# Patient Record
Sex: FEMALE | Race: BLACK OR AFRICAN AMERICAN | NOT HISPANIC OR LATINO | Employment: OTHER | URBAN - METROPOLITAN AREA
[De-identification: names, ages, dates, MRNs, and addresses within clinical notes are randomized per-mention and may not be internally consistent; named-entity substitution may affect disease eponyms.]

---

## 2017-01-05 ENCOUNTER — TELEPHONE (OUTPATIENT)
Dept: SLEEP MEDICINE | Facility: OTHER | Age: 42
End: 2017-01-05

## 2017-01-10 ENCOUNTER — TELEPHONE (OUTPATIENT)
Dept: SLEEP MEDICINE | Facility: OTHER | Age: 42
End: 2017-01-10

## 2017-01-17 ENCOUNTER — TELEPHONE (OUTPATIENT)
Dept: SLEEP MEDICINE | Facility: OTHER | Age: 42
End: 2017-01-17

## 2017-01-23 DIAGNOSIS — I10 ESSENTIAL HYPERTENSION: ICD-10-CM

## 2017-01-23 DIAGNOSIS — R06.02 SHORTNESS OF BREATH: ICD-10-CM

## 2017-01-23 DIAGNOSIS — E66.01 MORBID OBESITY WITH BMI OF 50.0-59.9, ADULT: ICD-10-CM

## 2017-01-23 DIAGNOSIS — I51.7 CARDIOMEGALY: ICD-10-CM

## 2017-01-23 RX ORDER — LOSARTAN POTASSIUM AND HYDROCHLOROTHIAZIDE 25; 100 MG/1; MG/1
1 TABLET ORAL DAILY
Qty: 30 TABLET | Refills: 6 | Status: SHIPPED | OUTPATIENT
Start: 2017-01-23 | End: 2019-03-28

## 2017-01-24 ENCOUNTER — TELEPHONE (OUTPATIENT)
Dept: SLEEP MEDICINE | Facility: OTHER | Age: 42
End: 2017-01-24

## 2017-01-24 ENCOUNTER — PATIENT MESSAGE (OUTPATIENT)
Dept: ADMINISTRATIVE | Facility: OTHER | Age: 42
End: 2017-01-24

## 2017-01-24 NOTE — TELEPHONE ENCOUNTER
Left messages to reschedule her sleep study she missed in Dec.  No response,sending out a message through my [facility] to schedule.

## 2017-01-26 ENCOUNTER — PATIENT MESSAGE (OUTPATIENT)
Dept: CARDIOLOGY | Facility: CLINIC | Age: 42
End: 2017-01-26

## 2017-01-26 ENCOUNTER — TELEPHONE (OUTPATIENT)
Dept: CARDIOLOGY | Facility: CLINIC | Age: 42
End: 2017-01-26

## 2017-01-26 ENCOUNTER — LAB VISIT (OUTPATIENT)
Dept: LAB | Facility: HOSPITAL | Age: 42
End: 2017-01-26
Attending: INTERNAL MEDICINE
Payer: COMMERCIAL

## 2017-01-26 DIAGNOSIS — I10 ESSENTIAL HYPERTENSION: ICD-10-CM

## 2017-01-26 DIAGNOSIS — I10 ESSENTIAL HYPERTENSION: Primary | ICD-10-CM

## 2017-01-26 LAB
ANION GAP SERPL CALC-SCNC: 6 MMOL/L
BUN SERPL-MCNC: 14 MG/DL
CALCIUM SERPL-MCNC: 9.8 MG/DL
CHLORIDE SERPL-SCNC: 103 MMOL/L
CO2 SERPL-SCNC: 29 MMOL/L
CREAT SERPL-MCNC: 0.9 MG/DL
EST. GFR  (AFRICAN AMERICAN): >60 ML/MIN/1.73 M^2
EST. GFR  (NON AFRICAN AMERICAN): >60 ML/MIN/1.73 M^2
GLUCOSE SERPL-MCNC: 97 MG/DL
MAGNESIUM SERPL-MCNC: 1.8 MG/DL
POTASSIUM SERPL-SCNC: 4 MMOL/L
SODIUM SERPL-SCNC: 138 MMOL/L

## 2017-01-26 PROCEDURE — 83735 ASSAY OF MAGNESIUM: CPT

## 2017-01-26 PROCEDURE — 36415 COLL VENOUS BLD VENIPUNCTURE: CPT | Mod: PO

## 2017-01-26 PROCEDURE — 80048 BASIC METABOLIC PNL TOTAL CA: CPT

## 2017-01-27 ENCOUNTER — PATIENT MESSAGE (OUTPATIENT)
Dept: CARDIOLOGY | Facility: CLINIC | Age: 42
End: 2017-01-27

## 2017-02-01 ENCOUNTER — TELEPHONE (OUTPATIENT)
Dept: SLEEP MEDICINE | Facility: OTHER | Age: 42
End: 2017-02-01

## 2017-02-02 ENCOUNTER — HOSPITAL ENCOUNTER (EMERGENCY)
Facility: OTHER | Age: 42
Discharge: HOME | End: 2017-02-02
Attending: EMERGENCY MEDICINE
Payer: COMMERCIAL

## 2017-02-02 VITALS
HEIGHT: 66 IN | DIASTOLIC BLOOD PRESSURE: 78 MMHG | OXYGEN SATURATION: 100 % | BODY MASS INDEX: 47.09 KG/M2 | TEMPERATURE: 98 F | HEART RATE: 82 BPM | WEIGHT: 293 LBS | SYSTOLIC BLOOD PRESSURE: 142 MMHG | RESPIRATION RATE: 18 BRPM

## 2017-02-02 DIAGNOSIS — R51.9 NONINTRACTABLE HEADACHE, UNSPECIFIED CHRONICITY PATTERN, UNSPECIFIED HEADACHE TYPE: Primary | ICD-10-CM

## 2017-02-02 DIAGNOSIS — R20.0 LEFT SIDED NUMBNESS: ICD-10-CM

## 2017-02-02 LAB
ALBUMIN SERPL-MCNC: 3.2 G/DL (ref 3.3–5.5)
ALP SERPL-CCNC: 88 U/L (ref 42–141)
B-HCG UR QL: NEGATIVE
BILIRUB SERPL-MCNC: 0.4 MG/DL (ref 0.2–1.6)
BUN SERPL-MCNC: 12 MG/DL (ref 7–22)
CALCIUM SERPL-MCNC: 9.4 MG/DL (ref 8–10.3)
CHLORIDE SERPL-SCNC: 99 MMOL/L (ref 98–108)
CREAT SERPL-MCNC: 0.9 MG/DL (ref 0.6–1.2)
CTP QC/QA: YES
GLUCOSE SERPL-MCNC: 85 MG/DL (ref 73–118)
POC ALT (SGPT): 28 (ref 10–47)
POC AST (SGOT): 32 (ref 11–38)
POC TCO2: 30 (ref 18–33)
POTASSIUM BLD-SCNC: 4.1 MMOL/L (ref 3.6–5.1)
PROTEIN, POC: 8.3 (ref 6.4–8.1)
SODIUM BLD-SCNC: 139 MMOL/L (ref 128–145)

## 2017-02-02 PROCEDURE — 25000003 PHARM REV CODE 250: Performed by: EMERGENCY MEDICINE

## 2017-02-02 PROCEDURE — 85025 COMPLETE CBC W/AUTO DIFF WBC: CPT

## 2017-02-02 PROCEDURE — 81025 URINE PREGNANCY TEST: CPT

## 2017-02-02 PROCEDURE — 99284 EMERGENCY DEPT VISIT MOD MDM: CPT | Mod: 25

## 2017-02-02 PROCEDURE — 81025 URINE PREGNANCY TEST: CPT | Performed by: EMERGENCY MEDICINE

## 2017-02-02 PROCEDURE — 80053 COMPREHEN METABOLIC PANEL: CPT

## 2017-02-02 RX ORDER — BUTALBITAL, ACETAMINOPHEN AND CAFFEINE 50; 325; 40 MG/1; MG/1; MG/1
1 TABLET ORAL EVERY 6 HOURS PRN
Qty: 15 TABLET | Refills: 0 | Status: SHIPPED | OUTPATIENT
Start: 2017-02-02 | End: 2017-02-12

## 2017-02-02 RX ORDER — BUTALBITAL, ACETAMINOPHEN AND CAFFEINE 50; 325; 40 MG/1; MG/1; MG/1
1 TABLET ORAL
Status: COMPLETED | OUTPATIENT
Start: 2017-02-02 | End: 2017-02-02

## 2017-02-02 RX ADMIN — BUTALBITAL, ACETAMINOPHEN, AND CAFFEINE 1 TABLET: 50; 325; 40 TABLET ORAL at 08:02

## 2017-02-02 NOTE — ED AVS SNAPSHOT
[facility] EMERGENCY DEPARTMENT  [address] [facility] [address]               Kristopher Ye   2017  6:01 PM   ED    Description:  Female : 1975   Department:  [facility] Emergency Department           Your Care was Coordinated By:     Provider Role From To    Gallo Rey MD Attending Provider 17 --      Reason for Visit     Numbness           Diagnoses this Visit        Comments    Nonintractable headache, unspecified chronicity pattern, unspecified headache type    -  Primary     Left sided numbness           ED Disposition     ED Disposition Condition Comment    Discharge             To Do List           Follow-up Information     Follow up with Juliano Giraldo MD. Call in 1 day.    Specialty:  Family Medicine    Why:  to follow up ED visit discussed the need for further workup by neurology as an outpatient.    Contact information:    [address] [facility] [address]  [phone]          Follow up with [facility] Emergency Department.    Specialty:  Emergency Medicine    Why:  As needed, If symptoms worsen or any new neurological symptoms, weakness, numbness or visual changes.    Contact information:    [address] [facility] [address]  [phone]       These Medications        Disp Refills Start End    butalbital-acetaminophen-caffeine -40 mg (FIORICET, ESGIC) -40 mg per tablet 15 tablet 0 2017    Take 1 tablet by mouth every 6 (six) hours as needed for Headaches. - Oral    Pharmacy: [facility] [address] [facility] Ph #: [phone]         [facility]     [facility] On Call Nurse Care Line -  Assistance  Registered nurses in the [facility] Call Center provide clinical advisement, health education, appointment booking, and other advisory services.  Call for this free service at [phone].             Medications            Message regarding Medications     Verify the changes and/or additions to your medication regime listed below are the same as discussed with your clinician today.  If any of these changes or additions are incorrect, please notify your healthcare provider.        START taking these NEW medications        Refills    butalbital-acetaminophen-caffeine -40 mg (FIORICET, ESGIC) -40 mg per tablet 0    Sig: Take 1 tablet by mouth every 6 (six) hours as needed for Headaches.    Class: Print    Route: Oral      These medications were administered today        Dose Freq    butalbital-acetaminophen-caffeine -40 mg per tablet 1 tablet 1 tablet ED 1 Time    Sig: Take 1 tablet by mouth ED 1 Time.    Class: Normal    Route: Oral      STOP taking these medications     metformin (GLUCOPHAGE) 1000 MG tablet Take 1 tablet (1,000 mg total) by mouth 2 (two) times daily with meals.    ondansetron (ZOFRAN-ODT) 4 MG TbDL Take 2 tablets (8 mg total) by mouth every 6 (six) hours as needed.    fluoxetine (PROZAC) 20 MG capsule Take 1 capsule (20 mg total) by mouth once daily.    loratadine (CLARITIN) 10 mg tablet Take 1 tablet (10 mg total) by mouth daily as needed.    ipratropium (ATROVENT) 0.03 % nasal spray 2 sprays by Nasal route 2 (two) times daily.    albuterol 90 mcg/actuation inhaler Inhale 2 puffs into the lungs every 6 (six) hours as needed for Wheezing or Shortness of Breath.           Verify that the below list of medications is an accurate representation of the medications you are currently taking.  If none reported, the list may be blank. If incorrect, please contact your healthcare provider. Carry this list with you in case of emergency.           Current Medications     ferrous sulfate 325 mg (65 mg iron) Tab tablet Take 1 tablet (325 mg total) by mouth 2 (two) times daily.    losartan-hydrochlorothiazide 100-25 mg (HYZAAR) 100-25 mg per tablet Take 1 tablet by mouth once daily.     "butalbital-acetaminophen-caffeine -40 mg (FIORICET, ESGIC) -40 mg per tablet Take 1 tablet by mouth every 6 (six) hours as needed for Headaches.    clotrimazole-betamethasone 1-0.05% (LOTRISONE) cream Apply topically 2 (two) times daily. X 1-2 weeks as flareup    fluticasone (FLONASE) 50 mcg/actuation nasal spray 1 spray by Each Nare route 2 (two) times daily.           Clinical Reference Information           Your Vitals Were     BP Pulse Temp Resp Height Weight    157/77 80 97.6 °F (36.4 °C) (Temporal) 18 5' 6" (1.676 m) 149.7 kg (330 lb)    Last Period SpO2 BMI          01/25/2017 100% 53.26 kg/m2        Allergies as of 2/2/2017        Reactions    Keflex [Cephalexin] Itching      Immunizations Administered on Date of Encounter - 2/2/2017     None      ED Micro, Lab, POCT     Start Ordered       Status Ordering Provider    02/02/17 1910 02/02/17 1910  POCT CMP  Once      Final result     02/02/17 1816 02/02/17 1815  POCT CBC  Once      Edited Result - FINAL     02/02/17 1816 02/02/17 1815  POCT CMP  Once      Completed     02/02/17 1816 02/02/17 1815  POCT urine pregnancy  Once      Final result       ED Imaging Orders     Start Ordered       Status Ordering Provider    02/02/17 2019 02/02/17 2019  CT Head Without Contrast  1 time imaging      Final result         Discharge Instructions         Self-Care for Headaches  Most headaches aren't serious and can be relieved with self-care. But some headaches may be a sign of another health problem like eye trouble or high blood pressure. To find the best treatment, learn what kind of headaches you get. For tension headaches, self-care will usually help. To treat migraines, ask your healthcare provider for advice. It is also possible to get both tension and migraine headaches. Self-care involves relieving the pain and avoiding headache triggers if you can.    Ways to reduce pain and tension  Try these steps:  · Apply a cold compress or ice pack to the pain " "site.  · Drink fluids. If nausea makes it hard to drink, try sucking on ice.  · Rest. Protect yourself from bright light and loud noises.  · Calm your emotions by imagining a peaceful scene.  · Massage tight neck, shoulder, and head muscles.  · To relax muscles, soak in a hot bath or use a hot shower.  Use medicines  Aspirin or aspirin substitutes, such as ibuprofen and acetaminophen, can relieve headache. Remember: Never give aspirin to anyone 18 years old or younger because of the risk of developing Reye syndrome. Use pain medicines only when necessary.  Track your headaches  Keeping a headache diary can help you and your healthcare provider identify what's causing your headaches:  · Note when each headache happens.  · Identify your activities and the foods you've eaten 6 to 8 hours before the headache began.  · Look for any trends or "triggers."  Signs of tension headache  Any of the following can be signs:  · Dull pain or feeling of pressure in a tight band around your head  · Pain in your neck or shoulders  · Headache without a definite beginning or end  · Headache after an activity such as driving or working on a computer  Signs of migraine  Any of the following can be signs:  · Throbbing pain on one or both sides of your head  · Nausea or vomiting  · Extreme sensitivity to light, sound, and smells  · Bright spots, flashes, or other visual changes  · Pain or nausea so severe that you can't continue your daily activities  Call your healthcare provider   If you have any of the following symptoms, contact your healthcare provider:  · A headache that lingers after a recent injury or bump to the head.  · A fever with a stiff neck or pain when you bend your head toward your chest.  · A headache along with slurred speech, changes in your vision, or numbness or weakness in your arms or legs.  · A headache for longer than 3 days.  · Frequent headaches, especially in the morning.  · Headaches with seizures   · Seek " "immediate medical attention if you have a headache that you would call "the worst headache you have ever had."   Date Last Reviewed: 10/4/2015  © 9689-6597 [employer]. [address] [place] [address], [place] [address]. All rights reserved. This information is not intended as a substitute for professional medical care. Always follow your healthcare professional's instructions.          Headache, Unspecified    Headaches can be caused by a number of things. The cause of your headache isnt clear. But it doesnt seem to be a sign of any serious illness.  You could have a tension headache or a migraine headache.  Stress can cause a tension headache. This can happen if you tense the muscles of your shoulders, neck, and scalp without knowing it. If this stress lasts long enough, you may develop a tension headache.  It is not clear why migraines occur, but transient factors called" triggers" can raise the risk of having a migraine attack. Migraine triggers may include emotional stress or depression, or by hormone changes during the menstrual cycle. Other triggers include birth control pills and other medicines, alcohol or caffeine, foods with tyramine, eye strain, weather changes, missed meals, and lack of sleep or oversleeping.  Other causes of headache include:  · Viral illness with high fever  · Head injury with concussion  · Sinus, ear, or throat infection  · Dental pain and jaw joint (TMJ) pain  More serious but less common causes of headache include stroke, brain hemorrhage, brain tumor, meningitis, and encephalitis.  Home care  Follow these tips when taking care of yourself at home:  · Dont drive yourself home if you were given pain medicine for your headache. Instead, have someone else drive you home. Try to sleep when you get home. You should feel much better when you wake up.  · Apply heat to the back of your neck to ease a neck muscle spasm. Take care of a migraine headache by putting an ice pack on your " forehead or at the base of your skull.  · If you have nausea or vomiting, eat a light diet until your headache eases.  · If you have a migraine headache, use sunglasses when in the daylight or around bright indoor lighting until your symptoms get better. Bright glaring light can make this type of headache worse.  Follow-up care  Follow up with your health care provider if your headache doesnt get better within the next 24 hours. Talk with your provider If you have frequent headaches. He or she can help figure out a treatment plan. By knowing the earliest signs of headache, and starting treatment right away, you may be able to stop the pain yourself.  When to seek medical advice  Call your health care provider right away if any of these occur:  · Your head pain gets worse  · Your head pain doesnt get better within 24 hours  · You arent able to keep liquids down (repeated vomiting)  · Fever of 100.4ºF (38ºC) or higher, or as directed by your health care provider  · Stiff neck  · Extreme drowsiness, confusion, or fainting  · Dizziness or dizziness with spinning sensation (vertigo)  · Weakness in an arm or leg or one side of your face  · You have difficulty talking or seeing  © 6837-6166 [employer]. [address] [place] [address], [place] [address]. All rights reserved. This information is not intended as a substitute for professional medical care. Always follow your healthcare professional's instructions.          Understanding Headache Pain  Headache pain can start in different structures in the head. The brain itself doesn't hurt, but other parts of the head do. Headache is a common symptom of illness, such as a cold or the flu. At other times, headaches happen without seeming to be connected to any illness. These are known as primary headaches. Examples of primary headaches include migraine and tension headaches. Very rarely are headaches a sign of a serious medical problem.    What is referred  pain?  Referred pain has its source in one place, but is felt in another. For example, pain behind the eyes may actually be caused by tense muscles in the neck and shoulders. This means that the place that hurts may not be the part of the head that needs treatment.  Date Last Reviewed: 10/9/2015  © 5699-4351 [employer]. [address] [place] [address]. All rights reserved. This information is not intended as a substitute for professional medical care. Always follow your healthcare professional's instructions.      Imaging Results         CT Head Without Contrast (Final result) Result time:  02/02/17 21:00:44    Final result by Interface, Rad Results In (02/02/17 21:00:44)    Narrative:    Study Desc:   CT HEAD WITHOUT CONTRAST  History: Left-sided pain radiating from the orbit for one week in a patient with possible   stroke     Comparison exam: None.     Technique: CT of the head is performed on a multidetector CT with axial imaging.  Coronal   and sagittal reconstructions were obtained.     The total DLP for the procedure is 738.58 mGy.      The brain parenchyma is normal with normal gray and white interfaces, sulci and gyri.    There are no intracranial masses, mass effect or midline shift.  There is no cerebral   edema.  There is no subarachnoid hemorrhage.  There are no intra-or extra-axial fluid   collections, intraventricular or intraparenchymal hemorrhage.  There are no low   attenuation demarcating areas, to suggest subacute stroke on CT.  The pineal, sellar,   skull base and brainstem regions appear unremarkable.     The lateral, third and fourth ventricles appear unremarkable.  The suprasellar and   basilar cisterns appear unremarkable.     The visualized orbits, paranasal sinuses and mastoid regions appear unremarkable.     There are no definite skull osseous abnormality seen.     If there is further clinical concern for intracranial pathology, MRI of the brain may be   performed  for further assessment.     Impression:  1.  Unremarkable normal CT of the head without contrast.  No intracranial hemorrhage,   masses or stroke seen.     SL: 23 Signed by: JOSE Erazo MD  2017-02-02 21:00:42                 [facility] Department complies with applicable Federal civil rights laws and does not discriminate on the basis of race, color, national origin, age, disability, or sex.        Language Assistance Services     ATTENTION: Language assistance services are available, free of charge. Please call [phone].      ATENCIÓN: Si habla español, tiene a tapia disposición servicios gratuitos de asistencia lingüística. Llame al [phone].     CHÚ Ý: N?u b?n nói Ti?ng Vi?t, có các d?ch v? h? tr? ngôn ng? mi?n phí dành cho b?n. G?i s? [phone].

## 2017-02-03 NOTE — ED TRIAGE NOTES
Patient identifiers verified and correct for Kristopher Ye.    LOC: The patient is awake, alert and aware of environment with an appropriate affect, the patient is oriented x 3 and speaking appropriately.  APPEARANCE: Patient resting comfortably and in no acute distress, patient is clean and well groomed, patient's clothing is properly fastened.  SKIN: The skin is warm and dry, color consistent with ethnicity, patient has normal skin turgor and moist mucus membranes, skin intact, no breakdown or bruising noted.  MUSCULOSKELETAL: Patient moving all extremities spontaneously, no obvious swelling or deformities noted.  RESPIRATORY: Airway is open and patent, respirations are spontaneous, patient has a normal effort and rate, no accessory muscle use noted, clear bilateral breath sounds.  CARDIAC: Patient has a normal rate and regular rhythm, no periphreal edema noted, capillary refill < 3 seconds.  ABDOMEN: Soft and non tender to palpation, no distention noted, normoactive bowel sounds present in all four quadrants.  NEUROLOGIC: PERRL, 3mm bilaterally, eyes open spontaneously, behavior appropriate to situation, follows commands, facial expression symmetrical, bilateral hand grasp equal and even, purposeful motor response noted, normal sensation in all extremities when touched with a finger.

## 2017-02-03 NOTE — DISCHARGE INSTRUCTIONS
"  Self-Care for Headaches  Most headaches aren't serious and can be relieved with self-care. But some headaches may be a sign of another health problem like eye trouble or high blood pressure. To find the best treatment, learn what kind of headaches you get. For tension headaches, self-care will usually help. To treat migraines, ask your healthcare provider for advice. It is also possible to get both tension and migraine headaches. Self-care involves relieving the pain and avoiding headache triggers if you can.    Ways to reduce pain and tension  Try these steps:  · Apply a cold compress or ice pack to the pain site.  · Drink fluids. If nausea makes it hard to drink, try sucking on ice.  · Rest. Protect yourself from bright light and loud noises.  · Calm your emotions by imagining a peaceful scene.  · Massage tight neck, shoulder, and head muscles.  · To relax muscles, soak in a hot bath or use a hot shower.  Use medicines  Aspirin or aspirin substitutes, such as ibuprofen and acetaminophen, can relieve headache. Remember: Never give aspirin to anyone 18 years old or younger because of the risk of developing Reye syndrome. Use pain medicines only when necessary.  Track your headaches  Keeping a headache diary can help you and your healthcare provider identify what's causing your headaches:  · Note when each headache happens.  · Identify your activities and the foods you've eaten 6 to 8 hours before the headache began.  · Look for any trends or "triggers."  Signs of tension headache  Any of the following can be signs:  · Dull pain or feeling of pressure in a tight band around your head  · Pain in your neck or shoulders  · Headache without a definite beginning or end  · Headache after an activity such as driving or working on a computer  Signs of migraine  Any of the following can be signs:  · Throbbing pain on one or both sides of your head  · Nausea or vomiting  · Extreme sensitivity to light, sound, and " "smells  · Bright spots, flashes, or other visual changes  · Pain or nausea so severe that you can't continue your daily activities  Call your healthcare provider   If you have any of the following symptoms, contact your healthcare provider:  · A headache that lingers after a recent injury or bump to the head.  · A fever with a stiff neck or pain when you bend your head toward your chest.  · A headache along with slurred speech, changes in your vision, or numbness or weakness in your arms or legs.  · A headache for longer than 3 days.  · Frequent headaches, especially in the morning.  · Headaches with seizures   · Seek immediate medical attention if you have a headache that you would call "the worst headache you have ever had."   Date Last Reviewed: 10/4/2015  © 1475-6091 [employer]. [address] [place] [address], [place] [address]. All rights reserved. This information is not intended as a substitute for professional medical care. Always follow your healthcare professional's instructions.          Headache, Unspecified    Headaches can be caused by a number of things. The cause of your headache isnt clear. But it doesnt seem to be a sign of any serious illness.  You could have a tension headache or a migraine headache.  Stress can cause a tension headache. This can happen if you tense the muscles of your shoulders, neck, and scalp without knowing it. If this stress lasts long enough, you may develop a tension headache.  It is not clear why migraines occur, but transient factors called" triggers" can raise the risk of having a migraine attack. Migraine triggers may include emotional stress or depression, or by hormone changes during the menstrual cycle. Other triggers include birth control pills and other medicines, alcohol or caffeine, foods with tyramine, eye strain, weather changes, missed meals, and lack of sleep or oversleeping.  Other causes of headache include:  · Viral illness with high " fever  · Head injury with concussion  · Sinus, ear, or throat infection  · Dental pain and jaw joint (TMJ) pain  More serious but less common causes of headache include stroke, brain hemorrhage, brain tumor, meningitis, and encephalitis.  Home care  Follow these tips when taking care of yourself at home:  · Dont drive yourself home if you were given pain medicine for your headache. Instead, have someone else drive you home. Try to sleep when you get home. You should feel much better when you wake up.  · Apply heat to the back of your neck to ease a neck muscle spasm. Take care of a migraine headache by putting an ice pack on your forehead or at the base of your skull.  · If you have nausea or vomiting, eat a light diet until your headache eases.  · If you have a migraine headache, use sunglasses when in the daylight or around bright indoor lighting until your symptoms get better. Bright glaring light can make this type of headache worse.  Follow-up care  Follow up with your health care provider if your headache doesnt get better within the next 24 hours. Talk with your provider If you have frequent headaches. He or she can help figure out a treatment plan. By knowing the earliest signs of headache, and starting treatment right away, you may be able to stop the pain yourself.  When to seek medical advice  Call your health care provider right away if any of these occur:  · Your head pain gets worse  · Your head pain doesnt get better within 24 hours  · You arent able to keep liquids down (repeated vomiting)  · Fever of 100.4ºF (38ºC) or higher, or as directed by your health care provider  · Stiff neck  · Extreme drowsiness, confusion, or fainting  · Dizziness or dizziness with spinning sensation (vertigo)  · Weakness in an arm or leg or one side of your face  · You have difficulty talking or seeing  © 8087-8048 [employer]. [address] [place] [address], [place] [address]. All rights reserved. This  information is not intended as a substitute for professional medical care. Always follow your healthcare professional's instructions.          Understanding Headache Pain  Headache pain can start in different structures in the head. The brain itself doesn't hurt, but other parts of the head do. Headache is a common symptom of illness, such as a cold or the flu. At other times, headaches happen without seeming to be connected to any illness. These are known as primary headaches. Examples of primary headaches include migraine and tension headaches. Very rarely are headaches a sign of a serious medical problem.    What is referred pain?  Referred pain has its source in one place, but is felt in another. For example, pain behind the eyes may actually be caused by tense muscles in the neck and shoulders. This means that the place that hurts may not be the part of the head that needs treatment.  Date Last Reviewed: 10/9/2015  © 2494-4131 [employer]. [address] [place] [address]. All rights reserved. This information is not intended as a substitute for professional medical care. Always follow your healthcare professional's instructions.      Imaging Results         CT Head Without Contrast (Final result) Result time:  02/02/17 21:00:44    Final result by Interface, Rad Results In (02/02/17 21:00:44)    Narrative:    Study Desc:   CT HEAD WITHOUT CONTRAST  History: Left-sided pain radiating from the orbit for one week in a patient with possible   stroke     Comparison exam: None.     Technique: CT of the head is performed on a multidetector CT with axial imaging.  Coronal   and sagittal reconstructions were obtained.     The total DLP for the procedure is 738.58 mGy.      The brain parenchyma is normal with normal gray and white interfaces, sulci and gyri.    There are no intracranial masses, mass effect or midline shift.  There is no cerebral   edema.  There is no subarachnoid hemorrhage.  There  are no intra-or extra-axial fluid   collections, intraventricular or intraparenchymal hemorrhage.  There are no low   attenuation demarcating areas, to suggest subacute stroke on CT.  The pineal, sellar,   skull base and brainstem regions appear unremarkable.     The lateral, third and fourth ventricles appear unremarkable.  The suprasellar and   basilar cisterns appear unremarkable.     The visualized orbits, paranasal sinuses and mastoid regions appear unremarkable.     There are no definite skull osseous abnormality seen.     If there is further clinical concern for intracranial pathology, MRI of the brain may be   performed for further assessment.     Impression:  1.  Unremarkable normal CT of the head without contrast.  No intracranial hemorrhage,   masses or stroke seen.     SL: 23 Signed by: JOSE Erazo MD  2017-02-02 21:00:42

## 2017-03-09 ENCOUNTER — TELEPHONE (OUTPATIENT)
Dept: SLEEP MEDICINE | Facility: OTHER | Age: 42
End: 2017-03-09

## 2017-03-09 NOTE — TELEPHONE ENCOUNTER
Left several message to schedule sleep study,also sent out a message through [facility].  No response.

## 2017-04-05 ENCOUNTER — OFFICE VISIT (OUTPATIENT)
Dept: FAMILY MEDICINE | Facility: CLINIC | Age: 42
End: 2017-04-05
Payer: COMMERCIAL

## 2017-04-05 VITALS
TEMPERATURE: 98 F | OXYGEN SATURATION: 99 % | HEIGHT: 66 IN | RESPIRATION RATE: 18 BRPM | SYSTOLIC BLOOD PRESSURE: 130 MMHG | HEART RATE: 67 BPM | DIASTOLIC BLOOD PRESSURE: 88 MMHG | WEIGHT: 293 LBS | BODY MASS INDEX: 47.09 KG/M2

## 2017-04-05 DIAGNOSIS — J30.9 ALLERGIC RHINITIS, UNSPECIFIED ALLERGIC RHINITIS TRIGGER, UNSPECIFIED RHINITIS SEASONALITY: ICD-10-CM

## 2017-04-05 DIAGNOSIS — R51.9 ACUTE NONINTRACTABLE HEADACHE, UNSPECIFIED HEADACHE TYPE: Primary | ICD-10-CM

## 2017-04-05 DIAGNOSIS — E66.01 MORBID OBESITY WITH BODY MASS INDEX OF 50.0-59.9 IN ADULT: ICD-10-CM

## 2017-04-05 DIAGNOSIS — F43.0 ACUTE REACTION TO SITUATIONAL STRESS: ICD-10-CM

## 2017-04-05 PROCEDURE — 96372 THER/PROPH/DIAG INJ SC/IM: CPT | Mod: S$GLB,,, | Performed by: NURSE PRACTITIONER

## 2017-04-05 PROCEDURE — 99214 OFFICE O/P EST MOD 30 MIN: CPT | Mod: 25,S$GLB,, | Performed by: NURSE PRACTITIONER

## 2017-04-05 PROCEDURE — 3075F SYST BP GE 130 - 139MM HG: CPT | Mod: S$GLB,,, | Performed by: NURSE PRACTITIONER

## 2017-04-05 PROCEDURE — 99999 PR PBB SHADOW E&M-EST. PATIENT-LVL III: CPT | Mod: PBBFAC,,, | Performed by: NURSE PRACTITIONER

## 2017-04-05 PROCEDURE — 1160F RVW MEDS BY RX/DR IN RCRD: CPT | Mod: S$GLB,,, | Performed by: NURSE PRACTITIONER

## 2017-04-05 PROCEDURE — 3079F DIAST BP 80-89 MM HG: CPT | Mod: S$GLB,,, | Performed by: NURSE PRACTITIONER

## 2017-04-05 RX ORDER — ACETAMINOPHEN AND CODEINE PHOSPHATE 300; 30 MG/1; MG/1
1 TABLET ORAL
Qty: 30 TABLET | Refills: 0 | Status: SHIPPED | OUTPATIENT
Start: 2017-04-05 | End: 2017-04-15

## 2017-04-05 RX ORDER — KETOROLAC TROMETHAMINE 30 MG/ML
60 INJECTION, SOLUTION INTRAMUSCULAR; INTRAVENOUS
Status: COMPLETED | OUTPATIENT
Start: 2017-04-05 | End: 2017-04-05

## 2017-04-05 RX ORDER — FLUTICASONE PROPIONATE 50 MCG
1 SPRAY, SUSPENSION (ML) NASAL 2 TIMES DAILY
Qty: 1 BOTTLE | Refills: 0 | Status: SHIPPED | OUTPATIENT
Start: 2017-04-05 | End: 2018-03-07

## 2017-04-05 RX ADMIN — KETOROLAC TROMETHAMINE 60 MG: 30 INJECTION, SOLUTION INTRAMUSCULAR; INTRAVENOUS at 09:04

## 2017-04-05 NOTE — MR AVS SNAPSHOT
[facility]  [address]  [place] [address]  Phone: [phone]  Fax: [phone]                  Kristopher Ye   2017 9:00 AM   Office Visit    Description:  Female : 1975   Provider:  [facility]APALCO, [facility]   Department:  [facility]           Reason for Visit     Headache           Diagnoses this Visit        Comments    Allergic rhinitis, unspecified allergic rhinitis trigger, unspecified rhinitis seasonality    -  Primary     Morbid obesity with body mass index of 50.0-59.9 in adult         Acute reaction to situational stress         Acute nonintractable headache, unspecified headache type                To Do List           Goals (5 Years of Data)     None      Follow-Up and Disposition     Return if symptoms worsen or fail to improve.       These Medications        Disp Refills Start End    fluticasone (FLONASE) 50 mcg/actuation nasal spray 1 Bottle 0 2017     1 spray by Each Nare route 2 (two) times daily. - Each Nare    Pharmacy: [facility] [address] [place], [place] [address] [address] [facility] #: [phone]       acetaminophen-codeine 300-30mg (TYLENOL #3) 300-30 mg Tab 30 tablet 0 2017 4/15/2017    Take 1 tablet by mouth every 6 to 8 hours as needed. - Oral    Pharmacy: [facility] [address] [place], [place] [address] [address] [facility] #: [phone]         [facility]     [facility] Care [facility] - 24/7 Assistance  Unless otherwise directed by your provider, please contact [facility], our nurse care line that is available for 24/7 assistance.     Registered nurses in the [facility] provide: appointment scheduling, clinical advisement, health education, and other advisory services.  Call: [phone] (toll free)               Medications           Message regarding Medications     Verify the changes and/or additions to your medication regime listed  "below are the same as discussed with your clinician today.  If any of these changes or additions are incorrect, please notify your healthcare provider.        START taking these NEW medications        Refills    acetaminophen-codeine 300-30mg (TYLENOL #3) 300-30 mg Tab 0    Sig: Take 1 tablet by mouth every 6 to 8 hours as needed.    Class: Normal    Route: Oral      These medications were administered today        Dose Freq    ketorolac injection 60 mg 60 mg Clinic/HOD 1 time    Sig: Inject 2 mLs (60 mg total) into the muscle one time.    Class: Normal    Route: Intramuscular           Verify that the below list of medications is an accurate representation of the medications you are currently taking.  If none reported, the list may be blank. If incorrect, please contact your healthcare provider. Carry this list with you in case of emergency.           Current Medications     ferrous sulfate 325 mg (65 mg iron) Tab tablet Take 1 tablet (325 mg total) by mouth 2 (two) times daily.    losartan-hydrochlorothiazide 100-25 mg (HYZAAR) 100-25 mg per tablet Take 1 tablet by mouth once daily.    acetaminophen-codeine 300-30mg (TYLENOL #3) 300-30 mg Tab Take 1 tablet by mouth every 6 to 8 hours as needed.    fluticasone (FLONASE) 50 mcg/actuation nasal spray 1 spray by Each Nare route 2 (two) times daily.           Clinical Reference Information           Your Vitals Were     BP Pulse Temp Resp Height Weight    130/88 (BP Location: Right arm, Patient Position: Sitting, BP Method: Manual) 67 98.3 °F (36.8 °C) (Oral) 18 5' 6" (1.676 m) 162.6 kg (358 lb 9.3 oz)    SpO2 BMI             99% 57.88 kg/m2         Blood Pressure          Most Recent Value    BP  130/88      Allergies as of 4/5/2017     Keflex [Cephalexin]      Immunizations Administered on Date of Encounter - 4/5/2017     None      Instructions      Allergic Rhinitis  Allergic rhinitis is an allergic reaction that affects the nose, and often the eyes. Its often " known as nasal allergies. Nasal allergies are often due to things in the environment that are breathed in. Depending what you are sensitive to, nasal allergies may occur only during certain seasons. Or they may occur year round. Common indoor allergens include house dust mites, mold, cockroaches, and pet dander. Outdoor allergens include pollen from trees, grasses, and weeds.   Symptoms include a drippy, stuffy, and itchy nose. They also include sneezing and red and itchy eyes. You may feel tired more often. Severe allergies may also affect your breathing and trigger a condition called asthma.   Tests can be done to see what allergens are affecting you. You may be referred to an allergy specialist for testing and further evaluation.  Home care  The healthcare provider may prescribe medicines to help relieve allergy symptoms.   Ask the provider for advice on how to avoid substances that you are allergic to. Below are a few tips for each type of allergen.  Pet dander:  · Do not have pets with fur and feathers.  · If you cannot avoid having a pet, keep it out of your bedroom and off upholstered furniture.  Pollen:  · When pollen counts are high, keep windows of your car and home closed. If possible, use an air conditioner instead.  · Wear a filter mask when mowing or doing yard work.  House dust mites:  · Wash bedding every week in warm water and detergent and dry on a hot setting.  · Cover the mattress, box spring, and pillows with allergy covers.   · If possible, sleep in a room with no carpet, curtains, or upholstered furniture.  Cockroaches:  · Store food in sealed containers.  · Remove garbage from the home promptly.  · Fix water leaks  Mold:  · Keep humidity low by using a dehumidifier or air conditioner. Keep the dehumidifier and air conditioner clean and free of mold.  · Clean moldy areas with bleach and water.  In general:  · Vacuum once or twice a week. If possible, use a vacuum with a high-efficiency  "particulate air (HEPA) filter.  · Do not smoke. Avoid cigarette smoke. Cigarette smoke is an irritant that can make symptoms worse.  Follow-up care  Follow up as advised by the health care provider or our staff. If you were referred to an allergy specialist, make this appointment promptly.  When to seek medical advice  Call your healthcare provider right away if the following occur:  · Coughing or wheezing  · Fever greater than 100.4°F (38°C)  · Continuing symptoms, new symptoms, or worsening symptoms  Call 911 right away if you have:  · Trouble breathing  · Hives (raised red bumps)  · Severe swelling of the face or severe itching of the eyes or mouth  Date Last Reviewed: 4/26/2015 © 2000-2016 [employer]. [address] [place] [address], [place] [address]. All rights reserved. This information is not intended as a substitute for professional medical care. Always follow your healthcare professional's instructions.        Headache, Unspecified    A number of things can cause headaches. The cause of your headache isnt clear. But it doesnt seem to be a sign of any serious illness.  You could have a tension headache or a migraine headache.  Stress can cause a tension headache. This can happen if you tense the muscles of your shoulders, neck, and scalp without knowing it. If this stress lasts long enough, you may develop a tension headache.  It is not clear why migraines occur, but certain things called" triggers" can raise the risk of having a migraine attack. Migraine triggers may include emotional stress or depression, or by hormone changes during the menstrual cycle. Other triggers include birth control pills and other medicines, alcohol or caffeine, foods with tyramine (such as aged cheese, wine), eyestrain, weather changes, missed meals, and lack of sleep or oversleeping.  Other causes of headache include:  · Viral illness with high fever  · Head injury with concussion  · Sinus, ear, or throat " infection  · Dental pain and jaw joint (TMJ) pain  More serious but less common causes of headache include stroke, brain hemorrhage, brain tumor, meningitis, and encephalitis.  Home care  Follow these tips when taking care of yourself at home:  · Dont drive yourself home if you were given pain medicine for your headache. Instead, have someone else drive you home. Try to sleep when you get home. You should feel much better when you wake up.  · Apply heat to the back of your neck to ease a neck muscle spasm. Take care of a migraine headache by putting an ice pack on your forehead or at the base of your skull.  · If you have nausea or vomiting, eat a light diet until your headache eases.  · If you have a migraine headache, use sunglasses when in the daylight or around bright indoor lighting until your symptoms get better. Bright glaring light can make this type of headache worse.  Follow-up care  Follow up with your healthcare provider, or as advised. Talk with your provider if you have frequent headaches. He or she can help figure out a treatment plan. By knowing the earliest signs of headache, and starting treatment right away, you may be able to stop the pain yourself.  When to seek medical advice  Call your healthcare provider right away if any of these occur:  · Your head pain suddenly gets worse after sexual intercourse or strenuous activity  · Your head pain doesnt get better within 24 hours  · You arent able to keep liquids down (repeated vomiting)  · Fever of 100.4ºF (38ºC) or higher, or as directed by your healthcare provider  · Stiff neck  · Extreme drowsiness, confusion, or fainting  · Dizziness or dizziness with spinning sensation (vertigo)  · Weakness in an arm or leg or one side of your face  · You have trouble talking or seeing  Date Last Reviewed: 8/1/2016  © 3794-4906 [employer]. [address] [place] [address], [place] [address]. All rights reserved. This information is not intended as a  substitute for professional medical care. Always follow your healthcare professional's instructions.             Language Assistance Services     ATTENTION: Language assistance services are available, free of charge. Please call [phone].      ATENCIÓN: Si habla srinivas, tiene a tapia disposición servicios gratuitos de asistencia lingüística. Llame al [phone].     CHÚ Ý: N?u b?n nói Ti?ng Vi?t, có các d?ch v? h? tr? ngôn ng? mi?n phí dành cho b?n. G?i s? [phone].         [facility] complies with applicable Federal civil rights laws and does not discriminate on the basis of race, color, national origin, age, disability, or sex.

## 2017-04-05 NOTE — PROGRESS NOTES
"Subjective:       Patient ID: Kristopher Ye is a 41 y.o. female.    Chief Complaint: Headache (reports as "shooting pain" to left side of head)    Headache    This is a recurrent problem. The current episode started yesterday. The problem occurs constantly. The problem has been unchanged. The pain is located in the left unilateral, frontal and temporal region. The pain radiates to the face. The pain quality is similar to prior headaches. The quality of the pain is described as aching. The pain is at a severity of 8/10. The pain is moderate. Associated symptoms include eye pain, muscle aches and tingling (face). Pertinent negatives include no back pain, blurred vision, coughing, dizziness, drainage, ear pain, eye redness, fever, hearing loss, insomnia, neck pain, numbness, phonophobia, photophobia, rhinorrhea, scalp tenderness, seizures, sinus pressure, tinnitus, visual change, vomiting, weakness or weight loss. The symptoms are aggravated by noise. She has tried nothing for the symptoms.     Review of Systems   Constitutional: Negative for chills, diaphoresis, fatigue, fever and weight loss.   HENT: Negative for ear pain, hearing loss, rhinorrhea, sinus pressure and tinnitus.    Eyes: Positive for pain. Negative for blurred vision, photophobia, redness and itching.   Respiratory: Negative for cough, chest tightness, shortness of breath, wheezing and stridor.    Cardiovascular: Negative for chest pain, palpitations and leg swelling.   Gastrointestinal: Negative for vomiting.   Musculoskeletal: Negative for back pain and neck pain.   Neurological: Positive for tingling (face) and headaches. Negative for dizziness, seizures, weakness and numbness.   Psychiatric/Behavioral: The patient does not have insomnia.        Objective:      Physical Exam   Constitutional: She is oriented to person, place, and time. She appears well-developed and well-nourished. No distress.   HENT:   Head: Normocephalic and atraumatic. "   Right Ear: Tympanic membrane, external ear and ear canal normal.   Left Ear: Tympanic membrane, external ear and ear canal normal.   Nose: Mucosal edema present. No rhinorrhea. Right sinus exhibits no maxillary sinus tenderness and no frontal sinus tenderness. Left sinus exhibits maxillary sinus tenderness and frontal sinus tenderness.   Mouth/Throat: Uvula is midline, oropharynx is clear and moist and mucous membranes are normal. Mucous membranes are not dry. No oropharyngeal exudate, posterior oropharyngeal edema or posterior oropharyngeal erythema.   Eyes: Conjunctivae, EOM and lids are normal. Pupils are equal, round, and reactive to light.   Cardiovascular: Normal rate and regular rhythm.    No murmur heard.  Pulmonary/Chest: Effort normal and breath sounds normal. She has no wheezes. She has no rales.   Lymphadenopathy:     She has no cervical adenopathy.   Neurological: She is alert and oriented to person, place, and time.   Skin: Skin is warm, dry and intact.   Psychiatric: She has a normal mood and affect.   Nursing note and vitals reviewed.      Assessment:       1. Acute nonintractable headache, unspecified headache type    2. Allergic rhinitis, unspecified allergic rhinitis trigger, unspecified rhinitis seasonality    3. Morbid obesity (BMI= 55.9 on 10/30/14)    4. Acute reaction to situational stress        Plan:       Kristopher was seen today for headache.    Diagnoses and all orders for this visit:    Acute nonintractable headache, unspecified headache type  -     ketorolac injection 60 mg; Inject 2 mLs (60 mg total) into the muscle one time.  -     acetaminophen-codeine 300-30mg (TYLENOL #3) 300-30 mg Tab; Take 1 tablet by mouth every 6 to 8 hours as needed.    Allergic rhinitis, unspecified allergic rhinitis trigger, unspecified rhinitis seasonality  -     fluticasone (FLONASE) 50 mcg/actuation nasal spray; 1 spray by Each Nare route 2 (two) times daily.    Morbid obesity (BMI= 55.9 on  10/30/14)  Discussed diet, continued participation in tolerable fitness activities, health risks associated with obesity.     Acute reaction to situational stress  Learn to relax  Control stress by avoiding things that trigger stress and by relaxing when you start feeling tense. Find a quiet place and sit or lie in a comfortable position. Close your eyes and try the following:  · Picture yourself in an ideal place, doing something you enjoy. Stay in that place until you feel relaxed.  · Slowly tense, then relax, each part of your body. Start with your toes and work up to your scalp. As you breathe in, tighten the muscles. Keep them tight for several seconds. Then relax as you breathe out.  · You can also relax by doing sepideh chi or yoga, praying, meditating, or listening to music or relaxation tapes.  Talk with your health care provider about how you are feeling. It's important for your provider to understand what is going on and how it is affecting your life.        Patient discharged to home in stable condition with instructions to:   1. Please take all meds as prescribed.  2. Follow-up with your primary care doctor   3. Return precautions discussed and patient and/or family/caretaker understands to return to the emergency room for any concerns including worsening of your current symptoms, fever, chills, night sweats, worsening pain, chest pain, shortness of breath, nausea, vomiting, diarrhea, bleeding, headache, difficulty talking, visual disturbances, weakness, numbness or any other acute concerns

## 2017-04-05 NOTE — PATIENT INSTRUCTIONS
Allergic Rhinitis  Allergic rhinitis is an allergic reaction that affects the nose, and often the eyes. Its often known as nasal allergies. Nasal allergies are often due to things in the environment that are breathed in. Depending what you are sensitive to, nasal allergies may occur only during certain seasons. Or they may occur year round. Common indoor allergens include house dust mites, mold, cockroaches, and pet dander. Outdoor allergens include pollen from trees, grasses, and weeds.   Symptoms include a drippy, stuffy, and itchy nose. They also include sneezing and red and itchy eyes. You may feel tired more often. Severe allergies may also affect your breathing and trigger a condition called asthma.   Tests can be done to see what allergens are affecting you. You may be referred to an allergy specialist for testing and further evaluation.  Home care  The healthcare provider may prescribe medicines to help relieve allergy symptoms.   Ask the provider for advice on how to avoid substances that you are allergic to. Below are a few tips for each type of allergen.  Pet dander:  · Do not have pets with fur and feathers.  · If you cannot avoid having a pet, keep it out of your bedroom and off upholstered furniture.  Pollen:  · When pollen counts are high, keep windows of your car and home closed. If possible, use an air conditioner instead.  · Wear a filter mask when mowing or doing yard work.  House dust mites:  · Wash bedding every week in warm water and detergent and dry on a hot setting.  · Cover the mattress, box spring, and pillows with allergy covers.   · If possible, sleep in a room with no carpet, curtains, or upholstered furniture.  Cockroaches:  · Store food in sealed containers.  · Remove garbage from the home promptly.  · Fix water leaks  Mold:  · Keep humidity low by using a dehumidifier or air conditioner. Keep the dehumidifier and air conditioner clean and free of mold.  · Clean moldy areas with  "bleach and water.  In general:  · Vacuum once or twice a week. If possible, use a vacuum with a high-efficiency particulate air (HEPA) filter.  · Do not smoke. Avoid cigarette smoke. Cigarette smoke is an irritant that can make symptoms worse.  Follow-up care  Follow up as advised by the health care provider or our staff. If you were referred to an allergy specialist, make this appointment promptly.  When to seek medical advice  Call your healthcare provider right away if the following occur:  · Coughing or wheezing  · Fever greater than 100.4°F (38°C)  · Continuing symptoms, new symptoms, or worsening symptoms  Call 911 right away if you have:  · Trouble breathing  · Hives (raised red bumps)  · Severe swelling of the face or severe itching of the eyes or mouth  Date Last Reviewed: 4/26/2015  © 4883-7496 [employer]. [address] [place] [address]. All rights reserved. This information is not intended as a substitute for professional medical care. Always follow your healthcare professional's instructions.        Headache, Unspecified    A number of things can cause headaches. The cause of your headache isnt clear. But it doesnt seem to be a sign of any serious illness.  You could have a tension headache or a migraine headache.  Stress can cause a tension headache. This can happen if you tense the muscles of your shoulders, neck, and scalp without knowing it. If this stress lasts long enough, you may develop a tension headache.  It is not clear why migraines occur, but certain things called" triggers" can raise the risk of having a migraine attack. Migraine triggers may include emotional stress or depression, or by hormone changes during the menstrual cycle. Other triggers include birth control pills and other medicines, alcohol or caffeine, foods with tyramine (such as aged cheese, wine), eyestrain, weather changes, missed meals, and lack of sleep or oversleeping.  Other causes of headache " include:  · Viral illness with high fever  · Head injury with concussion  · Sinus, ear, or throat infection  · Dental pain and jaw joint (TMJ) pain  More serious but less common causes of headache include stroke, brain hemorrhage, brain tumor, meningitis, and encephalitis.  Home care  Follow these tips when taking care of yourself at home:  · Dont drive yourself home if you were given pain medicine for your headache. Instead, have someone else drive you home. Try to sleep when you get home. You should feel much better when you wake up.  · Apply heat to the back of your neck to ease a neck muscle spasm. Take care of a migraine headache by putting an ice pack on your forehead or at the base of your skull.  · If you have nausea or vomiting, eat a light diet until your headache eases.  · If you have a migraine headache, use sunglasses when in the daylight or around bright indoor lighting until your symptoms get better. Bright glaring light can make this type of headache worse.  Follow-up care  Follow up with your healthcare provider, or as advised. Talk with your provider if you have frequent headaches. He or she can help figure out a treatment plan. By knowing the earliest signs of headache, and starting treatment right away, you may be able to stop the pain yourself.  When to seek medical advice  Call your healthcare provider right away if any of these occur:  · Your head pain suddenly gets worse after sexual intercourse or strenuous activity  · Your head pain doesnt get better within 24 hours  · You arent able to keep liquids down (repeated vomiting)  · Fever of 100.4ºF (38ºC) or higher, or as directed by your healthcare provider  · Stiff neck  · Extreme drowsiness, confusion, or fainting  · Dizziness or dizziness with spinning sensation (vertigo)  · Weakness in an arm or leg or one side of your face  · You have trouble talking or seeing  Date Last Reviewed: 8/1/2016 © 2000-2016 [employer]. [address]  [place] [address]. All rights reserved. This information is not intended as a substitute for professional medical care. Always follow your healthcare professional's instructions.        Keys to Managing Stress  There are several keys to managing stress. First, learn to recognize when youre under stress and what triggers it. Next, find positive ways of responding to your triggers. Be sure to take good care of your health and make time to relax. Read on to learn more about the keys to managing stress.    Recognizing stress  Learn to recognize your stress and find out what triggers it. To do this, try to be aware of how you feel each day. If you notice your heart racing or your muscles tightening, your body may be responding to stress. Ask yourself why. Then write down your answer. To keep the process going, make a list of all the things that trigger stressful feelings.  Living a healthy life  Keeping yourself healthy helps you deal better with stress. This means getting enough sleep, eating right, and exercising. It also means knowing what you value most in life, and making time for yourself. Keep a daily health journal to see if youre doing these things. Then, read your journal each week. If you dont take good care of yourself, you may feel more stressed.  Responding better to stress  Life is full of stressors that you cant control. But you can learn more positive ways of responding to them. This will help you feel more in control. To begin, try this tip: Think about how much effort you want to put into dealing with a certain stressor. Do you really need to handle that stressor? If so, decide on the best way to do this. Change what you can. But if the stressor isnt important, or if its out of your control, then why worry about it?  Relaxing to slow down  Relaxing can help you prevent or relieve stressful feelings. This tip may also help: When youre facing a stressor, pause for a moment.  Then take a deep breath and slowly breathe out as you count to 10. This will help clear your mind so you can respond to stress better.  Date Last Reviewed: 12/22/2014  © 1195-7418 [employer]. [address] [place] [address], [place] [address]. All rights reserved. This information is not intended as a substitute for professional medical care. Always follow your healthcare professional's instructions.

## 2017-04-27 ENCOUNTER — OFFICE VISIT (OUTPATIENT)
Dept: FAMILY MEDICINE | Facility: CLINIC | Age: 42
End: 2017-04-27
Payer: COMMERCIAL

## 2017-04-27 VITALS
BODY MASS INDEX: 47.09 KG/M2 | RESPIRATION RATE: 16 BRPM | HEART RATE: 68 BPM | HEIGHT: 66 IN | TEMPERATURE: 98 F | WEIGHT: 293 LBS | OXYGEN SATURATION: 68 % | DIASTOLIC BLOOD PRESSURE: 90 MMHG | SYSTOLIC BLOOD PRESSURE: 122 MMHG

## 2017-04-27 DIAGNOSIS — K52.9 GASTROENTERITIS: Primary | ICD-10-CM

## 2017-04-27 DIAGNOSIS — R19.7 DIARRHEA, UNSPECIFIED TYPE: ICD-10-CM

## 2017-04-27 DIAGNOSIS — R11.2 NAUSEA AND VOMITING, INTRACTABILITY OF VOMITING NOT SPECIFIED, UNSPECIFIED VOMITING TYPE: ICD-10-CM

## 2017-04-27 PROCEDURE — 1160F RVW MEDS BY RX/DR IN RCRD: CPT | Mod: S$GLB,,, | Performed by: NURSE PRACTITIONER

## 2017-04-27 PROCEDURE — 99999 PR PBB SHADOW E&M-EST. PATIENT-LVL III: CPT | Mod: PBBFAC,,, | Performed by: NURSE PRACTITIONER

## 2017-04-27 PROCEDURE — 3074F SYST BP LT 130 MM HG: CPT | Mod: S$GLB,,, | Performed by: NURSE PRACTITIONER

## 2017-04-27 PROCEDURE — 99214 OFFICE O/P EST MOD 30 MIN: CPT | Mod: S$GLB,,, | Performed by: NURSE PRACTITIONER

## 2017-04-27 PROCEDURE — 3080F DIAST BP >= 90 MM HG: CPT | Mod: S$GLB,,, | Performed by: NURSE PRACTITIONER

## 2017-04-27 RX ORDER — PROMETHAZINE HYDROCHLORIDE 25 MG/1
25 TABLET ORAL EVERY 4 HOURS
Qty: 20 TABLET | Refills: 0 | Status: SHIPPED | OUTPATIENT
Start: 2017-04-27 | End: 2017-11-08

## 2017-04-27 RX ORDER — ONDANSETRON 4 MG/1
4 TABLET, ORALLY DISINTEGRATING ORAL
Status: COMPLETED | OUTPATIENT
Start: 2017-04-27 | End: 2017-04-27

## 2017-04-27 RX ORDER — LOPERAMIDE HYDROCHLORIDE 2 MG/1
2 CAPSULE ORAL 4 TIMES DAILY PRN
Refills: 0
Start: 2017-04-27 | End: 2017-05-07

## 2017-04-27 RX ADMIN — ONDANSETRON 4 MG: 4 TABLET, ORALLY DISINTEGRATING ORAL at 08:04

## 2017-04-27 NOTE — MEDICAL/APP STUDENT
Subjective:       Patient ID: Kristopher Ye is a 41 y.o. female.    Chief Complaint: Nausea (x's 2 days); Diarrhea (x's 2 days); and Emesis (x's 2 days)    Nausea   This is a new problem. Episode onset: 3 days. The problem occurs intermittently. The problem has been gradually improving. Associated symptoms include abdominal pain, a change in bowel habit, congestion, diaphoresis, a fever (low grade yesterday 99F), headaches, myalgias, nausea and vomiting. Pertinent negatives include no anorexia, arthralgias, chest pain, chills, coughing, fatigue, joint swelling, neck pain, numbness, rash, sore throat, swollen glands, urinary symptoms, vertigo, visual change or weakness. Nothing aggravates the symptoms. She has tried nothing for the symptoms. The treatment provided no relief.   Diarrhea    This is a new problem. The current episode started yesterday. The problem occurs 5 to 10 times per day. The problem has been waxing and waning. The stool consistency is described as watery. The patient states that diarrhea awakens her from sleep. Associated symptoms include abdominal pain, a fever (low grade yesterday 99F), headaches, myalgias, sweats and vomiting. Pertinent negatives include no arthralgias, bloating, chills, coughing, increased  flatus, URI or weight loss. Risk factors include ill contacts. She has tried anti-motility drug for the symptoms. The treatment provided significant relief.   Emesis    This is a new problem. The current episode started in the past 7 days (4 days). The problem occurs 5 to 10 times per day. The problem has been gradually improving. The emesis has an appearance of stomach contents. There has been no fever. Associated symptoms include abdominal pain, diarrhea, dizziness, a fever (low grade yesterday 99F), headaches, myalgias and sweats. Pertinent negatives include no arthralgias, chest pain, chills, coughing, URI or weight loss. Risk factors include ill contacts.     Review of  Systems   Constitutional: Positive for diaphoresis and fever (low grade yesterday 99F). Negative for chills, fatigue and weight loss.   HENT: Positive for congestion. Negative for sore throat.    Respiratory: Negative for cough.    Cardiovascular: Negative for chest pain.   Gastrointestinal: Positive for abdominal pain, change in bowel habit, diarrhea, nausea and vomiting. Negative for abdominal distention, anal bleeding, anorexia, bloating, blood in stool, constipation, flatus and rectal pain.   Musculoskeletal: Positive for myalgias. Negative for arthralgias, joint swelling and neck pain.   Skin: Negative for rash.   Neurological: Positive for dizziness and headaches. Negative for vertigo, weakness and numbness.       Objective:      Physical Exam   Constitutional: She is oriented to person, place, and time. Vital signs are normal. She appears well-developed and well-nourished.   HENT:   Head: Normocephalic and atraumatic.   Right Ear: External ear normal.   Left Ear: External ear normal.   Nose: Nose normal.   Mouth/Throat: Uvula is midline, oropharynx is clear and moist and mucous membranes are normal.   Eyes: Conjunctivae and EOM are normal. Pupils are equal, round, and reactive to light.   Neck: Normal range of motion. Neck supple.   Cardiovascular: Normal rate and regular rhythm.    Pulmonary/Chest: Effort normal and breath sounds normal.   Abdominal: Normal appearance. Bowel sounds are increased. There is tenderness in the periumbilical area and left upper quadrant. There is no rigidity, no rebound, no guarding, no CVA tenderness, no tenderness at McBurney's point and negative Hickey's sign.   Musculoskeletal: Normal range of motion.   Neurological: She is alert and oriented to person, place, and time.   Skin: Skin is warm and dry.   Psychiatric: She has a normal mood and affect. Her behavior is normal.   Nursing note and vitals reviewed.      Assessment:       1. Gastroenteritis    2. Nausea and vomiting,  intractability of vomiting not specified, unspecified vomiting type    3. Diarrhea, unspecified type      Plan:       Kristopher was seen today for nausea, diarrhea and emesis.    Diagnoses and all orders for this visit:    Gastroenteritis  -     promethazine (PHENERGAN) 25 MG tablet; Take 1 tablet (25 mg total) by mouth every 4 (four) hours.  -     ondansetron disintegrating tablet 4 mg; Take 1 tablet (4 mg total) by mouth one time.  -     loperamide (IMODIUM) 2 mg capsule; Take 1 capsule (2 mg total) by mouth 4 (four) times daily as needed for Diarrhea.    Nausea and vomiting, intractability of vomiting not specified, unspecified vomiting type  -     promethazine (PHENERGAN) 25 MG tablet; Take 1 tablet (25 mg total) by mouth every 4 (four) hours.  -     ondansetron disintegrating tablet 4 mg; Take 1 tablet (4 mg total) by mouth one time.    Diarrhea, unspecified type  -     loperamide (IMODIUM) 2 mg capsule; Take 1 capsule (2 mg total) by mouth 4 (four) times daily as needed for Diarrhea.    Home care  Medicine  · You may use acetaminophen or NSAID medicines like ibuprofen or naproxen to control fever, unless another medicine is prescribed. (Note: If you have chronic liver or kidney disease, or ever had a stomach ulcer or gastrointestinalI bleeding, talk with your healthcare provider before using these medicines.) Aspirin should never be used in anyone under 18 years of age who is ill with a fever. It may cause severe liver damage. Don't increase your NSAID medicines if you are already taking these medicines for another condition (like arthritis). Don't use NSAIDS if you are on aspirin (such as for heart disease, or after a stroke).  · If medicines for diarrhea or vomiting are prescribed, take only as directed.  General care and preventing spread of the illness  · If symptoms are severe, rest at home for the next 24 hours or until you feel better.  · Hand washing with soap and water is the best way to prevent the  spread of infection. Wash your hands after touching anyone who is sick.  · Wash your hands after using the toilet and before meals. Clean the toilet after each use.  · Caffeine, tobacco, and alcohol can make your diarrhea, cramping, and pain worse.  Diet  · Water and clear liquids are important so you do not get dehydrated. Drink a small amount at a time.  · Do not force yourself to eat, especially if you have cramps, vomiting, or diarrhea. When you finally decide to start eating, do not eat large amounts at a time, even if you are hungry.  · If you eat, avoid fatty, greasy, spicy, or fried foods.  · Do not eat dairy products if you have diarrhea; they can make the diarrhea worse.  During the first 24 hours (the first full day), follow the diet below:  · Beverages: Water, clear liquids, soft drinks without caffeine, like ginger ale; mineral water (plain or flavored); decaffeinated tea and coffee.  · Soups: Clear broth, consommé, and bouillon Sports drinks aren't a good choice because they have too much sugar and not enough electrolytes. In this case, commercially available products called oral rehydration solutions are best.  · Desserts: Plain gelatin, popsicles, and fruit juice bars.  During the next 24 hours (the second day), you may add the following to the above if you have improved. If not, continue what you did the first day:  · Hot cereal, plain toast, bread, rolls, crackers  · Plain noodles, rice, mashed potatoes, chicken noodle or rice soup  · Unsweetened canned fruit (avoid pineapple), bananas  · Limit caffeine and chocolate. No spices or seasonings except salt.  During the next 24 hours  · Gradually resume a normal diet, as you feel better and your symptoms improve.  · If at any time your symptoms start getting worse, go back to clear liquids until you feel better.  Food preparation  · If you have diarrhea, you should not prepare food for others. When you  prepare food for yourself, wash your hands before  and after.  · Wash your hands after using cutting boards, countertops, and knives that have been in contact with raw food.  · Keep uncooked meats away from cooked and ready-to-eat foods.  Follow-up care  Follow up with your healthcare provider if you are not improving over the next 2 to 3 days, or as advised. If a stool (diarrhea) sample was taken, call for the results as directed.  When to seek medical care  Call your healthcare provider right away if any of these occur:   · Increasing abdominal pain or constant lower right abdominal pain  · Continued vomiting (unable to keep liquids down)  · Frequent diarrhea (more than 5 times a day)  · Blood in vomit or stool (black or red color)  · Inability to tolerate solid food after a few days.  · Dark urine, reduced urine output  · Weakness, dizziness  · Drowsiness  · Fever of 100.4ºF (38.0ºC) or higher, or as directed by your healthcare provider  · New rash  Call 911  Call 911 if any of these occur:  · Trouble breathing  · Chest pain  · Confusion  · Severe drowsiness or trouble awakening  · Seizure  · Stiff neck  Date Last Reviewed: 11/16/2015  © 1241-0199 [employer]. [address] [place] [address], [place] [address]. All rights reserved. This information is not intended as a substitute for professional medical care. Always follow your healthcare professional's instructions.        Return if symptoms worsen or fail to improve.

## 2017-04-27 NOTE — MR AVS SNAPSHOT
[facility]  [address] [facility]  [place] [address]  Phone: [phone]  Fax: [phone]                  Kristopher Ye   2017 8:00 AM   Office Visit    Description:  Female : 1975   Provider:  Daria Rdz NP   Department:  [facility]           Reason for Visit     Nausea     Diarrhea     Emesis           Diagnoses this Visit        Comments    Gastroenteritis    -  Primary     Nausea and vomiting, intractability of vomiting not specified, unspecified vomiting type         Diarrhea, unspecified type                To Do List           Goals (5 Years of Data)     None      Follow-Up and Disposition     Return if symptoms worsen or fail to improve.       These Medications        Disp Refills Start End    promethazine (PHENERGAN) 25 MG tablet 20 tablet 0 2017     Take 1 tablet (25 mg total) by mouth every 4 (four) hours. - Oral    Pharmacy: [facility] [address] [place] [address] #: [phone]       loperamide (IMODIUM) 2 mg capsule  0 2017    Take 1 capsule (2 mg total) by mouth 4 (four) times daily as needed for Diarrhea. - Oral    Pharmacy: [facility] [address] [place] [address] AT [facility] #: [phone]         [facility] On Call     [facility] On Call Nurse Care Line - 24/ Assistance  Unless otherwise directed by your provider, please contact [facility], our nurse care line that is available for 24/ assistance.     Registered nurses in the [facility] provide: appointment scheduling, clinical advisement, health education, and other advisory services.  Call: [phone] (toll free)               Medications           Message regarding Medications     Verify the changes and/or additions to your medication regime listed below are the same as discussed with your clinician today.  If any of these changes or additions are  "incorrect, please notify your healthcare provider.        START taking these NEW medications        Refills    promethazine (PHENERGAN) 25 MG tablet 0    Sig: Take 1 tablet (25 mg total) by mouth every 4 (four) hours.    Class: Normal    Route: Oral    loperamide (IMODIUM) 2 mg capsule 0    Sig: Take 1 capsule (2 mg total) by mouth 4 (four) times daily as needed for Diarrhea.    Class: No Print    Route: Oral      These medications were administered today        Dose Freq    ondansetron disintegrating tablet 4 mg 4 mg Clinic/HOD 1 time    Sig: Take 1 tablet (4 mg total) by mouth one time.    Class: Normal    Route: Oral           Verify that the below list of medications is an accurate representation of the medications you are currently taking.  If none reported, the list may be blank. If incorrect, please contact your healthcare provider. Carry this list with you in case of emergency.           Current Medications     ferrous sulfate 325 mg (65 mg iron) Tab tablet Take 1 tablet (325 mg total) by mouth 2 (two) times daily.    losartan-hydrochlorothiazide 100-25 mg (HYZAAR) 100-25 mg per tablet Take 1 tablet by mouth once daily.    fluticasone (FLONASE) 50 mcg/actuation nasal spray 1 spray by Each Nare route 2 (two) times daily.    loperamide (IMODIUM) 2 mg capsule Take 1 capsule (2 mg total) by mouth 4 (four) times daily as needed for Diarrhea.    promethazine (PHENERGAN) 25 MG tablet Take 1 tablet (25 mg total) by mouth every 4 (four) hours.           Clinical Reference Information           Your Vitals Were     BP Pulse Temp Resp Height Weight    122/90 (BP Location: Right arm, Patient Position: Sitting, BP Method: Manual) 68 98.3 °F (36.8 °C) (Oral) 16 5' 6" (1.676 m) 160.3 kg (353 lb 8.1 oz)    SpO2 BMI             68% 57.06 kg/m2         Blood Pressure          Most Recent Value    BP  (!)  122/90      Allergies as of 4/27/2017     Keflex [Cephalexin]      Immunizations Administered on Date of Encounter - " 4/27/2017     None      Instructions      Noninfectious Gastroenteritis (Ages 6 Years to Adult)    Gastroenteritis can cause nausea, vomiting, diarrhea, and abdominal cramping. This may occur as a result of food sensitivity, inflammation of your gastrointestinal tract, medicines, stress, or other causes not related to infection. Your symptoms will usually last from 1 to 3 days, but can last longer. Antibiotics are not effective, but simple home treatment will be helpful.  Home care  Medicine  · You may use acetaminophen or NSAID medicines like ibuprofen or naproxen to control fever, unless another medicine is prescribed. (Note: If you have chronic liver or kidney disease, or ever had a stomach ulcer or gastrointestinalI bleeding, talk with your healthcare provider before using these medicines.) Aspirin should never be used in anyone under 18 years of age who is ill with a fever. It may cause severe liver damage. Don't increase your NSAID medicines if you are already taking these medicines for another condition (like arthritis). Don't use NSAIDS if you are on aspirin (such as for heart disease, or after a stroke).  · If medicines for diarrhea or vomiting are prescribed, take only as directed.  General care and preventing spread of the illness  · If symptoms are severe, rest at home for the next 24 hours or until you feel better.  · Hand washing with soap and water is the best way to prevent the spread of infection. Wash your hands after touching anyone who is sick.  · Wash your hands after using the toilet and before meals. Clean the toilet after each use.  · Caffeine, tobacco, and alcohol can make your diarrhea, cramping, and pain worse.  Diet  · Water and clear liquids are important so you do not get dehydrated. Drink a small amount at a time.  · Do not force yourself to eat, especially if you have cramps, vomiting, or diarrhea. When you finally decide to start eating, do not eat large amounts at a time, even if  you are hungry.  · If you eat, avoid fatty, greasy, spicy, or fried foods.  · Do not eat dairy products if you have diarrhea; they can make the diarrhea worse.  During the first 24 hours (the first full day), follow the diet below:  · Beverages: Water, clear liquids, soft drinks without caffeine, like ginger ale; mineral water (plain or flavored); decaffeinated tea and coffee.  · Soups: Clear broth, consommé, and bouillon Sports drinks aren't a good choice because they have too much sugar and not enough electrolytes. In this case, commercially available products called oral rehydration solutions are best.  · Desserts: Plain gelatin, popsicles, and fruit juice bars.  During the next 24 hours (the second day), you may add the following to the above if you have improved. If not, continue what you did the first day:  · Hot cereal, plain toast, bread, rolls, crackers  · Plain noodles, rice, mashed potatoes, chicken noodle or rice soup  · Unsweetened canned fruit (avoid pineapple), bananas  · Limit caffeine and chocolate. No spices or seasonings except salt.  During the next 24 hours  · Gradually resume a normal diet, as you feel better and your symptoms improve.  · If at any time your symptoms start getting worse, go back to clear liquids until you feel better.  Food preparation  · If you have diarrhea, you should not prepare food for others. When you  prepare food for yourself, wash your hands before and after.  · Wash your hands after using cutting boards, countertops, and knives that have been in contact with raw food.  · Keep uncooked meats away from cooked and ready-to-eat foods.  Follow-up care  Follow up with your healthcare provider if you are not improving over the next 2 to 3 days, or as advised. If a stool (diarrhea) sample was taken, call for the results as directed.  When to seek medical care  Call your healthcare provider right away if any of these occur:   · Increasing abdominal pain or constant lower  right abdominal pain  · Continued vomiting (unable to keep liquids down)  · Frequent diarrhea (more than 5 times a day)  · Blood in vomit or stool (black or red color)  · Inability to tolerate solid food after a few days.  · Dark urine, reduced urine output  · Weakness, dizziness  · Drowsiness  · Fever of 100.4ºF (38.0ºC) or higher, or as directed by your healthcare provider  · New rash  Call 911  Call 911 if any of these occur:  · Trouble breathing  · Chest pain  · Confusion  · Severe drowsiness or trouble awakening  · Seizure  · Stiff neck  Date Last Reviewed: 11/16/2015  © 1409-1317 [employer]. [address] [place] [address], [place] [address]. All rights reserved. This information is not intended as a substitute for professional medical care. Always follow your healthcare professional's instructions.             Language Assistance Services     ATTENTION: Language assistance services are available, free of charge. Please call [phone].      ATENCIÓN: Si habla español, tiene a tapia disposición servicios gratuitos de asistencia lingüística. Llame al [phone].     CHÚ Ý: N?u b?n nói Ti?ng Vi?t, có các d?ch v? h? tr? ngôn ng? mi?n phí dành cho b?n. G?i s? [phone].         [facility] complies with applicable Federal civil rights laws and does not discriminate on the basis of race, color, national origin, age, disability, or sex.

## 2017-04-27 NOTE — PATIENT INSTRUCTIONS
Noninfectious Gastroenteritis (Ages 6 Years to Adult)    Gastroenteritis can cause nausea, vomiting, diarrhea, and abdominal cramping. This may occur as a result of food sensitivity, inflammation of your gastrointestinal tract, medicines, stress, or other causes not related to infection. Your symptoms will usually last from 1 to 3 days, but can last longer. Antibiotics are not effective, but simple home treatment will be helpful.  Home care  Medicine  · You may use acetaminophen or NSAID medicines like ibuprofen or naproxen to control fever, unless another medicine is prescribed. (Note: If you have chronic liver or kidney disease, or ever had a stomach ulcer or gastrointestinalI bleeding, talk with your healthcare provider before using these medicines.) Aspirin should never be used in anyone under 18 years of age who is ill with a fever. It may cause severe liver damage. Don't increase your NSAID medicines if you are already taking these medicines for another condition (like arthritis). Don't use NSAIDS if you are on aspirin (such as for heart disease, or after a stroke).  · If medicines for diarrhea or vomiting are prescribed, take only as directed.  General care and preventing spread of the illness  · If symptoms are severe, rest at home for the next 24 hours or until you feel better.  · Hand washing with soap and water is the best way to prevent the spread of infection. Wash your hands after touching anyone who is sick.  · Wash your hands after using the toilet and before meals. Clean the toilet after each use.  · Caffeine, tobacco, and alcohol can make your diarrhea, cramping, and pain worse.  Diet  · Water and clear liquids are important so you do not get dehydrated. Drink a small amount at a time.  · Do not force yourself to eat, especially if you have cramps, vomiting, or diarrhea. When you finally decide to start eating, do not eat large amounts at a time, even if you are hungry.  · If you eat, avoid  fatty, greasy, spicy, or fried foods.  · Do not eat dairy products if you have diarrhea; they can make the diarrhea worse.  During the first 24 hours (the first full day), follow the diet below:  · Beverages: Water, clear liquids, soft drinks without caffeine, like ginger ale; mineral water (plain or flavored); decaffeinated tea and coffee.  · Soups: Clear broth, consommé, and bouillon Sports drinks aren't a good choice because they have too much sugar and not enough electrolytes. In this case, commercially available products called oral rehydration solutions are best.  · Desserts: Plain gelatin, popsicles, and fruit juice bars.  During the next 24 hours (the second day), you may add the following to the above if you have improved. If not, continue what you did the first day:  · Hot cereal, plain toast, bread, rolls, crackers  · Plain noodles, rice, mashed potatoes, chicken noodle or rice soup  · Unsweetened canned fruit (avoid pineapple), bananas  · Limit caffeine and chocolate. No spices or seasonings except salt.  During the next 24 hours  · Gradually resume a normal diet, as you feel better and your symptoms improve.  · If at any time your symptoms start getting worse, go back to clear liquids until you feel better.  Food preparation  · If you have diarrhea, you should not prepare food for others. When you  prepare food for yourself, wash your hands before and after.  · Wash your hands after using cutting boards, countertops, and knives that have been in contact with raw food.  · Keep uncooked meats away from cooked and ready-to-eat foods.  Follow-up care  Follow up with your healthcare provider if you are not improving over the next 2 to 3 days, or as advised. If a stool (diarrhea) sample was taken, call for the results as directed.  When to seek medical care  Call your healthcare provider right away if any of these occur:   · Increasing abdominal pain or constant lower right abdominal pain  · Continued  vomiting (unable to keep liquids down)  · Frequent diarrhea (more than 5 times a day)  · Blood in vomit or stool (black or red color)  · Inability to tolerate solid food after a few days.  · Dark urine, reduced urine output  · Weakness, dizziness  · Drowsiness  · Fever of 100.4ºF (38.0ºC) or higher, or as directed by your healthcare provider  · New rash  Call 911  Call 911 if any of these occur:  · Trouble breathing  · Chest pain  · Confusion  · Severe drowsiness or trouble awakening  · Seizure  · Stiff neck  Date Last Reviewed: 11/16/2015  © 5919-3446 [employer]. [address] [place] [address], [place] [address]. All rights reserved. This information is not intended as a substitute for professional medical care. Always follow your healthcare professional's instructions.

## 2017-04-30 ENCOUNTER — PATIENT MESSAGE (OUTPATIENT)
Dept: FAMILY MEDICINE | Facility: CLINIC | Age: 42
End: 2017-04-30

## 2017-05-01 ENCOUNTER — TELEPHONE (OUTPATIENT)
Dept: FAMILY MEDICINE | Facility: CLINIC | Age: 42
End: 2017-05-01

## 2017-05-01 DIAGNOSIS — K29.70 GASTRITIS, PRESENCE OF BLEEDING UNSPECIFIED, UNSPECIFIED CHRONICITY, UNSPECIFIED GASTRITIS TYPE: Primary | ICD-10-CM

## 2017-05-01 RX ORDER — CIPROFLOXACIN 500 MG/1
500 TABLET ORAL EVERY 12 HOURS
Qty: 20 TABLET | Refills: 0 | Status: SHIPPED | OUTPATIENT
Start: 2017-05-01 | End: 2017-05-11

## 2017-05-01 RX ORDER — METRONIDAZOLE 500 MG/1
500 TABLET ORAL EVERY 12 HOURS
Qty: 20 TABLET | Refills: 0 | Status: SHIPPED | OUTPATIENT
Start: 2017-05-01 | End: 2017-05-11

## 2017-05-01 NOTE — TELEPHONE ENCOUNTER
"Patient sent email stating the following "The diarrhea hasn't stopped. What do I do? I don't have horrible stomach cramps or vomiting but the diarrhea is just liquid and I need it to stop. Immediately. The Imodium is not working anymore."     Office visit 04/27/2017. Please review and advise.   "

## 2017-05-03 NOTE — PROGRESS NOTES
Subjective:       Patient ID: Kristopher Ye is a 41 y.o. female.    Chief Complaint: Nausea (x's 2 days); Diarrhea (x's 2 days); and Emesis (x's 2 days)    Nausea   This is a new problem. Episode onset: 3 days. The problem occurs intermittently. The problem has been gradually improving. Associated symptoms include abdominal pain, a change in bowel habit, congestion, diaphoresis, a fever (low grade yesterday 99F), headaches, myalgias, nausea and vomiting. Pertinent negatives include no anorexia, arthralgias, chest pain, chills, coughing, fatigue, joint swelling, neck pain, numbness, rash, sore throat, swollen glands, urinary symptoms, vertigo, visual change or weakness. Nothing aggravates the symptoms. She has tried nothing for the symptoms. The treatment provided no relief.   Diarrhea    This is a new problem. The current episode started yesterday. The problem occurs 5 to 10 times per day. The problem has been waxing and waning. The stool consistency is described as watery. The patient states that diarrhea awakens her from sleep. Associated symptoms include abdominal pain, a fever (low grade yesterday 99F), headaches, myalgias, sweats and vomiting. Pertinent negatives include no arthralgias, bloating, chills, coughing, increased  flatus, URI or weight loss. Risk factors include ill contacts. She has tried anti-motility drug for the symptoms. The treatment provided significant relief.   Emesis    This is a new problem. The current episode started in the past 7 days (4 days). The problem occurs 5 to 10 times per day. The problem has been gradually improving. The emesis has an appearance of stomach contents. There has been no fever. Associated symptoms include abdominal pain, diarrhea, dizziness, a fever (low grade yesterday 99F), headaches, myalgias and sweats. Pertinent negatives include no arthralgias, chest pain, chills, coughing, URI or weight loss. Risk factors include ill contacts.     Review of  Systems   Constitutional: Positive for diaphoresis and fever (low grade yesterday 99F). Negative for chills, fatigue and weight loss.   HENT: Positive for congestion. Negative for sore throat.    Respiratory: Negative for cough.    Cardiovascular: Negative for chest pain.   Gastrointestinal: Positive for abdominal pain, change in bowel habit, diarrhea, nausea and vomiting. Negative for abdominal distention, anal bleeding, anorexia, bloating, blood in stool, constipation, flatus and rectal pain.   Musculoskeletal: Positive for myalgias. Negative for arthralgias, joint swelling and neck pain.   Skin: Negative for rash.   Neurological: Positive for dizziness and headaches. Negative for vertigo, weakness and numbness.       Objective:      Physical Exam   Constitutional: She is oriented to person, place, and time. Vital signs are normal. She appears well-developed and well-nourished.   HENT:   Head: Normocephalic and atraumatic.   Right Ear: External ear normal.   Left Ear: External ear normal.   Nose: Nose normal.   Mouth/Throat: Uvula is midline, oropharynx is clear and moist and mucous membranes are normal.   Eyes: Conjunctivae and EOM are normal. Pupils are equal, round, and reactive to light.   Neck: Normal range of motion. Neck supple.   Cardiovascular: Normal rate and regular rhythm.    Pulmonary/Chest: Effort normal and breath sounds normal.   Abdominal: Normal appearance. Bowel sounds are increased. There is tenderness in the periumbilical area and left upper quadrant. There is no rigidity, no rebound, no guarding, no CVA tenderness, no tenderness at McBurney's point and negative Hickey's sign.   Musculoskeletal: Normal range of motion.   Neurological: She is alert and oriented to person, place, and time.   Skin: Skin is warm and dry.   Psychiatric: She has a normal mood and affect. Her behavior is normal.   Nursing note and vitals reviewed.      Assessment:       1. Gastroenteritis    2. Nausea and vomiting,  intractability of vomiting not specified, unspecified vomiting type    3. Diarrhea, unspecified type      Plan:       Kristopher was seen today for nausea, diarrhea and emesis.    Diagnoses and all orders for this visit:    Gastroenteritis  -     promethazine (PHENERGAN) 25 MG tablet; Take 1 tablet (25 mg total) by mouth every 4 (four) hours.  -     ondansetron disintegrating tablet 4 mg; Take 1 tablet (4 mg total) by mouth one time.  -     loperamide (IMODIUM) 2 mg capsule; Take 1 capsule (2 mg total) by mouth 4 (four) times daily as needed for Diarrhea.    Nausea and vomiting, intractability of vomiting not specified, unspecified vomiting type  -     promethazine (PHENERGAN) 25 MG tablet; Take 1 tablet (25 mg total) by mouth every 4 (four) hours.  -     ondansetron disintegrating tablet 4 mg; Take 1 tablet (4 mg total) by mouth one time.    Diarrhea, unspecified type  -     loperamide (IMODIUM) 2 mg capsule; Take 1 capsule (2 mg total) by mouth 4 (four) times daily as needed for Diarrhea.    Home care  Medicine  · You may use acetaminophen or NSAID medicines like ibuprofen or naproxen to control fever, unless another medicine is prescribed. (Note: If you have chronic liver or kidney disease, or ever had a stomach ulcer or gastrointestinalI bleeding, talk with your healthcare provider before using these medicines.) Aspirin should never be used in anyone under 18 years of age who is ill with a fever. It may cause severe liver damage. Don't increase your NSAID medicines if you are already taking these medicines for another condition (like arthritis). Don't use NSAIDS if you are on aspirin (such as for heart disease, or after a stroke).  · If medicines for diarrhea or vomiting are prescribed, take only as directed.  General care and preventing spread of the illness  · If symptoms are severe, rest at home for the next 24 hours or until you feel better.  · Hand washing with soap and water is the best way to prevent the  spread of infection. Wash your hands after touching anyone who is sick.  · Wash your hands after using the toilet and before meals. Clean the toilet after each use.  · Caffeine, tobacco, and alcohol can make your diarrhea, cramping, and pain worse.  Diet  · Water and clear liquids are important so you do not get dehydrated. Drink a small amount at a time.  · Do not force yourself to eat, especially if you have cramps, vomiting, or diarrhea. When you finally decide to start eating, do not eat large amounts at a time, even if you are hungry.  · If you eat, avoid fatty, greasy, spicy, or fried foods.  · Do not eat dairy products if you have diarrhea; they can make the diarrhea worse.  During the first 24 hours (the first full day), follow the diet below:  · Beverages: Water, clear liquids, soft drinks without caffeine, like ginger ale; mineral water (plain or flavored); decaffeinated tea and coffee.  · Soups: Clear broth, consommé, and bouillon Sports drinks aren't a good choice because they have too much sugar and not enough electrolytes. In this case, commercially available products called oral rehydration solutions are best.  · Desserts: Plain gelatin, popsicles, and fruit juice bars.  During the next 24 hours (the second day), you may add the following to the above if you have improved. If not, continue what you did the first day:  · Hot cereal, plain toast, bread, rolls, crackers  · Plain noodles, rice, mashed potatoes, chicken noodle or rice soup  · Unsweetened canned fruit (avoid pineapple), bananas  · Limit caffeine and chocolate. No spices or seasonings except salt.  During the next 24 hours  · Gradually resume a normal diet, as you feel better and your symptoms improve.  · If at any time your symptoms start getting worse, go back to clear liquids until you feel better.  Food preparation  · If you have diarrhea, you should not prepare food for others. When you  prepare food for yourself, wash your hands before  and after.  · Wash your hands after using cutting boards, countertops, and knives that have been in contact with raw food.  · Keep uncooked meats away from cooked and ready-to-eat foods.  Follow-up care  Follow up with your healthcare provider if you are not improving over the next 2 to 3 days, or as advised. If a stool (diarrhea) sample was taken, call for the results as directed.  When to seek medical care  Call your healthcare provider right away if any of these occur:   · Increasing abdominal pain or constant lower right abdominal pain  · Continued vomiting (unable to keep liquids down)  · Frequent diarrhea (more than 5 times a day)  · Blood in vomit or stool (black or red color)  · Inability to tolerate solid food after a few days.  · Dark urine, reduced urine output  · Weakness, dizziness  · Drowsiness  · Fever of 100.4ºF (38.0ºC) or higher, or as directed by your healthcare provider  · New rash  Call 911  Call 911 if any of these occur:  · Trouble breathing  · Chest pain  · Confusion  · Severe drowsiness or trouble awakening  · Seizure  · Stiff neck  Date Last Reviewed: 11/16/2015  © 0793-1683 [employer]. [address] [place] [address], [place] [address]. All rights reserved. This information is not intended as a substitute for professional medical care. Always follow your healthcare professional's instructions.        Return if symptoms worsen or fail to improve.

## 2017-05-17 ENCOUNTER — PATIENT MESSAGE (OUTPATIENT)
Dept: OBSTETRICS AND GYNECOLOGY | Facility: CLINIC | Age: 42
End: 2017-05-17

## 2017-05-17 DIAGNOSIS — Z12.31 VISIT FOR SCREENING MAMMOGRAM: Primary | ICD-10-CM

## 2017-05-21 ENCOUNTER — PATIENT MESSAGE (OUTPATIENT)
Dept: OBSTETRICS AND GYNECOLOGY | Facility: CLINIC | Age: 42
End: 2017-05-21

## 2017-05-22 RX ORDER — CIPROFLOXACIN 500 MG/1
TABLET ORAL
COMMUNITY
Start: 2017-05-17 | End: 2017-08-02 | Stop reason: ALTCHOICE

## 2017-05-22 RX ORDER — METRONIDAZOLE 500 MG/1
TABLET ORAL
COMMUNITY
Start: 2017-05-17 | End: 2017-08-02 | Stop reason: ALTCHOICE

## 2017-05-24 ENCOUNTER — HOSPITAL ENCOUNTER (OUTPATIENT)
Dept: RADIOLOGY | Facility: HOSPITAL | Age: 42
Discharge: HOME | End: 2017-05-24
Attending: OBSTETRICS & GYNECOLOGY
Payer: COMMERCIAL

## 2017-05-24 DIAGNOSIS — Z12.31 VISIT FOR SCREENING MAMMOGRAM: ICD-10-CM

## 2017-05-24 PROCEDURE — 77063 BREAST TOMOSYNTHESIS BI: CPT | Mod: 26,,, | Performed by: RADIOLOGY

## 2017-05-24 PROCEDURE — 77067 SCR MAMMO BI INCL CAD: CPT | Mod: 26,,, | Performed by: RADIOLOGY

## 2017-05-24 PROCEDURE — 77067 SCR MAMMO BI INCL CAD: CPT | Mod: TC

## 2017-08-02 ENCOUNTER — OFFICE VISIT (OUTPATIENT)
Dept: FAMILY MEDICINE | Facility: CLINIC | Age: 42
End: 2017-08-02
Payer: COMMERCIAL

## 2017-08-02 VITALS
DIASTOLIC BLOOD PRESSURE: 100 MMHG | OXYGEN SATURATION: 99 % | TEMPERATURE: 99 F | WEIGHT: 293 LBS | BODY MASS INDEX: 47.09 KG/M2 | SYSTOLIC BLOOD PRESSURE: 142 MMHG | RESPIRATION RATE: 18 BRPM | HEART RATE: 103 BPM | HEIGHT: 66 IN

## 2017-08-02 DIAGNOSIS — M54.31 SCIATICA OF RIGHT SIDE: ICD-10-CM

## 2017-08-02 DIAGNOSIS — E66.01 MORBID OBESITY WITH BODY MASS INDEX OF 50.0-59.9 IN ADULT: ICD-10-CM

## 2017-08-02 DIAGNOSIS — I51.7 CARDIOMEGALY: ICD-10-CM

## 2017-08-02 DIAGNOSIS — R60.9 EDEMA, UNSPECIFIED TYPE: Primary | ICD-10-CM

## 2017-08-02 DIAGNOSIS — I10 ESSENTIAL HYPERTENSION: ICD-10-CM

## 2017-08-02 PROCEDURE — 99999 PR PBB SHADOW E&M-EST. PATIENT-LVL IV: CPT | Mod: PBBFAC,,, | Performed by: NURSE PRACTITIONER

## 2017-08-02 PROCEDURE — 99214 OFFICE O/P EST MOD 30 MIN: CPT | Mod: 25,S$GLB,, | Performed by: NURSE PRACTITIONER

## 2017-08-02 PROCEDURE — 93005 ELECTROCARDIOGRAM TRACING: CPT | Mod: S$GLB,,, | Performed by: NURSE PRACTITIONER

## 2017-08-02 PROCEDURE — 96372 THER/PROPH/DIAG INJ SC/IM: CPT | Mod: S$GLB,,, | Performed by: NURSE PRACTITIONER

## 2017-08-02 PROCEDURE — 93010 ELECTROCARDIOGRAM REPORT: CPT | Mod: S$GLB,,, | Performed by: INTERNAL MEDICINE

## 2017-08-02 PROCEDURE — 3008F BODY MASS INDEX DOCD: CPT | Mod: S$GLB,,, | Performed by: NURSE PRACTITIONER

## 2017-08-02 RX ORDER — KETOROLAC TROMETHAMINE 30 MG/ML
60 INJECTION, SOLUTION INTRAMUSCULAR; INTRAVENOUS
Status: COMPLETED | OUTPATIENT
Start: 2017-08-02 | End: 2017-08-02

## 2017-08-02 RX ADMIN — KETOROLAC TROMETHAMINE 60 MG: 30 INJECTION, SOLUTION INTRAMUSCULAR; INTRAVENOUS at 07:08

## 2017-08-03 ENCOUNTER — PATIENT MESSAGE (OUTPATIENT)
Dept: CARDIOLOGY | Facility: CLINIC | Age: 42
End: 2017-08-03

## 2017-08-03 NOTE — PATIENT INSTRUCTIONS
Watch diet closely  Knee high ANNEMARIE woodruff  Call and schedule a follow up appt with Dr. Olivares  Physical with Dr. Giraldo

## 2017-08-04 ENCOUNTER — PATIENT MESSAGE (OUTPATIENT)
Dept: CARDIOLOGY | Facility: CLINIC | Age: 42
End: 2017-08-04

## 2017-08-08 ENCOUNTER — OFFICE VISIT (OUTPATIENT)
Dept: CARDIOLOGY | Facility: CLINIC | Age: 42
End: 2017-08-08
Payer: COMMERCIAL

## 2017-08-08 VITALS — BODY MASS INDEX: 47.09 KG/M2 | WEIGHT: 293 LBS | HEIGHT: 66 IN

## 2017-08-08 DIAGNOSIS — R06.02 SHORTNESS OF BREATH: Primary | ICD-10-CM

## 2017-08-08 DIAGNOSIS — E66.01 MORBID OBESITY WITH BMI OF 50.0-59.9, ADULT: ICD-10-CM

## 2017-08-08 DIAGNOSIS — R60.9 EDEMA, UNSPECIFIED TYPE: ICD-10-CM

## 2017-08-08 DIAGNOSIS — I10 ESSENTIAL HYPERTENSION: ICD-10-CM

## 2017-08-08 DIAGNOSIS — R00.2 PALPITATIONS: ICD-10-CM

## 2017-08-08 PROCEDURE — 99999 PR PBB SHADOW E&M-EST. PATIENT-LVL III: CPT | Mod: PBBFAC,,, | Performed by: INTERNAL MEDICINE

## 2017-08-08 PROCEDURE — 99214 OFFICE O/P EST MOD 30 MIN: CPT | Mod: S$GLB,,, | Performed by: INTERNAL MEDICINE

## 2017-08-08 PROCEDURE — 3008F BODY MASS INDEX DOCD: CPT | Mod: S$GLB,,, | Performed by: INTERNAL MEDICINE

## 2017-08-08 RX ORDER — FUROSEMIDE 40 MG/1
20 TABLET ORAL DAILY PRN
Qty: 30 TABLET | Refills: 11 | Status: SHIPPED | OUTPATIENT
Start: 2017-08-08 | End: 2018-03-07

## 2017-08-08 NOTE — PROGRESS NOTES
Subjective:    Patient ID:  Kristopher Ye is a 41 y.o. female who presents for evaluation of Edema      HPI  Patient is here for cardiac evaluation for shortness of breath and edema.  She says that since been progressing since approximately 1 month now.  She was previously seen at [facility] by Dr. Olivares.  She lives locally and wishes to establish care here.  She denies any chest pain but has progressive worsening shortness breath on exertion relieved with rest.  She denies any palpitations.  She says she does get tachycardic very easily.  She denies any PND, orthopnea but does have lower extremity edema usually relieved with elevation.  She denies any dizziness, presyncope or syncope.    Review of Systems   Constitution: Negative.   HENT: Negative.    Eyes: Negative.    Cardiovascular: Positive for dyspnea on exertion and leg swelling. Negative for chest pain, irregular heartbeat, near-syncope, orthopnea, palpitations, paroxysmal nocturnal dyspnea and syncope.   Respiratory: Positive for shortness of breath.    Skin: Negative.    Musculoskeletal: Negative.    Gastrointestinal: Negative for abdominal pain, constipation and diarrhea.   Genitourinary: Negative for dysuria.   Neurological: Negative.    Psychiatric/Behavioral: Negative.      Past Medical History:   Diagnosis Date    Abnormal Pap smear     pt states 13yrs ago colpo was done    Anemia     Fibroid     Hypertension      Past Surgical History:   Procedure Laterality Date     SECTION      TONSILLECTOMY      TUBAL LIGATION       Social History   Substance Use Topics    Smoking status: Never Smoker    Smokeless tobacco: Never Used    Alcohol use 0.0 oz/week      Comment: socially     Family History   Problem Relation Age of Onset    Arthritis Mother     Diabetes Mother     Heart disease Mother      CHF, CAD , 2 stents    Hypertension Mother     Hyperlipidemia Mother     No Known Problems Sister     Hypertension Brother      No Known Problems Daughter     Asthma Daughter         Objective:    Physical Exam   Constitutional: She is oriented to person, place, and time. She appears well-developed and well-nourished.   HENT:   Head: Normocephalic and atraumatic.   Eyes: Conjunctivae and EOM are normal. Pupils are equal, round, and reactive to light.   Neck: Normal range of motion. Neck supple. No thyromegaly present.   Cardiovascular: Normal rate and regular rhythm.    No murmur heard.  Pulmonary/Chest: Effort normal and breath sounds normal. No respiratory distress.   Abdominal: Soft. Bowel sounds are normal.   Musculoskeletal: She exhibits edema.   Neurological: She is alert and oriented to person, place, and time.   Skin: Skin is warm and dry.   Psychiatric: She has a normal mood and affect. Her behavior is normal.       ekg normal sinus rhythm nonspecific ST-T changes    Assessment:       1. Shortness of breath    2. Morbid obesity with BMI of 50.0-59.9, adult    3. Palpitations    4. Essential hypertension    5. Edema, unspecified type         Plan:       -For baseline testing including pharmacological stress echo and Holter  -Counseled on sodium restriction, compression stockings if testing is stable  -Encouraged diet and exercise  -Plan for sleep study referral  -Okay to continue maintenance Lasix for now  -Consider gastric bypass referral if insurance allows    Return to clinic in one month with testing ASAP

## 2017-08-16 ENCOUNTER — LAB VISIT (OUTPATIENT)
Dept: LAB | Facility: HOSPITAL | Age: 42
End: 2017-08-16
Attending: INTERNAL MEDICINE
Payer: COMMERCIAL

## 2017-08-16 ENCOUNTER — OFFICE VISIT (OUTPATIENT)
Dept: FAMILY MEDICINE | Facility: CLINIC | Age: 42
End: 2017-08-16
Payer: COMMERCIAL

## 2017-08-16 VITALS
RESPIRATION RATE: 16 BRPM | DIASTOLIC BLOOD PRESSURE: 80 MMHG | BODY MASS INDEX: 47.09 KG/M2 | HEIGHT: 66 IN | SYSTOLIC BLOOD PRESSURE: 116 MMHG | HEART RATE: 81 BPM | TEMPERATURE: 99 F | WEIGHT: 293 LBS | OXYGEN SATURATION: 97 %

## 2017-08-16 DIAGNOSIS — D50.0 IRON DEFICIENCY ANEMIA DUE TO CHRONIC BLOOD LOSS: ICD-10-CM

## 2017-08-16 DIAGNOSIS — I10 ESSENTIAL HYPERTENSION: Primary | ICD-10-CM

## 2017-08-16 DIAGNOSIS — F41.9 ANXIETY: ICD-10-CM

## 2017-08-16 DIAGNOSIS — R53.83 FATIGUE, UNSPECIFIED TYPE: ICD-10-CM

## 2017-08-16 DIAGNOSIS — R20.2 NUMBNESS AND TINGLING: ICD-10-CM

## 2017-08-16 DIAGNOSIS — R20.0 NUMBNESS AND TINGLING: ICD-10-CM

## 2017-08-16 DIAGNOSIS — E66.01 MORBID OBESITY WITH BMI OF 50.0-59.9, ADULT: ICD-10-CM

## 2017-08-16 DIAGNOSIS — R60.9 EDEMA, UNSPECIFIED TYPE: ICD-10-CM

## 2017-08-16 DIAGNOSIS — Z00.00 ANNUAL PHYSICAL EXAM: ICD-10-CM

## 2017-08-16 DIAGNOSIS — I10 ESSENTIAL HYPERTENSION: ICD-10-CM

## 2017-08-16 LAB
ALBUMIN SERPL BCP-MCNC: 3.4 G/DL
ALP SERPL-CCNC: 104 U/L
ALT SERPL W/O P-5'-P-CCNC: 17 U/L
ANION GAP SERPL CALC-SCNC: 11 MMOL/L
ANISOCYTOSIS BLD QL SMEAR: SLIGHT
AST SERPL-CCNC: 17 U/L
BASOPHILS # BLD AUTO: 0.04 K/UL
BASOPHILS NFR BLD: 0.4 %
BILIRUB SERPL-MCNC: 0.4 MG/DL
BNP SERPL-MCNC: <10 PG/ML
BUN SERPL-MCNC: 11 MG/DL
CALCIUM SERPL-MCNC: 9.8 MG/DL
CHLORIDE SERPL-SCNC: 103 MMOL/L
CHOLEST/HDLC SERPL: 5.5 {RATIO}
CO2 SERPL-SCNC: 27 MMOL/L
CREAT SERPL-MCNC: 1 MG/DL
DIFFERENTIAL METHOD: ABNORMAL
EOSINOPHIL # BLD AUTO: 0.1 K/UL
EOSINOPHIL NFR BLD: 1.2 %
ERYTHROCYTE [DISTWIDTH] IN BLOOD BY AUTOMATED COUNT: 21.2 %
EST. GFR  (AFRICAN AMERICAN): >60 ML/MIN/1.73 M^2
EST. GFR  (NON AFRICAN AMERICAN): >60 ML/MIN/1.73 M^2
GIANT PLATELETS BLD QL SMEAR: PRESENT
GLUCOSE SERPL-MCNC: 97 MG/DL
HCT VFR BLD AUTO: 35.7 %
HDL/CHOLESTEROL RATIO: 18.3 %
HDLC SERPL-MCNC: 229 MG/DL
HDLC SERPL-MCNC: 42 MG/DL
HGB BLD-MCNC: 10.8 G/DL
HYPOCHROMIA BLD QL SMEAR: ABNORMAL
LDLC SERPL CALC-MCNC: 162.4 MG/DL
LYMPHOCYTES # BLD AUTO: 3.8 K/UL
LYMPHOCYTES NFR BLD: 36.9 %
MCH RBC QN AUTO: 19.5 PG
MCHC RBC AUTO-ENTMCNC: 30.3 G/DL
MCV RBC AUTO: 65 FL
MONOCYTES # BLD AUTO: 0.7 K/UL
MONOCYTES NFR BLD: 6.7 %
NEUTROPHILS # BLD AUTO: 5.6 K/UL
NEUTROPHILS NFR BLD: 54.8 %
NONHDLC SERPL-MCNC: 187 MG/DL
OVALOCYTES BLD QL SMEAR: ABNORMAL
PLATELET # BLD AUTO: 515 K/UL
PLATELET BLD QL SMEAR: ABNORMAL
PMV BLD AUTO: 10.2 FL
POLYCHROMASIA BLD QL SMEAR: ABNORMAL
POTASSIUM SERPL-SCNC: 3.4 MMOL/L
PROT SERPL-MCNC: 8.7 G/DL
RBC # BLD AUTO: 5.53 M/UL
SODIUM SERPL-SCNC: 141 MMOL/L
TARGETS BLD QL SMEAR: ABNORMAL
TRIGL SERPL-MCNC: 123 MG/DL
TSH SERPL DL<=0.005 MIU/L-ACNC: 1.06 UIU/ML
WBC # BLD AUTO: 10.17 K/UL

## 2017-08-16 PROCEDURE — 80053 COMPREHEN METABOLIC PANEL: CPT

## 2017-08-16 PROCEDURE — 84443 ASSAY THYROID STIM HORMONE: CPT

## 2017-08-16 PROCEDURE — 36415 COLL VENOUS BLD VENIPUNCTURE: CPT

## 2017-08-16 PROCEDURE — 86431 RHEUMATOID FACTOR QUANT: CPT

## 2017-08-16 PROCEDURE — 80061 LIPID PANEL: CPT

## 2017-08-16 PROCEDURE — 99999 PR PBB SHADOW E&M-EST. PATIENT-LVL III: CPT | Mod: PBBFAC,,, | Performed by: FAMILY MEDICINE

## 2017-08-16 PROCEDURE — 83036 HEMOGLOBIN GLYCOSYLATED A1C: CPT

## 2017-08-16 PROCEDURE — 83880 ASSAY OF NATRIURETIC PEPTIDE: CPT

## 2017-08-16 PROCEDURE — 99396 PREV VISIT EST AGE 40-64: CPT | Mod: S$GLB,,, | Performed by: FAMILY MEDICINE

## 2017-08-16 PROCEDURE — 86038 ANTINUCLEAR ANTIBODIES: CPT

## 2017-08-16 PROCEDURE — 85025 COMPLETE CBC W/AUTO DIFF WBC: CPT

## 2017-08-16 RX ORDER — BUSPIRONE HYDROCHLORIDE 5 MG/1
5 TABLET ORAL 2 TIMES DAILY
Qty: 60 TABLET | Refills: 11 | Status: SHIPPED | OUTPATIENT
Start: 2017-08-16 | End: 2018-03-07

## 2017-08-16 NOTE — PROGRESS NOTES
Chief Complaint   Patient presents with    Annual Exam     physical       HPI  Kristopher Ye is a 41 y.o. female with multiple medical diagnoses as listed in the medical history and problem list that presents for follow up.      Edema - followed by Cardiology 3 day hx of lasix use. Needs labs drawn.    Pt states +dry mouth, +constant drinking water, 1 year hx, intermittent dry eyes, +multiple cavities, +numbness/tingling feet/arms, +incomplete void.    Anxiety - states lexapro causes crying episodes, xanax - caused zombie like, wellbutrin - felt sad    Fam hx - HLD, HTN, CHF, no CA    S/p menstrual embolization 2015, no menses thereafter.     Pt is known to me and was last seen by me on Visit date not found.    PAST MEDICAL HISTORY:  Past Medical History:   Diagnosis Date    Abnormal Pap smear     pt states 13yrs ago colpo was done    Anemia     Fibroid     Hypertension        PAST SURGICAL HISTORY:  Past Surgical History:   Procedure Laterality Date     SECTION      TONSILLECTOMY      TUBAL LIGATION         SOCIAL HISTORY:  Social History     Social History    Marital status:      Spouse name: N/A    Number of children: N/A    Years of education: N/A     Occupational History    Not on file.     Social History Main Topics    Smoking status: Never Smoker    Smokeless tobacco: Never Used    Alcohol use 0.0 oz/week      Comment: socially    Drug use: No    Sexual activity: Yes     Partners: Male     Birth control/ protection: Surgical     Other Topics Concern    Not on file     Social History Narrative    No narrative on file       FAMILY HISTORY:  Family History   Problem Relation Age of Onset    Arthritis Mother     Diabetes Mother     Heart disease Mother      CHF, CAD , 2 stents    Hypertension Mother     Hyperlipidemia Mother     No Known Problems Sister     Hypertension Brother     No Known Problems Daughter     Asthma Daughter        ALLERGIES AND  MEDICATIONS: updated and reviewed.  Review of patient's allergies indicates:   Allergen Reactions    Keflex [cephalexin] Itching     Current Outpatient Prescriptions   Medication Sig Dispense Refill    ferrous sulfate 325 mg (65 mg iron) Tab tablet Take 1 tablet (325 mg total) by mouth 2 (two) times daily. 60 tablet 2    fluticasone (FLONASE) 50 mcg/actuation nasal spray 1 spray by Each Nare route 2 (two) times daily. 1 Bottle 0    furosemide (LASIX) 40 MG tablet Take 0.5 tablets (20 mg total) by mouth daily as needed. 30 tablet 11    losartan-hydrochlorothiazide 100-25 mg (HYZAAR) 100-25 mg per tablet Take 1 tablet by mouth once daily. 30 tablet 6    promethazine (PHENERGAN) 25 MG tablet Take 1 tablet (25 mg total) by mouth every 4 (four) hours. 20 tablet 0    busPIRone (BUSPAR) 5 MG Tab Take 1 tablet (5 mg total) by mouth 2 (two) times daily. 60 tablet 11     No current facility-administered medications for this visit.        ROS  Review of Systems   Constitutional: Positive for activity change and unexpected weight change.   HENT: Positive for trouble swallowing. Negative for hearing loss and rhinorrhea.    Eyes: Negative for discharge and visual disturbance.   Respiratory: Positive for chest tightness and wheezing.    Cardiovascular: Positive for palpitations. Negative for chest pain.   Gastrointestinal: Negative for blood in stool, constipation, diarrhea and vomiting.   Endocrine: Positive for polydipsia and polyuria.   Genitourinary: Positive for difficulty urinating and dysuria. Negative for hematuria and menstrual problem.   Musculoskeletal: Positive for arthralgias and joint swelling. Negative for neck pain.   Neurological: Positive for weakness and headaches.   Psychiatric/Behavioral: Positive for confusion and dysphoric mood.       Physical Exam  Vitals:    08/16/17 1605   BP: 116/80   Pulse: 81   Resp: 16   Temp: 99 °F (37.2 °C)    Body mass index is 58.93 kg/m².  Weight: (!) 165.6 kg (365 lb 1.3  "oz)   Height: 5' 6" (167.6 cm)     Physical Exam   Constitutional: She is oriented to person, place, and time. She appears well-developed and well-nourished.   HENT:   Head: Normocephalic.   Cardiovascular: Normal rate and regular rhythm.    Pulmonary/Chest: Effort normal.   Musculoskeletal: Normal range of motion.   Neurological: She is alert and oriented to person, place, and time.   Psychiatric: She has a normal mood and affect. Her behavior is normal. Judgment and thought content normal.       Health Maintenance       Date Due Completion Date    Pneumococcal PCV13 (High Risk) (1 - PCV13 Required) 10/14/1976 ---    TETANUS VACCINE 10/14/1993 ---    Pneumococcal PPSV23 (High Risk) (1) 10/14/1993 ---    Influenza Vaccine 08/01/2017 ---    Pap Smear with HPV Cotest 05/13/2018 5/13/2015    Mammogram 05/25/2019 5/25/2017          Assessment & Plan    Essential hypertension  - Continue current medication regimen as prescribed    Iron deficiency anemia due to chronic blood loss  - Continue current medication regimen as prescribed  - Cont to monitor    Numbness and tingling  -     NITESH; Future; Expected date: 08/16/2017  -     Rheumatoid factor; Future; Expected date: 08/16/2017  - Assess labs    Anxiety  -     busPIRone (BUSPAR) 5 MG Tab; Take 1 tablet (5 mg total) by mouth 2 (two) times daily.  Dispense: 60 tablet; Refill: 11    Annual physical exam  - Counseled on age appropriate medical preventative services, including age appropriate cancer screenings, over all nutritional health, need for a consistent exercise regimen and an over all push towards maintaining a vigorous and active lifestyle.      - Counseled on age appropriate vaccines and discussed upcoming health care needs based on age/gender.  Spent time with patient counseling on need for a good patient/doctor relationship moving forward.  Discussed use of common OTC medications and supplements.  Discussed common dietary aids and use of caffeine and the need for " good sleep hygiene and stress management.        Return in about 4 weeks (around 9/13/2017), or if symptoms worsen or fail to improve.

## 2017-08-17 ENCOUNTER — PATIENT MESSAGE (OUTPATIENT)
Dept: FAMILY MEDICINE | Facility: CLINIC | Age: 42
End: 2017-08-17

## 2017-08-17 LAB
ANA SER QL IF: NORMAL
ESTIMATED AVG GLUCOSE: 131 MG/DL
HBA1C MFR BLD HPLC: 6.2 %
RHEUMATOID FACT SERPL-ACNC: <10 IU/ML

## 2017-08-22 ENCOUNTER — PATIENT MESSAGE (OUTPATIENT)
Dept: CARDIOLOGY | Facility: CLINIC | Age: 42
End: 2017-08-22

## 2017-08-25 ENCOUNTER — HOSPITAL ENCOUNTER (OUTPATIENT)
Dept: CARDIOLOGY | Facility: HOSPITAL | Age: 42
Discharge: HOME | End: 2017-08-25
Attending: INTERNAL MEDICINE
Payer: COMMERCIAL

## 2017-08-25 DIAGNOSIS — I10 HYPERTENSION: Primary | ICD-10-CM

## 2017-08-25 DIAGNOSIS — R06.02 SHORTNESS OF BREATH: ICD-10-CM

## 2017-08-25 DIAGNOSIS — I51.7 CARDIOMEGALY: ICD-10-CM

## 2017-08-25 DIAGNOSIS — R00.2 PALPITATIONS: ICD-10-CM

## 2017-08-25 LAB
DIASTOLIC DYSFUNCTION: YES
MITRAL VALVE REGURGITATION: ABNORMAL
RETIRED EF AND QEF - SEE NOTES: 45 (ref 55–65)
TRICUSPID VALVE REGURGITATION: ABNORMAL

## 2017-08-25 PROCEDURE — 93325 DOPPLER ECHO COLOR FLOW MAPG: CPT

## 2017-08-25 PROCEDURE — 93351 STRESS TTE COMPLETE: CPT | Mod: 26,,, | Performed by: INTERNAL MEDICINE

## 2017-08-25 PROCEDURE — 63600175 PHARM REV CODE 636 W HCPCS

## 2017-08-25 PROCEDURE — 93226 XTRNL ECG REC<48 HR SCAN A/R: CPT

## 2017-08-25 PROCEDURE — 93325 DOPPLER ECHO COLOR FLOW MAPG: CPT | Mod: 26,,, | Performed by: INTERNAL MEDICINE

## 2017-08-25 PROCEDURE — 93227 XTRNL ECG REC<48 HR R&I: CPT | Mod: ,,, | Performed by: INTERNAL MEDICINE

## 2017-08-25 PROCEDURE — 93320 DOPPLER ECHO COMPLETE: CPT | Mod: 26,,, | Performed by: INTERNAL MEDICINE

## 2017-08-25 PROCEDURE — 93320 DOPPLER ECHO COMPLETE: CPT

## 2017-08-25 RX ORDER — DOBUTAMINE HYDROCHLORIDE 200 MG/100ML
INJECTION INTRAVENOUS
Status: DISPENSED
Start: 2017-08-25 | End: 2017-08-25

## 2017-08-25 RX ORDER — ATROPINE SULFATE 0.1 MG/ML
INJECTION INTRAVENOUS
Status: DISPENSED
Start: 2017-08-25 | End: 2017-08-25

## 2017-09-11 ENCOUNTER — OFFICE VISIT (OUTPATIENT)
Dept: CARDIOLOGY | Facility: CLINIC | Age: 42
End: 2017-09-11
Payer: COMMERCIAL

## 2017-09-11 VITALS
OXYGEN SATURATION: 96 % | HEART RATE: 95 BPM | DIASTOLIC BLOOD PRESSURE: 88 MMHG | WEIGHT: 293 LBS | BODY MASS INDEX: 58.36 KG/M2 | SYSTOLIC BLOOD PRESSURE: 139 MMHG

## 2017-09-11 DIAGNOSIS — E66.01 MORBID OBESITY WITH BMI OF 50.0-59.9, ADULT: ICD-10-CM

## 2017-09-11 DIAGNOSIS — R60.9 EDEMA, UNSPECIFIED TYPE: ICD-10-CM

## 2017-09-11 DIAGNOSIS — R00.2 PALPITATIONS: ICD-10-CM

## 2017-09-11 DIAGNOSIS — R06.02 SHORTNESS OF BREATH: ICD-10-CM

## 2017-09-11 DIAGNOSIS — R60.1 GENERALIZED EDEMA: Primary | ICD-10-CM

## 2017-09-11 DIAGNOSIS — I10 ESSENTIAL HYPERTENSION: ICD-10-CM

## 2017-09-11 PROCEDURE — 3075F SYST BP GE 130 - 139MM HG: CPT | Mod: S$GLB,,, | Performed by: INTERNAL MEDICINE

## 2017-09-11 PROCEDURE — 99214 OFFICE O/P EST MOD 30 MIN: CPT | Mod: S$GLB,,, | Performed by: INTERNAL MEDICINE

## 2017-09-11 PROCEDURE — 99999 PR PBB SHADOW E&M-EST. PATIENT-LVL III: CPT | Mod: PBBFAC,,, | Performed by: INTERNAL MEDICINE

## 2017-09-11 PROCEDURE — 3079F DIAST BP 80-89 MM HG: CPT | Mod: S$GLB,,, | Performed by: INTERNAL MEDICINE

## 2017-09-11 PROCEDURE — 3008F BODY MASS INDEX DOCD: CPT | Mod: S$GLB,,, | Performed by: INTERNAL MEDICINE

## 2017-09-11 NOTE — PROGRESS NOTES
Subjective:    Patient ID:  Kristopher Ye is a 41 y.o. female who presents for follow-up of Results      HPI  Patient is here for follow-up of shortness of breath and edema.  She underwent diagnostic testing as below.  This essentially unchanged and within normal limits.  We mainly discussed health maintenance and risk factor modification.  She's agreeable to try exercise program.  She again is encouraged by the testing and wants to pursue sodium restriction and compression stockings.  She denies any current PND, orthopnea or lower edema.  She's not expressing dizziness, presyncope or syncope.  She does get short of breath with heavier exertion but relieved with rest.    Review of Systems   Constitution: Negative.   HENT: Negative.    Eyes: Negative.    Cardiovascular: Positive for dyspnea on exertion. Negative for chest pain, irregular heartbeat, leg swelling, near-syncope, orthopnea, palpitations, paroxysmal nocturnal dyspnea and syncope.   Respiratory: Positive for shortness of breath.    Skin: Negative.    Musculoskeletal: Negative.    Gastrointestinal: Negative for abdominal pain, constipation and diarrhea.   Genitourinary: Negative for dysuria.   Neurological: Negative.    Psychiatric/Behavioral: Negative.         Objective:    Physical Exam   Constitutional: She is oriented to person, place, and time. She appears well-developed and well-nourished.   HENT:   Head: Normocephalic and atraumatic.   Eyes: Conjunctivae and EOM are normal. Pupils are equal, round, and reactive to light.   Neck: Normal range of motion. Neck supple. No thyromegaly present.   Cardiovascular: Normal rate and regular rhythm.    No murmur heard.  Pulmonary/Chest: Effort normal and breath sounds normal. No respiratory distress.   Abdominal: Soft. Bowel sounds are normal.   Musculoskeletal: She exhibits no edema.   Neurological: She is alert and oriented to person, place, and time.   Skin: Skin is warm and dry.   Psychiatric: She  has a normal mood and affect. Her behavior is normal.         Assessment:       1. Generalized edema    2. Shortness of breath    3. Palpitations    4. Essential hypertension    5. Morbid obesity with BMI of 50.0-59.9, adult    6. Edema, unspecified type         Plan:       -Mainly reassurance a lot of testing  -Sodium restriction  -Diet and exercise tolerated  -Compression stockings with Lasix when necessary    Return to clinic in 6 months

## 2017-09-18 ENCOUNTER — OFFICE VISIT (OUTPATIENT)
Dept: FAMILY MEDICINE | Facility: CLINIC | Age: 42
End: 2017-09-18
Payer: COMMERCIAL

## 2017-09-18 VITALS
WEIGHT: 293 LBS | SYSTOLIC BLOOD PRESSURE: 104 MMHG | BODY MASS INDEX: 47.09 KG/M2 | TEMPERATURE: 99 F | OXYGEN SATURATION: 98 % | HEART RATE: 80 BPM | HEIGHT: 66 IN | DIASTOLIC BLOOD PRESSURE: 78 MMHG | RESPIRATION RATE: 17 BRPM

## 2017-09-18 DIAGNOSIS — I10 ESSENTIAL HYPERTENSION: ICD-10-CM

## 2017-09-18 DIAGNOSIS — D56.9 THALASSEMIA, UNSPECIFIED TYPE: ICD-10-CM

## 2017-09-18 DIAGNOSIS — D50.0 IRON DEFICIENCY ANEMIA DUE TO CHRONIC BLOOD LOSS: ICD-10-CM

## 2017-09-18 DIAGNOSIS — E66.01 MORBID OBESITY, UNSPECIFIED OBESITY TYPE: ICD-10-CM

## 2017-09-18 DIAGNOSIS — Z23 NEED FOR PNEUMOCOCCAL VACCINATION: Primary | ICD-10-CM

## 2017-09-18 PROCEDURE — 3008F BODY MASS INDEX DOCD: CPT | Mod: S$GLB,,, | Performed by: FAMILY MEDICINE

## 2017-09-18 PROCEDURE — 3078F DIAST BP <80 MM HG: CPT | Mod: S$GLB,,, | Performed by: FAMILY MEDICINE

## 2017-09-18 PROCEDURE — 99999 PR PBB SHADOW E&M-EST. PATIENT-LVL III: CPT | Mod: PBBFAC,,, | Performed by: FAMILY MEDICINE

## 2017-09-18 PROCEDURE — 3074F SYST BP LT 130 MM HG: CPT | Mod: S$GLB,,, | Performed by: FAMILY MEDICINE

## 2017-09-18 PROCEDURE — 99214 OFFICE O/P EST MOD 30 MIN: CPT | Mod: S$GLB,,, | Performed by: FAMILY MEDICINE

## 2017-09-21 ENCOUNTER — TELEPHONE (OUTPATIENT)
Dept: FAMILY MEDICINE | Facility: CLINIC | Age: 42
End: 2017-09-21

## 2017-09-21 NOTE — TELEPHONE ENCOUNTER
Hi, can you please help me schedule this patient for a surgical consult/ class? The referral is in.

## 2017-09-22 ENCOUNTER — TELEPHONE (OUTPATIENT)
Dept: BARIATRICS | Facility: CLINIC | Age: 42
End: 2017-09-22

## 2017-09-22 NOTE — TELEPHONE ENCOUNTER
----- Message from Mikaela Connolly sent at 9/22/2017 10:13 AM CDT -----  Contact: self   Kristopher States that they need a call returned by a nurse in reference to a phone call and voice mail she received from you , She has some additional questions . Please call Kristopher @ [phone]  . Thanks :)

## 2017-09-26 NOTE — TELEPHONE ENCOUNTER
I tried to return call and pt and I had a bad  Connection and she could not hear me. I will send her a mesg through [facility] . Pt was referred to  For weight loss

## 2017-09-27 ENCOUNTER — PATIENT MESSAGE (OUTPATIENT)
Dept: BARIATRICS | Facility: CLINIC | Age: 42
End: 2017-09-27

## 2017-10-03 ENCOUNTER — DOCUMENTATION ONLY (OUTPATIENT)
Dept: BARIATRICS | Facility: CLINIC | Age: 42
End: 2017-10-03

## 2017-10-03 ENCOUNTER — TELEPHONE (OUTPATIENT)
Dept: BARIATRICS | Facility: CLINIC | Age: 42
End: 2017-10-03

## 2017-10-03 NOTE — TELEPHONE ENCOUNTER
"Called patient and she will do the seminar from her laptop. She was on a cell when she viewed the seminar and didn't see where the form was. Told her she needs to choose "click here" in the box below the code word. She will do this today.  "

## 2017-10-03 NOTE — PROGRESS NOTES
Bariatric Surgery Online Course Form Submission  Someone has submitted a Bariatric Surgery Online Course Form Submission on this page.  Date: 2017-10-03 - 16:01  Patient's Name: jackie palumbo  YOB: 1975 Email: [email or web address]  Phone: [phone]   Patient Address: [address] [place] [address]  Preferred Surgical Location: [facility]   I certify that I am 18 years of age or older:   Confirmation Code: [id number]  Verification of Bariatric Seminar: y  Insurance Information  Insurance or Self Pay? Insurance - Fill out fields below  Insurance Company Name: [employer]   Type of Coverage/Coverage Plan (i.e. PPO, HMO):   Group Name:   Subscriber Name: disha palumbo lalita   Member Name (patient's name)   Member ID/Policy #:[id number]   Plan Effective Date: 9/1/2017  Card Issuance date: 8/24/17

## 2017-10-12 ENCOUNTER — PATIENT MESSAGE (OUTPATIENT)
Dept: PULMONOLOGY | Facility: CLINIC | Age: 42
End: 2017-10-12

## 2017-10-13 ENCOUNTER — PATIENT MESSAGE (OUTPATIENT)
Dept: OBSTETRICS AND GYNECOLOGY | Facility: CLINIC | Age: 42
End: 2017-10-13

## 2017-10-13 ENCOUNTER — PATIENT MESSAGE (OUTPATIENT)
Dept: PULMONOLOGY | Facility: CLINIC | Age: 42
End: 2017-10-13

## 2017-10-13 ENCOUNTER — OFFICE VISIT (OUTPATIENT)
Dept: SLEEP MEDICINE | Facility: CLINIC | Age: 42
End: 2017-10-13
Payer: COMMERCIAL

## 2017-10-13 ENCOUNTER — PATIENT MESSAGE (OUTPATIENT)
Dept: FAMILY MEDICINE | Facility: CLINIC | Age: 42
End: 2017-10-13

## 2017-10-13 DIAGNOSIS — G47.33 OSA (OBSTRUCTIVE SLEEP APNEA): Primary | ICD-10-CM

## 2017-10-13 PROCEDURE — 99499 UNLISTED E&M SERVICE: CPT | Mod: S$GLB,,, | Performed by: INTERNAL MEDICINE

## 2017-10-13 NOTE — LETTER
October 13, 2017      David Thomas MD  [address]  [address]  [place] [address]           [facility]  [address] [place] [address]  Phone: [phone]  Fax: [phone]          Patient: Kristopher Ye   MR Number: [id number]   YOB: 1975   Date of Visit: 10/13/2017       Dear Dr. David Thomas:    Thank you for referring Kristopher Ye to me for evaluation. Attached you will find relevant portions of my assessment and plan of care.    If you have questions, please do not hesitate to call me. I look forward to following Kristopher Ye along with you.    Sincerely,    Tung Stevens MD    Enclosure  CC:  No Recipients    If you would like to receive this communication electronically, please contact [email or web address] or [phone] to request more information on [employer] access.    For providers and/or their staff who would like to refer a patient to [facility], please contact us through our one-stop-shop provider referral line, [facility], at [phone].    If you feel you have received this communication in error or would no longer like to receive these types of communications, please e-mail [email or web address]

## 2017-10-16 RX ORDER — FLUCONAZOLE 150 MG/1
150 TABLET ORAL DAILY
Qty: 2 TABLET | Refills: 0 | Status: SHIPPED | OUTPATIENT
Start: 2017-10-16 | End: 2017-10-17

## 2017-11-08 ENCOUNTER — OFFICE VISIT (OUTPATIENT)
Dept: FAMILY MEDICINE | Facility: CLINIC | Age: 42
End: 2017-11-08
Payer: COMMERCIAL

## 2017-11-08 VITALS
TEMPERATURE: 98 F | OXYGEN SATURATION: 97 % | HEIGHT: 66 IN | DIASTOLIC BLOOD PRESSURE: 80 MMHG | SYSTOLIC BLOOD PRESSURE: 110 MMHG | BODY MASS INDEX: 47.09 KG/M2 | HEART RATE: 78 BPM | WEIGHT: 293 LBS

## 2017-11-08 DIAGNOSIS — K52.9 GASTROENTERITIS: Primary | ICD-10-CM

## 2017-11-08 DIAGNOSIS — R10.9 ABDOMINAL PAIN, UNSPECIFIED ABDOMINAL LOCATION: ICD-10-CM

## 2017-11-08 DIAGNOSIS — R19.7 DIARRHEA, UNSPECIFIED TYPE: ICD-10-CM

## 2017-11-08 DIAGNOSIS — J32.9 RHINOSINUSITIS: ICD-10-CM

## 2017-11-08 DIAGNOSIS — R11.0 NAUSEA: ICD-10-CM

## 2017-11-08 PROCEDURE — 99999 PR PBB SHADOW E&M-EST. PATIENT-LVL IV: CPT | Mod: PBBFAC,,, | Performed by: NURSE PRACTITIONER

## 2017-11-08 PROCEDURE — 99214 OFFICE O/P EST MOD 30 MIN: CPT | Mod: S$GLB,,, | Performed by: NURSE PRACTITIONER

## 2017-11-08 RX ORDER — HYOSCYAMINE SULFATE 0.125 MG
125 TABLET ORAL EVERY 4 HOURS PRN
Qty: 30 TABLET | Refills: 0 | Status: SHIPPED | OUTPATIENT
Start: 2017-11-08 | End: 2018-03-07

## 2017-11-08 RX ORDER — CODEINE PHOSPHATE AND GUAIFENESIN 10; 100 MG/5ML; MG/5ML
5 SOLUTION ORAL 3 TIMES DAILY PRN
Qty: 180 ML | Refills: 0 | Status: SHIPPED | OUTPATIENT
Start: 2017-11-08 | End: 2017-11-18

## 2017-11-08 RX ORDER — ONDANSETRON 8 MG/1
8 TABLET, ORALLY DISINTEGRATING ORAL EVERY 6 HOURS PRN
Qty: 20 TABLET | Refills: 0 | Status: SHIPPED | OUTPATIENT
Start: 2017-11-08 | End: 2018-03-07

## 2017-11-08 NOTE — PROGRESS NOTES
Subjective:       Patient ID: Kristopher Ye is a 42 y.o. female.    Chief Complaint: Sore Throat (X 2-3 days ) and stomach virus (some diarrhea,abdomen pain and nausea X yesterday afternoon)    Sore Throat    This is a new problem. The current episode started in the past 7 days (3 days). The problem has been unchanged. Neither side of throat is experiencing more pain than the other. There has been no fever. The pain is at a severity of 7/10. The pain is moderate. Associated symptoms include abdominal pain, coughing, diarrhea, a hoarse voice, neck pain and trouble swallowing (hurts). Pertinent negatives include no congestion, drooling, ear discharge, ear pain, headaches, plugged ear sensation, shortness of breath, stridor, swollen glands or vomiting. She has tried nothing for the symptoms.   Diarrhea    This is a new problem. The current episode started yesterday. The problem occurs 2 to 4 times per day. The problem has been unchanged. The stool consistency is described as watery. Associated symptoms include abdominal pain, coughing and sweats. Pertinent negatives include no arthralgias, bloating, chills, fever, headaches, increased  flatus, myalgias, URI, vomiting or weight loss. Nothing aggravates the symptoms. Risk factors include ill contacts. She has tried anti-motility drug for the symptoms. The treatment provided moderate relief.   Nausea   This is a new problem. The current episode started yesterday. The problem occurs constantly. The problem has been waxing and waning. Associated symptoms include abdominal pain, a change in bowel habit (diarrhea), coughing, diaphoresis, nausea, neck pain and a sore throat. Pertinent negatives include no anorexia, arthralgias, chest pain, chills, congestion, fatigue, fever, headaches, joint swelling, myalgias, numbness, rash, swollen glands, urinary symptoms, vertigo, visual change, vomiting or weakness. The symptoms are aggravated by exertion.     Review of Systems    Constitutional: Positive for diaphoresis. Negative for chills, fatigue, fever and weight loss.   HENT: Positive for hoarse voice, sore throat and trouble swallowing (hurts). Negative for congestion, drooling, ear discharge and ear pain.    Respiratory: Positive for cough. Negative for shortness of breath and stridor.    Cardiovascular: Negative for chest pain.   Gastrointestinal: Positive for abdominal pain, change in bowel habit (diarrhea), diarrhea and nausea. Negative for anorexia, bloating, flatus and vomiting.   Musculoskeletal: Positive for neck pain. Negative for arthralgias, joint swelling and myalgias.   Skin: Negative for rash.   Neurological: Negative for vertigo, weakness, numbness and headaches.       Objective:      Physical Exam   Constitutional: Vital signs are normal. She appears well-developed and well-nourished.   HENT:   Head: Normocephalic and atraumatic.   Right Ear: Tympanic membrane, external ear and ear canal normal.   Left Ear: Tympanic membrane, external ear and ear canal normal.   Nose: Nose normal. Right sinus exhibits no maxillary sinus tenderness and no frontal sinus tenderness. Left sinus exhibits no maxillary sinus tenderness and no frontal sinus tenderness.   Mouth/Throat: Uvula is midline and mucous membranes are normal. Posterior oropharyngeal erythema present.   Eyes: Conjunctivae and EOM are normal. Pupils are equal, round, and reactive to light.   Neck: Normal range of motion and full passive range of motion without pain. Neck supple.   Cardiovascular: Normal rate, regular rhythm and normal heart sounds.    Pulmonary/Chest: Effort normal and breath sounds normal.   Abdominal: Soft. Normal appearance. Bowel sounds are increased. There is tenderness in the epigastric area and periumbilical area. There is no rigidity, no rebound, no guarding, no CVA tenderness, no tenderness at McBurney's point and negative Hickey's sign.   Musculoskeletal: Normal range of motion.    Lymphadenopathy:     She has no cervical adenopathy.   Neurological: She is alert.   Skin: Skin is warm, dry and intact.   Psychiatric: She has a normal mood and affect.   Nursing note and vitals reviewed.      Assessment:       1. Gastroenteritis    2. Nausea    3. Diarrhea, unspecified type    4. Rhinosinusitis    5. Abdominal pain, unspecified abdominal location      Plan:       Kristopher was seen today for sore throat and stomach virus.    Diagnoses and all orders for this visit:    Gastroenteritis  -     hyoscyamine (ANASPAZ,LEVSIN) 0.125 mg Tab; Take 1 tablet (125 mcg total) by mouth every 4 (four) hours as needed.  -     ondansetron (ZOFRAN-ODT) 8 MG TbDL; Take 1 tablet (8 mg total) by mouth every 6 (six) hours as needed.    Nausea  -     ondansetron (ZOFRAN-ODT) 8 MG TbDL; Take 1 tablet (8 mg total) by mouth every 6 (six) hours as needed.    Diarrhea, unspecified type  Continue with imodium    Rhinosinusitis  -     guaifenesin-codeine 100-10 mg/5 ml (TUSSI-ORGANIDIN NR)  mg/5 mL syrup; Take 5 mLs by mouth 3 (three) times daily as needed for Cough.    Abdominal pain, unspecified abdominal location  -     hyoscyamine (ANASPAZ,LEVSIN) 0.125 mg Tab; Take 1 tablet (125 mcg total) by mouth every 4 (four) hours as needed.      Patient discharged to home in stable condition with instructions to:   1. Please take all meds as prescribed.  2. Follow-up with your primary care doctor   3. Return precautions discussed and patient and/or family/caretaker understands to return to the emergency room for any concerns including worsening of your current symptoms, fever, chills, night sweats, worsening pain, chest pain, shortness of breath, nausea, vomiting, diarrhea, bleeding, headache, difficulty talking, visual disturbances, weakness, numbness or any other acute concerns

## 2017-11-08 NOTE — LETTER
November 8, 2017      Kristopher Ye   [address]  [place] [address]             [facility]  [address]  [place] [address]  Phone: [phone]  Fax: [phone] Kristopher eY    Was treated here on 11/08/2017    May Return to work/school on 11/10/2017    No Restrictions            Daria Rdz, NP

## 2017-11-08 NOTE — PATIENT INSTRUCTIONS
Noninfectious Gastroenteritis (Ages 6 Years to Adult)    Gastroenteritis can cause nausea, vomiting, diarrhea, and abdominal cramping. This may occur as a result of food sensitivity, inflammation of your gastrointestinal tract, medicines, stress, or other causes not related to infection. Your symptoms will usually last from 1 to 3 days, but can last longer. Antibiotics are not effective, but simple home treatment will be helpful.  Home care  Medicine  · You may use acetaminophen or NSAID medicines like ibuprofen or naproxen to control fever, unless another medicine is prescribed. (Note: If you have chronic liver or kidney disease, or ever had a stomach ulcer or gastrointestinalI bleeding, talk with your healthcare provider before using these medicines.) Aspirin should never be used in anyone under 18 years of age who is ill with a fever. It may cause severe liver damage. Don't increase your NSAID medicines if you are already taking these medicines for another condition (like arthritis). Don't use NSAIDS if you are on aspirin (such as for heart disease, or after a stroke).  · If medicines for diarrhea or vomiting are prescribed, take only as directed.  General care and preventing spread of the illness  · If symptoms are severe, rest at home for the next 24 hours or until you feel better.  · Hand washing with soap and water is the best way to prevent the spread of infection. Wash your hands after touching anyone who is sick.  · Wash your hands after using the toilet and before meals. Clean the toilet after each use.  · Caffeine, tobacco, and alcohol can make your diarrhea, cramping, and pain worse.  Diet  · Water and clear liquids are important so you do not get dehydrated. Drink a small amount at a time.  · Do not force yourself to eat, especially if you have cramps, vomiting, or diarrhea. When you finally decide to start eating, do not eat large amounts at a time, even if you are hungry.  · If you eat, avoid  fatty, greasy, spicy, or fried foods.  · Do not eat dairy products if you have diarrhea; they can make the diarrhea worse.  During the first 24 hours (the first full day), follow the diet below:  · Beverages: Water, clear liquids, soft drinks without caffeine, like ginger ale; mineral water (plain or flavored); decaffeinated tea and coffee.  · Soups: Clear broth, consommé, and bouillon Sports drinks aren't a good choice because they have too much sugar and not enough electrolytes. In this case, commercially available products called oral rehydration solutions are best.  · Desserts: Plain gelatin, popsicles, and fruit juice bars.  During the next 24 hours (the second day), you may add the following to the above if you have improved. If not, continue what you did the first day:  · Hot cereal, plain toast, bread, rolls, crackers  · Plain noodles, rice, mashed potatoes, chicken noodle or rice soup  · Unsweetened canned fruit (avoid pineapple), bananas  · Limit caffeine and chocolate. No spices or seasonings except salt.  During the next 24 hours  · Gradually resume a normal diet, as you feel better and your symptoms improve.  · If at any time your symptoms start getting worse, go back to clear liquids until you feel better.  Food preparation  · If you have diarrhea, you should not prepare food for others. When you  prepare food for yourself, wash your hands before and after.  · Wash your hands after using cutting boards, countertops, and knives that have been in contact with raw food.  · Keep uncooked meats away from cooked and ready-to-eat foods.  Follow-up care  Follow up with your healthcare provider if you are not improving over the next 2 to 3 days, or as advised. If a stool (diarrhea) sample was taken, call for the results as directed.  When to seek medical care  Call your healthcare provider right away if any of these occur:   · Increasing abdominal pain or constant lower right abdominal pain  · Continued  vomiting (unable to keep liquids down)  · Frequent diarrhea (more than 5 times a day)  · Blood in vomit or stool (black or red color)  · Inability to tolerate solid food after a few days.  · Dark urine, reduced urine output  · Weakness, dizziness  · Drowsiness  · Fever of 100.4ºF (38.0ºC) or higher, or as directed by your healthcare provider  · New rash  Call 911  Call 911 if any of these occur:  · Trouble breathing  · Chest pain  · Confusion  · Severe drowsiness or trouble awakening  · Seizure  · Stiff neck  Date Last Reviewed: 11/16/2015  © 4353-0785 [employer]. [address] [place] [address], [place] [address]. All rights reserved. This information is not intended as a substitute for professional medical care. Always follow your healthcare professional's instructions.

## 2017-11-08 NOTE — MEDICAL/APP STUDENT
Subjective:       Patient ID: Kristopher Ye is a 42 y.o. female.    Chief Complaint: Sore Throat (X 2-3 days ) and stomach virus (some diarrhea,abdomen pain and nausea X yesterday afternoon)    Sore Throat    This is a new problem. The current episode started in the past 7 days (3 days). The problem has been unchanged. Neither side of throat is experiencing more pain than the other. There has been no fever. The pain is at a severity of 7/10. The pain is moderate. Associated symptoms include abdominal pain, coughing, diarrhea, a hoarse voice, neck pain and trouble swallowing (hurts). Pertinent negatives include no congestion, drooling, ear discharge, ear pain, headaches, plugged ear sensation, shortness of breath, stridor, swollen glands or vomiting. She has tried nothing for the symptoms.   Diarrhea    This is a new problem. The current episode started yesterday. The problem occurs 2 to 4 times per day. The problem has been unchanged. The stool consistency is described as watery. Associated symptoms include abdominal pain, coughing and sweats. Pertinent negatives include no arthralgias, bloating, chills, fever, headaches, increased  flatus, myalgias, URI, vomiting or weight loss. Nothing aggravates the symptoms. Risk factors include ill contacts. She has tried anti-motility drug for the symptoms. The treatment provided moderate relief.   Nausea   This is a new problem. The current episode started yesterday. The problem occurs constantly. The problem has been waxing and waning. Associated symptoms include abdominal pain, a change in bowel habit (diarrhea), coughing, diaphoresis, nausea, neck pain and a sore throat. Pertinent negatives include no anorexia, arthralgias, chest pain, chills, congestion, fatigue, fever, headaches, joint swelling, myalgias, numbness, rash, swollen glands, urinary symptoms, vertigo, visual change, vomiting or weakness. The symptoms are aggravated by exertion.     Review of Systems    Constitutional: Positive for diaphoresis. Negative for chills, fatigue, fever and weight loss.   HENT: Positive for hoarse voice, sore throat and trouble swallowing (hurts). Negative for congestion, drooling, ear discharge and ear pain.    Respiratory: Positive for cough. Negative for shortness of breath and stridor.    Cardiovascular: Negative for chest pain.   Gastrointestinal: Positive for abdominal pain, change in bowel habit (diarrhea), diarrhea and nausea. Negative for anorexia, bloating, flatus and vomiting.   Musculoskeletal: Positive for neck pain. Negative for arthralgias, joint swelling and myalgias.   Skin: Negative for rash.   Neurological: Negative for vertigo, weakness, numbness and headaches.       Objective:      Physical Exam   Constitutional: Vital signs are normal. She appears well-developed and well-nourished.   HENT:   Head: Normocephalic and atraumatic.   Right Ear: Tympanic membrane, external ear and ear canal normal.   Left Ear: Tympanic membrane, external ear and ear canal normal.   Nose: Nose normal. Right sinus exhibits no maxillary sinus tenderness and no frontal sinus tenderness. Left sinus exhibits no maxillary sinus tenderness and no frontal sinus tenderness.   Mouth/Throat: Uvula is midline and mucous membranes are normal. Posterior oropharyngeal erythema present.   Eyes: Conjunctivae and EOM are normal. Pupils are equal, round, and reactive to light.   Neck: Normal range of motion and full passive range of motion without pain. Neck supple.   Cardiovascular: Normal rate, regular rhythm and normal heart sounds.    Pulmonary/Chest: Effort normal and breath sounds normal.   Abdominal: Soft. Normal appearance. Bowel sounds are increased. There is tenderness in the epigastric area and periumbilical area. There is no rigidity, no rebound, no guarding, no CVA tenderness, no tenderness at McBurney's point and negative Hickey's sign.   Musculoskeletal: Normal range of motion.    Lymphadenopathy:     She has no cervical adenopathy.   Neurological: She is alert.   Skin: Skin is warm, dry and intact.   Psychiatric: She has a normal mood and affect.   Nursing note and vitals reviewed.      Assessment:       1. Gastroenteritis    2. Nausea    3. Diarrhea, unspecified type    4. Rhinosinusitis    5. Abdominal pain, unspecified abdominal location      Plan:       Kristopher was seen today for sore throat and stomach virus.    Diagnoses and all orders for this visit:    Gastroenteritis  -     hyoscyamine (ANASPAZ,LEVSIN) 0.125 mg Tab; Take 1 tablet (125 mcg total) by mouth every 4 (four) hours as needed.  -     ondansetron (ZOFRAN-ODT) 8 MG TbDL; Take 1 tablet (8 mg total) by mouth every 6 (six) hours as needed.    Nausea  -     ondansetron (ZOFRAN-ODT) 8 MG TbDL; Take 1 tablet (8 mg total) by mouth every 6 (six) hours as needed.    Diarrhea, unspecified type  Continue with imodium    Rhinosinusitis  -     guaifenesin-codeine 100-10 mg/5 ml (TUSSI-ORGANIDIN NR)  mg/5 mL syrup; Take 5 mLs by mouth 3 (three) times daily as needed for Cough.    Abdominal pain, unspecified abdominal location  -     hyoscyamine (ANASPAZ,LEVSIN) 0.125 mg Tab; Take 1 tablet (125 mcg total) by mouth every 4 (four) hours as needed.      Patient discharged to home in stable condition with instructions to:   1. Please take all meds as prescribed.  2. Follow-up with your primary care doctor   3. Return precautions discussed and patient and/or family/caretaker understands to return to the emergency room for any concerns including worsening of your current symptoms, fever, chills, night sweats, worsening pain, chest pain, shortness of breath, nausea, vomiting, diarrhea, bleeding, headache, difficulty talking, visual disturbances, weakness, numbness or any other acute concerns       Return if symptoms worsen or fail to improve.

## 2017-11-09 ENCOUNTER — PATIENT MESSAGE (OUTPATIENT)
Dept: FAMILY MEDICINE | Facility: CLINIC | Age: 42
End: 2017-11-09

## 2017-11-09 RX ORDER — LEVOCETIRIZINE DIHYDROCHLORIDE 5 MG/1
5 TABLET, FILM COATED ORAL NIGHTLY
Qty: 30 TABLET | Refills: 3 | Status: SHIPPED | OUTPATIENT
Start: 2017-11-09 | End: 2018-03-07

## 2018-01-04 DIAGNOSIS — J32.9 RHINOSINUSITIS: ICD-10-CM

## 2018-01-04 RX ORDER — CODEINE PHOSPHATE AND GUAIFENESIN 10; 100 MG/5ML; MG/5ML
5 SOLUTION ORAL 3 TIMES DAILY PRN
Qty: 180 ML | Refills: 0 | OUTPATIENT
Start: 2018-01-04 | End: 2018-01-14

## 2018-03-07 ENCOUNTER — HOSPITAL ENCOUNTER (EMERGENCY)
Facility: OTHER | Age: 43
Discharge: HOME | End: 2018-03-07
Attending: EMERGENCY MEDICINE
Payer: COMMERCIAL

## 2018-03-07 VITALS
TEMPERATURE: 98 F | WEIGHT: 293 LBS | DIASTOLIC BLOOD PRESSURE: 94 MMHG | HEART RATE: 84 BPM | SYSTOLIC BLOOD PRESSURE: 153 MMHG | RESPIRATION RATE: 16 BRPM | BODY MASS INDEX: 47.09 KG/M2 | OXYGEN SATURATION: 100 % | HEIGHT: 66 IN

## 2018-03-07 DIAGNOSIS — J40 BRONCHITIS: Primary | ICD-10-CM

## 2018-03-07 PROCEDURE — 99283 EMERGENCY DEPT VISIT LOW MDM: CPT

## 2018-03-07 RX ORDER — ALBUTEROL SULFATE 90 UG/1
2 AEROSOL, METERED RESPIRATORY (INHALATION) EVERY 6 HOURS PRN
Qty: 6.7 G | Refills: 0 | Status: SHIPPED | OUTPATIENT
Start: 2018-03-07 | End: 2019-11-20 | Stop reason: SDUPTHER

## 2018-03-07 RX ORDER — LORATADINE 10 MG/1
10 TABLET ORAL DAILY PRN
COMMUNITY
End: 2021-11-03

## 2018-03-07 RX ORDER — PREDNISONE 10 MG/1
10 TABLET ORAL DAILY
Qty: 5 TABLET | Refills: 0 | Status: SHIPPED | OUTPATIENT
Start: 2018-03-07 | End: 2018-03-12

## 2018-03-08 NOTE — ED TRIAGE NOTES
Pt c/o of sore throat x 1 week as well as productive cough with green sputum. Pt has no other complaints

## 2018-03-08 NOTE — ED PROVIDER NOTES
"Encounter Date: 3/7/2018       History     Chief Complaint   Patient presents with    Sore Throat     pt presents with c/o sore throat with productive cough with green sputum, horseness x5 days; reports "feeling hot"; last dose of Ibuprofen at 1430     The history is provided by the patient.   Sore Throat   This is a new problem. The current episode started yesterday. The problem occurs constantly. The problem has not changed since onset.Pertinent negatives include no chest pain, no abdominal pain, no headaches and no shortness of breath. Nothing aggravates the symptoms. Nothing relieves the symptoms. She has tried nothing for the symptoms.     Review of patient's allergies indicates:   Allergen Reactions    Keflex [cephalexin] Itching     Past Medical History:   Diagnosis Date    Abnormal Pap smear     pt states 13yrs ago colpo was done    Anemia     Fibroid     Hypertension      Past Surgical History:   Procedure Laterality Date     SECTION      TONSILLECTOMY      TUBAL LIGATION       Family History   Problem Relation Age of Onset    Arthritis Mother     Diabetes Mother     Heart disease Mother      CHF, CAD , 2 stents    Hypertension Mother     Hyperlipidemia Mother     No Known Problems Sister     Hypertension Brother     No Known Problems Daughter     Asthma Daughter      Social History   Substance Use Topics    Smoking status: Never Smoker    Smokeless tobacco: Never Used    Alcohol use 0.0 oz/week      Comment: socially     Review of Systems   Constitutional: Negative.    HENT: Positive for postnasal drip, rhinorrhea and sore throat. Negative for sinus pain and sinus pressure.    Eyes: Negative.    Respiratory: Positive for cough. Negative for shortness of breath.    Cardiovascular: Negative.  Negative for chest pain.   Gastrointestinal: Negative.  Negative for abdominal pain.   Endocrine: Negative.    Genitourinary: Negative.    Musculoskeletal: Negative.    Skin: Negative.  "   Allergic/Immunologic: Negative.    Neurological: Negative.  Negative for headaches.   Hematological: Negative.    Psychiatric/Behavioral: Negative.    All other systems reviewed and are negative.      Physical Exam     Initial Vitals [03/07/18 1923]   BP Pulse Resp Temp SpO2   (!) 153/94 84 16 98.1 °F (36.7 °C) 100 %      MAP       113.67         Physical Exam    Nursing note and vitals reviewed.  Constitutional: Vital signs are normal. She appears well-developed. She is active and cooperative.   HENT:   Head: Normocephalic and atraumatic.   Right Ear: Tympanic membrane normal.   Left Ear: Tympanic membrane normal.   Nose: Mucosal edema and rhinorrhea present.   Mouth/Throat: Uvula is midline, oropharynx is clear and moist and mucous membranes are normal.   Eyes: Conjunctivae, EOM and lids are normal. Pupils are equal, round, and reactive to light.   Neck: Trachea normal and full passive range of motion without pain. Neck supple. No thyroid mass present.   Cardiovascular: Normal rate, regular rhythm, S1 normal, S2 normal, normal heart sounds, intact distal pulses and normal pulses.   Pulmonary/Chest: Effort normal and breath sounds normal.           Abdominal: Soft. Normal appearance, normal aorta and bowel sounds are normal.   Musculoskeletal: Normal range of motion.   Lymphadenopathy:     She has no axillary adenopathy.   Neurological: She is alert and oriented to person, place, and time.   Skin: Skin is warm, dry and intact.   Psychiatric: She has a normal mood and affect. Her speech is normal and behavior is normal. Judgment and thought content normal. Cognition and memory are normal.         ED Course   Procedures  Labs Reviewed - No data to display                               Clinical Impression:   The encounter diagnosis was Bronchitis.                           Francois Lewis MD  03/07/18 1942

## 2018-03-12 ENCOUNTER — OFFICE VISIT (OUTPATIENT)
Dept: FAMILY MEDICINE | Facility: CLINIC | Age: 43
End: 2018-03-12
Payer: COMMERCIAL

## 2018-03-12 VITALS
HEART RATE: 89 BPM | WEIGHT: 293 LBS | HEIGHT: 66 IN | DIASTOLIC BLOOD PRESSURE: 96 MMHG | SYSTOLIC BLOOD PRESSURE: 136 MMHG | OXYGEN SATURATION: 97 % | TEMPERATURE: 98 F | BODY MASS INDEX: 47.09 KG/M2

## 2018-03-12 DIAGNOSIS — N89.8 VAGINAL DISCHARGE: ICD-10-CM

## 2018-03-12 DIAGNOSIS — N76.0 ACUTE VAGINITIS: Primary | ICD-10-CM

## 2018-03-12 DIAGNOSIS — N30.01 ACUTE CYSTITIS WITH HEMATURIA: ICD-10-CM

## 2018-03-12 LAB
BILIRUB SERPL-MCNC: NEGATIVE MG/DL
BLOOD URINE, POC: 250
COLOR, POC UA: NORMAL
GLUCOSE UR QL STRIP: NEGATIVE
KETONES UR QL STRIP: NEGATIVE
LEUKOCYTE ESTERASE URINE, POC: NORMAL
NITRITE, POC UA: NEGATIVE
PH, POC UA: 5
PROTEIN, POC: NORMAL
SPECIFIC GRAVITY, POC UA: 1.02
UROBILINOGEN, POC UA: NEGATIVE

## 2018-03-12 PROCEDURE — 87086 URINE CULTURE/COLONY COUNT: CPT

## 2018-03-12 PROCEDURE — 99999 PR PBB SHADOW E&M-EST. PATIENT-LVL IV: CPT | Mod: PBBFAC,,, | Performed by: NURSE PRACTITIONER

## 2018-03-12 PROCEDURE — 81002 URINALYSIS NONAUTO W/O SCOPE: CPT | Mod: S$GLB,,, | Performed by: NURSE PRACTITIONER

## 2018-03-12 PROCEDURE — 3080F DIAST BP >= 90 MM HG: CPT | Mod: CPTII,S$GLB,, | Performed by: NURSE PRACTITIONER

## 2018-03-12 PROCEDURE — 99214 OFFICE O/P EST MOD 30 MIN: CPT | Mod: SA,25,S$GLB, | Performed by: NURSE PRACTITIONER

## 2018-03-12 PROCEDURE — 87491 CHLMYD TRACH DNA AMP PROBE: CPT

## 2018-03-12 PROCEDURE — 3075F SYST BP GE 130 - 139MM HG: CPT | Mod: CPTII,S$GLB,, | Performed by: NURSE PRACTITIONER

## 2018-03-12 PROCEDURE — 87480 CANDIDA DNA DIR PROBE: CPT

## 2018-03-12 RX ORDER — METRONIDAZOLE 500 MG/1
500 TABLET ORAL EVERY 12 HOURS
Qty: 14 TABLET | Refills: 0 | Status: SHIPPED | OUTPATIENT
Start: 2018-03-12 | End: 2018-03-19

## 2018-03-12 RX ORDER — CIPROFLOXACIN 250 MG/1
250 TABLET, FILM COATED ORAL 2 TIMES DAILY
Qty: 10 TABLET | Refills: 0 | Status: SHIPPED | OUTPATIENT
Start: 2018-03-12 | End: 2018-03-17

## 2018-03-12 NOTE — PATIENT INSTRUCTIONS
Bacterial Vaginosis    You have a vaginal infection called bacterial vaginosis (BV). Both good and bad bacteria are present in a healthy vagina. BV occurs when these bacteria get out of balance. The number of bad bacteria increase. And the number of good bacteria decrease.  BV may or may not cause symptoms. If symptoms do occur, they can include:  · Thin, gray, milky-white, or sometimes green discharge  · Unpleasant odor or fishy smell  · Itching, burning, or pain in or around the vagina  It is not known what causes BV, but certain factors can make the problem more likely. This can include:  · Douching  · Having sex with a new partner  · Having sex with more than one partner  BV will sometimes go away on its own. But treatment is usually recommended. This is because untreated BV can increase the risk of more serious health problems such as:  · Pelvic inflammatory disease (PID)  ·  delivery (giving birth to a baby early if youre pregnant)  · HIV and certain other sexually transmitted diseases (STDs)  · Infection after surgery on the reproductive organs  Home care  General care  · BV is most often treated with medicines called antibiotics. These may be given as pills or as a vaginal cream. If antibiotics are prescribed, be sure to use them exactly as directed. Also, be sure to complete all of the medicine, even if your symptoms go away.  · Avoid douching or having sex during treatment.  · If you have sex with a female partner, ask your healthcare provider if she should also be treated.  Prevention  · Limit or avoid douching.  · Avoid having sex. If you do have sex, then take steps to lower your risk:  ¨ Use condoms when having sex.  ¨ Limit the number of partners you have sex with.  Follow-up care  Follow up with your healthcare provider, or as advised.  When to seek medical advice  Call your healthcare provider right away if:  · You have a fever of 100.4ºF (38ºC) or higher, or as directed by your  provider.  · Your symptoms worsen, or they dont go away within a few days of starting treatment.  · You have new pain in the lower belly or pelvic region.  · You have side effects that bother you or a reaction to the pills or cream youre prescribed.  · You or any partners you have sex with have new symptoms, such as a rash, joint pain, or sores.  Date Last Reviewed: 7/30/2015  © 7912-6947 [employer]. [address] [place] [address], [place] [address]. All rights reserved. This information is not intended as a substitute for professional medical care. Always follow your healthcare professional's instructions.

## 2018-03-13 LAB
BACTERIA UR CULT: NORMAL
C TRACH DNA SPEC QL NAA+PROBE: NOT DETECTED
CANDIDA RRNA VAG QL PROBE: NEGATIVE
G VAGINALIS RRNA GENITAL QL PROBE: POSITIVE
N GONORRHOEA DNA SPEC QL NAA+PROBE: NOT DETECTED
T VAGINALIS RRNA GENITAL QL PROBE: NEGATIVE

## 2018-03-14 ENCOUNTER — TELEPHONE (OUTPATIENT)
Dept: FAMILY MEDICINE | Facility: CLINIC | Age: 43
End: 2018-03-14

## 2018-03-15 NOTE — PROGRESS NOTES
Subjective:       Patient ID: Kristopher Ye is a 42 y.o. female.    Chief Complaint: Vaginal Itching and Exposure to STD    Vaginal Itching   The patient's primary symptoms include genital itching, a genital odor and vaginal discharge. The patient's pertinent negatives include no genital lesions, genital rash, missed menses, pelvic pain or vaginal bleeding. This is a new problem. The current episode started in the past 7 days. The problem occurs constantly. The problem has been unchanged. The patient is experiencing no pain. She is not pregnant. Associated symptoms include dysuria. Pertinent negatives include no abdominal pain, anorexia, back pain, chills, constipation, diarrhea, discolored urine, fever, flank pain, frequency, headaches, hematuria, joint pain, joint swelling, nausea, painful intercourse, rash, sore throat, urgency or vomiting. The vaginal discharge was thick, white and malodorous. There has been no bleeding. She has not been passing clots. She has not been passing tissue. The symptoms are aggravated by tactile pressure and urinating. She is sexually active. It is unknown whether or not her partner has an STD. She uses nothing for contraception. Her menstrual history has been regular.     Review of Systems   Constitutional: Negative for chills and fever.   HENT: Negative for sore throat.    Gastrointestinal: Negative for abdominal pain, anorexia, constipation, diarrhea, nausea and vomiting.   Genitourinary: Positive for dysuria and vaginal discharge. Negative for flank pain, frequency, hematuria, missed menses, pelvic pain and urgency.   Musculoskeletal: Negative for back pain and joint pain.   Skin: Negative for rash.   Neurological: Negative for headaches.       Objective:      Physical Exam   Constitutional: She is oriented to person, place, and time. Vital signs are normal. She appears well-developed and well-nourished.   Cardiovascular: Normal rate, regular rhythm and normal heart  sounds.    Pulmonary/Chest: Effort normal and breath sounds normal.   Abdominal: Soft. Bowel sounds are normal. There is no tenderness.   Genitourinary: Uterus normal. Pelvic exam was performed with patient supine. There is no rash or tenderness on the right labia. There is no rash or tenderness on the left labia. Cervix exhibits discharge. Cervix exhibits no motion tenderness and no friability. Right adnexum displays no tenderness. Left adnexum displays no tenderness. There is erythema in the vagina. No tenderness or bleeding in the vagina. Vaginal discharge found.   Neurological: She is alert and oriented to person, place, and time.   Skin: Skin is warm, dry and intact. Capillary refill takes less than 2 seconds.   Psychiatric: She has a normal mood and affect.       Assessment:       1. Acute vaginitis    2. Vaginal discharge    3. Acute cystitis with hematuria        Plan:       Kristopher was seen today for vaginal itching and exposure to std.    Diagnoses and all orders for this visit:    Acute vaginitis  -     POCT URINE DIPSTICK WITHOUT MICROSCOPE  -     Vaginosis Screen by DNA Probe  -     C. trachomatis/N. gonorrhoeae by AMP DNA Urine  -     Urine culture  -     metroNIDAZOLE (FLAGYL) 500 MG tablet; Take 1 tablet (500 mg total) by mouth every 12 (twelve) hours.    Vaginal discharge  -     POCT URINE DIPSTICK WITHOUT MICROSCOPE  -     Vaginosis Screen by DNA Probe  -     C. trachomatis/N. gonorrhoeae by AMP DNA Urine  -     Urine culture    Acute cystitis with hematuria  -     ciprofloxacin HCl (CIPRO) 250 MG tablet; Take 1 tablet (250 mg total) by mouth 2 (two) times daily.    Home care  General care  · BV is most often treated with medicines called antibiotics. These may be given as pills or as a vaginal cream. If antibiotics are prescribed, be sure to use them exactly as directed. Also, be sure to complete all of the medicine, even if your symptoms go away.  · Avoid douching or having sex during  treatment.  · If you have sex with a female partner, ask your healthcare provider if she should also be treated.  Prevention  · Limit or avoid douching.  · Avoid having sex. If you do have sex, then take steps to lower your risk:  ¨ Use condoms when having sex.  ¨ Limit the number of partners you have sex with.  Follow-up care  Follow up with your healthcare provider, or as advised.  When to seek medical advice  Call your healthcare provider right away if:  · You have a fever of 100.4ºF (38ºC) or higher, or as directed by your provider.  · Your symptoms worsen, or they dont go away within a few days of starting treatment.  · You have new pain in the lower belly or pelvic region.  · You have side effects that bother you or a reaction to the pills or cream youre prescribed.  · You or any partners you have sex with have new symptoms, such as a rash, joint pain, or sores.  Date Last Reviewed: 7/30/2015  © 5380-8706 [employer]. [address] [place] [address], [place] [address]. All rights reserved. This information is not intended as a substitute for professional medical care. Always follow your healthcare professional's instructions.

## 2018-04-05 ENCOUNTER — HOSPITAL ENCOUNTER (EMERGENCY)
Facility: HOSPITAL | Age: 43
Discharge: HOME | End: 2018-04-05
Attending: EMERGENCY MEDICINE
Payer: COMMERCIAL

## 2018-04-05 VITALS
WEIGHT: 293 LBS | BODY MASS INDEX: 47.09 KG/M2 | TEMPERATURE: 98 F | RESPIRATION RATE: 20 BRPM | SYSTOLIC BLOOD PRESSURE: 161 MMHG | HEART RATE: 60 BPM | DIASTOLIC BLOOD PRESSURE: 95 MMHG | HEIGHT: 66 IN

## 2018-04-05 DIAGNOSIS — M54.6 ACUTE BILATERAL THORACIC BACK PAIN: ICD-10-CM

## 2018-04-05 DIAGNOSIS — R07.89 CHEST PAIN, NON-CARDIAC: Primary | ICD-10-CM

## 2018-04-05 LAB
ALBUMIN SERPL BCP-MCNC: 3.1 G/DL
ALP SERPL-CCNC: 98 U/L
ALT SERPL W/O P-5'-P-CCNC: 20 U/L
ANION GAP SERPL CALC-SCNC: 6 MMOL/L
ANISOCYTOSIS BLD QL SMEAR: SLIGHT
AST SERPL-CCNC: 16 U/L
BASOPHILS # BLD AUTO: 0.04 K/UL
BASOPHILS NFR BLD: 0.4 %
BILIRUB SERPL-MCNC: 0.4 MG/DL
BUN SERPL-MCNC: 9 MG/DL
CALCIUM SERPL-MCNC: 9.2 MG/DL
CHLORIDE SERPL-SCNC: 108 MMOL/L
CO2 SERPL-SCNC: 28 MMOL/L
CREAT SERPL-MCNC: 0.9 MG/DL
DIFFERENTIAL METHOD: ABNORMAL
EOSINOPHIL # BLD AUTO: 0.1 K/UL
EOSINOPHIL NFR BLD: 1.5 %
ERYTHROCYTE [DISTWIDTH] IN BLOOD BY AUTOMATED COUNT: 21.4 %
EST. GFR  (AFRICAN AMERICAN): >60 ML/MIN/1.73 M^2
EST. GFR  (NON AFRICAN AMERICAN): >60 ML/MIN/1.73 M^2
GLUCOSE SERPL-MCNC: 102 MG/DL
HCT VFR BLD AUTO: 36.1 %
HGB BLD-MCNC: 11 G/DL
LYMPHOCYTES # BLD AUTO: 3 K/UL
LYMPHOCYTES NFR BLD: 32.1 %
MCH RBC QN AUTO: 19.7 PG
MCHC RBC AUTO-ENTMCNC: 30.5 G/DL
MCV RBC AUTO: 65 FL
MONOCYTES # BLD AUTO: 0.6 K/UL
MONOCYTES NFR BLD: 6.9 %
NEUTROPHILS # BLD AUTO: 5.5 K/UL
NEUTROPHILS NFR BLD: 59.1 %
PLATELET # BLD AUTO: 441 K/UL
PLATELET BLD QL SMEAR: ABNORMAL
PMV BLD AUTO: 10.8 FL
POTASSIUM SERPL-SCNC: 3.9 MMOL/L
PROT SERPL-MCNC: 7.6 G/DL
RBC # BLD AUTO: 5.57 M/UL
SODIUM SERPL-SCNC: 142 MMOL/L
TROPONIN I SERPL DL<=0.01 NG/ML-MCNC: <0.006 NG/ML
TROPONIN I SERPL DL<=0.01 NG/ML-MCNC: <0.006 NG/ML
WBC # BLD AUTO: 9.27 K/UL

## 2018-04-05 PROCEDURE — 25000003 PHARM REV CODE 250: Performed by: EMERGENCY MEDICINE

## 2018-04-05 PROCEDURE — 84484 ASSAY OF TROPONIN QUANT: CPT | Mod: 91

## 2018-04-05 PROCEDURE — 93005 ELECTROCARDIOGRAM TRACING: CPT

## 2018-04-05 PROCEDURE — 99284 EMERGENCY DEPT VISIT MOD MDM: CPT

## 2018-04-05 PROCEDURE — 80053 COMPREHEN METABOLIC PANEL: CPT

## 2018-04-05 PROCEDURE — 85025 COMPLETE CBC W/AUTO DIFF WBC: CPT

## 2018-04-05 PROCEDURE — 93010 ELECTROCARDIOGRAM REPORT: CPT | Mod: ,,, | Performed by: INTERNAL MEDICINE

## 2018-04-05 RX ORDER — PANTOPRAZOLE SODIUM 40 MG/1
TABLET, DELAYED RELEASE ORAL
Qty: 30 TABLET | Refills: 0 | Status: ON HOLD | OUTPATIENT
Start: 2018-04-05 | End: 2019-12-16

## 2018-04-05 RX ORDER — PANTOPRAZOLE SODIUM 40 MG/1
80 TABLET, DELAYED RELEASE ORAL
Status: COMPLETED | OUTPATIENT
Start: 2018-04-05 | End: 2018-04-05

## 2018-04-05 RX ORDER — HYDROCODONE BITARTRATE AND ACETAMINOPHEN 5; 325 MG/1; MG/1
1 TABLET ORAL EVERY 4 HOURS PRN
Qty: 15 TABLET | Refills: 0 | Status: SHIPPED | OUTPATIENT
Start: 2018-04-05 | End: 2018-04-15

## 2018-04-05 RX ORDER — MAG HYDROX/ALUMINUM HYD/SIMETH 200-200-20
60 SUSPENSION, ORAL (FINAL DOSE FORM) ORAL
Status: COMPLETED | OUTPATIENT
Start: 2018-04-05 | End: 2018-04-05

## 2018-04-05 RX ORDER — ONDANSETRON 8 MG/1
8 TABLET, ORALLY DISINTEGRATING ORAL
Status: COMPLETED | OUTPATIENT
Start: 2018-04-05 | End: 2018-04-05

## 2018-04-05 RX ADMIN — PANTOPRAZOLE SODIUM 80 MG: 40 TABLET, DELAYED RELEASE ORAL at 09:04

## 2018-04-05 RX ADMIN — ALUMINUM HYDROXIDE, MAGNESIUM HYDROXIDE, AND SIMETHICONE 60 ML: 200; 200; 20 SUSPENSION ORAL at 09:04

## 2018-04-05 RX ADMIN — ONDANSETRON 8 MG: 8 TABLET, ORALLY DISINTEGRATING ORAL at 09:04

## 2018-04-05 NOTE — ED TRIAGE NOTES
Patient presents via EMS with c/o chest pain midsternal that radiates to her back and nausea and abdominal discomfort since this morning.

## 2018-04-05 NOTE — ED PROVIDER NOTES
"Encounter Date: 2018    SCRIBE #1 NOTE: I, Devi Cordon, am scribing for, and in the presence of,  Brigido Aceves MD. I have scribed the following portions of the note - Other sections scribed: HPI and ROS.       History     Chief Complaint   Patient presents with    Chest Pain     Midsternal chest pain radiating to back that began this morning.  Nausea since yesterday.  324 of ASA with EMS.  Refused nitro with EMS.     CC: Chest Pain    HPI: This 42 y.o. female with a medical history of anemia and hypertension presents to the ED via EMS transportation c/o acute, constant pain radiating across the chest as well as the back that began this morning. Pt reports that she initially began to feel nauseated yesterday, and notes that this morning while at work she began to experience chest pain as well as back pain. Pt states that she feels as though the symptoms are due to gas as she notes, "it doesn't hurt, it feels gaseous". She describes the symptoms as a "dull pressure" and tightness, rating it 6/10. Pt reports that the nausea is persisting as she adds that she is also experiencing epigastric abdominal pain. She notes that she has not experienced a menstrual cycle since undergoing a fibroid embolization. Pt denies fever, chills, headache, ear pain, sore throat, cough, shortness of breath, dysuria, extremity issues and rash. She also denies a history of heart issues. No other associated symptoms. No alleviating or exacerbating factors.           The history is provided by the patient. No  was used.     Review of patient's allergies indicates:   Allergen Reactions    Keflex [cephalexin] Itching     Past Medical History:   Diagnosis Date    Abnormal Pap smear     pt states 13yrs ago colpo was done    Anemia     Fibroid     Hypertension      Past Surgical History:   Procedure Laterality Date     SECTION      TONSILLECTOMY      TUBAL LIGATION       Family History   Problem " Relation Age of Onset    Arthritis Mother     Diabetes Mother     Heart disease Mother      CHF, CAD , 2 stents    Hypertension Mother     Hyperlipidemia Mother     No Known Problems Sister     Hypertension Brother     No Known Problems Daughter     Asthma Daughter      Social History   Substance Use Topics    Smoking status: Never Smoker    Smokeless tobacco: Never Used    Alcohol use 0.0 oz/week      Comment: socially     Review of Systems   Constitutional: Negative for chills and fever.   HENT: Negative for congestion, ear pain, rhinorrhea and sore throat.    Eyes: Negative for pain and visual disturbance.   Respiratory: Negative for cough and shortness of breath.    Cardiovascular: Positive for chest pain.   Gastrointestinal: Positive for abdominal pain (epigastric) and nausea. Negative for diarrhea and vomiting.   Genitourinary: Negative for dysuria.   Musculoskeletal: Positive for back pain. Negative for neck pain.   Skin: Negative for rash.   Neurological: Negative for headaches.       Physical Exam     Initial Vitals [04/05/18 0914]   BP Pulse Resp Temp SpO2   (!) 180/112 75 20 98.3 °F (36.8 °C) --      MAP       134.67         Physical Exam  The patient was examined specifically for the following:   General:No significant distress, Good color, Warm and dry. Head and neck:Scalp atraumatic, Neck supple. Neurological:Appropriate conversation, Gross motor deficits. Eyes:Conjugate gaze, Clear corneas. ENT: No epistaxis. Cardiac: Regular rate and rhythm, Grossly normal heart tones. Pulmonary: Wheezing, Rales. Gastrointestinal: Abdominal tenderness, Abdominal distention. Musculoskeletal: Extremity deformity, Apparent pain with range of motion of the joints. Skin: Rash.   The findings on examination were normal except for the following: The patient is morbidly obese.  The chest is nontender.  Lungs are clear.  The heart tones are normal.  The abdomen is soft.  ED Course   Procedures  Labs Reviewed    COMPREHENSIVE METABOLIC PANEL - Abnormal; Notable for the following:        Result Value    Albumin 3.1 (*)     Anion Gap 6 (*)     All other components within normal limits   CBC W/ AUTO DIFFERENTIAL - Abnormal; Notable for the following:     RBC 5.57 (*)     Hemoglobin 11.0 (*)     Hematocrit 36.1 (*)     MCV 65 (*)     MCH 19.7 (*)     MCHC 30.5 (*)     RDW 21.4 (*)     Platelets 441 (*)     Platelet Estimate Increased (*)     All other components within normal limits   TROPONIN I   TROPONIN I     EKG Readings: (Independently Interpreted)   This patient is in a sinus rhythm with a first-degree AV block.  There is diffuse T-wave flattening.  There are no ST segment changes.  There is no definite evidence of acute myocardial infarction or malignant arrhythmia.       X-Rays:   Independently Interpreted Readings:   Other Readings:  Chest x-ray fails to reveal pneumothorax pneumonia pleural effusion.    Medical decision making: Given the above, this patient presents to emergency room with chest pain that wraps around her thoracic back.  2 troponins are -3 hours apart.  I do not think this is cardiac pain.  The patient has epigastric abdominal pain.  I will treated with pantoprazole Mylanta and Robaxin.  I will have her follow-up with primary care.  I will refer her to cardiology for further evaluation and investigation. I doubt pulmonary embolus the patient is not tachycardic or short of breath.  The patient did have elevated blood pressure in the emergency room .  Without treatment, the patient's blood pressure fell; last value was 159/74.  I doubt thoracic aortic aneurysm.               Scribe Attestation:   Scribe #1: I performed the above scribed service and the documentation accurately describes the services I performed. I attest to the accuracy of the note.    Attending Attestation:           Physician Attestation for Scribe:  Physician Attestation Statement for Scribe #1: I, Brigido Aceves MD, reviewed  documentation, as scribed by Devi Cordon in my presence, and it is both accurate and complete.                    Clinical Impression:   The primary encounter diagnosis was Chest pain, non-cardiac. A diagnosis of Acute bilateral thoracic back pain was also pertinent to this visit.                           Brigido Aceves MD  04/05/18 1253

## 2018-04-05 NOTE — DISCHARGE INSTRUCTIONS
Please use Mylanta 30 mL by mouth every 4 hours when you have chest or abdominal discomfort.  Please return immediately if you get worse or if new problems develop.  Please follow-up with the cardiologist above this week call today for an appointment.  Medicines as above.

## 2018-04-17 ENCOUNTER — PATIENT MESSAGE (OUTPATIENT)
Dept: FAMILY MEDICINE | Facility: CLINIC | Age: 43
End: 2018-04-17

## 2018-04-17 DIAGNOSIS — N76.0 BV (BACTERIAL VAGINOSIS): Primary | ICD-10-CM

## 2018-04-17 DIAGNOSIS — B96.89 BV (BACTERIAL VAGINOSIS): Primary | ICD-10-CM

## 2018-04-19 ENCOUNTER — PATIENT MESSAGE (OUTPATIENT)
Dept: FAMILY MEDICINE | Facility: CLINIC | Age: 43
End: 2018-04-19

## 2018-04-19 RX ORDER — METRONIDAZOLE 500 MG/1
500 TABLET ORAL EVERY 12 HOURS
Qty: 14 TABLET | Refills: 0 | Status: SHIPPED | OUTPATIENT
Start: 2018-04-19 | End: 2018-04-26

## 2018-05-01 ENCOUNTER — PATIENT MESSAGE (OUTPATIENT)
Dept: OBSTETRICS AND GYNECOLOGY | Facility: CLINIC | Age: 43
End: 2018-05-01

## 2018-05-08 ENCOUNTER — OFFICE VISIT (OUTPATIENT)
Dept: OBSTETRICS AND GYNECOLOGY | Facility: CLINIC | Age: 43
End: 2018-05-08
Payer: COMMERCIAL

## 2018-05-08 VITALS
WEIGHT: 293 LBS | DIASTOLIC BLOOD PRESSURE: 84 MMHG | SYSTOLIC BLOOD PRESSURE: 126 MMHG | BODY MASS INDEX: 47.09 KG/M2 | HEIGHT: 66 IN

## 2018-05-08 DIAGNOSIS — Z12.31 VISIT FOR SCREENING MAMMOGRAM: ICD-10-CM

## 2018-05-08 DIAGNOSIS — D25.9 UTERINE LEIOMYOMA, UNSPECIFIED LOCATION: ICD-10-CM

## 2018-05-08 DIAGNOSIS — I51.7 CARDIOMEGALY: Primary | ICD-10-CM

## 2018-05-08 DIAGNOSIS — Z01.419 ENCOUNTER FOR GYNECOLOGICAL EXAMINATION WITHOUT ABNORMAL FINDING: ICD-10-CM

## 2018-05-08 DIAGNOSIS — I10 ESSENTIAL HYPERTENSION: ICD-10-CM

## 2018-05-08 DIAGNOSIS — E66.01 MORBID OBESITY WITH BODY MASS INDEX OF 50.0-59.9 IN ADULT: ICD-10-CM

## 2018-05-08 DIAGNOSIS — N89.8 VAGINAL DISCHARGE: ICD-10-CM

## 2018-05-08 LAB
C TRACH DNA SPEC QL NAA+PROBE: NOT DETECTED
CANDIDA RRNA VAG QL PROBE: POSITIVE
G VAGINALIS RRNA GENITAL QL PROBE: POSITIVE
N GONORRHOEA DNA SPEC QL NAA+PROBE: NOT DETECTED
T VAGINALIS RRNA GENITAL QL PROBE: NEGATIVE

## 2018-05-08 PROCEDURE — 87510 GARDNER VAG DNA DIR PROBE: CPT

## 2018-05-08 PROCEDURE — 99999 PR PBB SHADOW E&M-EST. PATIENT-LVL III: CPT | Mod: PBBFAC,,, | Performed by: OBSTETRICS & GYNECOLOGY

## 2018-05-08 PROCEDURE — 87491 CHLMYD TRACH DNA AMP PROBE: CPT

## 2018-05-08 PROCEDURE — 87480 CANDIDA DNA DIR PROBE: CPT

## 2018-05-08 PROCEDURE — 99396 PREV VISIT EST AGE 40-64: CPT | Mod: S$GLB,,, | Performed by: OBSTETRICS & GYNECOLOGY

## 2018-05-08 PROCEDURE — 3079F DIAST BP 80-89 MM HG: CPT | Mod: CPTII,S$GLB,, | Performed by: OBSTETRICS & GYNECOLOGY

## 2018-05-08 PROCEDURE — 3074F SYST BP LT 130 MM HG: CPT | Mod: CPTII,S$GLB,, | Performed by: OBSTETRICS & GYNECOLOGY

## 2018-05-08 NOTE — PROGRESS NOTES
HISTORY OF PRESENT ILLNESS:    Kristopher Ye is a 42 y.o. female, , No LMP recorded. Patient is not currently having periods (Reason: Other).,  presents for a routine exam.   Patient reports vaginal irritation.     Past Medical History:   Diagnosis Date    Abnormal Pap smear     pt states 13yrs ago colpo was done    Anemia     Fibroid     Hypertension        Past Surgical History:   Procedure Laterality Date     SECTION      TONSILLECTOMY      TUBAL LIGATION      UTERINE FIBROID EMBOLIZATION         MEDICATIONS AND ALLERGIES:      Current Outpatient Prescriptions:     albuterol 90 mcg/actuation inhaler, Inhale 2 puffs into the lungs every 6 (six) hours as needed for Wheezing. Rescue, Disp: 6.7 g, Rfl: 0    loratadine (CLARITIN) 10 mg tablet, Take 10 mg by mouth once daily., Disp: , Rfl:     losartan-hydrochlorothiazide 100-25 mg (HYZAAR) 100-25 mg per tablet, Take 1 tablet by mouth once daily., Disp: 30 tablet, Rfl: 6    pantoprazole (PROTONIX) 40 MG tablet, Please take one tablet by mouth every 12 hours when you have discomfort, then take one tablet every day., Disp: 30 tablet, Rfl: 0    tinidazole (TINDAMAX) 500 MG tablet, Take 1 tablet (500 mg total) by mouth 2 (two) times daily., Disp: 14 tablet, Rfl: 0    Review of patient's allergies indicates:   Allergen Reactions    Keflex [cephalexin] Itching       Family History   Problem Relation Age of Onset    Arthritis Mother     Diabetes Mother     Heart disease Mother         CHF, CAD , 2 stents    Hypertension Mother     Hyperlipidemia Mother     No Known Problems Sister     Hypertension Brother     No Known Problems Daughter     Asthma Daughter     Breast cancer Neg Hx     Colon cancer Neg Hx     Ovarian cancer Neg Hx        Social History     Social History    Marital status:      Spouse name: N/A    Number of children: N/A    Years of education: N/A     Occupational History    Not on file.  "    Social History Main Topics    Smoking status: Never Smoker    Smokeless tobacco: Never Used    Alcohol use 0.0 oz/week      Comment: socially    Drug use: No    Sexual activity: Yes     Partners: Male     Birth control/ protection: Surgical     Other Topics Concern    Not on file     Social History Narrative    No narrative on file       COMPREHENSIVE GYN HISTORY:  PAP History: Denies abnormal Paps.  Infection History: Denies STDs. Denies PID.  Benign History: Denies uterine fibroids. Denies ovarian cysts. Denies endometriosis. Denies other conditions.  Cancer History: Denies cervical cancer. Denies uterine cancer or hyperplasia. Denies ovarian cancer. Denies vulvar cancer or pre-cancer. Denies vaginal cancer or pre-cancer. Denies breast cancer. Denies colon cancer.  Sexual Activity History: Reports currently being sexually active  Menstrual History: Monthly. Mod then light flow.   Dysmenorrhea History: Reports mild dysmenorrhea.       ROS:  GENERAL: No weight changes. No swelling. No fatigue. No fever.  CARDIOVASCULAR: No chest pain. No shortness of breath. No leg cramps.   NEUROLOGICAL: No headaches. No vision changes.  BREASTS: No pain. No lumps. No discharge.  ABDOMEN: No pain. No nausea. No vomiting. No diarrhea. No constipation.  REPRODUCTIVE: No abnormal bleeding.   VULVA: No pain. No lesions. No itching.  VAGINA: No relaxation. No itching. No odor. No discharge. No lesions.  URINARY: No incontinence. No nocturia. No frequency. No dysuria.    /84   Ht 5' 6" (1.676 m)   Wt (!) 157.2 kg (346 lb 9 oz)   BMI 55.94 kg/m²     PE:  APPEARANCE: Well nourished, well developed, in no acute distress.  AFFECT: WNL, alert and oriented x 3.  SKIN: No acne or hirsutism.  NECK: Neck symmetric, without masses or thyromegaly.  NODES: No inguinal, cervical, axillary or femoral lymph node enlargement.  CHEST: Good respiratory effort.   ABDOMEN: Soft. No tenderness or masses. No hepatosplenomegaly. No " hernias.  BREASTS: Symmetrical, no skin changes, visible lesions, palpable masses or nipple discharge bilaterally.  PELVIC: External female genitalia without lesions.  Female hair distribution. Adequate perineal body, Normal urethral meatus. Vagina moist and well rugated without lesions or discharge.  No significant cystocele or rectocele present. Cervix pink without lesions, discharge or tenderness. Uterus is 4-6 week size, regular, mobile and nontender. Adnexa without masses or tenderness.  EXTREMITIES: No edema    DIAGNOSIS:  1. Cardiomegaly    2. Essential hypertension    3. Uterine leiomyoma, unspecified location    4. Morbid obesity (BMI= 55.9 on 10/30/14)    5. Encounter for gynecological examination without abnormal finding    6. Visit for screening mammogram    7. Vaginal discharge        COUNSELING:  The patient was counseled today on:  -A.C.S. Pap and pelvic exam guidelines (pap every 3 years), recomendations for yearly mammogram;  -to follow up with her PCP for other health maintenance.    FOLLOW-UP with me annually.

## 2018-05-09 ENCOUNTER — TELEPHONE (OUTPATIENT)
Dept: OBSTETRICS AND GYNECOLOGY | Facility: CLINIC | Age: 43
End: 2018-05-09

## 2018-05-09 DIAGNOSIS — N76.0 ACUTE VAGINITIS: Primary | ICD-10-CM

## 2018-05-09 RX ORDER — FLUCONAZOLE 150 MG/1
150 TABLET ORAL ONCE
Qty: 1 TABLET | Refills: 0 | Status: SHIPPED | OUTPATIENT
Start: 2018-05-09 | End: 2018-05-09

## 2018-05-09 RX ORDER — TINIDAZOLE 500 MG/1
500 TABLET ORAL 2 TIMES DAILY
Qty: 14 TABLET | Refills: 0 | Status: SHIPPED | OUTPATIENT
Start: 2018-05-09 | End: 2018-05-14

## 2018-05-09 NOTE — TELEPHONE ENCOUNTER
Patient given Terazol 7, please complete this 7 day course.     Patient has Bacterial vaginosis and yeast   Plan:  Finish Terazol 7   Take Metrogel Disp one tube, one applicatorful qhs for 5 nights sent to the pharmacy.  Then after Metrogel is completed, take Diflucan    Please  patient on vaginitis prevention including :  a. avoiding feminine products such as deoderant soaps, body wash, bubble bath, douches, scented toilet paper, deoderant tampons or pads, feminine wipes, chronic pad use, etc.  b. avoiding other vulvovaginal irritants such as long hot baths, humidity, tight, synthetic clothing, chlorine and sitting around in wet bathing suits  c. wearing cotton underwear, avoiding thong underwear and no underwear to bed  d. taking showers instead of baths and use a hair dryer on cool setting afterwards to dry  e. wearing cotton to exercise and shower immediately after exercise and change clothes  f. using polyurethane condoms without spermicide if sexually active and symptoms are triggered by intercourse

## 2018-05-09 NOTE — TELEPHONE ENCOUNTER
I spoke to the pt and explained to her how to take the medicine for the yeast and BV. I informed the pt that the medicine was sent to the pharmacy.

## 2019-01-09 ENCOUNTER — OFFICE VISIT (OUTPATIENT)
Dept: FAMILY MEDICINE | Facility: CLINIC | Age: 44
End: 2019-01-09
Payer: COMMERCIAL

## 2019-01-09 VITALS
WEIGHT: 293 LBS | TEMPERATURE: 98 F | OXYGEN SATURATION: 97 % | HEIGHT: 66 IN | HEART RATE: 74 BPM | DIASTOLIC BLOOD PRESSURE: 80 MMHG | BODY MASS INDEX: 47.09 KG/M2 | SYSTOLIC BLOOD PRESSURE: 120 MMHG

## 2019-01-09 DIAGNOSIS — N89.8 VAGINAL IRRITATION: ICD-10-CM

## 2019-01-09 DIAGNOSIS — N30.01 ACUTE CYSTITIS WITH HEMATURIA: ICD-10-CM

## 2019-01-09 DIAGNOSIS — N76.0 ACUTE VAGINITIS: ICD-10-CM

## 2019-01-09 DIAGNOSIS — N89.8 VAGINAL DISCHARGE: ICD-10-CM

## 2019-01-09 DIAGNOSIS — R11.2 NAUSEA AND VOMITING, INTRACTABILITY OF VOMITING NOT SPECIFIED, UNSPECIFIED VOMITING TYPE: ICD-10-CM

## 2019-01-09 DIAGNOSIS — K52.9 GASTROENTERITIS: Primary | ICD-10-CM

## 2019-01-09 LAB
BILIRUB SERPL-MCNC: NORMAL MG/DL
BLOOD URINE, POC: 50
COLOR, POC UA: YELLOW
GLUCOSE UR QL STRIP: NORMAL
KETONES UR QL STRIP: NORMAL
LEUKOCYTE ESTERASE URINE, POC: NORMAL
NITRITE, POC UA: NORMAL
PH, POC UA: 5
PROTEIN, POC: NORMAL
SPECIFIC GRAVITY, POC UA: 1.02
UROBILINOGEN, POC UA: NORMAL

## 2019-01-09 PROCEDURE — 99214 PR OFFICE/OUTPT VISIT, EST, LEVL IV, 30-39 MIN: ICD-10-PCS | Mod: 25,S$GLB,, | Performed by: NURSE PRACTITIONER

## 2019-01-09 PROCEDURE — 81002 URINALYSIS NONAUTO W/O SCOPE: CPT | Mod: S$GLB,,, | Performed by: NURSE PRACTITIONER

## 2019-01-09 PROCEDURE — 87086 URINE CULTURE/COLONY COUNT: CPT

## 2019-01-09 PROCEDURE — 81002 POCT URINE DIPSTICK WITHOUT MICROSCOPE: ICD-10-PCS | Mod: S$GLB,,, | Performed by: NURSE PRACTITIONER

## 2019-01-09 PROCEDURE — 3079F DIAST BP 80-89 MM HG: CPT | Mod: CPTII,S$GLB,, | Performed by: NURSE PRACTITIONER

## 2019-01-09 PROCEDURE — 3079F PR MOST RECENT DIASTOLIC BLOOD PRESSURE 80-89 MM HG: ICD-10-PCS | Mod: CPTII,S$GLB,, | Performed by: NURSE PRACTITIONER

## 2019-01-09 PROCEDURE — 3008F PR BODY MASS INDEX (BMI) DOCUMENTED: ICD-10-PCS | Mod: CPTII,S$GLB,, | Performed by: NURSE PRACTITIONER

## 2019-01-09 PROCEDURE — 99214 OFFICE O/P EST MOD 30 MIN: CPT | Mod: 25,S$GLB,, | Performed by: NURSE PRACTITIONER

## 2019-01-09 PROCEDURE — 99999 PR PBB SHADOW E&M-EST. PATIENT-LVL IV: CPT | Mod: PBBFAC,,, | Performed by: NURSE PRACTITIONER

## 2019-01-09 PROCEDURE — 99999 PR PBB SHADOW E&M-EST. PATIENT-LVL IV: ICD-10-PCS | Mod: PBBFAC,,, | Performed by: NURSE PRACTITIONER

## 2019-01-09 PROCEDURE — 3074F PR MOST RECENT SYSTOLIC BLOOD PRESSURE < 130 MM HG: ICD-10-PCS | Mod: CPTII,S$GLB,, | Performed by: NURSE PRACTITIONER

## 2019-01-09 PROCEDURE — 3074F SYST BP LT 130 MM HG: CPT | Mod: CPTII,S$GLB,, | Performed by: NURSE PRACTITIONER

## 2019-01-09 PROCEDURE — 3008F BODY MASS INDEX DOCD: CPT | Mod: CPTII,S$GLB,, | Performed by: NURSE PRACTITIONER

## 2019-01-09 RX ORDER — ONDANSETRON 4 MG/1
8 TABLET, ORALLY DISINTEGRATING ORAL EVERY 8 HOURS PRN
Qty: 30 TABLET | Refills: 0 | Status: ON HOLD | OUTPATIENT
Start: 2019-01-09 | End: 2019-12-16 | Stop reason: DRUGHIGH

## 2019-01-09 RX ORDER — CIPROFLOXACIN 250 MG/1
250 TABLET, FILM COATED ORAL 2 TIMES DAILY
Qty: 10 TABLET | Refills: 0 | Status: SHIPPED | OUTPATIENT
Start: 2019-01-09 | End: 2019-01-14

## 2019-01-09 RX ORDER — METRONIDAZOLE 500 MG/1
500 TABLET ORAL EVERY 12 HOURS
Qty: 14 TABLET | Refills: 0 | Status: SHIPPED | OUTPATIENT
Start: 2019-01-09 | End: 2019-01-16

## 2019-01-09 NOTE — PROGRESS NOTES
Subjective:       Patient ID: Kristopher Ye is a 43 y.o. female.    Chief Complaint: Emesis (two times ) and Abdominal Pain (stomach cramps)    Emesis    This is a new problem. The current episode started today. The problem occurs 2 to 4 times per day. The problem has been unchanged. The emesis has an appearance of stomach contents. There has been no fever. Associated symptoms include headaches. Pertinent negatives include no abdominal pain, arthralgias, chest pain, chills, coughing, decreased urine volume, diarrhea, dizziness, fever, myalgias, sweats, URI or weight loss. Risk factors include ill contacts (kids at school sick). She has tried nothing for the symptoms.   Vaginal Discharge   The patient's primary symptoms include vaginal discharge. The patient's pertinent negatives include no genital itching, genital lesions, genital odor, genital rash, missed menses, pelvic pain or vaginal bleeding. This is a new problem. The current episode started in the past 7 days. The problem occurs constantly. The problem has been unchanged. The patient is experiencing no pain. She is not pregnant. Associated symptoms include headaches, nausea and vomiting. Pertinent negatives include no abdominal pain, anorexia, back pain, chills, constipation, diarrhea, discolored urine, dysuria, fever, flank pain, frequency, hematuria, joint pain, painful intercourse, rash, sore throat or urgency. The vaginal discharge was white and thick. There has been no bleeding. She has not been passing clots. She has not been passing tissue. Nothing aggravates the symptoms. She has tried nothing for the symptoms. She is not sexually active. No, her partner does not have an STD. She uses tubal ligation for contraception. Her past medical history is significant for vaginosis.     Review of Systems   Constitutional: Negative for chills, diaphoresis, fatigue, fever and weight loss.   HENT: Negative for sore throat.    Respiratory: Negative for  cough.    Cardiovascular: Negative for chest pain.   Gastrointestinal: Positive for nausea and vomiting. Negative for abdominal pain, anorexia, constipation and diarrhea.   Genitourinary: Positive for vaginal discharge. Negative for decreased urine volume, dysuria, flank pain, frequency, hematuria, missed menses, pelvic pain and urgency.   Musculoskeletal: Negative for arthralgias, back pain, joint pain and myalgias.   Skin: Negative for rash.   Neurological: Positive for headaches. Negative for dizziness.       Objective:      Physical Exam   Constitutional: She is oriented to person, place, and time. Vital signs are normal. She appears well-developed and well-nourished.   HENT:   Head: Normocephalic and atraumatic.   Right Ear: External ear normal.   Left Ear: External ear normal.   Nose: Nose normal.   Mouth/Throat: Oropharynx is clear and moist. No oropharyngeal exudate.   Eyes: Conjunctivae and EOM are normal. Pupils are equal, round, and reactive to light.   Cardiovascular: Normal rate, regular rhythm and normal heart sounds.   Pulmonary/Chest: Effort normal and breath sounds normal.   Abdominal: Soft. Bowel sounds are normal. There is no tenderness.   Genitourinary: Uterus normal. Pelvic exam was performed with patient supine. There is no rash or tenderness on the right labia. There is no rash or tenderness on the left labia. Cervix exhibits discharge. Cervix exhibits no motion tenderness and no friability. Right adnexum displays no tenderness. Left adnexum displays no tenderness. There is erythema in the vagina. No tenderness or bleeding in the vagina. Vaginal discharge found.   Neurological: She is alert and oriented to person, place, and time.   Skin: Skin is warm, dry and intact.   Psychiatric: She has a normal mood and affect.       Assessment:       1. Gastroenteritis    2. Nausea and vomiting, intractability of vomiting not specified, unspecified vomiting type    3. Acute cystitis with hematuria    4.  Vaginal discharge    5. Vaginal irritation    6. Acute vaginitis        Plan:       Kristopher was seen today for emesis and abdominal pain.    Diagnoses and all orders for this visit:    Gastroenteritis  -     ondansetron (ZOFRAN-ODT) 4 MG TbDL; Take 2 tablets (8 mg total) by mouth every 8 (eight) hours as needed.    Nausea and vomiting, intractability of vomiting not specified, unspecified vomiting type  -     ondansetron (ZOFRAN-ODT) 4 MG TbDL; Take 2 tablets (8 mg total) by mouth every 8 (eight) hours as needed.    Acute cystitis with hematuria  -     ciprofloxacin HCl (CIPRO) 250 MG tablet; Take 1 tablet (250 mg total) by mouth 2 (two) times daily. for 5 days    Acute vaginitis  -     metroNIDAZOLE (FLAGYL) 500 MG tablet; Take 1 tablet (500 mg total) by mouth every 12 (twelve) hours. for 7 days    Vaginal irritation  -     POCT URINE DIPSTICK WITHOUT MICROSCOPE    Vaginal discharge  -     POCT URINE DIPSTICK WITHOUT MICROSCOPE    Please return here or go to the Emergency Department for any concerns or worsening of condition.  If you were prescribed antibiotics, please take them to completion.  If you were prescribed a narcotic medication, do not drive or operate heavy equipment or machinery while taking these medications.  Please follow up with your primary care doctor or specialist as needed.    If you  smoke, please stop smoking.

## 2019-01-09 NOTE — LETTER
January 9, 2019      [facility]  [address] [facility]  [place] [address]  Phone: [phone]  Fax: [phone]       Patient: Kristopher Ye   YOB: 1975  Date of Visit: 01/09/2019    To Whom It May Concern:    Adalberto Ye  was at [facility] on 01/09/2019. She may return to work/school on 01/10/2019 with no restrictions. If you have any questions or concerns, or if I can be of further assistance, please do not hesitate to contact me.    Sincerely,      Daria Rdz NP

## 2019-01-09 NOTE — PATIENT INSTRUCTIONS
Noninfectious Gastroenteritis (Ages 6 Years to Adult)    Gastroenteritis can cause nausea, vomiting, diarrhea, and abdominal cramping. This may occur as a result of food sensitivity, inflammation of your gastrointestinal tract, medicines, stress, or other causes not related to infection. Your symptoms will usually last from 1 to 3 days, but can last longer. Antibiotics are not effective, but simple home treatment will be helpful.  Home care  Medicine  · You may use acetaminophen or NSAID medicines like ibuprofen or naproxen to control fever, unless another medicine is prescribed. (Note: If you have chronic liver or kidney disease, or ever had a stomach ulcer or gastrointestinalI bleeding, talk with your healthcare provider before using these medicines.) Aspirin should never be used in anyone under 18 years of age who is ill with a fever. It may cause severe liver damage. Don't increase your NSAID medicines if you are already taking these medicines for another condition (like arthritis). Don't use NSAIDS if you are on aspirin (such as for heart disease, or after a stroke).  · If medicines for diarrhea or vomiting are prescribed, take only as directed.  General care and preventing spread of the illness  · If symptoms are severe, rest at home for the next 24 hours or until you feel better.  · Hand washing with soap and water is the best way to prevent the spread of infection. Wash your hands after touching anyone who is sick.  · Wash your hands after using the toilet and before meals. Clean the toilet after each use.  · Caffeine, tobacco, and alcohol can make your diarrhea, cramping, and pain worse.  Diet  · Water and clear liquids are important so you do not get dehydrated. Drink a small amount at a time.  · Do not force yourself to eat, especially if you have cramps, vomiting, or diarrhea. When you finally decide to start eating, do not eat large amounts at a time, even if you are hungry.  · If you eat, avoid  fatty, greasy, spicy, or fried foods.  · Do not eat dairy products if you have diarrhea; they can make the diarrhea worse.  During the first 24 hours (the first full day), follow the diet below:  · Beverages: Water, clear liquids, soft drinks without caffeine, like ginger ale; mineral water (plain or flavored); decaffeinated tea and coffee.  · Soups: Clear broth, consommé, and bouillon Sports drinks aren't a good choice because they have too much sugar and not enough electrolytes. In this case, commercially available products called oral rehydration solutions are best.  · Desserts: Plain gelatin, popsicles, and fruit juice bars.  During the next 24 hours (the second day), you may add the following to the above if you have improved. If not, continue what you did the first day:  · Hot cereal, plain toast, bread, rolls, crackers  · Plain noodles, rice, mashed potatoes, chicken noodle or rice soup  · Unsweetened canned fruit (avoid pineapple), bananas  · Limit caffeine and chocolate. No spices or seasonings except salt.  During the next 24 hours  · Gradually resume a normal diet, as you feel better and your symptoms improve.  · If at any time your symptoms start getting worse, go back to clear liquids until you feel better.  Food preparation  · If you have diarrhea, you should not prepare food for others. When you  prepare food for yourself, wash your hands before and after.  · Wash your hands after using cutting boards, countertops, and knives that have been in contact with raw food.  · Keep uncooked meats away from cooked and ready-to-eat foods.  Follow-up care  Follow up with your healthcare provider if you are not improving over the next 2 to 3 days, or as advised. If a stool (diarrhea) sample was taken, call for the results as directed.  When to seek medical care  Call your healthcare provider right away if any of these occur:   · Increasing abdominal pain or constant lower right abdominal pain  · Continued  "vomiting (unable to keep liquids down)  · Frequent diarrhea (more than 5 times a day)  · Blood in vomit or stool (black or red color)  · Inability to tolerate solid food after a few days.  · Dark urine, reduced urine output  · Weakness, dizziness  · Drowsiness  · Fever of 100.4ºF (38.0ºC) or higher, or as directed by your healthcare provider  · New rash  Call 911  Call 911 if any of these occur:  · Trouble breathing  · Chest pain  · Confusion  · Severe drowsiness or trouble awakening  · Seizure  · Stiff neck  Date Last Reviewed: 11/16/2015  © 6836-9241 [employer]. [address] [place] [address], [place] [address]. All rights reserved. This information is not intended as a substitute for professional medical care. Always follow your healthcare professional's instructions.        Bladder Infection, Female (Adult)    Urine is normally doesn't have any bacteria in it. But bacteria can get into the urinary tract from the skin around the rectum. Or they can travel in the blood from elsewhere in the body. Once they are in your urinary tract, they can cause infection in the urethra (urethritis), the bladder (cystitis), or the kidneys (pyelonephritis).  The most common place for an infection is in the bladder. This is called a bladder infection. This is one of the most common infections in women. Most bladder infections are easily treated. They are not serious unless the infection spreads to the kidney.  The phrases "bladder infection," "UTI," and "cystitis" are often used to describe the same thing. But they are not always the same. Cystitis is an inflammation of the bladder. The most common cause of cystitis is an infection.  Symptoms  The infection causes inflammation in the urethra and bladder. This causes many of the symptoms. The most common symptoms of a bladder infection are:  · Pain or burning when urinating  · Having to urinate more often than usual  · Urgent need to urinate  · Only a small amount of urine " comes out  · Blood in urine  · Abdominal discomfort. This is usually in the lower abdomen above the pubic bone.  · Cloudy urine  · Strong- or bad-smelling urine  · Unable to urinate (urinary retention)  · Unable to hold urine in (urinary incontinence)  · Fever  · Loss of appetite  · Confusion (in older adults)  Causes  Bladder infections are not contagious. You can't get one from someone else, from a toilet seat, or from sharing a bath.  The most common cause of bladder infections is bacteria from the bowels. The bacteria get onto the skin around the opening of the urethra. From there, they can get into the urine and travel up to the bladder, causing inflammation and infection. This usually happens because of:  · Wiping improperly after urinating. Always wipe from front to back.  · Bowel incontinence  · Pregnancy  · Procedures such as having a catheter inserted  · Older age  · Not emptying your bladder. This can allow bacteria a chance to grow in your urine.  · Dehydration  · Constipation  · Sex  · Use of a diaphragm for birth control   Treatment  Bladder infections are diagnosed by a urine test. They are treated with antibiotics and usually clear up quickly without complications. Treatment helps prevent a more serious kidney infection.  Medicines  Medicines can help in the treatment of a bladder infection:  · Take antibiotics until they are used up, even if you feel better. It is important to finish them to make sure the infection has cleared.  · You can use acetaminophen or ibuprofen for pain, fever, or discomfort, unless another medicine was prescribed. If you have chronic liver or kidney disease, talk with your healthcare provider before using these medicines. Also talk with your provider if you've ever had a stomach ulcer or gastrointestinal bleeding, or are taking blood-thinner medicines.  · If you are given phenazopydridine to reduce burning with urination, it will cause your urine to become a bright orange  color. This can stain clothing.  Care and prevention  These self-care steps can help prevent future infections:  · Drink plenty of fluids to prevent dehydration and flush out your bladder. Do this unless you must restrict fluids for other health reasons, or your doctor told you not to.  · Proper cleaning after going to the bathroom is important. Wipe from front to back after using the toilet to prevent the spread of bacteria.  · Urinate more often. Don't try to hold urine in for a long time.  · Wear loose-fitting clothes and cotton underwear. Avoid tight-fitting pants.  · Improve your diet and prevent constipation. Eat more fresh fruit and vegetables, and fiber, and less junk and fatty foods.  · Avoid sex until your symptoms are gone.  · Avoid caffeine, alcohol, and spicy foods. These can irritate your bladder.  · Urinate right after intercourse to flush out your bladder.  · If you use birth control pills and have frequent bladder infections, discuss it with your doctor.  Follow-up care  Call your healthcare provider if all symptoms are not gone after 3 days of treatment. This is especially important if you have repeat infections.  If a culture was done, you will be told if your treatment needs to be changed. If directed, you can call to find out the results.  If X-rays were done, you will be told if the results will affect your treatment.  Call 911  Call 911 if any of the following occur:  · Trouble breathing  · Hard to wake up or confusion  · Fainting or loss of consciousness  · Rapid heart rate  When to seek medical advice  Call your healthcare provider right away if any of these occur:  · Fever of 100.4ºF (38.0ºC) or higher, or as directed by your healthcare provider  · Symptoms are not better by the third day of treatment  · Back or belly (abdominal) pain that gets worse  · Repeated vomiting, or unable to keep medicine down  · Weakness or dizziness  · Vaginal discharge  · Pain, redness, or swelling in the outer  vaginal area (labia)  Date Last Reviewed: 10/1/2016  © 5275-3464 [employer]. [address] [place] [address], [place] [address]. All rights reserved. This information is not intended as a substitute for professional medical care. Always follow your healthcare professional's instructions.

## 2019-01-10 LAB
BACTERIA UR CULT: NORMAL
BACTERIA UR CULT: NORMAL

## 2019-03-18 ENCOUNTER — OFFICE VISIT (OUTPATIENT)
Dept: FAMILY MEDICINE | Facility: CLINIC | Age: 44
End: 2019-03-18
Payer: COMMERCIAL

## 2019-03-18 VITALS
BODY MASS INDEX: 55.87 KG/M2 | OXYGEN SATURATION: 94 % | SYSTOLIC BLOOD PRESSURE: 152 MMHG | WEIGHT: 293 LBS | TEMPERATURE: 98 F | DIASTOLIC BLOOD PRESSURE: 94 MMHG | RESPIRATION RATE: 16 BRPM | HEART RATE: 78 BPM

## 2019-03-18 DIAGNOSIS — N76.0 ACUTE VAGINITIS: Primary | ICD-10-CM

## 2019-03-18 DIAGNOSIS — I10 ESSENTIAL HYPERTENSION: ICD-10-CM

## 2019-03-18 DIAGNOSIS — G47.30 SLEEP-RELATED BREATHING DISORDER: ICD-10-CM

## 2019-03-18 DIAGNOSIS — I50.42 SYSTOLIC AND DIASTOLIC CHF, CHRONIC: ICD-10-CM

## 2019-03-18 PROCEDURE — 99999 PR PBB SHADOW E&M-EST. PATIENT-LVL IV: ICD-10-PCS | Mod: PBBFAC,,, | Performed by: NURSE PRACTITIONER

## 2019-03-18 PROCEDURE — 99999 PR PBB SHADOW E&M-EST. PATIENT-LVL IV: CPT | Mod: PBBFAC,,, | Performed by: NURSE PRACTITIONER

## 2019-03-18 PROCEDURE — 3008F PR BODY MASS INDEX (BMI) DOCUMENTED: ICD-10-PCS | Mod: CPTII,S$GLB,, | Performed by: NURSE PRACTITIONER

## 2019-03-18 PROCEDURE — 3077F PR MOST RECENT SYSTOLIC BLOOD PRESSURE >= 140 MM HG: ICD-10-PCS | Mod: CPTII,S$GLB,, | Performed by: NURSE PRACTITIONER

## 2019-03-18 PROCEDURE — 99214 OFFICE O/P EST MOD 30 MIN: CPT | Mod: S$GLB,,, | Performed by: NURSE PRACTITIONER

## 2019-03-18 PROCEDURE — 3008F BODY MASS INDEX DOCD: CPT | Mod: CPTII,S$GLB,, | Performed by: NURSE PRACTITIONER

## 2019-03-18 PROCEDURE — 3080F DIAST BP >= 90 MM HG: CPT | Mod: CPTII,S$GLB,, | Performed by: NURSE PRACTITIONER

## 2019-03-18 PROCEDURE — 99214 PR OFFICE/OUTPT VISIT, EST, LEVL IV, 30-39 MIN: ICD-10-PCS | Mod: S$GLB,,, | Performed by: NURSE PRACTITIONER

## 2019-03-18 PROCEDURE — 3080F PR MOST RECENT DIASTOLIC BLOOD PRESSURE >= 90 MM HG: ICD-10-PCS | Mod: CPTII,S$GLB,, | Performed by: NURSE PRACTITIONER

## 2019-03-18 PROCEDURE — 3077F SYST BP >= 140 MM HG: CPT | Mod: CPTII,S$GLB,, | Performed by: NURSE PRACTITIONER

## 2019-03-18 RX ORDER — METRONIDAZOLE 500 MG/1
500 TABLET ORAL EVERY 12 HOURS
Qty: 14 TABLET | Refills: 0 | Status: SHIPPED | OUTPATIENT
Start: 2019-03-18 | End: 2019-03-25

## 2019-03-18 NOTE — PATIENT INSTRUCTIONS
Vaginal Infection: Bacterial Vaginosis  Both good and bad bacteria are present in a healthy vagina. Bacterial vaginosis (BV) occurs when these bacteria get out of balance. The numbers of good bacteria decrease. This allows the numbers of bad bacteria to increase and cause BV. In most cases, BV is not a serious problem.  Causes of bacterial vaginosis  The cause of BV is not clear. Douching may lead to it. Having sex with a new partner or more than 1 partner makes it more likely.  Symptoms of bacterial vaginosis  Symptoms of BV vary for each woman. Some women have few symptoms or none at all. If symptoms are present, they can include:  · Thin, milky white or gray or sometimes green discharge  · Unpleasant fishy odor  · Irritation, itching, and burning at opening of vagina which may indicate mixed vaginitis    · Burning or irritation with sex or urination which may indicate mixed vaginitis  Diagnosing bacterial vaginosis  Your healthcare provider will ask about your symptoms and health history. He or she will also do a pelvic exam. This is an exam of your vagina and cervix. A sample of vaginal fluid or discharge may be taken. This sample is checked for signs of BV.  Treating bacterial vaginosis  BV is often treated with antibiotics. They may be given in oral pill form or as a vaginal cream. To use these medicines:  · Be sure to take all of your medicine, even if your symptoms go away.  · If youre taking antibiotic pills, do not drink alcohol until youre finished with all of your medicine.  · If youre using vaginal cream, apply it as directed. Be aware that the cream may make condoms and diaphragms less effective.  · Call your healthcare provider if symptoms do not go away within 4 days of starting treatment. Also call if you have a reaction to the medicine.  Why treatment matters  Even if you have no symptoms or your symptoms are mild, BV should be treated. Untreated BV can lead to health problems such  as:  · Increased risk of  delivery if youre pregnant  · Increased risk of complications after surgery on the reproductive organs  · Possible increased risk of pelvic inflammatory disease (PID)   Date Last Reviewed: 3/1/2017  © 3523-6956 [employer]. [address] [place] [address], [place] [address]. All rights reserved. This information is not intended as a substitute for professional medical care. Always follow your healthcare professional's instructions.

## 2019-03-28 ENCOUNTER — LAB VISIT (OUTPATIENT)
Dept: LAB | Facility: HOSPITAL | Age: 44
End: 2019-03-28
Attending: INTERNAL MEDICINE
Payer: COMMERCIAL

## 2019-03-28 ENCOUNTER — OFFICE VISIT (OUTPATIENT)
Dept: CARDIOLOGY | Facility: CLINIC | Age: 44
End: 2019-03-28
Payer: COMMERCIAL

## 2019-03-28 VITALS
HEART RATE: 73 BPM | BODY MASS INDEX: 47.09 KG/M2 | OXYGEN SATURATION: 98 % | HEIGHT: 66 IN | DIASTOLIC BLOOD PRESSURE: 100 MMHG | SYSTOLIC BLOOD PRESSURE: 181 MMHG | WEIGHT: 293 LBS

## 2019-03-28 DIAGNOSIS — E78.00 PURE HYPERCHOLESTEROLEMIA: ICD-10-CM

## 2019-03-28 DIAGNOSIS — I42.9 CARDIOMYOPATHY, UNSPECIFIED TYPE: ICD-10-CM

## 2019-03-28 DIAGNOSIS — I10 ESSENTIAL HYPERTENSION: Primary | ICD-10-CM

## 2019-03-28 DIAGNOSIS — E66.01 MORBID OBESITY WITH BODY MASS INDEX OF 50.0-59.9 IN ADULT: ICD-10-CM

## 2019-03-28 DIAGNOSIS — I10 ESSENTIAL HYPERTENSION: ICD-10-CM

## 2019-03-28 PROBLEM — E78.5 HYPERLIPIDEMIA: Status: ACTIVE | Noted: 2019-03-28

## 2019-03-28 PROBLEM — I50.42 SYSTOLIC AND DIASTOLIC CHF, CHRONIC: Status: RESOLVED | Noted: 2019-03-18 | Resolved: 2019-03-28

## 2019-03-28 LAB
ALBUMIN SERPL BCP-MCNC: 3.3 G/DL (ref 3.5–5.2)
ALP SERPL-CCNC: 112 U/L (ref 55–135)
ALT SERPL W/O P-5'-P-CCNC: 17 U/L (ref 10–44)
ANION GAP SERPL CALC-SCNC: 12 MMOL/L (ref 8–16)
AST SERPL-CCNC: 14 U/L (ref 10–40)
BASOPHILS # BLD AUTO: 0.05 K/UL (ref 0–0.2)
BASOPHILS NFR BLD: 0.5 % (ref 0–1.9)
BILIRUB SERPL-MCNC: 0.4 MG/DL (ref 0.1–1)
BNP SERPL-MCNC: 52 PG/ML (ref 0–99)
BUN SERPL-MCNC: 11 MG/DL (ref 6–20)
CALCIUM SERPL-MCNC: 9.6 MG/DL (ref 8.7–10.5)
CHLORIDE SERPL-SCNC: 105 MMOL/L (ref 95–110)
CHOLEST SERPL-MCNC: 229 MG/DL (ref 120–199)
CHOLEST/HDLC SERPL: 4.8 {RATIO} (ref 2–5)
CO2 SERPL-SCNC: 24 MMOL/L (ref 23–29)
CREAT SERPL-MCNC: 0.8 MG/DL (ref 0.5–1.4)
DIFFERENTIAL METHOD: ABNORMAL
EOSINOPHIL # BLD AUTO: 0.1 K/UL (ref 0–0.5)
EOSINOPHIL NFR BLD: 1.3 % (ref 0–8)
ERYTHROCYTE [DISTWIDTH] IN BLOOD BY AUTOMATED COUNT: 20.4 % (ref 11.5–14.5)
EST. GFR  (AFRICAN AMERICAN): >60 ML/MIN/1.73 M^2
EST. GFR  (NON AFRICAN AMERICAN): >60 ML/MIN/1.73 M^2
GLUCOSE SERPL-MCNC: 80 MG/DL (ref 70–110)
HCT VFR BLD AUTO: 42.2 % (ref 37–48.5)
HDLC SERPL-MCNC: 48 MG/DL (ref 40–75)
HDLC SERPL: 21 % (ref 20–50)
HGB BLD-MCNC: 12.2 G/DL (ref 12–16)
IMM GRANULOCYTES # BLD AUTO: 0.02 K/UL (ref 0–0.04)
IMM GRANULOCYTES NFR BLD AUTO: 0.2 % (ref 0–0.5)
LDLC SERPL CALC-MCNC: 158.2 MG/DL (ref 63–159)
LYMPHOCYTES # BLD AUTO: 4.1 K/UL (ref 1–4.8)
LYMPHOCYTES NFR BLD: 41.3 % (ref 18–48)
MCH RBC QN AUTO: 20.6 PG (ref 27–31)
MCHC RBC AUTO-ENTMCNC: 28.9 G/DL (ref 32–36)
MCV RBC AUTO: 71 FL (ref 82–98)
MONOCYTES # BLD AUTO: 0.7 K/UL (ref 0.3–1)
MONOCYTES NFR BLD: 7.3 % (ref 4–15)
NEUTROPHILS # BLD AUTO: 4.8 K/UL (ref 1.8–7.7)
NEUTROPHILS NFR BLD: 49.4 % (ref 38–73)
NONHDLC SERPL-MCNC: 181 MG/DL
NRBC BLD-RTO: 0 /100 WBC
PLATELET # BLD AUTO: 430 K/UL (ref 150–350)
PMV BLD AUTO: 10.3 FL (ref 9.2–12.9)
POTASSIUM SERPL-SCNC: 3.8 MMOL/L (ref 3.5–5.1)
PROT SERPL-MCNC: 8.2 G/DL (ref 6–8.4)
RBC # BLD AUTO: 5.93 M/UL (ref 4–5.4)
SODIUM SERPL-SCNC: 141 MMOL/L (ref 136–145)
TRIGL SERPL-MCNC: 114 MG/DL (ref 30–150)
TSH SERPL DL<=0.005 MIU/L-ACNC: 1.29 UIU/ML (ref 0.4–4)
WBC # BLD AUTO: 9.81 K/UL (ref 3.9–12.7)

## 2019-03-28 PROCEDURE — 99999 PR PBB SHADOW E&M-EST. PATIENT-LVL IV: ICD-10-PCS | Mod: PBBFAC,,, | Performed by: INTERNAL MEDICINE

## 2019-03-28 PROCEDURE — 3008F BODY MASS INDEX DOCD: CPT | Mod: CPTII,S$GLB,, | Performed by: INTERNAL MEDICINE

## 2019-03-28 PROCEDURE — 36415 COLL VENOUS BLD VENIPUNCTURE: CPT

## 2019-03-28 PROCEDURE — 80053 COMPREHEN METABOLIC PANEL: CPT

## 2019-03-28 PROCEDURE — 99999 PR PBB SHADOW E&M-EST. PATIENT-LVL IV: CPT | Mod: PBBFAC,,, | Performed by: INTERNAL MEDICINE

## 2019-03-28 PROCEDURE — 3080F PR MOST RECENT DIASTOLIC BLOOD PRESSURE >= 90 MM HG: ICD-10-PCS | Mod: CPTII,S$GLB,, | Performed by: INTERNAL MEDICINE

## 2019-03-28 PROCEDURE — 80061 LIPID PANEL: CPT

## 2019-03-28 PROCEDURE — 3077F SYST BP >= 140 MM HG: CPT | Mod: CPTII,S$GLB,, | Performed by: INTERNAL MEDICINE

## 2019-03-28 PROCEDURE — 85025 COMPLETE CBC W/AUTO DIFF WBC: CPT

## 2019-03-28 PROCEDURE — 99214 PR OFFICE/OUTPT VISIT, EST, LEVL IV, 30-39 MIN: ICD-10-PCS | Mod: S$GLB,,, | Performed by: INTERNAL MEDICINE

## 2019-03-28 PROCEDURE — 99214 OFFICE O/P EST MOD 30 MIN: CPT | Mod: S$GLB,,, | Performed by: INTERNAL MEDICINE

## 2019-03-28 PROCEDURE — 83880 ASSAY OF NATRIURETIC PEPTIDE: CPT

## 2019-03-28 PROCEDURE — 84443 ASSAY THYROID STIM HORMONE: CPT

## 2019-03-28 PROCEDURE — 3008F PR BODY MASS INDEX (BMI) DOCUMENTED: ICD-10-PCS | Mod: CPTII,S$GLB,, | Performed by: INTERNAL MEDICINE

## 2019-03-28 PROCEDURE — 3080F DIAST BP >= 90 MM HG: CPT | Mod: CPTII,S$GLB,, | Performed by: INTERNAL MEDICINE

## 2019-03-28 PROCEDURE — 3077F PR MOST RECENT SYSTOLIC BLOOD PRESSURE >= 140 MM HG: ICD-10-PCS | Mod: CPTII,S$GLB,, | Performed by: INTERNAL MEDICINE

## 2019-03-28 RX ORDER — IRBESARTAN 75 MG/1
75 TABLET ORAL NIGHTLY
Qty: 30 TABLET | Refills: 11 | Status: SHIPPED | OUTPATIENT
Start: 2019-03-28 | End: 2019-11-20

## 2019-03-28 RX ORDER — AMLODIPINE BESYLATE 5 MG/1
5 TABLET ORAL DAILY
Qty: 30 TABLET | Refills: 11 | Status: SHIPPED | OUTPATIENT
Start: 2019-03-28 | End: 2020-04-22

## 2019-03-28 NOTE — PATIENT INSTRUCTIONS
We discussed keeping a log of home blood pressures and notifying the office if it is consistently running above 130/80 mmHg. I have asked her to send me her readings via [facility] in 1-2 weeks.    Limit sodium intake to < 1500mg daily and follow the DASH diet plan.

## 2019-03-28 NOTE — PROGRESS NOTES
Subjective:   Patient ID:  Kristopher Ye is a 43 y.o. female who presents for follow-up of Hypertension and Fatigue      HPI: I last saw Kristopher in 2016. She comes in today with uncontrolled HTN after stopping her losartan-hczt after the recall. She has not been feeling well since being off the medication. She had a DSE done with Dr. Thomas  which showed concentric LVH and mildly reduced LV function with an LVEF of 45-50%. Her LVEDD was 5.7 cm. A holter done at the same time was normal.    ECG (18): NSR with 1st degree AV block and inferolateral T wave inversions.    Past Medical History:   Diagnosis Date    Abnormal Pap smear     pt states 13yrs ago colpo was done    Anemia     Cardiomyopathy     Fibroid     Hyperlipidemia     Hypertension        Past Surgical History:   Procedure Laterality Date     SECTION      EMBOLIZATION N/A 2015    Performed by Rico Green MD at [facility] [address] [place] [address]    TONSILLECTOMY      TUBAL LIGATION      UTERINE FIBROID EMBOLIZATION         Social History     Socioeconomic History    Marital status:      Spouse name: None    Number of children: None    Years of education: None    Highest education level: None   Occupational History    None   Social Needs    Financial resource strain: None    Food insecurity:     Worry: None     Inability: None    Transportation needs:     Medical: None     Non-medical: None   Tobacco Use    Smoking status: Never Smoker    Smokeless tobacco: Never Used   Substance and Sexual Activity    Alcohol use: Yes     Alcohol/week: 0.0 oz     Comment: socially    Drug use: No    Sexual activity: Yes     Partners: Male     Birth control/protection: Surgical   Lifestyle    Physical activity:     Days per week: None     Minutes per session: None    Stress: None   Relationships    Social connections:     Talks on phone: None     Gets together: None     Attends Synagogue service: None     Active member of  club or organization: None     Attends meetings of clubs or organizations: None     Relationship status: None    Intimate partner violence:     Fear of current or ex partner: None     Emotionally abused: None     Physically abused: None     Forced sexual activity: None   Other Topics Concern    None   Social History Narrative    None       Family History   Problem Relation Age of Onset    Arthritis Mother     Diabetes Mother     Heart disease Mother         CHF, CAD , 2 stents    Hypertension Mother     Hyperlipidemia Mother     No Known Problems Sister     Hypertension Brother     No Known Problems Daughter     Asthma Daughter     Breast cancer Neg Hx     Colon cancer Neg Hx     Ovarian cancer Neg Hx        Patient's Medications   New Prescriptions    AMLODIPINE (NORVASC) 5 MG TABLET    Take 1 tablet (5 mg total) by mouth once daily.    IRBESARTAN (AVAPRO) 75 MG TABLET    Take 1 tablet (75 mg total) by mouth every evening.   Previous Medications    ALBUTEROL 90 MCG/ACTUATION INHALER    Inhale 2 puffs into the lungs every 6 (six) hours as needed for Wheezing. Rescue    LORATADINE (CLARITIN) 10 MG TABLET    Take 10 mg by mouth once daily.    ONDANSETRON (ZOFRAN-ODT) 4 MG TBDL    Take 2 tablets (8 mg total) by mouth every 8 (eight) hours as needed.    PANTOPRAZOLE (PROTONIX) 40 MG TABLET    Please take one tablet by mouth every 12 hours when you have discomfort, then take one tablet every day.   Modified Medications    No medications on file   Discontinued Medications    LOSARTAN-HYDROCHLOROTHIAZIDE 100-25 MG (HYZAAR) 100-25 MG PER TABLET    Take 1 tablet by mouth once daily.       Review of Systems   Constitution: Positive for malaise/fatigue. Negative for weight gain.   HENT: Negative for hearing loss.    Eyes: Negative for visual disturbance.   Cardiovascular: Positive for leg swelling. Negative for chest pain, claudication, dyspnea on exertion, near-syncope, orthopnea, palpitations, paroxysmal  "nocturnal dyspnea and syncope.   Respiratory: Positive for sleep disturbances due to breathing and snoring. Negative for cough, shortness of breath and wheezing.    Endocrine: Negative for cold intolerance, heat intolerance, polydipsia, polyphagia and polyuria.   Hematologic/Lymphatic: Negative for bleeding problem. Does not bruise/bleed easily.   Skin: Negative for rash and suspicious lesions.   Musculoskeletal: Negative for arthritis, falls, joint pain, muscle weakness and myalgias.   Gastrointestinal: Negative for abdominal pain, change in bowel habit, constipation, diarrhea, heartburn, hematochezia, melena and nausea.   Genitourinary: Negative for hematuria and nocturia.   Neurological: Positive for headaches. Negative for excessive daytime sleepiness, dizziness, light-headedness, loss of balance and weakness.   Psychiatric/Behavioral: Negative for depression. The patient is not nervous/anxious.    Allergic/Immunologic: Negative for environmental allergies.       BP (!) 181/100 (BP Location: Left arm, Patient Position: Sitting, BP Method: Large (Automatic))   Pulse 73   Ht 5' 6" (1.676 m)   Wt (!) 160.8 kg (354 lb 8 oz)   SpO2 98%   BMI 57.22 kg/m²     Objective:   Physical Exam   Constitutional: She is oriented to person, place, and time. She appears well-developed and well-nourished.        HENT:   Head: Normocephalic and atraumatic.   Mouth/Throat: Oropharynx is clear and moist.   Eyes: Pupils are equal, round, and reactive to light. Conjunctivae and EOM are normal. No scleral icterus.   Neck: Normal range of motion. Neck supple. No hepatojugular reflux and no JVD present. No tracheal deviation present. No thyromegaly present.   Cardiovascular: Normal rate, regular rhythm, normal heart sounds and intact distal pulses. PMI is not displaced.   Pulses:       Carotid pulses are 2+ on the right side, and 2+ on the left side.       Radial pulses are 2+ on the right side, and 2+ on the left side.        " Dorsalis pedis pulses are 2+ on the right side, and 2+ on the left side.        Posterior tibial pulses are 2+ on the right side, and 2+ on the left side.   Distant heart sounds   Pulmonary/Chest: Effort normal and breath sounds normal.   Abdominal: Soft. Bowel sounds are normal. She exhibits no distension and no mass. There is no hepatosplenomegaly. There is no tenderness.   Musculoskeletal: She exhibits edema. She exhibits no tenderness.   1+ pretibial edema   Lymphadenopathy:     She has no cervical adenopathy.   Neurological: She is alert and oriented to person, place, and time.   Skin: Skin is warm and dry. No rash noted. No cyanosis or erythema. Nails show no clubbing.   Psychiatric: She has a normal mood and affect. Her speech is normal and behavior is normal.       Lab Results   Component Value Date     04/05/2018    K 3.9 04/05/2018     04/05/2018    CO2 28 04/05/2018    BUN 9 04/05/2018    CREATININE 0.9 04/05/2018     04/05/2018    HGBA1C 6.2 (H) 08/16/2017    MG 1.8 01/26/2017    AST 16 04/05/2018    ALT 20 04/05/2018    ALBUMIN 3.1 (L) 04/05/2018    PROT 7.6 04/05/2018    BILITOT 0.4 04/05/2018    WBC 9.27 04/05/2018    HGB 11.0 (L) 04/05/2018    HCT 36.1 (L) 04/05/2018    MCV 65 (L) 04/05/2018     (H) 04/05/2018    INR 1.0 07/22/2015    TSH 1.064 08/16/2017    CHOL 229 (H) 08/16/2017    HDL 42 08/16/2017    LDLCALC 162.4 (H) 08/16/2017    TRIG 123 08/16/2017    BNP <10 08/16/2017       Assessment:     1. Essential hypertension : Uncontrolled after stopping her medication. Start amlodipine 5 mg daily and irbesartan 75 mg daily. We discussed keeping a log of home blood pressures and notifying the office if it is consistently running above 130/80 mmHg. I have asked him/her to send me his/her readings via [facility] in 1-2 weeks. Limit sodium intake to < 1500mg daily and follow the DASH diet plan.   2. Cardiomyopathy, unspecified type : Start irbesartan. Follow up echo ordered.     3. Morbid obesity (BMI= 55.9 on 10/30/14) : Had initial consult with bariatrics a few years ago. I dont see follow up . Sleep referral placed to assess for ZABRINA.   4. Pure hypercholesterolemia : Labs ordered.       Plan:     Kristopher was seen today for hypertension and fatigue.    Diagnoses and all orders for this visit:    Essential hypertension  -     amLODIPine (NORVASC) 5 MG tablet; Take 1 tablet (5 mg total) by mouth once daily.  -     irbesartan (AVAPRO) 75 MG tablet; Take 1 tablet (75 mg total) by mouth every evening.  -     CBC auto differential; Future  -     TSH; Future  -     Comprehensive metabolic panel; Future  -     Lipid panel; Future  -     Transthoracic echo (TTE) 2D with Color Flow; Future  -     Ambulatory referral to Sleep Disorders    Cardiomyopathy, unspecified type  -     amLODIPine (NORVASC) 5 MG tablet; Take 1 tablet (5 mg total) by mouth once daily.  -     irbesartan (AVAPRO) 75 MG tablet; Take 1 tablet (75 mg total) by mouth every evening.  -     CBC auto differential; Future  -     TSH; Future  -     Comprehensive metabolic panel; Future  -     Lipid panel; Future  -     Transthoracic echo (TTE) 2D with Color Flow; Future  -     Ambulatory referral to Sleep Disorders    Morbid obesity (BMI= 55.9 on 10/30/14)  -     Ambulatory referral to Sleep Disorders    Pure hypercholesterolemia  -     amLODIPine (NORVASC) 5 MG tablet; Take 1 tablet (5 mg total) by mouth once daily.  -     irbesartan (AVAPRO) 75 MG tablet; Take 1 tablet (75 mg total) by mouth every evening.  -     CBC auto differential; Future  -     TSH; Future  -     Comprehensive metabolic panel; Future  -     Lipid panel; Future  -     Transthoracic echo (TTE) 2D with Color Flow; Future        Thank you for allowing me to participate in this patient's care. Please do not hesitate to contact me with any questions or concerns.

## 2019-03-29 NOTE — PROGRESS NOTES
Chief Complaint   Patient presents with    labs results  follow up       HPI  Kristopher Ye is a 41 y.o. female with multiple medical diagnoses as listed in the medical history and problem list that presents for follow up.    L chest pain - mammogram completed,     Increased exercising - able to complete 1 hour exercise regimen without SOB involved. Pt very interested in bariatric procedures to lose weight.     HLD - labs show increasing LDL.     Pt is known to me and was last seen by me on 2017.    PAST MEDICAL HISTORY:  Past Medical History:   Diagnosis Date    Abnormal Pap smear     pt states 13yrs ago colpo was done    Anemia     Fibroid     Hypertension        PAST SURGICAL HISTORY:  Past Surgical History:   Procedure Laterality Date     SECTION      TONSILLECTOMY      TUBAL LIGATION         SOCIAL HISTORY:  Social History     Social History    Marital status:      Spouse name: N/A    Number of children: N/A    Years of education: N/A     Occupational History    Not on file.     Social History Main Topics    Smoking status: Never Smoker    Smokeless tobacco: Never Used    Alcohol use 0.0 oz/week      Comment: socially    Drug use: No    Sexual activity: Yes     Partners: Male     Birth control/ protection: Surgical     Other Topics Concern    Not on file     Social History Narrative    No narrative on file       FAMILY HISTORY:  Family History   Problem Relation Age of Onset    Arthritis Mother     Diabetes Mother     Heart disease Mother      CHF, CAD , 2 stents    Hypertension Mother     Hyperlipidemia Mother     No Known Problems Sister     Hypertension Brother     No Known Problems Daughter     Asthma Daughter        ALLERGIES AND MEDICATIONS: updated and reviewed.  Review of patient's allergies indicates:   Allergen Reactions    Keflex [cephalexin] Itching     Current Outpatient Prescriptions   Medication Sig Dispense Refill    busPIRone (BUSPAR)  "5 MG Tab Take 1 tablet (5 mg total) by mouth 2 (two) times daily. 60 tablet 11    ferrous sulfate 325 mg (65 mg iron) Tab tablet Take 1 tablet (325 mg total) by mouth 2 (two) times daily. 60 tablet 2    losartan-hydrochlorothiazide 100-25 mg (HYZAAR) 100-25 mg per tablet Take 1 tablet by mouth once daily. 30 tablet 6    fluticasone (FLONASE) 50 mcg/actuation nasal spray 1 spray by Each Nare route 2 (two) times daily. 1 Bottle 0    furosemide (LASIX) 40 MG tablet Take 0.5 tablets (20 mg total) by mouth daily as needed. 30 tablet 11    promethazine (PHENERGAN) 25 MG tablet Take 1 tablet (25 mg total) by mouth every 4 (four) hours. 20 tablet 0     No current facility-administered medications for this visit.        ROS  Review of Systems   Constitutional: Positive for fatigue. Negative for activity change, appetite change and fever.   HENT: Negative for congestion and sore throat.    Eyes: Negative for visual disturbance.   Respiratory: Negative for cough and shortness of breath.    Cardiovascular: Negative for chest pain.   Gastrointestinal: Negative for abdominal pain, diarrhea, nausea and vomiting.   Endocrine: Negative.    Genitourinary: Negative for dysuria.   Musculoskeletal: Positive for joint swelling. Negative for arthralgias and back pain.   Skin: Negative for rash.   Allergic/Immunologic: Negative.    Neurological: Negative for dizziness, weakness and headaches.   Hematological: Negative.    Psychiatric/Behavioral: Negative for agitation and confusion.       Physical Exam  Vitals:    09/18/17 1552   BP: 104/78   Pulse: 80   Resp: 17   Temp: 98.5 °F (36.9 °C)    Body mass index is 58.93 kg/m².  Weight: (!) 165.6 kg (365 lb 1.3 oz)   Height: 5' 6" (167.6 cm)     Physical Exam   Constitutional: She is oriented to person, place, and time. She appears well-developed and well-nourished.   HENT:   Head: Normocephalic.   Neurological: She is alert and oriented to person, place, and time.   Psychiatric: She has a " normal mood and affect. Her behavior is normal. Judgment and thought content normal.       Health Maintenance       Date Due Completion Date    Pneumococcal PCV13 (High Risk) (1 - PCV13 Required) 10/14/1976 ---    TETANUS VACCINE 10/14/1993 ---    Pneumococcal PPSV23 (High Risk) (1) 10/14/1993 ---    Influenza Vaccine 08/01/2017 ---    Pap Smear with HPV Cotest 05/13/2018 5/13/2015    Mammogram 05/25/2019 5/25/2017          Assessment & Plan    Essential hypertension  - Continue current medication regimen as prescribed  - Overall doing well    Iron deficiency anemia due to chronic blood loss, Thalassemia, unspecified type  - Continue current medication regimen as prescribed    Morbid obesity, unspecified obesity type  -     Ambulatory Referral to Bariatric Surgery ([facility])  - Pt desires full evaluation    Return in about 3 months (around 12/18/2017).           not examined

## 2019-04-03 ENCOUNTER — PATIENT MESSAGE (OUTPATIENT)
Dept: SLEEP MEDICINE | Facility: CLINIC | Age: 44
End: 2019-04-03

## 2019-04-05 NOTE — PROGRESS NOTES
Patient Name: Kristopher Ye    : 1975  MRN: [id number]    Subjective:  Kristopher is a 43 y.o. female with multiple medical problems listed below who presents today for     1. Fishy vaginal discharge. Pt gets recurrent BV. No abdominal pain. No urinary symptoms.     2. Reports SOB attributed to weight. Has swelling lower extremities. History significant for cardiomyopathy with heart failure.    3. Pt reports symptoms of snoring, restless sleep, daytime fatigue.     Past Medical History  Past Medical History:   Diagnosis Date    Abnormal Pap smear     pt states 13yrs ago colpo was done    Anemia     Cardiomyopathy     Fibroid     Hyperlipidemia     Hypertension        Past Surgical History  Past Surgical History:   Procedure Laterality Date     SECTION      EMBOLIZATION N/A 2015    Performed by [facility] at [facility] OR [address] [place] [address]    TONSILLECTOMY      TUBAL LIGATION      UTERINE FIBROID EMBOLIZATION         Family History  Family History   Problem Relation Age of Onset    Arthritis Mother     Diabetes Mother     Heart disease Mother         CHF, CAD , 2 stents    Hypertension Mother     Hyperlipidemia Mother     No Known Problems Sister     Hypertension Brother     No Known Problems Daughter     Asthma Daughter     Breast cancer Neg Hx     Colon cancer Neg Hx     Ovarian cancer Neg Hx        Social History  Social History     Socioeconomic History    Marital status:      Spouse name: Not on file    Number of children: Not on file    Years of education: Not on file    Highest education level: Not on file   Occupational History    Not on file   Social Needs    Financial resource strain: Not on file    Food insecurity:     Worry: Not on file     Inability: Not on file    Transportation needs:     Medical: Not on file     Non-medical: Not on file   Tobacco Use    Smoking status: Never Smoker    Smokeless tobacco: Never Used   Substance and Sexual  Activity    Alcohol use: Yes     Alcohol/week: 0.0 oz     Comment: socially    Drug use: No    Sexual activity: Yes     Partners: Male     Birth control/protection: Surgical   Lifestyle    Physical activity:     Days per week: Not on file     Minutes per session: Not on file    Stress: Not on file   Relationships    Social connections:     Talks on phone: Not on file     Gets together: Not on file     Attends [employer] service: Not on file     Active member of club or organization: Not on file     Attends meetings of clubs or organizations: Not on file     Relationship status: Not on file   Other Topics Concern    Not on file   Social History Narrative    Not on file       Allergies  Review of patient's allergies indicates:   Allergen Reactions    Keflex [cephalexin] Itching    -reviewed and updated      Medications  Reviewed and updated.   Current Outpatient Medications   Medication Sig Dispense Refill    albuterol 90 mcg/actuation inhaler Inhale 2 puffs into the lungs every 6 (six) hours as needed for Wheezing. Rescue 6.7 g 0    loratadine (CLARITIN) 10 mg tablet Take 10 mg by mouth once daily.      ondansetron (ZOFRAN-ODT) 4 MG TbDL Take 2 tablets (8 mg total) by mouth every 8 (eight) hours as needed. 30 tablet 0    pantoprazole (PROTONIX) 40 MG tablet Please take one tablet by mouth every 12 hours when you have discomfort, then take one tablet every day. 30 tablet 0    amLODIPine (NORVASC) 5 MG tablet Take 1 tablet (5 mg total) by mouth once daily. 30 tablet 11    irbesartan (AVAPRO) 75 MG tablet Take 1 tablet (75 mg total) by mouth every evening. 30 tablet 11     No current facility-administered medications for this visit.          Review of Systems   Constitutional: Negative for chills and fever.   HENT: Negative for sore throat.    Respiratory: Positive for shortness of breath. Negative for cough.    Cardiovascular: Positive for leg swelling. Negative for chest pain and palpitations.    Gastrointestinal: Negative for abdominal pain.   Genitourinary: Negative for dysuria, frequency and urgency.   Psychiatric/Behavioral: The patient has insomnia.          Physical Exam  Blood Pressure (Abnormal) 152/94   Pulse 78   Temperature 98.2 °F (36.8 °C)   Respiration 16   Weight (Abnormal) 157 kg (346 lb 2 oz)   Oxygen Saturation (Abnormal) 94%   Body Mass Index 55.87 kg/m²   Physical Exam   Constitutional: She is oriented to person, place, and time. She appears well-developed and well-nourished.   obese   HENT:   Head: Normocephalic.   Nose: Nose normal.   Mallampati 4   Eyes: Conjunctivae and EOM are normal.   Cardiovascular: Normal rate and regular rhythm.   Pulmonary/Chest: Effort normal and breath sounds normal.   Musculoskeletal: She exhibits edema.   Neurological: She is alert and oriented to person, place, and time.   Psychiatric: She has a normal mood and affect. Her behavior is normal.     Dobutamine stress: EF 45, mitral and tricuspid valve regurgitation, diastolic dysfunction    Assessment/Plan:  Kristopher Ye is a 43 y.o. female who presents today for :    Acute vaginitis  Pt with recurrent BV, empirically treated with flagyl  -     metroNIDAZOLE (FLAGYL) 500 MG tablet; Take 1 tablet (500 mg total) by mouth every 12 (twelve) hours. for 7 days  Dispense: 14 tablet; Refill: 0    Essential hypertension/Systolic and diastolic CHF, chronic  Advised to f/u with cardiologist    Sleep-related breathing disorder   Pt with symptoms of snoring, daytime fatigue with findings of morbid obesity, crowded airway, uncontrolled HTN, CHF. Recommend in lab sleep study to assess for sleep breathing disorder.   -     Polysomnogram (CPAP will be added if patient meets diagnostic criteria.); Future         Follow-up if symptoms worsen or fail to improve, nurse BP check in 2 weeks.

## 2019-07-24 ENCOUNTER — TELEPHONE (OUTPATIENT)
Dept: OBSTETRICS AND GYNECOLOGY | Facility: CLINIC | Age: 44
End: 2019-07-24

## 2019-07-24 DIAGNOSIS — Z12.31 SCREENING MAMMOGRAM, ENCOUNTER FOR: Primary | ICD-10-CM

## 2019-07-24 NOTE — TELEPHONE ENCOUNTER
Orders for mammogram placed. Attempted to contact the patient to schedule her mammogram. No answer, left voicemail message for the patient to call the clinic back. Orders are in please schedule the patient.

## 2019-08-07 ENCOUNTER — OFFICE VISIT (OUTPATIENT)
Dept: FAMILY MEDICINE | Facility: CLINIC | Age: 44
End: 2019-08-07
Payer: COMMERCIAL

## 2019-08-07 ENCOUNTER — HOSPITAL ENCOUNTER (OUTPATIENT)
Dept: RADIOLOGY | Facility: HOSPITAL | Age: 44
Discharge: HOME | End: 2019-08-07
Attending: NURSE PRACTITIONER
Payer: COMMERCIAL

## 2019-08-07 VITALS
HEIGHT: 66 IN | SYSTOLIC BLOOD PRESSURE: 120 MMHG | TEMPERATURE: 99 F | OXYGEN SATURATION: 99 % | HEART RATE: 80 BPM | WEIGHT: 293 LBS | DIASTOLIC BLOOD PRESSURE: 80 MMHG | BODY MASS INDEX: 47.09 KG/M2

## 2019-08-07 DIAGNOSIS — M25.561 ACUTE PAIN OF RIGHT KNEE: ICD-10-CM

## 2019-08-07 DIAGNOSIS — F41.9 ANXIETY: ICD-10-CM

## 2019-08-07 DIAGNOSIS — F41.0 PANIC ATTACKS: ICD-10-CM

## 2019-08-07 DIAGNOSIS — R35.0 URINARY FREQUENCY: Primary | ICD-10-CM

## 2019-08-07 LAB
BILIRUB SERPL-MCNC: NORMAL MG/DL
BLOOD URINE, POC: NORMAL
COLOR, POC UA: YELLOW
GLUCOSE UR QL STRIP: NORMAL
KETONES UR QL STRIP: NORMAL
LEUKOCYTE ESTERASE URINE, POC: NORMAL
NITRITE, POC UA: NORMAL
PH, POC UA: 5
PROTEIN, POC: NORMAL
SPECIFIC GRAVITY, POC UA: 1.02
UROBILINOGEN, POC UA: NORMAL

## 2019-08-07 PROCEDURE — 99999 PR PBB SHADOW E&M-EST. PATIENT-LVL IV: ICD-10-PCS | Mod: PBBFAC,,, | Performed by: NURSE PRACTITIONER

## 2019-08-07 PROCEDURE — 81001 URINALYSIS AUTO W/SCOPE: CPT | Mod: S$GLB,,, | Performed by: NURSE PRACTITIONER

## 2019-08-07 PROCEDURE — 3074F SYST BP LT 130 MM HG: CPT | Mod: CPTII,S$GLB,, | Performed by: NURSE PRACTITIONER

## 2019-08-07 PROCEDURE — 99999 PR PBB SHADOW E&M-EST. PATIENT-LVL IV: CPT | Mod: PBBFAC,,, | Performed by: NURSE PRACTITIONER

## 2019-08-07 PROCEDURE — 81001 POCT URINALYSIS, DIPSTICK OR TABLET REAGENT, AUTOMATED, WITH MICROSCOP: ICD-10-PCS | Mod: S$GLB,,, | Performed by: NURSE PRACTITIONER

## 2019-08-07 PROCEDURE — 3079F DIAST BP 80-89 MM HG: CPT | Mod: CPTII,S$GLB,, | Performed by: NURSE PRACTITIONER

## 2019-08-07 PROCEDURE — 3008F PR BODY MASS INDEX (BMI) DOCUMENTED: ICD-10-PCS | Mod: CPTII,S$GLB,, | Performed by: NURSE PRACTITIONER

## 2019-08-07 PROCEDURE — 73562 XR KNEE 3 VIEW RIGHT: ICD-10-PCS | Mod: 26,RT,, | Performed by: RADIOLOGY

## 2019-08-07 PROCEDURE — 3079F PR MOST RECENT DIASTOLIC BLOOD PRESSURE 80-89 MM HG: ICD-10-PCS | Mod: CPTII,S$GLB,, | Performed by: NURSE PRACTITIONER

## 2019-08-07 PROCEDURE — 99214 PR OFFICE/OUTPT VISIT, EST, LEVL IV, 30-39 MIN: ICD-10-PCS | Mod: 25,S$GLB,, | Performed by: NURSE PRACTITIONER

## 2019-08-07 PROCEDURE — 99214 OFFICE O/P EST MOD 30 MIN: CPT | Mod: 25,S$GLB,, | Performed by: NURSE PRACTITIONER

## 2019-08-07 PROCEDURE — 3008F BODY MASS INDEX DOCD: CPT | Mod: CPTII,S$GLB,, | Performed by: NURSE PRACTITIONER

## 2019-08-07 PROCEDURE — 73562 X-RAY EXAM OF KNEE 3: CPT | Mod: 26,RT,, | Performed by: RADIOLOGY

## 2019-08-07 PROCEDURE — 73562 X-RAY EXAM OF KNEE 3: CPT | Mod: TC,FY,PO,RT

## 2019-08-07 PROCEDURE — 3074F PR MOST RECENT SYSTOLIC BLOOD PRESSURE < 130 MM HG: ICD-10-PCS | Mod: CPTII,S$GLB,, | Performed by: NURSE PRACTITIONER

## 2019-08-07 RX ORDER — HYDROXYZINE PAMOATE 50 MG/1
CAPSULE ORAL
Qty: 40 CAPSULE | Refills: 0 | Status: ON HOLD | OUTPATIENT
Start: 2019-08-07 | End: 2019-12-16

## 2019-08-07 RX ORDER — CITALOPRAM 20 MG/1
20 TABLET, FILM COATED ORAL DAILY
Qty: 30 TABLET | Refills: 1 | Status: ON HOLD | OUTPATIENT
Start: 2019-08-07 | End: 2019-12-16

## 2019-08-07 RX ORDER — IBUPROFEN 800 MG/1
800 TABLET ORAL 3 TIMES DAILY
Qty: 30 TABLET | Refills: 0 | Status: SHIPPED | OUTPATIENT
Start: 2019-08-07 | End: 2019-11-12

## 2019-08-07 NOTE — PATIENT INSTRUCTIONS
Do not take Claritin with Vistaril  Vistaril 50 mg one every 6 hours as needed for anxiety/panic attacks   Start Celexa 20 mg one daily   Ibuprofen 800 mg one every 8 hours as needed for knee pain  Ice and elevated right knee several times a day   Follow up with me in 4 weeks for a check up on anxiety

## 2019-08-07 NOTE — PROGRESS NOTES
Subjective:       Patient ID: Kristopher Ye is a 43 y.o. female.    Chief Complaint: Vaginitis; Anxiety; and Knee Pain (right )    43-year-old female presents to the clinic today with complaint of right knee pain since yesterday.  She said she got out of bed turned the wrong way and felt a pull in her knee.  She has been having trouble bending her knee inward and standing up straight since the injury.  She can bend her leg forward without any problems. She said today she was have a little bit of trouble walking.  She has not tried anything for the discomfort and she has not applied ice yet either.  She is also having problems with anxiety since she started school yesterday.  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  She says she has always suffered a little bit from anxiety but it seemed to be a lot worse yesterday.  She denies any depression, suicidal ideations, hallucinations, or homicidal ideations.  She has some mild urinary frequency.  But, she denies any dysuria, hematuria, difficulty with urination, or vaginal discharge. She states she has a strange sensation to her vaginal area but denies any discharge or lesions. She denies any itching.  I offered to do a pelvic exam on her but she deferred at this time.    Past Medical History:   Diagnosis Date    Abnormal Pap smear     pt states 13yrs ago colpo was done    Anemia     Cardiomyopathy     Fibroid     Hyperlipidemia     Hypertension      Past Surgical History:   Procedure Laterality Date     SECTION      EMBOLIZATION N/A 2015    Performed by [facility] at [facility] OR [address] [place] [address]    TONSILLECTOMY      TUBAL LIGATION      UTERINE FIBROID EMBOLIZATION        reports that she has never smoked. She has never used smokeless tobacco. She reports that she drinks alcohol. She reports that she does not use drugs.  Review of Systems   Respiratory: Negative for cough, shortness  of breath and wheezing.    Cardiovascular: Negative for chest pain, palpitations and leg swelling.   Gastrointestinal: Negative for abdominal pain, diarrhea, nausea and vomiting.   Genitourinary: Positive for frequency. Negative for difficulty urinating, dysuria and urgency.   Musculoskeletal: Positive for gait problem.        Right knee pain    Neurological: Negative for dizziness, light-headedness and headaches.   Psychiatric/Behavioral: Negative for self-injury, sleep disturbance and suicidal ideas. The patient is nervous/anxious.        Objective:      Physical Exam   Constitutional: She is oriented to person, place, and time. She appears well-developed and well-nourished. No distress.   Eyes: Pupils are equal, round, and reactive to light. Conjunctivae and EOM are normal. Right eye exhibits no discharge. Left eye exhibits no discharge. No scleral icterus.   Neck: Normal range of motion. Neck supple. No JVD present.   Cardiovascular: Normal rate, regular rhythm and normal heart sounds.   No murmur heard.  Pulmonary/Chest: Effort normal and breath sounds normal. No respiratory distress. She has no wheezes. She has no rales.   Abdominal: Soft. Bowel sounds are normal. There is no tenderness.   Musculoskeletal: She exhibits tenderness. She exhibits no edema.   Right knee mild tenderness over the medial side of knee no redness or swelling noted painful to bend knee inward and stand straight on it but moves outwards without any difficulty    Neurological: She is alert and oriented to person, place, and time.   Skin: Skin is warm and dry. She is not diaphoretic.   Psychiatric: She has a normal mood and affect.       Assessment:       1. Urinary frequency    2. Acute pain of right knee    3. Anxiety    4. Panic attacks        Plan:         Urinary frequency  -     POCT urinalysis, dipstick or tablet reag    Acute pain of right knee  -     ibuprofen (ADVIL,MOTRIN) 800 MG tablet; Take 1 tablet (800 mg total) by mouth 3  (three) times daily.  Dispense: 30 tablet; Refill: 0  -     X-Ray Knee 3 View Right; Future; Expected date: 08/07/2019    Anxiety  -     citalopram (CELEXA) 20 MG tablet; Take 1 tablet (20 mg total) by mouth once daily.  Dispense: 30 tablet; Refill: 1  -     hydrOXYzine pamoate (VISTARIL) 50 MG Cap; One every 6 hours as needed for anxiety  Dispense: 40 capsule; Refill: 0    Panic attacks  - Vistaril as needed

## 2019-08-07 NOTE — LETTER
August 7, 2019      [facility]  [address]  [place] [address]  Phone: [phone]  Fax: [phone]       Patient: Kristopher Ye   YOB: 1975  Date of Visit: 08/07/2019    To Whom It May Concern:    Adalberto Ye  was at [facility] on 08/07/2019. She may return to work/school on 8/8/2019 with restrictions.She needs to do light duty for one week.  If you have any questions or concerns, or if I can be of further assistance, please do not hesitate to contact me.    Sincerely,    Miles Miller, LEILA-C

## 2019-08-08 ENCOUNTER — TELEPHONE (OUTPATIENT)
Dept: FAMILY MEDICINE | Facility: CLINIC | Age: 44
End: 2019-08-08

## 2019-08-08 NOTE — TELEPHONE ENCOUNTER
----- Message from Sera Mcintyre sent at 8/8/2019  9:04 AM CDT -----  Contact: Patient ph [phone]  Type:  Patient Returning Call    Who Called: Patient    Who Left Message for Patient: Think it was Miles Miller    Does the patient know what this is regarding?: Think it's about xray results    Would the patient rather a call back or a response via [facility]? Call back    Best Call Back Number: [phone]

## 2019-08-08 NOTE — TELEPHONE ENCOUNTER
I spoke with the patient and explained that her right knee x-ray showed osteoarthritis and no fracture or dislocations. Patient verbalized understanding of above.

## 2019-08-09 ENCOUNTER — PATIENT MESSAGE (OUTPATIENT)
Dept: FAMILY MEDICINE | Facility: CLINIC | Age: 44
End: 2019-08-09

## 2019-08-09 ENCOUNTER — TELEPHONE (OUTPATIENT)
Dept: FAMILY MEDICINE | Facility: CLINIC | Age: 44
End: 2019-08-09

## 2019-08-09 DIAGNOSIS — M25.561 ACUTE PAIN OF RIGHT KNEE: Primary | ICD-10-CM

## 2019-08-09 RX ORDER — TRAMADOL HYDROCHLORIDE 50 MG/1
50 TABLET ORAL EVERY 8 HOURS PRN
Qty: 21 TABLET | Refills: 0 | Status: SHIPPED | OUTPATIENT
Start: 2019-08-09 | End: 2019-08-16

## 2019-08-13 ENCOUNTER — PATIENT MESSAGE (OUTPATIENT)
Dept: FAMILY MEDICINE | Facility: CLINIC | Age: 44
End: 2019-08-13

## 2019-08-23 ENCOUNTER — HOSPITAL ENCOUNTER (EMERGENCY)
Facility: HOSPITAL | Age: 44
Discharge: HOME | End: 2019-08-23
Attending: EMERGENCY MEDICINE
Payer: COMMERCIAL

## 2019-08-23 VITALS
OXYGEN SATURATION: 97 % | SYSTOLIC BLOOD PRESSURE: 177 MMHG | BODY MASS INDEX: 47.09 KG/M2 | TEMPERATURE: 98 F | HEART RATE: 84 BPM | DIASTOLIC BLOOD PRESSURE: 98 MMHG | HEIGHT: 66 IN | RESPIRATION RATE: 18 BRPM | WEIGHT: 293 LBS

## 2019-08-23 DIAGNOSIS — Z87.39 HISTORY OF OSTEOARTHRITIS: ICD-10-CM

## 2019-08-23 DIAGNOSIS — M25.561 CHRONIC PAIN OF RIGHT KNEE: Primary | ICD-10-CM

## 2019-08-23 DIAGNOSIS — R03.0 ELEVATED BLOOD PRESSURE READING: ICD-10-CM

## 2019-08-23 DIAGNOSIS — M25.561 RIGHT KNEE PAIN: ICD-10-CM

## 2019-08-23 DIAGNOSIS — G89.29 CHRONIC PAIN OF RIGHT KNEE: Primary | ICD-10-CM

## 2019-08-23 DIAGNOSIS — Z91.148 NONCOMPLIANCE WITH MEDICATIONS: ICD-10-CM

## 2019-08-23 LAB
B-HCG UR QL: NEGATIVE
CTP QC/QA: YES

## 2019-08-23 PROCEDURE — 99284 EMERGENCY DEPT VISIT MOD MDM: CPT | Mod: 25

## 2019-08-23 PROCEDURE — 81025 URINE PREGNANCY TEST: CPT | Performed by: PHYSICIAN ASSISTANT

## 2019-08-23 PROCEDURE — 25000003 PHARM REV CODE 250: Performed by: PHYSICIAN ASSISTANT

## 2019-08-23 RX ORDER — HYDROCODONE BITARTRATE AND ACETAMINOPHEN 5; 325 MG/1; MG/1
1 TABLET ORAL EVERY 4 HOURS PRN
Qty: 12 TABLET | Refills: 0 | Status: ON HOLD | OUTPATIENT
Start: 2019-08-23 | End: 2019-12-16

## 2019-08-23 RX ORDER — ACETAMINOPHEN 500 MG
1000 TABLET ORAL
Status: COMPLETED | OUTPATIENT
Start: 2019-08-23 | End: 2019-08-23

## 2019-08-23 RX ORDER — TRAMADOL HYDROCHLORIDE 50 MG/1
50 TABLET ORAL EVERY 6 HOURS
COMMUNITY
End: 2019-11-12

## 2019-08-23 RX ORDER — DICLOFENAC SODIUM 10 MG/G
2 GEL TOPICAL 3 TIMES DAILY
Qty: 100 G | Refills: 0 | Status: ON HOLD | OUTPATIENT
Start: 2019-08-23 | End: 2019-12-16

## 2019-08-23 RX ORDER — KETOROLAC TROMETHAMINE 30 MG/ML
30 INJECTION, SOLUTION INTRAMUSCULAR; INTRAVENOUS
Status: DISCONTINUED | OUTPATIENT
Start: 2019-08-23 | End: 2019-08-23

## 2019-08-23 RX ADMIN — ACETAMINOPHEN 1000 MG: 500 TABLET ORAL at 06:08

## 2019-08-23 NOTE — ED PROVIDER NOTES
Encounter Date: 2019       History     Chief Complaint   Patient presents with    Knee Pain     right knee pain, hx of osteoarthritis; denies recent trauma     43-year-old female with chronic 16 year history of right anterior knee pain presents to the emergency department for 1 day history of exacerbation of her chronic right anterior knee pain.  Notes there is some a occasional pain that radiates to her right popliteal space.  Denies calf pain history of DVT.  Denies hip and ankle pain (clarified nursing note).  Denies numbness and fever.  No medications taken prior to arrival.  Has previously been taking tramadol with temporary relief of her symptoms.  She is not established with an orthopedist for the symptoms. No history of DVT/PE.        Review of patient's allergies indicates:   Allergen Reactions    Keflex [cephalexin] Itching     Past Medical History:   Diagnosis Date    Abnormal Pap smear     pt states 13yrs ago colpo was done    Anemia     Cardiomyopathy     Fibroid     Hyperlipidemia     Hypertension      Past Surgical History:   Procedure Laterality Date     SECTION      EMBOLIZATION N/A 2015    Performed by [facility] at [facility] OR [address] [place] [address]    TONSILLECTOMY      TUBAL LIGATION      UTERINE FIBROID EMBOLIZATION       Family History   Problem Relation Age of Onset    Arthritis Mother     Diabetes Mother     Heart disease Mother         CHF, CAD , 2 stents    Hypertension Mother     Hyperlipidemia Mother     No Known Problems Sister     Hypertension Brother     No Known Problems Daughter     Asthma Daughter     Breast cancer Neg Hx     Colon cancer Neg Hx     Ovarian cancer Neg Hx      Social History     Tobacco Use    Smoking status: Never Smoker    Smokeless tobacco: Never Used   Substance Use Topics    Alcohol use: Yes     Alcohol/week: 0.0 oz     Comment: socially    Drug use: No     Review of Systems   Constitutional: Negative for fever.    Respiratory: Negative for shortness of breath.    Cardiovascular: Negative for chest pain.   Gastrointestinal: Negative for abdominal pain, nausea and vomiting.   Musculoskeletal: Positive for arthralgias. Negative for back pain, joint swelling and neck pain.   Skin: Negative for color change and wound.   Neurological: Negative for numbness.   All other systems reviewed and are negative.      Physical Exam     Initial Vitals [08/23/19 1653]   BP Pulse Resp Temp SpO2   (!) 185/114 95 20 98.6 °F (37 °C) 99 %      MAP       --         Physical Exam    Nursing note and vitals reviewed.  Constitutional: She appears well-developed and well-nourished. She is not diaphoretic. She is Obese . No distress.   HENT:   Head: Normocephalic and atraumatic.   Nose: Nose normal.   Eyes: Conjunctivae and EOM are normal. Right eye exhibits no discharge. Left eye exhibits no discharge.   Neck: Normal range of motion. No tracheal deviation present. No JVD present.   Cardiovascular: Normal rate, regular rhythm and normal heart sounds. Exam reveals no friction rub.    No murmur heard.  Pulmonary/Chest: Breath sounds normal. No stridor. No respiratory distress. She has no wheezes. She has no rhonchi. She has no rales. She exhibits no tenderness.   Musculoskeletal:   There is no tenderness or discernible swelling. No erythema.  Full ROM of all extremity digits.  Distal lower extremity pulses 2+ and equal. Sensation intact ankle.  Bears weight on lower extremity and ambulates.   Neurological: She is alert and oriented to person, place, and time.   Skin: Skin is warm and dry. No rash and no abscess noted. No erythema. No pallor.   Psychiatric: She has a normal mood and affect. Thought content normal.         ED Course   Procedures  Labs Reviewed   POCT URINE PREGNANCY          Imaging Results          US Lower Extremity Veins Right (Final result)  Result time 08/23/19 18:49:07    Final result by David Lentz MD (08/23/19 18:49:07)                  Impression:      No evidence of deep venous thrombosis in the right lower extremity.      Electronically signed by: David Lentz MD  Date:    08/23/2019  Time:    18:49             Narrative:    EXAMINATION:  US LOWER EXTREMITY VEINS RIGHT    CLINICAL HISTORY:  Pain in right knee    TECHNIQUE:  Duplex and color flow Doppler evaluation and graded compression of the right lower extremity veins was performed.    COMPARISON:  None    FINDINGS:  Duplex and color flow Doppler evaluation does not reveal any evidence of acute venous thrombosis in the common femoral, superficial femoral, greater saphenous, popliteal, peroneal, anterior tibial and posterior tibial veins of the right lower extremity.  There is no reflux to suggest valvular incompetence.                                 Medical Decision Making:   History:   Old Medical Records: I decided to obtain old medical records.  Initial Assessment:   43-year-old female with acute on chronic right knee pain  Independently Interpreted Test(s):   I have ordered and independently interpreted X-rays - see prior notes.  Clinical Tests:   Lab Tests: Ordered and Reviewed  Radiological Study: Ordered and Reviewed  ED Management:  Ultrasound shows no DVT.  Recent x-ray shows no acute fracture, dislocation, or bony lesion.  No vascular compromise.  No signs of infection.  Likely acute exacerbation of her chronic right knee pain.  Advising follow-up with Orthopedics.  Strict return precautions discussed with patient.  Patient agreeable plan.                      Clinical Impression:       ICD-10-CM ICD-9-CM   1. Chronic pain of right knee M25.561 719.46    G89.29 338.29   2. Right knee pain M25.561 719.46   3. History of osteoarthritis Z87.39 V13.4   4. Elevated blood pressure reading R03.0 796.2   5. Noncompliance with medications Z91.14 V15.81                                Refugio Nye PA-C  08/23/19 1949

## 2019-08-23 NOTE — ED TRIAGE NOTES
Patient reports right knee pain and swelling x 2 weeks. Patient states that she was seen by an AP at her PCP office and was dx with osteoarthritis after xrays were done. Patient states that pain is progressing towards her right ankle and right hip. Patient states that she was prescribed 800 mg of ibuprofen and tramadol, which provided temporary relief but now she is out of medications.

## 2019-09-09 ENCOUNTER — PATIENT OUTREACH (OUTPATIENT)
Dept: ADMINISTRATIVE | Facility: OTHER | Age: 44
End: 2019-09-09

## 2019-09-11 ENCOUNTER — OFFICE VISIT (OUTPATIENT)
Dept: OBSTETRICS AND GYNECOLOGY | Facility: CLINIC | Age: 44
End: 2019-09-11
Payer: COMMERCIAL

## 2019-09-11 ENCOUNTER — HOSPITAL ENCOUNTER (OUTPATIENT)
Dept: RADIOLOGY | Facility: OTHER | Age: 44
Discharge: HOME | End: 2019-09-11
Attending: OBSTETRICS & GYNECOLOGY
Payer: COMMERCIAL

## 2019-09-11 VITALS
HEIGHT: 66 IN | WEIGHT: 293 LBS | SYSTOLIC BLOOD PRESSURE: 120 MMHG | DIASTOLIC BLOOD PRESSURE: 88 MMHG | BODY MASS INDEX: 47.09 KG/M2

## 2019-09-11 DIAGNOSIS — Z12.31 SCREENING MAMMOGRAM, ENCOUNTER FOR: ICD-10-CM

## 2019-09-11 DIAGNOSIS — Z12.31 VISIT FOR SCREENING MAMMOGRAM: ICD-10-CM

## 2019-09-11 DIAGNOSIS — Z01.419 ENCOUNTER FOR GYNECOLOGICAL EXAMINATION WITHOUT ABNORMAL FINDING: Primary | ICD-10-CM

## 2019-09-11 PROCEDURE — 3079F DIAST BP 80-89 MM HG: CPT | Mod: CPTII,S$GLB,, | Performed by: OBSTETRICS & GYNECOLOGY

## 2019-09-11 PROCEDURE — 99999 PR PBB SHADOW E&M-EST. PATIENT-LVL III: ICD-10-PCS | Mod: PBBFAC,,, | Performed by: OBSTETRICS & GYNECOLOGY

## 2019-09-11 PROCEDURE — 99396 PR PREVENTIVE VISIT,EST,40-64: ICD-10-PCS | Mod: S$GLB,,, | Performed by: OBSTETRICS & GYNECOLOGY

## 2019-09-11 PROCEDURE — 3074F PR MOST RECENT SYSTOLIC BLOOD PRESSURE < 130 MM HG: ICD-10-PCS | Mod: CPTII,S$GLB,, | Performed by: OBSTETRICS & GYNECOLOGY

## 2019-09-11 PROCEDURE — 77063 BREAST TOMOSYNTHESIS BI: CPT | Mod: 26,,, | Performed by: INTERNAL MEDICINE

## 2019-09-11 PROCEDURE — 3074F SYST BP LT 130 MM HG: CPT | Mod: CPTII,S$GLB,, | Performed by: OBSTETRICS & GYNECOLOGY

## 2019-09-11 PROCEDURE — 77067 MAMMO DIGITAL SCREENING BILAT WITH TOMOSYNTHESIS_CAD: ICD-10-PCS | Mod: 26,,, | Performed by: INTERNAL MEDICINE

## 2019-09-11 PROCEDURE — 99396 PREV VISIT EST AGE 40-64: CPT | Mod: S$GLB,,, | Performed by: OBSTETRICS & GYNECOLOGY

## 2019-09-11 PROCEDURE — 77067 SCR MAMMO BI INCL CAD: CPT | Mod: TC

## 2019-09-11 PROCEDURE — 77063 MAMMO DIGITAL SCREENING BILAT WITH TOMOSYNTHESIS_CAD: ICD-10-PCS | Mod: 26,,, | Performed by: INTERNAL MEDICINE

## 2019-09-11 PROCEDURE — 99999 PR PBB SHADOW E&M-EST. PATIENT-LVL III: CPT | Mod: PBBFAC,,, | Performed by: OBSTETRICS & GYNECOLOGY

## 2019-09-11 PROCEDURE — 88175 CYTOPATH C/V AUTO FLUID REDO: CPT

## 2019-09-11 PROCEDURE — 77067 SCR MAMMO BI INCL CAD: CPT | Mod: 26,,, | Performed by: INTERNAL MEDICINE

## 2019-09-11 PROCEDURE — 3079F PR MOST RECENT DIASTOLIC BLOOD PRESSURE 80-89 MM HG: ICD-10-PCS | Mod: CPTII,S$GLB,, | Performed by: OBSTETRICS & GYNECOLOGY

## 2019-09-13 ENCOUNTER — HOSPITAL ENCOUNTER (OUTPATIENT)
Dept: RADIOLOGY | Facility: HOSPITAL | Age: 44
Discharge: HOME | End: 2019-09-13
Attending: OBSTETRICS & GYNECOLOGY
Payer: COMMERCIAL

## 2019-09-13 DIAGNOSIS — R92.8 ABNORMAL MAMMOGRAM: ICD-10-CM

## 2019-09-13 PROCEDURE — 77065 DX MAMMO INCL CAD UNI: CPT | Mod: TC,PO

## 2019-09-13 PROCEDURE — 76642 ULTRASOUND BREAST LIMITED: CPT | Mod: TC,PO,RT

## 2019-09-13 PROCEDURE — 76642 ULTRASOUND BREAST LIMITED: CPT | Mod: 26,RT,, | Performed by: RADIOLOGY

## 2019-09-13 PROCEDURE — 77061 BREAST TOMOSYNTHESIS UNI: CPT | Mod: TC,PO

## 2019-09-13 PROCEDURE — 77061 BREAST TOMOSYNTHESIS UNI: CPT | Mod: 26,,, | Performed by: RADIOLOGY

## 2019-09-13 PROCEDURE — 76642 US BREAST RIGHT LIMITED: ICD-10-PCS | Mod: 26,RT,, | Performed by: RADIOLOGY

## 2019-09-13 PROCEDURE — 77065 MAMMO DIGITAL DIAGNOSTIC RIGHT WITH TOMOSYNTHESIS_CAD: ICD-10-PCS | Mod: 26,,, | Performed by: RADIOLOGY

## 2019-09-13 PROCEDURE — 77065 DX MAMMO INCL CAD UNI: CPT | Mod: 26,,, | Performed by: RADIOLOGY

## 2019-09-13 PROCEDURE — 77061 MAMMO DIGITAL DIAGNOSTIC RIGHT WITH TOMOSYNTHESIS_CAD: ICD-10-PCS | Mod: 26,,, | Performed by: RADIOLOGY

## 2019-09-16 ENCOUNTER — TELEPHONE (OUTPATIENT)
Dept: RADIOLOGY | Facility: HOSPITAL | Age: 44
End: 2019-09-16

## 2019-09-16 NOTE — PROGRESS NOTES
HISTORY OF PRESENT ILLNESS:    Kristopher Ye is a 43 y.o. female, , Patient's last menstrual period was 2019.,  presents for a routine exam and has no complaints.    Past Medical History:   Diagnosis Date    Abnormal Pap smear     pt states 13yrs ago colpo was done    Anemia     Cardiomyopathy     Fibroid     Hyperlipidemia     Hypertension        Past Surgical History:   Procedure Laterality Date     SECTION      EMBOLIZATION N/A 2015    [facility]    TONSILLECTOMY      TUBAL LIGATION      UTERINE FIBROID EMBOLIZATION         MEDICATIONS AND ALLERGIES:      Current Outpatient Medications:     albuterol 90 mcg/actuation inhaler, Inhale 2 puffs into the lungs every 6 (six) hours as needed for Wheezing. Rescue, Disp: 6.7 g, Rfl: 0    amLODIPine (NORVASC) 5 MG tablet, Take 1 tablet (5 mg total) by mouth once daily., Disp: 30 tablet, Rfl: 11    citalopram (CELEXA) 20 MG tablet, Take 1 tablet (20 mg total) by mouth once daily., Disp: 30 tablet, Rfl: 1    HYDROcodone-acetaminophen (NORCO) 5-325 mg per tablet, Take 1 tablet by mouth every 4 (four) hours as needed for Pain., Disp: 12 tablet, Rfl: 0    hydrOXYzine pamoate (VISTARIL) 50 MG Cap, One every 6 hours as needed for anxiety, Disp: 40 capsule, Rfl: 0    ibuprofen (ADVIL,MOTRIN) 800 MG tablet, Take 1 tablet (800 mg total) by mouth 3 (three) times daily., Disp: 30 tablet, Rfl: 0    irbesartan (AVAPRO) 75 MG tablet, Take 1 tablet (75 mg total) by mouth every evening., Disp: 30 tablet, Rfl: 11    loratadine (CLARITIN) 10 mg tablet, Take 10 mg by mouth once daily., Disp: , Rfl:     ondansetron (ZOFRAN-ODT) 4 MG TbDL, Take 2 tablets (8 mg total) by mouth every 8 (eight) hours as needed., Disp: 30 tablet, Rfl: 0    pantoprazole (PROTONIX) 40 MG tablet, Please take one tablet by mouth every 12 hours when you have discomfort, then take one tablet every day., Disp: 30 tablet,  Rfl: 0    traMADol (ULTRAM) 50 mg tablet, Take 50 mg by mouth every 6 (six) hours., Disp: , Rfl:     diclofenac sodium (VOLTAREN) 1 % Gel, Apply 2 g topically 3 (three) times daily. for 10 days, Disp: 100 g, Rfl: 0    Review of patient's allergies indicates:   Allergen Reactions    Keflex [cephalexin] Itching       Family History   Problem Relation Age of Onset    Arthritis Mother     Diabetes Mother     Heart disease Mother         CHF, CAD , 2 stents    Hypertension Mother     Hyperlipidemia Mother     No Known Problems Sister     Hypertension Brother     No Known Problems Daughter     Asthma Daughter     Breast cancer Neg Hx     Colon cancer Neg Hx     Ovarian cancer Neg Hx        Social History     Socioeconomic History    Marital status:      Spouse name: Not on file    Number of children: Not on file    Years of education: Not on file    Highest education level: Not on file   Occupational History    Not on file   Social Needs    Financial resource strain: Not on file    Food insecurity:     Worry: Not on file     Inability: Not on file    Transportation needs:     Medical: Not on file     Non-medical: Not on file   Tobacco Use    Smoking status: Never Smoker    Smokeless tobacco: Never Used   Substance and Sexual Activity    Alcohol use: Yes     Alcohol/week: 0.0 oz     Comment: socially    Drug use: No    Sexual activity: Yes     Partners: Male     Birth control/protection: Surgical   Lifestyle    Physical activity:     Days per week: Not on file     Minutes per session: Not on file    Stress: Not on file   Relationships    Social connections:     Talks on phone: Not on file     Gets together: Not on file     Attends Zoroastrianism service: Not on file     Active member of club or organization: Not on file     Attends meetings of clubs or organizations: Not on file     Relationship status: Not on file   Other Topics Concern    Not on file   Social History Narrative    Not on  "file       COMPREHENSIVE GYN HISTORY:  PAP History: Denies abnormal Paps.  Infection History: Denies STDs. Denies PID.  Benign History: Denies uterine fibroids. Denies ovarian cysts. Denies endometriosis. Denies other conditions.  Cancer History: Denies cervical cancer. Denies uterine cancer or hyperplasia. Denies ovarian cancer. Denies vulvar cancer or pre-cancer. Denies vaginal cancer or pre-cancer. Denies breast cancer. Denies colon cancer.  Sexual Activity History: Reports currently being sexually active  Menstrual History: Monthly. Mod then light flow.   Dysmenorrhea History: Reports mild dysmenorrhea.       ROS:  GENERAL: No weight changes. No swelling. No fatigue. No fever.  CARDIOVASCULAR: No chest pain. No shortness of breath. No leg cramps.   NEUROLOGICAL: No headaches. No vision changes.  BREASTS: No pain. No lumps. No discharge.  ABDOMEN: No pain. No nausea. No vomiting. No diarrhea. No constipation.  REPRODUCTIVE: No abnormal bleeding.   VULVA: No pain. No lesions. No itching.  VAGINA: No relaxation. No itching. No odor. No discharge. No lesions.  URINARY: No incontinence. No nocturia. No frequency. No dysuria.    /88   Ht 5' 6" (1.676 m)   Wt (!) 161 kg (354 lb 15.1 oz)   LMP 06/20/2019   BMI 57.29 kg/m²     PE:  APPEARANCE: Well nourished, well developed, in no acute distress.  AFFECT: WNL, alert and oriented x 3.  SKIN: No acne or hirsutism.  NECK: Neck symmetric, without masses or thyromegaly.  NODES: No inguinal, cervical, axillary or femoral lymph node enlargement.  CHEST: Good respiratory effort.   ABDOMEN: Soft. No tenderness or masses. No hepatosplenomegaly. No hernias.  BREASTS: Symmetrical, no skin changes, visible lesions, palpable masses or nipple discharge bilaterally.  PELVIC: External female genitalia without lesions.  Female hair distribution. Adequate perineal body, Normal urethral meatus. Vagina moist and well rugated without lesions or discharge.  No significant cystocele " or rectocele present. Cervix pink without lesions, discharge or tenderness. Uterus is 4-6 week size, regular, mobile and nontender. Adnexa without masses or tenderness.  EXTREMITIES: No edema    DIAGNOSIS:  1. Encounter for gynecological examination without abnormal finding    2. Visit for screening mammogram        PLAN:    Orders Placed This Encounter    Liquid-based pap smear, screening       COUNSELING:  The patient was counseled today on:  -A.C.S. Pap and pelvic exam guidelines (pap every 3 years), recomendations for yearly mammogram;  -to follow up with her PCP for other health maintenance.    FOLLOW-UP with me annually.

## 2019-09-16 NOTE — TELEPHONE ENCOUNTER
Spoke with patient. Reviewed breast biopsy procedure and reviewed instructions for breast biopsy. Patient expressed understanding and all questions were answered. Provided patient with my phone number to call for any further concerns or questions.   Patient scheduled breast biopsy at the [facility] on 6/19/2019.

## 2019-09-19 ENCOUNTER — HOSPITAL ENCOUNTER (OUTPATIENT)
Dept: RADIOLOGY | Facility: HOSPITAL | Age: 44
Discharge: HOME | End: 2019-09-19
Attending: OBSTETRICS & GYNECOLOGY
Payer: COMMERCIAL

## 2019-09-19 DIAGNOSIS — R92.8 ABNORMAL MAMMOGRAM: ICD-10-CM

## 2019-09-19 PROCEDURE — 88360 TISSUE SPECIMEN TO PATHOLOGY, RADIOLOGY: ICD-10-PCS | Mod: 26,,, | Performed by: PATHOLOGY

## 2019-09-19 PROCEDURE — 88341 TISSUE SPECIMEN TO PATHOLOGY, RADIOLOGY: ICD-10-PCS | Mod: 26,59,, | Performed by: PATHOLOGY

## 2019-09-19 PROCEDURE — 19084 BX BREAST ADD LESION US IMAG: CPT | Mod: RT,,, | Performed by: RADIOLOGY

## 2019-09-19 PROCEDURE — 88341 IMHCHEM/IMCYTCHM EA ADD ANTB: CPT | Mod: 26,59,, | Performed by: PATHOLOGY

## 2019-09-19 PROCEDURE — 77065 DX MAMMO INCL CAD UNI: CPT | Mod: 26,RT,, | Performed by: RADIOLOGY

## 2019-09-19 PROCEDURE — 88342 TISSUE SPECIMEN TO PATHOLOGY, RADIOLOGY: ICD-10-PCS | Mod: 26,59,, | Performed by: PATHOLOGY

## 2019-09-19 PROCEDURE — 25000003 PHARM REV CODE 250: Mod: PO | Performed by: OBSTETRICS & GYNECOLOGY

## 2019-09-19 PROCEDURE — 19083 BX BREAST 1ST LESION US IMAG: CPT | Mod: RT,,, | Performed by: RADIOLOGY

## 2019-09-19 PROCEDURE — 88305 TISSUE SPECIMEN TO PATHOLOGY, RADIOLOGY: ICD-10-PCS | Mod: 26,,, | Performed by: PATHOLOGY

## 2019-09-19 PROCEDURE — 88360 TUMOR IMMUNOHISTOCHEM/MANUAL: CPT | Mod: 26,59,, | Performed by: PATHOLOGY

## 2019-09-19 PROCEDURE — 77065 DX MAMMO INCL CAD UNI: CPT | Mod: TC,PO,RT

## 2019-09-19 PROCEDURE — 88305 TISSUE EXAM BY PATHOLOGIST: CPT | Performed by: PATHOLOGY

## 2019-09-19 PROCEDURE — 88342 IMHCHEM/IMCYTCHM 1ST ANTB: CPT | Mod: 26,59,, | Performed by: PATHOLOGY

## 2019-09-19 PROCEDURE — 19083 US BREAST BIOPSY WITH IMAGING 1ST SITE RIGHT: ICD-10-PCS | Mod: RT,,, | Performed by: RADIOLOGY

## 2019-09-19 PROCEDURE — 77065 MAMMO DIGITAL DIAGNOSTIC RIGHT WITH CAD: ICD-10-PCS | Mod: 26,RT,, | Performed by: RADIOLOGY

## 2019-09-19 PROCEDURE — 27201068 US BREAST BIOPSY WITH IMAGING EA ADDITIONAL: Mod: PO

## 2019-09-19 PROCEDURE — 19084 PR BX BRST, EA ADD'L LESION, US GUIDANCE: ICD-10-PCS | Mod: RT,,, | Performed by: RADIOLOGY

## 2019-09-19 PROCEDURE — 27201068 US BREAST BIOPSY WITH IMAGING 1ST SITE RIGHT: Mod: PO

## 2019-09-19 RX ORDER — LIDOCAINE HYDROCHLORIDE AND EPINEPHRINE 20; 10 MG/ML; UG/ML
25 INJECTION, SOLUTION INFILTRATION; PERINEURAL ONCE
Status: COMPLETED | OUTPATIENT
Start: 2019-09-19 | End: 2019-09-19

## 2019-09-19 RX ORDER — LIDOCAINE HYDROCHLORIDE 10 MG/ML
5 INJECTION, SOLUTION EPIDURAL; INFILTRATION; INTRACAUDAL; PERINEURAL ONCE
Status: COMPLETED | OUTPATIENT
Start: 2019-09-19 | End: 2019-09-19

## 2019-09-19 RX ADMIN — LIDOCAINE HYDROCHLORIDE 5 ML: 10 INJECTION, SOLUTION EPIDURAL; INFILTRATION; INTRACAUDAL; PERINEURAL at 02:09

## 2019-09-19 RX ADMIN — LIDOCAINE HYDROCHLORIDE AND EPINEPHRINE 25 ML: 20; 10 INJECTION, SOLUTION INFILTRATION; PERINEURAL at 02:09

## 2019-09-24 ENCOUNTER — PATIENT OUTREACH (OUTPATIENT)
Dept: ADMINISTRATIVE | Facility: OTHER | Age: 44
End: 2019-09-24

## 2019-09-24 ENCOUNTER — DOCUMENTATION ONLY (OUTPATIENT)
Dept: SURGERY | Facility: CLINIC | Age: 44
End: 2019-09-24

## 2019-09-24 NOTE — PROGRESS NOTES
Called Patient with results of breast biopsy from 9/19/2019.  Explained that the biopsy showed invasive ductal carcinoma . Discussed what this means and that the next step is to meet with a breast surgeon. An appt was made for 9/25/2019 with Dr. Ferris.  Reviewed location of breast center. Patient verbalized understanding.

## 2019-09-25 ENCOUNTER — OFFICE VISIT (OUTPATIENT)
Dept: SURGERY | Facility: CLINIC | Age: 44
End: 2019-09-25
Payer: COMMERCIAL

## 2019-09-25 VITALS
HEART RATE: 96 BPM | DIASTOLIC BLOOD PRESSURE: 97 MMHG | SYSTOLIC BLOOD PRESSURE: 144 MMHG | BODY MASS INDEX: 47.09 KG/M2 | TEMPERATURE: 99 F | HEIGHT: 66 IN | WEIGHT: 293 LBS

## 2019-09-25 DIAGNOSIS — C50.211 MALIGNANT NEOPLASM OF UPPER-INNER QUADRANT OF RIGHT BREAST IN FEMALE, ESTROGEN RECEPTOR POSITIVE: Primary | ICD-10-CM

## 2019-09-25 DIAGNOSIS — Z17.0 MALIGNANT NEOPLASM OF UPPER-INNER QUADRANT OF RIGHT BREAST IN FEMALE, ESTROGEN RECEPTOR POSITIVE: Primary | ICD-10-CM

## 2019-09-25 DIAGNOSIS — C50.911 BREAST CANCER, RIGHT BREAST: Primary | ICD-10-CM

## 2019-09-25 PROCEDURE — 99205 OFFICE O/P NEW HI 60 MIN: CPT | Mod: S$GLB,,, | Performed by: SURGERY

## 2019-09-25 PROCEDURE — 99999 PR PBB SHADOW E&M-EST. PATIENT-LVL III: ICD-10-PCS | Mod: PBBFAC,,, | Performed by: SURGERY

## 2019-09-25 PROCEDURE — 3080F PR MOST RECENT DIASTOLIC BLOOD PRESSURE >= 90 MM HG: ICD-10-PCS | Mod: CPTII,S$GLB,, | Performed by: SURGERY

## 2019-09-25 PROCEDURE — 99205 PR OFFICE/OUTPT VISIT, NEW, LEVL V, 60-74 MIN: ICD-10-PCS | Mod: S$GLB,,, | Performed by: SURGERY

## 2019-09-25 PROCEDURE — 3077F PR MOST RECENT SYSTOLIC BLOOD PRESSURE >= 140 MM HG: ICD-10-PCS | Mod: CPTII,S$GLB,, | Performed by: SURGERY

## 2019-09-25 PROCEDURE — 3077F SYST BP >= 140 MM HG: CPT | Mod: CPTII,S$GLB,, | Performed by: SURGERY

## 2019-09-25 PROCEDURE — 99999 PR PBB SHADOW E&M-EST. PATIENT-LVL III: CPT | Mod: PBBFAC,,, | Performed by: SURGERY

## 2019-09-25 PROCEDURE — 3008F PR BODY MASS INDEX (BMI) DOCUMENTED: ICD-10-PCS | Mod: CPTII,S$GLB,, | Performed by: SURGERY

## 2019-09-25 PROCEDURE — 3080F DIAST BP >= 90 MM HG: CPT | Mod: CPTII,S$GLB,, | Performed by: SURGERY

## 2019-09-25 PROCEDURE — 3008F BODY MASS INDEX DOCD: CPT | Mod: CPTII,S$GLB,, | Performed by: SURGERY

## 2019-09-25 NOTE — PROGRESS NOTES
"New Breast Cancer  History and Physical  [facility]  Department of Surgery    REFERRING PROVIDER: Jaylyn Washburn MD  [address] [address]    CHIEF COMPLAINT: right breast cancer    Subjective:      Kristopher Ye is a 43 y.o. premenopausal female referred for evaluation of recently diagnosed carcinoma of the right breast. The patient was initially referred for surgical evaluation of an abnormal mammogram first noted 19. Follow-up imaging on 19 showed 17 mm x 16 mm x 14 mm irregularly shaped, hypoechoic mass with circumscribed margins  at 3 o'clock 2cm FN and two smaller adjacent masses towards the nipple and two smaller masses approximately 10 cm medially.  All masses demonstrate the same imaging characteristics, likely the same process in the right breast. A ultrasound guided biopsy of the 3 o'clock lesion and the subareolar mass was performed on 19 with pathology revealing grade 3, invasive ductal carcinoma    Patient does not routinely do self breast exams.  Patient has not noted a change on breast exam.  Patient admits to nipple discharge about 1-2 months ago but unsure about which breast. Patient denies to previous breast biopsy. Patient denies a personal history of breast cancer.    Findings at that time were the following:   Tumor size: 1.7 cm (3 o'clock mass)  Tumor thgthrthathdtheth:th th4th Estrogen Receptor: pending   Progesterone Receptor: pending   Her-2 dago: pending   Lymph node status: negative on imaging       GYN History:  Age of menarche was 12. Patient reports LMP was in  after undergoing uterine fibroid embolectomy. However about 1-2 months ago she had a "period".  Patient denies hormonal therapy. Patient is . Age of first live birth was 25. Patient did not breast feed.    FAMILY History:  Negative    Past Medical History:   Diagnosis Date    Abnormal Pap smear     pt states 13yrs ago colpo was done    Anemia     Cardiomyopathy     " Fibroid     Hyperlipidemia     Hypertension      Past Surgical History:   Procedure Laterality Date     SECTION      TONSILLECTOMY      TUBAL LIGATION      UTERINE FIBROID EMBOLIZATION       Current Outpatient Medications on File Prior to Visit   Medication Sig Dispense Refill    albuterol 90 mcg/actuation inhaler Inhale 2 puffs into the lungs every 6 (six) hours as needed for Wheezing. Rescue 6.7 g 0    amLODIPine (NORVASC) 5 MG tablet Take 1 tablet (5 mg total) by mouth once daily. 30 tablet 11    citalopram (CELEXA) 20 MG tablet Take 1 tablet (20 mg total) by mouth once daily. 30 tablet 1    diclofenac sodium (VOLTAREN) 1 % Gel Apply 2 g topically 3 (three) times daily. for 10 days 100 g 0    HYDROcodone-acetaminophen (NORCO) 5-325 mg per tablet Take 1 tablet by mouth every 4 (four) hours as needed for Pain. 12 tablet 0    hydrOXYzine pamoate (VISTARIL) 50 MG Cap One every 6 hours as needed for anxiety 40 capsule 0    ibuprofen (ADVIL,MOTRIN) 800 MG tablet Take 1 tablet (800 mg total) by mouth 3 (three) times daily. 30 tablet 0    irbesartan (AVAPRO) 75 MG tablet Take 1 tablet (75 mg total) by mouth every evening. 30 tablet 11    loratadine (CLARITIN) 10 mg tablet Take 10 mg by mouth once daily.      ondansetron (ZOFRAN-ODT) 4 MG TbDL Take 2 tablets (8 mg total) by mouth every 8 (eight) hours as needed. 30 tablet 0    pantoprazole (PROTONIX) 40 MG tablet Please take one tablet by mouth every 12 hours when you have discomfort, then take one tablet every day. 30 tablet 0    traMADol (ULTRAM) 50 mg tablet Take 50 mg by mouth every 6 (six) hours.       No current facility-administered medications on file prior to visit.      Social History     Socioeconomic History    Marital status:      Spouse name: Not on file    Number of children: Not on file    Years of education: Not on file    Highest education level: Not on file   Occupational History    Not on file   Social Needs     Financial resource strain: Not on file    Food insecurity:     Worry: Not on file     Inability: Not on file    Transportation needs:     Medical: Not on file     Non-medical: Not on file   Tobacco Use    Smoking status: Never Smoker    Smokeless tobacco: Never Used   Substance and Sexual Activity    Alcohol use: Yes     Alcohol/week: 0.0 standard drinks     Comment: socially    Drug use: No    Sexual activity: Yes     Partners: Male     Birth control/protection: Surgical   Lifestyle    Physical activity:     Days per week: Not on file     Minutes per session: Not on file    Stress: Not on file   Relationships    Social connections:     Talks on phone: Not on file     Gets together: Not on file     Attends [employer] service: Not on file     Active member of club or organization: Not on file     Attends meetings of clubs or organizations: Not on file     Relationship status: Not on file   Other Topics Concern    Not on file   Social History Narrative    Not on file     Family History   Problem Relation Age of Onset    Arthritis Mother     Diabetes Mother     Heart disease Mother         CHF, CAD , 2 stents    Hypertension Mother     Hyperlipidemia Mother     No Known Problems Sister     Hypertension Brother     No Known Problems Daughter     Asthma Daughter     Breast cancer Neg Hx     Colon cancer Neg Hx     Ovarian cancer Neg Hx         Review of Systems  Review of Systems     Objective:   PHYSICAL EXAM:  There were no vitals taken for this visit.    Physical Exam      Radiology review: Images personally reviewed by me in the clinic.   Result: 9/13/19 Mammo Digital Diagnostic Right w/ Pierre and US Breast Right Limited    Findings:  Mammo Digital Diagnostic Right w/ Pierre  - The breast has scattered areas of fibroglandular density.  - There is a mass seen in the right breast at 3 o'clock.   - There are multiple similar round masses seen in the lower inner quadrant of the right breast.       US Breast Right Limited  - There is a 17 mm x 16 mm x 14 mm irregularly shaped, hypoechoic mass with circumscribed margins seen in the right breast at 3 o'clock in the anterior depth, 2 cm from the nipple. The mass correlates with the prior mammogram finding.   - There are two smaller adjacent masses towards the nipple and two smaller masses approximately 10 cm medially.  All masses demonstrate the same imaging characteristics, likely the same process. Two ultrasound guided biopsies recommended, one of the primary mass and one of the distant mass.  - No axillary lymph adenopathy.     Impression:  Right  Mass: Right breast mass at the 3 o'clock position. Assessment: 4 - Suspicious finding. Ultrasound guided Biopsy is recommended.      BI-RADS Category:   Right: 4 - Suspicious  Overall: 4 - Suspicious     Recommendation:  Ultrasound guided Biopsy is recommended of two masses.  Findings and recommendations discussed with patient at time of exam     The patient's estimated lifetime risk of breast cancer (to age 85) based on Tyrer-Cuzick - 7 risk assessment model is: Tyrer-Cuzick: 12.73 %. According to the American Cancer Society,  patients with a lifetime breast cancer risk of 20% or higher might benefit from supplemental screening tests.    PATHOLOGY REVIEW:  FINAL PATHOLOGIC DIAGNOSIS  1. Right breast mass, subareolar no calcifications, core biopsy:  Invasive ductal carcinoma, [place] histologic grade 3 (tubular formation: 3, nuclear pleomorphism: 3, mitotic  activity: 1).  Comment: The largest single linear focus of invasive carcinoma in the biopsy material measures 7 mm. Tumor  cells are positive for Ecadherin marker, supportive of ductal differentiation. Stains for P63 and CK7 are negative in  tumor cells. The latter result is unusual for breast carcinoma, but the morphology and clinical history is in keeping  with such. Breast biomarkers are in progress and will be reported in an addendum.    2. Right  breast mass, 3 o'clock no calcifications, core biopsy:  Invasive ductal carcinoma, [place] histologic grade 3 (tubular formation: 3, nuclear pleomorphism: 3, mitotic  activity: 2), with papillary features.  Comment: Tumor cells are also positive for Ecadherin marker. P63 is completely negative in tumor even in  expansile intraductal-like areas and features suggestive of possible etiology from intraductal papillary lesion. The  largest single linear focus of invasive disease is at least 5 mm. Separate breast biomarkers for this part are also in  progress and will be reported in an addendum.      Assessment:      Kristopher Ye is a 43 y.o. premenopausal female with recently diagnosed carcinoma of the right breast.      Plan:    Options for management were discussed with the patient and her family. We reviewed the existing data noting the equivalency of breast conserving surgery with radiation therapy and mastectomy. We also reviewed the guidelines of the National Comprehensive Cancer Network for Stage 1-2 breast carcinoma. We discussed the need for lumpectomy margins to be negative for carcinoma, the necessity for postoperative radiation therapy after breast conservation in most cases, the possibility of a failed or false negative sentinel lymph node biopsy and the potential need for complete lymphadenectomy for a failed or positive sentinel lymph node biopsy were fully discussed. In the setting of mastectomy, delayed or immediate reconstruction options are available and were discussed.     In the setting of lumpectomy, radiation therapy would be recommended majority of the time.  The duration and treatment side effects were discussed with the patient.  This will coordinated with the radiation oncologist pending final pathology.    We also discussed the role of systemic therapy in the treatment of early stage breast cancer.  We discussed that this is based on tumor biology and hakeem status and will be  determined based on final pathology.  We discussed that if the cancer is hormone positive, endocrine therapy would be recommended in most cases and its use can reduce the risk of recurrence as well as improve survival. Side effects of treatment were briefly discussed. We also discussed the potential role for chemotherapy based on a number of factors such as tumor phenotype (ER+ vs. triple negative vs. [id number]+) and this would be determined in coordination with the medical oncologist.    The patient, in consultation with her family, has elected to proceed with bilateral total mastectomy and right sentinel lymph node biopsy. The operative risks of bleeding, infection, recurrence, scarring, and anesthetic complications and the possibility of requiring further surgery were all noted and informed consent obtained.    1. The post-biopsy MMG showed a suspicious lymph node. Patient is scheduled for a biopsy on 9/26/19.   2. Genetic testing  3. Referral to medical oncology  4. Discussed MRI but she is over the weight limit.  Could try but I do not think it will be feasible.    Patient was educated on breast cancer, receptors, wire localization lumpectomy, mastectomy, sentinel lymph node mapping and biopsy, axillary lymph node dissection, reconstruction, breast prosthesis with post-mastectomy bra and radiation therapy. Patient was given patient information binder including [facility] breast cancer treatment brochure.  All her questions were answered.    Total time spent with the patient: 60 minutes.  45 minutes of face to face consultation and 15 minutes of chart review and coordination of care.

## 2019-09-25 NOTE — LETTER
October 2, 2019      Jaylyn Washburn MD  [address]  [address]  [place] [address]           [facility]  [address]  [facility] [address]  Phone: [phone]  Fax: [phone]          Patient: Kristopher Ye   MR Number: [id number]   YOB: 1975   Date of Visit: 9/25/2019       Dear Dr. Jaylyn Washburn:    Thank you for referring Kristopher Ye to me for evaluation. Attached you will find relevant portions of my assessment and plan of care.    If you have questions, please do not hesitate to call me. I look forward to following Kristopher Ye along with you.    Sincerely,    Darlin Ferris MD    Enclosure  CC:  No Recipients    If you would like to receive this communication electronically, please contact [email or web address] or [phone] to request more information on [employer] Link access.    For providers and/or their staff who would like to refer a patient to [facility], please contact us through our one-stop-shop provider referral line, [facility], at [phone].    If you feel you have received this communication in error or would no longer like to receive these types of communications, please e-mail [email or web address]

## 2019-09-26 ENCOUNTER — HOSPITAL ENCOUNTER (OUTPATIENT)
Dept: RADIOLOGY | Facility: HOSPITAL | Age: 44
Discharge: HOME | End: 2019-09-26
Attending: SURGERY
Payer: COMMERCIAL

## 2019-09-26 DIAGNOSIS — N63.10 BREAST MASS, RIGHT: ICD-10-CM

## 2019-09-26 DIAGNOSIS — C50.911 MALIGNANT NEOPLASM OF RIGHT FEMALE BREAST, UNSPECIFIED ESTROGEN RECEPTOR STATUS, UNSPECIFIED SITE OF BREAST: Primary | ICD-10-CM

## 2019-09-26 PROCEDURE — 25000003 PHARM REV CODE 250: Mod: PO | Performed by: SURGERY

## 2019-09-26 PROCEDURE — 88305 TISSUE SPECIMEN TO PATHOLOGY: ICD-10-PCS | Mod: 26,,, | Performed by: PATHOLOGY

## 2019-09-26 PROCEDURE — 77065 MAMMO DIGITAL DIAGNOSTIC RIGHT WITH CAD: ICD-10-PCS | Mod: 26,RT,, | Performed by: RADIOLOGY

## 2019-09-26 PROCEDURE — 38505 US BIOPSY LYMPH NODE AXILLA: ICD-10-PCS | Mod: ,,, | Performed by: RADIOLOGY

## 2019-09-26 PROCEDURE — 38505 NEEDLE BIOPSY LYMPH NODES: CPT | Mod: ,,, | Performed by: RADIOLOGY

## 2019-09-26 PROCEDURE — 77065 DX MAMMO INCL CAD UNI: CPT | Mod: TC,PO,RT

## 2019-09-26 PROCEDURE — 88305 TISSUE EXAM BY PATHOLOGIST: CPT | Performed by: PATHOLOGY

## 2019-09-26 PROCEDURE — 27201068 US BIOPSY LYMPH NODE AXILLA: Mod: PO

## 2019-09-26 PROCEDURE — 77065 DX MAMMO INCL CAD UNI: CPT | Mod: 26,RT,, | Performed by: RADIOLOGY

## 2019-09-26 RX ORDER — LIDOCAINE HYDROCHLORIDE AND EPINEPHRINE 20; 10 MG/ML; UG/ML
10 INJECTION, SOLUTION INFILTRATION; PERINEURAL ONCE
Status: COMPLETED | OUTPATIENT
Start: 2019-09-26 | End: 2019-09-26

## 2019-09-26 RX ORDER — LIDOCAINE HYDROCHLORIDE 10 MG/ML
2 INJECTION, SOLUTION EPIDURAL; INFILTRATION; INTRACAUDAL; PERINEURAL ONCE
Status: COMPLETED | OUTPATIENT
Start: 2019-09-26 | End: 2019-09-26

## 2019-09-26 RX ADMIN — LIDOCAINE HYDROCHLORIDE 2 ML: 10 INJECTION, SOLUTION EPIDURAL; INFILTRATION; INTRACAUDAL; PERINEURAL at 03:09

## 2019-09-26 RX ADMIN — LIDOCAINE HYDROCHLORIDE,EPINEPHRINE BITARTRATE 10 ML: 20; .01 INJECTION, SOLUTION INFILTRATION; PERINEURAL at 03:09

## 2019-09-27 ENCOUNTER — TELEPHONE (OUTPATIENT)
Dept: SURGERY | Facility: CLINIC | Age: 44
End: 2019-09-27

## 2019-09-27 NOTE — TELEPHONE ENCOUNTER
Called Patient with results of breast biopsy from 9/26/2019.  Explained that the biopsy showed positive lymph node . Discussed what this means and that her next víctor is with Dr Mendez on 10/7/2019 Reviewed location. Patient verbalized understanding.

## 2019-10-01 ENCOUNTER — DOCUMENTATION ONLY (OUTPATIENT)
Dept: REHABILITATION | Facility: HOSPITAL | Age: 44
End: 2019-10-01

## 2019-10-01 NOTE — PROGRESS NOTES
History:     Reason For Consultation:   1. Stage II ([id number]) invasive ductal carcinoma of right breast, upper inner quadrant, ER %, IL neg, Her2 2+, FISH pending, Grade 3, multifocal with one lesion appearing more aggressive (Ki67 80%), large LN +     Referring Provider:   Darlin Ferris MD  [address] [place] [address]    Records Obtained: Records of the patients history including those obtained from the referring provider were reviewed and summarized in detail.    HPI:   Kristopher Ye presents for consultation breast cancer. She is premenopausal but has had tubal ligation. Her oncologic history is detailed below. Today, she is feeling well but is naturally anxious/worried. She is a 7th grade . Her partner has had testicular cancer before. She has good support network. No family history of breast cancer. Complaints of right knee pain - had injury prior and pain seems to be worsening recently.     Oncologic History:  Presentation  - 9/11/19 - Screening mammogram showed multiple right breast masses lower inner quadrant  - 9/13/19 - Diagnostic mammogram and US showed a right breast, 3:00 position mass, 2 CFN measuring 17 mm x 16 mm x 14 mm.  There 2 smaller adjacent masses towards the nipple  - 9/19/19 - Biopsy -    A. Right breast subareolar: Grade 3 IDC, estrogen receptor 80%, progesterone receptor 0%, Her2 dago 2+, FISH pending, Ki67 80%.     B. Right breast mass 3:00: Grade 3 IDC with papillary features, estrogen receptor 100%, progesterone receptor 0%, Her2 dago 1+, Ki67 30%.   - 9/26/19: US right axilla with abnormal lymphadenopathy measuring 4.3 x 2.8 cm with biopsy + for metastatic breast cancer.   Surgery consultation with Dr Ferris on 9/25/19   - 9/25/19 - Genetics Genetics [facility] BRACAnalysis pending; VUS pending  Medical oncology consultation on 10/7/19   * Staging scans ordered; echo ordered.       Past Medical   Past Medical History:   Diagnosis Date     Abnormal Pap smear     pt states 13yrs ago colpo was done    Anemia     Cardiomyopathy     Fibroid     Hyperlipidemia     Hypertension      Patient Active Problem List   Diagnosis    Hypertension    Iron deficiency anemia    Palpitations    Cardiomegaly    Morbid obesity (BMI= 55.9 on 10/30/14)    Fibroid    Fibroid uterus    Fatigue    Shortness of breath    Thalassemia    Acute reaction to situational stress    Chest pain, non-cardiac    Cardiomyopathy    Hyperlipidemia     Social History   Social History     Socioeconomic History    Marital status:      Spouse name: Not on file    Number of children: Not on file    Years of education: Not on file    Highest education level: Not on file   Occupational History    Not on file   Social Needs    Financial resource strain: Not on file    Food insecurity:     Worry: Not on file     Inability: Not on file    Transportation needs:     Medical: Not on file     Non-medical: Not on file   Tobacco Use    Smoking status: Never Smoker    Smokeless tobacco: Never Used   Substance and Sexual Activity    Alcohol use: Not Currently     Alcohol/week: 0.0 standard drinks    Drug use: No    Sexual activity: Yes     Partners: Male     Birth control/protection: Surgical   Lifestyle    Physical activity:     Days per week: Not on file     Minutes per session: Not on file    Stress: Not on file   Relationships    Social connections:     Talks on phone: Not on file     Gets together: Not on file     Attends [employer] service: Not on file     Active member of club or organization: Not on file     Attends meetings of clubs or organizations: Not on file     Relationship status: Not on file   Other Topics Concern    Not on file   Social History Narrative    Not on file     Family History  Family History   Problem Relation Age of Onset    Arthritis Mother     Diabetes Mother     Heart disease Mother         CHF, CAD , 2 stents    Hypertension  Mother     Hyperlipidemia Mother     No Known Problems Sister     No Known Problems Father     Hypertension Brother     No Known Problems Daughter     Asthma Daughter     No Known Problems Maternal Aunt     No Known Problems Maternal Uncle     No Known Problems Paternal Aunt     No Known Problems Paternal Uncle     No Known Problems Maternal Grandmother     Cancer Maternal Grandfather     No Known Problems Paternal Grandmother     No Known Problems Paternal Grandfather     Breast cancer Neg Hx     Colon cancer Neg Hx     Ovarian cancer Neg Hx      Medications    Current Outpatient Medications:     albuterol 90 mcg/actuation inhaler, Inhale 2 puffs into the lungs every 6 (six) hours as needed for Wheezing. Rescue, Disp: 6.7 g, Rfl: 0    amLODIPine (NORVASC) 5 MG tablet, Take 1 tablet (5 mg total) by mouth once daily., Disp: 30 tablet, Rfl: 11    diazePAM (VALIUM) 5 MG tablet, Take 5 mg by mouth On call Procedure (2 tablets ordered, pt to take 1-2 tabs 30 mins prior to breast MRI 10/7/19)., Disp: , Rfl:     irbesartan (AVAPRO) 75 MG tablet, Take 1 tablet (75 mg total) by mouth every evening., Disp: 30 tablet, Rfl: 11    loratadine (CLARITIN) 10 mg tablet, Take 10 mg by mouth once daily., Disp: , Rfl:     ondansetron (ZOFRAN-ODT) 4 MG TbDL, Take 2 tablets (8 mg total) by mouth every 8 (eight) hours as needed., Disp: 30 tablet, Rfl: 0    citalopram (CELEXA) 20 MG tablet, Take 1 tablet (20 mg total) by mouth once daily. (Patient not taking: Reported on 10/7/2019), Disp: 30 tablet, Rfl: 1    diclofenac sodium (VOLTAREN) 1 % Gel, Apply 2 g topically 3 (three) times daily. for 10 days (Patient not taking: Reported on 10/7/2019), Disp: 100 g, Rfl: 0    HYDROcodone-acetaminophen (NORCO) 5-325 mg per tablet, Take 1 tablet by mouth every 4 (four) hours as needed for Pain., Disp: 12 tablet, Rfl: 0    hydrOXYzine pamoate (VISTARIL) 50 MG Cap, One every 6 hours as needed for anxiety (Patient not taking:  Reported on 10/7/2019), Disp: 40 capsule, Rfl: 0    ibuprofen (ADVIL,MOTRIN) 800 MG tablet, Take 1 tablet (800 mg total) by mouth 3 (three) times daily. (Patient not taking: Reported on 10/7/2019), Disp: 30 tablet, Rfl: 0    pantoprazole (PROTONIX) 40 MG tablet, Please take one tablet by mouth every 12 hours when you have discomfort, then take one tablet every day. (Patient not taking: Reported on 10/7/2019), Disp: 30 tablet, Rfl: 0    traMADol (ULTRAM) 50 mg tablet, Take 50 mg by mouth every 6 (six) hours., Disp: , Rfl:   Allergies  Review of patient's allergies indicates:   Allergen Reactions    Keflex [cephalexin] Itching     Review of Systems  Review of Systems   Constitutional: Negative for activity change, appetite change, chills, fatigue, fever and unexpected weight change.   HENT: Negative for congestion, hearing loss, nosebleeds, sinus pressure and trouble swallowing.    Eyes: Negative for pain, discharge and itching.   Respiratory: Negative for cough, chest tightness and shortness of breath.    Cardiovascular: Negative for chest pain, palpitations and leg swelling.   Gastrointestinal: Negative for abdominal distention, abdominal pain, blood in stool, diarrhea, nausea, rectal pain and vomiting.   Endocrine: Negative for heat intolerance and polydipsia.   Genitourinary: Negative for difficulty urinating, dysuria, flank pain, frequency, hematuria and urgency.   Musculoskeletal: Positive for arthralgias (knee pain). Negative for back pain and neck pain.   Skin: Negative for color change, pallor and rash.   Neurological: Negative for dizziness, numbness and headaches.   Hematological: Negative for adenopathy. Does not bruise/bleed easily.   Psychiatric/Behavioral: Negative for confusion and decreased concentration. The patient is not nervous/anxious.         Objective     Objective:     Vitals:    10/07/19 0722   BP: 126/70   BP Location: Right arm   Patient Position: Sitting   Pulse: 80   Resp: 20   Temp:  "98.8 °F (37.1 °C)   TempSrc: Oral   Weight: (!) 161.8 kg (356 lb 11.3 oz)   Height: 5' 6" (1.676 m)     Body surface area is 2.74 meters squared.  Physical Exam   Constitutional: She is oriented to person, place, and time. She appears well-developed and well-nourished. No distress.   HENT:   Head: Normocephalic and atraumatic.   Mouth/Throat: Oropharynx is clear and moist and mucous membranes are normal. No oral lesions.   Eyes: Conjunctivae and EOM are normal. Right eye exhibits no discharge. Left eye exhibits no discharge. No scleral icterus.   Neck: Normal range of motion. Neck supple. No thyroid mass and no thyromegaly present.   Cardiovascular: Normal rate, regular rhythm, S1 normal, S2 normal and normal heart sounds.   Pulmonary/Chest: Effort normal and breath sounds normal. No respiratory distress. She has no wheezes. She has no rhonchi. She has no rales. Chest wall is not dull to percussion.   Breast exam not done today due to counseling/discussion; will perform at next visit.    Abdominal: Soft. Normal appearance and bowel sounds are normal. There is no hepatosplenomegaly. There is no tenderness.   Lymphadenopathy:     She has no cervical adenopathy.        Right: No supraclavicular adenopathy present.        Left: No supraclavicular adenopathy present.   Neurological: She is alert and oriented to person, place, and time. She has normal strength.   Skin: Skin is warm, dry and intact. No pallor.   Psychiatric: Her behavior is normal. Thought content normal.         Labs and Imaging  Results for orders placed or performed during the hospital encounter of 08/23/19   POCT urine pregnancy   Result Value Ref Range    POC Preg Test, Ur Negative Negative     Acceptable Yes        Breast pathology and imaging as above.     Assessment     Assessment:     1. Stage II ([id number]) invasive ductal carcinoma of right breast, upper inner quadrant, ER %, MD neg, Her2 2+, FISH pending, Grade 3, multifocal " with one lesion appearing more aggressive (Ki67 80%), large LN +    * Genetics pending   * MRI scheduled for tonight but may be unable to get MRI due to weight.    * Staging scans ordered since large LN    * We discussed the prognosis for her breast cancer, particularly in terms of risks of local and distant recurrences. We reviewed treatment recommendations as detailed below.    * Preservation of fertility not desired - she has had tubal ligation.    * Plan neoadjuvant chemo   * After chemo, will need surgery +/- RT given large LN, ovarian suppression + endocrine therapy.     2. Possible cardiomyopathy   * Cardiac stress echo in 8/2017 with EF 50-55% with diastolic dysfunction.    * repeat echo ordered.    * Follows with Dr Olivares.     3. Morbid obesity   * Adjusting diet for weight loss.     Plan:     1. Will obtain staging scans since the right axillary hakeem disease is very large  2. Labs today - cbc, cmp  3. I recommend systemic therapy with large node + disease and we can do this neoadjuvantly to see if we can get improvement in surgical outcomes, but we will need to check echo to see if a candidate for anthracycline +/- Her2 directed therapy if she is Her2 positive.  4. Follow up Her2  5. Port planned with Dr Ferris.     6. If Echo with EF > 50%, will go on and place plan for ddAC followed by Taxol so that we can work on prior auth without further delay. Of course, if Her2 comes back positive, we will have to adjust regimen or can add Herceptin/Perjeta to the taxol.   7. Will also send back to Dr Olivares for cardiac optimization with chemotherapy - once we know plan.  8. ACP at next visit.       Follow-up TBD by above - will call with results of CT scan/bone scan and echo. I asked the patient to call for any questions, concerns, or new symptoms.    I spent 60 minutes with patient and family with 55 spent face to face counseling on diagnosis, goals of therapy, treatment options.

## 2019-10-04 ENCOUNTER — TELEPHONE (OUTPATIENT)
Dept: SURGERY | Facility: CLINIC | Age: 44
End: 2019-10-04

## 2019-10-04 DIAGNOSIS — C50.211 MALIGNANT NEOPLASM OF UPPER-INNER QUADRANT OF RIGHT BREAST IN FEMALE, ESTROGEN RECEPTOR POSITIVE: Primary | ICD-10-CM

## 2019-10-04 DIAGNOSIS — Z17.0 MALIGNANT NEOPLASM OF UPPER-INNER QUADRANT OF RIGHT BREAST IN FEMALE, ESTROGEN RECEPTOR POSITIVE: Primary | ICD-10-CM

## 2019-10-04 RX ORDER — DIAZEPAM 5 MG/1
5 TABLET ORAL
COMMUNITY
Start: 2019-10-07 | End: 2019-10-07

## 2019-10-04 NOTE — TELEPHONE ENCOUNTER
Call to pt's pharmacy to call in pre-med needed for breast MRI 10/7/19. Rx for diazepam 5 mg, Disp:2 tabs, Si-2 tabs 30 mins prior to MRI 10/7/19, no refills, ([facility]), Dr Darlin Ferris. Rx called to doctor hotline.

## 2019-10-07 ENCOUNTER — HOSPITAL ENCOUNTER (OUTPATIENT)
Dept: RADIOLOGY | Facility: HOSPITAL | Age: 44
Discharge: HOME | End: 2019-10-07
Attending: SURGERY
Payer: COMMERCIAL

## 2019-10-07 ENCOUNTER — TELEPHONE (OUTPATIENT)
Dept: SURGERY | Facility: CLINIC | Age: 44
End: 2019-10-07

## 2019-10-07 ENCOUNTER — OFFICE VISIT (OUTPATIENT)
Dept: HEMATOLOGY/ONCOLOGY | Facility: CLINIC | Age: 44
End: 2019-10-07
Payer: COMMERCIAL

## 2019-10-07 VITALS
WEIGHT: 293 LBS | HEART RATE: 80 BPM | DIASTOLIC BLOOD PRESSURE: 70 MMHG | BODY MASS INDEX: 47.09 KG/M2 | TEMPERATURE: 99 F | RESPIRATION RATE: 20 BRPM | SYSTOLIC BLOOD PRESSURE: 126 MMHG | HEIGHT: 66 IN

## 2019-10-07 DIAGNOSIS — C50.211 MALIGNANT NEOPLASM OF UPPER-INNER QUADRANT OF RIGHT BREAST IN FEMALE, ESTROGEN RECEPTOR POSITIVE: ICD-10-CM

## 2019-10-07 DIAGNOSIS — I42.9 CARDIOMYOPATHY, UNSPECIFIED TYPE: ICD-10-CM

## 2019-10-07 DIAGNOSIS — E66.01 MORBID OBESITY WITH BODY MASS INDEX OF 50.0-59.9 IN ADULT: ICD-10-CM

## 2019-10-07 DIAGNOSIS — Z17.0 MALIGNANT NEOPLASM OF UPPER-INNER QUADRANT OF RIGHT BREAST IN FEMALE, ESTROGEN RECEPTOR POSITIVE: Primary | ICD-10-CM

## 2019-10-07 DIAGNOSIS — Z17.0 MALIGNANT NEOPLASM OF UPPER-INNER QUADRANT OF RIGHT BREAST IN FEMALE, ESTROGEN RECEPTOR POSITIVE: ICD-10-CM

## 2019-10-07 DIAGNOSIS — Z01.818 EXAMINATION PRIOR TO CHEMOTHERAPY: ICD-10-CM

## 2019-10-07 DIAGNOSIS — C50.211 MALIGNANT NEOPLASM OF UPPER-INNER QUADRANT OF RIGHT BREAST IN FEMALE, ESTROGEN RECEPTOR POSITIVE: Primary | ICD-10-CM

## 2019-10-07 PROCEDURE — 99245 PR OFFICE CONSULTATION,LEVEL V: ICD-10-PCS | Mod: S$GLB,,, | Performed by: INTERNAL MEDICINE

## 2019-10-07 PROCEDURE — 77049 MRI BREAST W/WO CONTRAST, W/CAD, BILATERAL: ICD-10-PCS | Mod: 26,,, | Performed by: RADIOLOGY

## 2019-10-07 PROCEDURE — 25500020 PHARM REV CODE 255: Performed by: SURGERY

## 2019-10-07 PROCEDURE — A9577 INJ MULTIHANCE: HCPCS | Performed by: SURGERY

## 2019-10-07 PROCEDURE — 99999 PR PBB SHADOW E&M-EST. PATIENT-LVL IV: ICD-10-PCS | Mod: PBBFAC,,, | Performed by: INTERNAL MEDICINE

## 2019-10-07 PROCEDURE — 77049 MRI BREAST C-+ W/CAD BI: CPT | Mod: 26,,, | Performed by: RADIOLOGY

## 2019-10-07 PROCEDURE — 77049 MRI BREAST C-+ W/CAD BI: CPT | Mod: TC

## 2019-10-07 PROCEDURE — 99999 PR PBB SHADOW E&M-EST. PATIENT-LVL IV: CPT | Mod: PBBFAC,,, | Performed by: INTERNAL MEDICINE

## 2019-10-07 PROCEDURE — 99245 OFF/OP CONSLTJ NEW/EST HI 55: CPT | Mod: S$GLB,,, | Performed by: INTERNAL MEDICINE

## 2019-10-07 RX ADMIN — GADOBENATE DIMEGLUMINE 20 ML: 529 INJECTION, SOLUTION INTRAVENOUS at 07:10

## 2019-10-07 NOTE — LETTER
October 7, 2019      Darlin Ferris MD  [address]  [facility] [address]           [facility]  [address]  [facility] [address]  Phone: [phone]          Patient: Kristopher Ye   MR Number: [id number]   YOB: 1975   Date of Visit: 10/7/2019       Dear Dr. Darlin Ferris:    Thank you for referring Kristopher Ye to me for evaluation. Attached you will find relevant portions of my assessment and plan of care.    If you have questions, please do not hesitate to call me. I look forward to following Kristopher Ye along with you.    Sincerely,    Jessica Mendez MD    Enclosure  CC:  No Recipients    If you would like to receive this communication electronically, please contact [email or web address] or [phone] to request more information on [employer] access.    For providers and/or their staff who would like to refer a patient to [facility], please contact us through our one-stop-shop provider referral line, [facility], at [phone].    If you feel you have received this communication in error or would no longer like to receive these types of communications, please e-mail [email or web address]

## 2019-10-07 NOTE — TELEPHONE ENCOUNTER
Call to pt to give information regarding port placement in Interventional Radiology at [facility] 10/11/19. Call went to voice mail, but unable to leave a message as voice mail full. Will try pt again tomorrow.

## 2019-10-07 NOTE — Clinical Note
Echo, CT C/A/P and bone scan ASAP - after 3 pm if able (unless today before 11)Will place prior auth once I have echo back RTC TBD

## 2019-10-08 ENCOUNTER — HOSPITAL ENCOUNTER (OUTPATIENT)
Dept: RADIOLOGY | Facility: HOSPITAL | Age: 44
Discharge: HOME | End: 2019-10-08
Attending: INTERNAL MEDICINE
Payer: COMMERCIAL

## 2019-10-08 ENCOUNTER — HOSPITAL ENCOUNTER (OUTPATIENT)
Dept: CARDIOLOGY | Facility: CLINIC | Age: 44
Discharge: HOME | End: 2019-10-08
Attending: INTERNAL MEDICINE
Payer: COMMERCIAL

## 2019-10-08 ENCOUNTER — DOCUMENTATION ONLY (OUTPATIENT)
Dept: HEMATOLOGY/ONCOLOGY | Facility: CLINIC | Age: 44
End: 2019-10-08

## 2019-10-08 ENCOUNTER — PATIENT MESSAGE (OUTPATIENT)
Dept: HEMATOLOGY/ONCOLOGY | Facility: CLINIC | Age: 44
End: 2019-10-08

## 2019-10-08 ENCOUNTER — TELEPHONE (OUTPATIENT)
Dept: SURGERY | Facility: CLINIC | Age: 44
End: 2019-10-08

## 2019-10-08 VITALS
HEIGHT: 66 IN | SYSTOLIC BLOOD PRESSURE: 126 MMHG | DIASTOLIC BLOOD PRESSURE: 70 MMHG | HEART RATE: 88 BPM | WEIGHT: 293 LBS | BODY MASS INDEX: 47.09 KG/M2

## 2019-10-08 DIAGNOSIS — Z17.0 MALIGNANT NEOPLASM OF UPPER-INNER QUADRANT OF RIGHT BREAST IN FEMALE, ESTROGEN RECEPTOR POSITIVE: ICD-10-CM

## 2019-10-08 DIAGNOSIS — Z01.818 EXAMINATION PRIOR TO CHEMOTHERAPY: ICD-10-CM

## 2019-10-08 DIAGNOSIS — C50.211 MALIGNANT NEOPLASM OF UPPER-INNER QUADRANT OF RIGHT BREAST IN FEMALE, ESTROGEN RECEPTOR POSITIVE: ICD-10-CM

## 2019-10-08 DIAGNOSIS — I42.9 CARDIOMYOPATHY, UNSPECIFIED TYPE: ICD-10-CM

## 2019-10-08 PROCEDURE — 25500020 PHARM REV CODE 255: Performed by: INTERNAL MEDICINE

## 2019-10-08 PROCEDURE — 71260 CT THORAX DX C+: CPT | Mod: 26,,, | Performed by: RADIOLOGY

## 2019-10-08 PROCEDURE — 93306 ECHO (CUPID ONLY): ICD-10-PCS | Mod: 26,,, | Performed by: INTERNAL MEDICINE

## 2019-10-08 PROCEDURE — 93306 TTE W/DOPPLER COMPLETE: CPT | Mod: 26,,, | Performed by: INTERNAL MEDICINE

## 2019-10-08 PROCEDURE — 74177 CT ABD & PELVIS W/CONTRAST: CPT | Mod: TC

## 2019-10-08 PROCEDURE — 93306 TTE W/DOPPLER COMPLETE: CPT

## 2019-10-08 PROCEDURE — 71260 CT CHEST ABDOMEN PELVIS WITH CONTRAST (XPD): ICD-10-PCS | Mod: 26,,, | Performed by: RADIOLOGY

## 2019-10-08 PROCEDURE — 74177 CT ABD & PELVIS W/CONTRAST: CPT | Mod: 26,,, | Performed by: RADIOLOGY

## 2019-10-08 PROCEDURE — 74177 CT CHEST ABDOMEN PELVIS WITH CONTRAST (XPD): ICD-10-PCS | Mod: 26,,, | Performed by: RADIOLOGY

## 2019-10-08 RX ADMIN — IOHEXOL 100 ML: 350 INJECTION, SOLUTION INTRAVENOUS at 08:10

## 2019-10-08 NOTE — PROGRESS NOTES
Called the patient several times after receiving a consult from Dr Mendez (patient scored 10 on her Distress Screen). Could not leave the patient a message (her voice message box was full). Will attempt to contact her again.

## 2019-10-08 NOTE — TELEPHONE ENCOUNTER
Spoke with pt regarding port placement 10/11/19. Pt to report to [facility], [address], 1st floor at 11:00 am, port placement at 1:00 pm. Pt to be NPO past midnight  The nigh before. Pt instructed to have someone with her for a ride home if sedation used. Pt voiced understanding. Informed that the bone scan scheduled on same day will need to be rescheduled. Dr Mendez and her staff notified via Epic message.

## 2019-10-09 ENCOUNTER — TELEPHONE (OUTPATIENT)
Dept: INFUSION THERAPY | Facility: HOSPITAL | Age: 44
End: 2019-10-09

## 2019-10-09 ENCOUNTER — DOCUMENTATION ONLY (OUTPATIENT)
Dept: HEMATOLOGY/ONCOLOGY | Facility: CLINIC | Age: 44
End: 2019-10-09

## 2019-10-09 ENCOUNTER — TELEPHONE (OUTPATIENT)
Dept: HEMATOLOGY/ONCOLOGY | Facility: CLINIC | Age: 44
End: 2019-10-09

## 2019-10-09 ENCOUNTER — PATIENT MESSAGE (OUTPATIENT)
Dept: HEMATOLOGY/ONCOLOGY | Facility: CLINIC | Age: 44
End: 2019-10-09

## 2019-10-09 LAB
ASCENDING AORTA: 3.02 CM
AV INDEX (PROSTH): 0.63
AV MEAN GRADIENT: 7 MMHG
AV PEAK GRADIENT: 10 MMHG
AV VALVE AREA: 2.69 CM2
AV VELOCITY RATIO: 0.56
BSA FOR ECHO PROCEDURE: 2.74 M2
CV ECHO LV RWT: 0.36 CM
DOP CALC AO PEAK VEL: 1.6 M/S
DOP CALC AO VTI: 28.43 CM
DOP CALC LVOT AREA: 4.3 CM2
DOP CALC LVOT DIAMETER: 2.33 CM
DOP CALC LVOT PEAK VEL: 0.89 M/S
DOP CALC LVOT STROKE VOLUME: 76.54 CM3
DOP CALCLVOT PEAK VEL VTI: 17.96 CM
E WAVE DECELERATION TIME: 151.16 MSEC
E/A RATIO: 3.26
E/E' RATIO: 16.47 M/S
ECHO LV POSTERIOR WALL: 1.14 CM (ref 0.6–1.1)
FRACTIONAL SHORTENING: 10 % (ref 28–44)
INTERVENTRICULAR SEPTUM: 1.16 CM (ref 0.6–1.1)
LA MAJOR: 5.73 CM
LA MINOR: 5.54 CM
LA WIDTH: 5.23 CM
LEFT ATRIUM SIZE: 3.96 CM
LEFT ATRIUM VOLUME INDEX: 38.8 ML/M2
LEFT ATRIUM VOLUME: 99.17 CM3
LEFT INTERNAL DIMENSION IN SYSTOLE: 5.69 CM (ref 2.1–4)
LEFT VENTRICLE DIASTOLIC VOLUME INDEX: 80.09 ML/M2
LEFT VENTRICLE DIASTOLIC VOLUME: 204.68 ML
LEFT VENTRICLE MASS INDEX: 128 G/M2
LEFT VENTRICLE SYSTOLIC VOLUME INDEX: 62.4 ML/M2
LEFT VENTRICLE SYSTOLIC VOLUME: 159.42 ML
LEFT VENTRICULAR INTERNAL DIMENSION IN DIASTOLE: 6.35 CM (ref 3.5–6)
LEFT VENTRICULAR MASS: 326.05 G
LV LATERAL E/E' RATIO: 14 M/S
LV SEPTAL E/E' RATIO: 20 M/S
MV PEAK A VEL: 0.43 M/S
MV PEAK E VEL: 1.4 M/S
PULM VEIN S/D RATIO: 0.79
PV PEAK D VEL: 0.48 M/S
PV PEAK S VEL: 0.38 M/S
RA MAJOR: 4.49 CM
RA PRESSURE: 3 MMHG
RA WIDTH: 3.46 CM
RETIRED EF AND QEF - SEE NOTES: 34 %
RIGHT VENTRICULAR END-DIASTOLIC DIMENSION: 3.61 CM
RV TISSUE DOPPLER FREE WALL SYSTOLIC VELOCITY 1 (APICAL 4 CHAMBER VIEW): 14.12 CM/S
SINUS: 2.91 CM
STJ: 2.47 CM
TDI LATERAL: 0.1 M/S
TDI SEPTAL: 0.07 M/S
TDI: 0.09 M/S
TRICUSPID ANNULAR PLANE SYSTOLIC EXCURSION: 1.91 CM

## 2019-10-09 NOTE — TELEPHONE ENCOUNTER
----- Message from Jessica Mendez MD sent at 10/9/2019  1:29 PM CDT -----  Patient's CT scan is showing bony mets. I'm going to call her this afternoon once she's out of the classroom to discuss, but she won't need a port unfortunately. If for some reason the bone scan looks differently, I'll let you know.     Thanks, Jessica

## 2019-10-09 NOTE — PLAN OF CARE
START ON PATHWAY REGIMEN - Breast    [id number]        Docetaxel (Taxotere(R))       Cyclophosphamide (Cytoxan(R))           Additional Orders: Premedicate with dexamethasone 8 mg PO bid for three   days beginning 1 day prior to therapy    **Always confirm dose/schedule in your pharmacy ordering system**    Administration Notes: Unable to tolerate anthracycline due to CHF - EF 30%    Patient Characteristics:  Preoperative or Nonsurgical Candidate (Clinical Staging), Neoadjuvant Therapy   followed by Surgery, Invasive Disease, Chemotherapy, HER2   Negative/Unknown/Equivocal, ER Positive  Therapeutic Status: Preoperative or Nonsurgical Candidate (Clinical Staging)  AJCC M Category: cM0  AJCC Grade: G3  Breast Surgical Plan: Neoadjuvant Therapy followed by Surgery  ER Status: Positive (+)  AJCC 8 Stage Grouping: IIB  HER2 Status: Negative (-)  AJCC T Category: cT1b  AJCC N Category: cN1  IA Status: Negative (-)  Intent of Therapy:  Curative Intent, Discussed with Patient

## 2019-10-09 NOTE — PROGRESS NOTES
I had originally placed a chemo plan for taxotere/cytoxan so as not to cause a delay if Her2 negative (no anthracycline due to low EF of 30% with plans to adjust plan prior to admin if Her2 +), but her CT scan has returned concerning for bony mets. Bone scan scheduled for next week. We can hold on placing port given concern for metastatic disease.   I'm still waiting on your Her2 results, but if negative, will likely plan ovarian suppression, AI and CDK 4/6 inhibitor.     Echo with EF of only 30%. I'd like for her to get back in to see Dr Olivares for her decreased EF.     Discussed plan of care with patient.     She will need:  DEXA  Bone scan  CA 15-3  Follow up Her2

## 2019-10-09 NOTE — PROGRESS NOTES
"Spoke to the patient after receiving a consult from Dr Mendez (patient scored a 10 on her Distress Screen): The patient stated that she was anxious about her upcoming chemotherapy treatments. 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 She lives with her two daughters ages 16 and 18. She stated that "they were coping well as long as I was keeping it together" She also has a brother and sister and was the caregiver for her parents ("they are not taking it well") -  "I have always been the nurturer". She was most concerned about the possible changes in her appearance. Offered to discuss a referral to the Oncology Psychologist with Dr Mendez (message sent to Dr Mendez). She was in agreement with the referral, but also requested an appointment in the mean time - appointment scheduled with me on 10/15/19 at 4 pm. The patient has contact information for me and agreed to call as needed.   "

## 2019-10-10 ENCOUNTER — TELEPHONE (OUTPATIENT)
Dept: INFUSION THERAPY | Facility: HOSPITAL | Age: 44
End: 2019-10-10

## 2019-10-10 ENCOUNTER — PATIENT MESSAGE (OUTPATIENT)
Dept: CARDIOLOGY | Facility: CLINIC | Age: 44
End: 2019-10-10

## 2019-10-10 ENCOUNTER — PATIENT MESSAGE (OUTPATIENT)
Dept: HEMATOLOGY/ONCOLOGY | Facility: CLINIC | Age: 44
End: 2019-10-10

## 2019-10-10 ENCOUNTER — TELEPHONE (OUTPATIENT)
Dept: SURGERY | Facility: CLINIC | Age: 44
End: 2019-10-10

## 2019-10-10 DIAGNOSIS — F41.9 ANXIETY: Primary | ICD-10-CM

## 2019-10-10 DIAGNOSIS — F43.0 ACUTE REACTION TO SITUATIONAL STRESS: ICD-10-CM

## 2019-10-10 RX ORDER — LORAZEPAM 0.5 MG/1
0.5 TABLET ORAL EVERY 12 HOURS PRN
Qty: 30 TABLET | Refills: 0 | Status: SHIPPED | OUTPATIENT
Start: 2019-10-10 | End: 2020-10-16

## 2019-10-10 NOTE — TELEPHONE ENCOUNTER
Good afternoon all. I contacted this pt. She wanted an early morning appointment asap(says she is a  & early as possible is best for her). I scheduled her with Dr. Webber on tomorrow @ 8am.  Dr. Odom will not be available until Tuesday.

## 2019-10-10 NOTE — TELEPHONE ENCOUNTER
----- Message from Marcy Smith sent at 10/10/2019 10:36 AM CDT -----  Contact: Vera ([facility])      ----- Message -----  From: Marilia Montoya  Sent: 10/10/2019  10:32 AM CDT  To: Staff Vanessa Lopez    Staff Message     Caller name: Vera    Reason for call: Reviewing office notes for pt appt tomorrow, and it states that the pt is going to hold off on port placement. Calling to clarify that information.        Communication Preference: [phone]    Additional Information:

## 2019-10-11 ENCOUNTER — TELEPHONE (OUTPATIENT)
Dept: INFUSION THERAPY | Facility: HOSPITAL | Age: 44
End: 2019-10-11

## 2019-10-11 ENCOUNTER — TELEPHONE (OUTPATIENT)
Dept: PSYCHIATRY | Facility: CLINIC | Age: 44
End: 2019-10-11

## 2019-10-11 NOTE — TELEPHONE ENCOUNTER
Message left for patient due to no show 10/11/19.  Detailed re-scheduling information included.  JOSELINE Webber, PhD

## 2019-10-14 ENCOUNTER — TELEPHONE (OUTPATIENT)
Dept: INFUSION THERAPY | Facility: HOSPITAL | Age: 44
End: 2019-10-14

## 2019-10-14 ENCOUNTER — HOSPITAL ENCOUNTER (OUTPATIENT)
Dept: RADIOLOGY | Facility: HOSPITAL | Age: 44
Discharge: HOME | End: 2019-10-14
Attending: INTERNAL MEDICINE
Payer: COMMERCIAL

## 2019-10-14 DIAGNOSIS — Z17.0 MALIGNANT NEOPLASM OF UPPER-INNER QUADRANT OF RIGHT BREAST IN FEMALE, ESTROGEN RECEPTOR POSITIVE: ICD-10-CM

## 2019-10-14 DIAGNOSIS — C50.211 MALIGNANT NEOPLASM OF UPPER-INNER QUADRANT OF RIGHT BREAST IN FEMALE, ESTROGEN RECEPTOR POSITIVE: ICD-10-CM

## 2019-10-14 PROCEDURE — 78306 NM BONE SCAN WHOLE BODY: ICD-10-PCS | Mod: 26,,, | Performed by: RADIOLOGY

## 2019-10-14 PROCEDURE — A9503 TC99M MEDRONATE: HCPCS

## 2019-10-14 PROCEDURE — 78306 BONE IMAGING WHOLE BODY: CPT | Mod: 26,,, | Performed by: RADIOLOGY

## 2019-10-15 ENCOUNTER — DOCUMENTATION ONLY (OUTPATIENT)
Dept: HEMATOLOGY/ONCOLOGY | Facility: CLINIC | Age: 44
End: 2019-10-15

## 2019-10-15 NOTE — PROGRESS NOTES
History:     Reason For Follow Up:   1. Stage IV ([id number]) invasive ductal carcinoma of right breast, upper inner quadrant, ER %, IA neg, Her2 neg, Grade 3, multifocal with one lesion appearing more aggressive (Ki67 80%), large LN +, bone mets.    HPI:   Kristopher Ye presents for follow up of breast cancer. Her imaging studies are showing bony mets unfortunately. She has constant dull back pain - she hasn't been taking anything for it because she wasn't certain what she could take. She has tried Ultram for her knee OA which didn't help much. She is meeting with Dr Odom this morning. Complaints of menopausal type symptoms with vaginal dryness and hot flashes.     Oncologic History:  Presentation  - 9/11/19 - Screening mammogram showed multiple right breast masses lower inner quadrant  - 9/13/19 - Diagnostic mammogram and US showed a right breast, 3:00 position mass, 2 CFN measuring 17 mm x 16 mm x 14 mm.  There 2 smaller adjacent masses towards the nipple  - 9/19/19 - Biopsy -    A. Right breast subareolar: Grade 3 IDC, estrogen receptor 80%, progesterone receptor 0%, Her2 dago neg, Ki67 80%.     B. Right breast mass 3:00: Grade 3 IDC with papillary features, estrogen receptor 100%, progesterone receptor 0%, Her2 dago 1+, Ki67 30%.   - 9/26/19: US right axilla with abnormal lymphadenopathy measuring 4.3 x 2.8 cm with biopsy + for metastatic breast cancer.   Surgery consultation with Dr Ferris on 9/25/19   - 9/25/19 - Genetics Genetics [facility] pending; VUS pending  Medical oncology consultation on 10/7/19   * 10/8/19 - CT C/A/P with several lytic lesions in thoracic and lumbar spine, iliac bones, left scapula in addition to 3 x 4.5 cm right axillary node and 2.1 cm right breast mass. Bone scan negative.   * 10/16/19 - Plan palliative Ibrance, Arimidex, Zoladex, and Xgeva.     History: I reviewed, confirmed and updated history (past medical, social, family) as necessary.     Medications    Current Outpatient Medications:     albuterol 90 mcg/actuation inhaler, Inhale 2 puffs into the lungs every 6 (six) hours as needed for Wheezing. Rescue, Disp: 6.7 g, Rfl: 0    amLODIPine (NORVASC) 5 MG tablet, Take 1 tablet (5 mg total) by mouth once daily., Disp: 30 tablet, Rfl: 11    citalopram (CELEXA) 20 MG tablet, Take 1 tablet (20 mg total) by mouth once daily., Disp: 30 tablet, Rfl: 1    HYDROcodone-acetaminophen (NORCO) 5-325 mg per tablet, Take 1 tablet by mouth every 4 (four) hours as needed for Pain., Disp: 12 tablet, Rfl: 0    hydrOXYzine pamoate (VISTARIL) 50 MG Cap, One every 6 hours as needed for anxiety, Disp: 40 capsule, Rfl: 0    ibuprofen (ADVIL,MOTRIN) 800 MG tablet, Take 1 tablet (800 mg total) by mouth 3 (three) times daily., Disp: 30 tablet, Rfl: 0    irbesartan (AVAPRO) 75 MG tablet, Take 1 tablet (75 mg total) by mouth every evening., Disp: 30 tablet, Rfl: 11    loratadine (CLARITIN) 10 mg tablet, Take 10 mg by mouth once daily., Disp: , Rfl:     LORazepam (ATIVAN) 0.5 MG tablet, Take 1 tablet (0.5 mg total) by mouth every 12 (twelve) hours as needed for Anxiety., Disp: 30 tablet, Rfl: 0    ondansetron (ZOFRAN-ODT) 4 MG TbDL, Take 2 tablets (8 mg total) by mouth every 8 (eight) hours as needed., Disp: 30 tablet, Rfl: 0    pantoprazole (PROTONIX) 40 MG tablet, Please take one tablet by mouth every 12 hours when you have discomfort, then take one tablet every day., Disp: 30 tablet, Rfl: 0    traMADol (ULTRAM) 50 mg tablet, Take 50 mg by mouth every 6 (six) hours., Disp: , Rfl:     diclofenac sodium (VOLTAREN) 1 % Gel, Apply 2 g topically 3 (three) times daily. for 10 days (Patient not taking: Reported on 10/7/2019), Disp: 100 g, Rfl: 0    ondansetron (ZOFRAN) 8 MG tablet, Take 1 tablet (8 mg total) by mouth every 8 (eight) hours as needed for Nausea., Disp: 45 tablet, Rfl: 2  Allergies  Review of patient's allergies indicates:   Allergen Reactions    Keflex  [cephalexin] Itching     Review of Systems  Review of Systems   Constitutional: Negative for activity change, appetite change, chills, fatigue, fever and unexpected weight change.   HENT: Negative for congestion, hearing loss, nosebleeds, sinus pressure and trouble swallowing.    Eyes: Negative for pain, discharge, itching and visual disturbance.   Respiratory: Negative for cough, chest tightness and shortness of breath.    Cardiovascular: Negative for chest pain, palpitations and leg swelling.   Gastrointestinal: Negative for abdominal distention, abdominal pain, blood in stool, diarrhea, nausea, rectal pain and vomiting.   Endocrine: Positive for heat intolerance. Negative for polydipsia.   Genitourinary: Negative for difficulty urinating, dysuria, flank pain, frequency, hematuria and urgency.   Musculoskeletal: Positive for arthralgias (knee pain) and back pain. Negative for neck pain.   Skin: Negative for color change, pallor and rash.   Neurological: Negative for dizziness, numbness and headaches.   Hematological: Negative for adenopathy. Does not bruise/bleed easily.   Psychiatric/Behavioral: Negative for confusion and decreased concentration. The patient is nervous/anxious.         Objective     Objective:     Vitals:    10/16/19 0838   BP: (!) 166/98   BP Location: Right arm   Patient Position: Sitting   Pulse: 79   Resp: 20   Temp: 98.2 °F (36.8 °C)   TempSrc: Oral   Weight: (!) 161 kg (354 lb 15.1 oz)     Body surface area is 2.74 meters squared.  Physical Exam   Constitutional: She is oriented to person, place, and time. She appears well-developed and well-nourished. No distress.   HENT:   Head: Normocephalic and atraumatic.   Mouth/Throat: Oropharynx is clear and moist and mucous membranes are normal. No oral lesions.   Eyes: Conjunctivae and EOM are normal. Right eye exhibits no discharge. Left eye exhibits no discharge. No scleral icterus.   Neck: Normal range of motion. Neck supple. No thyroid mass  and no thyromegaly present.   Cardiovascular: Normal rate, regular rhythm, S1 normal, S2 normal and normal heart sounds.   Pulmonary/Chest: Effort normal and breath sounds normal. No respiratory distress. She has no wheezes. She has no rhonchi. She has no rales. Chest wall is not dull to percussion.   Breast exam again not done today due to counseling/discussion on metastatic breast cancer; will perform at next visit for baseline.   Abdominal: Soft. Normal appearance and bowel sounds are normal. There is no hepatosplenomegaly. There is no tenderness.   Lymphadenopathy:     She has no cervical adenopathy.        Right: No supraclavicular adenopathy present.        Left: No supraclavicular adenopathy present.   Neurological: She is alert and oriented to person, place, and time. She has normal strength.   Skin: Skin is warm, dry and intact. No pallor.   Psychiatric: Her behavior is normal. Thought content normal.         Labs and Imaging  Results for orders placed or performed during the hospital encounter of 10/08/19   Echo   Result Value Ref Range    Ascending aorta 3.02 cm    STJ 2.47 cm    AV mean gradient 7 mmHg    Ao peak damien 1.60 m/s    Ao VTI 28.43 cm    IVS 1.16 (A) 0.6 - 1.1 cm    LA size 3.96 cm    Left Atrium Major Axis 5.73 cm    Left Atrium Minor Axis 5.54 cm    LVIDD 6.35 (A) 3.5 - 6.0 cm    LVIDS 5.69 (A) 2.1 - 4.0 cm    LVOT diameter 2.33 cm    LVOT peak VTI 17.96 cm    PW 1.14 (A) 0.6 - 1.1 cm    MV Peak A Damien 0.43 m/s    E wave decelartion time 151.16 msec    MV Peak E Damien 1.40 m/s    PV Peak D Damien 0.48 m/s    PV Peak S Damien 0.38 m/s    RA Major Axis 4.49 cm    RA Width 3.46 cm    RVDD 3.61 cm    Sinus 2.91 cm    TAPSE 1.91 cm    TDI LATERAL 0.10 m/s    TDI SEPTAL 0.07 m/s    LA WIDTH 5.23 cm    LV Diastolic Volume 204.68 mL    LV Systolic Volume 159.42 mL    RV S' 14.12 cm/s    LVOT peak damien 0.89 m/s    LV LATERAL E/E' RATIO 14.00 m/s    LV SEPTAL E/E' RATIO 20.00 m/s    FS 10 %    LA volume 99.17  cm3    LV mass 326.05 g    Left Ventricle Relative Wall Thickness 0.36 cm    AV valve area 2.69 cm2    AV Velocity Ratio 0.56     AV index (prosthetic) 0.63     E/A ratio 3.26     Mean e' 0.09 m/s    Pulm vein S/D ratio 0.79     LVOT area 4.3 cm2    LVOT stroke volume 76.54 cm3    AV peak gradient 10 mmHg    E/E' ratio 16.47 m/s    BSA 2.74 m2    LV Systolic Volume Index 62.4 mL/m2    LV Diastolic Volume Index 80.09 mL/m2    LA Volume Index 38.8 mL/m2    LV Mass Index 128 g/m2    Right Atrial Pressure (from IVC) 3 mmHg    QEF 34 %     I have personally reviewed imaging results and agree with assessment as detailed below in dictation.     NM Bone Scan Whole Body  Narrative: EXAMINATION:  NM BONE SCAN WHOLE BODY    CLINICAL HISTORY:  diagnosis associated with order;  Malignant neoplasm of upper-inner quadrant of right female breast    TECHNIQUE:  Approximately 2-3 hours following the IV administration of  mCi of [id number], anterior and posterior delayed whole body images were acquired.  Lateral spot images of the skull were also acquired.    COMPARISON:  Right knee radiograph 08/07/2019    FINDINGS:  Focal uptake is noted within the distal right femur.  In addition, there is degenerative type uptake is noted within bilateral shoulders and knees, right knee significantly worse left.    There is normal uptake in the genitourinary system and soft tissues.  Impression: Degenerative type uptake most prominent within bilateral knees, right worse than left.  In addition, there is focal uptake within the distal right femur, which could be related to patient's degenerative change; however, metastatic disease cannot be excluded.  Recommend further evaluation with a dedicated right femur radiograph.    This report was flagged in Epic as abnormal.    Electronically signed by resident: Lizy Celeste  Date:    10/15/2019  Time:    09:16    Electronically signed by: Dawna Wesley  MD  Date:    10/15/2019  Time:    11:31        Assessment     Assessment:     1. Stage IV ([id number]) invasive ductal carcinoma of right breast, upper inner quadrant, ER %, VA neg, Her2 neg, Grade 3, multifocal with one lesion appearing more aggressive (Ki67 80%), large LN +, bony mets   * Genetics pending   * 10/2019 staging scans showed diffuse bony mets  *  Will work on obtaining approval for ovarian suppression, Arimidex and Ibrance 125 mg daily days 1-21 every 28 days. Discussed side effects of above including but not limited to cytopenias, nausea, osteoporosis, musculoskeletal symptoms, cardiovascular risks, fatigue, diarrhea, sexual dysfunction, and mood instability. Patient asked multiple appropriate questions and was given a handout detailing the medications.   * Plan re-image in 3 mo with CT C/A/P. Check CA 15-3, estradiol, FSH   * We discussed today about goals of care, palliative nature of metastatic disease, etc. Patient is coping well and is establishing care with Dr Odom today.     2. Bony mets   * Confirm with bone biopsy   * Xgeva monthly - will get dental clearance    3. Anxiety related to cancer diagnosis.   * Seeing Dr Odom.    4. Congestive heart failure, new diagnosis   * Echo 10/2019 with EF 30%   * Follows with Dr Olivares. She sees her later this month.     5. Morbid obesity   * Adjusting diet for weight loss.     6. Cancer pain   * Try tylenol and ibuprofen prn; can adjust if not helpful although patient wants to avoid stronger medication if able    7. Menopausal symptoms   * Refer to Dr Avina - suspect they'll worsen with Arimidex/Zoladex.     Plan:     1. Work on approval for ovarian suppression, Arimidex, Ibrance. Will start all three on day of first Zoladex  2. Plan Xgeva with second dose of Zoladex - dental clearance  3. Bone biopsy to confirm metastatic disease  4. STRATA - will eval for PIK3 mutation  5. Labs today  6. Dr Avina referral  7. Plan to see me back on day 14 with  cbc, cmp. Day 1 will be when she receives Zoladex (she'll start her Ibrance/Arimidex then too).  8. Dr Olivares follow up  9. Follow up genetics results.  10. Plan DEXA in near future, but will hold for now since she's having mutliple other appts  11. Calcium/vitamin D     Advance Care Planning     Power of   I initiated the process of advance care planning today and explained the importance of this process to the patient.  I introduced the concept of advance directives to the patient, as well. Then the patient received detailed information about the importance of designating a Health Care Power of  (HCPOA). She was also instructed to communicate with this person about their wishes for future healthcare, should she become sick and lose decision-making capacity. The patient has not previously appointed a HCPOA. After our discussion, the patient has decided to complete a HCPOA and has appointed her sister and NAME:Janel.            I spent 55 minutes with patient and family with 45 spent face to face counseling on diagnosis, goals of therapy, treatment options.

## 2019-10-15 NOTE — PROGRESS NOTES
The patient did not show for her appointment today. She also had an appointment scheduled with Pranay Webber,  on 10/11/19 and did not show, but reported to the  that she forgot. She is scheduled with Bria Odom PhD on 10/16/19. The patient has contact information for me. Will follow as needed on her request.

## 2019-10-16 ENCOUNTER — OFFICE VISIT (OUTPATIENT)
Dept: PSYCHIATRY | Facility: CLINIC | Age: 44
End: 2019-10-16
Payer: COMMERCIAL

## 2019-10-16 ENCOUNTER — LAB VISIT (OUTPATIENT)
Dept: LAB | Facility: HOSPITAL | Age: 44
End: 2019-10-16
Attending: INTERNAL MEDICINE
Payer: COMMERCIAL

## 2019-10-16 ENCOUNTER — OFFICE VISIT (OUTPATIENT)
Dept: HEMATOLOGY/ONCOLOGY | Facility: CLINIC | Age: 44
End: 2019-10-16
Payer: COMMERCIAL

## 2019-10-16 ENCOUNTER — TELEPHONE (OUTPATIENT)
Dept: OBSTETRICS AND GYNECOLOGY | Facility: CLINIC | Age: 44
End: 2019-10-16

## 2019-10-16 ENCOUNTER — DOCUMENTATION ONLY (OUTPATIENT)
Dept: HEMATOLOGY/ONCOLOGY | Facility: CLINIC | Age: 44
End: 2019-10-16

## 2019-10-16 VITALS
WEIGHT: 293 LBS | TEMPERATURE: 98 F | HEART RATE: 79 BPM | SYSTOLIC BLOOD PRESSURE: 166 MMHG | RESPIRATION RATE: 20 BRPM | DIASTOLIC BLOOD PRESSURE: 98 MMHG | BODY MASS INDEX: 57.29 KG/M2

## 2019-10-16 DIAGNOSIS — N95.1 MENOPAUSAL SYMPTOMS: ICD-10-CM

## 2019-10-16 DIAGNOSIS — C50.211 MALIGNANT NEOPLASM OF UPPER-INNER QUADRANT OF RIGHT BREAST IN FEMALE, ESTROGEN RECEPTOR POSITIVE: ICD-10-CM

## 2019-10-16 DIAGNOSIS — Z17.0 MALIGNANT NEOPLASM OF UPPER-INNER QUADRANT OF RIGHT BREAST IN FEMALE, ESTROGEN RECEPTOR POSITIVE: ICD-10-CM

## 2019-10-16 DIAGNOSIS — I50.20 SYSTOLIC CONGESTIVE HEART FAILURE, UNSPECIFIED HF CHRONICITY: ICD-10-CM

## 2019-10-16 DIAGNOSIS — F43.23 ADJUSTMENT DISORDER WITH MIXED ANXIETY AND DEPRESSED MOOD: Primary | ICD-10-CM

## 2019-10-16 DIAGNOSIS — Z17.0 MALIGNANT NEOPLASM OF UPPER-INNER QUADRANT OF RIGHT BREAST IN FEMALE, ESTROGEN RECEPTOR POSITIVE: Primary | ICD-10-CM

## 2019-10-16 DIAGNOSIS — C50.211 MALIGNANT NEOPLASM OF UPPER-INNER QUADRANT OF RIGHT BREAST IN FEMALE, ESTROGEN RECEPTOR POSITIVE: Primary | ICD-10-CM

## 2019-10-16 DIAGNOSIS — C79.51 BONY METASTASIS: Primary | ICD-10-CM

## 2019-10-16 DIAGNOSIS — C79.51 BONY METASTASIS: ICD-10-CM

## 2019-10-16 DIAGNOSIS — F06.4 ANXIETY DISORDER DUE TO MEDICAL CONDITION: ICD-10-CM

## 2019-10-16 LAB
ESTRADIOL SERPL-MCNC: 34 PG/ML
FSH SERPL-ACNC: 40.1 MIU/ML

## 2019-10-16 PROCEDURE — 3077F PR MOST RECENT SYSTOLIC BLOOD PRESSURE >= 140 MM HG: ICD-10-PCS | Mod: CPTII,S$GLB,, | Performed by: INTERNAL MEDICINE

## 2019-10-16 PROCEDURE — 90791 PSYCH DIAGNOSTIC EVALUATION: CPT | Mod: S$GLB,,, | Performed by: PSYCHOLOGIST

## 2019-10-16 PROCEDURE — 86300 IMMUNOASSAY TUMOR CA 15-3: CPT

## 2019-10-16 PROCEDURE — 99999 PR PBB SHADOW E&M-EST. PATIENT-LVL II: CPT | Mod: PBBFAC,,, | Performed by: PSYCHOLOGIST

## 2019-10-16 PROCEDURE — 3080F DIAST BP >= 90 MM HG: CPT | Mod: CPTII,S$GLB,, | Performed by: INTERNAL MEDICINE

## 2019-10-16 PROCEDURE — 99215 PR OFFICE/OUTPT VISIT, EST, LEVL V, 40-54 MIN: ICD-10-PCS | Mod: S$GLB,,, | Performed by: INTERNAL MEDICINE

## 2019-10-16 PROCEDURE — 3080F PR MOST RECENT DIASTOLIC BLOOD PRESSURE >= 90 MM HG: ICD-10-PCS | Mod: CPTII,S$GLB,, | Performed by: INTERNAL MEDICINE

## 2019-10-16 PROCEDURE — 3008F BODY MASS INDEX DOCD: CPT | Mod: CPTII,S$GLB,, | Performed by: INTERNAL MEDICINE

## 2019-10-16 PROCEDURE — 99999 PR PBB SHADOW E&M-EST. PATIENT-LVL II: ICD-10-PCS | Mod: PBBFAC,,, | Performed by: PSYCHOLOGIST

## 2019-10-16 PROCEDURE — 99999 PR PBB SHADOW E&M-EST. PATIENT-LVL IV: CPT | Mod: PBBFAC,,, | Performed by: INTERNAL MEDICINE

## 2019-10-16 PROCEDURE — 99999 PR PBB SHADOW E&M-EST. PATIENT-LVL IV: ICD-10-PCS | Mod: PBBFAC,,, | Performed by: INTERNAL MEDICINE

## 2019-10-16 PROCEDURE — 3077F SYST BP >= 140 MM HG: CPT | Mod: CPTII,S$GLB,, | Performed by: INTERNAL MEDICINE

## 2019-10-16 PROCEDURE — 82670 ASSAY OF TOTAL ESTRADIOL: CPT

## 2019-10-16 PROCEDURE — 83001 ASSAY OF GONADOTROPIN (FSH): CPT

## 2019-10-16 PROCEDURE — 90791 PR PSYCHIATRIC DIAGNOSTIC EVALUATION: ICD-10-PCS | Mod: S$GLB,,, | Performed by: PSYCHOLOGIST

## 2019-10-16 PROCEDURE — 99215 OFFICE O/P EST HI 40 MIN: CPT | Mod: S$GLB,,, | Performed by: INTERNAL MEDICINE

## 2019-10-16 PROCEDURE — 3008F PR BODY MASS INDEX (BMI) DOCUMENTED: ICD-10-PCS | Mod: CPTII,S$GLB,, | Performed by: INTERNAL MEDICINE

## 2019-10-16 RX ORDER — ANASTROZOLE 1 MG/1
1 TABLET ORAL DAILY
Qty: 90 TABLET | Refills: 3 | Status: SHIPPED | OUTPATIENT
Start: 2019-10-16 | End: 2020-10-20 | Stop reason: SDUPTHER

## 2019-10-16 RX ORDER — ONDANSETRON HYDROCHLORIDE 8 MG/1
8 TABLET, FILM COATED ORAL EVERY 8 HOURS PRN
Qty: 45 TABLET | Refills: 2 | Status: SHIPPED | OUTPATIENT
Start: 2019-10-16 | End: 2020-10-16

## 2019-10-16 NOTE — PROGRESS NOTES
Spoke to the patient about an appointment on request from Bria Odom, PhD: Made an appointment with her for 10/22/19 at 10.30 am.

## 2019-10-16 NOTE — PLAN OF CARE
START OFF PATHWAY REGIMEN - Breast            Goserelin acetate (Zoladex(R))           Additional Orders: Ref: Jamie CF et al. N Engl J Med 2015;   372(10):923-932.; Brandan KIRAN et al. Urology 1998; 52(1):82-88.    **Always confirm dose/schedule in your pharmacy ordering system**        Anastrozole (Arimidex(R))       Palbociclib (Ibrance(R))           Additional Orders: Avoid concomitant use of strong and moderate CYP3A   inhibitors and inducers. If interaction unavoidable, see palbociclib prescribing   information for recommended dosing adjustments. CBC recommended on day 1 of   each cycle, and day 14 of first two cycles, then as clinically indicated.       Ref: Teto et al. Lancet Oncol. 2015; 16: 25.  [Extrapolated; custom request]    **Always confirm dose/schedule in your pharmacy ordering system**    Patient Characteristics:  Distant Metastases or Locoregional Recurrent Disease - Unresected or Locally   Advanced Unresectable Disease Progressing after Neoadjuvant and Local Therapies,   HER2 Negative/Unknown/Equivocal, ER Positive, Endocrine Therapy, Premenopausal,   First Line, Candidate for a CDK4/6 Inhibitor  Therapeutic Status: Distant Metastases  BRCA Mutation Status: Awaiting Test Results  ER Status: Positive (+)  HER2 Status: Negative (-)  SC Status: Negative (-)  Menopausal Status: Premenopausal  Line of Therapy: First Line  Intent of Therapy:  Curative Intent, Discussed with Patient

## 2019-10-16 NOTE — PLAN OF CARE
ALERT: A disease instance has been permanently removed from this patient's   pathway record and replaced with a new disease instance. Information on the new   disease instance will be transmitted in a separate message.    Disease Being Removed: Breast    Reason for Removal: Reason not listed

## 2019-10-16 NOTE — Clinical Note
Carol met with this patient. She is having some financial stress. Was denied medicaid. Need financial assistance. Would like to speak with a SW.

## 2019-10-16 NOTE — Clinical Note
Carol Mendez! Saw this patient today. I will be following her. Do we know her prognosis?  She also had a question about celexa and wanted to speak with someone to determine if it is ok to start taking it. She already has a script but hasnt initiated it.

## 2019-10-16 NOTE — PROGRESS NOTES
INFORMED CONSENT/ LIMITS of CONFIDENTIALITY: Prior to beginning the interview, the patient's identification was confirmed via name and date of birth. Kristopher Ye  was informed of the possible risks and benefits of psychological interventions (e.g., counseling, psychotherapy, testing) and provided information regarding the handling of protected health records and the limits of confidentiality, including the importance of reporting any suicidal or homicidal ideation to ensure safety of all parties. This provider explained the purpose of today's appointment and the patient was provided with time to ask questions regarding this information.  Acceptance and understanding of these conditions was expressed, and Kristopher Ye freely consented to this evaluation.       PSYCHO-ONCOLOGY INTAKE    Diagnostic Interview - CPT 09559    Date: 10/16/2019  Site: [facility]     Evaluation Length (direct face-to-face time):  1 hour     Referral Source: Jessica Mendez MD   Oncologist:   PCP: Juliano Giraldo MD    Clinical status of patient: Outpatient    Kristopher Ye, a 44 y.o. female, seen for initial evaluation visit.  Met with patient.    Chief complaint/reason for encounter: adjustment to illness, depression and anxiety    Medical/Surgical History:    Patient Active Problem List   Diagnosis    Hypertension    Iron deficiency anemia    Palpitations    Cardiomegaly    Morbid obesity (BMI= 55.9 on 10/30/14)    Fibroid    Fibroid uterus    Fatigue    Shortness of breath    Thalassemia    Acute reaction to situational stress    Chest pain, non-cardiac    Cardiomyopathy    Hyperlipidemia    Malignant neoplasm of upper-inner quadrant of right breast in female, estrogen receptor positive       Health Behaviors:       ETOH Use: Yes (social)       Tobacco Use: No   Illicit Drug Use:  No     Prescription Misuse:No   Caffeine: minimal   Exercise:The patient engages in environmental  activity only.   Firearms:  No   Advanced directives:No, Discussed the importance of having on one file     Family History:   Psychiatric illness: No     Alcohol/Drug Abuse: No     Suicide: No      Past Psychiatric History:   Inpatient treatment: No     Outpatient treatment: No     Prior substance abuse treatment: No     Suicide Attempts: No     Psychotropic Medications:  Current: Ativan and Celexa       Past: none    Current medications as per below, allergies reviewed in chart.    Current Outpatient Medications   Medication    albuterol 90 mcg/actuation inhaler    amLODIPine (NORVASC) 5 MG tablet    anastrozole (ARIMIDEX) 1 mg Tab    citalopram (CELEXA) 20 MG tablet    diclofenac sodium (VOLTAREN) 1 % Gel    HYDROcodone-acetaminophen (NORCO) 5-325 mg per tablet    hydrOXYzine pamoate (VISTARIL) 50 MG Cap    ibuprofen (ADVIL,MOTRIN) 800 MG tablet    irbesartan (AVAPRO) 75 MG tablet    loratadine (CLARITIN) 10 mg tablet    LORazepam (ATIVAN) 0.5 MG tablet    ondansetron (ZOFRAN) 8 MG tablet    ondansetron (ZOFRAN-ODT) 4 MG TbDL    palbociclib (IBRANCE) 125 mg Cap    pantoprazole (PROTONIX) 40 MG tablet    traMADol (ULTRAM) 50 mg tablet     No current facility-administered medications for this visit.        CAM Therapies: None     Screening: Depression: Positive   Jada: Denies Psychosis: Denies    Generalized anxiety: Positive  Panic Disorder: Denies    Social/specific phobia: Denies OCD: Denies   Sexual Dysfunction:  Denies Trauma: Denies      Social situation/Stressors: Kristopher Ye lives with daughters in [place], [place].  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    Kristopher Ye has been  twice ending in divorce and has two children.  She currently has a partner.   The patient reports good social support.  Kristopher Ye is an active member of the [employer].  Kristopher Ye loves to sing.    Additional  stressors:  Financial      Strengths:Interpersonal relationships and supports available - family, relatives, friends and Cultural/spiritual/Yarsani and community involvement  Liabilities: Financial strain    Current Evaluation:     Mental Status Exam: Kristopher Ye arrived promptly for the assessment session.  The patient was fully cooperative throughout the interview and was an adequate historian   Appearance: age appropriate, appropriately  dressed, adequately  groomed  Behavior/Cooperation: friendly and cooperative  Speech: normal in rate, volume, and tone and appropriate quality, quantity and organization of sentences  Mood: euthymic  Affect: euthymic  Thought Process: goal-directed, logical  Thought Content: normal, no suicidality, no homicidality, delusions, or paranoia;did not appear to be responding to internal stimuli during the interview.   Orientation: grossly intact  Memory: Grossly intact  Attention Span/Concentration: Attends to interview without distraction; reports forgetfulness difficulty  Fund of Knowledge: average  Estimate of Intelligence: average from verbal skills and history  Cognition: grossly intact  Insight: patient has awareness of illness; good insight into own behavior and behavior of others  Judgment: the patient's behavior is adequate to circumstances    Distress Score    Distress Score: 8        Practical Problems Physical Problems               Insurance / Financial: Yes              Work / School: Yes      Treatment Decisions: Yes        Eating: Yes    Family Problems Fatigue: Yes           Dealing with Partner: Yes              Family Health Issues: Yes        Memory / Concentration: Yes   Emotional Problems              Fears: Yes  Nose Dry / Congested: Yes    Nervousness: Yes  Pain: Yes    Sadness: Yes      Worry: Yes      Loss of Interest in Usual Activities: Yes Sleep: Yes          Spiritual/Religions Concerns               Other Problems            Patient Health  Questionnaire-9 (PHQ-9)     1.  Little interest or pleasure in doing things: Several days                = 1   2.  Feeling down, depressed or hopeless: Several days                = 1   3.  Trouble falling or staying asleep, or sleeping too much: More than half of days  = 2   4.  Feeling tired or having little energy: More than half of days  = 2   5.  Poor appetite or overeating: Nearly every day           = 3   6.  Feeling bad about yourself - or that you are a failure or have let yourself or your family down: More than half of days  = 2   7.  Trouble concentrating on things, such as reading the newspaper or watching television: More than half of days  = 2   8.  Moving or speaking so slowly that other people could have noticed.  Or the opposite - being fidgety or restless that you have been moving around a lot more than usual: Several days                = 1   9.  Thoughts that you would be better off dead, or of hurting yourself: Not at all                       = 0    PHQ-9 Total:  14, moderate     Generalized Anxiety Disorder Questionnaire-7 (HUY-7)  The patient completed the General Anxiety Disorder, 7 Items (GAD7)and received a score of 17, indicating severe anxiety symptoms presently impacting current functioning.     History of present illness:       Malignant neoplasm of upper-inner quadrant of right breast in female, estrogen receptor positive    10/7/2019 Initial Diagnosis     Malignant neoplasm of upper-inner quadrant of right breast in female, estrogen receptor positive      10/14/2019 - 10/14/2019 Chemotherapy     Treatment Summary   Plan Name: OP BREAST TC - DOCETAXEL CYCLOPHOSPHAMIDE Q3W  Treatment Goal: Curative  Status: Inactive  Start Date: [No treatment day found]  End Date: [No treatment day found]  Provider: Jessica Mendez MD  Chemotherapy: cyclophosphamide (CYTOXAN) 600 mg/m2 = 1,645 mg in sodium chloride 0.9% 250 mL chemo infusion, 600 mg/m2, Intravenous, Clinic/HOD 1 time, 0 of 4  "cycles  DOCEtaxel (TAXOTERE) 75 mg/m2 = 205 mg in sodium chloride 0.9% 250 mL chemo infusion, 75 mg/m2, Intravenous, Clinic/HOD 1 time, 0 of 4 cycles      10/16/2019 Cancer Staged     Staging form: Breast, AJCC 8th Edition  - Clinical stage from 10/16/2019: Stage IV (cT1c(2), cN1(f), cM1, G3, ER+, SC-, HER2-)      10/16/2019 -  Chemotherapy     Treatment Summary   Plan Name: OP ANASTROZOLE PALBOCICLIB Q4W  Treatment Goal: Palliative  Status: Active  Start Date: 10/16/2019  End Date: 10/16/2019  Provider: Jessica Mendez MD  Chemotherapy: [No matching medication found in this treatment plan]       Patient reported that he family's reaction to her diagnosis is a source of stress for her. She stated that she has refrained form discussing her treatment in-depth with them because "they will break-down." She was able to explain her diagnosis and treatment but seemed to not want to discuss prognosis. She stated that she "wants to live life regardless."    Kristopher Ye has adjusted to illness with difficulty primarily through active coping strategies. She has engaged in appropriate information gathering.  The patient has good family/friend support.  Her support system is coping poorly with the diagnosis/treatment/prognosis. Illness-related psychosocial stressors include financial strain , limitations on sexual interactions and absence from school.  The patient has a good partnership with her [facility] oncology treatment team. The patient reports the following barriers to cancer care:financial limitations.     Symptoms:   · Mood: diminished interest, insomnia, fatigue, worthlessness/guilt, social isolation and irritibility;  no prior and no SI/HI;  · Anxiety: Feeling nervous, anxious, or on edge, Uncontrollable worry (about health and future), Excessive worry (interfering with daily functioning, sleep), Difficulty relaxing, Restlessness, Irritability and Fear of unknown; no prior  · Substance abuse: denied  · Cognitive " functioning: memory and forgetfulness  · Health behaviors: Obesity  · Sleep:  The patient reports 6 hours of uninterrupted sleep per night. restless sleep , interrupted sleep and non-restorative sleep , 30 min+ extended sleep onset latency and 2 x WASO (with re-onset difficulty), (+) EDS  and no naps , no caffeine/stimulants  no use of OTC/melatonin/hypnotics/benzodiazepines    · Pain: Ms. Ye reports diffuse pain, primarily in back and knees. Symptoms interfere with daily activity, sleeping and work.        Assessment - Diagnosis - Goals:     Adjustment Disorder, with mixed anxiety and depressed mood.  Plan:individual psychotherapy , medication management by physician    Summary and Recommendations  Kristopher Ye is a 44 y.o. female referred by Jessica Mendez MD for psychological evaluation and treatment.  Ms. Ye appears to be coping poorly with her diagnosis and proposed treatment course. She reported apathy related to feelings about her cancer. Patient was encouraged to speak to her primary care physician or oncologist regarding psychotropic medication options. She is interested in CBT to address depression/anxiety/insomnia and will follow up with me for that purpose. Mood protective strategies during cancer treatment were discussed. Discussed importance of weight loss.     GOALS:   Pleasant events scheduling  Discuss psychotropic medication options with oncologist      Next Session:  Introduction to Sleep Hygiene    Bria Odom, PhD  Clinical Psychologist  [place] License #[id number]

## 2019-10-16 NOTE — TELEPHONE ENCOUNTER
----- Message from Marcy Smith sent at 10/16/2019  9:33 AM CDT -----  Can we get in with Dr. Avina please? Thanks     ----- Message -----  From: Jessica Mendez MD  Sent: 10/16/2019   9:31 AM CDT  To: Marcy Smith    1. IR for bone biopsy (does not have to be before she starts treatment)  2. Prior auth for Zoladex and Xgeva. Schedule Zoladex once approved and please let me know date. Will add Xgeva with second dose.   3. MD appt with cbc, cmp 14 days after Zoladex dose.   4. STRATA  5. Labs today after Dr Odom visit - ca 15-3, estradiol, FSH  6. Referral to Dr Avina

## 2019-10-16 NOTE — Clinical Note
1. IR for bone biopsy (does not have to be before she starts treatment)2. Prior auth for Zoladex and Xgeva. Schedule Zoladex once approved and please let me know date. Will add Xgeva with second dose. 3. MD appt with cbc, cmp 14 days after Zoladex dose. 4. STRATA5. Labs today after Dr Odom visit - ca 15-3, estradiol, FSH6. Referral to Dr Avina

## 2019-10-17 ENCOUNTER — TELEPHONE (OUTPATIENT)
Dept: PHARMACY | Facility: CLINIC | Age: 44
End: 2019-10-17

## 2019-10-17 NOTE — TELEPHONE ENCOUNTER
LVM for callback to inform patient that [facility] received prescription for Ibrance and prior authorization is required.  [facility] will be back in touch once insurance determination is received.

## 2019-10-18 ENCOUNTER — TELEPHONE (OUTPATIENT)
Dept: OBSTETRICS AND GYNECOLOGY | Facility: CLINIC | Age: 44
End: 2019-10-18

## 2019-10-18 ENCOUNTER — HOSPITAL ENCOUNTER (EMERGENCY)
Facility: HOSPITAL | Age: 44
Discharge: HOME | End: 2019-10-19
Attending: EMERGENCY MEDICINE
Payer: COMMERCIAL

## 2019-10-18 ENCOUNTER — PATIENT OUTREACH (OUTPATIENT)
Dept: ADMINISTRATIVE | Facility: OTHER | Age: 44
End: 2019-10-18

## 2019-10-18 DIAGNOSIS — R07.9 CHEST PAIN: ICD-10-CM

## 2019-10-18 DIAGNOSIS — J20.9 ACUTE BRONCHITIS, UNSPECIFIED ORGANISM: Primary | ICD-10-CM

## 2019-10-18 DIAGNOSIS — R05.9 COUGH: ICD-10-CM

## 2019-10-18 LAB — CANCER AG15-3 SERPL-ACNC: 23 U/ML (ref 0–31)

## 2019-10-18 PROCEDURE — 36000 PLACE NEEDLE IN VEIN: CPT

## 2019-10-18 PROCEDURE — 93010 ELECTROCARDIOGRAM REPORT: CPT | Mod: ,,, | Performed by: INTERNAL MEDICINE

## 2019-10-18 PROCEDURE — 93010 EKG 12-LEAD: ICD-10-PCS | Mod: ,,, | Performed by: INTERNAL MEDICINE

## 2019-10-18 PROCEDURE — 87502 INFLUENZA DNA AMP PROBE: CPT

## 2019-10-18 PROCEDURE — 99285 EMERGENCY DEPT VISIT HI MDM: CPT | Mod: 25

## 2019-10-18 PROCEDURE — 93005 ELECTROCARDIOGRAM TRACING: CPT

## 2019-10-18 NOTE — TELEPHONE ENCOUNTER
DOCUMENTATION ONLY:  Prior authorization for Ibrance 125 mg #21/28 approved from 10/17/2019 to 10/16/2020  Case ID: [id number]    Co-pay: $100.00    Patient Assistance IS required. - TSERING      FOR DOCUMENTATION ONLY:  Financial Assistance for Ibrance 125 mg approved from 10/18/2019 to 12/31/2020  Source: Ibrance Co-pay Card  BIN: [id number]  ID: [id number]  GRP: [id number]  Co-pay with co-pay card: $0.00 - TSERING

## 2019-10-18 NOTE — TELEPHONE ENCOUNTER
I spoke to the pt and she states that she is going to get a second opinion  she states that she has not been given any medicine or anything.Pt was thankful that I called.

## 2019-10-18 NOTE — TELEPHONE ENCOUNTER
----- Message from Jaylyn Washburn MD sent at 10/17/2019  8:07 AM CDT -----  Please call to check in with patient

## 2019-10-19 VITALS
SYSTOLIC BLOOD PRESSURE: 177 MMHG | DIASTOLIC BLOOD PRESSURE: 93 MMHG | OXYGEN SATURATION: 97 % | WEIGHT: 293 LBS | TEMPERATURE: 98 F | RESPIRATION RATE: 18 BRPM | HEIGHT: 66 IN | BODY MASS INDEX: 47.09 KG/M2 | HEART RATE: 81 BPM

## 2019-10-19 LAB
ALBUMIN SERPL BCP-MCNC: 3.6 G/DL (ref 3.5–5.2)
ALP SERPL-CCNC: 116 U/L (ref 55–135)
ALT SERPL W/O P-5'-P-CCNC: 23 U/L (ref 10–44)
ANION GAP SERPL CALC-SCNC: 11 MMOL/L (ref 8–16)
AST SERPL-CCNC: 20 U/L (ref 10–40)
BASOPHILS # BLD AUTO: 0.05 K/UL (ref 0–0.2)
BASOPHILS NFR BLD: 0.5 % (ref 0–1.9)
BILIRUB SERPL-MCNC: 0.4 MG/DL (ref 0.1–1)
BNP SERPL-MCNC: 44 PG/ML (ref 0–99)
BUN SERPL-MCNC: 13 MG/DL (ref 6–20)
CALCIUM SERPL-MCNC: 9.6 MG/DL (ref 8.7–10.5)
CHLORIDE SERPL-SCNC: 105 MMOL/L (ref 95–110)
CO2 SERPL-SCNC: 24 MMOL/L (ref 23–29)
CREAT SERPL-MCNC: 0.9 MG/DL (ref 0.5–1.4)
CTP QC/QA: YES
DIFFERENTIAL METHOD: ABNORMAL
EOSINOPHIL # BLD AUTO: 0.2 K/UL (ref 0–0.5)
EOSINOPHIL NFR BLD: 2.2 % (ref 0–8)
ERYTHROCYTE [DISTWIDTH] IN BLOOD BY AUTOMATED COUNT: 19.7 % (ref 11.5–14.5)
EST. GFR  (AFRICAN AMERICAN): >60 ML/MIN/1.73 M^2
EST. GFR  (NON AFRICAN AMERICAN): >60 ML/MIN/1.73 M^2
GLUCOSE SERPL-MCNC: 104 MG/DL (ref 70–110)
HCT VFR BLD AUTO: 40.1 % (ref 37–48.5)
HGB BLD-MCNC: 11.9 G/DL (ref 12–16)
IMM GRANULOCYTES # BLD AUTO: 0.03 K/UL (ref 0–0.04)
IMM GRANULOCYTES NFR BLD AUTO: 0.3 % (ref 0–0.5)
LYMPHOCYTES # BLD AUTO: 3.6 K/UL (ref 1–4.8)
LYMPHOCYTES NFR BLD: 33.7 % (ref 18–48)
MCH RBC QN AUTO: 20.9 PG (ref 27–31)
MCHC RBC AUTO-ENTMCNC: 29.7 G/DL (ref 32–36)
MCV RBC AUTO: 70 FL (ref 82–98)
MONOCYTES # BLD AUTO: 0.8 K/UL (ref 0.3–1)
MONOCYTES NFR BLD: 7 % (ref 4–15)
NEUTROPHILS # BLD AUTO: 6.1 K/UL (ref 1.8–7.7)
NEUTROPHILS NFR BLD: 56.3 % (ref 38–73)
NRBC BLD-RTO: 0 /100 WBC
PLATELET # BLD AUTO: 387 K/UL (ref 150–350)
PMV BLD AUTO: 9.9 FL (ref 9.2–12.9)
POC MOLECULAR INFLUENZA A AGN: NEGATIVE
POC MOLECULAR INFLUENZA B AGN: NEGATIVE
POTASSIUM SERPL-SCNC: 4.1 MMOL/L (ref 3.5–5.1)
PROT SERPL-MCNC: 8.5 G/DL (ref 6–8.4)
RBC # BLD AUTO: 5.7 M/UL (ref 4–5.4)
SODIUM SERPL-SCNC: 140 MMOL/L (ref 136–145)
TROPONIN I SERPL DL<=0.01 NG/ML-MCNC: <0.006 NG/ML (ref 0–0.03)
WBC # BLD AUTO: 10.77 K/UL (ref 3.9–12.7)

## 2019-10-19 PROCEDURE — 85025 COMPLETE CBC W/AUTO DIFF WBC: CPT

## 2019-10-19 PROCEDURE — 84484 ASSAY OF TROPONIN QUANT: CPT

## 2019-10-19 PROCEDURE — 25000003 PHARM REV CODE 250: Performed by: PHYSICIAN ASSISTANT

## 2019-10-19 PROCEDURE — 94640 AIRWAY INHALATION TREATMENT: CPT

## 2019-10-19 PROCEDURE — 25000242 PHARM REV CODE 250 ALT 637 W/ HCPCS: Performed by: PHYSICIAN ASSISTANT

## 2019-10-19 PROCEDURE — 83880 ASSAY OF NATRIURETIC PEPTIDE: CPT

## 2019-10-19 PROCEDURE — 80053 COMPREHEN METABOLIC PANEL: CPT

## 2019-10-19 RX ORDER — IPRATROPIUM BROMIDE AND ALBUTEROL SULFATE 2.5; .5 MG/3ML; MG/3ML
3 SOLUTION RESPIRATORY (INHALATION)
Status: COMPLETED | OUTPATIENT
Start: 2019-10-19 | End: 2019-10-19

## 2019-10-19 RX ORDER — PREDNISONE 20 MG/1
40 TABLET ORAL DAILY
Qty: 6 TABLET | Refills: 0 | Status: SHIPPED | OUTPATIENT
Start: 2019-10-19 | End: 2019-10-22

## 2019-10-19 RX ORDER — OXYMETAZOLINE HCL 0.05 %
1 SPRAY, NON-AEROSOL (ML) NASAL 2 TIMES DAILY
Qty: 15 ML | Refills: 0 | Status: SHIPPED | OUTPATIENT
Start: 2019-10-19 | End: 2019-10-22

## 2019-10-19 RX ORDER — ALBUTEROL SULFATE 90 UG/1
1-2 AEROSOL, METERED RESPIRATORY (INHALATION) EVERY 6 HOURS PRN
Qty: 1 INHALER | Refills: 0 | Status: SHIPPED | OUTPATIENT
Start: 2019-10-19 | End: 2022-02-11

## 2019-10-19 RX ORDER — BENZONATATE 100 MG/1
200 CAPSULE ORAL ONCE
Status: COMPLETED | OUTPATIENT
Start: 2019-10-19 | End: 2019-10-19

## 2019-10-19 RX ORDER — ACETAMINOPHEN 500 MG
1000 TABLET ORAL
Status: COMPLETED | OUTPATIENT
Start: 2019-10-19 | End: 2019-10-19

## 2019-10-19 RX ORDER — BENZONATATE 100 MG/1
100 CAPSULE ORAL 3 TIMES DAILY PRN
Qty: 20 CAPSULE | Refills: 0 | Status: SHIPPED | OUTPATIENT
Start: 2019-10-19 | End: 2019-10-29

## 2019-10-19 RX ADMIN — BENZONATATE 200 MG: 100 CAPSULE ORAL at 01:10

## 2019-10-19 RX ADMIN — ACETAMINOPHEN 1000 MG: 500 TABLET ORAL at 01:10

## 2019-10-19 RX ADMIN — IPRATROPIUM BROMIDE AND ALBUTEROL SULFATE 3 ML: .5; 3 SOLUTION RESPIRATORY (INHALATION) at 12:10

## 2019-10-19 NOTE — ED NOTES
Patient instructed on urine specimen collection.  Patient refused to give urine for UPT.  Patient educated on reasoning for UPT, but patient continues to refuse.  Midlevel provider notified of refusal

## 2019-10-19 NOTE — ED PROVIDER NOTES
Encounter Date: 10/18/2019       History     Chief Complaint   Patient presents with    Cough     The patient reports sob, a productive cough, runny nose, chest tightness x 2 days. Denies fevers, nausea, vomiting, diarrhea, abd pain, sore throat.    Chest Pain     44-year-old female with history of breast cancer (not currently under therapy) presents to emergency department for 2 day history of cough associated with general myalgias and pressure-like chest pain that is worse when she is coughing.  Denies nausea, vomiting, fever, diaphoresis, and abdominal pain.  No cardiac history.  Robitussin and TheraFlu taken earlier this morning with some relief of symptoms.  Notes her  has similar symptoms at home.  No recent use of antibiotics, hospitalization, or history of pneumonia.  Notes she has had similar symptoms in the past that were related to bronchitis improved with an inhaler.  She does not have an inhaler at home.        Review of patient's allergies indicates:   Allergen Reactions    Keflex [cephalexin] Itching     Past Medical History:   Diagnosis Date    Abnormal Pap smear     pt states 13yrs ago colpo was done    Anemia     Anxiety     Cancer     Cardiomyopathy     Fibroid     Hyperlipidemia     Hypertension     Sleep difficulties      Past Surgical History:   Procedure Laterality Date     SECTION      TONSILLECTOMY      TUBAL LIGATION      UTERINE FIBROID EMBOLIZATION       Family History   Problem Relation Age of Onset    Arthritis Mother     Diabetes Mother     Heart disease Mother         CHF, CAD , 2 stents    Hypertension Mother     Hyperlipidemia Mother     No Known Problems Sister     No Known Problems Father     Hypertension Brother     No Known Problems Daughter     Asthma Daughter     No Known Problems Maternal Aunt     No Known Problems Maternal Uncle     No Known Problems Paternal Aunt     No Known Problems Paternal Uncle     No Known Problems  Maternal Grandmother     Cancer Maternal Grandfather     No Known Problems Paternal Grandmother     No Known Problems Paternal Grandfather     Breast cancer Neg Hx     Colon cancer Neg Hx     Ovarian cancer Neg Hx      Social History     Tobacco Use    Smoking status: Never Smoker    Smokeless tobacco: Never Used   Substance Use Topics    Alcohol use: Not Currently     Alcohol/week: 0.0 standard drinks    Drug use: No     Review of Systems   Constitutional: Negative for fever.   HENT: Negative for congestion and sore throat.    Eyes: Negative for visual disturbance.   Respiratory: Positive for cough. Negative for shortness of breath.    Cardiovascular: Positive for chest pain. Negative for leg swelling.   Gastrointestinal: Negative for abdominal pain, nausea and vomiting.   Genitourinary: Negative for dysuria.   Musculoskeletal: Positive for myalgias.   Skin: Negative for rash.   Neurological: Negative for headaches.   All other systems reviewed and are negative.      Physical Exam     Initial Vitals [10/18/19 2227]   BP Pulse Resp Temp SpO2   (!) 174/107 84 18 98.6 °F (37 °C) 97 %      MAP       --         Physical Exam    Nursing note and vitals reviewed.  Constitutional: She appears well-developed and well-nourished. She is not diaphoretic. She is Obese . No distress.   HENT:   Head: Normocephalic and atraumatic.   Nasal congestion present without active rhinorrhea. No sinus TTP. TMs intact without erythema or swelling; able to discern bony landmarks. No mastoid tenderness or swelling behind the ears. No pain with manipulation of external ears. No oropharyngeal edema, swelling, erythema, tonsillar exudates, uvula deviation, changes in phonation, trismus, drooling, or cervical adenopathy. No meningeal signs.      Eyes: Conjunctivae and EOM are normal. Right eye exhibits no discharge. Left eye exhibits no discharge.   Neck: Normal range of motion. No tracheal deviation present. No JVD present.  "  Cardiovascular: Normal rate, regular rhythm and normal heart sounds. Exam reveals no friction rub.    No murmur heard.  Pulmonary/Chest: Breath sounds normal. No accessory muscle usage or stridor. No tachypnea. No respiratory distress. She has no decreased breath sounds. She has no wheezes. She has no rhonchi. She has no rales. She exhibits no tenderness.   Abdominal: There is no tenderness. There is no rigidity, no rebound, no guarding, no CVA tenderness, no tenderness at McBurney's point and negative Hickey's sign.   Musculoskeletal: She exhibits no edema or tenderness.   Chest pain is not reproducible with palpation or distracting movements.  No overlying skin abnormalities    No lower extremity swelling or tenderness.   Neurological: She is alert and oriented to person, place, and time.   Skin: Skin is warm and dry. No rash and no abscess noted. No erythema. No pallor.         ED Course   Procedures  Labs Reviewed   CBC W/ AUTO DIFFERENTIAL - Abnormal; Notable for the following components:       Result Value    RBC 5.70 (*)     Hemoglobin 11.9 (*)     Mean Corpuscular Volume 70 (*)     Mean Corpuscular Hemoglobin 20.9 (*)     Mean Corpuscular Hemoglobin Conc 29.7 (*)     RDW 19.7 (*)     Platelets 387 (*)     All other components within normal limits   COMPREHENSIVE METABOLIC PANEL - Abnormal; Notable for the following components:    Total Protein 8.5 (*)     All other components within normal limits   TROPONIN I   B-TYPE NATRIURETIC PEPTIDE   POCT INFLUENZA A/B MOLECULAR          Imaging Results          X-Ray Chest PA And Lateral (Final result)  Result time 10/19/19 00:44:10    Final result by Adolph Kraus MD (10/19/19 00:44:10)                 Impression:      No acute cardiopulmonary finding.      Electronically signed by: Adolph Kraus MD  Date:    10/19/2019  Time:    00:44             Narrative:    EXAMINATION:  XR CHEST PA AND LATERAL    CLINICAL HISTORY:  Provided history is "  " "Cough".    TECHNIQUE:  Frontal and lateral views of the chest were performed.    COMPARISON:  CT chest, 10/08/2019.  Chest radiograph, 04/05/2018.    FINDINGS:  Cardiomediastinal silhouette is stable and not felt to be significantly enlarged.  Subcentimeter pulmonary nodules seen on recent CT are not well characterized with plain radiography.  No focal area of consolidation.  No sizable pleural effusion.  No pneumothorax.                                 Medical Decision Making:   History:   Old Medical Records: I decided to obtain old medical records.  Independently Interpreted Test(s):   I have ordered and independently interpreted X-rays - see prior notes.  I have ordered and independently interpreted EKG Reading(s) - see prior notes  Clinical Tests:   Lab Tests: Ordered and Reviewed  Radiological Study: Ordered and Reviewed  Medical Tests: Ordered and Reviewed  ED Management:  Presentation overall most consistent with bronchitis in the setting of viral URI.  No pneumonia, pneumothorax, or widening mediastinum on chest x-ray.  EKG shows no significant arrhythmia.  Cardiac workup unremarkable. No history of trauma.  Chest pain likely secondary to her persistent coughing.    Sent home with supportive care.  Advising follow-up with PCP. Strict return precautions discussed with patient who is agreeable to the plan.                      Clinical Impression:       ICD-10-CM ICD-9-CM   1. Acute bronchitis, unspecified organism J20.9 466.0   2. Chest pain R07.9 786.50   3. Cough R05 786.2                                Refugio Nye PA-C  10/19/19 0452    "

## 2019-10-21 ENCOUNTER — TELEPHONE (OUTPATIENT)
Dept: PHARMACY | Facility: CLINIC | Age: 44
End: 2019-10-21

## 2019-10-21 ENCOUNTER — TELEPHONE (OUTPATIENT)
Dept: OBSTETRICS AND GYNECOLOGY | Facility: CLINIC | Age: 44
End: 2019-10-21

## 2019-10-21 NOTE — TELEPHONE ENCOUNTER
----- Message from Tiffany Sellers sent at 10/18/2019 10:29 AM CDT -----  Good morning, Dr. Mendez is referring this patient to see .can you please assist with scheduling her

## 2019-10-22 ENCOUNTER — PATIENT MESSAGE (OUTPATIENT)
Dept: HEMATOLOGY/ONCOLOGY | Facility: CLINIC | Age: 44
End: 2019-10-22

## 2019-10-22 ENCOUNTER — PATIENT MESSAGE (OUTPATIENT)
Dept: ADMINISTRATIVE | Facility: OTHER | Age: 44
End: 2019-10-22

## 2019-10-22 ENCOUNTER — DOCUMENTATION ONLY (OUTPATIENT)
Dept: RADIATION ONCOLOGY | Facility: CLINIC | Age: 44
End: 2019-10-22

## 2019-10-22 ENCOUNTER — OFFICE VISIT (OUTPATIENT)
Dept: CARDIOLOGY | Facility: CLINIC | Age: 44
End: 2019-10-22
Payer: COMMERCIAL

## 2019-10-22 ENCOUNTER — HOSPITAL ENCOUNTER (EMERGENCY)
Facility: HOSPITAL | Age: 44
Discharge: HOME | End: 2019-10-22
Attending: EMERGENCY MEDICINE
Payer: COMMERCIAL

## 2019-10-22 VITALS
SYSTOLIC BLOOD PRESSURE: 179 MMHG | HEART RATE: 80 BPM | WEIGHT: 293 LBS | HEIGHT: 64 IN | BODY MASS INDEX: 50.02 KG/M2 | DIASTOLIC BLOOD PRESSURE: 83 MMHG

## 2019-10-22 VITALS
TEMPERATURE: 98 F | SYSTOLIC BLOOD PRESSURE: 138 MMHG | WEIGHT: 293 LBS | BODY MASS INDEX: 60.76 KG/M2 | OXYGEN SATURATION: 98 % | DIASTOLIC BLOOD PRESSURE: 77 MMHG | RESPIRATION RATE: 18 BRPM | HEART RATE: 87 BPM

## 2019-10-22 DIAGNOSIS — M54.41 ACUTE BILATERAL LOW BACK PAIN WITH BILATERAL SCIATICA: Primary | ICD-10-CM

## 2019-10-22 DIAGNOSIS — M54.42 ACUTE BILATERAL LOW BACK PAIN WITH BILATERAL SCIATICA: Primary | ICD-10-CM

## 2019-10-22 DIAGNOSIS — E66.01 MORBID OBESITY WITH BODY MASS INDEX OF 50.0-59.9 IN ADULT: ICD-10-CM

## 2019-10-22 DIAGNOSIS — Z17.0 MALIGNANT NEOPLASM OF UPPER-INNER QUADRANT OF RIGHT BREAST IN FEMALE, ESTROGEN RECEPTOR POSITIVE: ICD-10-CM

## 2019-10-22 DIAGNOSIS — C50.919 STAGE IV BREAST CANCER IN FEMALE: ICD-10-CM

## 2019-10-22 DIAGNOSIS — E78.00 PURE HYPERCHOLESTEROLEMIA: ICD-10-CM

## 2019-10-22 DIAGNOSIS — C50.211 MALIGNANT NEOPLASM OF UPPER-INNER QUADRANT OF RIGHT BREAST IN FEMALE, ESTROGEN RECEPTOR POSITIVE: ICD-10-CM

## 2019-10-22 DIAGNOSIS — C79.51 BONY METASTASIS: ICD-10-CM

## 2019-10-22 DIAGNOSIS — I42.0 DILATED CARDIOMYOPATHY: Primary | ICD-10-CM

## 2019-10-22 DIAGNOSIS — I10 ESSENTIAL HYPERTENSION: ICD-10-CM

## 2019-10-22 PROCEDURE — 99999 PR PBB SHADOW E&M-EST. PATIENT-LVL V: CPT | Mod: PBBFAC,,, | Performed by: INTERNAL MEDICINE

## 2019-10-22 PROCEDURE — 3077F SYST BP >= 140 MM HG: CPT | Mod: CPTII,S$GLB,, | Performed by: INTERNAL MEDICINE

## 2019-10-22 PROCEDURE — 3079F PR MOST RECENT DIASTOLIC BLOOD PRESSURE 80-89 MM HG: ICD-10-PCS | Mod: CPTII,S$GLB,, | Performed by: INTERNAL MEDICINE

## 2019-10-22 PROCEDURE — 99214 PR OFFICE/OUTPT VISIT, EST, LEVL IV, 30-39 MIN: ICD-10-PCS | Mod: S$GLB,,, | Performed by: INTERNAL MEDICINE

## 2019-10-22 PROCEDURE — 3079F DIAST BP 80-89 MM HG: CPT | Mod: CPTII,S$GLB,, | Performed by: INTERNAL MEDICINE

## 2019-10-22 PROCEDURE — 99999 PR PBB SHADOW E&M-EST. PATIENT-LVL V: ICD-10-PCS | Mod: PBBFAC,,, | Performed by: INTERNAL MEDICINE

## 2019-10-22 PROCEDURE — 3077F PR MOST RECENT SYSTOLIC BLOOD PRESSURE >= 140 MM HG: ICD-10-PCS | Mod: CPTII,S$GLB,, | Performed by: INTERNAL MEDICINE

## 2019-10-22 PROCEDURE — 99214 OFFICE O/P EST MOD 30 MIN: CPT | Mod: S$GLB,,, | Performed by: INTERNAL MEDICINE

## 2019-10-22 PROCEDURE — 96372 THER/PROPH/DIAG INJ SC/IM: CPT | Mod: ER

## 2019-10-22 PROCEDURE — 63600175 PHARM REV CODE 636 W HCPCS: Mod: ER | Performed by: EMERGENCY MEDICINE

## 2019-10-22 PROCEDURE — 3008F BODY MASS INDEX DOCD: CPT | Mod: CPTII,S$GLB,, | Performed by: INTERNAL MEDICINE

## 2019-10-22 PROCEDURE — 3008F PR BODY MASS INDEX (BMI) DOCUMENTED: ICD-10-PCS | Mod: CPTII,S$GLB,, | Performed by: INTERNAL MEDICINE

## 2019-10-22 PROCEDURE — 99284 EMERGENCY DEPT VISIT MOD MDM: CPT | Mod: 25,ER

## 2019-10-22 RX ORDER — KETOROLAC TROMETHAMINE 30 MG/ML
60 INJECTION, SOLUTION INTRAMUSCULAR; INTRAVENOUS
Status: COMPLETED | OUTPATIENT
Start: 2019-10-22 | End: 2019-10-22

## 2019-10-22 RX ORDER — CARVEDILOL 6.25 MG/1
6.25 TABLET ORAL 2 TIMES DAILY WITH MEALS
Qty: 60 TABLET | Refills: 11 | Status: SHIPPED | OUTPATIENT
Start: 2019-10-22 | End: 2020-04-09

## 2019-10-22 RX ORDER — KETOROLAC TROMETHAMINE 10 MG/1
10 TABLET, FILM COATED ORAL EVERY 6 HOURS PRN
Qty: 12 TABLET | Refills: 0 | Status: ON HOLD | OUTPATIENT
Start: 2019-10-22 | End: 2019-12-16

## 2019-10-22 RX ORDER — METHOCARBAMOL 750 MG/1
1500 TABLET, FILM COATED ORAL EVERY 6 HOURS
Qty: 24 TABLET | Refills: 0 | Status: SHIPPED | OUTPATIENT
Start: 2019-10-22 | End: 2019-10-25

## 2019-10-22 RX ADMIN — KETOROLAC TROMETHAMINE 60 MG: 30 INJECTION, SOLUTION INTRAMUSCULAR at 07:10

## 2019-10-22 NOTE — TELEPHONE ENCOUNTER
Attempted to reach patient regarding missed consult appointment at 11am for Ibrance.  LVM.  Will continue to follow up.

## 2019-10-22 NOTE — PROGRESS NOTES
Subjective:   Patient ID:  Kristopher Ye is a 44 y.o. female who presents for follow-up of Malignant neoplasm of upper-inner quadrant of right breast i and Bony metastasis      HPI: She was recently diagnosed with stage IV ER+, OH-, Her 2 + invasive ductal carcinoma of the right breast. She was evaluated by Dr. Mendez and a pre-chemo echocardiogram was obtained which showed a dilated LV measuring 6.35 cm with severely reduced LV function of 30%. Concentric LVH and a restrictive filling pattern was also seen. Her previous echo was a DSE done in 2017 which showed an LVEF of 45-50% and was negative for ischemia. She currently feels ok. Kristopher Ye denies any chest pain, shortness of breath, PND, orthopnea, palpitations, leg edema, or syncope. She reports some right hip pain and pain across her lumbar spine after coughing on . She has not had her sleep study done. She did not take her medications yet today. She states her BP at school runs 170/80's. She has not yet started treatment for her breast cancer. Recent BNP and troponin levels were normal.    ECG; (10/2/19): NSR 87 bpm    Past Medical History:   Diagnosis Date    Abnormal Pap smear     pt states 13yrs ago colpo was done    Anemia     Anxiety     Cancer     Cardiomyopathy     Fibroid     Hyperlipidemia     Hypertension     Sleep difficulties        Past Surgical History:   Procedure Laterality Date     SECTION      TONSILLECTOMY      TUBAL LIGATION      UTERINE FIBROID EMBOLIZATION         Social History     Socioeconomic History    Marital status:      Spouse name: Not on file    Number of children: Not on file    Years of education: Not on file    Highest education level: Not on file   Occupational History    Not on file   Social Needs    Financial resource strain: Not on file    Food insecurity:     Worry: Not on file     Inability: Not on file    Transportation needs:     Medical: Not on file      Non-medical: Not on file   Tobacco Use    Smoking status: Never Smoker    Smokeless tobacco: Never Used   Substance and Sexual Activity    Alcohol use: Not Currently     Alcohol/week: 0.0 standard drinks    Drug use: No    Sexual activity: Yes     Partners: Male     Birth control/protection: Surgical   Lifestyle    Physical activity:     Days per week: Not on file     Minutes per session: Not on file    Stress: Not on file   Relationships    Social connections:     Talks on phone: Not on file     Gets together: Not on file     Attends Buddhism service: Not on file     Active member of club or organization: Not on file     Attends meetings of clubs or organizations: Not on file     Relationship status: Not on file   Other Topics Concern    Patient feels they ought to cut down on drinking/drug use Not Asked    Patient annoyed by others criticizing their drinking/drug use Not Asked    Patient has felt bad or guilty about drinking/drug use Not Asked    Patient has had a drink/used drugs as an eye opener in the AM Not Asked   Social History Narrative    Patient is a pleasant AAF,  x2. She has excellent social support, although she states they worry more than she does. She has a 15 yo and an 17 yo.       Family History   Problem Relation Age of Onset    Arthritis Mother     Diabetes Mother     Heart disease Mother         CHF, CAD , 2 stents    Hypertension Mother     Hyperlipidemia Mother     Heart failure Mother     No Known Problems Sister     No Known Problems Father     Hypertension Brother     No Known Problems Daughter     Asthma Daughter     No Known Problems Maternal Aunt     No Known Problems Maternal Uncle     No Known Problems Paternal Aunt     No Known Problems Paternal Uncle     No Known Problems Maternal Grandmother     Cancer Maternal Grandfather     No Known Problems Paternal Grandmother     No Known Problems Paternal Grandfather     Breast cancer Neg Hx      Colon cancer Neg Hx     Ovarian cancer Neg Hx        Patient's Medications   New Prescriptions    CARVEDILOL (COREG) 6.25 MG TABLET    Take 1 tablet (6.25 mg total) by mouth 2 (two) times daily with meals.   Previous Medications    ALBUTEROL (PROVENTIL/VENTOLIN HFA) 90 MCG/ACTUATION INHALER    Inhale 1-2 puffs into the lungs every 6 (six) hours as needed for Wheezing. Rescue    ALBUTEROL 90 MCG/ACTUATION INHALER    Inhale 2 puffs into the lungs every 6 (six) hours as needed for Wheezing. Rescue    AMLODIPINE (NORVASC) 5 MG TABLET    Take 1 tablet (5 mg total) by mouth once daily.    ANASTROZOLE (ARIMIDEX) 1 MG TAB    Take 1 tablet (1 mg total) by mouth once daily.    BENZONATATE (TESSALON) 100 MG CAPSULE    Take 1 capsule (100 mg total) by mouth 3 (three) times daily as needed for Cough.    CITALOPRAM (CELEXA) 20 MG TABLET    Take 1 tablet (20 mg total) by mouth once daily.    DICLOFENAC SODIUM (VOLTAREN) 1 % GEL    Apply 2 g topically 3 (three) times daily. for 10 days    HYDROCODONE-ACETAMINOPHEN (NORCO) 5-325 MG PER TABLET    Take 1 tablet by mouth every 4 (four) hours as needed for Pain.    HYDROXYZINE PAMOATE (VISTARIL) 50 MG CAP    One every 6 hours as needed for anxiety    IBUPROFEN (ADVIL,MOTRIN) 800 MG TABLET    Take 1 tablet (800 mg total) by mouth 3 (three) times daily.    IRBESARTAN (AVAPRO) 75 MG TABLET    Take 1 tablet (75 mg total) by mouth every evening.    LORATADINE (CLARITIN) 10 MG TABLET    Take 10 mg by mouth daily as needed.     LORAZEPAM (ATIVAN) 0.5 MG TABLET    Take 1 tablet (0.5 mg total) by mouth every 12 (twelve) hours as needed for Anxiety.    ONDANSETRON (ZOFRAN) 8 MG TABLET    Take 1 tablet (8 mg total) by mouth every 8 (eight) hours as needed for Nausea.    ONDANSETRON (ZOFRAN-ODT) 4 MG TBDL    Take 2 tablets (8 mg total) by mouth every 8 (eight) hours as needed.    OXYMETAZOLINE (AFRIN) 0.05 % NASAL SPRAY    1 spray by Nasal route 2 (two) times daily. DO NOT EXCEED USE FOR MORE THAN  3 DAYS IN A ROW. for 3 days    PALBOCICLIB (IBRANCE) 125 MG CAP    Take 125 mg by mouth as instructed Take as directed days 1-21 of each 28 day cycle. Take with food.    PANTOPRAZOLE (PROTONIX) 40 MG TABLET    Please take one tablet by mouth every 12 hours when you have discomfort, then take one tablet every day.    PE-DM-ASA/HZYGSS-ZY-UT-ASA (IVAN-SELTZER PLUS DAY-NIGHT) 7.8-325 MG(D)/ 6. MG(NT) TEFS    Take 2 capsules by mouth every 4 (four) hours as needed. Do not exceed more than 10 capsules in 24 hours. Do not exceed recommended dose. Children under 12 years of age:  DO NOT USE.    PREDNISONE (DELTASONE) 20 MG TABLET    Take 2 tablets (40 mg total) by mouth once daily. for 3 days    TRAMADOL (ULTRAM) 50 MG TABLET    Take 50 mg by mouth every 6 (six) hours.   Modified Medications    No medications on file   Discontinued Medications    No medications on file       Review of Systems   Constitution: Negative for malaise/fatigue and weight gain.   HENT: Negative for hearing loss.    Eyes: Negative for visual disturbance.   Cardiovascular: Negative for chest pain, claudication, dyspnea on exertion, leg swelling, near-syncope, orthopnea, palpitations, paroxysmal nocturnal dyspnea and syncope.   Respiratory: Negative for cough, shortness of breath, sleep disturbances due to breathing, snoring and wheezing.    Endocrine: Negative for cold intolerance, heat intolerance, polydipsia, polyphagia and polyuria.   Hematologic/Lymphatic: Negative for bleeding problem. Does not bruise/bleed easily.   Skin: Negative for rash and suspicious lesions.   Musculoskeletal: Positive for back pain and joint pain. Negative for arthritis, falls, muscle weakness and myalgias.   Gastrointestinal: Negative for abdominal pain, change in bowel habit, constipation, diarrhea, heartburn, hematochezia, melena and nausea.   Genitourinary: Negative for hematuria and nocturia.   Neurological: Negative for excessive daytime sleepiness, dizziness,  "headaches, light-headedness, loss of balance and weakness.   Psychiatric/Behavioral: Negative for depression. The patient is not nervous/anxious.    Allergic/Immunologic: Negative for environmental allergies.       BP (!) 179/83 (BP Location: Left arm, Patient Position: Sitting, BP Method: Large (Automatic))   Pulse 80   Ht 5' 4" (1.626 m)   Wt (!) 160.4 kg (353 lb 9.9 oz)   BMI 60.70 kg/m²     Objective:   Physical Exam   Constitutional: She is oriented to person, place, and time. She appears well-developed and well-nourished.        HENT:   Head: Normocephalic and atraumatic.   Mouth/Throat: Oropharynx is clear and moist.   Eyes: Pupils are equal, round, and reactive to light. Conjunctivae and EOM are normal. No scleral icterus.   Neck: Normal range of motion. Neck supple. No hepatojugular reflux and no JVD present. No tracheal deviation present. No thyromegaly present.   Cardiovascular: Normal rate, regular rhythm, normal heart sounds and intact distal pulses. PMI is not displaced.   Pulses:       Carotid pulses are 2+ on the right side, and 2+ on the left side.       Radial pulses are 2+ on the right side, and 2+ on the left side.        Dorsalis pedis pulses are 2+ on the right side, and 2+ on the left side.        Posterior tibial pulses are 2+ on the right side, and 2+ on the left side.   Pulmonary/Chest: Effort normal and breath sounds normal.   Abdominal: Soft. Bowel sounds are normal. She exhibits no distension and no mass. There is no hepatosplenomegaly. There is no tenderness.   Musculoskeletal: She exhibits no edema or tenderness.   Lymphadenopathy:     She has no cervical adenopathy.   Neurological: She is alert and oriented to person, place, and time.   Skin: Skin is warm and dry. No rash noted. No cyanosis or erythema. Nails show no clubbing.   Psychiatric: She has a normal mood and affect. Her speech is normal and behavior is normal.       Lab Results   Component Value Date     10/19/2019 "    K 4.1 10/19/2019     10/19/2019    CO2 24 10/19/2019    BUN 13 10/19/2019    CREATININE 0.9 10/19/2019     10/19/2019    HGBA1C 6.2 (H) 08/16/2017    MG 1.8 01/26/2017    AST 20 10/19/2019    ALT 23 10/19/2019    ALBUMIN 3.6 10/19/2019    PROT 8.5 (H) 10/19/2019    BILITOT 0.4 10/19/2019    WBC 10.77 10/19/2019    HGB 11.9 (L) 10/19/2019    HCT 40.1 10/19/2019    MCV 70 (L) 10/19/2019     (H) 10/19/2019    INR 1.0 07/22/2015    TSH 1.286 03/28/2019    CHOL 229 (H) 03/28/2019    HDL 48 03/28/2019    LDLCALC 158.2 03/28/2019    TRIG 114 03/28/2019    BNP 44 10/19/2019       Assessment:     1. Dilated cardiomyopathy : She has  newly depressed LV systolic function. Her ventricle is dilated. I suspect this is secondary to uncontrolled HTN, likely sleep apnea and obesity. There may also be a genetic component as her mother had CHF as well. She appears well compensated today. I have started carvedilol 6.25 mg bid in addition to her irbesartan. We discussed that the goal is to up-titrate her medications to target doses.  I have re-referred her to Sleep Medicine to be evaluate at the [facility] for sleep apnea.   2. Pure hypercholesterolemia : Her ASCVD risk score is 15.4%. Will address statin therapy at her next visit.   3. Essential hypertension : Her blood pressure is not controlled. Carvedilol started. I have enrolled her in the Digital HTN program. I will see her back in 4 weeks.   4. Malignant neoplasm of upper-inner quadrant of right breast in female, estrogen receptor positive: She is not a candidate for anthracycline or anti-HER 2 therapy with Herceptin given her severely reduced LVEF.   5. Morbid obesity (BMI= 55.9 on 10/30/14) : Significant weight loss is recommended.       Plan:     Kristopher was seen today for malignant neoplasm of upper-inner quadrant of right breast i and bony metastasis.    Diagnoses and all orders for this visit:    Dilated cardiomyopathy  -     carvedilol (COREG)  6.25 MG tablet; Take 1 tablet (6.25 mg total) by mouth 2 (two) times daily with meals.  -     Ambulatory referral to Sleep Disorders    Pure hypercholesterolemia  -     carvedilol (COREG) 6.25 MG tablet; Take 1 tablet (6.25 mg total) by mouth 2 (two) times daily with meals.  -     Ambulatory referral to Sleep Disorders    Essential hypertension  -     carvedilol (COREG) 6.25 MG tablet; Take 1 tablet (6.25 mg total) by mouth 2 (two) times daily with meals.  -     Ambulatory referral to Sleep Disorders  -     [facility]) Enrollment Order  -     Hypertension Digital Medicine ([facility]): Assign Onboarding Questionnaires    Malignant neoplasm of upper-inner quadrant of right breast in female, estrogen receptor positive    Morbid obesity (BMI= 55.9 on 10/30/14)        Thank you for allowing me to participate in this patient's care. Please do not hesitate to contact me with any questions or concerns.

## 2019-10-22 NOTE — PATIENT INSTRUCTIONS
Take a dose of lasix for weight gain of 3 or more lbs in 24 hours or 5 lbs in one week.    Limit sodium intake to < 1500mg daily.

## 2019-10-23 NOTE — ED PROVIDER NOTES
"Encounter Date: 10/22/2019    SCRIBE #1 NOTE: I, Stefanie Burns , am scribing for, and in the presence of,  Dr. Dunne. I have scribed the following portions of the note - Other sections scribed: HPI, ROS, PE.       History     Chief Complaint   Patient presents with    Back Pain     Pt states," I hurt my back three days ago. I have spinal CA and my DrDaniela said come in."     44 year old female with back pain (7/10) radiating down bilateral legs, stopping at hips x 3 days. Patient was bending  down to  laundry basket (not heavy) when pain began. Denies difficulty with urination, urgency, numbness, or issues with ambulation. Patient took 2 doses of Tylenol Arthritis, without relief. She was recently diagnosed with breast cancer spreading to bones and was directed to come to ED by doctor Patient has not began cancer treatment.    The history is provided by the patient. No  was used.     Review of patient's allergies indicates:   Allergen Reactions    Keflex [cephalexin] Itching     Past Medical History:   Diagnosis Date    Abnormal Pap smear     pt states 13yrs ago colpo was done    Anemia     Anxiety     Cancer     Cardiomyopathy     Fibroid     Hyperlipidemia     Hypertension     Sleep difficulties      Past Surgical History:   Procedure Laterality Date     SECTION      TONSILLECTOMY      TUBAL LIGATION      UTERINE FIBROID EMBOLIZATION       Family History   Problem Relation Age of Onset    Arthritis Mother     Diabetes Mother     Heart disease Mother         CHF, CAD , 2 stents    Hypertension Mother     Hyperlipidemia Mother     Heart failure Mother     No Known Problems Sister     No Known Problems Father     Hypertension Brother     No Known Problems Daughter     Asthma Daughter     No Known Problems Maternal Aunt     No Known Problems Maternal Uncle     No Known Problems Paternal Aunt     No Known Problems Paternal Uncle     No Known " Problems Maternal Grandmother     Cancer Maternal Grandfather     No Known Problems Paternal Grandmother     No Known Problems Paternal Grandfather     Breast cancer Neg Hx     Colon cancer Neg Hx     Ovarian cancer Neg Hx      Social History     Tobacco Use    Smoking status: Never Smoker    Smokeless tobacco: Never Used   Substance Use Topics    Alcohol use: Not Currently     Alcohol/week: 0.0 standard drinks    Drug use: No     Review of Systems   Genitourinary: Negative for difficulty urinating, frequency and urgency.   Musculoskeletal: Positive for arthralgias (bilateral hips) and back pain. Negative for gait problem.   Neurological: Negative for weakness and numbness.   All other systems reviewed and are negative.      Physical Exam     Initial Vitals [10/22/19 1859]   BP Pulse Resp Temp SpO2   (!) 157/102 76 16 98.2 °F (36.8 °C) 99 %      MAP       --         Physical Exam    Nursing note and vitals reviewed.  Constitutional: She appears well-developed and well-nourished. She is not diaphoretic. No distress.   HENT:   Head: Normocephalic and atraumatic.   Mouth/Throat: Oropharynx is clear and moist.   Eyes: Conjunctivae are normal.   Neck: Normal range of motion and phonation normal. Neck supple. No stridor present.   Cardiovascular: Normal rate, regular rhythm, intact distal pulses and normal pulses. Exam reveals no gallop and no friction rub.    No murmur heard.  Pulses:       Dorsalis pedis pulses are 2+ on the right side, and 2+ on the left side.        Posterior tibial pulses are 2+ on the right side, and 2+ on the left side.   Pulmonary/Chest: Effort normal. No stridor. No respiratory distress.   Musculoskeletal: Normal range of motion. She exhibits no edema.        Lumbar back: She exhibits pain.        Right foot: There is no swelling.        Left foot: There is no swelling.   Positive straight leg raise bilaterally on exam.      No midline or paraspinal tenderness   Neurological: She is  alert and oriented to person, place, and time. Gait normal.   Skin: Skin is warm and dry.   Psychiatric: She has a normal mood and affect.         ED Course   Procedures  Labs Reviewed - No data to display       Imaging Results          CT Lumbar Spine Without Contrast (Final result)  Result time 10/22/19 20:17:05    Final result by Ann Barragan MD (10/22/19 20:17:05)                 Impression:      Multiple lytic lesions throughout the skeleton.  The largest involves the L3 vertebral body.    There is a lytic lesion of the L2 vertebral body with new inferior endplate fracture displaced internal (superior) to the vertebral body.  No definite loss of height.  No subluxation.      Electronically signed by: Ann Barragan  Date:    10/22/2019  Time:    20:17             Narrative:    EXAMINATION:  CT LUMBAR SPINE WITHOUT CONTRAST    CLINICAL HISTORY:  Low back pain, minor trauma;    TECHNIQUE:  Low-dose axial, sagittal and coronal reformations are obtained through the lumbar spine.  Contrast was not administered.    COMPARISON:  10/08/2019 CT    FINDINGS:  5 lumbar-type vertebra are assigned for numbering purposes on this exam.    There are multiple lytic lesions consistent with metastasis.  The largest involves L3 vertebral body and measures 2.3 x 2.3 x 2.1 centimetres without fracture appreciated.    There is a lytic 2 x 2.2 by 2.1 centimetres lesion of L2 vertebral body.  The inferior endplate of L2 shows new superior displacement, this is not as well demonstrated on the 10/08/2019 exam but appears to be new consistent with new inferior endplate fracture.  No definite loss of height.    No fracture or compression deformity.  No definite intraspinal canal extension seen with CT imaging.  There are small lytic lesions of the right iliac.  There is a small lytic lesion of S1.    No endplate erosion or subluxation.  No pars defect.  There is probably a posterior element spinous process lesion of the T10 level  partially visualized on coverage of the exam.    No bowel dilation or ascites seen.  The visualized kidneys and adrenal glands appear normal.  No urolithiasis.  No ureteral dilation or calculus visualized.  No pneumothorax or pleural effusion found.  No SI joint widening.                                 Medical Decision Making:   History:   Old Medical Records: I decided to obtain old medical records.  Clinical Tests:   Radiological Study: Ordered    Labs Reviewed       Imaging Reviewed    Imaging Results          CT Lumbar Spine Without Contrast (Final result)  Result time 10/22/19 20:17:05    Final result by Ann Barragan MD (10/22/19 20:17:05)                 Impression:      Multiple lytic lesions throughout the skeleton.  The largest involves the L3 vertebral body.    There is a lytic lesion of the L2 vertebral body with new inferior endplate fracture displaced internal (superior) to the vertebral body.  No definite loss of height.  No subluxation.      Electronically signed by: Ann Barragan  Date:    10/22/2019  Time:    20:17             Narrative:    EXAMINATION:  CT LUMBAR SPINE WITHOUT CONTRAST    CLINICAL HISTORY:  Low back pain, minor trauma;    TECHNIQUE:  Low-dose axial, sagittal and coronal reformations are obtained through the lumbar spine.  Contrast was not administered.    COMPARISON:  10/08/2019 CT    FINDINGS:  5 lumbar-type vertebra are assigned for numbering purposes on this exam.    There are multiple lytic lesions consistent with metastasis.  The largest involves L3 vertebral body and measures 2.3 x 2.3 x 2.1 centimetres without fracture appreciated.    There is a lytic 2 x 2.2 by 2.1 centimetres lesion of L2 vertebral body.  The inferior endplate of L2 shows new superior displacement, this is not as well demonstrated on the 10/08/2019 exam but appears to be new consistent with new inferior endplate fracture.  No definite loss of height.    No fracture or compression deformity.  No  definite intraspinal canal extension seen with CT imaging.  There are small lytic lesions of the right iliac.  There is a small lytic lesion of S1.    No endplate erosion or subluxation.  No pars defect.  There is probably a posterior element spinous process lesion of the T10 level partially visualized on coverage of the exam.    No bowel dilation or ascites seen.  The visualized kidneys and adrenal glands appear normal.  No urolithiasis.  No ureteral dilation or calculus visualized.  No pneumothorax or pleural effusion found.  No SI joint widening.                                Medications given in ED    Medications   ketorolac injection 60 mg (60 mg Intramuscular Given 10/22/19 1943)       This document was produced by a scribe under my direction and in my presence. I agree with the content of the note and have made any necessary edits.     Kishore Dunne MD         Note was created using voice recognition software. Note may have occasional typographical errors that may not have been identified and edited despite good jd initial review prior to signing.            Scribe Attestation:   Scribe #1: I performed the above scribed service and the documentation accurately describes the services I performed. I attest to the accuracy of the note.      Discharge Medications     Discharge Medication List as of 10/22/2019  8:32 PM      START taking these medications    Details   ketorolac (TORADOL) 10 mg tablet Take 1 tablet (10 mg total) by mouth every 6 (six) hours as needed for Pain., Starting Tue 10/22/2019, Print      methocarbamol (ROBAXIN) 750 MG Tab Take 2 tablets (1,500 mg total) by mouth every 6 (six) hours. for 3 days, Starting Tue 10/22/2019, Until Fri 10/25/2019, Print         CONTINUE these medications which have NOT CHANGED    Details   !! albuterol (PROVENTIL/VENTOLIN HFA) 90 mcg/actuation inhaler Inhale 1-2 puffs into the lungs every 6 (six) hours as needed for Wheezing. Rescue, Starting Sat 10/19/2019,  Print      !! albuterol 90 mcg/actuation inhaler Inhale 2 puffs into the lungs every 6 (six) hours as needed for Wheezing. Rescue, Starting Wed 3/7/2018, Print      amLODIPine (NORVASC) 5 MG tablet Take 1 tablet (5 mg total) by mouth once daily., Starting Thu 3/28/2019, Until Fri 3/27/2020, Normal      anastrozole (ARIMIDEX) 1 mg Tab Take 1 tablet (1 mg total) by mouth once daily., Starting Wed 10/16/2019, Normal      benzonatate (TESSALON) 100 MG capsule Take 1 capsule (100 mg total) by mouth 3 (three) times daily as needed for Cough., Starting Sat 10/19/2019, Until Tue 10/29/2019, Print      carvedilol (COREG) 6.25 MG tablet Take 1 tablet (6.25 mg total) by mouth 2 (two) times daily with meals., Starting Tue 10/22/2019, Until Wed 10/21/2020, Normal      citalopram (CELEXA) 20 MG tablet Take 1 tablet (20 mg total) by mouth once daily., Starting Wed 8/7/2019, Until Thu 8/6/2020, Normal      diclofenac sodium (VOLTAREN) 1 % Gel Apply 2 g topically 3 (three) times daily. for 10 days, Starting Fri 8/23/2019, Until Mon 9/2/2019, Print      HYDROcodone-acetaminophen (NORCO) 5-325 mg per tablet Take 1 tablet by mouth every 4 (four) hours as needed for Pain., Starting Fri 8/23/2019, Print      hydrOXYzine pamoate (VISTARIL) 50 MG Cap One every 6 hours as needed for anxiety, Normal      ibuprofen (ADVIL,MOTRIN) 800 MG tablet Take 1 tablet (800 mg total) by mouth 3 (three) times daily., Starting Wed 8/7/2019, Normal      irbesartan (AVAPRO) 75 MG tablet Take 1 tablet (75 mg total) by mouth every evening., Starting Thu 3/28/2019, Until Fri 3/27/2020, Normal      loratadine (CLARITIN) 10 mg tablet Take 10 mg by mouth daily as needed. , Historical Med      LORazepam (ATIVAN) 0.5 MG tablet Take 1 tablet (0.5 mg total) by mouth every 12 (twelve) hours as needed for Anxiety., Starting Thu 10/10/2019, Until Fri 10/9/2020, Normal      ondansetron (ZOFRAN) 8 MG tablet Take 1 tablet (8 mg total) by mouth every 8 (eight) hours as  needed for Nausea., Starting Wed 10/16/2019, Normal      ondansetron (ZOFRAN-ODT) 4 MG TbDL Take 2 tablets (8 mg total) by mouth every 8 (eight) hours as needed., Starting Wed 1/9/2019, Normal      oxymetazoline (AFRIN) 0.05 % nasal spray 1 spray by Nasal route 2 (two) times daily. DO NOT EXCEED USE FOR MORE THAN 3 DAYS IN A ROW. for 3 days, Starting Sat 10/19/2019, Until Tue 10/22/2019, Print      palbociclib (IBRANCE) 125 mg Cap Take 125 mg by mouth as instructed Take as directed days 1-21 of each 28 day cycle. Take with food., Starting Wed 10/16/2019, Normal      pantoprazole (PROTONIX) 40 MG tablet Please take one tablet by mouth every 12 hours when you have discomfort, then take one tablet every day., Print      predniSONE (DELTASONE) 20 MG tablet Take 2 tablets (40 mg total) by mouth once daily. for 3 days, Starting Sat 10/19/2019, Until Tue 10/22/2019, Print      traMADol (ULTRAM) 50 mg tablet Take 50 mg by mouth every 6 (six) hours., Historical Med       !! - Potential duplicate medications found. Please discuss with provider.                Patient discharged to home in stable condition with instructions to:   1. Please take all meds as prescribed.  2. Follow-up with your primary care doctor   3. Return precautions discussed and patient and/or family/caretaker understands to return to the emergency room for any concerns including worsening of your current symptoms, fever, chills, night sweats, worsening pain, chest pain, shortness of breath, nausea, vomiting, diarrhea, bleeding, headache, difficulty talking, visual disturbances, weakness, numbness or any other acute concerns             Clinical Impression:     1. Acute bilateral low back pain with bilateral sciatica    2. Bony metastasis    3. Stage IV breast cancer in female            Disposition:   Disposition: Discharged  Condition: Stable                        Kishore Dunne MD  10/23/19 8302

## 2019-10-25 ENCOUNTER — TELEPHONE (OUTPATIENT)
Dept: OBSTETRICS AND GYNECOLOGY | Facility: CLINIC | Age: 44
End: 2019-10-25

## 2019-10-25 NOTE — TELEPHONE ENCOUNTER
RN Navigator phoned patient in regards to scheduling an appt with Dr. Avina and the [facility]. RN explained 's role and the supportive services available to her in the [facility]. Patient mentions she has not started treatment yet, but verbalizes concerns regarding the menopausal side effects of her treatment. Patient notes she is post-menopausal by recent serum levels, but notes she is currently without symptoms. Patient notes upcoming bone bx and consult. Prefers to confirm an appt once she has a confirmed plan of care and has initiated treatment as she is currently asymptomatic and has a recent Gyn Annual exam with Dr. Washburn. RN expressed her support and understanding. Appt confirmed for 11/22/19 at 0930. BALWINDER Auguste

## 2019-10-29 ENCOUNTER — PATIENT MESSAGE (OUTPATIENT)
Dept: HEMATOLOGY/ONCOLOGY | Facility: CLINIC | Age: 44
End: 2019-10-29

## 2019-10-29 DIAGNOSIS — Z17.0 MALIGNANT NEOPLASM OF UPPER-INNER QUADRANT OF RIGHT BREAST IN FEMALE, ESTROGEN RECEPTOR POSITIVE: Primary | ICD-10-CM

## 2019-10-29 DIAGNOSIS — C50.211 MALIGNANT NEOPLASM OF UPPER-INNER QUADRANT OF RIGHT BREAST IN FEMALE, ESTROGEN RECEPTOR POSITIVE: Primary | ICD-10-CM

## 2019-10-29 DIAGNOSIS — G89.3 CANCER ASSOCIATED PAIN: Primary | ICD-10-CM

## 2019-10-29 RX ORDER — TRAMADOL HYDROCHLORIDE 50 MG/1
50 TABLET ORAL EVERY 6 HOURS PRN
Qty: 60 TABLET | Refills: 0 | Status: SHIPPED | OUTPATIENT
Start: 2019-10-29 | End: 2019-11-12 | Stop reason: SDUPTHER

## 2019-10-30 ENCOUNTER — OFFICE VISIT (OUTPATIENT)
Dept: INTERVENTIONAL RADIOLOGY/VASCULAR | Facility: CLINIC | Age: 44
End: 2019-10-30
Payer: COMMERCIAL

## 2019-10-30 ENCOUNTER — OFFICE VISIT (OUTPATIENT)
Dept: PSYCHIATRY | Facility: CLINIC | Age: 44
End: 2019-10-30
Payer: OTHER MISCELLANEOUS

## 2019-10-30 ENCOUNTER — INFUSION (OUTPATIENT)
Dept: INFUSION THERAPY | Facility: HOSPITAL | Age: 44
End: 2019-10-30
Attending: INTERNAL MEDICINE
Payer: COMMERCIAL

## 2019-10-30 ENCOUNTER — LAB VISIT (OUTPATIENT)
Dept: LAB | Facility: HOSPITAL | Age: 44
End: 2019-10-30
Attending: INTERNAL MEDICINE
Payer: COMMERCIAL

## 2019-10-30 ENCOUNTER — PATIENT MESSAGE (OUTPATIENT)
Dept: HEMATOLOGY/ONCOLOGY | Facility: CLINIC | Age: 44
End: 2019-10-30

## 2019-10-30 VITALS
HEIGHT: 64 IN | HEART RATE: 93 BPM | SYSTOLIC BLOOD PRESSURE: 178 MMHG | DIASTOLIC BLOOD PRESSURE: 92 MMHG | WEIGHT: 293 LBS | BODY MASS INDEX: 50.02 KG/M2

## 2019-10-30 DIAGNOSIS — C50.211 MALIGNANT NEOPLASM OF UPPER-INNER QUADRANT OF RIGHT BREAST IN FEMALE, ESTROGEN RECEPTOR POSITIVE: Primary | ICD-10-CM

## 2019-10-30 DIAGNOSIS — M89.9 BONE LESION: Primary | ICD-10-CM

## 2019-10-30 DIAGNOSIS — C50.211 MALIGNANT NEOPLASM OF UPPER-INNER QUADRANT OF RIGHT BREAST IN FEMALE, ESTROGEN RECEPTOR POSITIVE: ICD-10-CM

## 2019-10-30 DIAGNOSIS — E66.01 MORBID OBESITY WITH BODY MASS INDEX OF 50.0-59.9 IN ADULT: ICD-10-CM

## 2019-10-30 DIAGNOSIS — Z17.0 MALIGNANT NEOPLASM OF UPPER-INNER QUADRANT OF RIGHT BREAST IN FEMALE, ESTROGEN RECEPTOR POSITIVE: Primary | ICD-10-CM

## 2019-10-30 DIAGNOSIS — Z17.0 MALIGNANT NEOPLASM OF UPPER-INNER QUADRANT OF RIGHT BREAST IN FEMALE, ESTROGEN RECEPTOR POSITIVE: ICD-10-CM

## 2019-10-30 DIAGNOSIS — F43.23 ADJUSTMENT DISORDER WITH MIXED ANXIETY AND DEPRESSED MOOD: Primary | ICD-10-CM

## 2019-10-30 DIAGNOSIS — I42.0 DILATED CARDIOMYOPATHY: ICD-10-CM

## 2019-10-30 PROCEDURE — 99999 PR PBB SHADOW E&M-EST. PATIENT-LVL III: CPT | Mod: PBBFAC,,, | Performed by: FAMILY MEDICINE

## 2019-10-30 PROCEDURE — 99244 PR OFFICE CONSULTATION,LEVEL IV: ICD-10-PCS | Mod: S$GLB,,, | Performed by: FAMILY MEDICINE

## 2019-10-30 PROCEDURE — 96402 CHEMO HORMON ANTINEOPL SQ/IM: CPT

## 2019-10-30 PROCEDURE — 99244 OFF/OP CNSLTJ NEW/EST MOD 40: CPT | Mod: S$GLB,,, | Performed by: FAMILY MEDICINE

## 2019-10-30 PROCEDURE — 86703 HIV-1/HIV-2 1 RESULT ANTBDY: CPT

## 2019-10-30 PROCEDURE — 90837 PSYTX W PT 60 MINUTES: CPT | Mod: ,,, | Performed by: PSYCHOLOGIST

## 2019-10-30 PROCEDURE — 63600175 PHARM REV CODE 636 W HCPCS: Mod: JG | Performed by: INTERNAL MEDICINE

## 2019-10-30 PROCEDURE — 99999 PR PBB SHADOW E&M-EST. PATIENT-LVL II: CPT | Mod: PBBFAC,,, | Performed by: PSYCHOLOGIST

## 2019-10-30 PROCEDURE — 99999 PR PBB SHADOW E&M-EST. PATIENT-LVL III: ICD-10-PCS | Mod: PBBFAC,,, | Performed by: FAMILY MEDICINE

## 2019-10-30 PROCEDURE — 90837 PR PSYCHOTHERAPY W/PATIENT, 60 MIN: ICD-10-PCS | Mod: ,,, | Performed by: PSYCHOLOGIST

## 2019-10-30 PROCEDURE — 99999 PR PBB SHADOW E&M-EST. PATIENT-LVL II: ICD-10-PCS | Mod: PBBFAC,,, | Performed by: PSYCHOLOGIST

## 2019-10-30 RX ADMIN — GOSERELIN ACETATE 3.6 MG: 3.6 IMPLANT SUBCUTANEOUS at 03:10

## 2019-10-30 NOTE — PROGRESS NOTES
"Subjective:       Patient ID: Kristopher Ye is a 44 y.o. female.    Chief Complaint: Cancer    Patient referred to Interventional Radiology by Dr. Mendez for evaluation of bone lesions. She was initially diagnosed with breast cancer in September 2019. A screening mammogram noted multiple masses in the right breast. Bone lesions were identified earlier this month. She reports feeling well, but tells me her lower back is painful. She tells me this pain started approximately 2 weeks ago when she was lifting a basket of laundry. She points to her lower back when describing her pain. She tells me she has tried tramadol without relief. She tells me her pain is progressively getting worse. She also has complaints of left rib pain.    Review of Systems   Constitutional: Positive for activity change (decreased due to back pain). Negative for appetite change, chills, fatigue and fever.   HENT: Negative for congestion, drooling, ear discharge, postnasal drip, sneezing and trouble swallowing.    Eyes: Negative for pain, discharge, redness and itching.   Respiratory: Positive for cough ("I am getting over bronchitis"). Negative for shortness of breath, wheezing and stridor.    Cardiovascular: Positive for palpitations (occasionally; believes may be due to anxiety). Negative for chest pain and leg swelling.   Gastrointestinal: Negative for abdominal distention, abdominal pain, constipation, diarrhea, nausea and vomiting.   Genitourinary: Negative for difficulty urinating, dysuria, frequency and urgency.   Musculoskeletal: Positive for arthralgias (arthritis in right knee) and back pain. Negative for gait problem, joint swelling, myalgias and neck pain.   Skin: Negative for color change, pallor and rash.   Neurological: Negative for dizziness, weakness and headaches.       Objective:      Physical Exam   Constitutional: She is oriented to person, place, and time. She appears well-developed and well-nourished. No distress. "   HENT:   Head: Normocephalic and atraumatic.   Right Ear: External ear normal.   Left Ear: External ear normal.   Nose: Nose normal.   Mouth/Throat: Oropharynx is clear and moist. No oropharyngeal exudate.   Eyes: Pupils are equal, round, and reactive to light. Conjunctivae are normal. Right eye exhibits no discharge. Left eye exhibits no discharge. No scleral icterus.   Neck: Neck supple. No JVD present. No tracheal deviation present. No thyromegaly present.   Cardiovascular: Normal rate, regular rhythm, normal heart sounds and intact distal pulses. Exam reveals no gallop and no friction rub.   No murmur heard.  Pulmonary/Chest: Effort normal and breath sounds normal. No stridor. No respiratory distress. She has no wheezes. She has no rales.   Abdominal: Soft. Bowel sounds are normal. She exhibits no distension and no mass. There is no hepatosplenomegaly. There is no tenderness. There is no rebound and no guarding.   Musculoskeletal: She exhibits no edema.   Lymphadenopathy:     She has no cervical adenopathy.   Neurological: She is alert and oriented to person, place, and time. Gait normal.   Skin: Skin is warm and dry. She is not diaphoretic. No cyanosis. Nails show no clubbing.   Psychiatric: She has a normal mood and affect.   Vitals reviewed.      Imaging:                 Assessment:       1. Bone lesion        Plan:         Discussed case with Dr. Galeana. Explained we can offer biopsy of one of the bone lesions. Discussed how the procedure will be performed, risks (including, but not limited to, pain, bleeding, infection, damage to nearby structures, and the need for additional procedures), benefits, possible complications, pre-post procedure expectations, and alternatives. The patient voices understanding and all questions have been answered.  The patient agrees to proceed as planned. Patient scheduled for 11/11/2019. Pre-procedure handout with clinic phone number provided. Patient will have pre-procedure  labs drawn today.    Also suggested patient discuss with her oncologist if she is a candidate for kyphoplasty/vertebroplasty or spine injections to help alleviate her back pain.

## 2019-10-30 NOTE — PROGRESS NOTES
INFORMED CONSENT: Kristopher Ye   is known to this provider and identity was confirmed via NAME and .  The patient was has been informed of the risks and benefits associated with engaging in psychotherapy, the handling of protected health information, the rights of privacy and the limits of confidentiality. The patient has also been informed of the importance of reporting any suicidal or homicidal ideation to this or any provider to ensure safety of all parties, and the Kristopher Ye expressed understanding. The patient was agreeable to these terms and freely participates in individual psychotherapy.    PSYCHO-ONCOLOGY NOTE/ Individual Psychotherapy     Date: 10/30/2019   [facility]        Therapeutic Intervention: Met with patient.  Outpatient - Behavior modifying psychotherapy 60 min - CPT code 45536      Patient was last seen by me on 10/16/2019    Problem list  Patient Active Problem List   Diagnosis    Hypertension    Iron deficiency anemia    Palpitations    Cardiomegaly    Morbid obesity (BMI= 55.9 on 10/30/14)    Fibroid    Fibroid uterus    Fatigue    Shortness of breath    Thalassemia    Adjustment disorder with mixed anxiety and depressed mood    Chest pain, non-cardiac    Cardiomyopathy    Hyperlipidemia    Malignant neoplasm of upper-inner quadrant of right breast in female, estrogen receptor positive       Chief complaint/reason for encounter: depression and anxiety   Met with patient to evaluate psychosocial adaptation to diagnosis/treatment/survivorship of breast cancer    Current Medications  Current Outpatient Medications   Medication    albuterol (PROVENTIL/VENTOLIN HFA) 90 mcg/actuation inhaler    albuterol 90 mcg/actuation inhaler    amLODIPine (NORVASC) 5 MG tablet    anastrozole (ARIMIDEX) 1 mg Tab    carvedilol (COREG) 6.25 MG tablet    citalopram (CELEXA) 20 MG tablet    diclofenac sodium (VOLTAREN) 1 % Gel    goserelin (ZOLADEX) 3.6 mg  injection    HYDROcodone-acetaminophen (NORCO) 5-325 mg per tablet    hydrOXYzine pamoate (VISTARIL) 50 MG Cap    ibuprofen (ADVIL,MOTRIN) 800 MG tablet    irbesartan (AVAPRO) 75 MG tablet    ketorolac (TORADOL) 10 mg tablet    loratadine (CLARITIN) 10 mg tablet    LORazepam (ATIVAN) 0.5 MG tablet    ondansetron (ZOFRAN) 8 MG tablet    ondansetron (ZOFRAN-ODT) 4 MG TbDL    palbociclib (IBRANCE) 125 mg Cap    pantoprazole (PROTONIX) 40 MG tablet    traMADol (ULTRAM) 50 mg tablet    traMADol (ULTRAM) 50 mg tablet     No current facility-administered medications for this visit.        Objective:  Kristopher Ye arrived  promptly for the session.  Ms. Ye was independently ambulatory at the time of session. The patient was fully cooperative throughout the session.  Appearance: age appropriate, appropriately  dressed, adequately  groomed  Behavior/Cooperation: friendly and cooperative  Speech: normal in rate, volume, and tone and appropriate quality, quantity and organization of sentences  Mood: anxious, upset  Affect: anxious, increased in intensity and increased in range  Thought Process: goal-directed, logical  Thought Content: normal,  No delusions or paranoia; did not appear to be responding to internal stimuli during the session  Orientation: grossly intact  Memory: Grossly intact  Attention Span/Concentration: Attends to session without distraction; reports no difficulty  Fund of Knowledge: average  Estimate of Intelligence: average from verbal skills and history  Cognition: grossly intact  Insight: patient has awareness of illness; good insight into own behavior and behavior of others  Judgment: the patient's behavior is adequate to circumstances    Interval history and content of current session: Patient recently presented to the ED for back pain, and found out today at an office visit that she has a fractur3 in her back. Discussed avoidance and mild denial of cancer, restrictions, and  emotions. Discussed with patient importance of including children and family in on discussion and decisions.   Discussed current adaptation to disease and treatment status and family's adaptation to disease and treatment status. Reports to be coping in a passive manner. Evaluated cognitive response, paying particular attention to negative intrusive thoughts of a persistent and detrimental nature. Thoughts of this type are in evidence with mild distress. Provided cognitive behavioral therapy to address negative cognitions. Identified and evaluated psychosocial and environmental stressors secondary to diagnosis and treatment.  Examined proactive behaviors that may be implemented to minimize or ameliorate psychosocial stressors secondary to diagnosis and treatment. Medication compliance.     Risk parameters:   Patient reports no suicidal ideation  Patient reports no homicidal ideation  Patient reports no self-injurious behavior  Patient reports no violent behavior   Safety needs:  None at this time      Verbal deficits: None     Patient's response to intervention:The patient's response to intervention is reluctant.     Progress toward goals and other mental status changes:  The patient's progress toward goals is good.      Progress to date:Progress - Ongoing, but Slow      Goals from last visit: Met     Patient reported outcomes:    Distress Thermometer: 4   PHQ-9= 5, No SI   HUY-7= 6      Client Strengths: verbal, intelligent, successful, good social support, good insight, commitment to wellness, strong jd, strong cultural traditions     Diagnosis:   Adjustment Disorder with anxiety and depressed mood    Treatment Plan:individual psychotherapy and medication management by physician  · Target symptoms: depression, anxiety , adjustment  · Why chosen therapy is appropriate versus another modality: relevant to diagnosis, evidence based practice  · Outcome monitoring methods: self-report, observation, checklist/rating  scale  · Therapeutic intervention type: behavior modifying psychotherapy  · Prognosis: Good      Behavioral goals:    Stress management: Improve open communication with boyfriend and children; invite children to next appointment for discussion about reaction to diagnosis   Nutrition: Eliminate soda intake, make small dietary changes as recommended by PCP and cardiologist      Return to clinic: 2 weeks    Next Session:   Continued challenge of automatic negative thoughts, denial and avoidance behaviors     Length of Service (minutes direct face-to-face contact): 60    Bria Odom, PhD  [place] License #[id number]

## 2019-10-30 NOTE — LETTER
October 30, 2019      Jessica Mendez MD  [address]  [facility] [address]           [facility] Inter[facility]  [address]  [facility] [address]  Phone: [phone]          Patient: Kristopher Ye   MR Number: [id number]   YOB: 1975   Date of Visit: 10/30/2019       Dear Dr. Jessica Menedz:    Thank you for referring Kristopher Ye to me for evaluation. Attached you will find relevant portions of my assessment and plan of care.    If you have questions, please do not hesitate to call me. I look forward to following Kristopher Ye along with you.    Sincerely,    Elizabeth Hammer, NP    Enclosure  CC:  No Recipients    If you would like to receive this communication electronically, please contact [email or web address] or [phone] to request more information on [employer] [place] Link access.    For providers and/or their staff who would like to refer a patient to [facility], please contact us through our one-stop-shop provider referral line, [facility] , at [phone].    If you feel you have received this communication in error or would no longer like to receive these types of communications, please e-mail [email or web address]

## 2019-10-30 NOTE — Clinical Note
Thank you for referring Ms. Ye to Interventional Radiology at the [facility]. Please don't hesitate to contact us if there are any questions regarding this evaluation, or if you have any other patients for whom you would like a consultation at [phone].Sincerely,GAIL Tristan, FNPInterventional Radiology

## 2019-10-30 NOTE — TELEPHONE ENCOUNTER
The patient presented to [facility] to  Ibrance and complete consultation on 10/30/19. $0 copay. Patient will start Ibrance on 10/31. Patient had good baseline understanding since she reviewed Ibrance with provider and read about it online.    Patient was counseled on the administration directions for Ibrance 125mg:  -Take 1 capsule (125 mg) by mouth once daily for 21 days, then 7 days off.  -Take with food and avoid GF/seville oranges (patient allergic to citrus fruits and already avoids)   -Confirmed she picked Arimidex up from local pharmacy and is aware to take 1 tablet per day continuously.  -Reviewed PO chemo handling precautions.  -Store at room temp    Patient declined to review side effects since she read about it online; briefly reviewed the following:  - Nausea: Patient has an antiemetic at home. Recommended to take antiemetic 30 mins prior to Ibrance if persistent nausea.  - Stomatitis: May swish and spit with 1 tsp salt/baking soda mixed in ~1 cup of warm water prn. Avoid spicy, acid, hot foods if sores occur.  - Rash: May apply HC1% cream (provided with first fill only) to rash prn. Avoid applying to open skin/wounds.  - Increased risk of infection: Patient is aware of the importance and indication of frequent lab monitoring, karin WBC to ensure adequate immune system.    DDIs: Medication list reviewed, patient is not taking Norco. She reported previously self-discontinuing amlodipine but will restart. Reviewed Cat C DDI between amlodipine + Ibrance: Ibrance may increase the serum concentration of amlodipine. Patient is enrolling in digital medicine who will monitor BP closely for changes. Additionally reviewed s/sx orthostasis.    Patient was given 2 patient education handouts on how to handle oral chemotherapy and specific recommendations- do's and don'ts. We reviewed [facility] background including hours, RPh on call, [employer] shipping - no signature required, refill process - no auto refills, Welcome Packet,  insurance benefits explanation upon request, charting system and communication with provider's offices.    She stated she has back pain at baseline. Norco made her sick after first dose therefore did not take. Tramadol did not improve pain. She will soon see interventional radiation to hopefully attain relief. 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 She has two daughters - ages 16 and 18 - who provide great social support.    Patient will start Ibrance on 10/31. Consultation included: indication; goals of treatment; administration; storage and handling; side effects; how to handle side effects; the importance of compliance; how to handle missed doses; the importance of laboratory monitoring; the importance of keeping all follow up appointments. Patient understands to report any medication changes to [facility] and provider. All questions answered and addressed to patients satisfaction. RPh will f/u with patient in 1 week from start, [facility] to contact patient in 3 weeks for refills.

## 2019-10-30 NOTE — NURSING
Patient here to begin treatment with zolzdex=-teaching done on use and side effects of zoladex-tolerated well.

## 2019-10-30 NOTE — LETTER
October 30, 2019    Eileengemma Elmore Ephraim  [address]  [facility] [address]     [facility]  [address]  [facility] [address]  Phone: [phone] PRE-PROCEDURE INSTRUCTIONS    Your procedure with Interventional Radiology is scheduled for November 11, 2019. Please arrive by 11:00am.    You must check-in and receive a wristband before going to your procedure. Your [facility]-in location [facility].    **Do not eat or drink anything between midnight and the time of your procedure. This includes gum, mints, and candy lemon drops.    **Do not smoke or drink alcoholic beverages 24 hours prior to your procedure.    **If you wear contact lenses, dentures, hearing aids, or glasses, bring a container to put them in during the procedure and give them to a family member for safekeeping.    **If you have been diagnosed with sleep apnea please bring your CPAP machine.    **If your doctor has scheduled you for an overnight stay, bring a small overnight bag with any personal items that you may need.    **Make arrangements in advance for transportation home by a responsible adult. It is not safe to drive a vehicle during the 24 hours following the procedure.    **All [facility] facilities and properties are tobacco free. Smoking is NOT allowed.    PLEASE NOTE: The procedure schedule has many variables which affect the time of your procedure. Family members should be available if your surgery time changes.    If you have any questions about these instructions call Interventional Radiology at [phone] Monday - Friday between 8:00am and 4:00pm or [phone] for after hours.

## 2019-10-31 ENCOUNTER — PATIENT MESSAGE (OUTPATIENT)
Dept: HEMATOLOGY/ONCOLOGY | Facility: CLINIC | Age: 44
End: 2019-10-31

## 2019-10-31 ENCOUNTER — PATIENT MESSAGE (OUTPATIENT)
Dept: CARDIOLOGY | Facility: CLINIC | Age: 44
End: 2019-10-31

## 2019-10-31 LAB — HIV 1+2 AB+HIV1 P24 AG SERPL QL IA: NEGATIVE

## 2019-11-05 ENCOUNTER — DOCUMENTATION ONLY (OUTPATIENT)
Dept: HEMATOLOGY/ONCOLOGY | Facility: HOSPITAL | Age: 44
End: 2019-11-05

## 2019-11-05 DIAGNOSIS — M89.9 BONE LESION: Primary | ICD-10-CM

## 2019-11-06 ENCOUNTER — PATIENT MESSAGE (OUTPATIENT)
Dept: ADMINISTRATIVE | Facility: OTHER | Age: 44
End: 2019-11-06

## 2019-11-06 ENCOUNTER — PATIENT MESSAGE (OUTPATIENT)
Dept: HEMATOLOGY/ONCOLOGY | Facility: CLINIC | Age: 44
End: 2019-11-06

## 2019-11-10 ENCOUNTER — HOSPITAL ENCOUNTER (EMERGENCY)
Facility: HOSPITAL | Age: 44
Discharge: HOME | End: 2019-11-10
Attending: EMERGENCY MEDICINE
Payer: COMMERCIAL

## 2019-11-10 VITALS
HEIGHT: 66 IN | WEIGHT: 293 LBS | BODY MASS INDEX: 47.09 KG/M2 | HEART RATE: 78 BPM | SYSTOLIC BLOOD PRESSURE: 176 MMHG | TEMPERATURE: 99 F | DIASTOLIC BLOOD PRESSURE: 95 MMHG | OXYGEN SATURATION: 100 % | RESPIRATION RATE: 16 BRPM

## 2019-11-10 DIAGNOSIS — K59.00 CONSTIPATION, UNSPECIFIED CONSTIPATION TYPE: ICD-10-CM

## 2019-11-10 DIAGNOSIS — K59.00 CONSTIPATION: Primary | ICD-10-CM

## 2019-11-10 PROCEDURE — 25000003 PHARM REV CODE 250: Performed by: EMERGENCY MEDICINE

## 2019-11-10 PROCEDURE — 99283 EMERGENCY DEPT VISIT LOW MDM: CPT | Mod: 25

## 2019-11-10 PROCEDURE — 63600175 PHARM REV CODE 636 W HCPCS: Performed by: EMERGENCY MEDICINE

## 2019-11-10 RX ORDER — SYRING-NEEDL,DISP,INSUL,0.3 ML 29 G X1/2"
296 SYRINGE, EMPTY DISPOSABLE MISCELLANEOUS
Status: COMPLETED | OUTPATIENT
Start: 2019-11-10 | End: 2019-11-10

## 2019-11-10 RX ORDER — LIDOCAINE AND PRILOCAINE 25; 25 MG/G; MG/G
CREAM TOPICAL ONCE
Status: COMPLETED | OUTPATIENT
Start: 2019-11-10 | End: 2019-11-10

## 2019-11-10 RX ORDER — PSEUDOEPHEDRINE/ACETAMINOPHEN 30MG-500MG
100 TABLET ORAL
Status: COMPLETED | OUTPATIENT
Start: 2019-11-10 | End: 2019-11-10

## 2019-11-10 RX ORDER — POLYETHYLENE GLYCOL 3350 17 G/17G
17 POWDER, FOR SOLUTION ORAL DAILY
Qty: 116 G | Refills: 0 | Status: SHIPPED | OUTPATIENT
Start: 2019-11-10 | End: 2021-11-03

## 2019-11-10 RX ADMIN — LIDOCAINE AND PRILOCAINE: 25; 25 CREAM TOPICAL at 05:11

## 2019-11-10 RX ADMIN — SODIUM CHLORIDE 500 ML: 0.9 INJECTION, SOLUTION INTRAVENOUS at 05:11

## 2019-11-10 RX ADMIN — Medication 100 ML: at 05:11

## 2019-11-10 RX ADMIN — MAGNESIUM CITRATE 296 ML: 1.75 LIQUID ORAL at 05:11

## 2019-11-10 NOTE — ED PROVIDER NOTES
"Encounter Date: 11/10/2019    SCRIBE #1 NOTE: I, Maco Starr, am scribing for, and in the presence of,  Esther Bob MD. I have scribed the following portions of the note - Other sections scribed: HPI, ROS, PE.       History     Chief Complaint   Patient presents with    Constipation     Pt reports taking Tramadol x 1 week due to a lower back fracture. Pt now c/o constipation. Pt states, "I'm so impacted." Last BM Wednesday.     CC: Constipation    HPI:  This is a 44 y.o. female with a PMHx of malignancy(Stage IV Breast Cancer) and HTN who presents to the Emergency Department with a cc of severe worsening constipation beginning 2 days ago. Pt reports acute onset rectum pain. Pt denies abdominal pain or blood in the stool. Symptoms are worsened by standing up or sitting on the anal midline. She notes her last BM was 5 days ago. Patient had undergone a lower back fracture 1 week ago and was prescribed a larger dose of Tramadol. Patient has taken Senokot x 2 for Sx. Patient is not undergoing radiation chemotherapy,but oral chemotherapy instead . Patient is allergic to Keflex, which causes her to itch.           Review of patient's allergies indicates:   Allergen Reactions    Keflex [cephalexin] Itching     Past Medical History:   Diagnosis Date    Abnormal Pap smear     pt states 13yrs ago colpo was done    Anemia     Anxiety     Cancer     Cardiomyopathy     Fibroid     Hyperlipidemia     Hypertension     Sleep difficulties      Past Surgical History:   Procedure Laterality Date     SECTION      TONSILLECTOMY      TUBAL LIGATION      UTERINE FIBROID EMBOLIZATION       Family History   Problem Relation Age of Onset    Arthritis Mother     Diabetes Mother     Heart disease Mother         CHF, CAD , 2 stents    Hypertension Mother     Hyperlipidemia Mother     Heart failure Mother     No Known Problems Sister     No Known Problems Father     Hypertension Brother     No Known " Problems Daughter     Asthma Daughter     No Known Problems Maternal Aunt     No Known Problems Maternal Uncle     No Known Problems Paternal Aunt     No Known Problems Paternal Uncle     No Known Problems Maternal Grandmother     Cancer Maternal Grandfather     No Known Problems Paternal Grandmother     No Known Problems Paternal Grandfather     Breast cancer Neg Hx     Colon cancer Neg Hx     Ovarian cancer Neg Hx      Social History     Tobacco Use    Smoking status: Never Smoker    Smokeless tobacco: Never Used   Substance Use Topics    Alcohol use: Not Currently     Alcohol/week: 0.0 standard drinks     Frequency: Never    Drug use: No     Review of Systems   Constitutional: Negative for fever.   HENT: Negative for sore throat.    Respiratory: Negative for shortness of breath.    Cardiovascular: Negative for chest pain.   Gastrointestinal: Positive for constipation. Negative for abdominal pain, blood in stool and nausea.        (+) anal pain     Genitourinary: Negative for dysuria.   Musculoskeletal: Negative for back pain.   Skin: Negative for rash.   Neurological: Negative for weakness.   Hematological: Does not bruise/bleed easily.       Physical Exam     Initial Vitals [11/10/19 1548]   BP Pulse Resp Temp SpO2   (!) 140/83 80 18 98.2 °F (36.8 °C) 97 %      MAP       --         Physical Exam    Constitutional: She appears well-developed and well-nourished.   HENT:   Head: Normocephalic and atraumatic.   Eyes: EOM are normal. Pupils are equal, round, and reactive to light.   Neck: Normal range of motion. Neck supple. No JVD present.   Cardiovascular: Normal rate, regular rhythm and normal heart sounds.   No murmur heard.  Pulmonary/Chest: Breath sounds normal. No respiratory distress.   Abdominal: Soft. Bowel sounds are normal. There is no tenderness.   Musculoskeletal: Normal range of motion. She exhibits no tenderness.   Neurological: She is alert and oriented to person, place, and time. She  has normal strength.   Skin: Skin is warm and dry. Capillary refill takes less than 2 seconds.   Psychiatric: She has a normal mood and affect. Her behavior is normal.         ED Course   Procedures  Labs Reviewed   URINALYSIS   POCT URINE PREGNANCY          Imaging Results          X-Ray Abdomen Flat And Erect (Final result)  Result time 11/10/19 16:40:02    Final result by Ann Barragan MD (11/10/19 16:40:02)                 Impression:      Mild to moderate formed stool.  No pattern of ileus or obstruction.      Electronically signed by: Ann Barragan  Date:    11/10/2019  Time:    16:40             Narrative:    EXAMINATION:  XR ABDOMEN FLAT AND ERECT    CLINICAL HISTORY:  Constipation, unspecified    TECHNIQUE:  Flat and erect AP views of the abdomen were performed.    COMPARISON:  None    FINDINGS:  Eight images with upright and supine positioning.    There is moderate formed stool of the left colon and rectum without definite distension.  There mild formed stool in the right colon.  No pneumatosis or free air or calculus or fracture or hernia.  Bowel gas pattern appears normal.  The visualized lung bases appear grossly normal.                                 Medical Decision Making:   Initial Assessment:   This is an emergent evaluation of a 44-year-old woman who presented to the emergency department today secondary to constipation  Differential Diagnosis:   Constipation, fecal impaction, bowel obstruction, amongst others.  Clinical Tests:   Lab Tests: Ordered and Reviewed  Radiological Study: Ordered and Reviewed  ED Management:  On physical examination, patient was in no acute distress.  Heart and lung sounds were within normal limits.  Abdomen was soft, nondistended, nontender in all quadrants.  Flat and erect x-ray was obtained and showed constipation but no signs of obstruction nor fecal impaction.  Brown bomb enema was provided with significant return of fecal matter.  She reported significant  improvement in her symptoms. She did state she felt some rectal discomfort therefore EMLA was applied to her rectum, with relief. She was discharged home with a prescription for Miralax and instructions to drink plenty of fluids. She was advised to follow with her Oncologist and return precautions were given.     Esther Bob MD  9:30 PM  11/10/2019                     Scribe attestation: I, Esther Bob MD  , personally performed the services described in this documentation. All medical record entries made by the scribe were at my direction and in my presence.  I have reviewed the chart and agree that the record reflects my personal performance and is accurate and complete.                Clinical Impression:       ICD-10-CM ICD-9-CM   1. Constipation K59.00 564.00   2. Constipation, unspecified constipation type K59.00 564.00                             Esther Bob MD  11/10/19 4445

## 2019-11-10 NOTE — ED TRIAGE NOTES
"Pt reports to ED with CC of constipation. Pt reports taking tramadol since last Wednesday for back pain d/t fracture and has since not been able to have a bowel movement. "It hurts to sit, it hurts to stand." 8/10 cramping pain to anus. Pt reports taking senna with no relief. Pt denies abdominal discomfort. Reports pain with urination.     Pt denies N/D, SOB, HA.     Pt reports current breast cancer spread to spine.     Pt is AAox4, able to verbalize and follow commands. Drover herself to ED.   "

## 2019-11-11 ENCOUNTER — PATIENT MESSAGE (OUTPATIENT)
Dept: HEMATOLOGY/ONCOLOGY | Facility: CLINIC | Age: 44
End: 2019-11-11

## 2019-11-11 ENCOUNTER — TELEPHONE (OUTPATIENT)
Dept: HEMATOLOGY/ONCOLOGY | Facility: CLINIC | Age: 44
End: 2019-11-11

## 2019-11-12 ENCOUNTER — OFFICE VISIT (OUTPATIENT)
Dept: PSYCHIATRY | Facility: CLINIC | Age: 44
End: 2019-11-12
Payer: COMMERCIAL

## 2019-11-12 ENCOUNTER — LAB VISIT (OUTPATIENT)
Dept: LAB | Facility: HOSPITAL | Age: 44
End: 2019-11-12
Attending: INTERNAL MEDICINE
Payer: COMMERCIAL

## 2019-11-12 ENCOUNTER — OFFICE VISIT (OUTPATIENT)
Dept: HEMATOLOGY/ONCOLOGY | Facility: CLINIC | Age: 44
End: 2019-11-12
Payer: COMMERCIAL

## 2019-11-12 VITALS
RESPIRATION RATE: 16 BRPM | BODY MASS INDEX: 47.09 KG/M2 | SYSTOLIC BLOOD PRESSURE: 132 MMHG | WEIGHT: 293 LBS | HEIGHT: 66 IN | TEMPERATURE: 98 F | OXYGEN SATURATION: 95 % | DIASTOLIC BLOOD PRESSURE: 80 MMHG | HEART RATE: 66 BPM

## 2019-11-12 DIAGNOSIS — C50.211 MALIGNANT NEOPLASM OF UPPER-INNER QUADRANT OF RIGHT BREAST IN FEMALE, ESTROGEN RECEPTOR POSITIVE: ICD-10-CM

## 2019-11-12 DIAGNOSIS — Z17.0 MALIGNANT NEOPLASM OF UPPER-INNER QUADRANT OF RIGHT BREAST IN FEMALE, ESTROGEN RECEPTOR POSITIVE: ICD-10-CM

## 2019-11-12 DIAGNOSIS — M84.48XD: ICD-10-CM

## 2019-11-12 DIAGNOSIS — C50.211 MALIGNANT NEOPLASM OF UPPER-INNER QUADRANT OF RIGHT BREAST IN FEMALE, ESTROGEN RECEPTOR POSITIVE: Primary | ICD-10-CM

## 2019-11-12 DIAGNOSIS — N95.1 MENOPAUSAL SYMPTOM: ICD-10-CM

## 2019-11-12 DIAGNOSIS — G89.3 CANCER ASSOCIATED PAIN: ICD-10-CM

## 2019-11-12 DIAGNOSIS — C79.51 BONE METASTASES: ICD-10-CM

## 2019-11-12 DIAGNOSIS — Z17.0 MALIGNANT NEOPLASM OF UPPER-INNER QUADRANT OF RIGHT BREAST IN FEMALE, ESTROGEN RECEPTOR POSITIVE: Primary | ICD-10-CM

## 2019-11-12 DIAGNOSIS — F43.23 ADJUSTMENT DISORDER WITH MIXED ANXIETY AND DEPRESSED MOOD: Primary | ICD-10-CM

## 2019-11-12 DIAGNOSIS — K59.00 CONSTIPATION, UNSPECIFIED CONSTIPATION TYPE: ICD-10-CM

## 2019-11-12 LAB
ALBUMIN SERPL BCP-MCNC: 3.4 G/DL (ref 3.5–5.2)
ALP SERPL-CCNC: 128 U/L (ref 55–135)
ALT SERPL W/O P-5'-P-CCNC: 21 U/L (ref 10–44)
ANION GAP SERPL CALC-SCNC: 7 MMOL/L (ref 8–16)
AST SERPL-CCNC: 16 U/L (ref 10–40)
BILIRUB SERPL-MCNC: 0.5 MG/DL (ref 0.1–1)
BUN SERPL-MCNC: 13 MG/DL (ref 6–20)
CALCIUM SERPL-MCNC: 9.8 MG/DL (ref 8.7–10.5)
CHLORIDE SERPL-SCNC: 108 MMOL/L (ref 95–110)
CO2 SERPL-SCNC: 27 MMOL/L (ref 23–29)
CREAT SERPL-MCNC: 1.1 MG/DL (ref 0.5–1.4)
ERYTHROCYTE [DISTWIDTH] IN BLOOD BY AUTOMATED COUNT: 19.8 % (ref 11.5–14.5)
EST. GFR  (AFRICAN AMERICAN): >60 ML/MIN/1.73 M^2
EST. GFR  (NON AFRICAN AMERICAN): >60 ML/MIN/1.73 M^2
GLUCOSE SERPL-MCNC: 105 MG/DL (ref 70–110)
HCT VFR BLD AUTO: 41.3 % (ref 37–48.5)
HGB BLD-MCNC: 11.9 G/DL (ref 12–16)
IMM GRANULOCYTES # BLD AUTO: 0.01 K/UL (ref 0–0.04)
MCH RBC QN AUTO: 21.2 PG (ref 27–31)
MCHC RBC AUTO-ENTMCNC: 28.8 G/DL (ref 32–36)
MCV RBC AUTO: 74 FL (ref 82–98)
NEUTROPHILS # BLD AUTO: 2.3 K/UL (ref 1.8–7.7)
PLATELET # BLD AUTO: 383 K/UL (ref 150–350)
PMV BLD AUTO: 10.2 FL (ref 9.2–12.9)
POTASSIUM SERPL-SCNC: 3.7 MMOL/L (ref 3.5–5.1)
PROT SERPL-MCNC: 8.5 G/DL (ref 6–8.4)
RBC # BLD AUTO: 5.62 M/UL (ref 4–5.4)
SODIUM SERPL-SCNC: 142 MMOL/L (ref 136–145)
WBC # BLD AUTO: 4.26 K/UL (ref 3.9–12.7)

## 2019-11-12 PROCEDURE — 80053 COMPREHEN METABOLIC PANEL: CPT

## 2019-11-12 PROCEDURE — 3075F PR MOST RECENT SYSTOLIC BLOOD PRESS GE 130-139MM HG: ICD-10-PCS | Mod: CPTII,S$GLB,, | Performed by: INTERNAL MEDICINE

## 2019-11-12 PROCEDURE — 3079F DIAST BP 80-89 MM HG: CPT | Mod: CPTII,S$GLB,, | Performed by: INTERNAL MEDICINE

## 2019-11-12 PROCEDURE — 99999 PR PBB SHADOW E&M-EST. PATIENT-LVL III: ICD-10-PCS | Mod: PBBFAC,,, | Performed by: INTERNAL MEDICINE

## 2019-11-12 PROCEDURE — 3079F PR MOST RECENT DIASTOLIC BLOOD PRESSURE 80-89 MM HG: ICD-10-PCS | Mod: CPTII,S$GLB,, | Performed by: INTERNAL MEDICINE

## 2019-11-12 PROCEDURE — 90837 PR PSYCHOTHERAPY W/PATIENT, 60 MIN: ICD-10-PCS | Mod: S$GLB,,, | Performed by: PSYCHOLOGIST

## 2019-11-12 PROCEDURE — 99999 PR PBB SHADOW E&M-EST. PATIENT-LVL III: CPT | Mod: PBBFAC,,, | Performed by: INTERNAL MEDICINE

## 2019-11-12 PROCEDURE — 3008F PR BODY MASS INDEX (BMI) DOCUMENTED: ICD-10-PCS | Mod: CPTII,S$GLB,, | Performed by: INTERNAL MEDICINE

## 2019-11-12 PROCEDURE — 99215 OFFICE O/P EST HI 40 MIN: CPT | Mod: S$GLB,,, | Performed by: INTERNAL MEDICINE

## 2019-11-12 PROCEDURE — 90837 PSYTX W PT 60 MINUTES: CPT | Mod: S$GLB,,, | Performed by: PSYCHOLOGIST

## 2019-11-12 PROCEDURE — 36415 COLL VENOUS BLD VENIPUNCTURE: CPT

## 2019-11-12 PROCEDURE — 86300 IMMUNOASSAY TUMOR CA 15-3: CPT

## 2019-11-12 PROCEDURE — 3008F BODY MASS INDEX DOCD: CPT | Mod: CPTII,S$GLB,, | Performed by: INTERNAL MEDICINE

## 2019-11-12 PROCEDURE — 3075F SYST BP GE 130 - 139MM HG: CPT | Mod: CPTII,S$GLB,, | Performed by: INTERNAL MEDICINE

## 2019-11-12 PROCEDURE — 99999 PR PBB SHADOW E&M-EST. PATIENT-LVL II: CPT | Mod: PBBFAC,,, | Performed by: PSYCHOLOGIST

## 2019-11-12 PROCEDURE — 85027 COMPLETE CBC AUTOMATED: CPT

## 2019-11-12 PROCEDURE — 99215 PR OFFICE/OUTPT VISIT, EST, LEVL V, 40-54 MIN: ICD-10-PCS | Mod: S$GLB,,, | Performed by: INTERNAL MEDICINE

## 2019-11-12 PROCEDURE — 99999 PR PBB SHADOW E&M-EST. PATIENT-LVL II: ICD-10-PCS | Mod: PBBFAC,,, | Performed by: PSYCHOLOGIST

## 2019-11-12 RX ORDER — TRAMADOL HYDROCHLORIDE 50 MG/1
100 TABLET ORAL EVERY 6 HOURS PRN
Qty: 180 TABLET | Refills: 0 | Status: ON HOLD | OUTPATIENT
Start: 2019-11-12 | End: 2019-12-16

## 2019-11-12 NOTE — PROGRESS NOTES
INFORMED CONSENT: Kristopher Ye   is known to this provider and identity was confirmed via NAME and .  The patient has been informed of the risks and benefits associated with engaging in psychotherapy, the handling of protected health information, the rights of privacy and the limits of confidentiality. The patient has also been informed of the importance of reporting any suicidal or homicidal ideation to this or any provider to ensure safety of all parties, and the Kristopher Ye expressed understanding. The patient was agreeable to these terms and freely participates in individual psychotherapy.      PSYCHO-ONCOLOGY NOTE/ Individual Psychotherapy     Date: 2019   [facility]        Therapeutic Intervention: Met with patient.  Outpatient - Behavior modifying psychotherapy 60 min - CPT code 13130    Patient was last seen by me on 10/30/2019    Problem list  Patient Active Problem List   Diagnosis    Hypertension    Iron deficiency anemia    Palpitations    Cardiomegaly    Morbid obesity (BMI= 55.9 on 10/30/14)    Fibroid    Fibroid uterus    Fatigue    Shortness of breath    Thalassemia    Adjustment disorder with mixed anxiety and depressed mood    Cardiomyopathy    Hyperlipidemia    Malignant neoplasm of upper-inner quadrant of right breast in female, estrogen receptor positive    Cancer associated pain    Pathological fracture of lumbar vertebra with routine healing    Bone metastases    Constipation       Chief complaint/reason for encounter: depression and anxiety   Met with patient to evaluate psychosocial adaptation to diagnosis/treatment/survivorship of Breast Cancer    Current Medications  Current Outpatient Medications   Medication    albuterol (PROVENTIL/VENTOLIN HFA) 90 mcg/actuation inhaler    albuterol 90 mcg/actuation inhaler    amLODIPine (NORVASC) 5 MG tablet    anastrozole (ARIMIDEX) 1 mg Tab    carvedilol (COREG) 6.25 MG tablet     citalopram (CELEXA) 20 MG tablet    diclofenac sodium (VOLTAREN) 1 % Gel    goserelin (ZOLADEX) 3.6 mg injection    HYDROcodone-acetaminophen (NORCO) 5-325 mg per tablet    hydrOXYzine pamoate (VISTARIL) 50 MG Cap    irbesartan (AVAPRO) 75 MG tablet    ketorolac (TORADOL) 10 mg tablet    loratadine (CLARITIN) 10 mg tablet    LORazepam (ATIVAN) 0.5 MG tablet    ondansetron (ZOFRAN) 8 MG tablet    ondansetron (ZOFRAN-ODT) 4 MG TbDL    palbociclib (IBRANCE) 125 mg Cap    pantoprazole (PROTONIX) 40 MG tablet    polyethylene glycol (GLYCOLAX) 17 gram/dose powder    sennosides (SENNA-C ORAL)    traMADol (ULTRAM) 50 mg tablet     No current facility-administered medications for this visit.        Objective:  Kristopher Ye arrived promptly for the session.   Ms. Ye was independently ambulatory at the time of session. The patient was fully cooperative throughout the session.  Appearance: age appropriate, appropriately  dressed, adequately  groomed  Behavior/Cooperation: friendly and cooperative  Speech: normal in rate, volume, and tone and appropriate quality, quantity and organization of sentences  Mood: anxious  Affect: anxious  Thought Process: goal-directed, logical  Thought Content: normal,  No delusions or paranoia; did not appear to be responding to internal stimuli during the session  Orientation: grossly intact  Memory: Grossly intact  Attention Span/Concentration: Attends to session without distraction; reports no difficulty  Fund of Knowledge: average  Estimate of Intelligence: average from verbal skills and history  Cognition: grossly intact  Insight: patient has awareness of illness; good insight into own behavior and behavior of others  Judgment: the patient's behavior is adequate to circumstances    Interval history and content of current session: Patient reported fears and exacerbated anxiety related to having a CT guided biopsy done, therefore she skipped the appointment. Patient  also revealed increased irritbility regarding communicating with her partner.  Discussed passive-aggressive responses to her partner and current adaptation to disease and treatment status. Reports to be coping with moderate difficulty. Evaluated cognitive response, paying particular attention to negative intrusive thoughts of a persistent and detrimental nature. Thoughts of this type are in evidence with moderate distress. Provided cognitive behavioral therapy to address negative cognitions. Identified and evaluated psychosocial and environmental stressors secondary to diagnosis and treatment.  Examined proactive behaviors that may be implemented to minimize or ameliorate psychosocial stressors secondary to diagnosis and treatment.     Risk parameters:   Patient reports no suicidal ideation  Patient reports no homicidal ideation  Patient reports no self-injurious behavior  Patient reports no violent behavior   Safety needs:  None at this time      Verbal deficits: None     Patient's response to intervention:The patient's response to intervention is accepting, reluctant.     Progress toward goals and other mental status changes:  The patient's progress toward goals is good.      Progress to date:Progress - Ongoing, but Slow      Goals from last visit: Attempted, partially met     Patient reported outcomes:    Distress Thermometer: 8   PHQ-9= 13, no SI   HUY-7= 21      Client Strengths: verbal, intelligent, successful, good social support, good insight, commitment to wellness, strong jd, strong cultural traditions     Diagnosis:       ICD-10-CM ICD-9-CM   1. Adjustment disorder with mixed anxiety and depressed mood F43.23 309.28   2. Malignant neoplasm of upper-inner quadrant of right breast in female, estrogen receptor positive C50.211 174.2    Z17.0 V86.0        Treatment Plan:individual psychotherapy  · Target symptoms: depression, anxiety   · Why chosen therapy is appropriate versus another modality: relevant  to diagnosis, evidence based practice  · Outcome monitoring methods: self-report, observation, checklist/rating scale  · Therapeutic intervention type: behavior modifying psychotherapy  · Prognosis: Good      Behavioral goals:   Gathering more information about Procedure to assist with anxiety management  Utilize Assertiveness with discussion with boyfriend in lieu of aggressiveness  Attend Meditation Group  Begin engaging in Hobby ([facility] machine)    Return to clinic: 2 weeks    Next Session:   Introduce to Meditation     Length of Service (minutes direct face-to-face contact): 60    Bria Odom, PhD  [place] License #[id number]

## 2019-11-12 NOTE — Clinical Note
Cancel injections today. MD in 2 weeks (11/29) with cbc, cmp, mg/phos, ca 15-3, zoladex and Xgeva - can she have around 10:00 appt

## 2019-11-12 NOTE — Clinical Note
Saw this patient today. She missed her biopsy due to fears and avoidant-anxiety. I encouraged her to speak with the medical team about getting education about the procedure and to ask questions about what would occur.

## 2019-11-12 NOTE — PROGRESS NOTES
History:     Reason For Follow Up:   1. Stage IV ([id number]) invasive ductal carcinoma of right breast, upper inner quadrant, ER %, UT neg, Her2 neg, Grade 3, multifocal with one lesion appearing more aggressive (Ki67 80%), large LN +, bone mets.    HPI:   Kristopher Ye presents for follow up of breast cancer. She went   To the emergency department with complaints of pain on in CT of the lumbar spine showed multiple lytic lesions throughout the skeleton with a new inferior endplate fracture at the L2 vertebral body.  She then went on to have consultation with interventional radiology for bone biopsy - the biopsy has yet to occur. She went to ER yesterday with complaints of constipation - received an enema with success. She stopped taking the Ultram concerned that it was causing the constipation. When she does take Ultram though at 100 mg it works. She's on day 13 of her Ibrance and tolerating it well.       Oncologic History:  Presentation  - 9/11/19 - Screening mammogram showed multiple right breast masses lower inner quadrant  - 9/13/19 - Diagnostic mammogram and US showed a right breast, 3:00 position mass, 2 CFN measuring 17 mm x 16 mm x 14 mm.  There 2 smaller adjacent masses towards the nipple  - 9/19/19 - Biopsy -    A. Right breast subareolar: Grade 3 IDC, estrogen receptor 80%, progesterone receptor 0%, Her2 dago neg, Ki67 80%.     B. Right breast mass 3:00: Grade 3 IDC with papillary features, estrogen receptor 100%, progesterone receptor 0%, Her2 dago 1+, Ki67 30%.   - 9/26/19: US right axilla with abnormal lymphadenopathy measuring 4.3 x 2.8 cm with biopsy + for metastatic breast cancer.   Surgery consultation with Dr Ferris on 9/25/19   - 9/25/19 - Genetics Genetics Ellis Jensen pending; VICKI pending  Medical oncology consultation on 10/7/19   * 10/8/19 - CT C/A/P with several lytic lesions in thoracic and lumbar spine, iliac bones, left scapula in addition to 3 x 4.5 cm  right axillary node and 2.1 cm right breast mass. Bone scan negative.   * 10/31/19 - started Ibrance, Arimidex, Zoladex; plan for Xgeva.    * 11/12/19 STRATA without targetable mutations.     History: I reviewed, confirmed and updated history (past medical, social, family) as necessary.    Medications    Current Outpatient Medications:     albuterol (PROVENTIL/VENTOLIN HFA) 90 mcg/actuation inhaler, Inhale 1-2 puffs into the lungs every 6 (six) hours as needed for Wheezing. Rescue, Disp: 1 Inhaler, Rfl: 0    albuterol 90 mcg/actuation inhaler, Inhale 2 puffs into the lungs every 6 (six) hours as needed for Wheezing. Rescue, Disp: 6.7 g, Rfl: 0    amLODIPine (NORVASC) 5 MG tablet, Take 1 tablet (5 mg total) by mouth once daily., Disp: 30 tablet, Rfl: 11    anastrozole (ARIMIDEX) 1 mg Tab, Take 1 tablet (1 mg total) by mouth once daily., Disp: 90 tablet, Rfl: 3    carvedilol (COREG) 6.25 MG tablet, Take 1 tablet (6.25 mg total) by mouth 2 (two) times daily with meals., Disp: 60 tablet, Rfl: 11    citalopram (CELEXA) 20 MG tablet, Take 1 tablet (20 mg total) by mouth once daily., Disp: 30 tablet, Rfl: 1    goserelin (ZOLADEX) 3.6 mg injection, Inject 3.6 mg into the skin every 28 days., Disp: , Rfl:     hydrOXYzine pamoate (VISTARIL) 50 MG Cap, One every 6 hours as needed for anxiety, Disp: 40 capsule, Rfl: 0    irbesartan (AVAPRO) 75 MG tablet, Take 1 tablet (75 mg total) by mouth every evening., Disp: 30 tablet, Rfl: 11    ketorolac (TORADOL) 10 mg tablet, Take 1 tablet (10 mg total) by mouth every 6 (six) hours as needed for Pain., Disp: 12 tablet, Rfl: 0    loratadine (CLARITIN) 10 mg tablet, Take 10 mg by mouth daily as needed. , Disp: , Rfl:     LORazepam (ATIVAN) 0.5 MG tablet, Take 1 tablet (0.5 mg total) by mouth every 12 (twelve) hours as needed for Anxiety., Disp: 30 tablet, Rfl: 0    ondansetron (ZOFRAN) 8 MG tablet, Take 1 tablet (8 mg total) by mouth every 8 (eight) hours as needed for  Nausea., Disp: 45 tablet, Rfl: 2    ondansetron (ZOFRAN-ODT) 4 MG TbDL, Take 2 tablets (8 mg total) by mouth every 8 (eight) hours as needed., Disp: 30 tablet, Rfl: 0    palbociclib (IBRANCE) 125 mg Cap, Take 125 mg by mouth as instructed Take as directed days 1-21 of each 28 day cycle. Take with food., Disp: 21 capsule, Rfl: 6    pantoprazole (PROTONIX) 40 MG tablet, Please take one tablet by mouth every 12 hours when you have discomfort, then take one tablet every day., Disp: 30 tablet, Rfl: 0    polyethylene glycol (GLYCOLAX) 17 gram/dose powder, Take 17 g by mouth once daily., Disp: 116 g, Rfl: 0    sennosides (SENNA-C ORAL), Take by mouth., Disp: , Rfl:     traMADol (ULTRAM) 50 mg tablet, Take 50 mg by mouth every 6 (six) hours., Disp: , Rfl:     traMADol (ULTRAM) 50 mg tablet, Take 1 tablet (50 mg total) by mouth every 6 (six) hours as needed for Pain., Disp: 60 tablet, Rfl: 0    diclofenac sodium (VOLTAREN) 1 % Gel, Apply 2 g topically 3 (three) times daily. for 10 days (Patient not taking: Reported on 10/7/2019), Disp: 100 g, Rfl: 0    HYDROcodone-acetaminophen (NORCO) 5-325 mg per tablet, Take 1 tablet by mouth every 4 (four) hours as needed for Pain., Disp: 12 tablet, Rfl: 0  Allergies  Review of patient's allergies indicates:   Allergen Reactions    Keflex [cephalexin] Itching     Review of Systems  Review of Systems   Constitutional: Negative for activity change, appetite change, chills, fatigue, fever and unexpected weight change.   HENT: Negative for congestion, hearing loss, nosebleeds, sinus pressure and trouble swallowing.    Eyes: Negative for pain, discharge, itching and visual disturbance.   Respiratory: Negative for cough, chest tightness and shortness of breath.    Cardiovascular: Negative for chest pain, palpitations and leg swelling.   Gastrointestinal: Positive for constipation. Negative for abdominal distention, abdominal pain, blood in stool, diarrhea, nausea, rectal pain and  "vomiting.   Endocrine: Positive for heat intolerance. Negative for polydipsia.   Genitourinary: Negative for difficulty urinating, dysuria, flank pain, frequency, hematuria and urgency.   Musculoskeletal: Positive for arthralgias (knee pain) and back pain. Negative for neck pain.   Skin: Negative for color change, pallor and rash.   Neurological: Negative for dizziness, numbness and headaches.   Hematological: Negative for adenopathy. Does not bruise/bleed easily.   Psychiatric/Behavioral: Negative for confusion and decreased concentration. The patient is nervous/anxious (improving).         Objective     Objective:     Vitals:    11/12/19 1502   BP: 132/80   BP Location: Right arm   Patient Position: Sitting   BP Method: Large (Automatic)   Pulse: 66   Resp: 16   Temp: 98.2 °F (36.8 °C)   TempSrc: Oral   SpO2: 95%   Weight: (!) 158.7 kg (349 lb 13.9 oz)   Height: 5' 6" (1.676 m)     Body surface area is 2.72 meters squared.  Physical Exam   Constitutional: She is oriented to person, place, and time. She appears well-developed and well-nourished. No distress.   HENT:   Head: Normocephalic and atraumatic.   Mouth/Throat: Oropharynx is clear and moist and mucous membranes are normal. No oral lesions.   Eyes: Conjunctivae and EOM are normal. Right eye exhibits no discharge. Left eye exhibits no discharge. No scleral icterus.   Neck: Normal range of motion. Neck supple. No thyroid mass and no thyromegaly present.   Cardiovascular: Normal rate, regular rhythm, S1 normal, S2 normal and normal heart sounds.   Pulmonary/Chest: Effort normal and breath sounds normal. No respiratory distress. She has no wheezes. She has no rhonchi. She has no rales. Chest wall is not dull to percussion.   Abdominal: Soft. Normal appearance and bowel sounds are normal. There is no hepatosplenomegaly. There is no tenderness.   Lymphadenopathy:     She has no cervical adenopathy.        Right: No supraclavicular adenopathy present.        Left: " No supraclavicular adenopathy present.   Neurological: She is alert and oriented to person, place, and time. She has normal strength.   Skin: Skin is warm, dry and intact. No pallor.   Psychiatric: Her behavior is normal. Thought content normal.         Labs and Imaging  Results for orders placed or performed in visit on 11/12/19   CBC Oncology   Result Value Ref Range    WBC 4.26 3.90 - 12.70 K/uL    RBC 5.62 (H) 4.00 - 5.40 M/uL    Hemoglobin 11.9 (L) 12.0 - 16.0 g/dL    Hematocrit 41.3 37.0 - 48.5 %    Mean Corpuscular Volume 74 (L) 82 - 98 fL    Mean Corpuscular Hemoglobin 21.2 (L) 27.0 - 31.0 pg    Mean Corpuscular Hemoglobin Conc 28.8 (L) 32.0 - 36.0 g/dL    RDW 19.8 (H) 11.5 - 14.5 %    Platelets 383 (H) 150 - 350 K/uL    MPV 10.2 9.2 - 12.9 fL    Gran # (ANC) 2.3 1.8 - 7.7 K/uL    Immature Grans (Abs) 0.01 0.00 - 0.04 K/uL   Comprehensive metabolic panel   Result Value Ref Range    Sodium 142 136 - 145 mmol/L    Potassium 3.7 3.5 - 5.1 mmol/L    Chloride 108 95 - 110 mmol/L    CO2 27 23 - 29 mmol/L    Glucose 105 70 - 110 mg/dL    BUN, Bld 13 6 - 20 mg/dL    Creatinine 1.1 0.5 - 1.4 mg/dL    Calcium 9.8 8.7 - 10.5 mg/dL    Total Protein 8.5 (H) 6.0 - 8.4 g/dL    Albumin 3.4 (L) 3.5 - 5.2 g/dL    Total Bilirubin 0.5 0.1 - 1.0 mg/dL    Alkaline Phosphatase 128 55 - 135 U/L    AST 16 10 - 40 U/L    ALT 21 10 - 44 U/L    Anion Gap 7 (L) 8 - 16 mmol/L    eGFR if African American >60.0 >60 mL/min/1.73 m^2    eGFR if non African American >60.0 >60 mL/min/1.73 m^2       Assessment     Assessment:     1. Stage IV ([id number]) invasive ductal carcinoma of right breast, upper inner quadrant, ER %, [place] neg, Her2 neg, Grade 3, multifocal with one lesion appearing more aggressive (Ki67 80%), large LN +, bony mets   * Genetics pending   * 10/2019 staging scans showed diffuse bony mets  *  10/31/19 started Arimidex and Ibrance 125 mg daily days 1-21 every 28 days with Zoladex.    * Plan re-image after 3 months of therapy  (end of January) with CT C/A/P. Trend AC 15-3.    2. Bony mets with L2 endplate fracture   * Confirm with bone biopsy - need to make sure primary and met same ER/WI/Her2   * Xgeva monthly - will get dental clearance    3. Anxiety related to cancer diagnosis.   * Seeing Dr Odom.    4. Congestive heart failure, new diagnosis   * Echo 10/2019 with EF 30%   * Follows with Dr Olivares.     5. Morbid obesity   * Adjusting diet for weight loss.     6. Cancer pain   * Ultram 100 mg q 6 hours prn.     * Discussed options with IR for local control, but she prefers to use prn Ultram for time being.     7. Menopausal symptoms   * Referred to Dr Avina - suspect they'll worsen with Arimidex/Zoladex.     8. Constipation.    * Senna S 2 tabs nightly, Miralax prn  Plan:     1. Continue Zoladex, Arimidex, Ibrance. Currently cycle 1 day 13.   2. Plan Xgeva with second dose of Zoladex - dental clearance  3. Bone biopsy to confirm metastatic disease  4. Senna S 2 tabs nightly, Miralax prn  5. Plan re-image after 3 months of therapy (end of January) with CT C/A/P. Trend AC 15-3.  6. Dr Olivares follow up  7. Follow up genetics results.  8. Plan DEXA in near future, but will hold for now since she's having mutliple other appts  9. Calcium/vitamin D     ACP done.   RTC in 2 weeks with cbc, cmp, ca 15-3, xgeva, zoladex.       Future Appointments   Date Time Provider Department Center   11/12/2019  4:00 PM Bria Odom, PhD [facility] CAN PSY [facility]   11/20/2019  3:40 PM Yen Ahmadi NP [facility] CARDIO [facility]   11/22/2019  9:30 AM Mai Avina MD [facility] WWSS [facility]   11/29/2019 10:00 AM LAB, HEMONC SAME DAY [facility] LAB HO [facility]   11/29/2019 11:00 AM Jessica Mendez MD [facility] HEM ONC [facility]   11/29/2019 11:30 AM INJECTION, [facility] INFUSION [facility]MO [facility]

## 2019-11-15 ENCOUNTER — PATIENT MESSAGE (OUTPATIENT)
Dept: HEMATOLOGY/ONCOLOGY | Facility: CLINIC | Age: 44
End: 2019-11-15

## 2019-11-15 LAB — CANCER AG15-3 SERPL-ACNC: 29 U/ML (ref 0–31)

## 2019-11-18 ENCOUNTER — PATIENT OUTREACH (OUTPATIENT)
Dept: ADMINISTRATIVE | Facility: OTHER | Age: 44
End: 2019-11-18

## 2019-11-20 ENCOUNTER — OFFICE VISIT (OUTPATIENT)
Dept: CARDIOLOGY | Facility: CLINIC | Age: 44
End: 2019-11-20
Payer: COMMERCIAL

## 2019-11-20 VITALS
DIASTOLIC BLOOD PRESSURE: 67 MMHG | HEIGHT: 65 IN | WEIGHT: 293 LBS | BODY MASS INDEX: 48.82 KG/M2 | SYSTOLIC BLOOD PRESSURE: 140 MMHG | HEART RATE: 67 BPM | OXYGEN SATURATION: 95 %

## 2019-11-20 DIAGNOSIS — I42.0 DILATED CARDIOMYOPATHY: Primary | ICD-10-CM

## 2019-11-20 DIAGNOSIS — R73.03 PREDIABETES: ICD-10-CM

## 2019-11-20 DIAGNOSIS — E78.00 PURE HYPERCHOLESTEROLEMIA: ICD-10-CM

## 2019-11-20 DIAGNOSIS — E66.01 MORBID OBESITY WITH BODY MASS INDEX OF 50.0-59.9 IN ADULT: ICD-10-CM

## 2019-11-20 DIAGNOSIS — R29.818 SUSPECTED SLEEP APNEA: ICD-10-CM

## 2019-11-20 DIAGNOSIS — I10 ESSENTIAL HYPERTENSION: ICD-10-CM

## 2019-11-20 DIAGNOSIS — D56.9 THALASSEMIA, UNSPECIFIED TYPE: ICD-10-CM

## 2019-11-20 PROCEDURE — 3008F BODY MASS INDEX DOCD: CPT | Mod: CPTII,S$GLB,, | Performed by: NURSE PRACTITIONER

## 2019-11-20 PROCEDURE — 99999 PR PBB SHADOW E&M-EST. PATIENT-LVL IV: CPT | Mod: PBBFAC,,, | Performed by: NURSE PRACTITIONER

## 2019-11-20 PROCEDURE — 99214 PR OFFICE/OUTPT VISIT, EST, LEVL IV, 30-39 MIN: ICD-10-PCS | Mod: S$GLB,,, | Performed by: NURSE PRACTITIONER

## 2019-11-20 PROCEDURE — 3078F DIAST BP <80 MM HG: CPT | Mod: CPTII,S$GLB,, | Performed by: NURSE PRACTITIONER

## 2019-11-20 PROCEDURE — 99214 OFFICE O/P EST MOD 30 MIN: CPT | Mod: S$GLB,,, | Performed by: NURSE PRACTITIONER

## 2019-11-20 PROCEDURE — 3077F PR MOST RECENT SYSTOLIC BLOOD PRESSURE >= 140 MM HG: ICD-10-PCS | Mod: CPTII,S$GLB,, | Performed by: NURSE PRACTITIONER

## 2019-11-20 PROCEDURE — 3008F PR BODY MASS INDEX (BMI) DOCUMENTED: ICD-10-PCS | Mod: CPTII,S$GLB,, | Performed by: NURSE PRACTITIONER

## 2019-11-20 PROCEDURE — 3077F SYST BP >= 140 MM HG: CPT | Mod: CPTII,S$GLB,, | Performed by: NURSE PRACTITIONER

## 2019-11-20 PROCEDURE — 99999 PR PBB SHADOW E&M-EST. PATIENT-LVL IV: ICD-10-PCS | Mod: PBBFAC,,, | Performed by: NURSE PRACTITIONER

## 2019-11-20 PROCEDURE — 3078F PR MOST RECENT DIASTOLIC BLOOD PRESSURE < 80 MM HG: ICD-10-PCS | Mod: CPTII,S$GLB,, | Performed by: NURSE PRACTITIONER

## 2019-11-20 RX ORDER — IRBESARTAN 150 MG/1
150 TABLET ORAL NIGHTLY
Qty: 30 TABLET | Refills: 11 | Status: SHIPPED | OUTPATIENT
Start: 2019-11-20 | End: 2020-12-17

## 2019-11-20 NOTE — PATIENT INSTRUCTIONS
Increase the irbesartan to 150 mg daily.  You can take 2 of the 75 mg tablets a day until you run out and then start the new prescription.

## 2019-11-20 NOTE — PROGRESS NOTES
Ms. Ye is a patient of Dr. Olivares and was last seen in [facility] 10/22/2019.      Subjective:   Patient ID:  Kristopher Ye is a 44 y.o. female who presents for follow-up of No chief complaint on file.    Problems:  Dilated cardiomyopathy, EF 30%  HTN  HLD  Breast cancer stage IV with mets to the bone  -Zoladex (edema; HTN), Arimidex (edema; HTN; MI; DVT/PE), Ibrance    HPI  Ms. Ye is in clinic today to follow up HFrEF after starting irbesartan 75 mg and carvediolol 6.25 mg BID.  EF initially dropped in 2017 with EF 45% and stress echo without myocardial ischemia.  Reports mother has had CM since her 40s as well.  She has not had genetic testing.  She gets SOB at work but not at home.  Patient denies chest pain with exertion or at rest, palpitations, dizziness, syncope, edema, orthopnea, PND, or claudication.  Reports no routine exercise.       Under ACC guidelines, this patient's 10 year risk for atherosclerotic cardiovascular disease (ASCVD) is The 10-year ASCVD risk score (Rosita CELESTE Jr., et al., 2013) is: 4.9%    Values used to calculate the score:      Age: 44 years      Sex: Female      Is Non- : Yes      Diabetic: No      Tobacco smoker: No      Systolic Blood Pressure: 140 mmHg      Is BP treated: Yes      HDL Cholesterol: 48 mg/dL      Total Cholesterol: 229 mg/dL     Review of Systems   Constitution: Negative for decreased appetite, diaphoresis, malaise/fatigue, weight gain and weight loss.   Eyes: Negative for visual disturbance.   Cardiovascular: Positive for dyspnea on exertion. Negative for chest pain, claudication, irregular heartbeat, leg swelling, near-syncope, orthopnea, palpitations, paroxysmal nocturnal dyspnea and syncope.        Denies chest pressure   Respiratory: Negative for cough, hemoptysis, shortness of breath, sleep disturbances due to breathing and snoring.    Endocrine: Negative for cold intolerance and heat intolerance.    Hematologic/Lymphatic: Negative for bleeding problem. Does not bruise/bleed easily.   Musculoskeletal: Negative for myalgias.   Gastrointestinal: Negative for bloating, abdominal pain, anorexia, change in bowel habit, constipation, diarrhea, nausea and vomiting.   Neurological: Negative for difficulty with concentration, disturbances in coordination, excessive daytime sleepiness, dizziness, headaches, light-headedness, loss of balance, numbness and weakness.   Psychiatric/Behavioral: The patient does not have insomnia.      Allergies and current medications updated and reviewed:  Review of patient's allergies indicates:   Allergen Reactions    Keflex [cephalexin] Itching     Current Outpatient Medications   Medication Sig    albuterol (PROVENTIL/VENTOLIN HFA) 90 mcg/actuation inhaler Inhale 1-2 puffs into the lungs every 6 (six) hours as needed for Wheezing. Rescue    amLODIPine (NORVASC) 5 MG tablet Take 1 tablet (5 mg total) by mouth once daily.    anastrozole (ARIMIDEX) 1 mg Tab Take 1 tablet (1 mg total) by mouth once daily.    carvedilol (COREG) 6.25 MG tablet Take 1 tablet (6.25 mg total) by mouth 2 (two) times daily with meals.    citalopram (CELEXA) 20 MG tablet Take 1 tablet (20 mg total) by mouth once daily.    goserelin (ZOLADEX) 3.6 mg injection Inject 3.6 mg into the skin every 28 days.    HYDROcodone-acetaminophen (NORCO) 5-325 mg per tablet Take 1 tablet by mouth every 4 (four) hours as needed for Pain.    hydrOXYzine pamoate (VISTARIL) 50 MG Cap One every 6 hours as needed for anxiety    irbesartan (AVAPRO) 75 MG tablet Take 1 tablet (75 mg total) by mouth every evening.    ketorolac (TORADOL) 10 mg tablet Take 1 tablet (10 mg total) by mouth every 6 (six) hours as needed for Pain.    loratadine (CLARITIN) 10 mg tablet Take 10 mg by mouth daily as needed.     LORazepam (ATIVAN) 0.5 MG tablet Take 1 tablet (0.5 mg total) by mouth every 12 (twelve) hours as needed for Anxiety.     "ondansetron (ZOFRAN) 8 MG tablet Take 1 tablet (8 mg total) by mouth every 8 (eight) hours as needed for Nausea.    ondansetron (ZOFRAN-ODT) 4 MG TbDL Take 2 tablets (8 mg total) by mouth every 8 (eight) hours as needed.    palbociclib (IBRANCE) 125 mg Cap Take 125 mg by mouth as instructed Take as directed days 1-21 of each 28 day cycle. Take with food.    pantoprazole (PROTONIX) 40 MG tablet Please take one tablet by mouth every 12 hours when you have discomfort, then take one tablet every day.    polyethylene glycol (GLYCOLAX) 17 gram/dose powder Take 17 g by mouth once daily.    sennosides (SENNA-C ORAL) Take by mouth.    traMADol (ULTRAM) 50 mg tablet Take 2 tablets (100 mg total) by mouth every 6 (six) hours as needed for Pain.    albuterol 90 mcg/actuation inhaler Inhale 2 puffs into the lungs every 6 (six) hours as needed for Wheezing. Rescue (Patient not taking: Reported on 2019)    diclofenac sodium (VOLTAREN) 1 % Gel Apply 2 g topically 3 (three) times daily. for 10 days (Patient not taking: Reported on 10/7/2019)     No current facility-administered medications for this visit.      Objective:     Right Arm BP - Sittin/66 (19 1521)  Left Arm BP - Sittin/67 (19 1521)    BP (!) 140/67 (BP Location: Left arm, Patient Position: Sitting, BP Method: X-Large (Automatic))   Pulse 67   Ht 5' 5" (1.651 m)   Wt (!) 159.7 kg (352 lb 1.2 oz)   SpO2 95%   BMI 58.59 kg/m²     Physical Exam   Constitutional: She is oriented to person, place, and time. Vital signs are normal. She appears well-developed and well-nourished. She is active. No distress.   HENT:   Head: Normocephalic and atraumatic.   Eyes: Conjunctivae and lids are normal. No scleral icterus.   Neck: Neck supple. Normal carotid pulses, no hepatojugular reflux and no JVD present. Carotid bruit is not present.   Cardiovascular: Normal rate, regular rhythm, S1 normal, S2 normal and intact distal pulses. PMI is not " displaced. Exam reveals no gallop and no friction rub.   No murmur heard.  Pulses:       Carotid pulses are 2+ on the right side, and 2+ on the left side.       Radial pulses are 2+ on the right side, and 2+ on the left side.        Dorsalis pedis pulses are 2+ on the right side, and 2+ on the left side.        Posterior tibial pulses are 1+ on the right side, and 1+ on the left side.   Pulmonary/Chest: Effort normal and breath sounds normal. No respiratory distress. She has no decreased breath sounds. She has no wheezes. She has no rhonchi. She has no rales. She exhibits no tenderness.   Abdominal: Soft. Normal appearance and bowel sounds are normal. She exhibits no distension, no fluid wave, no abdominal bruit, no ascites and no pulsatile midline mass. There is no hepatosplenomegaly. There is no tenderness.   Musculoskeletal: She exhibits no edema.   Neurological: She is alert and oriented to person, place, and time. Gait normal.   Skin: Skin is warm, dry and intact. No rash noted. She is not diaphoretic. Nails show no clubbing.   Psychiatric: She has a normal mood and affect. Her speech is normal and behavior is normal. Judgment and thought content normal. Cognition and memory are normal.   Nursing note and vitals reviewed.      Chemistry        Component Value Date/Time     11/12/2019 1429    K 3.7 11/12/2019 1429     11/12/2019 1429    CO2 27 11/12/2019 1429    BUN 13 11/12/2019 1429    CREATININE 1.1 11/12/2019 1429     11/12/2019 1429        Component Value Date/Time    CALCIUM 9.8 11/12/2019 1429    ALKPHOS 128 11/12/2019 1429    AST 16 11/12/2019 1429    ALT 21 11/12/2019 1429    BILITOT 0.5 11/12/2019 1429    ESTGFRAFRICA >60.0 11/12/2019 1429    EGFRNONAA >60.0 11/12/2019 1429        Lab Results   Component Value Date    HGBA1C 6.2 (H) 08/16/2017     Recent Labs   Lab 08/16/17  1748  03/28/19  1427  10/19/19  0046 11/12/19  1429   WBC 10.17   < > 9.81   < > 10.77 4.26   Hemoglobin 10.8  L   < > 12.2   < > 11.9 L 11.9 L   Hematocrit 35.7 L   < > 42.2   < > 40.1 41.3   Mean Corpuscular Volume 65 L   < > 71 L   < > 70 L 74 L   Platelets 515 H   < > 430 H   < > 387 H 383 H   BNP <10  --  52  --  44  --    TSH 1.064  --  1.286  --   --   --    Cholesterol 229 H  --  229 H  --   --   --    HDL 42  --  48  --   --   --    LDL Cholesterol 162.4 H  --  158.2  --   --   --    Triglycerides 123  --  114  --   --   --    Hdl/Cholesterol Ratio 18.3 L  --  21.0  --   --   --     < > = values in this interval not displayed.     Lab Results   Component Value Date    IRON 28 (L) 09/08/2016    TIBC 278 09/08/2016    FERRITIN 94 09/08/2016     Recent Labs   Lab 10/30/19  1448   INR 1.0        Test(s) Reviewed  I have reviewed the following in detail:  [] Stress test   [] Angiography   [x] Echocardiogram   [x] Labs   [] Other:         Assessment/Plan:   1. Dilated cardiomyopathy  Euvolemic on exam.  Increase irbesartan to 150 mg daily.  She has cmp already schedule for Dr. Mendez on 11/29/19.  If BP becomes too low, would stop amlodipine to allow further up titration of ARB and BB.  Continue carvedilol 6.25 mg BID.  Encouraged increase exercise in the pool or on the bike since her back is hurting.      2. Essential hypertension  BP not at goal <130/80. Increase irbesartan to 150 mg daily.  Requested 2 week bp log.  She is in the process of signing up for digital hypertension.  Instructed to go [facility] to get the bp cuff and complete enrollment.      - Ambulatory referral to Sleep Disorders  - irbesartan (AVAPRO) 150 MG tablet; Take 1 tablet (150 mg total) by mouth every evening.  Dispense: 30 tablet; Refill: 11    3. Pure hypercholesterolemia   but her ASCVD risk is <7.5% and no risk enhancing factors identified.  However, suspect DM so A1C is being drawn. Discussed that if her A1C is elevated that she would need a statin.  Given copy of and encouraged the mediterranean diet,  exercise, and weight loss.     - irbesartan (AVAPRO) 150 MG tablet; Take 1 tablet (150 mg total) by mouth every evening.  Dispense: 30 tablet; Refill: 11    4. Thalassemia, unspecified type      5. Morbid obesity (BMI= 55.9 on 10/30/14)  BMI 58.6 Encouraged increased CV exercise to 30 minutes a day for 5 days a week.     - Hemoglobin A1c; Future  - Ambulatory referral to Sleep Disorders    6. Prediabetes  Acanthosis nigricans on exam. Reports significant thirst and drinking a lot of water. Last A1C was 6.2 in 2017.  Will add A1C to her labs on 11/29.  Encouraged low carb/low sugar diet.      - Hemoglobin A1c; Future    7. Suspected sleep apnea  Reports snoring, waking a couple of times a night, she has awoken gasping for air, and her BMI is 58.  Refer to the sleep clinic.  Discussed potential complications associated with untreated ZABRINA.        Patient was discussed with but not examined by Dr. Olivares    Follow up in about 4 weeks (around 12/18/2019).

## 2019-11-22 ENCOUNTER — TELEPHONE (OUTPATIENT)
Dept: PHARMACY | Facility: CLINIC | Age: 44
End: 2019-11-22

## 2019-11-22 NOTE — TELEPHONE ENCOUNTER
Contacted patient in regards to Ibrance refill and 7 day touchbase. She confirmed start date of 10/31 - refill needed by next cycle starting 11/28. She will  from [facility] on 11/27 and is aware we will be closed on Thanksgiving. $0 copay, address confirmed. No changes in other medications, allergies, health conditions or missed doses.    Administration: Patient confirms taking medication as prescribed: 1 capsule once daily x 3 weeks on, 1 week off. Proper chemo handling procedures are followed (pours into cap of bottle). Medication is stored at room temp.  Adherence: Patient denies missed doses of her Ibrance since starting.  Side effects/disease state management: Patient confirms a rash that quickly resolved with hydrocortisone cream. We reviewed that if her rash spreads or becomes bothersome - alert provider/[facility]. She denied additional common side effects such as diarrhea or nausea.  ED visit: The patient reported having to go to the ED for severe constipation and received an enema - she asked if Ibrance or Arimidex could have caused the constipation. Discussed that constipation is not a commonly reported side effect of Ibrance or Arimidex - may be more related to tramadol. She has since discontinued tramadol but continues to have constipation. Provider is aware and recommended a regimen of daily Metamucil which the patient stated helps.    Overall, the patient seems to be tolerating Ibrance great. An RPh will continue to f/u every three cycles or as clinically appropriate. Therapy appropriate. Encouraged patient to contact [facility] with any questions/concerns.

## 2019-11-29 ENCOUNTER — LAB VISIT (OUTPATIENT)
Dept: LAB | Facility: HOSPITAL | Age: 44
End: 2019-11-29
Attending: INTERNAL MEDICINE
Payer: COMMERCIAL

## 2019-11-29 ENCOUNTER — INFUSION (OUTPATIENT)
Dept: INFUSION THERAPY | Facility: HOSPITAL | Age: 44
End: 2019-11-29
Attending: INTERNAL MEDICINE
Payer: COMMERCIAL

## 2019-11-29 ENCOUNTER — OFFICE VISIT (OUTPATIENT)
Dept: HEMATOLOGY/ONCOLOGY | Facility: CLINIC | Age: 44
End: 2019-11-29
Payer: COMMERCIAL

## 2019-11-29 ENCOUNTER — TELEPHONE (OUTPATIENT)
Dept: HEMATOLOGY/ONCOLOGY | Facility: CLINIC | Age: 44
End: 2019-11-29

## 2019-11-29 ENCOUNTER — TELEPHONE (OUTPATIENT)
Dept: INTERVENTIONAL RADIOLOGY/VASCULAR | Facility: HOSPITAL | Age: 44
End: 2019-11-29

## 2019-11-29 VITALS
HEIGHT: 66 IN | BODY MASS INDEX: 47.09 KG/M2 | TEMPERATURE: 98 F | WEIGHT: 293 LBS | SYSTOLIC BLOOD PRESSURE: 158 MMHG | OXYGEN SATURATION: 95 % | HEART RATE: 69 BPM | DIASTOLIC BLOOD PRESSURE: 94 MMHG | RESPIRATION RATE: 18 BRPM

## 2019-11-29 DIAGNOSIS — Z17.0 MALIGNANT NEOPLASM OF UPPER-INNER QUADRANT OF RIGHT BREAST IN FEMALE, ESTROGEN RECEPTOR POSITIVE: Primary | ICD-10-CM

## 2019-11-29 DIAGNOSIS — M84.48XD: ICD-10-CM

## 2019-11-29 DIAGNOSIS — C79.51 BONE METASTASES: ICD-10-CM

## 2019-11-29 DIAGNOSIS — G89.3 CANCER ASSOCIATED PAIN: ICD-10-CM

## 2019-11-29 DIAGNOSIS — K59.00 CONSTIPATION, UNSPECIFIED CONSTIPATION TYPE: ICD-10-CM

## 2019-11-29 DIAGNOSIS — I42.0 DILATED CARDIOMYOPATHY: ICD-10-CM

## 2019-11-29 DIAGNOSIS — R23.2 HOT FLASHES: ICD-10-CM

## 2019-11-29 DIAGNOSIS — R73.03 PREDIABETES: ICD-10-CM

## 2019-11-29 DIAGNOSIS — C50.211 MALIGNANT NEOPLASM OF UPPER-INNER QUADRANT OF RIGHT BREAST IN FEMALE, ESTROGEN RECEPTOR POSITIVE: Primary | ICD-10-CM

## 2019-11-29 DIAGNOSIS — F43.23 ADJUSTMENT DISORDER WITH MIXED ANXIETY AND DEPRESSED MOOD: ICD-10-CM

## 2019-11-29 DIAGNOSIS — Z17.0 MALIGNANT NEOPLASM OF UPPER-INNER QUADRANT OF RIGHT BREAST IN FEMALE, ESTROGEN RECEPTOR POSITIVE: ICD-10-CM

## 2019-11-29 DIAGNOSIS — E66.01 MORBID OBESITY WITH BODY MASS INDEX OF 50.0-59.9 IN ADULT: ICD-10-CM

## 2019-11-29 DIAGNOSIS — M89.9 BONE LESION: Primary | ICD-10-CM

## 2019-11-29 DIAGNOSIS — C50.211 MALIGNANT NEOPLASM OF UPPER-INNER QUADRANT OF RIGHT BREAST IN FEMALE, ESTROGEN RECEPTOR POSITIVE: ICD-10-CM

## 2019-11-29 LAB
ALBUMIN SERPL BCP-MCNC: 3.4 G/DL (ref 3.5–5.2)
ALP SERPL-CCNC: 191 U/L (ref 55–135)
ALT SERPL W/O P-5'-P-CCNC: 31 U/L (ref 10–44)
ANION GAP SERPL CALC-SCNC: 6 MMOL/L (ref 8–16)
AST SERPL-CCNC: 19 U/L (ref 10–40)
BILIRUB SERPL-MCNC: 0.3 MG/DL (ref 0.1–1)
BUN SERPL-MCNC: 17 MG/DL (ref 6–20)
CALCIUM SERPL-MCNC: 9.6 MG/DL (ref 8.7–10.5)
CHLORIDE SERPL-SCNC: 106 MMOL/L (ref 95–110)
CO2 SERPL-SCNC: 27 MMOL/L (ref 23–29)
CREAT SERPL-MCNC: 1 MG/DL (ref 0.5–1.4)
ERYTHROCYTE [DISTWIDTH] IN BLOOD BY AUTOMATED COUNT: 22.9 % (ref 11.5–14.5)
EST. GFR  (AFRICAN AMERICAN): >60 ML/MIN/1.73 M^2
EST. GFR  (NON AFRICAN AMERICAN): >60 ML/MIN/1.73 M^2
ESTIMATED AVG GLUCOSE: 126 MG/DL (ref 68–131)
GLUCOSE SERPL-MCNC: 94 MG/DL (ref 70–110)
HBA1C MFR BLD HPLC: 6 % (ref 4–5.6)
HCT VFR BLD AUTO: 38.7 % (ref 37–48.5)
HGB BLD-MCNC: 11.3 G/DL (ref 12–16)
IMM GRANULOCYTES # BLD AUTO: 0.01 K/UL (ref 0–0.04)
MAGNESIUM SERPL-MCNC: 2.2 MG/DL (ref 1.6–2.6)
MCH RBC QN AUTO: 22.2 PG (ref 27–31)
MCHC RBC AUTO-ENTMCNC: 29.2 G/DL (ref 32–36)
MCV RBC AUTO: 76 FL (ref 82–98)
NEUTROPHILS # BLD AUTO: 3.4 K/UL (ref 1.8–7.7)
PHOSPHATE SERPL-MCNC: 4.3 MG/DL (ref 2.7–4.5)
PLATELET # BLD AUTO: 316 K/UL (ref 150–350)
PMV BLD AUTO: 10 FL (ref 9.2–12.9)
POTASSIUM SERPL-SCNC: 4.2 MMOL/L (ref 3.5–5.1)
PROT SERPL-MCNC: 8.1 G/DL (ref 6–8.4)
RBC # BLD AUTO: 5.1 M/UL (ref 4–5.4)
SODIUM SERPL-SCNC: 139 MMOL/L (ref 136–145)
WBC # BLD AUTO: 8.49 K/UL (ref 3.9–12.7)

## 2019-11-29 PROCEDURE — 83735 ASSAY OF MAGNESIUM: CPT

## 2019-11-29 PROCEDURE — 85027 COMPLETE CBC AUTOMATED: CPT

## 2019-11-29 PROCEDURE — 3008F PR BODY MASS INDEX (BMI) DOCUMENTED: ICD-10-PCS | Mod: CPTII,S$GLB,, | Performed by: INTERNAL MEDICINE

## 2019-11-29 PROCEDURE — 63600175 PHARM REV CODE 636 W HCPCS: Mod: JG | Performed by: INTERNAL MEDICINE

## 2019-11-29 PROCEDURE — 99214 OFFICE O/P EST MOD 30 MIN: CPT | Mod: S$GLB,,, | Performed by: INTERNAL MEDICINE

## 2019-11-29 PROCEDURE — 99214 PR OFFICE/OUTPT VISIT, EST, LEVL IV, 30-39 MIN: ICD-10-PCS | Mod: S$GLB,,, | Performed by: INTERNAL MEDICINE

## 2019-11-29 PROCEDURE — 36415 COLL VENOUS BLD VENIPUNCTURE: CPT

## 2019-11-29 PROCEDURE — 99999 PR PBB SHADOW E&M-EST. PATIENT-LVL III: ICD-10-PCS | Mod: PBBFAC,,, | Performed by: INTERNAL MEDICINE

## 2019-11-29 PROCEDURE — 3008F BODY MASS INDEX DOCD: CPT | Mod: CPTII,S$GLB,, | Performed by: INTERNAL MEDICINE

## 2019-11-29 PROCEDURE — 96372 THER/PROPH/DIAG INJ SC/IM: CPT

## 2019-11-29 PROCEDURE — 3077F PR MOST RECENT SYSTOLIC BLOOD PRESSURE >= 140 MM HG: ICD-10-PCS | Mod: CPTII,S$GLB,, | Performed by: INTERNAL MEDICINE

## 2019-11-29 PROCEDURE — 3080F PR MOST RECENT DIASTOLIC BLOOD PRESSURE >= 90 MM HG: ICD-10-PCS | Mod: CPTII,S$GLB,, | Performed by: INTERNAL MEDICINE

## 2019-11-29 PROCEDURE — 83036 HEMOGLOBIN GLYCOSYLATED A1C: CPT

## 2019-11-29 PROCEDURE — 3080F DIAST BP >= 90 MM HG: CPT | Mod: CPTII,S$GLB,, | Performed by: INTERNAL MEDICINE

## 2019-11-29 PROCEDURE — 3077F SYST BP >= 140 MM HG: CPT | Mod: CPTII,S$GLB,, | Performed by: INTERNAL MEDICINE

## 2019-11-29 PROCEDURE — 99999 PR PBB SHADOW E&M-EST. PATIENT-LVL III: CPT | Mod: PBBFAC,,, | Performed by: INTERNAL MEDICINE

## 2019-11-29 PROCEDURE — 84100 ASSAY OF PHOSPHORUS: CPT

## 2019-11-29 PROCEDURE — 80053 COMPREHEN METABOLIC PANEL: CPT

## 2019-11-29 RX ADMIN — GOSERELIN ACETATE 3.6 MG: 3.6 IMPLANT SUBCUTANEOUS at 11:11

## 2019-11-29 NOTE — Clinical Note
No Xgeva today - hasn't seen dentist todayReschedule bone biopsy with IRMD in 1 mo with cbc, cmp, ca 15-3, mg, phos

## 2019-11-29 NOTE — PROGRESS NOTES
History:     Reason For Follow Up:   1. Stage IV ([id number]) invasive ductal carcinoma of right breast, upper inner quadrant, ER %, [place] neg, Her2 neg, Grade 3, multifocal with one lesion appearing more aggressive (Ki67 80%), large LN +, bone mets.    HPI:   Kristopher Ye presents for follow up of breast cancer.   Continues Ibrance, Zoladex, Arimidex - started yesterday. Hot flashes fairly severe, but doesn't want to try to a new medication.   Pain is improving. Using Ultram slightly less.  Hasn't received dental clearance yet.   Some gum soreness.   Constipation slowly improving.     Oncologic History:  Presentation  - 9/11/19 - Screening mammogram showed multiple right breast masses lower inner quadrant  - 9/13/19 - Diagnostic mammogram and US showed a right breast, 3:00 position mass, 2 CFN measuring 17 mm x 16 mm x 14 mm.  There 2 smaller adjacent masses towards the nipple  - 9/19/19 - Biopsy -    A. Right breast subareolar: Grade 3 IDC, estrogen receptor 80%, progesterone receptor 0%, Her2 dago neg, Ki67 80%.     B. Right breast mass 3:00: Grade 3 IDC with papillary features, estrogen receptor 100%, progesterone receptor 0%, Her2 dago 1+, Ki67 30%.   - 9/26/19: US right axilla with abnormal lymphadenopathy measuring 4.3 x 2.8 cm with biopsy + for metastatic breast cancer.   Surgery consultation with Dr Ferris on 9/25/19   - 9/25/19 - Genetics Genetics DOZCAnalysis pending; VUS pending  Medical oncology consultation on 10/7/19   * 10/8/19 - CT C/A/P with several lytic lesions in thoracic and lumbar spine, iliac bones, left scapula in addition to 3 x 4.5 cm right axillary node and 2.1 cm right breast mass. Bone scan negative.   * 10/31/19 - started Ibrance, Arimidex, Zoladex; plan for Xgeva.    * 11/12/19 STRATA without targetable mutations.     History: I reviewed, confirmed and updated history (past medical, social, family) as necessary.    Medications    Current Outpatient  Medications:     albuterol (PROVENTIL/VENTOLIN HFA) 90 mcg/actuation inhaler, Inhale 1-2 puffs into the lungs every 6 (six) hours as needed for Wheezing. Rescue, Disp: 1 Inhaler, Rfl: 0    amLODIPine (NORVASC) 5 MG tablet, Take 1 tablet (5 mg total) by mouth once daily., Disp: 30 tablet, Rfl: 11    anastrozole (ARIMIDEX) 1 mg Tab, Take 1 tablet (1 mg total) by mouth once daily., Disp: 90 tablet, Rfl: 3    carvedilol (COREG) 6.25 MG tablet, Take 1 tablet (6.25 mg total) by mouth 2 (two) times daily with meals., Disp: 60 tablet, Rfl: 11    citalopram (CELEXA) 20 MG tablet, Take 1 tablet (20 mg total) by mouth once daily., Disp: 30 tablet, Rfl: 1    goserelin (ZOLADEX) 3.6 mg injection, Inject 3.6 mg into the skin every 28 days., Disp: , Rfl:     HYDROcodone-acetaminophen (NORCO) 5-325 mg per tablet, Take 1 tablet by mouth every 4 (four) hours as needed for Pain., Disp: 12 tablet, Rfl: 0    hydrOXYzine pamoate (VISTARIL) 50 MG Cap, One every 6 hours as needed for anxiety, Disp: 40 capsule, Rfl: 0    irbesartan (AVAPRO) 150 MG tablet, Take 1 tablet (150 mg total) by mouth every evening., Disp: 30 tablet, Rfl: 11    ketorolac (TORADOL) 10 mg tablet, Take 1 tablet (10 mg total) by mouth every 6 (six) hours as needed for Pain., Disp: 12 tablet, Rfl: 0    loratadine (CLARITIN) 10 mg tablet, Take 10 mg by mouth daily as needed. , Disp: , Rfl:     LORazepam (ATIVAN) 0.5 MG tablet, Take 1 tablet (0.5 mg total) by mouth every 12 (twelve) hours as needed for Anxiety., Disp: 30 tablet, Rfl: 0    ondansetron (ZOFRAN) 8 MG tablet, Take 1 tablet (8 mg total) by mouth every 8 (eight) hours as needed for Nausea., Disp: 45 tablet, Rfl: 2    ondansetron (ZOFRAN-ODT) 4 MG TbDL, Take 2 tablets (8 mg total) by mouth every 8 (eight) hours as needed., Disp: 30 tablet, Rfl: 0    palbociclib (IBRANCE) 125 mg Cap, Take 125 mg by mouth as instructed Take as directed days 1-21 of each 28 day cycle. Take with food., Disp: 21  capsule, Rfl: 6    pantoprazole (PROTONIX) 40 MG tablet, Please take one tablet by mouth every 12 hours when you have discomfort, then take one tablet every day., Disp: 30 tablet, Rfl: 0    polyethylene glycol (GLYCOLAX) 17 gram/dose powder, Take 17 g by mouth once daily., Disp: 116 g, Rfl: 0    sennosides (SENNA-C ORAL), Take by mouth., Disp: , Rfl:     traMADol (ULTRAM) 50 mg tablet, Take 2 tablets (100 mg total) by mouth every 6 (six) hours as needed for Pain., Disp: 180 tablet, Rfl: 0    diclofenac sodium (VOLTAREN) 1 % Gel, Apply 2 g topically 3 (three) times daily. for 10 days (Patient not taking: Reported on 10/7/2019), Disp: 100 g, Rfl: 0  No current facility-administered medications for this visit.   Allergies  Review of patient's allergies indicates:   Allergen Reactions    Keflex [cephalexin] Itching     Review of Systems  Review of Systems   Constitutional: Negative for activity change, appetite change, chills, fatigue, fever and unexpected weight change.   HENT: Negative for congestion, hearing loss, nosebleeds, sinus pressure and trouble swallowing.    Eyes: Negative for pain, discharge, itching and visual disturbance.   Respiratory: Negative for cough, chest tightness and shortness of breath.    Cardiovascular: Negative for chest pain, palpitations and leg swelling.   Gastrointestinal: Positive for constipation (improving). Negative for abdominal distention, abdominal pain, blood in stool, diarrhea, nausea, rectal pain and vomiting.   Endocrine: Positive for heat intolerance. Negative for polydipsia.   Genitourinary: Negative for difficulty urinating, dysuria, flank pain, frequency, hematuria and urgency.   Musculoskeletal: Positive for back pain. Negative for arthralgias and neck pain.   Skin: Negative for color change, pallor and rash.   Neurological: Negative for dizziness, numbness and headaches.   Hematological: Negative for adenopathy. Does not bruise/bleed easily.  "  Psychiatric/Behavioral: Negative for confusion and decreased concentration. The patient is nervous/anxious (improving).         Objective     Objective:     Vitals:    11/29/19 1046   BP: (!) 158/94  Comment: hasn't taken meds yet   BP Location: Right arm  Comment: forearm   Patient Position: Sitting   BP Method: Large (Automatic)   Pulse: 69   Resp: 18   Temp: 98.3 °F (36.8 °C)   TempSrc: Oral   SpO2: 95%   Weight: (!) 161.3 kg (355 lb 9.6 oz)   Height: 5' 6" (1.676 m)     Body surface area is 2.74 meters squared.  Physical Exam   Constitutional: She is oriented to person, place, and time. She appears well-developed and well-nourished. No distress.   HENT:   Head: Normocephalic and atraumatic.   Mouth/Throat: Oropharynx is clear and moist and mucous membranes are normal. No oral lesions.   Eyes: Conjunctivae and EOM are normal. Right eye exhibits no discharge. Left eye exhibits no discharge. No scleral icterus.   Neck: Normal range of motion. Neck supple. No thyroid mass and no thyromegaly present.   Cardiovascular: Normal rate, regular rhythm, S1 normal, S2 normal and normal heart sounds.   Pulmonary/Chest: Effort normal and breath sounds normal. No respiratory distress. She has no wheezes. She has no rhonchi. She has no rales. Chest wall is not dull to percussion.   Abdominal: Soft. Normal appearance and bowel sounds are normal. There is no hepatosplenomegaly. There is no tenderness.   Lymphadenopathy:     She has no cervical adenopathy.        Right: No supraclavicular adenopathy present.        Left: No supraclavicular adenopathy present.   Neurological: She is alert and oriented to person, place, and time. She has normal strength.   Skin: Skin is warm, dry and intact. No pallor.   Psychiatric: Her behavior is normal. Thought content normal.         Labs and Imaging  Results for orders placed or performed in visit on 11/29/19   CBC Oncology   Result Value Ref Range    WBC 8.49 3.90 - 12.70 K/uL    RBC 5.10 " 4.00 - 5.40 M/uL    Hemoglobin 11.3 (L) 12.0 - 16.0 g/dL    Hematocrit 38.7 37.0 - 48.5 %    Mean Corpuscular Volume 76 (L) 82 - 98 fL    Mean Corpuscular Hemoglobin 22.2 (L) 27.0 - 31.0 pg    Mean Corpuscular Hemoglobin Conc 29.2 (L) 32.0 - 36.0 g/dL    RDW 22.9 (H) 11.5 - 14.5 %    Platelets 316 150 - 350 K/uL    MPV 10.0 9.2 - 12.9 fL    Gran # (ANC) 3.4 1.8 - 7.7 K/uL    Immature Grans (Abs) 0.01 0.00 - 0.04 K/uL   Comprehensive metabolic panel   Result Value Ref Range    Sodium 139 136 - 145 mmol/L    Potassium 4.2 3.5 - 5.1 mmol/L    Chloride 106 95 - 110 mmol/L    CO2 27 23 - 29 mmol/L    Glucose 94 70 - 110 mg/dL    BUN, Bld 17 6 - 20 mg/dL    Creatinine 1.0 0.5 - 1.4 mg/dL    Calcium 9.6 8.7 - 10.5 mg/dL    Total Protein 8.1 6.0 - 8.4 g/dL    Albumin 3.4 (L) 3.5 - 5.2 g/dL    Total Bilirubin 0.3 0.1 - 1.0 mg/dL    Alkaline Phosphatase 191 (H) 55 - 135 U/L    AST 19 10 - 40 U/L    ALT 31 10 - 44 U/L    Anion Gap 6 (L) 8 - 16 mmol/L    eGFR if African American >60.0 >60 mL/min/1.73 m^2    eGFR if non African American >60.0 >60 mL/min/1.73 m^2   Hemoglobin A1c   Result Value Ref Range    Hemoglobin A1C 6.0 (H) 4.0 - 5.6 %    Estimated Avg Glucose 126 68 - 131 mg/dL       Assessment     Assessment:     1. Stage IV ([id number]) invasive ductal carcinoma of right breast, upper inner quadrant, ER %, [place] neg, Her2 neg, Grade 3, multifocal with one lesion appearing more aggressive (Ki67 80%), large LN +, bony mets   * Genetics pending   * 10/2019 staging scans showed diffuse bony mets  *  10/31/19 started Arimidex and Ibrance 125 mg daily days 1-21 every 28 days with Zoladex.    * Plan re-image after 3 months of therapy (end of January) with CT C/A/P. Trend CA 15-3.   * Cycle 2 Ibrance/Arimidex/Zoladex - started 11/28.     2. Bony mets with L2 endplate fracture   * Confirm with bone biopsy - need to make sure primary and met same ER/[place]/Her2   * Xgeva monthly - will get dental clearance    3. Anxiety related to  cancer diagnosis.   * Seeing Dr Odom. Improving.     4. Congestive heart failure, new diagnosis   * Echo 10/2019 with EF 30%   * Follows with Dr Olivares.     5. Morbid obesity   * Adjusting diet for weight loss.     6. Cancer pain   * Ultram 100 mg q 6 hours prn.  Controlled.    * Discussed options with IR for local control, but she prefers to use prn Ultram for time being.     7. Menopausal symptoms   * Referred to Dr Avina; worsening with Arimidex/Zoladex.     8. Constipation.    * Senna S 2 tabs nightly, Miralax prn    Plan:     1. Continue Zoladex, Arimidex, Ibrance. Currently cycle 2 day 2.   2. Plan Xgeva with next dose of Zoladex if she gets dental clearance.   3. Bone biopsy to confirm metastatic disease - rescheduled.   4. Senna S 2 tabs nightly, Miralax prn  5. Plan re-image after 3 months of therapy (end of January) with CT C/A/P. Trend AC 15-3.  6. Plan DEXA in near future, but will hold for now since she's having mutliple other appts  7. Calcium/vitamin D     ACP done.   RTC in 4 weeks with cbc, cmp, mg, phosca 15-3, xgeva, zoladex.       Future Appointments   Date Time Provider Department Center   12/18/2019 11:20 AM Yen Ahmadi NP [facility] CARDIO [facility]   1/3/2020  2:00 PM LAB, HEMONC CANCER BLDG [facility] LAB HO [facility]   1/3/2020  3:00 PM Jessica Mendez MD [facility] HEM ONC [facility]   2/19/2020 10:00 AM Bebe Euceda NP [facility]

## 2019-12-03 ENCOUNTER — PATIENT MESSAGE (OUTPATIENT)
Dept: CARDIOLOGY | Facility: CLINIC | Age: 44
End: 2019-12-03

## 2019-12-11 ENCOUNTER — TELEPHONE (OUTPATIENT)
Dept: INFUSION THERAPY | Facility: HOSPITAL | Age: 44
End: 2019-12-11

## 2019-12-13 ENCOUNTER — TELEPHONE (OUTPATIENT)
Dept: INTERVENTIONAL RADIOLOGY/VASCULAR | Facility: HOSPITAL | Age: 44
End: 2019-12-13

## 2019-12-13 DIAGNOSIS — M89.9 BONE LESION: Primary | ICD-10-CM

## 2019-12-13 RX ORDER — MIDAZOLAM HYDROCHLORIDE 1 MG/ML
1 INJECTION INTRAMUSCULAR; INTRAVENOUS
Status: CANCELLED | OUTPATIENT
Start: 2019-12-13

## 2019-12-13 RX ORDER — FENTANYL CITRATE 50 UG/ML
50 INJECTION, SOLUTION INTRAMUSCULAR; INTRAVENOUS
Status: CANCELLED | OUTPATIENT
Start: 2019-12-13

## 2019-12-16 ENCOUNTER — HOSPITAL ENCOUNTER (OUTPATIENT)
Facility: HOSPITAL | Age: 44
Discharge: HOME | End: 2019-12-16
Attending: RADIOLOGY | Admitting: RADIOLOGY
Payer: COMMERCIAL

## 2019-12-16 ENCOUNTER — PATIENT OUTREACH (OUTPATIENT)
Dept: ADMINISTRATIVE | Facility: OTHER | Age: 44
End: 2019-12-16

## 2019-12-16 VITALS
TEMPERATURE: 98 F | SYSTOLIC BLOOD PRESSURE: 148 MMHG | DIASTOLIC BLOOD PRESSURE: 91 MMHG | WEIGHT: 293 LBS | HEART RATE: 78 BPM | HEIGHT: 66 IN | OXYGEN SATURATION: 99 % | BODY MASS INDEX: 47.09 KG/M2 | RESPIRATION RATE: 19 BRPM

## 2019-12-16 DIAGNOSIS — M89.9 BONE LESION: ICD-10-CM

## 2019-12-16 LAB
B-HCG UR QL: NEGATIVE
CTP QC/QA: YES

## 2019-12-16 PROCEDURE — 63600175 PHARM REV CODE 636 W HCPCS: Performed by: FAMILY MEDICINE

## 2019-12-16 PROCEDURE — 88305 TISSUE EXAM BY PATHOLOGIST: CPT | Performed by: PATHOLOGY

## 2019-12-16 PROCEDURE — 88333 PATH CONSLTJ SURG CYTO XM 1: CPT | Mod: 26,,, | Performed by: PATHOLOGY

## 2019-12-16 PROCEDURE — 88342 IMHCHEM/IMCYTCHM 1ST ANTB: CPT | Mod: 26,,, | Performed by: PATHOLOGY

## 2019-12-16 PROCEDURE — 88333 PR  INTRAOPERATIVE CYTO PATH CONSULT, INITIAL SITE: ICD-10-PCS | Mod: 26,,, | Performed by: PATHOLOGY

## 2019-12-16 PROCEDURE — 81025 URINE PREGNANCY TEST: CPT | Performed by: RADIOLOGY

## 2019-12-16 PROCEDURE — 88333 PATH CONSLTJ SURG CYTO XM 1: CPT | Performed by: PATHOLOGY

## 2019-12-16 PROCEDURE — 88305 TISSUE EXAM BY PATHOLOGIST: ICD-10-PCS | Mod: 26,,, | Performed by: PATHOLOGY

## 2019-12-16 PROCEDURE — 63600175 PHARM REV CODE 636 W HCPCS: Performed by: RADIOLOGY

## 2019-12-16 PROCEDURE — 88342 IMHCHEM/IMCYTCHM 1ST ANTB: CPT | Performed by: PATHOLOGY

## 2019-12-16 PROCEDURE — 88305 TISSUE EXAM BY PATHOLOGIST: CPT | Mod: 26,,, | Performed by: PATHOLOGY

## 2019-12-16 PROCEDURE — 88342 CHG IMMUNOCYTOCHEMISTRY: ICD-10-PCS | Mod: 26,,, | Performed by: PATHOLOGY

## 2019-12-16 RX ORDER — FENTANYL CITRATE 50 UG/ML
INJECTION, SOLUTION INTRAMUSCULAR; INTRAVENOUS CODE/TRAUMA/SEDATION MEDICATION
Status: COMPLETED | OUTPATIENT
Start: 2019-12-16 | End: 2019-12-16

## 2019-12-16 RX ORDER — SODIUM CHLORIDE 9 MG/ML
500 INJECTION, SOLUTION INTRAVENOUS ONCE
Status: COMPLETED | OUTPATIENT
Start: 2019-12-16 | End: 2019-12-16

## 2019-12-16 RX ORDER — MIDAZOLAM HYDROCHLORIDE 1 MG/ML
INJECTION INTRAMUSCULAR; INTRAVENOUS CODE/TRAUMA/SEDATION MEDICATION
Status: COMPLETED | OUTPATIENT
Start: 2019-12-16 | End: 2019-12-16

## 2019-12-16 RX ADMIN — FENTANYL CITRATE 100 MCG: 50 INJECTION, SOLUTION INTRAMUSCULAR; INTRAVENOUS at 03:12

## 2019-12-16 RX ADMIN — SODIUM CHLORIDE 500 ML: 0.9 INJECTION, SOLUTION INTRAVENOUS at 01:12

## 2019-12-16 RX ADMIN — MIDAZOLAM HYDROCHLORIDE 1 MG: 1 INJECTION, SOLUTION INTRAMUSCULAR; INTRAVENOUS at 03:12

## 2019-12-16 RX ADMIN — MIDAZOLAM HYDROCHLORIDE 2 MG: 1 INJECTION, SOLUTION INTRAMUSCULAR; INTRAVENOUS at 03:12

## 2019-12-16 NOTE — DISCHARGE SUMMARY
Radiology Discharge Summary      Hospital Course: No complications    Admit Date: 12/16/2019  Discharge Date: 12/16/2019     Instructions Given to Patient: Yes  Diet: Resume prior diet  Activity: activity as tolerated and no driving for today    Description of Condition on Discharge: Stable  Vital Signs (Most Recent): Temp: 98.4 °F (36.9 °C) (12/16/19 1232)  Pulse: 78 (12/16/19 1545)  Resp: 19 (12/16/19 1545)  BP: (!) 148/91 (12/16/19 1545)  SpO2: 99 % (12/16/19 1545)    Discharge Disposition: Home    Discharge Diagnosis: Breast cancer s/p lumbar bone biopsy. Follow up as scheduled.    Evangelist Galeana M.D.  Diagnostic and Interventional Radiologist  Department of Radiology  Pager: [phone]

## 2019-12-16 NOTE — PROGRESS NOTES
Called IR for case update- states nurse has been assigned, will come shortly to get pt.    Pt updated

## 2019-12-16 NOTE — DISCHARGE INSTRUCTIONS
For questions or non emergent concerns call: Radiology clinic  MON-FRI 8 AM- 5PM @ [phone].     For immediate emergent concerns call Radiology resident on call @ [phone].

## 2019-12-16 NOTE — H&P
Radiology History & Physical      SUBJECTIVE:     Chief Complaint: Breast Cancer    History of Present Illness:  Kristopher Ye is a 44 y.o. female with breast cancer and suspected bone metastasis who presents for CT guided bone biopsy of vertebral lesion.    Past Medical History:   Diagnosis Date    Abnormal Pap smear     pt states 13yrs ago colpo was done    Anemia     Anxiety     Cancer     Cardiomyopathy     Fibroid     Hyperlipidemia     Hypertension     Sleep difficulties      Past Surgical History:   Procedure Laterality Date     SECTION      TONSILLECTOMY      TUBAL LIGATION      UTERINE FIBROID EMBOLIZATION         Home Meds:   Prior to Admission medications    Medication Sig Start Date End Date Taking? Authorizing Provider   albuterol (PROVENTIL/VENTOLIN HFA) 90 mcg/actuation inhaler Inhale 1-2 puffs into the lungs every 6 (six) hours as needed for Wheezing. Rescue 10/19/19   Refugio Nye PA-C   amLODIPine (NORVASC) 5 MG tablet Take 1 tablet (5 mg total) by mouth once daily. 3/28/19 3/27/20  Floridalma Olivares MD   anastrozole (ARIMIDEX) 1 mg Tab Take 1 tablet (1 mg total) by mouth once daily. 10/16/19   Jessica Mendez MD   carvedilol (COREG) 6.25 MG tablet Take 1 tablet (6.25 mg total) by mouth 2 (two) times daily with meals. 10/22/19 10/21/20  Floridalma Olivares MD   goserelin (ZOLADEX) 3.6 mg injection Inject 3.6 mg into the skin every 28 days. 10/30/19   Historical Provider, MD   irbesartan (AVAPRO) 150 MG tablet Take 1 tablet (150 mg total) by mouth every evening. 19  Yen Ahmadi NP   ketorolac (TORADOL) 10 mg tablet Take 1 tablet (10 mg total) by mouth every 6 (six) hours as needed for Pain. 10/22/19   Kishore Dunne MD   loratadine (CLARITIN) 10 mg tablet Take 10 mg by mouth daily as needed.     Historical Provider, MD   LORazepam (ATIVAN) 0.5 MG tablet Take 1 tablet (0.5 mg total) by mouth every 12 (twelve) hours as needed for Anxiety.  10/10/19 10/9/20  Jessica Mendez MD   ondansetron (ZOFRAN) 8 MG tablet Take 1 tablet (8 mg total) by mouth every 8 (eight) hours as needed for Nausea. 10/16/19   Jessica Mendez MD   ondansetron (ZOFRAN-ODT) 4 MG TbDL Take 2 tablets (8 mg total) by mouth every 8 (eight) hours as needed. 1/9/19   Daria Rdz NP   palbociclib (IBRANCE) 125 mg Cap Take 125 mg by mouth as instructed Take as directed days 1-21 of each 28 day cycle. Take with food. 10/16/19   Jessica Mendez MD   pantoprazole (PROTONIX) 40 MG tablet Please take one tablet by mouth every 12 hours when you have discomfort, then take one tablet every day. 4/5/18   Brigido Aceves MD   polyethylene glycol (GLYCOLAX) 17 gram/dose powder Take 17 g by mouth once daily. 11/10/19   Esther Lovell MD   sennosides (SENNA-C ORAL) Take by mouth.    Jane Barker MD   traMADol (ULTRAM) 50 mg tablet Take 2 tablets (100 mg total) by mouth every 6 (six) hours as needed for Pain. 11/12/19   Jessica Mendez MD   citalopram (CELEXA) 20 MG tablet Take 1 tablet (20 mg total) by mouth once daily. 8/7/19 12/16/19  ADELINE Kumar   diclofenac sodium (VOLTAREN) 1 % Gel Apply 2 g topically 3 (three) times daily. for 10 days  Patient not taking: Reported on 10/7/2019 8/23/19 12/16/19  Refugio Nye PA-C   HYDROcodone-acetaminophen (NORCO) 5-325 mg per tablet Take 1 tablet by mouth every 4 (four) hours as needed for Pain. 8/23/19 12/16/19  Refugio Nye PA-C   hydrOXYzine pamoate (VISTARIL) 50 MG Cap One every 6 hours as needed for anxiety 8/7/19 12/16/19  Donalyn M. Deblanc, FNP-C     Anticoagulants/Antiplatelets: no anticoagulation    Allergies:   Review of patient's allergies indicates:   Allergen Reactions    Keflex [cephalexin] Itching     Sedation History:  no adverse reactions    Review of Systems:   Hematological: no known coagulopathies  Respiratory: no shortness of breath  Cardiovascular: no chest pain  Gastrointestinal: no abdominal  pain  Genito-Urinary: no dysuria  Musculoskeletal: negative  Neurological: no TIA or stroke symptoms         OBJECTIVE:     Vital Signs (Most Recent)       Physical Exam:  ASA: 3  Mallampati: 1    General: no acute distress  Mental Status: alert and oriented to person, place and time  HEENT: normocephalic, atraumatic  Chest: unlabored breathing  Heart: regular heart rate  Abdomen: nondistended  Extremity: moves all extremities    Laboratory  Lab Results   Component Value Date    INR 1.0 12/16/2019       Lab Results   Component Value Date    WBC 8.49 11/29/2019    HGB 11.3 (L) 11/29/2019    HCT 38.7 11/29/2019    MCV 76 (L) 11/29/2019     11/29/2019      Lab Results   Component Value Date    GLU 94 11/29/2019     11/29/2019    K 4.2 11/29/2019     11/29/2019    CO2 27 11/29/2019    BUN 17 11/29/2019    CREATININE 1.0 11/29/2019    CALCIUM 9.6 11/29/2019    MG 2.2 11/29/2019    ALT 31 11/29/2019    AST 19 11/29/2019    ALBUMIN 3.4 (L) 11/29/2019    BILITOT 0.3 11/29/2019       ASSESSMENT/PLAN:     Sedation Plan: moderate  Patient will undergo CT guided bone biopsy of vertebral lesion     Laila Giraldo MD  Department of Radiology, PGY-2  Pager: [phone]

## 2019-12-16 NOTE — PROCEDURES
Radiology Post-Procedure Note    Pre Op Diagnosis: Bone lesions    Post Op Diagnosis: Same    Procedure: L2 bone biopsy    Procedure performed by: Evangelist Galeana MD    Written Informed Consent Obtained: Yes  Specimen Removed: YES 2 x 13 gauge cores  Estimated Blood Loss: Minimal    Findings:   Under CT guidance, a 15/13 gauge biopsy system was used to obtain 2 cores from right L2 lesion. No complications.     Patient tolerated procedure well.    Evangelist Galeana M.D.  Diagnostic and Interventional Radiologist  Department of Radiology  Pager: [phone]

## 2019-12-17 ENCOUNTER — PATIENT MESSAGE (OUTPATIENT)
Dept: HEMATOLOGY/ONCOLOGY | Facility: CLINIC | Age: 44
End: 2019-12-17

## 2019-12-20 ENCOUNTER — TELEPHONE (OUTPATIENT)
Dept: PHARMACY | Facility: CLINIC | Age: 44
End: 2019-12-20

## 2019-12-20 ENCOUNTER — OFFICE VISIT (OUTPATIENT)
Dept: PSYCHIATRY | Facility: CLINIC | Age: 44
End: 2019-12-20
Payer: COMMERCIAL

## 2019-12-20 DIAGNOSIS — C50.211 MALIGNANT NEOPLASM OF UPPER-INNER QUADRANT OF RIGHT BREAST IN FEMALE, ESTROGEN RECEPTOR POSITIVE: ICD-10-CM

## 2019-12-20 DIAGNOSIS — Z17.0 MALIGNANT NEOPLASM OF UPPER-INNER QUADRANT OF RIGHT BREAST IN FEMALE, ESTROGEN RECEPTOR POSITIVE: ICD-10-CM

## 2019-12-20 DIAGNOSIS — F43.23 ADJUSTMENT DISORDER WITH MIXED ANXIETY AND DEPRESSED MOOD: Primary | ICD-10-CM

## 2019-12-20 PROCEDURE — 99999 PR PBB SHADOW E&M-EST. PATIENT-LVL II: ICD-10-PCS | Mod: PBBFAC,,, | Performed by: PSYCHOLOGIST

## 2019-12-20 PROCEDURE — 90837 PSYTX W PT 60 MINUTES: CPT | Mod: S$GLB,,, | Performed by: PSYCHOLOGIST

## 2019-12-20 PROCEDURE — 99999 PR PBB SHADOW E&M-EST. PATIENT-LVL II: CPT | Mod: PBBFAC,,, | Performed by: PSYCHOLOGIST

## 2019-12-20 PROCEDURE — 90837 PR PSYCHOTHERAPY W/PATIENT, 60 MIN: ICD-10-PCS | Mod: S$GLB,,, | Performed by: PSYCHOLOGIST

## 2019-12-20 NOTE — Clinical Note
Hey. This patient had a prescription for celexa from a [facility] provider and had a negative side effect and discontinued the med. She was unsure if celexa interacted negatively with any of her Onc meds?

## 2019-12-20 NOTE — PROGRESS NOTES
INFORMED CONSENT: Kristopher Ye   is known to this provider and identity was confirmed via NAME and .  The patient has been informed of the risks and benefits associated with engaging in psychotherapy, the handling of protected health information, the rights of privacy and the limits of confidentiality. The patient has also been informed of the importance of reporting any suicidal or homicidal ideation to this or any provider to ensure safety of all parties, and the Kristopher Ye expressed understanding. The patient was agreeable to these terms and freely participates in individual psychotherapy.      PSYCHO-ONCOLOGY NOTE/ Individual Psychotherapy     Date: 2019   [facility]        Therapeutic Intervention: Met with patient.  Outpatient - Behavior modifying psychotherapy 60 min - CPT code 61825      Patient was last seen by me on 2019    Problem list  Patient Active Problem List   Diagnosis    Hypertension    Iron deficiency anemia    Palpitations    Cardiomegaly    Morbid obesity (BMI= 55.9 on 10/30/14)    Fibroid    Fibroid uterus    Fatigue    Shortness of breath    Thalassemia    Adjustment disorder with mixed anxiety and depressed mood    Cardiomyopathy    Hyperlipidemia    Malignant neoplasm of upper-inner quadrant of right breast in female, estrogen receptor positive    Cancer associated pain    Pathological fracture of lumbar vertebra with routine healing    Bone metastases    Constipation    Bone lesion       Chief complaint/reason for encounter: depression and anxiety   Met with patient to evaluate psychosocial adaptation to diagnosis/treatment/survivorship of Stage IV Breast Cancer    Current Medications  Current Outpatient Medications   Medication    albuterol (PROVENTIL/VENTOLIN HFA) 90 mcg/actuation inhaler    amLODIPine (NORVASC) 5 MG tablet    anastrozole (ARIMIDEX) 1 mg Tab    carvedilol (COREG) 6.25 MG tablet    goserelin  (ZOLADEX) 3.6 mg injection    irbesartan (AVAPRO) 150 MG tablet    loratadine (CLARITIN) 10 mg tablet    LORazepam (ATIVAN) 0.5 MG tablet    ondansetron (ZOFRAN) 8 MG tablet    palbociclib (IBRANCE) 125 mg Cap    polyethylene glycol (GLYCOLAX) 17 gram/dose powder    sennosides (SENNA-C ORAL)     No current facility-administered medications for this visit.        Objective:  Kristopher Ye arrived promptly for the session.  Ms. Ye was independently ambulatory at the time of session. The patient was fully cooperative throughout the session.  Appearance: age appropriate, appropriately  dressed, adequately  groomed  Behavior/Cooperation: friendly and cooperative  Speech: normal in rate, volume, and tone and appropriate quality, quantity and organization of sentences  Mood: anxious  Affect: anxious  Thought Process: goal-directed, logical  Thought Content: normal,  No delusions or paranoia; did not appear to be responding to internal stimuli during the session  Orientation: grossly intact  Memory: Grossly intact  Attention Span/Concentration: Attends to session without distraction; reports no difficulty  Fund of Knowledge: average  Estimate of Intelligence: average from verbal skills and history  Cognition: grossly intact  Insight: patient has awareness of illness; good insight into own behavior and behavior of others  Judgment: the patient's behavior is adequate to circumstances    Interval history and content of current session: Patient recently discovered that her partner is legally .  Discussed psychosocial stressors and current adaptation to disease and treatment status. Reports to be coping in a passive manner. Evaluated cognitive response, paying particular attention to negative intrusive thoughts of a persistent and detrimental nature. Thoughts of this type are in evidence with moderate distress. Provided cognitive behavioral therapy to address negative cognitions. Identified and  evaluated psychosocial and environmental stressors secondary to diagnosis and treatment.  Examined proactive behaviors that may be implemented to minimize or ameliorate psychosocial stressors secondary to diagnosis and treatment.     Risk parameters:   Patient reports no suicidal ideation  Patient reports no homicidal ideation  Patient reports no self-injurious behavior  Patient reports no violent behavior   Safety needs:  None at this time      Verbal deficits: None     Patient's response to intervention:The patient's response to intervention is accepting.     Progress toward goals and other mental status changes:  The patient's progress toward goals is good.      Progress to date:Progress - Ongoing, but Slow      Goals from last visit: Met     Patient reported outcomes:    Distress Thermometer: 6   PHQ-9= 9, no SI   HUY-7= 7      Client Strengths: verbal, intelligent, successful, good social support, good insight, commitment to wellness, strong jd, strong cultural traditions     Diagnosis:     ICD-10-CM ICD-9-CM   1. Adjustment disorder with mixed anxiety and depressed mood F43.23 309.28   2. Malignant neoplasm of upper-inner quadrant of right breast in female, estrogen receptor positive C50.211 174.2    Z17.0 V86.0       Treatment Plan:individual psychotherapy  · Target symptoms: depression, anxiety   · Why chosen therapy is appropriate versus another modality: relevant to diagnosis, evidence based practice  · Outcome monitoring methods: self-report, observation, checklist/rating scale  · Therapeutic intervention type: behavior modifying psychotherapy  · Prognosis: Good      Behavioral goals:   Spend Gamal with children  Limit passive-aggressive behaviors  Reduce contact with former partner    Return to clinic: 3 weeks    Next Session:   Continue to manage passive-aggressiveness  Review options for completing Advanced Directive     Length of Service (minutes direct face-to-face contact): 60    Bria RICH  Ilene, PhD  [place] License #[id number]

## 2019-12-31 ENCOUNTER — PATIENT MESSAGE (OUTPATIENT)
Dept: HEMATOLOGY/ONCOLOGY | Facility: CLINIC | Age: 44
End: 2019-12-31

## 2019-12-31 DIAGNOSIS — F43.23 ADJUSTMENT DISORDER WITH MIXED ANXIETY AND DEPRESSED MOOD: Primary | ICD-10-CM

## 2019-12-31 DIAGNOSIS — N76.0 BACTERIAL VAGINOSIS: ICD-10-CM

## 2019-12-31 DIAGNOSIS — B96.89 BACTERIAL VAGINOSIS: ICD-10-CM

## 2019-12-31 RX ORDER — METRONIDAZOLE 7.5 MG/G
1 GEL VAGINAL DAILY
Qty: 70 G | Refills: 0 | Status: SHIPPED | OUTPATIENT
Start: 2019-12-31 | End: 2020-01-05

## 2019-12-31 RX ORDER — VENLAFAXINE HYDROCHLORIDE 37.5 MG/1
37.5 CAPSULE, EXTENDED RELEASE ORAL DAILY
Qty: 30 CAPSULE | Refills: 2 | Status: SHIPPED | OUTPATIENT
Start: 2019-12-31 | End: 2020-05-18

## 2020-01-03 ENCOUNTER — SOCIAL WORK (OUTPATIENT)
Dept: HEMATOLOGY/ONCOLOGY | Facility: CLINIC | Age: 45
End: 2020-01-03

## 2020-01-03 ENCOUNTER — LAB VISIT (OUTPATIENT)
Dept: LAB | Facility: HOSPITAL | Age: 45
End: 2020-01-03
Attending: INTERNAL MEDICINE
Payer: COMMERCIAL

## 2020-01-03 ENCOUNTER — OFFICE VISIT (OUTPATIENT)
Dept: HEMATOLOGY/ONCOLOGY | Facility: CLINIC | Age: 45
End: 2020-01-03
Payer: COMMERCIAL

## 2020-01-03 ENCOUNTER — INFUSION (OUTPATIENT)
Dept: INFUSION THERAPY | Facility: HOSPITAL | Age: 45
End: 2020-01-03
Attending: INTERNAL MEDICINE
Payer: COMMERCIAL

## 2020-01-03 VITALS
RESPIRATION RATE: 18 BRPM | HEART RATE: 75 BPM | WEIGHT: 293 LBS | BODY MASS INDEX: 47.09 KG/M2 | HEIGHT: 66 IN | SYSTOLIC BLOOD PRESSURE: 137 MMHG | TEMPERATURE: 98 F | DIASTOLIC BLOOD PRESSURE: 94 MMHG | OXYGEN SATURATION: 96 %

## 2020-01-03 DIAGNOSIS — Z17.0 MALIGNANT NEOPLASM OF UPPER-INNER QUADRANT OF RIGHT BREAST IN FEMALE, ESTROGEN RECEPTOR POSITIVE: ICD-10-CM

## 2020-01-03 DIAGNOSIS — R23.2 HOT FLASHES: ICD-10-CM

## 2020-01-03 DIAGNOSIS — K59.00 CONSTIPATION, UNSPECIFIED CONSTIPATION TYPE: ICD-10-CM

## 2020-01-03 DIAGNOSIS — C50.211 MALIGNANT NEOPLASM OF UPPER-INNER QUADRANT OF RIGHT BREAST IN FEMALE, ESTROGEN RECEPTOR POSITIVE: ICD-10-CM

## 2020-01-03 DIAGNOSIS — Z17.0 MALIGNANT NEOPLASM OF UPPER-INNER QUADRANT OF RIGHT BREAST IN FEMALE, ESTROGEN RECEPTOR POSITIVE: Primary | ICD-10-CM

## 2020-01-03 DIAGNOSIS — I42.0 DILATED CARDIOMYOPATHY: ICD-10-CM

## 2020-01-03 DIAGNOSIS — C50.211 MALIGNANT NEOPLASM OF UPPER-INNER QUADRANT OF RIGHT BREAST IN FEMALE, ESTROGEN RECEPTOR POSITIVE: Primary | ICD-10-CM

## 2020-01-03 DIAGNOSIS — M84.48XD: ICD-10-CM

## 2020-01-03 DIAGNOSIS — G89.3 CANCER ASSOCIATED PAIN: ICD-10-CM

## 2020-01-03 DIAGNOSIS — C79.51 BONE METASTASES: ICD-10-CM

## 2020-01-03 LAB
ALBUMIN SERPL BCP-MCNC: 3.2 G/DL (ref 3.5–5.2)
ALP SERPL-CCNC: 145 U/L (ref 55–135)
ALT SERPL W/O P-5'-P-CCNC: 23 U/L (ref 10–44)
ANION GAP SERPL CALC-SCNC: 7 MMOL/L (ref 8–16)
AST SERPL-CCNC: 15 U/L (ref 10–40)
BILIRUB SERPL-MCNC: 0.4 MG/DL (ref 0.1–1)
BUN SERPL-MCNC: 11 MG/DL (ref 6–20)
CALCIUM SERPL-MCNC: 10 MG/DL (ref 8.7–10.5)
CHLORIDE SERPL-SCNC: 104 MMOL/L (ref 95–110)
CO2 SERPL-SCNC: 30 MMOL/L (ref 23–29)
CREAT SERPL-MCNC: 1.1 MG/DL (ref 0.5–1.4)
ERYTHROCYTE [DISTWIDTH] IN BLOOD BY AUTOMATED COUNT: 25.7 % (ref 11.5–14.5)
EST. GFR  (AFRICAN AMERICAN): >60 ML/MIN/1.73 M^2
EST. GFR  (NON AFRICAN AMERICAN): >60 ML/MIN/1.73 M^2
GLUCOSE SERPL-MCNC: 102 MG/DL (ref 70–110)
HCT VFR BLD AUTO: 40.6 % (ref 37–48.5)
HGB BLD-MCNC: 12 G/DL (ref 12–16)
IMM GRANULOCYTES # BLD AUTO: 0.01 K/UL (ref 0–0.04)
MAGNESIUM SERPL-MCNC: 2.1 MG/DL (ref 1.6–2.6)
MCH RBC QN AUTO: 23.6 PG (ref 27–31)
MCHC RBC AUTO-ENTMCNC: 29.6 G/DL (ref 32–36)
MCV RBC AUTO: 80 FL (ref 82–98)
NEUTROPHILS # BLD AUTO: 3.2 K/UL (ref 1.8–7.7)
PHOSPHATE SERPL-MCNC: 4.2 MG/DL (ref 2.7–4.5)
PLATELET # BLD AUTO: 378 K/UL (ref 150–350)
PMV BLD AUTO: 10.2 FL (ref 9.2–12.9)
POTASSIUM SERPL-SCNC: 3.9 MMOL/L (ref 3.5–5.1)
PROT SERPL-MCNC: 8.2 G/DL (ref 6–8.4)
RBC # BLD AUTO: 5.09 M/UL (ref 4–5.4)
SODIUM SERPL-SCNC: 141 MMOL/L (ref 136–145)
WBC # BLD AUTO: 6.64 K/UL (ref 3.9–12.7)

## 2020-01-03 PROCEDURE — 3080F DIAST BP >= 90 MM HG: CPT | Mod: CPTII,S$GLB,, | Performed by: INTERNAL MEDICINE

## 2020-01-03 PROCEDURE — 85027 COMPLETE CBC AUTOMATED: CPT

## 2020-01-03 PROCEDURE — 3008F BODY MASS INDEX DOCD: CPT | Mod: CPTII,S$GLB,, | Performed by: INTERNAL MEDICINE

## 2020-01-03 PROCEDURE — 83735 ASSAY OF MAGNESIUM: CPT

## 2020-01-03 PROCEDURE — 99999 PR PBB SHADOW E&M-EST. PATIENT-LVL III: ICD-10-PCS | Mod: PBBFAC,,, | Performed by: INTERNAL MEDICINE

## 2020-01-03 PROCEDURE — 99999 PR PBB SHADOW E&M-EST. PATIENT-LVL III: CPT | Mod: PBBFAC,,, | Performed by: INTERNAL MEDICINE

## 2020-01-03 PROCEDURE — 80053 COMPREHEN METABOLIC PANEL: CPT

## 2020-01-03 PROCEDURE — 63600175 PHARM REV CODE 636 W HCPCS: Mod: JG | Performed by: INTERNAL MEDICINE

## 2020-01-03 PROCEDURE — 96402 CHEMO HORMON ANTINEOPL SQ/IM: CPT

## 2020-01-03 PROCEDURE — 84100 ASSAY OF PHOSPHORUS: CPT

## 2020-01-03 PROCEDURE — 3075F SYST BP GE 130 - 139MM HG: CPT | Mod: CPTII,S$GLB,, | Performed by: INTERNAL MEDICINE

## 2020-01-03 PROCEDURE — 99215 OFFICE O/P EST HI 40 MIN: CPT | Mod: S$GLB,,, | Performed by: INTERNAL MEDICINE

## 2020-01-03 PROCEDURE — 86300 IMMUNOASSAY TUMOR CA 15-3: CPT

## 2020-01-03 PROCEDURE — 99215 PR OFFICE/OUTPT VISIT, EST, LEVL V, 40-54 MIN: ICD-10-PCS | Mod: S$GLB,,, | Performed by: INTERNAL MEDICINE

## 2020-01-03 PROCEDURE — 3080F PR MOST RECENT DIASTOLIC BLOOD PRESSURE >= 90 MM HG: ICD-10-PCS | Mod: CPTII,S$GLB,, | Performed by: INTERNAL MEDICINE

## 2020-01-03 PROCEDURE — 3075F PR MOST RECENT SYSTOLIC BLOOD PRESS GE 130-139MM HG: ICD-10-PCS | Mod: CPTII,S$GLB,, | Performed by: INTERNAL MEDICINE

## 2020-01-03 PROCEDURE — 3008F PR BODY MASS INDEX (BMI) DOCUMENTED: ICD-10-PCS | Mod: CPTII,S$GLB,, | Performed by: INTERNAL MEDICINE

## 2020-01-03 PROCEDURE — 36415 COLL VENOUS BLD VENIPUNCTURE: CPT

## 2020-01-03 RX ADMIN — GOSERELIN ACETATE 3.6 MG: 3.6 IMPLANT SUBCUTANEOUS at 03:01

## 2020-01-03 NOTE — Clinical Note
Sorry - please actually change appt to 1/31 in afternoon with cbc, cmp, Zoladex. PET a few days before. The 23rd is too early for Zoladex

## 2020-01-03 NOTE — PROGRESS NOTES
Spoke to the patient on request from Dr Mendez: The patient informed that she was not able to work for 6 weeks and that she was only receiving short term disability (60% of her income). She is having difficulty paying her bills. Discussed assistance from the [facility] Patient Assistance Fund with her. She completed an application for this fund and provided receipts for her rental payment for December 2019. Also provided her with an application for [employer] [place] and she agreed to complete it and bring it in to the office. The patient has contact information and agreed to call as needed.

## 2020-01-03 NOTE — PROGRESS NOTES
History:     Reason For Follow Up:   1. Stage IV (dH2qB3V2) invasive ductal carcinoma of right breast, upper inner quadrant, ER %, OH neg, Her2 neg, Grade 3, multifocal with one lesion appearing more aggressive (Ki67 80%), large LN +, bone mets.    HPI:   Kristopher Ye presents for follow up of breast cancer.   Continues Ibrance, Zoladex, Arimidex - Tolerating well.   Hot flashes - started effexor earlier this week.   Pain controlled.   Constipation - minimal.   Bone biopsy came back negative.     Oncologic History:  Presentation  - 9/11/19 - Screening mammogram showed multiple right breast masses lower inner quadrant  - 9/13/19 - Diagnostic mammogram and US showed a right breast, 3:00 position mass, 2 CFN measuring 17 mm x 16 mm x 14 mm.  There 2 smaller adjacent masses towards the nipple  - 9/19/19 - Biopsy -    A. Right breast subareolar: Grade 3 IDC, estrogen receptor 80%, progesterone receptor 0%, Her2 dago neg, Ki67 80%.     B. Right breast mass 3:00: Grade 3 IDC with papillary features, estrogen receptor 100%, progesterone receptor 0%, Her2 dago 1+, Ki67 30%.   - 9/26/19: US right axilla with abnormal lymphadenopathy measuring 4.3 x 2.8 cm with biopsy + for metastatic breast cancer.   Surgery consultation with Dr Ferris on 9/25/19   - 9/25/19 - Genetics Genetics [facility] Ignacioalysis pending; VUS pending  Medical oncology consultation on 10/7/19   * 10/8/19 - CT C/A/P with several lytic lesions in thoracic and lumbar spine, iliac bones, left scapula in addition to 3 x 4.5 cm right axillary node and 2.1 cm right breast mass. Bone scan negative.   * 10/31/19 - started Ibrance, Arimidex, Zoladex; plan for Xgeva.    * 11/12/19 STRATA without targetable mutations.    * 12/16/19 - Bone biopsy negative.     History: I reviewed, confirmed and updated history (past medical, social, family) as necessary.     Medications    Current Outpatient Medications:     albuterol (PROVENTIL/VENTOLIN HFA) 90  mcg/actuation inhaler, Inhale 1-2 puffs into the lungs every 6 (six) hours as needed for Wheezing. Rescue, Disp: 1 Inhaler, Rfl: 0    amLODIPine (NORVASC) 5 MG tablet, Take 1 tablet (5 mg total) by mouth once daily., Disp: 30 tablet, Rfl: 11    anastrozole (ARIMIDEX) 1 mg Tab, Take 1 tablet (1 mg total) by mouth once daily., Disp: 90 tablet, Rfl: 3    carvedilol (COREG) 6.25 MG tablet, Take 1 tablet (6.25 mg total) by mouth 2 (two) times daily with meals., Disp: 60 tablet, Rfl: 11    goserelin (ZOLADEX) 3.6 mg injection, Inject 3.6 mg into the skin every 28 days., Disp: , Rfl:     irbesartan (AVAPRO) 150 MG tablet, Take 1 tablet (150 mg total) by mouth every evening., Disp: 30 tablet, Rfl: 11    loratadine (CLARITIN) 10 mg tablet, Take 10 mg by mouth daily as needed. , Disp: , Rfl:     LORazepam (ATIVAN) 0.5 MG tablet, Take 1 tablet (0.5 mg total) by mouth every 12 (twelve) hours as needed for Anxiety., Disp: 30 tablet, Rfl: 0    metroNIDAZOLE (METROGEL) 0.75 % vaginal gel, Place 1 applicator vaginally once daily. for 5 days, Disp: 70 g, Rfl: 0    ondansetron (ZOFRAN) 8 MG tablet, Take 1 tablet (8 mg total) by mouth every 8 (eight) hours as needed for Nausea., Disp: 45 tablet, Rfl: 2    palbociclib (IBRANCE) 125 mg Cap, Take 125 mg by mouth as instructed Take as directed days 1-21 of each 28 day cycle. Take with food., Disp: 21 capsule, Rfl: 6    polyethylene glycol (GLYCOLAX) 17 gram/dose powder, Take 17 g by mouth once daily., Disp: 116 g, Rfl: 0    sennosides (SENNA-C ORAL), Take by mouth., Disp: , Rfl:     venlafaxine (EFFEXOR XR) 37.5 MG 24 hr capsule, Take 1 capsule (37.5 mg total) by mouth once daily., Disp: 30 capsule, Rfl: 2  Allergies  Review of patient's allergies indicates:   Allergen Reactions    Keflex [cephalexin] Itching     Review of Systems  Review of Systems   Constitutional: Negative for activity change, appetite change, chills, fatigue, fever and unexpected weight change.   HENT:  "Negative for congestion, hearing loss, nosebleeds, sinus pressure and trouble swallowing.    Eyes: Negative for pain, discharge, itching and visual disturbance.   Respiratory: Negative for cough, chest tightness and shortness of breath.    Cardiovascular: Negative for chest pain, palpitations and leg swelling.   Gastrointestinal: Negative for abdominal distention, abdominal pain, blood in stool, constipation, diarrhea, nausea, rectal pain and vomiting.   Endocrine: Positive for heat intolerance. Negative for polydipsia.   Genitourinary: Negative for difficulty urinating, dysuria, flank pain, frequency, hematuria and urgency.   Musculoskeletal: Negative for arthralgias, back pain and neck pain.   Skin: Negative for color change, pallor and rash.   Neurological: Negative for dizziness, numbness and headaches.   Hematological: Negative for adenopathy. Does not bruise/bleed easily.   Psychiatric/Behavioral: Negative for confusion and decreased concentration. The patient is nervous/anxious (improving).         Objective     Objective:     Vitals:    01/03/20 1438   BP: (!) 137/94   BP Location: Left arm   Patient Position: Sitting   BP Method: Large (Automatic)   Pulse: 75   Resp: 18   Temp: 98.4 °F (36.9 °C)   TempSrc: Oral   SpO2: 96%   Weight: (!) 161.8 kg (356 lb 11.3 oz)   Height: 5' 6" (1.676 m)     Body surface area is 2.74 meters squared.  Physical Exam   Constitutional: She is oriented to person, place, and time. She appears well-developed and well-nourished. No distress.   HENT:   Head: Normocephalic and atraumatic.   Mouth/Throat: Oropharynx is clear and moist and mucous membranes are normal. No oral lesions.   Eyes: Conjunctivae and EOM are normal. Right eye exhibits no discharge. Left eye exhibits no discharge. No scleral icterus.   Neck: Normal range of motion. Neck supple. No thyroid mass and no thyromegaly present.   Cardiovascular: Normal rate, regular rhythm, S1 normal, S2 normal and normal heart " sounds.   Pulmonary/Chest: Effort normal and breath sounds normal. No respiratory distress. She has no wheezes. She has no rhonchi. She has no rales. Chest wall is not dull to percussion.   Abdominal: Soft. Normal appearance and bowel sounds are normal. There is no hepatosplenomegaly. There is no tenderness.   Lymphadenopathy:     She has no cervical adenopathy.     She has no axillary adenopathy.        Right: No supraclavicular adenopathy present.        Left: No supraclavicular adenopathy present.   Neurological: She is alert and oriented to person, place, and time. She has normal strength.   Skin: Skin is warm, dry and intact. No pallor.   Psychiatric: Her behavior is normal. Thought content normal.         Labs and Imaging  Results for orders placed or performed in visit on 01/03/20   CBC Oncology   Result Value Ref Range    WBC 6.64 3.90 - 12.70 K/uL    RBC 5.09 4.00 - 5.40 M/uL    Hemoglobin 12.0 12.0 - 16.0 g/dL    Hematocrit 40.6 37.0 - 48.5 %    Mean Corpuscular Volume 80 (L) 82 - 98 fL    Mean Corpuscular Hemoglobin 23.6 (L) 27.0 - 31.0 pg    Mean Corpuscular Hemoglobin Conc 29.6 (L) 32.0 - 36.0 g/dL    RDW 25.7 (H) 11.5 - 14.5 %    Platelets 378 (H) 150 - 350 K/uL    MPV 10.2 9.2 - 12.9 fL    Gran # (ANC) 3.2 1.8 - 7.7 K/uL    Immature Grans (Abs) 0.01 0.00 - 0.04 K/uL       Assessment     Assessment:     1. Stage IV ([id number]) invasive ductal carcinoma of right breast, upper inner quadrant, ER %, [place] neg, Her2 neg, Grade 3, multifocal with one lesion appearing more aggressive (Ki67 80%), large LN +, bony mets   * Genetics pending   * 10/2019 staging scans showed diffuse bony mets  *  10/31/19 started Arimidex and Ibrance 125 mg daily days 1-21 every 28 days with Zoladex. Bone biopsy negative, but review of imaging truly looks like metastatic disease.    * Plan re-image after 3 months of therapy (end of January) with PET (neded to eval bone better).    * Cycle 3 Ibrance/Arimidex/Zoladex - started  12/26.     2. Bony mets with L2 endplate fracture   * Bone biopsy negative but imaging looks like metastatic disease. Will continue with above plan and re-image.    * Xgeva monthly - will get dental clearance    3. Anxiety related to cancer diagnosis.   * Seeing Dr Odom. Improving.     4. Congestive heart failure, new diagnosis   * Echo 10/2019 with EF 30%   * Follows with Dr Olivares.     5. Morbid obesity   * Adjusting diet for weight loss.     6. Cancer pain   * Ultram 100 mg q 6 hours prn.  Controlled.     7. Hot flashes   * Referred to Dr Avina; worsening with Arimidex/Zoladex.    * effexor    Plan:     1. Continue Zoladex, Arimidex, Ibrance. Currently cycle 3 - statrted 12/26. Will delay cycle 4 until we have PET so that we can get starting cycle day the same as Zoladex.    2. Plan Xgeva with next dose of Zoladex if she gets dental clearance.   3. Senna S 2 tabs nightly, Miralax prn  4. Plan re-image after 3 months of therapy (end of January) with PET - PET helpful since bone biopsy negative and tumor marker normal.  5. Plan DEXA in near future, but will hold for now since she's having mutliple other appts  6. Calcium/vitamin D     ACP done.   RTC on 1/31 with cbc, cmp, mg, phos, ca 15-3, xgeva, zoladex with PET a day or two before.       Future Appointments   Date Time Provider Department Center   1/3/2020  3:45 PM INJECTION, [facility] IN[facility]USION [facility]   1/14/2020  4:00 PM Bria Odom, PhD [facility] CAN PSY [facility]   2/19/2020 10:00 AM Bebe Euceda, [facility] SLEEP [facility]

## 2020-01-03 NOTE — LETTER
January 3, 2020      [facility]  [address]  [facility] [address]  Phone: [phone]       Patient: Kristopher Ye   YOB: 1975  Date of Visit: 01/03/2020    To Whom It May Concern:    Adalberto Ye  was at [facility] on 01/03/2020. She may return to work/school on 1/6/2020 with restrictions of no heavy lifting or direct sunlight. If you have any questions or concerns, or if I can be of further assistance, please do not hesitate to contact me.    She will have to come in for routine visits monthly thus will need to be excused for those visits.     Sincerely,    Jessica Mendez MD

## 2020-01-05 LAB — CANCER AG15-3 SERPL-ACNC: 29 U/ML (ref 0–31)

## 2020-01-09 ENCOUNTER — PATIENT MESSAGE (OUTPATIENT)
Dept: HEMATOLOGY/ONCOLOGY | Facility: CLINIC | Age: 45
End: 2020-01-09

## 2020-01-14 ENCOUNTER — TELEPHONE (OUTPATIENT)
Dept: PSYCHIATRY | Facility: CLINIC | Age: 45
End: 2020-01-14

## 2020-01-21 ENCOUNTER — TELEPHONE (OUTPATIENT)
Dept: PHARMACY | Facility: CLINIC | Age: 45
End: 2020-01-21

## 2020-01-29 ENCOUNTER — PATIENT MESSAGE (OUTPATIENT)
Dept: HEMATOLOGY/ONCOLOGY | Facility: CLINIC | Age: 45
End: 2020-01-29

## 2020-01-29 ENCOUNTER — OFFICE VISIT (OUTPATIENT)
Dept: PSYCHIATRY | Facility: CLINIC | Age: 45
End: 2020-01-29
Payer: COMMERCIAL

## 2020-01-29 DIAGNOSIS — C50.211 MALIGNANT NEOPLASM OF UPPER-INNER QUADRANT OF RIGHT BREAST IN FEMALE, ESTROGEN RECEPTOR POSITIVE: ICD-10-CM

## 2020-01-29 DIAGNOSIS — Z17.0 MALIGNANT NEOPLASM OF UPPER-INNER QUADRANT OF RIGHT BREAST IN FEMALE, ESTROGEN RECEPTOR POSITIVE: ICD-10-CM

## 2020-01-29 DIAGNOSIS — C79.51 BONE METASTASES: ICD-10-CM

## 2020-01-29 DIAGNOSIS — F43.23 ADJUSTMENT DISORDER WITH MIXED ANXIETY AND DEPRESSED MOOD: Primary | ICD-10-CM

## 2020-01-29 PROCEDURE — 99999 PR PBB SHADOW E&M-EST. PATIENT-LVL II: ICD-10-PCS | Mod: PBBFAC,,, | Performed by: PSYCHOLOGIST

## 2020-01-29 PROCEDURE — 90837 PR PSYCHOTHERAPY W/PATIENT, 60 MIN: ICD-10-PCS | Mod: S$GLB,,, | Performed by: PSYCHOLOGIST

## 2020-01-29 PROCEDURE — 99999 PR PBB SHADOW E&M-EST. PATIENT-LVL II: CPT | Mod: PBBFAC,,, | Performed by: PSYCHOLOGIST

## 2020-01-29 PROCEDURE — 90837 PSYTX W PT 60 MINUTES: CPT | Mod: S$GLB,,, | Performed by: PSYCHOLOGIST

## 2020-01-29 NOTE — PROGRESS NOTES
INFORMED CONSENT: Kristopher Ye   is known to this provider and identity was confirmed via NAME and .  The patient has been informed of the risks and benefits associated with engaging in psychotherapy, the handling of protected health information, the rights of privacy and the limits of confidentiality. The patient has also been informed of the importance of reporting any suicidal or homicidal ideation to this or any provider to ensure safety of all parties, and the Kristopher Ye expressed understanding. The patient was agreeable to these terms and freely participates in individual psychotherapy.    PSYCHO-ONCOLOGY NOTE/ Individual Psychotherapy     Date: 2020   [facility]        Therapeutic Intervention: Met with patient.  Outpatient - Behavior modifying psychotherapy 60 min - CPT code 30410      Patient was last seen by me on 2019    Problem list  Patient Active Problem List   Diagnosis    Hypertension    Iron deficiency anemia    Palpitations    Cardiomegaly    Morbid obesity (BMI= 55.9 on 10/30/14)    Fibroid    Fibroid uterus    Fatigue    Shortness of breath    Thalassemia    Adjustment disorder with mixed anxiety and depressed mood    Cardiomyopathy    Hyperlipidemia    Malignant neoplasm of upper-inner quadrant of right breast in female, estrogen receptor positive    Cancer associated pain    Pathological fracture of lumbar vertebra with routine healing    Bone metastases    Constipation    Bone lesion    Hot flashes       Chief complaint/reason for encounter: depression and anxiety   Met with patient to evaluate psychosocial adaptation to diagnosis/treatment/survivorship of Breast Cancer    Current Medications  Current Outpatient Medications   Medication    albuterol (PROVENTIL/VENTOLIN HFA) 90 mcg/actuation inhaler    amLODIPine (NORVASC) 5 MG tablet    anastrozole (ARIMIDEX) 1 mg Tab    carvedilol (COREG) 6.25 MG tablet    goserelin  "(ZOLADEX) 3.6 mg injection    irbesartan (AVAPRO) 150 MG tablet    loratadine (CLARITIN) 10 mg tablet    LORazepam (ATIVAN) 0.5 MG tablet    ondansetron (ZOFRAN) 8 MG tablet    palbociclib (IBRANCE) 125 mg Cap    polyethylene glycol (GLYCOLAX) 17 gram/dose powder    sennosides (SENNA-C ORAL)    venlafaxine (EFFEXOR XR) 37.5 MG 24 hr capsule     No current facility-administered medications for this visit.        Objective:  Kristopher Ye arrived early for the session.  Ms. Ye was independently ambulatory at the time of session. The patient was fully cooperative throughout the session.  Appearance: Disheveled   Behavior/Cooperation: friendly and cooperative  Speech: normal in rate, volume, and tone and appropriate quality, quantity and organization of sentences  Mood: anxious, sad  Affect: tearful  Thought Process: goal-directed, logical  Thought Content: normal,  No delusions or paranoia; did not appear to be responding to internal stimuli during the session  Orientation: grossly intact  Memory: Grossly intact  Attention Span/Concentration: Attends to session without distraction; reports no difficulty  Fund of Knowledge: average  Estimate of Intelligence: average from verbal skills and history  Cognition: grossly intact  Insight: patient has awareness of illness; good insight into own behavior and behavior of others  Judgment: the patient's behavior is adequate to circumstances    Interval history and content of current session: Patient reported that up until last night she has avoided and denied her emotions regarding her cancer. She feels that "the flood mustafa opened"  Discussed current adaptation to disease and treatment status. Reports to be coping with great difficulty. Evaluated cognitive response, paying particular attention to negative intrusive thoughts of a persistent and detrimental nature. Thoughts of this type are in evidence with moderate distress. Provided cognitive behavioral " therapy to address negative cognitions. Identified and evaluated psychosocial and environmental stressors secondary to diagnosis and treatment.  Examined proactive behaviors that may be implemented to minimize or ameliorate psychosocial stressors secondary to diagnosis and treatment.     Risk parameters:   Patient reports no suicidal ideation  Patient reports no homicidal ideation  Patient reports no self-injurious behavior  Patient reports no violent behavior   Safety needs:  None at this time      Verbal deficits: None     Patient's response to intervention:The patient's response to intervention is accepting.     Progress toward goals and other mental status changes:  The patient's progress toward goals is good.      Progress to date:No Progress - Continue Objectives      Goals from last visit: Attempted, not met Patient has not been taking Effexor     Patient reported outcomes:    Distress Thermometer: 5   PHQ-9= 10, moderate   HUY-7= 10      Client Strengths: verbal, intelligent, successful, good social support, good insight, commitment to wellness, strong jd, strong cultural traditions     Diagnosis:     ICD-10-CM ICD-9-CM   1. Adjustment disorder with mixed anxiety and depressed mood F43.23 309.28   2. Malignant neoplasm of upper-inner quadrant of right breast in female, estrogen receptor positive C50.211 174.2    Z17.0 V86.0   3. Bone metastases C79.51 198.5       Treatment Plan:individual psychotherapy and medication management by physician  · Target symptoms: depression, anxiety   · Why chosen therapy is appropriate versus another modality: relevant to diagnosis, evidence based practice  · Outcome monitoring methods: self-report, observation, checklist/rating scale  · Therapeutic intervention type: behavior modifying psychotherapy  · Prognosis: Good      Behavioral goals:   Balance obligations with self care  Rely more on social support  Consider taking mental health days at work    Return to clinic: 2  weeks    Length of Service (minutes direct face-to-face contact): 60    Bria Odom, PhD  [place] License #[id number]

## 2020-01-29 NOTE — Clinical Note
Hey Saw this patient today. Seems that her denial and avoidance dissipated last night (which is good!). She is beginning to feel emotions and We processed everything. She has not been taking Effexor as prescribed and I encouraged her to do so.

## 2020-01-31 ENCOUNTER — INFUSION (OUTPATIENT)
Dept: INFUSION THERAPY | Facility: HOSPITAL | Age: 45
End: 2020-01-31
Attending: INTERNAL MEDICINE
Payer: COMMERCIAL

## 2020-01-31 ENCOUNTER — OFFICE VISIT (OUTPATIENT)
Dept: HEMATOLOGY/ONCOLOGY | Facility: CLINIC | Age: 45
End: 2020-01-31
Payer: COMMERCIAL

## 2020-01-31 ENCOUNTER — HOSPITAL ENCOUNTER (OUTPATIENT)
Dept: RADIOLOGY | Facility: HOSPITAL | Age: 45
Discharge: HOME | End: 2020-01-31
Attending: INTERNAL MEDICINE
Payer: COMMERCIAL

## 2020-01-31 VITALS
RESPIRATION RATE: 18 BRPM | TEMPERATURE: 99 F | BODY MASS INDEX: 47.09 KG/M2 | SYSTOLIC BLOOD PRESSURE: 177 MMHG | HEART RATE: 90 BPM | HEIGHT: 66 IN | DIASTOLIC BLOOD PRESSURE: 85 MMHG | WEIGHT: 293 LBS | OXYGEN SATURATION: 97 %

## 2020-01-31 DIAGNOSIS — G89.3 CANCER ASSOCIATED PAIN: ICD-10-CM

## 2020-01-31 DIAGNOSIS — R42 DIZZINESS: ICD-10-CM

## 2020-01-31 DIAGNOSIS — C79.51 BONE METASTASES: ICD-10-CM

## 2020-01-31 DIAGNOSIS — C50.211 MALIGNANT NEOPLASM OF UPPER-INNER QUADRANT OF RIGHT BREAST IN FEMALE, ESTROGEN RECEPTOR POSITIVE: Primary | ICD-10-CM

## 2020-01-31 DIAGNOSIS — C50.211 MALIGNANT NEOPLASM OF UPPER-INNER QUADRANT OF RIGHT BREAST IN FEMALE, ESTROGEN RECEPTOR POSITIVE: ICD-10-CM

## 2020-01-31 DIAGNOSIS — K59.00 CONSTIPATION, UNSPECIFIED CONSTIPATION TYPE: ICD-10-CM

## 2020-01-31 DIAGNOSIS — R23.2 HOT FLASHES: ICD-10-CM

## 2020-01-31 DIAGNOSIS — Z17.0 MALIGNANT NEOPLASM OF UPPER-INNER QUADRANT OF RIGHT BREAST IN FEMALE, ESTROGEN RECEPTOR POSITIVE: Primary | ICD-10-CM

## 2020-01-31 DIAGNOSIS — M84.48XD: ICD-10-CM

## 2020-01-31 DIAGNOSIS — Z17.0 MALIGNANT NEOPLASM OF UPPER-INNER QUADRANT OF RIGHT BREAST IN FEMALE, ESTROGEN RECEPTOR POSITIVE: ICD-10-CM

## 2020-01-31 LAB — POCT GLUCOSE: 123 MG/DL (ref 70–110)

## 2020-01-31 PROCEDURE — 99215 PR OFFICE/OUTPT VISIT, EST, LEVL V, 40-54 MIN: ICD-10-PCS | Mod: S$GLB,,, | Performed by: INTERNAL MEDICINE

## 2020-01-31 PROCEDURE — 78815 PET IMAGE W/CT SKULL-THIGH: CPT | Mod: TC

## 2020-01-31 PROCEDURE — 78815 PET IMAGE W/CT SKULL-THIGH: CPT | Mod: 26,PS,, | Performed by: RADIOLOGY

## 2020-01-31 PROCEDURE — 63600175 PHARM REV CODE 636 W HCPCS: Mod: JG | Performed by: INTERNAL MEDICINE

## 2020-01-31 PROCEDURE — 78815 NM PET CT ROUTINE: ICD-10-PCS | Mod: 26,PS,, | Performed by: RADIOLOGY

## 2020-01-31 PROCEDURE — 99999 PR PBB SHADOW E&M-EST. PATIENT-LVL III: ICD-10-PCS | Mod: PBBFAC,,, | Performed by: INTERNAL MEDICINE

## 2020-01-31 PROCEDURE — 3008F BODY MASS INDEX DOCD: CPT | Mod: CPTII,S$GLB,, | Performed by: INTERNAL MEDICINE

## 2020-01-31 PROCEDURE — 99999 PR PBB SHADOW E&M-EST. PATIENT-LVL III: CPT | Mod: PBBFAC,,, | Performed by: INTERNAL MEDICINE

## 2020-01-31 PROCEDURE — 3008F PR BODY MASS INDEX (BMI) DOCUMENTED: ICD-10-PCS | Mod: CPTII,S$GLB,, | Performed by: INTERNAL MEDICINE

## 2020-01-31 PROCEDURE — 96402 CHEMO HORMON ANTINEOPL SQ/IM: CPT

## 2020-01-31 PROCEDURE — 99215 OFFICE O/P EST HI 40 MIN: CPT | Mod: S$GLB,,, | Performed by: INTERNAL MEDICINE

## 2020-01-31 PROCEDURE — 3079F PR MOST RECENT DIASTOLIC BLOOD PRESSURE 80-89 MM HG: ICD-10-PCS | Mod: CPTII,S$GLB,, | Performed by: INTERNAL MEDICINE

## 2020-01-31 PROCEDURE — 3077F SYST BP >= 140 MM HG: CPT | Mod: CPTII,S$GLB,, | Performed by: INTERNAL MEDICINE

## 2020-01-31 PROCEDURE — 3079F DIAST BP 80-89 MM HG: CPT | Mod: CPTII,S$GLB,, | Performed by: INTERNAL MEDICINE

## 2020-01-31 PROCEDURE — A9552 F18 FDG: HCPCS

## 2020-01-31 PROCEDURE — 3077F PR MOST RECENT SYSTOLIC BLOOD PRESSURE >= 140 MM HG: ICD-10-PCS | Mod: CPTII,S$GLB,, | Performed by: INTERNAL MEDICINE

## 2020-01-31 RX ADMIN — GOSERELIN ACETATE 3.6 MG: 3.6 IMPLANT SUBCUTANEOUS at 03:01

## 2020-01-31 NOTE — PROGRESS NOTES
History:     Reason For Follow Up:   1. Stage IV ([id number]) invasive ductal carcinoma of right breast, upper inner quadrant, ER %, [place] neg, Her2 neg, Grade 3, multifocal with one lesion appearing more aggressive (Ki67 80%), large LN +, bone mets.    HPI:   Kristopher Ye presents for follow up of breast cancer.   Continues Ibrance, Zoladex, Arimidex - tolerating well.   Hot flashes - stable  Constipation - stable.  Anxiety/depression - follows with Dr Odom - discussed case with her - patient is making progress.   Generally hasn't felt well today after her PET. Didn't take her BP med this morning.  Dizziness when changing positions. No weakness or vision changes.     Oncologic History:  Presentation  - 9/11/19 - Screening mammogram showed multiple right breast masses lower inner quadrant  - 9/13/19 - Diagnostic mammogram and US showed a right breast, 3:00 position mass, 2 CFN measuring 17 mm x 16 mm x 14 mm.  There 2 smaller adjacent masses towards the nipple  - 9/19/19 - Biopsy -    A. Right breast subareolar: Grade 3 IDC, estrogen receptor 80%, progesterone receptor 0%, Her2 dago neg, Ki67 80%.     B. Right breast mass 3:00: Grade 3 IDC with papillary features, estrogen receptor 100%, progesterone receptor 0%, Her2 dago 1+, Ki67 30%.   - 9/26/19: US right axilla with abnormal lymphadenopathy measuring 4.3 x 2.8 cm with biopsy + for metastatic breast cancer.   Surgery consultation with Dr Ferris on 9/25/19   - 9/25/19 - Genetics [facility] pending; VUS pending  Medical oncology consultation on 10/7/19   * 10/8/19 - CT C/A/P with several lytic lesions in thoracic and lumbar spine, iliac bones, left scapula in addition to 3 x 4.5 cm right axillary node and 2.1 cm right breast mass. Bone scan negative.   * 10/31/19 - started Ibrance, Arimidex, Zoladex; plan for Xgeva.    * 11/12/19 STRATA without targetable mutations.    * 12/16/19 - Bone biopsy negative.     History: I reviewed,  confirmed and updated history (past medical, social, family) as necessary.    Medications    Current Outpatient Medications:     albuterol (PROVENTIL/VENTOLIN HFA) 90 mcg/actuation inhaler, Inhale 1-2 puffs into the lungs every 6 (six) hours as needed for Wheezing. Rescue, Disp: 1 Inhaler, Rfl: 0    amLODIPine (NORVASC) 5 MG tablet, Take 1 tablet (5 mg total) by mouth once daily., Disp: 30 tablet, Rfl: 11    anastrozole (ARIMIDEX) 1 mg Tab, Take 1 tablet (1 mg total) by mouth once daily., Disp: 90 tablet, Rfl: 3    carvedilol (COREG) 6.25 MG tablet, Take 1 tablet (6.25 mg total) by mouth 2 (two) times daily with meals., Disp: 60 tablet, Rfl: 11    goserelin (ZOLADEX) 3.6 mg injection, Inject 3.6 mg into the skin every 28 days., Disp: , Rfl:     irbesartan (AVAPRO) 150 MG tablet, Take 1 tablet (150 mg total) by mouth every evening., Disp: 30 tablet, Rfl: 11    loratadine (CLARITIN) 10 mg tablet, Take 10 mg by mouth daily as needed. , Disp: , Rfl:     LORazepam (ATIVAN) 0.5 MG tablet, Take 1 tablet (0.5 mg total) by mouth every 12 (twelve) hours as needed for Anxiety., Disp: 30 tablet, Rfl: 0    ondansetron (ZOFRAN) 8 MG tablet, Take 1 tablet (8 mg total) by mouth every 8 (eight) hours as needed for Nausea., Disp: 45 tablet, Rfl: 2    palbociclib (IBRANCE) 125 mg Cap, Take 125 mg by mouth as instructed Take as directed days 1-21 of each 28 day cycle. Take with food., Disp: 21 capsule, Rfl: 6    polyethylene glycol (GLYCOLAX) 17 gram/dose powder, Take 17 g by mouth once daily., Disp: 116 g, Rfl: 0    sennosides (SENNA-C ORAL), Take by mouth., Disp: , Rfl:     venlafaxine (EFFEXOR XR) 37.5 MG 24 hr capsule, Take 1 capsule (37.5 mg total) by mouth once daily., Disp: 30 capsule, Rfl: 2  Allergies  Review of patient's allergies indicates:   Allergen Reactions    Keflex [cephalexin] Itching     Review of Systems  Review of Systems   Constitutional: Positive for fatigue. Negative for activity change, appetite  "change, chills, fever and unexpected weight change.   HENT: Negative for congestion, hearing loss, nosebleeds, sinus pressure and trouble swallowing.    Eyes: Negative for pain, discharge, itching and visual disturbance.   Respiratory: Negative for cough, chest tightness and shortness of breath.    Cardiovascular: Negative for chest pain, palpitations and leg swelling.   Gastrointestinal: Negative for abdominal distention, abdominal pain, blood in stool, constipation, diarrhea, nausea, rectal pain and vomiting.   Endocrine: Positive for heat intolerance. Negative for polydipsia.   Genitourinary: Negative for difficulty urinating, dysuria, flank pain, frequency, hematuria and urgency.   Musculoskeletal: Negative for arthralgias, back pain and neck pain.   Skin: Negative for color change, pallor and rash.   Neurological: Positive for dizziness. Negative for numbness and headaches.   Hematological: Negative for adenopathy. Does not bruise/bleed easily.   Psychiatric/Behavioral: Negative for confusion and decreased concentration. The patient is nervous/anxious (improving).         Objective     Objective:     Vitals:    01/31/20 1435   BP: (!) 177/85   BP Location: Right arm   Patient Position: Sitting   BP Method: Large (Automatic)   Pulse: 90   Resp: 18   Temp: 98.8 °F (37.1 °C)   TempSrc: Oral   SpO2: 97%   Weight: (!) 167.1 kg (368 lb 6.2 oz)   Height: 5' 6" (1.676 m)     Body surface area is 2.79 meters squared.  Physical Exam   Constitutional: She is oriented to person, place, and time. She appears well-developed and well-nourished. No distress.   HENT:   Head: Normocephalic and atraumatic.   Mouth/Throat: Oropharynx is clear and moist and mucous membranes are normal. No oral lesions.   Eyes: Conjunctivae and EOM are normal. Right eye exhibits no discharge. Left eye exhibits no discharge. No scleral icterus.   Neck: Normal range of motion. Neck supple. No thyroid mass and no thyromegaly present.   Cardiovascular: " Normal rate, regular rhythm, S1 normal, S2 normal and normal heart sounds.   Pulmonary/Chest: Effort normal and breath sounds normal. No respiratory distress. She has no wheezes. She has no rhonchi. She has no rales. Chest wall is not dull to percussion.   Unable to palpable breast mass in right   Abdominal: Soft. Normal appearance and bowel sounds are normal. There is no hepatosplenomegaly. There is no tenderness.   Lymphadenopathy:     She has no cervical adenopathy.     She has no axillary adenopathy.        Right: No supraclavicular adenopathy present.        Left: No supraclavicular adenopathy present.   Neurological: She is alert and oriented to person, place, and time. She has normal strength.   Skin: Skin is warm, dry and intact. No pallor.   Psychiatric: Her behavior is normal. Thought content normal.         Labs and Imaging  Results for orders placed or performed in visit on 01/31/20   CBC Oncology   Result Value Ref Range    WBC 9.61 3.90 - 12.70 K/uL    RBC 4.23 4.00 - 5.40 M/uL    Hemoglobin 10.5 (L) 12.0 - 16.0 g/dL    Hematocrit 35.3 (L) 37.0 - 48.5 %    Mean Corpuscular Volume 84 82 - 98 fL    Mean Corpuscular Hemoglobin 24.8 (L) 27.0 - 31.0 pg    Mean Corpuscular Hemoglobin Conc 29.7 (L) 32.0 - 36.0 g/dL    RDW 26.2 (H) 11.5 - 14.5 %    Platelets 316 150 - 350 K/uL    MPV 10.4 9.2 - 12.9 fL    Gran # (ANC) 5.3 1.8 - 7.7 K/uL    Immature Grans (Abs) 0.09 (H) 0.00 - 0.04 K/uL   Comprehensive metabolic panel   Result Value Ref Range    Sodium 143 136 - 145 mmol/L    Potassium 3.6 3.5 - 5.1 mmol/L    Chloride 107 95 - 110 mmol/L    CO2 26 23 - 29 mmol/L    Glucose 119 (H) 70 - 110 mg/dL    BUN, Bld 11 6 - 20 mg/dL    Creatinine 0.9 0.5 - 1.4 mg/dL    Calcium 9.3 8.7 - 10.5 mg/dL    Total Protein 7.7 6.0 - 8.4 g/dL    Albumin 3.2 (L) 3.5 - 5.2 g/dL    Total Bilirubin 0.3 0.1 - 1.0 mg/dL    Alkaline Phosphatase 128 55 - 135 U/L    AST 16 10 - 40 U/L    ALT 24 10 - 44 U/L    Anion Gap 10 8 - 16 mmol/L     eGFR if African American >60.0 >60 mL/min/1.73 m^2    eGFR if non African American >60.0 >60 mL/min/1.73 m^2     PET personally reviewed - awaiting final read.     Assessment     Assessment:     1. Stage IV ([id number]) invasive ductal carcinoma of right breast, upper inner quadrant, ER %, [place] neg, Her2 neg, Grade 3, multifocal with one lesion appearing more aggressive (Ki67 80%), large LN +, bony mets   * Genetics pending   * 10/2019 staging scans showed diffuse bony mets  *  10/31/19 started Arimidex and Ibrance 125 mg daily days 1-21 every 28 days with Zoladex. Bone biopsy negative, but review of imaging truly looks like metastatic disease.    * 1/31/20 PET - pending.     * Cycle 4 Ibrance/Arimidex/Zoladex - started 1/30/2020    2. Bony mets with L2 endplate fracture   * Bone biopsy negative but imaging looks like metastatic disease. Will continue with above plan and re-image.    * Xgeva monthly - will get dental clearance - has an appt next month.    3. Anxiety related to cancer diagnosis.   * Seeing Dr Odom. Improving.     4. Congestive heart failure, new diagnosis   * Echo 10/2019 with EF 30%   * Follows with Dr Olivares.     5. Morbid obesity   * Adjusting diet for weight loss.     6. Cancer pain   * Ultram 100 mg q 6 hours prn.  Controlled.     7. Hot flashes   * Referred to Dr Avina; worsening with Arimidex/Zoladex.    * Effexor    8. Dizziness   * Sounds most like vertigo. Discussed getting CNS imaging, but we agreed to hold at this time.     Plan:     1. Continue Zoladex, Arimidex, Ibrance. Currently cycle 4 - start 1/31.   2. Plan Xgeva with next dose of Zoladex - going to dentist soon  3. Plan DEXA in near future, but will hold for now since she's having mutliple other appts  4. Calcium/vitamin D   5. Follow up PET.    ACP done.   RTC 2/28 and 3/27 with cbc, cmp, mg, phos, ca 15-3, xgeva, zoladex    Future Appointments   Date Time Provider Department Center   1/31/2020  3:30 PM Jessica Mendez MD  [facility] HEM ONC [facility]   1/31/2020  4:00 PM INJECTION, [facility] INFUSION [facility]   2/12/2020  1:00 PM Bria Odom, PhD [facility] CAN PSY [facility]   2/19/2020 10:00 AM Bebe Euceda NP [facility] SLEEP [facility]   3/27/2020 11:00 AM Jessica Mendez MD [facility] HEM ONC [facility]

## 2020-02-03 ENCOUNTER — PATIENT MESSAGE (OUTPATIENT)
Dept: HEMATOLOGY/ONCOLOGY | Facility: CLINIC | Age: 45
End: 2020-02-03

## 2020-02-04 ENCOUNTER — PATIENT MESSAGE (OUTPATIENT)
Dept: HEMATOLOGY/ONCOLOGY | Facility: CLINIC | Age: 45
End: 2020-02-04

## 2020-02-04 DIAGNOSIS — R11.0 NAUSEA: Primary | ICD-10-CM

## 2020-02-04 DIAGNOSIS — R42 DIZZINESS: ICD-10-CM

## 2020-02-04 DIAGNOSIS — Z17.0 MALIGNANT NEOPLASM OF UPPER-INNER QUADRANT OF RIGHT BREAST IN FEMALE, ESTROGEN RECEPTOR POSITIVE: ICD-10-CM

## 2020-02-04 DIAGNOSIS — C79.51 BONE METASTASES: ICD-10-CM

## 2020-02-04 DIAGNOSIS — Z17.0 MALIGNANT NEOPLASM OF UPPER-INNER QUADRANT OF RIGHT BREAST IN FEMALE, ESTROGEN RECEPTOR POSITIVE: Primary | ICD-10-CM

## 2020-02-04 DIAGNOSIS — C50.211 MALIGNANT NEOPLASM OF UPPER-INNER QUADRANT OF RIGHT BREAST IN FEMALE, ESTROGEN RECEPTOR POSITIVE: ICD-10-CM

## 2020-02-04 DIAGNOSIS — C50.211 MALIGNANT NEOPLASM OF UPPER-INNER QUADRANT OF RIGHT BREAST IN FEMALE, ESTROGEN RECEPTOR POSITIVE: Primary | ICD-10-CM

## 2020-02-04 RX ORDER — PROCHLORPERAZINE MALEATE 10 MG
10 TABLET ORAL EVERY 6 HOURS PRN
Qty: 45 TABLET | Refills: 2 | Status: SHIPPED | OUTPATIENT
Start: 2020-02-04 | End: 2020-05-20

## 2020-02-05 ENCOUNTER — HOSPITAL ENCOUNTER (OUTPATIENT)
Dept: RADIOLOGY | Facility: HOSPITAL | Age: 45
Discharge: HOME | End: 2020-02-05
Attending: INTERNAL MEDICINE
Payer: COMMERCIAL

## 2020-02-05 DIAGNOSIS — R42 DIZZINESS: ICD-10-CM

## 2020-02-05 DIAGNOSIS — C79.51 BONE METASTASES: ICD-10-CM

## 2020-02-05 DIAGNOSIS — Z17.0 MALIGNANT NEOPLASM OF UPPER-INNER QUADRANT OF RIGHT BREAST IN FEMALE, ESTROGEN RECEPTOR POSITIVE: ICD-10-CM

## 2020-02-05 DIAGNOSIS — C50.211 MALIGNANT NEOPLASM OF UPPER-INNER QUADRANT OF RIGHT BREAST IN FEMALE, ESTROGEN RECEPTOR POSITIVE: ICD-10-CM

## 2020-02-05 PROCEDURE — 70553 MRI BRAIN STEM W/O & W/DYE: CPT | Mod: TC

## 2020-02-05 PROCEDURE — 70553 MRI BRAIN W WO CONTRAST: ICD-10-PCS | Mod: 26,,, | Performed by: RADIOLOGY

## 2020-02-05 PROCEDURE — 70553 MRI BRAIN STEM W/O & W/DYE: CPT | Mod: 26,,, | Performed by: RADIOLOGY

## 2020-02-05 PROCEDURE — A9585 GADOBUTROL INJECTION: HCPCS | Performed by: INTERNAL MEDICINE

## 2020-02-05 PROCEDURE — 25500020 PHARM REV CODE 255: Performed by: INTERNAL MEDICINE

## 2020-02-05 RX ORDER — GADOBUTROL 604.72 MG/ML
10 INJECTION INTRAVENOUS
Status: COMPLETED | OUTPATIENT
Start: 2020-02-05 | End: 2020-02-05

## 2020-02-05 RX ADMIN — GADOBUTROL 10 ML: 604.72 INJECTION INTRAVENOUS at 06:02

## 2020-02-10 ENCOUNTER — TELEPHONE (OUTPATIENT)
Dept: PHARMACY | Facility: CLINIC | Age: 45
End: 2020-02-10

## 2020-02-10 ENCOUNTER — PATIENT MESSAGE (OUTPATIENT)
Dept: HEMATOLOGY/ONCOLOGY | Facility: CLINIC | Age: 45
End: 2020-02-10

## 2020-02-10 DIAGNOSIS — R42 VERTIGO: Primary | ICD-10-CM

## 2020-02-10 RX ORDER — MECLIZINE HCL 12.5 MG 12.5 MG/1
12.5 TABLET ORAL 3 TIMES DAILY PRN
Qty: 45 TABLET | Refills: 0 | Status: SHIPPED | OUTPATIENT
Start: 2020-02-10 | End: 2021-11-03

## 2020-02-10 NOTE — TELEPHONE ENCOUNTER
Provider sent a prescription for meclizine to local pharmacy for vertigo. Contacted patient to let her know and discussed directions. Advised her to monitor for worsening energy as meclizine may cause fatigue. She has no questions or concerns at this time. She is aware to contact [facility] if she needs anything.

## 2020-02-10 NOTE — TELEPHONE ENCOUNTER
"Contacted patient for an Ibrance clinical f/u.     Dosing, how taking: Ibrance 125 mg - take 1 capsule QD at 8:30-9 AM with food for 21 days on and 7 days off. Avoids grapefruit/grapefruit juice. Denies missed doses.   Storage: Stores medication in bag along with other medications at room temperature.  Handling: Does not touch directly - uses cap of bottle. Aware of handling precautions  Side effects:   -Dizziness/Vertigo (started in December - progressively worsening). It occurs daily - has 1-5 dizzy spells in 24 hour period. Not associated with nausea. She received an MRI, which came back fine. **Will message MD to see if she could be prescribed something for vertigo.**  -Fatigue (started end of December)  Recent infections: No signs of infection - no fever, achy chills, or wet persistent cough  Pain: 0/10 - denies pain  Appetite: Eats 1 good meal/day. Takes food with medication. Reports not having an appetite. Snacks healthy throughout the day - fruits and vegetables. Sometimes, drinks a smoothie in place of her meal. Not interested in taking an appetite stimulant - she said she's fine, and she's getting what she needs. Encouraged patient to increase food consumption in order to boost up her energy levels.   Energy, fatigue: She is work daily as a teacher. Reports energy level is "really low" at work. She spends time with her kids when she gets home. She wants to do things with friends, but she does not have the energy. She exercises every day with the students. Encouraged increasing nutritional consumption to help increase energy levels.   Health, mood, QOL: Reports having anxiety, stress, and depression - well controlled on Effexor. Reports good family and friend support.   ED/UC visits: No  Next clinical follow up: 3 months    Medication list reviewed. Patient no longer taking Zofran - switched to Compazine. Reviewed Cat C DDI between amlodipine + Ibrance: Ibrance may increase the serum concentration of " amlodipine. No changes in allergies or health conditions.     BP: Reports it usually runs ~140/92. She checks BP daily. Advised patient to notify provider and seek medical attention if BP gets even higher.    Labs reviewed from 1/31/2020. CBC stable except Hgb and Hct decreased (will monitor). CMP - stable. Renal and hepatic function are stable. No action required. Therapy and dose are appropriate for Breast cancer, advanced, initial endocrine-based therapy: HER-2 negative: Oral: 125 mg once daily for 21 days, followed by 7 days off, repeat every 28 days (in combination with continuous aromatase inhibitor therapy); continue until disease progression or unacceptable toxicity. Patient takes with Arimidex 1 mg QD.    Patient has no further questions or concerns at this time. She is aware to contact [facility] if she needs anything.

## 2020-02-12 ENCOUNTER — PATIENT MESSAGE (OUTPATIENT)
Dept: SLEEP MEDICINE | Facility: CLINIC | Age: 45
End: 2020-02-12

## 2020-02-12 ENCOUNTER — OFFICE VISIT (OUTPATIENT)
Dept: PSYCHIATRY | Facility: CLINIC | Age: 45
End: 2020-02-12
Payer: COMMERCIAL

## 2020-02-12 DIAGNOSIS — Z17.0 MALIGNANT NEOPLASM OF UPPER-INNER QUADRANT OF RIGHT BREAST IN FEMALE, ESTROGEN RECEPTOR POSITIVE: ICD-10-CM

## 2020-02-12 DIAGNOSIS — F32.9 MAJOR DEPRESSIVE DISORDER, SINGLE EPISODE WITH ANXIOUS DISTRESS: Primary | ICD-10-CM

## 2020-02-12 DIAGNOSIS — C50.211 MALIGNANT NEOPLASM OF UPPER-INNER QUADRANT OF RIGHT BREAST IN FEMALE, ESTROGEN RECEPTOR POSITIVE: ICD-10-CM

## 2020-02-12 PROCEDURE — 90837 PR PSYCHOTHERAPY W/PATIENT, 60 MIN: ICD-10-PCS | Mod: S$GLB,,, | Performed by: PSYCHOLOGIST

## 2020-02-12 PROCEDURE — 90837 PSYTX W PT 60 MINUTES: CPT | Mod: S$GLB,,, | Performed by: PSYCHOLOGIST

## 2020-02-12 PROCEDURE — 99999 PR PBB SHADOW E&M-EST. PATIENT-LVL II: ICD-10-PCS | Mod: PBBFAC,,, | Performed by: PSYCHOLOGIST

## 2020-02-12 PROCEDURE — 99999 PR PBB SHADOW E&M-EST. PATIENT-LVL II: CPT | Mod: PBBFAC,,, | Performed by: PSYCHOLOGIST

## 2020-02-12 NOTE — PROGRESS NOTES
INFORMED CONSENT: Kristopher Ye   is known to this provider and identity was confirmed via NAME and .  The patient has been informed of the risks and benefits associated with engaging in psychotherapy, the handling of protected health information, the rights of privacy and the limits of confidentiality. The patient has also been informed of the importance of reporting any suicidal or homicidal ideation to this or any provider to ensure safety of all parties, and the Kristopher Ye expressed understanding. The patient was agreeable to these terms and freely participates in individual psychotherapy.      PSYCHO-ONCOLOGY NOTE/ Individual Psychotherapy     Date: 2020   [facility]:  [facility]        Therapeutic Intervention: Met with patient.  Outpatient - Behavior modifying psychotherapy 60 min - CPT code 83769      Patient was last seen by me on 2020    Problem list  Patient Active Problem List   Diagnosis    Hypertension    Iron deficiency anemia    Palpitations    Cardiomegaly    Morbid obesity (BMI= 55.9 on 10/30/14)    Fibroid    Fibroid uterus    Fatigue    Shortness of breath    Thalassemia    Major depressive disorder, single episode with anxious distress    Cardiomyopathy    Hyperlipidemia    Malignant neoplasm of upper-inner quadrant of right breast in female, estrogen receptor positive    Cancer associated pain    Pathological fracture of lumbar vertebra with routine healing    Bone metastases    Constipation    Bone lesion    Hot flashes       Chief complaint/reason for encounter: depression and anxiety   Met with patient to evaluate psychosocial adaptation to diagnosis/treatment/survivorship of Breast Cancer Stave IV    Current Medications  Current Outpatient Medications   Medication    albuterol (PROVENTIL/VENTOLIN HFA) 90 mcg/actuation inhaler    amLODIPine (NORVASC) 5 MG tablet    anastrozole (ARIMIDEX) 1 mg Tab    carvedilol (COREG) 6.25 MG tablet     goserelin (ZOLADEX) 3.6 mg injection    irbesartan (AVAPRO) 150 MG tablet    loratadine (CLARITIN) 10 mg tablet    LORazepam (ATIVAN) 0.5 MG tablet    meclizine (ANTIVERT) 12.5 mg tablet    ondansetron (ZOFRAN) 8 MG tablet    palbociclib (IBRANCE) 125 mg Cap    polyethylene glycol (GLYCOLAX) 17 gram/dose powder    prochlorperazine (COMPAZINE) 10 MG tablet    sennosides (SENNA-C ORAL)    venlafaxine (EFFEXOR XR) 37.5 MG 24 hr capsule     No current facility-administered medications for this visit.        Objective:  Kristopher Ye arrived promptly for the session.   Ms. Ye was independently ambulatory at the time of session. The patient was fully cooperative throughout the session.  Appearance: age appropriate, appropriately  dressed, adequately  groomed  Behavior/Cooperation: friendly and cooperative  Speech: normal in rate, volume, and tone and appropriate quality, quantity and organization of sentences  Mood: euthymic  Affect: full range and appropriate  Thought Process: goal-directed, logical  Thought Content: normal,  No delusions or paranoia; did not appear to be responding to internal stimuli during the session  Orientation: grossly intact  Memory: Grossly intact  Attention Span/Concentration: Attends to session without distraction; reports no difficulty  Fund of Knowledge: average  Estimate of Intelligence: average from verbal skills and history  Cognition: grossly intact  Insight: patient has awareness of illness; good insight into own behavior and behavior of others  Judgment: the patient's behavior is adequate to circumstances    Interval history and content of current session: Patient reported having a dizzy spell at work and fell. She decided that she was pushing herself too much and has taken a leave of absence from work. Processed interpersonal discord with her ex-partner cheating with a coworker. Patient reported that her ex-partner at times comes to her house unannounced.  She stated that she samaniego not fear for her safety. Discussed safety precautions as needed, and calling the police if she is harassed. Discussed current adaptation to disease and treatment status. Reports to be coping with moderate difficulty. Evaluated cognitive response, paying particular attention to negative intrusive thoughts of a persistent and detrimental nature. Thoughts of this type are in evidence with moderate distress. Provided cognitive behavioral therapy to address negative cognitions. Identified and evaluated psychosocial and environmental stressors secondary to diagnosis and treatment.  Examined proactive behaviors that may be implemented to minimize or ameliorate psychosocial stressors secondary to diagnosis and treatment.     Risk parameters:   Patient reports no suicidal ideation  Patient reports no homicidal ideation  Patient reports no self-injurious behavior  Patient reports no violent behavior   Safety needs:  None at this time      Verbal deficits: None     Patient's response to intervention:The patient's response to intervention is accepting.     Progress toward goals and other mental status changes:  The patient's progress toward goals is good.      Progress to date:Progress - Ongoing, but Slow      Goals from last visit: Met     Patient reported outcomes:    Distress Thermometer: 5   PHQ-9= 6, no SI   HUY-7= 7       Client Strengths: verbal, intelligent, successful, good social support, good insight, commitment to wellness, strong jd, strong cultural traditions     Diagnosis:     ICD-10-CM ICD-9-CM   1. Major depressive disorder, single episode with anxious distress F32.9 296.20   2. Malignant neoplasm of upper-inner quadrant of right breast in female, estrogen receptor positive C50.211 174.2    Z17.0 V86.0   Mild Personality Traits    Treatment Plan:individual psychotherapy and medication management by physician  · Target symptoms: depression, anxiety   · Why chosen therapy is appropriate  versus another modality: relevant to diagnosis, patient responds to this modality, evidence based practice  · Outcome monitoring methods: self-report, observation, checklist/rating scale  · Therapeutic intervention type: behavior modifying psychotherapy  · Prognosis: Good      Behavioral goals:   Continue with Behavioral Activation and Balancing Stress with Coping  Discuss CPAP options with Sleep Med    Return to clinic: 2 weeks    Length of Service (minutes direct face-to-face contact): 60    Bria Odom, PhD  [place] License #[id number]

## 2020-02-13 PROBLEM — K59.00 CONSTIPATION: Status: RESOLVED | Noted: 2019-11-12 | Resolved: 2020-02-13

## 2020-02-13 PROBLEM — M89.9 BONE LESION: Status: RESOLVED | Noted: 2019-12-16 | Resolved: 2020-02-13

## 2020-02-13 NOTE — PROGRESS NOTES
History:     Reason For Follow Up:   1. Stage IV ([id number]) invasive ductal carcinoma of right breast, upper inner quadrant, ER %, MS neg, Her2 neg, Grade 3, multifocal with one lesion appearing more aggressive (Ki67 80%), large LN +, bone mets.    HPI:   Kristopher Ye presents for follow up of breast cancer.   Continues Ibrance, Zoladex, Arimidex - tolerating well.   Hot flashes - better  Constipation - stable.  Anxiety/depression - follows with Dr Odom - discussed case with her - patient is making progress.   Didn't take her BP med this morning.  Dizziness when changing positions. No weakness or vision changes.  Notes jaw pain - will follow up with dentist as there is no oral lesions or lymphadenopathy palpated.      Oncologic History:  Presentation  - 9/11/19 - Screening mammogram showed multiple right breast masses lower inner quadrant  - 9/13/19 - Diagnostic mammogram and US showed a right breast, 3:00 position mass, 2 CFN measuring 17 mm x 16 mm x 14 mm.  There 2 smaller adjacent masses towards the nipple  - 9/19/19 - Biopsy -    A. Right breast subareolar: Grade 3 IDC, estrogen receptor 80%, progesterone receptor 0%, Her2 dago neg, Ki67 80%.     B. Right breast mass 3:00: Grade 3 IDC with papillary features, estrogen receptor 100%, progesterone receptor 0%, Her2 dago 1+, Ki67 30%.   - 9/26/19: US right axilla with abnormal lymphadenopathy measuring 4.3 x 2.8 cm with biopsy + for metastatic breast cancer.   Surgery consultation with Dr Ferris on 9/25/19   - 9/25/19 - Genetics Genetics [facility] pending; VUS pending  Medical oncology consultation on 10/7/19   * 10/8/19 - CT C/A/P with several lytic lesions in thoracic and lumbar spine, iliac bones, left scapula in addition to 3 x 4.5 cm right axillary node and 2.1 cm right breast mass. Bone scan negative.   * 10/31/19 - started Ibrance, Arimidex, Zoladex; plan for Xgeva.    * 11/12/19 STRATA without targetable mutations.    *  "12/16/19 - Bone biopsy negative.       Review of Systems  Review of Systems   Constitutional: Negative for activity change, appetite change, chills, fatigue (better; has been resting this week), fever and unexpected weight change.   HENT: Negative for congestion, hearing loss, nosebleeds, sinus pressure and trouble swallowing.    Eyes: Negative for pain, discharge, itching and visual disturbance.   Respiratory: Negative for cough, chest tightness and shortness of breath.    Cardiovascular: Negative for chest pain, palpitations and leg swelling.   Gastrointestinal: Negative for abdominal distention, abdominal pain, blood in stool, constipation, diarrhea, nausea, rectal pain and vomiting.   Endocrine: Positive for heat intolerance (improving on effexor). Negative for polydipsia.   Genitourinary: Negative for difficulty urinating, dysuria, flank pain, frequency, hematuria and urgency.   Musculoskeletal: Negative for arthralgias, back pain and neck pain.   Skin: Negative for color change, pallor and rash.   Allergic/Immunologic: Negative for immunocompromised state.   Neurological: Positive for dizziness. Negative for numbness and headaches.   Hematological: Negative for adenopathy. Does not bruise/bleed easily.   Psychiatric/Behavioral: Negative for confusion and decreased concentration. The patient is nervous/anxious (improving).         Objective     Objective:     Vitals:    02/27/20 1310   BP: (!) 165/102   BP Location: Left arm   Patient Position: Sitting   BP Method: Large (Automatic)   Pulse: 78   Resp: 16   Temp: 98.6 °F (37 °C)   TempSrc: Oral   SpO2: 95%   Weight: (!) 167.3 kg (368 lb 13.3 oz)   Height: 5' 6" (1.676 m)     Body surface area is 2.79 meters squared.     Physical Exam   Constitutional: She is oriented to person, place, and time. She appears well-developed and well-nourished. No distress.   HENT:   Head: Normocephalic and atraumatic.   Mouth/Throat: Oropharynx is clear and moist and mucous " membranes are normal. No oral lesions. No oropharyngeal exudate.   Eyes: Pupils are equal, round, and reactive to light. Conjunctivae and EOM are normal. Right eye exhibits no discharge. Left eye exhibits no discharge. No scleral icterus.   Neck: Normal range of motion. Neck supple. No thyroid mass present.   Cardiovascular: Normal rate, regular rhythm, S1 normal, S2 normal and normal heart sounds.   Pulmonary/Chest: Effort normal and breath sounds normal. No respiratory distress. She has no wheezes. She has no rhonchi. She has no rales. Chest wall is not dull to percussion.   Abdominal: Soft. Normal appearance and bowel sounds are normal. She exhibits no distension. There is no hepatosplenomegaly. There is no tenderness.   Musculoskeletal: She exhibits no edema.   Lymphadenopathy:     She has no cervical adenopathy.     She has no axillary adenopathy.        Right: No supraclavicular adenopathy present.        Left: No supraclavicular adenopathy present.   Neurological: She is alert and oriented to person, place, and time. She has normal strength.   Skin: Skin is warm, dry and intact. No rash noted. No pallor.   Psychiatric: She has a normal mood and affect. Her behavior is normal. Thought content normal.   Nursing note and vitals reviewed.        Assessment     Assessment:     1. Stage IV ([id number]) invasive ductal carcinoma of right breast, upper inner quadrant, ER %, DC neg, Her2 neg, Grade 3, multifocal with one lesion appearing more aggressive (Ki67 80%), large LN +, bony mets   * Genetics negative - in media   * 10/2019 staging scans showed diffuse bony mets  *  10/31/19 started Arimidex and Ibrance 125 mg daily days 1-21 every 28 days with Zoladex. Bone biopsy negative, but review of imaging truly looks like metastatic disease.    * 1/31/20 PET - shows no osseous lesions and no hypermetabolic up take in the right breast    * Cycle 5 Ibrance/Arimidex/Zoladex - started 2/28/2020    2. Bony mets with L2  endplate fracture   * Bone biopsy negative but imaging looks like metastatic disease. Will continue with above plan and re-image.    * Xgeva monthly - will get dental clearance - has an appt next month.    3. Anxiety related to cancer diagnosis.   * Seeing Dr Odom. Improving.     4. Congestive heart failure, new diagnosis   * Echo 10/2019 with EF 30%   * Follows with Dr Olivares.     5. Morbid obesity   * Adjusting diet for weight loss.     6. Cancer pain   * Ultram 100 mg q 6 hours prn.  Controlled.     7. Hot flashes   * Referred to Dr Avina; worsening with Arimidex/Zoladex.    * Effexor    8. Dizziness   * Sounds most like vertigo. Discussed getting CNS imaging, but we agreed to hold at this time.     Plan:     1. Continue Zoladex, Arimidex, Ibrance. Currently cycle 5 - start 2/28.   2. Plan Xgeva with next dose of Zoladex - going to dentist soon (still has not seen dentist)  3. Plan DEXA in near future, but will hold for now since she's having mutliple other appts  4. Calcium/vitamin D   5. PET showing response to therapy     ACP done.   RTC 3/27 with cbc, cmp, mg, phos, ca 15-3, xgeva, zoladex    Return to clinic in 4 weeks with MD appointment and labs.     Patient is in agreement with the proposed treatment plan. All questions were answered to the patient's satisfaction. Patient knows to call clinic for any new or worsening symptoms and if anything is needed before the next clinic visit.          Dawna Akers, JUNGP-C  Hematology & Medical Oncology   [facility] [place] [address]  ph. [phone]  Fax. [phone]    Patient dicussed with collaborating physician, Dr. Mendez.

## 2020-02-14 ENCOUNTER — PATIENT MESSAGE (OUTPATIENT)
Dept: HEMATOLOGY/ONCOLOGY | Facility: CLINIC | Age: 45
End: 2020-02-14

## 2020-02-18 ENCOUNTER — PATIENT OUTREACH (OUTPATIENT)
Dept: ADMINISTRATIVE | Facility: OTHER | Age: 45
End: 2020-02-18

## 2020-02-18 NOTE — PROGRESS NOTES
Chart reviewed.   Immunizations: Triggered Imm Registry     Orders placed: n/a  Upcoming appts to satisfy ANDRE topics: n/a

## 2020-02-19 ENCOUNTER — OFFICE VISIT (OUTPATIENT)
Dept: SLEEP MEDICINE | Facility: CLINIC | Age: 45
End: 2020-02-19
Payer: COMMERCIAL

## 2020-02-19 VITALS
SYSTOLIC BLOOD PRESSURE: 122 MMHG | DIASTOLIC BLOOD PRESSURE: 79 MMHG | HEART RATE: 74 BPM | HEIGHT: 66 IN | BODY MASS INDEX: 47.09 KG/M2 | WEIGHT: 293 LBS

## 2020-02-19 DIAGNOSIS — G47.30 SLEEP APNEA, UNSPECIFIED TYPE: Primary | ICD-10-CM

## 2020-02-19 PROCEDURE — 3074F PR MOST RECENT SYSTOLIC BLOOD PRESSURE < 130 MM HG: ICD-10-PCS | Mod: CPTII,S$GLB,, | Performed by: NURSE PRACTITIONER

## 2020-02-19 PROCEDURE — 99203 OFFICE O/P NEW LOW 30 MIN: CPT | Mod: S$GLB,,, | Performed by: NURSE PRACTITIONER

## 2020-02-19 PROCEDURE — 3008F PR BODY MASS INDEX (BMI) DOCUMENTED: ICD-10-PCS | Mod: CPTII,S$GLB,, | Performed by: NURSE PRACTITIONER

## 2020-02-19 PROCEDURE — 3078F DIAST BP <80 MM HG: CPT | Mod: CPTII,S$GLB,, | Performed by: NURSE PRACTITIONER

## 2020-02-19 PROCEDURE — 3074F SYST BP LT 130 MM HG: CPT | Mod: CPTII,S$GLB,, | Performed by: NURSE PRACTITIONER

## 2020-02-19 PROCEDURE — 99999 PR PBB SHADOW E&M-EST. PATIENT-LVL IV: ICD-10-PCS | Mod: PBBFAC,,, | Performed by: NURSE PRACTITIONER

## 2020-02-19 PROCEDURE — 3078F PR MOST RECENT DIASTOLIC BLOOD PRESSURE < 80 MM HG: ICD-10-PCS | Mod: CPTII,S$GLB,, | Performed by: NURSE PRACTITIONER

## 2020-02-19 PROCEDURE — 99203 PR OFFICE/OUTPT VISIT, NEW, LEVL III, 30-44 MIN: ICD-10-PCS | Mod: S$GLB,,, | Performed by: NURSE PRACTITIONER

## 2020-02-19 PROCEDURE — 3008F BODY MASS INDEX DOCD: CPT | Mod: CPTII,S$GLB,, | Performed by: NURSE PRACTITIONER

## 2020-02-19 PROCEDURE — 99999 PR PBB SHADOW E&M-EST. PATIENT-LVL IV: CPT | Mod: PBBFAC,,, | Performed by: NURSE PRACTITIONER

## 2020-02-19 NOTE — LETTER
February 19, 2020      Yen Ahmadi NP  [address]  [facility] [address]           [facility] [place] [facility] [address]  [facility] [address]  Phone: [phone]          Patient: Kristopher Ye   MR Number: [id number]   YOB: 1975   Date of Visit: 2/19/2020       Dear Yen Ahmadi:    Thank you for referring Kristopher Ye to me for evaluation. Attached you will find relevant portions of my assessment and plan of care.    If you have questions, please do not hesitate to call me. I look forward to following Kristopher Ye along with you.    Sincerely,    Bebe Euceda NP    Enclosure  CC:  No Recipients    If you would like to receive this communication electronically, please contact [email or web address] or [phone] to request more information on [employer] access.    For providers and/or their staff who would like to refer a patient to [facility], please contact us through our one-stop-shop provider referral line, [facility], at [phone].    If you feel you have received this communication in error or would no longer like to receive these types of communications, please e-mail [email or web address]

## 2020-02-19 NOTE — PROGRESS NOTES
Kristopher Ye  was seen as a new patient at the request of Yen Ahmadi NP for the evaluation of obstructive sleep apnea.    CHIEF COMPLAINT:    Chief Complaint   Patient presents with    evaluation for a sleep study       02/19/2020 VAMSI Euceda NP: Initial HISTORY OF PRESENT ILLNESS: Kristopher Ye is a 44 y.o. female is here for sleep evaluation.       Patient complaints include: snoring, unrefreshing sleep,  and daytime fatigue.   Fatigue worse since breast ca treatment  Denies insomnia.   Denies symptoms of restless legs or kicking during sleep.  Never sleep tested before.     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      PAST MEDICAL HISTORY:    Active Ambulatory Problems     Diagnosis Date Noted    Hypertension 07/17/2012    Iron deficiency anemia 07/17/2012    Cardiomegaly 04/17/2014    Morbid obesity (BMI= 55.9 on 10/30/14) 10/30/2014    Fibroid uterus 07/22/2015    Shortness of breath 03/28/2016    Thalassemia 11/02/2016    Major depressive disorder, single episode with anxious distress 11/02/2016    Cardiomyopathy 03/28/2019    Hyperlipidemia 03/28/2019    Malignant neoplasm of upper-inner quadrant of right breast in female, estrogen receptor positive 10/07/2019    Cancer associated pain 11/12/2019    Pathological fracture of lumbar vertebra with routine healing 11/12/2019    Bone metastases 11/12/2019    Hot flashes 01/03/2020     Resolved Ambulatory Problems     Diagnosis Date Noted    Palpitations 04/08/2014    Fibroid 07/21/2015    Fatigue 03/28/2016    Systolic and diastolic CHF, chronic 03/18/2019    Constipation 11/12/2019    Bone lesion 12/16/2019     Past Medical History:   Diagnosis Date    Abnormal Pap smear     Anemia     Anxiety     Cancer     Sleep difficulties                 PAST SURGICAL HISTORY:    Past Surgical History:   Procedure Laterality Date      SECTION      TONSILLECTOMY      TUBAL LIGATION      UTERINE FIBROID EMBOLIZATION           FAMILY HISTORY:                Family History   Problem Relation Age of Onset    Arthritis Mother     Diabetes Mother     Heart disease Mother         CHF, CAD , 2 stents    Hypertension Mother     Hyperlipidemia Mother     Heart failure Mother     No Known Problems Sister     No Known Problems Father     Hypertension Brother     No Known Problems Daughter     Asthma Daughter     No Known Problems Maternal Aunt     No Known Problems Maternal Uncle     No Known Problems Paternal Aunt     No Known Problems Paternal Uncle     No Known Problems Maternal Grandmother     Cancer Maternal Grandfather     No Known Problems Paternal Grandmother     No Known Problems Paternal Grandfather     Breast cancer Neg Hx     Colon cancer Neg Hx     Ovarian cancer Neg Hx        SOCIAL HISTORY:          Tobacco:   Social History     Tobacco Use   Smoking Status Never Smoker   Smokeless Tobacco Never Used       Alcohol use:    Social History     Substance and Sexual Activity   Alcohol Use Not Currently    Alcohol/week: 0.0 standard drinks    Frequency: Never                 ALLERGIES:    Review of patient's allergies indicates:   Allergen Reactions    Keflex [cephalexin] Itching       CURRENT MEDICATIONS:    Current Outpatient Medications   Medication Sig Dispense Refill    albuterol (PROVENTIL/VENTOLIN HFA) 90 mcg/actuation inhaler Inhale 1-2 puffs into the lungs every 6 (six) hours as needed for Wheezing. Rescue 1 Inhaler 0    amLODIPine (NORVASC) 5 MG tablet Take 1 tablet (5 mg total) by mouth once daily. 30 tablet 11    anastrozole (ARIMIDEX) 1 mg Tab Take 1 tablet (1 mg total) by mouth once daily. 90 tablet 3    carvedilol (COREG) 6.25 MG tablet Take 1 tablet (6.25 mg total) by mouth 2 (two) times daily with meals. 60 tablet 11    goserelin (ZOLADEX) 3.6 mg injection Inject 3.6 mg into the skin  "every 28 days.      irbesartan (AVAPRO) 150 MG tablet Take 1 tablet (150 mg total) by mouth every evening. 30 tablet 11    loratadine (CLARITIN) 10 mg tablet Take 10 mg by mouth daily as needed.       LORazepam (ATIVAN) 0.5 MG tablet Take 1 tablet (0.5 mg total) by mouth every 12 (twelve) hours as needed for Anxiety. 30 tablet 0    meclizine (ANTIVERT) 12.5 mg tablet Take 1 tablet (12.5 mg total) by mouth 3 (three) times daily as needed for Dizziness. 45 tablet 0    ondansetron (ZOFRAN) 8 MG tablet Take 1 tablet (8 mg total) by mouth every 8 (eight) hours as needed for Nausea. 45 tablet 2    palbociclib (IBRANCE) 125 mg Cap Take 125 mg by mouth as instructed Take as directed days 1-21 of each 28 day cycle. Take with food. 21 capsule 6    polyethylene glycol (GLYCOLAX) 17 gram/dose powder Take 17 g by mouth once daily. 116 g 0    prochlorperazine (COMPAZINE) 10 MG tablet Take 1 tablet (10 mg total) by mouth every 6 (six) hours as needed. 45 tablet 2    sennosides (SENNA-C ORAL) Take by mouth daily as needed.       venlafaxine (EFFEXOR XR) 37.5 MG 24 hr capsule Take 1 capsule (37.5 mg total) by mouth once daily. 30 capsule 2     No current facility-administered medications for this visit.                   REVIEW OF SYSTEMS:     Sleep related symptoms as per HPI.  CONST:Denies weight gain    HEENT: Denies sinus congestion  PULM: Denies dyspnea  CARD:  Denies palpitations   GI:  Denies acid reflux  : Denies polyuria  NEURO: Denies headaches  PSYCH: Denies mood disturbance  HEME: Denies anemia    Otherwise, a balance of 10 systems reviewed is negative.          PHYSICAL EXAM:  /79   Pulse 74   Ht 5' 6" (1.676 m)   Wt (!) 165.8 kg (365 lb 8.4 oz)   BMI 59.00 kg/m²   GENERAL: Obese development, well groomed  HEENT:  Conjunctivae are non-erythematous; Pupils equal, round, and reactive to light; Nose is symmetrical; Nasal mucosa is pink and moist; Septum is midline; Inferior turbinates are normal; " Nasal airflow is normal; Posterior pharynx is pink; Modified Mallampati: III; Posterior palate is normal; Tonsils surgically absent; Uvula - not visualized;Tongue is normal; Dentition is fair; No TMJ tenderness; Jaw opening and protrusion without click and without discomfort.  NECK: Supple. Neck circumference is 17 inches. No thyromegaly. No palpable nodes.    SKIN: On face and neck: No abrasions, no rashes, no lesions.  No subcutaneous nodules are palpable.  RESPIRATORY: Chest is clear to auscultation.  Normal chest expansion and non-labored breathing at rest.  CARDIOVASCULAR: Normal S1, S2.  No murmurs, gallops or rubs. No carotid bruits bilaterally.  EXTREMITIES: No edema. No clubbing. No cyanosis. Station normal. Gait normal.        NEURO/PSYCH: Oriented to time, place and person. Normal attention span and concentration. Affect is full. Mood is normal.                                    10/08/2019 estimated ejection fraction is 30%.              ASSESSMENT:    Sleep apnea, unspecified. The patient symptomatically has snoring, unrefreshing sleep, and daytime fatigue with findings of crowded oral airway and elevated body mass index. Medical co-morbidities: anxiety/depression, HTN, cardiomyopathy, and obesity.  This warrants further investigation for possible obstructive sleep apnea.      Obesity, BMI > 50, discussed relationship between ZABRINA and weight     Hx tonsillectomy, in childhood     PLAN:    Diagnostic: Polysomnogram in-lab due to cardiac co-mobidity. The nature of this procedure and its indication was discussed with the patient.  If not PSG, then HST. Discussed with patient that if HST is negative or depending on severity of positive HST, in-lab sleep study may be necessary. Patient will be contacted after sleep study is done.  Email results, RTC prn.     Education: During our discussion today, we talked about the etiology of obstructive sleep apnea as well as the potential ramifications of untreated sleep  apnea, which could include daytime sleepiness, hypertension, heart disease and/or stroke. We discussed potential treatment options, which could include weight loss, body positioning (pillow or [facility] NightBalance), continuous positive airway pressure (CPAP), OA, EPAP, or referral for surgical consideration.     Precautions: The patient was advised to abstain from driving should they feel sleepy  or drowsy.     Thank you for allowing me the opportunity to participate in the care of your patient.

## 2020-02-19 NOTE — PATIENT INSTRUCTIONS
Edison or Dawna will contact you to schedule your sleep study. Their number is [phone] (ext [phone]). The [facility] is located on 7th floor of the [facility].    If you have not been contacted regarding scheduling your sleep test after one week from today, please notify me via [facility] or by phone by calling [phone] (ext 1).     We will call you when the sleep study results are ready - if you have not heard from us by 2 weeks from the date of the study, please call [phone] (ext 1).    You are advised to abstain from driving should you feel sleepy or drowsy.

## 2020-02-21 ENCOUNTER — TELEPHONE (OUTPATIENT)
Dept: PHARMACY | Facility: CLINIC | Age: 45
End: 2020-02-21

## 2020-02-26 ENCOUNTER — TELEPHONE (OUTPATIENT)
Dept: SLEEP MEDICINE | Facility: HOSPITAL | Age: 45
End: 2020-02-26

## 2020-02-27 ENCOUNTER — OFFICE VISIT (OUTPATIENT)
Dept: HEMATOLOGY/ONCOLOGY | Facility: CLINIC | Age: 45
End: 2020-02-27
Payer: COMMERCIAL

## 2020-02-27 ENCOUNTER — INFUSION (OUTPATIENT)
Dept: INFUSION THERAPY | Facility: HOSPITAL | Age: 45
End: 2020-02-27
Attending: INTERNAL MEDICINE
Payer: COMMERCIAL

## 2020-02-27 ENCOUNTER — LAB VISIT (OUTPATIENT)
Dept: LAB | Facility: HOSPITAL | Age: 45
End: 2020-02-27
Attending: INTERNAL MEDICINE
Payer: COMMERCIAL

## 2020-02-27 VITALS
OXYGEN SATURATION: 95 % | HEART RATE: 78 BPM | RESPIRATION RATE: 16 BRPM | WEIGHT: 293 LBS | HEIGHT: 66 IN | TEMPERATURE: 99 F | DIASTOLIC BLOOD PRESSURE: 102 MMHG | SYSTOLIC BLOOD PRESSURE: 165 MMHG | BODY MASS INDEX: 47.09 KG/M2

## 2020-02-27 DIAGNOSIS — I10 ESSENTIAL HYPERTENSION: ICD-10-CM

## 2020-02-27 DIAGNOSIS — C50.211 MALIGNANT NEOPLASM OF UPPER-INNER QUADRANT OF RIGHT BREAST IN FEMALE, ESTROGEN RECEPTOR POSITIVE: Primary | ICD-10-CM

## 2020-02-27 DIAGNOSIS — F32.9 MAJOR DEPRESSIVE DISORDER, SINGLE EPISODE WITH ANXIOUS DISTRESS: ICD-10-CM

## 2020-02-27 DIAGNOSIS — Z17.0 MALIGNANT NEOPLASM OF UPPER-INNER QUADRANT OF RIGHT BREAST IN FEMALE, ESTROGEN RECEPTOR POSITIVE: ICD-10-CM

## 2020-02-27 DIAGNOSIS — E78.00 PURE HYPERCHOLESTEROLEMIA: ICD-10-CM

## 2020-02-27 DIAGNOSIS — Z17.0 MALIGNANT NEOPLASM OF UPPER-INNER QUADRANT OF RIGHT BREAST IN FEMALE, ESTROGEN RECEPTOR POSITIVE: Primary | ICD-10-CM

## 2020-02-27 DIAGNOSIS — E66.01 MORBID OBESITY WITH BODY MASS INDEX OF 50.0-59.9 IN ADULT: ICD-10-CM

## 2020-02-27 DIAGNOSIS — C50.211 MALIGNANT NEOPLASM OF UPPER-INNER QUADRANT OF RIGHT BREAST IN FEMALE, ESTROGEN RECEPTOR POSITIVE: ICD-10-CM

## 2020-02-27 DIAGNOSIS — C79.51 BONE METASTASES: ICD-10-CM

## 2020-02-27 LAB
ALBUMIN SERPL BCP-MCNC: 3.2 G/DL (ref 3.5–5.2)
ALP SERPL-CCNC: 118 U/L (ref 55–135)
ALT SERPL W/O P-5'-P-CCNC: 18 U/L (ref 10–44)
ANION GAP SERPL CALC-SCNC: 10 MMOL/L (ref 8–16)
AST SERPL-CCNC: 13 U/L (ref 10–40)
BILIRUB SERPL-MCNC: 0.3 MG/DL (ref 0.1–1)
BUN SERPL-MCNC: 12 MG/DL (ref 6–20)
CALCIUM SERPL-MCNC: 9.6 MG/DL (ref 8.7–10.5)
CHLORIDE SERPL-SCNC: 107 MMOL/L (ref 95–110)
CO2 SERPL-SCNC: 25 MMOL/L (ref 23–29)
CREAT SERPL-MCNC: 0.9 MG/DL (ref 0.5–1.4)
ERYTHROCYTE [DISTWIDTH] IN BLOOD BY AUTOMATED COUNT: 21.2 % (ref 11.5–14.5)
EST. GFR  (AFRICAN AMERICAN): >60 ML/MIN/1.73 M^2
EST. GFR  (NON AFRICAN AMERICAN): >60 ML/MIN/1.73 M^2
GLUCOSE SERPL-MCNC: 103 MG/DL (ref 70–110)
HCT VFR BLD AUTO: 38.4 % (ref 37–48.5)
HGB BLD-MCNC: 11.6 G/DL (ref 12–16)
IMM GRANULOCYTES # BLD AUTO: 0.01 K/UL (ref 0–0.04)
MAGNESIUM SERPL-MCNC: 1.9 MG/DL (ref 1.6–2.6)
MCH RBC QN AUTO: 26.7 PG (ref 27–31)
MCHC RBC AUTO-ENTMCNC: 30.2 G/DL (ref 32–36)
MCV RBC AUTO: 88 FL (ref 82–98)
NEUTROPHILS # BLD AUTO: 2.7 K/UL (ref 1.8–7.7)
PHOSPHATE SERPL-MCNC: 4.3 MG/DL (ref 2.7–4.5)
PLATELET # BLD AUTO: 265 K/UL (ref 150–350)
PMV BLD AUTO: 9.8 FL (ref 9.2–12.9)
POTASSIUM SERPL-SCNC: 4 MMOL/L (ref 3.5–5.1)
PROT SERPL-MCNC: 8.2 G/DL (ref 6–8.4)
RBC # BLD AUTO: 4.35 M/UL (ref 4–5.4)
SODIUM SERPL-SCNC: 142 MMOL/L (ref 136–145)
WBC # BLD AUTO: 6.84 K/UL (ref 3.9–12.7)

## 2020-02-27 PROCEDURE — 99999 PR PBB SHADOW E&M-EST. PATIENT-LVL IV: ICD-10-PCS | Mod: PBBFAC,,, | Performed by: NURSE PRACTITIONER

## 2020-02-27 PROCEDURE — 99214 OFFICE O/P EST MOD 30 MIN: CPT | Mod: S$GLB,,, | Performed by: NURSE PRACTITIONER

## 2020-02-27 PROCEDURE — 86300 IMMUNOASSAY TUMOR CA 15-3: CPT

## 2020-02-27 PROCEDURE — 80053 COMPREHEN METABOLIC PANEL: CPT

## 2020-02-27 PROCEDURE — 63600175 PHARM REV CODE 636 W HCPCS: Mod: JG | Performed by: INTERNAL MEDICINE

## 2020-02-27 PROCEDURE — 84100 ASSAY OF PHOSPHORUS: CPT

## 2020-02-27 PROCEDURE — 85027 COMPLETE CBC AUTOMATED: CPT

## 2020-02-27 PROCEDURE — 96402 CHEMO HORMON ANTINEOPL SQ/IM: CPT

## 2020-02-27 PROCEDURE — 83735 ASSAY OF MAGNESIUM: CPT

## 2020-02-27 PROCEDURE — 99999 PR PBB SHADOW E&M-EST. PATIENT-LVL IV: CPT | Mod: PBBFAC,,, | Performed by: NURSE PRACTITIONER

## 2020-02-27 PROCEDURE — 36415 COLL VENOUS BLD VENIPUNCTURE: CPT

## 2020-02-27 PROCEDURE — 99214 PR OFFICE/OUTPT VISIT, EST, LEVL IV, 30-39 MIN: ICD-10-PCS | Mod: S$GLB,,, | Performed by: NURSE PRACTITIONER

## 2020-02-27 RX ADMIN — GOSERELIN ACETATE 3.6 MG: 3.6 IMPLANT SUBCUTANEOUS at 02:02

## 2020-02-28 LAB — CANCER AG15-3 SERPL-ACNC: 28 U/ML (ref 0–31)

## 2020-03-02 ENCOUNTER — OFFICE VISIT (OUTPATIENT)
Dept: PSYCHIATRY | Facility: CLINIC | Age: 45
End: 2020-03-02
Payer: COMMERCIAL

## 2020-03-02 DIAGNOSIS — C50.211 MALIGNANT NEOPLASM OF UPPER-INNER QUADRANT OF RIGHT BREAST IN FEMALE, ESTROGEN RECEPTOR POSITIVE: ICD-10-CM

## 2020-03-02 DIAGNOSIS — F32.9 MAJOR DEPRESSIVE DISORDER, SINGLE EPISODE WITH ANXIOUS DISTRESS: Primary | ICD-10-CM

## 2020-03-02 DIAGNOSIS — Z17.0 MALIGNANT NEOPLASM OF UPPER-INNER QUADRANT OF RIGHT BREAST IN FEMALE, ESTROGEN RECEPTOR POSITIVE: ICD-10-CM

## 2020-03-02 PROCEDURE — 90834 PR PSYCHOTHERAPY W/PATIENT, 45 MIN: ICD-10-PCS | Mod: S$GLB,,, | Performed by: PSYCHOLOGIST

## 2020-03-02 PROCEDURE — 99999 PR PBB SHADOW E&M-EST. PATIENT-LVL II: ICD-10-PCS | Mod: PBBFAC,,, | Performed by: PSYCHOLOGIST

## 2020-03-02 PROCEDURE — 99999 PR PBB SHADOW E&M-EST. PATIENT-LVL II: CPT | Mod: PBBFAC,,, | Performed by: PSYCHOLOGIST

## 2020-03-02 PROCEDURE — 90834 PSYTX W PT 45 MINUTES: CPT | Mod: S$GLB,,, | Performed by: PSYCHOLOGIST

## 2020-03-02 NOTE — PROGRESS NOTES
INFORMED CONSENT: Kristopher Ye   is known to this provider and identity was confirmed via NAME and .  The patient has been informed of the risks and benefits associated with engaging in psychotherapy, the handling of protected health information, the rights of privacy and the limits of confidentiality. The patient has also been informed of the importance of reporting any suicidal or homicidal ideation to this or any provider to ensure safety of all parties, and the Kristopher Ye expressed understanding. The patient was agreeable to these terms and freely participates in individual psychotherapy.    PSYCHO-ONCOLOGY NOTE/ Individual Psychotherapy     Date: 3/2/2020   [facility]        Therapeutic Intervention: Met with patient.  Outpatient - Behavior modifying psychotherapy 45 min - CPT code 75171      Patient was last seen by me on 2020    Problem list  Patient Active Problem List   Diagnosis    Hypertension    Iron deficiency anemia    Cardiomegaly    Morbid obesity (BMI= 55.9 on 10/30/14)    Fibroid uterus    Shortness of breath    Thalassemia    Major depressive disorder, single episode with anxious distress    Cardiomyopathy    Hyperlipidemia    Malignant neoplasm of upper-inner quadrant of right breast in female, estrogen receptor positive    Cancer associated pain    Pathological fracture of lumbar vertebra with routine healing    Bone metastases    Hot flashes       Chief complaint/reason for encounter: depression, anxiety and interpersonal   Met with patient to evaluate psychosocial adaptation to diagnosis/treatment/survivorship of Estrogen Positive Breast Cancer    Current Medications  Current Outpatient Medications   Medication    albuterol (PROVENTIL/VENTOLIN HFA) 90 mcg/actuation inhaler    amLODIPine (NORVASC) 5 MG tablet    anastrozole (ARIMIDEX) 1 mg Tab    carvedilol (COREG) 6.25 MG tablet    goserelin (ZOLADEX) 3.6 mg injection    irbesartan  (AVAPRO) 150 MG tablet    loratadine (CLARITIN) 10 mg tablet    LORazepam (ATIVAN) 0.5 MG tablet    meclizine (ANTIVERT) 12.5 mg tablet    ondansetron (ZOFRAN) 8 MG tablet    palbociclib (IBRANCE) 125 mg Cap    polyethylene glycol (GLYCOLAX) 17 gram/dose powder    prochlorperazine (COMPAZINE) 10 MG tablet    sennosides (SENNA-C ORAL)    venlafaxine (EFFEXOR XR) 37.5 MG 24 hr capsule     No current facility-administered medications for this visit.        Objective:  Kristopher Ye arrived promptly for the session.   Ms. Ye was independently ambulatory at the time of session. The patient was fully cooperative throughout the session.  Appearance: age appropriate, appropriately  dressed, adequately  groomed  Behavior/Cooperation: friendly and cooperative  Speech: normal in rate, volume, and tone and appropriate quality, quantity and organization of sentences  Mood: euthymic  Affect: full range and appropriate  Thought Process: goal-directed, logical  Thought Content: normal,  No delusions or paranoia; did not appear to be responding to internal stimuli during the session  Orientation: grossly intact  Memory: Grossly intact  Attention Span/Concentration: Attends to session without distraction; reports no difficulty  Fund of Knowledge: average  Estimate of Intelligence: average from verbal skills and history  Cognition: grossly intact  Insight: patient has awareness of illness; good insight into own behavior and behavior of others  Judgment: the patient's behavior is adequate to circumstances    Interval history and content of current session: Patient reported that her leave from school has been very beneficial. She reported that she has not been taking Effexor as prescribed. Had discussion about usefulness of this medication for managing mood and hot flashes. Patient feels a stigma about taking psychotropic medication.  Discussed current adaptation to disease and treatment status. Reports to be  coping in a passive manner. Evaluated cognitive response, paying particular attention to negative intrusive thoughts of a persistent and detrimental nature. Thoughts of this type are in evidence with mild distress. Provided cognitive behavioral therapy to address negative cognitions. Identified and evaluated psychosocial and environmental stressors secondary to diagnosis and treatment.  Examined proactive behaviors that may be implemented to minimize or ameliorate psychosocial stressors secondary to diagnosis and treatment.     Risk parameters:   Patient reports no suicidal ideation  Patient reports no homicidal ideation  Patient reports no self-injurious behavior  Patient reports no violent behavior   Safety needs:  None at this time      Verbal deficits: None     Patient's response to intervention:The patient's response to intervention is accepting, but avoidant.     Progress toward goals and other mental status changes:  The patient's progress toward goals is good.      Progress to date:Progress as Expected      Goals from last visit: Met     Patient reported outcomes:    Distress Thermometer: 5   PHQ-9= 5, no SI   HUY-7= 6      Client Strengths: verbal, intelligent, successful, good social support, good insight, commitment to wellness, strong jd, strong cultural traditions     Diagnosis:     ICD-10-CM ICD-9-CM   1. Major depressive disorder, single episode with anxious distress F32.9 296.20   2. Malignant neoplasm of upper-inner quadrant of right breast in female, estrogen receptor positive C50.211 174.2    Z17.0 V86.0       Treatment Plan:individual psychotherapy and medication management by physician  · Target symptoms: depression, anxiety , interpersonal conflict  · Why chosen therapy is appropriate versus another modality: relevant to diagnosis, patient responds to this modality, evidence based practice  · Outcome monitoring methods: self-report, observation, checklist/rating scale  · Therapeutic  intervention type: behavior modifying psychotherapy  · Prognosis: Good      Behavioral goals:    Exercise: Incorporated good movement daily; patient as meeting with    Stress management: Take medications as prescibed   Social engagement: Spend time alone x2 engaging in a pleasant activity    Return to clinic: 3 weeks    Next Session:      Length of Service (minutes direct face-to-face contact): 45    Bria Odom, PhD  [place] License #[id number]

## 2020-03-02 NOTE — Clinical Note
Hey just an update. Patient has not been taking Effexor due to stigma about psych meds. Had a conversation about usefulness for mood and hot flashes. After some discussion she agreed to take as prescribed.

## 2020-03-04 ENCOUNTER — TELEPHONE (OUTPATIENT)
Dept: HEMATOLOGY/ONCOLOGY | Facility: CLINIC | Age: 45
End: 2020-03-04

## 2020-03-04 NOTE — TELEPHONE ENCOUNTER
"Returned call to Mya and answered questions pertinent to pt's physician statement regarding medical necessity for pt's disability.   Mya verbalized understanding and knows to call if any additional questions.       ----- Message from Refugio Omer sent at 3/4/2020  1:43 PM CST -----  Contact: Mya   Consult/Advisory:    Name Of Caller: Mya rg/[employer]   Provider Name: Dr. Mendez   Does patient feel the need to be seen today?  Relationship to the Pt?:    Contact Preference?: [phone]  What is the nature of the call?:  Mya request a callback at [phone] regarding attending physician statement     Additional Notes:  "Thank you for all that you do for our patients'"        "

## 2020-03-16 ENCOUNTER — PATIENT OUTREACH (OUTPATIENT)
Dept: ADMINISTRATIVE | Facility: OTHER | Age: 45
End: 2020-03-16

## 2020-03-23 ENCOUNTER — PATIENT MESSAGE (OUTPATIENT)
Dept: HEMATOLOGY/ONCOLOGY | Facility: CLINIC | Age: 45
End: 2020-03-23

## 2020-03-24 ENCOUNTER — TELEPHONE (OUTPATIENT)
Dept: PHARMACY | Facility: CLINIC | Age: 45
End: 2020-03-24

## 2020-03-26 NOTE — PROGRESS NOTES
"  History:     Reason For Follow Up:   1. Stage IV ([id number]) invasive ductal carcinoma of right breast, upper inner quadrant, ER %, AL neg, Her2 neg, Grade 3, multifocal with one lesion appearing more aggressive (Ki67 80%), large LN +, bone mets.    HPI:   Kristopher Ye presents for follow up of breast cancer.   Continues Ibrance, Zoladex, Arimidex - tolerating well.   Dizziness - still going on. CNS imaging without met disease. Dizziness doesn't improve on her "off week" of therapy. Dizziness limits her QOL; occurs at all hours. She has fallen.   Hot flashes stable.   Fatigue persists.   haziness over eyes        Oncologic History:  Presentation  - 9/11/19 - Screening mammogram showed multiple right breast masses lower inner quadrant  - 9/13/19 - Diagnostic mammogram and US showed a right breast, 3:00 position mass, 2 CFN measuring 17 mm x 16 mm x 14 mm.  There 2 smaller adjacent masses towards the nipple  - 9/19/19 - Biopsy -    A. Right breast subareolar: Grade 3 IDC, estrogen receptor 80%, progesterone receptor 0%, Her2 dago neg, Ki67 80%.     B. Right breast mass 3:00: Grade 3 IDC with papillary features, estrogen receptor 100%, progesterone receptor 0%, Her2 dago 1+, Ki67 30%.   - 9/26/19: US right axilla with abnormal lymphadenopathy measuring 4.3 x 2.8 cm with biopsy + for metastatic breast cancer.   Surgery consultation with Dr Ferris on 9/25/19   - 9/25/19 - Genetics Genetics SmallknotCAnalysis pending; VUS pending  Medical oncology consultation on 10/7/19   * 10/8/19 - CT C/A/P with several lytic lesions in thoracic and lumbar spine, iliac bones, left scapula in addition to 3 x 4.5 cm right axillary node and 2.1 cm right breast mass. Bone scan negative.   * 10/31/19 - started Ibrance, Arimidex, Zoladex; plan for Xgeva.    * 11/12/19 STRATA without targetable mutations.    * 12/16/19 - Bone biopsy negative.       Review of Systems  Review of Systems   Constitutional: Negative for " activity change, appetite change, chills, fatigue, fever and unexpected weight change.   HENT: Negative for congestion, hearing loss, nosebleeds, sinus pressure and trouble swallowing.    Eyes: Negative for pain, discharge, itching and visual disturbance.   Respiratory: Negative for cough, chest tightness and shortness of breath.    Cardiovascular: Negative for chest pain, palpitations and leg swelling.   Gastrointestinal: Negative for abdominal distention, abdominal pain, blood in stool, constipation, diarrhea, nausea, rectal pain and vomiting.   Endocrine: Positive for heat intolerance (improving on effexor). Negative for polydipsia.   Genitourinary: Negative for difficulty urinating, dysuria, flank pain, frequency, hematuria and urgency.   Musculoskeletal: Negative for arthralgias, back pain and neck pain.   Skin: Negative for color change, pallor and rash.   Allergic/Immunologic: Negative for immunocompromised state.   Neurological: Positive for dizziness. Negative for numbness and headaches.   Hematological: Negative for adenopathy. Does not bruise/bleed easily.   Psychiatric/Behavioral: Negative for confusion and decreased concentration. The patient is nervous/anxious (improving).         Objective     Objective:     Physical Exam   Constitutional: She is oriented to person, place, and time. She appears well-developed and well-nourished.   Neurological: She is alert and oriented to person, place, and time.   Psychiatric: She has a normal mood and affect. Her behavior is normal. Judgment and thought content normal.       MRI Brain W WO Contrast  Narrative: EXAMINATION:  MRI BRAIN W WO CONTRAST    CLINICAL HISTORY:  Breast Cancer, Vertigo/Headaches;  Malignant neoplasm of upper-inner quadrant of right female breast    TECHNIQUE:  Sagittal and axial T1, axial T2, axial FLAIR, axial gradient and axial diffusion imaging of the whole brain without contrast.  In addition post contrast axial T1 and 3D  spoiled  gradient sagittal T1 imaging of the whole brain was performed with spoiled gradient imaging reformatted in the coronal and axial planes.  10 mL of Gadavist was injected intravenously    .    COMPARISON:  CT head 02/02/2017    FINDINGS:  The brain parenchyma is normal in signal and contour.  The ventricles are normal in size and configuration without evidence for hydrocephalus.  There is no midline shift or mass effect.  There is no abnormal parenchymal susceptibility to suggest parenchymal hemorrhage.  No restricted diffusion to suggest acute infarction.  There is no abnormal intra or extra-axial fluid collection.  The major intracranial T2 flow voids are present.    No abnormal parenchymal enhancement.  Question bilateral globe proptosis clinical correlation advised.    Partially empty sella.  Heterogeneous T1 bone marrow signal throughout the calvarium likely sequela of a red marrow reconversion no evidence for focal bone marrow lesion.  Impression: Partially empty sella.  Question bilateral globe proptosis.    Otherwise unremarkable MRI brain as detailed above specifically without evidence for intracranial enhancing lesion to suggest metastatic disease..    Electronically signed by: Everett Mccloud DO  Date:    02/06/2020  Time:    07:36    Results for orders placed or performed in visit on 03/27/20   CBC Oncology   Result Value Ref Range    WBC 5.42 3.90 - 12.70 K/uL    RBC 4.05 4.00 - 5.40 M/uL    Hemoglobin 11.4 (L) 12.0 - 16.0 g/dL    Hematocrit 37.1 37.0 - 48.5 %    Mean Corpuscular Volume 92 82 - 98 fL    Mean Corpuscular Hemoglobin 28.1 27.0 - 31.0 pg    Mean Corpuscular Hemoglobin Conc 30.7 (L) 32.0 - 36.0 g/dL    RDW 17.4 (H) 11.5 - 14.5 %    Platelets 239 150 - 350 K/uL    MPV 10.4 9.2 - 12.9 fL    Gran # (ANC) 1.4 (L) 1.8 - 7.7 K/uL    Immature Grans (Abs) 0.01 0.00 - 0.04 K/uL   Comprehensive metabolic panel   Result Value Ref Range    Sodium 137 136 - 145 mmol/L    Potassium 3.9 3.5 - 5.1 mmol/L     Chloride 105 95 - 110 mmol/L    CO2 22 (L) 23 - 29 mmol/L    Glucose 132 (H) 70 - 110 mg/dL    BUN, Bld 11 6 - 20 mg/dL    Creatinine 0.9 0.5 - 1.4 mg/dL    Calcium 9.2 8.7 - 10.5 mg/dL    Total Protein 8.0 6.0 - 8.4 g/dL    Albumin 3.2 (L) 3.5 - 5.2 g/dL    Total Bilirubin 0.2 0.1 - 1.0 mg/dL    Alkaline Phosphatase 109 55 - 135 U/L    AST 15 10 - 40 U/L    ALT 19 10 - 44 U/L    Anion Gap 10 8 - 16 mmol/L    eGFR if African American >60.0 >60 mL/min/1.73 m^2    eGFR if non African American >60.0 >60 mL/min/1.73 m^2   Magnesium   Result Value Ref Range    Magnesium 1.9 1.6 - 2.6 mg/dL   Phosphorus   Result Value Ref Range    Phosphorus 4.0 2.7 - 4.5 mg/dL     MRI Brain W WO Contrast  Narrative: EXAMINATION:  MRI BRAIN W WO CONTRAST    CLINICAL HISTORY:  Breast Cancer, Vertigo/Headaches;  Malignant neoplasm of upper-inner quadrant of right female breast    TECHNIQUE:  Sagittal and axial T1, axial T2, axial FLAIR, axial gradient and axial diffusion imaging of the whole brain without contrast.  In addition post contrast axial T1 and 3D  spoiled gradient sagittal T1 imaging of the whole brain was performed with spoiled gradient imaging reformatted in the coronal and axial planes.  10 mL of Gadavist was injected intravenously    .    COMPARISON:  CT head 02/02/2017    FINDINGS:  The brain parenchyma is normal in signal and contour.  The ventricles are normal in size and configuration without evidence for hydrocephalus.  There is no midline shift or mass effect.  There is no abnormal parenchymal susceptibility to suggest parenchymal hemorrhage.  No restricted diffusion to suggest acute infarction.  There is no abnormal intra or extra-axial fluid collection.  The major intracranial T2 flow voids are present.    No abnormal parenchymal enhancement.  Question bilateral globe proptosis clinical correlation advised.    Partially empty sella.  Heterogeneous T1 bone marrow signal throughout the calvarium likely sequela of a red  "marrow reconversion no evidence for focal bone marrow lesion.  Impression: Partially empty sella.  Question bilateral globe proptosis.    Otherwise unremarkable MRI brain as detailed above specifically without evidence for intracranial enhancing lesion to suggest metastatic disease..    Electronically signed by: Everett Mccloud DO  Date:    02/06/2020  Time:    07:36        Assessment     Assessment:     1. Stage IV ([id number]) invasive ductal carcinoma of right breast, upper inner quadrant, ER %, DC neg, Her2 neg, Grade 3, multifocal with one lesion appearing more aggressive (Ki67 80%), large LN +, bony mets   * Genetics negative - in media   * 10/2019 staging scans showed diffuse bony mets  *  10/31/19 started Arimidex and Ibrance 125 mg daily days 1-21 every 28 days with Zoladex. Bone biopsy negative, but review of imaging truly looks like metastatic disease.    * 1/31/20 PET - shows no osseous lesions and no hypermetabolic up take in the right breast    * Cycle 6 Ibrance/Arimidex/Zoladex - started 3/27/2020. Tolerating well. Do not suspect as cause of dizziness    2. Bony mets with L2 endplate fracture   * Bone biopsy negative but imaging looks like metastatic disease. Will continue with above plan and re-image.    * Xgeva monthly - will get dental clearance - has an appt next month.    3. Anxiety related to cancer diagnosis.   * Seeing Dr Odom. Improving.     4. Congestive heart failure, new diagnosis   * Echo 10/2019 with EF 30%   * Follows with Dr Olivares.     5. Morbid obesity   * Adjusting diet for weight loss.     6. Cancer pain   * Ultram 100 mg q 6 hours prn.  Controlled.     7. Hot flashes   * Referred to Dr Avina; worsening with Arimidex/Zoladex.    * Effexor    8. Dizziness, possible vertile   * CNS imaging without mets but does show partial empty sella syndrome. Unclear if this could be contributing, but I touched base with endocrinology and they don't think that the "empty sella" on the MRI is " anything to worry about and not the cause for the dizziness. They felt like it sounded more like vertigo which was my initial thinking back when I sent in meclizine. We all think a referral to ENT is likely our best next step thus i'll place that order.   * Her dizziness is limiting her ability to work.     Plan:     1. Continue Zoladex, Arimidex, Ibrance. Currently cycle 6 - start 3/27.   2. Plan Xgeva with next cycle.   3. Plan DEXA in future - once we can after coronavirus  4. Calcium/vitamin D   5. PET showing response to therapy Re-image in end of May  6. ENT referral for likely vertigo.       ACP done.   RTC 4/26 with cbc, cmp, mg, phos, ca 15-3, xgeva, zoladex    The patient location is: Home  The chief complaint leading to consultation is: breast cancer; dizziness  Visit type: Virtual visit with synchronous audio and video  Total time spent with patient: 20 min  Each patient to whom he or she provides medical services by telemedicine is:  (1) informed of the relationship between the physician and patient and the respective role of any other health care provider with respect to management of the patient; and (2) notified that he or she may decline to receive medical services by telemedicine and may withdraw from such care at any time.      Future Appointments   Date Time Provider Department Center   3/27/2020 11:00 AM Jessica Mendez MD [facility] HEM ONC [facility]   3/27/2020 11:45 AM INJECTION, [facility] INFUS[facility]   3/30/2020  1:00 PM Bria Odom, PhD [facility]

## 2020-03-27 ENCOUNTER — PATIENT MESSAGE (OUTPATIENT)
Dept: HEMATOLOGY/ONCOLOGY | Facility: CLINIC | Age: 45
End: 2020-03-27

## 2020-03-27 ENCOUNTER — LAB VISIT (OUTPATIENT)
Dept: LAB | Facility: HOSPITAL | Age: 45
End: 2020-03-27
Attending: INTERNAL MEDICINE
Payer: COMMERCIAL

## 2020-03-27 ENCOUNTER — OFFICE VISIT (OUTPATIENT)
Dept: HEMATOLOGY/ONCOLOGY | Facility: CLINIC | Age: 45
End: 2020-03-27
Payer: COMMERCIAL

## 2020-03-27 ENCOUNTER — INFUSION (OUTPATIENT)
Dept: INFUSION THERAPY | Facility: HOSPITAL | Age: 45
End: 2020-03-27
Attending: INTERNAL MEDICINE
Payer: COMMERCIAL

## 2020-03-27 ENCOUNTER — TELEPHONE (OUTPATIENT)
Dept: HEMATOLOGY/ONCOLOGY | Facility: CLINIC | Age: 45
End: 2020-03-27

## 2020-03-27 VITALS
TEMPERATURE: 98 F | RESPIRATION RATE: 20 BRPM | HEART RATE: 94 BPM | SYSTOLIC BLOOD PRESSURE: 145 MMHG | DIASTOLIC BLOOD PRESSURE: 85 MMHG

## 2020-03-27 DIAGNOSIS — R42 DIZZINESS: ICD-10-CM

## 2020-03-27 DIAGNOSIS — R23.2 HOT FLASHES: ICD-10-CM

## 2020-03-27 DIAGNOSIS — E23.6 EMPTY SELLA SYNDROME: ICD-10-CM

## 2020-03-27 DIAGNOSIS — M84.48XD: ICD-10-CM

## 2020-03-27 DIAGNOSIS — C50.211 MALIGNANT NEOPLASM OF UPPER-INNER QUADRANT OF RIGHT BREAST IN FEMALE, ESTROGEN RECEPTOR POSITIVE: ICD-10-CM

## 2020-03-27 DIAGNOSIS — G89.3 CANCER ASSOCIATED PAIN: ICD-10-CM

## 2020-03-27 DIAGNOSIS — C79.51 BONE METASTASES: ICD-10-CM

## 2020-03-27 DIAGNOSIS — Z17.0 MALIGNANT NEOPLASM OF UPPER-INNER QUADRANT OF RIGHT BREAST IN FEMALE, ESTROGEN RECEPTOR POSITIVE: ICD-10-CM

## 2020-03-27 DIAGNOSIS — C50.211 MALIGNANT NEOPLASM OF UPPER-INNER QUADRANT OF RIGHT BREAST IN FEMALE, ESTROGEN RECEPTOR POSITIVE: Primary | ICD-10-CM

## 2020-03-27 DIAGNOSIS — Z17.0 MALIGNANT NEOPLASM OF UPPER-INNER QUADRANT OF RIGHT BREAST IN FEMALE, ESTROGEN RECEPTOR POSITIVE: Primary | ICD-10-CM

## 2020-03-27 LAB
ALBUMIN SERPL BCP-MCNC: 3.2 G/DL (ref 3.5–5.2)
ALP SERPL-CCNC: 109 U/L (ref 55–135)
ALT SERPL W/O P-5'-P-CCNC: 19 U/L (ref 10–44)
ANION GAP SERPL CALC-SCNC: 10 MMOL/L (ref 8–16)
AST SERPL-CCNC: 15 U/L (ref 10–40)
BILIRUB SERPL-MCNC: 0.2 MG/DL (ref 0.1–1)
BUN SERPL-MCNC: 11 MG/DL (ref 6–20)
CALCIUM SERPL-MCNC: 9.2 MG/DL (ref 8.7–10.5)
CHLORIDE SERPL-SCNC: 105 MMOL/L (ref 95–110)
CO2 SERPL-SCNC: 22 MMOL/L (ref 23–29)
CREAT SERPL-MCNC: 0.9 MG/DL (ref 0.5–1.4)
ERYTHROCYTE [DISTWIDTH] IN BLOOD BY AUTOMATED COUNT: 17.4 % (ref 11.5–14.5)
EST. GFR  (AFRICAN AMERICAN): >60 ML/MIN/1.73 M^2
EST. GFR  (NON AFRICAN AMERICAN): >60 ML/MIN/1.73 M^2
GLUCOSE SERPL-MCNC: 132 MG/DL (ref 70–110)
HCT VFR BLD AUTO: 37.1 % (ref 37–48.5)
HGB BLD-MCNC: 11.4 G/DL (ref 12–16)
IMM GRANULOCYTES # BLD AUTO: 0.01 K/UL (ref 0–0.04)
MAGNESIUM SERPL-MCNC: 1.9 MG/DL (ref 1.6–2.6)
MCH RBC QN AUTO: 28.1 PG (ref 27–31)
MCHC RBC AUTO-ENTMCNC: 30.7 G/DL (ref 32–36)
MCV RBC AUTO: 92 FL (ref 82–98)
NEUTROPHILS # BLD AUTO: 1.4 K/UL (ref 1.8–7.7)
PHOSPHATE SERPL-MCNC: 4 MG/DL (ref 2.7–4.5)
PLATELET # BLD AUTO: 239 K/UL (ref 150–350)
PMV BLD AUTO: 10.4 FL (ref 9.2–12.9)
POTASSIUM SERPL-SCNC: 3.9 MMOL/L (ref 3.5–5.1)
PROT SERPL-MCNC: 8 G/DL (ref 6–8.4)
RBC # BLD AUTO: 4.05 M/UL (ref 4–5.4)
SODIUM SERPL-SCNC: 137 MMOL/L (ref 136–145)
WBC # BLD AUTO: 5.42 K/UL (ref 3.9–12.7)

## 2020-03-27 PROCEDURE — 63600175 PHARM REV CODE 636 W HCPCS: Mod: JG | Performed by: INTERNAL MEDICINE

## 2020-03-27 PROCEDURE — 85027 COMPLETE CBC AUTOMATED: CPT

## 2020-03-27 PROCEDURE — 96402 CHEMO HORMON ANTINEOPL SQ/IM: CPT

## 2020-03-27 PROCEDURE — 84100 ASSAY OF PHOSPHORUS: CPT

## 2020-03-27 PROCEDURE — 99215 PR OFFICE/OUTPT VISIT, EST, LEVL V, 40-54 MIN: ICD-10-PCS | Mod: 95,,, | Performed by: INTERNAL MEDICINE

## 2020-03-27 PROCEDURE — 86300 IMMUNOASSAY TUMOR CA 15-3: CPT

## 2020-03-27 PROCEDURE — 3078F DIAST BP <80 MM HG: CPT | Mod: CPTII,,, | Performed by: INTERNAL MEDICINE

## 2020-03-27 PROCEDURE — 83735 ASSAY OF MAGNESIUM: CPT

## 2020-03-27 PROCEDURE — 3077F PR MOST RECENT SYSTOLIC BLOOD PRESSURE >= 140 MM HG: ICD-10-PCS | Mod: CPTII,,, | Performed by: INTERNAL MEDICINE

## 2020-03-27 PROCEDURE — 80053 COMPREHEN METABOLIC PANEL: CPT

## 2020-03-27 PROCEDURE — 99215 OFFICE O/P EST HI 40 MIN: CPT | Mod: 95,,, | Performed by: INTERNAL MEDICINE

## 2020-03-27 PROCEDURE — 3077F SYST BP >= 140 MM HG: CPT | Mod: CPTII,,, | Performed by: INTERNAL MEDICINE

## 2020-03-27 PROCEDURE — 36415 COLL VENOUS BLD VENIPUNCTURE: CPT

## 2020-03-27 PROCEDURE — 3078F PR MOST RECENT DIASTOLIC BLOOD PRESSURE < 80 MM HG: ICD-10-PCS | Mod: CPTII,,, | Performed by: INTERNAL MEDICINE

## 2020-03-27 RX ADMIN — GOSERELIN ACETATE 3.6 MG: 3.6 IMPLANT SUBCUTANEOUS at 11:03

## 2020-03-27 NOTE — NURSING
1135-Zoladex injection administered SQ to abdomen, pt tolerated well. Band-aid applied to site. Pt discharged with no distress noted, ambulating independently. RTC 3/30/20, pt verbalized understanding.

## 2020-03-27 NOTE — TELEPHONE ENCOUNTER
Called patient scheduled ENT appointment with her with Dr Gil first available May 4. Name was added to the waitlist. Mailed appt slip.      ----- Message from Jessica Mendez MD sent at 3/27/2020  1:29 PM CDT -----  Referral to ENT for vertigo

## 2020-03-28 LAB — CANCER AG15-3 SERPL-ACNC: 28 U/ML (ref 0–31)

## 2020-03-30 ENCOUNTER — PATIENT MESSAGE (OUTPATIENT)
Dept: PSYCHIATRY | Facility: CLINIC | Age: 45
End: 2020-03-30

## 2020-03-30 ENCOUNTER — OFFICE VISIT (OUTPATIENT)
Dept: PSYCHIATRY | Facility: CLINIC | Age: 45
End: 2020-03-30
Payer: COMMERCIAL

## 2020-03-30 ENCOUNTER — PATIENT MESSAGE (OUTPATIENT)
Dept: HEMATOLOGY/ONCOLOGY | Facility: CLINIC | Age: 45
End: 2020-03-30

## 2020-03-30 DIAGNOSIS — C50.211 MALIGNANT NEOPLASM OF UPPER-INNER QUADRANT OF RIGHT BREAST IN FEMALE, ESTROGEN RECEPTOR POSITIVE: ICD-10-CM

## 2020-03-30 DIAGNOSIS — F32.9 MAJOR DEPRESSIVE DISORDER, SINGLE EPISODE WITH ANXIOUS DISTRESS: Primary | ICD-10-CM

## 2020-03-30 DIAGNOSIS — Z17.0 MALIGNANT NEOPLASM OF UPPER-INNER QUADRANT OF RIGHT BREAST IN FEMALE, ESTROGEN RECEPTOR POSITIVE: ICD-10-CM

## 2020-03-30 PROCEDURE — 90832 PSYTX W PT 30 MINUTES: CPT | Mod: 95,,, | Performed by: PSYCHOLOGIST

## 2020-03-30 PROCEDURE — 90832 PR PSYCHOTHERAPY W/PATIENT, 30 MIN: ICD-10-PCS | Mod: 95,,, | Performed by: PSYCHOLOGIST

## 2020-03-30 NOTE — PROGRESS NOTES
INFORMED CONSENT: Kristopher Ye   is known to this provider and identity was confirmed via NAME and .  The patient has been informed of the risks and benefits associated with engaging in psychotherapy, the handling of protected health information, the rights of privacy and the limits of confidentiality. The patient has also been informed of the importance of reporting any suicidal or homicidal ideation to this or any provider to ensure safety of all parties, and the Kristopher Ye expressed understanding. The patient was agreeable to these terms and freely participates in individual psychotherapy.    Telemedicine PSYCHO-ONCOLOGY NOTE/ Individual Psychotherapy   (patient not on premises due to COVID-19 restrictions)    Consultation started: 3/30/2020 at 1:12 PM   The chief complaint leading to consultation is: anxiety, depression  The patient location is:  Patient home  Virtual visit with synchronous audio and video    Patient alone at the time of consultation    Crisis Disclaimer: Patient was informed that due to the virtual nature of the visit, that if a crisis develops, protocols will be implemented to ensure patient safety, including but not limited to: 1) Initiating a welfare check with local Law Enforcement, 2) Calling [facility]/National Crisis Hotline, and/or 3) Initiating PEC/CEC procedures.      Each patient provided medical services by telemedicine is:  (1) informed of the relationship between the physician and patient and the respective role of any other health care provider with respect to management of the patient; and (2) notified that he or she may decline to receive medical services by telemedicine and may withdraw from such care at any time.    Date: 3/30/2020   Site of therapist:  [facility]         Therapeutic Intervention: Met with patient.  Outpatient - Behavior modifying psychotherapy 30 min - CPT code 58641    Referring provider:    Chief complaint/reason for encounter:  depression and anxiety   Met with patient to evaluate psychosocial adaptation to survivorship of Breast Cancer    Patient was last seen by me on 3/2/2020    Objective:      Kristopher Ye arrived promptly for the session The patient was fully cooperative throughout the session.  Appearance: age appropriate, appropriately  dressed, adequately  groomed  Behavior/Cooperation: friendly and cooperative  Speech: normal in rate, volume, and tone and appropriate quality, quantity and organization of sentences  Mood: euthymic  Affect: full range and appropriate  Thought Process: goal-directed, logical  Thought Content: normal,  No delusions or paranoia; did not appear to be responding to internal stimuli during the session  Orientation: grossly intact  Memory: Grossly intact  Attention Span/Concentration: Attends to session without distraction; reports no difficulty  Fund of Knowledge: average  Estimate of Intelligence: average from verbal skills and history  Cognition: grossly intact  Insight: patient has awareness of illness; good insight into own behavior and behavior of others  Judgment: the patient's behavior is adequate to circumstances      Interval history and content of current session: Patient has begun taking Effexor as prescribed and is finding significant benefit and improvement in her moods.  Discussed completion of homework and unwarranted fear about being alone in a restaurant. Discussed  current adaptation to disease and treatment status. Reports to be coping in an adequate manner. Evaluated cognitive response, paying particular attention to negative intrusive thoughts of a persistent and detrimental nature. Thoughts of this type are in evidence with mild distress. Identified and evaluated psychosocial and environmental stressors secondary to diagnosis and treatment.     Focused on providing cognitive behavioral therapy to address negative cognitions. Examined proactive behaviors that may be  implemented to minimize or ameliorate psychosocial stressors secondary to diagnosis and treatment.     Risk parameters:   Patient reports no suicidal ideation  Patient reports no homicidal ideation  Patient reports no self-injurious behavior  Patient reports no violent behavior   Safety needs:  None at this time      Verbal deficits: None     Patient's response to intervention:The patient's response to intervention is accepting.     Progress toward goals and other mental status changes:  The patient's progress toward goals is good.      Progress to date:Progress as Expected      Goals from last visit: Met      Patient reported outcomes:      Distress Thermometer:   Distress Score    Distress Score: 0        Practical Problems Physical Problems                                                   Family Problems                                         Emotional Problems                                                         Spiritual/Religions Concerns               Other Problems                Client Strengths: verbal, intelligent, successful, good social support, good insight, commitment to wellness, strong jd, strong cultural traditions      ICD-10-CM ICD-9-CM   1. Major depressive disorder, single episode with anxious distress F32.9 296.20   2. Malignant neoplasm of upper-inner quadrant of right breast in female, estrogen receptor positive C50.211 174.2    Z17.0 V86.0       Treatment Plan:individual psychotherapy and medication management by physician  · Target symptoms: depression, anxiety   · Why chosen therapy is appropriate versus another modality: relevant to diagnosis, patient responds to this modality, evidence based practice  · Outcome monitoring methods: self-report, observation, checklist/rating scale  · Therapeutic intervention type: behavior modifying psychotherapy  · Prognosis: Good      Behavioral goals:    Continue to take medication as prescribed   Stress management: Use distracting technique to  manage stress of self-quarantining/social distancing      Return to clinic: 3 weeks     Length of Service (minutes direct face-to-face contact): 30          Bria Odom PhD  [place] License #[id number]

## 2020-04-01 ENCOUNTER — PATIENT MESSAGE (OUTPATIENT)
Dept: HEMATOLOGY/ONCOLOGY | Facility: CLINIC | Age: 45
End: 2020-04-01

## 2020-04-06 ENCOUNTER — PATIENT MESSAGE (OUTPATIENT)
Dept: CARDIOLOGY | Facility: CLINIC | Age: 45
End: 2020-04-06

## 2020-04-08 ENCOUNTER — PATIENT OUTREACH (OUTPATIENT)
Dept: ADMINISTRATIVE | Facility: OTHER | Age: 45
End: 2020-04-08

## 2020-04-09 ENCOUNTER — OFFICE VISIT (OUTPATIENT)
Dept: CARDIOLOGY | Facility: CLINIC | Age: 45
End: 2020-04-09
Payer: COMMERCIAL

## 2020-04-09 DIAGNOSIS — I10 ESSENTIAL HYPERTENSION: ICD-10-CM

## 2020-04-09 DIAGNOSIS — C50.211 MALIGNANT NEOPLASM OF UPPER-INNER QUADRANT OF RIGHT BREAST IN FEMALE, ESTROGEN RECEPTOR POSITIVE: ICD-10-CM

## 2020-04-09 DIAGNOSIS — E78.00 PURE HYPERCHOLESTEROLEMIA: ICD-10-CM

## 2020-04-09 DIAGNOSIS — E66.01 MORBID OBESITY WITH BODY MASS INDEX OF 50.0-59.9 IN ADULT: ICD-10-CM

## 2020-04-09 DIAGNOSIS — Z17.0 MALIGNANT NEOPLASM OF UPPER-INNER QUADRANT OF RIGHT BREAST IN FEMALE, ESTROGEN RECEPTOR POSITIVE: ICD-10-CM

## 2020-04-09 DIAGNOSIS — I42.0 DILATED CARDIOMYOPATHY: Primary | ICD-10-CM

## 2020-04-09 PROCEDURE — 99214 OFFICE O/P EST MOD 30 MIN: CPT | Mod: 95,,, | Performed by: INTERNAL MEDICINE

## 2020-04-09 PROCEDURE — 99214 PR OFFICE/OUTPT VISIT, EST, LEVL IV, 30-39 MIN: ICD-10-PCS | Mod: 95,,, | Performed by: INTERNAL MEDICINE

## 2020-04-09 RX ORDER — CARVEDILOL 12.5 MG/1
12.5 TABLET ORAL 2 TIMES DAILY WITH MEALS
Qty: 60 TABLET | Refills: 11 | Status: SHIPPED | OUTPATIENT
Start: 2020-04-09 | End: 2021-03-02

## 2020-04-09 NOTE — PROGRESS NOTES
Subjective:   Patient ID:  Kristopher Ye is a 44 y.o. female who presents for follow-up of No chief complaint on file.      HPI: The patient location is: home  The chief complaint leading to consultation is: follow up CHF and HTN  Visit type: Virtual visit with synchronous audio and video  Total time spent with patient: 17 minutes  Each patient to whom he or she provides medical services by telemedicine is:  (1) informed of the relationship between the physician and patient and the respective role of any other health care provider with respect to management of the patient; and (2) notified that he or she may decline to receive medical services by telemedicine and may withdraw from such care at any time.    Notes: She has been doing ok since her last visit. She has been tolerating her chemotherapy for her breast cancer and the increased dose of irbesartan for her cardiomyopathy. She reports some hot flashes and weakness when she takes a hot shower but denies edema, pnd, orthopnea or worsening BLUE. She has not been able to get a scale to follow her weight. She has been trying to eat a healthier diet. She is no longer working. She did not make it to her sleep study appointment. She reports her blood pressure readings at home are mainly in the 130-140/90's. She has not enrolled in Digital HTN.    Echo 10/19:  Conclusion     · Moderate left ventricular enlargement.  · Moderately decreased left ventricular systolic function. The estimated ejection fraction is 30%.  · The left ventricular global longitudinal strain is -11.6%.  · Normal right ventricular systolic function.  · Grade III (severe) left ventricular diastolic dysfunction consistent with restrictive physiology.  · Mild left atrial enlargement.  · Normal central venous pressure (3 mm Hg).            Past Medical History:   Diagnosis Date    Abnormal Pap smear     pt states 13yrs ago colpo was done    Anemia     Anxiety     Cancer     Cardiomyopathy      Fibroid     Hyperlipidemia     Hypertension     Sleep difficulties        Past Surgical History:   Procedure Laterality Date     SECTION      TONSILLECTOMY      TUBAL LIGATION      UTERINE FIBROID EMBOLIZATION         Social History     Socioeconomic History    Marital status:      Spouse name: Not on file    Number of children: Not on file    Years of education: Not on file    Highest education level: Not on file   Occupational History    Not on file   Social Needs    Financial resource strain: Very hard    Food insecurity:     Worry: Often true     Inability: Often true    Transportation needs:     Medical: No     Non-medical: No   Tobacco Use    Smoking status: Never Smoker    Smokeless tobacco: Never Used   Substance and Sexual Activity    Alcohol use: Not Currently     Alcohol/week: 0.0 standard drinks     Frequency: Never    Drug use: No    Sexual activity: Yes     Partners: Male     Birth control/protection: Surgical   Lifestyle    Physical activity:     Days per week: Not on file     Minutes per session: Not on file    Stress: Not on file   Relationships    Social connections:     Talks on phone: More than three times a week     Gets together: Once a week     Attends Spiritism service: Never     Active member of club or organization: No     Attends meetings of clubs or organizations: Never     Relationship status:    Other Topics Concern    Patient feels they ought to cut down on drinking/drug use Not Asked    Patient annoyed by others criticizing their drinking/drug use Not Asked    Patient has felt bad or guilty about drinking/drug use Not Asked    Patient has had a drink/used drugs as an eye opener in the AM Not Asked   Social History Narrative    Patient is a pleasant AAF,  x2. She has excellent social support, although she states they worry more than she does. She has a 15 yo and an 19 yo.       Family History   Problem Relation Age of  Onset    Arthritis Mother     Diabetes Mother     Heart disease Mother         CHF, CAD , 2 stents    Hypertension Mother     Hyperlipidemia Mother     Heart failure Mother     No Known Problems Sister     No Known Problems Father     Hypertension Brother     No Known Problems Daughter     Asthma Daughter     No Known Problems Maternal Aunt     No Known Problems Maternal Uncle     No Known Problems Paternal Aunt     No Known Problems Paternal Uncle     No Known Problems Maternal Grandmother     Cancer Maternal Grandfather     No Known Problems Paternal Grandmother     No Known Problems Paternal Grandfather     Breast cancer Neg Hx     Colon cancer Neg Hx     Ovarian cancer Neg Hx        Patient's Medications   New Prescriptions    No medications on file   Previous Medications    ALBUTEROL (PROVENTIL/VENTOLIN HFA) 90 MCG/ACTUATION INHALER    Inhale 1-2 puffs into the lungs every 6 (six) hours as needed for Wheezing. Rescue    AMLODIPINE (NORVASC) 5 MG TABLET    Take 1 tablet (5 mg total) by mouth once daily.    ANASTROZOLE (ARIMIDEX) 1 MG TAB    Take 1 tablet (1 mg total) by mouth once daily.    GOSERELIN (ZOLADEX) 3.6 MG INJECTION    Inject 3.6 mg into the skin every 28 days.    IRBESARTAN (AVAPRO) 150 MG TABLET    Take 1 tablet (150 mg total) by mouth every evening.    LORATADINE (CLARITIN) 10 MG TABLET    Take 10 mg by mouth daily as needed.     LORAZEPAM (ATIVAN) 0.5 MG TABLET    Take 1 tablet (0.5 mg total) by mouth every 12 (twelve) hours as needed for Anxiety.    MECLIZINE (ANTIVERT) 12.5 MG TABLET    Take 1 tablet (12.5 mg total) by mouth 3 (three) times daily as needed for Dizziness.    ONDANSETRON (ZOFRAN) 8 MG TABLET    Take 1 tablet (8 mg total) by mouth every 8 (eight) hours as needed for Nausea.    PALBOCICLIB (IBRANCE) 125 MG CAP    Take 125 mg by mouth as instructed Take as directed days 1-21 of each 28 day cycle. Take with food.    POLYETHYLENE GLYCOL (GLYCOLAX) 17 GRAM/DOSE  POWDER    Take 17 g by mouth once daily.    PROCHLORPERAZINE (COMPAZINE) 10 MG TABLET    Take 1 tablet (10 mg total) by mouth every 6 (six) hours as needed.    SENNOSIDES (SENNA-C ORAL)    Take by mouth daily as needed.     VENLAFAXINE (EFFEXOR XR) 37.5 MG 24 HR CAPSULE    Take 1 capsule (37.5 mg total) by mouth once daily.   Modified Medications    Modified Medication Previous Medication    CARVEDILOL (COREG) 12.5 MG TABLET carvedilol (COREG) 6.25 MG tablet       Take 1 tablet (12.5 mg total) by mouth 2 (two) times daily with meals.    Take 1 tablet (6.25 mg total) by mouth 2 (two) times daily with meals.   Discontinued Medications    No medications on file       Review of Systems   Constitution: Negative for malaise/fatigue and weight gain.   HENT: Negative for hearing loss.    Eyes: Negative for visual disturbance.   Cardiovascular: Positive for dyspnea on exertion. Negative for chest pain, claudication, leg swelling, near-syncope, orthopnea, palpitations, paroxysmal nocturnal dyspnea and syncope.   Respiratory: Negative for cough, shortness of breath, sleep disturbances due to breathing, snoring and wheezing.    Endocrine: Positive for heat intolerance. Negative for cold intolerance, polydipsia, polyphagia and polyuria.        Hot flashes   Hematologic/Lymphatic: Negative for bleeding problem. Does not bruise/bleed easily.   Skin: Negative for rash and suspicious lesions.   Musculoskeletal: Negative for arthritis, falls, joint pain, muscle weakness and myalgias.   Gastrointestinal: Negative for abdominal pain, change in bowel habit, constipation, diarrhea, heartburn, hematochezia, melena and nausea.   Genitourinary: Negative for hematuria and nocturia.   Neurological: Negative for excessive daytime sleepiness, dizziness, headaches, light-headedness, loss of balance and weakness.   Psychiatric/Behavioral: Negative for depression. The patient is not nervous/anxious.    Allergic/Immunologic: Negative for  environmental allergies.       There were no vitals taken for this visit.    Objective:   Physical Exam    Lab Results   Component Value Date     03/27/2020    K 3.9 03/27/2020     03/27/2020    CO2 22 (L) 03/27/2020    BUN 11 03/27/2020    CREATININE 0.9 03/27/2020     (H) 03/27/2020    HGBA1C 6.0 (H) 11/29/2019    MG 1.9 03/27/2020    AST 15 03/27/2020    ALT 19 03/27/2020    ALBUMIN 3.2 (L) 03/27/2020    PROT 8.0 03/27/2020    BILITOT 0.2 03/27/2020    WBC 5.42 03/27/2020    HGB 11.4 (L) 03/27/2020    HCT 37.1 03/27/2020    MCV 92 03/27/2020     03/27/2020    INR 1.0 12/16/2019    TSH 1.286 03/28/2019    CHOL 229 (H) 03/28/2019    HDL 48 03/28/2019    LDLCALC 158.2 03/28/2019    TRIG 114 03/28/2019    BNP 44 10/19/2019       Assessment:     1. Dilated cardiomyopathy : She sounds clinically well compensated. Increase carvedilol to 12.5 mg bid. After one week, increase irbesartan to 150 mg bid. Follow up in 4 weeks. Will try to add spironolactone at that time. Given restrictive filling pattern, will consider PYP scan in the future once COVID restrictions are lifted. Echo once restrictions are lifted. Still needs sleep study.   2. Essential hypertension : Changes as above. I have enrolled her in the Digital HTN program.   3. Pure hypercholesterolemia : Following a heart health diet such as the Mediterranean Diet is recommended in addition to 150 minutes a week of moderate intensity exercise to lower cholesterol, maintain a healthy body weight, and improve overall cardiovascular health.   4. Malignant neoplasm of upper-inner quadrant of right breast in female, estrogen receptor positive : Currently on treatment with Ibrance, Zoladex, Arimidex. Follows with Dr. Mendez.   5. Morbid obesity (BMI= 55.9 on 10/30/14) : Significant weight loss is recommended.       Plan:     Diagnoses and all orders for this visit:    Dilated cardiomyopathy  -     carvediloL (COREG) 12.5 MG tablet; Take 1 tablet  (12.5 mg total) by mouth 2 (two) times daily with meals.    Essential hypertension  -     Hypertension Digital Medicine (HDMP) Enrollment Order  -     carvediloL (COREG) 12.5 MG tablet; Take 1 tablet (12.5 mg total) by mouth 2 (two) times daily with meals.    Pure hypercholesterolemia  -     carvediloL (COREG) 12.5 MG tablet; Take 1 tablet (12.5 mg total) by mouth 2 (two) times daily with meals.    Malignant neoplasm of upper-inner quadrant of right breast in female, estrogen receptor positive    Morbid obesity (BMI= 55.9 on 10/30/14)        Thank you for allowing me to participate in this patient's care. Please do not hesitate to contact me with any questions or concerns.

## 2020-04-17 ENCOUNTER — PATIENT MESSAGE (OUTPATIENT)
Dept: HEMATOLOGY/ONCOLOGY | Facility: CLINIC | Age: 45
End: 2020-04-17

## 2020-04-17 ENCOUNTER — TELEPHONE (OUTPATIENT)
Dept: PHARMACY | Facility: CLINIC | Age: 45
End: 2020-04-17

## 2020-04-17 ENCOUNTER — TELEPHONE (OUTPATIENT)
Dept: HEMATOLOGY/ONCOLOGY | Facility: CLINIC | Age: 45
End: 2020-04-17

## 2020-04-17 DIAGNOSIS — C79.51 BONE METASTASES: ICD-10-CM

## 2020-04-17 DIAGNOSIS — C50.919 METASTATIC BREAST CANCER: Primary | ICD-10-CM

## 2020-04-17 DIAGNOSIS — Z17.0 MALIGNANT NEOPLASM OF UPPER-INNER QUADRANT OF RIGHT BREAST IN FEMALE, ESTROGEN RECEPTOR POSITIVE: Primary | ICD-10-CM

## 2020-04-17 DIAGNOSIS — G89.3 CANCER ASSOCIATED PAIN: ICD-10-CM

## 2020-04-17 DIAGNOSIS — C50.211 MALIGNANT NEOPLASM OF UPPER-INNER QUADRANT OF RIGHT BREAST IN FEMALE, ESTROGEN RECEPTOR POSITIVE: Primary | ICD-10-CM

## 2020-04-17 RX ORDER — OXYCODONE HYDROCHLORIDE 5 MG/1
5 TABLET ORAL EVERY 8 HOURS PRN
Qty: 60 TABLET | Refills: 0 | Status: SHIPPED | OUTPATIENT
Start: 2020-04-17 | End: 2020-10-16

## 2020-04-17 NOTE — TELEPHONE ENCOUNTER
----- Message from Marcy Smith sent at 4/17/2020  1:14 PM CDT -----      ----- Message -----  From: Dawna Akers NP  Sent: 4/17/2020   1:13 PM CDT  To: Marcy Smith    Needs PET scan per Vanessa

## 2020-04-17 NOTE — TELEPHONE ENCOUNTER
Message fwd to .     ----- Message from Dawna Akers NP sent at 4/17/2020  1:13 PM CDT -----  Needs PET scan per Vanesas

## 2020-04-20 ENCOUNTER — PATIENT MESSAGE (OUTPATIENT)
Dept: PSYCHIATRY | Facility: CLINIC | Age: 45
End: 2020-04-20

## 2020-04-20 ENCOUNTER — OFFICE VISIT (OUTPATIENT)
Dept: PSYCHIATRY | Facility: CLINIC | Age: 45
End: 2020-04-20
Payer: COMMERCIAL

## 2020-04-20 DIAGNOSIS — C50.211 MALIGNANT NEOPLASM OF UPPER-INNER QUADRANT OF RIGHT BREAST IN FEMALE, ESTROGEN RECEPTOR POSITIVE: ICD-10-CM

## 2020-04-20 DIAGNOSIS — E66.01 MORBID OBESITY WITH BODY MASS INDEX OF 50.0-59.9 IN ADULT: ICD-10-CM

## 2020-04-20 DIAGNOSIS — Z17.0 MALIGNANT NEOPLASM OF UPPER-INNER QUADRANT OF RIGHT BREAST IN FEMALE, ESTROGEN RECEPTOR POSITIVE: ICD-10-CM

## 2020-04-20 DIAGNOSIS — F32.9 MAJOR DEPRESSIVE DISORDER, SINGLE EPISODE WITH ANXIOUS DISTRESS: Primary | ICD-10-CM

## 2020-04-20 DIAGNOSIS — I10 ESSENTIAL HYPERTENSION: ICD-10-CM

## 2020-04-20 DIAGNOSIS — I42.0 DILATED CARDIOMYOPATHY: ICD-10-CM

## 2020-04-20 PROCEDURE — 90834 PR PSYCHOTHERAPY W/PATIENT, 45 MIN: ICD-10-PCS | Mod: 95,,, | Performed by: PSYCHOLOGIST

## 2020-04-20 PROCEDURE — 90834 PSYTX W PT 45 MINUTES: CPT | Mod: 95,,, | Performed by: PSYCHOLOGIST

## 2020-04-20 NOTE — Clinical Note
Patient's mood is improving. We are working on goals for weight management, will schedule her with lynette Terrell. She does have some unresolved issues with sleep. Her sleep study was never completed. I gave her the contact info for [facility] to see about an at home sleep study.

## 2020-04-20 NOTE — PROGRESS NOTES
INFORMED CONSENT: Kristopher Ye   is known to this provider and identity was confirmed via NAME and .  The patient has been informed of the risks and benefits associated with engaging in psychotherapy, the handling of protected health information, the rights of privacy and the limits of confidentiality. The patient has also been informed of the importance of reporting any suicidal or homicidal ideation to this or any provider to ensure safety of all parties, and the Kristopher Ye expressed understanding. The patient was agreeable to these terms and freely participates in individual psychotherapy.    Telemedicine PSYCHO-ONCOLOGY NOTE/ Individual Psychotherapy   (patient not on premises due to COVID-19 restrictions)    Consultation started: 2020 at 1:10 PM   The chief complaint leading to consultation is: anxiety/depression  The patient location is:  Patient home  Virtual visit with synchronous audio and video   Patient alone at the time of consultation    Crisis Disclaimer: Patient was informed that due to the virtual nature of the visit, that if a crisis develops, protocols will be implemented to ensure patient safety, including but not limited to: 1) Initiating a welfare check with local Law Enforcement, 2) Calling [facility]/National Crisis Hotline, and/or 3) Initiating PEC/CEC procedures.      Each patient provided medical services by telemedicine is:  (1) informed of the relationship between the physician and patient and the respective role of any other health care provider with respect to management of the patient; and (2) notified that he or she may decline to receive medical services by telemedicine and may withdraw from such care at any time.    Date: 2020   Site of therapist:  [facility]         Therapeutic Intervention: Met with patient.  Outpatient - Behavior modifying psychotherapy 45 min - CPT code 06029    Referring provider:    Chief complaint/reason for encounter:  depression and anxiety   Met with patient to evaluate psychosocial adaptation to survivorship of Stage IV Breast Cancer    Patient was last seen by me on 3/30/2020    Objective:      Kristopher Ye arrived promptly for the session. The patient was fully cooperative throughout the session.  Appearance: age appropriate, appropriately  dressed, adequately  groomed  Behavior/Cooperation: friendly and cooperative  Speech: normal in rate, volume, and tone and appropriate quality, quantity and organization of sentences  Mood: euthymic  Affect: mood congruent  Thought Process: goal-directed, logical  Thought Content: normal,  No delusions or paranoia; did not appear to be responding to internal stimuli during the session  Orientation: grossly intact  Memory: Grossly intact  Attention Span/Concentration: Attends to session without distraction; reports no difficulty  Fund of Knowledge: average  Estimate of Intelligence: average from verbal skills and history  Cognition: grossly intact  Insight: patient has awareness of illness; good insight into own behavior and behavior of others  Judgment: the patient's behavior is adequate to circumstances      Interval history and content of current session: Patient reported that her employee SS benefits. She stated that since she has taken leave from work and has found significant benefit and reduction in stress. Patient has also found significant benefit from taking Effexor. Discussed overall helath and well-being to include sleep management, weight management, nutrition and exercise. Discussed current adaptation to disease and treatment status. Reports to be coping with some difficulty. Evaluated cognitive response, paying particular attention to negative intrusive thoughts of a persistent and detrimental nature. Thoughts of this type are in evidence with mild distress. Identified and evaluated psychosocial and environmental stressors secondary to diagnosis and treatment.      Focused on providing cognitive behavioral therapy to address negative cognitions. Examined proactive behaviors that may be implemented to minimize or ameliorate psychosocial stressors secondary to diagnosis and treatment.     Risk parameters:   Patient reports no suicidal ideation  Patient reports no homicidal ideation  Patient reports no self-injurious behavior  Patient reports no violent behavior   Safety needs:  None at this time      Verbal deficits: None     Patient's response to intervention:The patient's response to intervention is accepting.     Progress toward goals and other mental status changes:  The patient's progress toward goals is good.      Progress to date:Progress as Expected      Goals from last visit: Met      Patient reported outcomes:      Distress Thermometer:   Distress Score 3            Practical Problems Physical Problems                                                   Family Problems                                         Emotional Problems                                                         Spiritual/Religions Concerns               Other Problems              Client Strengths: verbal, intelligent, successful, good social support, good insight, commitment to wellness, strong jd, strong cultural traditions      ICD-10-CM ICD-9-CM   1. Major depressive disorder, single episode with anxious distress F32.9 296.20   2. Malignant neoplasm of upper-inner quadrant of right breast in female, estrogen receptor positive C50.211 174.2    Z17.0 V86.0        Treatment Plan:individual psychotherapy and medication management by physician  · Target symptoms: depression, anxiety   · Why chosen therapy is appropriate versus another modality: relevant to diagnosis, patient responds to this modality, evidence based practice  · Outcome monitoring methods: self-report, observation, checklist/rating scale  · Therapeutic intervention type: behavior modifying psychotherapy  · Prognosis:  Good      Behavioral goals:    Exercise: Continue Daily walks   Stress management: Follow up with sleep med   Social engagement:   Nutrition: Referral to ONC RD; Food intake Documentation    Return to clinic: 3 weeks     Length of Service (minutes direct face-to-face contact): 45      Bria Odom PhD  [place] License #[id number]

## 2020-04-20 NOTE — Clinical Note
Hey there, I am placing a referral for Mrs. Ye. Very nice lady. Has CHF, HTN, and Stage IV Breast Cancer and is struggling with developing a plan for healthy eating and weight management. I will place referral! Thanks

## 2020-04-22 ENCOUNTER — PATIENT MESSAGE (OUTPATIENT)
Dept: CARDIOLOGY | Facility: CLINIC | Age: 45
End: 2020-04-22

## 2020-04-22 ENCOUNTER — HOSPITAL ENCOUNTER (OUTPATIENT)
Dept: RADIOLOGY | Facility: HOSPITAL | Age: 45
Discharge: HOME | End: 2020-04-22
Attending: NURSE PRACTITIONER
Payer: COMMERCIAL

## 2020-04-22 ENCOUNTER — PATIENT MESSAGE (OUTPATIENT)
Dept: HEMATOLOGY/ONCOLOGY | Facility: CLINIC | Age: 45
End: 2020-04-22

## 2020-04-22 DIAGNOSIS — E78.00 PURE HYPERCHOLESTEROLEMIA: ICD-10-CM

## 2020-04-22 DIAGNOSIS — C50.919 METASTATIC BREAST CANCER: ICD-10-CM

## 2020-04-22 DIAGNOSIS — I42.9 CARDIOMYOPATHY, UNSPECIFIED TYPE: ICD-10-CM

## 2020-04-22 DIAGNOSIS — I10 ESSENTIAL HYPERTENSION: ICD-10-CM

## 2020-04-22 LAB — POCT GLUCOSE: 131 MG/DL (ref 70–110)

## 2020-04-22 PROCEDURE — 78815 NM PET CT ROUTINE: ICD-10-PCS | Mod: 26,PS,, | Performed by: RADIOLOGY

## 2020-04-22 PROCEDURE — 78815 PET IMAGE W/CT SKULL-THIGH: CPT | Mod: 26,PS,, | Performed by: RADIOLOGY

## 2020-04-22 PROCEDURE — 78815 PET IMAGE W/CT SKULL-THIGH: CPT | Mod: TC

## 2020-04-22 PROCEDURE — A9552 F18 FDG: HCPCS

## 2020-04-22 RX ORDER — AMLODIPINE BESYLATE 5 MG/1
TABLET ORAL
Qty: 30 TABLET | Refills: 11 | Status: SHIPPED | OUTPATIENT
Start: 2020-04-22 | End: 2021-04-14 | Stop reason: SDUPTHER

## 2020-04-22 NOTE — TELEPHONE ENCOUNTER
Attempted to call patient after receiving her message via [facility] portal. No answer. VM left to call the office.

## 2020-04-23 DIAGNOSIS — Z13.9 SCREENING FOR CONDITION: Primary | ICD-10-CM

## 2020-04-23 NOTE — PROGRESS NOTES
04/23/2020      In an effort to protect our immunocompromised patients from potential exposure to COVID-19, [facility] will now require all patients receiving an infusion, an injection, and/or radiation therapy to be tested for COVID-19 prior to their appointment.  All patients currently under treatment will be tested immediately, and patients initiating new treatment cycles or with one-time appointments (injections, transfusions, etc.) must be tested within 72 hours of their appointment.     Placed COVID-19 test order for patient.  A member of our team is to contact the patient in the near future to explain this process and the rationale behind it, to ask the COVID-19 screening questions, and to get the patient scheduled for their COVID-19 test.     The above was completed in accordance with instructions and guidelines set forth by [facility].     Signed,    Valentino Roman, GABRIEL     Date:  04/23/2020

## 2020-04-24 ENCOUNTER — CLINICAL SUPPORT (OUTPATIENT)
Dept: HEMATOLOGY/ONCOLOGY | Facility: CLINIC | Age: 45
End: 2020-04-24
Payer: COMMERCIAL

## 2020-04-24 ENCOUNTER — PATIENT MESSAGE (OUTPATIENT)
Dept: HEMATOLOGY/ONCOLOGY | Facility: CLINIC | Age: 45
End: 2020-04-24

## 2020-04-24 ENCOUNTER — LAB VISIT (OUTPATIENT)
Dept: LAB | Facility: HOSPITAL | Age: 45
End: 2020-04-24
Payer: COMMERCIAL

## 2020-04-24 ENCOUNTER — TELEPHONE (OUTPATIENT)
Dept: HEMATOLOGY/ONCOLOGY | Facility: CLINIC | Age: 45
End: 2020-04-24

## 2020-04-24 DIAGNOSIS — C50.211 MALIGNANT NEOPLASM OF UPPER-INNER QUADRANT OF RIGHT BREAST IN FEMALE, ESTROGEN RECEPTOR POSITIVE: ICD-10-CM

## 2020-04-24 DIAGNOSIS — Z13.9 SCREENING FOR CONDITION: ICD-10-CM

## 2020-04-24 DIAGNOSIS — U07.1 COVID-19 VIRUS DETECTED: Primary | ICD-10-CM

## 2020-04-24 DIAGNOSIS — Z17.0 MALIGNANT NEOPLASM OF UPPER-INNER QUADRANT OF RIGHT BREAST IN FEMALE, ESTROGEN RECEPTOR POSITIVE: ICD-10-CM

## 2020-04-24 LAB
ALBUMIN SERPL BCP-MCNC: 3.1 G/DL (ref 3.5–5.2)
ALP SERPL-CCNC: 108 U/L (ref 55–135)
ALT SERPL W/O P-5'-P-CCNC: 21 U/L (ref 10–44)
ANION GAP SERPL CALC-SCNC: 9 MMOL/L (ref 8–16)
AST SERPL-CCNC: 17 U/L (ref 10–40)
BILIRUB SERPL-MCNC: 0.4 MG/DL (ref 0.1–1)
BUN SERPL-MCNC: 13 MG/DL (ref 6–20)
CALCIUM SERPL-MCNC: 9.4 MG/DL (ref 8.7–10.5)
CHLORIDE SERPL-SCNC: 106 MMOL/L (ref 95–110)
CO2 SERPL-SCNC: 25 MMOL/L (ref 23–29)
CREAT SERPL-MCNC: 0.9 MG/DL (ref 0.5–1.4)
ERYTHROCYTE [DISTWIDTH] IN BLOOD BY AUTOMATED COUNT: 17.2 % (ref 11.5–14.5)
EST. GFR  (AFRICAN AMERICAN): >60 ML/MIN/1.73 M^2
EST. GFR  (NON AFRICAN AMERICAN): >60 ML/MIN/1.73 M^2
GLUCOSE SERPL-MCNC: 134 MG/DL (ref 70–110)
HCT VFR BLD AUTO: 35 % (ref 37–48.5)
HGB BLD-MCNC: 10.7 G/DL (ref 12–16)
IMM GRANULOCYTES # BLD AUTO: 0.02 K/UL (ref 0–0.04)
MAGNESIUM SERPL-MCNC: 2 MG/DL (ref 1.6–2.6)
MCH RBC QN AUTO: 27.6 PG (ref 27–31)
MCHC RBC AUTO-ENTMCNC: 30.6 G/DL (ref 32–36)
MCV RBC AUTO: 90 FL (ref 82–98)
NEUTROPHILS # BLD AUTO: 2.9 K/UL (ref 1.8–7.7)
PHOSPHATE SERPL-MCNC: 4 MG/DL (ref 2.7–4.5)
PLATELET # BLD AUTO: 252 K/UL (ref 150–350)
PMV BLD AUTO: 12.2 FL (ref 9.2–12.9)
POTASSIUM SERPL-SCNC: 3.8 MMOL/L (ref 3.5–5.1)
PROT SERPL-MCNC: 7.8 G/DL (ref 6–8.4)
RBC # BLD AUTO: 3.88 M/UL (ref 4–5.4)
SARS-COV-2 RNA RESP QL NAA+PROBE: DETECTED
SODIUM SERPL-SCNC: 140 MMOL/L (ref 136–145)
WBC # BLD AUTO: 7.98 K/UL (ref 3.9–12.7)

## 2020-04-24 PROCEDURE — 85027 COMPLETE CBC AUTOMATED: CPT

## 2020-04-24 PROCEDURE — 83735 ASSAY OF MAGNESIUM: CPT

## 2020-04-24 PROCEDURE — 84100 ASSAY OF PHOSPHORUS: CPT

## 2020-04-24 PROCEDURE — 86300 IMMUNOASSAY TUMOR CA 15-3: CPT

## 2020-04-24 PROCEDURE — 36415 COLL VENOUS BLD VENIPUNCTURE: CPT

## 2020-04-24 PROCEDURE — U0002 COVID-19 LAB TEST NON-CDC: HCPCS

## 2020-04-24 PROCEDURE — 80053 COMPREHEN METABOLIC PANEL: CPT

## 2020-04-24 NOTE — TELEPHONE ENCOUNTER
Phoned pt to give her tomorrow's IM appt info.  No answer; LVM advising pt to check my two messages in [employer].

## 2020-04-24 NOTE — TELEPHONE ENCOUNTER
"    04/24/2020    - COVID-19 testing Collection Date: 4/24/2020 Collection Time:  10:13 AM  - This result was communicated to the patient by Valentino Roman DNP on 04/24/2020 as below.     Phoned the patient.    Symptom Patient's Response   Fever YES/NO: no   Cough YES/NO: no   Shortness of breath  YES/NO: Yes--weird breaths occur less frequently than once per hour and occur mostly at night when lying down; does not really experience chest pain- it feels like how it would feel to breath after a big cry and kind of like hiccups; gets winded since earlier this month   Difficulty breathing YES/NO: yes--see above in "Shortness of breath"   GI symptoms such as diarrhea or nausea YES/NO: no   Loss of taste YES/NO: no   Loss of smell YES/NO: no   The patient denies feeling that her symptoms are emergent at this time and was advised as to symptomatology that would warrant a call to the [facility] On-Call Nurse Line or 911.    Other Screening Patient's Response   Has the patient previously undergone COVID-19 testing? YES/NO: none prior     If yes to the question directly above, what was the result? not applicable   Has the patient been in close contact with someone who has undergone COVID-19 testing? YES/NO: no     If yes to the question directly above, what was the result? not applicable     Notified the patient that her COVID-19 test came back POSITIVE.  Prompt follow-up is needed via one of the following two options, both of which were discussed with the patient:  Option 1)  Virtual visit, which can be set up as a same-day appointment, with an Internal Medicine provider if desired by the patient; or Option 2) COVID Symptom Tracker program if the patient feels there is no need for a visit at this time--This program sends automated messages to the patient daily for 14 days, and the patient presses a certain button if not feeling well and will then be triaged to receive an urgent follow-up call.  The patient has elected to " proceed with Options 1 and 2.  I have facilitated moving forward with this by getting her scheduled with Dr. Karl Bernal for a virtual visit tomorrow (04/25/2020) at 9:20am and by enrolling her in the Symptom Tracker program.    Advised the patient that before she can return for her therapy (infusion, injection, and/or radiation therapy), she must undergo COVID-19 retesting 14 days after the original test date (of 04/24/2020) and be symptom-free for a minimum of seven days.  If the repeat test is negative, the treatment physician should determine if it is safe to reinitiate treatment.    We discussed precautions to take to help prevent spreading COVID-19.    Questions were answered to patient's satisfaction, and patient verbalized understanding of information and agreement with the plan.     Hem/Onc provider notified:  Dr. Jessica Mendez and staff  Reminder for COVID-19 retest:  Set  Phone numbers provided to patient:  [facility] COVID-19 Info Line, [facility] 24[facility]7 Nurse On-Call Line, my contact information    The above was completed in accordance with instructions and guidelines set forth by my department.    Signed,

## 2020-04-25 ENCOUNTER — OFFICE VISIT (OUTPATIENT)
Dept: INTERNAL MEDICINE | Facility: CLINIC | Age: 45
End: 2020-04-25
Payer: COMMERCIAL

## 2020-04-25 DIAGNOSIS — D56.9 THALASSEMIA, UNSPECIFIED TYPE: ICD-10-CM

## 2020-04-25 DIAGNOSIS — I42.0 DILATED CARDIOMYOPATHY: ICD-10-CM

## 2020-04-25 DIAGNOSIS — Z17.0 MALIGNANT NEOPLASM OF UPPER-INNER QUADRANT OF RIGHT BREAST IN FEMALE, ESTROGEN RECEPTOR POSITIVE: ICD-10-CM

## 2020-04-25 DIAGNOSIS — C50.211 MALIGNANT NEOPLASM OF UPPER-INNER QUADRANT OF RIGHT BREAST IN FEMALE, ESTROGEN RECEPTOR POSITIVE: ICD-10-CM

## 2020-04-25 DIAGNOSIS — U07.1 COVID-19 VIRUS DETECTED: Primary | ICD-10-CM

## 2020-04-25 DIAGNOSIS — R73.09 ELEVATED GLUCOSE: ICD-10-CM

## 2020-04-25 PROCEDURE — 99214 OFFICE O/P EST MOD 30 MIN: CPT | Mod: 95,,, | Performed by: INTERNAL MEDICINE

## 2020-04-25 PROCEDURE — 99214 PR OFFICE/OUTPT VISIT, EST, LEVL IV, 30-39 MIN: ICD-10-PCS | Mod: 95,,, | Performed by: INTERNAL MEDICINE

## 2020-04-25 NOTE — PROGRESS NOTES
Subjective:       Patient ID: Kristopher Ye is a 44 y.o. female.    Chief Complaint: No chief complaint on file.  Dictation #1  MRN:[id number]  CSN:[id number]  Dict   HPI  Review of Systems   Constitutional: Negative.    HENT: Negative for congestion, hearing loss, sinus pressure, sneezing, sore throat and voice change.    Eyes: Negative for visual disturbance.   Respiratory: Negative for cough, chest tightness and shortness of breath.    Cardiovascular: Negative.  Negative for chest pain, palpitations and leg swelling.   Gastrointestinal: Negative.    Genitourinary: Negative for difficulty urinating, dysuria, flank pain, frequency, hematuria, menstrual problem, pelvic pain, urgency, vaginal bleeding and vaginal discharge.   Musculoskeletal: Negative.  Negative for neck pain and neck stiffness.   Skin: Negative.    Neurological: Negative.  Negative for dizziness, seizures, light-headedness, numbness and headaches.   Psychiatric/Behavioral: Negative for agitation, behavioral problems, confusion and sleep disturbance. The patient is not nervous/anxious.      The patient location is: home  The chief complaint leading to consultation is: COVID  Visit type: audiovisual  Total time spent with patient: 20 min  Each patient to whom he or she provides medical services by telemedicine is:  (1) informed of the relationship between the physician and patient and the respective role of any other health care provider with respect to management of the patient; and (2) notified that he or she may decline to receive medical services by telemedicine and may withdraw from such care at any time.    Notes:  44-year-old female patient who was diagnosed as having COVID the day prior to this visit.  This was done as part of a routine lab screening for her getting back on chemotherapy for breast cancer.  Breast cancer was diagnosed in October of 2019.  Patient has been asymptomatic with this.  She has some living at home with her 2  daughters since the end of February.  All 3 of number practicing COVID isolation.  However 1 of her daughters 2 weeks ago had a fever that lasted for several days associated with the loss of taste and smell.  It appears she did not have any COVID testing done.    The only symptom the patient has that is different than usual is she has she says she has had stuttering respiratory movements over the last 2-3 weeks.  She describes these as a briefs I in repetition but no shortness of breath associated with that.  She has had no cough or fever.    Past history:  Important history she has morbid obesity, breast cancer on chemotherapy, elevated glucose, and thalassemia.    Her oncologist has said that she is going to need to wait for least 2 weeks before any additional chemotherapy is initiated.    Physical exam:  Not done since this is a virtual visit    Impression:  1.  Positive COVID test on her respiratory secretions.  2.  Elevated glucose  3.  Breast cancer in the process of getting chemotherapy  4.  Morbid obesity  5.  Thalassemia    Plan:  1.  We had lengthy discussion about proper isolation and quarantine practice.  Patient is already doing that it in the home environment.  2.  I have told her that when her chemotherapy is initiated will be up to her oncologist but it will be at least 2 weeks 3.  I have placed her on the COVID follow-up program 4.  She needed no supportive therapy.    Objective:        Assessment:       1. COVID-19 virus detected    2. Malignant neoplasm of upper-inner quadrant of right breast in female, estrogen receptor positive    3. Dilated cardiomyopathy    4. Elevated glucose    5. Thalassemia, unspecified type        Plan:

## 2020-04-26 LAB — CANCER AG15-3 SERPL-ACNC: 22 U/ML (ref 0–31)

## 2020-04-27 ENCOUNTER — OFFICE VISIT (OUTPATIENT)
Dept: HEMATOLOGY/ONCOLOGY | Facility: CLINIC | Age: 45
End: 2020-04-27
Payer: COMMERCIAL

## 2020-04-27 DIAGNOSIS — U07.1 COVID-19 VIRUS DETECTED: ICD-10-CM

## 2020-04-27 DIAGNOSIS — C79.51 BONE METASTASES: ICD-10-CM

## 2020-04-27 DIAGNOSIS — Z17.0 MALIGNANT NEOPLASM OF UPPER-INNER QUADRANT OF RIGHT BREAST IN FEMALE, ESTROGEN RECEPTOR POSITIVE: Primary | ICD-10-CM

## 2020-04-27 DIAGNOSIS — R42 VERTIGO: ICD-10-CM

## 2020-04-27 DIAGNOSIS — G89.3 CANCER ASSOCIATED PAIN: ICD-10-CM

## 2020-04-27 DIAGNOSIS — R23.2 HOT FLASHES: ICD-10-CM

## 2020-04-27 DIAGNOSIS — C50.211 MALIGNANT NEOPLASM OF UPPER-INNER QUADRANT OF RIGHT BREAST IN FEMALE, ESTROGEN RECEPTOR POSITIVE: Primary | ICD-10-CM

## 2020-04-27 DIAGNOSIS — M84.48XD: ICD-10-CM

## 2020-04-27 PROCEDURE — 99214 PR OFFICE/OUTPT VISIT, EST, LEVL IV, 30-39 MIN: ICD-10-PCS | Mod: 95,,, | Performed by: INTERNAL MEDICINE

## 2020-04-27 PROCEDURE — 99214 OFFICE O/P EST MOD 30 MIN: CPT | Mod: 95,,, | Performed by: INTERNAL MEDICINE

## 2020-04-27 NOTE — PROGRESS NOTES
History:     Reason For Follow Up:   1. Stage IV ([id number]) invasive ductal carcinoma of right breast, upper inner quadrant, ER %, VT neg, Her2 neg, Grade 3, multifocal with one lesion appearing more aggressive (Ki67 80%), large LN +, bone mets.    HPI:   Kristopher Ye presents for follow up of breast cancer.   Continues Ibrance, Zoladex, Arimidex - tolerating well.   Dizziness - resolved -has ENT appt in June -wants to keep  Hot flashes stable  Fatigue stable  Recent COVID + but feeling well.       Oncologic History:  Presentation  - 9/11/19 - Screening mammogram showed multiple right breast masses lower inner quadrant  - 9/13/19 - Diagnostic mammogram and US showed a right breast, 3:00 position mass, 2 CFN measuring 17 mm x 16 mm x 14 mm.  There 2 smaller adjacent masses towards the nipple  - 9/19/19 - Biopsy -    A. Right breast subareolar: Grade 3 IDC, estrogen receptor 80%, progesterone receptor 0%, Her2 dago neg, Ki67 80%.     B. Right breast mass 3:00: Grade 3 IDC with papillary features, estrogen receptor 100%, progesterone receptor 0%, Her2 dago 1+, Ki67 30%.   - 9/26/19: US right axilla with abnormal lymphadenopathy measuring 4.3 x 2.8 cm with biopsy + for metastatic breast cancer.   Surgery consultation with Dr Ferris on 9/25/19   - 9[facility]25/19 [facility]  Medical oncology consultation on 10/7/19   * 10/8/19 - CT C/A/P with several lytic lesions in thoracic and lumbar spine, iliac bones, left scapula in addition to 3 x 4.5 cm right axillary node and 2.1 cm right breast mass. Bone scan negative.   * 10/31/19 - started Ibrance, Arimidex, Zoladex; plan for Xgeva.    * 11/12/19 STRATA without targetable mutations.    * 12/16/19 - Bone biopsy negative.       Review of Systems  Review of Systems   Constitutional: Negative for activity change, appetite change, chills, fatigue, fever and unexpected weight change.   HENT: Negative for congestion,  hearing loss, nosebleeds, sinus pressure and trouble swallowing.    Eyes: Negative for pain, discharge, itching and visual disturbance.   Respiratory: Negative for cough, chest tightness and shortness of breath.    Cardiovascular: Negative for chest pain, palpitations and leg swelling.   Gastrointestinal: Negative for abdominal distention, abdominal pain, blood in stool, constipation, diarrhea, nausea, rectal pain and vomiting.   Endocrine: Positive for heat intolerance (improving on effexor). Negative for polydipsia.   Genitourinary: Negative for difficulty urinating, dysuria, flank pain, frequency, hematuria and urgency.   Musculoskeletal: Negative for arthralgias, back pain and neck pain.   Skin: Negative for color change, pallor and rash.   Allergic/Immunologic: Negative for immunocompromised state.   Neurological: Negative for dizziness, numbness and headaches.   Hematological: Negative for adenopathy. Does not bruise/bleed easily.   Psychiatric/Behavioral: Negative for confusion and decreased concentration. The patient is nervous/anxious (improving).         Objective     Objective:     Physical Exam   Constitutional: She is oriented to person, place, and time. She appears well-developed and well-nourished.   Neurological: She is alert and oriented to person, place, and time.   Psychiatric: She has a normal mood and affect. Her behavior is normal. Judgment and thought content normal.     I have personally reviewed imaging results and agree with assessment as detailed below in dictation.     NM PET CT Routine FDG  Narrative: EXAMINATION:  NM PET CT ROUTINE    CLINICAL HISTORY:  Metastatic Breast Cancer Malignant neoplasm of unspecified site of unspecified female breast    TECHNIQUE:  11.41 mCi of F18-FDG was administered intravenously in the left hand.  After an approximately 60 min distribution time, PET/CT images were acquired from the skull base to the mid thigh. Transmission images were acquired to correct  for attenuation using a whole body low-dose CT scan without contrast with the arms positioned above the head. Glycemia at the time of injection was 131 mg/dL.    COMPARISON:  PET-CT 01/31/2020.    FINDINGS:  Quality of the study: Adequate.    There is a persistent mildly hypermetabolic right axillary lymph node measuring 2.3 x 3.2 cm with SUV max 2.9 (previously 3.8).  Previously seen non hypermetabolic nodule in the lower inner quadrant of the right breast is not appreciable on today's examination.    There is diffusely increased uptake in the axial and appendicular skeleton most compatible with bone marrow hyperplasia.    Prior lytic lesions in L2, L3, and left acetabulum remain non hypermetabolic in keeping with treated metastases.  There is increasing sclerosis of the previously noted lytic bone lesions also strongly suggesting treated metastases.  No new osseous lesions.    Physiologic uptake of the tracer is present within the brain, salivary glands, myocardium, GI and  tracts.    Incidental CT findings: Colonic diverticulosis.  Impression: Persistent mildly hypermetabolic and enlarged right axillary lymph node compatible with viable metastasis, mildly decreased in size and mildly decreased in SUV max when compared to the prior study.  No hypermetabolic breast masses are identified.    Diffuse uptake within the axial and appendicular skeleton in keeping with bone marrow hyperplasia.    Previous lytic lesions within the lumbar spine and left acetabulum demonstrate increasing sclerosis and remain non hypermetabolic in keeping with treated metastases.  No new hypermetabolic osseous lesions.    Electronically signed by resident: Ld Castro  Date:    04/22/2020  Time:    09:24    Electronically signed by: Hugo Vinson MD  Date:    04/22/2020  Time:    10:21    Results for orders placed or performed in visit on 04/24/20   COVID-19 Routine Screening   Result Value Ref Range    SARS-CoV2 (COVID-19) Qualitative  PCR Detected (A) Not Detected     Labs reviewed      Assessment     Assessment:     1. Stage IV ([id number]) invasive ductal carcinoma of right breast, upper inner quadrant, ER %, IN neg, Her2 neg, Grade 3, multifocal with one lesion appearing more aggressive (Ki67 80%), large LN +, bony mets   * Genetics negative - in media   * 10/2019 staging scans showed diffuse bony mets  *  10/31/19 started Arimidex and Ibrance 125 mg daily days 1-21 every 28 days with Zoladex. Bone biopsy negative, but review of imaging truly looks like metastatic disease.    * 4/22/2020 PET - disease improvement.    * Cycle 7 Ibrance/Arimidex/Zoladex - to start 5/11. Delay due to COVID infection.      2. Bony mets with L2 endplate fracture   * Bone biopsy negative but imaging looks like metastatic disease. Will continue with above plan and re-image.    * Xgeva monthly    3. Anxiety related to cancer diagnosis.   * Seeing Dr Odom. Improving.     4. Congestive heart failure, new diagnosis   * Echo 10/2019 with EF 30%   * Follows with Dr Olivares.     5. Morbid obesity   * Adjusting diet for weight loss.     6. Cancer pain   * Ultram 100 mg q 6 hours prn.  Controlled.     7. Hot flashes   * Referred to Dr Avina; worsening with Arimidex/Zoladex.    * Effexor    8. Dizziness, possible vertigo   * CNS imaging without mets; referred to ENT. Improved but wants to keep ENT appt     9. COVID infection   * Fairly asymptomatic. Has had a covid visit with MD.   Plan:     1. Continue Zoladex, Arimidex, Ibrance but delay cycle 7 - to start 5/11.  2. Plan Xgeva with next cycle.   3. Plan DEXA in future - once we can after coronavirus  4. Calcium/vitamin D   5. Re-image in about 4 mo (end of August)    ACP done.   RTC 5/11 with cbc, cmp, mg, phos, xgeva, zoladex    The patient location is: home  The chief complaint leading to consultation is: breast ca  Visit type: audiovisual  Total time spent with patient: 15 min  Each patient to whom he or she provides  medical services by telemedicine is:  (1) informed of the relationship between the physician and patient and the respective role of any other health care provider with respect to management of the patient; and (2) notified that he or she may decline to receive medical services by telemedicine and may withdraw from such care at any time.      Future Appointments   Date Time Provider Department Center   4/27/2020 10:30 AM Jessica Mendez MD [facility] HEM ONC [facility]   5/11/2020  1:00 PM Bria Odom, PhD [facility] CAN PSY [facility]   5/13/2020  1:00 PM Floridalma Olivares MD [facility] CARDIO [facility]   6/29/2020 10:00 AM EUNICE Saldivar [facility] AUDIO [facility]   6/29/2020 10:30 AM Xiang Escalante III, MD [facility] ENT [facility]

## 2020-04-28 ENCOUNTER — TELEPHONE (OUTPATIENT)
Dept: HEMATOLOGY/ONCOLOGY | Facility: CLINIC | Age: 45
End: 2020-04-28

## 2020-04-28 DIAGNOSIS — C50.211 MALIGNANT NEOPLASM OF UPPER-INNER QUADRANT OF RIGHT BREAST IN FEMALE, ESTROGEN RECEPTOR POSITIVE: Primary | ICD-10-CM

## 2020-04-28 DIAGNOSIS — Z17.0 MALIGNANT NEOPLASM OF UPPER-INNER QUADRANT OF RIGHT BREAST IN FEMALE, ESTROGEN RECEPTOR POSITIVE: Primary | ICD-10-CM

## 2020-04-28 NOTE — TELEPHONE ENCOUNTER
Called patient with dates and times for next follow up.      ----- Message from Jessica Mendez MD sent at 4/27/2020 10:39 AM CDT -----  Cbc, cmp, mg, phos, zoladex, xgeva, arnoldo MD on 5/11. Will need covid before

## 2020-05-06 ENCOUNTER — PATIENT MESSAGE (OUTPATIENT)
Dept: HEMATOLOGY/ONCOLOGY | Facility: CLINIC | Age: 45
End: 2020-05-06

## 2020-05-06 ENCOUNTER — TELEPHONE (OUTPATIENT)
Dept: HEMATOLOGY/ONCOLOGY | Facility: CLINIC | Age: 45
End: 2020-05-06

## 2020-05-06 NOTE — TELEPHONE ENCOUNTER
Communicated with pt via [facility]       ----- Message from Valentino Roman DNP sent at 5/6/2020  2:29 PM CDT -----  Hey! Please change bloodwork for 5/8 to other lab ([facility] 3rd floor lab we are trying to keep COVID-free) and COVID test to [facility] drive-thru or other drive-thru COVID testing site--please let pt know too. Thanks!

## 2020-05-07 ENCOUNTER — PATIENT MESSAGE (OUTPATIENT)
Dept: PSYCHIATRY | Facility: CLINIC | Age: 45
End: 2020-05-07

## 2020-05-08 ENCOUNTER — LAB VISIT (OUTPATIENT)
Dept: INTERNAL MEDICINE | Facility: CLINIC | Age: 45
End: 2020-05-08
Payer: COMMERCIAL

## 2020-05-08 ENCOUNTER — LAB VISIT (OUTPATIENT)
Dept: LAB | Facility: HOSPITAL | Age: 45
End: 2020-05-08
Attending: INTERNAL MEDICINE
Payer: COMMERCIAL

## 2020-05-08 ENCOUNTER — TELEPHONE (OUTPATIENT)
Dept: PHARMACY | Facility: CLINIC | Age: 45
End: 2020-05-08

## 2020-05-08 DIAGNOSIS — C50.211 MALIGNANT NEOPLASM OF UPPER-INNER QUADRANT OF RIGHT BREAST IN FEMALE, ESTROGEN RECEPTOR POSITIVE: ICD-10-CM

## 2020-05-08 DIAGNOSIS — Z17.0 MALIGNANT NEOPLASM OF UPPER-INNER QUADRANT OF RIGHT BREAST IN FEMALE, ESTROGEN RECEPTOR POSITIVE: ICD-10-CM

## 2020-05-08 LAB
ALBUMIN SERPL BCP-MCNC: 3.1 G/DL (ref 3.5–5.2)
ALP SERPL-CCNC: 119 U/L (ref 55–135)
ALT SERPL W/O P-5'-P-CCNC: 26 U/L (ref 10–44)
ANION GAP SERPL CALC-SCNC: 10 MMOL/L (ref 8–16)
AST SERPL-CCNC: 16 U/L (ref 10–40)
BILIRUB SERPL-MCNC: 0.2 MG/DL (ref 0.1–1)
BUN SERPL-MCNC: 11 MG/DL (ref 6–20)
CALCIUM SERPL-MCNC: 9.9 MG/DL (ref 8.7–10.5)
CHLORIDE SERPL-SCNC: 104 MMOL/L (ref 95–110)
CO2 SERPL-SCNC: 27 MMOL/L (ref 23–29)
CREAT SERPL-MCNC: 1 MG/DL (ref 0.5–1.4)
ERYTHROCYTE [DISTWIDTH] IN BLOOD BY AUTOMATED COUNT: 16.8 % (ref 11.5–14.5)
EST. GFR  (AFRICAN AMERICAN): >60 ML/MIN/1.73 M^2
EST. GFR  (NON AFRICAN AMERICAN): >60 ML/MIN/1.73 M^2
GLUCOSE SERPL-MCNC: 163 MG/DL (ref 70–110)
HCT VFR BLD AUTO: 39.4 % (ref 37–48.5)
HGB BLD-MCNC: 11.7 G/DL (ref 12–16)
IMM GRANULOCYTES # BLD AUTO: 0.05 K/UL (ref 0–0.04)
MAGNESIUM SERPL-MCNC: 1.7 MG/DL (ref 1.6–2.6)
MCH RBC QN AUTO: 26.7 PG (ref 27–31)
MCHC RBC AUTO-ENTMCNC: 29.7 G/DL (ref 32–36)
MCV RBC AUTO: 90 FL (ref 82–98)
NEUTROPHILS # BLD AUTO: 6.4 K/UL (ref 1.8–7.7)
PHOSPHATE SERPL-MCNC: 3.9 MG/DL (ref 2.7–4.5)
PLATELET # BLD AUTO: 532 K/UL (ref 150–350)
PMV BLD AUTO: 10.9 FL (ref 9.2–12.9)
POTASSIUM SERPL-SCNC: 4 MMOL/L (ref 3.5–5.1)
PROT SERPL-MCNC: 8.1 G/DL (ref 6–8.4)
RBC # BLD AUTO: 4.38 M/UL (ref 4–5.4)
SARS-COV-2 RNA RESP QL NAA+PROBE: NOT DETECTED
SODIUM SERPL-SCNC: 141 MMOL/L (ref 136–145)
WBC # BLD AUTO: 11 K/UL (ref 3.9–12.7)

## 2020-05-08 PROCEDURE — 85027 COMPLETE CBC AUTOMATED: CPT

## 2020-05-08 PROCEDURE — U0002 COVID-19 LAB TEST NON-CDC: HCPCS

## 2020-05-08 PROCEDURE — 36415 COLL VENOUS BLD VENIPUNCTURE: CPT

## 2020-05-08 PROCEDURE — 80053 COMPREHEN METABOLIC PANEL: CPT

## 2020-05-08 PROCEDURE — 84100 ASSAY OF PHOSPHORUS: CPT

## 2020-05-08 PROCEDURE — 83735 ASSAY OF MAGNESIUM: CPT

## 2020-05-08 NOTE — TELEPHONE ENCOUNTER
Contacted patient for Ibrance clinical follow up. Patient was a little busy as she is currently at [facility] waiting to get labs done. She states that she hasn't be taking the Ibrance because she tested positive for COVID-19. She states that her MD had her hold the medicine for the 2 weeks she was in quarantine. Today is her first day out of quarantine, and she has a follow up with her MD on Monday, 5/11 @ 9 AM to discuss whether she is to restart her Ibrance therapy. She would like a call back afterwards to discuss/ follow up.

## 2020-05-11 ENCOUNTER — OFFICE VISIT (OUTPATIENT)
Dept: PSYCHIATRY | Facility: CLINIC | Age: 45
End: 2020-05-11
Payer: COMMERCIAL

## 2020-05-11 ENCOUNTER — TELEPHONE (OUTPATIENT)
Dept: HEMATOLOGY/ONCOLOGY | Facility: CLINIC | Age: 45
End: 2020-05-11

## 2020-05-11 ENCOUNTER — INFUSION (OUTPATIENT)
Dept: INFUSION THERAPY | Facility: HOSPITAL | Age: 45
End: 2020-05-11
Attending: INTERNAL MEDICINE
Payer: COMMERCIAL

## 2020-05-11 ENCOUNTER — OFFICE VISIT (OUTPATIENT)
Dept: HEMATOLOGY/ONCOLOGY | Facility: CLINIC | Age: 45
End: 2020-05-11
Payer: COMMERCIAL

## 2020-05-11 VITALS — BODY MASS INDEX: 47.09 KG/M2 | HEIGHT: 66 IN | WEIGHT: 293 LBS

## 2020-05-11 DIAGNOSIS — R23.2 HOT FLASHES: ICD-10-CM

## 2020-05-11 DIAGNOSIS — I42.0 DILATED CARDIOMYOPATHY: ICD-10-CM

## 2020-05-11 DIAGNOSIS — E66.01 MORBID OBESITY WITH BODY MASS INDEX OF 50.0-59.9 IN ADULT: ICD-10-CM

## 2020-05-11 DIAGNOSIS — D75.839 THROMBOCYTOSIS: ICD-10-CM

## 2020-05-11 DIAGNOSIS — C50.211 MALIGNANT NEOPLASM OF UPPER-INNER QUADRANT OF RIGHT BREAST IN FEMALE, ESTROGEN RECEPTOR POSITIVE: ICD-10-CM

## 2020-05-11 DIAGNOSIS — Z17.0 MALIGNANT NEOPLASM OF UPPER-INNER QUADRANT OF RIGHT BREAST IN FEMALE, ESTROGEN RECEPTOR POSITIVE: ICD-10-CM

## 2020-05-11 DIAGNOSIS — G89.3 CANCER ASSOCIATED PAIN: ICD-10-CM

## 2020-05-11 DIAGNOSIS — F32.9 MAJOR DEPRESSIVE DISORDER, SINGLE EPISODE WITH ANXIOUS DISTRESS: Primary | ICD-10-CM

## 2020-05-11 DIAGNOSIS — Z17.0 MALIGNANT NEOPLASM OF UPPER-INNER QUADRANT OF RIGHT BREAST IN FEMALE, ESTROGEN RECEPTOR POSITIVE: Primary | ICD-10-CM

## 2020-05-11 DIAGNOSIS — C79.51 BONE METASTASES: ICD-10-CM

## 2020-05-11 DIAGNOSIS — Z13.9 SCREENING FOR CONDITION: Primary | ICD-10-CM

## 2020-05-11 DIAGNOSIS — I10 ESSENTIAL HYPERTENSION: ICD-10-CM

## 2020-05-11 DIAGNOSIS — C50.211 MALIGNANT NEOPLASM OF UPPER-INNER QUADRANT OF RIGHT BREAST IN FEMALE, ESTROGEN RECEPTOR POSITIVE: Primary | ICD-10-CM

## 2020-05-11 DIAGNOSIS — M84.48XD: ICD-10-CM

## 2020-05-11 PROCEDURE — 99214 PR OFFICE/OUTPT VISIT, EST, LEVL IV, 30-39 MIN: ICD-10-PCS | Mod: 95,,, | Performed by: INTERNAL MEDICINE

## 2020-05-11 PROCEDURE — 90834 PR PSYCHOTHERAPY W/PATIENT, 45 MIN: ICD-10-PCS | Mod: 95,,, | Performed by: PSYCHOLOGIST

## 2020-05-11 PROCEDURE — 63600175 PHARM REV CODE 636 W HCPCS: Mod: JG | Performed by: INTERNAL MEDICINE

## 2020-05-11 PROCEDURE — 96401 CHEMO ANTI-NEOPL SQ/IM: CPT

## 2020-05-11 PROCEDURE — 90834 PSYTX W PT 45 MINUTES: CPT | Mod: 95,,, | Performed by: PSYCHOLOGIST

## 2020-05-11 PROCEDURE — 99214 OFFICE O/P EST MOD 30 MIN: CPT | Mod: 95,,, | Performed by: INTERNAL MEDICINE

## 2020-05-11 RX ADMIN — GOSERELIN ACETATE 3.6 MG: 3.6 IMPLANT SUBCUTANEOUS at 11:05

## 2020-05-11 NOTE — PROGRESS NOTES
INFORMED CONSENT: Kristopher Ye   is known to this provider and identity was confirmed via NAME and .  The patient has been informed of the risks and benefits associated with engaging in psychotherapy, the handling of protected health information, the rights of privacy and the limits of confidentiality. The patient has also been informed of the importance of reporting any suicidal or homicidal ideation to this or any provider to ensure safety of all parties, and the Kristopher Ye expressed understanding. The patient was agreeable to these terms and freely participates in individual psychotherapy.    Telemedicine PSYCHO-ONCOLOGY NOTE/ Individual Psychotherapy   (patient not on premises due to COVID-19 restrictions)    Consultation started: 2020 at 1:06 PM   The chief complaint leading to consultation is:   The patient location is:  Patient home  Virtual visit with synchronous audio and video   Patient alone at the time of consultation    Crisis Disclaimer: Patient was informed that due to the virtual nature of the visit, that if a crisis develops, protocols will be implemented to ensure patient safety, including but not limited to: 1) Initiating a welfare check with local Law Enforcement, 2) Calling [facility]/National Crisis Hotline, and/or 3) Initiating PEC/CEC procedures.      Each patient provided medical services by telemedicine is:  (1) informed of the relationship between the physician and patient and the respective role of any other health care provider with respect to management of the patient; and (2) notified that he or she may decline to receive medical services by telemedicine and may withdraw from such care at any time.    Date: 2020   Site of therapist:  [facility]         Therapeutic Intervention: Met with patient.  Outpatient - Behavior modifying psychotherapy 45 min - CPT code 22797    Referring provider:    Chief complaint/reason for encounter: depression and  anxiety   Met with patient to evaluate psychosocial adaptation to survivorship of [facility] [facility] and COVID    Patient was last seen by me on 4/20/2020    Objective:      Kristopher Ye arrived promptly for the session.  The patient was fully cooperative throughout the session.  Appearance: age appropriate, casually  dressed, adequately  groomed  Behavior/Cooperation: friendly and cooperative  Speech: normal in rate, volume, and tone and appropriate quality, quantity and organization of sentences  Mood: euthymic  Affect: mood congruent  Thought Process: goal-directed, logical  Thought Content: normal,  No delusions or paranoia; did not appear to be responding to internal stimuli during the session  Orientation: grossly intact  Memory: Grossly intact  Attention Span/Concentration: Attends to session without distraction; reports no difficulty  Fund of Knowledge: average  Estimate of Intelligence: average from verbal skills and history  Cognition: grossly intact  Insight: patient has awareness of illness; good insight into own behavior and behavior of others  Judgment: the patient's behavior is adequate to circumstances      Interval history and content of current session: Patient stated that she tested positive for COVID but experienced no symptoms. She reported that quarantined and is now COVID negative. Processed significant decreased in outcome measures. She updated on the improvements in her cancer status.   Discussed current adaptation to disease and treatment status. Reports to be coping in an adequate manner. Evaluated cognitive response, paying particular attention to negative intrusive thoughts of a persistent and detrimental nature. Thoughts of this type are in evidence with mild distress. Identified and evaluated psychosocial and environmental stressors secondary to diagnosis and treatment.     Focused on providing cognitive behavioral therapy to address negative cognitions. Examined proactive  behaviors that may be implemented to minimize or ameliorate psychosocial stressors secondary to diagnosis and treatment.     Risk parameters:   Patient reports no suicidal ideation  Patient reports no homicidal ideation  Patient reports no self-injurious behavior  Patient reports no violent behavior   Safety needs:  None at this time      Verbal deficits: None     Patient's response to intervention:The patient's response to intervention is accepting.     Progress toward goals and other mental status changes:  The patient's progress toward goals is good.      Progress to date:Progress as Expected      Goals from last visit: Met      Patient reported outcomes:      Distress Thermometer:   Distress Score    Distress Score: 2        Practical Problems Physical Problems   : No Appearance: No   Housing: No Bathing / Dressing: Yes   Insurance / Financial: No Breathing: No    Transportation: No  Changes in Urination: No    Work / School: No  Constipation: No   Treatment Decisions: No  Diarrhea: No     Eating: No    Family Problems Fatigue: No    Dealing with Children: No Feeling Swollen: No    Dealing with Partner: No Fevers: No    Ability to Have Children: No  Getting Around: No       Indigestion: No     Memory / Concentration: No   Emotional Problems Mouth Sores: No    Depression: Yes  Nausea: No    Fears: Yes  Nose Dry / Congested: No    Nervousness: Yes  Pain: Yes    Sadness: Yes Sexual: No    Worry: Yes Skin Dry / Itchy: Yes    Loss of Interest in Usual Activities: Yes Sleep: Yes     Substance Abuse: No    Spiritual/Religions Concerns Tingling in Hands / Feet: No   Spritual / Anabaptism Concerns: No         Other Problems             PHQ ANSWERS    Q1. Little interest or pleasure in doing things: (P) Several days (05/11/20 1304)  Q2. Feeling down, depressed, or hopeless: (P) Several days (05/11/20 1304)  Q3. Trouble falling or staying asleep, or sleeping too much: (P) Several days (05/11/20 1304)  Q4. Feeling  tired or having little energy: (P) Several days (05/11/20 1304)  Q5. Poor appetite or overeating: (P) Not at all (05/11/20 1304)  Q6. Feeling bad about yourself - or that you are a failure or have let yourself or your family down: (P) Not at all (05/11/20 1304)  Q7. Trouble concentrating on things, such as reading the newspaper or watching television: (P) Several days (05/11/20 1304)  Q8. Moving or speaking so slowly that other people could have noticed. Or the opposite - being so fidgety or restless that you have been moving around a lot more than usual: (P) Several days (05/11/20 1304)  Q9.      PHQ8 Score : (P) 6 (05/11/20 1304)  PHQ-9 Total Score: (P) 6 (05/11/20 1304)     HUY-7 Answers    GAD7 5/11/2020   1. Feeling nervous, anxious, or on edge? 1   2. Not being able to stop or control worrying? 1   3. Worrying too much about different things? 1   4. Trouble relaxing? 1   5. Being so restless that it is hard to sit still? 1   6. Becoming easily annoyed or irritable? 1   7. Feeling afraid as if something awful might happen? 1   HUY-7 Score 7     HUY-7 Score: (P) 7  Interpretation: (P) Mild Anxiety       Client Strengths: verbal, intelligent, successful, good social support, good insight, commitment to wellness, strong jd, strong cultural traditions      ICD-10-CM ICD-9-CM   1. Major depressive disorder, single episode with anxious distress F32.9 296.20   2. Malignant neoplasm of upper-inner quadrant of right breast in female, estrogen receptor positive C50.211 174.2    Z17.0 V86.0   3. Essential hypertension I10 401.9   4. Morbid obesity (BMI= 55.9 on 10/30/14) E66.01 278.01    Z68.43 V85.43   5. Dilated cardiomyopathy I42.0 425.4        Treatment Plan:individual psychotherapy and medication management by physician  · Target symptoms: depression, anxiety   · Why chosen therapy is appropriate versus another modality: relevant to diagnosis, patient responds to this modality, evidence based practice  · Outcome  monitoring methods: self-report, observation, checklist/rating scale  · Therapeutic intervention type: behavior modifying psychotherapy  · Prognosis: Good      Behavioral goals:    Exercise:   Stress management:   Social engagement:   Nutrition: Reschedule appt with Nutrition   Smoking Cessation:   Therapy:    Return to clinic: 3 weeks     Length of Service (minutes direct face-to-face contact): 45          Bria Odom PhD  [place] License #[id number]

## 2020-05-11 NOTE — Clinical Note
Carol met with this patient today. She has COVID and needed to cancel her appt with you. She is ready to reschedule.

## 2020-05-11 NOTE — Clinical Note
Ms. Ye is doing very well emotionally. Been working with her for 6 months and her depression and anxiety have sig improved. I will follow up with her in 8 weeks.

## 2020-05-11 NOTE — PROGRESS NOTES
History:     Reason For Follow Up:   1. Stage IV ([id number]) invasive ductal carcinoma of right breast, upper inner quadrant, ER %, NJ neg, Her2 neg, Grade 3, multifocal with one lesion appearing more aggressive (Ki67 80%), large LN +, bone mets.    HPI:   Kristopher Ye presents for follow up of breast cancer.   Has had 2 weeks off Ibrance, Zoladex, Arimidex since COVID +.  Her energy improved while off of Ibrance.  She continues to have hot flashes but they are tolerable.  She denies any significant bony type pain.    Oncologic History:  Presentation  - 9/11/19 - Screening mammogram showed multiple right breast masses lower inner quadrant  - 9/13/19 - Diagnostic mammogram and US showed a right breast, 3:00 position mass, 2 CFN measuring 17 mm x 16 mm x 14 mm.  There 2 smaller adjacent masses towards the nipple  - 9/19/19 - Biopsy -    A. Right breast subareolar: Grade 3 IDC, estrogen receptor 80%, progesterone receptor 0%, Her2 dago neg, Ki67 80%.     B. Right breast mass 3:00: Grade 3 IDC with papillary features, estrogen receptor 100%, progesterone receptor 0%, Her2 dago 1+, Ki67 30%.   - 9/26/19: US right axilla with abnormal lymphadenopathy measuring 4.3 x 2.8 cm with biopsy + for metastatic breast cancer.   Surgery consultation with Dr Ferris on 9/25/19   - 9/25/19 - Genetics [facility]ACAnalysis pending; VUS pending  Medical oncology consultation on 10/7/19   * 10/8/19 - CT C/A/P with several lytic lesions in thoracic and lumbar spine, iliac bones, left scapula in addition to 3 x 4.5 cm right axillary node and 2.1 cm right breast mass. Bone scan negative.   * 10/31/19 - started Ibrance, Arimidex, Zoladex; plan for Xgeva.    * 11/12/19 STRATA without targetable mutations.    * 12/16/19 - Bone biopsy negative.       Review of Systems  Review of Systems   Constitutional: Negative for activity change, appetite change, chills, fatigue, fever and unexpected weight change.   HENT:  Negative for congestion, hearing loss, nosebleeds, sinus pressure and trouble swallowing.    Eyes: Negative for pain, discharge, itching and visual disturbance.   Respiratory: Negative for cough, chest tightness and shortness of breath.    Cardiovascular: Negative for chest pain, palpitations and leg swelling.   Gastrointestinal: Negative for abdominal distention, abdominal pain, blood in stool, constipation, diarrhea, nausea, rectal pain and vomiting.   Endocrine: Positive for heat intolerance (improving on effexor). Negative for polydipsia.   Genitourinary: Negative for difficulty urinating, dysuria, flank pain, frequency, hematuria and urgency.   Musculoskeletal: Negative for arthralgias, back pain and neck pain.   Skin: Negative for color change, pallor and rash.   Allergic/Immunologic: Negative for immunocompromised state.   Neurological: Negative for dizziness, numbness and headaches.   Hematological: Negative for adenopathy. Does not bruise/bleed easily.   Psychiatric/Behavioral: Negative for confusion and decreased concentration. The patient is not nervous/anxious (improving).         Objective     Objective:     Physical Exam   Constitutional: She is oriented to person, place, and time. She appears well-developed and well-nourished.   Neurological: She is alert and oriented to person, place, and time.   Psychiatric: She has a normal mood and affect. Her behavior is normal. Judgment and thought content normal.     Results for orders placed or performed in visit on 05/08/20   CBC Oncology   Result Value Ref Range    WBC 11.00 3.90 - 12.70 K/uL    RBC 4.38 4.00 - 5.40 M/uL    Hemoglobin 11.7 (L) 12.0 - 16.0 g/dL    Hematocrit 39.4 37.0 - 48.5 %    Mean Corpuscular Volume 90 82 - 98 fL    Mean Corpuscular Hemoglobin 26.7 (L) 27.0 - 31.0 pg    Mean Corpuscular Hemoglobin Conc 29.7 (L) 32.0 - 36.0 g/dL    RDW 16.8 (H) 11.5 - 14.5 %    Platelets 532 (H) 150 - 350 K/uL    MPV 10.9 9.2 - 12.9 fL    Gran # (ANC) 6.4  1.8 - 7.7 K/uL    Immature Grans (Abs) 0.05 (H) 0.00 - 0.04 K/uL   Comprehensive metabolic panel   Result Value Ref Range    Sodium 141 136 - 145 mmol/L    Potassium 4.0 3.5 - 5.1 mmol/L    Chloride 104 95 - 110 mmol/L    CO2 27 23 - 29 mmol/L    Glucose 163 (H) 70 - 110 mg/dL    BUN, Bld 11 6 - 20 mg/dL    Creatinine 1.0 0.5 - 1.4 mg/dL    Calcium 9.9 8.7 - 10.5 mg/dL    Total Protein 8.1 6.0 - 8.4 g/dL    Albumin 3.1 (L) 3.5 - 5.2 g/dL    Total Bilirubin 0.2 0.1 - 1.0 mg/dL    Alkaline Phosphatase 119 55 - 135 U/L    AST 16 10 - 40 U/L    ALT 26 10 - 44 U/L    Anion Gap 10 8 - 16 mmol/L    eGFR if African American >60.0 >60 mL/min/1.73 m^2    eGFR if non African American >60.0 >60 mL/min/1.73 m^2   Magnesium   Result Value Ref Range    Magnesium 1.7 1.6 - 2.6 mg/dL   Phosphorus   Result Value Ref Range    Phosphorus 3.9 2.7 - 4.5 mg/dL     Labs reviewed      Assessment     Assessment:     1. Stage IV ([id number]) invasive ductal carcinoma of right breast, upper inner quadrant, ER %, [place] neg, Her2 neg, Grade 3, multifocal with one lesion appearing more aggressive (Ki67 80%), large LN +, bony mets   * Genetics negative - in media   * 10/2019 staging scans showed diffuse bony mets  *  10/31/19 started Arimidex and Ibrance 125 mg daily days 1-21 every 28 days with Zoladex. Bone biopsy negative, but review of imaging truly looks like metastatic disease.    * 4/22/2020 PET - disease improvement.    * Cycle 7 Ibrance/Arimidex/Zoladex - to start 5/11. Delay due to COVID infection. Improved energy while off Ibrance - discussed if needed that we could decrease dose, but she declines at this time.      2. Bony mets with L2 endplate fracture   * Bone biopsy negative but imaging looks like metastatic disease. Will continue with above plan and re-image.    * Xgeva monthly    3. Anxiety related to cancer diagnosis.   * Seeing Dr Odom. Improving.     4. Congestive heart failure, new diagnosis   * Echo 10/2019 with EF 30%   *  Follows with Dr Olivares.     5. Morbid obesity   * Adjusting diet for weight loss.     6. Cancer pain   * Ultram 100 mg q 6 hours prn.  Controlled.     7. Hot flashes   * Effexor- improving.     8. COVID infection   * Fairly asymptomatic; test now negative.     9. Thrombocytosis - reactive  Plan:     1. Continue Zoladex, Arimidex, Ibrance cycle 7 - to start 5/11.  2. Plan Xgeva monthly  3. Plan DEXA in future - once we can after coronavirus  4. Calcium/vitamin D   5. Re-image in about 4 mo from prior imaging (end of August)    ACP done.   RTC 6/8 appt in person with me (at 3:00) with cbc, cmp, mg, phos, xgeva, zoladex        Future Appointments   Date Time Provider Department Center   5/11/2020 11:30 AM INJECTION, [facility] INFUSION [facility]   5/11/2020  1:00 PM Bria Odom, PhD [facility] CAN PSY [facility]   5/13/2020  1:00 PM Floridalma Olivares MD [facility] CARDIO [facility]   6/29/2020 10:00 AM EUNICE Saldivar [facility] AUDIO [facility]   6/29/2020 10:30 AM Xiang Escalante III, MD [facility] ENT [facility]       The patient location is: home  The chief complaint leading to consultation is: breast ca; high risk med  Visit type: audiovisual  Total time spent with patient: 12 min  Each patient to whom he or she provides medical services by telemedicine is:  (1) informed of the relationship between the physician and patient and the respective role of any other health care provider with respect to management of the patient; and (2) notified that he or she may decline to receive medical services by telemedicine and may withdraw from such care at any time.

## 2020-05-11 NOTE — TELEPHONE ENCOUNTER
Spoke with patient after office visit today.  Ms. Ye will be restarting Ibrance today at the same dose of 125mg daily with 7 days off each cycle.  After her previous refill, she took 2 doses of Ibrance 125mg prior to holding and MD was made aware of this today.  She currently has 19 Ibrance 125mg capsules on hand today.  Per patient, MD was ok with her taking the 19 capsules she had remaining, proceeding with her 7 days off as per usual, then resuming full 21 days on, 7 days off next cycle.      Due to the fact that she has not been on the medication for a few weeks, Ms. Ye opted to defer clinical follow up to next refill call.  Will pend accordingly per patient request.  No additional questions at this time.

## 2020-05-11 NOTE — TELEPHONE ENCOUNTER
Past 7 days:  Symptom Patient's Response   Fever YES/NO: no   Cough YES/NO: no   Shortness of breath  YES/NO: no   Difficulty breathing YES/NO: no   GI symptoms such as diarrhea or nausea YES/NO: no   Loss of taste YES/NO: no   Loss of smell YES/NO: no       Other Screening Patient's Response   Has the patient previously undergone COVID-19 testing? YES/NO: yes     If yes to the question directly above, what was the result? negative on 05/08/2020, positive on 04/24/2020   Has the patient been in close contact with someone who has undergone COVID-19 testing? YES/NO: no     If yes to the question directly above, what was the result? not applicable     Advised pt she can proceed with appointments as scheduled based on negative COVID test and negative screen.

## 2020-05-11 NOTE — NURSING
Zoladex injection given without incidence, tolerated well.  Patient unable to obtain dental clearance for xgeva, med held.  Discharged to home, nad

## 2020-05-12 ENCOUNTER — PATIENT OUTREACH (OUTPATIENT)
Dept: ADMINISTRATIVE | Facility: OTHER | Age: 45
End: 2020-05-12

## 2020-05-13 ENCOUNTER — CLINICAL SUPPORT (OUTPATIENT)
Dept: HEMATOLOGY/ONCOLOGY | Facility: CLINIC | Age: 45
End: 2020-05-13
Payer: COMMERCIAL

## 2020-05-13 ENCOUNTER — OFFICE VISIT (OUTPATIENT)
Dept: CARDIOLOGY | Facility: CLINIC | Age: 45
End: 2020-05-13
Payer: COMMERCIAL

## 2020-05-13 VITALS — WEIGHT: 293 LBS | BODY MASS INDEX: 47.09 KG/M2 | HEIGHT: 66 IN

## 2020-05-13 DIAGNOSIS — I10 ESSENTIAL HYPERTENSION: ICD-10-CM

## 2020-05-13 DIAGNOSIS — C50.211 MALIGNANT NEOPLASM OF UPPER-INNER QUADRANT OF RIGHT BREAST IN FEMALE, ESTROGEN RECEPTOR POSITIVE: ICD-10-CM

## 2020-05-13 DIAGNOSIS — E66.01 MORBID OBESITY WITH BODY MASS INDEX OF 50.0-59.9 IN ADULT: ICD-10-CM

## 2020-05-13 DIAGNOSIS — Z71.3 NUTRITIONAL COUNSELING: Primary | ICD-10-CM

## 2020-05-13 DIAGNOSIS — I42.0 DILATED CARDIOMYOPATHY: Primary | ICD-10-CM

## 2020-05-13 DIAGNOSIS — E78.00 PURE HYPERCHOLESTEROLEMIA: ICD-10-CM

## 2020-05-13 DIAGNOSIS — Z17.0 MALIGNANT NEOPLASM OF UPPER-INNER QUADRANT OF RIGHT BREAST IN FEMALE, ESTROGEN RECEPTOR POSITIVE: ICD-10-CM

## 2020-05-13 DIAGNOSIS — C50.919 METASTATIC BREAST CANCER: ICD-10-CM

## 2020-05-13 PROCEDURE — 99214 OFFICE O/P EST MOD 30 MIN: CPT | Mod: 95,,, | Performed by: INTERNAL MEDICINE

## 2020-05-13 PROCEDURE — 99214 PR OFFICE/OUTPT VISIT, EST, LEVL IV, 30-39 MIN: ICD-10-PCS | Mod: 95,,, | Performed by: INTERNAL MEDICINE

## 2020-05-13 PROCEDURE — 97802 MEDICAL NUTRITION INDIV IN: CPT | Mod: 95,,, | Performed by: DIETITIAN, REGISTERED

## 2020-05-13 PROCEDURE — 97802 PR MED NUTR THER, 1ST, INDIV, EA 15 MIN: ICD-10-PCS | Mod: 95,,, | Performed by: DIETITIAN, REGISTERED

## 2020-05-13 RX ORDER — SPIRONOLACTONE 25 MG/1
25 TABLET ORAL DAILY
Qty: 90 TABLET | Refills: 3 | Status: SHIPPED | OUTPATIENT
Start: 2020-05-13 | End: 2021-05-14 | Stop reason: SDUPTHER

## 2020-05-13 NOTE — PROGRESS NOTES
Oncology Nutrition Assessment for Medical Nutrition Therapy  Initial Visit    Kristopher Ye   1975    Referring Provider:  Bria Odom, PhD    Reason for Visit: Pt in for education and nutrition counseling     PMHx:   Past Medical History:   Diagnosis Date    Abnormal Pap smear     pt states 13yrs ago colpo was done    Anemia     Anxiety     Cancer     Cardiomyopathy     Fibroid     Hyperlipidemia     Hypertension     Sleep difficulties        Nutrition Assessment    The patient location is: home   The chief complaint leading to consultation is: obesity   Visit type: audiovisual  Total time spent with patient: 35 minutes   Each patient to whom he or she provides medical services by telemedicine is:  (1) informed of the relationship between the physician and patient and the respective role of any other health care provider with respect to management of the patient; and (2) notified that he or she may decline to receive medical services by telemedicine and may withdraw from such care at any time.    This is a 44 y.o.female with a medical diagnosis of stage IV breast cancer. Currently on treatment with Ibrance, Zoladex, Arimidex. Will also be starting Xgeva monthly- still needs dental clearance. Referred to nutrition for diet education and counseling due to morbid obesity.    She reports she has gained about 15lb since being in quarantine due to reduced activity and increased snacking.   Previously she had been eating well and walking outside with friends. Has not been a comfortable weight for about 19 years (since daughter was born). She does not have diabetes but she monitors herself closely as diabetes runs in the family. Her Dad follows a strict, healthy lifestyle and encourages her a lot as do her daughters.     She eats small, frequent meals due to medications/nausea. Tries to eat all meals/snacks prior to 8pm   Diet recall:    Breakfast- cereal (honey bunches of oats) or steel cut  "oatmeal (pre made)   Snack- fruit cup or fresh fruits   Lunch- [place] (light mullen, mustard, turkey), soup (Campbells)   Snack- muffin, cashews/nuts/trail mix  Dinner- meat, vegetables, salad     Other snacks- popcorn wiht butter/flavor, ice cream, popsicles  Drinks- water, juice (mixed with water/ice), occasionally sweet tea       Weight:(!) 167.1 kg (368 lb 6.2 oz)  Height:5' 6" (1.676 m)  BMI:Body mass index is 59.46 kg/m².   IBW: Ideal body weight: 59.3 kg (130 lb 11.7 oz)  Adjusted ideal body weight: 102.4 kg (225 lb 12.7 oz)    Usual BW: 350lb, feels comfortable around 200lb    Weight Change: recent 18lb increase     Nutrition focused physical findings: obese     Allergies: Keflex [cephalexin]    Current Medications:    Current Outpatient Medications:     albuterol (PROVENTIL/VENTOLIN HFA) 90 mcg/actuation inhaler, Inhale 1-2 puffs into the lungs every 6 (six) hours as needed for Wheezing. Rescue, Disp: 1 Inhaler, Rfl: 0    amLODIPine (NORVASC) 5 MG tablet, TAKE 1 TABLET(5 MG) BY MOUTH EVERY DAY, Disp: 30 tablet, Rfl: 11    anastrozole (ARIMIDEX) 1 mg Tab, Take 1 tablet (1 mg total) by mouth once daily., Disp: 90 tablet, Rfl: 3    carvediloL (COREG) 12.5 MG tablet, Take 1 tablet (12.5 mg total) by mouth 2 (two) times daily with meals., Disp: 60 tablet, Rfl: 11    goserelin (ZOLADEX) 3.6 mg injection, Inject 3.6 mg into the skin every 28 days., Disp: , Rfl:     irbesartan (AVAPRO) 150 MG tablet, Take 1 tablet (150 mg total) by mouth every evening., Disp: 30 tablet, Rfl: 11    loratadine (CLARITIN) 10 mg tablet, Take 10 mg by mouth daily as needed. , Disp: , Rfl:     LORazepam (ATIVAN) 0.5 MG tablet, Take 1 tablet (0.5 mg total) by mouth every 12 (twelve) hours as needed for Anxiety., Disp: 30 tablet, Rfl: 0    meclizine (ANTIVERT) 12.5 mg tablet, Take 1 tablet (12.5 mg total) by mouth 3 (three) times daily as needed for Dizziness., Disp: 45 tablet, Rfl: 0    ondansetron (ZOFRAN) 8 MG tablet, Take 1 " tablet (8 mg total) by mouth every 8 (eight) hours as needed for Nausea., Disp: 45 tablet, Rfl: 2    oxyCODONE (ROXICODONE) 5 MG immediate release tablet, Take 1 tablet (5 mg total) by mouth every 8 (eight) hours as needed for Pain., Disp: 60 tablet, Rfl: 0    palbociclib (IBRANCE) 125 mg Cap, Take 125 mg by mouth as instructed Take as directed days 1-21 of each 28 day cycle. Take with food., Disp: 21 capsule, Rfl: 6    polyethylene glycol (GLYCOLAX) 17 gram/dose powder, Take 17 g by mouth once daily., Disp: 116 g, Rfl: 0    prochlorperazine (COMPAZINE) 10 MG tablet, Take 1 tablet (10 mg total) by mouth every 6 (six) hours as needed., Disp: 45 tablet, Rfl: 2    sennosides (SENNA-C ORAL), Take by mouth daily as needed. , Disp: , Rfl:     venlafaxine (EFFEXOR XR) 37.5 MG 24 hr capsule, Take 1 capsule (37.5 mg total) by mouth once daily., Disp: 30 capsule, Rfl: 2    Food/medication interactions noted: avoid grapefruit     Vitamins/Supplements: none     Labs: Reviewed -glucose 163, Albumin 3.1    Nutrition Diagnosis    Problem: excessive protein/calorie intake   Etiology (related to): dietary non-compliance  Signs/Symptoms (as evidenced by): BMI =59    Nutrition Intervention    Nutrition Prescription   8642-7340 Kcals (12-15kcal/kg or 20-25kcal/kg AIBW)  134 g protein (0.8g/kg or 1.3g/kg AIBW)   2500 mL fluid (15mL/kg) or per MD recs    Recommendations:  Resume walking -1 mile per day to start   Remove added sugars from diet   Use fruits, vegetables, nuts, yogurt for snacks   Steel cut oats, omelets, or similar for breakfast  Salads, protein and vegetables, or sandwich on sprouted grain bread for lunch (have fruits, vegetables, or vegetable based soup with it)  Continue usual dinner   Water only for fluids     Nutrition Monitoring and Evaluation    Monitor: weight    Goals: weight loss of 2lb per week    Motivation to change/anticipated barriers: no barriers identified     Follow up In 4 weeks     Communication to  referring provider/care team: note available in chart     Counseling time: 30 Minutes    Yen Terrell, MPH, RD, , LDN, FAND   [phone]

## 2020-05-13 NOTE — PROGRESS NOTES
"Subjective:   Patient ID:  Kristopher Ye is a 44 y.o. female who presents for follow-up of No chief complaint on file.      HPI: The patient location is: home  The chief complaint leading to consultation is: follow up chf  Visit type: audiovisual  Total time spent with patient: 25 minutes  Each patient to whom he or she provides medical services by telemedicine is:  (1) informed of the relationship between the physician and patient and the respective role of any other health care provider with respect to management of the patient; and (2) notified that he or she may decline to receive medical services by telemedicine and may withdraw from such care at any time.    Notes: We increased her irbesartan and carvedilol at her last visit which she has tolerated well. Kristopher Ye denies any chest pain, shortness of breath, PND, orthopnea, palpitations, leg edema, or syncope. She reports a "stuttering" in her breath which occurs randomly. She tested + for COVID-19 but was asymptomatic. She never heard from Digital HTN. She reports her home readings are 130/80-90's.    Echo 10/19:  Conclusion      · Moderate left ventricular enlargement.  · Moderately decreased left ventricular systolic function. The estimated ejection fraction is 30%.  · The left ventricular global longitudinal strain is -11.6%.  · Normal right ventricular systolic function.  · Grade III (severe) left ventricular diastolic dysfunction consistent with restrictive physiology.  · Mild left atrial enlargement.  · Normal central venous pressure (3 mm Hg).         Past Medical History:   Diagnosis Date    Abnormal Pap smear     pt states 13yrs ago colpo was done    Anemia     Anxiety     Cancer     Cardiomyopathy     Fibroid     Hyperlipidemia     Hypertension     Sleep difficulties        Past Surgical History:   Procedure Laterality Date     SECTION      TONSILLECTOMY      TUBAL LIGATION      UTERINE FIBROID " EMBOLIZATION  2015       Social History     Socioeconomic History    Marital status:      Spouse name: Not on file    Number of children: Not on file    Years of education: Not on file    Highest education level: Not on file   Occupational History    Not on file   Social Needs    Financial resource strain: Very hard    Food insecurity:     Worry: Often true     Inability: Often true    Transportation needs:     Medical: No     Non-medical: No   Tobacco Use    Smoking status: Never Smoker    Smokeless tobacco: Never Used   Substance and Sexual Activity    Alcohol use: Not Currently     Alcohol/week: 0.0 standard drinks     Frequency: Never    Drug use: No    Sexual activity: Yes     Partners: Male     Birth control/protection: Surgical   Lifestyle    Physical activity:     Days per week: Not on file     Minutes per session: Not on file    Stress: Not on file   Relationships    Social connections:     Talks on phone: More than three times a week     Gets together: Once a week     Attends Yazidism service: Never     Active member of club or organization: No     Attends meetings of clubs or organizations: Never     Relationship status:    Other Topics Concern    Patient feels they ought to cut down on drinking/drug use Not Asked    Patient annoyed by others criticizing their drinking/drug use Not Asked    Patient has felt bad or guilty about drinking/drug use Not Asked    Patient has had a drink/used drugs as an eye opener in the AM Not Asked   Social History Narrative    Patient is a pleasant AAF,  x2. She has excellent social support, although she states they worry more than she does. She has a 17 yo and an 19 yo.       Family History   Problem Relation Age of Onset    Arthritis Mother     Diabetes Mother     Heart disease Mother         CHF, CAD , 2 stents    Hypertension Mother     Hyperlipidemia Mother     Heart failure Mother     No Known Problems Sister     No  Known Problems Father     Hypertension Brother     No Known Problems Daughter     Asthma Daughter     No Known Problems Maternal Aunt     No Known Problems Maternal Uncle     No Known Problems Paternal Aunt     No Known Problems Paternal Uncle     No Known Problems Maternal Grandmother     Cancer Maternal Grandfather     No Known Problems Paternal Grandmother     No Known Problems Paternal Grandfather     Breast cancer Neg Hx     Colon cancer Neg Hx     Ovarian cancer Neg Hx        Patient's Medications   New Prescriptions    SPIRONOLACTONE (ALDACTONE) 25 MG TABLET    Take 1 tablet (25 mg total) by mouth once daily.   Previous Medications    ALBUTEROL (PROVENTIL/VENTOLIN HFA) 90 MCG/ACTUATION INHALER    Inhale 1-2 puffs into the lungs every 6 (six) hours as needed for Wheezing. Rescue    AMLODIPINE (NORVASC) 5 MG TABLET    TAKE 1 TABLET(5 MG) BY MOUTH EVERY DAY    ANASTROZOLE (ARIMIDEX) 1 MG TAB    Take 1 tablet (1 mg total) by mouth once daily.    CARVEDILOL (COREG) 12.5 MG TABLET    Take 1 tablet (12.5 mg total) by mouth 2 (two) times daily with meals.    GOSERELIN (ZOLADEX) 3.6 MG INJECTION    Inject 3.6 mg into the skin every 28 days.    IRBESARTAN (AVAPRO) 150 MG TABLET    Take 1 tablet (150 mg total) by mouth every evening.    LORATADINE (CLARITIN) 10 MG TABLET    Take 10 mg by mouth daily as needed.     LORAZEPAM (ATIVAN) 0.5 MG TABLET    Take 1 tablet (0.5 mg total) by mouth every 12 (twelve) hours as needed for Anxiety.    MECLIZINE (ANTIVERT) 12.5 MG TABLET    Take 1 tablet (12.5 mg total) by mouth 3 (three) times daily as needed for Dizziness.    ONDANSETRON (ZOFRAN) 8 MG TABLET    Take 1 tablet (8 mg total) by mouth every 8 (eight) hours as needed for Nausea.    OXYCODONE (ROXICODONE) 5 MG IMMEDIATE RELEASE TABLET    Take 1 tablet (5 mg total) by mouth every 8 (eight) hours as needed for Pain.    PALBOCICLIB (IBRANCE) 125 MG CAP    Take 125 mg by mouth as instructed Take as directed days  1-21 of each 28 day cycle. Take with food.    POLYETHYLENE GLYCOL (GLYCOLAX) 17 GRAM/DOSE POWDER    Take 17 g by mouth once daily.    PROCHLORPERAZINE (COMPAZINE) 10 MG TABLET    Take 1 tablet (10 mg total) by mouth every 6 (six) hours as needed.    SENNOSIDES (SENNA-C ORAL)    Take by mouth daily as needed.     VENLAFAXINE (EFFEXOR XR) 37.5 MG 24 HR CAPSULE    Take 1 capsule (37.5 mg total) by mouth once daily.   Modified Medications    No medications on file   Discontinued Medications    No medications on file       Review of Systems   Constitution: Negative for malaise/fatigue and weight gain.   HENT: Negative for hearing loss.    Eyes: Negative for visual disturbance.   Cardiovascular: Negative for chest pain, claudication, dyspnea on exertion, leg swelling, near-syncope, orthopnea, palpitations, paroxysmal nocturnal dyspnea and syncope.   Respiratory: Negative for cough, shortness of breath, sleep disturbances due to breathing, snoring and wheezing.    Endocrine: Negative for cold intolerance, heat intolerance, polydipsia, polyphagia and polyuria.   Hematologic/Lymphatic: Negative for bleeding problem. Does not bruise/bleed easily.   Skin: Negative for rash and suspicious lesions.   Musculoskeletal: Negative for arthritis, falls, joint pain, muscle weakness and myalgias.   Gastrointestinal: Negative for abdominal pain, change in bowel habit, constipation, diarrhea, heartburn, hematochezia, melena and nausea.   Genitourinary: Negative for hematuria and nocturia.   Neurological: Negative for excessive daytime sleepiness, dizziness, headaches, light-headedness, loss of balance and weakness.   Psychiatric/Behavioral: Negative for depression. The patient is not nervous/anxious.    Allergic/Immunologic: Negative for environmental allergies.       There were no vitals taken for this visit.    Objective:   Physical Exam    Lab Results   Component Value Date     05/08/2020    K 4.0 05/08/2020     05/08/2020     CO2 27 05/08/2020    BUN 11 05/08/2020    CREATININE 1.0 05/08/2020     (H) 05/08/2020    HGBA1C 6.0 (H) 11/29/2019    MG 1.7 05/08/2020    AST 16 05/08/2020    ALT 26 05/08/2020    ALBUMIN 3.1 (L) 05/08/2020    PROT 8.1 05/08/2020    BILITOT 0.2 05/08/2020    WBC 11.00 05/08/2020    HGB 11.7 (L) 05/08/2020    HCT 39.4 05/08/2020    MCV 90 05/08/2020     (H) 05/08/2020    INR 1.0 12/16/2019    TSH 1.286 03/28/2019    CHOL 229 (H) 03/28/2019    HDL 48 03/28/2019    LDLCALC 158.2 03/28/2019    TRIG 114 03/28/2019    BNP 44 10/19/2019       Assessment:     1. Dilated cardiomyopathy : She sounds well compensated. Add spironolactone 25 mg daily. Follow up BMP in one week. Will up-titrate her irbesartan and carvedilol at her next visit. Will schedule cardiac PYP scan given the restrictive filling pattern seen on her echo. Follow up echo in 3 months.   2. Essential hypertension : I reordered [facility] for her. She still needs sleep study.   3. Pure hypercholesterolemia : Following a heart health diet such as the Mediterranean Diet is recommended in addition to 150 minutes a week of moderate intensity exercise to lower cholesterol, maintain a healthy body weight, and improve overall cardiovascular health.   4. Malignant neoplasm of upper-inner quadrant of right breast in female, estrogen receptor positive:Currently on treatment with Ibrance, Zoladex, Arimidex. Follows with Dr. Mendez.     5. Morbid obesity (BMI= 55.9 on 10/30/14) : She consulted with Nutrition today to help her with a diet to aid weight loss.       Plan:     Diagnoses and all orders for this visit:    Dilated cardiomyopathy  -     Lipid Panel; Future  -     PYP ATTR related Amyloidosis (Cupid Only); Future  -     Hemoglobin A1C; Future  -     spironolactone (ALDACTONE) 25 MG tablet; Take 1 tablet (25 mg total) by mouth once daily.  -     Basic metabolic panel; Future    Essential hypertension  -     Airborne and Contact and Droplet  Isolation Status; Standing  -     Airborne and Contact and Droplet Isolation Status    Pure hypercholesterolemia    Malignant neoplasm of upper-inner quadrant of right breast in female, estrogen receptor positive  -     Lipid Panel; Future  -     PYP ATTR related Amyloidosis (Cupid Only); Future  -     Hemoglobin A1C; Future  -     spironolactone (ALDACTONE) 25 MG tablet; Take 1 tablet (25 mg total) by mouth once daily.  -     Basic metabolic panel; Future    Morbid obesity (BMI= 55.9 on 10/30/14)        Thank you for allowing me to participate in this patient's care. Please do not hesitate to contact me with any questions or concerns.

## 2020-05-17 DIAGNOSIS — F43.23 ADJUSTMENT DISORDER WITH MIXED ANXIETY AND DEPRESSED MOOD: ICD-10-CM

## 2020-05-18 RX ORDER — VENLAFAXINE HYDROCHLORIDE 37.5 MG/1
CAPSULE, EXTENDED RELEASE ORAL
Qty: 90 CAPSULE | Refills: 2 | Status: SHIPPED | OUTPATIENT
Start: 2020-05-18 | End: 2021-02-01

## 2020-05-20 DIAGNOSIS — Z17.0 MALIGNANT NEOPLASM OF UPPER-INNER QUADRANT OF RIGHT BREAST IN FEMALE, ESTROGEN RECEPTOR POSITIVE: ICD-10-CM

## 2020-05-20 DIAGNOSIS — R11.0 NAUSEA: ICD-10-CM

## 2020-05-20 DIAGNOSIS — C50.211 MALIGNANT NEOPLASM OF UPPER-INNER QUADRANT OF RIGHT BREAST IN FEMALE, ESTROGEN RECEPTOR POSITIVE: ICD-10-CM

## 2020-05-20 RX ORDER — PROCHLORPERAZINE MALEATE 10 MG
TABLET ORAL
Qty: 45 TABLET | Refills: 2 | Status: SHIPPED | OUTPATIENT
Start: 2020-05-20 | End: 2020-10-16

## 2020-05-22 ENCOUNTER — CLINICAL SUPPORT (OUTPATIENT)
Dept: CARDIOLOGY | Facility: CLINIC | Age: 45
End: 2020-05-22
Attending: INTERNAL MEDICINE
Payer: COMMERCIAL

## 2020-05-22 ENCOUNTER — PATIENT MESSAGE (OUTPATIENT)
Dept: CARDIOLOGY | Facility: CLINIC | Age: 45
End: 2020-05-22

## 2020-05-22 ENCOUNTER — TELEPHONE (OUTPATIENT)
Dept: CARDIOLOGY | Facility: CLINIC | Age: 45
End: 2020-05-22

## 2020-05-22 DIAGNOSIS — C50.211 MALIGNANT NEOPLASM OF UPPER-INNER QUADRANT OF RIGHT BREAST IN FEMALE, ESTROGEN RECEPTOR POSITIVE: ICD-10-CM

## 2020-05-22 DIAGNOSIS — Z17.0 MALIGNANT NEOPLASM OF UPPER-INNER QUADRANT OF RIGHT BREAST IN FEMALE, ESTROGEN RECEPTOR POSITIVE: ICD-10-CM

## 2020-05-22 DIAGNOSIS — E78.2 MIXED HYPERLIPIDEMIA: Primary | ICD-10-CM

## 2020-05-22 DIAGNOSIS — I42.0 DILATED CARDIOMYOPATHY: ICD-10-CM

## 2020-05-22 LAB — OHS CV PLANAR SCORE: 1

## 2020-05-22 PROCEDURE — 99999 PR PBB SHADOW E&M-EST. PATIENT-LVL I: CPT | Mod: PBBFAC,,,

## 2020-05-22 PROCEDURE — 78803 RP LOCLZJ TUM SPECT 1 AREA: CPT | Mod: S$GLB,,, | Performed by: INTERNAL MEDICINE

## 2020-05-22 PROCEDURE — 78803 CV PYP ATTR W SPECT (CUPID ONLY): ICD-10-PCS | Mod: S$GLB,,, | Performed by: INTERNAL MEDICINE

## 2020-05-22 PROCEDURE — A9538 CV PYP ATTR W SPECT (CUPID ONLY): ICD-10-PCS | Mod: S$GLB,,, | Performed by: INTERNAL MEDICINE

## 2020-05-22 PROCEDURE — A9538 TC99M PYROPHOSPHATE: HCPCS | Mod: S$GLB,,, | Performed by: INTERNAL MEDICINE

## 2020-05-22 PROCEDURE — 99999 PR PBB SHADOW E&M-EST. PATIENT-LVL I: ICD-10-PCS | Mod: PBBFAC,,,

## 2020-05-22 RX ORDER — ATORVASTATIN CALCIUM 40 MG/1
40 TABLET, FILM COATED ORAL DAILY
Qty: 90 TABLET | Refills: 3 | Status: SHIPPED | OUTPATIENT
Start: 2020-05-22 | End: 2021-03-15 | Stop reason: SDUPTHER

## 2020-05-22 NOTE — TELEPHONE ENCOUNTER
LDL cholesterol is elevated and 10 year ASCVD risk is 6.5%. Also, A1C is now consistent with diabetes. Recommend starting atorvastatin 40 mg daily with FLP in 6 weeks and following up with Primary Care for treatment of diabetes. PYP scan was not consistent with cardiac amyloidosis.

## 2020-05-24 ENCOUNTER — PATIENT MESSAGE (OUTPATIENT)
Dept: ADMINISTRATIVE | Facility: OTHER | Age: 45
End: 2020-05-24

## 2020-05-25 ENCOUNTER — CLINICAL SUPPORT (OUTPATIENT)
Dept: HEMATOLOGY/ONCOLOGY | Facility: CLINIC | Age: 45
End: 2020-05-25
Payer: COMMERCIAL

## 2020-05-25 DIAGNOSIS — Z71.3 NUTRITIONAL COUNSELING: Primary | ICD-10-CM

## 2020-05-25 DIAGNOSIS — E11.9 TYPE 2 DIABETES MELLITUS WITHOUT COMPLICATION, WITHOUT LONG-TERM CURRENT USE OF INSULIN: ICD-10-CM

## 2020-05-25 DIAGNOSIS — C50.211 MALIGNANT NEOPLASM OF UPPER-INNER QUADRANT OF RIGHT BREAST IN FEMALE, ESTROGEN RECEPTOR POSITIVE: ICD-10-CM

## 2020-05-25 DIAGNOSIS — I10 ESSENTIAL HYPERTENSION: ICD-10-CM

## 2020-05-25 DIAGNOSIS — Z17.0 MALIGNANT NEOPLASM OF UPPER-INNER QUADRANT OF RIGHT BREAST IN FEMALE, ESTROGEN RECEPTOR POSITIVE: ICD-10-CM

## 2020-05-25 DIAGNOSIS — E66.01 MORBID OBESITY WITH BODY MASS INDEX OF 50.0-59.9 IN ADULT: ICD-10-CM

## 2020-05-25 DIAGNOSIS — F32.9 MAJOR DEPRESSIVE DISORDER, SINGLE EPISODE WITH ANXIOUS DISTRESS: ICD-10-CM

## 2020-05-25 DIAGNOSIS — I42.0 DILATED CARDIOMYOPATHY: ICD-10-CM

## 2020-05-25 PROCEDURE — 97803 MED NUTRITION INDIV SUBSEQ: CPT | Mod: 95,,, | Performed by: DIETITIAN, REGISTERED

## 2020-05-25 PROCEDURE — 97803 PR MED NUTR THER, SUBSQ, INDIV, EA 15 MIN: ICD-10-PCS | Mod: 95,,, | Performed by: DIETITIAN, REGISTERED

## 2020-05-25 NOTE — PROGRESS NOTES
The patient location is: home   The chief complaint leading to consultation is: obesity, newly diagnosed diabetes     Visit type: audiovisual    Face to Face time with patient: 15 minutes   25  minutes of total time spent on the encounter, which includes face to face time and non-face to face time preparing to see the patient (eg, review of tests), Obtaining and/or reviewing separately obtained history, Documenting clinical information in the electronic or other health record, Independently interpreting results (not separately reported) and communicating results to the patient/family/caregiver, or Care coordination (not separately reported).         Each patient to whom he or she provides medical services by telemedicine is:  (1) informed of the relationship between the physician and patient and the respective role of any other health care provider with respect to management of the patient; and (2) notified that he or she may decline to receive medical services by telemedicine and may withdraw from such care at any time.    Oncology Nutrition Assessment for Medical Nutrition Therapy  Initial Visit    Kristopher Elmore Ephraim   1975    Referring Provider:  Bria Odom, PhD      Reason for Visit: Pt in for education and nutrition counseling     PMHx:   Past Medical History:   Diagnosis Date    Abnormal Pap smear     pt states 13yrs ago colpo was done    Anemia     Anxiety     Cancer     Cardiomyopathy     Fibroid     Hyperlipidemia     Hypertension     Sleep difficulties        Nutrition Assessment    This is a 44 y.o.female with a medical diagnosis of stage IV breast cancer. Currently on treatment with Ibrance, Zoladex, Arimidex. Will also be starting Xgeva monthly.  Follow up today for weight management counseling. Patient reports being diagnosed with diabetes on Friday (A1c 6.5). She is hoping it can be diet controlled. She is to follow up with primary care soon. Not currently on medication or  monitoring blood sugars at home.   She has been following nutrition recommendations. Her sister is a  and came over this past weekend to help with meal prepping and teaching her ways to make healthy foods, vegetables taste good.   Diet recall:   Breakfast- old fashioned oatmeal with fruit  Lunch- soup, salad with grilled chicken or shrimp and balsamic dressing  Dinner- meat/fish, vegetables   Snacks- mostly fruit   Drinks- only water   She has also resumed walking several days per week. She does reports sometimes it aggravates her back pain.      Weight:  no new height/weight available- she has a scale coming from [employer] on 5/29  Height:   BMI:There is no height or weight on file to calculate BMI.   IBW: Ideal body weight: 59.3 kg (130 lb 11.7 oz)  Adjusted ideal body weight: 102.4 kg (225 lb 12.7 oz)    Usual BW: 350lb, feels comfortable around 200lb  Weight Change: unable to assess    Nutrition focused physical findings: unable to fully assess     Allergies: Keflex [cephalexin]    Current Medications:    Current Outpatient Medications:     albuterol (PROVENTIL/VENTOLIN HFA) 90 mcg/actuation inhaler, Inhale 1-2 puffs into the lungs every 6 (six) hours as needed for Wheezing. Rescue, Disp: 1 Inhaler, Rfl: 0    amLODIPine (NORVASC) 5 MG tablet, TAKE 1 TABLET(5 MG) BY MOUTH EVERY DAY, Disp: 30 tablet, Rfl: 11    anastrozole (ARIMIDEX) 1 mg Tab, Take 1 tablet (1 mg total) by mouth once daily., Disp: 90 tablet, Rfl: 3    atorvastatin (LIPITOR) 40 MG tablet, Take 1 tablet (40 mg total) by mouth once daily., Disp: 90 tablet, Rfl: 3    carvediloL (COREG) 12.5 MG tablet, Take 1 tablet (12.5 mg total) by mouth 2 (two) times daily with meals., Disp: 60 tablet, Rfl: 11    goserelin (ZOLADEX) 3.6 mg injection, Inject 3.6 mg into the skin every 28 days., Disp: , Rfl:     irbesartan (AVAPRO) 150 MG tablet, Take 1 tablet (150 mg total) by mouth every evening., Disp: 30 tablet, Rfl: 11    loratadine (CLARITIN) 10 mg  tablet, Take 10 mg by mouth daily as needed. , Disp: , Rfl:     LORazepam (ATIVAN) 0.5 MG tablet, Take 1 tablet (0.5 mg total) by mouth every 12 (twelve) hours as needed for Anxiety., Disp: 30 tablet, Rfl: 0    meclizine (ANTIVERT) 12.5 mg tablet, Take 1 tablet (12.5 mg total) by mouth 3 (three) times daily as needed for Dizziness., Disp: 45 tablet, Rfl: 0    ondansetron (ZOFRAN) 8 MG tablet, Take 1 tablet (8 mg total) by mouth every 8 (eight) hours as needed for Nausea., Disp: 45 tablet, Rfl: 2    oxyCODONE (ROXICODONE) 5 MG immediate release tablet, Take 1 tablet (5 mg total) by mouth every 8 (eight) hours as needed for Pain., Disp: 60 tablet, Rfl: 0    palbociclib (IBRANCE) 125 mg Cap, Take 125 mg by mouth as instructed Take as directed days 1-21 of each 28 day cycle. Take with food., Disp: 21 capsule, Rfl: 6    polyethylene glycol (GLYCOLAX) 17 gram/dose powder, Take 17 g by mouth once daily., Disp: 116 g, Rfl: 0    prochlorperazine (COMPAZINE) 10 MG tablet, TAKE 1 TABLET(10 MG) BY MOUTH EVERY 6 HOURS AS NEEDED, Disp: 45 tablet, Rfl: 2    sennosides (SENNA-C ORAL), Take by mouth daily as needed. , Disp: , Rfl:     spironolactone (ALDACTONE) 25 MG tablet, Take 1 tablet (25 mg total) by mouth once daily., Disp: 90 tablet, Rfl: 3    venlafaxine (EFFEXOR-XR) 37.5 MG 24 hr capsule, TAKE 1 CAPSULE(37.5 MG) BY MOUTH EVERY DAY, Disp: 90 capsule, Rfl: 2    Food/medication interactions noted: avoid grapefruit    Vitamins/Supplements: none    Labs: Reviewed- A1c 6.5, cholesterol 226    Nutrition Diagnosis    Problem: Obese   Etiology (related to): excessive calorie intake   Signs/Symptoms (as evidenced by): BMI =59    Nutrition Intervention    Nutrition Prescription   1142-9632 Kcals (12-15kcal/kg or 20-25kcal/kg AIBW)  134 g protein (0.8g/kg or 1.3g/kg AIBW)   2500 mL fluid (15mL/kg) or per MD recs    Recommendations:  Discussed limiting activity if it is causing pain and discuss with primary doctor or Dr. Mendez.  She may need to work on weight loss before resuming exercise.   Provided patient additional recipes and cooking methods to help with acceptability of healthy foods   Check weight 2-3 times per week  Follow up with PCP regarding further diabetes management     Nutrition Monitoring and Evaluation    Monitor: energy intake, weight, diet education needs  and blood glucose     Goals: weight loss of at least 2lb per week; normalization of blood sugars     Motivation to change/anticipated barriers: no barriers identified, excellent family support     Follow up In 4 weeks     Communication to referring provider/care team: note available in chart     Counseling time: 15 Minutes    Yen Terrell, MPH, RD, , LDN, FAND   [phone]

## 2020-05-29 ENCOUNTER — TELEPHONE (OUTPATIENT)
Dept: PHARMACY | Facility: CLINIC | Age: 45
End: 2020-05-29

## 2020-05-29 DIAGNOSIS — C50.211 MALIGNANT NEOPLASM OF UPPER-INNER QUADRANT OF RIGHT BREAST IN FEMALE, ESTROGEN RECEPTOR POSITIVE: ICD-10-CM

## 2020-05-29 DIAGNOSIS — Z17.0 MALIGNANT NEOPLASM OF UPPER-INNER QUADRANT OF RIGHT BREAST IN FEMALE, ESTROGEN RECEPTOR POSITIVE: ICD-10-CM

## 2020-05-29 NOTE — PROGRESS NOTES
History:     Reason For Follow Up:   1. Stage IV (cO2dI6S3) invasive ductal carcinoma of right breast, upper inner quadrant, ER %, HI neg, Her2 neg, Grade 3, multifocal with one lesion appearing more aggressive (Ki67 80%), large LN +, bone mets.    HPI:   Kristopher Ye presents for follow up of breast cancer.   Remains on Ibrance, Zoladex, Arimidex. complaints of vaginal itching present now for over a week. No significant discharge. + significant fatigue.     Oncologic History:  Presentation  - 9/11/19 - Screening mammogram showed multiple right breast masses lower inner quadrant  - 9/13/19 - Diagnostic mammogram and US showed a right breast, 3:00 position mass, 2 CFN measuring 17 mm x 16 mm x 14 mm.  There 2 smaller adjacent masses towards the nipple  - 9/19/19 - Biopsy -    A. Right breast subareolar: Grade 3 IDC, estrogen receptor 80%, progesterone receptor 0%, Her2 dago neg, Ki67 80%.     B. Right breast mass 3:00: Grade 3 IDC with papillary features, estrogen receptor 100%, progesterone receptor 0%, Her2 dago 1+, Ki67 30%.   - 9/26/19: US right axilla with abnormal lymphadenopathy measuring 4.3 x 2.8 cm with biopsy + for metastatic breast cancer.   Surgery consultation with Dr Ferris on 9/25/19   - 9/25/19 - Genetics Genetics [employer] [place] [facility] pending; VUS pending  Medical oncology consultation on 10/7/19   * 10/8/19 - CT C/A/P with several lytic lesions in thoracic and lumbar spine, iliac bones, left scapula in addition to 3 x 4.5 cm right axillary node and 2.1 cm right breast mass. Bone scan negative.   * 10/31/19 - started Ibrance, Arimidex, Zoladex; plan for Xgeva.    * 11/12/19 STRATA without targetable mutations.    * 12/16/19 - Bone biopsy negative.       Review of Systems  Review of Systems   Constitutional: Positive for fatigue. Negative for activity change, appetite change, chills, fever and unexpected weight change.   HENT: Negative for congestion, hearing loss, nosebleeds,  sinus pressure and trouble swallowing.    Eyes: Negative for pain, discharge, itching and visual disturbance.   Respiratory: Negative for cough, chest tightness and shortness of breath.    Cardiovascular: Negative for chest pain, palpitations and leg swelling.   Gastrointestinal: Negative for abdominal distention, abdominal pain, blood in stool, constipation, diarrhea, nausea, rectal pain and vomiting.   Endocrine: Positive for heat intolerance (improving on effexor). Negative for polydipsia.   Genitourinary: Positive for vaginal pain. Negative for difficulty urinating, dysuria, flank pain, frequency, hematuria and urgency.   Musculoskeletal: Negative for arthralgias, back pain and neck pain.   Skin: Negative for color change, pallor and rash.   Allergic/Immunologic: Negative for immunocompromised state.   Neurological: Negative for dizziness, numbness and headaches.   Hematological: Negative for adenopathy. Does not bruise/bleed easily.   Psychiatric/Behavioral: Negative for confusion and decreased concentration. The patient is not nervous/anxious (improving).         Objective     Objective:     Vitals:    06/08/20 1455   BP: (!) 181/85   Pulse: 85   Resp: 16   Temp: 97.9 °F (36.6 °C)       Physical Exam   Constitutional: She is oriented to person, place, and time. She appears well-developed and well-nourished. No distress.   HENT:   Head: Normocephalic and atraumatic.   Mouth/Throat: Oropharynx is clear and moist and mucous membranes are normal. No oral lesions.   Eyes: Conjunctivae and EOM are normal. Right eye exhibits no discharge. Left eye exhibits no discharge. No scleral icterus.   Neck: Neck supple.   Cardiovascular: Normal rate, regular rhythm and normal heart sounds.   No murmur heard.  Pulmonary/Chest: Effort normal and breath sounds normal. No respiratory distress. She has no wheezes. She has no rhonchi. She has no rales. Chest wall is not dull to percussion.   Abdominal: Soft. Normal appearance and  bowel sounds are normal. There is no tenderness.   Neurological: She is alert and oriented to person, place, and time.   Skin: Skin is warm, dry and intact. No pallor.   Psychiatric: She has a normal mood and affect. Her behavior is normal. Judgment and thought content normal.   Nursing note and vitals reviewed.    Results for orders placed or performed in visit on 06/05/20   CBC Oncology   Result Value Ref Range    WBC 6.25 3.90 - 12.70 K/uL    RBC 3.62 (L) 4.00 - 5.40 M/uL    Hemoglobin 10.1 (L) 12.0 - 16.0 g/dL    Hematocrit 32.7 (L) 37.0 - 48.5 %    Mean Corpuscular Volume 90 82 - 98 fL    Mean Corpuscular Hemoglobin 27.9 27.0 - 31.0 pg    Mean Corpuscular Hemoglobin Conc 30.9 (L) 32.0 - 36.0 g/dL    RDW 17.7 (H) 11.5 - 14.5 %    Platelets 247 150 - 350 K/uL    MPV 11.0 9.2 - 12.9 fL    Gran # (ANC) 2.6 1.8 - 7.7 K/uL    Immature Grans (Abs) 0.02 0.00 - 0.04 K/uL   Comprehensive metabolic panel   Result Value Ref Range    Sodium 142 136 - 145 mmol/L    Potassium 3.7 3.5 - 5.1 mmol/L    Chloride 108 95 - 110 mmol/L    CO2 27 23 - 29 mmol/L    Glucose 116 (H) 70 - 110 mg/dL    BUN, Bld 11 6 - 20 mg/dL    Creatinine 1.0 0.5 - 1.4 mg/dL    Calcium 9.5 8.7 - 10.5 mg/dL    Total Protein 7.7 6.0 - 8.4 g/dL    Albumin 3.2 (L) 3.5 - 5.2 g/dL    Total Bilirubin 0.4 0.1 - 1.0 mg/dL    Alkaline Phosphatase 118 55 - 135 U/L    AST 19 10 - 40 U/L    ALT 25 10 - 44 U/L    Anion Gap 7 (L) 8 - 16 mmol/L    eGFR if African American >60.0 >60 mL/min/1.73 m^2    eGFR if non African American >60.0 >60 mL/min/1.73 m^2   Magnesium   Result Value Ref Range    Magnesium 1.8 1.6 - 2.6 mg/dL   Phosphorus   Result Value Ref Range    Phosphorus 4.3 2.7 - 4.5 mg/dL       Assessment     Assessment:     1. Stage IV (rV6gN4C1) invasive ductal carcinoma of right breast, upper inner quadrant, ER %, UT neg, Her2 neg, Grade 3, multifocal with one lesion appearing more aggressive (Ki67 80%), large LN +, bony mets   * Genetics negative - in  media   * 10/2019 staging scans showed diffuse bony mets  *  10/31/19 started Arimidex and Ibrance 125 mg daily days 1-21 every 28 days with Zoladex. Bone biopsy negative, but review of imaging truly looks like metastatic disease.    * 4/22/2020 PET - disease improvement.    * Cycle 8 Ibrance/Arimidex/Zoladex - to start 6/8.     2. Bony mets with L2 endplate fracture   * Bone biopsy negative but imaging looks like metastatic disease. Will continue with above plan and re-image.    * Xgeva monthly once she has dental clearance.     3. Anxiety related to cancer diagnosis.   * Seeing Dr Odom. Improving.     4. Congestive heart failure, new diagnosis   * Echo 10/2019 with EF 30%   * Follows with Dr Olivares.     5. Morbid obesity   * Adjusting diet for weight loss.     6. Hot flashes   * Effexor- improving.     7. Fatigue of malignancy -    * Discussed exercise/supplements. Discussed dose reduction of ibrance - declines at this time.      8. Vaginal dryness and itching. Patient is concerned it is yeast infection but may also be due to post-menopausal type state.   Plan:     1. Continue Zoladex, Arimidex, Ibrance cycle 8 - to start 6/8.  2. Plan Xgeva monthly once dental clearance.   3. Plan DEXA in future - once we can after coronavirus  4. Calcium/vitamin D   5. Re-image in about 4 mo from prior imaging (end of August) - order at next visit.   6. Diflucan  7. Dr Avnia referral    ACP done.   Labs/zoladex monthly; md in 2 mo      Future Appointments   Date Time Provider Department Center   6/8/2020  3:30 PM INJECTION, NOM INFUSION [facility] CHEMO [facility]   6/22/2020  1:00 PM Yen Terrell RD [facility] HEM ONC [facility]   6/24/2020  3:00 PM Miriam Humphries NP [facility] CARDIO [address]   6/29/2020 10:00 AM EUNICE Saldivar [facility] AUDIO [address]   6/29/2020 10:30 AM Xiang Escalante III, MD [facility] ENT [address]   7/9/2020 11:00 AM Bria Odom, PhD [facility] CAN PSY [facility]       The patient location is: home  The  chief complaint leading to consultation is: breast ca; high risk med  Visit type: audiovisual  Total time spent with patient: 12 min  Each patient to whom he or she provides medical services by telemedicine is:  (1) informed of the relationship between the physician and patient and the respective role of any other health care provider with respect to management of the patient; and (2) notified that he or she may decline to receive medical services by telemedicine and may withdraw from such care at any time.

## 2020-05-29 NOTE — TELEPHONE ENCOUNTER
"Dosing, how taking Ibrance: Takes 1 capsule (125mg) once daily with some food at lunchtime. Denies any missed doses.   Storage: room temperature  Handling: pours into cap and does not touch capsules directly   Side effects: no significant side effects from Ibrance  Recent infections: tested positive for coronavirus about a month ago but no s/sx. No fever, chills, cough, or SOB currently.   Pain: 0/10 - denies any pain  Appetite: off and on. Some days are good and some days are not the best. Weight stable.   Energy, fatigue: "horrible" - tries to fight it. Misses out on activities due to her energy level being low. Still pushes herself to do important things - make her MD appt. Still able to complete normal activities  Health, mood, QOL: 8/10, on anti-depressant and this does help with mood.   ED/UC visits: no  Next clinical follow up: 3 months  Medication list reviewed. No new allergies or health conditions.             "

## 2020-06-01 ENCOUNTER — TELEPHONE (OUTPATIENT)
Dept: PHARMACY | Facility: CLINIC | Age: 45
End: 2020-06-01

## 2020-06-05 ENCOUNTER — CLINICAL SUPPORT (OUTPATIENT)
Dept: HEMATOLOGY/ONCOLOGY | Facility: CLINIC | Age: 45
End: 2020-06-05
Payer: COMMERCIAL

## 2020-06-05 ENCOUNTER — LAB VISIT (OUTPATIENT)
Dept: LAB | Facility: HOSPITAL | Age: 45
End: 2020-06-05
Attending: INTERNAL MEDICINE
Payer: COMMERCIAL

## 2020-06-05 DIAGNOSIS — C50.211 MALIGNANT NEOPLASM OF UPPER-INNER QUADRANT OF RIGHT BREAST IN FEMALE, ESTROGEN RECEPTOR POSITIVE: ICD-10-CM

## 2020-06-05 DIAGNOSIS — Z17.0 MALIGNANT NEOPLASM OF UPPER-INNER QUADRANT OF RIGHT BREAST IN FEMALE, ESTROGEN RECEPTOR POSITIVE: ICD-10-CM

## 2020-06-05 DIAGNOSIS — Z13.9 SCREENING FOR CONDITION: ICD-10-CM

## 2020-06-05 LAB
ALBUMIN SERPL BCP-MCNC: 3.2 G/DL (ref 3.5–5.2)
ALP SERPL-CCNC: 118 U/L (ref 55–135)
ALT SERPL W/O P-5'-P-CCNC: 25 U/L (ref 10–44)
ANION GAP SERPL CALC-SCNC: 7 MMOL/L (ref 8–16)
AST SERPL-CCNC: 19 U/L (ref 10–40)
BILIRUB SERPL-MCNC: 0.4 MG/DL (ref 0.1–1)
BUN SERPL-MCNC: 11 MG/DL (ref 6–20)
CALCIUM SERPL-MCNC: 9.5 MG/DL (ref 8.7–10.5)
CHLORIDE SERPL-SCNC: 108 MMOL/L (ref 95–110)
CO2 SERPL-SCNC: 27 MMOL/L (ref 23–29)
CREAT SERPL-MCNC: 1 MG/DL (ref 0.5–1.4)
ERYTHROCYTE [DISTWIDTH] IN BLOOD BY AUTOMATED COUNT: 17.7 % (ref 11.5–14.5)
EST. GFR  (AFRICAN AMERICAN): >60 ML/MIN/1.73 M^2
EST. GFR  (NON AFRICAN AMERICAN): >60 ML/MIN/1.73 M^2
GLUCOSE SERPL-MCNC: 116 MG/DL (ref 70–110)
HCT VFR BLD AUTO: 32.7 % (ref 37–48.5)
HGB BLD-MCNC: 10.1 G/DL (ref 12–16)
IMM GRANULOCYTES # BLD AUTO: 0.02 K/UL (ref 0–0.04)
MAGNESIUM SERPL-MCNC: 1.8 MG/DL (ref 1.6–2.6)
MCH RBC QN AUTO: 27.9 PG (ref 27–31)
MCHC RBC AUTO-ENTMCNC: 30.9 G/DL (ref 32–36)
MCV RBC AUTO: 90 FL (ref 82–98)
NEUTROPHILS # BLD AUTO: 2.6 K/UL (ref 1.8–7.7)
PHOSPHATE SERPL-MCNC: 4.3 MG/DL (ref 2.7–4.5)
PLATELET # BLD AUTO: 247 K/UL (ref 150–350)
PMV BLD AUTO: 11 FL (ref 9.2–12.9)
POTASSIUM SERPL-SCNC: 3.7 MMOL/L (ref 3.5–5.1)
PROT SERPL-MCNC: 7.7 G/DL (ref 6–8.4)
RBC # BLD AUTO: 3.62 M/UL (ref 4–5.4)
SARS-COV-2 RNA RESP QL NAA+PROBE: NOT DETECTED
SODIUM SERPL-SCNC: 142 MMOL/L (ref 136–145)
WBC # BLD AUTO: 6.25 K/UL (ref 3.9–12.7)

## 2020-06-05 PROCEDURE — 83735 ASSAY OF MAGNESIUM: CPT

## 2020-06-05 PROCEDURE — 84100 ASSAY OF PHOSPHORUS: CPT

## 2020-06-05 PROCEDURE — 80053 COMPREHEN METABOLIC PANEL: CPT

## 2020-06-05 PROCEDURE — U0003 INFECTIOUS AGENT DETECTION BY NUCLEIC ACID (DNA OR RNA); SEVERE ACUTE RESPIRATORY SYNDROME CORONAVIRUS 2 (SARS-COV-2) (CORONAVIRUS DISEASE [COVID-19]), AMPLIFIED PROBE TECHNIQUE, MAKING USE OF HIGH THROUGHPUT TECHNOLOGIES AS DESCRIBED BY CMS-2020-01-R: HCPCS

## 2020-06-05 PROCEDURE — 85027 COMPLETE CBC AUTOMATED: CPT

## 2020-06-05 PROCEDURE — 36415 COLL VENOUS BLD VENIPUNCTURE: CPT

## 2020-06-05 NOTE — PROGRESS NOTES
COVID Screening specimen collected    Negative for following Symptoms:  Fever  Cough  Shortness of breath  Difficulty breathing  GI Symptoms such as diarrhea or nausea  Loss of taste  Loss of smell

## 2020-06-08 ENCOUNTER — INFUSION (OUTPATIENT)
Dept: INFUSION THERAPY | Facility: HOSPITAL | Age: 45
End: 2020-06-08
Attending: INTERNAL MEDICINE
Payer: COMMERCIAL

## 2020-06-08 ENCOUNTER — OFFICE VISIT (OUTPATIENT)
Dept: HEMATOLOGY/ONCOLOGY | Facility: CLINIC | Age: 45
End: 2020-06-08
Payer: COMMERCIAL

## 2020-06-08 VITALS
DIASTOLIC BLOOD PRESSURE: 85 MMHG | TEMPERATURE: 98 F | OXYGEN SATURATION: 95 % | WEIGHT: 293 LBS | BODY MASS INDEX: 47.09 KG/M2 | HEART RATE: 85 BPM | RESPIRATION RATE: 16 BRPM | SYSTOLIC BLOOD PRESSURE: 181 MMHG | HEIGHT: 66 IN

## 2020-06-08 DIAGNOSIS — Z17.0 MALIGNANT NEOPLASM OF UPPER-INNER QUADRANT OF RIGHT BREAST IN FEMALE, ESTROGEN RECEPTOR POSITIVE: Primary | ICD-10-CM

## 2020-06-08 DIAGNOSIS — C50.211 MALIGNANT NEOPLASM OF UPPER-INNER QUADRANT OF RIGHT BREAST IN FEMALE, ESTROGEN RECEPTOR POSITIVE: Primary | ICD-10-CM

## 2020-06-08 DIAGNOSIS — M84.48XD: ICD-10-CM

## 2020-06-08 DIAGNOSIS — R23.2 HOT FLASHES: ICD-10-CM

## 2020-06-08 DIAGNOSIS — N89.8 VAGINAL ITCHING: ICD-10-CM

## 2020-06-08 DIAGNOSIS — B37.31 CANDIDIASIS OF FEMALE GENITALIA: ICD-10-CM

## 2020-06-08 DIAGNOSIS — G89.3 CANCER ASSOCIATED PAIN: ICD-10-CM

## 2020-06-08 DIAGNOSIS — C79.51 BONE METASTASES: ICD-10-CM

## 2020-06-08 PROCEDURE — 3008F PR BODY MASS INDEX (BMI) DOCUMENTED: ICD-10-PCS | Mod: CPTII,S$GLB,, | Performed by: INTERNAL MEDICINE

## 2020-06-08 PROCEDURE — 3077F PR MOST RECENT SYSTOLIC BLOOD PRESSURE >= 140 MM HG: ICD-10-PCS | Mod: CPTII,S$GLB,, | Performed by: INTERNAL MEDICINE

## 2020-06-08 PROCEDURE — 63600175 PHARM REV CODE 636 W HCPCS: Mod: JG | Performed by: INTERNAL MEDICINE

## 2020-06-08 PROCEDURE — 99999 PR PBB SHADOW E&M-EST. PATIENT-LVL IV: ICD-10-PCS | Mod: PBBFAC,,, | Performed by: INTERNAL MEDICINE

## 2020-06-08 PROCEDURE — 3077F SYST BP >= 140 MM HG: CPT | Mod: CPTII,S$GLB,, | Performed by: INTERNAL MEDICINE

## 2020-06-08 PROCEDURE — 96402 CHEMO HORMON ANTINEOPL SQ/IM: CPT

## 2020-06-08 PROCEDURE — 99999 PR PBB SHADOW E&M-EST. PATIENT-LVL IV: CPT | Mod: PBBFAC,,, | Performed by: INTERNAL MEDICINE

## 2020-06-08 PROCEDURE — 3079F DIAST BP 80-89 MM HG: CPT | Mod: CPTII,S$GLB,, | Performed by: INTERNAL MEDICINE

## 2020-06-08 PROCEDURE — 3008F BODY MASS INDEX DOCD: CPT | Mod: CPTII,S$GLB,, | Performed by: INTERNAL MEDICINE

## 2020-06-08 PROCEDURE — 3079F PR MOST RECENT DIASTOLIC BLOOD PRESSURE 80-89 MM HG: ICD-10-PCS | Mod: CPTII,S$GLB,, | Performed by: INTERNAL MEDICINE

## 2020-06-08 PROCEDURE — 99215 PR OFFICE/OUTPT VISIT, EST, LEVL V, 40-54 MIN: ICD-10-PCS | Mod: S$GLB,,, | Performed by: INTERNAL MEDICINE

## 2020-06-08 PROCEDURE — 99215 OFFICE O/P EST HI 40 MIN: CPT | Mod: S$GLB,,, | Performed by: INTERNAL MEDICINE

## 2020-06-08 RX ORDER — LIDOCAINE AND PRILOCAINE 25; 25 MG/G; MG/G
CREAM TOPICAL
Qty: 30 G | Refills: 1 | Status: SHIPPED | OUTPATIENT
Start: 2020-06-08 | End: 2020-06-09 | Stop reason: SDUPTHER

## 2020-06-08 RX ORDER — FLUCONAZOLE 150 MG/1
150 TABLET ORAL DAILY
Qty: 3 TABLET | Refills: 0 | Status: SHIPPED | OUTPATIENT
Start: 2020-06-08 | End: 2020-10-16

## 2020-06-08 RX ADMIN — GOSERELIN ACETATE 3.6 MG: 3.6 IMPLANT SUBCUTANEOUS at 03:06

## 2020-06-08 NOTE — NURSING
Zoladex injection given, tolerated well.  Xgeva not given today as patient has not yet had dental clearance, states she will notify clinic when she is able to do so.  Discharged to home, nad

## 2020-06-09 ENCOUNTER — TELEPHONE (OUTPATIENT)
Dept: PHARMACY | Facility: CLINIC | Age: 45
End: 2020-06-09

## 2020-06-09 DIAGNOSIS — C50.211 MALIGNANT NEOPLASM OF UPPER-INNER QUADRANT OF RIGHT BREAST IN FEMALE, ESTROGEN RECEPTOR POSITIVE: ICD-10-CM

## 2020-06-09 DIAGNOSIS — Z17.0 MALIGNANT NEOPLASM OF UPPER-INNER QUADRANT OF RIGHT BREAST IN FEMALE, ESTROGEN RECEPTOR POSITIVE: ICD-10-CM

## 2020-06-09 RX ORDER — LIDOCAINE AND PRILOCAINE 25; 25 MG/G; MG/G
CREAM TOPICAL
Qty: 30 G | Refills: 1 | Status: SHIPPED | OUTPATIENT
Start: 2020-06-09 | End: 2020-10-16

## 2020-06-23 ENCOUNTER — PATIENT OUTREACH (OUTPATIENT)
Dept: ADMINISTRATIVE | Facility: OTHER | Age: 45
End: 2020-06-23

## 2020-06-26 ENCOUNTER — TELEPHONE (OUTPATIENT)
Dept: PHARMACY | Facility: CLINIC | Age: 45
End: 2020-06-26

## 2020-06-26 NOTE — TELEPHONE ENCOUNTER
Contacted patient to see if she was ready to refill the Ibrance 125 mg Capsule #21/28, the patient will start her off cycle tomorrow and will re-start next cycle on 7/4. Ship 07/01 for 07/02 delivery.  Verified address.  Copay $$0.00 in 004.  Patient has not started any new medications including OTC drugs. Patient has not had any medication/ dose or instruction changes. No new allergies or side effects reported with this shipment. Medication is being taken as prescribed by physician and properly stored.  Declined [facility] . -TSERING

## 2020-07-06 ENCOUNTER — INFUSION (OUTPATIENT)
Dept: INFUSION THERAPY | Facility: HOSPITAL | Age: 45
End: 2020-07-06
Attending: INTERNAL MEDICINE
Payer: COMMERCIAL

## 2020-07-06 ENCOUNTER — LAB VISIT (OUTPATIENT)
Dept: LAB | Facility: HOSPITAL | Age: 45
End: 2020-07-06
Attending: INTERNAL MEDICINE
Payer: COMMERCIAL

## 2020-07-06 DIAGNOSIS — C50.211 MALIGNANT NEOPLASM OF UPPER-INNER QUADRANT OF RIGHT BREAST IN FEMALE, ESTROGEN RECEPTOR POSITIVE: ICD-10-CM

## 2020-07-06 DIAGNOSIS — Z17.0 MALIGNANT NEOPLASM OF UPPER-INNER QUADRANT OF RIGHT BREAST IN FEMALE, ESTROGEN RECEPTOR POSITIVE: Primary | ICD-10-CM

## 2020-07-06 DIAGNOSIS — C50.211 MALIGNANT NEOPLASM OF UPPER-INNER QUADRANT OF RIGHT BREAST IN FEMALE, ESTROGEN RECEPTOR POSITIVE: Primary | ICD-10-CM

## 2020-07-06 DIAGNOSIS — Z17.0 MALIGNANT NEOPLASM OF UPPER-INNER QUADRANT OF RIGHT BREAST IN FEMALE, ESTROGEN RECEPTOR POSITIVE: ICD-10-CM

## 2020-07-06 LAB
ALBUMIN SERPL BCP-MCNC: 3.2 G/DL (ref 3.5–5.2)
ALP SERPL-CCNC: 115 U/L (ref 55–135)
ALT SERPL W/O P-5'-P-CCNC: 23 U/L (ref 10–44)
ANION GAP SERPL CALC-SCNC: 8 MMOL/L (ref 8–16)
AST SERPL-CCNC: 16 U/L (ref 10–40)
BILIRUB SERPL-MCNC: 0.3 MG/DL (ref 0.1–1)
BUN SERPL-MCNC: 9 MG/DL (ref 6–20)
CALCIUM SERPL-MCNC: 9.2 MG/DL (ref 8.7–10.5)
CHLORIDE SERPL-SCNC: 106 MMOL/L (ref 95–110)
CO2 SERPL-SCNC: 26 MMOL/L (ref 23–29)
CREAT SERPL-MCNC: 0.9 MG/DL (ref 0.5–1.4)
ERYTHROCYTE [DISTWIDTH] IN BLOOD BY AUTOMATED COUNT: 17.7 % (ref 11.5–14.5)
EST. GFR  (AFRICAN AMERICAN): >60 ML/MIN/1.73 M^2
EST. GFR  (NON AFRICAN AMERICAN): >60 ML/MIN/1.73 M^2
GLUCOSE SERPL-MCNC: 121 MG/DL (ref 70–110)
HCT VFR BLD AUTO: 35.8 % (ref 37–48.5)
HGB BLD-MCNC: 11.1 G/DL (ref 12–16)
IMM GRANULOCYTES # BLD AUTO: 0.01 K/UL (ref 0–0.04)
MCH RBC QN AUTO: 28 PG (ref 27–31)
MCHC RBC AUTO-ENTMCNC: 31 G/DL (ref 32–36)
MCV RBC AUTO: 90 FL (ref 82–98)
NEUTROPHILS # BLD AUTO: 2.9 K/UL (ref 1.8–7.7)
PLATELET # BLD AUTO: 263 K/UL (ref 150–350)
PMV BLD AUTO: 11.8 FL (ref 9.2–12.9)
POTASSIUM SERPL-SCNC: 3.8 MMOL/L (ref 3.5–5.1)
PROT SERPL-MCNC: 7.5 G/DL (ref 6–8.4)
RBC # BLD AUTO: 3.97 M/UL (ref 4–5.4)
SODIUM SERPL-SCNC: 140 MMOL/L (ref 136–145)
WBC # BLD AUTO: 7.11 K/UL (ref 3.9–12.7)

## 2020-07-06 PROCEDURE — 36415 COLL VENOUS BLD VENIPUNCTURE: CPT

## 2020-07-06 PROCEDURE — 80053 COMPREHEN METABOLIC PANEL: CPT

## 2020-07-06 PROCEDURE — 85027 COMPLETE CBC AUTOMATED: CPT

## 2020-07-06 PROCEDURE — 63600175 PHARM REV CODE 636 W HCPCS: Mod: JG | Performed by: INTERNAL MEDICINE

## 2020-07-06 PROCEDURE — 96402 CHEMO HORMON ANTINEOPL SQ/IM: CPT

## 2020-07-06 RX ADMIN — GOSERELIN ACETATE 3.6 MG: 3.6 IMPLANT SUBCUTANEOUS at 03:07

## 2020-07-08 ENCOUNTER — PATIENT MESSAGE (OUTPATIENT)
Dept: PULMONOLOGY | Facility: CLINIC | Age: 45
End: 2020-07-08

## 2020-07-08 DIAGNOSIS — N30.90 CYSTITIS: Primary | ICD-10-CM

## 2020-07-13 ENCOUNTER — OFFICE VISIT (OUTPATIENT)
Dept: PSYCHIATRY | Facility: CLINIC | Age: 45
End: 2020-07-13
Payer: COMMERCIAL

## 2020-07-13 DIAGNOSIS — Z17.0 MALIGNANT NEOPLASM OF UPPER-INNER QUADRANT OF RIGHT BREAST IN FEMALE, ESTROGEN RECEPTOR POSITIVE: ICD-10-CM

## 2020-07-13 DIAGNOSIS — C50.211 MALIGNANT NEOPLASM OF UPPER-INNER QUADRANT OF RIGHT BREAST IN FEMALE, ESTROGEN RECEPTOR POSITIVE: ICD-10-CM

## 2020-07-13 DIAGNOSIS — F32.4 MAJOR DEPRESSIVE DISORDER, SINGLE EPISODE, IN PARTIAL REMISSION WITH ANXIOUS DISTRESS: Primary | ICD-10-CM

## 2020-07-13 PROCEDURE — 90832 PR PSYCHOTHERAPY W/PATIENT, 30 MIN: ICD-10-PCS | Mod: 95,,, | Performed by: PSYCHOLOGIST

## 2020-07-13 PROCEDURE — 90832 PSYTX W PT 30 MINUTES: CPT | Mod: 95,,, | Performed by: PSYCHOLOGIST

## 2020-07-13 NOTE — PROGRESS NOTES
INFORMED CONSENT: Kristopher Ye   is known to this provider and identity was confirmed via NAME and .  The patient has been informed of the risks and benefits associated with engaging in psychotherapy, the handling of protected health information, the rights of privacy and the limits of confidentiality. The patient has also been informed of the importance of reporting any suicidal or homicidal ideation to this or any provider to ensure safety of all parties, and the Kristopher Ye expressed understanding. The patient was agreeable to these terms and freely participates in individual psychotherapy.    Telemedicine PSYCHO-ONCOLOGY NOTE/ Individual Psychotherapy   (patient not on premises due to COVID-19 restrictions)    Consultation started: 2020 at 1:02 PM   The chief complaint leading to consultation is: anxiety/depression; reassessment of needs  The patient location is:  Patient home  Virtual visit with synchronous audio and video      Patient alone at the time of consultation      Crisis Disclaimer: Patient was informed that due to the virtual nature of the visit, that if a crisis develops, protocols will be implemented to ensure patient safety, including but not limited to: 1) Initiating a welfare check with local Law Enforcement, 2) Calling [facility]/National Crisis Hotline, and/or 3) Initiating PEC/CEC procedures.      Each patient provided medical services by telemedicine is:  (1) informed of the relationship between the physician and patient and the respective role of any other health care provider with respect to management of the patient; and (2) notified that he or she may decline to receive medical services by telemedicine and may withdraw from such care at any time.    Date: 2020   Site of therapist:  [facility]         Therapeutic Intervention: Met with patient.  Outpatient - Behavior modifying psychotherapy 30 min - CPT code 84457    Referring provider:      complaint/reason for encounter: anxiety/depression; reassessment of needs  Met with patient to evaluate psychosocial adaptation to survivorship of Breast Cancer    Patient was last seen by me on 5/11/2020    Objective:      Kristopher Ye arrived promptly for the session.  The patient was fully cooperative throughout the session.  Appearance: age appropriate, appropriately  dressed, adequately  groomed  Behavior/Cooperation: friendly and cooperative  Speech: normal in rate, volume, and tone and appropriate quality, quantity and organization of sentences  Mood: euthymic  Affect: mood congruent, full range and appropriate  Thought Process: goal-directed, logical  Thought Content: normal,  No delusions or paranoia; did not appear to be responding to internal stimuli during the session  Orientation: grossly intact  Memory: Grossly intact  Attention Span/Concentration: Attends to session without distraction; reports no difficulty  Fund of Knowledge: average  Estimate of Intelligence: average from verbal skills and history  Cognition: grossly intact  Insight: patient has awareness of illness; good insight into own behavior and behavior of others  Judgment: the patient's behavior is adequate to circumstances      Interval history and content of current session: Patient reported that overall she is functioning well. She continues to live, enjoy, and spend time with herself and people who are important to her. She doesn't have stress related to her children returning to school. She has retired as a  and feels that this was the best decision for her wellbeing.   Discussed current adaptation to disease and treatment status and family's adaptation to disease and treatment status. Reports to be coping in an adequate manner.  Discussed sleep routine changes.    Evaluated cognitive response, paying particular attention to negative intrusive thoughts of a persistent and detrimental nature. Thoughts of this  type are in evidence with mild distress. Identified and evaluated psychosocial and environmental stressors secondary to diagnosis and treatment.     Focused on providing cognitive behavioral therapy to address negative cognitions. Examined proactive behaviors that may be implemented to minimize or ameliorate psychosocial stressors secondary to diagnosis and treatment.     Risk parameters:   Patient reports no suicidal ideation  Patient reports no homicidal ideation  Patient reports no self-injurious behavior  Patient reports no violent behavior   Safety needs:  None at this time      Verbal deficits: None     Patient's response to intervention:The patient's response to intervention is accepting.     Progress toward goals and other mental status changes:  The patient's progress toward goals is good.      Progress to date:Progress as Expected and Problem Resolved      Goals from last visit: Met      Patient reported outcomes:      Distress Thermometer:   Distress Score    Distress Score: 2        Practical Problems Physical Problems   : No Appearance: No   Housing: No Bathing / Dressing: Yes   Insurance / Financial: No Breathing: Yes    Transportation: No  Changes in Urination: Yes    Work / School: No  Constipation: No   Treatment Decisions: No  Diarrhea: No     Eating: No    Family Problems Fatigue: Yes    Dealing with Children: No Feeling Swollen: Yes    Dealing with Partner: No Fevers: No    Ability to Have Children: No  Getting Around: Yes       Indigestion: No     Memory / Concentration: Yes   Emotional Problems Mouth Sores: No    Depression: Yes  Nausea: No    Fears: No  Nose Dry / Congested: No    Nervousness: Yes  Pain: Yes    Sadness: Yes Sexual: No    Worry: Yes Skin Dry / Itchy: No    Loss of Interest in Usual Activities: No Sleep: Yes     Substance Abuse: No    Spiritual/Religions Concerns Tingling in Hands / Feet: No   Spritual / Taoist Concerns: No         Other Problems             PHQ  ANSWERS    Q1. Little interest or pleasure in doing things: (P) Several days (07/13/20 0026)  Q2. Feeling down, depressed, or hopeless: (P) Several days (07/13/20 0026)  Q3. Trouble falling or staying asleep, or sleeping too much: (P) More than half the days (07/13/20 0026)  Q4. Feeling tired or having little energy: (P) More than half the days (07/13/20 0026)  Q5. Poor appetite or overeating: (P) Several days (07/13/20 0026)  Q6. Feeling bad about yourself - or that you are a failure or have let yourself or your family down: (P) Not at all (07/13/20 0026)  Q7. Trouble concentrating on things, such as reading the newspaper or watching television: (P) Several days (07/13/20 0026)  Q8. Moving or speaking so slowly that other people could have noticed. Or the opposite - being so fidgety or restless that you have been moving around a lot more than usual: (P) Not at all (07/13/20 0026)  Q9.      PHQ8 Score : (P) 8 (07/13/20 0026)  PHQ-9 Total Score: (P) 8 (07/13/20 0026)     HUY-7 Answers    GAD7 7/13/2020   1. Feeling nervous, anxious, or on edge? 1   2. Not being able to stop or control worrying? 1   3. Worrying too much about different things? 1   4. Trouble relaxing? 1   5. Being so restless that it is hard to sit still? 0   6. Becoming easily annoyed or irritable? 1   7. Feeling afraid as if something awful might happen? 0   HUY-7 Score 5     HUY-7 Score: (P) 5  Interpretation: (P) Mild Anxiety       Client Strengths: verbal, intelligent, successful, good social support, good insight, commitment to wellness, strong jd, strong cultural traditions      ICD-10-CM ICD-9-CM   1. Major depressive disorder, single episode, in partial remission with anxious distress  F32.4 296.25   2. Malignant neoplasm of upper-inner quadrant of right breast in female, estrogen receptor positive  C50.211 174.2    Z17.0 V86.0       Treatment Plan:medication management by physician  · Target symptoms: depression, anxiety   · Why chosen  therapy is appropriate versus another modality: relevant to diagnosis, patient responds to this modality, evidence based practice  · Outcome monitoring methods: self-report, observation, checklist/rating scale  · Therapeutic intervention type: behavior modifying psychotherapy  · Prognosis: Good    Patient is functioning well and symptoms are most indicative of MDD, with anxious distress in partial remission. Patient will follow up PRN.    Return to clinic: as needed     Length of Service (minutes direct face-to-face contact): 30          Bria Odom PhD  [place] License #[id number]

## 2020-07-21 LAB
ADEQUACY: NORMAL
FINAL PATHOLOGIC DIAGNOSIS: NORMAL
GROSS: NORMAL
MICROSCOPIC EXAM: NORMAL

## 2020-07-24 ENCOUNTER — TELEPHONE (OUTPATIENT)
Dept: PHARMACY | Facility: CLINIC | Age: 45
End: 2020-07-24

## 2020-08-07 ENCOUNTER — LAB VISIT (OUTPATIENT)
Dept: LAB | Facility: HOSPITAL | Age: 45
End: 2020-08-07
Attending: INTERNAL MEDICINE
Payer: COMMERCIAL

## 2020-08-07 DIAGNOSIS — C50.211 MALIGNANT NEOPLASM OF UPPER-INNER QUADRANT OF RIGHT BREAST IN FEMALE, ESTROGEN RECEPTOR POSITIVE: ICD-10-CM

## 2020-08-07 DIAGNOSIS — Z17.0 MALIGNANT NEOPLASM OF UPPER-INNER QUADRANT OF RIGHT BREAST IN FEMALE, ESTROGEN RECEPTOR POSITIVE: ICD-10-CM

## 2020-08-07 LAB
ALBUMIN SERPL BCP-MCNC: 3.2 G/DL (ref 3.5–5.2)
ALP SERPL-CCNC: 123 U/L (ref 55–135)
ALT SERPL W/O P-5'-P-CCNC: 27 U/L (ref 10–44)
ANION GAP SERPL CALC-SCNC: 8 MMOL/L (ref 8–16)
AST SERPL-CCNC: 18 U/L (ref 10–40)
BILIRUB SERPL-MCNC: 0.4 MG/DL (ref 0.1–1)
BUN SERPL-MCNC: 9 MG/DL (ref 6–20)
CALCIUM SERPL-MCNC: 9.6 MG/DL (ref 8.7–10.5)
CHLORIDE SERPL-SCNC: 108 MMOL/L (ref 95–110)
CO2 SERPL-SCNC: 27 MMOL/L (ref 23–29)
CREAT SERPL-MCNC: 1 MG/DL (ref 0.5–1.4)
ERYTHROCYTE [DISTWIDTH] IN BLOOD BY AUTOMATED COUNT: 17.7 % (ref 11.5–14.5)
EST. GFR  (AFRICAN AMERICAN): >60 ML/MIN/1.73 M^2
EST. GFR  (NON AFRICAN AMERICAN): >60 ML/MIN/1.73 M^2
GLUCOSE SERPL-MCNC: 180 MG/DL (ref 70–110)
HCT VFR BLD AUTO: 37.4 % (ref 37–48.5)
HGB BLD-MCNC: 11 G/DL (ref 12–16)
IMM GRANULOCYTES # BLD AUTO: 0.02 K/UL (ref 0–0.04)
MCH RBC QN AUTO: 27.1 PG (ref 27–31)
MCHC RBC AUTO-ENTMCNC: 29.4 G/DL (ref 32–36)
MCV RBC AUTO: 92 FL (ref 82–98)
NEUTROPHILS # BLD AUTO: 4.1 K/UL (ref 1.8–7.7)
PLATELET # BLD AUTO: 295 K/UL (ref 150–350)
PMV BLD AUTO: 9.9 FL (ref 9.2–12.9)
POTASSIUM SERPL-SCNC: 3.7 MMOL/L (ref 3.5–5.1)
PROT SERPL-MCNC: 7.5 G/DL (ref 6–8.4)
RBC # BLD AUTO: 4.06 M/UL (ref 4–5.4)
SODIUM SERPL-SCNC: 143 MMOL/L (ref 136–145)
WBC # BLD AUTO: 8.84 K/UL (ref 3.9–12.7)

## 2020-08-07 PROCEDURE — 36415 COLL VENOUS BLD VENIPUNCTURE: CPT

## 2020-08-07 PROCEDURE — 80053 COMPREHEN METABOLIC PANEL: CPT

## 2020-08-07 PROCEDURE — 85027 COMPLETE CBC AUTOMATED: CPT

## 2020-08-10 ENCOUNTER — OFFICE VISIT (OUTPATIENT)
Dept: HEMATOLOGY/ONCOLOGY | Facility: CLINIC | Age: 45
End: 2020-08-10
Payer: COMMERCIAL

## 2020-08-10 ENCOUNTER — INFUSION (OUTPATIENT)
Dept: INFUSION THERAPY | Facility: HOSPITAL | Age: 45
End: 2020-08-10
Attending: INTERNAL MEDICINE
Payer: COMMERCIAL

## 2020-08-10 VITALS
WEIGHT: 293 LBS | TEMPERATURE: 98 F | DIASTOLIC BLOOD PRESSURE: 78 MMHG | BODY MASS INDEX: 47.09 KG/M2 | RESPIRATION RATE: 18 BRPM | HEART RATE: 80 BPM | HEIGHT: 66 IN | OXYGEN SATURATION: 96 % | SYSTOLIC BLOOD PRESSURE: 138 MMHG

## 2020-08-10 DIAGNOSIS — C50.211 MALIGNANT NEOPLASM OF UPPER-INNER QUADRANT OF RIGHT BREAST IN FEMALE, ESTROGEN RECEPTOR POSITIVE: Primary | ICD-10-CM

## 2020-08-10 DIAGNOSIS — I42.0 DILATED CARDIOMYOPATHY: ICD-10-CM

## 2020-08-10 DIAGNOSIS — R30.0 DYSURIA: ICD-10-CM

## 2020-08-10 DIAGNOSIS — R23.2 HOT FLASHES: ICD-10-CM

## 2020-08-10 DIAGNOSIS — E66.01 MORBID OBESITY WITH BODY MASS INDEX OF 50.0-59.9 IN ADULT: ICD-10-CM

## 2020-08-10 DIAGNOSIS — D64.81 ANTINEOPLASTIC CHEMOTHERAPY INDUCED ANEMIA: ICD-10-CM

## 2020-08-10 DIAGNOSIS — Z17.0 MALIGNANT NEOPLASM OF UPPER-INNER QUADRANT OF RIGHT BREAST IN FEMALE, ESTROGEN RECEPTOR POSITIVE: Primary | ICD-10-CM

## 2020-08-10 DIAGNOSIS — C79.51 BONE METASTASES: ICD-10-CM

## 2020-08-10 DIAGNOSIS — F32.4 MAJOR DEPRESSIVE DISORDER, SINGLE EPISODE, IN PARTIAL REMISSION WITH ANXIOUS DISTRESS: ICD-10-CM

## 2020-08-10 DIAGNOSIS — G89.3 CANCER ASSOCIATED PAIN: ICD-10-CM

## 2020-08-10 DIAGNOSIS — T45.1X5A ANTINEOPLASTIC CHEMOTHERAPY INDUCED ANEMIA: ICD-10-CM

## 2020-08-10 LAB
BACTERIA #/AREA URNS AUTO: ABNORMAL /HPF
BILIRUB UR QL STRIP: NEGATIVE
CLARITY UR REFRACT.AUTO: ABNORMAL
COLOR UR AUTO: ABNORMAL
GLUCOSE UR QL STRIP: NEGATIVE
HGB UR QL STRIP: ABNORMAL
HYALINE CASTS UR QL AUTO: 0 /LPF
KETONES UR QL STRIP: NEGATIVE
LEUKOCYTE ESTERASE UR QL STRIP: ABNORMAL
MICROSCOPIC COMMENT: ABNORMAL
NITRITE UR QL STRIP: NEGATIVE
PH UR STRIP: 5 [PH] (ref 5–8)
PROT UR QL STRIP: ABNORMAL
RBC #/AREA URNS AUTO: 10 /HPF (ref 0–4)
SP GR UR STRIP: 1.02 (ref 1–1.03)
SQUAMOUS #/AREA URNS AUTO: 70 /HPF
URN SPEC COLLECT METH UR: ABNORMAL
WBC #/AREA URNS AUTO: 67 /HPF (ref 0–5)

## 2020-08-10 PROCEDURE — 87086 URINE CULTURE/COLONY COUNT: CPT

## 2020-08-10 PROCEDURE — 3008F PR BODY MASS INDEX (BMI) DOCUMENTED: ICD-10-PCS | Mod: CPTII,S$GLB,, | Performed by: INTERNAL MEDICINE

## 2020-08-10 PROCEDURE — 3075F SYST BP GE 130 - 139MM HG: CPT | Mod: CPTII,S$GLB,, | Performed by: INTERNAL MEDICINE

## 2020-08-10 PROCEDURE — 99999 PR PBB SHADOW E&M-EST. PATIENT-LVL V: ICD-10-PCS | Mod: PBBFAC,,, | Performed by: INTERNAL MEDICINE

## 2020-08-10 PROCEDURE — 87088 URINE BACTERIA CULTURE: CPT

## 2020-08-10 PROCEDURE — 81001 URINALYSIS AUTO W/SCOPE: CPT

## 2020-08-10 PROCEDURE — 3075F PR MOST RECENT SYSTOLIC BLOOD PRESS GE 130-139MM HG: ICD-10-PCS | Mod: CPTII,S$GLB,, | Performed by: INTERNAL MEDICINE

## 2020-08-10 PROCEDURE — 99999 PR PBB SHADOW E&M-EST. PATIENT-LVL V: CPT | Mod: PBBFAC,,, | Performed by: INTERNAL MEDICINE

## 2020-08-10 PROCEDURE — 87077 CULTURE AEROBIC IDENTIFY: CPT

## 2020-08-10 PROCEDURE — 3078F DIAST BP <80 MM HG: CPT | Mod: CPTII,S$GLB,, | Performed by: INTERNAL MEDICINE

## 2020-08-10 PROCEDURE — 96402 CHEMO HORMON ANTINEOPL SQ/IM: CPT

## 2020-08-10 PROCEDURE — 87186 SC STD MICRODIL/AGAR DIL: CPT

## 2020-08-10 PROCEDURE — 3078F PR MOST RECENT DIASTOLIC BLOOD PRESSURE < 80 MM HG: ICD-10-PCS | Mod: CPTII,S$GLB,, | Performed by: INTERNAL MEDICINE

## 2020-08-10 PROCEDURE — 99215 OFFICE O/P EST HI 40 MIN: CPT | Mod: S$GLB,,, | Performed by: INTERNAL MEDICINE

## 2020-08-10 PROCEDURE — 63600175 PHARM REV CODE 636 W HCPCS: Mod: JG | Performed by: INTERNAL MEDICINE

## 2020-08-10 PROCEDURE — 99215 PR OFFICE/OUTPT VISIT, EST, LEVL V, 40-54 MIN: ICD-10-PCS | Mod: S$GLB,,, | Performed by: INTERNAL MEDICINE

## 2020-08-10 PROCEDURE — 3008F BODY MASS INDEX DOCD: CPT | Mod: CPTII,S$GLB,, | Performed by: INTERNAL MEDICINE

## 2020-08-10 RX ORDER — SULFAMETHOXAZOLE AND TRIMETHOPRIM 800; 160 MG/1; MG/1
1 TABLET ORAL 2 TIMES DAILY
Qty: 10 TABLET | Refills: 0 | Status: SHIPPED | OUTPATIENT
Start: 2020-08-10 | End: 2020-10-16

## 2020-08-10 RX ADMIN — GOSERELIN ACETATE 3.6 MG: 3.6 IMPLANT SUBCUTANEOUS at 12:08

## 2020-08-10 NOTE — Clinical Note
UA/Urine culture today  PET scan  Virtual visit with me day after PET scan  Cbc, cmp, mg, phos, Zoladex, Xgeva in 4 and 8 wks.

## 2020-08-10 NOTE — PROGRESS NOTES
History:     Reason For Follow Up:   1. Stage IV ([id number]) invasive ductal carcinoma of right breast, upper inner quadrant, ER %, [place] neg, Her2 neg, Grade 3, multifocal with one lesion appearing more aggressive (Ki67 80%), large LN +, bone mets.    HPI:   Kristopher Ye presents for follow up of breast cancer.   Remains on Ibrance, Zoladex, Arimidex.   Back is hurting worse now - can't stand for long periods of time. Hurts in lower back. Doesn't like to take pain medications.   Dysuria - took azo and it improved and now worsened again. Doesn't have PCP.     Oncologic History:  Presentation  - 9/11/19 - Screening mammogram showed multiple right breast masses lower inner quadrant  - 9/13/19 - Diagnostic mammogram and US showed a right breast, 3:00 position mass, 2 CFN measuring 17 mm x 16 mm x 14 mm.  There 2 smaller adjacent masses towards the nipple  - 9/19/19 - Biopsy -    A. Right breast subareolar: Grade 3 IDC, estrogen receptor 80%, progesterone receptor 0%, Her2 dago neg, Ki67 80%.     B. Right breast mass 3:00: Grade 3 IDC with papillary features, estrogen receptor 100%, progesterone receptor 0%, Her2 dago 1+, Ki67 30%.   - 9/26/19: US right axilla with abnormal lymphadenopathy measuring 4.3 x 2.8 cm with biopsy + for metastatic breast cancer.   Surgery consultation with Dr Ferris on 9/25/19   - 9/25/19 - Genetics [facility] pending; VUS pending  Medical oncology consultation on 10/7/19   * 10/8/19 - CT C/A/P with several lytic lesions in thoracic and lumbar spine, iliac bones, left scapula in addition to 3 x 4.5 cm right axillary node and 2.1 cm right breast mass. Bone scan negative.   * 10/31/19 - started Ibrance, Arimidex, Zoladex; plan for Xgeva.    * 11/12/19 STRATA without targetable mutations.    * 12/16/19 - Bone biopsy + for met disease (initially read as negative, but we treated as metastatic disease given high suspicion).       Review of Systems  Review of  Systems   Constitutional: Positive for fatigue. Negative for activity change, appetite change, chills, fever and unexpected weight change.   HENT: Negative for congestion, hearing loss, nosebleeds, sinus pressure and trouble swallowing.    Eyes: Negative for pain, discharge, itching and visual disturbance.   Respiratory: Negative for cough, chest tightness and shortness of breath.    Cardiovascular: Negative for chest pain, palpitations and leg swelling.   Gastrointestinal: Negative for abdominal distention, abdominal pain, blood in stool, constipation, diarrhea, nausea, rectal pain and vomiting.   Endocrine: Positive for heat intolerance (improving on effexor). Negative for polydipsia.   Genitourinary: Positive for dysuria and urgency. Negative for difficulty urinating, flank pain, frequency, hematuria and vaginal pain.   Musculoskeletal: Positive for back pain. Negative for arthralgias and neck pain.   Skin: Negative for color change, pallor and rash.   Allergic/Immunologic: Negative for immunocompromised state.   Neurological: Negative for dizziness, numbness and headaches.   Hematological: Negative for adenopathy. Does not bruise/bleed easily.   Psychiatric/Behavioral: Negative for confusion and decreased concentration. The patient is not nervous/anxious (improving).         Objective     Objective:     Vitals:    08/10/20 1152   BP: 138/78   Pulse: 80   Resp: 18   Temp: 98.4 °F (36.9 °C)       Physical Exam  Vitals signs and nursing note reviewed.   Constitutional:       General: She is not in acute distress.     Appearance: Normal appearance. She is well-developed. She is obese.   HENT:      Head: Normocephalic and atraumatic.      Mouth/Throat:      Mouth: No oral lesions.   Eyes:      General: No scleral icterus.        Right eye: No discharge.         Left eye: No discharge.      Conjunctiva/sclera: Conjunctivae normal.   Neck:      Musculoskeletal: Neck supple.   Cardiovascular:      Rate and Rhythm:  Normal rate and regular rhythm.      Heart sounds: Normal heart sounds. No murmur.   Pulmonary:      Effort: Pulmonary effort is normal. No respiratory distress.      Breath sounds: Normal breath sounds. No wheezing, rhonchi or rales.   Chest:      Chest wall: There is no dullness to percussion.   Abdominal:      General: Bowel sounds are normal.      Palpations: Abdomen is soft.      Tenderness: There is no abdominal tenderness.   Skin:     General: Skin is warm and dry.      Coloration: Skin is not pale.   Neurological:      Mental Status: She is alert and oriented to person, place, and time.   Psychiatric:         Behavior: Behavior normal.         Thought Content: Thought content normal.         Judgment: Judgment normal.       Results for orders placed or performed in visit on 08/07/20   CBC Oncology   Result Value Ref Range    WBC 8.84 3.90 - 12.70 K/uL    RBC 4.06 4.00 - 5.40 M/uL    Hemoglobin 11.0 (L) 12.0 - 16.0 g/dL    Hematocrit 37.4 37.0 - 48.5 %    Mean Corpuscular Volume 92 82 - 98 fL    Mean Corpuscular Hemoglobin 27.1 27.0 - 31.0 pg    Mean Corpuscular Hemoglobin Conc 29.4 (L) 32.0 - 36.0 g/dL    RDW 17.7 (H) 11.5 - 14.5 %    Platelets 295 150 - 350 K/uL    MPV 9.9 9.2 - 12.9 fL    Gran # (ANC) 4.1 1.8 - 7.7 K/uL    Immature Grans (Abs) 0.02 0.00 - 0.04 K/uL   Comprehensive metabolic panel   Result Value Ref Range    Sodium 143 136 - 145 mmol/L    Potassium 3.7 3.5 - 5.1 mmol/L    Chloride 108 95 - 110 mmol/L    CO2 27 23 - 29 mmol/L    Glucose 180 (H) 70 - 110 mg/dL    BUN, Bld 9 6 - 20 mg/dL    Creatinine 1.0 0.5 - 1.4 mg/dL    Calcium 9.6 8.7 - 10.5 mg/dL    Total Protein 7.5 6.0 - 8.4 g/dL    Albumin 3.2 (L) 3.5 - 5.2 g/dL    Total Bilirubin 0.4 0.1 - 1.0 mg/dL    Alkaline Phosphatase 123 55 - 135 U/L    AST 18 10 - 40 U/L    ALT 27 10 - 44 U/L    Anion Gap 8 8 - 16 mmol/L    eGFR if African American >60.0 >60 mL/min/1.73 m^2    eGFR if non African American >60.0 >60 mL/min/1.73 m^2        Assessment     Assessment:     1. Stage IV ([id number]) invasive ductal carcinoma of right breast, upper inner quadrant, ER %, [place] neg, Her2 neg, Grade 3, multifocal with one lesion appearing more aggressive (Ki67 80%), large LN +, bony mets   * Genetics negative - in media   * 10/2019 staging scans showed diffuse bony mets  *  10/31/19 started Arimidex and Ibrance 125 mg daily days 1-21 every 28 days with Zoladex. Bone biopsy negative, but review of imaging truly looks like metastatic disease.    * 4/22/2020 PET - disease improvement.    * Cycle 10 Ibrance/Arimidex/Zoladex - to start 8/10.     2. Bony mets with L2 endplate fracture   *  Xgeva monthly  - we had been waiting on dental clearance, but she has been unable to get into dentistry and is accepting of risks. Has no active dental disease.     3. Anxiety related to cancer diagnosis.   * Seeing Dr Odom. Improving.     4. Cardiomyopathy   * Echo 10/2019 with EF 30%   * Follows with Dr Olivares.     5. Morbid obesity   * Adjusting diet for weight loss. Discussed    6. Hot flashes   * Effexor- improving.     7. Chemo anemia. Monitor.     9. Dysuria - Check UA/culture; empiric treatment with bactrim  10. Cancer pain   - Back pain worsening. Rescan and consider radiation. Doesn't want pain Rx.   11. Hyperglycemia   - Needs to find PCP - discussed how to obtain one  Plan:     1. Continue Zoladex, Arimidex, Ibrance cycle 10 - started 8/10.  2. Plan Xgeva monthly  3. Plan DEXA in future - once we can after coronavirus  4. Calcium/vitamin D   5. Re-image with PET scan to eval response to treatment and worsening back apin  6. Bactrim, UA with reflex to culture if needed  7. Virtual visit with me after PET scan.   8. Consider RT to spine to help with pain; she does not want pain Rx    ACP done.         Future Appointments   Date Time Provider Department Center   8/10/2020 12:30 PM IN[facility]CTION, [facility] Blake Barakat

## 2020-08-10 NOTE — NURSING
1245 Pt arrived for zoladex injection. Unable to receive xgeva this afternoon, dental clearance pending.  Pt doing well. Reports hot flashes in the evening, some depression. Denies HAs, abnormal swelling, headaches. Pt sees psychologist. Zoladex administered sub-q to RLabdomen, pellet released. Band aid applied to site. Pt ambulatory out of infusion suite independently. AVS declined.

## 2020-08-11 ENCOUNTER — PATIENT MESSAGE (OUTPATIENT)
Dept: HEMATOLOGY/ONCOLOGY | Facility: CLINIC | Age: 45
End: 2020-08-11

## 2020-08-12 ENCOUNTER — PATIENT MESSAGE (OUTPATIENT)
Dept: HEMATOLOGY/ONCOLOGY | Facility: CLINIC | Age: 45
End: 2020-08-12

## 2020-08-12 DIAGNOSIS — G89.3 CANCER ASSOCIATED PAIN: ICD-10-CM

## 2020-08-12 DIAGNOSIS — Z17.0 MALIGNANT NEOPLASM OF UPPER-INNER QUADRANT OF RIGHT BREAST IN FEMALE, ESTROGEN RECEPTOR POSITIVE: Primary | ICD-10-CM

## 2020-08-12 DIAGNOSIS — G89.3 CANCER ASSOCIATED PAIN: Primary | ICD-10-CM

## 2020-08-12 DIAGNOSIS — C50.211 MALIGNANT NEOPLASM OF UPPER-INNER QUADRANT OF RIGHT BREAST IN FEMALE, ESTROGEN RECEPTOR POSITIVE: Primary | ICD-10-CM

## 2020-08-12 LAB — BACTERIA UR CULT: ABNORMAL

## 2020-08-12 RX ORDER — IBUPROFEN 800 MG/1
800 TABLET ORAL 2 TIMES DAILY PRN
Qty: 45 TABLET | Refills: 2 | Status: SHIPPED | OUTPATIENT
Start: 2020-08-12 | End: 2021-05-14 | Stop reason: SDUPTHER

## 2020-08-12 RX ORDER — TRAMADOL HYDROCHLORIDE 50 MG/1
50 TABLET ORAL EVERY 8 HOURS PRN
Qty: 60 TABLET | Refills: 0 | Status: SHIPPED | OUTPATIENT
Start: 2020-08-12 | End: 2020-10-16

## 2020-08-12 RX ORDER — IBUPROFEN 800 MG/1
800 TABLET ORAL 2 TIMES DAILY PRN
Qty: 60 TABLET | Refills: 2 | Status: CANCELLED | OUTPATIENT
Start: 2020-08-12 | End: 2021-08-12

## 2020-08-19 ENCOUNTER — HOSPITAL ENCOUNTER (OUTPATIENT)
Dept: RADIOLOGY | Facility: HOSPITAL | Age: 45
Discharge: HOME | End: 2020-08-19
Attending: INTERNAL MEDICINE
Payer: COMMERCIAL

## 2020-08-19 DIAGNOSIS — C50.211 MALIGNANT NEOPLASM OF UPPER-INNER QUADRANT OF RIGHT BREAST IN FEMALE, ESTROGEN RECEPTOR POSITIVE: ICD-10-CM

## 2020-08-19 DIAGNOSIS — G89.3 CANCER ASSOCIATED PAIN: ICD-10-CM

## 2020-08-19 DIAGNOSIS — C79.51 BONE METASTASES: ICD-10-CM

## 2020-08-19 DIAGNOSIS — Z17.0 MALIGNANT NEOPLASM OF UPPER-INNER QUADRANT OF RIGHT BREAST IN FEMALE, ESTROGEN RECEPTOR POSITIVE: ICD-10-CM

## 2020-08-19 PROCEDURE — 78815 NM PET CT ROUTINE: ICD-10-PCS | Mod: 26,PS,, | Performed by: RADIOLOGY

## 2020-08-19 PROCEDURE — A9552 F18 FDG: HCPCS

## 2020-08-19 PROCEDURE — 78815 PET IMAGE W/CT SKULL-THIGH: CPT | Mod: TC

## 2020-08-19 PROCEDURE — 78815 PET IMAGE W/CT SKULL-THIGH: CPT | Mod: 26,PS,, | Performed by: RADIOLOGY

## 2020-08-20 LAB — POCT GLUCOSE: 121 MG/DL (ref 70–110)

## 2020-08-21 ENCOUNTER — OFFICE VISIT (OUTPATIENT)
Dept: HEMATOLOGY/ONCOLOGY | Facility: CLINIC | Age: 45
End: 2020-08-21
Payer: COMMERCIAL

## 2020-08-21 ENCOUNTER — TELEPHONE (OUTPATIENT)
Dept: RADIATION ONCOLOGY | Facility: CLINIC | Age: 45
End: 2020-08-21

## 2020-08-21 ENCOUNTER — TELEPHONE (OUTPATIENT)
Dept: PHARMACY | Facility: CLINIC | Age: 45
End: 2020-08-21

## 2020-08-21 DIAGNOSIS — Z17.0 MALIGNANT NEOPLASM OF UPPER-INNER QUADRANT OF RIGHT BREAST IN FEMALE, ESTROGEN RECEPTOR POSITIVE: Primary | ICD-10-CM

## 2020-08-21 DIAGNOSIS — G89.3 CANCER ASSOCIATED PAIN: ICD-10-CM

## 2020-08-21 DIAGNOSIS — C79.51 BONE METASTASES: ICD-10-CM

## 2020-08-21 DIAGNOSIS — C50.211 MALIGNANT NEOPLASM OF UPPER-INNER QUADRANT OF RIGHT BREAST IN FEMALE, ESTROGEN RECEPTOR POSITIVE: Primary | ICD-10-CM

## 2020-08-21 PROCEDURE — 99215 PR OFFICE/OUTPT VISIT, EST, LEVL V, 40-54 MIN: ICD-10-PCS | Mod: 95,,, | Performed by: INTERNAL MEDICINE

## 2020-08-21 PROCEDURE — 99215 OFFICE O/P EST HI 40 MIN: CPT | Mod: 95,,, | Performed by: INTERNAL MEDICINE

## 2020-08-21 NOTE — Clinical Note
Refer to rad onc  Cbc, cmp, ca 15-3 on 10/1. See me 10/1.   Make sure cbc, cmp, ca 15-3 set for labs on 9/4.

## 2020-08-21 NOTE — PROGRESS NOTES
History:     Reason For Follow Up:   1. Stage IV ([id number]) invasive ductal carcinoma of right breast, upper inner quadrant, ER %, [place] neg, Her2 neg, Grade 3, multifocal with one lesion appearing more aggressive (Ki67 80%), large LN +, bone mets.    HPI:   Kristopher Ye presents for follow up of breast cancer.   Remains on Ibrance, Zoladex, Arimidex.   Back pain present - requiring ultram.   Dysuria - improved after bactrim..   PET with disease improvement.     Oncologic History:  Presentation  - 9/11/19 - Screening mammogram showed multiple right breast masses lower inner quadrant  - 9/13/19 - Diagnostic mammogram and US showed a right breast, 3:00 position mass, 2 CFN measuring 17 mm x 16 mm x 14 mm.  There 2 smaller adjacent masses towards the nipple  - 9/19/19 - Biopsy -    A. Right breast subareolar: Grade 3 IDC, estrogen receptor 80%, progesterone receptor 0%, Her2 dago neg, Ki67 80%.     B. Right breast mass 3:00: Grade 3 IDC with papillary features, estrogen receptor 100%, progesterone receptor 0%, Her2 dago 1+, Ki67 30%.   - 9/26/19: US right axilla with abnormal lymphadenopathy measuring 4.3 x 2.8 cm with biopsy + for metastatic breast cancer.   Surgery consultation with Dr Ferris on 9/25/19   - 9/25/19 - [facility]; VUS pending  Medical oncology consultation on 10/7/19   * 10/8/19 - CT C/A/P with several lytic lesions in thoracic and lumbar spine, iliac bones, left scapula in addition to 3 x 4.5 cm right axillary node and 2.1 cm right breast mass. Bone scan negative.   * 10/31/19 - started Ibrance, Arimidex, Zoladex; plan for Xgeva.    * 11/12/19 STRATA without targetable mutations.    * 12/16/19 - Bone biopsy + for met disease (initially read as negative, but we treated as metastatic disease given high suspicion).       Review of Systems  Review of Systems   Constitutional: Positive for fatigue. Negative for activity change, appetite change,  chills, fever and unexpected weight change.   HENT: Negative for congestion, hearing loss, nosebleeds, sinus pressure and trouble swallowing.    Eyes: Negative for pain, discharge, itching and visual disturbance.   Respiratory: Negative for cough, chest tightness and shortness of breath.    Cardiovascular: Negative for chest pain, palpitations and leg swelling.   Gastrointestinal: Negative for abdominal distention, abdominal pain, blood in stool, constipation, diarrhea, nausea, rectal pain and vomiting.   Endocrine: Positive for heat intolerance (improving on effexor). Negative for polydipsia.   Genitourinary: Negative for difficulty urinating, dysuria, flank pain, frequency, hematuria, urgency and vaginal pain.   Musculoskeletal: Positive for back pain. Negative for arthralgias and neck pain.   Skin: Negative for color change, pallor and rash.   Allergic/Immunologic: Negative for immunocompromised state.   Neurological: Negative for dizziness, numbness and headaches.   Hematological: Negative for adenopathy. Does not bruise/bleed easily.   Psychiatric/Behavioral: Negative for confusion and decreased concentration. The patient is not nervous/anxious (improving).         Objective     Objective:     There were no vitals filed for this visit.    Physical Exam  Constitutional:       Appearance: She is well-developed.   Neurological:      Mental Status: She is alert and oriented to person, place, and time.   Psychiatric:         Behavior: Behavior normal.         Thought Content: Thought content normal.         Judgment: Judgment normal.       Results for orders placed or performed during the hospital encounter of 08/19/20   POCT glucose   Result Value Ref Range    POCT Glucose 121 (H) 70 - 110 mg/dL     I have personally reviewed imaging results and agree with assessment as detailed in the imaging dictation.     NM PET CT Routine Skull to Mid Thigh  Narrative: EXAMINATION:  NM PET CT ROUTINE    CLINICAL  HISTORY:  diagnosis associated with order; Malignant neoplasm of upper-inner quadrant of right female breast    TECHNIQUE:  12.6 mCi of F18-FDG was administered intravenously in the right hand.  After an approximately 60 min distribution time, PET/CT images were acquired from the skull base to mid thigh.  Transmission images were acquired to correct for attenuation using a whole body low-dose CT scan without contrast with the arms positioned above the head. Glycemia at the time of injection was 121 mg/dL.    COMPARISON:  FDG PET 04/22/2020, 01/31/2020, 10/14/2019    FINDINGS:  Quality of the study: Adequate.    In the head and neck, there are no hypermetabolic lesions worrisome for malignancy. There are no hypermetabolic mucosal lesions, and there are no pathologically enlarged or hypermetabolic lymph nodes.    In the chest, redemonstration of mildly hypermetabolic right axillary level 1 lymph node measuring 1.2 cm in short axis with SUV max 2.1 (previously 2.3 cm and SUV max 2.9).  No evidence of new hypermetabolic breast masses.  There are no concerning pulmonary nodules or masses, and there are no pathologically enlarged or hypermetabolic lymph nodes.    In the abdomen and pelvis, there is physiologic tracer distribution within the abdominal organs and excretion into the genitourinary system.  Scattered colonic diverticula without evidence of diverticulitis.    In the bones, there are no hypermetabolic lesions worrisome for malignancy.  Diffuse axial skeleton radiotracer uptake suggestive of bone marrow hyperplasia.  Redemonstration of lytic changes in L2, L3, left acetabulum no significant radiotracer uptake consistent with treated metastasis.  No evidence of new osseous lesions  Impression: Interval decrease in size of level 1 axillary lymph node with stable mild radiotracer uptake.  No evidence of new disease.    Redemonstration of multiple lytic lesions without significant radiotracer uptake within L2, L3,  the left acetabulum consistent with treated metastasis.    I, Antony Suarez MD, attest that I reviewed and interpreted the images.    Electronically signed by resident: Patrick Monge  Date:    08/19/2020  Time:    15:30    Electronically signed by: Antony Suarez  Date:    08/20/2020  Time:    09:45      Assessment     Assessment:     1. Stage IV ([id number]) invasive ductal carcinoma of right breast, upper inner quadrant, ER %, [place] neg, Her2 neg, Grade 3, multifocal with one lesion appearing more aggressive (Ki67 80%), large LN +, bony mets   * Genetics negative - in media   * 10/2019 staging scans showed diffuse bony mets  *  10/31/19 started Arimidex and Ibrance 125 mg daily days 1-21 every 28 days with Zoladex. Bone biopsy negative, but review of imaging truly looks like metastatic disease.    * 4/22/2020 PET - disease improvement. 8/2020 PET with disease improvement.    * Cycle 10 Ibrance/Arimidex/Zoladex - started 8/10.      2. Bony mets with L2 endplate fracture   *  Xgeva monthly  - we had been waiting on dental clearance, but she has been unable to get into dentistry and is accepting of risks. Has no active dental disease.     3. Anxiety related to cancer diagnosis.   * Seeing Dr Odom. Improving.     4. Cardiomyopathy   * Echo 10/2019 with EF 30%   * Follows with Dr Olivares.     5. Morbid obesity   * Adjusting diet for weight loss. Discussed    6. Hot flashes   * Effexor- improving.     7. Chemo anemia. Monitor.     9. Cancer pain   - Back pain worsening. Refer to RT to consider palliative RT.   Plan:     1. Continue Zoladex, Arimidex, Ibrance cycle 10 - started 8/10. Cycle 11 on 9/7 and Cycle 12 on 10/5.   2. Refer to rad onc to consider palliative RT to spine for back pain.   3. Plan Xgeva monthly  4. Repeat imaging in about 4 mo  5. Plan DEXA in future - once we can after coronavirus  6. Calcium/vitamin D     Has Zoladex scheduled on 9/8 and 10/6.   Refer to rad onc  Cbc, cmp, ca 15-3 on 10/1. See me  10/1.   Make sure cbc, cmp, ca 15-3 set for labs on 9/4.  Discussed transitioning to another provider upon me departure.       Future Appointments   Date Time Provider Department Center   9/4/2020  1:15 PM LAB, [facility] [place]   9/8/2020  1:00 PM [facility]CTION[facility] Cance   10/5/2020  1:15 PM LAB, [facility] [place]   10/6/2020  1:00 PM INJE[facility]   11/12/2020  3:15 PM ECHO[facility] Derrick Nevarez   11/12/2020  5:00 PM Floridalma Olivares MD [facility] CARDIO Derrick jordan     The patient location is: home  The chief complaint leading to consultation is: breastca; high risk med managemetn, scan review.     Visit type: audiovisual    Face to Face time with patient: 12 min  20 minutes of total time spent on the encounter, which includes face to face time and non-face to face time preparing to see the patient (eg, review of tests), Obtaining and/or reviewing separately obtained history, Documenting clinical information in the electronic or other health record, Independently interpreting results (not separately reported) and communicating results to the patient/family/caregiver, or Care coordination (not separately reported).         Each patient to whom he or she provides medical services by telemedicine is:  (1) informed of the relationship between the physician and patient and the respective role of any other health care provider with respect to management of the patient; and (2) notified that he or she may decline to receive medical services by telemedicine and may withdraw from such care at any time.    Notes:

## 2020-08-26 ENCOUNTER — TELEPHONE (OUTPATIENT)
Dept: OBSTETRICS AND GYNECOLOGY | Facility: CLINIC | Age: 45
End: 2020-08-26

## 2020-08-26 NOTE — TELEPHONE ENCOUNTER
Referral discussed in length. Pt c/o VMS, fatigue, irritability and joint pain, low libido on current Zoladex, Arimidex and Ibrance therapies. Pt confirmed appt with Dr. Avina for 09/03/2020 at 1030. BALWINDER Auguste.

## 2020-08-27 NOTE — TELEPHONE ENCOUNTER
Dosing, how taking Ibrance: Takes 1 capsule (125mg) by mouth in the evening with dinner. Denies any missed doses.   Storage: room temperature  Handling: uses cap of container and does not touch capsules directly.   Side effects: no side effects noted with the Ibrance. Denies any N/V/D/C or rash.  Recent infections: no fever, chills, cough  Pain: 6/10 - spinal pain. Worsened over the past few months. Takes Ibuprofen and tramadol - helps but makes her tired (brings pain level to 3/10).  Appetite: some days are good and some days are not. Most days she has at least 2 meals a day. Drinking plenty of water.   Energy, fatigue: low, she is able to complete normal daily activities, but rests a lot due to her back pain.   Health, mood, QOL: 8/10 (10 being the best)  ED/UC visits: no  Next clinical follow up: 3 months  Medication list reviewed. No new allergies or health conditions.     Refill: Patient states that she will start her next cycle on 9/7. Medication will be shipped on 9/2 for patient to receive on 9/3. Address confirmed. $0. Denies any missed doses. Medication list reviewed. No new allergies or health conditions. Confirmed correct dose, storage, and administration. Denies any significant issues.

## 2020-08-28 ENCOUNTER — INITIAL CONSULT (OUTPATIENT)
Dept: RADIATION ONCOLOGY | Facility: CLINIC | Age: 45
End: 2020-08-28
Payer: COMMERCIAL

## 2020-08-28 ENCOUNTER — HOSPITAL ENCOUNTER (OUTPATIENT)
Dept: RADIATION THERAPY | Facility: HOSPITAL | Age: 45
Discharge: HOME | End: 2020-08-28
Attending: RADIOLOGY
Payer: COMMERCIAL

## 2020-08-28 VITALS
SYSTOLIC BLOOD PRESSURE: 155 MMHG | HEIGHT: 64 IN | WEIGHT: 293 LBS | DIASTOLIC BLOOD PRESSURE: 81 MMHG | OXYGEN SATURATION: 94 % | HEART RATE: 75 BPM | TEMPERATURE: 98 F | BODY MASS INDEX: 50.02 KG/M2 | RESPIRATION RATE: 18 BRPM

## 2020-08-28 DIAGNOSIS — C79.51 BONE METASTASES: ICD-10-CM

## 2020-08-28 DIAGNOSIS — Z17.0 MALIGNANT NEOPLASM OF UPPER-INNER QUADRANT OF RIGHT BREAST IN FEMALE, ESTROGEN RECEPTOR POSITIVE: ICD-10-CM

## 2020-08-28 DIAGNOSIS — C50.211 MALIGNANT NEOPLASM OF UPPER-INNER QUADRANT OF RIGHT BREAST IN FEMALE, ESTROGEN RECEPTOR POSITIVE: ICD-10-CM

## 2020-08-28 DIAGNOSIS — G89.3 CANCER ASSOCIATED PAIN: ICD-10-CM

## 2020-08-28 PROCEDURE — 77263 PR  RADIATION THERAPY PLAN COMPLEX: ICD-10-PCS | Mod: ,,, | Performed by: RADIOLOGY

## 2020-08-28 PROCEDURE — 77290 THER RAD SIMULAJ FIELD CPLX: CPT | Mod: 26,,, | Performed by: RADIOLOGY

## 2020-08-28 PROCEDURE — 99999 PR PBB SHADOW E&M-EST. PATIENT-LVL V: CPT | Mod: PBBFAC,,, | Performed by: RADIOLOGY

## 2020-08-28 PROCEDURE — 3008F PR BODY MASS INDEX (BMI) DOCUMENTED: ICD-10-PCS | Mod: CPTII,S$GLB,, | Performed by: RADIOLOGY

## 2020-08-28 PROCEDURE — 3079F PR MOST RECENT DIASTOLIC BLOOD PRESSURE 80-89 MM HG: ICD-10-PCS | Mod: CPTII,S$GLB,, | Performed by: RADIOLOGY

## 2020-08-28 PROCEDURE — 3079F DIAST BP 80-89 MM HG: CPT | Mod: CPTII,S$GLB,, | Performed by: RADIOLOGY

## 2020-08-28 PROCEDURE — 3077F PR MOST RECENT SYSTOLIC BLOOD PRESSURE >= 140 MM HG: ICD-10-PCS | Mod: CPTII,S$GLB,, | Performed by: RADIOLOGY

## 2020-08-28 PROCEDURE — 99204 PR OFFICE/OUTPT VISIT, NEW, LEVL IV, 45-59 MIN: ICD-10-PCS | Mod: 25,S$GLB,, | Performed by: RADIOLOGY

## 2020-08-28 PROCEDURE — 77014 HC CT GUIDANCE RADIATION THERAPY FLDS PLACEMENT: CPT | Mod: TC | Performed by: RADIOLOGY

## 2020-08-28 PROCEDURE — 77334 RADIATION TREATMENT AID(S): CPT | Mod: 26,,, | Performed by: RADIOLOGY

## 2020-08-28 PROCEDURE — 99999 PR PBB SHADOW E&M-EST. PATIENT-LVL V: ICD-10-PCS | Mod: PBBFAC,,, | Performed by: RADIOLOGY

## 2020-08-28 PROCEDURE — 77263 THER RADIOLOGY TX PLNG CPLX: CPT | Mod: ,,, | Performed by: RADIOLOGY

## 2020-08-28 PROCEDURE — 77334 RADIATION TREATMENT AID(S): CPT | Mod: TC | Performed by: RADIOLOGY

## 2020-08-28 PROCEDURE — 3077F SYST BP >= 140 MM HG: CPT | Mod: CPTII,S$GLB,, | Performed by: RADIOLOGY

## 2020-08-28 PROCEDURE — 99204 OFFICE O/P NEW MOD 45 MIN: CPT | Mod: 25,S$GLB,, | Performed by: RADIOLOGY

## 2020-08-28 PROCEDURE — 77290 THER RAD SIMULAJ FIELD CPLX: CPT | Mod: TC | Performed by: RADIOLOGY

## 2020-08-28 PROCEDURE — 77290 PR  SET RADN THERAPY FIELD COMPLEX: ICD-10-PCS | Mod: 26,,, | Performed by: RADIOLOGY

## 2020-08-28 PROCEDURE — 77334 PR  RADN TREATMENT AID(S) COMPLX: ICD-10-PCS | Mod: 26,,, | Performed by: RADIOLOGY

## 2020-08-28 PROCEDURE — 3008F BODY MASS INDEX DOCD: CPT | Mod: CPTII,S$GLB,, | Performed by: RADIOLOGY

## 2020-08-28 NOTE — PROGRESS NOTES
08/28/2020    Radiation Oncology Consultation    Assessment   This is a 44 y.o. y/o female with Stage IV Right breast IDC (ER+, Her2 neg) diagnosed in 9/2019 from Right breast biopsy. She was noted to have metastatic disease to bone during initial staging. She was started on Ibrance and Arimidex with Dr. Mendez and continues on that today. Most recent PET/CT 8/19/20 demonstrated continued response to treatment without evidence of focal uptake in sites of bone metastases, though she has diffuse uptake in marrow. She is referred for consideration of palliative RT for lower back pain.     I discussed the role of radiation in relief of pain from bone metastases. Since her disease appears to have been responding on recent imaging and even demonstrates some remineralization of the lytic lesions at L2-L3, I can't be certain her lower back pain is due to cancer. Given the low risk of side effects, I think it is reasonable to try a palliative course of RT 8 Gy x1 to L1-L4. Potential side effects of radiation to the lumbar spine were reviewed. At the end of our discussion, she wished to proceed with the recommended treatment.         Plan   1) CT Simulation today for radiation treatment planning.        Chief Complaint   Patient presents with    Breast Cancer     radiation cons       HPI: Ms. Ye is a 45 y/o female who was diagnosed with Right breast cancer (ER+, Her2 neg) in 9/2019 from Right breast biopsy. She was noted to have metastatic disease to bone during initial staging. She was started on Ibrance and Arimidex with Dr. Mendez and continues on that today. Most recent PET/CT 8/19/20 demonstrated continued response to treatment without evidence of focal uptake in sites of bone metastases, though she has diffuse uptake in marrow. She is referred for consideration of palliative RT for lower back pain.     She reports her lower back pain was first noticeable around the time of diagnosis but mild. Following spine biopsy in  2019, it worsened and has become progressive over time. Denies radiation into lower extremities. No focal weakness. Pain is worse with standing or walking, improved by sitting.       Prior Radiation History: None    Past Medical History:   Diagnosis Date    Abnormal Pap smear     pt states 13yrs ago colpo was done    Anemia     Anxiety     Cancer     Cardiomyopathy     Fibroid     Hyperlipidemia     Hypertension     Sleep difficulties        Past Surgical History:   Procedure Laterality Date     SECTION      TONSILLECTOMY      TUBAL LIGATION      UTERINE FIBROID EMBOLIZATION         Social History     Tobacco Use    Smoking status: Never Smoker    Smokeless tobacco: Never Used   Substance Use Topics    Alcohol use: Not Currently     Alcohol/week: 0.0 standard drinks     Frequency: Never    Drug use: No       Cancer-related family history includes Cancer in her maternal grandfather. There is no history of Breast cancer or Ovarian cancer.    Current Outpatient Medications on File Prior to Visit   Medication Sig Dispense Refill    albuterol (PROVENTIL/VENTOLIN HFA) 90 mcg/actuation inhaler Inhale 1-2 puffs into the lungs every 6 (six) hours as needed for Wheezing. Rescue 1 Inhaler 0    amLODIPine (NORVASC) 5 MG tablet TAKE 1 TABLET(5 MG) BY MOUTH EVERY DAY 30 tablet 11    anastrozole (ARIMIDEX) 1 mg Tab Take 1 tablet (1 mg total) by mouth once daily. 90 tablet 3    atorvastatin (LIPITOR) 40 MG tablet Take 1 tablet (40 mg total) by mouth once daily. 90 tablet 3    carvediloL (COREG) 12.5 MG tablet Take 1 tablet (12.5 mg total) by mouth 2 (two) times daily with meals. 60 tablet 11    ibuprofen (ADVIL,MOTRIN) 800 MG tablet Take 1 tablet (800 mg total) by mouth 2 (two) times daily as needed for Pain. 45 tablet 2    palbociclib (IBRANCE) 125 mg Cap Take 125 mg by mouth as instructed Take as directed days 1-21 of each 28 day cycle. Take with food. 21 capsule 6    spironolactone  (ALDACTONE) 25 MG tablet Take 1 tablet (25 mg total) by mouth once daily. 90 tablet 3    traMADoL (ULTRAM) 50 mg tablet Take 1 tablet (50 mg total) by mouth every 8 (eight) hours as needed for Pain. 60 tablet 0    venlafaxine (EFFEXOR-XR) 37.5 MG 24 hr capsule TAKE 1 CAPSULE(37.5 MG) BY MOUTH EVERY DAY 90 capsule 2    fluconazole (DIFLUCAN) 150 MG Tab Take 1 tablet (150 mg total) by mouth once daily. 3 tablet 0    goserelin (ZOLADEX) 3.6 mg injection Inject 3.6 mg into the skin every 28 days.      irbesartan (AVAPRO) 150 MG tablet Take 1 tablet (150 mg total) by mouth every evening. 30 tablet 11    lidocaine-prilocaine (EMLA) cream Apply topically as needed. 45 min - 1 hour before injection 30 g 1    loratadine (CLARITIN) 10 mg tablet Take 10 mg by mouth daily as needed.       LORazepam (ATIVAN) 0.5 MG tablet Take 1 tablet (0.5 mg total) by mouth every 12 (twelve) hours as needed for Anxiety. (Patient not taking: Reported on 8/28/2020) 30 tablet 0    meclizine (ANTIVERT) 12.5 mg tablet Take 1 tablet (12.5 mg total) by mouth 3 (three) times daily as needed for Dizziness. (Patient not taking: Reported on 8/28/2020) 45 tablet 0    ondansetron (ZOFRAN) 8 MG tablet Take 1 tablet (8 mg total) by mouth every 8 (eight) hours as needed for Nausea. (Patient not taking: Reported on 8/28/2020) 45 tablet 2    oxyCODONE (ROXICODONE) 5 MG immediate release tablet Take 1 tablet (5 mg total) by mouth every 8 (eight) hours as needed for Pain. (Patient not taking: Reported on 8/28/2020) 60 tablet 0    polyethylene glycol (GLYCOLAX) 17 gram/dose powder Take 17 g by mouth once daily. (Patient not taking: Reported on 8/28/2020) 116 g 0    prochlorperazine (COMPAZINE) 10 MG tablet TAKE 1 TABLET(10 MG) BY MOUTH EVERY 6 HOURS AS NEEDED (Patient not taking: Reported on 8/28/2020) 45 tablet 2    sennosides (SENNA-C ORAL) Take by mouth daily as needed.       sulfamethoxazole-trimethoprim 800-160mg (BACTRIM DS) 800-160 mg Tab  "Take 1 tablet by mouth 2 (two) times daily. 10 tablet 0     No current facility-administered medications on file prior to visit.        Review of patient's allergies indicates:   Allergen Reactions    Keflex [cephalexin] Itching       Review of Systems   Constitutional: Positive for malaise/fatigue. Negative for fever and weight loss.   HENT: Negative for ear pain and sore throat.    Eyes: Negative for blurred vision and double vision.   Respiratory: Positive for shortness of breath and wheezing. Negative for cough and hemoptysis.    Cardiovascular: Negative for chest pain and leg swelling.   Gastrointestinal: Positive for nausea. Negative for abdominal pain, constipation, diarrhea and heartburn.   Genitourinary: Negative for dysuria and hematuria.   Musculoskeletal: Positive for back pain, joint pain and neck pain. Negative for falls.   Skin: Positive for itching.   Neurological: Positive for dizziness and tingling. Negative for speech change, focal weakness, seizures and headaches.   Psychiatric/Behavioral: Positive for depression. The patient is nervous/anxious.         Vital Signs: BP (!) 155/81 (BP Location: Right arm)   Pulse 75   Temp 98 °F (36.7 °C) (Oral)   Resp 18   Ht 5' 4" (1.626 m)   Wt (!) 173 kg (381 lb 8 oz)   SpO2 (!) 94%   BMI 65.48 kg/m²     ECOG Performance Status: 1 - Ambulates, capable of light work    Physical Exam   Constitutional: She is oriented to person, place, and time and well-developed, well-nourished, and in no distress.   HENT:   Head: Normocephalic and atraumatic.   Mouth/Throat: Oropharynx is clear and moist.   Eyes: EOM are normal. No scleral icterus.   Neck: Normal range of motion. Neck supple.   Pulmonary/Chest: No accessory muscle usage. No respiratory distress.   Abdominal: Soft. Normal appearance. She exhibits no distension.   Musculoskeletal: Normal range of motion.         General: No edema.   Lymphadenopathy:     She has no cervical adenopathy.        Right: No " supraclavicular adenopathy present.        Left: No supraclavicular adenopathy present.   Neurological: She is alert and oriented to person, place, and time. No cranial nerve deficit.   Skin: Skin is warm and dry.   Psychiatric: Mood, affect and judgment normal.   Vitals reviewed.       Labs:    Imaging: I have personally reviewed the patient's available images and reports and summarized pertinent findings above in HPI.     Pathology: I have personally reviewed the patient's available pathology and summarized pertinent findings above in HPI.       - Thank you for allowing me to participate in the care of your patient.    Jair Alarcon MD, PhD

## 2020-08-28 NOTE — LETTER
August 28, 2020      Jessica Mendez MD  [address]  [facility] [address]           [facility]  [address]  [facility] [address]  Phone: [phone]          Patient: Kristopher Ye   MR Number: [id number]   YOB: 1975   Date of Visit: 8/28/2020       Dear Dr. Jessica Mendez:    Thank you for referring Kristopher Ye to me for evaluation. Attached you will find relevant portions of my assessment and plan of care.    If you have questions, please do not hesitate to call me. I look forward to following Kristopher Ye along with you.    Sincerely,    Jair Alarcon MD    Enclosure  CC:  No Recipients    If you would like to receive this communication electronically, please contact [email or web address] or [phone] to request more information on [facility] access.    For providers and/or their staff who would like to refer a patient to [facility], please contact us through our one-stop-shop provider referral line, [facility], at [phone].    If you feel you have received this communication in error or would no longer like to receive these types of communications, please e-mail [email or web address]

## 2020-08-31 PROCEDURE — 77300 PR RADIATION THERAPY,DOSIMETRY PLAN: ICD-10-PCS | Mod: 26,,, | Performed by: RADIOLOGY

## 2020-08-31 PROCEDURE — 77295 3-D RADIOTHERAPY PLAN: CPT | Mod: 26,,, | Performed by: RADIOLOGY

## 2020-08-31 PROCEDURE — 77334 PR  RADN TREATMENT AID(S) COMPLX: ICD-10-PCS | Mod: 26,,, | Performed by: RADIOLOGY

## 2020-08-31 PROCEDURE — 77334 RADIATION TREATMENT AID(S): CPT | Mod: 26,,, | Performed by: RADIOLOGY

## 2020-08-31 PROCEDURE — 77300 RADIATION THERAPY DOSE PLAN: CPT | Mod: 26,,, | Performed by: RADIOLOGY

## 2020-08-31 PROCEDURE — 77300 RADIATION THERAPY DOSE PLAN: CPT | Mod: TC | Performed by: RADIOLOGY

## 2020-08-31 PROCEDURE — 77295 PR 3D RADIOTHERAPY PLAN: ICD-10-PCS | Mod: 26,,, | Performed by: RADIOLOGY

## 2020-08-31 PROCEDURE — 77295 3-D RADIOTHERAPY PLAN: CPT | Mod: TC | Performed by: RADIOLOGY

## 2020-08-31 PROCEDURE — 77334 RADIATION TREATMENT AID(S): CPT | Mod: TC | Performed by: RADIOLOGY

## 2020-09-01 ENCOUNTER — APPOINTMENT (OUTPATIENT)
Dept: RADIATION THERAPY | Facility: OTHER | Age: 45
End: 2020-09-01
Attending: RADIOLOGY
Payer: COMMERCIAL

## 2020-09-01 ENCOUNTER — PATIENT OUTREACH (OUTPATIENT)
Dept: ADMINISTRATIVE | Facility: OTHER | Age: 45
End: 2020-09-01

## 2020-09-01 PROCEDURE — 77387 PR GUIDANCE FOR RADIATION TREATMENT DELIVERY: ICD-10-PCS | Mod: 26,,, | Performed by: RADIOLOGY

## 2020-09-01 PROCEDURE — 77387 GUIDANCE FOR RADJ TX DLVR: CPT | Mod: 26,,, | Performed by: RADIOLOGY

## 2020-09-01 PROCEDURE — 77412 RADIATION TX DELIVERY LVL 3: CPT | Performed by: RADIOLOGY

## 2020-09-01 PROCEDURE — 77417 THER RADIOLOGY PORT IMAGE(S): CPT | Performed by: RADIOLOGY

## 2020-09-01 PROCEDURE — 77387 GUIDANCE FOR RADJ TX DLVR: CPT | Mod: TC | Performed by: RADIOLOGY

## 2020-09-03 PROCEDURE — 77336 RADIATION PHYSICS CONSULT: CPT | Performed by: RADIOLOGY

## 2020-09-04 ENCOUNTER — LAB VISIT (OUTPATIENT)
Dept: LAB | Facility: HOSPITAL | Age: 45
End: 2020-09-04
Attending: INTERNAL MEDICINE
Payer: COMMERCIAL

## 2020-09-04 DIAGNOSIS — Z17.0 MALIGNANT NEOPLASM OF UPPER-INNER QUADRANT OF RIGHT BREAST IN FEMALE, ESTROGEN RECEPTOR POSITIVE: ICD-10-CM

## 2020-09-04 DIAGNOSIS — C50.211 MALIGNANT NEOPLASM OF UPPER-INNER QUADRANT OF RIGHT BREAST IN FEMALE, ESTROGEN RECEPTOR POSITIVE: ICD-10-CM

## 2020-09-04 LAB
ALBUMIN SERPL BCP-MCNC: 3.2 G/DL (ref 3.5–5.2)
ALP SERPL-CCNC: 114 U/L (ref 55–135)
ALT SERPL W/O P-5'-P-CCNC: 23 U/L (ref 10–44)
ANION GAP SERPL CALC-SCNC: 9 MMOL/L (ref 8–16)
AST SERPL-CCNC: 17 U/L (ref 10–40)
BILIRUB SERPL-MCNC: 0.4 MG/DL (ref 0.1–1)
BUN SERPL-MCNC: 9 MG/DL (ref 6–20)
CALCIUM SERPL-MCNC: 9.3 MG/DL (ref 8.7–10.5)
CHLORIDE SERPL-SCNC: 104 MMOL/L (ref 95–110)
CO2 SERPL-SCNC: 27 MMOL/L (ref 23–29)
CREAT SERPL-MCNC: 0.9 MG/DL (ref 0.5–1.4)
ERYTHROCYTE [DISTWIDTH] IN BLOOD BY AUTOMATED COUNT: 18 % (ref 11.5–14.5)
EST. GFR  (AFRICAN AMERICAN): >60 ML/MIN/1.73 M^2
EST. GFR  (NON AFRICAN AMERICAN): >60 ML/MIN/1.73 M^2
GLUCOSE SERPL-MCNC: 129 MG/DL (ref 70–110)
HCT VFR BLD AUTO: 34.7 % (ref 37–48.5)
HGB BLD-MCNC: 10.7 G/DL (ref 12–16)
IMM GRANULOCYTES # BLD AUTO: 0.01 K/UL (ref 0–0.04)
MAGNESIUM SERPL-MCNC: 2 MG/DL (ref 1.6–2.6)
MCH RBC QN AUTO: 27.3 PG (ref 27–31)
MCHC RBC AUTO-ENTMCNC: 30.8 G/DL (ref 32–36)
MCV RBC AUTO: 89 FL (ref 82–98)
NEUTROPHILS # BLD AUTO: 2 K/UL (ref 1.8–7.7)
PHOSPHATE SERPL-MCNC: 3.8 MG/DL (ref 2.7–4.5)
PLATELET # BLD AUTO: 217 K/UL (ref 150–350)
PMV BLD AUTO: 10.4 FL (ref 9.2–12.9)
POTASSIUM SERPL-SCNC: 3.7 MMOL/L (ref 3.5–5.1)
PROT SERPL-MCNC: 7.7 G/DL (ref 6–8.4)
RBC # BLD AUTO: 3.92 M/UL (ref 4–5.4)
SODIUM SERPL-SCNC: 140 MMOL/L (ref 136–145)
WBC # BLD AUTO: 4.31 K/UL (ref 3.9–12.7)

## 2020-09-04 PROCEDURE — 36415 COLL VENOUS BLD VENIPUNCTURE: CPT | Mod: PO

## 2020-09-04 PROCEDURE — 85027 COMPLETE CBC AUTOMATED: CPT

## 2020-09-04 PROCEDURE — 84100 ASSAY OF PHOSPHORUS: CPT

## 2020-09-04 PROCEDURE — 80053 COMPREHEN METABOLIC PANEL: CPT

## 2020-09-04 PROCEDURE — 83735 ASSAY OF MAGNESIUM: CPT

## 2020-09-04 PROCEDURE — 86300 IMMUNOASSAY TUMOR CA 15-3: CPT

## 2020-09-08 ENCOUNTER — INFUSION (OUTPATIENT)
Dept: INFUSION THERAPY | Facility: HOSPITAL | Age: 45
End: 2020-09-08
Attending: INTERNAL MEDICINE
Payer: COMMERCIAL

## 2020-09-08 DIAGNOSIS — C50.211 MALIGNANT NEOPLASM OF UPPER-INNER QUADRANT OF RIGHT BREAST IN FEMALE, ESTROGEN RECEPTOR POSITIVE: Primary | ICD-10-CM

## 2020-09-08 DIAGNOSIS — Z17.0 MALIGNANT NEOPLASM OF UPPER-INNER QUADRANT OF RIGHT BREAST IN FEMALE, ESTROGEN RECEPTOR POSITIVE: Primary | ICD-10-CM

## 2020-09-08 PROCEDURE — 96402 CHEMO HORMON ANTINEOPL SQ/IM: CPT

## 2020-09-08 PROCEDURE — 63600175 PHARM REV CODE 636 W HCPCS: Mod: JG | Performed by: INTERNAL MEDICINE

## 2020-09-08 RX ORDER — CARVEDILOL 25 MG/1
TABLET ORAL
COMMUNITY
Start: 2020-08-07 | End: 2020-09-11 | Stop reason: SDUPTHER

## 2020-09-08 RX ORDER — CARVEDILOL 6.25 MG/1
TABLET ORAL
COMMUNITY
Start: 2020-08-21 | End: 2020-10-16

## 2020-09-08 RX ADMIN — GOSERELIN ACETATE 3.6 MG: 3.6 IMPLANT SUBCUTANEOUS at 01:09

## 2020-09-08 NOTE — NURSING
Pt arrived for zoladex.  Still has not gotten dental clearence-so no xgeva.  Pt tolerated SQ injection to RLA.  Discharged to home.

## 2020-09-11 ENCOUNTER — TELEPHONE (OUTPATIENT)
Dept: RADIATION ONCOLOGY | Facility: CLINIC | Age: 45
End: 2020-09-11

## 2020-09-11 LAB — CANCER AG15-3 SERPL-ACNC: 19 U/ML (ref 0–31)

## 2020-09-11 RX ORDER — CARVEDILOL 12.5 MG/1
TABLET ORAL
Qty: 90 TABLET | Refills: 3 | Status: SHIPPED | OUTPATIENT
Start: 2020-09-11 | End: 2020-10-16

## 2020-09-28 ENCOUNTER — TELEPHONE (OUTPATIENT)
Dept: PHARMACY | Facility: CLINIC | Age: 45
End: 2020-09-28

## 2020-10-01 ENCOUNTER — OFFICE VISIT (OUTPATIENT)
Dept: HEMATOLOGY/ONCOLOGY | Facility: CLINIC | Age: 45
End: 2020-10-01
Payer: COMMERCIAL

## 2020-10-01 ENCOUNTER — LAB VISIT (OUTPATIENT)
Dept: LAB | Facility: HOSPITAL | Age: 45
End: 2020-10-01
Attending: INTERNAL MEDICINE
Payer: COMMERCIAL

## 2020-10-01 VITALS
SYSTOLIC BLOOD PRESSURE: 164 MMHG | WEIGHT: 293 LBS | OXYGEN SATURATION: 99 % | HEIGHT: 65 IN | BODY MASS INDEX: 48.82 KG/M2 | TEMPERATURE: 99 F | RESPIRATION RATE: 16 BRPM | DIASTOLIC BLOOD PRESSURE: 91 MMHG | HEART RATE: 77 BPM

## 2020-10-01 DIAGNOSIS — C79.51 BONE METASTASES: ICD-10-CM

## 2020-10-01 DIAGNOSIS — Z17.0 MALIGNANT NEOPLASM OF UPPER-INNER QUADRANT OF RIGHT BREAST IN FEMALE, ESTROGEN RECEPTOR POSITIVE: ICD-10-CM

## 2020-10-01 DIAGNOSIS — R23.2 HOT FLASHES: ICD-10-CM

## 2020-10-01 DIAGNOSIS — C50.211 MALIGNANT NEOPLASM OF UPPER-INNER QUADRANT OF RIGHT BREAST IN FEMALE, ESTROGEN RECEPTOR POSITIVE: Primary | ICD-10-CM

## 2020-10-01 DIAGNOSIS — E66.01 MORBID OBESITY WITH BODY MASS INDEX OF 50.0-59.9 IN ADULT: ICD-10-CM

## 2020-10-01 DIAGNOSIS — G89.3 CANCER ASSOCIATED PAIN: ICD-10-CM

## 2020-10-01 DIAGNOSIS — C50.211 MALIGNANT NEOPLASM OF UPPER-INNER QUADRANT OF RIGHT BREAST IN FEMALE, ESTROGEN RECEPTOR POSITIVE: ICD-10-CM

## 2020-10-01 DIAGNOSIS — Z17.0 MALIGNANT NEOPLASM OF UPPER-INNER QUADRANT OF RIGHT BREAST IN FEMALE, ESTROGEN RECEPTOR POSITIVE: Primary | ICD-10-CM

## 2020-10-01 DIAGNOSIS — R73.9 HYPERGLYCEMIA: ICD-10-CM

## 2020-10-01 LAB
ALBUMIN SERPL BCP-MCNC: 3.3 G/DL (ref 3.5–5.2)
ALP SERPL-CCNC: 124 U/L (ref 55–135)
ALT SERPL W/O P-5'-P-CCNC: 21 U/L (ref 10–44)
ANION GAP SERPL CALC-SCNC: 10 MMOL/L (ref 8–16)
AST SERPL-CCNC: 17 U/L (ref 10–40)
BILIRUB SERPL-MCNC: 0.4 MG/DL (ref 0.1–1)
BUN SERPL-MCNC: 9 MG/DL (ref 6–20)
CALCIUM SERPL-MCNC: 9.4 MG/DL (ref 8.7–10.5)
CHLORIDE SERPL-SCNC: 104 MMOL/L (ref 95–110)
CO2 SERPL-SCNC: 25 MMOL/L (ref 23–29)
CREAT SERPL-MCNC: 1.1 MG/DL (ref 0.5–1.4)
ERYTHROCYTE [DISTWIDTH] IN BLOOD BY AUTOMATED COUNT: 17.9 % (ref 11.5–14.5)
EST. GFR  (AFRICAN AMERICAN): >60 ML/MIN/1.73 M^2
EST. GFR  (NON AFRICAN AMERICAN): >60 ML/MIN/1.73 M^2
GLUCOSE SERPL-MCNC: 211 MG/DL (ref 70–110)
HCT VFR BLD AUTO: 36.7 % (ref 37–48.5)
HGB BLD-MCNC: 10.8 G/DL (ref 12–16)
IMM GRANULOCYTES # BLD AUTO: 0.02 K/UL (ref 0–0.04)
MCH RBC QN AUTO: 27.1 PG (ref 27–31)
MCHC RBC AUTO-ENTMCNC: 29.4 G/DL (ref 32–36)
MCV RBC AUTO: 92 FL (ref 82–98)
NEUTROPHILS # BLD AUTO: 2.7 K/UL (ref 1.8–7.7)
PLATELET # BLD AUTO: 215 K/UL (ref 150–350)
PMV BLD AUTO: 10 FL (ref 9.2–12.9)
POTASSIUM SERPL-SCNC: 3.4 MMOL/L (ref 3.5–5.1)
PROT SERPL-MCNC: 7.9 G/DL (ref 6–8.4)
RBC # BLD AUTO: 3.99 M/UL (ref 4–5.4)
SODIUM SERPL-SCNC: 139 MMOL/L (ref 136–145)
WBC # BLD AUTO: 5.45 K/UL (ref 3.9–12.7)

## 2020-10-01 PROCEDURE — 99215 OFFICE O/P EST HI 40 MIN: CPT | Mod: S$GLB,,, | Performed by: INTERNAL MEDICINE

## 2020-10-01 PROCEDURE — 3077F PR MOST RECENT SYSTOLIC BLOOD PRESSURE >= 140 MM HG: ICD-10-PCS | Mod: CPTII,S$GLB,, | Performed by: INTERNAL MEDICINE

## 2020-10-01 PROCEDURE — 3080F DIAST BP >= 90 MM HG: CPT | Mod: CPTII,S$GLB,, | Performed by: INTERNAL MEDICINE

## 2020-10-01 PROCEDURE — 80053 COMPREHEN METABOLIC PANEL: CPT

## 2020-10-01 PROCEDURE — 3077F SYST BP >= 140 MM HG: CPT | Mod: CPTII,S$GLB,, | Performed by: INTERNAL MEDICINE

## 2020-10-01 PROCEDURE — 36415 COLL VENOUS BLD VENIPUNCTURE: CPT

## 2020-10-01 PROCEDURE — 99999 PR PBB SHADOW E&M-EST. PATIENT-LVL V: CPT | Mod: PBBFAC,,, | Performed by: INTERNAL MEDICINE

## 2020-10-01 PROCEDURE — 3008F BODY MASS INDEX DOCD: CPT | Mod: CPTII,S$GLB,, | Performed by: INTERNAL MEDICINE

## 2020-10-01 PROCEDURE — 3008F PR BODY MASS INDEX (BMI) DOCUMENTED: ICD-10-PCS | Mod: CPTII,S$GLB,, | Performed by: INTERNAL MEDICINE

## 2020-10-01 PROCEDURE — 3080F PR MOST RECENT DIASTOLIC BLOOD PRESSURE >= 90 MM HG: ICD-10-PCS | Mod: CPTII,S$GLB,, | Performed by: INTERNAL MEDICINE

## 2020-10-01 PROCEDURE — 86300 IMMUNOASSAY TUMOR CA 15-3: CPT

## 2020-10-01 PROCEDURE — 99999 PR PBB SHADOW E&M-EST. PATIENT-LVL V: ICD-10-PCS | Mod: PBBFAC,,, | Performed by: INTERNAL MEDICINE

## 2020-10-01 PROCEDURE — 99215 PR OFFICE/OUTPT VISIT, EST, LEVL V, 40-54 MIN: ICD-10-PCS | Mod: S$GLB,,, | Performed by: INTERNAL MEDICINE

## 2020-10-01 PROCEDURE — 85027 COMPLETE CBC AUTOMATED: CPT

## 2020-10-01 NOTE — Clinical Note
4 and 8 wks from 10/5 - cbc, cmp, mg, phos, ca 15-3, xgeva/zoladex.   4 from 10/5 see NP and 8 wks from 10/5 see [place]  Provider visit, labs, injections same day

## 2020-10-01 NOTE — PROGRESS NOTES
History:     Reason For Follow Up:   1. Stage IV ([id number]) invasive ductal carcinoma of right breast, upper inner quadrant, ER %, [place] neg, Her2 neg, Grade 3, multifocal with one lesion appearing more aggressive (Ki67 80%), large LN +, bone mets.    HPI:   Kristopher Ye presents for follow up of breast cancer.   Remains on Ibrance, Zoladex, Arimidex.   Completed palliative RT to back with great success in reducing pain. Has nausea with RT but resolved    Having hot flashes  + fatigue  + walking with a friend on regular basis.     Oncologic History:  Presentation  - 9/11/19 - Screening mammogram showed multiple right breast masses lower inner quadrant  - 9/13/19 - Diagnostic mammogram and US showed a right breast, 3:00 position mass, 2 CFN measuring 17 mm x 16 mm x 14 mm.  There 2 smaller adjacent masses towards the nipple  - 9/19/19 - Biopsy -    A. Right breast subareolar: Grade 3 IDC, estrogen receptor 80%, progesterone receptor 0%, Her2 dago neg, Ki67 80%.     B. Right breast mass 3:00: Grade 3 IDC with papillary features, estrogen receptor 100%, progesterone receptor 0%, Her2 dago 1+, Ki67 30%.   - 9/26/19: US right axilla with abnormal lymphadenopathy measuring 4.3 x 2.8 cm with biopsy + for metastatic breast cancer.   Surgery consultation with Dr Ferris on 9/25/19   - 9/25/19 - Genetics [facility] [employer] [facility]; VUS pending  Medical oncology consultation on 10/7/19   * 10/8/19 - CT C/A/P with several lytic lesions in thoracic and lumbar spine, iliac bones, left scapula in addition to 3 x 4.5 cm right axillary node and 2.1 cm right breast mass. Bone scan negative.   * 10/31/19 - started Ibrance, Arimidex, Zoladex; plan for Xgeva.    * 11/12/19 STRATA without targetable mutations.    * 12/16/19 - Bone biopsy + for met disease (initially read as negative, but we treated as metastatic disease given high suspicion).    *  9/1/20 - Palliative RT to L1-L4      Review of  Systems  Review of Systems   Constitutional: Positive for fatigue. Negative for activity change, appetite change, chills, fever and unexpected weight change.   HENT: Negative for congestion, hearing loss, nosebleeds, sinus pressure and trouble swallowing.    Eyes: Negative for pain, discharge, itching and visual disturbance.   Respiratory: Negative for cough, chest tightness and shortness of breath.    Cardiovascular: Negative for chest pain, palpitations and leg swelling.   Gastrointestinal: Negative for abdominal distention, abdominal pain, blood in stool, constipation, diarrhea, nausea, rectal pain and vomiting.   Endocrine: Positive for heat intolerance (improving on effexor). Negative for polydipsia.   Genitourinary: Negative for difficulty urinating, dysuria, flank pain, frequency, hematuria, urgency and vaginal pain.   Musculoskeletal: Negative for arthralgias, back pain and neck pain.   Skin: Negative for color change, pallor and rash.   Allergic/Immunologic: Negative for immunocompromised state.   Neurological: Negative for dizziness, numbness and headaches.   Hematological: Negative for adenopathy. Does not bruise/bleed easily.   Psychiatric/Behavioral: Negative for confusion and decreased concentration. The patient is not nervous/anxious (improving).         Objective     Objective:     Vitals:    10/01/20 1539   BP: (!) 164/91   Pulse: 77   Resp: 16   Temp: 98.7 °F (37.1 °C)       Physical Exam  Vitals signs and nursing note reviewed.   Constitutional:       General: She is not in acute distress.     Appearance: Normal appearance. She is well-developed. She is obese.   HENT:      Head: Normocephalic and atraumatic.      Mouth/Throat:      Mouth: No oral lesions.   Eyes:      General: No scleral icterus.        Right eye: No discharge.         Left eye: No discharge.      Conjunctiva/sclera: Conjunctivae normal.   Neck:      Musculoskeletal: Neck supple.   Cardiovascular:      Rate and Rhythm: Normal rate  and regular rhythm.      Heart sounds: Normal heart sounds. No murmur.   Pulmonary:      Effort: Pulmonary effort is normal. No respiratory distress.      Breath sounds: Normal breath sounds. No wheezing, rhonchi or rales.   Chest:      Chest wall: There is no dullness to percussion.   Abdominal:      General: Bowel sounds are normal.      Palpations: Abdomen is soft.      Tenderness: There is no abdominal tenderness.   Skin:     General: Skin is warm and dry.      Coloration: Skin is not pale.   Neurological:      Mental Status: She is alert and oriented to person, place, and time.   Psychiatric:         Behavior: Behavior normal.         Thought Content: Thought content normal.         Judgment: Judgment normal.       Results for orders placed or performed in visit on 10/01/20   CBC Oncology   Result Value Ref Range    WBC 5.45 3.90 - 12.70 K/uL    RBC 3.99 (L) 4.00 - 5.40 M/uL    Hemoglobin 10.8 (L) 12.0 - 16.0 g/dL    Hematocrit 36.7 (L) 37.0 - 48.5 %    Mean Corpuscular Volume 92 82 - 98 fL    Mean Corpuscular Hemoglobin 27.1 27.0 - 31.0 pg    Mean Corpuscular Hemoglobin Conc 29.4 (L) 32.0 - 36.0 g/dL    RDW 17.9 (H) 11.5 - 14.5 %    Platelets 215 150 - 350 K/uL    MPV 10.0 9.2 - 12.9 fL    Gran # (ANC) 2.7 1.8 - 7.7 K/uL    Immature Grans (Abs) 0.02 0.00 - 0.04 K/uL   Comprehensive metabolic panel   Result Value Ref Range    Sodium 139 136 - 145 mmol/L    Potassium 3.4 (L) 3.5 - 5.1 mmol/L    Chloride 104 95 - 110 mmol/L    CO2 25 23 - 29 mmol/L    Glucose 211 (H) 70 - 110 mg/dL    BUN, Bld 9 6 - 20 mg/dL    Creatinine 1.1 0.5 - 1.4 mg/dL    Calcium 9.4 8.7 - 10.5 mg/dL    Total Protein 7.9 6.0 - 8.4 g/dL    Albumin 3.3 (L) 3.5 - 5.2 g/dL    Total Bilirubin 0.4 0.1 - 1.0 mg/dL    Alkaline Phosphatase 124 55 - 135 U/L    AST 17 10 - 40 U/L    ALT 21 10 - 44 U/L    Anion Gap 10 8 - 16 mmol/L    eGFR if African American >60.0 >60 mL/min/1.73 m^2    eGFR if non African American >60.0 >60 mL/min/1.73 m^2        Assessment     Assessment:     1. Stage IV ([id number]) invasive ductal carcinoma of right breast, upper inner quadrant, ER %, [place] neg, Her2 neg, Grade 3, multifocal with one lesion appearing more aggressive (Ki67 80%), large LN +, bony mets   * Genetics negative - in media   * 10/2019 staging scans showed diffuse bony mets  *  10/31/19 started Arimidex and Ibrance 125 mg daily days 1-21 every 28 days with Zoladex. Bone biopsy negative, but review of imaging truly looks like metastatic disease.    * 4/22/2020 PET - disease improvement. 8/2020 PET with disease improvement.    * Cycle 12 Ibrance/Arimidex/Zoladex - start 10/5.      2. Bony mets with L2 endplate fracture   *  Xgeva monthly  - we had been waiting on dental clearance, but she has been unable to get into dentistry and is accepting of risks. Has no active dental disease.     3. Anxiety related to cancer diagnosis.   * Seeing Dr Odom. Improving.     4. Cardiomyopathy   * Echo 10/2019 with EF 30%   * Follows with Dr Olivares.     5. Morbid obesity and hyperglycemia   * Adjusting diet for weight loss and exercising. Will establish care with PCP    6. Hot flashes   * Effexor- improving.     7. Chemo anemia. Monitor.     9. Cancer pain   - s/p palliative RT to L1-L4   - improved   Plan:     1. Continue Zoladex, Arimidex, Ibrance cycle 12 on 10/5.   2. Xgeva monthly  3. Repeat imaging in about 4 mo from prior   4. Plan DEXA in future - once we can after coronavirus  5. Calcium/vitamin D     Zoladex, xgeva, labs every 4 wks. NP in 4 wks, MD in 8 wks        Future Appointments   Date Time Provider Department Center   10/5/2020  1:15 PM LAB, [facility] [place]   10/6/2020  1:00 [facility]   10/6/2020  1:30 PM Jair Alarcon MD [facility] RADONC3 [facility]   11/12/2020  3:15 PM ECHO, [facility] ECHOSTR [facility]   11/12/2020  5:00 PM Floridalma Olivares MD [facility] CARDIO [facility]     The patient location is: home  The  chief complaint leading to consultation is: breastca; high risk med managemetn, scan review.     Visit type: audiovisual    Face to Face time with patient: 12 min  20 minutes of total time spent on the encounter, which includes face to face time and non-face to face time preparing to see the patient (eg, review of tests), Obtaining and/or reviewing separately obtained history, Documenting clinical information in the electronic or other health record, Independently interpreting results (not separately reported) and communicating results to the patient/family/caregiver, or Care coordination (not separately reported).         Each patient to whom he or she provides medical services by telemedicine is:  (1) informed of the relationship between the physician and patient and the respective role of any other health care provider with respect to management of the patient; and (2) notified that he or she may decline to receive medical services by telemedicine and may withdraw from such care at any time.    Notes:

## 2020-10-05 ENCOUNTER — TELEPHONE (OUTPATIENT)
Dept: HEMATOLOGY/ONCOLOGY | Facility: CLINIC | Age: 45
End: 2020-10-05

## 2020-10-05 LAB — CANCER AG15-3 SERPL-ACNC: 21 U/ML (ref 0–31)

## 2020-10-05 NOTE — TELEPHONE ENCOUNTER
----- Message from Marcy Smith sent at 10/5/2020  3:11 PM CDT -----  Can we add on zoladex/xgeva on 11.30?  ----- Message -----  From: Jessica Mendez MD  Sent: 10/1/2020   3:55 PM CDT  To: Marcy Smith    4 and 8 wks from 10/5 - cbc, cmp, mg, phos, ca 15-3, xgeva/zoladex.   4 from 10/5 see NP and 8 wks from 10/5 see [place]  Provider visit, labs, injections same day

## 2020-10-06 ENCOUNTER — INFUSION (OUTPATIENT)
Dept: INFUSION THERAPY | Facility: HOSPITAL | Age: 45
End: 2020-10-06
Attending: INTERNAL MEDICINE
Payer: COMMERCIAL

## 2020-10-06 ENCOUNTER — OFFICE VISIT (OUTPATIENT)
Dept: RADIATION ONCOLOGY | Facility: CLINIC | Age: 45
End: 2020-10-06
Payer: COMMERCIAL

## 2020-10-06 VITALS
TEMPERATURE: 97 F | WEIGHT: 293 LBS | RESPIRATION RATE: 18 BRPM | HEART RATE: 83 BPM | DIASTOLIC BLOOD PRESSURE: 63 MMHG | SYSTOLIC BLOOD PRESSURE: 120 MMHG | BODY MASS INDEX: 62.51 KG/M2

## 2020-10-06 DIAGNOSIS — C50.211 MALIGNANT NEOPLASM OF UPPER-INNER QUADRANT OF RIGHT BREAST IN FEMALE, ESTROGEN RECEPTOR POSITIVE: Primary | ICD-10-CM

## 2020-10-06 DIAGNOSIS — C79.51 BONE METASTASES: Primary | ICD-10-CM

## 2020-10-06 DIAGNOSIS — Z17.0 MALIGNANT NEOPLASM OF UPPER-INNER QUADRANT OF RIGHT BREAST IN FEMALE, ESTROGEN RECEPTOR POSITIVE: Primary | ICD-10-CM

## 2020-10-06 PROCEDURE — 99024 PR POST-OP FOLLOW-UP VISIT: ICD-10-PCS | Mod: S$GLB,,, | Performed by: RADIOLOGY

## 2020-10-06 PROCEDURE — 99999 PR PBB SHADOW E&M-EST. PATIENT-LVL V: CPT | Mod: PBBFAC,,, | Performed by: RADIOLOGY

## 2020-10-06 PROCEDURE — 63600175 PHARM REV CODE 636 W HCPCS: Mod: JG | Performed by: INTERNAL MEDICINE

## 2020-10-06 PROCEDURE — 99999 PR PBB SHADOW E&M-EST. PATIENT-LVL V: ICD-10-PCS | Mod: PBBFAC,,, | Performed by: RADIOLOGY

## 2020-10-06 PROCEDURE — 96402 CHEMO HORMON ANTINEOPL SQ/IM: CPT

## 2020-10-06 PROCEDURE — 99024 POSTOP FOLLOW-UP VISIT: CPT | Mod: S$GLB,,, | Performed by: RADIOLOGY

## 2020-10-06 RX ADMIN — GOSERELIN ACETATE 3.6 MG: 3.6 IMPLANT SUBCUTANEOUS at 01:10

## 2020-10-06 NOTE — NURSING
Patient here for zoladex injection-has not had dental clearance yet-complains of hot flashes-tolerated well.

## 2020-10-06 NOTE — PROGRESS NOTES
10/06/2020    Radiation Oncology Follow-Up Visit    Prior Radiation History:    Site  Ref. ID  Energy  Dose/Fx (Gy)  #Fx  Total Dose (Gy)  Start Date  End Date  Elapsed Days    L1-L4  L1-L4  18X  8    8  2020  0      Assessment   This is a 44 y.o. y/o female with Stage IV Right breast IDC (ER+, Her2 neg) diagnosed in 2019 from Right breast biopsy. She was noted to have metastatic disease to bone during initial staging. She was started on Ibrance and Arimidex with Dr. Mendez and continues on that today. PET/CT 20 demonstrated continued response to treatment without evidence of focal uptake in sites of bone metastases, though she had diffuse uptake in marrow. She underwent palliative RT to L1-L4 for progressive lower back pain 8 Gy x1 completed 20.     She is doing well since treatment. She developed nausea within hours after treatment that resolved within a few days. Her back pain resolved within 1 week of treatment and is no longer bothersome. She feels well overall       Plan   1) F/U with Medical Oncology as scheduled. I will see her back on a PRN basis.        Chief Complaint   Patient presents with    Breast Cancer     f/u after tx       HPI: HPI    Past Medical History:   Diagnosis Date    Abnormal Pap smear     pt states 13yrs ago colpo was done    Anemia     Anxiety     Cancer     Cardiomyopathy     Fibroid     Hyperlipidemia     Hypertension     Sleep difficulties        Past Surgical History:   Procedure Laterality Date     SECTION      TONSILLECTOMY      TUBAL LIGATION      UTERINE FIBROID EMBOLIZATION         Social History     Tobacco Use    Smoking status: Never Smoker    Smokeless tobacco: Never Used   Substance Use Topics    Alcohol use: Not Currently     Alcohol/week: 0.0 standard drinks     Frequency: Never    Drug use: No       Cancer-related family history includes Cancer in her maternal grandfather. There is no history of Breast cancer or  Ovarian cancer.    Current Outpatient Medications on File Prior to Visit   Medication Sig Dispense Refill    albuterol (PROVENTIL/VENTOLIN HFA) 90 mcg/actuation inhaler Inhale 1-2 puffs into the lungs every 6 (six) hours as needed for Wheezing. Rescue 1 Inhaler 0    amLODIPine (NORVASC) 5 MG tablet TAKE 1 TABLET(5 MG) BY MOUTH EVERY DAY 30 tablet 11    anastrozole (ARIMIDEX) 1 mg Tab Take 1 tablet (1 mg total) by mouth once daily. 90 tablet 3    atorvastatin (LIPITOR) 40 MG tablet Take 1 tablet (40 mg total) by mouth once daily. 90 tablet 3    carvediloL (COREG) 12.5 MG tablet Take 1 tablet (12.5 mg total) by mouth 2 (two) times daily with meals. 60 tablet 11    goserelin (ZOLADEX) 3.6 mg injection Inject 3.6 mg into the skin every 28 days.      irbesartan (AVAPRO) 150 MG tablet Take 1 tablet (150 mg total) by mouth every evening. 30 tablet 11    palbociclib (IBRANCE) 125 mg Cap Take 125 mg by mouth as instructed Take as directed days 1-21 of each 28 day cycle. Take with food. 21 capsule 6    spironolactone (ALDACTONE) 25 MG tablet Take 1 tablet (25 mg total) by mouth once daily. 90 tablet 3    venlafaxine (EFFEXOR-XR) 37.5 MG 24 hr capsule TAKE 1 CAPSULE(37.5 MG) BY MOUTH EVERY DAY 90 capsule 2    carvediloL (COREG) 12.5 MG tablet TK 1/2 T PO BID 90 tablet 3    carvediloL (COREG) 6.25 MG tablet       fluconazole (DIFLUCAN) 150 MG Tab Take 1 tablet (150 mg total) by mouth once daily. (Patient not taking: Reported on 10/6/2020) 3 tablet 0    ibuprofen (ADVIL,MOTRIN) 800 MG tablet Take 1 tablet (800 mg total) by mouth 2 (two) times daily as needed for Pain. (Patient not taking: Reported on 10/6/2020) 45 tablet 2    lidocaine-prilocaine (EMLA) cream Apply topically as needed. 45 min - 1 hour before injection (Patient not taking: Reported on 10/6/2020) 30 g 1    loratadine (CLARITIN) 10 mg tablet Take 10 mg by mouth daily as needed.       LORazepam (ATIVAN) 0.5 MG tablet Take 1 tablet (0.5 mg total) by  mouth every 12 (twelve) hours as needed for Anxiety. (Patient not taking: Reported on 10/6/2020) 30 tablet 0    meclizine (ANTIVERT) 12.5 mg tablet Take 1 tablet (12.5 mg total) by mouth 3 (three) times daily as needed for Dizziness. (Patient not taking: Reported on 10/6/2020) 45 tablet 0    ondansetron (ZOFRAN) 8 MG tablet Take 1 tablet (8 mg total) by mouth every 8 (eight) hours as needed for Nausea. (Patient not taking: Reported on 10/6/2020) 45 tablet 2    oxyCODONE (ROXICODONE) 5 MG immediate release tablet Take 1 tablet (5 mg total) by mouth every 8 (eight) hours as needed for Pain. (Patient not taking: Reported on 10/6/2020) 60 tablet 0    polyethylene glycol (GLYCOLAX) 17 gram/dose powder Take 17 g by mouth once daily. (Patient not taking: Reported on 10/6/2020) 116 g 0    prochlorperazine (COMPAZINE) 10 MG tablet TAKE 1 TABLET(10 MG) BY MOUTH EVERY 6 HOURS AS NEEDED (Patient not taking: Reported on 10/6/2020) 45 tablet 2    sennosides (SENNA-C ORAL) Take by mouth daily as needed.       sulfamethoxazole-trimethoprim 800-160mg (BACTRIM DS) 800-160 mg Tab Take 1 tablet by mouth 2 (two) times daily. 10 tablet 0    traMADoL (ULTRAM) 50 mg tablet Take 1 tablet (50 mg total) by mouth every 8 (eight) hours as needed for Pain. (Patient not taking: Reported on 10/6/2020) 60 tablet 0     Current Facility-Administered Medications on File Prior to Visit   Medication Dose Route Frequency Provider Last Rate Last Dose    [COMPLETED] goserelin (ZOLADEX) injection 3.6 mg  3.6 mg Subcutaneous 1 time in Clinic/HOD Jessica Mendez MD   3.6 mg at 10/06/20 1311    [DISCONTINUED] denosumab (XGEVA) solution 120 mg  120 mg Subcutaneous 1 time in Clinic/HOD Jessica Mendez MD           Review of patient's allergies indicates:   Allergen Reactions    Keflex [cephalexin] Itching       Review of Systems   Constitutional: Positive for malaise/fatigue. Negative for fever and weight loss.   HENT: Negative for ear pain and sore  throat.    Eyes: Positive for blurred vision. Negative for double vision.   Respiratory: Negative for cough, hemoptysis and shortness of breath.    Cardiovascular: Negative for chest pain and leg swelling.   Gastrointestinal: Negative for abdominal pain, constipation, diarrhea, heartburn and nausea.   Genitourinary: Negative for dysuria and hematuria.   Musculoskeletal: Negative for falls and joint pain.   Neurological: Positive for tingling. Negative for speech change, focal weakness, seizures and headaches.   Psychiatric/Behavioral: Positive for depression. The patient is nervous/anxious.         Vital Signs: /63 (BP Location: Left arm)   Pulse 83   Temp 97.2 °F (36.2 °C) (Oral)   Resp 18   Wt (!) 170.4 kg (375 lb 10.6 oz)   BMI 62.51 kg/m²     ECOG Performance Status: 0 - Fully Active    Physical Exam   Constitutional: She is oriented to person, place, and time and well-developed, well-nourished, and in no distress.   HENT:   Head: Normocephalic and atraumatic.   Mouth/Throat: Oropharynx is clear and moist.   Eyes: EOM are normal. No scleral icterus.   Neck: Normal range of motion. Neck supple.   Pulmonary/Chest: No accessory muscle usage. No respiratory distress.   Abdominal: Soft. Normal appearance. She exhibits no distension.   Musculoskeletal: Normal range of motion.         General: No edema.   Lymphadenopathy:     She has no cervical adenopathy.        Right: No supraclavicular adenopathy present.        Left: No supraclavicular adenopathy present.   Neurological: She is alert and oriented to person, place, and time. No cranial nerve deficit. Gait normal.   Skin: Skin is warm and dry.   Psychiatric: Mood, affect and judgment normal.   Vitals reviewed.       Labs:    Imaging: None new    Pathology: None new

## 2020-10-16 ENCOUNTER — OFFICE VISIT (OUTPATIENT)
Dept: FAMILY MEDICINE | Facility: CLINIC | Age: 45
End: 2020-10-16
Payer: COMMERCIAL

## 2020-10-16 VITALS
HEART RATE: 75 BPM | WEIGHT: 293 LBS | RESPIRATION RATE: 20 BRPM | HEIGHT: 65 IN | TEMPERATURE: 99 F | BODY MASS INDEX: 48.82 KG/M2 | OXYGEN SATURATION: 98 %

## 2020-10-16 DIAGNOSIS — R73.03 PREDIABETES: ICD-10-CM

## 2020-10-16 DIAGNOSIS — I50.42 SYSTOLIC AND DIASTOLIC CHF, CHRONIC: ICD-10-CM

## 2020-10-16 DIAGNOSIS — E78.00 ELEVATED LDL CHOLESTEROL LEVEL: ICD-10-CM

## 2020-10-16 DIAGNOSIS — C50.919 MALIGNANT NEOPLASM OF FEMALE BREAST, UNSPECIFIED ESTROGEN RECEPTOR STATUS, UNSPECIFIED LATERALITY, UNSPECIFIED SITE OF BREAST: ICD-10-CM

## 2020-10-16 DIAGNOSIS — Z76.89 ESTABLISHING CARE WITH NEW DOCTOR, ENCOUNTER FOR: Primary | ICD-10-CM

## 2020-10-16 DIAGNOSIS — I10 ESSENTIAL HYPERTENSION: ICD-10-CM

## 2020-10-16 PROCEDURE — 99214 PR OFFICE/OUTPT VISIT, EST, LEVL IV, 30-39 MIN: ICD-10-PCS | Mod: S$GLB,,, | Performed by: INTERNAL MEDICINE

## 2020-10-16 PROCEDURE — 99999 PR PBB SHADOW E&M-EST. PATIENT-LVL IV: ICD-10-PCS | Mod: PBBFAC,,, | Performed by: INTERNAL MEDICINE

## 2020-10-16 PROCEDURE — 3008F PR BODY MASS INDEX (BMI) DOCUMENTED: ICD-10-PCS | Mod: CPTII,S$GLB,, | Performed by: INTERNAL MEDICINE

## 2020-10-16 PROCEDURE — 99214 OFFICE O/P EST MOD 30 MIN: CPT | Mod: S$GLB,,, | Performed by: INTERNAL MEDICINE

## 2020-10-16 PROCEDURE — 99999 PR PBB SHADOW E&M-EST. PATIENT-LVL IV: CPT | Mod: PBBFAC,,, | Performed by: INTERNAL MEDICINE

## 2020-10-16 PROCEDURE — 3008F BODY MASS INDEX DOCD: CPT | Mod: CPTII,S$GLB,, | Performed by: INTERNAL MEDICINE

## 2020-10-16 NOTE — PROGRESS NOTES
Subjective:       Patient ID: Kristopher Ye is a pleasant 45 y.o. Black or  female patient    Chief Complaint: Establish Care      Patient is a new pt to me but established pt in our practice, last visit with VALERIE Ventura in 08/2019.  See her last notes and list of problems below.      HPI     She has a long and complicated history of metastatic breast cancer. Most recent visit with Hem/Onc, Dr. Mendez, on 10/01/2020.  As per Dr. Mendez's notes:  Stage IV ([id number]) invasive ductal carcinoma of right breast, upper inner quadrant, ER %, [place] neg, Her2 neg, Grade 3, multifocal with one lesion appearing more aggressive (Ki67 80%), large LN +, bone mets. On Ibrance, Zoladex, and Arimidex. Completed palliative RT to lumbar area in September with good success in reducing pain.  She also has a cardiologist, Dr. Olivares, for systolic and diastolic CHF, she had a virtual visit in May, see her notes, she is to see her back in November.  She has no complaint today.  She has the concern to have her diabetes the due to family history and blood sugar that was always above the range, most recent 01/02/2023.  She had A1c done in May that was 6.5%.  Previously was always in the prediabetes area.  She reports that she tries to work on her lifestyle.  Her most recent blood pressure was 120/63.    Patient Active Problem List   Diagnosis    Hypertension    Iron deficiency anemia    Cardiomegaly    Fibroid uterus    Shortness of breath    Thalassemia    Major depressive disorder, single episode, in partial remission with anxious distress    Cardiomyopathy    Hyperlipidemia    Malignant neoplasm of upper-inner quadrant of right breast in female, estrogen receptor positive    Cancer associated pain    Pathological fracture of lumbar vertebra with routine healing    Bone metastases    Hot flashes    Prediabetes    BMI 60.0-69.9, adult    Elevated LDL cholesterol level          ACTIVE MEDICAL  ISSUES:  Documented in Problem List     PAST MEDICAL HISTORY  Documented     PAST SURGICAL HISTORY:  Documented     SOCIAL HISTORY:  Documented     FAMILY HISTORY:  Documented     ALLERGIES AND MEDICATIONS: updated and reviewed.  Documented    Review of Systems   Constitutional: Negative for activity change, appetite change, fatigue and fever.        Hot flashes   HENT: Negative for congestion, postnasal drip, rhinorrhea, sinus pressure and sore throat.    Eyes: Negative for pain, discharge and redness.   Respiratory: Negative for cough, chest tightness and shortness of breath.    Cardiovascular: Negative for chest pain, palpitations and leg swelling.   Gastrointestinal: Negative for abdominal pain, constipation and nausea.   Endocrine: Negative for cold intolerance and heat intolerance.   Genitourinary: Negative for difficulty urinating, flank pain, frequency, menstrual problem, pelvic pain, urgency and vaginal discharge.   Musculoskeletal: Negative for arthralgias, back pain, joint swelling and neck pain.   Skin: Negative for color change and rash.   Allergic/Immunologic: Negative for environmental allergies and food allergies.   Neurological: Negative for weakness, numbness and headaches.   Hematological: Negative for adenopathy.   Psychiatric/Behavioral: Negative for behavioral problems, hallucinations, sleep disturbance and suicidal ideas. The patient is not nervous/anxious.        Objective:      Physical Exam  Vitals signs and nursing note reviewed.   Constitutional:       Appearance: Normal appearance. She is obese.      Comments: Morbid obesity   HENT:      Right Ear: Tympanic membrane normal.      Left Ear: Tympanic membrane normal.   Cardiovascular:      Rate and Rhythm: Normal rate and regular rhythm.      Pulses: Normal pulses.      Heart sounds: Normal heart sounds.   Pulmonary:      Effort: Pulmonary effort is normal.   Musculoskeletal:      Right lower leg: No edema.      Left lower leg: No edema.  "  Skin:     General: Skin is warm and dry.   Neurological:      Mental Status: She is alert.   Psychiatric:         Mood and Affect: Mood normal.         Behavior: Behavior normal.         Thought Content: Thought content normal.         Vitals:    10/16/20 1326   Pulse: 75   Resp: 20   Temp: 98.5 °F (36.9 °C)   TempSrc: Oral   SpO2: 98%   Weight: (!) 171.4 kg (377 lb 13.9 oz)   Height: 5' 5" (1.651 m)     Body mass index is 62.88 kg/m².    RESULTS: Reviewed labs from last 12 months    Assessment:       1. Establishing care with new doctor, encounter for    2. Prediabetes    3. Essential hypertension    4. Systolic and diastolic CHF, chronic    5. BMI 60.0-69.9, adult    6. Malignant neoplasm of female breast, unspecified estrogen receptor status, unspecified laterality, unspecified site of breast    7. Elevated LDL cholesterol level        Plan:   Kristopher was seen today for establish care.    Diagnoses and all orders for this visit:    Establishing care with new doctor, encounter for    Will review all former notes.  Patient has several medical appointments and I will see her as needed for now only.  She can contact me any time if she has a question or needs to be seen.  I will contact her anyway after using the results of the blood work as below.    Prediabetes  -     Hemoglobin A1C; Future    All recent A1c were in prediabetes array with the last value done in May that was 6.5%.  Patient was on metformin for while that was stopped.  I will repeat blood work with future blood work ordered by Oncology and probably resume metformin.  Kidney function is fine.  Once again patient receives metformin in the past that worked well for her with no side effects.    Essential hypertension    Impossible to check her blood pressure today due to technical issues in a specific condition.  Most recent value in the range.  Same treatment.    Systolic and diastolic CHF, chronic    Clinically euvolemic.  Has follow-up by " cardiologist and she will see her next month.      BMI 60.0-69.9, adult    Patient tries to work on her lifestyle.    Malignant neoplasm of female breast, unspecified estrogen receptor status, unspecified laterality, unspecified site of breast    See notes from oncologist and HPI.  Treatments well tolerated, some hot flashes.    Elevated LDL cholesterol level  -     Lipid Panel; Future    On statin.  Will check lipid panel.  Advised patient regarding lifestyle.    Follow up if symptoms worsen or fail to improve.    This note was created by combination of typed  and M-Modal dictation.  Transcription errors may be present.  If there are any questions, please contact me.

## 2020-10-16 NOTE — PROGRESS NOTES
Health Maintenance Due   Topic Date Due    Pneumococcal Vaccine (Highest Risk) (1 of 3 - PCV13)     Influenza Vaccine (1) decline

## 2020-10-19 NOTE — PROGRESS NOTES
History:     Reason For Follow Up:   1. Stage IV ([id number]) invasive ductal carcinoma of right breast, upper inner quadrant, ER %, [place] neg, Her2 neg, Grade 3, multifocal with one lesion appearing more aggressive (Ki67 80%), large LN +, bone mets.    HPI:   Kristopher Ye presents for follow up of breast cancer.   Remains on Ibrance, Zoladex, Arimidex.   Patient fell and hurt herself. Notes this is related to dizziness, this has started happening daily.    Eating and drinking well. Energy level better.   + walking with a friend on regular basis.   Pain post radiation better.     Oncologic History:  Presentation  - 9/11/19 - Screening mammogram showed multiple right breast masses lower inner quadrant  - 9/13/19 - Diagnostic mammogram and US showed a right breast, 3:00 position mass, 2 CFN measuring 17 mm x 16 mm x 14 mm.  There 2 smaller adjacent masses towards the nipple  - 9/19/19 - Biopsy -    A. Right breast subareolar: Grade 3 IDC, estrogen receptor 80%, progesterone receptor 0%, Her2 dago neg, Ki67 80%.     B. Right breast mass 3:00: Grade 3 IDC with papillary features, estrogen receptor 100%, progesterone receptor 0%, Her2 dago 1+, Ki67 30%.   - 9/26/19: US right axilla with abnormal lymphadenopathy measuring 4.3 x 2.8 cm with biopsy + for metastatic breast cancer.   Surgery consultation with Dr Ferris on 9/25/19   - 9/25/19 - Genetics Genetics [facility] [place] [facility] BRACAnalysis pending; VUS pending  Medical oncology consultation on 10/7/19   * 10/8/19 - CT C/A/P with several lytic lesions in thoracic and lumbar spine, iliac bones, left scapula in addition to 3 x 4.5 cm right axillary node and 2.1 cm right breast mass. Bone scan negative.   * 10/31/19 - started Ibrance, Arimidex, Zoladex; plan for Xgeva.    * 11/12/19 STRATA without targetable mutations.    * 12/16/19 - Bone biopsy + for met disease (initially read as negative, but we treated as metastatic disease given high suspicion).    *  9/1/20 -  Palliative RT to L1-L4      Review of Systems  Review of Systems   Constitutional: Positive for fatigue (better). Negative for activity change, appetite change, chills, fever and unexpected weight change.   HENT: Negative for congestion, hearing loss, nosebleeds, sinus pressure and trouble swallowing.    Eyes: Negative for pain, discharge, itching and visual disturbance.   Respiratory: Negative for cough, chest tightness and shortness of breath.    Cardiovascular: Negative for chest pain, palpitations and leg swelling.   Gastrointestinal: Negative for abdominal distention, abdominal pain, blood in stool, constipation, diarrhea, nausea, rectal pain and vomiting.   Endocrine: Positive for heat intolerance (improving on effexor). Negative for polydipsia.   Genitourinary: Negative for difficulty urinating, dysuria, flank pain, frequency, hematuria, urgency and vaginal pain.   Musculoskeletal: Negative for arthralgias, back pain and neck pain.   Skin: Negative for color change, pallor and rash.   Allergic/Immunologic: Negative for immunocompromised state.   Neurological: Positive for dizziness and light-headedness. Negative for numbness and headaches.   Hematological: Negative for adenopathy. Does not bruise/bleed easily.   Psychiatric/Behavioral: Negative for confusion and decreased concentration. The patient is not nervous/anxious (improving).         Objective     Objective:     Vitals:    11/02/20 1306   BP: 139/82   Pulse: 86   Resp: 16   Temp: 98.4 °F (36.9 °C)       Physical Exam  Vitals signs and nursing note reviewed.   Constitutional:       General: She is not in acute distress.     Appearance: Normal appearance. She is well-developed. She is obese.   HENT:      Head: Normocephalic and atraumatic.      Mouth/Throat:      Mouth: No oral lesions.   Eyes:      General: No scleral icterus.        Right eye: No discharge.         Left eye: No discharge.      Conjunctiva/sclera: Conjunctivae normal.   Neck:       Musculoskeletal: Neck supple.   Cardiovascular:      Rate and Rhythm: Normal rate and regular rhythm.      Heart sounds: Normal heart sounds. No murmur.   Pulmonary:      Effort: Pulmonary effort is normal. No respiratory distress.      Breath sounds: Normal breath sounds. No wheezing, rhonchi or rales.   Chest:      Chest wall: There is no dullness to percussion.   Abdominal:      General: Bowel sounds are normal.      Palpations: Abdomen is soft.      Tenderness: There is no abdominal tenderness.   Skin:     General: Skin is warm and dry.      Coloration: Skin is not pale.   Neurological:      Mental Status: She is alert and oriented to person, place, and time.   Psychiatric:         Behavior: Behavior normal.         Thought Content: Thought content normal.         Judgment: Judgment normal.       Results for orders placed or performed in visit on 11/02/20   CBC Oncology   Result Value Ref Range    WBC 5.08 3.90 - 12.70 K/uL    RBC 3.50 (L) 4.00 - 5.40 M/uL    Hemoglobin 9.8 (L) 12.0 - 16.0 g/dL    Hematocrit 32.5 (L) 37.0 - 48.5 %    MCV 93 82 - 98 fL    MCH 28.0 27.0 - 31.0 pg    MCHC 30.2 (L) 32.0 - 36.0 g/dL    RDW 18.4 (H) 11.5 - 14.5 %    Platelets 218 150 - 350 K/uL    MPV 10.8 9.2 - 12.9 fL    Gran # (ANC) 2.1 1.8 - 7.7 K/uL    Immature Grans (Abs) 0.01 0.00 - 0.04 K/uL       Assessment     Assessment:     1. Stage IV ([id number]) invasive ductal carcinoma of right breast, upper inner quadrant, ER %, [place] neg, Her2 neg, Grade 3, multifocal with one lesion appearing more aggressive (Ki67 80%), large LN +, bony mets   * Genetics negative - in media   * 10/2019 staging scans showed diffuse bony mets  *  10/31/19 started Arimidex and Ibrance 125 mg daily days 1-21 every 28 days with Zoladex. Bone biopsy negative, but review of imaging truly looks like metastatic disease.    * 4/22/2020 PET - disease improvement. 8/2020 PET with disease improvement.    * Cycle 12 Ibrance/Arimidex/Zoladex - start 10/5.       2. Bony mets with L2 endplate fracture   *  Xgeva monthly  - we had been waiting on dental clearance, but she has been unable to get into dentistry and is accepting of risks. Has no active dental disease.     3. Anxiety related to cancer diagnosis.   * Seeing Dr Odom. Improving.     4. Cardiomyopathy   * Echo 10/2019 with EF 30%   * Follows with Dr Olivares.     5. Morbid obesity and hyperglycemia   * Adjusting diet for weight loss and exercising. Will establish care with PCP    6. Hot flashes   * Effexor- improving.     7. Chemo anemia. Monitor.     9. Cancer pain   - s/p palliative RT to L1-L4   - improved   Plan:     1. Continue Zoladex, Arimidex, Ibrance cycle 13 on 11/2.   2. Xgeva monthly  3. Repeat imaging in about 4 mo from prior   4. Plan DEXA in future - once we can after coronavirus  5. Calcium/vitamin D     Zoladex, xgeva, labs every 4 wks. NP in 4 wks, MD in 8 wks      Return to clinic in 4 weeks with MD appointment and labs.     Patient is in agreement with the proposed treatment plan. All questions were answered to the patient's satisfaction. Patient knows to call clinic for any new or worsening symptoms and if anything is needed before the next clinic visit.          Dawna Akers, FNP-C  Hematology & Medical Oncology   [facility] [place] [address]  ph. [phone]  Fax. [phone]    Patient dicussed with collaborating physician, Dr. Lei.

## 2020-10-20 DIAGNOSIS — C50.211 MALIGNANT NEOPLASM OF UPPER-INNER QUADRANT OF RIGHT BREAST IN FEMALE, ESTROGEN RECEPTOR POSITIVE: ICD-10-CM

## 2020-10-20 DIAGNOSIS — Z17.0 MALIGNANT NEOPLASM OF UPPER-INNER QUADRANT OF RIGHT BREAST IN FEMALE, ESTROGEN RECEPTOR POSITIVE: ICD-10-CM

## 2020-10-20 RX ORDER — ANASTROZOLE 1 MG/1
1 TABLET ORAL DAILY
Qty: 90 TABLET | Refills: 3 | Status: SHIPPED | OUTPATIENT
Start: 2020-10-20 | End: 2021-11-10

## 2020-10-28 ENCOUNTER — PATIENT MESSAGE (OUTPATIENT)
Dept: FAMILY MEDICINE | Facility: CLINIC | Age: 45
End: 2020-10-28

## 2020-11-02 ENCOUNTER — OFFICE VISIT (OUTPATIENT)
Dept: HEMATOLOGY/ONCOLOGY | Facility: CLINIC | Age: 45
End: 2020-11-02
Payer: MEDICAID

## 2020-11-02 ENCOUNTER — INFUSION (OUTPATIENT)
Dept: INFUSION THERAPY | Facility: HOSPITAL | Age: 45
End: 2020-11-02
Attending: INTERNAL MEDICINE
Payer: MEDICAID

## 2020-11-02 VITALS
HEIGHT: 65 IN | TEMPERATURE: 98 F | HEART RATE: 86 BPM | OXYGEN SATURATION: 96 % | WEIGHT: 293 LBS | DIASTOLIC BLOOD PRESSURE: 82 MMHG | RESPIRATION RATE: 16 BRPM | BODY MASS INDEX: 48.82 KG/M2 | SYSTOLIC BLOOD PRESSURE: 139 MMHG

## 2020-11-02 DIAGNOSIS — G89.3 CANCER ASSOCIATED PAIN: ICD-10-CM

## 2020-11-02 DIAGNOSIS — D50.0 IRON DEFICIENCY ANEMIA DUE TO CHRONIC BLOOD LOSS: ICD-10-CM

## 2020-11-02 DIAGNOSIS — I10 ESSENTIAL HYPERTENSION: ICD-10-CM

## 2020-11-02 DIAGNOSIS — C50.211 MALIGNANT NEOPLASM OF UPPER-INNER QUADRANT OF RIGHT BREAST IN FEMALE, ESTROGEN RECEPTOR POSITIVE: Primary | ICD-10-CM

## 2020-11-02 DIAGNOSIS — Z17.0 MALIGNANT NEOPLASM OF UPPER-INNER QUADRANT OF RIGHT BREAST IN FEMALE, ESTROGEN RECEPTOR POSITIVE: Primary | ICD-10-CM

## 2020-11-02 DIAGNOSIS — C79.51 BONE METASTASES: ICD-10-CM

## 2020-11-02 DIAGNOSIS — E78.00 PURE HYPERCHOLESTEROLEMIA: ICD-10-CM

## 2020-11-02 PROCEDURE — 99214 OFFICE O/P EST MOD 30 MIN: CPT | Mod: S$GLB,,, | Performed by: NURSE PRACTITIONER

## 2020-11-02 PROCEDURE — 99214 PR OFFICE/OUTPT VISIT, EST, LEVL IV, 30-39 MIN: ICD-10-PCS | Mod: S$GLB,,, | Performed by: NURSE PRACTITIONER

## 2020-11-02 PROCEDURE — 63600175 PHARM REV CODE 636 W HCPCS: Mod: JG | Performed by: INTERNAL MEDICINE

## 2020-11-02 PROCEDURE — 99999 PR PBB SHADOW E&M-EST. PATIENT-LVL V: ICD-10-PCS | Mod: PBBFAC,,, | Performed by: NURSE PRACTITIONER

## 2020-11-02 PROCEDURE — 96402 CHEMO HORMON ANTINEOPL SQ/IM: CPT

## 2020-11-02 PROCEDURE — 96372 THER/PROPH/DIAG INJ SC/IM: CPT

## 2020-11-02 PROCEDURE — 99999 PR PBB SHADOW E&M-EST. PATIENT-LVL V: CPT | Mod: PBBFAC,,, | Performed by: NURSE PRACTITIONER

## 2020-11-02 RX ADMIN — GOSERELIN ACETATE 3.6 MG: 3.6 IMPLANT SUBCUTANEOUS at 02:11

## 2020-11-02 RX ADMIN — DENOSUMAB 120 MG: 120 INJECTION SUBCUTANEOUS at 02:11

## 2020-11-02 NOTE — NURSING
Pt did have dental clearance done and stated she is cleared for Xgeva. Will bring clearance letter to next visit.

## 2020-11-09 ENCOUNTER — DOCUMENTATION ONLY (OUTPATIENT)
Dept: ONCOLOGY | Facility: HOSPITAL | Age: 45
End: 2020-11-09

## 2020-11-09 ENCOUNTER — PATIENT MESSAGE (OUTPATIENT)
Dept: HEMATOLOGY/ONCOLOGY | Facility: CLINIC | Age: 45
End: 2020-11-09

## 2020-11-09 NOTE — PROGRESS NOTES
In response to in basket message from VALERIE Toney stating pt is uninsured and needs Ibrance, SUSAN called pt.  She applied for Medicaid on 11/4/20.  SUSAN asked pt to go online and look at resources offered by [employer] (Ibrance manufacterer).  In addition, Susan called specialty pharmacy, [facility] ([phone]  She said pt's Ibrance costs $19,000 if paid for out of pocket.  Pt had been receiving it at no out of pocket cost because she had insurance and a discount card.  Pt's insurance dropped on 10/31/20 due to her beginning workman's comp.  Specialty pharmacy will contact pt to enroll her in financial assistance.  Sw remains available.

## 2020-11-10 ENCOUNTER — SPECIALTY PHARMACY (OUTPATIENT)
Dept: PHARMACY | Facility: CLINIC | Age: 45
End: 2020-11-10

## 2020-11-10 NOTE — TELEPHONE ENCOUNTER
I spoke with Ms. Ye regarding her Ibrance therapy. She states that she was supposed to start her cycle on Monday, 11/2. After realizing she had lost her insurance, she applied for Medicaid on Wednesday, 11/4. She was told it could take up to 30 days for receive a determination on her application. She would like help applying for financial assistance. She mentioned something about downloading an application online for  assistance I believe. She states her current income including social security is $1785/month and her household size is 3 (the patient + 2 daughters). Notified FA to see if assistance options are available and to follow up with the patient.

## 2020-11-11 ENCOUNTER — PATIENT OUTREACH (OUTPATIENT)
Dept: ADMINISTRATIVE | Facility: OTHER | Age: 45
End: 2020-11-11

## 2020-11-11 NOTE — TELEPHONE ENCOUNTER
Patient returned phone call regarding Ibrance FA. Confirmed that patient does not have insurance currently. Offered to help patient apply for  assistance. Patient voiced understanding and would like to move forward with assistance. Confirmed patients HH size and annual income to screen for FA. Patient already printed out assistance application from online. She will forward her portion of application to me once complete. Will reach out to [facility] to initiate provider portion of application.

## 2020-11-11 NOTE — TELEPHONE ENCOUNTER
Sent a staff message to Dr Mendez regarding Ibrance assistance application and faxing application prescription for her review and signature.

## 2020-11-11 NOTE — TELEPHONE ENCOUNTER
LVM requesting a callback to offer patient assistance help with applying for  assistance for Ibrance.

## 2020-11-12 NOTE — TELEPHONE ENCOUNTER
Received patients portion of assistance application along with proof of income documents. Application is pending providers signature.

## 2020-11-13 NOTE — TELEPHONE ENCOUNTER
Received provider portion of Ibrance assistance application fax. Faxed completed assistance application to  for review. Determination pending.

## 2020-11-16 NOTE — TELEPHONE ENCOUNTER
Contacted [employer] to check status of patients Ibrance assistance application. Spoke to Rep Jones who confirmed application is complete and is being processed. Karen provided an ETAT of 3 to 4 business days for a status update and determination. Will continue to follow up.

## 2020-11-19 NOTE — TELEPHONE ENCOUNTER
Contacted [employer] to check on the status of patients Ibrance assistance application. Spoke to [employer] who informed me that application is still being processed and there are no updates as of yet. Dilia was able to process the application and complete the assessment while on the phone with me. She confirmed that patient will be approved for [employer] PAP as of today 11/19/20 through 11/19/21. Requested that the approval letter be faxed to me at [phone].

## 2020-11-23 ENCOUNTER — PATIENT MESSAGE (OUTPATIENT)
Dept: HEMATOLOGY/ONCOLOGY | Facility: CLINIC | Age: 45
End: 2020-11-23

## 2020-11-23 DIAGNOSIS — B37.9 YEAST INFECTION: Primary | ICD-10-CM

## 2020-11-23 RX ORDER — FLUCONAZOLE 100 MG/1
100 TABLET ORAL DAILY
Qty: 3 TABLET | Refills: 0 | Status: SHIPPED | OUTPATIENT
Start: 2020-11-23 | End: 2020-11-26

## 2020-11-23 NOTE — TELEPHONE ENCOUNTER
FOR DOCUMENTATION ONLY:  Financial Assistance for IBRANCE is approved from 11/19/20 to 11/19/21  Source: [employer] Patient Assistance Program  Case ID: [id number]  Phone: [phone]  Fax: [phone]  Pharmacy: [facility]   Pharmacy Phone: [phone]    Patient will receive Ibrance though [employer] Patient Assistance Program free of charge until 11/19/21.

## 2020-11-23 NOTE — TELEPHONE ENCOUNTER
Informed patient that she is approved for [employer] PAP and will receive Ibrance free of charge through 11/19/21. Patient is expected to receive her first shipment of Ibrance today 11/23/20. Patient voiced understanding and has been provided with phone number and information needed to schedule refills.

## 2020-12-02 ENCOUNTER — PATIENT MESSAGE (OUTPATIENT)
Dept: HEMATOLOGY/ONCOLOGY | Facility: CLINIC | Age: 45
End: 2020-12-02

## 2020-12-02 ENCOUNTER — DOCUMENTATION ONLY (OUTPATIENT)
Dept: HEMATOLOGY/ONCOLOGY | Facility: CLINIC | Age: 45
End: 2020-12-02

## 2020-12-02 NOTE — PROGRESS NOTES
In response to in basket message from [employer] informing pt is scheduled to receive Zolodex/Xgeva tomorrow but is Medicaid pending--uninsured SUSAN messaged Radha Murray, specialty pharmacy financial assistance.  Her reply stated that [facility] has assistance program for uninsured pts and forwarded the application to SUSAN.  Susan also called Franca Obrien, medical cost assistance program.  She informed SW that pt's medicaid víctor is still pending.  Susan will begin processing assistance application and inform [employer] Vidal of pt's status.  SW remains available.   Later--SUSAN sent semi-completed drug assistance forms to [employer] for completion of provider portion, and will continue with completing applications, including gathering pt income information and having pt sign them, upon receipt from [employer].

## 2020-12-03 ENCOUNTER — DOCUMENTATION ONLY (OUTPATIENT)
Dept: ONCOLOGY | Facility: HOSPITAL | Age: 45
End: 2020-12-03

## 2020-12-03 NOTE — PROGRESS NOTES
SUSAN spoke with pt via telephone today and informed her that financial assistance applications are being completed by SUSAN and [employer] V[employer] for pt's Xgeva and Zolodex.  SW explained to pt that her signature and financial information will be required to complete applications. Pt said she receives SS disability and food stamps and will include award letters for each when she scans application back to SW.  SW will continue to follow application completion.

## 2020-12-04 ENCOUNTER — DOCUMENTATION ONLY (OUTPATIENT)
Dept: HEMATOLOGY/ONCOLOGY | Facility: CLINIC | Age: 45
End: 2020-12-04

## 2020-12-04 NOTE — PROGRESS NOTES
SUSAN faxed completed XGEVA drug assistance from to [employer] ([phone]) and informed pt.

## 2020-12-09 ENCOUNTER — DOCUMENTATION ONLY (OUTPATIENT)
Dept: HEMATOLOGY/ONCOLOGY | Facility: CLINIC | Age: 45
End: 2020-12-09

## 2020-12-09 NOTE — PROGRESS NOTES
SUSAN received dr alyx Mcclure Patient Assistance Form from [employer] today and scanned and emailed it to the pt asking that she complete her part and send it back to SUSAN ASAP.  SUSAN will follow up upon receipt of forms from pt.

## 2020-12-10 ENCOUNTER — DOCUMENTATION ONLY (OUTPATIENT)
Dept: HEMATOLOGY/ONCOLOGY | Facility: CLINIC | Age: 45
End: 2020-12-10

## 2020-12-10 NOTE — PROGRESS NOTES
Sw received completed application back from pt via email and faxed it to [employer] ([phone]).  SW remains available.

## 2020-12-11 ENCOUNTER — TELEPHONE (OUTPATIENT)
Dept: HEMATOLOGY/ONCOLOGY | Facility: CLINIC | Age: 45
End: 2020-12-11

## 2020-12-11 NOTE — TELEPHONE ENCOUNTER
Message fwd to [facility].       ----- Message from Carla Guillermo sent at 12/11/2020 12:42 PM CST -----  Regarding: Prescription  Contact: Nina @ [phone]  Nina with [employer], stating the following medication needs a valid prescription:    goserelin (ZOLADEX) 3.6 mg injection    Paper work that was faxed over to them needs an updated page 3 of their application and they need us to add strength next to medication name, directions is missing as well. The quantity is missing and also how many refills.     Medication is ship to doctors office only. If you want it to go to pt there needs to be a letter of medical necessity signed and dated by doctor. When you fax over please only fax page 3 along with doctors office fax cover sheet.    Call back: Nina @ [phone]                                Fax: [phone]

## 2020-12-16 ENCOUNTER — PATIENT MESSAGE (OUTPATIENT)
Dept: HEMATOLOGY/ONCOLOGY | Facility: CLINIC | Age: 45
End: 2020-12-16

## 2020-12-18 ENCOUNTER — PATIENT MESSAGE (OUTPATIENT)
Dept: HEMATOLOGY/ONCOLOGY | Facility: CLINIC | Age: 45
End: 2020-12-18

## 2020-12-18 DIAGNOSIS — C50.211 MALIGNANT NEOPLASM OF UPPER-INNER QUADRANT OF RIGHT BREAST IN FEMALE, ESTROGEN RECEPTOR POSITIVE: Primary | ICD-10-CM

## 2020-12-18 DIAGNOSIS — Z17.0 MALIGNANT NEOPLASM OF UPPER-INNER QUADRANT OF RIGHT BREAST IN FEMALE, ESTROGEN RECEPTOR POSITIVE: Primary | ICD-10-CM

## 2020-12-20 ENCOUNTER — PATIENT MESSAGE (OUTPATIENT)
Dept: CARDIOLOGY | Facility: CLINIC | Age: 45
End: 2020-12-20

## 2020-12-22 ENCOUNTER — PATIENT MESSAGE (OUTPATIENT)
Dept: CARDIOLOGY | Facility: CLINIC | Age: 45
End: 2020-12-22

## 2020-12-22 ENCOUNTER — INFUSION (OUTPATIENT)
Dept: INFUSION THERAPY | Facility: HOSPITAL | Age: 45
End: 2020-12-22
Payer: MEDICAID

## 2020-12-22 ENCOUNTER — OFFICE VISIT (OUTPATIENT)
Dept: HEMATOLOGY/ONCOLOGY | Facility: CLINIC | Age: 45
End: 2020-12-22
Payer: COMMERCIAL

## 2020-12-22 ENCOUNTER — LAB VISIT (OUTPATIENT)
Dept: LAB | Facility: HOSPITAL | Age: 45
End: 2020-12-22
Payer: MEDICAID

## 2020-12-22 VITALS
OXYGEN SATURATION: 97 % | HEART RATE: 76 BPM | DIASTOLIC BLOOD PRESSURE: 84 MMHG | BODY MASS INDEX: 48.82 KG/M2 | WEIGHT: 293 LBS | TEMPERATURE: 98 F | SYSTOLIC BLOOD PRESSURE: 143 MMHG | HEIGHT: 65 IN | RESPIRATION RATE: 18 BRPM

## 2020-12-22 VITALS — BODY MASS INDEX: 48.82 KG/M2 | WEIGHT: 293 LBS | HEIGHT: 65 IN

## 2020-12-22 DIAGNOSIS — Z79.811 AROMATASE INHIBITOR USE: ICD-10-CM

## 2020-12-22 DIAGNOSIS — Z17.0 MALIGNANT NEOPLASM OF UPPER-INNER QUADRANT OF RIGHT BREAST IN FEMALE, ESTROGEN RECEPTOR POSITIVE: Primary | ICD-10-CM

## 2020-12-22 DIAGNOSIS — I42.9 CARDIOMYOPATHY, UNSPECIFIED TYPE: ICD-10-CM

## 2020-12-22 DIAGNOSIS — C79.51 BONE METASTASES: ICD-10-CM

## 2020-12-22 DIAGNOSIS — Z17.0 MALIGNANT NEOPLASM OF UPPER-INNER QUADRANT OF RIGHT BREAST IN FEMALE, ESTROGEN RECEPTOR POSITIVE: ICD-10-CM

## 2020-12-22 DIAGNOSIS — C50.211 MALIGNANT NEOPLASM OF UPPER-INNER QUADRANT OF RIGHT BREAST IN FEMALE, ESTROGEN RECEPTOR POSITIVE: Primary | ICD-10-CM

## 2020-12-22 DIAGNOSIS — F41.9 ANXIETY: ICD-10-CM

## 2020-12-22 DIAGNOSIS — C50.211 MALIGNANT NEOPLASM OF UPPER-INNER QUADRANT OF RIGHT BREAST IN FEMALE, ESTROGEN RECEPTOR POSITIVE: ICD-10-CM

## 2020-12-22 DIAGNOSIS — E87.6 HYPOKALEMIA: ICD-10-CM

## 2020-12-22 DIAGNOSIS — G89.3 NEOPLASM RELATED PAIN: ICD-10-CM

## 2020-12-22 LAB
ALBUMIN SERPL BCP-MCNC: 3.2 G/DL (ref 3.5–5.2)
ALP SERPL-CCNC: 114 U/L (ref 55–135)
ALT SERPL W/O P-5'-P-CCNC: 17 U/L (ref 10–44)
ANION GAP SERPL CALC-SCNC: 8 MMOL/L (ref 8–16)
AST SERPL-CCNC: 14 U/L (ref 10–40)
BILIRUB SERPL-MCNC: 0.3 MG/DL (ref 0.1–1)
BUN SERPL-MCNC: 10 MG/DL (ref 6–20)
CALCIUM SERPL-MCNC: 8 MG/DL (ref 8.7–10.5)
CHLORIDE SERPL-SCNC: 107 MMOL/L (ref 95–110)
CO2 SERPL-SCNC: 24 MMOL/L (ref 23–29)
CREAT SERPL-MCNC: 0.9 MG/DL (ref 0.5–1.4)
ERYTHROCYTE [DISTWIDTH] IN BLOOD BY AUTOMATED COUNT: 21 % (ref 11.5–14.5)
EST. GFR  (AFRICAN AMERICAN): >60 ML/MIN/1.73 M^2
EST. GFR  (NON AFRICAN AMERICAN): >60 ML/MIN/1.73 M^2
GLUCOSE SERPL-MCNC: 183 MG/DL (ref 70–110)
HCT VFR BLD AUTO: 35.1 % (ref 37–48.5)
HGB BLD-MCNC: 10.4 G/DL (ref 12–16)
IMM GRANULOCYTES # BLD AUTO: 0.01 K/UL (ref 0–0.04)
MCH RBC QN AUTO: 26.3 PG (ref 27–31)
MCHC RBC AUTO-ENTMCNC: 29.6 G/DL (ref 32–36)
MCV RBC AUTO: 89 FL (ref 82–98)
NEUTROPHILS # BLD AUTO: 1.5 K/UL (ref 1.8–7.7)
PLATELET # BLD AUTO: 213 K/UL (ref 150–350)
PMV BLD AUTO: 9.9 FL (ref 9.2–12.9)
POTASSIUM SERPL-SCNC: 3.7 MMOL/L (ref 3.5–5.1)
PROT SERPL-MCNC: 7.8 G/DL (ref 6–8.4)
RBC # BLD AUTO: 3.95 M/UL (ref 4–5.4)
SODIUM SERPL-SCNC: 139 MMOL/L (ref 136–145)
WBC # BLD AUTO: 3.56 K/UL (ref 3.9–12.7)

## 2020-12-22 PROCEDURE — 96402 CHEMO HORMON ANTINEOPL SQ/IM: CPT

## 2020-12-22 PROCEDURE — 63600175 PHARM REV CODE 636 W HCPCS: Mod: JG | Performed by: INTERNAL MEDICINE

## 2020-12-22 PROCEDURE — 85027 COMPLETE CBC AUTOMATED: CPT

## 2020-12-22 PROCEDURE — 99215 OFFICE O/P EST HI 40 MIN: CPT | Mod: S$GLB,,, | Performed by: NURSE PRACTITIONER

## 2020-12-22 PROCEDURE — 96372 THER/PROPH/DIAG INJ SC/IM: CPT

## 2020-12-22 PROCEDURE — 80053 COMPREHEN METABOLIC PANEL: CPT

## 2020-12-22 PROCEDURE — 99999 PR PBB SHADOW E&M-EST. PATIENT-LVL IV: CPT | Mod: PBBFAC,,, | Performed by: NURSE PRACTITIONER

## 2020-12-22 PROCEDURE — 99215 PR OFFICE/OUTPT VISIT, EST, LEVL V, 40-54 MIN: ICD-10-PCS | Mod: S$GLB,,, | Performed by: NURSE PRACTITIONER

## 2020-12-22 PROCEDURE — 36415 COLL VENOUS BLD VENIPUNCTURE: CPT

## 2020-12-22 PROCEDURE — 99999 PR PBB SHADOW E&M-EST. PATIENT-LVL IV: ICD-10-PCS | Mod: PBBFAC,,, | Performed by: NURSE PRACTITIONER

## 2020-12-22 RX ORDER — OXYCODONE AND ACETAMINOPHEN 5; 325 MG/1; MG/1
1 TABLET ORAL EVERY 4 HOURS PRN
Qty: 30 TABLET | Refills: 0 | Status: SHIPPED | OUTPATIENT
Start: 2020-12-22 | End: 2021-04-01

## 2020-12-22 RX ADMIN — DENOSUMAB 120 MG: 120 INJECTION SUBCUTANEOUS at 12:12

## 2020-12-22 RX ADMIN — GOSERELIN ACETATE 3.6 MG: 3.6 IMPLANT SUBCUTANEOUS at 12:12

## 2020-12-22 NOTE — NURSING
1229-Zoladex injection administered SQ to left abdomen, pt tolerated well, gauze and tape applied to site. Xgeva injection administered SQ to right abdomen, pt tolerated well, band-aid applied to site. Pt discharged with no distress noted, ambulating independently.

## 2020-12-22 NOTE — Clinical Note
RTC 2 weeks to see Dr. Lei with PET, BMD couple of days before. Ok for virtual. Needs 30 minutes as previous Dr. Mendez patient  RTC in 4 weeks for EP to see Dr. Lei or myself  with cbc, cmp, Vit D zoladex and Xgeva.

## 2020-12-22 NOTE — PROGRESS NOTES
History:     Reason For Follow Up:   1. Stage IV ([id number]) invasive ductal carcinoma of right breast, upper inner quadrant, ER %, [place] neg, Her2 neg, Grade 3, multifocal with one lesion appearing more aggressive (Ki67 80%), large LN +, bone mets.    HPI:   Kristopher Ye presents for follow up of breast cancer.   Remains on Ibrance, Zoladex, Arimidex.       Back pain- improved after radiation but now returned about 2 weeks ago- 8/10 at times. Taking IBP 800mg but no relief. Would like something stronger. Took tramadol with minimal relief in past.   States more active- mother passed away so she is now taking care of her Dad. Moving his things to her house. Not resting her back like she normally does.   No fever/chills.   Breathing well. No CP. No swelling.   No dental  Issues.   Occasional dizziness - not a new symptom and not anymore frequent. had an appt to get ears checked. Feels inner ear related.   No weakness in legs or incontinence. No numbness or tingling.   Xgeva and zoladex overdue as had insurance issues.     Oncologic History: per Dr. Mendez's note  Presentation  - 9/11/19 - Screening mammogram showed multiple right breast masses lower inner quadrant  - 9/13/19 - Diagnostic mammogram and US showed a right breast, 3:00 position mass, 2 CFN measuring 17 mm x 16 mm x 14 mm.  There 2 smaller adjacent masses towards the nipple  - 9/19/19 - Biopsy -    A. Right breast subareolar: Grade 3 IDC, estrogen receptor 80%, progesterone receptor 0%, Her2 dago neg, Ki67 80%.     B. Right breast mass 3:00: Grade 3 IDC with papillary features, estrogen receptor 100%, progesterone receptor 0%, Her2 dago 1+, Ki67 30%.   - 9/26/19: US right axilla with abnormal lymphadenopathy measuring 4.3 x 2.8 cm with biopsy + for metastatic breast cancer.   Surgery consultation with Dr Ferris on 9/25/19   - 9/25/19 - Genetics Genetics Mapp Neda Wilsonis pending; VUS pending  Medical oncology consultation on 10/7/19   *  "10/8/19 - CT C/A/P with several lytic lesions in thoracic and lumbar spine, iliac bones, left scapula in addition to 3 x 4.5 cm right axillary node and 2.1 cm right breast mass. Bone scan negative.   * 10/31/19 - started Ibrance, Arimidex, Zoladex; plan for Xgeva.    * 11/12/19 STRATA without targetable mutations.    * 12/16/19 - Bone biopsy + for met disease (initially read as negative, but we treated as metastatic disease given high suspicion).    * MRI brain: "Partially empty sella.  Question bilateral globe proptosis. Otherwise unremarkable MRI brain as detailed above specifically without evidence for intracranial enhancing lesion to suggest metastatic disease."    * 4/22/2020 PET - disease improvement. 8/2020 PET with disease improvement.    *  9/1/20 - Palliative RT to L1-L4    Of note, * Xgeva monthly  - we had been waiting on dental clearance, but she has been unable to get into dentistry and is accepting of risks. Has no active dental disease.       Review of Systems  Review of Systems   Constitutional:        See above   All other systems reviewed and are negative.       Objective     Objective:     Vitals:    12/22/20 1037   BP: (!) 143/84   Pulse: 76   Resp: 18   Temp: 98.3 °F (36.8 °C)       Physical Exam  Vitals signs and nursing note reviewed.   Constitutional:       General: She is not in acute distress.     Appearance: Normal appearance. She is well-developed. She is obese.   HENT:      Head: Normocephalic and atraumatic.      Mouth/Throat:      Mouth: No oral lesions.   Eyes:      General: No scleral icterus.        Right eye: No discharge.         Left eye: No discharge.      Conjunctiva/sclera: Conjunctivae normal.   Neck:      Musculoskeletal: Neck supple.   Cardiovascular:      Rate and Rhythm: Normal rate and regular rhythm.      Heart sounds: Normal heart sounds. No murmur.   Pulmonary:      Effort: Pulmonary effort is normal. No respiratory distress.      Breath sounds: Normal breath sounds. " No wheezing, rhonchi or rales.   Chest:      Chest wall: There is no dullness to percussion.   Abdominal:      General: Bowel sounds are normal.      Palpations: Abdomen is soft.      Tenderness: There is no abdominal tenderness.   Skin:     General: Skin is warm and dry.      Coloration: Skin is not pale.   Neurological:      General: No focal deficit present.      Mental Status: She is alert and oriented to person, place, and time.      Motor: No weakness.      Coordination: Coordination normal.      Gait: Gait normal.   Psychiatric:         Behavior: Behavior normal.         Thought Content: Thought content normal.         Judgment: Judgment normal.     Labs today-  Results for orders placed or performed in visit on 12/22/20   CBC Oncology   Result Value Ref Range    WBC 3.56 (L) 3.90 - 12.70 K/uL    RBC 3.95 (L) 4.00 - 5.40 M/uL    Hemoglobin 10.4 (L) 12.0 - 16.0 g/dL    Hematocrit 35.1 (L) 37.0 - 48.5 %    MCV 89 82 - 98 fL    MCH 26.3 (L) 27.0 - 31.0 pg    MCHC 29.6 (L) 32.0 - 36.0 g/dL    RDW 21.0 (H) 11.5 - 14.5 %    Platelets 213 150 - 350 K/uL    MPV 9.9 9.2 - 12.9 fL    Gran # (ANC) 1.5 (L) 1.8 - 7.7 K/uL    Immature Grans (Abs) 0.01 0.00 - 0.04 K/uL   CMP   Result Value Ref Range    Sodium 139 136 - 145 mmol/L    Potassium 3.7 3.5 - 5.1 mmol/L    Chloride 107 95 - 110 mmol/L    CO2 24 23 - 29 mmol/L    Glucose 183 (H) 70 - 110 mg/dL    BUN 10 6 - 20 mg/dL    Creatinine 0.9 0.5 - 1.4 mg/dL    Calcium 8.0 (L) 8.7 - 10.5 mg/dL    Total Protein 7.8 6.0 - 8.4 g/dL    Albumin 3.2 (L) 3.5 - 5.2 g/dL    Total Bilirubin 0.3 0.1 - 1.0 mg/dL    Alkaline Phosphatase 114 55 - 135 U/L    AST 14 10 - 40 U/L    ALT 17 10 - 44 U/L    Anion Gap 8 8 - 16 mmol/L    eGFR if African American >60.0 >60 mL/min/1.73 m^2    eGFR if non African American >60.0 >60 mL/min/1.73 m^2       Assessment     Assessment:     1. Stage IV ([id number]) invasive ductal carcinoma of right breast, upper inner quadrant, ER %, [place] neg, Her2 neg,  Grade 3, multifocal with one lesion appearing more aggressive (Ki67 80%), large LN +, bony mets   * Genetics negative - in media   * 10/2019 staging scans showed diffuse bony mets  *  10/31/19 started Arimidex and Ibrance 125 mg daily days 1-21 every 28 days with Zoladex. Bone biopsy negative, but review of imaging truly looks like metastatic disease.    * 4/22/2020 PET - disease improvement. 8/2020 PET with disease improvement.    * Cycle 13 Ibrance/Arimidex/Zoladex -11/2.      2. Bony mets with L2 endplate fracture   *  Xgeva monthly  - we had been waiting on dental clearance, but she has been unable to get into dentistry and is accepting of risks. Has no active dental disease.     3. Anxiety related to cancer diagnosis.   * Seeing Dr Odom. Improving.     4. Cardiomyopathy   * Echo 10/2019 with EF 30%   * Follows with Dr Olivares.     5. Morbid obesity and hyperglycemia   * Adjusting diet for weight loss and exercising. Will establish care with PCP    6. Hot flashes   * Effexor- improving.     7. Chemo anemia. Monitor.     9. Cancer pain   - s/p palliative RT to L1-L4   - improved     10. Hypokalemia- resolved  11. Hypocalcemia   *corrected 8.5    Plan:     1. Labs reviewed. Corrected calcium normal. Xgeva and Zoladex today.   2. Continue Zoladex, Arimidex, Ibrance -cycle 14 on 12/27.   3. Xgeva monthly  4. Repeat imaging in 2 weeks as due and having back pain. We discussed indications for sooner imaging.   5. *BMD due now.   6. *Needs to start Calcium and Vit D - needs Vit D level checked as well.   7. Rx Percocet for back pain. Note, Feels back pain related to moving boxes as father moving in with her since mother passed. Knows to call if gets worse, develops new symptoms or not relieved with RX as will need imaging.     RTC 2 weeks to see Dr. Lei with PET, BMD. Ok for virtual. Needs 30 minutes as previous Dr. Mendez patient  RTC in 4 weeks for EP to see Dr. Lei or myself  with cbc, cmp, Vit D zoladex and  Xgeva.       Patient is in agreement with the proposed treatment plan. All questions were answered to the patient's satisfaction. Pt knows to call clinic for any new or worsening symptoms and if anything is needed before the next clinic visit.      LEILA Sanabria-C  Hematology & Oncology  [facility] [place] [address]  ph. [phone]  Fax. [phone]     I spent 45 minutes (face to face) with the patient, more than 50% was in counseling and coordination of care as detailed above.

## 2020-12-23 DIAGNOSIS — E55.9 VITAMIN D DEFICIENCY: Primary | ICD-10-CM

## 2020-12-23 DIAGNOSIS — Z17.0 MALIGNANT NEOPLASM OF UPPER-INNER QUADRANT OF RIGHT BREAST IN FEMALE, ESTROGEN RECEPTOR POSITIVE: ICD-10-CM

## 2020-12-23 DIAGNOSIS — C50.211 MALIGNANT NEOPLASM OF UPPER-INNER QUADRANT OF RIGHT BREAST IN FEMALE, ESTROGEN RECEPTOR POSITIVE: ICD-10-CM

## 2020-12-28 ENCOUNTER — PATIENT MESSAGE (OUTPATIENT)
Dept: HEMATOLOGY/ONCOLOGY | Facility: CLINIC | Age: 45
End: 2020-12-28

## 2020-12-29 ENCOUNTER — TELEPHONE (OUTPATIENT)
Dept: HEMATOLOGY/ONCOLOGY | Facility: CLINIC | Age: 45
End: 2020-12-29

## 2020-12-29 DIAGNOSIS — C50.919 METASTATIC BREAST CANCER: Primary | ICD-10-CM

## 2021-01-04 ENCOUNTER — HOSPITAL ENCOUNTER (OUTPATIENT)
Dept: RADIOLOGY | Facility: OTHER | Age: 46
Discharge: HOME | End: 2021-01-04
Attending: NURSE PRACTITIONER
Payer: MEDICAID

## 2021-01-04 DIAGNOSIS — Z79.811 AROMATASE INHIBITOR USE: ICD-10-CM

## 2021-01-04 DIAGNOSIS — C79.51 BONE METASTASES: ICD-10-CM

## 2021-01-04 DIAGNOSIS — C50.919 METASTATIC BREAST CANCER: ICD-10-CM

## 2021-01-04 DIAGNOSIS — C50.211 MALIGNANT NEOPLASM OF UPPER-INNER QUADRANT OF RIGHT BREAST IN FEMALE, ESTROGEN RECEPTOR POSITIVE: ICD-10-CM

## 2021-01-04 DIAGNOSIS — Z17.0 MALIGNANT NEOPLASM OF UPPER-INNER QUADRANT OF RIGHT BREAST IN FEMALE, ESTROGEN RECEPTOR POSITIVE: ICD-10-CM

## 2021-01-04 PROCEDURE — 77080 DEXA BONE DENSITY SPINE HIP: ICD-10-PCS | Mod: 26,,, | Performed by: RADIOLOGY

## 2021-01-04 PROCEDURE — 25500020 PHARM REV CODE 255: Performed by: NURSE PRACTITIONER

## 2021-01-04 PROCEDURE — A9698 NON-RAD CONTRAST MATERIALNOC: HCPCS | Performed by: NURSE PRACTITIONER

## 2021-01-04 PROCEDURE — 77080 DXA BONE DENSITY AXIAL: CPT | Mod: TC

## 2021-01-04 PROCEDURE — 77080 DXA BONE DENSITY AXIAL: CPT | Mod: 26,,, | Performed by: RADIOLOGY

## 2021-01-04 PROCEDURE — 71260 CT CHEST ABDOMEN PELVIS WITH CONTRAST (XPD): ICD-10-PCS | Mod: 26,,, | Performed by: RADIOLOGY

## 2021-01-04 PROCEDURE — 71260 CT THORAX DX C+: CPT | Mod: 26,,, | Performed by: RADIOLOGY

## 2021-01-04 PROCEDURE — 74177 CT CHEST ABDOMEN PELVIS WITH CONTRAST (XPD): ICD-10-PCS | Mod: 26,,, | Performed by: RADIOLOGY

## 2021-01-04 PROCEDURE — 71260 CT THORAX DX C+: CPT | Mod: TC

## 2021-01-04 PROCEDURE — 74177 CT ABD & PELVIS W/CONTRAST: CPT | Mod: 26,,, | Performed by: RADIOLOGY

## 2021-01-04 PROCEDURE — 74177 CT ABD & PELVIS W/CONTRAST: CPT | Mod: TC

## 2021-01-04 RX ADMIN — IOHEXOL 100 ML: 350 INJECTION, SOLUTION INTRAVENOUS at 11:01

## 2021-01-04 RX ADMIN — IOHEXOL 1000 ML: 12 SOLUTION ORAL at 10:01

## 2021-01-05 ENCOUNTER — PATIENT MESSAGE (OUTPATIENT)
Dept: HEMATOLOGY/ONCOLOGY | Facility: CLINIC | Age: 46
End: 2021-01-05

## 2021-01-05 ENCOUNTER — OFFICE VISIT (OUTPATIENT)
Dept: HEMATOLOGY/ONCOLOGY | Facility: CLINIC | Age: 46
End: 2021-01-05
Payer: MEDICAID

## 2021-01-05 DIAGNOSIS — C50.211 MALIGNANT NEOPLASM OF UPPER-INNER QUADRANT OF RIGHT BREAST IN FEMALE, ESTROGEN RECEPTOR POSITIVE: Primary | ICD-10-CM

## 2021-01-05 DIAGNOSIS — C79.51 BONE METASTASES: ICD-10-CM

## 2021-01-05 DIAGNOSIS — Z17.0 MALIGNANT NEOPLASM OF UPPER-INNER QUADRANT OF RIGHT BREAST IN FEMALE, ESTROGEN RECEPTOR POSITIVE: Primary | ICD-10-CM

## 2021-01-05 DIAGNOSIS — G89.3 CANCER ASSOCIATED PAIN: ICD-10-CM

## 2021-01-05 PROCEDURE — 99214 PR OFFICE/OUTPT VISIT, EST, LEVL IV, 30-39 MIN: ICD-10-PCS | Mod: 95,,, | Performed by: INTERNAL MEDICINE

## 2021-01-05 PROCEDURE — 99214 OFFICE O/P EST MOD 30 MIN: CPT | Mod: 95,,, | Performed by: INTERNAL MEDICINE

## 2021-01-06 ENCOUNTER — TELEPHONE (OUTPATIENT)
Dept: OBSTETRICS AND GYNECOLOGY | Facility: CLINIC | Age: 46
End: 2021-01-06

## 2021-01-06 ENCOUNTER — PATIENT OUTREACH (OUTPATIENT)
Dept: ADMINISTRATIVE | Facility: OTHER | Age: 46
End: 2021-01-06

## 2021-01-07 ENCOUNTER — HOSPITAL ENCOUNTER (OUTPATIENT)
Dept: CARDIOLOGY | Facility: HOSPITAL | Age: 46
Discharge: HOME | End: 2021-01-07
Attending: INTERNAL MEDICINE
Payer: MEDICAID

## 2021-01-07 VITALS
HEART RATE: 75 BPM | BODY MASS INDEX: 48.82 KG/M2 | WEIGHT: 293 LBS | DIASTOLIC BLOOD PRESSURE: 80 MMHG | SYSTOLIC BLOOD PRESSURE: 140 MMHG | HEIGHT: 65 IN

## 2021-01-07 DIAGNOSIS — I42.0 DILATED CARDIOMYOPATHY: ICD-10-CM

## 2021-01-07 DIAGNOSIS — C50.211 MALIGNANT NEOPLASM OF UPPER-INNER QUADRANT OF RIGHT BREAST IN FEMALE, ESTROGEN RECEPTOR POSITIVE: ICD-10-CM

## 2021-01-07 DIAGNOSIS — Z17.0 MALIGNANT NEOPLASM OF UPPER-INNER QUADRANT OF RIGHT BREAST IN FEMALE, ESTROGEN RECEPTOR POSITIVE: ICD-10-CM

## 2021-01-07 LAB
ASCENDING AORTA: 3.06 CM
AV INDEX (PROSTH): 0.66
AV MEAN GRADIENT: 4 MMHG
AV PEAK GRADIENT: 9 MMHG
AV VALVE AREA: 2.77 CM2
AV VELOCITY RATIO: 0.59
BSA FOR ECHO PROCEDURE: 2.8 M2
CV ECHO LV RWT: 0.35 CM
DOP CALC AO PEAK VEL: 1.5 M/S
DOP CALC AO VTI: 26.26 CM
DOP CALC LVOT AREA: 4.2 CM2
DOP CALC LVOT DIAMETER: 2.31 CM
DOP CALC LVOT PEAK VEL: 0.88 M/S
DOP CALC LVOT STROKE VOLUME: 72.8 CM3
DOP CALCLVOT PEAK VEL VTI: 17.38 CM
E WAVE DECELERATION TIME: 230.8 MSEC
E/A RATIO: 0.75
E/E' RATIO: 6.24 M/S
ECHO LV POSTERIOR WALL: 1.05 CM (ref 0.6–1.1)
FRACTIONAL SHORTENING: 29 % (ref 28–44)
INTERVENTRICULAR SEPTUM: 1.07 CM (ref 0.6–1.1)
IVRT: 108.47 MSEC
LA MAJOR: 5.49 CM
LA MINOR: 5.61 CM
LA WIDTH: 4.77 CM
LEFT ATRIUM SIZE: 4.32 CM
LEFT ATRIUM VOLUME INDEX MOD: 32.3 ML/M2
LEFT ATRIUM VOLUME INDEX: 37.6 ML/M2
LEFT ATRIUM VOLUME MOD: 83.61 CM3
LEFT ATRIUM VOLUME: 97.2 CM3
LEFT INTERNAL DIMENSION IN SYSTOLE: 4.26 CM (ref 2.1–4)
LEFT VENTRICLE DIASTOLIC VOLUME INDEX: 69.1 ML/M2
LEFT VENTRICLE DIASTOLIC VOLUME: 178.75 ML
LEFT VENTRICLE MASS INDEX: 102 G/M2
LEFT VENTRICLE SYSTOLIC VOLUME INDEX: 31.5 ML/M2
LEFT VENTRICLE SYSTOLIC VOLUME: 81.44 ML
LEFT VENTRICULAR INTERNAL DIMENSION IN DIASTOLE: 5.98 CM (ref 3.5–6)
LEFT VENTRICULAR MASS: 264.84 G
LV LATERAL E/E' RATIO: 5.3 M/S
LV SEPTAL E/E' RATIO: 7.57 M/S
MV A" WAVE DURATION": 19.12 MSEC
MV PEAK A VEL: 0.71 M/S
MV PEAK E VEL: 0.53 M/S
PISA TR MAX VEL: 1.61 M/S
PULM VEIN S/D RATIO: 1.18
PV PEAK D VEL: 0.38 M/S
PV PEAK S VEL: 0.45 M/S
RA MAJOR: 4.71 CM
RA PRESSURE: 3 MMHG
RA WIDTH: 3.13 CM
RETIRED EF AND QEF - SEE NOTES: 33 %
RIGHT VENTRICULAR END-DIASTOLIC DIMENSION: 3.3 CM
RV TISSUE DOPPLER FREE WALL SYSTOLIC VELOCITY 1 (APICAL 4 CHAMBER VIEW): 10.53 CM/S
SINUS: 2.97 CM
STJ: 2.85 CM
TDI LATERAL: 0.1 M/S
TDI SEPTAL: 0.07 M/S
TDI: 0.09 M/S
TR MAX PG: 10 MMHG
TRICUSPID ANNULAR PLANE SYSTOLIC EXCURSION: 2.1 CM
TV REST PULMONARY ARTERY PRESSURE: 13 MMHG

## 2021-01-07 PROCEDURE — 93306 ECHO (CUPID ONLY): ICD-10-PCS | Mod: 26,,, | Performed by: INTERNAL MEDICINE

## 2021-01-07 PROCEDURE — 93306 TTE W/DOPPLER COMPLETE: CPT

## 2021-01-07 PROCEDURE — 93356 MYOCRD STRAIN IMG SPCKL TRCK: CPT | Mod: ,,, | Performed by: INTERNAL MEDICINE

## 2021-01-07 PROCEDURE — 93306 TTE W/DOPPLER COMPLETE: CPT | Mod: 26,,, | Performed by: INTERNAL MEDICINE

## 2021-01-07 PROCEDURE — 93356 ECHO (CUPID ONLY): ICD-10-PCS | Mod: ,,, | Performed by: INTERNAL MEDICINE

## 2021-01-08 ENCOUNTER — OFFICE VISIT (OUTPATIENT)
Dept: CARDIOLOGY | Facility: CLINIC | Age: 46
End: 2021-01-08
Payer: MEDICAID

## 2021-01-08 ENCOUNTER — PATIENT MESSAGE (OUTPATIENT)
Dept: CARDIOLOGY | Facility: CLINIC | Age: 46
End: 2021-01-08

## 2021-01-08 DIAGNOSIS — I10 ESSENTIAL HYPERTENSION: ICD-10-CM

## 2021-01-08 DIAGNOSIS — I50.42 CHRONIC COMBINED SYSTOLIC AND DIASTOLIC HEART FAILURE: ICD-10-CM

## 2021-01-08 DIAGNOSIS — R73.03 PREDIABETES: ICD-10-CM

## 2021-01-08 DIAGNOSIS — Z17.0 MALIGNANT NEOPLASM OF UPPER-INNER QUADRANT OF RIGHT BREAST IN FEMALE, ESTROGEN RECEPTOR POSITIVE: ICD-10-CM

## 2021-01-08 DIAGNOSIS — C50.211 MALIGNANT NEOPLASM OF UPPER-INNER QUADRANT OF RIGHT BREAST IN FEMALE, ESTROGEN RECEPTOR POSITIVE: ICD-10-CM

## 2021-01-08 DIAGNOSIS — E78.00 PURE HYPERCHOLESTEROLEMIA: Primary | ICD-10-CM

## 2021-01-08 PROCEDURE — 99214 OFFICE O/P EST MOD 30 MIN: CPT | Mod: 95,,, | Performed by: INTERNAL MEDICINE

## 2021-01-08 PROCEDURE — 99214 PR OFFICE/OUTPT VISIT, EST, LEVL IV, 30-39 MIN: ICD-10-PCS | Mod: 95,,, | Performed by: INTERNAL MEDICINE

## 2021-01-08 RX ORDER — SACUBITRIL AND VALSARTAN 49; 51 MG/1; MG/1
1 TABLET, FILM COATED ORAL 2 TIMES DAILY
Qty: 60 TABLET | Refills: 11 | Status: SHIPPED | OUTPATIENT
Start: 2021-01-08 | End: 2022-01-09

## 2021-01-11 ENCOUNTER — TELEPHONE (OUTPATIENT)
Dept: INFUSION THERAPY | Facility: HOSPITAL | Age: 46
End: 2021-01-11

## 2021-01-11 ENCOUNTER — TELEPHONE (OUTPATIENT)
Dept: OBSTETRICS AND GYNECOLOGY | Facility: CLINIC | Age: 46
End: 2021-01-11

## 2021-01-12 ENCOUNTER — PATIENT OUTREACH (OUTPATIENT)
Dept: ADMINISTRATIVE | Facility: HOSPITAL | Age: 46
End: 2021-01-12

## 2021-01-19 ENCOUNTER — PATIENT MESSAGE (OUTPATIENT)
Dept: HEMATOLOGY/ONCOLOGY | Facility: CLINIC | Age: 46
End: 2021-01-19

## 2021-01-22 ENCOUNTER — OFFICE VISIT (OUTPATIENT)
Dept: HEMATOLOGY/ONCOLOGY | Facility: CLINIC | Age: 46
End: 2021-01-22
Payer: MEDICAID

## 2021-01-22 DIAGNOSIS — R68.82 LOW LIBIDO: ICD-10-CM

## 2021-01-22 DIAGNOSIS — N95.1 MENOPAUSAL SYMPTOM: Primary | ICD-10-CM

## 2021-01-22 DIAGNOSIS — Z85.3 HISTORY OF BREAST CANCER: ICD-10-CM

## 2021-01-22 DIAGNOSIS — N76.0 ACUTE VAGINITIS: ICD-10-CM

## 2021-01-22 PROCEDURE — 99215 PR OFFICE/OUTPT VISIT, EST, LEVL V, 40-54 MIN: ICD-10-PCS | Mod: 95,,, | Performed by: OBSTETRICS & GYNECOLOGY

## 2021-01-22 PROCEDURE — 99215 OFFICE O/P EST HI 40 MIN: CPT | Mod: 95,,, | Performed by: OBSTETRICS & GYNECOLOGY

## 2021-01-25 ENCOUNTER — TELEPHONE (OUTPATIENT)
Dept: OBSTETRICS AND GYNECOLOGY | Facility: CLINIC | Age: 46
End: 2021-01-25

## 2021-01-25 DIAGNOSIS — Z17.0 MALIGNANT NEOPLASM OF UPPER-INNER QUADRANT OF RIGHT BREAST IN FEMALE, ESTROGEN RECEPTOR POSITIVE: ICD-10-CM

## 2021-01-25 DIAGNOSIS — R23.2 HOT FLASHES RELATED TO AROMATASE INHIBITOR THERAPY: ICD-10-CM

## 2021-01-25 DIAGNOSIS — C50.211 MALIGNANT NEOPLASM OF UPPER-INNER QUADRANT OF RIGHT BREAST IN FEMALE, ESTROGEN RECEPTOR POSITIVE: ICD-10-CM

## 2021-01-25 DIAGNOSIS — N95.1 MENOPAUSAL SYMPTOMS: Primary | ICD-10-CM

## 2021-01-25 DIAGNOSIS — T45.1X5A HOT FLASHES RELATED TO AROMATASE INHIBITOR THERAPY: ICD-10-CM

## 2021-01-28 ENCOUNTER — LAB VISIT (OUTPATIENT)
Dept: LAB | Facility: HOSPITAL | Age: 46
End: 2021-01-28
Attending: OBSTETRICS & GYNECOLOGY
Payer: MEDICAID

## 2021-01-28 DIAGNOSIS — N95.1 MENOPAUSAL SYMPTOM: ICD-10-CM

## 2021-01-28 DIAGNOSIS — Z85.3 HISTORY OF BREAST CANCER: ICD-10-CM

## 2021-01-28 LAB
ESTRADIOL SERPL-MCNC: <10 PG/ML
FSH SERPL-ACNC: 12.5 MIU/ML

## 2021-01-28 PROCEDURE — 36415 COLL VENOUS BLD VENIPUNCTURE: CPT | Mod: PO

## 2021-01-28 PROCEDURE — 84403 ASSAY OF TOTAL TESTOSTERONE: CPT

## 2021-01-28 PROCEDURE — 82670 ASSAY OF TOTAL ESTRADIOL: CPT

## 2021-01-28 PROCEDURE — 83001 ASSAY OF GONADOTROPIN (FSH): CPT

## 2021-02-01 ENCOUNTER — PATIENT MESSAGE (OUTPATIENT)
Dept: PSYCHIATRY | Facility: CLINIC | Age: 46
End: 2021-02-01

## 2021-02-01 ENCOUNTER — OFFICE VISIT (OUTPATIENT)
Dept: PSYCHIATRY | Facility: CLINIC | Age: 46
End: 2021-02-01
Payer: MEDICAID

## 2021-02-01 DIAGNOSIS — Z17.0 MALIGNANT NEOPLASM OF UPPER-INNER QUADRANT OF RIGHT BREAST IN FEMALE, ESTROGEN RECEPTOR POSITIVE: ICD-10-CM

## 2021-02-01 DIAGNOSIS — F32.4 MAJOR DEPRESSIVE DISORDER, SINGLE EPISODE, IN PARTIAL REMISSION WITH ANXIOUS DISTRESS: Primary | ICD-10-CM

## 2021-02-01 DIAGNOSIS — C50.211 MALIGNANT NEOPLASM OF UPPER-INNER QUADRANT OF RIGHT BREAST IN FEMALE, ESTROGEN RECEPTOR POSITIVE: ICD-10-CM

## 2021-02-01 DIAGNOSIS — F43.23 ADJUSTMENT DISORDER WITH MIXED ANXIETY AND DEPRESSED MOOD: ICD-10-CM

## 2021-02-01 DIAGNOSIS — F43.21 GRIEF: ICD-10-CM

## 2021-02-01 PROCEDURE — 90834 PR PSYCHOTHERAPY W/PATIENT, 45 MIN: ICD-10-PCS | Mod: 95,HP,HB, | Performed by: PSYCHOLOGIST

## 2021-02-01 PROCEDURE — 90834 PSYTX W PT 45 MINUTES: CPT | Mod: 95,HP,HB, | Performed by: PSYCHOLOGIST

## 2021-02-01 RX ORDER — VENLAFAXINE HYDROCHLORIDE 37.5 MG/1
75 CAPSULE, EXTENDED RELEASE ORAL DAILY
Qty: 90 CAPSULE | Refills: 2 | Status: SHIPPED | OUTPATIENT
Start: 2021-02-01 | End: 2021-02-17 | Stop reason: SDUPTHER

## 2021-02-03 ENCOUNTER — PATIENT MESSAGE (OUTPATIENT)
Dept: OBSTETRICS AND GYNECOLOGY | Facility: CLINIC | Age: 46
End: 2021-02-03

## 2021-02-03 LAB
ALBUMIN SERPL-MCNC: 3.5 G/DL (ref 3.6–5.1)
SHBG SERPL-SCNC: 32 NMOL/L (ref 17–124)
TESTOST FREE SERPL-MCNC: 1.5 PG/ML (ref 0.2–5)
TESTOST SERPL-MCNC: 12 NG/DL (ref 2–45)
TESTOSTERONE.FREE+WB SERPL-MCNC: 2.4 NG/DL (ref 0.5–8.5)

## 2021-02-04 ENCOUNTER — INFUSION (OUTPATIENT)
Dept: INFUSION THERAPY | Facility: HOSPITAL | Age: 46
End: 2021-02-04
Payer: MEDICAID

## 2021-02-04 ENCOUNTER — OFFICE VISIT (OUTPATIENT)
Dept: HEMATOLOGY/ONCOLOGY | Facility: CLINIC | Age: 46
End: 2021-02-04
Payer: MEDICAID

## 2021-02-04 VITALS
WEIGHT: 293 LBS | DIASTOLIC BLOOD PRESSURE: 89 MMHG | HEIGHT: 65 IN | RESPIRATION RATE: 16 BRPM | BODY MASS INDEX: 48.82 KG/M2 | OXYGEN SATURATION: 95 % | TEMPERATURE: 99 F | HEART RATE: 86 BPM | SYSTOLIC BLOOD PRESSURE: 133 MMHG

## 2021-02-04 DIAGNOSIS — C79.51 BONE METASTASES: ICD-10-CM

## 2021-02-04 DIAGNOSIS — G89.3 CANCER ASSOCIATED PAIN: ICD-10-CM

## 2021-02-04 DIAGNOSIS — E55.9 VITAMIN D DEFICIENCY: ICD-10-CM

## 2021-02-04 DIAGNOSIS — N89.8 VAGINAL DRYNESS: ICD-10-CM

## 2021-02-04 DIAGNOSIS — I10 ESSENTIAL HYPERTENSION: ICD-10-CM

## 2021-02-04 DIAGNOSIS — Z17.0 MALIGNANT NEOPLASM OF UPPER-INNER QUADRANT OF RIGHT BREAST IN FEMALE, ESTROGEN RECEPTOR POSITIVE: Primary | ICD-10-CM

## 2021-02-04 DIAGNOSIS — C50.211 MALIGNANT NEOPLASM OF UPPER-INNER QUADRANT OF RIGHT BREAST IN FEMALE, ESTROGEN RECEPTOR POSITIVE: Primary | ICD-10-CM

## 2021-02-04 DIAGNOSIS — D56.9 THALASSEMIA, UNSPECIFIED TYPE: ICD-10-CM

## 2021-02-04 DIAGNOSIS — E78.00 PURE HYPERCHOLESTEROLEMIA: ICD-10-CM

## 2021-02-04 DIAGNOSIS — I51.7 CARDIOMEGALY: ICD-10-CM

## 2021-02-04 DIAGNOSIS — R23.2 HOT FLASHES: ICD-10-CM

## 2021-02-04 PROCEDURE — 99215 OFFICE O/P EST HI 40 MIN: CPT | Mod: S$PBB,,, | Performed by: INTERNAL MEDICINE

## 2021-02-04 PROCEDURE — 96372 THER/PROPH/DIAG INJ SC/IM: CPT | Mod: 59

## 2021-02-04 PROCEDURE — 96402 CHEMO HORMON ANTINEOPL SQ/IM: CPT

## 2021-02-04 PROCEDURE — 99215 PR OFFICE/OUTPT VISIT, EST, LEVL V, 40-54 MIN: ICD-10-PCS | Mod: S$PBB,,, | Performed by: INTERNAL MEDICINE

## 2021-02-04 PROCEDURE — 99999 PR PBB SHADOW E&M-EST. PATIENT-LVL IV: CPT | Mod: PBBFAC,,, | Performed by: INTERNAL MEDICINE

## 2021-02-04 PROCEDURE — 63600175 PHARM REV CODE 636 W HCPCS: Mod: JG | Performed by: INTERNAL MEDICINE

## 2021-02-04 PROCEDURE — 99999 PR PBB SHADOW E&M-EST. PATIENT-LVL IV: ICD-10-PCS | Mod: PBBFAC,,, | Performed by: INTERNAL MEDICINE

## 2021-02-04 PROCEDURE — 99214 OFFICE O/P EST MOD 30 MIN: CPT | Mod: PBBFAC,25 | Performed by: INTERNAL MEDICINE

## 2021-02-04 RX ORDER — ERGOCALCIFEROL 1.25 MG/1
50000 CAPSULE ORAL
Qty: 12 CAPSULE | Refills: 0 | Status: SHIPPED | OUTPATIENT
Start: 2021-02-04 | End: 2021-04-21 | Stop reason: SDUPTHER

## 2021-02-04 RX ORDER — IRBESARTAN 150 MG/1
150 TABLET ORAL DAILY
COMMUNITY
Start: 2021-01-21 | End: 2021-04-01

## 2021-02-04 RX ORDER — LIDOCAINE AND PRILOCAINE 25; 25 MG/G; MG/G
CREAM TOPICAL
COMMUNITY
Start: 2021-01-04 | End: 2021-11-03

## 2021-02-04 RX ADMIN — DENOSUMAB 120 MG: 120 INJECTION SUBCUTANEOUS at 11:02

## 2021-02-04 RX ADMIN — GOSERELIN ACETATE 3.6 MG: 3.6 IMPLANT SUBCUTANEOUS at 11:02

## 2021-02-08 ENCOUNTER — PATIENT OUTREACH (OUTPATIENT)
Dept: ADMINISTRATIVE | Facility: OTHER | Age: 46
End: 2021-02-08

## 2021-02-09 ENCOUNTER — CLINICAL SUPPORT (OUTPATIENT)
Dept: OBSTETRICS AND GYNECOLOGY | Facility: CLINIC | Age: 46
End: 2021-02-09
Payer: MEDICAID

## 2021-02-09 ENCOUNTER — OFFICE VISIT (OUTPATIENT)
Dept: OBSTETRICS AND GYNECOLOGY | Facility: CLINIC | Age: 46
End: 2021-02-09
Payer: MEDICAID

## 2021-02-09 VITALS
DIASTOLIC BLOOD PRESSURE: 84 MMHG | WEIGHT: 293 LBS | HEIGHT: 65 IN | BODY MASS INDEX: 48.82 KG/M2 | SYSTOLIC BLOOD PRESSURE: 118 MMHG

## 2021-02-09 DIAGNOSIS — N95.2 VAGINAL ATROPHY: ICD-10-CM

## 2021-02-09 DIAGNOSIS — N94.10 DYSPAREUNIA IN FEMALE: ICD-10-CM

## 2021-02-09 DIAGNOSIS — N95.1 MENOPAUSAL SYMPTOMS: Primary | ICD-10-CM

## 2021-02-09 DIAGNOSIS — N95.2 ATROPHIC VAGINITIS: Primary | ICD-10-CM

## 2021-02-09 PROCEDURE — 96372 PR INJECTION,THERAP/PROPH/DIAG2ST, IM OR SUBCUT: ICD-10-PCS | Mod: S$GLB,,, | Performed by: OBSTETRICS & GYNECOLOGY

## 2021-02-09 PROCEDURE — 99215 OFFICE O/P EST HI 40 MIN: CPT | Mod: 25,S$GLB,, | Performed by: PHYSICIAN ASSISTANT

## 2021-02-09 PROCEDURE — 96372 THER/PROPH/DIAG INJ SC/IM: CPT | Mod: S$GLB,,, | Performed by: OBSTETRICS & GYNECOLOGY

## 2021-02-09 PROCEDURE — 99215 PR OFFICE/OUTPT VISIT, EST, LEVL V, 40-54 MIN: ICD-10-PCS | Mod: 25,S$GLB,, | Performed by: PHYSICIAN ASSISTANT

## 2021-02-09 RX ORDER — TESTOSTERONE CYPIONATE 200 MG/ML
50 INJECTION, SOLUTION INTRAMUSCULAR
Status: SHIPPED | OUTPATIENT
Start: 2021-02-09 | End: 2021-05-04

## 2021-02-09 RX ORDER — PRASTERONE 6.5 MG/1
INSERT VAGINAL
Qty: 30 EACH | Refills: 11 | Status: SHIPPED | OUTPATIENT
Start: 2021-02-09 | End: 2021-11-03

## 2021-02-09 RX ORDER — ESTRADIOL 10 UG/1
INSERT VAGINAL
Qty: 30 EACH | Refills: 11 | Status: SHIPPED | OUTPATIENT
Start: 2021-02-09 | End: 2021-02-09

## 2021-02-09 RX ORDER — FLUCONAZOLE 150 MG/1
150 TABLET ORAL DAILY
Qty: 1 TABLET | Refills: 0 | Status: SHIPPED | OUTPATIENT
Start: 2021-02-09 | End: 2021-02-10

## 2021-02-09 RX ADMIN — TESTOSTERONE CYPIONATE 50 MG: 200 INJECTION, SOLUTION INTRAMUSCULAR at 03:02

## 2021-02-11 ENCOUNTER — TELEPHONE (OUTPATIENT)
Dept: HEMATOLOGY/ONCOLOGY | Facility: CLINIC | Age: 46
End: 2021-02-11

## 2021-02-11 ENCOUNTER — PATIENT MESSAGE (OUTPATIENT)
Dept: HEMATOLOGY/ONCOLOGY | Facility: CLINIC | Age: 46
End: 2021-02-11

## 2021-02-12 ENCOUNTER — PATIENT MESSAGE (OUTPATIENT)
Dept: CARDIOLOGY | Facility: CLINIC | Age: 46
End: 2021-02-12

## 2021-02-14 ENCOUNTER — PATIENT MESSAGE (OUTPATIENT)
Dept: OBSTETRICS AND GYNECOLOGY | Facility: CLINIC | Age: 46
End: 2021-02-14

## 2021-02-16 ENCOUNTER — PATIENT MESSAGE (OUTPATIENT)
Dept: HEMATOLOGY/ONCOLOGY | Facility: CLINIC | Age: 46
End: 2021-02-16

## 2021-02-17 ENCOUNTER — TELEPHONE (OUTPATIENT)
Dept: HEMATOLOGY/ONCOLOGY | Facility: CLINIC | Age: 46
End: 2021-02-17

## 2021-02-17 ENCOUNTER — OFFICE VISIT (OUTPATIENT)
Dept: PSYCHIATRY | Facility: CLINIC | Age: 46
End: 2021-02-17
Payer: MEDICAID

## 2021-02-17 DIAGNOSIS — F43.23 ADJUSTMENT DISORDER WITH MIXED ANXIETY AND DEPRESSED MOOD: ICD-10-CM

## 2021-02-17 DIAGNOSIS — F43.21 GRIEF: ICD-10-CM

## 2021-02-17 DIAGNOSIS — C50.211 MALIGNANT NEOPLASM OF UPPER-INNER QUADRANT OF RIGHT BREAST IN FEMALE, ESTROGEN RECEPTOR POSITIVE: ICD-10-CM

## 2021-02-17 DIAGNOSIS — Z17.0 MALIGNANT NEOPLASM OF UPPER-INNER QUADRANT OF RIGHT BREAST IN FEMALE, ESTROGEN RECEPTOR POSITIVE: ICD-10-CM

## 2021-02-17 DIAGNOSIS — F32.4 MAJOR DEPRESSIVE DISORDER, SINGLE EPISODE, IN PARTIAL REMISSION WITH ANXIOUS DISTRESS: Primary | ICD-10-CM

## 2021-02-17 PROCEDURE — 99211 OFF/OP EST MAY X REQ PHY/QHP: CPT | Mod: PBBFAC | Performed by: PSYCHOLOGIST

## 2021-02-17 PROCEDURE — 99999 PR PBB SHADOW E&M-EST. PATIENT-LVL I: ICD-10-PCS | Mod: PBBFAC,HB,, | Performed by: PSYCHOLOGIST

## 2021-02-17 PROCEDURE — 99999 PR PBB SHADOW E&M-EST. PATIENT-LVL I: CPT | Mod: PBBFAC,HB,, | Performed by: PSYCHOLOGIST

## 2021-02-17 PROCEDURE — 90837 PSYTX W PT 60 MINUTES: CPT | Mod: S$PBB,HB,, | Performed by: PSYCHOLOGIST

## 2021-02-17 PROCEDURE — 90837 PR PSYCHOTHERAPY W/PATIENT, 60 MIN: ICD-10-PCS | Mod: S$PBB,HB,, | Performed by: PSYCHOLOGIST

## 2021-02-17 PROCEDURE — 90837 PSYTX W PT 60 MINUTES: CPT | Mod: PBBFAC | Performed by: PSYCHOLOGIST

## 2021-02-17 RX ORDER — VENLAFAXINE HYDROCHLORIDE 75 MG/1
75 CAPSULE, EXTENDED RELEASE ORAL DAILY
Qty: 30 CAPSULE | Refills: 3 | Status: SHIPPED | OUTPATIENT
Start: 2021-02-17 | End: 2021-06-10

## 2021-02-18 ENCOUNTER — OFFICE VISIT (OUTPATIENT)
Dept: FAMILY MEDICINE | Facility: CLINIC | Age: 46
End: 2021-02-18
Payer: MEDICAID

## 2021-02-18 VITALS
RESPIRATION RATE: 20 BRPM | OXYGEN SATURATION: 98 % | HEIGHT: 65 IN | HEART RATE: 75 BPM | BODY MASS INDEX: 48.82 KG/M2 | SYSTOLIC BLOOD PRESSURE: 124 MMHG | DIASTOLIC BLOOD PRESSURE: 82 MMHG | WEIGHT: 293 LBS | TEMPERATURE: 98 F

## 2021-02-18 DIAGNOSIS — R42 DIZZINESS: ICD-10-CM

## 2021-02-18 DIAGNOSIS — H10.33 ACUTE CONJUNCTIVITIS OF BOTH EYES, UNSPECIFIED ACUTE CONJUNCTIVITIS TYPE: Primary | ICD-10-CM

## 2021-02-18 PROCEDURE — 99214 OFFICE O/P EST MOD 30 MIN: CPT | Mod: S$PBB,,, | Performed by: INTERNAL MEDICINE

## 2021-02-18 PROCEDURE — 99214 PR OFFICE/OUTPT VISIT, EST, LEVL IV, 30-39 MIN: ICD-10-PCS | Mod: S$PBB,,, | Performed by: INTERNAL MEDICINE

## 2021-02-18 PROCEDURE — 99999 PR PBB SHADOW E&M-EST. PATIENT-LVL V: CPT | Mod: PBBFAC,,, | Performed by: INTERNAL MEDICINE

## 2021-02-18 PROCEDURE — 99215 OFFICE O/P EST HI 40 MIN: CPT | Mod: PBBFAC,PO | Performed by: INTERNAL MEDICINE

## 2021-02-18 PROCEDURE — 99999 PR PBB SHADOW E&M-EST. PATIENT-LVL V: ICD-10-PCS | Mod: PBBFAC,,, | Performed by: INTERNAL MEDICINE

## 2021-02-18 RX ORDER — POLYMYXIN B SULFATE AND TRIMETHOPRIM 1; 10000 MG/ML; [USP'U]/ML
1 SOLUTION OPHTHALMIC EVERY 6 HOURS
Qty: 1.8667 ML | Refills: 0 | Status: SHIPPED | OUTPATIENT
Start: 2021-02-18 | End: 2021-02-25

## 2021-02-19 ENCOUNTER — PATIENT MESSAGE (OUTPATIENT)
Dept: HEMATOLOGY/ONCOLOGY | Facility: CLINIC | Age: 46
End: 2021-02-19

## 2021-02-24 ENCOUNTER — PATIENT MESSAGE (OUTPATIENT)
Dept: HEMATOLOGY/ONCOLOGY | Facility: CLINIC | Age: 46
End: 2021-02-24

## 2021-02-26 ENCOUNTER — LAB VISIT (OUTPATIENT)
Dept: LAB | Facility: HOSPITAL | Age: 46
End: 2021-02-26
Payer: MEDICAID

## 2021-02-26 DIAGNOSIS — Z17.0 MALIGNANT NEOPLASM OF UPPER-INNER QUADRANT OF RIGHT BREAST IN FEMALE, ESTROGEN RECEPTOR POSITIVE: ICD-10-CM

## 2021-02-26 DIAGNOSIS — C50.211 MALIGNANT NEOPLASM OF UPPER-INNER QUADRANT OF RIGHT BREAST IN FEMALE, ESTROGEN RECEPTOR POSITIVE: ICD-10-CM

## 2021-02-26 LAB
ANISOCYTOSIS BLD QL SMEAR: SLIGHT
BASOPHILS # BLD AUTO: 0.06 K/UL (ref 0–0.2)
BASOPHILS NFR BLD: 1.1 % (ref 0–1.9)
DIFFERENTIAL METHOD: ABNORMAL
EOSINOPHIL # BLD AUTO: 0.1 K/UL (ref 0–0.5)
EOSINOPHIL NFR BLD: 0.9 % (ref 0–8)
ERYTHROCYTE [DISTWIDTH] IN BLOOD BY AUTOMATED COUNT: 25 % (ref 11.5–14.5)
HCT VFR BLD AUTO: 32 % (ref 37–48.5)
HGB BLD-MCNC: 9.8 G/DL (ref 12–16)
HYPOCHROMIA BLD QL SMEAR: ABNORMAL
IMM GRANULOCYTES # BLD AUTO: 0.01 K/UL (ref 0–0.04)
IMM GRANULOCYTES NFR BLD AUTO: 0.2 % (ref 0–0.5)
LYMPHOCYTES # BLD AUTO: 2.8 K/UL (ref 1–4.8)
LYMPHOCYTES NFR BLD: 50.5 % (ref 18–48)
MCH RBC QN AUTO: 26.4 PG (ref 27–31)
MCHC RBC AUTO-ENTMCNC: 30.6 G/DL (ref 32–36)
MCV RBC AUTO: 86 FL (ref 82–98)
MONOCYTES # BLD AUTO: 0.6 K/UL (ref 0.3–1)
MONOCYTES NFR BLD: 10.1 % (ref 4–15)
NEUTROPHILS # BLD AUTO: 2.1 K/UL (ref 1.8–7.7)
NEUTROPHILS NFR BLD: 37.2 % (ref 38–73)
NRBC BLD-RTO: 0 /100 WBC
OVALOCYTES BLD QL SMEAR: ABNORMAL
PLATELET # BLD AUTO: 221 K/UL (ref 150–350)
PLATELET BLD QL SMEAR: ABNORMAL
PMV BLD AUTO: 10 FL (ref 9.2–12.9)
POIKILOCYTOSIS BLD QL SMEAR: SLIGHT
POLYCHROMASIA BLD QL SMEAR: ABNORMAL
RBC # BLD AUTO: 3.71 M/UL (ref 4–5.4)
WBC # BLD AUTO: 5.52 K/UL (ref 3.9–12.7)

## 2021-02-26 PROCEDURE — 36415 COLL VENOUS BLD VENIPUNCTURE: CPT

## 2021-02-26 PROCEDURE — 85025 COMPLETE CBC W/AUTO DIFF WBC: CPT

## 2021-03-02 ENCOUNTER — LAB VISIT (OUTPATIENT)
Dept: LAB | Facility: HOSPITAL | Age: 46
End: 2021-03-02
Payer: MEDICAID

## 2021-03-02 ENCOUNTER — OFFICE VISIT (OUTPATIENT)
Dept: CARDIOLOGY | Facility: CLINIC | Age: 46
End: 2021-03-02
Payer: MEDICAID

## 2021-03-02 VITALS
WEIGHT: 293 LBS | BODY MASS INDEX: 50.02 KG/M2 | HEIGHT: 64 IN | HEART RATE: 69 BPM | DIASTOLIC BLOOD PRESSURE: 75 MMHG | SYSTOLIC BLOOD PRESSURE: 131 MMHG

## 2021-03-02 DIAGNOSIS — C50.211 MALIGNANT NEOPLASM OF UPPER-INNER QUADRANT OF RIGHT BREAST IN FEMALE, ESTROGEN RECEPTOR POSITIVE: ICD-10-CM

## 2021-03-02 DIAGNOSIS — I10 ESSENTIAL HYPERTENSION: ICD-10-CM

## 2021-03-02 DIAGNOSIS — E78.00 PURE HYPERCHOLESTEROLEMIA: ICD-10-CM

## 2021-03-02 DIAGNOSIS — I42.0 DILATED CARDIOMYOPATHY: ICD-10-CM

## 2021-03-02 DIAGNOSIS — Z17.0 MALIGNANT NEOPLASM OF UPPER-INNER QUADRANT OF RIGHT BREAST IN FEMALE, ESTROGEN RECEPTOR POSITIVE: ICD-10-CM

## 2021-03-02 DIAGNOSIS — C79.51 BONE METASTASES: ICD-10-CM

## 2021-03-02 LAB
ALBUMIN SERPL BCP-MCNC: 3.2 G/DL (ref 3.5–5.2)
ALP SERPL-CCNC: 93 U/L (ref 55–135)
ALT SERPL W/O P-5'-P-CCNC: 18 U/L (ref 10–44)
ANION GAP SERPL CALC-SCNC: 9 MMOL/L (ref 8–16)
ANISOCYTOSIS BLD QL SMEAR: SLIGHT
AST SERPL-CCNC: 14 U/L (ref 10–40)
BASOPHILS # BLD AUTO: 0.06 K/UL (ref 0–0.2)
BASOPHILS NFR BLD: 1.3 % (ref 0–1.9)
BILIRUB SERPL-MCNC: 0.4 MG/DL (ref 0.1–1)
BUN SERPL-MCNC: 9 MG/DL (ref 6–20)
CALCIUM SERPL-MCNC: 8.9 MG/DL (ref 8.7–10.5)
CHLORIDE SERPL-SCNC: 104 MMOL/L (ref 95–110)
CO2 SERPL-SCNC: 28 MMOL/L (ref 23–29)
CREAT SERPL-MCNC: 0.9 MG/DL (ref 0.5–1.4)
DACRYOCYTES BLD QL SMEAR: ABNORMAL
DIFFERENTIAL METHOD: ABNORMAL
EOSINOPHIL # BLD AUTO: 0 K/UL (ref 0–0.5)
EOSINOPHIL NFR BLD: 0.4 % (ref 0–8)
ERYTHROCYTE [DISTWIDTH] IN BLOOD BY AUTOMATED COUNT: 24.8 % (ref 11.5–14.5)
EST. GFR  (AFRICAN AMERICAN): >60 ML/MIN/1.73 M^2
EST. GFR  (NON AFRICAN AMERICAN): >60 ML/MIN/1.73 M^2
GLUCOSE SERPL-MCNC: 129 MG/DL (ref 70–110)
HCT VFR BLD AUTO: 32.2 % (ref 37–48.5)
HGB BLD-MCNC: 9.7 G/DL (ref 12–16)
HYPOCHROMIA BLD QL SMEAR: ABNORMAL
IMM GRANULOCYTES # BLD AUTO: 0.02 K/UL (ref 0–0.04)
IMM GRANULOCYTES NFR BLD AUTO: 0.4 % (ref 0–0.5)
LYMPHOCYTES # BLD AUTO: 1.9 K/UL (ref 1–4.8)
LYMPHOCYTES NFR BLD: 42.1 % (ref 18–48)
MCH RBC QN AUTO: 26.3 PG (ref 27–31)
MCHC RBC AUTO-ENTMCNC: 30.1 G/DL (ref 32–36)
MCV RBC AUTO: 87 FL (ref 82–98)
MONOCYTES # BLD AUTO: 0.3 K/UL (ref 0.3–1)
MONOCYTES NFR BLD: 6.9 % (ref 4–15)
NEUTROPHILS # BLD AUTO: 2.2 K/UL (ref 1.8–7.7)
NEUTROPHILS NFR BLD: 48.9 % (ref 38–73)
NRBC BLD-RTO: 0 /100 WBC
OVALOCYTES BLD QL SMEAR: ABNORMAL
PLATELET # BLD AUTO: 269 K/UL (ref 150–350)
PLATELET BLD QL SMEAR: ABNORMAL
PMV BLD AUTO: 9.5 FL (ref 9.2–12.9)
POIKILOCYTOSIS BLD QL SMEAR: SLIGHT
POLYCHROMASIA BLD QL SMEAR: ABNORMAL
POTASSIUM SERPL-SCNC: 3.4 MMOL/L (ref 3.5–5.1)
PROT SERPL-MCNC: 7.6 G/DL (ref 6–8.4)
RBC # BLD AUTO: 3.69 M/UL (ref 4–5.4)
SODIUM SERPL-SCNC: 141 MMOL/L (ref 136–145)
WBC # BLD AUTO: 4.51 K/UL (ref 3.9–12.7)

## 2021-03-02 PROCEDURE — 99214 PR OFFICE/OUTPT VISIT, EST, LEVL IV, 30-39 MIN: ICD-10-PCS | Mod: S$PBB,,, | Performed by: INTERNAL MEDICINE

## 2021-03-02 PROCEDURE — 80053 COMPREHEN METABOLIC PANEL: CPT

## 2021-03-02 PROCEDURE — 36415 COLL VENOUS BLD VENIPUNCTURE: CPT

## 2021-03-02 PROCEDURE — 99999 PR PBB SHADOW E&M-EST. PATIENT-LVL V: ICD-10-PCS | Mod: PBBFAC,,, | Performed by: INTERNAL MEDICINE

## 2021-03-02 PROCEDURE — 99215 OFFICE O/P EST HI 40 MIN: CPT | Mod: PBBFAC | Performed by: INTERNAL MEDICINE

## 2021-03-02 PROCEDURE — 99214 OFFICE O/P EST MOD 30 MIN: CPT | Mod: S$PBB,,, | Performed by: INTERNAL MEDICINE

## 2021-03-02 PROCEDURE — 99999 PR PBB SHADOW E&M-EST. PATIENT-LVL V: CPT | Mod: PBBFAC,,, | Performed by: INTERNAL MEDICINE

## 2021-03-02 PROCEDURE — 85025 COMPLETE CBC W/AUTO DIFF WBC: CPT

## 2021-03-02 RX ORDER — CARVEDILOL 25 MG/1
25 TABLET ORAL 2 TIMES DAILY WITH MEALS
Qty: 180 TABLET | Refills: 3 | Status: SHIPPED | OUTPATIENT
Start: 2021-03-02 | End: 2021-04-14

## 2021-03-05 ENCOUNTER — INFUSION (OUTPATIENT)
Dept: INFUSION THERAPY | Facility: HOSPITAL | Age: 46
End: 2021-03-05
Payer: MEDICAID

## 2021-03-05 ENCOUNTER — TELEPHONE (OUTPATIENT)
Dept: HEMATOLOGY/ONCOLOGY | Facility: CLINIC | Age: 46
End: 2021-03-05

## 2021-03-05 ENCOUNTER — OFFICE VISIT (OUTPATIENT)
Dept: HEMATOLOGY/ONCOLOGY | Facility: CLINIC | Age: 46
End: 2021-03-05
Payer: MEDICAID

## 2021-03-05 VITALS
HEIGHT: 65 IN | OXYGEN SATURATION: 95 % | BODY MASS INDEX: 48.82 KG/M2 | TEMPERATURE: 98 F | SYSTOLIC BLOOD PRESSURE: 145 MMHG | RESPIRATION RATE: 16 BRPM | WEIGHT: 293 LBS | DIASTOLIC BLOOD PRESSURE: 82 MMHG | HEART RATE: 79 BPM

## 2021-03-05 DIAGNOSIS — E55.9 VITAMIN D DEFICIENCY: ICD-10-CM

## 2021-03-05 DIAGNOSIS — Z17.0 MALIGNANT NEOPLASM OF UPPER-INNER QUADRANT OF RIGHT BREAST IN FEMALE, ESTROGEN RECEPTOR POSITIVE: Primary | ICD-10-CM

## 2021-03-05 DIAGNOSIS — M54.9 DORSALGIA, UNSPECIFIED: ICD-10-CM

## 2021-03-05 DIAGNOSIS — E78.00 PURE HYPERCHOLESTEROLEMIA: ICD-10-CM

## 2021-03-05 DIAGNOSIS — C79.51 BONE METASTASES: ICD-10-CM

## 2021-03-05 DIAGNOSIS — G89.3 CANCER ASSOCIATED PAIN: ICD-10-CM

## 2021-03-05 DIAGNOSIS — I10 ESSENTIAL HYPERTENSION: ICD-10-CM

## 2021-03-05 DIAGNOSIS — C50.211 MALIGNANT NEOPLASM OF UPPER-INNER QUADRANT OF RIGHT BREAST IN FEMALE, ESTROGEN RECEPTOR POSITIVE: Primary | ICD-10-CM

## 2021-03-05 DIAGNOSIS — D50.0 IRON DEFICIENCY ANEMIA DUE TO CHRONIC BLOOD LOSS: ICD-10-CM

## 2021-03-05 PROCEDURE — 96402 CHEMO HORMON ANTINEOPL SQ/IM: CPT

## 2021-03-05 PROCEDURE — 99215 OFFICE O/P EST HI 40 MIN: CPT | Mod: PBBFAC,25 | Performed by: NURSE PRACTITIONER

## 2021-03-05 PROCEDURE — 63600175 PHARM REV CODE 636 W HCPCS: Mod: JG | Performed by: NURSE PRACTITIONER

## 2021-03-05 PROCEDURE — 99214 OFFICE O/P EST MOD 30 MIN: CPT | Mod: S$PBB,,, | Performed by: NURSE PRACTITIONER

## 2021-03-05 PROCEDURE — 99999 PR PBB SHADOW E&M-EST. PATIENT-LVL V: ICD-10-PCS | Mod: PBBFAC,,, | Performed by: NURSE PRACTITIONER

## 2021-03-05 PROCEDURE — 99214 PR OFFICE/OUTPT VISIT, EST, LEVL IV, 30-39 MIN: ICD-10-PCS | Mod: S$PBB,,, | Performed by: NURSE PRACTITIONER

## 2021-03-05 PROCEDURE — 99999 PR PBB SHADOW E&M-EST. PATIENT-LVL V: CPT | Mod: PBBFAC,,, | Performed by: NURSE PRACTITIONER

## 2021-03-05 PROCEDURE — 96372 THER/PROPH/DIAG INJ SC/IM: CPT

## 2021-03-05 PROCEDURE — 63600175 PHARM REV CODE 636 W HCPCS: Mod: JG | Performed by: INTERNAL MEDICINE

## 2021-03-05 RX ORDER — OXYCODONE HYDROCHLORIDE 5 MG/1
5 TABLET ORAL EVERY 8 HOURS PRN
Qty: 45 TABLET | Refills: 0 | Status: SHIPPED | OUTPATIENT
Start: 2021-03-05 | End: 2021-05-31 | Stop reason: SDUPTHER

## 2021-03-05 RX ADMIN — DENOSUMAB 120 MG: 120 INJECTION SUBCUTANEOUS at 01:03

## 2021-03-05 RX ADMIN — GOSERELIN ACETATE 3.6 MG: 3.6 IMPLANT SUBCUTANEOUS at 01:03

## 2021-03-09 ENCOUNTER — PATIENT MESSAGE (OUTPATIENT)
Dept: OBSTETRICS AND GYNECOLOGY | Facility: CLINIC | Age: 46
End: 2021-03-09

## 2021-03-15 DIAGNOSIS — E78.2 MIXED HYPERLIPIDEMIA: ICD-10-CM

## 2021-03-15 RX ORDER — ATORVASTATIN CALCIUM 40 MG/1
40 TABLET, FILM COATED ORAL DAILY
Qty: 90 TABLET | Refills: 3 | Status: SHIPPED | OUTPATIENT
Start: 2021-03-15 | End: 2022-03-14

## 2021-03-18 ENCOUNTER — TELEPHONE (OUTPATIENT)
Dept: HEMATOLOGY/ONCOLOGY | Facility: CLINIC | Age: 46
End: 2021-03-18

## 2021-03-22 ENCOUNTER — TELEPHONE (OUTPATIENT)
Dept: OBSTETRICS AND GYNECOLOGY | Facility: CLINIC | Age: 46
End: 2021-03-22

## 2021-03-22 ENCOUNTER — PATIENT MESSAGE (OUTPATIENT)
Dept: OBSTETRICS AND GYNECOLOGY | Facility: CLINIC | Age: 46
End: 2021-03-22

## 2021-03-26 ENCOUNTER — PATIENT MESSAGE (OUTPATIENT)
Dept: HEMATOLOGY/ONCOLOGY | Facility: CLINIC | Age: 46
End: 2021-03-26

## 2021-03-26 ENCOUNTER — TELEPHONE (OUTPATIENT)
Dept: HEMATOLOGY/ONCOLOGY | Facility: CLINIC | Age: 46
End: 2021-03-26

## 2021-03-29 ENCOUNTER — PATIENT MESSAGE (OUTPATIENT)
Dept: PSYCHIATRY | Facility: CLINIC | Age: 46
End: 2021-03-29

## 2021-03-29 ENCOUNTER — TELEPHONE (OUTPATIENT)
Dept: INFUSION THERAPY | Facility: HOSPITAL | Age: 46
End: 2021-03-29

## 2021-03-30 ENCOUNTER — PATIENT MESSAGE (OUTPATIENT)
Dept: PSYCHIATRY | Facility: CLINIC | Age: 46
End: 2021-03-30

## 2021-03-31 ENCOUNTER — LAB VISIT (OUTPATIENT)
Dept: LAB | Facility: HOSPITAL | Age: 46
End: 2021-03-31
Payer: MEDICAID

## 2021-03-31 DIAGNOSIS — Z17.0 MALIGNANT NEOPLASM OF UPPER-INNER QUADRANT OF RIGHT BREAST IN FEMALE, ESTROGEN RECEPTOR POSITIVE: ICD-10-CM

## 2021-03-31 DIAGNOSIS — C50.211 MALIGNANT NEOPLASM OF UPPER-INNER QUADRANT OF RIGHT BREAST IN FEMALE, ESTROGEN RECEPTOR POSITIVE: ICD-10-CM

## 2021-03-31 LAB
ALBUMIN SERPL BCP-MCNC: 3 G/DL (ref 3.5–5.2)
ALP SERPL-CCNC: 84 U/L (ref 55–135)
ALT SERPL W/O P-5'-P-CCNC: 16 U/L (ref 10–44)
ANION GAP SERPL CALC-SCNC: 8 MMOL/L (ref 8–16)
AST SERPL-CCNC: 14 U/L (ref 10–40)
BILIRUB SERPL-MCNC: 0.4 MG/DL (ref 0.1–1)
BUN SERPL-MCNC: 9 MG/DL (ref 6–20)
CALCIUM SERPL-MCNC: 8.4 MG/DL (ref 8.7–10.5)
CHLORIDE SERPL-SCNC: 107 MMOL/L (ref 95–110)
CO2 SERPL-SCNC: 27 MMOL/L (ref 23–29)
CREAT SERPL-MCNC: 0.9 MG/DL (ref 0.5–1.4)
ERYTHROCYTE [DISTWIDTH] IN BLOOD BY AUTOMATED COUNT: 23.4 % (ref 11.5–14.5)
EST. GFR  (AFRICAN AMERICAN): >60 ML/MIN/1.73 M^2
EST. GFR  (NON AFRICAN AMERICAN): >60 ML/MIN/1.73 M^2
GLUCOSE SERPL-MCNC: 126 MG/DL (ref 70–110)
HCT VFR BLD AUTO: 32.6 % (ref 37–48.5)
HGB BLD-MCNC: 9.9 G/DL (ref 12–16)
IMM GRANULOCYTES # BLD AUTO: 0.01 K/UL (ref 0–0.04)
MCH RBC QN AUTO: 27 PG (ref 27–31)
MCHC RBC AUTO-ENTMCNC: 30.4 G/DL (ref 32–36)
MCV RBC AUTO: 89 FL (ref 82–98)
NEUTROPHILS # BLD AUTO: 2.8 K/UL (ref 1.8–7.7)
PLATELET # BLD AUTO: 292 K/UL (ref 150–450)
PMV BLD AUTO: 10.8 FL (ref 9.2–12.9)
POTASSIUM SERPL-SCNC: 3.5 MMOL/L (ref 3.5–5.1)
PROT SERPL-MCNC: 7.3 G/DL (ref 6–8.4)
RBC # BLD AUTO: 3.66 M/UL (ref 4–5.4)
SODIUM SERPL-SCNC: 142 MMOL/L (ref 136–145)
WBC # BLD AUTO: 5.67 K/UL (ref 3.9–12.7)

## 2021-03-31 PROCEDURE — 85027 COMPLETE CBC AUTOMATED: CPT | Performed by: NURSE PRACTITIONER

## 2021-03-31 PROCEDURE — 80053 COMPREHEN METABOLIC PANEL: CPT | Performed by: NURSE PRACTITIONER

## 2021-03-31 PROCEDURE — 36415 COLL VENOUS BLD VENIPUNCTURE: CPT | Performed by: NURSE PRACTITIONER

## 2021-04-01 ENCOUNTER — PATIENT MESSAGE (OUTPATIENT)
Dept: HEMATOLOGY/ONCOLOGY | Facility: CLINIC | Age: 46
End: 2021-04-01

## 2021-04-01 ENCOUNTER — INFUSION (OUTPATIENT)
Dept: INFUSION THERAPY | Facility: HOSPITAL | Age: 46
End: 2021-04-01
Payer: MEDICAID

## 2021-04-01 ENCOUNTER — OFFICE VISIT (OUTPATIENT)
Dept: HEMATOLOGY/ONCOLOGY | Facility: CLINIC | Age: 46
End: 2021-04-01
Payer: MEDICAID

## 2021-04-01 ENCOUNTER — OFFICE VISIT (OUTPATIENT)
Dept: PSYCHIATRY | Facility: CLINIC | Age: 46
End: 2021-04-01
Payer: MEDICAID

## 2021-04-01 VITALS
RESPIRATION RATE: 16 BRPM | DIASTOLIC BLOOD PRESSURE: 81 MMHG | SYSTOLIC BLOOD PRESSURE: 171 MMHG | WEIGHT: 293 LBS | HEART RATE: 77 BPM | BODY MASS INDEX: 48.82 KG/M2 | OXYGEN SATURATION: 94 % | HEIGHT: 65 IN

## 2021-04-01 DIAGNOSIS — C79.51 BONE METASTASES: ICD-10-CM

## 2021-04-01 DIAGNOSIS — I10 ESSENTIAL HYPERTENSION: ICD-10-CM

## 2021-04-01 DIAGNOSIS — E55.9 VITAMIN D DEFICIENCY: ICD-10-CM

## 2021-04-01 DIAGNOSIS — Z17.0 MALIGNANT NEOPLASM OF UPPER-INNER QUADRANT OF RIGHT BREAST IN FEMALE, ESTROGEN RECEPTOR POSITIVE: ICD-10-CM

## 2021-04-01 DIAGNOSIS — Z12.89 ENCOUNTER FOR SCREENING FOR MALIGNANT NEOPLASM OF OTHER SITES: ICD-10-CM

## 2021-04-01 DIAGNOSIS — C50.211 MALIGNANT NEOPLASM OF UPPER-INNER QUADRANT OF RIGHT BREAST IN FEMALE, ESTROGEN RECEPTOR POSITIVE: Primary | ICD-10-CM

## 2021-04-01 DIAGNOSIS — Z17.0 MALIGNANT NEOPLASM OF UPPER-INNER QUADRANT OF RIGHT BREAST IN FEMALE, ESTROGEN RECEPTOR POSITIVE: Primary | ICD-10-CM

## 2021-04-01 DIAGNOSIS — F32.4 MAJOR DEPRESSIVE DISORDER, SINGLE EPISODE, IN PARTIAL REMISSION WITH ANXIOUS DISTRESS: ICD-10-CM

## 2021-04-01 DIAGNOSIS — G89.3 CANCER ASSOCIATED PAIN: ICD-10-CM

## 2021-04-01 DIAGNOSIS — F33.1 MAJOR DEPRESSIVE DISORDER, RECURRENT EPISODE, MODERATE WITH ANXIOUS DISTRESS: Primary | ICD-10-CM

## 2021-04-01 DIAGNOSIS — F43.21 GRIEF: ICD-10-CM

## 2021-04-01 DIAGNOSIS — D50.0 IRON DEFICIENCY ANEMIA DUE TO CHRONIC BLOOD LOSS: ICD-10-CM

## 2021-04-01 DIAGNOSIS — E78.00 PURE HYPERCHOLESTEROLEMIA: ICD-10-CM

## 2021-04-01 DIAGNOSIS — C50.211 MALIGNANT NEOPLASM OF UPPER-INNER QUADRANT OF RIGHT BREAST IN FEMALE, ESTROGEN RECEPTOR POSITIVE: ICD-10-CM

## 2021-04-01 PROCEDURE — 99214 OFFICE O/P EST MOD 30 MIN: CPT | Mod: S$PBB,,, | Performed by: NURSE PRACTITIONER

## 2021-04-01 PROCEDURE — 96402 CHEMO HORMON ANTINEOPL SQ/IM: CPT

## 2021-04-01 PROCEDURE — 99999 PR PBB SHADOW E&M-EST. PATIENT-LVL I: ICD-10-PCS | Mod: PBBFAC,HB,, | Performed by: PSYCHOLOGIST

## 2021-04-01 PROCEDURE — 90837 PSYTX W PT 60 MINUTES: CPT | Mod: S$PBB,HB,, | Performed by: PSYCHOLOGIST

## 2021-04-01 PROCEDURE — 90837 PSYTX W PT 60 MINUTES: CPT | Mod: PBBFAC | Performed by: PSYCHOLOGIST

## 2021-04-01 PROCEDURE — 99999 PR PBB SHADOW E&M-EST. PATIENT-LVL V: ICD-10-PCS | Mod: PBBFAC,,, | Performed by: NURSE PRACTITIONER

## 2021-04-01 PROCEDURE — 90837 PR PSYCHOTHERAPY W/PATIENT, 60 MIN: ICD-10-PCS | Mod: S$PBB,HB,, | Performed by: PSYCHOLOGIST

## 2021-04-01 PROCEDURE — 99999 PR PBB SHADOW E&M-EST. PATIENT-LVL I: CPT | Mod: PBBFAC,HB,, | Performed by: PSYCHOLOGIST

## 2021-04-01 PROCEDURE — 96372 THER/PROPH/DIAG INJ SC/IM: CPT

## 2021-04-01 PROCEDURE — 99999 PR PBB SHADOW E&M-EST. PATIENT-LVL V: CPT | Mod: PBBFAC,,, | Performed by: NURSE PRACTITIONER

## 2021-04-01 PROCEDURE — 99214 PR OFFICE/OUTPT VISIT, EST, LEVL IV, 30-39 MIN: ICD-10-PCS | Mod: S$PBB,,, | Performed by: NURSE PRACTITIONER

## 2021-04-01 PROCEDURE — 63600175 PHARM REV CODE 636 W HCPCS: Mod: JG | Performed by: STUDENT IN AN ORGANIZED HEALTH CARE EDUCATION/TRAINING PROGRAM

## 2021-04-01 PROCEDURE — 99215 OFFICE O/P EST HI 40 MIN: CPT | Mod: PBBFAC | Performed by: NURSE PRACTITIONER

## 2021-04-01 PROCEDURE — 99211 OFF/OP EST MAY X REQ PHY/QHP: CPT | Mod: PBBFAC,25,27 | Performed by: PSYCHOLOGIST

## 2021-04-01 RX ORDER — CARVEDILOL 12.5 MG/1
12.5 TABLET ORAL 2 TIMES DAILY
COMMUNITY
Start: 2021-03-15 | End: 2021-04-14 | Stop reason: SDUPTHER

## 2021-04-01 RX ADMIN — DENOSUMAB 120 MG: 120 INJECTION SUBCUTANEOUS at 11:04

## 2021-04-01 RX ADMIN — GOSERELIN ACETATE 3.6 MG: 3.6 IMPLANT SUBCUTANEOUS at 11:04

## 2021-04-05 ENCOUNTER — PATIENT MESSAGE (OUTPATIENT)
Dept: HEMATOLOGY/ONCOLOGY | Facility: CLINIC | Age: 46
End: 2021-04-05

## 2021-04-08 ENCOUNTER — CLINICAL SUPPORT (OUTPATIENT)
Dept: OBSTETRICS AND GYNECOLOGY | Facility: CLINIC | Age: 46
End: 2021-04-08
Payer: MEDICAID

## 2021-04-08 VITALS — DIASTOLIC BLOOD PRESSURE: 85 MMHG | SYSTOLIC BLOOD PRESSURE: 148 MMHG

## 2021-04-08 DIAGNOSIS — N95.1 MENOPAUSAL SYMPTOMS: Primary | ICD-10-CM

## 2021-04-08 PROBLEM — F43.21 GRIEF: Status: ACTIVE | Noted: 2021-04-08

## 2021-04-08 PROCEDURE — 96372 PR INJECTION,THERAP/PROPH/DIAG2ST, IM OR SUBCUT: ICD-10-PCS | Mod: S$GLB,,, | Performed by: OBSTETRICS & GYNECOLOGY

## 2021-04-08 PROCEDURE — 96372 THER/PROPH/DIAG INJ SC/IM: CPT | Mod: S$GLB,,, | Performed by: OBSTETRICS & GYNECOLOGY

## 2021-04-08 RX ADMIN — TESTOSTERONE CYPIONATE 50 MG: 200 INJECTION, SOLUTION INTRAMUSCULAR at 02:04

## 2021-04-09 ENCOUNTER — PATIENT MESSAGE (OUTPATIENT)
Dept: CARDIOLOGY | Facility: CLINIC | Age: 46
End: 2021-04-09

## 2021-04-09 ENCOUNTER — TELEPHONE (OUTPATIENT)
Dept: OBSTETRICS AND GYNECOLOGY | Facility: CLINIC | Age: 46
End: 2021-04-09

## 2021-04-09 ENCOUNTER — IMMUNIZATION (OUTPATIENT)
Dept: PHARMACY | Facility: CLINIC | Age: 46
End: 2021-04-09
Payer: MEDICAID

## 2021-04-09 DIAGNOSIS — Z23 NEED FOR VACCINATION: Primary | ICD-10-CM

## 2021-04-14 DIAGNOSIS — E78.00 PURE HYPERCHOLESTEROLEMIA: ICD-10-CM

## 2021-04-14 DIAGNOSIS — I10 ESSENTIAL HYPERTENSION: ICD-10-CM

## 2021-04-14 DIAGNOSIS — I42.9 CARDIOMYOPATHY, UNSPECIFIED TYPE: ICD-10-CM

## 2021-04-14 RX ORDER — CARVEDILOL 25 MG/1
25 TABLET ORAL 2 TIMES DAILY
Qty: 180 TABLET | Refills: 3 | Status: SHIPPED | OUTPATIENT
Start: 2021-04-14 | End: 2022-07-07

## 2021-04-14 RX ORDER — AMLODIPINE BESYLATE 5 MG/1
5 TABLET ORAL DAILY
Qty: 30 TABLET | Refills: 11 | Status: SHIPPED | OUTPATIENT
Start: 2021-04-14 | End: 2022-02-11

## 2021-04-16 ENCOUNTER — PATIENT MESSAGE (OUTPATIENT)
Dept: HEMATOLOGY/ONCOLOGY | Facility: CLINIC | Age: 46
End: 2021-04-16

## 2021-04-21 DIAGNOSIS — E55.9 VITAMIN D DEFICIENCY: ICD-10-CM

## 2021-04-21 RX ORDER — ERGOCALCIFEROL 1.25 MG/1
50000 CAPSULE ORAL
Qty: 12 CAPSULE | Refills: 0 | Status: SHIPPED | OUTPATIENT
Start: 2021-04-21 | End: 2021-08-13

## 2021-04-22 ENCOUNTER — PATIENT MESSAGE (OUTPATIENT)
Dept: FAMILY MEDICINE | Facility: CLINIC | Age: 46
End: 2021-04-22

## 2021-04-22 ENCOUNTER — HOSPITAL ENCOUNTER (OUTPATIENT)
Dept: RADIOLOGY | Facility: HOSPITAL | Age: 46
Discharge: HOME | End: 2021-04-22
Attending: NURSE PRACTITIONER
Payer: MEDICAID

## 2021-04-22 DIAGNOSIS — C79.51 BONE METASTASES: ICD-10-CM

## 2021-04-22 DIAGNOSIS — Z17.0 MALIGNANT NEOPLASM OF UPPER-INNER QUADRANT OF RIGHT BREAST IN FEMALE, ESTROGEN RECEPTOR POSITIVE: ICD-10-CM

## 2021-04-22 DIAGNOSIS — C50.211 MALIGNANT NEOPLASM OF UPPER-INNER QUADRANT OF RIGHT BREAST IN FEMALE, ESTROGEN RECEPTOR POSITIVE: ICD-10-CM

## 2021-04-22 PROCEDURE — 78815 PET IMAGE W/CT SKULL-THIGH: CPT | Mod: TC

## 2021-04-22 PROCEDURE — 78815 PET IMAGE W/CT SKULL-THIGH: CPT | Mod: 26,PS,, | Performed by: RADIOLOGY

## 2021-04-22 PROCEDURE — 78815 NM PET CT ROUTINE: ICD-10-PCS | Mod: 26,PS,, | Performed by: RADIOLOGY

## 2021-04-23 ENCOUNTER — PATIENT MESSAGE (OUTPATIENT)
Dept: HEMATOLOGY/ONCOLOGY | Facility: CLINIC | Age: 46
End: 2021-04-23

## 2021-04-23 LAB — POCT GLUCOSE: 144 MG/DL (ref 70–110)

## 2021-04-26 ENCOUNTER — PATIENT MESSAGE (OUTPATIENT)
Dept: PSYCHIATRY | Facility: CLINIC | Age: 46
End: 2021-04-26

## 2021-04-26 ENCOUNTER — TELEPHONE (OUTPATIENT)
Dept: PSYCHIATRY | Facility: CLINIC | Age: 46
End: 2021-04-26

## 2021-04-26 ENCOUNTER — TELEPHONE (OUTPATIENT)
Dept: HEMATOLOGY/ONCOLOGY | Facility: CLINIC | Age: 46
End: 2021-04-26

## 2021-04-27 ENCOUNTER — TELEPHONE (OUTPATIENT)
Dept: INFUSION THERAPY | Facility: HOSPITAL | Age: 46
End: 2021-04-27

## 2021-04-28 ENCOUNTER — TELEPHONE (OUTPATIENT)
Dept: HEMATOLOGY/ONCOLOGY | Facility: CLINIC | Age: 46
End: 2021-04-28

## 2021-04-30 ENCOUNTER — LAB VISIT (OUTPATIENT)
Dept: LAB | Facility: HOSPITAL | Age: 46
End: 2021-04-30
Attending: INTERNAL MEDICINE
Payer: MEDICAID

## 2021-04-30 DIAGNOSIS — C50.211 MALIGNANT NEOPLASM OF UPPER-INNER QUADRANT OF RIGHT BREAST IN FEMALE, ESTROGEN RECEPTOR POSITIVE: ICD-10-CM

## 2021-04-30 DIAGNOSIS — Z17.0 MALIGNANT NEOPLASM OF UPPER-INNER QUADRANT OF RIGHT BREAST IN FEMALE, ESTROGEN RECEPTOR POSITIVE: ICD-10-CM

## 2021-04-30 LAB
ALBUMIN SERPL BCP-MCNC: 3 G/DL (ref 3.5–5.2)
ALP SERPL-CCNC: 90 U/L (ref 55–135)
ALT SERPL W/O P-5'-P-CCNC: 17 U/L (ref 10–44)
ANION GAP SERPL CALC-SCNC: 9 MMOL/L (ref 8–16)
AST SERPL-CCNC: 19 U/L (ref 10–40)
BILIRUB SERPL-MCNC: 0.5 MG/DL (ref 0.1–1)
BUN SERPL-MCNC: 10 MG/DL (ref 6–20)
CALCIUM SERPL-MCNC: 9.5 MG/DL (ref 8.7–10.5)
CHLORIDE SERPL-SCNC: 105 MMOL/L (ref 95–110)
CO2 SERPL-SCNC: 28 MMOL/L (ref 23–29)
CREAT SERPL-MCNC: 0.9 MG/DL (ref 0.5–1.4)
ERYTHROCYTE [DISTWIDTH] IN BLOOD BY AUTOMATED COUNT: 21.2 % (ref 11.5–14.5)
EST. GFR  (AFRICAN AMERICAN): >60 ML/MIN/1.73 M^2
EST. GFR  (NON AFRICAN AMERICAN): >60 ML/MIN/1.73 M^2
GLUCOSE SERPL-MCNC: 144 MG/DL (ref 70–110)
HCT VFR BLD AUTO: 31 % (ref 37–48.5)
HGB BLD-MCNC: 9.5 G/DL (ref 12–16)
IMM GRANULOCYTES # BLD AUTO: 0.01 K/UL (ref 0–0.04)
MCH RBC QN AUTO: 28.2 PG (ref 27–31)
MCHC RBC AUTO-ENTMCNC: 30.6 G/DL (ref 32–36)
MCV RBC AUTO: 92 FL (ref 82–98)
NEUTROPHILS # BLD AUTO: 2.9 K/UL (ref 1.8–7.7)
PLATELET # BLD AUTO: 338 K/UL (ref 150–450)
PMV BLD AUTO: 10.2 FL (ref 9.2–12.9)
POTASSIUM SERPL-SCNC: 3.3 MMOL/L (ref 3.5–5.1)
PROT SERPL-MCNC: 7.8 G/DL (ref 6–8.4)
RBC # BLD AUTO: 3.37 M/UL (ref 4–5.4)
SODIUM SERPL-SCNC: 142 MMOL/L (ref 136–145)
WBC # BLD AUTO: 5.23 K/UL (ref 3.9–12.7)

## 2021-04-30 PROCEDURE — 85027 COMPLETE CBC AUTOMATED: CPT | Performed by: INTERNAL MEDICINE

## 2021-04-30 PROCEDURE — 36415 COLL VENOUS BLD VENIPUNCTURE: CPT | Performed by: INTERNAL MEDICINE

## 2021-04-30 PROCEDURE — 80053 COMPREHEN METABOLIC PANEL: CPT | Performed by: INTERNAL MEDICINE

## 2021-05-03 ENCOUNTER — OFFICE VISIT (OUTPATIENT)
Dept: HEMATOLOGY/ONCOLOGY | Facility: CLINIC | Age: 46
End: 2021-05-03
Payer: MEDICAID

## 2021-05-03 ENCOUNTER — INFUSION (OUTPATIENT)
Dept: INFUSION THERAPY | Facility: HOSPITAL | Age: 46
End: 2021-05-03
Payer: MEDICAID

## 2021-05-03 ENCOUNTER — HOSPITAL ENCOUNTER (OUTPATIENT)
Dept: CARDIOLOGY | Facility: HOSPITAL | Age: 46
Discharge: HOME | End: 2021-05-03
Attending: INTERNAL MEDICINE
Payer: MEDICAID

## 2021-05-03 VITALS
DIASTOLIC BLOOD PRESSURE: 76 MMHG | OXYGEN SATURATION: 97 % | HEART RATE: 79 BPM | WEIGHT: 293 LBS | SYSTOLIC BLOOD PRESSURE: 144 MMHG | TEMPERATURE: 99 F | RESPIRATION RATE: 16 BRPM | HEIGHT: 64 IN | BODY MASS INDEX: 50.02 KG/M2

## 2021-05-03 VITALS
WEIGHT: 293 LBS | DIASTOLIC BLOOD PRESSURE: 76 MMHG | BODY MASS INDEX: 50.02 KG/M2 | HEIGHT: 64 IN | SYSTOLIC BLOOD PRESSURE: 188 MMHG

## 2021-05-03 VITALS
SYSTOLIC BLOOD PRESSURE: 188 MMHG | HEART RATE: 68 BPM | BODY MASS INDEX: 48.82 KG/M2 | HEIGHT: 65 IN | WEIGHT: 293 LBS | DIASTOLIC BLOOD PRESSURE: 76 MMHG

## 2021-05-03 DIAGNOSIS — C79.51 BONE METASTASES: ICD-10-CM

## 2021-05-03 DIAGNOSIS — C50.211 MALIGNANT NEOPLASM OF UPPER-INNER QUADRANT OF RIGHT BREAST IN FEMALE, ESTROGEN RECEPTOR POSITIVE: Primary | ICD-10-CM

## 2021-05-03 DIAGNOSIS — Z17.0 MALIGNANT NEOPLASM OF UPPER-INNER QUADRANT OF RIGHT BREAST IN FEMALE, ESTROGEN RECEPTOR POSITIVE: Primary | ICD-10-CM

## 2021-05-03 DIAGNOSIS — I50.42 CHRONIC COMBINED SYSTOLIC AND DIASTOLIC HEART FAILURE: ICD-10-CM

## 2021-05-03 DIAGNOSIS — E66.01 MORBID OBESITY WITH BODY MASS INDEX OF 50.0-59.9 IN ADULT: ICD-10-CM

## 2021-05-03 LAB
ASCENDING AORTA: 3.14 CM
AV INDEX (PROSTH): 0.6
AV MEAN GRADIENT: 6 MMHG
AV PEAK GRADIENT: 10 MMHG
AV VALVE AREA: 2.35 CM2
AV VELOCITY RATIO: 0.55
BSA FOR ECHO PROCEDURE: 2.84 M2
CV ECHO LV RWT: 0.3 CM
DOP CALC AO PEAK VEL: 1.61 M/S
DOP CALC AO VTI: 30.58 CM
DOP CALC LVOT AREA: 3.9 CM2
DOP CALC LVOT DIAMETER: 2.23 CM
DOP CALC LVOT PEAK VEL: 0.89 M/S
DOP CALC LVOT STROKE VOLUME: 71.75 CM3
DOP CALCLVOT PEAK VEL VTI: 18.38 CM
E WAVE DECELERATION TIME: 306.15 MSEC
E/A RATIO: 1.36
E/E' RATIO: 13 M/S
ECHO LV POSTERIOR WALL: 0.96 CM (ref 0.6–1.1)
EJECTION FRACTION: 35 %
FRACTIONAL SHORTENING: 21 % (ref 28–44)
INTERVENTRICULAR SEPTUM: 1 CM (ref 0.6–1.1)
LA MAJOR: 6.23 CM
LA MINOR: 6 CM
LA WIDTH: 4.28 CM
LEFT ATRIUM SIZE: 4.9 CM
LEFT ATRIUM VOLUME INDEX MOD: 24.3 ML/M2
LEFT ATRIUM VOLUME INDEX: 41.6 ML/M2
LEFT ATRIUM VOLUME MOD: 63.79 CM3
LEFT ATRIUM VOLUME: 108.97 CM3
LEFT INTERNAL DIMENSION IN SYSTOLE: 5.09 CM (ref 2.1–4)
LEFT VENTRICLE DIASTOLIC VOLUME INDEX: 80.5 ML/M2
LEFT VENTRICLE DIASTOLIC VOLUME: 210.9 ML
LEFT VENTRICLE MASS INDEX: 103 G/M2
LEFT VENTRICLE SYSTOLIC VOLUME INDEX: 47.1 ML/M2
LEFT VENTRICLE SYSTOLIC VOLUME: 123.39 ML
LEFT VENTRICULAR INTERNAL DIMENSION IN DIASTOLE: 6.43 CM (ref 3.5–6)
LEFT VENTRICULAR MASS: 270.79 G
LV LATERAL E/E' RATIO: 10.11 M/S
LV SEPTAL E/E' RATIO: 18.2 M/S
MV PEAK A VEL: 0.67 M/S
MV PEAK E VEL: 0.91 M/S
MV STENOSIS PRESSURE HALF TIME: 88.78 MS
MV VALVE AREA P 1/2 METHOD: 2.48 CM2
RA MAJOR: 4.98 CM
RA PRESSURE: 3 MMHG
RA WIDTH: 2.77 CM
RIGHT VENTRICULAR END-DIASTOLIC DIMENSION: 3.37 CM
SINUS: 2.8 CM
STJ: 2.96 CM
TDI LATERAL: 0.09 M/S
TDI SEPTAL: 0.05 M/S
TDI: 0.07 M/S
TRICUSPID ANNULAR PLANE SYSTOLIC EXCURSION: 1.98 CM

## 2021-05-03 PROCEDURE — 25500020 PHARM REV CODE 255: Performed by: INTERNAL MEDICINE

## 2021-05-03 PROCEDURE — 99214 PR OFFICE/OUTPT VISIT, EST, LEVL IV, 30-39 MIN: ICD-10-PCS | Mod: S$PBB,,, | Performed by: INTERNAL MEDICINE

## 2021-05-03 PROCEDURE — 63600175 PHARM REV CODE 636 W HCPCS: Mod: JG | Performed by: INTERNAL MEDICINE

## 2021-05-03 PROCEDURE — 99214 OFFICE O/P EST MOD 30 MIN: CPT | Mod: PBBFAC,25 | Performed by: INTERNAL MEDICINE

## 2021-05-03 PROCEDURE — 99214 OFFICE O/P EST MOD 30 MIN: CPT | Mod: PBBFAC,25,27 | Performed by: PHYSICIAN ASSISTANT

## 2021-05-03 PROCEDURE — C8929 TTE W OR WO FOL WCON,DOPPLER: HCPCS

## 2021-05-03 PROCEDURE — 93306 ECHO (CUPID ONLY): ICD-10-PCS | Mod: 26,,, | Performed by: INTERNAL MEDICINE

## 2021-05-03 PROCEDURE — 99213 OFFICE O/P EST LOW 20 MIN: CPT | Mod: S$PBB,,, | Performed by: PHYSICIAN ASSISTANT

## 2021-05-03 PROCEDURE — 99214 OFFICE O/P EST MOD 30 MIN: CPT | Mod: S$PBB,,, | Performed by: INTERNAL MEDICINE

## 2021-05-03 PROCEDURE — 99213 PR OFFICE/OUTPT VISIT, EST, LEVL III, 20-29 MIN: ICD-10-PCS | Mod: S$PBB,,, | Performed by: PHYSICIAN ASSISTANT

## 2021-05-03 PROCEDURE — 99999 PR PBB SHADOW E&M-EST. PATIENT-LVL IV: CPT | Mod: PBBFAC,,, | Performed by: PHYSICIAN ASSISTANT

## 2021-05-03 PROCEDURE — 93306 TTE W/DOPPLER COMPLETE: CPT | Mod: 26,,, | Performed by: INTERNAL MEDICINE

## 2021-05-03 PROCEDURE — 99999 PR PBB SHADOW E&M-EST. PATIENT-LVL IV: ICD-10-PCS | Mod: PBBFAC,,, | Performed by: INTERNAL MEDICINE

## 2021-05-03 PROCEDURE — 96402 CHEMO HORMON ANTINEOPL SQ/IM: CPT

## 2021-05-03 PROCEDURE — 96372 THER/PROPH/DIAG INJ SC/IM: CPT | Mod: 59

## 2021-05-03 PROCEDURE — 99999 PR PBB SHADOW E&M-EST. PATIENT-LVL IV: ICD-10-PCS | Mod: PBBFAC,,, | Performed by: PHYSICIAN ASSISTANT

## 2021-05-03 PROCEDURE — 99999 PR PBB SHADOW E&M-EST. PATIENT-LVL IV: CPT | Mod: PBBFAC,,, | Performed by: INTERNAL MEDICINE

## 2021-05-03 RX ADMIN — GOSERELIN ACETATE 3.6 MG: 3.6 IMPLANT SUBCUTANEOUS at 10:05

## 2021-05-03 RX ADMIN — DENOSUMAB 120 MG: 120 INJECTION SUBCUTANEOUS at 10:05

## 2021-05-03 RX ADMIN — HUMAN ALBUMIN MICROSPHERES AND PERFLUTREN 0.66 MG: 10; .22 INJECTION, SOLUTION INTRAVENOUS at 02:05

## 2021-05-14 DIAGNOSIS — C50.211 MALIGNANT NEOPLASM OF UPPER-INNER QUADRANT OF RIGHT BREAST IN FEMALE, ESTROGEN RECEPTOR POSITIVE: ICD-10-CM

## 2021-05-14 DIAGNOSIS — I42.0 DILATED CARDIOMYOPATHY: ICD-10-CM

## 2021-05-14 DIAGNOSIS — G89.3 CANCER ASSOCIATED PAIN: ICD-10-CM

## 2021-05-14 DIAGNOSIS — Z17.0 MALIGNANT NEOPLASM OF UPPER-INNER QUADRANT OF RIGHT BREAST IN FEMALE, ESTROGEN RECEPTOR POSITIVE: ICD-10-CM

## 2021-05-14 RX ORDER — SPIRONOLACTONE 25 MG/1
25 TABLET ORAL DAILY
Qty: 90 TABLET | Refills: 3 | Status: SHIPPED | OUTPATIENT
Start: 2021-05-14 | End: 2021-05-14 | Stop reason: SDUPTHER

## 2021-05-14 RX ORDER — SPIRONOLACTONE 25 MG/1
25 TABLET ORAL DAILY
Qty: 90 TABLET | Refills: 3 | Status: SHIPPED | OUTPATIENT
Start: 2021-05-14 | End: 2022-02-11

## 2021-05-14 RX ORDER — IBUPROFEN 800 MG/1
800 TABLET ORAL 2 TIMES DAILY PRN
Qty: 45 TABLET | Refills: 2 | Status: SHIPPED | OUTPATIENT
Start: 2021-05-14 | End: 2022-10-24 | Stop reason: SDUPTHER

## 2021-05-21 ENCOUNTER — TELEPHONE (OUTPATIENT)
Dept: HEMATOLOGY/ONCOLOGY | Facility: CLINIC | Age: 46
End: 2021-05-21

## 2021-05-25 ENCOUNTER — PATIENT MESSAGE (OUTPATIENT)
Dept: HEMATOLOGY/ONCOLOGY | Facility: CLINIC | Age: 46
End: 2021-05-25

## 2021-05-28 ENCOUNTER — LAB VISIT (OUTPATIENT)
Dept: LAB | Facility: HOSPITAL | Age: 46
End: 2021-05-28
Attending: INTERNAL MEDICINE
Payer: MEDICAID

## 2021-05-28 DIAGNOSIS — C50.211 MALIGNANT NEOPLASM OF UPPER-INNER QUADRANT OF RIGHT BREAST IN FEMALE, ESTROGEN RECEPTOR POSITIVE: ICD-10-CM

## 2021-05-28 DIAGNOSIS — Z17.0 MALIGNANT NEOPLASM OF UPPER-INNER QUADRANT OF RIGHT BREAST IN FEMALE, ESTROGEN RECEPTOR POSITIVE: ICD-10-CM

## 2021-05-28 DIAGNOSIS — C79.51 BONE METASTASES: ICD-10-CM

## 2021-05-28 LAB
ALBUMIN SERPL BCP-MCNC: 3.1 G/DL (ref 3.5–5.2)
ALP SERPL-CCNC: 80 U/L (ref 55–135)
ALT SERPL W/O P-5'-P-CCNC: 16 U/L (ref 10–44)
ANION GAP SERPL CALC-SCNC: 11 MMOL/L (ref 8–16)
AST SERPL-CCNC: 15 U/L (ref 10–40)
BASOPHILS # BLD AUTO: 0.1 K/UL (ref 0–0.2)
BASOPHILS NFR BLD: 1.7 % (ref 0–1.9)
BILIRUB SERPL-MCNC: 0.4 MG/DL (ref 0.1–1)
BUN SERPL-MCNC: 9 MG/DL (ref 6–20)
CALCIUM SERPL-MCNC: 9.4 MG/DL (ref 8.7–10.5)
CHLORIDE SERPL-SCNC: 107 MMOL/L (ref 95–110)
CO2 SERPL-SCNC: 26 MMOL/L (ref 23–29)
CREAT SERPL-MCNC: 0.9 MG/DL (ref 0.5–1.4)
DIFFERENTIAL METHOD: ABNORMAL
EOSINOPHIL # BLD AUTO: 0 K/UL (ref 0–0.5)
EOSINOPHIL NFR BLD: 0.3 % (ref 0–8)
ERYTHROCYTE [DISTWIDTH] IN BLOOD BY AUTOMATED COUNT: 19.6 % (ref 11.5–14.5)
EST. GFR  (AFRICAN AMERICAN): >60 ML/MIN/1.73 M^2
EST. GFR  (NON AFRICAN AMERICAN): >60 ML/MIN/1.73 M^2
GLUCOSE SERPL-MCNC: 130 MG/DL (ref 70–110)
HCT VFR BLD AUTO: 29.4 % (ref 37–48.5)
HGB BLD-MCNC: 9.2 G/DL (ref 12–16)
IMM GRANULOCYTES # BLD AUTO: 0.02 K/UL (ref 0–0.04)
IMM GRANULOCYTES NFR BLD AUTO: 0.3 % (ref 0–0.5)
LYMPHOCYTES # BLD AUTO: 2.6 K/UL (ref 1–4.8)
LYMPHOCYTES NFR BLD: 43.7 % (ref 18–48)
MCH RBC QN AUTO: 28.6 PG (ref 27–31)
MCHC RBC AUTO-ENTMCNC: 31.3 G/DL (ref 32–36)
MCV RBC AUTO: 91 FL (ref 82–98)
MONOCYTES # BLD AUTO: 0.5 K/UL (ref 0.3–1)
MONOCYTES NFR BLD: 8.3 % (ref 4–15)
NEUTROPHILS # BLD AUTO: 2.8 K/UL (ref 1.8–7.7)
NEUTROPHILS NFR BLD: 45.7 % (ref 38–73)
NRBC BLD-RTO: 0 /100 WBC
PLATELET # BLD AUTO: 290 K/UL (ref 150–450)
PMV BLD AUTO: 10 FL (ref 9.2–12.9)
POTASSIUM SERPL-SCNC: 3.1 MMOL/L (ref 3.5–5.1)
PROT SERPL-MCNC: 7.4 G/DL (ref 6–8.4)
RBC # BLD AUTO: 3.22 M/UL (ref 4–5.4)
SODIUM SERPL-SCNC: 144 MMOL/L (ref 136–145)
WBC # BLD AUTO: 6.04 K/UL (ref 3.9–12.7)

## 2021-05-28 PROCEDURE — 85025 COMPLETE CBC W/AUTO DIFF WBC: CPT | Performed by: INTERNAL MEDICINE

## 2021-05-28 PROCEDURE — 36415 COLL VENOUS BLD VENIPUNCTURE: CPT | Mod: PO | Performed by: INTERNAL MEDICINE

## 2021-05-28 PROCEDURE — 80053 COMPREHEN METABOLIC PANEL: CPT | Performed by: INTERNAL MEDICINE

## 2021-05-31 ENCOUNTER — TELEPHONE (OUTPATIENT)
Dept: INFUSION THERAPY | Facility: HOSPITAL | Age: 46
End: 2021-05-31

## 2021-05-31 ENCOUNTER — OFFICE VISIT (OUTPATIENT)
Dept: HEMATOLOGY/ONCOLOGY | Facility: CLINIC | Age: 46
End: 2021-05-31
Payer: MEDICAID

## 2021-05-31 ENCOUNTER — INFUSION (OUTPATIENT)
Dept: INFUSION THERAPY | Facility: HOSPITAL | Age: 46
End: 2021-05-31
Payer: MEDICAID

## 2021-05-31 VITALS
WEIGHT: 293 LBS | BODY MASS INDEX: 50.02 KG/M2 | DIASTOLIC BLOOD PRESSURE: 62 MMHG | SYSTOLIC BLOOD PRESSURE: 129 MMHG | HEART RATE: 63 BPM | HEIGHT: 64 IN

## 2021-05-31 VITALS
DIASTOLIC BLOOD PRESSURE: 68 MMHG | SYSTOLIC BLOOD PRESSURE: 140 MMHG | TEMPERATURE: 99 F | OXYGEN SATURATION: 96 % | RESPIRATION RATE: 16 BRPM | BODY MASS INDEX: 50.02 KG/M2 | HEART RATE: 70 BPM | WEIGHT: 293 LBS | HEIGHT: 64 IN

## 2021-05-31 DIAGNOSIS — E66.01 MORBID OBESITY WITH BODY MASS INDEX OF 50.0-59.9 IN ADULT: ICD-10-CM

## 2021-05-31 DIAGNOSIS — Z17.0 MALIGNANT NEOPLASM OF UPPER-INNER QUADRANT OF RIGHT BREAST IN FEMALE, ESTROGEN RECEPTOR POSITIVE: Primary | ICD-10-CM

## 2021-05-31 DIAGNOSIS — M54.9 DORSALGIA, UNSPECIFIED: ICD-10-CM

## 2021-05-31 DIAGNOSIS — Z17.0 MALIGNANT NEOPLASM OF UPPER-INNER QUADRANT OF RIGHT BREAST IN FEMALE, ESTROGEN RECEPTOR POSITIVE: ICD-10-CM

## 2021-05-31 DIAGNOSIS — C50.211 MALIGNANT NEOPLASM OF UPPER-INNER QUADRANT OF RIGHT BREAST IN FEMALE, ESTROGEN RECEPTOR POSITIVE: Primary | ICD-10-CM

## 2021-05-31 DIAGNOSIS — G89.3 NEOPLASM RELATED PAIN: ICD-10-CM

## 2021-05-31 DIAGNOSIS — C79.51 BONE METASTASES: ICD-10-CM

## 2021-05-31 DIAGNOSIS — C50.211 MALIGNANT NEOPLASM OF UPPER-INNER QUADRANT OF RIGHT BREAST IN FEMALE, ESTROGEN RECEPTOR POSITIVE: ICD-10-CM

## 2021-05-31 DIAGNOSIS — N89.8 VAGINAL DRYNESS: Primary | ICD-10-CM

## 2021-05-31 DIAGNOSIS — E87.6 HYPOKALEMIA: ICD-10-CM

## 2021-05-31 PROCEDURE — 99999 PR PBB SHADOW E&M-EST. PATIENT-LVL IV: ICD-10-PCS | Mod: PBBFAC,,, | Performed by: NURSE PRACTITIONER

## 2021-05-31 PROCEDURE — 99999 PR PBB SHADOW E&M-EST. PATIENT-LVL IV: CPT | Mod: PBBFAC,,, | Performed by: NURSE PRACTITIONER

## 2021-05-31 PROCEDURE — 96402 CHEMO HORMON ANTINEOPL SQ/IM: CPT

## 2021-05-31 PROCEDURE — 99214 OFFICE O/P EST MOD 30 MIN: CPT | Mod: PBBFAC,25,27 | Performed by: NURSE PRACTITIONER

## 2021-05-31 PROCEDURE — 99214 OFFICE O/P EST MOD 30 MIN: CPT | Mod: PBBFAC,25 | Performed by: PHYSICIAN ASSISTANT

## 2021-05-31 PROCEDURE — 96372 THER/PROPH/DIAG INJ SC/IM: CPT | Mod: 59

## 2021-05-31 PROCEDURE — 99213 OFFICE O/P EST LOW 20 MIN: CPT | Mod: S$PBB,,, | Performed by: PHYSICIAN ASSISTANT

## 2021-05-31 PROCEDURE — 99213 PR OFFICE/OUTPT VISIT, EST, LEVL III, 20-29 MIN: ICD-10-PCS | Mod: S$PBB,,, | Performed by: PHYSICIAN ASSISTANT

## 2021-05-31 PROCEDURE — 99999 PR PBB SHADOW E&M-EST. PATIENT-LVL IV: CPT | Mod: PBBFAC,,, | Performed by: PHYSICIAN ASSISTANT

## 2021-05-31 PROCEDURE — 63600175 PHARM REV CODE 636 W HCPCS: Mod: JG | Performed by: NURSE PRACTITIONER

## 2021-05-31 PROCEDURE — 99215 OFFICE O/P EST HI 40 MIN: CPT | Mod: S$PBB,,, | Performed by: NURSE PRACTITIONER

## 2021-05-31 PROCEDURE — 99215 PR OFFICE/OUTPT VISIT, EST, LEVL V, 40-54 MIN: ICD-10-PCS | Mod: S$PBB,,, | Performed by: NURSE PRACTITIONER

## 2021-05-31 PROCEDURE — 99999 PR PBB SHADOW E&M-EST. PATIENT-LVL IV: ICD-10-PCS | Mod: PBBFAC,,, | Performed by: PHYSICIAN ASSISTANT

## 2021-05-31 RX ORDER — POTASSIUM CHLORIDE 750 MG/1
10 TABLET, EXTENDED RELEASE ORAL DAILY
Qty: 1 TABLET | Refills: 0 | Status: SHIPPED | OUTPATIENT
Start: 2021-05-31 | End: 2021-07-09

## 2021-05-31 RX ORDER — OXYCODONE HYDROCHLORIDE 5 MG/1
5 TABLET ORAL EVERY 8 HOURS PRN
Qty: 45 TABLET | Refills: 0 | Status: SHIPPED | OUTPATIENT
Start: 2021-05-31 | End: 2021-07-26 | Stop reason: SDUPTHER

## 2021-05-31 RX ADMIN — DENOSUMAB 120 MG: 120 INJECTION SUBCUTANEOUS at 12:05

## 2021-05-31 RX ADMIN — GOSERELIN ACETATE 3.6 MG: 3.6 IMPLANT SUBCUTANEOUS at 12:05

## 2021-06-01 ENCOUNTER — TELEPHONE (OUTPATIENT)
Dept: HEMATOLOGY/ONCOLOGY | Facility: CLINIC | Age: 46
End: 2021-06-01

## 2021-06-01 ENCOUNTER — PATIENT MESSAGE (OUTPATIENT)
Dept: HEMATOLOGY/ONCOLOGY | Facility: CLINIC | Age: 46
End: 2021-06-01

## 2021-06-08 ENCOUNTER — TELEPHONE (OUTPATIENT)
Dept: HEMATOLOGY/ONCOLOGY | Facility: CLINIC | Age: 46
End: 2021-06-08

## 2021-06-11 ENCOUNTER — PATIENT MESSAGE (OUTPATIENT)
Dept: HEMATOLOGY/ONCOLOGY | Facility: CLINIC | Age: 46
End: 2021-06-11

## 2021-06-17 ENCOUNTER — TELEPHONE (OUTPATIENT)
Dept: HEMATOLOGY/ONCOLOGY | Facility: CLINIC | Age: 46
End: 2021-06-17

## 2021-06-18 ENCOUNTER — TELEPHONE (OUTPATIENT)
Dept: HEMATOLOGY/ONCOLOGY | Facility: CLINIC | Age: 46
End: 2021-06-18

## 2021-06-21 ENCOUNTER — PATIENT MESSAGE (OUTPATIENT)
Dept: HEMATOLOGY/ONCOLOGY | Facility: CLINIC | Age: 46
End: 2021-06-21

## 2021-06-24 ENCOUNTER — OFFICE VISIT (OUTPATIENT)
Dept: HEMATOLOGY/ONCOLOGY | Facility: CLINIC | Age: 46
End: 2021-06-24
Payer: MEDICAID

## 2021-06-24 ENCOUNTER — TELEPHONE (OUTPATIENT)
Dept: HEMATOLOGY/ONCOLOGY | Facility: CLINIC | Age: 46
End: 2021-06-24

## 2021-06-24 DIAGNOSIS — Z17.0 MALIGNANT NEOPLASM OF UPPER-INNER QUADRANT OF RIGHT BREAST IN FEMALE, ESTROGEN RECEPTOR POSITIVE: ICD-10-CM

## 2021-06-24 DIAGNOSIS — C50.211 MALIGNANT NEOPLASM OF UPPER-INNER QUADRANT OF RIGHT BREAST IN FEMALE, ESTROGEN RECEPTOR POSITIVE: ICD-10-CM

## 2021-06-24 DIAGNOSIS — C79.51 BONE METASTASES: ICD-10-CM

## 2021-06-24 DIAGNOSIS — Z71.83 ENCOUNTER FOR NONPROCREATIVE GENETIC COUNSELING: Primary | ICD-10-CM

## 2021-06-24 PROCEDURE — 99213 OFFICE O/P EST LOW 20 MIN: CPT | Mod: 95,,, | Performed by: NURSE PRACTITIONER

## 2021-06-24 PROCEDURE — 99213 PR OFFICE/OUTPT VISIT, EST, LEVL III, 20-29 MIN: ICD-10-PCS | Mod: 95,,, | Performed by: NURSE PRACTITIONER

## 2021-06-25 ENCOUNTER — OFFICE VISIT (OUTPATIENT)
Dept: PSYCHIATRY | Facility: CLINIC | Age: 46
End: 2021-06-25
Payer: MEDICAID

## 2021-06-25 ENCOUNTER — LAB VISIT (OUTPATIENT)
Dept: LAB | Facility: HOSPITAL | Age: 46
End: 2021-06-25
Payer: MEDICAID

## 2021-06-25 DIAGNOSIS — F43.21 GRIEF: ICD-10-CM

## 2021-06-25 DIAGNOSIS — Z17.0 MALIGNANT NEOPLASM OF UPPER-INNER QUADRANT OF RIGHT BREAST IN FEMALE, ESTROGEN RECEPTOR POSITIVE: ICD-10-CM

## 2021-06-25 DIAGNOSIS — C50.211 MALIGNANT NEOPLASM OF UPPER-INNER QUADRANT OF RIGHT BREAST IN FEMALE, ESTROGEN RECEPTOR POSITIVE: ICD-10-CM

## 2021-06-25 DIAGNOSIS — F33.1 MAJOR DEPRESSIVE DISORDER, RECURRENT EPISODE, MODERATE WITH ANXIOUS DISTRESS: Primary | ICD-10-CM

## 2021-06-25 LAB
ALBUMIN SERPL BCP-MCNC: 3.1 G/DL (ref 3.5–5.2)
ALP SERPL-CCNC: 89 U/L (ref 55–135)
ALT SERPL W/O P-5'-P-CCNC: 16 U/L (ref 10–44)
ANION GAP SERPL CALC-SCNC: 11 MMOL/L (ref 8–16)
AST SERPL-CCNC: 13 U/L (ref 10–40)
BILIRUB SERPL-MCNC: 0.4 MG/DL (ref 0.1–1)
BUN SERPL-MCNC: 11 MG/DL (ref 6–20)
CALCIUM SERPL-MCNC: 9.3 MG/DL (ref 8.7–10.5)
CHLORIDE SERPL-SCNC: 105 MMOL/L (ref 95–110)
CO2 SERPL-SCNC: 26 MMOL/L (ref 23–29)
CREAT SERPL-MCNC: 0.9 MG/DL (ref 0.5–1.4)
ERYTHROCYTE [DISTWIDTH] IN BLOOD BY AUTOMATED COUNT: 20 % (ref 11.5–14.5)
EST. GFR  (AFRICAN AMERICAN): >60 ML/MIN/1.73 M^2
EST. GFR  (NON AFRICAN AMERICAN): >60 ML/MIN/1.73 M^2
GLUCOSE SERPL-MCNC: 148 MG/DL (ref 70–110)
HCT VFR BLD AUTO: 32.1 % (ref 37–48.5)
HGB BLD-MCNC: 9.7 G/DL (ref 12–16)
IMM GRANULOCYTES # BLD AUTO: 0.01 K/UL (ref 0–0.04)
MCH RBC QN AUTO: 28.5 PG (ref 27–31)
MCHC RBC AUTO-ENTMCNC: 30.2 G/DL (ref 32–36)
MCV RBC AUTO: 94 FL (ref 82–98)
NEUTROPHILS # BLD AUTO: 2.6 K/UL (ref 1.8–7.7)
PLATELET # BLD AUTO: 285 K/UL (ref 150–450)
PMV BLD AUTO: 9.9 FL (ref 9.2–12.9)
POTASSIUM SERPL-SCNC: 3.6 MMOL/L (ref 3.5–5.1)
PROT SERPL-MCNC: 7.7 G/DL (ref 6–8.4)
RBC # BLD AUTO: 3.4 M/UL (ref 4–5.4)
SODIUM SERPL-SCNC: 142 MMOL/L (ref 136–145)
WBC # BLD AUTO: 5.71 K/UL (ref 3.9–12.7)

## 2021-06-25 PROCEDURE — 90837 PR PSYCHOTHERAPY W/PATIENT, 60 MIN: ICD-10-PCS | Mod: S$PBB,HB,, | Performed by: PSYCHOLOGIST

## 2021-06-25 PROCEDURE — 36415 COLL VENOUS BLD VENIPUNCTURE: CPT | Performed by: NURSE PRACTITIONER

## 2021-06-25 PROCEDURE — 90837 PSYTX W PT 60 MINUTES: CPT | Mod: PBBFAC | Performed by: PSYCHOLOGIST

## 2021-06-25 PROCEDURE — 90837 PSYTX W PT 60 MINUTES: CPT | Mod: S$PBB,HB,, | Performed by: PSYCHOLOGIST

## 2021-06-25 PROCEDURE — 99999 PR PBB SHADOW E&M-EST. PATIENT-LVL I: CPT | Mod: PBBFAC,HB,, | Performed by: PSYCHOLOGIST

## 2021-06-25 PROCEDURE — 99211 OFF/OP EST MAY X REQ PHY/QHP: CPT | Mod: PBBFAC,25 | Performed by: PSYCHOLOGIST

## 2021-06-25 PROCEDURE — 99999 PR PBB SHADOW E&M-EST. PATIENT-LVL I: ICD-10-PCS | Mod: PBBFAC,HB,, | Performed by: PSYCHOLOGIST

## 2021-06-25 PROCEDURE — 85027 COMPLETE CBC AUTOMATED: CPT | Performed by: NURSE PRACTITIONER

## 2021-06-25 PROCEDURE — 80053 COMPREHEN METABOLIC PANEL: CPT | Performed by: NURSE PRACTITIONER

## 2021-06-28 ENCOUNTER — OFFICE VISIT (OUTPATIENT)
Dept: HEMATOLOGY/ONCOLOGY | Facility: CLINIC | Age: 46
End: 2021-06-28
Payer: MEDICAID

## 2021-06-28 ENCOUNTER — INFUSION (OUTPATIENT)
Dept: INFUSION THERAPY | Facility: HOSPITAL | Age: 46
End: 2021-06-28
Payer: COMMERCIAL

## 2021-06-28 VITALS
HEART RATE: 74 BPM | RESPIRATION RATE: 16 BRPM | TEMPERATURE: 99 F | OXYGEN SATURATION: 95 % | SYSTOLIC BLOOD PRESSURE: 141 MMHG | WEIGHT: 293 LBS | HEIGHT: 65 IN | BODY MASS INDEX: 48.82 KG/M2 | DIASTOLIC BLOOD PRESSURE: 90 MMHG

## 2021-06-28 DIAGNOSIS — Z17.0 MALIGNANT NEOPLASM OF UPPER-INNER QUADRANT OF RIGHT BREAST IN FEMALE, ESTROGEN RECEPTOR POSITIVE: Primary | ICD-10-CM

## 2021-06-28 DIAGNOSIS — C79.51 BONE METASTASES: ICD-10-CM

## 2021-06-28 DIAGNOSIS — C50.211 MALIGNANT NEOPLASM OF UPPER-INNER QUADRANT OF RIGHT BREAST IN FEMALE, ESTROGEN RECEPTOR POSITIVE: Primary | ICD-10-CM

## 2021-06-28 DIAGNOSIS — E55.9 VITAMIN D DEFICIENCY: ICD-10-CM

## 2021-06-28 PROCEDURE — 99214 OFFICE O/P EST MOD 30 MIN: CPT | Mod: S$PBB,,, | Performed by: INTERNAL MEDICINE

## 2021-06-28 PROCEDURE — 63600175 PHARM REV CODE 636 W HCPCS: Mod: JG | Performed by: INTERNAL MEDICINE

## 2021-06-28 PROCEDURE — 96402 CHEMO HORMON ANTINEOPL SQ/IM: CPT

## 2021-06-28 PROCEDURE — 99214 PR OFFICE/OUTPT VISIT, EST, LEVL IV, 30-39 MIN: ICD-10-PCS | Mod: S$PBB,,, | Performed by: INTERNAL MEDICINE

## 2021-06-28 PROCEDURE — 96372 THER/PROPH/DIAG INJ SC/IM: CPT | Mod: 59

## 2021-06-28 PROCEDURE — 99999 PR PBB SHADOW E&M-EST. PATIENT-LVL V: CPT | Mod: PBBFAC,,, | Performed by: INTERNAL MEDICINE

## 2021-06-28 PROCEDURE — 99999 PR PBB SHADOW E&M-EST. PATIENT-LVL V: ICD-10-PCS | Mod: PBBFAC,,, | Performed by: INTERNAL MEDICINE

## 2021-06-28 PROCEDURE — 99215 OFFICE O/P EST HI 40 MIN: CPT | Mod: PBBFAC,25 | Performed by: INTERNAL MEDICINE

## 2021-06-28 RX ADMIN — DENOSUMAB 120 MG: 120 INJECTION SUBCUTANEOUS at 12:06

## 2021-06-28 RX ADMIN — GOSERELIN ACETATE 3.6 MG: 3.6 IMPLANT SUBCUTANEOUS at 12:06

## 2021-07-13 ENCOUNTER — PATIENT MESSAGE (OUTPATIENT)
Dept: PSYCHIATRY | Facility: CLINIC | Age: 46
End: 2021-07-13

## 2021-07-15 ENCOUNTER — TELEPHONE (OUTPATIENT)
Dept: PSYCHIATRY | Facility: CLINIC | Age: 46
End: 2021-07-15

## 2021-07-15 ENCOUNTER — PATIENT MESSAGE (OUTPATIENT)
Dept: PSYCHIATRY | Facility: CLINIC | Age: 46
End: 2021-07-15

## 2021-07-19 ENCOUNTER — PATIENT MESSAGE (OUTPATIENT)
Dept: HEMATOLOGY/ONCOLOGY | Facility: CLINIC | Age: 46
End: 2021-07-19

## 2021-07-21 ENCOUNTER — PATIENT MESSAGE (OUTPATIENT)
Dept: HEMATOLOGY/ONCOLOGY | Facility: CLINIC | Age: 46
End: 2021-07-21

## 2021-07-21 DIAGNOSIS — C50.211 MALIGNANT NEOPLASM OF UPPER-INNER QUADRANT OF RIGHT BREAST IN FEMALE, ESTROGEN RECEPTOR POSITIVE: ICD-10-CM

## 2021-07-21 DIAGNOSIS — Z17.0 MALIGNANT NEOPLASM OF UPPER-INNER QUADRANT OF RIGHT BREAST IN FEMALE, ESTROGEN RECEPTOR POSITIVE: ICD-10-CM

## 2021-07-23 ENCOUNTER — PATIENT MESSAGE (OUTPATIENT)
Dept: HEMATOLOGY/ONCOLOGY | Facility: CLINIC | Age: 46
End: 2021-07-23

## 2021-07-26 ENCOUNTER — INFUSION (OUTPATIENT)
Dept: INFUSION THERAPY | Facility: HOSPITAL | Age: 46
End: 2021-07-26
Payer: MEDICAID

## 2021-07-26 ENCOUNTER — PATIENT MESSAGE (OUTPATIENT)
Dept: HEMATOLOGY/ONCOLOGY | Facility: CLINIC | Age: 46
End: 2021-07-26

## 2021-07-26 ENCOUNTER — OFFICE VISIT (OUTPATIENT)
Dept: HEMATOLOGY/ONCOLOGY | Facility: CLINIC | Age: 46
End: 2021-07-26
Payer: MEDICAID

## 2021-07-26 ENCOUNTER — TELEPHONE (OUTPATIENT)
Dept: HEMATOLOGY/ONCOLOGY | Facility: CLINIC | Age: 46
End: 2021-07-26

## 2021-07-26 VITALS
HEART RATE: 74 BPM | HEIGHT: 65 IN | TEMPERATURE: 99 F | SYSTOLIC BLOOD PRESSURE: 129 MMHG | RESPIRATION RATE: 16 BRPM | OXYGEN SATURATION: 97 % | BODY MASS INDEX: 48.82 KG/M2 | DIASTOLIC BLOOD PRESSURE: 74 MMHG | WEIGHT: 293 LBS

## 2021-07-26 DIAGNOSIS — Z17.0 MALIGNANT NEOPLASM OF UPPER-INNER QUADRANT OF RIGHT BREAST IN FEMALE, ESTROGEN RECEPTOR POSITIVE: Primary | ICD-10-CM

## 2021-07-26 DIAGNOSIS — R11.0 NAUSEA: ICD-10-CM

## 2021-07-26 DIAGNOSIS — E87.6 HYPOKALEMIA: ICD-10-CM

## 2021-07-26 DIAGNOSIS — R42 DIZZINESS: ICD-10-CM

## 2021-07-26 DIAGNOSIS — C50.211 MALIGNANT NEOPLASM OF UPPER-INNER QUADRANT OF RIGHT BREAST IN FEMALE, ESTROGEN RECEPTOR POSITIVE: Primary | ICD-10-CM

## 2021-07-26 DIAGNOSIS — G89.3 NEOPLASM RELATED PAIN: ICD-10-CM

## 2021-07-26 DIAGNOSIS — C50.919 CARCINOMA OF BREAST METASTATIC TO BONE, UNSPECIFIED LATERALITY: Primary | ICD-10-CM

## 2021-07-26 DIAGNOSIS — C79.51 CARCINOMA OF BREAST METASTATIC TO BONE, UNSPECIFIED LATERALITY: Primary | ICD-10-CM

## 2021-07-26 DIAGNOSIS — F32.4 MAJOR DEPRESSIVE DISORDER, SINGLE EPISODE, IN PARTIAL REMISSION WITH ANXIOUS DISTRESS: ICD-10-CM

## 2021-07-26 DIAGNOSIS — C79.51 BONE METASTASES: ICD-10-CM

## 2021-07-26 PROCEDURE — 99215 OFFICE O/P EST HI 40 MIN: CPT | Mod: S$PBB,,, | Performed by: NURSE PRACTITIONER

## 2021-07-26 PROCEDURE — 99215 PR OFFICE/OUTPT VISIT, EST, LEVL V, 40-54 MIN: ICD-10-PCS | Mod: S$PBB,,, | Performed by: NURSE PRACTITIONER

## 2021-07-26 PROCEDURE — 99214 OFFICE O/P EST MOD 30 MIN: CPT | Mod: PBBFAC | Performed by: NURSE PRACTITIONER

## 2021-07-26 PROCEDURE — 63600175 PHARM REV CODE 636 W HCPCS: Mod: JG | Performed by: INTERNAL MEDICINE

## 2021-07-26 PROCEDURE — 99999 PR PBB SHADOW E&M-EST. PATIENT-LVL IV: CPT | Mod: PBBFAC,,, | Performed by: NURSE PRACTITIONER

## 2021-07-26 PROCEDURE — 96402 CHEMO HORMON ANTINEOPL SQ/IM: CPT

## 2021-07-26 PROCEDURE — 99999 PR PBB SHADOW E&M-EST. PATIENT-LVL IV: ICD-10-PCS | Mod: PBBFAC,,, | Performed by: NURSE PRACTITIONER

## 2021-07-26 PROCEDURE — 96372 THER/PROPH/DIAG INJ SC/IM: CPT

## 2021-07-26 RX ORDER — OXYCODONE HYDROCHLORIDE 5 MG/1
5 TABLET ORAL EVERY 8 HOURS PRN
Qty: 45 TABLET | Refills: 0 | Status: SHIPPED | OUTPATIENT
Start: 2021-07-26 | End: 2021-08-23 | Stop reason: SDUPTHER

## 2021-07-26 RX ORDER — POTASSIUM CHLORIDE 750 MG/1
10 TABLET, EXTENDED RELEASE ORAL DAILY
Qty: 30 TABLET | Refills: 0 | Status: SHIPPED | OUTPATIENT
Start: 2021-07-26 | End: 2021-08-23

## 2021-07-26 RX ORDER — ONDANSETRON 8 MG/1
8 TABLET, ORALLY DISINTEGRATING ORAL EVERY 8 HOURS PRN
Qty: 30 TABLET | Refills: 2 | Status: SHIPPED | OUTPATIENT
Start: 2021-07-26 | End: 2022-09-26

## 2021-07-26 RX ADMIN — GOSERELIN ACETATE 3.6 MG: 3.6 IMPLANT SUBCUTANEOUS at 01:07

## 2021-07-26 RX ADMIN — DENOSUMAB 120 MG: 120 INJECTION SUBCUTANEOUS at 01:07

## 2021-07-27 ENCOUNTER — TELEPHONE (OUTPATIENT)
Dept: HEMATOLOGY/ONCOLOGY | Facility: CLINIC | Age: 46
End: 2021-07-27

## 2021-07-30 ENCOUNTER — OFFICE VISIT (OUTPATIENT)
Dept: PSYCHIATRY | Facility: CLINIC | Age: 46
End: 2021-07-30
Payer: MEDICAID

## 2021-07-30 ENCOUNTER — PATIENT MESSAGE (OUTPATIENT)
Dept: HEMATOLOGY/ONCOLOGY | Facility: CLINIC | Age: 46
End: 2021-07-30

## 2021-07-30 DIAGNOSIS — C50.211 MALIGNANT NEOPLASM OF UPPER-INNER QUADRANT OF RIGHT BREAST IN FEMALE, ESTROGEN RECEPTOR POSITIVE: ICD-10-CM

## 2021-07-30 DIAGNOSIS — Z17.0 MALIGNANT NEOPLASM OF UPPER-INNER QUADRANT OF RIGHT BREAST IN FEMALE, ESTROGEN RECEPTOR POSITIVE: ICD-10-CM

## 2021-07-30 DIAGNOSIS — F43.21 GRIEF: ICD-10-CM

## 2021-07-30 DIAGNOSIS — F33.1 MAJOR DEPRESSIVE DISORDER, RECURRENT EPISODE, MODERATE WITH ANXIOUS DISTRESS: Primary | ICD-10-CM

## 2021-07-30 DIAGNOSIS — C79.51 BONE METASTASES: ICD-10-CM

## 2021-07-30 PROCEDURE — 99999 PR PBB SHADOW E&M-EST. PATIENT-LVL I: CPT | Mod: PBBFAC,HB,, | Performed by: PSYCHOLOGIST

## 2021-07-30 PROCEDURE — 90837 PSYTX W PT 60 MINUTES: CPT | Mod: PBBFAC | Performed by: PSYCHOLOGIST

## 2021-07-30 PROCEDURE — 99999 PR PBB SHADOW E&M-EST. PATIENT-LVL I: ICD-10-PCS | Mod: PBBFAC,HB,, | Performed by: PSYCHOLOGIST

## 2021-07-30 PROCEDURE — 90837 PR PSYCHOTHERAPY W/PATIENT, 60 MIN: ICD-10-PCS | Mod: S$PBB,HB,, | Performed by: PSYCHOLOGIST

## 2021-07-30 PROCEDURE — 90837 PSYTX W PT 60 MINUTES: CPT | Mod: S$PBB,HB,, | Performed by: PSYCHOLOGIST

## 2021-07-30 PROCEDURE — 99211 OFF/OP EST MAY X REQ PHY/QHP: CPT | Mod: PBBFAC | Performed by: PSYCHOLOGIST

## 2021-08-02 ENCOUNTER — PATIENT MESSAGE (OUTPATIENT)
Dept: HEMATOLOGY/ONCOLOGY | Facility: CLINIC | Age: 46
End: 2021-08-02

## 2021-08-06 ENCOUNTER — TELEPHONE (OUTPATIENT)
Dept: INFUSION THERAPY | Facility: HOSPITAL | Age: 46
End: 2021-08-06

## 2021-08-06 ENCOUNTER — OFFICE VISIT (OUTPATIENT)
Dept: PSYCHIATRY | Facility: CLINIC | Age: 46
End: 2021-08-06
Payer: MEDICAID

## 2021-08-06 ENCOUNTER — TELEPHONE (OUTPATIENT)
Dept: HEMATOLOGY/ONCOLOGY | Facility: CLINIC | Age: 46
End: 2021-08-06

## 2021-08-06 ENCOUNTER — LAB VISIT (OUTPATIENT)
Dept: LAB | Facility: HOSPITAL | Age: 46
End: 2021-08-06
Payer: MEDICAID

## 2021-08-06 VITALS
SYSTOLIC BLOOD PRESSURE: 128 MMHG | HEART RATE: 82 BPM | TEMPERATURE: 98 F | OXYGEN SATURATION: 93 % | DIASTOLIC BLOOD PRESSURE: 83 MMHG

## 2021-08-06 DIAGNOSIS — E87.6 HYPOKALEMIA: ICD-10-CM

## 2021-08-06 DIAGNOSIS — F33.1 MAJOR DEPRESSIVE DISORDER, RECURRENT EPISODE, MODERATE WITH ANXIOUS DISTRESS: Primary | ICD-10-CM

## 2021-08-06 DIAGNOSIS — Z17.0 MALIGNANT NEOPLASM OF UPPER-INNER QUADRANT OF RIGHT BREAST IN FEMALE, ESTROGEN RECEPTOR POSITIVE: ICD-10-CM

## 2021-08-06 DIAGNOSIS — C50.211 MALIGNANT NEOPLASM OF UPPER-INNER QUADRANT OF RIGHT BREAST IN FEMALE, ESTROGEN RECEPTOR POSITIVE: ICD-10-CM

## 2021-08-06 DIAGNOSIS — F43.21 GRIEF: ICD-10-CM

## 2021-08-06 DIAGNOSIS — E87.6 HYPOKALEMIA: Primary | ICD-10-CM

## 2021-08-06 DIAGNOSIS — C79.51 BONE METASTASES: ICD-10-CM

## 2021-08-06 LAB
ALBUMIN SERPL BCP-MCNC: 2.8 G/DL (ref 3.5–5.2)
ALP SERPL-CCNC: 89 U/L (ref 55–135)
ALT SERPL W/O P-5'-P-CCNC: 14 U/L (ref 10–44)
ANION GAP SERPL CALC-SCNC: 3 MMOL/L (ref 8–16)
AST SERPL-CCNC: 12 U/L (ref 10–40)
BILIRUB SERPL-MCNC: 0.4 MG/DL (ref 0.1–1)
BUN SERPL-MCNC: 15 MG/DL (ref 6–20)
CALCIUM SERPL-MCNC: 9.5 MG/DL (ref 8.7–10.5)
CHLORIDE SERPL-SCNC: 105 MMOL/L (ref 95–110)
CO2 SERPL-SCNC: 31 MMOL/L (ref 23–29)
CREAT SERPL-MCNC: 1 MG/DL (ref 0.5–1.4)
ERYTHROCYTE [DISTWIDTH] IN BLOOD BY AUTOMATED COUNT: 20.1 % (ref 11.5–14.5)
EST. GFR  (AFRICAN AMERICAN): >60 ML/MIN/1.73 M^2
EST. GFR  (NON AFRICAN AMERICAN): >60 ML/MIN/1.73 M^2
GLUCOSE SERPL-MCNC: 184 MG/DL (ref 70–110)
HCT VFR BLD AUTO: 30.6 % (ref 37–48.5)
HGB BLD-MCNC: 9.3 G/DL (ref 12–16)
IMM GRANULOCYTES # BLD AUTO: 0.03 K/UL (ref 0–0.04)
MCH RBC QN AUTO: 27.8 PG (ref 27–31)
MCHC RBC AUTO-ENTMCNC: 30.4 G/DL (ref 32–36)
MCV RBC AUTO: 92 FL (ref 82–98)
NEUTROPHILS # BLD AUTO: 3.2 K/UL (ref 1.8–7.7)
PLATELET # BLD AUTO: 428 K/UL (ref 150–450)
PMV BLD AUTO: 10.7 FL (ref 9.2–12.9)
POTASSIUM SERPL-SCNC: 3.9 MMOL/L (ref 3.5–5.1)
PROT SERPL-MCNC: 7.2 G/DL (ref 6–8.4)
RBC # BLD AUTO: 3.34 M/UL (ref 4–5.4)
SODIUM SERPL-SCNC: 139 MMOL/L (ref 136–145)
WBC # BLD AUTO: 5.42 K/UL (ref 3.9–12.7)

## 2021-08-06 PROCEDURE — 90837 PR PSYCHOTHERAPY W/PATIENT, 60 MIN: ICD-10-PCS | Mod: S$PBB,HB,, | Performed by: PSYCHOLOGIST

## 2021-08-06 PROCEDURE — 90837 PSYTX W PT 60 MINUTES: CPT | Mod: S$PBB,HB,, | Performed by: PSYCHOLOGIST

## 2021-08-06 PROCEDURE — 85027 COMPLETE CBC AUTOMATED: CPT | Performed by: NURSE PRACTITIONER

## 2021-08-06 PROCEDURE — 36415 COLL VENOUS BLD VENIPUNCTURE: CPT | Performed by: INTERNAL MEDICINE

## 2021-08-06 PROCEDURE — 80053 COMPREHEN METABOLIC PANEL: CPT | Performed by: INTERNAL MEDICINE

## 2021-08-06 PROCEDURE — 99213 OFFICE O/P EST LOW 20 MIN: CPT | Mod: PBBFAC,25 | Performed by: PSYCHOLOGIST

## 2021-08-06 PROCEDURE — 99999 PR PBB SHADOW E&M-EST. PATIENT-LVL III: CPT | Mod: PBBFAC,HB,, | Performed by: PSYCHOLOGIST

## 2021-08-06 PROCEDURE — 99999 PR PBB SHADOW E&M-EST. PATIENT-LVL III: ICD-10-PCS | Mod: PBBFAC,HB,, | Performed by: PSYCHOLOGIST

## 2021-08-06 PROCEDURE — 90837 PSYTX W PT 60 MINUTES: CPT | Mod: PBBFAC | Performed by: PSYCHOLOGIST

## 2021-08-09 ENCOUNTER — TELEPHONE (OUTPATIENT)
Dept: BARIATRICS | Facility: CLINIC | Age: 46
End: 2021-08-09

## 2021-08-09 ENCOUNTER — PATIENT MESSAGE (OUTPATIENT)
Dept: OBSTETRICS AND GYNECOLOGY | Facility: CLINIC | Age: 46
End: 2021-08-09

## 2021-08-09 ENCOUNTER — TELEPHONE (OUTPATIENT)
Dept: INFUSION THERAPY | Facility: HOSPITAL | Age: 46
End: 2021-08-09

## 2021-08-13 ENCOUNTER — PATIENT MESSAGE (OUTPATIENT)
Dept: HEMATOLOGY/ONCOLOGY | Facility: CLINIC | Age: 46
End: 2021-08-13

## 2021-08-13 ENCOUNTER — PATIENT MESSAGE (OUTPATIENT)
Dept: ADMINISTRATIVE | Facility: OTHER | Age: 46
End: 2021-08-13

## 2021-08-17 ENCOUNTER — PATIENT MESSAGE (OUTPATIENT)
Dept: ADMINISTRATIVE | Facility: OTHER | Age: 46
End: 2021-08-17

## 2021-08-23 ENCOUNTER — OFFICE VISIT (OUTPATIENT)
Dept: HEMATOLOGY/ONCOLOGY | Facility: CLINIC | Age: 46
End: 2021-08-23
Payer: MEDICAID

## 2021-08-23 ENCOUNTER — INFUSION (OUTPATIENT)
Dept: INFUSION THERAPY | Facility: HOSPITAL | Age: 46
End: 2021-08-23
Payer: MEDICAID

## 2021-08-23 VITALS
TEMPERATURE: 98 F | HEART RATE: 77 BPM | DIASTOLIC BLOOD PRESSURE: 90 MMHG | OXYGEN SATURATION: 92 % | BODY MASS INDEX: 48.82 KG/M2 | HEIGHT: 65 IN | RESPIRATION RATE: 18 BRPM | WEIGHT: 293 LBS | SYSTOLIC BLOOD PRESSURE: 160 MMHG

## 2021-08-23 DIAGNOSIS — C50.211 MALIGNANT NEOPLASM OF UPPER-INNER QUADRANT OF RIGHT BREAST IN FEMALE, ESTROGEN RECEPTOR POSITIVE: Primary | ICD-10-CM

## 2021-08-23 DIAGNOSIS — D50.0 IRON DEFICIENCY ANEMIA DUE TO CHRONIC BLOOD LOSS: ICD-10-CM

## 2021-08-23 DIAGNOSIS — G89.3 CANCER ASSOCIATED PAIN: ICD-10-CM

## 2021-08-23 DIAGNOSIS — Z17.0 MALIGNANT NEOPLASM OF UPPER-INNER QUADRANT OF RIGHT BREAST IN FEMALE, ESTROGEN RECEPTOR POSITIVE: Primary | ICD-10-CM

## 2021-08-23 DIAGNOSIS — C79.51 BONE METASTASES: ICD-10-CM

## 2021-08-23 DIAGNOSIS — G89.3 NEOPLASM RELATED PAIN: ICD-10-CM

## 2021-08-23 DIAGNOSIS — E78.00 PURE HYPERCHOLESTEROLEMIA: ICD-10-CM

## 2021-08-23 DIAGNOSIS — I10 ESSENTIAL HYPERTENSION: ICD-10-CM

## 2021-08-23 DIAGNOSIS — E55.9 VITAMIN D DEFICIENCY: ICD-10-CM

## 2021-08-23 DIAGNOSIS — F43.23 ADJUSTMENT DISORDER WITH MIXED ANXIETY AND DEPRESSED MOOD: ICD-10-CM

## 2021-08-23 PROCEDURE — 96372 THER/PROPH/DIAG INJ SC/IM: CPT | Mod: 59

## 2021-08-23 PROCEDURE — 99215 PR OFFICE/OUTPT VISIT, EST, LEVL V, 40-54 MIN: ICD-10-PCS | Mod: S$PBB,,, | Performed by: NURSE PRACTITIONER

## 2021-08-23 PROCEDURE — 99215 OFFICE O/P EST HI 40 MIN: CPT | Mod: PBBFAC | Performed by: NURSE PRACTITIONER

## 2021-08-23 PROCEDURE — 99999 PR PBB SHADOW E&M-EST. PATIENT-LVL V: ICD-10-PCS | Mod: PBBFAC,,, | Performed by: NURSE PRACTITIONER

## 2021-08-23 PROCEDURE — 96402 CHEMO HORMON ANTINEOPL SQ/IM: CPT

## 2021-08-23 PROCEDURE — 99999 PR PBB SHADOW E&M-EST. PATIENT-LVL V: CPT | Mod: PBBFAC,,, | Performed by: NURSE PRACTITIONER

## 2021-08-23 PROCEDURE — 99215 OFFICE O/P EST HI 40 MIN: CPT | Mod: S$PBB,,, | Performed by: NURSE PRACTITIONER

## 2021-08-23 PROCEDURE — 63600175 PHARM REV CODE 636 W HCPCS: Mod: JG | Performed by: INTERNAL MEDICINE

## 2021-08-23 RX ORDER — VENLAFAXINE HYDROCHLORIDE 150 MG/1
150 CAPSULE, EXTENDED RELEASE ORAL DAILY
Qty: 30 CAPSULE | Refills: 2 | Status: SHIPPED | OUTPATIENT
Start: 2021-08-23 | End: 2021-12-27

## 2021-08-23 RX ORDER — OXYCODONE HYDROCHLORIDE 5 MG/1
5 TABLET ORAL EVERY 6 HOURS PRN
Qty: 45 TABLET | Refills: 0 | Status: SHIPPED | OUTPATIENT
Start: 2021-08-23 | End: 2021-11-03

## 2021-08-23 RX ORDER — MORPHINE SULFATE 15 MG/1
15 TABLET, FILM COATED, EXTENDED RELEASE ORAL EVERY 8 HOURS PRN
Qty: 60 TABLET | Refills: 0 | Status: SHIPPED | OUTPATIENT
Start: 2021-08-23 | End: 2022-04-14 | Stop reason: SDUPTHER

## 2021-08-23 RX ADMIN — GOSERELIN ACETATE 3.6 MG: 3.6 IMPLANT SUBCUTANEOUS at 02:08

## 2021-08-23 RX ADMIN — DENOSUMAB 120 MG: 120 INJECTION SUBCUTANEOUS at 02:08

## 2021-08-25 ENCOUNTER — HOSPITAL ENCOUNTER (OUTPATIENT)
Dept: RADIOLOGY | Facility: HOSPITAL | Age: 46
Discharge: HOME | End: 2021-08-25
Attending: NURSE PRACTITIONER
Payer: MEDICAID

## 2021-08-25 DIAGNOSIS — R11.0 NAUSEA: ICD-10-CM

## 2021-08-25 DIAGNOSIS — C50.211 MALIGNANT NEOPLASM OF UPPER-INNER QUADRANT OF RIGHT BREAST IN FEMALE, ESTROGEN RECEPTOR POSITIVE: ICD-10-CM

## 2021-08-25 DIAGNOSIS — R42 DIZZINESS: ICD-10-CM

## 2021-08-25 DIAGNOSIS — Z17.0 MALIGNANT NEOPLASM OF UPPER-INNER QUADRANT OF RIGHT BREAST IN FEMALE, ESTROGEN RECEPTOR POSITIVE: ICD-10-CM

## 2021-08-25 PROCEDURE — 25500020 PHARM REV CODE 255: Performed by: NURSE PRACTITIONER

## 2021-08-25 PROCEDURE — 70553 MRI BRAIN W WO CONTRAST: ICD-10-PCS | Mod: 26,,, | Performed by: RADIOLOGY

## 2021-08-25 PROCEDURE — A9585 GADOBUTROL INJECTION: HCPCS | Performed by: NURSE PRACTITIONER

## 2021-08-25 PROCEDURE — 70553 MRI BRAIN STEM W/O & W/DYE: CPT | Mod: 26,,, | Performed by: RADIOLOGY

## 2021-08-25 PROCEDURE — 70553 MRI BRAIN STEM W/O & W/DYE: CPT | Mod: TC

## 2021-08-25 RX ORDER — GADOBUTROL 604.72 MG/ML
10 INJECTION INTRAVENOUS
Status: COMPLETED | OUTPATIENT
Start: 2021-08-25 | End: 2021-08-25

## 2021-08-25 RX ADMIN — GADOBUTROL 10 ML: 604.72 INJECTION INTRAVENOUS at 02:08

## 2021-08-26 DIAGNOSIS — C50.211 MALIGNANT NEOPLASM OF UPPER-INNER QUADRANT OF RIGHT BREAST IN FEMALE, ESTROGEN RECEPTOR POSITIVE: Primary | ICD-10-CM

## 2021-08-26 DIAGNOSIS — Z17.0 MALIGNANT NEOPLASM OF UPPER-INNER QUADRANT OF RIGHT BREAST IN FEMALE, ESTROGEN RECEPTOR POSITIVE: Primary | ICD-10-CM

## 2021-09-20 ENCOUNTER — OFFICE VISIT (OUTPATIENT)
Dept: HEMATOLOGY/ONCOLOGY | Facility: CLINIC | Age: 46
End: 2021-09-20
Payer: MEDICAID

## 2021-09-20 ENCOUNTER — INFUSION (OUTPATIENT)
Dept: INFUSION THERAPY | Facility: HOSPITAL | Age: 46
End: 2021-09-20
Payer: MEDICAID

## 2021-09-20 VITALS
HEIGHT: 65 IN | SYSTOLIC BLOOD PRESSURE: 188 MMHG | HEART RATE: 85 BPM | DIASTOLIC BLOOD PRESSURE: 98 MMHG | OXYGEN SATURATION: 100 % | TEMPERATURE: 99 F | RESPIRATION RATE: 16 BRPM | BODY MASS INDEX: 48.82 KG/M2 | WEIGHT: 293 LBS

## 2021-09-20 DIAGNOSIS — M54.40 ACUTE BILATERAL LOW BACK PAIN WITH SCIATICA, SCIATICA LATERALITY UNSPECIFIED: ICD-10-CM

## 2021-09-20 DIAGNOSIS — C79.51 BONE METASTASES: ICD-10-CM

## 2021-09-20 DIAGNOSIS — Z17.0 MALIGNANT NEOPLASM OF UPPER-INNER QUADRANT OF RIGHT BREAST IN FEMALE, ESTROGEN RECEPTOR POSITIVE: Primary | ICD-10-CM

## 2021-09-20 DIAGNOSIS — R73.9 HYPERGLYCEMIA: ICD-10-CM

## 2021-09-20 DIAGNOSIS — C50.211 MALIGNANT NEOPLASM OF UPPER-INNER QUADRANT OF RIGHT BREAST IN FEMALE, ESTROGEN RECEPTOR POSITIVE: Primary | ICD-10-CM

## 2021-09-20 DIAGNOSIS — G89.3 NEOPLASM RELATED PAIN: ICD-10-CM

## 2021-09-20 PROCEDURE — 99999 PR PBB SHADOW E&M-EST. PATIENT-LVL V: CPT | Mod: PBBFAC,,, | Performed by: NURSE PRACTITIONER

## 2021-09-20 PROCEDURE — 99215 OFFICE O/P EST HI 40 MIN: CPT | Mod: PBBFAC | Performed by: NURSE PRACTITIONER

## 2021-09-20 PROCEDURE — 99215 PR OFFICE/OUTPT VISIT, EST, LEVL V, 40-54 MIN: ICD-10-PCS | Mod: S$PBB,,, | Performed by: NURSE PRACTITIONER

## 2021-09-20 PROCEDURE — 99215 OFFICE O/P EST HI 40 MIN: CPT | Mod: S$PBB,,, | Performed by: NURSE PRACTITIONER

## 2021-09-20 PROCEDURE — 63600175 PHARM REV CODE 636 W HCPCS: Mod: JG | Performed by: INTERNAL MEDICINE

## 2021-09-20 PROCEDURE — 96372 THER/PROPH/DIAG INJ SC/IM: CPT | Mod: 59

## 2021-09-20 PROCEDURE — 99999 PR PBB SHADOW E&M-EST. PATIENT-LVL V: ICD-10-PCS | Mod: PBBFAC,,, | Performed by: NURSE PRACTITIONER

## 2021-09-20 PROCEDURE — 96402 CHEMO HORMON ANTINEOPL SQ/IM: CPT

## 2021-09-20 RX ORDER — HYDROCODONE BITARTRATE AND ACETAMINOPHEN 5; 325 MG/1; MG/1
1 TABLET ORAL EVERY 6 HOURS PRN
Qty: 30 TABLET | Refills: 0 | Status: SHIPPED | OUTPATIENT
Start: 2021-09-20 | End: 2022-04-14 | Stop reason: SDUPTHER

## 2021-09-20 RX ADMIN — GOSERELIN ACETATE 3.6 MG: 3.6 IMPLANT SUBCUTANEOUS at 11:09

## 2021-09-20 RX ADMIN — DENOSUMAB 120 MG: 120 INJECTION SUBCUTANEOUS at 11:09

## 2021-09-21 ENCOUNTER — TELEPHONE (OUTPATIENT)
Dept: HEMATOLOGY/ONCOLOGY | Facility: CLINIC | Age: 46
End: 2021-09-21

## 2021-09-21 ENCOUNTER — TELEPHONE (OUTPATIENT)
Dept: ADMINISTRATIVE | Facility: OTHER | Age: 46
End: 2021-09-21

## 2021-09-23 ENCOUNTER — PATIENT MESSAGE (OUTPATIENT)
Dept: HEMATOLOGY/ONCOLOGY | Facility: CLINIC | Age: 46
End: 2021-09-23

## 2021-09-23 ENCOUNTER — TELEPHONE (OUTPATIENT)
Dept: HEMATOLOGY/ONCOLOGY | Facility: CLINIC | Age: 46
End: 2021-09-23

## 2021-09-23 DIAGNOSIS — Z12.89 ENCOUNTER FOR SCREENING FOR MALIGNANT NEOPLASM OF OTHER SITES: ICD-10-CM

## 2021-09-29 ENCOUNTER — PATIENT MESSAGE (OUTPATIENT)
Dept: HEMATOLOGY/ONCOLOGY | Facility: CLINIC | Age: 46
End: 2021-09-29

## 2021-10-01 ENCOUNTER — APPOINTMENT (OUTPATIENT)
Dept: RADIATION THERAPY | Facility: OTHER | Age: 46
End: 2021-10-01
Attending: RADIOLOGY
Payer: MEDICAID

## 2021-10-08 ENCOUNTER — PATIENT MESSAGE (OUTPATIENT)
Dept: HEMATOLOGY/ONCOLOGY | Facility: CLINIC | Age: 46
End: 2021-10-08

## 2021-10-08 ENCOUNTER — TELEPHONE (OUTPATIENT)
Dept: HEMATOLOGY/ONCOLOGY | Facility: CLINIC | Age: 46
End: 2021-10-08

## 2021-10-12 ENCOUNTER — PATIENT MESSAGE (OUTPATIENT)
Dept: HEMATOLOGY/ONCOLOGY | Facility: CLINIC | Age: 46
End: 2021-10-12

## 2021-10-12 DIAGNOSIS — K12.30 MUCOSITIS: ICD-10-CM

## 2021-10-12 DIAGNOSIS — C06.0: Primary | ICD-10-CM

## 2021-10-14 ENCOUNTER — HOSPITAL ENCOUNTER (OUTPATIENT)
Dept: RADIOLOGY | Facility: HOSPITAL | Age: 46
Discharge: HOME | End: 2021-10-14
Attending: INTERNAL MEDICINE
Payer: MEDICAID

## 2021-10-14 DIAGNOSIS — Z12.89 ENCOUNTER FOR SCREENING FOR MALIGNANT NEOPLASM OF OTHER SITES: ICD-10-CM

## 2021-10-14 PROCEDURE — 72157 MRI THORACIC SPINE W WO CONTRAST: ICD-10-PCS | Mod: 26,,, | Performed by: RADIOLOGY

## 2021-10-14 PROCEDURE — A9585 GADOBUTROL INJECTION: HCPCS | Performed by: INTERNAL MEDICINE

## 2021-10-14 PROCEDURE — 25500020 PHARM REV CODE 255: Performed by: INTERNAL MEDICINE

## 2021-10-14 PROCEDURE — 72158 MRI LUMBAR SPINE W WO CONTRAST: ICD-10-PCS | Mod: 26,,, | Performed by: RADIOLOGY

## 2021-10-14 PROCEDURE — 72158 MRI LUMBAR SPINE W/O & W/DYE: CPT | Mod: 26,,, | Performed by: RADIOLOGY

## 2021-10-14 PROCEDURE — 72158 MRI LUMBAR SPINE W/O & W/DYE: CPT | Mod: TC

## 2021-10-14 PROCEDURE — 72157 MRI CHEST SPINE W/O & W/DYE: CPT | Mod: 26,,, | Performed by: RADIOLOGY

## 2021-10-14 PROCEDURE — 72157 MRI CHEST SPINE W/O & W/DYE: CPT | Mod: TC

## 2021-10-14 RX ORDER — GADOBUTROL 604.72 MG/ML
10 INJECTION INTRAVENOUS
Status: COMPLETED | OUTPATIENT
Start: 2021-10-14 | End: 2021-10-14

## 2021-10-14 RX ADMIN — GADOBUTROL 10 ML: 604.72 INJECTION INTRAVENOUS at 01:10

## 2021-10-18 ENCOUNTER — INFUSION (OUTPATIENT)
Dept: INFUSION THERAPY | Facility: HOSPITAL | Age: 46
End: 2021-10-18
Payer: MEDICAID

## 2021-10-18 ENCOUNTER — OFFICE VISIT (OUTPATIENT)
Dept: HEMATOLOGY/ONCOLOGY | Facility: CLINIC | Age: 46
End: 2021-10-18
Payer: MEDICAID

## 2021-10-18 ENCOUNTER — TELEPHONE (OUTPATIENT)
Dept: PRIMARY CARE CLINIC | Facility: CLINIC | Age: 46
End: 2021-10-18

## 2021-10-18 DIAGNOSIS — F43.23 ADJUSTMENT DISORDER WITH MIXED ANXIETY AND DEPRESSED MOOD: ICD-10-CM

## 2021-10-18 DIAGNOSIS — G89.3 NEOPLASM RELATED PAIN: ICD-10-CM

## 2021-10-18 DIAGNOSIS — C50.211 MALIGNANT NEOPLASM OF UPPER-INNER QUADRANT OF RIGHT BREAST IN FEMALE, ESTROGEN RECEPTOR POSITIVE: Primary | ICD-10-CM

## 2021-10-18 DIAGNOSIS — Z17.0 MALIGNANT NEOPLASM OF UPPER-INNER QUADRANT OF RIGHT BREAST IN FEMALE, ESTROGEN RECEPTOR POSITIVE: Primary | ICD-10-CM

## 2021-10-18 DIAGNOSIS — C79.51 BONE METASTASES: ICD-10-CM

## 2021-10-18 DIAGNOSIS — R73.9 HYPERGLYCEMIA: ICD-10-CM

## 2021-10-18 PROCEDURE — 63600175 PHARM REV CODE 636 W HCPCS: Mod: JG | Performed by: INTERNAL MEDICINE

## 2021-10-18 PROCEDURE — 99214 OFFICE O/P EST MOD 30 MIN: CPT | Mod: 95,,, | Performed by: NURSE PRACTITIONER

## 2021-10-18 PROCEDURE — 99214 PR OFFICE/OUTPT VISIT, EST, LEVL IV, 30-39 MIN: ICD-10-PCS | Mod: 95,,, | Performed by: NURSE PRACTITIONER

## 2021-10-18 PROCEDURE — 96402 CHEMO HORMON ANTINEOPL SQ/IM: CPT

## 2021-10-18 RX ADMIN — GOSERELIN ACETATE 3.6 MG: 3.6 IMPLANT SUBCUTANEOUS at 02:10

## 2021-10-19 ENCOUNTER — PATIENT MESSAGE (OUTPATIENT)
Dept: PRIMARY CARE CLINIC | Facility: CLINIC | Age: 46
End: 2021-10-19

## 2021-10-20 ENCOUNTER — PATIENT OUTREACH (OUTPATIENT)
Dept: ADMINISTRATIVE | Facility: HOSPITAL | Age: 46
End: 2021-10-20

## 2021-10-20 ENCOUNTER — OFFICE VISIT (OUTPATIENT)
Dept: RADIATION ONCOLOGY | Facility: CLINIC | Age: 46
End: 2021-10-20
Payer: MEDICAID

## 2021-10-20 VITALS
DIASTOLIC BLOOD PRESSURE: 67 MMHG | RESPIRATION RATE: 18 BRPM | SYSTOLIC BLOOD PRESSURE: 132 MMHG | TEMPERATURE: 99 F | HEART RATE: 85 BPM

## 2021-10-20 DIAGNOSIS — G89.3 CANCER ASSOCIATED PAIN: Primary | ICD-10-CM

## 2021-10-20 DIAGNOSIS — C79.51 BONE METASTASES: ICD-10-CM

## 2021-10-20 PROCEDURE — 99214 PR OFFICE/OUTPT VISIT, EST, LEVL IV, 30-39 MIN: ICD-10-PCS | Mod: S$PBB,,, | Performed by: RADIOLOGY

## 2021-10-20 PROCEDURE — 99213 OFFICE O/P EST LOW 20 MIN: CPT | Mod: PBBFAC | Performed by: RADIOLOGY

## 2021-10-20 PROCEDURE — 99214 OFFICE O/P EST MOD 30 MIN: CPT | Mod: S$PBB,,, | Performed by: RADIOLOGY

## 2021-10-20 PROCEDURE — 99999 PR PBB SHADOW E&M-EST. PATIENT-LVL III: CPT | Mod: PBBFAC,,, | Performed by: RADIOLOGY

## 2021-10-20 PROCEDURE — 99999 PR PBB SHADOW E&M-EST. PATIENT-LVL III: ICD-10-PCS | Mod: PBBFAC,,, | Performed by: RADIOLOGY

## 2021-10-21 ENCOUNTER — PATIENT MESSAGE (OUTPATIENT)
Dept: HEMATOLOGY/ONCOLOGY | Facility: CLINIC | Age: 46
End: 2021-10-21

## 2021-10-21 ENCOUNTER — TELEPHONE (OUTPATIENT)
Dept: HEMATOLOGY/ONCOLOGY | Facility: CLINIC | Age: 46
End: 2021-10-21

## 2021-10-25 ENCOUNTER — HOSPITAL ENCOUNTER (OUTPATIENT)
Dept: RADIOLOGY | Facility: HOSPITAL | Age: 46
Discharge: HOME | End: 2021-10-25
Attending: NURSE PRACTITIONER
Payer: MEDICAID

## 2021-10-25 ENCOUNTER — HOSPITAL ENCOUNTER (OUTPATIENT)
Dept: RADIATION THERAPY | Facility: HOSPITAL | Age: 46
Discharge: HOME | End: 2021-10-25
Attending: RADIOLOGY
Payer: MEDICAID

## 2021-10-25 DIAGNOSIS — C50.211 MALIGNANT NEOPLASM OF UPPER-INNER QUADRANT OF RIGHT BREAST IN FEMALE, ESTROGEN RECEPTOR POSITIVE: ICD-10-CM

## 2021-10-25 DIAGNOSIS — Z17.0 MALIGNANT NEOPLASM OF UPPER-INNER QUADRANT OF RIGHT BREAST IN FEMALE, ESTROGEN RECEPTOR POSITIVE: ICD-10-CM

## 2021-10-25 DIAGNOSIS — C79.51 BONE METASTASES: ICD-10-CM

## 2021-10-25 PROCEDURE — 74177 CT CHEST ABDOMEN PELVIS WITH CONTRAST (XPD): ICD-10-PCS | Mod: 26,,, | Performed by: RADIOLOGY

## 2021-10-25 PROCEDURE — 77014 HC CT GUIDANCE RADIATION THERAPY FLDS PLACEMENT: CPT | Mod: TC | Performed by: RADIOLOGY

## 2021-10-25 PROCEDURE — 77263 THER RADIOLOGY TX PLNG CPLX: CPT | Mod: ,,, | Performed by: RADIOLOGY

## 2021-10-25 PROCEDURE — 71260 CT THORAX DX C+: CPT | Mod: TC

## 2021-10-25 PROCEDURE — 25500020 PHARM REV CODE 255: Performed by: NURSE PRACTITIONER

## 2021-10-25 PROCEDURE — 71260 CT THORAX DX C+: CPT | Mod: 26,,, | Performed by: RADIOLOGY

## 2021-10-25 PROCEDURE — 77290 THER RAD SIMULAJ FIELD CPLX: CPT | Mod: 26,,, | Performed by: RADIOLOGY

## 2021-10-25 PROCEDURE — 77263 PR  RADIATION THERAPY PLAN COMPLEX: ICD-10-PCS | Mod: ,,, | Performed by: RADIOLOGY

## 2021-10-25 PROCEDURE — 71260 CT CHEST ABDOMEN PELVIS WITH CONTRAST (XPD): ICD-10-PCS | Mod: 26,,, | Performed by: RADIOLOGY

## 2021-10-25 PROCEDURE — 74177 CT ABD & PELVIS W/CONTRAST: CPT | Mod: TC

## 2021-10-25 PROCEDURE — 77334 RADIATION TREATMENT AID(S): CPT | Mod: 26,,, | Performed by: RADIOLOGY

## 2021-10-25 PROCEDURE — 77290 PR  SET RADN THERAPY FIELD COMPLEX: ICD-10-PCS | Mod: 26,,, | Performed by: RADIOLOGY

## 2021-10-25 PROCEDURE — 74177 CT ABD & PELVIS W/CONTRAST: CPT | Mod: 26,,, | Performed by: RADIOLOGY

## 2021-10-25 PROCEDURE — 77290 THER RAD SIMULAJ FIELD CPLX: CPT | Mod: TC | Performed by: RADIOLOGY

## 2021-10-25 PROCEDURE — 77334 RADIATION TREATMENT AID(S): CPT | Mod: TC | Performed by: RADIOLOGY

## 2021-10-25 PROCEDURE — 77334 PR  RADN TREATMENT AID(S) COMPLX: ICD-10-PCS | Mod: 26,,, | Performed by: RADIOLOGY

## 2021-10-25 RX ADMIN — IOHEXOL 15 ML: 350 INJECTION, SOLUTION INTRAVENOUS at 05:10

## 2021-10-25 RX ADMIN — IOHEXOL 100 ML: 350 INJECTION, SOLUTION INTRAVENOUS at 05:10

## 2021-10-26 PROCEDURE — 77300 RADIATION THERAPY DOSE PLAN: CPT | Mod: TC | Performed by: RADIOLOGY

## 2021-10-26 PROCEDURE — 77295 3-D RADIOTHERAPY PLAN: CPT | Mod: TC | Performed by: RADIOLOGY

## 2021-10-26 PROCEDURE — 77334 PR  RADN TREATMENT AID(S) COMPLX: ICD-10-PCS | Mod: 26,,, | Performed by: RADIOLOGY

## 2021-10-26 PROCEDURE — 77295 3-D RADIOTHERAPY PLAN: CPT | Mod: 26,,, | Performed by: RADIOLOGY

## 2021-10-26 PROCEDURE — 77334 RADIATION TREATMENT AID(S): CPT | Mod: TC | Performed by: RADIOLOGY

## 2021-10-26 PROCEDURE — 77300 RADIATION THERAPY DOSE PLAN: CPT | Mod: 26,,, | Performed by: RADIOLOGY

## 2021-10-26 PROCEDURE — 77300 PR RADIATION THERAPY,DOSIMETRY PLAN: ICD-10-PCS | Mod: 26,,, | Performed by: RADIOLOGY

## 2021-10-26 PROCEDURE — 77295 PR 3D RADIOTHERAPY PLAN: ICD-10-PCS | Mod: 26,,, | Performed by: RADIOLOGY

## 2021-10-26 PROCEDURE — 77334 RADIATION TREATMENT AID(S): CPT | Mod: 26,,, | Performed by: RADIOLOGY

## 2021-10-27 PROCEDURE — 77417 THER RADIOLOGY PORT IMAGE(S): CPT | Performed by: RADIOLOGY

## 2021-10-27 PROCEDURE — 77387 GUIDANCE FOR RADJ TX DLVR: CPT | Mod: 26,,, | Performed by: RADIOLOGY

## 2021-10-27 PROCEDURE — 77412 RADIATION TX DELIVERY LVL 3: CPT | Performed by: RADIOLOGY

## 2021-10-27 PROCEDURE — 77387 PR GUIDANCE FOR RADIATION TREATMENT DELIVERY: ICD-10-PCS | Mod: 26,,, | Performed by: RADIOLOGY

## 2021-10-27 PROCEDURE — 77387 GUIDANCE FOR RADJ TX DLVR: CPT | Mod: TC | Performed by: RADIOLOGY

## 2021-10-28 ENCOUNTER — TELEPHONE (OUTPATIENT)
Dept: HEMATOLOGY/ONCOLOGY | Facility: CLINIC | Age: 46
End: 2021-10-28

## 2021-10-28 PROCEDURE — 77387 GUIDANCE FOR RADJ TX DLVR: CPT | Mod: 26,,, | Performed by: RADIOLOGY

## 2021-10-28 PROCEDURE — 77387 GUIDANCE FOR RADJ TX DLVR: CPT | Mod: TC | Performed by: RADIOLOGY

## 2021-10-28 PROCEDURE — 77387 PR GUIDANCE FOR RADIATION TREATMENT DELIVERY: ICD-10-PCS | Mod: 26,,, | Performed by: RADIOLOGY

## 2021-10-28 PROCEDURE — 77412 RADIATION TX DELIVERY LVL 3: CPT | Performed by: RADIOLOGY

## 2021-10-29 ENCOUNTER — PATIENT MESSAGE (OUTPATIENT)
Dept: HEMATOLOGY/ONCOLOGY | Facility: CLINIC | Age: 46
End: 2021-10-29

## 2021-10-29 ENCOUNTER — OFFICE VISIT (OUTPATIENT)
Dept: HEMATOLOGY/ONCOLOGY | Facility: CLINIC | Age: 46
End: 2021-10-29
Payer: MEDICAID

## 2021-10-29 DIAGNOSIS — R11.2 NON-INTRACTABLE VOMITING WITH NAUSEA, UNSPECIFIED VOMITING TYPE: Primary | ICD-10-CM

## 2021-10-29 DIAGNOSIS — C50.211 MALIGNANT NEOPLASM OF UPPER-INNER QUADRANT OF RIGHT BREAST IN FEMALE, ESTROGEN RECEPTOR POSITIVE: ICD-10-CM

## 2021-10-29 DIAGNOSIS — Z17.0 MALIGNANT NEOPLASM OF UPPER-INNER QUADRANT OF RIGHT BREAST IN FEMALE, ESTROGEN RECEPTOR POSITIVE: ICD-10-CM

## 2021-10-29 PROCEDURE — 77387 GUIDANCE FOR RADJ TX DLVR: CPT | Mod: TC | Performed by: RADIOLOGY

## 2021-10-29 PROCEDURE — 77387 GUIDANCE FOR RADJ TX DLVR: CPT | Mod: 26,,, | Performed by: RADIOLOGY

## 2021-10-29 PROCEDURE — 99024 PR POST-OP FOLLOW-UP VISIT: ICD-10-PCS | Mod: 95,,, | Performed by: INTERNAL MEDICINE

## 2021-10-29 PROCEDURE — 99024 POSTOP FOLLOW-UP VISIT: CPT | Mod: 95,,, | Performed by: INTERNAL MEDICINE

## 2021-10-29 PROCEDURE — 77412 RADIATION TX DELIVERY LVL 3: CPT | Performed by: RADIOLOGY

## 2021-10-29 PROCEDURE — 77387 PR GUIDANCE FOR RADIATION TREATMENT DELIVERY: ICD-10-PCS | Mod: 26,,, | Performed by: RADIOLOGY

## 2021-10-29 RX ORDER — PROMETHAZINE HYDROCHLORIDE 25 MG/1
25 TABLET ORAL EVERY 6 HOURS PRN
Qty: 30 TABLET | Refills: 1 | Status: SHIPPED | OUTPATIENT
Start: 2021-10-29 | End: 2023-06-19

## 2021-11-01 ENCOUNTER — APPOINTMENT (OUTPATIENT)
Dept: RADIATION THERAPY | Facility: OTHER | Age: 46
End: 2021-11-01
Attending: RADIOLOGY
Payer: MEDICAID

## 2021-11-01 ENCOUNTER — TELEPHONE (OUTPATIENT)
Dept: PALLIATIVE MEDICINE | Facility: CLINIC | Age: 46
End: 2021-11-01

## 2021-11-02 ENCOUNTER — OFFICE VISIT (OUTPATIENT)
Dept: PALLIATIVE MEDICINE | Facility: CLINIC | Age: 46
End: 2021-11-02
Payer: MEDICAID

## 2021-11-02 ENCOUNTER — TELEPHONE (OUTPATIENT)
Dept: INFUSION THERAPY | Facility: HOSPITAL | Age: 46
End: 2021-11-02

## 2021-11-02 VITALS — DIASTOLIC BLOOD PRESSURE: 66 MMHG | HEART RATE: 71 BPM | SYSTOLIC BLOOD PRESSURE: 131 MMHG

## 2021-11-02 DIAGNOSIS — F43.23 ADJUSTMENT DISORDER WITH MIXED ANXIETY AND DEPRESSED MOOD: ICD-10-CM

## 2021-11-02 DIAGNOSIS — Z71.89 ADVANCED CARE PLANNING/COUNSELING DISCUSSION: ICD-10-CM

## 2021-11-02 DIAGNOSIS — K59.00 CONSTIPATION, UNSPECIFIED CONSTIPATION TYPE: ICD-10-CM

## 2021-11-02 DIAGNOSIS — R11.0 NAUSEA: ICD-10-CM

## 2021-11-02 DIAGNOSIS — Z17.0 MALIGNANT NEOPLASM OF UPPER-INNER QUADRANT OF RIGHT BREAST IN FEMALE, ESTROGEN RECEPTOR POSITIVE: Primary | ICD-10-CM

## 2021-11-02 DIAGNOSIS — R63.0 ANOREXIA: ICD-10-CM

## 2021-11-02 DIAGNOSIS — C50.211 MALIGNANT NEOPLASM OF UPPER-INNER QUADRANT OF RIGHT BREAST IN FEMALE, ESTROGEN RECEPTOR POSITIVE: Primary | ICD-10-CM

## 2021-11-02 DIAGNOSIS — C79.51 BONE METASTASES: ICD-10-CM

## 2021-11-02 DIAGNOSIS — R06.09 OTHER FORM OF DYSPNEA: ICD-10-CM

## 2021-11-02 DIAGNOSIS — G47.00 INSOMNIA, UNSPECIFIED TYPE: ICD-10-CM

## 2021-11-02 DIAGNOSIS — Z51.5 ENCOUNTER FOR PALLIATIVE CARE: ICD-10-CM

## 2021-11-02 DIAGNOSIS — R53.0 NEOPLASTIC (MALIGNANT) RELATED FATIGUE: ICD-10-CM

## 2021-11-02 DIAGNOSIS — G89.3 CANCER RELATED PAIN: ICD-10-CM

## 2021-11-02 PROCEDURE — 99999 PR PBB SHADOW E&M-EST. PATIENT-LVL IV: ICD-10-PCS | Mod: PBBFAC,,, | Performed by: STUDENT IN AN ORGANIZED HEALTH CARE EDUCATION/TRAINING PROGRAM

## 2021-11-02 PROCEDURE — 99214 OFFICE O/P EST MOD 30 MIN: CPT | Mod: PBBFAC,25 | Performed by: STUDENT IN AN ORGANIZED HEALTH CARE EDUCATION/TRAINING PROGRAM

## 2021-11-02 PROCEDURE — 99497 ADVNCD CARE PLAN 30 MIN: CPT | Mod: S$PBB,,, | Performed by: STUDENT IN AN ORGANIZED HEALTH CARE EDUCATION/TRAINING PROGRAM

## 2021-11-02 PROCEDURE — 99204 OFFICE O/P NEW MOD 45 MIN: CPT | Mod: S$PBB,,, | Performed by: STUDENT IN AN ORGANIZED HEALTH CARE EDUCATION/TRAINING PROGRAM

## 2021-11-02 PROCEDURE — 99497 PR ADVNCD CARE PLAN 30 MIN: ICD-10-PCS | Mod: S$PBB,,, | Performed by: STUDENT IN AN ORGANIZED HEALTH CARE EDUCATION/TRAINING PROGRAM

## 2021-11-02 PROCEDURE — 77387 PR GUIDANCE FOR RADIATION TREATMENT DELIVERY: ICD-10-PCS | Mod: 26,,, | Performed by: RADIOLOGY

## 2021-11-02 PROCEDURE — 99999 PR PBB SHADOW E&M-EST. PATIENT-LVL IV: CPT | Mod: PBBFAC,,, | Performed by: STUDENT IN AN ORGANIZED HEALTH CARE EDUCATION/TRAINING PROGRAM

## 2021-11-02 PROCEDURE — 99497 ADVNCD CARE PLAN 30 MIN: CPT | Mod: PBBFAC | Performed by: STUDENT IN AN ORGANIZED HEALTH CARE EDUCATION/TRAINING PROGRAM

## 2021-11-02 PROCEDURE — 77387 GUIDANCE FOR RADJ TX DLVR: CPT | Mod: 26,,, | Performed by: RADIOLOGY

## 2021-11-02 PROCEDURE — 77387 GUIDANCE FOR RADJ TX DLVR: CPT | Mod: TC | Performed by: RADIOLOGY

## 2021-11-02 PROCEDURE — 77412 RADIATION TX DELIVERY LVL 3: CPT | Performed by: RADIOLOGY

## 2021-11-02 PROCEDURE — 99204 PR OFFICE/OUTPT VISIT, NEW, LEVL IV, 45-59 MIN: ICD-10-PCS | Mod: S$PBB,,, | Performed by: STUDENT IN AN ORGANIZED HEALTH CARE EDUCATION/TRAINING PROGRAM

## 2021-11-02 RX ORDER — LIDOCAINE 50 MG/G
2 PATCH TOPICAL DAILY
Qty: 60 PATCH | Refills: 0 | Status: SHIPPED | OUTPATIENT
Start: 2021-11-02 | End: 2023-06-19

## 2021-11-03 ENCOUNTER — LAB VISIT (OUTPATIENT)
Dept: PRIMARY CARE CLINIC | Facility: CLINIC | Age: 46
End: 2021-11-03
Payer: MEDICAID

## 2021-11-03 ENCOUNTER — TELEPHONE (OUTPATIENT)
Dept: INFUSION THERAPY | Facility: HOSPITAL | Age: 46
End: 2021-11-03

## 2021-11-03 ENCOUNTER — OFFICE VISIT (OUTPATIENT)
Dept: PRIMARY CARE CLINIC | Facility: CLINIC | Age: 46
End: 2021-11-03
Payer: MEDICAID

## 2021-11-03 ENCOUNTER — TELEPHONE (OUTPATIENT)
Dept: DIABETES | Facility: CLINIC | Age: 46
End: 2021-11-03

## 2021-11-03 VITALS
HEIGHT: 65 IN | TEMPERATURE: 98 F | OXYGEN SATURATION: 96 % | RESPIRATION RATE: 18 BRPM | SYSTOLIC BLOOD PRESSURE: 131 MMHG | BODY MASS INDEX: 48.82 KG/M2 | HEART RATE: 75 BPM | DIASTOLIC BLOOD PRESSURE: 72 MMHG | WEIGHT: 293 LBS

## 2021-11-03 DIAGNOSIS — F33.1 MAJOR DEPRESSIVE DISORDER, RECURRENT EPISODE, MODERATE WITH ANXIOUS DISTRESS: ICD-10-CM

## 2021-11-03 DIAGNOSIS — C79.51 BONE METASTASES: ICD-10-CM

## 2021-11-03 DIAGNOSIS — C50.211 MALIGNANT NEOPLASM OF UPPER-INNER QUADRANT OF RIGHT BREAST IN FEMALE, ESTROGEN RECEPTOR POSITIVE: ICD-10-CM

## 2021-11-03 DIAGNOSIS — Z17.0 MALIGNANT NEOPLASM OF UPPER-INNER QUADRANT OF RIGHT BREAST IN FEMALE, ESTROGEN RECEPTOR POSITIVE: ICD-10-CM

## 2021-11-03 DIAGNOSIS — R73.9 HYPERGLYCEMIA: Primary | ICD-10-CM

## 2021-11-03 DIAGNOSIS — I42.9 CARDIOMYOPATHY, UNSPECIFIED TYPE: ICD-10-CM

## 2021-11-03 DIAGNOSIS — I10 PRIMARY HYPERTENSION: ICD-10-CM

## 2021-11-03 DIAGNOSIS — R73.9 HYPERGLYCEMIA: ICD-10-CM

## 2021-11-03 DIAGNOSIS — E78.00 PURE HYPERCHOLESTEROLEMIA: ICD-10-CM

## 2021-11-03 DIAGNOSIS — M84.48XD: ICD-10-CM

## 2021-11-03 DIAGNOSIS — I50.42 CHRONIC COMBINED SYSTOLIC AND DIASTOLIC HEART FAILURE: ICD-10-CM

## 2021-11-03 LAB
CHOLEST SERPL-MCNC: 119 MG/DL (ref 120–199)
CHOLEST/HDLC SERPL: 2.8 {RATIO} (ref 2–5)
ESTIMATED AVG GLUCOSE: 197 MG/DL (ref 68–131)
HBA1C MFR BLD: 8.5 % (ref 4–5.6)
HDLC SERPL-MCNC: 42 MG/DL (ref 40–75)
HDLC SERPL: 35.3 % (ref 20–50)
LDLC SERPL CALC-MCNC: 57.8 MG/DL (ref 63–159)
NONHDLC SERPL-MCNC: 77 MG/DL
TRIGL SERPL-MCNC: 96 MG/DL (ref 30–150)

## 2021-11-03 PROCEDURE — 99215 OFFICE O/P EST HI 40 MIN: CPT | Mod: PBBFAC,25,PN | Performed by: FAMILY MEDICINE

## 2021-11-03 PROCEDURE — 80061 LIPID PANEL: CPT | Performed by: FAMILY MEDICINE

## 2021-11-03 PROCEDURE — 77387 GUIDANCE FOR RADJ TX DLVR: CPT | Mod: 26,,, | Performed by: RADIOLOGY

## 2021-11-03 PROCEDURE — 83036 HEMOGLOBIN GLYCOSYLATED A1C: CPT | Performed by: FAMILY MEDICINE

## 2021-11-03 PROCEDURE — 99999 PR PBB SHADOW E&M-EST. PATIENT-LVL V: CPT | Mod: PBBFAC,,, | Performed by: FAMILY MEDICINE

## 2021-11-03 PROCEDURE — 99214 PR OFFICE/OUTPT VISIT, EST, LEVL IV, 30-39 MIN: ICD-10-PCS | Mod: S$PBB,,, | Performed by: FAMILY MEDICINE

## 2021-11-03 PROCEDURE — 77387 PR GUIDANCE FOR RADIATION TREATMENT DELIVERY: ICD-10-PCS | Mod: 26,,, | Performed by: RADIOLOGY

## 2021-11-03 PROCEDURE — 77387 GUIDANCE FOR RADJ TX DLVR: CPT | Mod: TC | Performed by: RADIOLOGY

## 2021-11-03 PROCEDURE — 99214 OFFICE O/P EST MOD 30 MIN: CPT | Mod: S$PBB,,, | Performed by: FAMILY MEDICINE

## 2021-11-03 PROCEDURE — 77412 RADIATION TX DELIVERY LVL 3: CPT | Performed by: RADIOLOGY

## 2021-11-03 PROCEDURE — 99999 PR PBB SHADOW E&M-EST. PATIENT-LVL V: ICD-10-PCS | Mod: PBBFAC,,, | Performed by: FAMILY MEDICINE

## 2021-11-03 PROCEDURE — 36415 COLL VENOUS BLD VENIPUNCTURE: CPT | Mod: PBBFAC,PN

## 2021-11-03 RX ORDER — FLUOROMETHOLONE 1 MG/ML
1 SUSPENSION/ DROPS OPHTHALMIC EVERY MORNING
COMMUNITY
Start: 2021-10-27 | End: 2021-12-07

## 2021-11-04 PROCEDURE — 77336 RADIATION PHYSICS CONSULT: CPT | Performed by: RADIOLOGY

## 2021-11-05 ENCOUNTER — DOCUMENTATION ONLY (OUTPATIENT)
Dept: HEMATOLOGY/ONCOLOGY | Facility: CLINIC | Age: 46
End: 2021-11-05

## 2021-11-05 DIAGNOSIS — Z17.0 MALIGNANT NEOPLASM OF UPPER-INNER QUADRANT OF RIGHT BREAST IN FEMALE, ESTROGEN RECEPTOR POSITIVE: Primary | ICD-10-CM

## 2021-11-05 DIAGNOSIS — C50.211 MALIGNANT NEOPLASM OF UPPER-INNER QUADRANT OF RIGHT BREAST IN FEMALE, ESTROGEN RECEPTOR POSITIVE: Primary | ICD-10-CM

## 2021-11-05 DIAGNOSIS — C79.51 BONE METASTASES: ICD-10-CM

## 2021-11-06 ENCOUNTER — PATIENT MESSAGE (OUTPATIENT)
Dept: PRIMARY CARE CLINIC | Facility: CLINIC | Age: 46
End: 2021-11-06

## 2021-11-06 DIAGNOSIS — E11.65 TYPE 2 DIABETES MELLITUS WITH HYPERGLYCEMIA, WITHOUT LONG-TERM CURRENT USE OF INSULIN: Primary | ICD-10-CM

## 2021-11-06 RX ORDER — SEMAGLUTIDE 1.34 MG/ML
0.25 INJECTION, SOLUTION SUBCUTANEOUS
Qty: 4 PEN | Refills: 1 | Status: SHIPPED | OUTPATIENT
Start: 2021-11-06 | End: 2021-11-09 | Stop reason: CLARIF

## 2021-11-07 PROBLEM — R73.9 HYPERGLYCEMIA: Status: ACTIVE | Noted: 2021-11-07

## 2021-11-08 ENCOUNTER — PATIENT MESSAGE (OUTPATIENT)
Dept: PRIMARY CARE CLINIC | Facility: CLINIC | Age: 46
End: 2021-11-08

## 2021-11-09 ENCOUNTER — PATIENT MESSAGE (OUTPATIENT)
Dept: PRIMARY CARE CLINIC | Facility: CLINIC | Age: 46
End: 2021-11-09

## 2021-11-09 ENCOUNTER — DOCUMENTATION ONLY (OUTPATIENT)
Dept: ONCOLOGY | Facility: HOSPITAL | Age: 46
End: 2021-11-09

## 2021-11-09 DIAGNOSIS — Z17.0 MALIGNANT NEOPLASM OF UPPER-INNER QUADRANT OF RIGHT BREAST IN FEMALE, ESTROGEN RECEPTOR POSITIVE: Primary | ICD-10-CM

## 2021-11-09 DIAGNOSIS — E11.65 TYPE 2 DIABETES MELLITUS WITH HYPERGLYCEMIA, WITHOUT LONG-TERM CURRENT USE OF INSULIN: Primary | ICD-10-CM

## 2021-11-09 DIAGNOSIS — C50.211 MALIGNANT NEOPLASM OF UPPER-INNER QUADRANT OF RIGHT BREAST IN FEMALE, ESTROGEN RECEPTOR POSITIVE: Primary | ICD-10-CM

## 2021-11-09 RX ORDER — FLASH GLUCOSE SCANNING READER
1 EACH MISCELLANEOUS DAILY
Qty: 6 EACH | Refills: 3 | Status: SHIPPED | OUTPATIENT
Start: 2021-11-09 | End: 2021-12-06

## 2021-11-09 RX ORDER — FLASH GLUCOSE SENSOR
1 KIT MISCELLANEOUS DAILY
Qty: 6 KIT | Refills: 3 | Status: SHIPPED | OUTPATIENT
Start: 2021-11-09 | End: 2021-12-06

## 2021-11-15 ENCOUNTER — PATIENT MESSAGE (OUTPATIENT)
Dept: PRIMARY CARE CLINIC | Facility: CLINIC | Age: 46
End: 2021-11-15

## 2021-11-15 ENCOUNTER — TELEPHONE (OUTPATIENT)
Dept: HEMATOLOGY/ONCOLOGY | Facility: CLINIC | Age: 46
End: 2021-11-15

## 2021-11-15 DIAGNOSIS — E11.65 TYPE 2 DIABETES MELLITUS WITH HYPERGLYCEMIA, WITHOUT LONG-TERM CURRENT USE OF INSULIN: Primary | ICD-10-CM

## 2021-11-17 ENCOUNTER — INFUSION (OUTPATIENT)
Dept: INFUSION THERAPY | Facility: HOSPITAL | Age: 46
End: 2021-11-17
Payer: MEDICAID

## 2021-11-17 DIAGNOSIS — C50.211 MALIGNANT NEOPLASM OF UPPER-INNER QUADRANT OF RIGHT BREAST IN FEMALE, ESTROGEN RECEPTOR POSITIVE: Primary | ICD-10-CM

## 2021-11-17 DIAGNOSIS — Z17.0 MALIGNANT NEOPLASM OF UPPER-INNER QUADRANT OF RIGHT BREAST IN FEMALE, ESTROGEN RECEPTOR POSITIVE: Primary | ICD-10-CM

## 2021-11-17 PROCEDURE — 96402 CHEMO HORMON ANTINEOPL SQ/IM: CPT

## 2021-11-17 PROCEDURE — 96372 THER/PROPH/DIAG INJ SC/IM: CPT | Mod: 59

## 2021-11-17 PROCEDURE — 63600175 PHARM REV CODE 636 W HCPCS: Mod: JG | Performed by: INTERNAL MEDICINE

## 2021-11-17 RX ADMIN — GOSERELIN ACETATE 3.6 MG: 3.6 IMPLANT SUBCUTANEOUS at 10:11

## 2021-11-17 RX ADMIN — DENOSUMAB 120 MG: 120 INJECTION SUBCUTANEOUS at 10:11

## 2021-11-18 ENCOUNTER — PATIENT MESSAGE (OUTPATIENT)
Dept: PRIMARY CARE CLINIC | Facility: CLINIC | Age: 46
End: 2021-11-18

## 2021-11-23 ENCOUNTER — OFFICE VISIT (OUTPATIENT)
Dept: PSYCHIATRY | Facility: CLINIC | Age: 46
End: 2021-11-23
Payer: MEDICAID

## 2021-11-23 ENCOUNTER — PATIENT MESSAGE (OUTPATIENT)
Dept: PSYCHIATRY | Facility: CLINIC | Age: 46
End: 2021-11-23

## 2021-11-23 DIAGNOSIS — F43.21 GRIEF: ICD-10-CM

## 2021-11-23 DIAGNOSIS — C50.211 MALIGNANT NEOPLASM OF UPPER-INNER QUADRANT OF RIGHT BREAST IN FEMALE, ESTROGEN RECEPTOR POSITIVE: ICD-10-CM

## 2021-11-23 DIAGNOSIS — Z17.0 MALIGNANT NEOPLASM OF UPPER-INNER QUADRANT OF RIGHT BREAST IN FEMALE, ESTROGEN RECEPTOR POSITIVE: ICD-10-CM

## 2021-11-23 DIAGNOSIS — C79.51 BONE METASTASES: ICD-10-CM

## 2021-11-23 DIAGNOSIS — F33.1 MAJOR DEPRESSIVE DISORDER, RECURRENT EPISODE, MODERATE WITH ANXIOUS DISTRESS: Primary | ICD-10-CM

## 2021-11-23 PROCEDURE — 90832 PR PSYCHOTHERAPY W/PATIENT, 30 MIN: ICD-10-PCS | Mod: HP,HB,95, | Performed by: PSYCHOLOGIST

## 2021-11-23 PROCEDURE — 90832 PSYTX W PT 30 MINUTES: CPT | Mod: HP,HB,95, | Performed by: PSYCHOLOGIST

## 2021-11-29 ENCOUNTER — OFFICE VISIT (OUTPATIENT)
Dept: HEMATOLOGY/ONCOLOGY | Facility: CLINIC | Age: 46
End: 2021-11-29
Payer: MEDICAID

## 2021-11-29 ENCOUNTER — LAB VISIT (OUTPATIENT)
Dept: LAB | Facility: HOSPITAL | Age: 46
End: 2021-11-29
Payer: MEDICAID

## 2021-11-29 VITALS
BODY MASS INDEX: 48.82 KG/M2 | RESPIRATION RATE: 18 BRPM | SYSTOLIC BLOOD PRESSURE: 130 MMHG | DIASTOLIC BLOOD PRESSURE: 78 MMHG | HEIGHT: 65 IN | HEART RATE: 77 BPM | TEMPERATURE: 98 F | WEIGHT: 293 LBS | OXYGEN SATURATION: 95 %

## 2021-11-29 DIAGNOSIS — E66.01 MORBID OBESITY WITH BODY MASS INDEX OF 50.0-59.9 IN ADULT: ICD-10-CM

## 2021-11-29 DIAGNOSIS — G89.3 CANCER ASSOCIATED PAIN: ICD-10-CM

## 2021-11-29 DIAGNOSIS — F33.1 MAJOR DEPRESSIVE DISORDER, RECURRENT EPISODE, MODERATE WITH ANXIOUS DISTRESS: ICD-10-CM

## 2021-11-29 DIAGNOSIS — C79.51 BONE METASTASES: ICD-10-CM

## 2021-11-29 DIAGNOSIS — Z17.0 MALIGNANT NEOPLASM OF UPPER-INNER QUADRANT OF RIGHT BREAST IN FEMALE, ESTROGEN RECEPTOR POSITIVE: ICD-10-CM

## 2021-11-29 DIAGNOSIS — R11.0 NAUSEA: ICD-10-CM

## 2021-11-29 DIAGNOSIS — E08.65 DIABETES MELLITUS DUE TO UNDERLYING CONDITION WITH HYPERGLYCEMIA, WITHOUT LONG-TERM CURRENT USE OF INSULIN: Primary | ICD-10-CM

## 2021-11-29 DIAGNOSIS — I50.42 CHRONIC COMBINED SYSTOLIC AND DIASTOLIC HEART FAILURE: ICD-10-CM

## 2021-11-29 DIAGNOSIS — C50.211 MALIGNANT NEOPLASM OF UPPER-INNER QUADRANT OF RIGHT BREAST IN FEMALE, ESTROGEN RECEPTOR POSITIVE: ICD-10-CM

## 2021-11-29 LAB
ALBUMIN SERPL BCP-MCNC: 3.1 G/DL (ref 3.5–5.2)
ALP SERPL-CCNC: 98 U/L (ref 55–135)
ALT SERPL W/O P-5'-P-CCNC: 31 U/L (ref 10–44)
ANION GAP SERPL CALC-SCNC: 11 MMOL/L (ref 8–16)
AST SERPL-CCNC: 21 U/L (ref 10–40)
BASOPHILS # BLD AUTO: 0.09 K/UL (ref 0–0.2)
BASOPHILS NFR BLD: 1.2 % (ref 0–1.9)
BILIRUB SERPL-MCNC: 0.3 MG/DL (ref 0.1–1)
BUN SERPL-MCNC: 8 MG/DL (ref 6–20)
CALCIUM SERPL-MCNC: 9.4 MG/DL (ref 8.7–10.5)
CHLORIDE SERPL-SCNC: 100 MMOL/L (ref 95–110)
CO2 SERPL-SCNC: 22 MMOL/L (ref 23–29)
CREAT SERPL-MCNC: 0.9 MG/DL (ref 0.5–1.4)
DIFFERENTIAL METHOD: ABNORMAL
EOSINOPHIL # BLD AUTO: 0.1 K/UL (ref 0–0.5)
EOSINOPHIL NFR BLD: 1.5 % (ref 0–8)
ERYTHROCYTE [DISTWIDTH] IN BLOOD BY AUTOMATED COUNT: 19.8 % (ref 11.5–14.5)
EST. GFR  (AFRICAN AMERICAN): >60 ML/MIN/1.73 M^2
EST. GFR  (NON AFRICAN AMERICAN): >60 ML/MIN/1.73 M^2
GLUCOSE SERPL-MCNC: 430 MG/DL (ref 70–110)
HCT VFR BLD AUTO: 31.6 % (ref 37–48.5)
HGB BLD-MCNC: 10 G/DL (ref 12–16)
IMM GRANULOCYTES # BLD AUTO: 0.09 K/UL (ref 0–0.04)
IMM GRANULOCYTES NFR BLD AUTO: 1.2 % (ref 0–0.5)
LYMPHOCYTES # BLD AUTO: 1.4 K/UL (ref 1–4.8)
LYMPHOCYTES NFR BLD: 18 % (ref 18–48)
MCH RBC QN AUTO: 28.4 PG (ref 27–31)
MCHC RBC AUTO-ENTMCNC: 31.6 G/DL (ref 32–36)
MCV RBC AUTO: 90 FL (ref 82–98)
MONOCYTES # BLD AUTO: 0.7 K/UL (ref 0.3–1)
MONOCYTES NFR BLD: 8.8 % (ref 4–15)
NEUTROPHILS # BLD AUTO: 5.2 K/UL (ref 1.8–7.7)
NEUTROPHILS NFR BLD: 69.3 % (ref 38–73)
NRBC BLD-RTO: 1 /100 WBC
PLATELET # BLD AUTO: 350 K/UL (ref 150–450)
PMV BLD AUTO: 11.6 FL (ref 9.2–12.9)
POTASSIUM SERPL-SCNC: 3.9 MMOL/L (ref 3.5–5.1)
PROT SERPL-MCNC: 7.4 G/DL (ref 6–8.4)
RBC # BLD AUTO: 3.52 M/UL (ref 4–5.4)
SODIUM SERPL-SCNC: 133 MMOL/L (ref 136–145)
WBC # BLD AUTO: 7.52 K/UL (ref 3.9–12.7)

## 2021-11-29 PROCEDURE — 99215 OFFICE O/P EST HI 40 MIN: CPT | Mod: PBBFAC | Performed by: NURSE PRACTITIONER

## 2021-11-29 PROCEDURE — 99215 PR OFFICE/OUTPT VISIT, EST, LEVL V, 40-54 MIN: ICD-10-PCS | Mod: S$PBB,,, | Performed by: NURSE PRACTITIONER

## 2021-11-29 PROCEDURE — 85025 COMPLETE CBC W/AUTO DIFF WBC: CPT | Performed by: INTERNAL MEDICINE

## 2021-11-29 PROCEDURE — 80053 COMPREHEN METABOLIC PANEL: CPT | Performed by: NURSE PRACTITIONER

## 2021-11-29 PROCEDURE — 99999 PR PBB SHADOW E&M-EST. PATIENT-LVL V: ICD-10-PCS | Mod: PBBFAC,,, | Performed by: NURSE PRACTITIONER

## 2021-11-29 PROCEDURE — 99215 OFFICE O/P EST HI 40 MIN: CPT | Mod: S$PBB,,, | Performed by: NURSE PRACTITIONER

## 2021-11-29 PROCEDURE — 36415 COLL VENOUS BLD VENIPUNCTURE: CPT | Performed by: NURSE PRACTITIONER

## 2021-11-29 PROCEDURE — 99999 PR PBB SHADOW E&M-EST. PATIENT-LVL V: CPT | Mod: PBBFAC,,, | Performed by: NURSE PRACTITIONER

## 2021-11-30 ENCOUNTER — PATIENT MESSAGE (OUTPATIENT)
Dept: HEMATOLOGY/ONCOLOGY | Facility: CLINIC | Age: 46
End: 2021-11-30

## 2021-11-30 ENCOUNTER — TELEPHONE (OUTPATIENT)
Dept: HEMATOLOGY/ONCOLOGY | Facility: CLINIC | Age: 46
End: 2021-11-30

## 2021-11-30 ENCOUNTER — PATIENT MESSAGE (OUTPATIENT)
Dept: PRIMARY CARE CLINIC | Facility: CLINIC | Age: 46
End: 2021-11-30

## 2021-11-30 ENCOUNTER — HOSPITAL ENCOUNTER (EMERGENCY)
Facility: HOSPITAL | Age: 46
Discharge: HOME | End: 2021-11-30
Attending: EMERGENCY MEDICINE
Payer: MEDICAID

## 2021-11-30 DIAGNOSIS — R73.9 HYPERGLYCEMIA: ICD-10-CM

## 2021-11-30 DIAGNOSIS — Z17.0 MALIGNANT NEOPLASM OF UPPER-INNER QUADRANT OF RIGHT BREAST IN FEMALE, ESTROGEN RECEPTOR POSITIVE: ICD-10-CM

## 2021-11-30 DIAGNOSIS — E08.65 DIABETES MELLITUS DUE TO UNDERLYING CONDITION WITH HYPERGLYCEMIA, WITHOUT LONG-TERM CURRENT USE OF INSULIN: Primary | ICD-10-CM

## 2021-11-30 DIAGNOSIS — C50.211 MALIGNANT NEOPLASM OF UPPER-INNER QUADRANT OF RIGHT BREAST IN FEMALE, ESTROGEN RECEPTOR POSITIVE: ICD-10-CM

## 2021-11-30 LAB
ALBUMIN SERPL BCP-MCNC: 3 G/DL (ref 3.5–5.2)
ALLENS TEST: ABNORMAL
ALP SERPL-CCNC: 95 U/L (ref 55–135)
ALT SERPL W/O P-5'-P-CCNC: 32 U/L (ref 10–44)
ANION GAP SERPL CALC-SCNC: 11 MMOL/L (ref 8–16)
AST SERPL-CCNC: 24 U/L (ref 10–40)
B-OH-BUTYR BLD STRIP-SCNC: 0.1 MMOL/L (ref 0–0.5)
BACTERIA #/AREA URNS HPF: NORMAL /HPF
BASOPHILS # BLD AUTO: 0.07 K/UL (ref 0–0.2)
BASOPHILS NFR BLD: 1 % (ref 0–1.9)
BILIRUB SERPL-MCNC: 0.3 MG/DL (ref 0.1–1)
BILIRUB UR QL STRIP: NEGATIVE
BUN SERPL-MCNC: 9 MG/DL (ref 6–20)
CALCIUM SERPL-MCNC: 9.4 MG/DL (ref 8.7–10.5)
CHLORIDE SERPL-SCNC: 104 MMOL/L (ref 95–110)
CLARITY UR: CLEAR
CO2 SERPL-SCNC: 24 MMOL/L (ref 23–29)
COLOR UR: COLORLESS
CREAT SERPL-MCNC: 0.9 MG/DL (ref 0.5–1.4)
DELSYS: ABNORMAL
DIFFERENTIAL METHOD: ABNORMAL
EOSINOPHIL # BLD AUTO: 0.1 K/UL (ref 0–0.5)
EOSINOPHIL NFR BLD: 1.4 % (ref 0–8)
ERYTHROCYTE [DISTWIDTH] IN BLOOD BY AUTOMATED COUNT: 19.3 % (ref 11.5–14.5)
EST. GFR  (AFRICAN AMERICAN): >60 ML/MIN/1.73 M^2
EST. GFR  (NON AFRICAN AMERICAN): >60 ML/MIN/1.73 M^2
GLUCOSE SERPL-MCNC: 393 MG/DL (ref 70–110)
GLUCOSE UR QL STRIP: ABNORMAL
HCO3 UR-SCNC: 26.1 MMOL/L (ref 24–28)
HCT VFR BLD AUTO: 32.2 % (ref 37–48.5)
HGB BLD-MCNC: 10.1 G/DL (ref 12–16)
HGB UR QL STRIP: NEGATIVE
IMM GRANULOCYTES # BLD AUTO: 0.05 K/UL (ref 0–0.04)
IMM GRANULOCYTES NFR BLD AUTO: 0.7 % (ref 0–0.5)
KETONES UR QL STRIP: NEGATIVE
LEUKOCYTE ESTERASE UR QL STRIP: NEGATIVE
LYMPHOCYTES # BLD AUTO: 1.1 K/UL (ref 1–4.8)
LYMPHOCYTES NFR BLD: 16 % (ref 18–48)
MCH RBC QN AUTO: 28.3 PG (ref 27–31)
MCHC RBC AUTO-ENTMCNC: 31.4 G/DL (ref 32–36)
MCV RBC AUTO: 90 FL (ref 82–98)
MICROSCOPIC COMMENT: NORMAL
MONOCYTES # BLD AUTO: 0.5 K/UL (ref 0.3–1)
MONOCYTES NFR BLD: 7.2 % (ref 4–15)
NEUTROPHILS # BLD AUTO: 5.1 K/UL (ref 1.8–7.7)
NEUTROPHILS NFR BLD: 73.7 % (ref 38–73)
NITRITE UR QL STRIP: NEGATIVE
NRBC BLD-RTO: 1 /100 WBC
PCO2 BLDA: 43 MMHG (ref 35–45)
PH SMN: 7.39 [PH] (ref 7.35–7.45)
PH UR STRIP: 6 [PH] (ref 5–8)
PLATELET # BLD AUTO: 357 K/UL (ref 150–450)
PMV BLD AUTO: 12.2 FL (ref 9.2–12.9)
PO2 BLDA: 65 MMHG (ref 40–60)
POC BE: 1 MMOL/L
POC SATURATED O2: 92 % (ref 95–100)
POC TCO2: 27 MMOL/L (ref 24–29)
POCT GLUCOSE: 244 MG/DL (ref 70–110)
POCT GLUCOSE: 250 MG/DL (ref 70–110)
POCT GLUCOSE: 318 MG/DL (ref 70–110)
POCT GLUCOSE: 326 MG/DL (ref 70–110)
POCT GLUCOSE: 345 MG/DL (ref 70–110)
POCT GLUCOSE: 379 MG/DL (ref 70–110)
POCT GLUCOSE: 404 MG/DL (ref 70–110)
POTASSIUM SERPL-SCNC: 3.9 MMOL/L (ref 3.5–5.1)
PROT SERPL-MCNC: 7.3 G/DL (ref 6–8.4)
PROT UR QL STRIP: NEGATIVE
RBC # BLD AUTO: 3.57 M/UL (ref 4–5.4)
SAMPLE: ABNORMAL
SITE: ABNORMAL
SODIUM SERPL-SCNC: 139 MMOL/L (ref 136–145)
SP GR UR STRIP: 1.03 (ref 1–1.03)
SQUAMOUS #/AREA URNS HPF: 0 /HPF
URN SPEC COLLECT METH UR: ABNORMAL
UROBILINOGEN UR STRIP-ACNC: NEGATIVE EU/DL
WBC # BLD AUTO: 6.94 K/UL (ref 3.9–12.7)
YEAST URNS QL MICRO: NORMAL

## 2021-11-30 PROCEDURE — 63600175 PHARM REV CODE 636 W HCPCS: Performed by: EMERGENCY MEDICINE

## 2021-11-30 PROCEDURE — 96376 TX/PRO/DX INJ SAME DRUG ADON: CPT

## 2021-11-30 PROCEDURE — 93010 ELECTROCARDIOGRAM REPORT: CPT | Mod: ,,, | Performed by: INTERNAL MEDICINE

## 2021-11-30 PROCEDURE — 81000 URINALYSIS NONAUTO W/SCOPE: CPT | Performed by: EMERGENCY MEDICINE

## 2021-11-30 PROCEDURE — 96374 THER/PROPH/DIAG INJ IV PUSH: CPT

## 2021-11-30 PROCEDURE — 99284 EMERGENCY DEPT VISIT MOD MDM: CPT | Mod: 25

## 2021-11-30 PROCEDURE — 93005 ELECTROCARDIOGRAM TRACING: CPT

## 2021-11-30 PROCEDURE — 82800 BLOOD PH: CPT

## 2021-11-30 PROCEDURE — 85025 COMPLETE CBC W/AUTO DIFF WBC: CPT | Performed by: EMERGENCY MEDICINE

## 2021-11-30 PROCEDURE — 93010 EKG 12-LEAD: ICD-10-PCS | Mod: ,,, | Performed by: INTERNAL MEDICINE

## 2021-11-30 PROCEDURE — 96361 HYDRATE IV INFUSION ADD-ON: CPT

## 2021-11-30 PROCEDURE — 80053 COMPREHEN METABOLIC PANEL: CPT | Mod: 91 | Performed by: EMERGENCY MEDICINE

## 2021-11-30 PROCEDURE — 25000003 PHARM REV CODE 250: Performed by: EMERGENCY MEDICINE

## 2021-11-30 PROCEDURE — 99900035 HC TECH TIME PER 15 MIN (STAT)

## 2021-11-30 PROCEDURE — 82010 KETONE BODYS QUAN: CPT | Performed by: EMERGENCY MEDICINE

## 2021-11-30 PROCEDURE — 82962 GLUCOSE BLOOD TEST: CPT

## 2021-11-30 RX ORDER — LANCETS
1 EACH MISCELLANEOUS 3 TIMES DAILY
Qty: 100 EACH | Refills: 3 | Status: SHIPPED | OUTPATIENT
Start: 2021-11-30 | End: 2021-12-07

## 2021-11-30 RX ORDER — INSULIN PUMP SYRINGE, 3 ML
EACH MISCELLANEOUS
Qty: 1 EACH | Refills: 0 | Status: SHIPPED | OUTPATIENT
Start: 2021-11-30 | End: 2021-12-06

## 2021-11-30 RX ADMIN — SODIUM CHLORIDE 1000 ML: 0.9 INJECTION, SOLUTION INTRAVENOUS at 05:11

## 2021-11-30 RX ADMIN — INSULIN HUMAN 7 UNITS: 100 INJECTION, SOLUTION PARENTERAL at 08:11

## 2021-11-30 RX ADMIN — SODIUM CHLORIDE 1000 ML: 0.9 INJECTION, SOLUTION INTRAVENOUS at 08:11

## 2021-11-30 RX ADMIN — INSULIN HUMAN 10 UNITS: 100 INJECTION, SOLUTION PARENTERAL at 07:11

## 2021-12-01 ENCOUNTER — PATIENT MESSAGE (OUTPATIENT)
Dept: HEMATOLOGY/ONCOLOGY | Facility: CLINIC | Age: 46
End: 2021-12-01

## 2021-12-01 ENCOUNTER — PATIENT MESSAGE (OUTPATIENT)
Dept: PRIMARY CARE CLINIC | Facility: CLINIC | Age: 46
End: 2021-12-01

## 2021-12-01 ENCOUNTER — TELEPHONE (OUTPATIENT)
Dept: HEMATOLOGY/ONCOLOGY | Facility: CLINIC | Age: 46
End: 2021-12-01

## 2021-12-01 VITALS
WEIGHT: 293 LBS | TEMPERATURE: 98 F | BODY MASS INDEX: 48.82 KG/M2 | HEART RATE: 78 BPM | OXYGEN SATURATION: 98 % | RESPIRATION RATE: 19 BRPM | SYSTOLIC BLOOD PRESSURE: 143 MMHG | DIASTOLIC BLOOD PRESSURE: 79 MMHG | HEIGHT: 65 IN

## 2021-12-01 DIAGNOSIS — E11.65 TYPE 2 DIABETES MELLITUS WITH HYPERGLYCEMIA, WITHOUT LONG-TERM CURRENT USE OF INSULIN: Primary | ICD-10-CM

## 2021-12-01 RX ORDER — GLIPIZIDE 10 MG/1
10 TABLET ORAL
Qty: 180 TABLET | Refills: 3 | Status: SHIPPED | OUTPATIENT
Start: 2021-12-01 | End: 2022-12-14

## 2021-12-02 ENCOUNTER — SPECIALTY PHARMACY (OUTPATIENT)
Dept: PHARMACY | Facility: CLINIC | Age: 46
End: 2021-12-02

## 2021-12-03 ENCOUNTER — SPECIALTY PHARMACY (OUTPATIENT)
Dept: PHARMACY | Facility: CLINIC | Age: 46
End: 2021-12-03

## 2021-12-06 ENCOUNTER — OFFICE VISIT (OUTPATIENT)
Dept: PRIMARY CARE CLINIC | Facility: CLINIC | Age: 46
End: 2021-12-06
Payer: MEDICAID

## 2021-12-06 ENCOUNTER — PATIENT MESSAGE (OUTPATIENT)
Dept: PRIMARY CARE CLINIC | Facility: CLINIC | Age: 46
End: 2021-12-06

## 2021-12-06 ENCOUNTER — TELEPHONE (OUTPATIENT)
Dept: RADIATION ONCOLOGY | Facility: CLINIC | Age: 46
End: 2021-12-06

## 2021-12-06 DIAGNOSIS — F33.1 MAJOR DEPRESSIVE DISORDER, RECURRENT EPISODE, MODERATE WITH ANXIOUS DISTRESS: ICD-10-CM

## 2021-12-06 DIAGNOSIS — C50.211 MALIGNANT NEOPLASM OF UPPER-INNER QUADRANT OF RIGHT BREAST IN FEMALE, ESTROGEN RECEPTOR POSITIVE: ICD-10-CM

## 2021-12-06 DIAGNOSIS — I42.9 CARDIOMYOPATHY, UNSPECIFIED TYPE: ICD-10-CM

## 2021-12-06 DIAGNOSIS — M84.48XD: ICD-10-CM

## 2021-12-06 DIAGNOSIS — C79.51 BONE METASTASES: ICD-10-CM

## 2021-12-06 DIAGNOSIS — E11.65 TYPE 2 DIABETES MELLITUS WITH HYPERGLYCEMIA, WITHOUT LONG-TERM CURRENT USE OF INSULIN: Primary | Chronic | ICD-10-CM

## 2021-12-06 DIAGNOSIS — Z17.0 MALIGNANT NEOPLASM OF UPPER-INNER QUADRANT OF RIGHT BREAST IN FEMALE, ESTROGEN RECEPTOR POSITIVE: ICD-10-CM

## 2021-12-06 DIAGNOSIS — H81.10 BENIGN PAROXYSMAL POSITIONAL VERTIGO, UNSPECIFIED LATERALITY: ICD-10-CM

## 2021-12-06 DIAGNOSIS — E78.00 PURE HYPERCHOLESTEROLEMIA: ICD-10-CM

## 2021-12-06 DIAGNOSIS — I50.42 CHRONIC COMBINED SYSTOLIC AND DIASTOLIC HEART FAILURE: ICD-10-CM

## 2021-12-06 DIAGNOSIS — I10 PRIMARY HYPERTENSION: ICD-10-CM

## 2021-12-06 PROCEDURE — 4010F ACE/ARB THERAPY RXD/TAKEN: CPT | Mod: CPTII,95,, | Performed by: FAMILY MEDICINE

## 2021-12-06 PROCEDURE — 4010F PR ACE/ARB THEARPY RXD/TAKEN: ICD-10-PCS | Mod: CPTII,95,, | Performed by: FAMILY MEDICINE

## 2021-12-06 PROCEDURE — 99214 PR OFFICE/OUTPT VISIT, EST, LEVL IV, 30-39 MIN: ICD-10-PCS | Mod: 95,,, | Performed by: FAMILY MEDICINE

## 2021-12-06 PROCEDURE — 99214 OFFICE O/P EST MOD 30 MIN: CPT | Mod: 95,,, | Performed by: FAMILY MEDICINE

## 2021-12-06 RX ORDER — LANCETS
EACH MISCELLANEOUS
Qty: 200 EACH | Refills: 5 | Status: SHIPPED | OUTPATIENT
Start: 2021-12-06 | End: 2023-01-10

## 2021-12-06 RX ORDER — INSULIN PUMP SYRINGE, 3 ML
EACH MISCELLANEOUS
Qty: 1 EACH | Refills: 0 | Status: SHIPPED | OUTPATIENT
Start: 2021-12-06 | End: 2023-01-10

## 2021-12-07 ENCOUNTER — PATIENT MESSAGE (OUTPATIENT)
Dept: HEMATOLOGY/ONCOLOGY | Facility: CLINIC | Age: 46
End: 2021-12-07

## 2021-12-07 ENCOUNTER — PATIENT MESSAGE (OUTPATIENT)
Dept: PRIMARY CARE CLINIC | Facility: CLINIC | Age: 46
End: 2021-12-07

## 2021-12-10 ENCOUNTER — PATIENT MESSAGE (OUTPATIENT)
Dept: PRIMARY CARE CLINIC | Facility: CLINIC | Age: 46
End: 2021-12-10

## 2021-12-13 ENCOUNTER — INFUSION (OUTPATIENT)
Dept: INFUSION THERAPY | Facility: HOSPITAL | Age: 46
End: 2021-12-13
Attending: INTERNAL MEDICINE
Payer: MEDICAID

## 2021-12-13 ENCOUNTER — OFFICE VISIT (OUTPATIENT)
Dept: HEMATOLOGY/ONCOLOGY | Facility: CLINIC | Age: 46
End: 2021-12-13
Payer: MEDICAID

## 2021-12-13 ENCOUNTER — SPECIALTY PHARMACY (OUTPATIENT)
Dept: PHARMACY | Facility: CLINIC | Age: 46
End: 2021-12-13

## 2021-12-13 VITALS
RESPIRATION RATE: 16 BRPM | DIASTOLIC BLOOD PRESSURE: 84 MMHG | HEIGHT: 65 IN | WEIGHT: 293 LBS | TEMPERATURE: 99 F | HEART RATE: 83 BPM | OXYGEN SATURATION: 96 % | BODY MASS INDEX: 48.82 KG/M2 | SYSTOLIC BLOOD PRESSURE: 138 MMHG

## 2021-12-13 DIAGNOSIS — D64.81 ANTINEOPLASTIC CHEMOTHERAPY INDUCED ANEMIA: ICD-10-CM

## 2021-12-13 DIAGNOSIS — T45.1X5A ANTINEOPLASTIC CHEMOTHERAPY INDUCED ANEMIA: ICD-10-CM

## 2021-12-13 DIAGNOSIS — E08.65 DIABETES MELLITUS DUE TO UNDERLYING CONDITION WITH HYPERGLYCEMIA, WITHOUT LONG-TERM CURRENT USE OF INSULIN: ICD-10-CM

## 2021-12-13 DIAGNOSIS — C79.51 BONE METASTASES: ICD-10-CM

## 2021-12-13 DIAGNOSIS — G89.3 CANCER ASSOCIATED PAIN: ICD-10-CM

## 2021-12-13 DIAGNOSIS — C50.211 MALIGNANT NEOPLASM OF UPPER-INNER QUADRANT OF RIGHT BREAST IN FEMALE, ESTROGEN RECEPTOR POSITIVE: Primary | ICD-10-CM

## 2021-12-13 DIAGNOSIS — Z17.0 MALIGNANT NEOPLASM OF UPPER-INNER QUADRANT OF RIGHT BREAST IN FEMALE, ESTROGEN RECEPTOR POSITIVE: Primary | ICD-10-CM

## 2021-12-13 DIAGNOSIS — E66.01 MORBID OBESITY WITH BODY MASS INDEX OF 50.0-59.9 IN ADULT: ICD-10-CM

## 2021-12-13 DIAGNOSIS — I50.42 CHRONIC COMBINED SYSTOLIC AND DIASTOLIC HEART FAILURE: ICD-10-CM

## 2021-12-13 PROCEDURE — 4010F ACE/ARB THERAPY RXD/TAKEN: CPT | Mod: CPTII,,, | Performed by: NURSE PRACTITIONER

## 2021-12-13 PROCEDURE — 99215 PR OFFICE/OUTPT VISIT, EST, LEVL V, 40-54 MIN: ICD-10-PCS | Mod: S$PBB,,, | Performed by: NURSE PRACTITIONER

## 2021-12-13 PROCEDURE — 96372 THER/PROPH/DIAG INJ SC/IM: CPT

## 2021-12-13 PROCEDURE — 63600175 PHARM REV CODE 636 W HCPCS: Mod: JG | Performed by: NURSE PRACTITIONER

## 2021-12-13 PROCEDURE — 99215 OFFICE O/P EST HI 40 MIN: CPT | Mod: PBBFAC | Performed by: NURSE PRACTITIONER

## 2021-12-13 PROCEDURE — 96402 CHEMO HORMON ANTINEOPL SQ/IM: CPT

## 2021-12-13 PROCEDURE — 99215 OFFICE O/P EST HI 40 MIN: CPT | Mod: S$PBB,,, | Performed by: NURSE PRACTITIONER

## 2021-12-13 PROCEDURE — 99999 PR PBB SHADOW E&M-EST. PATIENT-LVL V: CPT | Mod: PBBFAC,,, | Performed by: NURSE PRACTITIONER

## 2021-12-13 PROCEDURE — 99999 PR PBB SHADOW E&M-EST. PATIENT-LVL V: ICD-10-PCS | Mod: PBBFAC,,, | Performed by: NURSE PRACTITIONER

## 2021-12-13 PROCEDURE — 4010F PR ACE/ARB THEARPY RXD/TAKEN: ICD-10-PCS | Mod: CPTII,,, | Performed by: NURSE PRACTITIONER

## 2021-12-13 RX ADMIN — GOSERELIN ACETATE 3.6 MG: 3.6 IMPLANT SUBCUTANEOUS at 11:12

## 2021-12-13 RX ADMIN — DENOSUMAB 120 MG: 120 INJECTION SUBCUTANEOUS at 11:12

## 2021-12-15 ENCOUNTER — PATIENT MESSAGE (OUTPATIENT)
Dept: PRIMARY CARE CLINIC | Facility: CLINIC | Age: 46
End: 2021-12-15

## 2021-12-16 ENCOUNTER — CLINICAL SUPPORT (OUTPATIENT)
Dept: DIABETES | Facility: CLINIC | Age: 46
End: 2021-12-16
Payer: MEDICAID

## 2021-12-16 DIAGNOSIS — R73.9 HYPERGLYCEMIA: ICD-10-CM

## 2021-12-16 DIAGNOSIS — E11.65 TYPE 2 DIABETES MELLITUS WITH HYPERGLYCEMIA, WITHOUT LONG-TERM CURRENT USE OF INSULIN: Primary | ICD-10-CM

## 2021-12-16 PROCEDURE — G0108 PR DIAB MANAGE TRN  PER INDIV: ICD-10-PCS | Mod: S$GLB,,, | Performed by: DIETITIAN, REGISTERED

## 2021-12-16 PROCEDURE — G0108 DIAB MANAGE TRN  PER INDIV: HCPCS | Mod: S$GLB,,, | Performed by: DIETITIAN, REGISTERED

## 2021-12-21 ENCOUNTER — OFFICE VISIT (OUTPATIENT)
Dept: PRIMARY CARE CLINIC | Facility: CLINIC | Age: 46
End: 2021-12-21
Payer: MEDICAID

## 2021-12-21 DIAGNOSIS — I10 PRIMARY HYPERTENSION: ICD-10-CM

## 2021-12-21 DIAGNOSIS — I42.9 CARDIOMYOPATHY, UNSPECIFIED TYPE: ICD-10-CM

## 2021-12-21 DIAGNOSIS — Z17.0 MALIGNANT NEOPLASM OF UPPER-INNER QUADRANT OF RIGHT BREAST IN FEMALE, ESTROGEN RECEPTOR POSITIVE: ICD-10-CM

## 2021-12-21 DIAGNOSIS — I50.42 CHRONIC COMBINED SYSTOLIC AND DIASTOLIC HEART FAILURE: ICD-10-CM

## 2021-12-21 DIAGNOSIS — M84.48XD: ICD-10-CM

## 2021-12-21 DIAGNOSIS — F33.1 MAJOR DEPRESSIVE DISORDER, RECURRENT EPISODE, MODERATE WITH ANXIOUS DISTRESS: ICD-10-CM

## 2021-12-21 DIAGNOSIS — C79.51 BONE METASTASES: ICD-10-CM

## 2021-12-21 DIAGNOSIS — C50.211 MALIGNANT NEOPLASM OF UPPER-INNER QUADRANT OF RIGHT BREAST IN FEMALE, ESTROGEN RECEPTOR POSITIVE: ICD-10-CM

## 2021-12-21 DIAGNOSIS — E11.65 TYPE 2 DIABETES MELLITUS WITH HYPERGLYCEMIA, WITHOUT LONG-TERM CURRENT USE OF INSULIN: Primary | ICD-10-CM

## 2021-12-21 DIAGNOSIS — E78.00 PURE HYPERCHOLESTEROLEMIA: ICD-10-CM

## 2021-12-21 PROCEDURE — 4010F ACE/ARB THERAPY RXD/TAKEN: CPT | Mod: CPTII,95,, | Performed by: FAMILY MEDICINE

## 2021-12-21 PROCEDURE — 99214 PR OFFICE/OUTPT VISIT, EST, LEVL IV, 30-39 MIN: ICD-10-PCS | Mod: 95,,, | Performed by: FAMILY MEDICINE

## 2021-12-21 PROCEDURE — 99214 OFFICE O/P EST MOD 30 MIN: CPT | Mod: 95,,, | Performed by: FAMILY MEDICINE

## 2021-12-21 PROCEDURE — 4010F PR ACE/ARB THEARPY RXD/TAKEN: ICD-10-PCS | Mod: CPTII,95,, | Performed by: FAMILY MEDICINE

## 2021-12-23 ENCOUNTER — PATIENT MESSAGE (OUTPATIENT)
Dept: ADMINISTRATIVE | Facility: OTHER | Age: 46
End: 2021-12-23

## 2021-12-28 ENCOUNTER — PATIENT MESSAGE (OUTPATIENT)
Dept: PRIMARY CARE CLINIC | Facility: CLINIC | Age: 46
End: 2021-12-28

## 2021-12-29 ENCOUNTER — TELEPHONE (OUTPATIENT)
Dept: INFUSION THERAPY | Facility: HOSPITAL | Age: 46
End: 2021-12-29

## 2021-12-29 DIAGNOSIS — E11.9 TYPE 2 DIABETES MELLITUS WITHOUT COMPLICATION: ICD-10-CM

## 2021-12-29 DIAGNOSIS — E11.9 TYPE 2 DIABETES MELLITUS WITHOUT COMPLICATION, UNSPECIFIED WHETHER LONG TERM INSULIN USE: ICD-10-CM

## 2021-12-30 ENCOUNTER — TELEPHONE (OUTPATIENT)
Dept: INFUSION THERAPY | Facility: HOSPITAL | Age: 46
End: 2021-12-30

## 2022-01-05 ENCOUNTER — SPECIALTY PHARMACY (OUTPATIENT)
Dept: PHARMACY | Facility: CLINIC | Age: 47
End: 2022-01-05

## 2022-01-05 NOTE — TELEPHONE ENCOUNTER
Specialty Pharmacy - Refill Coordination    Specialty Medication Orders Linked to Encounter    Flowsheet Row Most Recent Value   Medication #1 palbociclib (IBRANCE) 125 mg Cap (Order#[id number], Rx#[id number])          Refill Questions - Documented Responses    Flowsheet Row Most Recent Value   Patient Availability and HIPAA Verification    Does patient want to proceed with activity? Yes   HIPAA/medical authority confirmed? Yes   Relationship to patient of person spoken to? Self   Refill Screening Questions    Changes to allergies? No   Changes to medications? No   New conditions since last clinic visit? No   Unplanned office visit, urgent care, ED, or hospital admission in the last 4 weeks? No   How does patient/caregiver feel medication is working? Good   Financial problems or insurance changes? No   How many doses of your specialty medications were missed in the last 4 weeks? 0   Would patient like to speak to a pharmacist? No   When does the patient need to receive the medication? 01/10/22   Refill Delivery Questions    How will the patient receive the medication? Delivery Jesenia   When does the patient need to receive the medication? 01/10/22   Shipping Address Home   Address in [facility] confirmed and updated if neccessary? Yes   Expected Copay ($) 0   Is the patient able to afford the medication copay? Yes   Payment Method zero copay   Days supply of Refill 28   Supplies needed? No supplies needed   Refill activity completed? Yes   Refill activity plan Refill scheduled   Shipment/Pickup Date: 01/06/22          Current Outpatient Medications   Medication Sig    (Magic mouthwash) 1:1:1 Benadryl 12.5mg/5ml liq, aluminum & magnesium hydroxide-simehticone (Maalox), LIDOcaine viscous 2% Swish and spit 10 mLs every 4 (four) hours as needed. for mouth sores    albuterol (PROVENTIL/VENTOLIN HFA) 90 mcg/actuation inhaler Inhale 1-2 puffs into the lungs every 6 (six) hours as needed for Wheezing. Rescue     amLODIPine (NORVASC) 5 MG tablet Take 1 tablet (5 mg total) by mouth once daily.    anastrozole (ARIMIDEX) 1 mg Tab TAKE 1 TABLET(1 MG) BY MOUTH EVERY DAY    atorvastatin (LIPITOR) 40 MG tablet Take 1 tablet (40 mg total) by mouth once daily.    blood sugar diagnostic Strp To check BG 4 times daily, to use with insurance preferred meter    blood-glucose meter kit To check BG 4 times daily, to use with insurance preferred meter    carvediloL (COREG) 25 MG tablet Take 1 tablet (25 mg total) by mouth 2 (two) times daily.    dulaglutide (TRULICITY) 0.75 mg/0.5 mL pen injector Inject 0.75 mg into the skin every 7 days.    ergocalciferol (ERGOCALCIFEROL) 50,000 unit Cap TAKE 1 CAPSULE BY MOUTH EVERY 7 DAYS    glipiZIDE (GLUCOTROL) 10 MG tablet Take 1 tablet (10 mg total) by mouth 2 (two) times daily before meals.    goserelin (ZOLADEX) 3.6 mg injection Inject 3.6 mg into the skin every 28 days.    HYDROcodone-acetaminophen (NORCO) 5-325 mg per tablet Take 1 tablet by mouth every 6 (six) hours as needed for Pain.    ibuprofen (ADVIL,MOTRIN) 800 MG tablet Take 1 tablet (800 mg total) by mouth 2 (two) times daily as needed for Pain.    lancets Misc To check BG 4 times daily, to use with insurance preferred meter    LIDOcaine (LIDODERM) 5 % Place 2 patches onto the skin once daily. Remove & Discard patch within 12 hours or as directed by MD    morphine (MS CONTIN) 15 MG 12 hr tablet Take 1 tablet (15 mg total) by mouth every 8 (eight) hours as needed for Pain.    ondansetron (ZOFRAN-ODT) 8 MG TbDL Take 1 tablet (8 mg total) by mouth every 8 (eight) hours as needed (nausea).    palbociclib (IBRANCE) 125 mg Cap Take 1 capsule (125 mg) by mouth as instructed Take as directed days 1-21 of each 28 day cycle. Take with food.    potassium chloride (KLOR-CON) 10 MEQ TbSR TAKE 1 TABLET(10 MEQ) BY MOUTH EVERY DAY    promethazine (PHENERGAN) 25 MG tablet Take 1 tablet (25 mg total) by mouth every 6 (six) hours as  needed for Nausea.    sacubitriL-valsartan (ENTRESTO) 49-51 mg per tablet Take 1 tablet by mouth 2 (two) times daily.    sennosides (SENNA-C ORAL) Take by mouth daily as needed.     spironolactone (ALDACTONE) 25 MG tablet Take 1 tablet (25 mg total) by mouth once daily.    venlafaxine (EFFEXOR-XR) 150 MG Cp24 TAKE 1 CAPSULE(150 MG) BY MOUTH EVERY DAY   Last reviewed on 12/13/2021  2:43 PM by Jes Whittington PharmD    Review of patient's allergies indicates:   Allergen Reactions    Keflex [cephalexin] Itching    Last reviewed on  12/21/2021 6:43 PM by Vito Walker      Tasks added this encounter   1/31/2022 - Refill Call (Auto Added)   Tasks due within next 3 months   2/24/2022 - Clinical - Follow Up Assesement (90 day)     William Nevarez - Specialty Pharmacy  [facility] [address] Nevarez  [facility] [address]  Phone: [phone]  Fax: [phone]

## 2022-01-06 ENCOUNTER — PATIENT MESSAGE (OUTPATIENT)
Dept: PSYCHIATRY | Facility: CLINIC | Age: 47
End: 2022-01-06

## 2022-01-06 ENCOUNTER — PATIENT OUTREACH (OUTPATIENT)
Dept: ADMINISTRATIVE | Facility: HOSPITAL | Age: 47
End: 2022-01-06

## 2022-01-06 ENCOUNTER — OFFICE VISIT (OUTPATIENT)
Dept: PSYCHIATRY | Facility: CLINIC | Age: 47
End: 2022-01-06
Payer: MEDICAID

## 2022-01-06 DIAGNOSIS — F43.21 GRIEF: ICD-10-CM

## 2022-01-06 DIAGNOSIS — Z17.0 MALIGNANT NEOPLASM OF UPPER-INNER QUADRANT OF RIGHT BREAST IN FEMALE, ESTROGEN RECEPTOR POSITIVE: ICD-10-CM

## 2022-01-06 DIAGNOSIS — C50.211 MALIGNANT NEOPLASM OF UPPER-INNER QUADRANT OF RIGHT BREAST IN FEMALE, ESTROGEN RECEPTOR POSITIVE: ICD-10-CM

## 2022-01-06 DIAGNOSIS — F33.1 MAJOR DEPRESSIVE DISORDER, RECURRENT EPISODE, MODERATE WITH ANXIOUS DISTRESS: Primary | ICD-10-CM

## 2022-01-06 PROCEDURE — 4010F ACE/ARB THERAPY RXD/TAKEN: CPT | Mod: HB,CPTII,, | Performed by: PSYCHOLOGIST

## 2022-01-06 PROCEDURE — 99999 PR PBB SHADOW E&M-EST. PATIENT-LVL I: ICD-10-PCS | Mod: PBBFAC,HB,, | Performed by: PSYCHOLOGIST

## 2022-01-06 PROCEDURE — 99999 PR PBB SHADOW E&M-EST. PATIENT-LVL I: CPT | Mod: PBBFAC,HB,, | Performed by: PSYCHOLOGIST

## 2022-01-06 PROCEDURE — 90837 PSYTX W PT 60 MINUTES: CPT | Mod: HP,HB,, | Performed by: PSYCHOLOGIST

## 2022-01-06 PROCEDURE — 90837 PR PSYCHOTHERAPY W/PATIENT, 60 MIN: ICD-10-PCS | Mod: HP,HB,, | Performed by: PSYCHOLOGIST

## 2022-01-06 PROCEDURE — 99211 OFF/OP EST MAY X REQ PHY/QHP: CPT | Mod: PBBFAC | Performed by: PSYCHOLOGIST

## 2022-01-06 PROCEDURE — 1159F MED LIST DOCD IN RCRD: CPT | Mod: HB,CPTII,, | Performed by: PSYCHOLOGIST

## 2022-01-06 PROCEDURE — 1159F PR MEDICATION LIST DOCUMENTED IN MEDICAL RECORD: ICD-10-PCS | Mod: HB,CPTII,, | Performed by: PSYCHOLOGIST

## 2022-01-06 PROCEDURE — 4010F PR ACE/ARB THEARPY RXD/TAKEN: ICD-10-PCS | Mod: HB,CPTII,, | Performed by: PSYCHOLOGIST

## 2022-01-06 NOTE — PROGRESS NOTES
INFORMED CONSENT: Kristopher Ye   is known to this provider and identity was confirmed via NAME and .  The patient has been informed of the risks and benefits associated with engaging in psychotherapy, the handling of protected health information, the rights of privacy and the limits of confidentiality. The patient has also been informed of the importance of reporting any suicidal or homicidal ideation to this or any provider to ensure safety of all parties, and the Kristopher Ye expressed understanding. The patient was agreeable to these terms and freely participates in individual psychotherapy.    PSYCHO-ONCOLOGY NOTE/ Individual Psychotherapy     Date: 2022   [facility]        Therapeutic Intervention: Met with patient.  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, estrogen receptor positive    Cancer associated pain    Pathological fracture of lumbar vertebra with routine healing    Bone metastases    Hot flashes    Prediabetes    BMI 60.0-69.9, adult    Elevated LDL cholesterol level    Anxiety    Vaginal dryness    Vitamin D deficiency    Grief    Hyperglycemia    Type 2 diabetes mellitus with hyperglycemia, without long-term current use of insulin    Benign paroxysmal positional vertigo       Chief complaint/reason for encounter: depression, anxiety and grief   Met with patient to evaluate psychosocial adaptation to diagnosis/treatment/survivorship of BC    Current  Medications  Current Outpatient Medications   Medication    (Magic mouthwash) 1:1:1 Benadryl 12.5mg/5ml liq, aluminum & magnesium hydroxide-simehticone (Maalox), LIDOcaine viscous 2%    albuterol (PROVENTIL/VENTOLIN HFA) 90 mcg/actuation inhaler    amLODIPine (NORVASC) 5 MG tablet    anastrozole (ARIMIDEX) 1 mg Tab    atorvastatin (LIPITOR) 40 MG tablet    blood sugar diagnostic Strp    blood-glucose meter kit    carvediloL (COREG) 25 MG tablet    dulaglutide (TRULICITY) 0.75 mg/0.5 mL pen injector    ergocalciferol (ERGOCALCIFEROL) 50,000 unit Cap    glipiZIDE (GLUCOTROL) 10 MG tablet    goserelin (ZOLADEX) 3.6 mg injection    HYDROcodone-acetaminophen (NORCO) 5-325 mg per tablet    ibuprofen (ADVIL,MOTRIN) 800 MG tablet    lancets Misc    LIDOcaine (LIDODERM) 5 %    morphine (MS CONTIN) 15 MG 12 hr tablet    ondansetron (ZOFRAN-ODT) 8 MG TbDL    palbociclib (IBRANCE) 125 mg Cap    potassium chloride (KLOR-CON) 10 MEQ TbSR    promethazine (PHENERGAN) 25 MG tablet    sacubitriL-valsartan (ENTRESTO) 49-51 mg per tablet    sennosides (SENNA-C ORAL)    spironolactone (ALDACTONE) 25 MG tablet    venlafaxine (EFFEXOR-XR) 150 MG Cp24     No current facility-administered medications for this visit.       Objective:  Kristopher Ye arrived promptly for the session.   Ms. Ye was independently ambulatory at the time of session. The patient was fully cooperative throughout the session.  Appearance: age appropriate, appropriately  dressed, adequately  groomed  Behavior/Cooperation: friendly and cooperative  Speech: normal in rate, volume, and tone and appropriate quality, quantity and organization of sentences  Mood: dysthymic  Affect: mood incongruent  Thought Process: goal-directed, logical  Thought Content: normal,  No delusions or paranoia; did not appear to be responding to internal stimuli during the session  Orientation: grossly intact  Memory: Grossly intact  Attention  Span/Concentration: Attends to session without distraction; reports no difficulty  Fund of Knowledge: average  Estimate of Intelligence: average from verbal skills and history  Cognition: grossly intact  Insight: patient has awareness of illness; good insight into own behavior and behavior of others  Judgment: the patient's behavior is adequate to circumstances    Interval history and content of current session: Patient continues to process death of parents more appropriately. Patient tends to use humor to cope with sad emotions.  Discussed diagnosis, treatment, prognosis, current adaptation to disease and treatment status and family's adaptation to disease and treatment status. Reports to be coping with moderate difficulty. Evaluated cognitive response, paying particular attention to negative intrusive thoughts of a persistent and detrimental nature. Thoughts of this type are in evidence with moderate distress. Provided cognitive behavioral therapy to address negative cognitions. Identified and evaluated psychosocial and environmental stressors secondary to diagnosis and treatment.  Examined proactive behaviors that may be implemented to minimize or ameliorate psychosocial stressors secondary to diagnosis and treatment.     Risk parameters:   Patient reports no suicidal ideation  Patient reports no homicidal ideation  Patient reports no self-injurious behavior  Patient reports no violent behavior   Safety needs:  None at this time      Verbal deficits: None     Patient's response to intervention:The patient's response to intervention is accepting.     Progress toward goals and other mental status changes:  The patient's progress toward goals is fair .      Progress to date:Revise Objectives (Goals)      Goals from last visit: n/a     Patient reported outcomes:    Distress Score    Distress Score: 5        Practical Problems Physical Problems   : No Appearance: No   Housing: Yes Bathing / Dressing: Yes    Insurance / Financial: Yes Breathing: No    Transportation: No  Changes in Urination: No    Work / School: No  Constipation: No   Treatment Decisions: No  Diarrhea: No     Eating: Yes    Family Problems Fatigue: Yes    Dealing with Children: No Feeling Swollen: No    Dealing with Partner: No Fevers: No    Ability to Have Children: No  Getting Around: Yes       Indigestion: Yes     Memory / Concentration: Yes   Emotional Problems Mouth Sores: Yes    Depression: Yes  Nausea: Yes    Fears: Yes  Nose Dry / Congested: No    Nervousness: Yes  Pain: Yes    Sadness: Yes Sexual: No    Worry: Yes Skin Dry / Itchy: Yes    Loss of Interest in Usual Activities: Yes Sleep: Yes     Substance Abuse: No    Spiritual/Religions Concerns Tingling in Hands / Feet: Yes   Spritual / Shinto Concerns: No         Other Problems              PHQ ANSWERS    Q1. Little interest or pleasure in doing things: (P) Nearly every day (01/05/22 0406)  Q2. Feeling down, depressed, or hopeless: (P) Nearly every day (01/05/22 0406)  Q3. Trouble falling or staying asleep, or sleeping too much: (P) Nearly every day (01/05/22 0406)  Q4. Feeling tired or having little energy: (P) Nearly every day (01/05/22 0406)  Q5. Poor appetite or overeating: (P) Nearly every day (01/05/22 0406)  Q6. Feeling bad about yourself - or that you are a failure or have let yourself or your family down: (P) Several days (01/05/22 0406)  Q7. Trouble concentrating on things, such as reading the newspaper or watching television: (P) Nearly every day (01/05/22 0406)  Q8. Moving or speaking so slowly that other people could have noticed. Or the opposite - being so fidgety or restless that you have been moving around a lot more than usual: (P) Several days (01/05/22 0406)  Q9.  No SI    PHQ8 Score : (P) 20 (01/05/22 0406)  PHQ-9 Total Score: (P) 20 (01/05/22 0406)     HUY-7 Answers    GAD7 1/5/2022   1. Feeling nervous, anxious, or on edge? 1   2. Not being able to stop or control  worrying? 1   3. Worrying too much about different things? 1   4. Trouble relaxing? 1   5. Being so restless that it is hard to sit still? 1   6. Becoming easily annoyed or irritable? 1   7. Feeling afraid as if something awful might happen? 1   HUY-7 Score 7     HUY-7 Score: (P) 7  Interpretation: (P) Mild Anxiety     Client Strengths: verbal, intelligent, successful, good social support, good insight, commitment to wellness, strong jd, strong cultural traditions     Diagnosis:     ICD-10-CM ICD-9-CM   1. Major depressive disorder, recurrent episode, moderate with anxious distress  F33.1 296.32   2. Grief  F43.21 309.0   3. Malignant neoplasm of upper-inner quadrant of right breast in female, estrogen receptor positive  C50.211 174.2    Z17.0 V86.0       Treatment Plan:individual psychotherapy and medication management by physician  · Target symptoms: depression, anxiety , grief  · Why chosen therapy is appropriate versus another modality: relevant to diagnosis, patient responds to this modality, evidence based practice  · Outcome monitoring methods: self-report, observation, checklist/rating scale  · Therapeutic intervention type: behavior modifying psychotherapy  · Prognosis: Fair      Return to clinic: 2 weeks       Length of Service (minutes direct face-to-face contact): 60    Bria Odom, PhD  Clinical Psychologist  [place] License #[id number]  [place] License #[id number]

## 2022-01-10 ENCOUNTER — OFFICE VISIT (OUTPATIENT)
Dept: HEMATOLOGY/ONCOLOGY | Facility: CLINIC | Age: 47
End: 2022-01-10
Payer: MEDICAID

## 2022-01-10 ENCOUNTER — INFUSION (OUTPATIENT)
Dept: INFUSION THERAPY | Facility: HOSPITAL | Age: 47
End: 2022-01-10
Payer: MEDICAID

## 2022-01-10 ENCOUNTER — CLINICAL SUPPORT (OUTPATIENT)
Dept: HEMATOLOGY/ONCOLOGY | Facility: CLINIC | Age: 47
End: 2022-01-10
Payer: MEDICAID

## 2022-01-10 ENCOUNTER — LAB VISIT (OUTPATIENT)
Dept: LAB | Facility: HOSPITAL | Age: 47
End: 2022-01-10
Payer: MEDICAID

## 2022-01-10 VITALS
WEIGHT: 293 LBS | OXYGEN SATURATION: 97 % | SYSTOLIC BLOOD PRESSURE: 125 MMHG | TEMPERATURE: 99 F | HEART RATE: 73 BPM | RESPIRATION RATE: 16 BRPM | BODY MASS INDEX: 50.02 KG/M2 | DIASTOLIC BLOOD PRESSURE: 71 MMHG | HEIGHT: 64 IN

## 2022-01-10 VITALS — DIASTOLIC BLOOD PRESSURE: 80 MMHG | SYSTOLIC BLOOD PRESSURE: 130 MMHG | HEART RATE: 69 BPM | RESPIRATION RATE: 18 BRPM

## 2022-01-10 DIAGNOSIS — Z13.9 ENCOUNTER FOR SCREENING: Primary | ICD-10-CM

## 2022-01-10 DIAGNOSIS — Z17.0 MALIGNANT NEOPLASM OF UPPER-INNER QUADRANT OF RIGHT BREAST IN FEMALE, ESTROGEN RECEPTOR POSITIVE: Primary | ICD-10-CM

## 2022-01-10 DIAGNOSIS — C50.211 MALIGNANT NEOPLASM OF UPPER-INNER QUADRANT OF RIGHT BREAST IN FEMALE, ESTROGEN RECEPTOR POSITIVE: Primary | ICD-10-CM

## 2022-01-10 DIAGNOSIS — R05.9 COUGH: Primary | ICD-10-CM

## 2022-01-10 DIAGNOSIS — D56.1 BETA-THALASSEMIA: ICD-10-CM

## 2022-01-10 DIAGNOSIS — Z13.9 ENCOUNTER FOR SCREENING: ICD-10-CM

## 2022-01-10 DIAGNOSIS — E11.65 TYPE 2 DIABETES MELLITUS WITH HYPERGLYCEMIA, WITHOUT LONG-TERM CURRENT USE OF INSULIN: Chronic | ICD-10-CM

## 2022-01-10 DIAGNOSIS — I51.7 CARDIOMEGALY: ICD-10-CM

## 2022-01-10 DIAGNOSIS — C50.211 MALIGNANT NEOPLASM OF UPPER-INNER QUADRANT OF RIGHT BREAST IN FEMALE, ESTROGEN RECEPTOR POSITIVE: ICD-10-CM

## 2022-01-10 DIAGNOSIS — G62.9 NEUROPATHY: ICD-10-CM

## 2022-01-10 DIAGNOSIS — R09.89 RUNNY NOSE: Primary | ICD-10-CM

## 2022-01-10 DIAGNOSIS — D50.8 OTHER IRON DEFICIENCY ANEMIA: ICD-10-CM

## 2022-01-10 DIAGNOSIS — C79.51 BONE METASTASES: ICD-10-CM

## 2022-01-10 DIAGNOSIS — I10 HYPERTENSION, UNSPECIFIED TYPE: ICD-10-CM

## 2022-01-10 DIAGNOSIS — R09.89 RUNNY NOSE: ICD-10-CM

## 2022-01-10 DIAGNOSIS — Z17.0 MALIGNANT NEOPLASM OF UPPER-INNER QUADRANT OF RIGHT BREAST IN FEMALE, ESTROGEN RECEPTOR POSITIVE: ICD-10-CM

## 2022-01-10 LAB
ALBUMIN SERPL BCP-MCNC: 2.9 G/DL (ref 3.5–5.2)
ALP SERPL-CCNC: 90 U/L (ref 55–135)
ALT SERPL W/O P-5'-P-CCNC: 16 U/L (ref 10–44)
ANION GAP SERPL CALC-SCNC: 8 MMOL/L (ref 8–16)
AST SERPL-CCNC: 14 U/L (ref 10–40)
BILIRUB SERPL-MCNC: 0.3 MG/DL (ref 0.1–1)
BUN SERPL-MCNC: 8 MG/DL (ref 6–20)
CALCIUM SERPL-MCNC: 9.4 MG/DL (ref 8.7–10.5)
CHLORIDE SERPL-SCNC: 103 MMOL/L (ref 95–110)
CO2 SERPL-SCNC: 26 MMOL/L (ref 23–29)
CREAT SERPL-MCNC: 0.8 MG/DL (ref 0.5–1.4)
ERYTHROCYTE [DISTWIDTH] IN BLOOD BY AUTOMATED COUNT: 19.3 % (ref 11.5–14.5)
EST. GFR  (AFRICAN AMERICAN): >60 ML/MIN/1.73 M^2
EST. GFR  (NON AFRICAN AMERICAN): >60 ML/MIN/1.73 M^2
GLUCOSE SERPL-MCNC: 165 MG/DL (ref 70–110)
HCT VFR BLD AUTO: 30.9 % (ref 37–48.5)
HGB BLD-MCNC: 9.1 G/DL (ref 12–16)
IMM GRANULOCYTES # BLD AUTO: 0.01 K/UL (ref 0–0.04)
MCH RBC QN AUTO: 26.3 PG (ref 27–31)
MCHC RBC AUTO-ENTMCNC: 29.4 G/DL (ref 32–36)
MCV RBC AUTO: 89 FL (ref 82–98)
NEUTROPHILS # BLD AUTO: 2.1 K/UL (ref 1.8–7.7)
PLATELET # BLD AUTO: 397 K/UL (ref 150–450)
PMV BLD AUTO: 11.4 FL (ref 9.2–12.9)
POTASSIUM SERPL-SCNC: 3.3 MMOL/L (ref 3.5–5.1)
PROT SERPL-MCNC: 7.3 G/DL (ref 6–8.4)
RBC # BLD AUTO: 3.46 M/UL (ref 4–5.4)
SARS-COV-2 RDRP RESP QL NAA+PROBE: NEGATIVE
SODIUM SERPL-SCNC: 137 MMOL/L (ref 136–145)
WBC # BLD AUTO: 3.54 K/UL (ref 3.9–12.7)

## 2022-01-10 PROCEDURE — 3052F PR MOST RECENT HEMOGLOBIN A1C LEVEL 8.0 - < 9.0%: ICD-10-PCS | Mod: CPTII,,, | Performed by: INTERNAL MEDICINE

## 2022-01-10 PROCEDURE — 4010F ACE/ARB THERAPY RXD/TAKEN: CPT | Mod: CPTII,,, | Performed by: INTERNAL MEDICINE

## 2022-01-10 PROCEDURE — 1160F RVW MEDS BY RX/DR IN RCRD: CPT | Mod: CPTII,,, | Performed by: INTERNAL MEDICINE

## 2022-01-10 PROCEDURE — 85027 COMPLETE CBC AUTOMATED: CPT | Performed by: NURSE PRACTITIONER

## 2022-01-10 PROCEDURE — 3078F PR MOST RECENT DIASTOLIC BLOOD PRESSURE < 80 MM HG: ICD-10-PCS | Mod: CPTII,,, | Performed by: INTERNAL MEDICINE

## 2022-01-10 PROCEDURE — 3074F SYST BP LT 130 MM HG: CPT | Mod: CPTII,,, | Performed by: INTERNAL MEDICINE

## 2022-01-10 PROCEDURE — 3008F PR BODY MASS INDEX (BMI) DOCUMENTED: ICD-10-PCS | Mod: CPTII,,, | Performed by: INTERNAL MEDICINE

## 2022-01-10 PROCEDURE — U0002 COVID-19 LAB TEST NON-CDC: HCPCS | Performed by: NURSE PRACTITIONER

## 2022-01-10 PROCEDURE — 3008F BODY MASS INDEX DOCD: CPT | Mod: CPTII,,, | Performed by: INTERNAL MEDICINE

## 2022-01-10 PROCEDURE — 99214 PR OFFICE/OUTPT VISIT, EST, LEVL IV, 30-39 MIN: ICD-10-PCS | Mod: S$PBB,,, | Performed by: INTERNAL MEDICINE

## 2022-01-10 PROCEDURE — 3074F PR MOST RECENT SYSTOLIC BLOOD PRESSURE < 130 MM HG: ICD-10-PCS | Mod: CPTII,,, | Performed by: INTERNAL MEDICINE

## 2022-01-10 PROCEDURE — 36415 COLL VENOUS BLD VENIPUNCTURE: CPT | Performed by: NURSE PRACTITIONER

## 2022-01-10 PROCEDURE — 99999 PR PBB SHADOW E&M-EST. PATIENT-LVL V: CPT | Mod: PBBFAC,,, | Performed by: INTERNAL MEDICINE

## 2022-01-10 PROCEDURE — 99999 PR PBB SHADOW E&M-EST. PATIENT-LVL V: ICD-10-PCS | Mod: PBBFAC,,, | Performed by: INTERNAL MEDICINE

## 2022-01-10 PROCEDURE — 1159F MED LIST DOCD IN RCRD: CPT | Mod: CPTII,,, | Performed by: INTERNAL MEDICINE

## 2022-01-10 PROCEDURE — 1160F PR REVIEW ALL MEDS BY PRESCRIBER/CLIN PHARMACIST DOCUMENTED: ICD-10-PCS | Mod: CPTII,,, | Performed by: INTERNAL MEDICINE

## 2022-01-10 PROCEDURE — 3078F DIAST BP <80 MM HG: CPT | Mod: CPTII,,, | Performed by: INTERNAL MEDICINE

## 2022-01-10 PROCEDURE — 96402 CHEMO HORMON ANTINEOPL SQ/IM: CPT

## 2022-01-10 PROCEDURE — 4010F PR ACE/ARB THEARPY RXD/TAKEN: ICD-10-PCS | Mod: CPTII,,, | Performed by: INTERNAL MEDICINE

## 2022-01-10 PROCEDURE — 63600175 PHARM REV CODE 636 W HCPCS: Mod: JG | Performed by: INTERNAL MEDICINE

## 2022-01-10 PROCEDURE — 99214 OFFICE O/P EST MOD 30 MIN: CPT | Mod: S$PBB,,, | Performed by: INTERNAL MEDICINE

## 2022-01-10 PROCEDURE — 3052F HG A1C>EQUAL 8.0%<EQUAL 9.0%: CPT | Mod: CPTII,,, | Performed by: INTERNAL MEDICINE

## 2022-01-10 PROCEDURE — 1159F PR MEDICATION LIST DOCUMENTED IN MEDICAL RECORD: ICD-10-PCS | Mod: CPTII,,, | Performed by: INTERNAL MEDICINE

## 2022-01-10 PROCEDURE — 96372 THER/PROPH/DIAG INJ SC/IM: CPT | Mod: 59

## 2022-01-10 PROCEDURE — 99215 OFFICE O/P EST HI 40 MIN: CPT | Mod: 25,PBBFAC | Performed by: INTERNAL MEDICINE

## 2022-01-10 PROCEDURE — 80053 COMPREHEN METABOLIC PANEL: CPT | Performed by: NURSE PRACTITIONER

## 2022-01-10 RX ADMIN — GOSERELIN ACETATE 3.6 MG: 3.6 IMPLANT SUBCUTANEOUS at 01:01

## 2022-01-10 RX ADMIN — DENOSUMAB 120 MG: 120 INJECTION SUBCUTANEOUS at 01:01

## 2022-01-10 NOTE — PROGRESS NOTES
Patient presented with runny nose and cough.  Order for rapid COVID Swab placed prior to injection appointment.

## 2022-01-10 NOTE — PROGRESS NOTES
COVID Screening specimen collected.    Negative for the following symptoms:  Fever       Cough  Shortness of breath      Difficulty breathing     GI symptoms such as diarrhea or nausea    Loss of taste      Loss of smell    Runny nose

## 2022-01-10 NOTE — PROGRESS NOTES
Subjective:       Patient ID: Kristopher Ye is a 46 y.o. female.    Chief Complaint: No chief complaint on file.    HPI     Returns for follow up  Better BG control  Reports neuropathy daily that comes and goes- legs, hands  Fleeting when it occurs  States back pain was better after XRT but still present  No other pain issues noted  Weight stable -- trying to lose and controlling portions  Has not been able to exercise     Currently on Arimidex and Ibrance   Reports dizziness today and mild congestion=- BP and GG normal  Asked to Covid swab  Diarrhea resolved  No fever/chills.   No HA  No dizziness.   No leg weakness  No incontinence.   No swelling.   Dry skin  She is in WC as she gest breathless on exertion. She ambulates with walker at home.      Due  xgeva and zoladex   She is taking Vit D.         Taking Effexor and is able to sleep at night.   She follows with Dr. Martin for vaginal dryness -- sometimes itches              Per Dr. Lei's previous note: Reason For Follow Up:   1. Stage IV ([id number]) invasive ductal carcinoma of right breast, upper inner quadrant, ER %, MD neg, Her2 neg, Grade 3, multifocal with one lesion appearing more aggressive (Ki67 80%), large LN +, bone mets.     HPI:   Kristopher Ye presents for follow up of breast cancer.   Remains on Ibrance, Zoladex, Arimidex.      Oncologic History:  Presentation  - 9/11/19 - Screening mammogram showed multiple right breast masses lower inner quadrant  - 9/13/19 - Diagnostic mammogram and US showed a right breast, 3:00 position mass, 2 CFN measuring 17 mm x 16 mm x 14 mm.  There 2 smaller adjacent masses towards the nipple  - 9/19/19 - Biopsy -               A. Right breast subareolar: Grade 3 IDC, estrogen receptor 80%, progesterone receptor 0%, Her2 dago neg, Ki67 80%.                           B. Right breast mass 3:00: Grade 3 IDC with papillary features, estrogen receptor 100%, progesterone receptor 0%, Her2 dago 1+,  "Ki67 30%.              - 9/26/19: US right axilla with abnormal lymphadenopathy measuring 4.3 x 2.8 cm with biopsy + for metastatic breast cancer.   Surgery consultation with Dr Ferris on 9/25/19              - 9/25/19 - Genetics [facility] pending; VUS pending  Medical oncology consultation on 10/7/19              * 10/8/19 - CT C/A/P with several lytic lesions in thoracic and lumbar spine, iliac bones, left scapula in addition to 3 x 4.5 cm right axillary node and 2.1 cm right breast mass. Bone scan negative.              * 10/31/19 - started Ibrance, Arimidex, Zoladex; plan for Xgeva.               * 11/12/19 STRATA without targetable mutations.               * 12/16/19 - Bone biopsy + for met disease (initially read as negative, but we treated as metastatic disease given high suspicion).               * MRI brain: "Partially empty sella.  Question bilateral globe proptosis. Otherwise unremarkable MRI brain as detailed above specifically without evidence for intracranial enhancing lesion to suggest metastatic disease."              * 4/22/2020 PET - disease improvement. 8/2020 PET with disease improvement.               *  9/1/20 - Palliative RT to L1-L4     Of note, * Xgeva monthly  - we had been waiting on dental clearance, but she has been unable to get into dentistry and is accepting of risks. Has no active dental disease.      PET scan 4/22/2021:  FINDINGS:  Quality of the study: Mildly degraded due to patient's large body habitus and skin abutting the gantry.  In the head and neck, there are no hypermetabolic lesions worrisome for malignancy. There are no hypermetabolic mucosal lesions, and there are no pathologically enlarged or hypermetabolic lymph nodes.  In the chest, there are no hypermetabolic lesions worrisome for malignancy.  Stable CT appearance of a level 1 right axillary lymph node measuring 1.3 cm in short axis with normal background radiotracer uptake.  Previously with " SUV max of 2.1.  There are no concerning pulmonary nodules or masses, and there are no pathologically enlarged or hypermetabolic lymph nodes.  In the abdomen and pelvis, there is physiologic tracer distribution within the abdominal organs and excretion into the genitourinary system.  In the bones, there are stable lytic lesions throughout the lumbar spine and pelvis and additional stable sclerotic lesions in the sternum and T3 vertebral body.  No associated focal abnormal increased radiotracer uptake.  Findings likely represent treated disease.  Impression:  Interval decreased uptake within a prominent right axillary lymph node, now with normal background uptake.  No new focal abnormal uptake.  Stable lytic and sclerotic lesions without focal abnormal uptake.  Findings are compatible with treated metastasis.      8/25/2021 MRI brain   Impression:  No significant change from prior specifically without evidence for enhancing lesion to suggest intracranial metastatic disease.  Continued partially empty sella, intracranial hypertension to be included in differential in the appropriate clinical setting.  Clinical correlation and further evaluation as warranted.     10/14/2021 MRI thoracic/lumbar spine:  Impression:  Patient history of metastatic breast cancer.  Bone lesions at T10, L2, L3, and right iliac wing, as above, concerning for metastatic disease.  No pathologic fracture, soft tissue component, or epidural extension identified.      10/25/2021 CT chest/abd/pelvis:  Impression:  1.  Stable metastasis of the spine  No evidence of intra-abdominal or intrathoracic metastatic disease  2.  Stable prominent right axillary lymph node.  3.  Additional findings are detailed above.     FH:  Paternal side:  1st cousin- ovarian cancer (diagnosed at age 26) and her mother has thyroid cancer  Maternal grandmother- some type of likely metastatic GI cancer    Review of Systems   Constitutional: Positive for appetite change.  Negative for unexpected weight change.   HENT: Positive for mouth sores.    Eyes: Negative for visual disturbance.   Respiratory: Positive for cough. Negative for shortness of breath.    Cardiovascular: Negative for chest pain.   Gastrointestinal: Negative for abdominal pain and diarrhea.   Genitourinary: Negative for frequency.   Musculoskeletal: Positive for back pain.   Integumentary:  Negative for rash.   Neurological: Negative for headaches.   Hematological: Negative for adenopathy.   Psychiatric/Behavioral: The patient is nervous/anxious.          Objective:      Physical Exam  Vitals and nursing note reviewed.   Constitutional:       General: She is not in acute distress.     Appearance: Normal appearance. She is well-developed. She is obese.      Comments: Presents with daughter  ECOG=1  Wheelchair today   HENT:      Head: Normocephalic and atraumatic.   Eyes:      General: Lids are normal. No scleral icterus.     Conjunctiva/sclera: Conjunctivae normal.      Pupils: Pupils are equal, round, and reactive to light.   Neck:      Thyroid: No thyromegaly.      Vascular: No JVD.      Trachea: Trachea normal.   Cardiovascular:      Rate and Rhythm: Normal rate and regular rhythm.      Heart sounds: Normal heart sounds.      Comments: Difficult to assess tones.   Pulmonary:      Effort: Pulmonary effort is normal. No respiratory distress.      Breath sounds: Normal breath sounds. No wheezing, rhonchi or rales.      Comments: distant  Chest:   Breasts:      Right: No axillary adenopathy or supraclavicular adenopathy.      Left: No axillary adenopathy or supraclavicular adenopathy.       Abdominal:      General: Bowel sounds are normal. There is no distension.      Palpations: Abdomen is soft. There is no mass.      Tenderness: There is no abdominal tenderness.      Comments: No organomegaly however difficult to assess.     Musculoskeletal:         General: Normal range of motion.      Cervical back: Normal range of  motion and neck supple.      Comments: No spinal or paraspinal tenderness.    Lymphadenopathy:      Head:      Right side of head: No submental or submandibular adenopathy.      Left side of head: No submental or submandibular adenopathy.      Cervical: No cervical adenopathy.      Upper Body:      Right upper body: No supraclavicular or axillary adenopathy.      Left upper body: No supraclavicular or axillary adenopathy.   Skin:     General: Skin is warm and dry.      Capillary Refill: Capillary refill takes less than 2 seconds.      Coloration: Skin is not jaundiced or pale.      Findings: No bruising or rash.      Nails: There is no clubbing.   Neurological:      Mental Status: She is alert and oriented to person, place, and time.      Sensory: No sensory deficit.      Motor: No weakness.      Coordination: Coordination normal.      Gait: Gait normal.      Comments: + dizziness   Psychiatric:         Mood and Affect: Mood normal.         Speech: Speech normal.         Behavior: Behavior normal.         Thought Content: Thought content normal.         Judgment: Judgment normal.         Assessment:       Problem List Items Addressed This Visit     Type 2 diabetes mellitus with hyperglycemia, without long-term current use of insulin (Chronic)    Thalassemia    Neuropathy    Malignant neoplasm of upper-inner quadrant of right breast in female, estrogen receptor positive - Primary    Iron deficiency anemia    Hypertension    Cardiomegaly    Bone metastases          Plan:       Breast cancer with bone metastases  Post XRT  Pain clinic referral  Continue Ibrance, AI (was on arimidex prior to ibrance initiation with response) and Xgeva   Continue Lupron  Scans next month    Vit D refilled    Vashti Stern and dietician  follow up-- weight loss    [place]- relatively stable, monitor    DM, HTN-- better control    Covid swab recommended-- see above

## 2022-01-10 NOTE — PLAN OF CARE
Pt arrives for zoladex, xgeva administration. Ice applied to lower abdomen prior to zoladex pt tolerated well. Verbalized compliance with po vitamins, denies jaw pain. Assisted to w/c d/c home vitals stable accompanied by daughter in NAD.

## 2022-01-11 ENCOUNTER — TELEPHONE (OUTPATIENT)
Dept: PALLIATIVE MEDICINE | Facility: CLINIC | Age: 47
End: 2022-01-11

## 2022-01-12 ENCOUNTER — TELEPHONE (OUTPATIENT)
Dept: INFUSION THERAPY | Facility: HOSPITAL | Age: 47
End: 2022-01-12

## 2022-01-12 ENCOUNTER — OFFICE VISIT (OUTPATIENT)
Dept: PALLIATIVE MEDICINE | Facility: CLINIC | Age: 47
End: 2022-01-12
Payer: MEDICAID

## 2022-01-12 DIAGNOSIS — C79.51 BONE METASTASES: ICD-10-CM

## 2022-01-12 DIAGNOSIS — Z71.89 ADVANCED CARE PLANNING/COUNSELING DISCUSSION: ICD-10-CM

## 2022-01-12 DIAGNOSIS — G89.3 CANCER RELATED PAIN: ICD-10-CM

## 2022-01-12 DIAGNOSIS — G47.00 INSOMNIA, UNSPECIFIED TYPE: ICD-10-CM

## 2022-01-12 DIAGNOSIS — Z51.5 ENCOUNTER FOR PALLIATIVE CARE: ICD-10-CM

## 2022-01-12 DIAGNOSIS — Z17.0 MALIGNANT NEOPLASM OF UPPER-INNER QUADRANT OF RIGHT BREAST IN FEMALE, ESTROGEN RECEPTOR POSITIVE: Primary | ICD-10-CM

## 2022-01-12 DIAGNOSIS — R11.0 NAUSEA: ICD-10-CM

## 2022-01-12 DIAGNOSIS — R06.09 OTHER FORM OF DYSPNEA: ICD-10-CM

## 2022-01-12 DIAGNOSIS — K59.00 CONSTIPATION, UNSPECIFIED CONSTIPATION TYPE: ICD-10-CM

## 2022-01-12 DIAGNOSIS — R53.0 NEOPLASTIC (MALIGNANT) RELATED FATIGUE: ICD-10-CM

## 2022-01-12 DIAGNOSIS — C50.211 MALIGNANT NEOPLASM OF UPPER-INNER QUADRANT OF RIGHT BREAST IN FEMALE, ESTROGEN RECEPTOR POSITIVE: Primary | ICD-10-CM

## 2022-01-12 DIAGNOSIS — R63.0 ANOREXIA: ICD-10-CM

## 2022-01-12 DIAGNOSIS — F43.23 ADJUSTMENT DISORDER WITH MIXED ANXIETY AND DEPRESSED MOOD: ICD-10-CM

## 2022-01-12 PROCEDURE — 99214 OFFICE O/P EST MOD 30 MIN: CPT | Mod: 95,,, | Performed by: STUDENT IN AN ORGANIZED HEALTH CARE EDUCATION/TRAINING PROGRAM

## 2022-01-12 PROCEDURE — 1160F PR REVIEW ALL MEDS BY PRESCRIBER/CLIN PHARMACIST DOCUMENTED: ICD-10-PCS | Mod: CPTII,95,, | Performed by: STUDENT IN AN ORGANIZED HEALTH CARE EDUCATION/TRAINING PROGRAM

## 2022-01-12 PROCEDURE — 1159F MED LIST DOCD IN RCRD: CPT | Mod: CPTII,95,, | Performed by: STUDENT IN AN ORGANIZED HEALTH CARE EDUCATION/TRAINING PROGRAM

## 2022-01-12 PROCEDURE — 4010F PR ACE/ARB THEARPY RXD/TAKEN: ICD-10-PCS | Mod: CPTII,95,, | Performed by: STUDENT IN AN ORGANIZED HEALTH CARE EDUCATION/TRAINING PROGRAM

## 2022-01-12 PROCEDURE — 1159F PR MEDICATION LIST DOCUMENTED IN MEDICAL RECORD: ICD-10-PCS | Mod: CPTII,95,, | Performed by: STUDENT IN AN ORGANIZED HEALTH CARE EDUCATION/TRAINING PROGRAM

## 2022-01-12 PROCEDURE — 1160F RVW MEDS BY RX/DR IN RCRD: CPT | Mod: CPTII,95,, | Performed by: STUDENT IN AN ORGANIZED HEALTH CARE EDUCATION/TRAINING PROGRAM

## 2022-01-12 PROCEDURE — 4010F ACE/ARB THERAPY RXD/TAKEN: CPT | Mod: CPTII,95,, | Performed by: STUDENT IN AN ORGANIZED HEALTH CARE EDUCATION/TRAINING PROGRAM

## 2022-01-12 PROCEDURE — 99214 PR OFFICE/OUTPT VISIT, EST, LEVL IV, 30-39 MIN: ICD-10-PCS | Mod: 95,,, | Performed by: STUDENT IN AN ORGANIZED HEALTH CARE EDUCATION/TRAINING PROGRAM

## 2022-01-12 NOTE — PROGRESS NOTES
Consult Note  Palliative Care    The [place] is: [place]  The chief complaint leading to consultation is: symptom management and ACP    Visit type: audiovisual    Face to Face time with patient: 24 minutes    30 minutes of total time spent on the encounter, which includes face to face time and non-face to face time preparing to see the patient (eg, review of tests), Obtaining and/or reviewing separately obtained history, Documenting clinical information in the electronic or other health record, Independently interpreting results (not separately reported) and communicating results to the patient/family/caregiver, or Care coordination (not separately reported).     Each patient to whom he or she provides medical services by telemedicine is:  (1) informed of the relationship between the physician and patient and the respective role of any other health care provider with respect to management of the patient; and (2) notified that he or she may decline to receive medical services by telemedicine and may withdraw from such care at any time.    Reason for Consult: symptom management and ACP      ASSESSMENT/PLAN:     Plan/Recommendations:  Diagnoses and all orders for this visit:    Malignant neoplasm of upper-inner quadrant of right breast in female, estrogen receptor positive with bone mets  - patient followed by Dr. Lei and VALERIE Akers  - currently on disease directed therapy    Encounter for palliative care/Advanced care planning  - patient decisional  - patient by herself on telemedicine visit  - no ACP documents uploaded into EMR  - philosophy of Palliative Medicine reviewed with patient and family at first visit  - new patient folder given to and reviewed with patient and family at first visit  - goals: life prolonging  - ACP booklet given to and reviewed with patient and family at first visit including HCPOA and living will  - patient verbally stated that she would like her sister, Janel, to be her  surrogate decision maker.   - request that patient review booklet and fill out form prior to next visit   - did not specifically discuss code status at this time  - will follow up at future appointments    Cancer related pain  - patient reporting her pain is mainly in her lower back and all along her right side  - pain rated as 6/10  - patient reports that the Norco, oxycodone, and MS Contin all make her very sleepy  - she does not use any pain medication consistently  - discussed using MS contin consistently at night for next week then message clinic with update on how she is feeling  - continue lidocaine patches as well for back and side  - will refer patient to PT/OT to help with strengthening and stretching her back   - patient also referred previously to pain management for possible injection into her back  - continue to follow with radiation oncology   - opioid safety sheet in new patient folder for patient to review  - will continue to monitor    Other form of dyspnea  - patient reporting moderate to severe dyspnea  - dyspnea worsens with exertion and increased pain  - will work on better symptom control  - also referred to PT to help with building up activity tolerance  - tips for shortness of breath in new patient folder for patient to review    Nausea/Anorexia  - patient with improvement in the nausea and moderate to severe anorexia  - patient using anti-emetics on prn basis  - discussed previously about starting zofran on a three times a day scheduled basis  - continue phenergan prn  - patient already following with nutrition  - will continue to monitor    Adjustment disorder with mixed anxiety and depressed mood  - patient reporting severe depression and anxiety  - she has experienced loss of both parents within the last year  - patient reports that it was difficult at times over the holidays with the recent loss and her grief; she was able to spend time with her siblings as well as have her daughters'  father come to visit her girls at [place]. She states that she was able to enjoy time with the family members though she was tired afterwards   - patient also wants to become more mobile again; she Is feeling somewhat down and frustrated about not being mobile  - emotional support provided throughout visit  - patient follows with oncology-psychology  - continue effexor at this time    Neoplastic (malignant) related fatigue/Insomnia  - patient reports having difficult staying asleep  - etiology likely multifactorial  - discussed using MS Contin consistently at night  - discussed using bedroom only for sleep and not to go to bed until feeling tired  - patient reports that she has been in a routine the past week, which has been helpful for her  - tips for better sleep in new patient folder for patient to review  - will hold off on pharmacologic intervention at this time  - will continue to monitor    Constipation  - patient having intermittent constipation; overall improved since last visit  - discussed the need for adequate hydration for patient at appropriate level for CHF  - discussed using bowel regimen consistently   - constipation tip sheet in new patient folder for patient to review  - will continue close monitoring    Understanding of illness/Prognosis: patient has good understanding of disease at this time.     Goals of care: life prolonging    Follow up: ~ 8-10 weeks    Patient's encounter and above plan of care discussed with patient's oncologist and oncology NP    SUBJECTIVE:     History of Present Illness:  Patient is a 46 y.o. year old female with anemia, anxiety, cardiomyopathy, CHF, HTN, HLD, and metastatic breast cancer presents to Palliative Medicine for symptom management and ACP. Patient was diagnosed with stage IV breast cancer in September 2019. She was started on Ibrance and Arimidex, and she continues on this regimen today. Please see oncology notes for full details regarding her oncologic  treatment course.     2022:  [place]  reviewed and summarized:  2021 Oxycodone 5 mg disp: 45 for 11 days  2021 MS Contin 15 mg Disp: 60 for 20 days    Today patient states overall she is doing okay. Patient continues to have severe back pain that limits her mobility. All of the pain medications that she has tried has significant side effect of sleepiness. Patient is open to meeting with pain management for intervention. Patient is also open to meeting with PT to help as well. Patient found the holidays difficult for her, but she was able to visit with her family. She did find enjoyment during that time as well. Patient has been following with Dr. Odom. Patient was having some difficulty with sleeping, but she is has been better with a routine this past week.     2021:  [place]  reviewed and summarized:  2021 Oxycodone 5 mg disp: 45 for 11 days  2021 MS Contin 15 mg Disp: 60 for 20 days    Patient presents to clinic today with her sister. Patient having moderate to severe pain in her back and right side. She has been following with radiation oncology as well. Patient uses both MS Contin and oxycodone intermittently. Patient also having some dyspnea. She is feeling very anxious and depressed recently. Patient also experiencing severe nausea and poor appetite. She has been using her anti-emetics intermittently. Patient also having trouble sleeping and feels very fatigued throughout the day. She is also intermittently constipated. Patient open to learning about ACP.    Past Medical History:   Diagnosis Date    Abnormal Pap smear     pt states 13yrs ago colpo was done    Anemia     Anxiety     Cancer     Cardiomyopathy     CHF (congestive heart failure)     Fibroid     Hyperlipidemia     Hypertension     Sleep difficulties      Past Surgical History:   Procedure Laterality Date     SECTION      TONSILLECTOMY      TUBAL LIGATION      UTERINE FIBROID EMBOLIZATION        Family History   Problem Relation Age of Onset    Arthritis Mother     Diabetes Mother     Heart disease Mother         CHF, CAD , 2 stents    Hypertension Mother     Hyperlipidemia Mother     Heart failure Mother     No Known Problems Sister     Prostate cancer Father     Hypertension Brother     No Known Problems Daughter     Asthma Daughter     No Known Problems Maternal Grandmother     Cancer Maternal Grandfather 79        abdominal origin?    No Known Problems Paternal Grandmother     No Known Problems Paternal Grandfather     Thyroid cancer Other         type? dx age?    Cancer Paternal Cousin         ovarian @ ?29 & cervical @ ?29    Endometriosis Paternal Cousin     Breast cancer Neg Hx     Ovarian cancer Neg Hx     Colon polyps Neg Hx      Review of patient's allergies indicates:   Allergen Reactions    Keflex [cephalexin] Itching       Medications:    Current Outpatient Medications:     (Magic mouthwash) 1:1:1 Benadryl 12.5mg/5ml liq, aluminum & magnesium hydroxide-simehticone (Maalox), LIDOcaine viscous 2%, Swish and spit 10 mLs every 4 (four) hours as needed. for mouth sores, Disp: 360 mL, Rfl: 0    albuterol (PROVENTIL/VENTOLIN HFA) 90 mcg/actuation inhaler, Inhale 1-2 puffs into the lungs every 6 (six) hours as needed for Wheezing. Rescue, Disp: 1 Inhaler, Rfl: 0    amLODIPine (NORVASC) 5 MG tablet, Take 1 tablet (5 mg total) by mouth once daily., Disp: 30 tablet, Rfl: 11    anastrozole (ARIMIDEX) 1 mg Tab, TAKE 1 TABLET(1 MG) BY MOUTH EVERY DAY, Disp: 90 tablet, Rfl: 3    atorvastatin (LIPITOR) 40 MG tablet, Take 1 tablet (40 mg total) by mouth once daily., Disp: 90 tablet, Rfl: 3    blood sugar diagnostic Strp, To check BG 4 times daily, to use with insurance preferred meter, Disp: 200 each, Rfl: 5    blood-glucose meter kit, To check BG 4 times daily, to use with insurance preferred meter, Disp: 1 each, Rfl: 0    carvediloL (COREG) 25 MG tablet, Take 1 tablet (25 mg  total) by mouth 2 (two) times daily., Disp: 180 tablet, Rfl: 3    dulaglutide (TRULICITY) 0.75 mg/0.5 mL pen injector, Inject 0.75 mg into the skin every 7 days., Disp: 4 pen, Rfl: 2    ENTRESTO 49-51 mg per tablet, TAKE 1 TABLET BY MOUTH TWICE DAILY, Disp: 60 tablet, Rfl: 11    ergocalciferol (ERGOCALCIFEROL) 50,000 unit Cap, TAKE 1 CAPSULE BY MOUTH EVERY 7 DAYS, Disp: 12 capsule, Rfl: 0    glipiZIDE (GLUCOTROL) 10 MG tablet, Take 1 tablet (10 mg total) by mouth 2 (two) times daily before meals., Disp: 180 tablet, Rfl: 3    goserelin (ZOLADEX) 3.6 mg injection, Inject 3.6 mg into the skin every 28 days., Disp: , Rfl:     HYDROcodone-acetaminophen (NORCO) 5-325 mg per tablet, Take 1 tablet by mouth every 6 (six) hours as needed for Pain., Disp: 30 tablet, Rfl: 0    ibuprofen (ADVIL,MOTRIN) 800 MG tablet, Take 1 tablet (800 mg total) by mouth 2 (two) times daily as needed for Pain., Disp: 45 tablet, Rfl: 2    lancets Misc, To check BG 4 times daily, to use with insurance preferred meter, Disp: 200 each, Rfl: 5    LIDOcaine (LIDODERM) 5 %, Place 2 patches onto the skin once daily. Remove & Discard patch within 12 hours or as directed by MD, Disp: 60 patch, Rfl: 0    morphine (MS CONTIN) 15 MG 12 hr tablet, Take 1 tablet (15 mg total) by mouth every 8 (eight) hours as needed for Pain., Disp: 60 tablet, Rfl: 0    ondansetron (ZOFRAN-ODT) 8 MG TbDL, Take 1 tablet (8 mg total) by mouth every 8 (eight) hours as needed (nausea)., Disp: 30 tablet, Rfl: 2    palbociclib (IBRANCE) 125 mg Cap, Take 1 capsule (125 mg) by mouth as instructed Take as directed days 1-21 of each 28 day cycle. Take with food., Disp: 21 capsule, Rfl: 6    potassium chloride (KLOR-CON) 10 MEQ TbSR, TAKE 1 TABLET(10 MEQ) BY MOUTH EVERY DAY, Disp: 30 tablet, Rfl: 2    promethazine (PHENERGAN) 25 MG tablet, Take 1 tablet (25 mg total) by mouth every 6 (six) hours as needed for Nausea., Disp: 30 tablet, Rfl: 1    sennosides (SENNA-C ORAL),  Take by mouth daily as needed. , Disp: , Rfl:     spironolactone (ALDACTONE) 25 MG tablet, Take 1 tablet (25 mg total) by mouth once daily., Disp: 90 tablet, Rfl: 3    venlafaxine (EFFEXOR-XR) 150 MG Cp24, TAKE 1 CAPSULE(150 MG) BY MOUTH EVERY DAY, Disp: 30 capsule, Rfl: 2    OBJECTIVE:       ROS:  Review of Systems   Constitutional: Positive for activity change, appetite change and fatigue.   HENT: Negative.    Eyes: Negative.    Respiratory: Positive for shortness of breath.    Cardiovascular: Negative.    Gastrointestinal: Positive for constipation and nausea.   Genitourinary: Negative.    Musculoskeletal: Positive for arthralgias, back pain and myalgias.   Skin: Negative.    Neurological: Negative.    Psychiatric/Behavioral: Positive for dysphoric mood and sleep disturbance. The patient is nervous/anxious.    All other systems reviewed and are negative.      Review of Symptoms    Symptom Assessment (ESAS 0-10 Scale)  Pain:  6  Dyspnea:  4  Anxiety:  6  Nausea:  5  Depression:  6  Anorexia:  5  Fatigue:  8  Insomnia:  7  Restlessness:  0  Agitation:  0     CAM / Delirium:  Negative  Constipation:  Positive  Diarrhea:  Negative    Bowel Management Plan (BMP):  Yes      Pain Assessment:  OME in 24 hours:  0-10  Location(s): back    Back       Location: lower        Quality: aching        Quantity: 6/10 in intensity        Chronicity: Onset 6 month(s) ago, unchanged        Aggravating Factors: activity        Alleviating Factors: opiates       Associated Symptoms: none    Modified Abigail Scale:  1    ECOG Performance Status ndGndrndanddndend:nd nd2nd Living Arrangements:  Lives with family    Psychosocial/Cultural: Patient lives with her two adult daughters (18 and 20 years old)    Spiritual:  F - Sandra and Belief:  Alcides  I - Importance:  High  C - Community:  Prays regularly  A - Address in Care:   services offered but declined. Needs met at this time      Advance Care Planning   Advance Directives:   Living Will: No     LaPOST: No    Do Not Resuscitate Status: No    Medical Power of : No        Oral Declaration: Yes  Witnesses:  Ella James LCSW   Agent's Name:  Janel (her sister)    Decision Making:  Patient answered questions          Physical Exam: Limited due to telemedicine visit  Vitals:    Physical Exam  Constitutional:       General: She is not in acute distress.     Appearance: She is not diaphoretic.   HENT:      Head: Normocephalic and atraumatic.      Right Ear: External ear normal.      Left Ear: External ear normal.   Eyes:      General: No scleral icterus.        Right eye: No discharge.         Left eye: No discharge.   Pulmonary:      Effort: Pulmonary effort is normal. No respiratory distress.   Musculoskeletal:      Cervical back: Normal range of motion.      Comments: Sitting up without assistance; able to move extremities without difficutly   Skin:     Coloration: Skin is not jaundiced.      Findings: No rash.   Neurological:      General: No focal deficit present.      Mental Status: She is alert and oriented to person, place, and time.      Cranial Nerves: No cranial nerve deficit.   Psychiatric:         Behavior: Behavior normal.         Thought Content: Thought content normal.         Judgment: Judgment normal.         Labs:  CBC:   WBC   Date Value Ref Range Status   01/10/2022 3.54 (L) 3.90 - 12.70 K/uL Final     Hemoglobin   Date Value Ref Range Status   01/10/2022 9.1 (L) 12.0 - 16.0 g/dL Final     Hematocrit   Date Value Ref Range Status   01/10/2022 30.9 (L) 37.0 - 48.5 % Final     MCV   Date Value Ref Range Status   01/10/2022 89 82 - 98 fL Final     Platelets   Date Value Ref Range Status   01/10/2022 397 150 - 450 K/uL Final       LFT:   Lab Results   Component Value Date    AST 14 01/10/2022    ALKPHOS 90 01/10/2022    BILITOT 0.3 01/10/2022       Albumin:   Albumin   Date Value Ref Range Status   01/10/2022 2.9 (L) 3.5 - 5.2 g/dL Final   01/28/2021 3.5 (L) 3.6 - 5.1 g/dL Final  "    Comment:     For additional information, please refer to   http://education.[employer]/faq/UZY884 (This link is   being provided for informational/ educational purposes only.)    This test was developed and its analytical performance   characteristics have been determined by [facility]. It has not been cleared or approved by the   [place] Food and Drug Administration. This assay has been validated   pursuant to the CLIA regulations and is used for clinical   purposes.  @ Test Performed By:  [facility] [place]  John Valverde M.D.,   [address] [place] [id number]  [facility]  [id number]       Protein:   Total Protein   Date Value Ref Range Status   01/10/2022 7.3 6.0 - 8.4 g/dL Final       Radiology:I have reviewed all pertinent imaging results/findings within the past 24 hours.    10/25/2021 CT C/A/P: "1.  Stable metastasis of the spine. No evidence of intra-abdominal or intrathoracic metastatic disease 2.  Stable prominent right axillary lymph node. 3.  Additional findings are detailed above."    Encounter occurred during period of COVID-19 emergency. Encounter performed under the concurrent guidelines, limitations and protocols.    30 minutes of total time spent on the encounter, which includes face to face time and non-face to face time preparing to see the patient (eg, review of tests), Obtaining and/or reviewing separately obtained history, Documenting clinical information in the electronic or other health record, Independently interpreting results (not separately reported) and communicating results to the patient/family/caregiver, or Care coordination (not separately reported).    Signature: Aishwarya Portillo MD        "

## 2022-01-13 ENCOUNTER — PATIENT MESSAGE (OUTPATIENT)
Dept: PRIMARY CARE CLINIC | Facility: CLINIC | Age: 47
End: 2022-01-13

## 2022-01-13 DIAGNOSIS — E11.65 TYPE 2 DIABETES MELLITUS WITH HYPERGLYCEMIA, WITHOUT LONG-TERM CURRENT USE OF INSULIN: Chronic | ICD-10-CM

## 2022-01-18 ENCOUNTER — PATIENT MESSAGE (OUTPATIENT)
Dept: PRIMARY CARE CLINIC | Facility: CLINIC | Age: 47
End: 2022-01-18

## 2022-01-20 ENCOUNTER — OFFICE VISIT (OUTPATIENT)
Dept: PSYCHIATRY | Facility: CLINIC | Age: 47
End: 2022-01-20
Payer: MEDICAID

## 2022-01-20 DIAGNOSIS — F43.21 GRIEF: ICD-10-CM

## 2022-01-20 DIAGNOSIS — F33.1 MAJOR DEPRESSIVE DISORDER, RECURRENT EPISODE, MODERATE WITH ANXIOUS DISTRESS: Primary | ICD-10-CM

## 2022-01-20 PROCEDURE — 1159F PR MEDICATION LIST DOCUMENTED IN MEDICAL RECORD: ICD-10-PCS | Mod: HP,HB,CPTII, | Performed by: PSYCHOLOGIST

## 2022-01-20 PROCEDURE — 90837 PSYTX W PT 60 MINUTES: CPT | Mod: HP,HB,, | Performed by: PSYCHOLOGIST

## 2022-01-20 PROCEDURE — 90837 PR PSYCHOTHERAPY W/PATIENT, 60 MIN: ICD-10-PCS | Mod: HP,HB,, | Performed by: PSYCHOLOGIST

## 2022-01-20 PROCEDURE — 4010F ACE/ARB THERAPY RXD/TAKEN: CPT | Mod: HP,HB,CPTII, | Performed by: PSYCHOLOGIST

## 2022-01-20 PROCEDURE — 4010F PR ACE/ARB THEARPY RXD/TAKEN: ICD-10-PCS | Mod: HP,HB,CPTII, | Performed by: PSYCHOLOGIST

## 2022-01-20 PROCEDURE — 99211 OFF/OP EST MAY X REQ PHY/QHP: CPT | Mod: PBBFAC | Performed by: PSYCHOLOGIST

## 2022-01-20 PROCEDURE — 99999 PR PBB SHADOW E&M-EST. PATIENT-LVL I: ICD-10-PCS | Mod: PBBFAC,HB,, | Performed by: PSYCHOLOGIST

## 2022-01-20 PROCEDURE — 99999 PR PBB SHADOW E&M-EST. PATIENT-LVL I: CPT | Mod: PBBFAC,HB,, | Performed by: PSYCHOLOGIST

## 2022-01-20 PROCEDURE — 1159F MED LIST DOCD IN RCRD: CPT | Mod: HP,HB,CPTII, | Performed by: PSYCHOLOGIST

## 2022-01-20 NOTE — PROGRESS NOTES
INFORMED CONSENT: Kristopher Ye   is known to this provider and identity was confirmed via NAME and .  The patient has been informed of the risks and benefits associated with engaging in psychotherapy, the handling of protected health information, the rights of privacy and the limits of confidentiality. The patient has also been informed of the importance of reporting any suicidal or homicidal ideation to this or any provider to ensure safety of all parties, and the Kristopher Ye expressed understanding. The patient was agreeable to these terms and freely participates in individual psychotherapy.    PSYCHO-ONCOLOGY NOTE/ Individual Psychotherapy     Date: 2022   [facility]        Therapeutic Intervention: Met with patient.  Outpatient - Behavior modifying psychotherapy 60 min - CPT code 08719      Patient was last seen by me on 2022    Problem list  Patient Active Problem List   Diagnosis    Hypertension    Iron deficiency anemia    Cardiomegaly    Fibroid uterus    Shortness of breath    Thalassemia    Major depressive disorder, recurrent episode, moderate with anxious distress    Chronic combined systolic and diastolic heart failure    Cardiomyopathy    Hyperlipidemia    Malignant neoplasm of upper-inner quadrant of right breast in female, estrogen receptor positive    Cancer associated pain    Pathological fracture of lumbar vertebra with routine healing    Bone metastases    Hot flashes    Prediabetes    BMI 60.0-69.9, adult    Elevated LDL cholesterol level    Anxiety    Vaginal dryness    Vitamin D deficiency    Grief    Hyperglycemia    Type 2 diabetes mellitus with hyperglycemia, without long-term current use of insulin    Benign paroxysmal positional vertigo    Neuropathy       Chief complaint/reason for encounter: depression, anxiety and grief   Met with patient to evaluate psychosocial adaptation to diagnosis/treatment/survivorship of  BC    Current Medications  Current Outpatient Medications   Medication    (Magic mouthwash) 1:1:1 Benadryl 12.5mg/5ml liq, aluminum & magnesium hydroxide-simehticone (Maalox), LIDOcaine viscous 2%    albuterol (PROVENTIL/VENTOLIN HFA) 90 mcg/actuation inhaler    amLODIPine (NORVASC) 5 MG tablet    anastrozole (ARIMIDEX) 1 mg Tab    atorvastatin (LIPITOR) 40 MG tablet    blood sugar diagnostic Strp    blood-glucose meter kit    carvediloL (COREG) 25 MG tablet    dulaglutide (TRULICITY) 0.75 mg/0.5 mL pen injector    ENTRESTO 49-51 mg per tablet    ergocalciferol (ERGOCALCIFEROL) 50,000 unit Cap    glipiZIDE (GLUCOTROL) 10 MG tablet    goserelin (ZOLADEX) 3.6 mg injection    HYDROcodone-acetaminophen (NORCO) 5-325 mg per tablet    ibuprofen (ADVIL,MOTRIN) 800 MG tablet    lancets Misc    LIDOcaine (LIDODERM) 5 %    morphine (MS CONTIN) 15 MG 12 hr tablet    ondansetron (ZOFRAN-ODT) 8 MG TbDL    palbociclib (IBRANCE) 125 mg Cap    potassium chloride (KLOR-CON) 10 MEQ TbSR    promethazine (PHENERGAN) 25 MG tablet    sennosides (SENNA-C ORAL)    spironolactone (ALDACTONE) 25 MG tablet    venlafaxine (EFFEXOR-XR) 150 MG Cp24     No current facility-administered medications for this visit.       Objective:  Kristopher Ye arrived promptly for the session.   Ms. Ye was independently ambulatory at the time of session. The patient was fully cooperative throughout the session.  Appearance: age appropriate, appropriately  dressed, adequately  groomed  Behavior/Cooperation: friendly and cooperative  Speech: normal in rate, volume, and tone and appropriate quality, quantity and organization of sentences  Mood: anxious, dysthymic  Affect: mood congruent  Thought Process: goal-directed, logical  Thought Content: normal,  No delusions or paranoia; did not appear to be responding to internal stimuli during the session  Orientation: grossly intact  Memory: Grossly intact  Attention  Span/Concentration: Attends to session without distraction; reports no difficulty  Fund of Knowledge: average  Estimate of Intelligence: average from verbal skills and history  Cognition: grossly intact  Insight: patient has awareness of illness; good insight into own behavior and behavior of others  Judgment: the patient's behavior is adequate to circumstances    Interval history and content of current session: Patient with improvement in mood. Patient's ex  relocating closer to help patient and two daughters.    Discussed diagnosis, treatment, prognosis, current adaptation to disease and treatment status and family's adaptation to disease and treatment status. Reports to be coping with significant difficulty. Evaluated cognitive response, paying particular attention to negative intrusive thoughts of a persistent and detrimental nature. Thoughts of this type are in evidence with mild distress. Provided cognitive behavioral therapy to address negative cognitions. Identified and evaluated psychosocial and environmental stressors secondary to diagnosis and treatment.  Examined proactive behaviors that may be implemented to minimize or ameliorate psychosocial stressors secondary to diagnosis and treatment.     Risk parameters:   Patient reports no suicidal ideation  Patient reports no homicidal ideation  Patient reports no self-injurious behavior  Patient reports no violent behavior   Safety needs:  None at this time      Verbal deficits: None     Patient's response to intervention:The patient's response to intervention is accepting.     Progress toward goals and other mental status changes:  The patient's progress toward goals is fair .      Progress to date:Progress - Ongoing, but Slow      Goals from last visit: Met     Patient reported outcomes:    Distress Thermometer: 0         Client Strengths: verbal, intelligent, successful, good social support, good insight, commitment to wellness, strong jd, strong  cultural traditions     Diagnosis:     ICD-10-CM ICD-9-CM   1. Major depressive disorder, recurrent episode, moderate with anxious distress  F33.1 296.32   2. Grief  F43.21 309.0     Treatment Plan:individual psychotherapy and medication management by physician  · Target symptoms: depression, anxiety , grief  · Why chosen therapy is appropriate versus another modality: relevant to diagnosis, patient responds to this modality, evidence based practice  · Outcome monitoring methods: self-report, observation, checklist/rating scale  · Therapeutic intervention type: behavior modifying psychotherapy  · Prognosis: Good      Behavioral goals:   Goals: Review SOB recs from [facility]; Engage in Rec strategies 3xs per week; Upon wakening at 7am  Goals: Engage in PT      Return to clinic: 3 weeks    Next Session:      Length of Service (minutes direct face-to-face contact): 60    Bria Odom, PhD  Clinical Psychologist  [place] License #[id number]  [place] License #[id number]

## 2022-01-21 ENCOUNTER — PATIENT MESSAGE (OUTPATIENT)
Dept: ADMINISTRATIVE | Facility: OTHER | Age: 47
End: 2022-01-21

## 2022-01-21 ENCOUNTER — PATIENT MESSAGE (OUTPATIENT)
Dept: HEMATOLOGY/ONCOLOGY | Facility: CLINIC | Age: 47
End: 2022-01-21

## 2022-01-26 ENCOUNTER — PATIENT MESSAGE (OUTPATIENT)
Dept: PRIMARY CARE CLINIC | Facility: CLINIC | Age: 47
End: 2022-01-26

## 2022-01-26 ENCOUNTER — OFFICE VISIT (OUTPATIENT)
Dept: PRIMARY CARE CLINIC | Facility: CLINIC | Age: 47
End: 2022-01-26
Payer: MEDICAID

## 2022-01-26 DIAGNOSIS — I42.9 CARDIOMYOPATHY, UNSPECIFIED TYPE: ICD-10-CM

## 2022-01-26 DIAGNOSIS — C50.211 MALIGNANT NEOPLASM OF UPPER-INNER QUADRANT OF RIGHT BREAST IN FEMALE, ESTROGEN RECEPTOR POSITIVE: ICD-10-CM

## 2022-01-26 DIAGNOSIS — C79.51 BONE METASTASES: ICD-10-CM

## 2022-01-26 DIAGNOSIS — E78.00 PURE HYPERCHOLESTEROLEMIA: ICD-10-CM

## 2022-01-26 DIAGNOSIS — Z17.0 MALIGNANT NEOPLASM OF UPPER-INNER QUADRANT OF RIGHT BREAST IN FEMALE, ESTROGEN RECEPTOR POSITIVE: ICD-10-CM

## 2022-01-26 DIAGNOSIS — I50.42 CHRONIC COMBINED SYSTOLIC AND DIASTOLIC HEART FAILURE: ICD-10-CM

## 2022-01-26 DIAGNOSIS — M84.48XD: ICD-10-CM

## 2022-01-26 DIAGNOSIS — I10 PRIMARY HYPERTENSION: ICD-10-CM

## 2022-01-26 DIAGNOSIS — E11.65 TYPE 2 DIABETES MELLITUS WITH HYPERGLYCEMIA, WITHOUT LONG-TERM CURRENT USE OF INSULIN: Primary | ICD-10-CM

## 2022-01-26 DIAGNOSIS — F33.1 MAJOR DEPRESSIVE DISORDER, RECURRENT EPISODE, MODERATE WITH ANXIOUS DISTRESS: ICD-10-CM

## 2022-01-26 DIAGNOSIS — C50.211 MALIGNANT NEOPLASM OF UPPER-INNER QUADRANT OF RIGHT BREAST IN FEMALE, ESTROGEN RECEPTOR POSITIVE: Primary | ICD-10-CM

## 2022-01-26 DIAGNOSIS — Z17.0 MALIGNANT NEOPLASM OF UPPER-INNER QUADRANT OF RIGHT BREAST IN FEMALE, ESTROGEN RECEPTOR POSITIVE: Primary | ICD-10-CM

## 2022-01-26 PROCEDURE — 99214 OFFICE O/P EST MOD 30 MIN: CPT | Mod: 95,,, | Performed by: FAMILY MEDICINE

## 2022-01-26 PROCEDURE — 4010F PR ACE/ARB THEARPY RXD/TAKEN: ICD-10-PCS | Mod: CPTII,95,, | Performed by: FAMILY MEDICINE

## 2022-01-26 PROCEDURE — 4010F ACE/ARB THERAPY RXD/TAKEN: CPT | Mod: CPTII,95,, | Performed by: FAMILY MEDICINE

## 2022-01-26 PROCEDURE — 99214 PR OFFICE/OUTPT VISIT, EST, LEVL IV, 30-39 MIN: ICD-10-PCS | Mod: 95,,, | Performed by: FAMILY MEDICINE

## 2022-01-26 PROCEDURE — 3052F PR MOST RECENT HEMOGLOBIN A1C LEVEL 8.0 - < 9.0%: ICD-10-PCS | Mod: CPTII,95,, | Performed by: FAMILY MEDICINE

## 2022-01-26 PROCEDURE — 3052F HG A1C>EQUAL 8.0%<EQUAL 9.0%: CPT | Mod: CPTII,95,, | Performed by: FAMILY MEDICINE

## 2022-01-26 NOTE — PROGRESS NOTES
The patient location is: [place]  The chief complaint leading to consultation is: F/U DM-2    Visit type: audiovisual    Face to Face time with patient: 15 minutes  30 minutes of total time spent on the encounter, which includes face to face time and non-face to face time preparing to see the patient (eg, review of tests), Obtaining and/or reviewing separately obtained history, Documenting clinical information in the electronic or other health record, Independently interpreting results (not separately reported) and communicating results to the patient/family/caregiver, or Care coordination (not separately reported).     Each patient to whom he or she provides medical services by telemedicine is:  (1) informed of the relationship between the physician and patient and the respective role of any other health care provider with respect to management of the patient; and (2) notified that he or she may decline to receive medical services by telemedicine and may withdraw from such care at any time.    Notes: See Below  Subjective:       Patient ID: Kristopher Ye is a 46 y.o. female.    Chief Complaint: Diabetes    45 yo F, established pt of mine (last visit 12/21/2021) with PMH significant for cancer of the right breast with bone metastases with complication of pathological fracture of L-spine; HFrEF with EF 35%, Dilated cardiomyopathy, HTN, HLD, Prediabetes, Vitamin D deficiency, Obesity, MDD, Anxiety, Uterine Fibroid s/p UAE, Fe deficiency anemia, thalassemia, and newly dx'd DM-2.  She presents today for evaluation via virtual visit to follow-up DM-2. She is now adherent with with glipizide 10 mg BID + Trulicity 0.75 mg. She is no longer experiencing polydipsia and polyuria. Her am fasting glucose levels have been 126-132 mg/dL. Random glc 130s-150s. She is following with diabetes education; most recent visit 12/16/21. Due for f/u. She has eliminated sugary snacks from diet (ie candy, soda)    States she  is not feeling well today. Woke up with headache and nausea. No vomiting, fevers, chills, diarrhea, or constipation. Has been in home for the past week as her children are in virtual learning since last Thursday.       Past Medical History:   Diagnosis Date    Abnormal Pap smear     pt states 13yrs ago colpo was done    Anemia     Anxiety     Cancer     Cardiomyopathy     CHF (congestive heart failure)     Fibroid     Hyperlipidemia     Hypertension     Sleep difficulties        Patient Active Problem List   Diagnosis    Hypertension    Iron deficiency anemia    Cardiomegaly    Fibroid uterus    Shortness of breath    Thalassemia    Major depressive disorder, recurrent episode, moderate with anxious distress    Chronic combined systolic and diastolic heart failure    Cardiomyopathy    Hyperlipidemia    Malignant neoplasm of upper-inner quadrant of right breast in female, estrogen receptor positive    Cancer associated pain    Pathological fracture of lumbar vertebra with routine healing    Bone metastases    Hot flashes    Prediabetes    BMI 60.0-69.9, adult    Elevated LDL cholesterol level    Anxiety    Vaginal dryness    Vitamin D deficiency    Grief    Hyperglycemia    Type 2 diabetes mellitus with hyperglycemia, without long-term current use of insulin    Benign paroxysmal positional vertigo    Neuropathy       Past Surgical History:   Procedure Laterality Date     SECTION      TONSILLECTOMY      TUBAL LIGATION      UTERINE FIBROID EMBOLIZATION         Family History   Problem Relation Age of Onset    Arthritis Mother     Diabetes Mother     Heart disease Mother         CHF, CAD , 2 stents    Hypertension Mother     Hyperlipidemia Mother     Heart failure Mother     No Known Problems Sister     Prostate cancer Father     Hypertension Brother     No Known Problems Daughter     Asthma Daughter     No Known Problems Maternal Grandmother     Cancer  Maternal Grandfather 79        abdominal origin?    No Known Problems Paternal Grandmother     No Known Problems Paternal Grandfather     Thyroid cancer Other         type? dx age?    Cancer Paternal Cousin         ovarian @ ?29 & cervical @ ?29    Endometriosis Paternal Cousin     Breast cancer Neg Hx     Ovarian cancer Neg Hx     Colon polyps Neg Hx        Social History     Tobacco Use   Smoking Status Never Smoker   Smokeless Tobacco Never Used       Social History     Social History Narrative    Patient is a pleasant AAF,  x2. She has excellent social support, although she states they worry more than she does. She has a 18 yo ([employer]) and an 20 yo ([facility] [employer]).    Former teacher English.    Disabled.       Medications have been reviewed and reconciled.     Review of patient's allergies indicates:   Allergen Reactions    Keflex [cephalexin] Itching        Review of Systems   Constitutional: Negative for chills, fatigue and fever.   Gastrointestinal: Positive for nausea. Negative for abdominal pain, constipation, diarrhea and vomiting.   Neurological: Positive for headaches.         Objective:        Vitals Unable to be Obtained    Physical Exam  Constitutional:       General: She is not in acute distress.     Appearance: Normal appearance.   HENT:      Head: Normocephalic and atraumatic.      Right Ear: External ear normal.      Left Ear: External ear normal.   Eyes:      General: No scleral icterus.     Extraocular Movements: Extraocular movements intact.      Conjunctiva/sclera: Conjunctivae normal.   Pulmonary:      Effort: Pulmonary effort is normal. No respiratory distress.      Comments: Speaking in full sentences without difficulty. No audible wheezing.  Musculoskeletal:         General: Normal range of motion.      Cervical back: Normal range of motion and neck supple.   Skin:     General: Skin is warm and dry.   Neurological:      Mental Status: She is alert and  oriented to person, place, and time.   Psychiatric:         Mood and Affect: Mood normal.         Behavior: Behavior normal.     Assessment:       1. Type 2 diabetes mellitus with hyperglycemia, without long-term current use of insulin    2. Malignant neoplasm of upper-inner quadrant of right breast in female, estrogen receptor positive    3. Bone metastases    4. Pathological fracture of lumbar vertebra with routine healing    5. Chronic combined systolic and diastolic heart failure    6. Cardiomyopathy, unspecified type    7. Primary hypertension    8. Pure hypercholesterolemia    9. BMI 60.0-69.9, adult    10. Major depressive disorder, recurrent episode, moderate with anxious distress        Plan:       Kristopher was seen today for diabetes.    Diagnoses and all orders for this visit:    Type 2 diabetes mellitus with hyperglycemia, without long-term current use of insulin  Comments:  A1c 8.5 11/3/2021. Cont Glipizide 10 mg BID + Trulicity 0.75 mg qwk. Repeat A1c 2/7/2022. Podiatry appt 3/2022. Previously referred to digital DM  Orders:  -     Hemoglobin A1C; Future    Malignant neoplasm of upper-inner quadrant of right breast in female, estrogen receptor positive  Comments:  management per heme/onc; rad/onc; pall care. Cont Ibrance, Arimidex, Zoladex and Xgeva    Bone metastases  Comments:  management per heme/onc; rad/onc; pall care. Cont Ibrance, Arimidex, Zoladex and Xgeva    Pathological fracture of lumbar vertebra with routine healing  Comments:  management per heme/onc; rad/onc; pall care. Cont Ibrance, Arimidex, Zoladex and Xgeva. MS contin 15 q8 prn and norco 5/325 prn pain.    Chronic combined systolic and diastolic heart failure  Comments:  EF 35% 1/21 and 5/21--need for AICD discussed with patient. She would like second opinion. Cardiology referral placed.    Cardiomyopathy, unspecified type  Comments:  continue carvedilol 25 BID and Entresto 49/51 BID. Management per cardiology    Primary  hypertension  Comments:  Controlled. Continue amlodipine 5, coreg 25, [id number]/51, and spironolactone 25     Pure hypercholesterolemia  Comments:  , TG 96, HDL 42, HDL 57.8 11/3/2021. Continue atorvastatin 40 mghs.    BMI 60.0-69.9, adult  Comments:  Not exercising d/t back pain and possibility of inciting another vertebral fracture. Focus on healthy diet. Low weight bearing activity as tolerated    Major depressive disorder, recurrent episode, moderate with anxious distress  Comments:  Cont Effexor  mg daily and psychotherapy with Dr. JESSIE Odom      Follow up pending A1c scheduled for 2/7/22. Needs eye exam; Micro/Cr; PPSV-23; Flu; COVID-19 .    Recommend COVID-19 testing if headache and nausea do not self-resolve within 3 days     Previous Visit(s)  =============  Benign paroxysmal positional vertigo, unspecified laterality  Comments:  Discussed natural course/prognosis. Advised modified Epley maneuvers. Provided in AVS.

## 2022-01-27 ENCOUNTER — PATIENT MESSAGE (OUTPATIENT)
Dept: ADMINISTRATIVE | Facility: OTHER | Age: 47
End: 2022-01-27

## 2022-01-31 ENCOUNTER — SPECIALTY PHARMACY (OUTPATIENT)
Dept: PHARMACY | Facility: CLINIC | Age: 47
End: 2022-01-31

## 2022-01-31 NOTE — TELEPHONE ENCOUNTER
Specialty Pharmacy - Refill Coordination    Specialty Medication Orders Linked to Encounter    Flowsheet Row Most Recent Value   Medication #1 palbociclib (IBRANCE) 125 mg Cap (Order#[id number], Rx#[id number])          Refill Questions - Documented Responses    Flowsheet Row Most Recent Value   Patient Availability and HIPAA Verification    Does patient want to proceed with activity? Yes   HIPAA/medical authority confirmed? Yes   Relationship to patient of person spoken to? Self   Refill Screening Questions    Changes to allergies? No   Changes to medications? No   New conditions since last clinic visit? No   Unplanned office visit, urgent care, ED, or hospital admission in the last 4 weeks? No   How does patient/caregiver feel medication is working? Good   Financial problems or insurance changes? No   How many doses of your specialty medications were missed in the last 4 weeks? 0   Would patient like to speak to a pharmacist? No   When does the patient need to receive the medication? 02/07/22   Refill Delivery Questions    How will the patient receive the medication? Delivery [facility]   When does the patient need to receive the medication? 02/07/22   Shipping Address Home   Address in [facility] confirmed and updated if neccessary? Yes   Expected Copay ($) 0   Is the patient able to afford the medication copay? Yes   Payment Method zero copay   Days supply of Refill 28   Supplies needed? No supplies needed   Refill activity completed? Yes   Refill activity plan Refill scheduled   Shipment/Pickup Date: 02/03/22          Current Outpatient Medications   Medication Sig    (Magic mouthwash) 1:1:1 Benadryl 12.5mg/5ml liq, aluminum & magnesium hydroxide-simehticone (Maalox), LIDOcaine viscous 2% Swish and spit 10 mLs every 4 (four) hours as needed. for mouth sores    albuterol (PROVENTIL/VENTOLIN HFA) 90 mcg/actuation inhaler Inhale 1-2 puffs into the lungs every 6 (six) hours as needed for Wheezing. Rescue     amLODIPine (NORVASC) 5 MG tablet Take 1 tablet (5 mg total) by mouth once daily.    anastrozole (ARIMIDEX) 1 mg Tab TAKE 1 TABLET(1 MG) BY MOUTH EVERY DAY    atorvastatin (LIPITOR) 40 MG tablet Take 1 tablet (40 mg total) by mouth once daily.    blood sugar diagnostic Strp To check BG 4 times daily, to use with insurance preferred meter    blood-glucose meter kit To check BG 4 times daily, to use with insurance preferred meter    carvediloL (COREG) 25 MG tablet Take 1 tablet (25 mg total) by mouth 2 (two) times daily.    dulaglutide (TRULICITY) 0.75 mg/0.5 mL pen injector Inject 0.75 mg into the skin every 7 days.    ENTRESTO 49-51 mg per tablet TAKE 1 TABLET BY MOUTH TWICE DAILY    ergocalciferol (ERGOCALCIFEROL) 50,000 unit Cap TAKE 1 CAPSULE BY MOUTH EVERY 7 DAYS    glipiZIDE (GLUCOTROL) 10 MG tablet Take 1 tablet (10 mg total) by mouth 2 (two) times daily before meals.    goserelin (ZOLADEX) 3.6 mg injection Inject 3.6 mg into the skin every 28 days.    HYDROcodone-acetaminophen (NORCO) 5-325 mg per tablet Take 1 tablet by mouth every 6 (six) hours as needed for Pain.    ibuprofen (ADVIL,MOTRIN) 800 MG tablet Take 1 tablet (800 mg total) by mouth 2 (two) times daily as needed for Pain.    lancets Misc To check BG 4 times daily, to use with insurance preferred meter    LIDOcaine (LIDODERM) 5 % Place 2 patches onto the skin once daily. Remove & Discard patch within 12 hours or as directed by MD    morphine (MS CONTIN) 15 MG 12 hr tablet Take 1 tablet (15 mg total) by mouth every 8 (eight) hours as needed for Pain.    ondansetron (ZOFRAN-ODT) 8 MG TbDL Take 1 tablet (8 mg total) by mouth every 8 (eight) hours as needed (nausea).    palbociclib (IBRANCE) 125 mg Cap Take 1 capsule (125 mg) by mouth as instructed Take as directed days 1-21 of each 28 day cycle. Take with food.    potassium chloride (KLOR-CON) 10 MEQ TbSR TAKE 1 TABLET(10 MEQ) BY MOUTH EVERY DAY    promethazine (PHENERGAN) 25 MG  tablet Take 1 tablet (25 mg total) by mouth every 6 (six) hours as needed for Nausea.    sennosides (SENNA-C ORAL) Take by mouth daily as needed.     spironolactone (ALDACTONE) 25 MG tablet Take 1 tablet (25 mg total) by mouth once daily.    venlafaxine (EFFEXOR-XR) 150 MG Cp24 TAKE 1 CAPSULE(150 MG) BY MOUTH EVERY DAY   Last reviewed on 1/20/2022  1:22 PM by Bria Odom, PhD    Review of patient's allergies indicates:   Allergen Reactions    Keflex [cephalexin] Itching    Last reviewed on  1/26/2022 3:37 PM by Vito Walker      Tasks added this encounter   2/28/2022 - Refill Call (Auto Added)   Tasks due within next 3 months   2/24/2022 - Clinical - Follow Up Assesement (90 day)     [facility]  [address] [place] [id number]  Phone: [phone]  Fax: [phone]

## 2022-02-01 ENCOUNTER — HOSPITAL ENCOUNTER (OUTPATIENT)
Dept: RADIOLOGY | Facility: OTHER | Age: 47
Discharge: HOME | End: 2022-02-01
Attending: INTERNAL MEDICINE
Payer: MEDICAID

## 2022-02-01 DIAGNOSIS — Z17.0 MALIGNANT NEOPLASM OF UPPER-INNER QUADRANT OF RIGHT BREAST IN FEMALE, ESTROGEN RECEPTOR POSITIVE: ICD-10-CM

## 2022-02-01 DIAGNOSIS — C50.211 MALIGNANT NEOPLASM OF UPPER-INNER QUADRANT OF RIGHT BREAST IN FEMALE, ESTROGEN RECEPTOR POSITIVE: ICD-10-CM

## 2022-02-01 DIAGNOSIS — C79.51 BONE METASTASES: ICD-10-CM

## 2022-02-01 PROCEDURE — 25500020 PHARM REV CODE 255: Performed by: INTERNAL MEDICINE

## 2022-02-01 PROCEDURE — 71260 CT CHEST ABDOMEN PELVIS WITH CONTRAST (XPD): ICD-10-PCS | Mod: 26,,, | Performed by: RADIOLOGY

## 2022-02-01 PROCEDURE — 74177 CT CHEST ABDOMEN PELVIS WITH CONTRAST (XPD): ICD-10-PCS | Mod: 26,,, | Performed by: RADIOLOGY

## 2022-02-01 PROCEDURE — 74177 CT ABD & PELVIS W/CONTRAST: CPT | Mod: 26,,, | Performed by: RADIOLOGY

## 2022-02-01 PROCEDURE — 74177 CT ABD & PELVIS W/CONTRAST: CPT | Mod: TC

## 2022-02-01 PROCEDURE — A9698 NON-RAD CONTRAST MATERIALNOC: HCPCS | Performed by: INTERNAL MEDICINE

## 2022-02-01 PROCEDURE — 71260 CT THORAX DX C+: CPT | Mod: TC

## 2022-02-01 PROCEDURE — 71260 CT THORAX DX C+: CPT | Mod: 26,,, | Performed by: RADIOLOGY

## 2022-02-01 RX ADMIN — IOHEXOL 100 ML: 350 INJECTION, SOLUTION INTRAVENOUS at 04:02

## 2022-02-01 RX ADMIN — IOHEXOL 1000 ML: 12 SOLUTION ORAL at 03:02

## 2022-02-02 ENCOUNTER — HOSPITAL ENCOUNTER (OUTPATIENT)
Dept: RADIOLOGY | Facility: HOSPITAL | Age: 47
Discharge: HOME | End: 2022-02-02
Attending: INTERNAL MEDICINE
Payer: MEDICAID

## 2022-02-02 DIAGNOSIS — C79.51 BONE METASTASES: ICD-10-CM

## 2022-02-02 DIAGNOSIS — C50.211 MALIGNANT NEOPLASM OF UPPER-INNER QUADRANT OF RIGHT BREAST IN FEMALE, ESTROGEN RECEPTOR POSITIVE: ICD-10-CM

## 2022-02-02 DIAGNOSIS — Z17.0 MALIGNANT NEOPLASM OF UPPER-INNER QUADRANT OF RIGHT BREAST IN FEMALE, ESTROGEN RECEPTOR POSITIVE: ICD-10-CM

## 2022-02-02 PROCEDURE — 78306 BONE IMAGING WHOLE BODY: CPT | Mod: TC

## 2022-02-02 PROCEDURE — A9503 TC99M MEDRONATE: HCPCS

## 2022-02-02 PROCEDURE — 78306 BONE IMAGING WHOLE BODY: CPT | Mod: 26,,, | Performed by: RADIOLOGY

## 2022-02-02 PROCEDURE — 78306 NM BONE SCAN WHOLE BODY: ICD-10-PCS | Mod: 26,,, | Performed by: RADIOLOGY

## 2022-02-07 ENCOUNTER — IMMUNIZATION (OUTPATIENT)
Dept: PHARMACY | Facility: CLINIC | Age: 47
End: 2022-02-07
Payer: MEDICAID

## 2022-02-07 ENCOUNTER — OFFICE VISIT (OUTPATIENT)
Dept: HEMATOLOGY/ONCOLOGY | Facility: CLINIC | Age: 47
End: 2022-02-07
Payer: MEDICAID

## 2022-02-07 ENCOUNTER — LAB VISIT (OUTPATIENT)
Dept: LAB | Facility: HOSPITAL | Age: 47
End: 2022-02-07
Attending: INTERNAL MEDICINE
Payer: MEDICAID

## 2022-02-07 ENCOUNTER — INFUSION (OUTPATIENT)
Dept: INFUSION THERAPY | Facility: HOSPITAL | Age: 47
End: 2022-02-07
Attending: INTERNAL MEDICINE
Payer: MEDICAID

## 2022-02-07 VITALS
BODY MASS INDEX: 50.02 KG/M2 | WEIGHT: 293 LBS | TEMPERATURE: 99 F | HEIGHT: 64 IN | SYSTOLIC BLOOD PRESSURE: 135 MMHG | HEART RATE: 95 BPM | OXYGEN SATURATION: 97 % | RESPIRATION RATE: 16 BRPM | DIASTOLIC BLOOD PRESSURE: 81 MMHG

## 2022-02-07 DIAGNOSIS — E11.65 TYPE 2 DIABETES MELLITUS WITH HYPERGLYCEMIA, WITHOUT LONG-TERM CURRENT USE OF INSULIN: ICD-10-CM

## 2022-02-07 DIAGNOSIS — C79.51 BONE METASTASES: ICD-10-CM

## 2022-02-07 DIAGNOSIS — Z17.0 MALIGNANT NEOPLASM OF UPPER-INNER QUADRANT OF RIGHT BREAST IN FEMALE, ESTROGEN RECEPTOR POSITIVE: Primary | ICD-10-CM

## 2022-02-07 DIAGNOSIS — C50.211 MALIGNANT NEOPLASM OF UPPER-INNER QUADRANT OF RIGHT BREAST IN FEMALE, ESTROGEN RECEPTOR POSITIVE: Primary | ICD-10-CM

## 2022-02-07 DIAGNOSIS — I42.0 DILATED CARDIOMYOPATHY: ICD-10-CM

## 2022-02-07 DIAGNOSIS — C50.211 MALIGNANT NEOPLASM OF UPPER-INNER QUADRANT OF RIGHT BREAST IN FEMALE, ESTROGEN RECEPTOR POSITIVE: ICD-10-CM

## 2022-02-07 DIAGNOSIS — F33.1 MAJOR DEPRESSIVE DISORDER, RECURRENT EPISODE, MODERATE WITH ANXIOUS DISTRESS: ICD-10-CM

## 2022-02-07 DIAGNOSIS — Z23 NEED FOR VACCINATION: Primary | ICD-10-CM

## 2022-02-07 DIAGNOSIS — Z17.0 MALIGNANT NEOPLASM OF UPPER-INNER QUADRANT OF RIGHT BREAST IN FEMALE, ESTROGEN RECEPTOR POSITIVE: ICD-10-CM

## 2022-02-07 DIAGNOSIS — E11.65 TYPE 2 DIABETES MELLITUS WITH HYPERGLYCEMIA, WITHOUT LONG-TERM CURRENT USE OF INSULIN: Chronic | ICD-10-CM

## 2022-02-07 LAB
ALBUMIN SERPL BCP-MCNC: 3.1 G/DL (ref 3.5–5.2)
ALP SERPL-CCNC: 84 U/L (ref 55–135)
ALT SERPL W/O P-5'-P-CCNC: 16 U/L (ref 10–44)
ANION GAP SERPL CALC-SCNC: 9 MMOL/L (ref 8–16)
AST SERPL-CCNC: 18 U/L (ref 10–40)
BASOPHILS # BLD AUTO: 0.05 K/UL (ref 0–0.2)
BASOPHILS NFR BLD: 1.4 % (ref 0–1.9)
BILIRUB SERPL-MCNC: 0.3 MG/DL (ref 0.1–1)
BUN SERPL-MCNC: 9 MG/DL (ref 6–20)
CALCIUM SERPL-MCNC: 9.3 MG/DL (ref 8.7–10.5)
CHLORIDE SERPL-SCNC: 107 MMOL/L (ref 95–110)
CO2 SERPL-SCNC: 25 MMOL/L (ref 23–29)
CREAT SERPL-MCNC: 0.9 MG/DL (ref 0.5–1.4)
DIFFERENTIAL METHOD: ABNORMAL
EOSINOPHIL # BLD AUTO: 0 K/UL (ref 0–0.5)
EOSINOPHIL NFR BLD: 1.1 % (ref 0–8)
ERYTHROCYTE [DISTWIDTH] IN BLOOD BY AUTOMATED COUNT: 21.2 % (ref 11.5–14.5)
EST. GFR  (AFRICAN AMERICAN): >60 ML/MIN/1.73 M^2
EST. GFR  (NON AFRICAN AMERICAN): >60 ML/MIN/1.73 M^2
GLUCOSE SERPL-MCNC: 180 MG/DL (ref 70–110)
HCT VFR BLD AUTO: 34.3 % (ref 37–48.5)
HGB BLD-MCNC: 10.3 G/DL (ref 12–16)
IMM GRANULOCYTES # BLD AUTO: 0.02 K/UL (ref 0–0.04)
IMM GRANULOCYTES NFR BLD AUTO: 0.5 % (ref 0–0.5)
LYMPHOCYTES # BLD AUTO: 1.1 K/UL (ref 1–4.8)
LYMPHOCYTES NFR BLD: 30 % (ref 18–48)
MCH RBC QN AUTO: 27.5 PG (ref 27–31)
MCHC RBC AUTO-ENTMCNC: 30 G/DL (ref 32–36)
MCV RBC AUTO: 92 FL (ref 82–98)
MONOCYTES # BLD AUTO: 0.4 K/UL (ref 0.3–1)
MONOCYTES NFR BLD: 10 % (ref 4–15)
NEUTROPHILS # BLD AUTO: 2.1 K/UL (ref 1.8–7.7)
NEUTROPHILS NFR BLD: 57 % (ref 38–73)
NRBC BLD-RTO: 1 /100 WBC
PLATELET # BLD AUTO: 271 K/UL (ref 150–450)
PMV BLD AUTO: 9.3 FL (ref 9.2–12.9)
POTASSIUM SERPL-SCNC: 4 MMOL/L (ref 3.5–5.1)
PROT SERPL-MCNC: 7.9 G/DL (ref 6–8.4)
RBC # BLD AUTO: 3.74 M/UL (ref 4–5.4)
SODIUM SERPL-SCNC: 141 MMOL/L (ref 136–145)
WBC # BLD AUTO: 3.7 K/UL (ref 3.9–12.7)

## 2022-02-07 PROCEDURE — 99999 PR PBB SHADOW E&M-EST. PATIENT-LVL V: CPT | Mod: PBBFAC,,, | Performed by: INTERNAL MEDICINE

## 2022-02-07 PROCEDURE — 63600175 PHARM REV CODE 636 W HCPCS: Mod: JG | Performed by: INTERNAL MEDICINE

## 2022-02-07 PROCEDURE — 1159F PR MEDICATION LIST DOCUMENTED IN MEDICAL RECORD: ICD-10-PCS | Mod: CPTII,,, | Performed by: INTERNAL MEDICINE

## 2022-02-07 PROCEDURE — 99999 PR PBB SHADOW E&M-EST. PATIENT-LVL V: ICD-10-PCS | Mod: PBBFAC,,, | Performed by: INTERNAL MEDICINE

## 2022-02-07 PROCEDURE — 99214 OFFICE O/P EST MOD 30 MIN: CPT | Mod: S$PBB,,, | Performed by: INTERNAL MEDICINE

## 2022-02-07 PROCEDURE — 3079F DIAST BP 80-89 MM HG: CPT | Mod: CPTII,,, | Performed by: INTERNAL MEDICINE

## 2022-02-07 PROCEDURE — 3052F HG A1C>EQUAL 8.0%<EQUAL 9.0%: CPT | Mod: CPTII,,, | Performed by: INTERNAL MEDICINE

## 2022-02-07 PROCEDURE — 80053 COMPREHEN METABOLIC PANEL: CPT | Performed by: INTERNAL MEDICINE

## 2022-02-07 PROCEDURE — 3008F BODY MASS INDEX DOCD: CPT | Mod: CPTII,,, | Performed by: INTERNAL MEDICINE

## 2022-02-07 PROCEDURE — 96372 THER/PROPH/DIAG INJ SC/IM: CPT

## 2022-02-07 PROCEDURE — 3052F PR MOST RECENT HEMOGLOBIN A1C LEVEL 8.0 - < 9.0%: ICD-10-PCS | Mod: CPTII,,, | Performed by: INTERNAL MEDICINE

## 2022-02-07 PROCEDURE — 99215 OFFICE O/P EST HI 40 MIN: CPT | Mod: PBBFAC,25 | Performed by: INTERNAL MEDICINE

## 2022-02-07 PROCEDURE — 99214 PR OFFICE/OUTPT VISIT, EST, LEVL IV, 30-39 MIN: ICD-10-PCS | Mod: S$PBB,,, | Performed by: INTERNAL MEDICINE

## 2022-02-07 PROCEDURE — 4010F ACE/ARB THERAPY RXD/TAKEN: CPT | Mod: CPTII,,, | Performed by: INTERNAL MEDICINE

## 2022-02-07 PROCEDURE — 1160F RVW MEDS BY RX/DR IN RCRD: CPT | Mod: CPTII,,, | Performed by: INTERNAL MEDICINE

## 2022-02-07 PROCEDURE — 3075F PR MOST RECENT SYSTOLIC BLOOD PRESS GE 130-139MM HG: ICD-10-PCS | Mod: CPTII,,, | Performed by: INTERNAL MEDICINE

## 2022-02-07 PROCEDURE — 3079F PR MOST RECENT DIASTOLIC BLOOD PRESSURE 80-89 MM HG: ICD-10-PCS | Mod: CPTII,,, | Performed by: INTERNAL MEDICINE

## 2022-02-07 PROCEDURE — 36415 COLL VENOUS BLD VENIPUNCTURE: CPT | Performed by: INTERNAL MEDICINE

## 2022-02-07 PROCEDURE — 1159F MED LIST DOCD IN RCRD: CPT | Mod: CPTII,,, | Performed by: INTERNAL MEDICINE

## 2022-02-07 PROCEDURE — 85025 COMPLETE CBC W/AUTO DIFF WBC: CPT | Performed by: INTERNAL MEDICINE

## 2022-02-07 PROCEDURE — 83036 HEMOGLOBIN GLYCOSYLATED A1C: CPT | Performed by: FAMILY MEDICINE

## 2022-02-07 PROCEDURE — 1160F PR REVIEW ALL MEDS BY PRESCRIBER/CLIN PHARMACIST DOCUMENTED: ICD-10-PCS | Mod: CPTII,,, | Performed by: INTERNAL MEDICINE

## 2022-02-07 PROCEDURE — 96402 CHEMO HORMON ANTINEOPL SQ/IM: CPT

## 2022-02-07 PROCEDURE — 4010F PR ACE/ARB THEARPY RXD/TAKEN: ICD-10-PCS | Mod: CPTII,,, | Performed by: INTERNAL MEDICINE

## 2022-02-07 PROCEDURE — 3008F PR BODY MASS INDEX (BMI) DOCUMENTED: ICD-10-PCS | Mod: CPTII,,, | Performed by: INTERNAL MEDICINE

## 2022-02-07 PROCEDURE — 3075F SYST BP GE 130 - 139MM HG: CPT | Mod: CPTII,,, | Performed by: INTERNAL MEDICINE

## 2022-02-07 RX ADMIN — GOSERELIN ACETATE 3.6 MG: 3.6 IMPLANT SUBCUTANEOUS at 04:02

## 2022-02-07 RX ADMIN — DENOSUMAB 120 MG: 120 INJECTION SUBCUTANEOUS at 04:02

## 2022-02-07 NOTE — PROGRESS NOTES
Subjective:       Patient ID: Kristopher Ye is a 46 y.o. female.    Chief Complaint: No chief complaint on file.    HPI     Reports worsening dyspnea  Notes winded with minimal activity- noted x several months- reports sees another cardiologist at [facility] later this week    Last ECHO 5/2021   · Moderate left atrial enlargement.  · The left ventricle is mildly enlarged with eccentric hypertrophy and moderately decreased systolic function.  · The estimated ejection fraction is 35%.  · It is not possible to calculate the strain values due to poor visualization of the myocardium.  · Grade I left ventricular diastolic dysfunction.  · Normal central venous pressure (3 mmHg).    Notes a pressure at times goes to her ear x 1 week  Fleeting    Notes an overwhelming sadness- was talking to her brother about work dad did to house and burst into tears  Watched a tik tok about teachers- felt she used to be a safe haven and this also made her cry  Feels like it is getting worse    Recent Imaging:  - 2/2/2022 Bone scan:  FINDINGS:  No focal abnormal uptake suggestive of osseous metastases.  There is diffusely increased uptake in the calvarium in keeping with hyperostosis.  There is degenerative type uptake most prominent in the bilateral shoulders and knees.  The focal uptake previously described in the distal right femur is not visualized.  There is otherwise physiologic distribution of the radiopharmaceutical throughout the skeleton.  There is normal uptake in the genitourinary system and soft tissues.  Impression:  There is no scintigraphic evidence of osteoblastic metastatic disease.  Nonspecific uptake in the distal right femur has resolved.  Diffuse cranial hyperostosis and other findings as above.     - 2/1/2022 CT C/A/P:  FINDINGS:  CHEST  Support tubes and lines: None.  Aorta: Normal caliber.  Heart: Normal size..  Coronary arteries: No calcifications.  Pericardium: Normal. No effusion, thickening, or  calcification.  Central pulmonary arteries: Normal caliber.  Base of neck/thyroid: Normal.  Lymph nodes: No supraclavicular, axillary, internal mammary, mediastinal or hilar lymphadenopathy.  Esophagus: Normal.  Pleura: No effusion, thickening or calcification.  Body wall: Unremarkable.  Airways: Normal.  Lungs: Clear without focal or diffuse abnormality.  Bones: Sclerotic lesions are seen throughout the spine as well as in the sternum, not substantially changed from 10/25/2021  ABDOMEN/PELVIS  Liver: Unremarkable  Gallbladder/bile ducts: Unremarkable. No intra or extrahepatic biliary ductal dilatation  Pancreas: Unremarkable.  Spleen: Unremarkable.  Adrenals: Unremarkable.  Kidneys: Simple cyst in the right kidney is unchanged  Lymph nodes: No abdominal or pelvic lymphadenopathy.  Bowel and mesentery: Unremarkable.  Abdominal aorta: Unremarkable.  Inferior vena cava: Unremarkable.  Free fluid or free air: None.  Pelvis: Unremarkable.  Urinary bladder: Unremarkable.  Body wall: Unremarkable.  Bones: Sclerotic lesions seen in the spine are unchanged.  Impression:  1. No evidence of new recurrent or metastatic disease.  2. Sclerotic lesions seen in the spine and sternum, unchanged when compared to 10/25/2021.    Returns for follow up  Better BG control  Reports neuropathy daily that comes and goes- legs, hands  Fleeting when it occurs  States back pain was better after XRT but still present  No other pain issues noted  Weight stable -- trying to lose and controlling portions  Has not been able to exercise     Currently on Arimidex and Ibrance   Reports dizziness today and mild congestion=- BP and GG normal  Asked to Covid swab  Diarrhea resolved  No fever/chills.   No HA  No dizziness.   No leg weakness  No incontinence.   No swelling.   Dry skin  She is in WC as she gest breathless on exertion. She ambulates with walker at home.      Due  xgeva and zoladex   She is taking Vit D.         Taking Effexor and is able to  sleep at night.   She follows with Dr. Martin for vaginal dryness -- sometimes itches              Per Dr. Lei's previous note: Reason For Follow Up:   1. Stage IV ([id number]) invasive ductal carcinoma of right breast, upper inner quadrant, ER %, TN neg, Her2 neg, Grade 3, multifocal with one lesion appearing more aggressive (Ki67 80%), large LN +, bone mets.     HPI:   Kristopher Ye presents for follow up of breast cancer.   Remains on Ibrance, Zoladex, Arimidex.      Oncologic History:  Presentation  - 9/11/19 - Screening mammogram showed multiple right breast masses lower inner quadrant  - 9/13/19 - Diagnostic mammogram and US showed a right breast, 3:00 position mass, 2 CFN measuring 17 mm x 16 mm x 14 mm.  There 2 smaller adjacent masses towards the nipple  - 9/19/19 - Biopsy -               A. Right breast subareolar: Grade 3 IDC, estrogen receptor 80%, progesterone receptor 0%, Her2 dago neg, Ki67 80%.                           B. Right breast mass 3:00: Grade 3 IDC with papillary features, estrogen receptor 100%, progesterone receptor 0%, Her2 dago 1+, Ki67 30%.              - 9/26/19: US right axilla with abnormal lymphadenopathy measuring 4.3 x 2.8 cm with biopsy + for metastatic breast cancer.   Surgery consultation with Dr Ferris on 9/25/19              - 9/25/19 - Genetics [facility] Ignacioalysis pending; VUS pending  Medical oncology consultation on 10/7/19              * 10/8/19 - CT C/A/P with several lytic lesions in thoracic and lumbar spine, iliac bones, left scapula in addition to 3 x 4.5 cm right axillary node and 2.1 cm right breast mass. Bone scan negative.              * 10/31/19 - started Ibrance, Arimidex, Zoladex; plan for Xgeva.               * 11/12/19 STRATA without targetable mutations.               * 12/16/19 - Bone biopsy + for met disease (initially read as negative, but we treated as metastatic disease given high suspicion).               * MRI brain:  ""Partially empty sella.  Question bilateral globe proptosis. Otherwise unremarkable MRI brain as detailed above specifically without evidence for intracranial enhancing lesion to suggest metastatic disease."              * 4/22/2020 PET - disease improvement. 8/2020 PET with disease improvement.               *  9/1/20 - Palliative RT to L1-L4     Of note, * Xgeva monthly  - we had been waiting on dental clearance, but she has been unable to get into dentistry and is accepting of risks. Has no active dental disease.      PET scan 4/22/2021:  FINDINGS:  Quality of the study: Mildly degraded due to patient's large body habitus and skin abutting the gantry.  In the head and neck, there are no hypermetabolic lesions worrisome for malignancy. There are no hypermetabolic mucosal lesions, and there are no pathologically enlarged or hypermetabolic lymph nodes.  In the chest, there are no hypermetabolic lesions worrisome for malignancy.  Stable CT appearance of a level 1 right axillary lymph node measuring 1.3 cm in short axis with normal background radiotracer uptake.  Previously with SUV max of 2.1.  There are no concerning pulmonary nodules or masses, and there are no pathologically enlarged or hypermetabolic lymph nodes.  In the abdomen and pelvis, there is physiologic tracer distribution within the abdominal organs and excretion into the genitourinary system.  In the bones, there are stable lytic lesions throughout the lumbar spine and pelvis and additional stable sclerotic lesions in the sternum and T3 vertebral body.  No associated focal abnormal increased radiotracer uptake.  Findings likely represent treated disease.  Impression:  Interval decreased uptake within a prominent right axillary lymph node, now with normal background uptake.  No new focal abnormal uptake.  Stable lytic and sclerotic lesions without focal abnormal uptake.  Findings are compatible with treated metastasis.      8/25/2021 MRI brain "   Impression:  No significant change from prior specifically without evidence for enhancing lesion to suggest intracranial metastatic disease.  Continued partially empty sella, intracranial hypertension to be included in differential in the appropriate clinical setting.  Clinical correlation and further evaluation as warranted.     10/14/2021 MRI thoracic/lumbar spine:  Impression:  Patient history of metastatic breast cancer.  Bone lesions at T10, L2, L3, and right iliac wing, as above, concerning for metastatic disease.  No pathologic fracture, soft tissue component, or epidural extension identified.      10/25/2021 CT chest/abd/pelvis:  Impression:  1.  Stable metastasis of the spine  No evidence of intra-abdominal or intrathoracic metastatic disease  2.  Stable prominent right axillary lymph node.  3.  Additional findings are detailed above.     FH:  Paternal side:  1st cousin- ovarian cancer (diagnosed at age 26) and her mother has thyroid cancer  Maternal grandmother- some type of likely metastatic GI cancer    Review of Systems   Constitutional: Negative for activity change, appetite change, fatigue and unexpected weight change (expected weight loss).   HENT: Negative for mouth sores.    Eyes: Negative for visual disturbance.   Respiratory: Positive for shortness of breath. Negative for cough and wheezing.    Cardiovascular: Negative for chest pain, palpitations and leg swelling.   Gastrointestinal: Negative for abdominal pain, change in bowel habit, constipation, diarrhea, nausea, vomiting and change in bowel habit.   Genitourinary: Negative for difficulty urinating, dysuria and frequency.   Musculoskeletal: Negative for arthralgias, back pain and neck stiffness.   Integumentary:  Negative for rash.   Neurological: Negative for dizziness, weakness, light-headedness, numbness and headaches.   Hematological: Negative for adenopathy.   Psychiatric/Behavioral: Positive for dysphoric mood. The patient is  nervous/anxious.          Objective:      Physical Exam  Vitals and nursing note reviewed.   Constitutional:       General: She is not in acute distress.     Appearance: Normal appearance. She is well-developed. She is obese.      Comments: Presents with daughter  ECOG= 1  Wheelchair today   HENT:      Head: Normocephalic and atraumatic.   Eyes:      General: Lids are normal. No scleral icterus.     Conjunctiva/sclera: Conjunctivae normal.      Pupils: Pupils are equal, round, and reactive to light.   Neck:      Thyroid: No thyromegaly.      Vascular: No JVD.      Trachea: Trachea normal.   Cardiovascular:      Rate and Rhythm: Normal rate and regular rhythm.      Heart sounds: Normal heart sounds.      Comments: Difficult to assess tones.   Pulmonary:      Effort: Pulmonary effort is normal. No respiratory distress.      Breath sounds: Normal breath sounds. No wheezing, rhonchi or rales.      Comments: distant  Chest:   Breasts:      Right: No axillary adenopathy or supraclavicular adenopathy.      Left: No axillary adenopathy or supraclavicular adenopathy.       Abdominal:      General: Bowel sounds are normal. There is no distension.      Palpations: Abdomen is soft. There is no mass.      Tenderness: There is no abdominal tenderness.      Comments: No organomegaly however difficult to assess.     Musculoskeletal:         General: Normal range of motion.      Cervical back: Normal range of motion and neck supple.      Comments: No spinal or paraspinal tenderness.    Lymphadenopathy:      Head:      Right side of head: No submental or submandibular adenopathy.      Left side of head: No submental or submandibular adenopathy.      Cervical: No cervical adenopathy.      Upper Body:      Right upper body: No supraclavicular or axillary adenopathy.      Left upper body: No supraclavicular or axillary adenopathy.   Skin:     General: Skin is warm and dry.      Capillary Refill: Capillary refill takes less than 2  seconds.      Coloration: Skin is not jaundiced or pale.      Findings: No bruising or rash.      Nails: There is no clubbing.   Neurological:      Mental Status: She is alert and oriented to person, place, and time.      Sensory: No sensory deficit.      Motor: No weakness.      Coordination: Coordination normal.      Gait: Gait normal.      Comments: + dizziness   Psychiatric:         Mood and Affect: Mood normal.         Speech: Speech normal.         Behavior: Behavior normal.         Thought Content: Thought content normal.         Judgment: Judgment normal.         Assessment:       Problem List Items Addressed This Visit     Type 2 diabetes mellitus with hyperglycemia, without long-term current use of insulin (Chronic)    Malignant neoplasm of upper-inner quadrant of right breast in female, estrogen receptor positive - Primary    Major depressive disorder, recurrent episode, moderate with anxious distress    Cardiomyopathy    Bone metastases          Plan:     DM- better BG control  HTN- good control  CHF- has new cardiology follow up she reports    Breast cancer with bone metastases  Post XRT  Continue Ibrance, AI (was on arimidex prior to ibrance initiation with response) and Xgeva   Continue Lupron and Xgeva  Scans stable to improved     Anemia improved    Route Chart for Scheduling    Med Onc Chart Routing  Follow up with physician    Follow up with [facility]ILA 4 weeks. Chemo inj same day   Labs CBC and CMP   Lab interval: every 4 weeks     Imaging    Pharmacy appointment    Other referrals          Treatment Plan Information   OP ANASTROZOLE PALBOCICLIB Q4W   Jessica Mendez MD   Upcoming Treatment Dates - OP ANASTROZOLE PALBOCICLIB Q4W    No upcoming days in selected categories.    Supportive Plan Information  OP BREAST GOSERELIN & DENOSUMAB Q4W   Jessica Mendez MD   Upcoming Treatment Dates - OP BREAST GOSERELIN & DENOSUMAB Q4W    2/7/2022       Chemotherapy       denosumab (XGEVA) solution 120 mg        goserelin (ZOLADEX) injection 3.6 mg

## 2022-02-08 ENCOUNTER — PATIENT MESSAGE (OUTPATIENT)
Dept: PRIMARY CARE CLINIC | Facility: CLINIC | Age: 47
End: 2022-02-08

## 2022-02-08 LAB
ESTIMATED AVG GLUCOSE: 206 MG/DL (ref 68–131)
HBA1C MFR BLD: 8.8 % (ref 4–5.6)

## 2022-02-09 ENCOUNTER — OFFICE VISIT (OUTPATIENT)
Dept: PSYCHIATRY | Facility: CLINIC | Age: 47
End: 2022-02-09
Payer: MEDICAID

## 2022-02-09 ENCOUNTER — PATIENT MESSAGE (OUTPATIENT)
Dept: PSYCHIATRY | Facility: CLINIC | Age: 47
End: 2022-02-09

## 2022-02-09 ENCOUNTER — TELEPHONE (OUTPATIENT)
Dept: INFUSION THERAPY | Facility: HOSPITAL | Age: 47
End: 2022-02-09

## 2022-02-09 ENCOUNTER — PATIENT MESSAGE (OUTPATIENT)
Dept: PRIMARY CARE CLINIC | Facility: CLINIC | Age: 47
End: 2022-02-09

## 2022-02-09 DIAGNOSIS — C50.211 MALIGNANT NEOPLASM OF UPPER-INNER QUADRANT OF RIGHT BREAST IN FEMALE, ESTROGEN RECEPTOR POSITIVE: ICD-10-CM

## 2022-02-09 DIAGNOSIS — Z17.0 MALIGNANT NEOPLASM OF UPPER-INNER QUADRANT OF RIGHT BREAST IN FEMALE, ESTROGEN RECEPTOR POSITIVE: ICD-10-CM

## 2022-02-09 DIAGNOSIS — F33.1 MAJOR DEPRESSIVE DISORDER, RECURRENT EPISODE, MODERATE WITH ANXIOUS DISTRESS: Primary | ICD-10-CM

## 2022-02-09 PROCEDURE — 1159F PR MEDICATION LIST DOCUMENTED IN MEDICAL RECORD: ICD-10-PCS | Mod: HP,HB,CPTII,95 | Performed by: PSYCHOLOGIST

## 2022-02-09 PROCEDURE — 3052F HG A1C>EQUAL 8.0%<EQUAL 9.0%: CPT | Mod: HP,HB,CPTII,95 | Performed by: PSYCHOLOGIST

## 2022-02-09 PROCEDURE — 90834 PR PSYCHOTHERAPY W/PATIENT, 45 MIN: ICD-10-PCS | Mod: HP,HB,95, | Performed by: PSYCHOLOGIST

## 2022-02-09 PROCEDURE — 3052F PR MOST RECENT HEMOGLOBIN A1C LEVEL 8.0 - < 9.0%: ICD-10-PCS | Mod: HP,HB,CPTII,95 | Performed by: PSYCHOLOGIST

## 2022-02-09 PROCEDURE — 4010F PR ACE/ARB THEARPY RXD/TAKEN: ICD-10-PCS | Mod: HP,HB,CPTII,95 | Performed by: PSYCHOLOGIST

## 2022-02-09 PROCEDURE — 4010F ACE/ARB THERAPY RXD/TAKEN: CPT | Mod: HP,HB,CPTII,95 | Performed by: PSYCHOLOGIST

## 2022-02-09 PROCEDURE — 1159F MED LIST DOCD IN RCRD: CPT | Mod: HP,HB,CPTII,95 | Performed by: PSYCHOLOGIST

## 2022-02-09 PROCEDURE — 90834 PSYTX W PT 45 MINUTES: CPT | Mod: HP,HB,95, | Performed by: PSYCHOLOGIST

## 2022-02-09 NOTE — PROGRESS NOTES
INFORMED CONSENT: Kristopher Ye   is known to this provider and identity was confirmed via NAME and .  The patient has been informed of the risks and benefits associated with engaging in psychotherapy, the handling of protected health information, the rights of privacy and the limits of confidentiality. The patient has also been informed of the importance of reporting any suicidal or homicidal ideation to this or any provider to ensure safety of all parties, and the Kristopher Ye expressed understanding. The patient was agreeable to these terms and freely participates in individual psychotherapy.    Telemedicine PSYCHO-ONCOLOGY NOTE/ Individual Psychotherapy     Consultation started: 2022 at 1:01 PM   The chief complaint leading to consultation is: depression/ anxiety  The patient location is: [place],   Patient home  Virtual visit with synchronous audio and video   Patient alone at the time of consultation    Crisis Disclaimer: Patient was informed that due to the virtual nature of the visit, that if a crisis develops, protocols will be implemented to ensure patient safety, including but not limited to: 1) Initiating a welfare check with local Law Enforcement, 2) Calling [facility]/National Crisis Hotline, and/or 3) Initiating PEC/CEC procedures.      Each patient provided medical services by telemedicine is:  (1) informed of the relationship between the physician and patient and the respective role of any other health care provider with respect to management of the patient; and (2) notified that he or she may decline to receive medical services by telemedicine and may withdraw from such care at any time.    Date: 2022   Site of therapist:  [facility]         Therapeutic Intervention: Met with patient.  Outpatient - Behavior modifying psychotherapy 45 min - CPT code 69456    Referring provider:    Chief complaint/reason for encounter: depression and anxiety   Met with patient to evaluate  psychosocial adaptation to survivorship of Stave IV BC    Patient was last seen by me on 1/20/2022    Objective:      Kristopher Ye arrived promptly for the session. The patient was fully cooperative throughout the session.  Appearance: age appropriate, appropriately  dressed, adequately  groomed  Behavior/Cooperation: friendly and cooperative  Speech: normal in rate, volume, and tone and appropriate quality, quantity and organization of sentences  Mood: euthymic  Affect: mood congruent  Thought Process: goal-directed, logical  Thought Content: normal,  No delusions or paranoia; did not appear to be responding to internal stimuli during the session  Orientation: grossly intact  Memory: Grossly intact  Attention Span/Concentration: Attends to session without distraction; reports no difficulty  Fund of Knowledge: average  Estimate of Intelligence: average from verbal skills and history  Cognition: grossly intact  Insight: patient has awareness of illness; good insight into own behavior and behavior of others  Judgment: the patient's behavior is adequate to circumstances      Interval history and content of current session: Patient not feeling well following 3rd injection of COVID.  Patient noted that she is sleeping better with new routine. Discussed recent sad episode related to grief. Discussed diagnosis, treatment, prognosis, current adaptation to disease and treatment status and family's adaptation to disease and treatment status. Reports to be coping with great difficulty. Evaluated cognitive response, paying particular attention to negative intrusive thoughts of a persistent and detrimental nature. Thoughts of this type are in evidence with moderate distress. Identified and evaluated psychosocial and environmental stressors secondary to diagnosis and treatment.     Focused on providing cognitive behavioral therapy to address negative cognitions. Examined proactive behaviors that may be implemented to  minimize or ameliorate psychosocial stressors secondary to diagnosis and treatment.     Risk parameters:   Patient reports no suicidal ideation  Patient reports no homicidal ideation  Patient reports no self-injurious behavior  Patient reports no violent behavior   Safety needs:  None at this time      Verbal deficits: None     Patient's response to intervention:The patient's response to intervention is accepting.     Progress toward goals and other mental status changes:  The patient's progress toward goals is good.      Progress to date:Progress as Expected      Goals from last visit: Met      Patient reported outcomes:      Distress Thermometer:   Distress Score    Distress Score: 7        Practical Problems Physical Problems   : No Appearance: Yes   Housing: No Bathing / Dressing: Yes   Insurance / Financial: No Breathing: Yes    Transportation: No  Changes in Urination: No    Work / School: No  Constipation: No   Treatment Decisions: No  Diarrhea: No     Eating: No    Family Problems Fatigue: Yes    Dealing with Children: No Feeling Swollen: No    Dealing with Partner: No Fevers: No    Ability to Have Children: No  Getting Around: Yes       Indigestion: No     Memory / Concentration: No   Emotional Problems Mouth Sores: Yes    Depression: Yes  Nausea: Yes    Fears: Yes  Nose Dry / Congested: No    Nervousness: Yes  Pain: Yes    Sadness: Yes Sexual: No    Worry: Yes Skin Dry / Itchy: Yes    Loss of Interest in Usual Activities: Yes Sleep: No     Substance Abuse: No    Spiritual/Religions Concerns Tingling in Hands / Feet: Yes   Spritual / Jew Concerns: No         Other Problems             PHQ ANSWERS    Q1. Little interest or pleasure in doing things: (P) Several days (02/09/22 1231)  Q2. Feeling down, depressed, or hopeless: (P) Several days (02/09/22 1231)  Q3. Trouble falling or staying asleep, or sleeping too much: (P) Several days (02/09/22 1231)  Q4. Feeling tired or having little energy:  (P) Several days (02/09/22 1231)  Q5. Poor appetite or overeating: (P) Several days (02/09/22 1231)  Q6. Feeling bad about yourself - or that you are a failure or have let yourself or your family down: (P) Not at all (02/09/22 1231)  Q7. Trouble concentrating on things, such as reading the newspaper or watching television: (P) Several days (02/09/22 1231)  Q8. Moving or speaking so slowly that other people could have noticed. Or the opposite - being so fidgety or restless that you have been moving around a lot more than usual: (P) Several days (02/09/22 1231)  Q9.  No SI    PHQ8 Score : (P) 7 (02/09/22 1231)  PHQ-9 Total Score: (P) 7 (02/09/22 1231)     HUY-7 Answers    GAD7 2/9/2022   1. Feeling nervous, anxious, or on edge? 1   2. Not being able to stop or control worrying? 1   3. Worrying too much about different things? 1   4. Trouble relaxing? 1   5. Being so restless that it is hard to sit still? 1   6. Becoming easily annoyed or irritable? 1   7. Feeling afraid as if something awful might happen? 1   HUY-7 Score 7     HUY-7 Score: (P) 7  Interpretation: (P) Mild Anxiety       Client Strengths: verbal, intelligent, successful, good social support, good insight, commitment to wellness, strong jd, strong cultural traditions      ICD-10-CM ICD-9-CM   1. Major depressive disorder, recurrent episode, moderate with anxious distress  F33.1 296.32   2. Malignant neoplasm of upper-inner quadrant of right breast in female, estrogen receptor positive  C50.211 174.2    Z17.0 V86.0        Treatment Plan:individual psychotherapy and medication management by physician Referral to psychaitry  · Target symptoms: depression, anxiety , grief  · Why chosen therapy is appropriate versus another modality: relevant to diagnosis, patient responds to this modality, evidence based practice  · Outcome monitoring methods: self-report, observation, checklist/rating scale  · Therapeutic intervention type: behavior modifying  psychotherapy  · Prognosis: Good       Behavioral goals:    Exercise:   Stress management: Recommend patient continue with grief processing- has historically been avoidant vacillating to obsessive with emotional response to grief.   Social engagement:   Nutrition:   Smoking Cessation:   Therapy: Inform Onc team about current symptoms; Try Grief Group    Return to clinic: 2 weeks     Next Session:  Values Card Sort     Length of Service (minutes direct face-to-face contact): 45          Bria Odom, PhD  Clinical Psychologist  [place] License #[id number]  [place] License #[id number]

## 2022-02-10 ENCOUNTER — TELEPHONE (OUTPATIENT)
Dept: HEMATOLOGY/ONCOLOGY | Facility: CLINIC | Age: 47
End: 2022-02-10

## 2022-02-10 ENCOUNTER — PATIENT OUTREACH (OUTPATIENT)
Dept: ADMINISTRATIVE | Facility: OTHER | Age: 47
End: 2022-02-10

## 2022-02-10 DIAGNOSIS — Z12.11 ENCOUNTER FOR FIT (FECAL IMMUNOCHEMICAL TEST) SCREENING: Primary | ICD-10-CM

## 2022-02-10 NOTE — TELEPHONE ENCOUNTER
Phoned and spoke with patient.  She reports feeling better than she has over the past couple of days.  No fever currently but still feeling a bit achy.  Reminded her to continue monitoring and to contact the clinic if she develops any new symptoms.  Patient verbalized understanding

## 2022-02-10 NOTE — PROGRESS NOTES
Care Everywhere: updated  Immunization: updated  Health Maintenance: updated  Media Review: review for outside mammogram, eye exam and colon cancer report   Legacy Review:   DIS:  Order placed: fit kit, mammogram  Upcoming appts:  EFAX:  Task Tickets:  Referrals:

## 2022-02-11 ENCOUNTER — TELEPHONE (OUTPATIENT)
Dept: HEMATOLOGY/ONCOLOGY | Facility: CLINIC | Age: 47
End: 2022-02-11

## 2022-02-11 ENCOUNTER — OFFICE VISIT (OUTPATIENT)
Dept: CARDIOLOGY | Facility: CLINIC | Age: 47
End: 2022-02-11
Payer: MEDICAID

## 2022-02-11 VITALS
BODY MASS INDEX: 50.02 KG/M2 | DIASTOLIC BLOOD PRESSURE: 76 MMHG | HEIGHT: 64 IN | HEART RATE: 75 BPM | OXYGEN SATURATION: 96 % | SYSTOLIC BLOOD PRESSURE: 122 MMHG | WEIGHT: 293 LBS

## 2022-02-11 DIAGNOSIS — C79.51 BONE METASTASES: ICD-10-CM

## 2022-02-11 DIAGNOSIS — I50.42 CHRONIC COMBINED SYSTOLIC AND DIASTOLIC HEART FAILURE: Primary | ICD-10-CM

## 2022-02-11 DIAGNOSIS — E78.00 PURE HYPERCHOLESTEROLEMIA: ICD-10-CM

## 2022-02-11 DIAGNOSIS — C50.211 MALIGNANT NEOPLASM OF UPPER-INNER QUADRANT OF RIGHT BREAST IN FEMALE, ESTROGEN RECEPTOR POSITIVE: ICD-10-CM

## 2022-02-11 DIAGNOSIS — I10 PRIMARY HYPERTENSION: ICD-10-CM

## 2022-02-11 DIAGNOSIS — Z17.0 MALIGNANT NEOPLASM OF UPPER-INNER QUADRANT OF RIGHT BREAST IN FEMALE, ESTROGEN RECEPTOR POSITIVE: ICD-10-CM

## 2022-02-11 DIAGNOSIS — I42.0 DILATED CARDIOMYOPATHY: ICD-10-CM

## 2022-02-11 PROCEDURE — 1159F MED LIST DOCD IN RCRD: CPT | Mod: CPTII,S$GLB,, | Performed by: INTERNAL MEDICINE

## 2022-02-11 PROCEDURE — 99214 OFFICE O/P EST MOD 30 MIN: CPT | Mod: S$GLB,,, | Performed by: INTERNAL MEDICINE

## 2022-02-11 PROCEDURE — 3074F PR MOST RECENT SYSTOLIC BLOOD PRESSURE < 130 MM HG: ICD-10-PCS | Mod: CPTII,S$GLB,, | Performed by: INTERNAL MEDICINE

## 2022-02-11 PROCEDURE — 3078F PR MOST RECENT DIASTOLIC BLOOD PRESSURE < 80 MM HG: ICD-10-PCS | Mod: CPTII,S$GLB,, | Performed by: INTERNAL MEDICINE

## 2022-02-11 PROCEDURE — 3008F PR BODY MASS INDEX (BMI) DOCUMENTED: ICD-10-PCS | Mod: CPTII,S$GLB,, | Performed by: INTERNAL MEDICINE

## 2022-02-11 PROCEDURE — 99214 PR OFFICE/OUTPT VISIT, EST, LEVL IV, 30-39 MIN: ICD-10-PCS | Mod: S$GLB,,, | Performed by: INTERNAL MEDICINE

## 2022-02-11 PROCEDURE — 1159F PR MEDICATION LIST DOCUMENTED IN MEDICAL RECORD: ICD-10-PCS | Mod: CPTII,S$GLB,, | Performed by: INTERNAL MEDICINE

## 2022-02-11 PROCEDURE — 3074F SYST BP LT 130 MM HG: CPT | Mod: CPTII,S$GLB,, | Performed by: INTERNAL MEDICINE

## 2022-02-11 PROCEDURE — 4010F PR ACE/ARB THEARPY RXD/TAKEN: ICD-10-PCS | Mod: CPTII,S$GLB,, | Performed by: INTERNAL MEDICINE

## 2022-02-11 PROCEDURE — 3052F PR MOST RECENT HEMOGLOBIN A1C LEVEL 8.0 - < 9.0%: ICD-10-PCS | Mod: CPTII,S$GLB,, | Performed by: INTERNAL MEDICINE

## 2022-02-11 PROCEDURE — 4010F ACE/ARB THERAPY RXD/TAKEN: CPT | Mod: CPTII,S$GLB,, | Performed by: INTERNAL MEDICINE

## 2022-02-11 PROCEDURE — 3008F BODY MASS INDEX DOCD: CPT | Mod: CPTII,S$GLB,, | Performed by: INTERNAL MEDICINE

## 2022-02-11 PROCEDURE — 3052F HG A1C>EQUAL 8.0%<EQUAL 9.0%: CPT | Mod: CPTII,S$GLB,, | Performed by: INTERNAL MEDICINE

## 2022-02-11 PROCEDURE — 3078F DIAST BP <80 MM HG: CPT | Mod: CPTII,S$GLB,, | Performed by: INTERNAL MEDICINE

## 2022-02-11 RX ORDER — SACUBITRIL AND VALSARTAN 97; 103 MG/1; MG/1
1 TABLET, FILM COATED ORAL 2 TIMES DAILY
Qty: 60 TABLET | Refills: 11 | Status: SHIPPED | OUTPATIENT
Start: 2022-02-11 | End: 2023-02-17

## 2022-02-11 NOTE — TELEPHONE ENCOUNTER
----- Message from Alfredo Bhatt NP sent at 2/10/2022  9:04 AM CST -----  1100- please make her an hour.   Thanks    ----- Message -----  From: Hemalatha Coates  Sent: 2/9/2022   5:05 PM CST  To: Alfredo Bhatt NP, Hemalatha Coates, #    Good afternoon,  Please advise on an appt time on 3/7 for PJ. I have the pt's injection on this day scheduled for 3:45, but I'm not too worried about that.    4 weeks PJ--chemo same day  cbc and cmp every 4 weeks    Thanks,  Hemalatha Coates

## 2022-02-13 NOTE — PROGRESS NOTES
Outpatient Psychiatry Initial Visit (MD/NP) - Telemedicine Visit    2022   Kristopher Ye  1975  [id number]       The patient location is: Patient reported that her location at the time of this visit was in the [facility]     Emergency contact: Garett taveras, [phone]      Visit type: audiovisual      Each patient to whom he or she provides medical services by telemedicine is:  (1) informed of the relationship between the physician and patient and the respective role of any other health care provider with respect to management of the patient; and (2) notified that he or she may decline to receive medical services by telemedicine and may withdraw from such care at any time.    Kristopher Ye, a 46 y.o. female, presenting for initial evaluation visit. Met with patient.    Reason for Encounter: self-referral. Patient complains of depression, grief, and anxiety.      History of Present Illness:     Pt is a 46 y.o. female with a hx of DM II, CHF, stage 4 breast cancer, anxiety, MDD and grief.    Pt logged in on time for this visit.     Pt is here for evaluation of depression, grief, and anxiety.     Pt presents with complaint of symptoms related to grief. Her parents  within 6 months of each other last year. Her mother  unexpectedly and her father  6 months later from cancer. She was their caregiver and they were a major part of her support system. She now lives in their home with her two adult daughters. She doesn't feel like she can really talk to them about how much she is grieving. Pt endorses depressed mood, anhedonia, decreased energy and motivation, low self-esteem, poor concentration, isolative bx, fatigue, and hopelessness. Pt complains of symptoms of anxiety including nervousness, uncontrolled worry, inability to relax, restlessness, being easily fatigued, difficulty concentrating, irritability, and muscle tension. These sxs began after she was dx'd with breast  "cancer in 2019. The sxs worsened after her parents . 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 She enjoyed it very much and misses it. She feels this contributes to her depression.    She has been on Effexor  mg x 2 years and feels like the medication is helpful. States "I don't get rattled like I used to. I feel calmer."    Pt denies recurrent thoughts of death and denies any suicidal or homicidal plans or intentions.      No objective s/sx of psychosis or keri.     Amber Odom, PhD for therapy.    Discussed risks, benefits and SE profile of medication options.       Risk Parameters:  Patient reports no suicidal ideation  Patient reports no homicidal ideation  Patient reports no self-injurious behavior  Patient reports no violent behavior      Psychotropic medication review  Previous Trials-  Klonopin  Valium  Xanax  Vistaril  Celexa - interacts with one of her cancer meds  Wellbutrin - took for wt loss; made her "sad"  Prozac    Current meds-  Effexor XR        Stressors:    Loss (of Whom or What): both of her parents and Health problems      History:   Past Psychiatric History:   Previous psych tx: yes - Dr. JESSIE Sher  Previous psychiatric hospitalizations: denies  Previous suicide attempts: denies  Previous non-suicidal self-harming bxs: denies  History of violence: denies  Other pertinent history including trauma and legal hx: Denies   ETOH: denies  Drugs: denies  Caffeine intake: none  Access to a gun:  denies       Additional historical information includes:   Seizure: denies  Head trauma/TBI: denies      Past Medical, Family and Social History: The patient's past medical, family and social history, allergies, current medications, past surgical history, and problem list have been reviewed and updated as appropriate within the electronic medical record.          Current Outpatient Medications   Medication Sig Dispense Refill    (Magic mouthwash) 1:1:1 Benadryl " 12.5mg/5ml liq, aluminum & magnesium hydroxide-simehticone (Maalox), LIDOcaine viscous 2% Swish and spit 10 mLs every 4 (four) hours as needed. for mouth sores 360 mL 0    anastrozole (ARIMIDEX) 1 mg Tab TAKE 1 TABLET(1 MG) BY MOUTH EVERY DAY 90 tablet 3    atorvastatin (LIPITOR) 40 MG tablet Take 1 tablet (40 mg total) by mouth once daily. 90 tablet 3    blood sugar diagnostic Strp To check BG 4 times daily, to use with insurance preferred meter 200 each 5    blood-glucose meter kit To check BG 4 times daily, to use with insurance preferred meter 1 each 0    carvediloL (COREG) 25 MG tablet Take 1 tablet (25 mg total) by mouth 2 (two) times daily. 180 tablet 3    dulaglutide (TRULICITY) 0.75 mg/0.5 mL pen injector Inject 0.75 mg into the skin every 7 days. 4 pen 2    ergocalciferol (ERGOCALCIFEROL) 50,000 unit Cap TAKE 1 CAPSULE BY MOUTH EVERY 7 DAYS 12 capsule 0    glipiZIDE (GLUCOTROL) 10 MG tablet Take 1 tablet (10 mg total) by mouth 2 (two) times daily before meals. 180 tablet 3    goserelin (ZOLADEX) 3.6 mg injection Inject 3.6 mg into the skin every 28 days.      HYDROcodone-acetaminophen (NORCO) 5-325 mg per tablet Take 1 tablet by mouth every 6 (six) hours as needed for Pain. 30 tablet 0    ibuprofen (ADVIL,MOTRIN) 800 MG tablet Take 1 tablet (800 mg total) by mouth 2 (two) times daily as needed for Pain. 45 tablet 2    lancets Misc To check BG 4 times daily, to use with insurance preferred meter 200 each 5    LIDOcaine (LIDODERM) 5 % Place 2 patches onto the skin once daily. Remove & Discard patch within 12 hours or as directed by MD 60 patch 0    morphine (MS CONTIN) 15 MG 12 hr tablet Take 1 tablet (15 mg total) by mouth every 8 (eight) hours as needed for Pain. 60 tablet 0    ondansetron (ZOFRAN-ODT) 8 MG TbDL Take 1 tablet (8 mg total) by mouth every 8 (eight) hours as needed (nausea). 30 tablet 2    palbociclib (IBRANCE) 125 mg Cap Take 1 capsule (125 mg) by mouth as instructed Take as  directed days 1-21 of each 28 day cycle. Take with food. 21 capsule 6    potassium chloride (KLOR-CON) 10 MEQ TbSR TAKE 1 TABLET(10 MEQ) BY MOUTH EVERY DAY 30 tablet 2    promethazine (PHENERGAN) 25 MG tablet Take 1 tablet (25 mg total) by mouth every 6 (six) hours as needed for Nausea. 30 tablet 1    sacubitriL-valsartan (ENTRESTO)  mg per tablet Take 1 tablet by mouth 2 (two) times daily. 60 tablet 11    sennosides (SENNA-C ORAL) Take by mouth daily as needed.       venlafaxine (EFFEXOR-XR) 150 MG Cp24 TAKE 1 CAPSULE(150 MG) BY MOUTH EVERY DAY 30 capsule 2     No current facility-administered medications for this visit.           Review Of Systems:       Review of Systems   Constitutional: Negative for chills, fever and malaise/fatigue.   Respiratory: Negative for cough and shortness of breath.    Cardiovascular: Negative for chest pain and palpitations.   Gastrointestinal: Negative for abdominal pain, diarrhea and vomiting.   Musculoskeletal: Negative for falls and myalgias.   Skin: Negative for rash.   Neurological: Negative for tremors, seizures and headaches.   Psychiatric/Behavioral:        See HPI          Current Evaluation:       Constitutional  Vitals:  Most recent vital signs, dated less than 90 days prior to this appointment, were reviewed.    There were no vitals filed for this visit.     General:  unremarkable, age appropriate, well nourished, casually dressed, neatly groomed, obese       Musculoskeletal  Muscle Strength/Tone:  not examined - NISREEN due to virtual visit   Gait & Station:  NISREEN due to virtual visit      Relevant Elements of Neurological Exam: NISREEN due to virtual visit      Mental Status Evaluation:  Appearance:  unremarkable, age appropriate, well nourished, casually dressed, neatly groomed, obese   Behavior:  normal, friendly and cooperative, eye contact normal   Speech:  no latency; no press   Mood:  dysthymic, tearful at times   Affect:  congruent and appropriate   Thought  Process:  normal and logical   Thought Content:  normal, no suicidality, no homicidality, delusions, or paranoia   Sensorium:  grossly intact   Cognition:  grossly intact and fund of knowledge intact and appropriate to age and level of education   Insight:  intact, has awareness of illness   Judgment:  behavior is adequate to circumstances, age appropriate       Functioning in Relationships:  Spouse/Partner/Family: supportive siblings and her two adult daughters  Peers: she reports she is isolative since the COVID quarantine  Employers: unemployed/disabled      Labs: reviewed most recent labs    Laboratory Data  Lab Visit on 02/07/2022   Component Date Value Ref Range Status    WBC 02/07/2022 3.70* 3.90 - 12.70 K/uL Final    RBC 02/07/2022 3.74* 4.00 - 5.40 M/uL Final    Hemoglobin 02/07/2022 10.3* 12.0 - 16.0 g/dL Final    Hematocrit 02/07/2022 34.3* 37.0 - 48.5 % Final    MCV 02/07/2022 92  82 - 98 fL Final    MCH 02/07/2022 27.5  27.0 - 31.0 pg Final    MCHC 02/07/2022 30.0* 32.0 - 36.0 g/dL Final    RDW 02/07/2022 21.2* 11.5 - 14.5 % Final    Platelets 02/07/2022 271  150 - 450 K/uL Final    MPV 02/07/2022 9.3  9.2 - 12.9 fL Final    Immature Granulocytes 02/07/2022 0.5  0.0 - 0.5 % Final    Gran # (ANC) 02/07/2022 2.1  1.8 - 7.7 K/uL Final    Immature Grans (Abs) 02/07/2022 0.02  0.00 - 0.04 K/uL Final    Lymph # 02/07/2022 1.1  1.0 - 4.8 K/uL Final    Mono # 02/07/2022 0.4  0.3 - 1.0 K/uL Final    Eos # 02/07/2022 0.0  0.0 - 0.5 K/uL Final    Baso # 02/07/2022 0.05  0.00 - 0.20 K/uL Final    nRBC 02/07/2022 1* 0 /100 WBC Final    Gran % 02/07/2022 57.0  38.0 - 73.0 % Final    Lymph % 02/07/2022 30.0  18.0 - 48.0 % Final    Mono % 02/07/2022 10.0  4.0 - 15.0 % Final    Eosinophil % 02/07/2022 1.1  0.0 - 8.0 % Final    Basophil % 02/07/2022 1.4  0.0 - 1.9 % Final    Differential Method 02/07/2022 Automated   Final    Sodium 02/07/2022 141  136 - 145 mmol/L Final    Potassium 02/07/2022  4.0  3.5 - 5.1 mmol/L Final    Chloride 02/07/2022 107  95 - 110 mmol/L Final    CO2 02/07/2022 25  23 - 29 mmol/L Final    Glucose 02/07/2022 180* 70 - 110 mg/dL Final    BUN 02/07/2022 9  6 - 20 mg/dL Final    Creatinine 02/07/2022 0.9  0.5 - 1.4 mg/dL Final    Calcium 02/07/2022 9.3  8.7 - 10.5 mg/dL Final    Total Protein 02/07/2022 7.9  6.0 - 8.4 g/dL Final    Albumin 02/07/2022 3.1* 3.5 - 5.2 g/dL Final    Total Bilirubin 02/07/2022 0.3  0.1 - 1.0 mg/dL Final    Alkaline Phosphatase 02/07/2022 84  55 - 135 U/L Final    AST 02/07/2022 18  10 - 40 U/L Final    ALT 02/07/2022 16  10 - 44 U/L Final    Anion Gap 02/07/2022 9  8 - 16 mmol/L Final    eGFR if African American 02/07/2022 >60  >60 mL/min/1.73 m^2 Final    eGFR if non African American 02/07/2022 >60  >60 mL/min/1.73 m^2 Final    Hemoglobin A1C 02/07/2022 8.8* 4.0 - 5.6 % Final    Estimated Avg Glucose 02/07/2022 206* 68 - 131 mg/dL Final         Medications  Outpatient Encounter Medications as of 2/14/2022   Medication Sig Dispense Refill    (Magic mouthwash) 1:1:1 Benadryl 12.5mg/5ml liq, aluminum & magnesium hydroxide-simehticone (Maalox), LIDOcaine viscous 2% Swish and spit 10 mLs every 4 (four) hours as needed. for mouth sores 360 mL 0    anastrozole (ARIMIDEX) 1 mg Tab TAKE 1 TABLET(1 MG) BY MOUTH EVERY DAY 90 tablet 3    atorvastatin (LIPITOR) 40 MG tablet Take 1 tablet (40 mg total) by mouth once daily. 90 tablet 3    blood sugar diagnostic Strp To check BG 4 times daily, to use with insurance preferred meter 200 each 5    blood-glucose meter kit To check BG 4 times daily, to use with insurance preferred meter 1 each 0    carvediloL (COREG) 25 MG tablet Take 1 tablet (25 mg total) by mouth 2 (two) times daily. 180 tablet 3    dulaglutide (TRULICITY) 0.75 mg/0.5 mL pen injector Inject 0.75 mg into the skin every 7 days. 4 pen 2    ergocalciferol (ERGOCALCIFEROL) 50,000 unit Cap TAKE 1 CAPSULE BY MOUTH EVERY 7 DAYS 12  capsule 0    glipiZIDE (GLUCOTROL) 10 MG tablet Take 1 tablet (10 mg total) by mouth 2 (two) times daily before meals. 180 tablet 3    goserelin (ZOLADEX) 3.6 mg injection Inject 3.6 mg into the skin every 28 days.      HYDROcodone-acetaminophen (NORCO) 5-325 mg per tablet Take 1 tablet by mouth every 6 (six) hours as needed for Pain. 30 tablet 0    ibuprofen (ADVIL,MOTRIN) 800 MG tablet Take 1 tablet (800 mg total) by mouth 2 (two) times daily as needed for Pain. 45 tablet 2    lancets Misc To check BG 4 times daily, to use with insurance preferred meter 200 each 5    LIDOcaine (LIDODERM) 5 % Place 2 patches onto the skin once daily. Remove & Discard patch within 12 hours or as directed by MD 60 patch 0    morphine (MS CONTIN) 15 MG 12 hr tablet Take 1 tablet (15 mg total) by mouth every 8 (eight) hours as needed for Pain. 60 tablet 0    ondansetron (ZOFRAN-ODT) 8 MG TbDL Take 1 tablet (8 mg total) by mouth every 8 (eight) hours as needed (nausea). 30 tablet 2    palbociclib (IBRANCE) 125 mg Cap Take 1 capsule (125 mg) by mouth as instructed Take as directed days 1-21 of each 28 day cycle. Take with food. 21 capsule 6    potassium chloride (KLOR-CON) 10 MEQ TbSR TAKE 1 TABLET(10 MEQ) BY MOUTH EVERY DAY 30 tablet 2    promethazine (PHENERGAN) 25 MG tablet Take 1 tablet (25 mg total) by mouth every 6 (six) hours as needed for Nausea. 30 tablet 1    sacubitriL-valsartan (ENTRESTO)  mg per tablet Take 1 tablet by mouth 2 (two) times daily. 60 tablet 11    sennosides (SENNA-C ORAL) Take by mouth daily as needed.       venlafaxine (EFFEXOR-XR) 150 MG Cp24 TAKE 1 CAPSULE(150 MG) BY MOUTH EVERY DAY 30 capsule 2     No facility-administered encounter medications on file as of 2/14/2022.           Assessment - Diagnosis - Goals:     Impression:       ICD-10-CM ICD-9-CM   1. Major depressive disorder, recurrent episode, moderate with anxious distress  F33.1 296.32       Strengths and Liabilities: Strength:  Patient accepts guidance/feedback, Strength: Patient is expressive/articulate., Strength: Patient is intelligent., Strength: Patient is motivated for change., Strength: Patient has positive support network., Strength: Patient has reasonable judgment., Strength: Patient is stable., Liability: Patient has poor health., Liability: Patient lacks coping skills.    Treatment Goals:    Depression: decreasing worthlessness, increasing energy, increasing interest in usual activities, increasing motivation, increasing self-reward for positive behaviors (one/day), increasing self-reward for positive thoughts (one/day), increasing social contacts (three/week), reducing excessive guilt, reducing fatigue and reducing negative automatic thoughts    Anxiety: acquiring relapse prevention skills, eliminating assumptions about dangerousness of anxiety, positive value of worry, or other assumptions, eliminating avoidance, modifying schemata of threat/vulnerability/need for control, reducing negative automatic thoughts, reducing physical symptoms of anxiety and reducing time spent worrying (<30 minutes/day)      Treatment Plan/Recommendations:   · Medication Management:  · Consulted with JESSIE Bassett MD on this pt  · Increase Effexor XR to 225 mg q am  Will have pt return for an in person visit in 4 weeks to monitor BP and HR.  · Labs: most recent labs reviewed  · The treatment plan and follow up plan were reviewed with the patient.  · Discussed with patient informed consent, risks vs. benefits, alternative treatments, side effect profile and the inherent unpredictability of individual responses to treatments and all medications prescribed. The patient expresses understanding of the above and displays the capacity to agree with this current plan and had no other questions.   · Encouraged the patient to keep future appointments.   · Take medications as prescribed and abstain from substance use.   · Pt was told to present to ED or call  911 for SI/HI plan or intent, psychosis, or other psychiatric or medical emergency, and pt agrees to this and verbalized understanding.        Return to Clinic: 4 weeks      Face to Face time with patient: 55 minutes  Total time: 75 minutes of total time spent on the encounter, which includes face to face time and non-face to face time preparing to see the patient (eg, review of tests), Obtaining and/or reviewing separately obtained history, Documenting clinical information in the electronic or other health record, Independently interpreting results (not separately reported) and communicating results to the patient/family/caregiver, or Care coordination (not separately reported).         Vandana Parra, MSN, APRN, PMHNP-BC [facility]

## 2022-02-13 NOTE — PROGRESS NOTES
Subjective:   Chief Complaint: second opinion, Shortness of Breath, and Cardiomyopathy  Last Clinic Visit: New Patient for me previously has followed with Dr. Alissa Olivares, Yen Ahmadi, saw Dr. William mathews.    History of Present Illness: Kristopher Ye is a 46 y.o. lady with metastatic breast cancer, morbid obesity, hyperlipidemia, hypertension, nonischemic dilated cardiomyopathy EF 35%, who presents for cardiology evaluation discussion of ICD.  She has been followed over the past 8 years by Dr. Olivares, initially evaluated for hypertension, chest pain, and morbid obesity.  She has a history of low normal EF mildly dilated cardiomyopathy dating back as far as 2014, with a low normal EF in 2017. Unfortunately was diagnosed with breast cancer in 2019, and in workup for potential anthracycline therapy before she received any radiation or chemotherapy was found to have further depressed ejection fraction 30%.  Negative stress test from 2017, and multiple CT scans since showing no significant calcific atherosclerosis.  Currently on carvedilol 25 mg, Entresto 49 mg, amlodipine 5 mg. Does not use ibuprofen on a regular basis, and blood pressure is 120s/70s.  She also reports not taking spironolactone which is prescribed.  She reports intermittent lightheadedness/seeing stars, no syncope, denies any issue with elevated or low blood pressure at home.  Reports ongoing dyspnea on exertion walking from the car to the house, or into clinic today.  Most recent hemoglobin 10.7 up from 9. Does not check weight on a daily basis, does note discrepancy between scales 390 on our scale today, was 370 on another scale this past Monday.    Dx:  Metastatic breast cancer  Morbid obesity  Hyperlipidemia  Hypertension  Nonischemic dilated cardiomyopathy EF 35%, not related to chemotherapy    Medications:  Outpatient Encounter Medications as of 2/11/2022   Medication Sig Dispense Refill    (Magic mouthwash) 1:1:1 Benadryl 12.5mg/5ml  liq, aluminum & magnesium hydroxide-simehticone (Maalox), LIDOcaine viscous 2% Swish and spit 10 mLs every 4 (four) hours as needed. for mouth sores 360 mL 0    anastrozole (ARIMIDEX) 1 mg Tab TAKE 1 TABLET(1 MG) BY MOUTH EVERY DAY 90 tablet 3    atorvastatin (LIPITOR) 40 MG tablet Take 1 tablet (40 mg total) by mouth once daily. 90 tablet 3    blood sugar diagnostic Strp To check BG 4 times daily, to use with insurance preferred meter 200 each 5    blood-glucose meter kit To check BG 4 times daily, to use with insurance preferred meter 1 each 0    carvediloL (COREG) 25 MG tablet Take 1 tablet (25 mg total) by mouth 2 (two) times daily. 180 tablet 3    dulaglutide (TRULICITY) 0.75 mg/0.5 mL pen injector Inject 0.75 mg into the skin every 7 days. 4 pen 2    ergocalciferol (ERGOCALCIFEROL) 50,000 unit Cap TAKE 1 CAPSULE BY MOUTH EVERY 7 DAYS 12 capsule 0    glipiZIDE (GLUCOTROL) 10 MG tablet Take 1 tablet (10 mg total) by mouth 2 (two) times daily before meals. 180 tablet 3    goserelin (ZOLADEX) 3.6 mg injection Inject 3.6 mg into the skin every 28 days.      HYDROcodone-acetaminophen (NORCO) 5-325 mg per tablet Take 1 tablet by mouth every 6 (six) hours as needed for Pain. 30 tablet 0    ibuprofen (ADVIL,MOTRIN) 800 MG tablet Take 1 tablet (800 mg total) by mouth 2 (two) times daily as needed for Pain. 45 tablet 2    lancets Misc To check BG 4 times daily, to use with insurance preferred meter 200 each 5    LIDOcaine (LIDODERM) 5 % Place 2 patches onto the skin once daily. Remove & Discard patch within 12 hours or as directed by MD 60 patch 0    morphine (MS CONTIN) 15 MG 12 hr tablet Take 1 tablet (15 mg total) by mouth every 8 (eight) hours as needed for Pain. 60 tablet 0    ondansetron (ZOFRAN-ODT) 8 MG TbDL Take 1 tablet (8 mg total) by mouth every 8 (eight) hours as needed (nausea). 30 tablet 2    palbociclib (IBRANCE) 125 mg Cap Take 1 capsule (125 mg) by mouth as instructed Take as directed  "days 1-21 of each 28 day cycle. Take with food. 21 capsule 6    potassium chloride (KLOR-CON) 10 MEQ TbSR TAKE 1 TABLET(10 MEQ) BY MOUTH EVERY DAY 30 tablet 2    promethazine (PHENERGAN) 25 MG tablet Take 1 tablet (25 mg total) by mouth every 6 (six) hours as needed for Nausea. 30 tablet 1    sennosides (SENNA-C ORAL) Take by mouth daily as needed.       venlafaxine (EFFEXOR-XR) 150 MG Cp24 TAKE 1 CAPSULE(150 MG) BY MOUTH EVERY DAY 30 capsule 2    [DISCONTINUED] amLODIPine (NORVASC) 5 MG tablet Take 1 tablet (5 mg total) by mouth once daily. 30 tablet 11    [DISCONTINUED] ENTRESTO 49-51 mg per tablet TAKE 1 TABLET BY MOUTH TWICE DAILY 60 tablet 11    [DISCONTINUED] spironolactone (ALDACTONE) 25 MG tablet Take 1 tablet (25 mg total) by mouth once daily. 90 tablet 3    sacubitriL-valsartan (ENTRESTO)  mg per tablet Take 1 tablet by mouth 2 (two) times daily. 60 tablet 11    [DISCONTINUED] albuterol (PROVENTIL/VENTOLIN HFA) 90 mcg/actuation inhaler Inhale 1-2 puffs into the lungs every 6 (six) hours as needed for Wheezing. Rescue 1 Inhaler 0     No facility-administered encounter medications on file as of 2/11/2022.     Social History:  Kristopher reports that she has never smoked. She has never used smokeless tobacco. She reports previous alcohol use. She reports that she does not use drugs.    Objective:   /76   Pulse 75   Ht 5' 4" (1.626 m)   Wt (!) 178.6 kg (393 lb 12.8 oz)   SpO2 96%   BMI 67.60 kg/m²     Physical Exam   Constitutional:   Morbidly obese   HENT:   Head: Normocephalic and atraumatic.   Mouth/Throat: Mucous membranes are moist.   Cardiovascular: Normal rate, regular rhythm and normal pulses. Exam reveals no gallop and no friction rub.   No murmur heard.  Distant heart sounds   Pulmonary/Chest: Effort normal. No stridor. She has no rhonchi. She has no rales. She exhibits no tenderness.   Abdominal: Normal appearance. She exhibits no distension.   Musculoskeletal:      Right " lower leg: No edema.      Left lower leg: No edema.      Comments: Mild bilateral ankle edema   Neurological: She is alert.   Skin: Skin is warm. Capillary refill takes less than 2 seconds.      EKG:  My independent visualization of most recent EKG is normal sinus rhythm, nonspecific ST changes    TTE:  05/03/2021  · Moderate left atrial enlargement.  · The left ventricle is mildly enlarged with eccentric hypertrophy and moderately decreased systolic function.  · The estimated ejection fraction is 35%.  · It is not possible to calculate the strain values due to poor visualization of the myocardium.  · Grade I left ventricular diastolic dysfunction.  · Normal central venous pressure (3 mmHg).     10/08/2019  · Moderate left ventricular enlargement.  · Moderately decreased left ventricular systolic function. The estimated ejection fraction is 30%.  · The left ventricular global longitudinal strain is -11.6%.  · Normal right ventricular systolic function.  · Grade III (severe) left ventricular diastolic dysfunction consistent with restrictive physiology.  · Mild left atrial enlargement.  · Normal central venous pressure (3 mm Hg).     08/25/2017  CONCLUSIONS     1 - Mildly depressed left ventricular systolic function (EF 45-50%).     2 - Concentric hypertrophy.     3 - Mild left ventricular enlargement.     4 - Impaired LV relaxation, normal LAP (grade 1 diastolic dysfunction).     5 - Trivial mitral regurgitation.     6 - Trivial tricuspid regurgitation.     7 - Trivial pulmonic regurgitation.   No evidence of stress induced myocardial ischemia.     04/16/2014  Left Ventricle: The left ventricle is mildly enlarged, with an end-diastolic diameter of 5.8 cm, and an end-systolic diameter of 4.2 cm. LV wall thickness is normal, with the septum and the posterior wall each measuring 1.1 cm across. Relative wall   thickness was normal at 0.38, and the LV mass index was increased at 126.9 g/m2 consistent with eccentric left  ventricular hypertrophy. Global left ventricular systolic function appears low normal to mildly depressed. Visually estimated ejection fraction    is 50-55%. The LV Doppler derived stroke volume equals 72.0 ccs.   The E/e'(lat) is 6, consistent with normal diastolic function.   CONCLUSIONS   No evidence of stress induced myocardial ischemia.     PYP  05/22/2020  Negative    Lipids:  Recent Labs   Lab 11/03/21  1529   LDL Cholesterol 57.8 L   HDL 42      Renal:  Recent Labs   Lab 02/07/22  0946   Creatinine 0.9   Potassium 4.0   CO2 25   BUN 9     Liver:  Recent Labs   Lab 02/07/22  0946   AST 18   ALT 16     Assessment:     1. Chronic combined systolic and diastolic heart failure    2. BMI 60.0-69.9, adult    3. Malignant neoplasm of upper-inner quadrant of right breast in female, estrogen receptor positive    4. Bone metastases    5. Pure hypercholesterolemia    6. Primary hypertension    7. Dilated cardiomyopathy      Plan:   1. Chronic combined systolic and diastolic heart failure  Will titrate up goal-directed medical therapy to 97 mg, cardiomyopathy is longstanding, and predates malignancy diagnosis.  Multiple CT scans without calcific atherosclerosis, suspect nonischemic.  Hopefully increase in Entresto will add some additional diuresis in so much as more aggressive loop diuretics not required at this time.  Volume status somewhat difficult to ascertain given body habitus.  Will obtain echo after Entresto up titration, as well as NT proBNP to help with volume status.    Discussed defibrillator therapy.  Cardiomyopathy has been relatively chronic, and nonischemic, no significant arrhythmia thus far.  Discussed prognosis with oncologist, and given risk benefit evaluation holding off on recommending defibrillator therapy at this time.  Concerning features would be development of any ventricular ectopy.    - Ambulatory referral/consult to Cardiology  - sacubitriL-valsartan (ENTRESTO)  mg per tablet; Take 1  tablet by mouth 2 (two) times daily.  Dispense: 60 tablet; Refill: 11  - Basic metabolic panel; Future  - Magnesium; Future  - BNP; Future  - NT-Pro Natriuretic Peptide; Future  - Echo; Future    2. BMI 60.0-69.9, adult  Encouraged weight loss    3. Malignant neoplasm of upper-inner quadrant of right breast in female, estrogen receptor positive      4. Bone metastases      5. Pure hypercholesterolemia      6. Primary hypertension      7. Dilated cardiomyopathy      Follow up in prn      Sathya Mccormick MD [facility]

## 2022-02-14 ENCOUNTER — OFFICE VISIT (OUTPATIENT)
Dept: PSYCHIATRY | Facility: CLINIC | Age: 47
End: 2022-02-14
Payer: MEDICAID

## 2022-02-14 DIAGNOSIS — F43.23 ADJUSTMENT DISORDER WITH MIXED ANXIETY AND DEPRESSED MOOD: ICD-10-CM

## 2022-02-14 DIAGNOSIS — F33.1 MAJOR DEPRESSIVE DISORDER, RECURRENT EPISODE, MODERATE WITH ANXIOUS DISTRESS: Primary | ICD-10-CM

## 2022-02-14 DIAGNOSIS — C50.211 MALIGNANT NEOPLASM OF UPPER-INNER QUADRANT OF RIGHT BREAST IN FEMALE, ESTROGEN RECEPTOR POSITIVE: Primary | ICD-10-CM

## 2022-02-14 DIAGNOSIS — Z17.0 MALIGNANT NEOPLASM OF UPPER-INNER QUADRANT OF RIGHT BREAST IN FEMALE, ESTROGEN RECEPTOR POSITIVE: Primary | ICD-10-CM

## 2022-02-14 PROCEDURE — 4010F PR ACE/ARB THEARPY RXD/TAKEN: ICD-10-PCS | Mod: SA,HB,CPTII,95 | Performed by: NURSE PRACTITIONER

## 2022-02-14 PROCEDURE — 1160F PR REVIEW ALL MEDS BY PRESCRIBER/CLIN PHARMACIST DOCUMENTED: ICD-10-PCS | Mod: SA,HB,CPTII,95 | Performed by: NURSE PRACTITIONER

## 2022-02-14 PROCEDURE — 3052F PR MOST RECENT HEMOGLOBIN A1C LEVEL 8.0 - < 9.0%: ICD-10-PCS | Mod: SA,HB,CPTII,95 | Performed by: NURSE PRACTITIONER

## 2022-02-14 PROCEDURE — 1160F RVW MEDS BY RX/DR IN RCRD: CPT | Mod: SA,HB,CPTII,95 | Performed by: NURSE PRACTITIONER

## 2022-02-14 PROCEDURE — 1159F MED LIST DOCD IN RCRD: CPT | Mod: SA,HB,CPTII,95 | Performed by: NURSE PRACTITIONER

## 2022-02-14 PROCEDURE — 90792 PSYCH DIAG EVAL W/MED SRVCS: CPT | Mod: SA,HB,95, | Performed by: NURSE PRACTITIONER

## 2022-02-14 PROCEDURE — 4010F ACE/ARB THERAPY RXD/TAKEN: CPT | Mod: SA,HB,CPTII,95 | Performed by: NURSE PRACTITIONER

## 2022-02-14 PROCEDURE — 1159F PR MEDICATION LIST DOCUMENTED IN MEDICAL RECORD: ICD-10-PCS | Mod: SA,HB,CPTII,95 | Performed by: NURSE PRACTITIONER

## 2022-02-14 PROCEDURE — 3052F HG A1C>EQUAL 8.0%<EQUAL 9.0%: CPT | Mod: SA,HB,CPTII,95 | Performed by: NURSE PRACTITIONER

## 2022-02-14 PROCEDURE — 90792 PR PSYCHIATRIC DIAGNOSTIC EVALUATION W/MEDICAL SERVICES: ICD-10-PCS | Mod: SA,HB,95, | Performed by: NURSE PRACTITIONER

## 2022-02-15 ENCOUNTER — DOCUMENTATION ONLY (OUTPATIENT)
Dept: REHABILITATION | Facility: OTHER | Age: 47
End: 2022-02-15

## 2022-02-17 ENCOUNTER — PATIENT MESSAGE (OUTPATIENT)
Dept: PSYCHIATRY | Facility: CLINIC | Age: 47
End: 2022-02-17

## 2022-02-17 RX ORDER — VENLAFAXINE HYDROCHLORIDE 75 MG/1
75 CAPSULE, EXTENDED RELEASE ORAL DAILY
Qty: 30 CAPSULE | Refills: 2 | Status: SHIPPED | OUTPATIENT
Start: 2022-02-17 | End: 2022-05-30 | Stop reason: SDUPTHER

## 2022-02-17 RX ORDER — VENLAFAXINE HYDROCHLORIDE 150 MG/1
150 CAPSULE, EXTENDED RELEASE ORAL DAILY
Qty: 30 CAPSULE | Refills: 2 | Status: SHIPPED | OUTPATIENT
Start: 2022-02-17 | End: 2022-05-30 | Stop reason: SDUPTHER

## 2022-02-22 ENCOUNTER — PATIENT MESSAGE (OUTPATIENT)
Dept: PRIMARY CARE CLINIC | Facility: CLINIC | Age: 47
End: 2022-02-22

## 2022-03-03 ENCOUNTER — SPECIALTY PHARMACY (OUTPATIENT)
Dept: PHARMACY | Facility: CLINIC | Age: 47
End: 2022-03-03

## 2022-03-03 NOTE — TELEPHONE ENCOUNTER
Specialty Pharmacy - Refill Coordination    Specialty Medication Orders Linked to Encounter    Flowsheet Row Most Recent Value   Medication #1 palbociclib (IBRANCE) 125 mg Cap (Order#[id number], Rx#[id number])          Refill Questions - Documented Responses    Flowsheet Row Most Recent Value   Patient Availability and HIPAA Verification    Does patient want to proceed with activity? Yes   HIPAA/medical authority confirmed? Yes   Relationship to patient of person spoken to? Self   Refill Screening Questions    Changes to allergies? No   Changes to medications? --  [effexor]   New conditions since last clinic visit? No   Unplanned office visit, urgent care, ED, or hospital admission in the last 4 weeks? No   How does patient/caregiver feel medication is working? Good   Financial problems or insurance changes? No   How many doses of your specialty medications were missed in the last 4 weeks? 1   Why were doses missed? Simply forgot   Would patient like to speak to a pharmacist? No   When does the patient need to receive the medication? 03/07/22   Refill Delivery Questions    How will the patient receive the medication? Pickup   When does the patient need to receive the medication? 03/07/22   Expected Copay ($) 0   Is the patient able to afford the medication copay? Yes   Payment Method zero copay   Days supply of Refill 28   Supplies needed? No supplies needed   Refill activity completed? Yes   Refill activity plan Refill scheduled   Shipment/Pickup Date: 03/07/22          Current Outpatient Medications   Medication Sig    (Magic mouthwash) 1:1:1 Benadryl 12.5mg/5ml liq, aluminum & magnesium hydroxide-simehticone (Maalox), LIDOcaine viscous 2% Swish and spit 10 mLs every 4 (four) hours as needed. for mouth sores    anastrozole (ARIMIDEX) 1 mg Tab TAKE 1 TABLET(1 MG) BY MOUTH EVERY DAY    atorvastatin (LIPITOR) 40 MG tablet Take 1 tablet (40 mg total) by mouth once daily.    blood sugar diagnostic Strp To check BG  4 times daily, to use with insurance preferred meter    blood-glucose meter kit To check BG 4 times daily, to use with insurance preferred meter    carvediloL (COREG) 25 MG tablet Take 1 tablet (25 mg total) by mouth 2 (two) times daily.    dulaglutide (TRULICITY) 0.75 mg/0.5 mL pen injector Inject 0.75 mg into the skin every 7 days.    ergocalciferol (ERGOCALCIFEROL) 50,000 unit Cap TAKE 1 CAPSULE BY MOUTH EVERY 7 DAYS    glipiZIDE (GLUCOTROL) 10 MG tablet Take 1 tablet (10 mg total) by mouth 2 (two) times daily before meals.    goserelin (ZOLADEX) 3.6 mg injection Inject 3.6 mg into the skin every 28 days.    HYDROcodone-acetaminophen (NORCO) 5-325 mg per tablet Take 1 tablet by mouth every 6 (six) hours as needed for Pain.    ibuprofen (ADVIL,MOTRIN) 800 MG tablet Take 1 tablet (800 mg total) by mouth 2 (two) times daily as needed for Pain.    lancets Misc To check BG 4 times daily, to use with insurance preferred meter    LIDOcaine (LIDODERM) 5 % Place 2 patches onto the skin once daily. Remove & Discard patch within 12 hours or as directed by MD    morphine (MS CONTIN) 15 MG 12 hr tablet Take 1 tablet (15 mg total) by mouth every 8 (eight) hours as needed for Pain.    ondansetron (ZOFRAN-ODT) 8 MG TbDL Take 1 tablet (8 mg total) by mouth every 8 (eight) hours as needed (nausea).    palbociclib (IBRANCE) 125 mg Cap Take 1 capsule (125 mg) by mouth as instructed Take as directed days 1-21 of each 28 day cycle. Take with food.    potassium chloride (KLOR-CON) 10 MEQ TbSR TAKE 1 TABLET(10 MEQ) BY MOUTH EVERY DAY    promethazine (PHENERGAN) 25 MG tablet Take 1 tablet (25 mg total) by mouth every 6 (six) hours as needed for Nausea.    sacubitriL-valsartan (ENTRESTO)  mg per tablet Take 1 tablet by mouth 2 (two) times daily.    sennosides (SENNA-C ORAL) Take by mouth daily as needed.     venlafaxine (EFFEXOR-XR) 150 MG Cp24 Take 1 capsule (150 mg total) by mouth once daily.    venlafaxine  (EFFEXOR-XR) 75 MG 24 hr capsule Take 1 capsule (75 mg total) by mouth once daily. Take with 150 mg capsule for a total of 225 mg.   Last reviewed on 2/14/2022  2:05 PM by Vandana Parra, NP-C    Review of patient's allergies indicates:   Allergen Reactions    Keflex [cephalexin] Itching    Last reviewed on  2/17/2022 3:55 PM by Vandana Parra      Tasks added this encounter   3/28/2022 - Refill Call (Auto Added)  3/8/2022 - Pickup Reminder   Tasks due within next 3 months   2/24/2022 - Clinical - Follow Up Assesement (90 day)     Katherin Suarez, PharmD  [facility]  [address] [place] [id number]  Phone: [phone]  Fax: [phone]

## 2022-03-03 NOTE — TELEPHONE ENCOUNTER
Kristopher Ye is a 46 y.o. female, who is followed by the specialty pharmacy service for management and education of her Ibrance.  She has been on therapy with Ibrance since Oct. 2019.  I have reviewed her electronic medical record and current medication list and determined that specialty medication adjustment Is not needed at this time.    Patient has not experienced adverse events.  She Is adherent reporting 1 missed doses since last review.  Adherence has been encouraged with the following mechanism(s): directed education.  She is meeting goals of therapy and will continue treatment.    Follow Up Review 3/3/2022 1/31/2022 1/5/2022 12/3/2021   # of missed doses 1 0 0 -   Reason Simply forgot - - -   New Medications? (No Data) No No -   New Conditions? No No No -   New Allergies? No No No -   Medication Effective? Good Good Good -   Missed activities? - - - No   Urgent Care? - - - None   Some recent data might be hidden       Therapy is appropriate to continue.    Therapy is effective: Yes  Recommendations: none at this time.  Review Method: Patient Contact     Had 1 missed dose to change in routine. Counseled on adherence techniques.     Rae Cota, PharmD  [facility]  [address]  [facility] [address]  Phone: [phone]  Fax: [phone]

## 2022-03-07 ENCOUNTER — OFFICE VISIT (OUTPATIENT)
Dept: HEMATOLOGY/ONCOLOGY | Facility: CLINIC | Age: 47
End: 2022-03-07
Payer: MEDICAID

## 2022-03-07 ENCOUNTER — INFUSION (OUTPATIENT)
Dept: INFUSION THERAPY | Facility: HOSPITAL | Age: 47
End: 2022-03-07
Attending: INTERNAL MEDICINE
Payer: MEDICAID

## 2022-03-07 VITALS
DIASTOLIC BLOOD PRESSURE: 81 MMHG | HEIGHT: 64 IN | BODY MASS INDEX: 50.02 KG/M2 | TEMPERATURE: 97 F | WEIGHT: 293 LBS | RESPIRATION RATE: 18 BRPM | OXYGEN SATURATION: 97 % | SYSTOLIC BLOOD PRESSURE: 147 MMHG | HEART RATE: 74 BPM

## 2022-03-07 DIAGNOSIS — C79.51 BONE METASTASES: ICD-10-CM

## 2022-03-07 DIAGNOSIS — D70.1 CHEMOTHERAPY INDUCED NEUTROPENIA: ICD-10-CM

## 2022-03-07 DIAGNOSIS — E55.9 VITAMIN D DEFICIENCY: ICD-10-CM

## 2022-03-07 DIAGNOSIS — D64.81 ANTINEOPLASTIC CHEMOTHERAPY INDUCED ANEMIA: ICD-10-CM

## 2022-03-07 DIAGNOSIS — T45.1X5A ANTINEOPLASTIC CHEMOTHERAPY INDUCED ANEMIA: ICD-10-CM

## 2022-03-07 DIAGNOSIS — Z17.0 MALIGNANT NEOPLASM OF UPPER-INNER QUADRANT OF RIGHT BREAST IN FEMALE, ESTROGEN RECEPTOR POSITIVE: Primary | ICD-10-CM

## 2022-03-07 DIAGNOSIS — E11.65 TYPE 2 DIABETES MELLITUS WITH HYPERGLYCEMIA, WITHOUT LONG-TERM CURRENT USE OF INSULIN: Chronic | ICD-10-CM

## 2022-03-07 DIAGNOSIS — C50.211 MALIGNANT NEOPLASM OF UPPER-INNER QUADRANT OF RIGHT BREAST IN FEMALE, ESTROGEN RECEPTOR POSITIVE: Primary | ICD-10-CM

## 2022-03-07 DIAGNOSIS — T45.1X5A CHEMOTHERAPY INDUCED NEUTROPENIA: ICD-10-CM

## 2022-03-07 DIAGNOSIS — I50.42 CHRONIC COMBINED SYSTOLIC AND DIASTOLIC HEART FAILURE: ICD-10-CM

## 2022-03-07 DIAGNOSIS — G89.3 CANCER ASSOCIATED PAIN: ICD-10-CM

## 2022-03-07 PROCEDURE — 99999 PR PBB SHADOW E&M-EST. PATIENT-LVL V: ICD-10-PCS | Mod: PBBFAC,,, | Performed by: NURSE PRACTITIONER

## 2022-03-07 PROCEDURE — 3079F DIAST BP 80-89 MM HG: CPT | Mod: CPTII,,, | Performed by: NURSE PRACTITIONER

## 2022-03-07 PROCEDURE — 99999 PR PBB SHADOW E&M-EST. PATIENT-LVL V: CPT | Mod: PBBFAC,,, | Performed by: NURSE PRACTITIONER

## 2022-03-07 PROCEDURE — 3077F PR MOST RECENT SYSTOLIC BLOOD PRESSURE >= 140 MM HG: ICD-10-PCS | Mod: CPTII,,, | Performed by: NURSE PRACTITIONER

## 2022-03-07 PROCEDURE — 3079F PR MOST RECENT DIASTOLIC BLOOD PRESSURE 80-89 MM HG: ICD-10-PCS | Mod: CPTII,,, | Performed by: NURSE PRACTITIONER

## 2022-03-07 PROCEDURE — 99215 PR OFFICE/OUTPT VISIT, EST, LEVL V, 40-54 MIN: ICD-10-PCS | Mod: S$PBB,,, | Performed by: NURSE PRACTITIONER

## 2022-03-07 PROCEDURE — 4010F ACE/ARB THERAPY RXD/TAKEN: CPT | Mod: CPTII,,, | Performed by: NURSE PRACTITIONER

## 2022-03-07 PROCEDURE — 3052F PR MOST RECENT HEMOGLOBIN A1C LEVEL 8.0 - < 9.0%: ICD-10-PCS | Mod: CPTII,,, | Performed by: NURSE PRACTITIONER

## 2022-03-07 PROCEDURE — 96372 THER/PROPH/DIAG INJ SC/IM: CPT

## 2022-03-07 PROCEDURE — 99215 OFFICE O/P EST HI 40 MIN: CPT | Mod: PBBFAC | Performed by: NURSE PRACTITIONER

## 2022-03-07 PROCEDURE — 3008F BODY MASS INDEX DOCD: CPT | Mod: CPTII,,, | Performed by: NURSE PRACTITIONER

## 2022-03-07 PROCEDURE — 63600175 PHARM REV CODE 636 W HCPCS: Mod: JG | Performed by: NURSE PRACTITIONER

## 2022-03-07 PROCEDURE — 4010F PR ACE/ARB THEARPY RXD/TAKEN: ICD-10-PCS | Mod: CPTII,,, | Performed by: NURSE PRACTITIONER

## 2022-03-07 PROCEDURE — 99215 OFFICE O/P EST HI 40 MIN: CPT | Mod: S$PBB,,, | Performed by: NURSE PRACTITIONER

## 2022-03-07 PROCEDURE — 96402 CHEMO HORMON ANTINEOPL SQ/IM: CPT

## 2022-03-07 PROCEDURE — 3052F HG A1C>EQUAL 8.0%<EQUAL 9.0%: CPT | Mod: CPTII,,, | Performed by: NURSE PRACTITIONER

## 2022-03-07 PROCEDURE — 3008F PR BODY MASS INDEX (BMI) DOCUMENTED: ICD-10-PCS | Mod: CPTII,,, | Performed by: NURSE PRACTITIONER

## 2022-03-07 PROCEDURE — 3077F SYST BP >= 140 MM HG: CPT | Mod: CPTII,,, | Performed by: NURSE PRACTITIONER

## 2022-03-07 RX ORDER — ERGOCALCIFEROL 1.25 MG/1
50000 CAPSULE ORAL
Qty: 12 CAPSULE | Refills: 0 | Status: SHIPPED | OUTPATIENT
Start: 2022-03-07 | End: 2022-05-19

## 2022-03-07 RX ADMIN — GOSERELIN ACETATE 3.6 MG: 3.6 IMPLANT SUBCUTANEOUS at 12:03

## 2022-03-07 RX ADMIN — DENOSUMAB 120 MG: 120 INJECTION SUBCUTANEOUS at 12:03

## 2022-03-07 NOTE — PROGRESS NOTES
"Subjective:       Patient ID: Kristopher Ye is a 46 y.o. female.    Chief Complaint: Malignant neoplasm of upper-inner quadrant of right breast     HPI     Here for follow up   Currently on Arimidex and Ibrance   Zoladex and Xgeva monthly    Overall feels well.   Reports neuropathy daily that comes and goes- legs, hands  Mid back pain x 2 months- takes IBP with some relief.   Reports pain better after XRT but continues to be present  Pain always there. "Dull" pain.   No radiation of pain.   No pain in legs  No numbness or tingling.   No leg weakness  No incontinence.   Avoids Morphine as causes sedation.  Norco causes headaches.   No other pain issues noted  Seeing cardiology regularly  - recent change in meds.   Breathing seems to be better since stopping amlodipine.   Next ECHo in May   She is taking Vit D.           Recent Imaging:  - 2/2/2022 Bone scan:  FINDINGS:  No focal abnormal uptake suggestive of osseous metastases.  There is diffusely increased uptake in the calvarium in keeping with hyperostosis.  There is degenerative type uptake most prominent in the bilateral shoulders and knees.  The focal uptake previously described in the distal right femur is not visualized.  There is otherwise physiologic distribution of the radiopharmaceutical throughout the skeleton.  There is normal uptake in the genitourinary system and soft tissues.  Impression:  There is no scintigraphic evidence of osteoblastic metastatic disease.  Nonspecific uptake in the distal right femur has resolved.  Diffuse cranial hyperostosis and other findings as above.     - 2/1/2022 CT C/A/P:  FINDINGS:  CHEST  Support tubes and lines: None.  Aorta: Normal caliber.  Heart: Normal size..  Coronary arteries: No calcifications.  Pericardium: Normal. No effusion, thickening, or calcification.  Central pulmonary arteries: Normal caliber.  Base of neck/thyroid: Normal.  Lymph nodes: No supraclavicular, axillary, internal mammary, " mediastinal or hilar lymphadenopathy.  Esophagus: Normal.  Pleura: No effusion, thickening or calcification.  Body wall: Unremarkable.  Airways: Normal.  Lungs: Clear without focal or diffuse abnormality.  Bones: Sclerotic lesions are seen throughout the spine as well as in the sternum, not substantially changed from 10/25/2021  ABDOMEN/PELVIS  Liver: Unremarkable  Gallbladder/bile ducts: Unremarkable. No intra or extrahepatic biliary ductal dilatation  Pancreas: Unremarkable.  Spleen: Unremarkable.  Adrenals: Unremarkable.  Kidneys: Simple cyst in the right kidney is unchanged  Lymph nodes: No abdominal or pelvic lymphadenopathy.  Bowel and mesentery: Unremarkable.  Abdominal aorta: Unremarkable.  Inferior vena cava: Unremarkable.  Free fluid or free air: None.  Pelvis: Unremarkable.  Urinary bladder: Unremarkable.  Body wall: Unremarkable.  Bones: Sclerotic lesions seen in the spine are unchanged.  Impression:  1. No evidence of new recurrent or metastatic disease.  2. Sclerotic lesions seen in the spine and sternum, unchanged when compared to 10/25/2021.      Last ECHO 5/2021   · Moderate left atrial enlargement.  · The left ventricle is mildly enlarged with eccentric hypertrophy and moderately decreased systolic function.  · The estimated ejection fraction is 35%.  · It is not possible to calculate the strain values due to poor visualization of the myocardium.  · Grade I left ventricular diastolic dysfunction.  · Normal central venous pressure (3 mmHg).             Per Dr. Lei's previous note: Reason For Follow Up:   1. Stage IV ([id number]) invasive ductal carcinoma of right breast, upper inner quadrant, ER %, CA neg, Her2 neg, Grade 3, multifocal with one lesion appearing more aggressive (Ki67 80%), large LN +, bone mets.       Oncologic History:  Presentation  - 9/11/19 - Screening mammogram showed multiple right breast masses lower inner quadrant  - 9/13/19 - Diagnostic mammogram and US showed a right  "breast, 3:00 position mass, 2 CFN measuring 17 mm x 16 mm x 14 mm.  There 2 smaller adjacent masses towards the nipple  - 9/19/19 - Biopsy -               A. Right breast subareolar: Grade 3 IDC, estrogen receptor 80%, progesterone receptor 0%, Her2 dago neg, Ki67 80%.                           B. Right breast mass 3:00: Grade 3 IDC with papillary features, estrogen receptor 100%, progesterone receptor 0%, Her2 dago 1+, Ki67 30%.              - 9/26/19: US right axilla with abnormal lymphadenopathy measuring 4.3 x 2.8 cm with biopsy + for metastatic breast cancer.   Surgery consultation with Dr Ferris on 9/25/19              - 9/25/19 - [facility]ACAnalysis pending; VUS pending  Medical oncology consultation on 10/7/19              * 10/8/19 - CT C/A/P with several lytic lesions in thoracic and lumbar spine, iliac bones, left scapula in addition to 3 x 4.5 cm right axillary node and 2.1 cm right breast mass. Bone scan negative.              * 10/31/19 - started Ibrance, Arimidex, Zoladex; plan for Xgeva.               * 11/12/19 STRATA without targetable mutations.               * 12/16/19 - Bone biopsy + for met disease (initially read as negative, but we treated as metastatic disease given high suspicion).               * MRI brain: "Partially empty sella.  Question bilateral globe proptosis. Otherwise unremarkable MRI brain as detailed above specifically without evidence for intracranial enhancing lesion to suggest metastatic disease."              * 4/22/2020 PET - disease improvement. 8/2020 PET with disease improvement.               *  9/1/20 - Palliative RT to L1-L4     Of note, * Xgeva monthly  - we had been waiting on dental clearance, but she has been unable to get into dentistry and is accepting of risks. Has no active dental disease.      PET scan 4/22/2021:  FINDINGS:  Quality of the study: Mildly degraded due to patient's large body habitus and skin abutting the gantry.  In the " head and neck, there are no hypermetabolic lesions worrisome for malignancy. There are no hypermetabolic mucosal lesions, and there are no pathologically enlarged or hypermetabolic lymph nodes.  In the chest, there are no hypermetabolic lesions worrisome for malignancy.  Stable CT appearance of a level 1 right axillary lymph node measuring 1.3 cm in short axis with normal background radiotracer uptake.  Previously with SUV max of 2.1.  There are no concerning pulmonary nodules or masses, and there are no pathologically enlarged or hypermetabolic lymph nodes.  In the abdomen and pelvis, there is physiologic tracer distribution within the abdominal organs and excretion into the genitourinary system.  In the bones, there are stable lytic lesions throughout the lumbar spine and pelvis and additional stable sclerotic lesions in the sternum and T3 vertebral body.  No associated focal abnormal increased radiotracer uptake.  Findings likely represent treated disease.  Impression:  Interval decreased uptake within a prominent right axillary lymph node, now with normal background uptake.  No new focal abnormal uptake.  Stable lytic and sclerotic lesions without focal abnormal uptake.  Findings are compatible with treated metastasis.      8/25/2021 MRI brain   Impression:  No significant change from prior specifically without evidence for enhancing lesion to suggest intracranial metastatic disease.  Continued partially empty sella, intracranial hypertension to be included in differential in the appropriate clinical setting.  Clinical correlation and further evaluation as warranted.     10/14/2021 MRI thoracic/lumbar spine:  Impression:  Patient history of metastatic breast cancer.  Bone lesions at T10, L2, L3, and right iliac wing, as above, concerning for metastatic disease.  No pathologic fracture, soft tissue component, or epidural extension identified.      10/25/2021 CT chest/abd/pelvis:  Impression:  1.  Stable  metastasis of the spine  No evidence of intra-abdominal or intrathoracic metastatic disease  2.  Stable prominent right axillary lymph node.  3.  Additional findings are detailed above.     FH:  Paternal side:  1st cousin- ovarian cancer (diagnosed at age 26) and her mother has thyroid cancer  Maternal grandmother- some type of likely metastatic GI cancer    Review of Systems   Constitutional:        See above   All other systems reviewed and are negative.        Objective:      Physical Exam  Vitals and nursing note reviewed.   Constitutional:       General: She is not in acute distress.     Appearance: Normal appearance. She is well-developed. She is obese.      Comments: Presents with daughter  ECOG= 1  Wheelchair today   HENT:      Head: Normocephalic and atraumatic.   Eyes:      General: Lids are normal. No scleral icterus.     Conjunctiva/sclera: Conjunctivae normal.      Pupils: Pupils are equal, round, and reactive to light.   Neck:      Thyroid: No thyromegaly.      Vascular: No JVD.      Trachea: Trachea normal.   Cardiovascular:      Rate and Rhythm: Normal rate and regular rhythm.      Heart sounds: Normal heart sounds.      Comments: Difficult to assess tones.   Pulmonary:      Effort: Pulmonary effort is normal. No respiratory distress.      Breath sounds: Normal breath sounds. No wheezing, rhonchi or rales.      Comments: distant  Chest:   Breasts:      Right: No axillary adenopathy or supraclavicular adenopathy.      Left: No axillary adenopathy or supraclavicular adenopathy.       Abdominal:      General: Bowel sounds are normal. There is no distension.      Palpations: Abdomen is soft. There is no mass.      Tenderness: There is no abdominal tenderness.      Comments: No organomegaly however difficult to assess.     Musculoskeletal:         General: Normal range of motion.      Cervical back: Normal range of motion and neck supple.      Comments: No spinal or paraspinal tenderness.     Lymphadenopathy:      Head:      Right side of head: No submental or submandibular adenopathy.      Left side of head: No submental or submandibular adenopathy.      Cervical: No cervical adenopathy.      Upper Body:      Right upper body: No supraclavicular or axillary adenopathy.      Left upper body: No supraclavicular or axillary adenopathy.   Skin:     General: Skin is warm and dry.      Capillary Refill: Capillary refill takes less than 2 seconds.      Coloration: Skin is not jaundiced or pale.      Findings: No bruising or rash.      Nails: There is no clubbing.   Neurological:      Mental Status: She is alert and oriented to person, place, and time.      Sensory: No sensory deficit.      Motor: No weakness.      Coordination: Coordination normal.      Gait: Gait normal.   Psychiatric:         Mood and Affect: Mood normal.         Speech: Speech normal.         Behavior: Behavior normal.         Thought Content: Thought content normal.         Judgment: Judgment normal.         Assessment:       1. Malignant neoplasm of upper-inner quadrant of right breast in female, estrogen receptor positive     2. Chemotherapy induced neutropenia     3. Antineoplastic chemotherapy induced anemia     4. Bone metastases     5. Cancer associated pain     6. Vitamin D deficiency  ergocalciferol (ERGOCALCIFEROL) 50,000 unit Cap   7. Type 2 diabetes mellitus with hyperglycemia, without long-term current use of insulin     8. Chronic combined systolic and diastolic heart failure         Plan:       -Labs reviewed and adequate.   -Continue Arimidex and Ibrance  -Xgeva and zoladex today  -Monitor anemia.   -Neutropenic precautions.   -Discussed back pain-Will take tylenol as pain mild.   If no relief, will rx percocet 5- She will message if would like to try.   She understands if pain continues or gets worse or develops new symptoms, will need repeat MRI.   -Continue calcium and Vit D  Refill Vit D  -no evidence of cardiac  decompensation.       Route Chart for Scheduling    Med Onc Chart Routing      Follow up with physician 4 weeks. Xgeva and zoladex   Follow up with CAMILA    Labs CBC and CMP   Lab interval: every 4 weeks     Imaging    Pharmacy appointment    Other referrals          Treatment Plan Information   OP ANASTROZOLE PALBOCICLIB Q4W   Jessica Mendez MD   Upcoming Treatment Dates - OP ANASTROZOLE PALBOCICLIB Q4W    No upcoming days in selected categories.    Supportive Plan Information  OP BREAST GOSERELIN & DENOSUMAB Q4W   Jessica Mendez MD   Upcoming Treatment Dates - OP BREAST GOSERELIN & DENOSUMAB Q4W    3/7/2022       Chemotherapy       denosumab (XGEVA) solution 120 mg       goserelin (ZOLADEX) injection 3.6 mg  4/4/2022       Chemotherapy       denosumab (XGEVA) solution 120 mg       goserelin (ZOLADEX) injection 3.6 mg             Patient is in agreement with the proposed treatment plan. All questions were answered to the patient's satisfaction. Pt knows to call clinic for any new or worsening symptoms and if anything is needed before the next clinic visit.      JUNG SanabriaP-C  Hematology & Oncology  [facility] [place] [address]  ph. [phone]  Fax. [phone]     Face to Face time with patient: 30 minutes  45minutes of total time spent on the encounter, which includes face to face time and non-face to face time preparing to see the patient (eg, review of tests), Obtaining and/or reviewing separately obtained history, Documenting clinical information in the electronic or other health record, Independently interpreting results (not separately reported) and communicating results to the patient/family/caregiver, or Care coordination (not separately reported).

## 2022-03-07 NOTE — NURSING
Pt arrived for zoladex and xgeva following NP visit.  Pt tolerated xgeva sq to RLA and zoladex to LLA.  Discharged to home.

## 2022-03-11 ENCOUNTER — PATIENT OUTREACH (OUTPATIENT)
Dept: ADMINISTRATIVE | Facility: HOSPITAL | Age: 47
End: 2022-03-11

## 2022-03-11 DIAGNOSIS — E11.9 TYPE 2 DIABETES MELLITUS WITHOUT COMPLICATION, UNSPECIFIED WHETHER LONG TERM INSULIN USE: Primary | ICD-10-CM

## 2022-03-12 DIAGNOSIS — E78.2 MIXED HYPERLIPIDEMIA: ICD-10-CM

## 2022-03-14 RX ORDER — ATORVASTATIN CALCIUM 40 MG/1
TABLET, FILM COATED ORAL
Qty: 90 TABLET | Refills: 3 | Status: SHIPPED | OUTPATIENT
Start: 2022-03-14 | End: 2023-03-22

## 2022-03-16 ENCOUNTER — TELEPHONE (OUTPATIENT)
Dept: INFUSION THERAPY | Facility: HOSPITAL | Age: 47
End: 2022-03-16

## 2022-03-16 ENCOUNTER — PATIENT MESSAGE (OUTPATIENT)
Dept: PSYCHIATRY | Facility: CLINIC | Age: 47
End: 2022-03-16

## 2022-03-16 DIAGNOSIS — C50.211 MALIGNANT NEOPLASM OF UPPER-INNER QUADRANT OF RIGHT BREAST IN FEMALE, ESTROGEN RECEPTOR POSITIVE: Primary | ICD-10-CM

## 2022-03-16 DIAGNOSIS — Z17.0 MALIGNANT NEOPLASM OF UPPER-INNER QUADRANT OF RIGHT BREAST IN FEMALE, ESTROGEN RECEPTOR POSITIVE: Primary | ICD-10-CM

## 2022-03-18 ENCOUNTER — PATIENT MESSAGE (OUTPATIENT)
Dept: ADMINISTRATIVE | Facility: HOSPITAL | Age: 47
End: 2022-03-18

## 2022-03-20 DIAGNOSIS — Z12.11 SCREENING FOR COLON CANCER: ICD-10-CM

## 2022-03-23 ENCOUNTER — OFFICE VISIT (OUTPATIENT)
Dept: PALLIATIVE MEDICINE | Facility: CLINIC | Age: 47
End: 2022-03-23
Payer: MEDICAID

## 2022-03-23 DIAGNOSIS — Z51.5 ENCOUNTER FOR PALLIATIVE CARE: ICD-10-CM

## 2022-03-23 DIAGNOSIS — R11.0 NAUSEA: ICD-10-CM

## 2022-03-23 DIAGNOSIS — R53.0 NEOPLASTIC (MALIGNANT) RELATED FATIGUE: ICD-10-CM

## 2022-03-23 DIAGNOSIS — F43.23 ADJUSTMENT DISORDER WITH MIXED ANXIETY AND DEPRESSED MOOD: ICD-10-CM

## 2022-03-23 DIAGNOSIS — Z17.0 MALIGNANT NEOPLASM OF UPPER-INNER QUADRANT OF RIGHT BREAST IN FEMALE, ESTROGEN RECEPTOR POSITIVE: Primary | ICD-10-CM

## 2022-03-23 DIAGNOSIS — G47.00 INSOMNIA, UNSPECIFIED TYPE: ICD-10-CM

## 2022-03-23 DIAGNOSIS — K59.00 CONSTIPATION, UNSPECIFIED CONSTIPATION TYPE: ICD-10-CM

## 2022-03-23 DIAGNOSIS — C50.211 MALIGNANT NEOPLASM OF UPPER-INNER QUADRANT OF RIGHT BREAST IN FEMALE, ESTROGEN RECEPTOR POSITIVE: Primary | ICD-10-CM

## 2022-03-23 DIAGNOSIS — R06.09 OTHER FORM OF DYSPNEA: ICD-10-CM

## 2022-03-23 DIAGNOSIS — G89.3 CANCER RELATED PAIN: ICD-10-CM

## 2022-03-23 DIAGNOSIS — R63.0 ANOREXIA: ICD-10-CM

## 2022-03-23 PROCEDURE — 3052F PR MOST RECENT HEMOGLOBIN A1C LEVEL 8.0 - < 9.0%: ICD-10-PCS | Mod: CPTII,95,, | Performed by: NURSE PRACTITIONER

## 2022-03-23 PROCEDURE — 99214 PR OFFICE/OUTPT VISIT, EST, LEVL IV, 30-39 MIN: ICD-10-PCS | Mod: 95,,, | Performed by: NURSE PRACTITIONER

## 2022-03-23 PROCEDURE — 99214 OFFICE O/P EST MOD 30 MIN: CPT | Mod: 95,,, | Performed by: NURSE PRACTITIONER

## 2022-03-23 PROCEDURE — 4010F ACE/ARB THERAPY RXD/TAKEN: CPT | Mod: CPTII,95,, | Performed by: NURSE PRACTITIONER

## 2022-03-23 PROCEDURE — 3052F HG A1C>EQUAL 8.0%<EQUAL 9.0%: CPT | Mod: CPTII,95,, | Performed by: NURSE PRACTITIONER

## 2022-03-23 PROCEDURE — 4010F PR ACE/ARB THEARPY RXD/TAKEN: ICD-10-PCS | Mod: CPTII,95,, | Performed by: NURSE PRACTITIONER

## 2022-03-23 NOTE — Clinical Note
Good afternoon,   My name is Jesusita, I am a recent NP addition to the outpt palliative team.   I had a virtual visit with  this AM. Her primary issue today is her dyspnea; on a bad day, she needs some recovery time after walking from her front door to her car (for example). I'm sure you're aware cards recently increased Entresto  & a repeat echo is pending for May. Back pain is well-controlled with occasional use of PRN Norco. Her depression over the recent loss of her parents is continuing to improve and karin after recent increase to Effexor dose, per Dr. Odom. She is pleased with her recent scan results and overall feels well-supported w ample positive coping mechanisms. No medication adjustments made today.   Thanks much, Jesusita NP

## 2022-03-23 NOTE — PROGRESS NOTES
Consult Note  Palliative Care    The [place] is: [place]  The chief complaint leading to consultation is: symptom management and ACP    Visit type: audiovisual    Face to Face time with patient: 32 minutes    38 minutes of total time spent on the encounter, which includes face to face time and non-face to face time preparing to see the patient (eg, review of tests), Obtaining and/or reviewing separately obtained history, Documenting clinical information in the electronic or other health record, Independently interpreting results (not separately reported) and communicating results to the patient/family/caregiver, or Care coordination (not separately reported).     Each patient to whom he or she provides medical services by telemedicine is:  (1) informed of the relationship between the physician and patient and the respective role of any other health care provider with respect to management of the patient; and (2) notified that he or she may decline to receive medical services by telemedicine and may withdraw from such care at any time.    Reason for Consult: symptom management and ACP      ASSESSMENT/PLAN:     Plan/Recommendations:  Diagnoses and all orders for this visit:    Malignant neoplasm of upper-inner quadrant of right breast in female, estrogen receptor positive with bone mets  - patient followed by Dr. Lei and VALERIE Akers  - currently on disease-directed therapy    Encounter for palliative care/Advanced care planning  - patient decisional  - patient by herself on telemedicine visit  - no ACP documents uploaded into EMR  - philosophy of Palliative Medicine reviewed with patient and family at first visit  - new patient folder given to and reviewed with patient and family at first visit  - goals: life prolonging  - ACP booklet given to and reviewed with patient and family at first visit including HCPOA and living will  - patient verbally stated that she would like her sister, Janel, to be her  "surrogate decision maker.   - request that patient review booklet and fill out form prior to next visit   - did not specifically discuss code status at this time  - will follow up at future appointments    Other form of dyspnea  - patient reporting moderate to severe dyspnea, this is her primary complaint today   - dyspnea worsens with exertion   - patient reports good days and bad days  - on a bad day, patient reports dyspnea with short walks, ie front door to car, car to store, she has to stop to catch her breath  - dyspnea is limiting desired daily activities    - patient reports recent appt with cardiologist w/medication changes, echo scheduled for May  - will work on better symptom control  - referred to PT at last visit   - tips for shortness of breath in new patient folder for patient to review    Cancer-related pain  - patient reporting her pain is mainly in her lower back and all along her right side, describes pain and constant, dull  - pain rated between 5-9/10 on a bad day   - patient reports that the Norco, oxycodone, and MS Contin all make her very sleepy  - she does not use any pain medication consistently  - patient states that she is adjusting to her 'new normal'  - escalates to [place] if ibuprofen doesn't work, takes 1 tablet on a bad day  - reports stretching also helps  - continue lidocaine patches as well   - cont ibuprofen   - cont norco   - opioid safety sheet in new patient folder for patient to review  - will continue to monitor    Nausea/Anorexia  - patient reports nausea and anorexia are tolerable  - recently diagnosed with DM  - patient reports intermittent decreased appetite has coincided with starting Trulicity    - overall dietary intake is good   - pt reports good fluid intake, ice from [employer] is her "guily pleasure",    - patient using anti-emetics on prn basis with good relief  - cont zofran PRN  - continue phenergan prn  - patient already following with nutrition  - will continue to " "monitor    Adjustment disorder with mixed anxiety and depressed mood  - patient reports improvement in depression and anxiety  - she continues to grieve the recent loss of both of her parents  - patient reports good social support from 2 ex-husbands, siblings, daughters & friends  - additionally reports using journaling, music and [place] to cope with feelings of sadness; her 2 kittens also offer comfort   - reports recent increase in Effexor is helping, prior to increase she was unable to discuss loss of parents without crying, this is now subsiding  - emotional support provided throughout visit  - cont effexor, recently increased   - patient follows with oncology-psychology  - will continue to monitor closely     Neoplastic (malignant) related fatigue/Insomnia  - patient reports improvement in insomnia & fatigue  - she is able to fall asleep and stay asleep when desired  - she is recently enjoying staying up late ("like a kid during summer break")    - she will experience some expected fatigue the day after staying up late, she limits naps to prevent further disruptions to sleep rhythm    - tips for better sleep in new patient folder for patient to review  - no pharmacologic intervention at this time  - will continue to monitor    Constipation  - patient reports regular, daily BM with the help of senna  - discussed using bowel regimen consistently   - adequate fluid intake   - constipation tip sheet in new patient folder for patient to review  - will continue close monitoring    Understanding of illness/Prognosis: patient has good understanding of disease at this time.     Goals of care: life-prolonging    Follow up: ~ 8-10 weeks    Patient's encounter and above plan of care discussed with patient's oncologist and oncology NP    SUBJECTIVE:     History of Present Illness:  Patient is a 46 y.o. year old female with anemia, anxiety, cardiomyopathy, CHF, HTN, HLD, and metastatic breast cancer presents to " "Palliative Medicine for symptom management and ACP. Patient was diagnosed with stage IV breast cancer in September 2019. She was started on Ibrance and Arimidex, and she continues on this regimen today. Please see oncology notes for full details regarding her oncologic treatment course.     03/23/2022:  [place]  reviewed and summarized:  No new prescriptions reported    Today patient's primary complaint is dyspnea with exertion; on a bad day dyspnea is significantly limiting of desired activities. Patient reports recent visit to cardiologist with repeat echo scheduled for May. Her pain is reasonably controlled, only occasionally using norco. She states she is adjusting to "a new normal". She reports recent improvement of her depression is likely attributable to increase in Effexor, can now talk about recent loss of her parents without crying. She continues to see onc-psych. She is pleased with stability of recent scans. She reports all other symptoms are improved or tolerable.     01/12/2022:  [place]  reviewed and summarized:  08/23/2021 Oxycodone 5 mg disp: 45 for 11 days  08/23/2021 MS Contin 15 mg Disp: 60 for 20 days    Today patient states overall she is doing okay. Patient continues to have severe back pain that limits her mobility. All of the pain medications that she has tried has significant side effect of sleepiness. Patient is open to meeting with pain management for intervention. Patient is also open to meeting with PT to help as well. Patient found the holidays difficult for her, but she was able to visit with her family. She did find enjoyment during that time as well. Patient has been following with Dr. Odom. Patient was having some difficulty with sleeping, but she is has been better with a routine this past week.     11/02/2021:  [place]  reviewed and summarized:  08/23/2021 Oxycodone 5 mg disp: 45 for 11 days  08/23/2021 MS Contin 15 mg Disp: 60 for 20 days    Patient presents to clinic today with her " sister. Patient having moderate to severe pain in her back and right side. She has been following with radiation oncology as well. Patient uses both MS Contin and oxycodone intermittently. Patient also having some dyspnea. She is feeling very anxious and depressed recently. Patient also experiencing severe nausea and poor appetite. She has been using her anti-emetics intermittently. Patient also having trouble sleeping and feels very fatigued throughout the day. She is also intermittently constipated. Patient open to learning about ACP.    Past Medical History:   Diagnosis Date    Abnormal Pap smear     pt states 13yrs ago colpo was done    Anemia     Anxiety     Cancer     Cardiomyopathy     CHF (congestive heart failure)     Fibroid     Hx of psychiatric care     Hyperlipidemia     Hypertension     Psychiatric problem     Sleep difficulties     Therapy      Past Surgical History:   Procedure Laterality Date     SECTION      TONSILLECTOMY      TUBAL LIGATION      UTERINE FIBROID EMBOLIZATION       Family History   Problem Relation Age of Onset    Arthritis Mother     Diabetes Mother     Heart disease Mother         CHF, CAD , 2 stents    Hypertension Mother     Hyperlipidemia Mother     Heart failure Mother     No Known Problems Sister     Prostate cancer Father     Hypertension Brother     No Known Problems Daughter     Asthma Daughter     No Known Problems Maternal Grandmother     Cancer Maternal Grandfather 79        abdominal origin?    No Known Problems Paternal Grandmother     No Known Problems Paternal Grandfather     Thyroid cancer Other         type? dx age?    Cancer Paternal Cousin         ovarian @ ?29 & cervical @ ?29    Endometriosis Paternal Cousin     Breast cancer Neg Hx     Ovarian cancer Neg Hx     Colon polyps Neg Hx      Review of patient's allergies indicates:   Allergen Reactions    Keflex [cephalexin] Itching       Medications:    Current  Outpatient Medications:     (Magic mouthwash) 1:1:1 Benadryl 12.5mg/5ml liq, aluminum & magnesium hydroxide-simehticone (Maalox), LIDOcaine viscous 2%, Swish and spit 10 mLs every 4 (four) hours as needed. for mouth sores, Disp: 360 mL, Rfl: 0    anastrozole (ARIMIDEX) 1 mg Tab, TAKE 1 TABLET(1 MG) BY MOUTH EVERY DAY, Disp: 90 tablet, Rfl: 3    atorvastatin (LIPITOR) 40 MG tablet, TAKE 1 TABLET(40 MG) BY MOUTH EVERY DAY, Disp: 90 tablet, Rfl: 3    blood sugar diagnostic Strp, To check BG 4 times daily, to use with insurance preferred meter, Disp: 200 each, Rfl: 5    blood-glucose meter kit, To check BG 4 times daily, to use with insurance preferred meter, Disp: 1 each, Rfl: 0    carvediloL (COREG) 25 MG tablet, Take 1 tablet (25 mg total) by mouth 2 (two) times daily., Disp: 180 tablet, Rfl: 3    dulaglutide (TRULICITY) 0.75 mg/0.5 mL pen injector, Inject 0.75 mg into the skin every 7 days., Disp: 4 pen, Rfl: 2    ergocalciferol (ERGOCALCIFEROL) 50,000 unit Cap, Take 1 capsule (50,000 Units total) by mouth every 7 days., Disp: 12 capsule, Rfl: 0    glipiZIDE (GLUCOTROL) 10 MG tablet, Take 1 tablet (10 mg total) by mouth 2 (two) times daily before meals., Disp: 180 tablet, Rfl: 3    goserelin (ZOLADEX) 3.6 mg injection, Inject 3.6 mg into the skin every 28 days., Disp: , Rfl:     HYDROcodone-acetaminophen (NORCO) 5-325 mg per tablet, Take 1 tablet by mouth every 6 (six) hours as needed for Pain., Disp: 30 tablet, Rfl: 0    ibuprofen (ADVIL,MOTRIN) 800 MG tablet, Take 1 tablet (800 mg total) by mouth 2 (two) times daily as needed for Pain., Disp: 45 tablet, Rfl: 2    lancets Misc, To check BG 4 times daily, to use with insurance preferred meter, Disp: 200 each, Rfl: 5    LIDOcaine (LIDODERM) 5 %, Place 2 patches onto the skin once daily. Remove & Discard patch within 12 hours or as directed by MD, Disp: 60 patch, Rfl: 0    morphine (MS CONTIN) 15 MG 12 hr tablet, Take 1 tablet (15 mg total) by mouth  every 8 (eight) hours as needed for Pain., Disp: 60 tablet, Rfl: 0    ondansetron (ZOFRAN-ODT) 8 MG TbDL, Take 1 tablet (8 mg total) by mouth every 8 (eight) hours as needed (nausea)., Disp: 30 tablet, Rfl: 2    palbociclib (IBRANCE) 125 mg Cap, Take 1 capsule (125 mg) by mouth as instructed Take as directed days 1-21 of each 28 day cycle. Take with food., Disp: 21 capsule, Rfl: 6    potassium chloride (KLOR-CON) 10 MEQ TbSR, TAKE 1 TABLET(10 MEQ) BY MOUTH EVERY DAY, Disp: 30 tablet, Rfl: 2    promethazine (PHENERGAN) 25 MG tablet, Take 1 tablet (25 mg total) by mouth every 6 (six) hours as needed for Nausea., Disp: 30 tablet, Rfl: 1    sacubitriL-valsartan (ENTRESTO)  mg per tablet, Take 1 tablet by mouth 2 (two) times daily., Disp: 60 tablet, Rfl: 11    sennosides (SENNA-C ORAL), Take 2 tablets by mouth daily as needed., Disp: , Rfl:     venlafaxine (EFFEXOR-XR) 150 MG Cp24, Take 1 capsule (150 mg total) by mouth once daily., Disp: 30 capsule, Rfl: 2    venlafaxine (EFFEXOR-XR) 75 MG 24 hr capsule, Take 1 capsule (75 mg total) by mouth once daily. Take with 150 mg capsule for a total of 225 mg., Disp: 30 capsule, Rfl: 2    OBJECTIVE:       ROS:  Review of Systems   Constitutional: Positive for activity change (2/2 dyspnea ), appetite change (improving) and fatigue.   HENT: Negative.    Eyes: Negative.    Respiratory: Positive for shortness of breath.    Cardiovascular: Negative.    Gastrointestinal: Positive for constipation and nausea.   Genitourinary: Negative.    Musculoskeletal: Positive for arthralgias, back pain and myalgias.   Skin: Negative.    Neurological: Negative.    Psychiatric/Behavioral: Positive for dysphoric mood and sleep disturbance. The patient is nervous/anxious (improving).    All other systems reviewed and are negative.      Review of Symptoms    Symptom Assessment (ESAS 0-10 Scale)  Pain:  6  Dyspnea:  6  Anxiety:  1  Nausea:  2  Depression:  5  Anorexia:  2  Fatigue:   2  Insomnia:  0  Restlessness:  0  Agitation:  0     CAM / Delirium:  Negative  Constipation:  Positive  Diarrhea:  Negative    Bowel Management Plan (BMP):  Yes      Pain Assessment:  OME in 24 hours:  0-5  Location(s): back    Back       Location: lower        Quality: aching and dull        Quantity: 6/10 in intensity        Chronicity: Onset 9 month(s) ago, unchanged        Aggravating Factors: activity        Alleviating Factors: opiates and NSAIDs       Associated Symptoms: none    Modified Abigail Scale:  3 (with exertion )    ECOG Performance Status ndGndrndanddndend:nd nd2nd Living Arrangements:  Lives with family    Psychosocial/Cultural: Patient lives with her two adult daughters (18 and 20 years old)    Spiritual:  F - Sandra and Belief:  Alcides  I - Importance:  High  C - Community:  Prays regularly  A - Address in Care:   services offered but declined. Needs met at this time      Advance Care Planning   Advance Directives:   Living Will: No    [place]POST: No    Do Not Resuscitate Status: No    Medical Power of : No        Oral Declaration: Yes  Witnesses:  Ella James LCSW   Agent's Name:  Janel (her sister)    Decision Making:  Patient answered questions          Physical Exam: Limited due to telemedicine visit  Vitals:  Virtual visit, no vitals obtained  Physical Exam  Constitutional:       General: She is not in acute distress.     Appearance: She is not diaphoretic.   HENT:      Head: Normocephalic and atraumatic.      Right Ear: External ear normal.      Left Ear: External ear normal.   Eyes:      General: No scleral icterus.        Right eye: No discharge.         Left eye: No discharge.   Pulmonary:      Effort: Pulmonary effort is normal. No respiratory distress.   Musculoskeletal:      Cervical back: Normal range of motion.      Comments: Sitting up without assistance; able to move extremities without difficutly   Skin:     Coloration: Skin is not jaundiced.      Findings: No rash.  "  Neurological:      General: No focal deficit present.      Mental Status: She is alert and oriented to person, place, and time.      Cranial Nerves: No cranial nerve deficit.   Psychiatric:         Behavior: Behavior normal.         Thought Content: Thought content normal.         Judgment: Judgment normal.         Labs:  CBC:   WBC   Date Value Ref Range Status   03/07/2022 3.46 (L) 3.90 - 12.70 K/uL Final     Hemoglobin   Date Value Ref Range Status   03/07/2022 8.9 (L) 12.0 - 16.0 g/dL Final     Hematocrit   Date Value Ref Range Status   03/07/2022 29.0 (L) 37.0 - 48.5 % Final     MCV   Date Value Ref Range Status   03/07/2022 90 82 - 98 fL Final     Platelets   Date Value Ref Range Status   03/07/2022 265 150 - 450 K/uL Final       LFT:   Lab Results   Component Value Date    AST 11 03/07/2022    ALKPHOS 78 03/07/2022    BILITOT 0.4 03/07/2022       Albumin:   Albumin   Date Value Ref Range Status   03/07/2022 2.9 (L) 3.5 - 5.2 g/dL Final   01/28/2021 3.5 (L) 3.6 - 5.1 g/dL Final     Comment:     For additional information, please refer to   http://education.[employer]/faq/HHW082 (This link is   being provided for informational/ educational purposes only.)    This test was developed and its analytical performance   characteristics have been determined by [facility]. It has not been cleared or approved by the   [place] Food and Drug Administration. This assay has been validated   pursuant to the CLIA regulations and is used for clinical   purposes.  @ Test Performed By:  [facility]  John Valverde M.D.,   [address] [place] [id number]  [place]  [id number]       Protein:   Total Protein   Date Value Ref Range Status   03/07/2022 7.2 6.0 - 8.4 g/dL Final       Radiology:I have reviewed all pertinent imaging results/findings within the past 24 hours.     02/01/2022: CT C/A/P: "1. No evidence of new recurrent or " "metastatic disease. 2. Sclerotic lesions seen in the spine and sternum, unchanged when compared to 10/25/2021."    02/02/2022 NM Bone Scan: "There is no scintigraphic evidence of osteoblastic metastatic disease. Nonspecific uptake in the distal right femur has resolved. Diffuse cranial hyperostosis."    Encounter occurred during period of COVID-19 emergency. Encounter performed under the concurrent guidelines, limitations and protocols.    38 minutes of total time spent on the encounter, which includes face to face time and non-face to face time preparing to see the patient (eg, review of tests), Obtaining and/or reviewing separately obtained history, Documenting clinical information in the electronic or other health record, Independently interpreting results (not separately reported) and communicating results to the patient/family/caregiver, or Care coordination (not separately reported).    Signature: Jesusita Lucas DNP          "

## 2022-03-24 ENCOUNTER — HOSPITAL ENCOUNTER (EMERGENCY)
Facility: HOSPITAL | Age: 47
Discharge: HOME | End: 2022-03-24
Attending: EMERGENCY MEDICINE
Payer: MEDICAID

## 2022-03-24 ENCOUNTER — OFFICE VISIT (OUTPATIENT)
Dept: HEMATOLOGY/ONCOLOGY | Facility: CLINIC | Age: 47
End: 2022-03-24
Payer: MEDICAID

## 2022-03-24 ENCOUNTER — TELEPHONE (OUTPATIENT)
Dept: HEMATOLOGY/ONCOLOGY | Facility: CLINIC | Age: 47
End: 2022-03-24

## 2022-03-24 VITALS
OXYGEN SATURATION: 97 % | RESPIRATION RATE: 18 BRPM | HEART RATE: 76 BPM | DIASTOLIC BLOOD PRESSURE: 73 MMHG | HEIGHT: 64 IN | WEIGHT: 293 LBS | TEMPERATURE: 98 F | SYSTOLIC BLOOD PRESSURE: 158 MMHG | BODY MASS INDEX: 50.02 KG/M2

## 2022-03-24 VITALS
OXYGEN SATURATION: 96 % | HEART RATE: 86 BPM | TEMPERATURE: 98 F | DIASTOLIC BLOOD PRESSURE: 94 MMHG | WEIGHT: 293 LBS | HEIGHT: 64 IN | BODY MASS INDEX: 50.02 KG/M2 | RESPIRATION RATE: 20 BRPM | SYSTOLIC BLOOD PRESSURE: 126 MMHG

## 2022-03-24 DIAGNOSIS — D64.9 ANEMIA, UNSPECIFIED TYPE: ICD-10-CM

## 2022-03-24 DIAGNOSIS — C50.911 MALIGNANT NEOPLASM OF RIGHT FEMALE BREAST, UNSPECIFIED ESTROGEN RECEPTOR STATUS, UNSPECIFIED SITE OF BREAST: ICD-10-CM

## 2022-03-24 DIAGNOSIS — G62.9 NEUROPATHY: ICD-10-CM

## 2022-03-24 DIAGNOSIS — C50.211 MALIGNANT NEOPLASM OF UPPER-INNER QUADRANT OF RIGHT BREAST IN FEMALE, ESTROGEN RECEPTOR POSITIVE: Primary | ICD-10-CM

## 2022-03-24 DIAGNOSIS — Z17.0 MALIGNANT NEOPLASM OF UPPER-INNER QUADRANT OF RIGHT BREAST IN FEMALE, ESTROGEN RECEPTOR POSITIVE: Primary | ICD-10-CM

## 2022-03-24 DIAGNOSIS — I10 PRIMARY HYPERTENSION: ICD-10-CM

## 2022-03-24 DIAGNOSIS — E78.00 PURE HYPERCHOLESTEROLEMIA: ICD-10-CM

## 2022-03-24 DIAGNOSIS — R06.02 SOB (SHORTNESS OF BREATH): Primary | ICD-10-CM

## 2022-03-24 DIAGNOSIS — D50.8 OTHER IRON DEFICIENCY ANEMIA: ICD-10-CM

## 2022-03-24 DIAGNOSIS — C79.51 BONE METASTASES: ICD-10-CM

## 2022-03-24 DIAGNOSIS — Z86.79 HISTORY OF CHF (CONGESTIVE HEART FAILURE): ICD-10-CM

## 2022-03-24 LAB
ALBUMIN SERPL BCP-MCNC: 2.8 G/DL (ref 3.5–5.2)
ALP SERPL-CCNC: 86 U/L (ref 55–135)
ALT SERPL W/O P-5'-P-CCNC: 14 U/L (ref 10–44)
ANION GAP SERPL CALC-SCNC: 10 MMOL/L (ref 8–16)
ANISOCYTOSIS BLD QL SMEAR: SLIGHT
AST SERPL-CCNC: 13 U/L (ref 10–40)
BASO STIPL BLD QL SMEAR: ABNORMAL
BASOPHILS # BLD AUTO: 0.04 K/UL (ref 0–0.2)
BASOPHILS NFR BLD: 0.8 % (ref 0–1.9)
BILIRUB SERPL-MCNC: 0.4 MG/DL (ref 0.1–1)
BNP SERPL-MCNC: 21 PG/ML (ref 0–99)
BUN SERPL-MCNC: 9 MG/DL (ref 6–20)
CALCIUM SERPL-MCNC: 9.6 MG/DL (ref 8.7–10.5)
CHLORIDE SERPL-SCNC: 107 MMOL/L (ref 95–110)
CO2 SERPL-SCNC: 23 MMOL/L (ref 23–29)
CREAT SERPL-MCNC: 0.9 MG/DL (ref 0.5–1.4)
CTP QC/QA: YES
DACRYOCYTES BLD QL SMEAR: ABNORMAL
DIFFERENTIAL METHOD: ABNORMAL
EOSINOPHIL # BLD AUTO: 0.1 K/UL (ref 0–0.5)
EOSINOPHIL NFR BLD: 1 % (ref 0–8)
ERYTHROCYTE [DISTWIDTH] IN BLOOD BY AUTOMATED COUNT: 23.3 % (ref 11.5–14.5)
EST. GFR  (AFRICAN AMERICAN): >60 ML/MIN/1.73 M^2
EST. GFR  (NON AFRICAN AMERICAN): >60 ML/MIN/1.73 M^2
GLUCOSE SERPL-MCNC: 187 MG/DL (ref 70–110)
HCT VFR BLD AUTO: 27.8 % (ref 37–48.5)
HGB BLD-MCNC: 8.2 G/DL (ref 12–16)
IMM GRANULOCYTES # BLD AUTO: 0.03 K/UL (ref 0–0.04)
IMM GRANULOCYTES NFR BLD AUTO: 0.6 % (ref 0–0.5)
LYMPHOCYTES # BLD AUTO: 1 K/UL (ref 1–4.8)
LYMPHOCYTES NFR BLD: 20.4 % (ref 18–48)
MAGNESIUM SERPL-MCNC: 1.8 MG/DL (ref 1.6–2.6)
MCH RBC QN AUTO: 27.9 PG (ref 27–31)
MCHC RBC AUTO-ENTMCNC: 29.5 G/DL (ref 32–36)
MCV RBC AUTO: 95 FL (ref 82–98)
MONOCYTES # BLD AUTO: 0.2 K/UL (ref 0.3–1)
MONOCYTES NFR BLD: 3.5 % (ref 4–15)
NEUTROPHILS # BLD AUTO: 3.8 K/UL (ref 1.8–7.7)
NEUTROPHILS NFR BLD: 73.7 % (ref 38–73)
NRBC BLD-RTO: 0 /100 WBC
OVALOCYTES BLD QL SMEAR: ABNORMAL
PLATELET # BLD AUTO: 327 K/UL (ref 150–450)
PLATELET BLD QL SMEAR: ABNORMAL
PMV BLD AUTO: 10.9 FL (ref 9.2–12.9)
POIKILOCYTOSIS BLD QL SMEAR: SLIGHT
POLYCHROMASIA BLD QL SMEAR: ABNORMAL
POTASSIUM SERPL-SCNC: 3.4 MMOL/L (ref 3.5–5.1)
PROT SERPL-MCNC: 7.2 G/DL (ref 6–8.4)
RBC # BLD AUTO: 2.94 M/UL (ref 4–5.4)
SARS-COV-2 RDRP RESP QL NAA+PROBE: NEGATIVE
SCHISTOCYTES BLD QL SMEAR: ABNORMAL
SCHISTOCYTES BLD QL SMEAR: PRESENT
SODIUM SERPL-SCNC: 140 MMOL/L (ref 136–145)
TROPONIN I SERPL DL<=0.01 NG/ML-MCNC: <0.006 NG/ML (ref 0–0.03)
WBC # BLD AUTO: 5.09 K/UL (ref 3.9–12.7)

## 2022-03-24 PROCEDURE — 99285 EMERGENCY DEPT VISIT HI MDM: CPT | Mod: 25,27

## 2022-03-24 PROCEDURE — 25500020 PHARM REV CODE 255: Performed by: EMERGENCY MEDICINE

## 2022-03-24 PROCEDURE — 1159F MED LIST DOCD IN RCRD: CPT | Mod: CPTII,,, | Performed by: NURSE PRACTITIONER

## 2022-03-24 PROCEDURE — 1159F PR MEDICATION LIST DOCUMENTED IN MEDICAL RECORD: ICD-10-PCS | Mod: CPTII,,, | Performed by: NURSE PRACTITIONER

## 2022-03-24 PROCEDURE — 3052F HG A1C>EQUAL 8.0%<EQUAL 9.0%: CPT | Mod: CPTII,,, | Performed by: NURSE PRACTITIONER

## 2022-03-24 PROCEDURE — 86803 HEPATITIS C AB TEST: CPT | Performed by: EMERGENCY MEDICINE

## 2022-03-24 PROCEDURE — 83735 ASSAY OF MAGNESIUM: CPT | Performed by: PHYSICIAN ASSISTANT

## 2022-03-24 PROCEDURE — 85025 COMPLETE CBC W/AUTO DIFF WBC: CPT | Performed by: PHYSICIAN ASSISTANT

## 2022-03-24 PROCEDURE — 3008F BODY MASS INDEX DOCD: CPT | Mod: CPTII,,, | Performed by: NURSE PRACTITIONER

## 2022-03-24 PROCEDURE — 93010 ELECTROCARDIOGRAM REPORT: CPT | Mod: ,,, | Performed by: INTERNAL MEDICINE

## 2022-03-24 PROCEDURE — 93010 EKG 12-LEAD: ICD-10-PCS | Mod: ,,, | Performed by: INTERNAL MEDICINE

## 2022-03-24 PROCEDURE — 99285 EMERGENCY DEPT VISIT HI MDM: CPT | Mod: CS,,, | Performed by: EMERGENCY MEDICINE

## 2022-03-24 PROCEDURE — U0002 COVID-19 LAB TEST NON-CDC: HCPCS | Performed by: PHYSICIAN ASSISTANT

## 2022-03-24 PROCEDURE — 4010F ACE/ARB THERAPY RXD/TAKEN: CPT | Mod: CPTII,,, | Performed by: NURSE PRACTITIONER

## 2022-03-24 PROCEDURE — 80053 COMPREHEN METABOLIC PANEL: CPT | Performed by: PHYSICIAN ASSISTANT

## 2022-03-24 PROCEDURE — 3008F PR BODY MASS INDEX (BMI) DOCUMENTED: ICD-10-PCS | Mod: CPTII,,, | Performed by: NURSE PRACTITIONER

## 2022-03-24 PROCEDURE — 83880 ASSAY OF NATRIURETIC PEPTIDE: CPT | Performed by: PHYSICIAN ASSISTANT

## 2022-03-24 PROCEDURE — 1160F PR REVIEW ALL MEDS BY PRESCRIBER/CLIN PHARMACIST DOCUMENTED: ICD-10-PCS | Mod: CPTII,,, | Performed by: NURSE PRACTITIONER

## 2022-03-24 PROCEDURE — 99999 PR PBB SHADOW E&M-EST. PATIENT-LVL V: ICD-10-PCS | Mod: PBBFAC,,, | Performed by: NURSE PRACTITIONER

## 2022-03-24 PROCEDURE — 3074F SYST BP LT 130 MM HG: CPT | Mod: CPTII,,, | Performed by: NURSE PRACTITIONER

## 2022-03-24 PROCEDURE — 3080F DIAST BP >= 90 MM HG: CPT | Mod: CPTII,,, | Performed by: NURSE PRACTITIONER

## 2022-03-24 PROCEDURE — 99285 PR EMERGENCY DEPT VISIT,LEVEL V: ICD-10-PCS | Mod: CS,,, | Performed by: EMERGENCY MEDICINE

## 2022-03-24 PROCEDURE — 87389 HIV-1 AG W/HIV-1&-2 AB AG IA: CPT | Performed by: EMERGENCY MEDICINE

## 2022-03-24 PROCEDURE — 3074F PR MOST RECENT SYSTOLIC BLOOD PRESSURE < 130 MM HG: ICD-10-PCS | Mod: CPTII,,, | Performed by: NURSE PRACTITIONER

## 2022-03-24 PROCEDURE — 3052F PR MOST RECENT HEMOGLOBIN A1C LEVEL 8.0 - < 9.0%: ICD-10-PCS | Mod: CPTII,,, | Performed by: NURSE PRACTITIONER

## 2022-03-24 PROCEDURE — 93005 ELECTROCARDIOGRAM TRACING: CPT

## 2022-03-24 PROCEDURE — 4010F PR ACE/ARB THEARPY RXD/TAKEN: ICD-10-PCS | Mod: CPTII,,, | Performed by: NURSE PRACTITIONER

## 2022-03-24 PROCEDURE — 99215 OFFICE O/P EST HI 40 MIN: CPT | Mod: S$PBB,,, | Performed by: NURSE PRACTITIONER

## 2022-03-24 PROCEDURE — 99215 OFFICE O/P EST HI 40 MIN: CPT | Mod: PBBFAC | Performed by: NURSE PRACTITIONER

## 2022-03-24 PROCEDURE — 99999 PR PBB SHADOW E&M-EST. PATIENT-LVL V: CPT | Mod: PBBFAC,,, | Performed by: NURSE PRACTITIONER

## 2022-03-24 PROCEDURE — 84484 ASSAY OF TROPONIN QUANT: CPT | Performed by: PHYSICIAN ASSISTANT

## 2022-03-24 PROCEDURE — 1160F RVW MEDS BY RX/DR IN RCRD: CPT | Mod: CPTII,,, | Performed by: NURSE PRACTITIONER

## 2022-03-24 PROCEDURE — 99215 PR OFFICE/OUTPT VISIT, EST, LEVL V, 40-54 MIN: ICD-10-PCS | Mod: S$PBB,,, | Performed by: NURSE PRACTITIONER

## 2022-03-24 PROCEDURE — 3080F PR MOST RECENT DIASTOLIC BLOOD PRESSURE >= 90 MM HG: ICD-10-PCS | Mod: CPTII,,, | Performed by: NURSE PRACTITIONER

## 2022-03-24 RX ADMIN — IOHEXOL 100 ML: 350 INJECTION, SOLUTION INTRAVENOUS at 12:03

## 2022-03-24 NOTE — ED NOTES
"Patient identifiers verified and correct for Kristopher Ye   C/C: Pt states "I have been having increased SOB"  APPEARANCE: awake and alert in no acute distress.  SKIN: warm, dry and intact. No breakdown or bruising.  MUSCULOSKELETAL: Patient moving all extremities spontaneously, no obvious swelling or deformities noted. Ambulates independently.  RESPIRATORY: Confirms shortness of breath. Respirations unlabored.   CARDIAC: Denies CP, 2+ distal pulses; no peripheral edema  ABDOMEN: soft, non-tender, and non-distended, Denies N/V  : voids spontaneously, denies difficulty  Neurologic: AAO x 4; follows commands equal strength in all extremities; denies numbness/tingling. Denies dizziness   "

## 2022-03-24 NOTE — PROGRESS NOTES
Subjective:       Patient ID: Kristopher Ye is a 46 y.o. female.    Chief Complaint: Malignant neoplasm of upper-inner quadrant of right breast     HPI  Here for an urgent care visit. She was seen yesterday by palliative care with a complain of shortness of breath.  She is out of breath to the point of exhaustion. Minimal movement causes severe shortness of breath.  Her breathing is getting worse and worse. She will get lightheaded and dizzy when short of breath. The shortness of breath causes anxiety.    She does follow cardiology who increased her entresto with minimal relief.     Current oral regimen: Arimidex and Ibrance   Zoladex and Xgeva monthly          Last ECHO 5/2021   · Moderate left atrial enlargement.  · The left ventricle is mildly enlarged with eccentric hypertrophy and moderately decreased systolic function.  · The estimated ejection fraction is 35%.  · It is not possible to calculate the strain values due to poor visualization of the myocardium.  · Grade I left ventricular diastolic dysfunction.  · Normal central venous pressure (3 mmHg).             Per Dr. Lei's previous note: Reason For Follow Up:   1. Stage IV ([id number]) invasive ductal carcinoma of right breast, upper inner quadrant, ER %, SC neg, Her2 neg, Grade 3, multifocal with one lesion appearing more aggressive (Ki67 80%), large LN +, bone mets.       Oncologic History:  Presentation  - 9/11/19 - Screening mammogram showed multiple right breast masses lower inner quadrant  - 9/13/19 - Diagnostic mammogram and US showed a right breast, 3:00 position mass, 2 CFN measuring 17 mm x 16 mm x 14 mm.  There 2 smaller adjacent masses towards the nipple  - 9/19/19 - Biopsy -               A. Right breast subareolar: Grade 3 IDC, estrogen receptor 80%, progesterone receptor 0%, Her2 dago neg, Ki67 80%.                           B. Right breast mass 3:00: Grade 3 IDC with papillary features, estrogen receptor 100%, progesterone  "receptor 0%, Her2 dago 1+, Ki67 30%.              - 9/26/19: US right axilla with abnormal lymphadenopathy measuring 4.3 x 2.8 cm with biopsy + for metastatic breast cancer.   Surgery consultation with Dr Ferris on 9/25/19              - 9/25/19 - Genetics Genetics Ellis Pierrealysis pending; VUS pending  Medical oncology consultation on 10/7/19              * 10/8/19 - CT C/A/P with several lytic lesions in thoracic and lumbar spine, iliac bones, left scapula in addition to 3 x 4.5 cm right axillary node and 2.1 cm right breast mass. Bone scan negative.              * 10/31/19 - started Ibrance, Arimidex, Zoladex; plan for Xgeva.               * 11/12/19 STRATA without targetable mutations.               * 12/16/19 - Bone biopsy + for met disease (initially read as negative, but we treated as metastatic disease given high suspicion).               * MRI brain: "Partially empty sella.  Question bilateral globe proptosis. Otherwise unremarkable MRI brain as detailed above specifically without evidence for intracranial enhancing lesion to suggest metastatic disease."              * 4/22/2020 PET - disease improvement. 8/2020 PET with disease improvement.               *  9/1/20 - Palliative RT to L1-L4     Of note, * Xgeva monthly  - we had been waiting on dental clearance, but she has been unable to get into dentistry and is accepting of risks. Has no active dental disease.        Review of Systems   Constitutional:        See above   All other systems reviewed and are negative.        Objective:      Physical Exam  Vitals and nursing note reviewed.   Constitutional:       General: She is not in acute distress.     Appearance: Normal appearance. She is well-developed. She is obese.      Comments: Presents alone  ECOG= 2  Wheelchair today   HENT:      Head: Normocephalic and atraumatic.   Eyes:      General: Lids are normal. No scleral icterus.     Conjunctiva/sclera: Conjunctivae normal.     Cardiovascular:    "   Rate and Rhythm: Normal rate and regular rhythm.      Heart sounds: Normal heart sounds.      Comments: Difficult to assess tones.   Pulmonary:      Effort: Pulmonary effort is normal. No respiratory distress.      Breath sounds: Normal breath sounds. No wheezing, rhonchi or rales.      Comments: distant  Chest:   Abdominal:      General: Bowel sounds are normal. There is no distension.      Palpations: Abdomen is soft. There is no mass.      Tenderness: There is no abdominal tenderness.       Skin:     General: Skin is warm and dry.       Coloration: Skin is not jaundiced or pale.      Findings: No bruising or rash.      Nails: There is no clubbing.   Neurological:      Mental Status: She is alert and oriented to person, place, and time.      Sensory: No sensory deficit.      Motor: No weakness.      Coordination: Coordination normal.      Gait: Gait normal.   Psychiatric:         Mood and Affect: Mood normal.         Speech: Speech normal.         Behavior: Behavior normal.         Thought Content: Thought content normal.         Judgment: Judgment normal.         Assessment:       1. Malignant neoplasm of upper-inner quadrant of right breast in female, estrogen receptor positive     2. Bone metastases     3. Other iron deficiency anemia     4. Primary hypertension     5. Pure hypercholesterolemia     6. Neuropathy         Plan:     1-2.  Continue Arimidex and Ibrance  -Xgeva and zoladex today - due in 2 weeks     3. Will monitor in 2 weeks     4. Monitored today; continue current medication and follow up with PCP     5. Continue current medication and follow up with PCP    6.    _ Sent to ER for possible exacerbation of CHF or PE. Dr. Lei notified ER. Discussed case with Dr. Lei    Return to clinic in 2 weeks with MD appointment and labs.     Patient is in agreement with the proposed treatment plan. All questions were answered to the patient's satisfaction. Patient knows to call clinic for any new or  worsening symptoms and if anything is needed before the next clinic visit.          Dawna Akers, FNP-C  Hematology & Medical Oncology   [address] [place] [address]  ph. [phone]  Fax. [phone]    Patient discussed with collaborating physician, Dr. Lei.    Approximately 20 minutes were spent face-to-face with the patient.  Approximately 30 minutes in total were spent on this encounter, which includes face-to-face time and non-face-to-face time preparing to see the patient (e.g., review of tests), obtaining and/or reviewing separately obtained history, documenting clinical information in the electronic or other health record, independently interpreting results (not separately reported) and communicating results to the patient/family/caregiver, or care coordination (not separately reported).     Route Chart for Scheduling    Med Onc Chart Routing      Follow up with physician 2 weeks. Already made with Dr. Lei   Follow up with CAMILA    Labs    Imaging    Pharmacy appointment    Other referrals          Treatment Plan Information   OP ANASTROZOLE PALBOCICLIB Q4W   Jessica Mendez MD   Upcoming Treatment Dates - OP ANASTROZOLE PALBOCICLIB Q4W    No upcoming days in selected categories.    Supportive Plan Information  OP BREAST GOSERELIN & DENOSUMAB Q4W   Jessica Mendez MD   Upcoming Treatment Dates - OP BREAST GOSERELIN & DENOSUMAB Q4W    4/4/2022       Chemotherapy       denosumab (XGEVA) solution 120 mg       goserelin (ZOLADEX) injection 3.6 mg                 Urgent Care Oncology Visit    Date and Time: 03/24/2022 9:56 AM   Patient MRN: [id number]   Chief Complaint: No chief complaint on file.    Reason for Urgent Care Visit: shortness of breath  Intervention: emergency department  Dispo: admit / ED  UrgOnc appointment addressed via: phone call  This UrgOnc visit was in person  Time spent handling UC issue: 30 minutes   Was this virtual or in person UrgOnc visit appropriate?  yes

## 2022-03-24 NOTE — TELEPHONE ENCOUNTER
Pt to ED from [facility] via  [facility] with patient escort.  Pt aaox4 at time of leaving clinic. To ED for possible admit/assess for dyspnea with exertion. VSS. Report called to Terri BRITT.

## 2022-03-24 NOTE — DISCHARGE INSTRUCTIONS
You had a slight down trend in your hemoglobin on your laboratory work today that you should follow-up with your primary care provider and oncologist for further evaluation.    Follow-up with your Cardiologist regarding your shortness of breath.    Return to the emergency room for new, worsening, or concerning symptoms.

## 2022-03-24 NOTE — ED PROVIDER NOTES
Encounter Date: 3/24/2022       History     Chief Complaint   Patient presents with    Shortness of Breath     Pt sent from clinic for SOB x couple weeks-gradual worsening.  Pt on oral chemo for breast cancer.     The history is provided by the patient and medical records. No  was used.      Kristopher Ye is a 46 y.o. female with medical history of R breast cancer with spine mets on oral chemo, HTN, HLD, CHF (EF 35%) presenting to the ED from oncology clinic for evaluation of SOB.     Patient reports SOB with exertion for 1 month, worsening over the past week. Exhausted with daily activities, walking from her house to her car. Reports lightheadedness with walking. Entresto recently increased last month by her cardiologist. Believes one of her medications is in combination with a diuretic. Reports chronic non-productive cough. No home oxygen use. No blood thinner use. No fever, headache, chest pain, abdominal pain, vomiting, urinary or bowel movement changes. She is concerned about her CHF, but notes she is 7 pounds down over the past month. No lower extremity swelling. Contributes weight loss to Trulicity. Hem-onc clinic was concerned for possible CHF exacerbation versus pulmonary embolism.     Review of patient's allergies indicates:   Allergen Reactions    Keflex [cephalexin] Itching     Past Medical History:   Diagnosis Date    Abnormal Pap smear     pt states 13yrs ago colpo was done    Anemia     Anxiety     Cancer     Cardiomyopathy     CHF (congestive heart failure)     Fibroid     Hx of psychiatric care     Hyperlipidemia     Hypertension     Psychiatric problem     Sleep difficulties     Therapy      Past Surgical History:   Procedure Laterality Date     SECTION      TONSILLECTOMY      TUBAL LIGATION      UTERINE FIBROID EMBOLIZATION       Family History   Problem Relation Age of Onset    Arthritis Mother     Diabetes Mother     Heart disease  Mother         CHF, CAD , 2 stents    Hypertension Mother     Hyperlipidemia Mother     Heart failure Mother     No Known Problems Sister     Prostate cancer Father     Hypertension Brother     No Known Problems Daughter     Asthma Daughter     No Known Problems Maternal Grandmother     Cancer Maternal Grandfather 79        abdominal origin?    No Known Problems Paternal Grandmother     No Known Problems Paternal Grandfather     Thyroid cancer Other         type? dx age?    Cancer Paternal Cousin         ovarian @ ?29 & cervical @ ?29    Endometriosis Paternal Cousin     Breast cancer Neg Hx     Ovarian cancer Neg Hx     Colon polyps Neg Hx      Social History     Tobacco Use    Smoking status: Never Smoker    Smokeless tobacco: Never Used   Substance Use Topics    Alcohol use: Not Currently     Alcohol/week: 0.0 standard drinks    Drug use: No     Review of Systems   Constitutional: Negative for fever.   HENT: Negative for sore throat.    Eyes: Negative for pain.   Respiratory: Positive for cough and shortness of breath.    Cardiovascular: Negative for chest pain.   Gastrointestinal: Negative for nausea.   Genitourinary: Negative for dysuria.   Musculoskeletal: Negative for back pain.   Skin: Negative for rash.   Neurological: Positive for light-headedness. Negative for weakness.   Hematological: Does not bruise/bleed easily.       Physical Exam     Initial Vitals [03/24/22 1038]   BP Pulse Resp Temp SpO2   136/65 78 (!) 24 98.4 °F (36.9 °C) 100 %      MAP       --         Physical Exam    Constitutional: She appears well-developed and well-nourished. She is not diaphoretic. No distress.   Obese female   HENT:   Head: Normocephalic and atraumatic.   Mouth/Throat: Oropharynx is clear and moist. No oropharyngeal exudate.   Eyes: Conjunctivae and EOM are normal. Pupils are equal, round, and reactive to light. No scleral icterus.   Neck: Neck supple.   Normal range of motion.  Cardiovascular:  Normal rate and regular rhythm.   Trace pitting edema   Pulmonary/Chest: Breath sounds normal. Tachypnea noted. No respiratory distress. She has no wheezes.   Mild difficulty speaking in full sentences  Decreased breath sounds   Abdominal: Abdomen is soft. She exhibits no distension. There is no abdominal tenderness. There is no rebound.   Genitourinary:    Genitourinary Comments: Rectal exam - Light brown stool on gloved finger. No melena or hematochezia. FOBT negative.     Musculoskeletal:         General: No tenderness or edema. Normal range of motion.      Cervical back: Normal range of motion and neck supple.     Neurological: She is alert and oriented to person, place, and time. She has normal strength. No sensory deficit.   Skin: Skin is warm and dry. No rash noted. No erythema.   Psychiatric: She has a normal mood and affect.       ED Course   Procedures  Labs Reviewed   CBC W/ AUTO DIFFERENTIAL - Abnormal; Notable for the following components:       Result Value    RBC 2.94 (*)     Hemoglobin 8.2 (*)     Hematocrit 27.8 (*)     MCHC 29.5 (*)     RDW 23.3 (*)     Immature Granulocytes 0.6 (*)     Mono # 0.2 (*)     Gran % 73.7 (*)     Mono % 3.5 (*)     All other components within normal limits   COMPREHENSIVE METABOLIC PANEL - Abnormal; Notable for the following components:    Potassium 3.4 (*)     Glucose 187 (*)     Albumin 2.8 (*)     All other components within normal limits   B-TYPE NATRIURETIC PEPTIDE   TROPONIN I   MAGNESIUM   NT-PRO NATRIURETIC PEPTIDE   HIV 1 / 2 ANTIBODY   HEPATITIS C ANTIBODY   SARS-COV-2 RDRP GENE    Narrative:     This test utilizes isothermal nucleic acid amplification   technology to detect the SARS-CoV-2 RdRp nucleic acid segment.   The analytical sensitivity (limit of detection) is 125 genome   equivalents/mL.   A POSITIVE result implies infection with the SARS-CoV-2 virus;   the patient is presumed to be contagious.     A NEGATIVE result means that SARS-CoV-2 nucleic acids  are not   present above the limit of detection. A NEGATIVE result should be   treated as presumptive. It does not rule out the possibility of   COVID-19 and should not be the sole basis for treatment decisions.   If COVID-19 is strongly suspected based on clinical and exposure   history, re-testing using an alternate molecular assay should be   considered.   This test is only for use under the Food and Drug   Administration s Emergency Use Authorization (EUA).   Commercial kits are provided by [employer].   Performance characteristics of the EUA have been independently   verified by [facility] Department of   Pathology and Laboratory Medicine.   _________________________________________________________________   The authorized Fact Sheet for Healthcare Providers and the authorized Fact   Sheet for Patients of the ID NOW COVID-19 are available on the FDA   website:     https://www.fda.gov/media/643737/download  https://www.fda.gov/media/919775/download               ECG Results          EKG 12-lead (Final result)  Result time 03/24/22 11:55:54    Final result by Interface, Lab In [facility] (03/24/22 11:55:54)                 Narrative:    Test Reason : R06.02,    Vent. Rate : 079 BPM     Atrial Rate : 079 BPM     P-R Int : 196 ms          QRS Dur : 102 ms      QT Int : 394 ms       P-R-T Axes : 001 -21 -31 degrees     QTc Int : 451 ms    Normal sinus rhythm  Cannot rule out Anterior infarct ,age undetermined  Abnormal ECG  When compared with ECG of 30-NOV-2021 17:11,  Aberrant conduction is no longer Present  Confirmed by OUSMANE ASHRAF MD ([id number]) on 3/24/2022 11:55:46 AM    Referred By:             Confirmed By:OUSMANE ASHRAF MD                            Imaging Results          CTA Chest Non-Coronary (PE Study) (Final result)  Result time 03/24/22 12:42:45    Final result by Mia Roland MD (03/24/22 12:42:45)                 Impression:      No evidence of pulmonary embolus to the segmental  branches.    Other stable findings are as above.    No specific CT evidence for new metastatic disease in the chest.      Electronically signed by: Mia Roland  Date:    03/24/2022  Time:    12:42             Narrative:    EXAMINATION:  CTA CHEST NON CORONARY    CLINICAL HISTORY:  Pulmonary embolism (PE) suspected, high prob;    TECHNIQUE:  Low dose axial images, sagittal and coronal reformations were obtained from the thoracic inlet to the lung bases following the IV administration of 100 mL of Omnipaque 350.  Contrast timing was optimized to evaluate the pulmonary arteries.  MIP images were performed.    COMPARISON:  CT of the chest 02/01/2022    FINDINGS:  Opacification of the pulmonary arteries is sufficient for evaluation to the segmental branches.  No filling defects were seen.    The heart size is normal.  The aorta and main pulmonary segment show normal diameter.  There is no pericardial effusion.    No new lymphadenopathy identified in the chest.    No radiographic evidence of pneumonia or pulmonary edema is identified.  Stable mild apical scarring.  No new or enlarging pulmonary nodule.    There is partial imaging of the upper abdomen.  There is diffusely diminished hepatic attenuation consistent fatty metamorphosis.    Stable, 10 mm sclerotic focus in the left 10th posterior vertebral body with a narrow zone of transition.  A similar appearing lesion is seen in the sternum.  No new suspicious bone lesions.    The site of the reported primary breast malignancy is not well identified.                                 Medications   iohexoL (OMNIPAQUE 350) injection 100 mL (100 mLs Intravenous Given 3/24/22 1230)     Medical Decision Making:   History:   Old Medical Records: I decided to obtain old medical records.  Old Records Summarized: records from clinic visits and records from previous admission(s).  Independently Interpreted Test(s):   I have ordered and independently interpreted EKG Reading(s) - see  summary below       <> Summary of EKG Reading(s): NSR 79 bpm. TWI in inferior, anterior, lateral leads similar to previous. No STEMI  Clinical Tests:   Lab Tests: Ordered and Reviewed  Radiological Study: Ordered and Reviewed  Medical Tests: Ordered and Reviewed       APC / Resident Notes:   46 y.o. female with medical history of R breast cancer with spine mets on oral chemo, HTN, HLD, CHF (EF 35%) presenting to the ED from oncology clinic for exertional SOB over the past month. Concerned for CHF exacerbation versus PE.     DDx includes but not limited to CHF exacerbation, pulmonary embolism, OHS, PNA, malignancy, cardiac arrhythmia.     Work-up reviewed. BNP negative in the setting of entresto use. No edema or evidence of PE on CTA chest. Presentation not consistent with CHF exacerbation or PE. Ambulatory POx 97%. She does have downtrending HBG, 8.2 today. No melena/hematchoezia on rectal with negative FOBT. Do not suspect GI bleed. Possible symptomatic anemia or physical deconditioning factoring into her SOB. Outpatient f/u with Cardiology, PCP, Hem-Onc appropriate. Patient expresses understanding and agreeable to the plan. Return to ED precautions given for new, worsening, or concerning symptoms. I have discussed the care of this patient with my supervising physician.         ED Course as of 03/24/22 2031   Thu Mar 24, 2022   1227 45 yo f pmhx of BRCA on chem w spine mets, combined HF (EF35%) pw SOB. AF. Tachypneic VSS otherwise reassuring oxygenating well.  [BD]   1346 No melena or hematochezia on rectal. FOBT negative. [BA]      ED Course User Index  [BA] Jose Bell PA-C  [BD] Gunnar Martinez MD             Clinical Impression:   Final diagnoses:  [R06.02] SOB (shortness of breath) (Primary)  [Z86.79] History of CHF (congestive heart failure)  [C50.911] Malignant neoplasm of right female breast, unspecified estrogen receptor status, unspecified site of breast  [D64.9] Anemia, unspecified type          ED  Disposition Condition    Discharge Stable        ED Prescriptions     None        Follow-up Information     Follow up With Specialties Details Why Contact Info Additional Information    Vito Walker MD Family Medicine   [address]  [address]  [facility] [address]  [phone]       [facility] - Cardiology - 3rd Fl Cardiology   [address] [facility] [address] [phone] Cardiology Services Clinics - 3rd floor    [facility] - Hem Onc 2nd Fl Hematology and Oncology   [address] [facility] [address] [phone] Please park in the Cancer Center surface lot on the [place] side and check in on the 2nd floor           Jose Bell PA-C  03/24/22 2031

## 2022-03-25 ENCOUNTER — PATIENT MESSAGE (OUTPATIENT)
Dept: HEMATOLOGY/ONCOLOGY | Facility: CLINIC | Age: 47
End: 2022-03-25

## 2022-03-25 DIAGNOSIS — D64.81 ANTINEOPLASTIC CHEMOTHERAPY INDUCED ANEMIA: Primary | ICD-10-CM

## 2022-03-25 DIAGNOSIS — I51.7 CARDIOMEGALY: Primary | ICD-10-CM

## 2022-03-25 DIAGNOSIS — T45.1X5A ANTINEOPLASTIC CHEMOTHERAPY INDUCED ANEMIA: Primary | ICD-10-CM

## 2022-03-25 LAB
HCV AB SERPL QL IA: NEGATIVE
HIV 1+2 AB+HIV1 P24 AG SERPL QL IA: NEGATIVE

## 2022-03-28 ENCOUNTER — PATIENT MESSAGE (OUTPATIENT)
Dept: PSYCHIATRY | Facility: CLINIC | Age: 47
End: 2022-03-28

## 2022-03-28 ENCOUNTER — OFFICE VISIT (OUTPATIENT)
Dept: PSYCHIATRY | Facility: CLINIC | Age: 47
End: 2022-03-28
Payer: MEDICAID

## 2022-03-28 DIAGNOSIS — F33.1 MAJOR DEPRESSIVE DISORDER, RECURRENT EPISODE, MODERATE WITH ANXIOUS DISTRESS: Primary | ICD-10-CM

## 2022-03-28 DIAGNOSIS — F43.21 GRIEF: ICD-10-CM

## 2022-03-28 DIAGNOSIS — C79.51 BONE METASTASES: ICD-10-CM

## 2022-03-28 DIAGNOSIS — Z17.0 MALIGNANT NEOPLASM OF UPPER-INNER QUADRANT OF RIGHT BREAST IN FEMALE, ESTROGEN RECEPTOR POSITIVE: ICD-10-CM

## 2022-03-28 DIAGNOSIS — C50.211 MALIGNANT NEOPLASM OF UPPER-INNER QUADRANT OF RIGHT BREAST IN FEMALE, ESTROGEN RECEPTOR POSITIVE: ICD-10-CM

## 2022-03-28 PROCEDURE — 90834 PR PSYCHOTHERAPY W/PATIENT, 45 MIN: ICD-10-PCS | Mod: AH,HB,, | Performed by: PSYCHOLOGIST

## 2022-03-28 PROCEDURE — 99999 PR PBB SHADOW E&M-EST. PATIENT-LVL I: ICD-10-PCS | Mod: PBBFAC,HB,, | Performed by: PSYCHOLOGIST

## 2022-03-28 PROCEDURE — 1159F MED LIST DOCD IN RCRD: CPT | Mod: AH,HB,CPTII, | Performed by: PSYCHOLOGIST

## 2022-03-28 PROCEDURE — 90834 PSYTX W PT 45 MINUTES: CPT | Mod: AH,HB,, | Performed by: PSYCHOLOGIST

## 2022-03-28 PROCEDURE — 99999 PR PBB SHADOW E&M-EST. PATIENT-LVL I: CPT | Mod: PBBFAC,HB,, | Performed by: PSYCHOLOGIST

## 2022-03-28 PROCEDURE — 3052F PR MOST RECENT HEMOGLOBIN A1C LEVEL 8.0 - < 9.0%: ICD-10-PCS | Mod: AH,HB,CPTII, | Performed by: PSYCHOLOGIST

## 2022-03-28 PROCEDURE — 1159F PR MEDICATION LIST DOCUMENTED IN MEDICAL RECORD: ICD-10-PCS | Mod: AH,HB,CPTII, | Performed by: PSYCHOLOGIST

## 2022-03-28 PROCEDURE — 4010F PR ACE/ARB THEARPY RXD/TAKEN: ICD-10-PCS | Mod: AH,HB,CPTII, | Performed by: PSYCHOLOGIST

## 2022-03-28 PROCEDURE — 4010F ACE/ARB THERAPY RXD/TAKEN: CPT | Mod: AH,HB,CPTII, | Performed by: PSYCHOLOGIST

## 2022-03-28 PROCEDURE — 3052F HG A1C>EQUAL 8.0%<EQUAL 9.0%: CPT | Mod: AH,HB,CPTII, | Performed by: PSYCHOLOGIST

## 2022-03-28 PROCEDURE — 99211 OFF/OP EST MAY X REQ PHY/QHP: CPT | Mod: PBBFAC | Performed by: PSYCHOLOGIST

## 2022-03-28 NOTE — PROGRESS NOTES
INFORMED CONSENT: Kristopher Ye   is known to this provider and identity was confirmed via NAME and .  The patient has been informed of the risks and benefits associated with engaging in psychotherapy, the handling of protected health information, the rights of privacy and the limits of confidentiality. The patient has also been informed of the importance of reporting any suicidal or homicidal ideation to this or any provider to ensure safety of all parties, and the Kristopher Ye expressed understanding. The patient was agreeable to these terms and freely participates in individual psychotherapy.    PSYCHO-ONCOLOGY NOTE/ Individual Psychotherapy     Date: 3/28/2022   [facility]        Therapeutic Intervention: Met with patient.  Outpatient - Behavior modifying psychotherapy 45 min - CPT code 83202      Patient was last seen by me on 2022    Problem list  Patient Active Problem List   Diagnosis    Hypertension    Iron deficiency anemia    Cardiomegaly    Fibroid uterus    Shortness of breath    Thalassemia    Major depressive disorder, recurrent episode, moderate with anxious distress    Chronic combined systolic and diastolic heart failure    Cardiomyopathy    Hyperlipidemia    Malignant neoplasm of upper-inner quadrant of right breast in female, estrogen receptor positive    Cancer associated pain    Pathological fracture of lumbar vertebra with routine healing    Bone metastases    Hot flashes    Prediabetes    BMI 60.0-69.9, adult    Elevated LDL cholesterol level    Anxiety    Vaginal dryness    Vitamin D deficiency    Grief    Hyperglycemia    Type 2 diabetes mellitus with hyperglycemia, without long-term current use of insulin    Benign paroxysmal positional vertigo    Neuropathy       Chief complaint/reason for encounter: depression and anxiety   Met with patient to evaluate psychosocial adaptation to diagnosis/treatment/survivorship of Stage IV  BC    Current Medications  Current Outpatient Medications   Medication    (Magic mouthwash) 1:1:1 Benadryl 12.5mg/5ml liq, aluminum & magnesium hydroxide-simehticone (Maalox), LIDOcaine viscous 2%    anastrozole (ARIMIDEX) 1 mg Tab    atorvastatin (LIPITOR) 40 MG tablet    blood sugar diagnostic Strp    blood-glucose meter kit    carvediloL (COREG) 25 MG tablet    dulaglutide (TRULICITY) 0.75 mg/0.5 mL pen injector    ergocalciferol (ERGOCALCIFEROL) 50,000 unit Cap    glipiZIDE (GLUCOTROL) 10 MG tablet    goserelin (ZOLADEX) 3.6 mg injection    HYDROcodone-acetaminophen (NORCO) 5-325 mg per tablet    ibuprofen (ADVIL,MOTRIN) 800 MG tablet    lancets Misc    LIDOcaine (LIDODERM) 5 %    morphine (MS CONTIN) 15 MG 12 hr tablet    ondansetron (ZOFRAN-ODT) 8 MG TbDL    palbociclib (IBRANCE) 125 mg Cap    potassium chloride (KLOR-CON) 10 MEQ TbSR    promethazine (PHENERGAN) 25 MG tablet    sacubitriL-valsartan (ENTRESTO)  mg per tablet    sennosides (SENNA-C ORAL)    venlafaxine (EFFEXOR-XR) 150 MG Cp24    venlafaxine (EFFEXOR-XR) 75 MG 24 hr capsule     No current facility-administered medications for this visit.       Objective:  Kristopher Ye arrived promptly for the session.  Ms. Ye was independently ambulatory at the time of session. The patient was fully cooperative throughout the session.  Appearance: age appropriate, appropriately  dressed, adequately  groomed  Behavior/Cooperation: friendly and cooperative  Speech: normal in rate, volume, and tone and appropriate quality, quantity and organization of sentences  Mood: euthymic  Affect: mood congruent  Thought Process: goal-directed, logical  Thought Content: normal,  No delusions or paranoia; did not appear to be responding to internal stimuli during the session  Orientation: grossly intact  Memory: Grossly intact  Attention Span/Concentration: Attends to session without distraction; reports no difficulty  Fund of  Knowledge: average  Estimate of Intelligence: average from verbal skills and history  Cognition: grossly intact  Insight: patient has awareness of illness; good insight into own behavior and behavior of others  Judgment: the patient's behavior is adequate to circumstances    Interval history and content of current session: Patient with multiple medical concerns recently with the most troublesome being SOB.   Discussed diagnosis, treatment, prognosis, current adaptation to disease and treatment status and family's adaptation to disease and treatment status. Reports to be coping with great difficulty. Evaluated cognitive response, paying particular attention to negative intrusive thoughts of a persistent and detrimental nature. Thoughts of this type are in evidence with mild distress. Provided cognitive behavioral therapy to address negative cognitions. Identified and evaluated psychosocial and environmental stressors secondary to diagnosis and treatment.  Examined proactive behaviors that may be implemented to minimize or ameliorate psychosocial stressors secondary to diagnosis and treatment.     Risk parameters:   Patient reports no suicidal ideation  Patient reports no homicidal ideation  Patient reports no self-injurious behavior  Patient reports no violent behavior   Safety needs:  None at this time      Verbal deficits: None     Patient's response to intervention:The patient's response to intervention is accepting.     Progress toward goals and other mental status changes:  The patient's progress toward goals is good.      Progress to date:Progress - Ongoing, but Slow      Goals from last visit: Met     Patient reported outcomes:    Distress Thermometer:   Distress Score    Distress Score: 4        Practical Problems Physical Problems   : No Appearance: No   Housing: Yes Bathing / Dressing: Yes   Insurance / Financial: No Breathing: Yes    Transportation: Yes  Changes in Urination: No    Work / School:  No  Constipation: No   Treatment Decisions: No  Diarrhea: No     Eating: Yes    Family Problems Fatigue: Yes    Dealing with Children: Yes Feeling Swollen: No    Dealing with Partner: No Fevers: No    Ability to Have Children: No  Getting Around: Yes       Indigestion: No     Memory / Concentration: Yes   Emotional Problems Mouth Sores: Yes    Depression: Yes  Nausea: Yes    Fears: Yes  Nose Dry / Congested: Yes    Nervousness: Yes  Pain: Yes    Sadness: Yes Sexual: No    Worry: Yes Skin Dry / Itchy: Yes    Loss of Interest in Usual Activities: Yes Sleep: No     Substance Abuse: No    Spiritual/Religions Concerns Tingling in Hands / Feet: Yes   Spritual / Sikh Concerns: No         Other Problems              PHQ ANSWERS    Q1. Little interest or pleasure in doing things: (P) More than half the days (03/28/22 1008)  Q2. Feeling down, depressed, or hopeless: (P) More than half the days (03/28/22 1008)  Q3. Trouble falling or staying asleep, or sleeping too much: (P) More than half the days (03/28/22 1008)  Q4. Feeling tired or having little energy: (P) More than half the days (03/28/22 1008)  Q5. Poor appetite or overeating: (P) More than half the days (03/28/22 1008)  Q6. Feeling bad about yourself - or that you are a failure or have let yourself or your family down: (P) More than half the days (03/28/22 1008)  Q7. Trouble concentrating on things, such as reading the newspaper or watching television: (P) More than half the days (03/28/22 1008)  Q8. Moving or speaking so slowly that other people could have noticed. Or the opposite - being so fidgety or restless that you have been moving around a lot more than usual: (P) Several days (03/28/22 1008)  Q9.  No SI    PHQ8 Score : (P) 15 (03/28/22 1008)  PHQ-9 Total Score: (P) 15 (03/28/22 1008)     HUY-7 Answers    GAD7 3/28/2022   1. Feeling nervous, anxious, or on edge? 2   2. Not being able to stop or control worrying? 2   3. Worrying too much about different  things? 2   4. Trouble relaxing? 2   5. Being so restless that it is hard to sit still? 2   6. Becoming easily annoyed or irritable? 2   7. Feeling afraid as if something awful might happen? 1   HUY-7 Score 13     HUY-7 Score: (P) 13  Interpretation: (P) Moderate Anxiety     Client Strengths: verbal, intelligent, successful, good social support, good insight, commitment to wellness, strong jd, strong cultural traditions     Diagnosis:     ICD-10-CM ICD-9-CM   1. Major depressive disorder, recurrent episode, moderate with anxious distress  F33.1 296.32   2. Grief  F43.21 309.0   3. Malignant neoplasm of upper-inner quadrant of right breast in female, estrogen receptor positive  C50.211 174.2    Z17.0 V86.0   4. Bone metastases  C79.51 198.5       Treatment Plan:individual psychotherapy and medication management by physician  · Target symptoms: depression, anxiety   · Why chosen therapy is appropriate versus another modality: relevant to diagnosis, patient responds to this modality, evidence based practice  · Outcome monitoring methods: self-report, observation, checklist/rating scale  · Therapeutic intervention type: behavior modifying psychotherapy  · Prognosis: Good      Behavioral goals:   Consider next life goals/values  Increase daily self-care and attention to health management  Pleasant events scheduling and increased social interaction  Monitor stressors in writing and bring to next visit  Identify, implement and maintain healthy personal boundaries    Return to clinic: 1 month    Next Session:      Length of Service (minutes direct face-to-face contact): 45    Bria Odom, PhD  Clinical Psychologist  [place] License #[id number]  [place] License #[id number]

## 2022-03-30 ENCOUNTER — HOSPITAL ENCOUNTER (OUTPATIENT)
Dept: CARDIOLOGY | Facility: HOSPITAL | Age: 47
Discharge: HOME | End: 2022-03-30
Attending: INTERNAL MEDICINE
Payer: MEDICAID

## 2022-03-30 VITALS
HEIGHT: 64 IN | WEIGHT: 293 LBS | DIASTOLIC BLOOD PRESSURE: 85 MMHG | HEART RATE: 70 BPM | BODY MASS INDEX: 50.02 KG/M2 | SYSTOLIC BLOOD PRESSURE: 156 MMHG

## 2022-03-30 DIAGNOSIS — I51.7 CARDIOMEGALY: ICD-10-CM

## 2022-03-30 LAB
ASCENDING AORTA: 3.26 CM
AV INDEX (PROSTH): 0.78
AV MEAN GRADIENT: 7 MMHG
AV PEAK GRADIENT: 11 MMHG
AV VALVE AREA: 3.25 CM2
AV VELOCITY RATIO: 0.61
BSA FOR ECHO PROCEDURE: 2.77 M2
CV ECHO LV RWT: 0.31 CM
DOP CALC AO PEAK VEL: 1.67 M/S
DOP CALC AO VTI: 28.07 CM
DOP CALC LVOT AREA: 4.2 CM2
DOP CALC LVOT DIAMETER: 2.31 CM
DOP CALC LVOT PEAK VEL: 1.02 M/S
DOP CALC LVOT STROKE VOLUME: 91.15 CM3
DOP CALCLVOT PEAK VEL VTI: 21.76 CM
E WAVE DECELERATION TIME: 150.43 MSEC
E/A RATIO: 0.77
E/E' RATIO: 9.38 M/S
ECHO LV POSTERIOR WALL: 1.07 CM (ref 0.6–1.1)
EJECTION FRACTION: 55 %
FRACTIONAL SHORTENING: 30 % (ref 28–44)
INTERVENTRICULAR SEPTUM: 1.08 CM (ref 0.6–1.1)
IVRT: 82.78 MSEC
LA MAJOR: 6.34 CM
LA MINOR: 6.16 CM
LA WIDTH: 5.17 CM
LEFT ATRIUM SIZE: 4.91 CM
LEFT ATRIUM VOLUME INDEX MOD: 52.3 ML/M2
LEFT ATRIUM VOLUME INDEX: 52.7 ML/M2
LEFT ATRIUM VOLUME MOD: 133.82 CM3
LEFT ATRIUM VOLUME: 134.83 CM3
LEFT INTERNAL DIMENSION IN SYSTOLE: 4.87 CM (ref 2.1–4)
LEFT VENTRICLE DIASTOLIC VOLUME INDEX: 98.08 ML/M2
LEFT VENTRICLE DIASTOLIC VOLUME: 251.09 ML
LEFT VENTRICLE MASS INDEX: 136 G/M2
LEFT VENTRICLE SYSTOLIC VOLUME INDEX: 43.3 ML/M2
LEFT VENTRICLE SYSTOLIC VOLUME: 110.95 ML
LEFT VENTRICULAR INTERNAL DIMENSION IN DIASTOLE: 6.95 CM (ref 3.5–6)
LEFT VENTRICULAR MASS: 348.27 G
LV LATERAL E/E' RATIO: 7.5 M/S
LV SEPTAL E/E' RATIO: 12.5 M/S
MV A" WAVE DURATION": 12.56 MSEC
MV PEAK A VEL: 0.98 M/S
MV PEAK E VEL: 0.75 M/S
MV STENOSIS PRESSURE HALF TIME: 43.62 MS
MV VALVE AREA P 1/2 METHOD: 5.04 CM2
PULM VEIN S/D RATIO: 1.71
PV PEAK D VEL: 0.31 M/S
PV PEAK S VEL: 0.53 M/S
QEF: 52 %
RA MAJOR: 4.66 CM
RA WIDTH: 3.97 CM
RIGHT VENTRICULAR END-DIASTOLIC DIMENSION: 4.19 CM
RV TISSUE DOPPLER FREE WALL SYSTOLIC VELOCITY 1 (APICAL 4 CHAMBER VIEW): 15.81 CM/S
SINUS: 3.02 CM
STJ: 2.5 CM
TDI LATERAL: 0.1 M/S
TDI SEPTAL: 0.06 M/S
TDI: 0.08 M/S
TRICUSPID ANNULAR PLANE SYSTOLIC EXCURSION: 2.45 CM

## 2022-03-30 PROCEDURE — 93306 ECHO (CUPID ONLY): ICD-10-PCS | Mod: 26,,, | Performed by: INTERNAL MEDICINE

## 2022-03-30 PROCEDURE — 93306 TTE W/DOPPLER COMPLETE: CPT

## 2022-03-30 PROCEDURE — 93306 TTE W/DOPPLER COMPLETE: CPT | Mod: 26,,, | Performed by: INTERNAL MEDICINE

## 2022-03-31 ENCOUNTER — SPECIALTY PHARMACY (OUTPATIENT)
Dept: PHARMACY | Facility: CLINIC | Age: 47
End: 2022-03-31

## 2022-03-31 DIAGNOSIS — Z23 NEED FOR VACCINATION: Primary | ICD-10-CM

## 2022-03-31 DIAGNOSIS — C50.211 MALIGNANT NEOPLASM OF UPPER-INNER QUADRANT OF RIGHT BREAST IN FEMALE, ESTROGEN RECEPTOR POSITIVE: ICD-10-CM

## 2022-03-31 DIAGNOSIS — Z17.0 MALIGNANT NEOPLASM OF UPPER-INNER QUADRANT OF RIGHT BREAST IN FEMALE, ESTROGEN RECEPTOR POSITIVE: ICD-10-CM

## 2022-03-31 NOTE — TELEPHONE ENCOUNTER
Specialty Pharmacy - Refill Coordination    Specialty Medication Orders Linked to Encounter    Flowsheet Row Most Recent Value   Medication #1 palbociclib (IBRANCE) 125 mg Cap (Order#[id number], Rx#[id number])          Refill Questions - Documented Responses    Flowsheet Row Most Recent Value   Patient Availability and HIPAA Verification    Does patient want to proceed with activity? Yes   HIPAA/medical authority confirmed? Yes   Relationship to patient of person spoken to? Self   Refill Screening Questions    Changes to allergies? No   Changes to medications? No   New conditions since last clinic visit? No   Unplanned office visit, urgent care, ED, or hospital admission in the last 4 weeks? Yes  [for SOB]   How does patient/caregiver feel medication is working? Good   Financial problems or insurance changes? No   How many doses of your specialty medications were missed in the last 4 weeks? 0   Would patient like to speak to a pharmacist? Yes   When does the patient need to receive the medication? 04/04/22   Refill Delivery Questions    How will the patient receive the medication? Pickup   When does the patient need to receive the medication? 04/04/22   Shipping Address Home   Address in [facility] confirmed and updated if neccessary? Yes   Expected Copay ($) 0   Is the patient able to afford the medication copay? Yes   Payment Method zero copay   Days supply of Refill 28   Supplies needed? No supplies needed   Refill activity completed? Yes   Refill activity plan Refill scheduled   Shipment/Pickup Date: 04/04/22          Current Outpatient Medications   Medication Sig    (Magic mouthwash) 1:1:1 Benadryl 12.5mg/5ml liq, aluminum & magnesium hydroxide-simehticone (Maalox), LIDOcaine viscous 2% Swish and spit 10 mLs every 4 (four) hours as needed. for mouth sores    anastrozole (ARIMIDEX) 1 mg Tab TAKE 1 TABLET(1 MG) BY MOUTH EVERY DAY    atorvastatin (LIPITOR) 40 MG tablet TAKE 1 TABLET(40 MG) BY MOUTH  EVERY DAY    blood sugar diagnostic Strp To check BG 4 times daily, to use with insurance preferred meter    blood-glucose meter kit To check BG 4 times daily, to use with insurance preferred meter    carvediloL (COREG) 25 MG tablet Take 1 tablet (25 mg total) by mouth 2 (two) times daily.    dulaglutide (TRULICITY) 0.75 mg/0.5 mL pen injector Inject 0.75 mg into the skin every 7 days.    ergocalciferol (ERGOCALCIFEROL) 50,000 unit Cap Take 1 capsule (50,000 Units total) by mouth every 7 days.    glipiZIDE (GLUCOTROL) 10 MG tablet Take 1 tablet (10 mg total) by mouth 2 (two) times daily before meals.    goserelin (ZOLADEX) 3.6 mg injection Inject 3.6 mg into the skin every 28 days.    HYDROcodone-acetaminophen (NORCO) 5-325 mg per tablet Take 1 tablet by mouth every 6 (six) hours as needed for Pain.    ibuprofen (ADVIL,MOTRIN) 800 MG tablet Take 1 tablet (800 mg total) by mouth 2 (two) times daily as needed for Pain.    lancets Misc To check BG 4 times daily, to use with insurance preferred meter    LIDOcaine (LIDODERM) 5 % Place 2 patches onto the skin once daily. Remove & Discard patch within 12 hours or as directed by MD    morphine (MS CONTIN) 15 MG 12 hr tablet Take 1 tablet (15 mg total) by mouth every 8 (eight) hours as needed for Pain.    ondansetron (ZOFRAN-ODT) 8 MG TbDL Take 1 tablet (8 mg total) by mouth every 8 (eight) hours as needed (nausea).    palbociclib (IBRANCE) 125 mg Cap Take 1 capsule (125 mg) by mouth as instructed Take as directed days 1-21 of each 28 day cycle. Take with food.    potassium chloride (KLOR-CON) 10 MEQ TbSR TAKE 1 TABLET(10 MEQ) BY MOUTH EVERY DAY    promethazine (PHENERGAN) 25 MG tablet Take 1 tablet (25 mg total) by mouth every 6 (six) hours as needed for Nausea.    sacubitriL-valsartan (ENTRESTO)  mg per tablet Take 1 tablet by mouth 2 (two) times daily.    sennosides (SENNA-C ORAL) Take 2 tablets by mouth daily as needed.    venlafaxine  (EFFEXOR-XR) 150 MG Cp24 Take 1 capsule (150 mg total) by mouth once daily.    venlafaxine (EFFEXOR-XR) 75 MG 24 hr capsule Take 1 capsule (75 mg total) by mouth once daily. Take with 150 mg capsule for a total of 225 mg.   Last reviewed on 3/31/2022  2:46 PM by Charlette Omer, PharmD    Review of patient's allergies indicates:   Allergen Reactions    Keflex [cephalexin] Itching    Last reviewed on  3/31/2022 2:46 PM by Charlette Omer      Tasks added this encounter   No tasks added.   Tasks due within next 3 months   3/28/2022 - Refill Call (Auto Added)     Charlette Omer, PharmD  [facility]  [address] [place] [id number]  Phone: [phone]  Fax: [phone]

## 2022-04-04 ENCOUNTER — INFUSION (OUTPATIENT)
Dept: INFUSION THERAPY | Facility: HOSPITAL | Age: 47
End: 2022-04-04
Payer: MEDICAID

## 2022-04-04 ENCOUNTER — OFFICE VISIT (OUTPATIENT)
Dept: HEMATOLOGY/ONCOLOGY | Facility: CLINIC | Age: 47
End: 2022-04-04
Payer: MEDICAID

## 2022-04-04 ENCOUNTER — SPECIALTY PHARMACY (OUTPATIENT)
Dept: PHARMACY | Facility: CLINIC | Age: 47
End: 2022-04-04

## 2022-04-04 VITALS
SYSTOLIC BLOOD PRESSURE: 130 MMHG | DIASTOLIC BLOOD PRESSURE: 92 MMHG | TEMPERATURE: 98 F | RESPIRATION RATE: 18 BRPM | WEIGHT: 293 LBS | OXYGEN SATURATION: 100 % | HEART RATE: 77 BPM | BODY MASS INDEX: 50.02 KG/M2 | HEIGHT: 64 IN

## 2022-04-04 DIAGNOSIS — K12.30 MUCOSITIS: Primary | ICD-10-CM

## 2022-04-04 DIAGNOSIS — Z17.0 MALIGNANT NEOPLASM OF UPPER-INNER QUADRANT OF RIGHT BREAST IN FEMALE, ESTROGEN RECEPTOR POSITIVE: ICD-10-CM

## 2022-04-04 DIAGNOSIS — D56.9 THALASSEMIA, UNSPECIFIED TYPE: ICD-10-CM

## 2022-04-04 DIAGNOSIS — C50.211 MALIGNANT NEOPLASM OF UPPER-INNER QUADRANT OF RIGHT BREAST IN FEMALE, ESTROGEN RECEPTOR POSITIVE: Primary | ICD-10-CM

## 2022-04-04 DIAGNOSIS — T45.1X5A ANEMIA ASSOCIATED WITH CHEMOTHERAPY: ICD-10-CM

## 2022-04-04 DIAGNOSIS — D64.81 ANEMIA ASSOCIATED WITH CHEMOTHERAPY: ICD-10-CM

## 2022-04-04 DIAGNOSIS — C50.211 MALIGNANT NEOPLASM OF UPPER-INNER QUADRANT OF RIGHT BREAST IN FEMALE, ESTROGEN RECEPTOR POSITIVE: ICD-10-CM

## 2022-04-04 DIAGNOSIS — Z17.0 MALIGNANT NEOPLASM OF UPPER-INNER QUADRANT OF RIGHT BREAST IN FEMALE, ESTROGEN RECEPTOR POSITIVE: Primary | ICD-10-CM

## 2022-04-04 PROCEDURE — 96402 CHEMO HORMON ANTINEOPL SQ/IM: CPT

## 2022-04-04 PROCEDURE — 3080F DIAST BP >= 90 MM HG: CPT | Mod: CPTII,,, | Performed by: INTERNAL MEDICINE

## 2022-04-04 PROCEDURE — 3052F HG A1C>EQUAL 8.0%<EQUAL 9.0%: CPT | Mod: CPTII,,, | Performed by: INTERNAL MEDICINE

## 2022-04-04 PROCEDURE — 1159F MED LIST DOCD IN RCRD: CPT | Mod: CPTII,,, | Performed by: INTERNAL MEDICINE

## 2022-04-04 PROCEDURE — 3008F PR BODY MASS INDEX (BMI) DOCUMENTED: ICD-10-PCS | Mod: CPTII,,, | Performed by: INTERNAL MEDICINE

## 2022-04-04 PROCEDURE — 99999 PR PBB SHADOW E&M-EST. PATIENT-LVL V: CPT | Mod: PBBFAC,,, | Performed by: INTERNAL MEDICINE

## 2022-04-04 PROCEDURE — 96372 THER/PROPH/DIAG INJ SC/IM: CPT | Mod: 59

## 2022-04-04 PROCEDURE — 1160F RVW MEDS BY RX/DR IN RCRD: CPT | Mod: CPTII,,, | Performed by: INTERNAL MEDICINE

## 2022-04-04 PROCEDURE — 4010F PR ACE/ARB THEARPY RXD/TAKEN: ICD-10-PCS | Mod: CPTII,,, | Performed by: INTERNAL MEDICINE

## 2022-04-04 PROCEDURE — 99215 OFFICE O/P EST HI 40 MIN: CPT | Mod: PBBFAC | Performed by: INTERNAL MEDICINE

## 2022-04-04 PROCEDURE — 99214 OFFICE O/P EST MOD 30 MIN: CPT | Mod: S$PBB,,, | Performed by: INTERNAL MEDICINE

## 2022-04-04 PROCEDURE — 3075F SYST BP GE 130 - 139MM HG: CPT | Mod: CPTII,,, | Performed by: INTERNAL MEDICINE

## 2022-04-04 PROCEDURE — 99999 PR PBB SHADOW E&M-EST. PATIENT-LVL V: ICD-10-PCS | Mod: PBBFAC,,, | Performed by: INTERNAL MEDICINE

## 2022-04-04 PROCEDURE — 3080F PR MOST RECENT DIASTOLIC BLOOD PRESSURE >= 90 MM HG: ICD-10-PCS | Mod: CPTII,,, | Performed by: INTERNAL MEDICINE

## 2022-04-04 PROCEDURE — 1160F PR REVIEW ALL MEDS BY PRESCRIBER/CLIN PHARMACIST DOCUMENTED: ICD-10-PCS | Mod: CPTII,,, | Performed by: INTERNAL MEDICINE

## 2022-04-04 PROCEDURE — 1159F PR MEDICATION LIST DOCUMENTED IN MEDICAL RECORD: ICD-10-PCS | Mod: CPTII,,, | Performed by: INTERNAL MEDICINE

## 2022-04-04 PROCEDURE — 3052F PR MOST RECENT HEMOGLOBIN A1C LEVEL 8.0 - < 9.0%: ICD-10-PCS | Mod: CPTII,,, | Performed by: INTERNAL MEDICINE

## 2022-04-04 PROCEDURE — 3075F PR MOST RECENT SYSTOLIC BLOOD PRESS GE 130-139MM HG: ICD-10-PCS | Mod: CPTII,,, | Performed by: INTERNAL MEDICINE

## 2022-04-04 PROCEDURE — 3008F BODY MASS INDEX DOCD: CPT | Mod: CPTII,,, | Performed by: INTERNAL MEDICINE

## 2022-04-04 PROCEDURE — 63600175 PHARM REV CODE 636 W HCPCS: Mod: JG | Performed by: INTERNAL MEDICINE

## 2022-04-04 PROCEDURE — 4010F ACE/ARB THERAPY RXD/TAKEN: CPT | Mod: CPTII,,, | Performed by: INTERNAL MEDICINE

## 2022-04-04 PROCEDURE — 99214 PR OFFICE/OUTPT VISIT, EST, LEVL IV, 30-39 MIN: ICD-10-PCS | Mod: S$PBB,,, | Performed by: INTERNAL MEDICINE

## 2022-04-04 RX ADMIN — DENOSUMAB 120 MG: 120 INJECTION SUBCUTANEOUS at 12:04

## 2022-04-04 RX ADMIN — GOSERELIN ACETATE 3.6 MG: 3.6 IMPLANT SUBCUTANEOUS at 12:04

## 2022-04-04 NOTE — PROGRESS NOTES
"Subjective:       Patient ID: Kristopher Ye is a 46 y.o. female.    Chief Complaint: No chief complaint on file.    HPI     Here for follow up.  Reports winded easily  Feels a pinch spasm in back of her neck  Notes tongue irritation "shooting pain"  Sometimes hurts to eat and avoids all spice    - 3/30/2022 ECHO:  · The left ventricle is moderately enlarged with mild eccentric hypertrophy and low normal systolic function.  · The estimated ejection fraction is 55%.  · The quantitatively derived ejection fraction is 52%.  · Indeterminate left ventricular diastolic function.  · Severe left atrial enlargement.  · Normal right ventricular size with normal right ventricular systolic function.    - 3/22/2022 CTA Chest:  FINDINGS:  Opacification of the pulmonary arteries is sufficient for evaluation to the segmental branches.  No filling defects were seen.  The heart size is normal.  The aorta and main pulmonary segment show normal diameter.  There is no pericardial effusion.  No new lymphadenopathy identified in the chest.  No radiographic evidence of pneumonia or pulmonary edema is identified.  Stable mild apical scarring.  No new or enlarging pulmonary nodule.  There is partial imaging of the upper abdomen.  There is diffusely diminished hepatic attenuation consistent fatty metamorphosis.  Stable, 10 mm sclerotic focus in the left 10th posterior vertebral body with a narrow zone of transition.  A similar appearing lesion is seen in the sternum.  No new suspicious bone lesions.  The site of the reported primary breast malignancy is not well identified.  Impression:  No evidence of pulmonary embolus to the segmental branches.  Other stable findings are as above.  No specific CT evidence for new metastatic disease in the chest.    Had been seeing Dr. Olivares and had a second opinion due to fears over pacemaker    Currently on Arimidex and Ibrance (week off now)  Zoladex and Xgeva monthly     Recent Imaging:  - " 2/2/2022 Bone scan:  FINDINGS:  No focal abnormal uptake suggestive of osseous metastases.  There is diffusely increased uptake in the calvarium in keeping with hyperostosis.  There is degenerative type uptake most prominent in the bilateral shoulders and knees.  The focal uptake previously described in the distal right femur is not visualized.  There is otherwise physiologic distribution of the radiopharmaceutical throughout the skeleton.  There is normal uptake in the genitourinary system and soft tissues.  Impression:  There is no scintigraphic evidence of osteoblastic metastatic disease.  Nonspecific uptake in the distal right femur has resolved.  Diffuse cranial hyperostosis and other findings as above.     - 2/1/2022 CT C/A/P:  FINDINGS:  CHEST  Support tubes and lines: None.  Aorta: Normal caliber.  Heart: Normal size..  Coronary arteries: No calcifications.  Pericardium: Normal. No effusion, thickening, or calcification.  Central pulmonary arteries: Normal caliber.  Base of neck/thyroid: Normal.  Lymph nodes: No supraclavicular, axillary, internal mammary, mediastinal or hilar lymphadenopathy.  Esophagus: Normal.  Pleura: No effusion, thickening or calcification.  Body wall: Unremarkable.  Airways: Normal.  Lungs: Clear without focal or diffuse abnormality.  Bones: Sclerotic lesions are seen throughout the spine as well as in the sternum, not substantially changed from 10/25/2021  ABDOMEN/PELVIS  Liver: Unremarkable  Gallbladder/bile ducts: Unremarkable. No intra or extrahepatic biliary ductal dilatation  Pancreas: Unremarkable.  Spleen: Unremarkable.  Adrenals: Unremarkable.  Kidneys: Simple cyst in the right kidney is unchanged  Lymph nodes: No abdominal or pelvic lymphadenopathy.  Bowel and mesentery: Unremarkable.  Abdominal aorta: Unremarkable.  Inferior vena cava: Unremarkable.  Free fluid or free air: None.  Pelvis: Unremarkable.  Urinary bladder: Unremarkable.  Body wall: Unremarkable.  Bones:  Sclerotic lesions seen in the spine are unchanged.  Impression:  1. No evidence of new recurrent or metastatic disease.  2. Sclerotic lesions seen in the spine and sternum, unchanged when compared to 10/25/2021.        Last ECHO 5/2021   · Moderate left atrial enlargement.  · The left ventricle is mildly enlarged with eccentric hypertrophy and moderately decreased systolic function.  · The estimated ejection fraction is 35%.  · It is not possible to calculate the strain values due to poor visualization of the myocardium.  · Grade I left ventricular diastolic dysfunction.  · Normal central venous pressure (3 mmHg).               Per Dr. Lei's previous note: Reason For Follow Up:   1. Stage IV ([id number]) invasive ductal carcinoma of right breast, upper inner quadrant, ER %, CO neg, Her2 neg, Grade 3, multifocal with one lesion appearing more aggressive (Ki67 80%), large LN +, bone mets.        Oncologic History:  Presentation  - 9/11/19 - Screening mammogram showed multiple right breast masses lower inner quadrant  - 9/13/19 - Diagnostic mammogram and US showed a right breast, 3:00 position mass, 2 CFN measuring 17 mm x 16 mm x 14 mm.  There 2 smaller adjacent masses towards the nipple  - 9/19/19 - Biopsy -               A. Right breast subareolar: Grade 3 IDC, estrogen receptor 80%, progesterone receptor 0%, Her2 dago neg, Ki67 80%.                           B. Right breast mass 3:00: Grade 3 IDC with papillary features, estrogen receptor 100%, progesterone receptor 0%, Her2 dago 1+, Ki67 30%.              - 9/26/19: US right axilla with abnormal lymphadenopathy measuring 4.3 x 2.8 cm with biopsy + for metastatic breast cancer.   Surgery consultation with Dr Ferris on 9/25/19              - 9/25/19 - Genetics Genetics Edufii Neda Pierrealysis pending; VUS pending  Medical oncology consultation on 10/7/19              * 10/8/19 - CT C/A/P with several lytic lesions in thoracic and lumbar spine, iliac bones,  "left scapula in addition to 3 x 4.5 cm right axillary node and 2.1 cm right breast mass. Bone scan negative.              * 10/31/19 - started Ibrance, Arimidex, Zoladex; plan for Xgeva.               * 11/12/19 STRATA without targetable mutations.               * 12/16/19 - Bone biopsy + for met disease (initially read as negative, but we treated as metastatic disease given high suspicion).               * MRI brain: "Partially empty sella.  Question bilateral globe proptosis. Otherwise unremarkable MRI brain as detailed above specifically without evidence for intracranial enhancing lesion to suggest metastatic disease."              * 4/22/2020 PET - disease improvement. 8/2020 PET with disease improvement.               *  9/1/20 - Palliative RT to L1-L4     Of note, * Xgeva monthly  - we had been waiting on dental clearance, but she has been unable to get into dentistry and is accepting of risks. Has no active dental disease.      PET scan 4/22/2021:  FINDINGS:  Quality of the study: Mildly degraded due to patient's large body habitus and skin abutting the gantry.  In the head and neck, there are no hypermetabolic lesions worrisome for malignancy. There are no hypermetabolic mucosal lesions, and there are no pathologically enlarged or hypermetabolic lymph nodes.  In the chest, there are no hypermetabolic lesions worrisome for malignancy.  Stable CT appearance of a level 1 right axillary lymph node measuring 1.3 cm in short axis with normal background radiotracer uptake.  Previously with SUV max of 2.1.  There are no concerning pulmonary nodules or masses, and there are no pathologically enlarged or hypermetabolic lymph nodes.  In the abdomen and pelvis, there is physiologic tracer distribution within the abdominal organs and excretion into the genitourinary system.  In the bones, there are stable lytic lesions throughout the lumbar spine and pelvis and additional stable sclerotic lesions in the sternum and T3 " vertebral body.  No associated focal abnormal increased radiotracer uptake.  Findings likely represent treated disease.  Impression:  Interval decreased uptake within a prominent right axillary lymph node, now with normal background uptake.  No new focal abnormal uptake.  Stable lytic and sclerotic lesions without focal abnormal uptake.  Findings are compatible with treated metastasis.      8/25/2021 MRI brain   Impression:  No significant change from prior specifically without evidence for enhancing lesion to suggest intracranial metastatic disease.  Continued partially empty sella, intracranial hypertension to be included in differential in the appropriate clinical setting.  Clinical correlation and further evaluation as warranted.     10/14/2021 MRI thoracic/lumbar spine:  Impression:  Patient history of metastatic breast cancer.  Bone lesions at T10, L2, L3, and right iliac wing, as above, concerning for metastatic disease.  No pathologic fracture, soft tissue component, or epidural extension identified.      10/25/2021 CT chest/abd/pelvis:  Impression:  1.  Stable metastasis of the spine  No evidence of intra-abdominal or intrathoracic metastatic disease  2.  Stable prominent right axillary lymph node.  3.  Additional findings are detailed above.     FH:  Paternal side:  1st cousin- ovarian cancer (diagnosed at age 26) and her mother has thyroid cancer  Maternal grandmother- some type of likely metastatic GI cancer    Review of Systems   Constitutional: Negative for activity change, appetite change, fatigue and unexpected weight change (expected weight loss).   HENT: Negative for mouth sores.    Eyes: Negative for visual disturbance.   Respiratory: Positive for shortness of breath. Negative for cough and wheezing.    Cardiovascular: Negative for chest pain, palpitations and leg swelling.   Gastrointestinal: Negative for abdominal pain, change in bowel habit, constipation, diarrhea, nausea, vomiting and change  in bowel habit.   Genitourinary: Negative for difficulty urinating, dysuria and frequency.   Musculoskeletal: Negative for arthralgias, back pain and neck stiffness.   Integumentary:  Negative for rash.   Neurological: Negative for dizziness, weakness, light-headedness, numbness and headaches.   Hematological: Negative for adenopathy.   Psychiatric/Behavioral: Positive for dysphoric mood. The patient is nervous/anxious.          Objective:      Physical Exam  Vitals and nursing note reviewed.   Constitutional:       General: She is not in acute distress.     Appearance: Normal appearance. She is well-developed. She is obese.      Comments: Presents with daughter   HENT:      Head: Normocephalic and atraumatic.   Eyes:      General: Lids are normal. No scleral icterus.     Conjunctiva/sclera: Conjunctivae normal.      Pupils: Pupils are equal, round, and reactive to light.   Neck:      Thyroid: No thyromegaly.      Vascular: No JVD.      Trachea: Trachea normal.   Cardiovascular:      Rate and Rhythm: Normal rate and regular rhythm.      Heart sounds: Normal heart sounds.      Comments: Difficult to assess tones.   Pulmonary:      Effort: Pulmonary effort is normal. No respiratory distress.      Breath sounds: Normal breath sounds. No wheezing, rhonchi or rales.      Comments: distant  Chest:   Breasts:      Right: No axillary adenopathy or supraclavicular adenopathy.      Left: No axillary adenopathy or supraclavicular adenopathy.       Abdominal:      General: Bowel sounds are normal. There is no distension.      Palpations: Abdomen is soft. There is no mass.      Tenderness: There is no abdominal tenderness.      Comments: No organomegaly however difficult to assess.     Musculoskeletal:         General: Normal range of motion.      Cervical back: Normal range of motion and neck supple.      Comments: No spinal or paraspinal tenderness.    Lymphadenopathy:      Head:      Right side of head: No submental or  submandibular adenopathy.      Left side of head: No submental or submandibular adenopathy.      Cervical: No cervical adenopathy.      Upper Body:      Right upper body: No supraclavicular or axillary adenopathy.      Left upper body: No supraclavicular or axillary adenopathy.   Skin:     General: Skin is warm and dry.      Capillary Refill: Capillary refill takes less than 2 seconds.      Coloration: Skin is not jaundiced or pale.      Findings: No bruising or rash.      Nails: There is no clubbing.   Neurological:      Mental Status: She is alert and oriented to person, place, and time.      Sensory: No sensory deficit.      Motor: No weakness.      Coordination: Coordination normal.      Gait: Gait normal.      Comments: + dizziness   Psychiatric:         Mood and Affect: Mood normal.         Speech: Speech normal.         Behavior: Behavior normal.         Thought Content: Thought content normal.         Judgment: Judgment normal.       Labs- reviewed  Assessment:       Problem List Items Addressed This Visit     Thalassemia    Malignant neoplasm of upper-inner quadrant of right breast in female, estrogen receptor positive    Anemia associated with chemotherapy      Other Visit Diagnoses     Mucositis    -  Primary    Relevant Medications    duke's soln (benadryl 30 mL, mylanta 30 mL, LIDOcaine 30 mL, nystatin 30 mL) 120mL          Plan:       Anemia- hx thalassemia  Worsening parameters, likely related to Ibrance- will monitor with hold    Mouth sores- Ibrance will be held another week   Sent Guy's magic mouthwash    LE swelling noted- diastolic dysfunction on ECHO  Will arrange follow up with Dr. Olivares    Route Chart for Scheduling    Med Onc Chart Routing  Urgent    Follow up with physician 2 months. Labs prior and injection after in 8 weeks   Follow up with CAMILA 4 weeks. - cbc and urgent care in 1 week, in 4 weeks labs prior and injection after-- needs follow up with Dr. Olivares prior   Labs    Imaging     Pharmacy appointment    Other referrals Additional referrals needed         Treatment Plan Information   OP ANASTROZOLE PALBOCICLIB Q4W   Jessica Mendez MD   Upcoming Treatment Dates - OP ANASTROZOLE PALBOCICLIB Q4W    No upcoming days in selected categories.    Supportive Plan Information  OP BREAST GOSERELIN & DENOSUMAB Q4W   Jessica Mendez MD   Upcoming Treatment Dates - OP BREAST GOSERELIN & DENOSUMAB Q4W    No upcoming days in selected categories.

## 2022-04-04 NOTE — NURSING
Pt here for zoladex and xgeva injections. Tolerated xgeva injection to right lower abdomen and zoladex injection to left lower abdomen without difficulty.

## 2022-04-04 NOTE — TELEPHONE ENCOUNTER
Received call from provider office that pt's Ibrance dose will be changed, no rx received yet. Informed fulfillment team to return this fill back to stock since dose will be changed. Will f/u with pt once new dose is received

## 2022-04-04 NOTE — TELEPHONE ENCOUNTER
Md office confirmed that we can put back script as she is holding her Ibrance and dose will change. Awaiting new script from MD.

## 2022-04-05 ENCOUNTER — TELEPHONE (OUTPATIENT)
Dept: HEMATOLOGY/ONCOLOGY | Facility: CLINIC | Age: 47
End: 2022-04-05

## 2022-04-12 NOTE — TELEPHONE ENCOUNTER
New rx has not been sent for new dose of Ibrance. Seems that pt has f/u with provider's office tomorrow 4/13.    Will f/u after 4/13 appt

## 2022-04-13 ENCOUNTER — PATIENT MESSAGE (OUTPATIENT)
Dept: OTHER | Facility: OTHER | Age: 47
End: 2022-04-13

## 2022-04-13 ENCOUNTER — OFFICE VISIT (OUTPATIENT)
Dept: HEMATOLOGY/ONCOLOGY | Facility: CLINIC | Age: 47
End: 2022-04-13
Payer: MEDICAID

## 2022-04-13 ENCOUNTER — TELEPHONE (OUTPATIENT)
Dept: HEMATOLOGY/ONCOLOGY | Facility: CLINIC | Age: 47
End: 2022-04-13

## 2022-04-13 ENCOUNTER — HOSPITAL ENCOUNTER (EMERGENCY)
Facility: HOSPITAL | Age: 47
Discharge: HOME | End: 2022-04-13
Attending: EMERGENCY MEDICINE
Payer: MEDICAID

## 2022-04-13 VITALS
SYSTOLIC BLOOD PRESSURE: 136 MMHG | HEIGHT: 64 IN | HEART RATE: 80 BPM | DIASTOLIC BLOOD PRESSURE: 71 MMHG | OXYGEN SATURATION: 95 % | TEMPERATURE: 99 F | RESPIRATION RATE: 18 BRPM | WEIGHT: 293 LBS | BODY MASS INDEX: 50.02 KG/M2

## 2022-04-13 VITALS
SYSTOLIC BLOOD PRESSURE: 149 MMHG | DIASTOLIC BLOOD PRESSURE: 60 MMHG | RESPIRATION RATE: 15 BRPM | HEIGHT: 64 IN | HEART RATE: 78 BPM | WEIGHT: 293 LBS | BODY MASS INDEX: 50.02 KG/M2 | OXYGEN SATURATION: 97 % | TEMPERATURE: 99 F

## 2022-04-13 DIAGNOSIS — R50.81 NEUTROPENIC FEVER: ICD-10-CM

## 2022-04-13 DIAGNOSIS — I42.0 DILATED CARDIOMYOPATHY: ICD-10-CM

## 2022-04-13 DIAGNOSIS — R11.0 NAUSEA: ICD-10-CM

## 2022-04-13 DIAGNOSIS — D70.9 NEUTROPENIC FEVER: ICD-10-CM

## 2022-04-13 DIAGNOSIS — D56.9 THALASSEMIA, UNSPECIFIED TYPE: ICD-10-CM

## 2022-04-13 DIAGNOSIS — D64.81 ANEMIA ASSOCIATED WITH CHEMOTHERAPY: ICD-10-CM

## 2022-04-13 DIAGNOSIS — E87.6 HYPOKALEMIA: ICD-10-CM

## 2022-04-13 DIAGNOSIS — C79.51 BONE METASTASES: ICD-10-CM

## 2022-04-13 DIAGNOSIS — C50.211 MALIGNANT NEOPLASM OF UPPER-INNER QUADRANT OF RIGHT BREAST IN FEMALE, ESTROGEN RECEPTOR POSITIVE: ICD-10-CM

## 2022-04-13 DIAGNOSIS — Z17.0 MALIGNANT NEOPLASM OF UPPER-INNER QUADRANT OF RIGHT BREAST IN FEMALE, ESTROGEN RECEPTOR POSITIVE: ICD-10-CM

## 2022-04-13 DIAGNOSIS — T45.1X5A ANEMIA ASSOCIATED WITH CHEMOTHERAPY: ICD-10-CM

## 2022-04-13 DIAGNOSIS — R06.02 SHORTNESS OF BREATH: ICD-10-CM

## 2022-04-13 DIAGNOSIS — R53.1 WEAKNESS: Primary | ICD-10-CM

## 2022-04-13 DIAGNOSIS — M54.40 ACUTE BILATERAL LOW BACK PAIN WITH SCIATICA, SCIATICA LATERALITY UNSPECIFIED: Primary | ICD-10-CM

## 2022-04-13 LAB
ABO + RH BLD: NORMAL
ALBUMIN SERPL BCP-MCNC: 2.9 G/DL (ref 3.5–5.2)
ALP SERPL-CCNC: 94 U/L (ref 55–135)
ALT SERPL W/O P-5'-P-CCNC: 20 U/L (ref 10–44)
ANION GAP SERPL CALC-SCNC: 11 MMOL/L (ref 8–16)
AST SERPL-CCNC: 17 U/L (ref 10–40)
BACTERIA #/AREA URNS AUTO: ABNORMAL /HPF
BILIRUB SERPL-MCNC: 0.4 MG/DL (ref 0.1–1)
BILIRUB UR QL STRIP: NEGATIVE
BLD GP AB SCN CELLS X3 SERPL QL: NORMAL
BUN SERPL-MCNC: 8 MG/DL (ref 6–20)
CALCIUM SERPL-MCNC: 9.3 MG/DL (ref 8.7–10.5)
CHLORIDE SERPL-SCNC: 105 MMOL/L (ref 95–110)
CLARITY UR REFRACT.AUTO: ABNORMAL
CO2 SERPL-SCNC: 24 MMOL/L (ref 23–29)
COLOR UR AUTO: YELLOW
CREAT SERPL-MCNC: 0.8 MG/DL (ref 0.5–1.4)
EST. GFR  (AFRICAN AMERICAN): >60 ML/MIN/1.73 M^2
EST. GFR  (NON AFRICAN AMERICAN): >60 ML/MIN/1.73 M^2
GLUCOSE SERPL-MCNC: 143 MG/DL (ref 70–110)
GLUCOSE UR QL STRIP: NEGATIVE
HGB UR QL STRIP: ABNORMAL
KETONES UR QL STRIP: NEGATIVE
LEUKOCYTE ESTERASE UR QL STRIP: ABNORMAL
LIPASE SERPL-CCNC: 16 U/L (ref 4–60)
MICROSCOPIC COMMENT: ABNORMAL
NITRITE UR QL STRIP: NEGATIVE
PH UR STRIP: 6 [PH] (ref 5–8)
POTASSIUM SERPL-SCNC: 3.7 MMOL/L (ref 3.5–5.1)
PROT SERPL-MCNC: 7.8 G/DL (ref 6–8.4)
PROT UR QL STRIP: NEGATIVE
RBC #/AREA URNS AUTO: 5 /HPF (ref 0–4)
SODIUM SERPL-SCNC: 140 MMOL/L (ref 136–145)
SP GR UR STRIP: >=1.03 (ref 1–1.03)
SQUAMOUS #/AREA URNS AUTO: 18 /HPF
URN SPEC COLLECT METH UR: ABNORMAL
WBC #/AREA URNS AUTO: 15 /HPF (ref 0–5)

## 2022-04-13 PROCEDURE — 63600175 PHARM REV CODE 636 W HCPCS

## 2022-04-13 PROCEDURE — 81001 URINALYSIS AUTO W/SCOPE: CPT

## 2022-04-13 PROCEDURE — 3075F SYST BP GE 130 - 139MM HG: CPT | Mod: CPTII,,, | Performed by: NURSE PRACTITIONER

## 2022-04-13 PROCEDURE — 87086 URINE CULTURE/COLONY COUNT: CPT

## 2022-04-13 PROCEDURE — 3008F BODY MASS INDEX DOCD: CPT | Mod: CPTII,,, | Performed by: NURSE PRACTITIONER

## 2022-04-13 PROCEDURE — 4010F ACE/ARB THERAPY RXD/TAKEN: CPT | Mod: CPTII,,, | Performed by: NURSE PRACTITIONER

## 2022-04-13 PROCEDURE — 86901 BLOOD TYPING SEROLOGIC RH(D): CPT

## 2022-04-13 PROCEDURE — 1159F PR MEDICATION LIST DOCUMENTED IN MEDICAL RECORD: ICD-10-PCS | Mod: CPTII,,, | Performed by: NURSE PRACTITIONER

## 2022-04-13 PROCEDURE — 3078F PR MOST RECENT DIASTOLIC BLOOD PRESSURE < 80 MM HG: ICD-10-PCS | Mod: CPTII,,, | Performed by: NURSE PRACTITIONER

## 2022-04-13 PROCEDURE — 1159F MED LIST DOCD IN RCRD: CPT | Mod: CPTII,,, | Performed by: NURSE PRACTITIONER

## 2022-04-13 PROCEDURE — 3075F PR MOST RECENT SYSTOLIC BLOOD PRESS GE 130-139MM HG: ICD-10-PCS | Mod: CPTII,,, | Performed by: NURSE PRACTITIONER

## 2022-04-13 PROCEDURE — 96361 HYDRATE IV INFUSION ADD-ON: CPT

## 2022-04-13 PROCEDURE — 3008F PR BODY MASS INDEX (BMI) DOCUMENTED: ICD-10-PCS | Mod: CPTII,,, | Performed by: NURSE PRACTITIONER

## 2022-04-13 PROCEDURE — 99285 EMERGENCY DEPT VISIT HI MDM: CPT | Mod: 25,27

## 2022-04-13 PROCEDURE — 99215 PR OFFICE/OUTPT VISIT, EST, LEVL V, 40-54 MIN: ICD-10-PCS | Mod: S$PBB,,, | Performed by: NURSE PRACTITIONER

## 2022-04-13 PROCEDURE — 99999 PR PBB SHADOW E&M-EST. PATIENT-LVL IV: ICD-10-PCS | Mod: PBBFAC,,, | Performed by: NURSE PRACTITIONER

## 2022-04-13 PROCEDURE — 99999 PR PBB SHADOW E&M-EST. PATIENT-LVL IV: CPT | Mod: PBBFAC,,, | Performed by: NURSE PRACTITIONER

## 2022-04-13 PROCEDURE — 80053 COMPREHEN METABOLIC PANEL: CPT

## 2022-04-13 PROCEDURE — 3052F PR MOST RECENT HEMOGLOBIN A1C LEVEL 8.0 - < 9.0%: ICD-10-PCS | Mod: CPTII,,, | Performed by: NURSE PRACTITIONER

## 2022-04-13 PROCEDURE — 3078F DIAST BP <80 MM HG: CPT | Mod: CPTII,,, | Performed by: NURSE PRACTITIONER

## 2022-04-13 PROCEDURE — 4010F PR ACE/ARB THEARPY RXD/TAKEN: ICD-10-PCS | Mod: CPTII,,, | Performed by: NURSE PRACTITIONER

## 2022-04-13 PROCEDURE — 3052F HG A1C>EQUAL 8.0%<EQUAL 9.0%: CPT | Mod: CPTII,,, | Performed by: NURSE PRACTITIONER

## 2022-04-13 PROCEDURE — 83690 ASSAY OF LIPASE: CPT

## 2022-04-13 PROCEDURE — 99214 OFFICE O/P EST MOD 30 MIN: CPT | Mod: PBBFAC,25 | Performed by: NURSE PRACTITIONER

## 2022-04-13 PROCEDURE — 99215 OFFICE O/P EST HI 40 MIN: CPT | Mod: S$PBB,,, | Performed by: NURSE PRACTITIONER

## 2022-04-13 PROCEDURE — 99285 EMERGENCY DEPT VISIT HI MDM: CPT | Mod: ,,, | Performed by: EMERGENCY MEDICINE

## 2022-04-13 PROCEDURE — 25500020 PHARM REV CODE 255: Performed by: EMERGENCY MEDICINE

## 2022-04-13 PROCEDURE — 96374 THER/PROPH/DIAG INJ IV PUSH: CPT

## 2022-04-13 PROCEDURE — 99285 PR EMERGENCY DEPT VISIT,LEVEL V: ICD-10-PCS | Mod: ,,, | Performed by: EMERGENCY MEDICINE

## 2022-04-13 RX ORDER — ONDANSETRON 4 MG/1
4 TABLET, ORALLY DISINTEGRATING ORAL 2 TIMES DAILY
Qty: 30 TABLET | Refills: 0 | Status: SHIPPED | OUTPATIENT
Start: 2022-04-13 | End: 2023-09-05

## 2022-04-13 RX ORDER — ONDANSETRON 2 MG/ML
4 INJECTION INTRAMUSCULAR; INTRAVENOUS
Status: COMPLETED | OUTPATIENT
Start: 2022-04-13 | End: 2022-04-13

## 2022-04-13 RX ADMIN — IOHEXOL 100 ML: 350 INJECTION, SOLUTION INTRAVENOUS at 01:04

## 2022-04-13 RX ADMIN — SODIUM CHLORIDE, SODIUM LACTATE, POTASSIUM CHLORIDE, AND CALCIUM CHLORIDE 1000 ML: .6; .31; .03; .02 INJECTION, SOLUTION INTRAVENOUS at 12:04

## 2022-04-13 RX ADMIN — ONDANSETRON 4 MG: 2 INJECTION INTRAMUSCULAR; INTRAVENOUS at 12:04

## 2022-04-13 NOTE — TELEPHONE ENCOUNTER
Pt had appt today, 4/13 with provider's office     Seems that Ibrance is still being held and pt was admitted from appt to hospital d/t feeling weak, dizzy, nauseated with 8/10 back pain and low grade temp today that requires further work-up per provider's note    Has f/u appt 4/18. Will pend refill and f/u on 4/19

## 2022-04-13 NOTE — ED PROVIDER NOTES
Encounter Date: 2022       History     Chief Complaint   Patient presents with    Weakness     Pt sent from oncology clinic for generalized weakness, low H & H, dizziness and SOB.  Pt takes oral chemo for breast cancer.     Ms. Ye is a 45 yo F with Hx of R Breast CA with spinal mets on oral hormonal CA therapy, HTN, HLD, T2DM, sciatica, thalassemia presenting from Heme/Onc clinic where she reported nausea that started last night, weakness, and dizziness. Did not take any meds for her nausea. No chest pain, SOB, abdominal pain, HA, LE swelling. No dark colored stools, bright blood in stools, does not have periods.     Per Heme/Onc note review and pt, she has been off her hormonal therapy for a week after onset of anemia (Hg 8.6), with little resolution of her anemia. Pt sent to ED to evaluate for nausea control, need for blood transfusion.         Review of patient's allergies indicates:   Allergen Reactions    Keflex [cephalexin] Itching     Past Medical History:   Diagnosis Date    Abnormal Pap smear     pt states 13yrs ago colpo was done    Anemia     Anxiety     Cancer     Cardiomyopathy     CHF (congestive heart failure)     Fibroid     Hx of psychiatric care     Hyperlipidemia     Hypertension     Psychiatric problem     Sleep difficulties     Therapy      Past Surgical History:   Procedure Laterality Date     SECTION      TONSILLECTOMY      TUBAL LIGATION      UTERINE FIBROID EMBOLIZATION       Family History   Problem Relation Age of Onset    Arthritis Mother     Diabetes Mother     Heart disease Mother         CHF, CAD , 2 stents    Hypertension Mother     Hyperlipidemia Mother     Heart failure Mother     No Known Problems Sister     Prostate cancer Father     Hypertension Brother     No Known Problems Daughter     Asthma Daughter     No Known Problems Maternal Grandmother     Cancer Maternal Grandfather 79        abdominal origin?    No Known Problems  Paternal Grandmother     No Known Problems Paternal Grandfather     Thyroid cancer Other         type? dx age?    Cancer Paternal Cousin         ovarian @ ?29 & cervical @ ?29    Endometriosis Paternal Cousin     Breast cancer Neg Hx     Ovarian cancer Neg Hx     Colon polyps Neg Hx      Social History     Tobacco Use    Smoking status: Never Smoker    Smokeless tobacco: Never Used   Substance Use Topics    Alcohol use: Not Currently     Alcohol/week: 0.0 standard drinks    Drug use: No     Review of Systems   Constitutional: Positive for fatigue. Negative for activity change, appetite change, chills and fever.   HENT: Negative.    Eyes: Negative.    Respiratory: Negative for cough, chest tightness, shortness of breath and wheezing.    Cardiovascular: Negative for chest pain, palpitations and leg swelling.   Gastrointestinal: Positive for nausea. Negative for abdominal pain, constipation, diarrhea and vomiting.   Genitourinary: Negative.    Musculoskeletal: Positive for back pain.   Skin: Negative for rash.   Allergic/Immunologic: Negative.    Neurological: Positive for dizziness and light-headedness. Negative for tremors, syncope and headaches.   Psychiatric/Behavioral: Negative.        Physical Exam     Initial Vitals [04/13/22 1159]   BP Pulse Resp Temp SpO2   (!) 180/72 74 20 98.7 °F (37.1 °C) 99 %      MAP       --         Physical Exam    Nursing note and vitals reviewed.  Constitutional: She appears well-developed and well-nourished.   HENT:   Head: Normocephalic and atraumatic.   Eyes: Conjunctivae and EOM are normal.   Neck: Neck supple. No JVD present.   Normal range of motion.  Cardiovascular: Normal rate and regular rhythm.   Pulmonary/Chest: Breath sounds normal. She has no wheezes. She has no rales.   Abdominal: Abdomen is soft. Bowel sounds are normal. She exhibits mass (midline and along waistline). There is no abdominal tenderness.   Musculoskeletal:         General: Normal range of  motion.      Cervical back: Normal range of motion and neck supple.     Neurological: She is alert and oriented to person, place, and time.   Skin: Skin is warm and dry.   Psychiatric: She has a normal mood and affect.         ED Course   Procedures  Labs Reviewed   COMPREHENSIVE METABOLIC PANEL - Abnormal; Notable for the following components:       Result Value    Glucose 143 (*)     Albumin 2.9 (*)     All other components within normal limits   URINALYSIS, REFLEX TO URINE CULTURE - Abnormal; Notable for the following components:    Appearance, UA Hazy (*)     Specific Gravity, UA >=1.030 (*)     Occult Blood UA 1+ (*)     Leukocytes, UA Trace (*)     All other components within normal limits    Narrative:     Specimen Source->Urine   URINALYSIS MICROSCOPIC - Abnormal; Notable for the following components:    RBC, UA 5 (*)     WBC, UA 15 (*)     All other components within normal limits    Narrative:     Specimen Source->Urine   CULTURE, URINE   LIPASE   TYPE & SCREEN          Imaging Results          X-Ray Chest PA And Lateral (Final result)  Result time 04/13/22 16:55:33    Final result by David Lentz MD (04/13/22 16:55:33)                 Impression:      1. Interstitial findings may reflect developing edema or other nonspecific pneumonitis, correlation is advised in this hypoventilatory exam.      Electronically signed by: David Lentz MD  Date:    04/13/2022  Time:    16:55             Narrative:    EXAMINATION:  XR CHEST PA AND LATERAL    CLINICAL HISTORY:  Shortness of breath    TECHNIQUE:  PA and lateral views of the chest were performed.    COMPARISON:  10/19/2019    FINDINGS:  The cardiomediastinal silhouette is prominent noting magnification by technique, stable.  There is elevation of the right hemidiaphragm..  There is no pleural effusion.  The trachea is midline.  The lungs are symmetrically expanded bilaterally with prominent interstitial attenuation bilaterally.  No large focal  consolidation seen.  There is no pneumothorax.  The osseous structures are remarkable for degenerative changes..                               CT Abdomen Pelvis With Contrast (Final result)  Result time 04/13/22 13:18:48    Final result by David Lentz MD (04/13/22 13:18:48)                 Impression:      1. Findings suggesting hepatic steatosis, correlation with LFTs recommended.  2. Osseous findings concerning for metastatic disease, similar to the previous exam.  3. Several additional findings above.      Electronically signed by: David Lentz MD  Date:    04/13/2022  Time:    13:18             Narrative:    EXAMINATION:  CT ABDOMEN PELVIS WITH CONTRAST    CLINICAL HISTORY:  Nausea/vomiting;    TECHNIQUE:  Low dose axial images, sagittal and coronal reformations were obtained from the lung bases to the pubic symphysis following the IV administration of 100 mL of Omnipaque 350 .  Oral contrast was not given.    COMPARISON:  CT 02/01/2022    FINDINGS:  Images of the lower thorax are remarkable for mild bilateral dependent atelectasis.    The liver is hypoattenuating suggesting steatosis, correlation with LFTs recommended.  There is a punctate focus of low attenuation within the right hepatic dome, too small for characterization.  The spleen, pancreas, gallbladder and adrenal glands are grossly unremarkable.  There is biliary dilation or ascites.  The stomach is nondistended, no significant gastric wall thickening.  The portal vein, splenic vein, SMV, celiac axis and SMA all are patent.  No significant abdominal lymphadenopathy.    The kidneys enhance symmetrically without hydronephrosis or nephrolithiasis.  There is a low attenuating lesion within the interpolar region of the right kidney measuring 1.4 cm, attenuation of which suggests cyst.  The bilateral ureters are unremarkable without calculi seen.  The urinary bladder is unremarkable.  The uterus and adnexa are grossly unremarkable noting calcified  anterior fundal leiomyoma.    There are several scattered colonic diverticula without inflammation.  The terminal ileum and appendix are unremarkable.  The small bowel is grossly unremarkable.  There are a few scattered shotty periaortic and paracaval lymph nodes.  No focal organized pelvic fluid collection.    Degenerative changes are noted of the spine.  There is a lucent appearing lesion within the right iliac wing, stable.  Additional lucent/sclerotic appearing foci are noted within several vertebral bodies, also stable.  No significant inguinal lymphadenopathy.  Surgical changes are noted of the anterior abdominal wall.  Right anterior abdominal wall injection site noted.                                 Medications   lactated ringers bolus 1,000 mL (0 mLs Intravenous Stopped 4/13/22 1358)   ondansetron injection 4 mg (4 mg Intravenous Given 4/13/22 1243)   iohexoL (OMNIPAQUE 350) injection 100 mL (100 mLs Intravenous Given 4/13/22 1302)     Medical Decision Making:   History:   Old Medical Records: I decided to obtain old medical records.  Initial Assessment:   Pt appears uncomfortable on exam, HD stable, in no distress. Firm, immobile abdominal masses on physical exam. No stigmata of CHF exacerbation, no SOB, but pt generally weak. CBC at Heme/Onc clinic with Hg 8.6 (baseline 10 prior to Ca treatment).  Differential Diagnosis:   Anemia of chronic disease, anemia associated with chemotherapy, iron deficiency anemia, CHF exacerbation, abdominal/peritoneal mets from breast CA, UTI  Independently Interpreted Test(s):   I have ordered and independently interpreted X-rays - see summary below.  Clinical Tests:   Lab Tests: Ordered and Reviewed  The following lab test(s) were unremarkable: CBC and CMP  Radiological Study: Ordered and Reviewed  ED Management:  Pt given 1 L IVF, Zofran for nausea relief. Hypoalbuminemia and slight hyperglycemia on CMP. CT Abdomen/Pelvis without acute findings. CXR without acute  findings, though was a hypoventilatory exam. Pt with some symptomatic relief, has some Zofran at home which helps with nausea, but will provide script for Zofran should patient need more. Pt has also stated that she will call Dr. Escobar's office in the AM for a refill on her pain meds for back pain associated with mets. UA with WBCs and RBCs, but many epithelial cells, likely not a clean catch. Pt will be notified if culture results positive for UTI, but discussed that there is not an indication for UTI treatment at this time and pt verbalized understanding.                       Clinical Impression:   Final diagnoses:  [R06.02] Shortness of breath  [R53.1] Weakness (Primary)                 Narinder Sherman MD  Resident  04/13/22 0096

## 2022-04-13 NOTE — EKG INTERPRETATIONS - EMERGENCY DEPT.
Independently interpreted by MD:  Rate 76, NSR, no stemi, no ectopy, no hypertrophy, diffuse flipped T waves inf/ant/lat  
12-Jun-2019 19:40

## 2022-04-13 NOTE — PROGRESS NOTES
Subjective:       Patient ID: Kristopher Ye is a 46 y.o. female.    Chief Complaint: Malignant neoplasm of upper-inner quadrant of right breast     HPI     Here for short interval follow up to check CBC.   Ibrance on hold due to anemia and mucositis.     Patient presented feeling weak, dizzy and nauseated.   No HA or visual disturbance.   No fever but low grade temp today in clinic.   She reports 8/10 back pain- mid back.   +leg weakness. No numbness/tingling.   No incontinence.   +neck pain-pinches.   +back spasms.   Takes oxy alt with morphine- has been doing this for the past year but out of supply. .   Reports breathing better since Ibrance held.   Appetite good and reports that se is hydrating well.   Ulcers to mouth improved.   +constipation- relieved with dulcolax. Last BM was this morning.         Previously,   - 3/30/2022 ECHO:  · The left ventricle is moderately enlarged with mild eccentric hypertrophy and low normal systolic function.  · The estimated ejection fraction is 55%.  · The quantitatively derived ejection fraction is 52%.  · Indeterminate left ventricular diastolic function.  · Severe left atrial enlargement.  · Normal right ventricular size with normal right ventricular systolic function.    - 3/22/2022 CTA Chest:  FINDINGS:  Opacification of the pulmonary arteries is sufficient for evaluation to the segmental branches.  No filling defects were seen.  The heart size is normal.  The aorta and main pulmonary segment show normal diameter.  There is no pericardial effusion.  No new lymphadenopathy identified in the chest.  No radiographic evidence of pneumonia or pulmonary edema is identified.  Stable mild apical scarring.  No new or enlarging pulmonary nodule.  There is partial imaging of the upper abdomen.  There is diffusely diminished hepatic attenuation consistent fatty metamorphosis.  Stable, 10 mm sclerotic focus in the left 10th posterior vertebral body with a narrow zone of  transition.  A similar appearing lesion is seen in the sternum.  No new suspicious bone lesions.  The site of the reported primary breast malignancy is not well identified.  Impression:  No evidence of pulmonary embolus to the segmental branches.  Other stable findings are as above.  No specific CT evidence for new metastatic disease in the chest.    Had been seeing Dr. Olivares and had a second opinion due to fears over pacemaker    Currently on Arimidex and Ibrance (week off now)  Zoladex and Xgeva monthly     Recent Imaging:  - 2/2/2022 Bone scan:  FINDINGS:  No focal abnormal uptake suggestive of osseous metastases.  There is diffusely increased uptake in the calvarium in keeping with hyperostosis.  There is degenerative type uptake most prominent in the bilateral shoulders and knees.  The focal uptake previously described in the distal right femur is not visualized.  There is otherwise physiologic distribution of the radiopharmaceutical throughout the skeleton.  There is normal uptake in the genitourinary system and soft tissues.  Impression:  There is no scintigraphic evidence of osteoblastic metastatic disease.  Nonspecific uptake in the distal right femur has resolved.  Diffuse cranial hyperostosis and other findings as above.     - 2/1/2022 CT C/A/P:  FINDINGS:  CHEST  Support tubes and lines: None.  Aorta: Normal caliber.  Heart: Normal size..  Coronary arteries: No calcifications.  Pericardium: Normal. No effusion, thickening, or calcification.  Central pulmonary arteries: Normal caliber.  Base of neck/thyroid: Normal.  Lymph nodes: No supraclavicular, axillary, internal mammary, mediastinal or hilar lymphadenopathy.  Esophagus: Normal.  Pleura: No effusion, thickening or calcification.  Body wall: Unremarkable.  Airways: Normal.  Lungs: Clear without focal or diffuse abnormality.  Bones: Sclerotic lesions are seen throughout the spine as well as in the sternum, not substantially changed from  10/25/2021  ABDOMEN/PELVIS  Liver: Unremarkable  Gallbladder/bile ducts: Unremarkable. No intra or extrahepatic biliary ductal dilatation  Pancreas: Unremarkable.  Spleen: Unremarkable.  Adrenals: Unremarkable.  Kidneys: Simple cyst in the right kidney is unchanged  Lymph nodes: No abdominal or pelvic lymphadenopathy.  Bowel and mesentery: Unremarkable.  Abdominal aorta: Unremarkable.  Inferior vena cava: Unremarkable.  Free fluid or free air: None.  Pelvis: Unremarkable.  Urinary bladder: Unremarkable.  Body wall: Unremarkable.  Bones: Sclerotic lesions seen in the spine are unchanged.  Impression:  1. No evidence of new recurrent or metastatic disease.  2. Sclerotic lesions seen in the spine and sternum, unchanged when compared to 10/25/2021.                    Per Dr. Lei's previous note: Reason For Follow Up:   1. Stage IV ([id number]) invasive ductal carcinoma of right breast, upper inner quadrant, ER %, MI neg, Her2 neg, Grade 3, multifocal with one lesion appearing more aggressive (Ki67 80%), large LN +, bone mets.        Oncologic History:  Presentation  - 9/11/19 - Screening mammogram showed multiple right breast masses lower inner quadrant  - 9/13/19 - Diagnostic mammogram and US showed a right breast, 3:00 position mass, 2 CFN measuring 17 mm x 16 mm x 14 mm.  There 2 smaller adjacent masses towards the nipple  - 9/19/19 - Biopsy -               A. Right breast subareolar: Grade 3 IDC, estrogen receptor 80%, progesterone receptor 0%, Her2 dago neg, Ki67 80%.                           B. Right breast mass 3:00: Grade 3 IDC with papillary features, estrogen receptor 100%, progesterone receptor 0%, Her2 dago 1+, Ki67 30%.              - 9/26/19: US right axilla with abnormal lymphadenopathy measuring 4.3 x 2.8 cm with biopsy + for metastatic breast cancer.   Surgery consultation with Dr Ferris on 9/25/19              - 9/25/19 - Genetics Genetics SibaritusHolden pending; VUS pending  Medical  "oncology consultation on 10/7/19              * 10/8/19 - CT C/A/P with several lytic lesions in thoracic and lumbar spine, iliac bones, left scapula in addition to 3 x 4.5 cm right axillary node and 2.1 cm right breast mass. Bone scan negative.              * 10/31/19 - started Ibrance, Arimidex, Zoladex; plan for Xgeva.               * 11/12/19 STRATA without targetable mutations.               * 12/16/19 - Bone biopsy + for met disease (initially read as negative, but we treated as metastatic disease given high suspicion).               * MRI brain: "Partially empty sella.  Question bilateral globe proptosis. Otherwise unremarkable MRI brain as detailed above specifically without evidence for intracranial enhancing lesion to suggest metastatic disease."              * 4/22/2020 PET - disease improvement. 8/2020 PET with disease improvement.               *  9/1/20 - Palliative RT to L1-L4     Of note, * Xgeva monthly  - we had been waiting on dental clearance, but she has been unable to get into dentistry and is accepting of risks. Has no active dental disease.      PET scan 4/22/2021:  FINDINGS:  Quality of the study: Mildly degraded due to patient's large body habitus and skin abutting the gantry.  In the head and neck, there are no hypermetabolic lesions worrisome for malignancy. There are no hypermetabolic mucosal lesions, and there are no pathologically enlarged or hypermetabolic lymph nodes.  In the chest, there are no hypermetabolic lesions worrisome for malignancy.  Stable CT appearance of a level 1 right axillary lymph node measuring 1.3 cm in short axis with normal background radiotracer uptake.  Previously with SUV max of 2.1.  There are no concerning pulmonary nodules or masses, and there are no pathologically enlarged or hypermetabolic lymph nodes.  In the abdomen and pelvis, there is physiologic tracer distribution within the abdominal organs and excretion into the genitourinary system.  In the " bones, there are stable lytic lesions throughout the lumbar spine and pelvis and additional stable sclerotic lesions in the sternum and T3 vertebral body.  No associated focal abnormal increased radiotracer uptake.  Findings likely represent treated disease.  Impression:  Interval decreased uptake within a prominent right axillary lymph node, now with normal background uptake.  No new focal abnormal uptake.  Stable lytic and sclerotic lesions without focal abnormal uptake.  Findings are compatible with treated metastasis.      8/25/2021 MRI brain   Impression:  No significant change from prior specifically without evidence for enhancing lesion to suggest intracranial metastatic disease.  Continued partially empty sella, intracranial hypertension to be included in differential in the appropriate clinical setting.  Clinical correlation and further evaluation as warranted.     10/14/2021 MRI thoracic/lumbar spine:  Impression:  Patient history of metastatic breast cancer.  Bone lesions at T10, L2, L3, and right iliac wing, as above, concerning for metastatic disease.  No pathologic fracture, soft tissue component, or epidural extension identified.      10/25/2021 CT chest/abd/pelvis:  Impression:  1.  Stable metastasis of the spine  No evidence of intra-abdominal or intrathoracic metastatic disease  2.  Stable prominent right axillary lymph node.  3.  Additional findings are detailed above.     FH:  Paternal side:   1st cousin- ovarian cancer (diagnosed at age 26) and her mother has thyroid cancer  Maternal grandmother- some type of likely metastatic GI cancer    Review of Systems   Constitutional: Positive for activity change, fatigue and fever (low grade today). Negative for appetite change.   HENT: Positive for mouth sores (imporved).    Eyes: Negative for visual disturbance.   Respiratory: Positive for shortness of breath (improved with Ibrance hold). Negative for cough and wheezing.    Cardiovascular: Negative for  chest pain, palpitations and leg swelling.   Gastrointestinal: Positive for constipation. Negative for abdominal pain, change in bowel habit, diarrhea, nausea, vomiting and change in bowel habit.   Genitourinary: Negative for difficulty urinating, dysuria and frequency.   Musculoskeletal: Positive for back pain and neck pain. Negative for arthralgias.   Integumentary:  Negative for rash.   Neurological: Positive for dizziness and weakness (to legs). Negative for light-headedness, numbness and headaches.   Hematological: Negative for adenopathy.   Psychiatric/Behavioral: Positive for dysphoric mood. The patient is nervous/anxious.          Objective:      Physical Exam  Vitals and nursing note reviewed.   Constitutional:       General: She is not in acute distress.     Appearance: She is well-developed. She is obese. She is ill-appearing.      Comments: Presents with daughters  In WC   HENT:      Head: Normocephalic and atraumatic.      Mouth/Throat:      Mouth: Mucous membranes are dry.   Eyes:      General: Lids are normal. No scleral icterus.     Conjunctiva/sclera: Conjunctivae normal.      Pupils: Pupils are equal, round, and reactive to light.   Neck:      Thyroid: No thyromegaly.      Vascular: No JVD.      Trachea: Trachea normal.   Cardiovascular:      Rate and Rhythm: Normal rate and regular rhythm.      Heart sounds: Normal heart sounds.      Comments: Difficult to assess tones.   Pulmonary:      Effort: Pulmonary effort is normal. No respiratory distress.      Breath sounds: Normal breath sounds. No wheezing, rhonchi or rales.      Comments: distant  Chest:   Breasts:      Right: No axillary adenopathy or supraclavicular adenopathy.      Left: No axillary adenopathy or supraclavicular adenopathy.       Abdominal:      General: Bowel sounds are normal. There is no distension.      Palpations: Abdomen is soft.      Tenderness: There is no abdominal tenderness.      Comments: No organomegaly however  difficult to assess.     Musculoskeletal:      Cervical back: Normal range of motion and neck supple.      Comments: Point tenderness to mid back   Lymphadenopathy:      Head:      Right side of head: No submental or submandibular adenopathy.      Left side of head: No submental or submandibular adenopathy.      Cervical: No cervical adenopathy.      Upper Body:      Right upper body: No supraclavicular or axillary adenopathy.      Left upper body: No supraclavicular or axillary adenopathy.   Skin:     General: Skin is warm and dry.      Capillary Refill: Capillary refill takes less than 2 seconds.      Coloration: Skin is not jaundiced or pale.      Findings: No bruising or rash.      Nails: There is no clubbing.   Neurological:      Mental Status: She is alert and oriented to person, place, and time.      Sensory: No sensory deficit.      Motor: Weakness present.      Comments: + dizziness   weak   Psychiatric:         Mood and Affect: Mood normal.         Speech: Speech normal.         Behavior: Behavior normal.         Thought Content: Thought content normal.         Judgment: Judgment normal.       Labs- reviewed  Assessment:       Problem List Items Addressed This Visit        Cardiac/Vascular    Cardiomyopathy       Oncology    Thalassemia    Malignant neoplasm of upper-inner quadrant of right breast in female, estrogen receptor positive    Bone metastases    Anemia associated with chemotherapy      Other Visit Diagnoses     Acute bilateral low back pain with sciatica, sciatica laterality unspecified    -  Primary    Neutropenic fever        Nausea        Hypokalemia              Plan:         Patient presented feeling weak, dizzy and nauseated. Also,  reports 8/10 back pain and low grade temp today.  Needs further workup.    Note-CBC pending at time of appt but may need transfusion.     Transported to ED for further workup and supportive care- report called to Jorge.    Will likely need dose reduction  once we resume Ibrance.     Sent message to Valentino Roman to obtain genetic test results.      Cc chart previously sent.   Urgent Care Oncology Visit    Date and Time: 04/13/2022 10:55 AM   Patient MRN: [id number]   Chief Complaint:   Chief Complaint   Patient presents with    Malignant neoplasm of upper-inner quadrant of right breast      Reason for Urgent Care Visit: anemia  Intervention: emergency department  Dispo: return to clinic for urgent care oncology follow up  UrgOnc appointment addressed via: f/u form last week  This UrgOnc visit was in person  Time spent handling UC issue: 30 minutes   Was this virtual or in person UrgOnc visit appropriate? yes       Route Chart for Scheduling    Med Onc Chart Routing  Urgent    Follow up with physician 1 week. Needs urgent care visit 1 week   Follow up with CAMILA    Labs CBC and CMP   Lab interval:     Imaging    Pharmacy appointment    Other referrals            Patient is in agreement with the proposed treatment plan. All questions were answered to the patient's satisfaction. Pt knows to call clinic for any new or worsening symptoms and if anything is needed before the next clinic visit.      JUNG SanabriaP-C  Hematology & Oncology  [address] [place] [id number]  ph. [phone]  Fax. [phone]     Face to Face time with patient: 30 minutes  45 minutes of total time spent on the encounter, which includes face to face time and non-face to face time preparing to see the patient (eg, review of tests), Obtaining and/or reviewing separately obtained history, Documenting clinical information in the electronic or other health record, Independently interpreting results (not separately reported) and communicating results to the patient/family/caregiver, or Care coordination (not separately reported).

## 2022-04-13 NOTE — DISCHARGE INSTRUCTIONS
Please follow up with your PCP in 1 week to discuss the need for iron and to discuss your ongoing anemia.    Use Zofran for nausea control and try to drink lots of fluids while your body recovers from nausea and weakness.

## 2022-04-14 ENCOUNTER — PATIENT MESSAGE (OUTPATIENT)
Dept: HEMATOLOGY/ONCOLOGY | Facility: CLINIC | Age: 47
End: 2022-04-14

## 2022-04-14 DIAGNOSIS — G89.3 NEOPLASM RELATED PAIN: ICD-10-CM

## 2022-04-15 LAB — BACTERIA UR CULT: NORMAL

## 2022-04-18 ENCOUNTER — PATIENT MESSAGE (OUTPATIENT)
Dept: HEMATOLOGY/ONCOLOGY | Facility: CLINIC | Age: 47
End: 2022-04-18

## 2022-04-18 ENCOUNTER — OFFICE VISIT (OUTPATIENT)
Dept: HEMATOLOGY/ONCOLOGY | Facility: CLINIC | Age: 47
End: 2022-04-18
Payer: MEDICAID

## 2022-04-18 ENCOUNTER — PATIENT MESSAGE (OUTPATIENT)
Dept: ADMINISTRATIVE | Facility: OTHER | Age: 47
End: 2022-04-18

## 2022-04-18 ENCOUNTER — LAB VISIT (OUTPATIENT)
Dept: LAB | Facility: HOSPITAL | Age: 47
End: 2022-04-18
Payer: MEDICAID

## 2022-04-18 VITALS
WEIGHT: 293 LBS | RESPIRATION RATE: 16 BRPM | SYSTOLIC BLOOD PRESSURE: 155 MMHG | BODY MASS INDEX: 50.02 KG/M2 | HEIGHT: 64 IN | OXYGEN SATURATION: 97 % | DIASTOLIC BLOOD PRESSURE: 75 MMHG | TEMPERATURE: 99 F | HEART RATE: 92 BPM

## 2022-04-18 DIAGNOSIS — C50.211 MALIGNANT NEOPLASM OF UPPER-INNER QUADRANT OF RIGHT BREAST IN FEMALE, ESTROGEN RECEPTOR POSITIVE: ICD-10-CM

## 2022-04-18 DIAGNOSIS — G47.9 SLEEP DIFFICULTIES: ICD-10-CM

## 2022-04-18 DIAGNOSIS — C50.211 MALIGNANT NEOPLASM OF UPPER-INNER QUADRANT OF RIGHT BREAST IN FEMALE, ESTROGEN RECEPTOR POSITIVE: Primary | ICD-10-CM

## 2022-04-18 DIAGNOSIS — R11.0 NAUSEA: ICD-10-CM

## 2022-04-18 DIAGNOSIS — Z17.0 MALIGNANT NEOPLASM OF UPPER-INNER QUADRANT OF RIGHT BREAST IN FEMALE, ESTROGEN RECEPTOR POSITIVE: Primary | ICD-10-CM

## 2022-04-18 DIAGNOSIS — Z17.0 MALIGNANT NEOPLASM OF UPPER-INNER QUADRANT OF RIGHT BREAST IN FEMALE, ESTROGEN RECEPTOR POSITIVE: ICD-10-CM

## 2022-04-18 DIAGNOSIS — T45.1X5A ANEMIA ASSOCIATED WITH CHEMOTHERAPY: ICD-10-CM

## 2022-04-18 DIAGNOSIS — C79.51 BONE METASTASES: ICD-10-CM

## 2022-04-18 DIAGNOSIS — D64.81 ANEMIA ASSOCIATED WITH CHEMOTHERAPY: ICD-10-CM

## 2022-04-18 DIAGNOSIS — C79.51 BONE METASTASES: Primary | ICD-10-CM

## 2022-04-18 LAB
ALBUMIN SERPL BCP-MCNC: 2.7 G/DL (ref 3.5–5.2)
ALP SERPL-CCNC: 93 U/L (ref 55–135)
ALT SERPL W/O P-5'-P-CCNC: 17 U/L (ref 10–44)
ANION GAP SERPL CALC-SCNC: 11 MMOL/L (ref 8–16)
AST SERPL-CCNC: 15 U/L (ref 10–40)
BASOPHILS # BLD AUTO: 0.1 K/UL (ref 0–0.2)
BASOPHILS NFR BLD: 1 % (ref 0–1.9)
BILIRUB SERPL-MCNC: 0.4 MG/DL (ref 0.1–1)
BUN SERPL-MCNC: 10 MG/DL (ref 6–20)
CALCIUM SERPL-MCNC: 10.3 MG/DL (ref 8.7–10.5)
CHLORIDE SERPL-SCNC: 103 MMOL/L (ref 95–110)
CO2 SERPL-SCNC: 24 MMOL/L (ref 23–29)
CREAT SERPL-MCNC: 0.8 MG/DL (ref 0.5–1.4)
DIFFERENTIAL METHOD: ABNORMAL
EOSINOPHIL # BLD AUTO: 0.1 K/UL (ref 0–0.5)
EOSINOPHIL NFR BLD: 1.1 % (ref 0–8)
ERYTHROCYTE [DISTWIDTH] IN BLOOD BY AUTOMATED COUNT: 21.6 % (ref 11.5–14.5)
EST. GFR  (AFRICAN AMERICAN): >60 ML/MIN/1.73 M^2
EST. GFR  (NON AFRICAN AMERICAN): >60 ML/MIN/1.73 M^2
GLUCOSE SERPL-MCNC: 189 MG/DL (ref 70–110)
HCT VFR BLD AUTO: 31.5 % (ref 37–48.5)
HGB BLD-MCNC: 9.3 G/DL (ref 12–16)
IMM GRANULOCYTES # BLD AUTO: 0.09 K/UL (ref 0–0.04)
IMM GRANULOCYTES NFR BLD AUTO: 0.9 % (ref 0–0.5)
LYMPHOCYTES # BLD AUTO: 1.7 K/UL (ref 1–4.8)
LYMPHOCYTES NFR BLD: 17.1 % (ref 18–48)
MCH RBC QN AUTO: 26.9 PG (ref 27–31)
MCHC RBC AUTO-ENTMCNC: 29.5 G/DL (ref 32–36)
MCV RBC AUTO: 91 FL (ref 82–98)
MONOCYTES # BLD AUTO: 0.7 K/UL (ref 0.3–1)
MONOCYTES NFR BLD: 6.7 % (ref 4–15)
NEUTROPHILS # BLD AUTO: 7.3 K/UL (ref 1.8–7.7)
NEUTROPHILS NFR BLD: 73.2 % (ref 38–73)
NRBC BLD-RTO: 1 /100 WBC
PLATELET # BLD AUTO: 474 K/UL (ref 150–450)
PMV BLD AUTO: 11.3 FL (ref 9.2–12.9)
POTASSIUM SERPL-SCNC: 3.7 MMOL/L (ref 3.5–5.1)
PROT SERPL-MCNC: 7.7 G/DL (ref 6–8.4)
RBC # BLD AUTO: 3.46 M/UL (ref 4–5.4)
SODIUM SERPL-SCNC: 138 MMOL/L (ref 136–145)
WBC # BLD AUTO: 9.99 K/UL (ref 3.9–12.7)

## 2022-04-18 PROCEDURE — 3078F DIAST BP <80 MM HG: CPT | Mod: CPTII,,, | Performed by: INTERNAL MEDICINE

## 2022-04-18 PROCEDURE — 36415 COLL VENOUS BLD VENIPUNCTURE: CPT | Performed by: INTERNAL MEDICINE

## 2022-04-18 PROCEDURE — 3008F PR BODY MASS INDEX (BMI) DOCUMENTED: ICD-10-PCS | Mod: CPTII,,, | Performed by: INTERNAL MEDICINE

## 2022-04-18 PROCEDURE — 99999 PR PBB SHADOW E&M-EST. PATIENT-LVL V: CPT | Mod: PBBFAC,,, | Performed by: INTERNAL MEDICINE

## 2022-04-18 PROCEDURE — 4010F ACE/ARB THERAPY RXD/TAKEN: CPT | Mod: CPTII,,, | Performed by: INTERNAL MEDICINE

## 2022-04-18 PROCEDURE — 3077F PR MOST RECENT SYSTOLIC BLOOD PRESSURE >= 140 MM HG: ICD-10-PCS | Mod: CPTII,,, | Performed by: INTERNAL MEDICINE

## 2022-04-18 PROCEDURE — 1160F PR REVIEW ALL MEDS BY PRESCRIBER/CLIN PHARMACIST DOCUMENTED: ICD-10-PCS | Mod: CPTII,,, | Performed by: INTERNAL MEDICINE

## 2022-04-18 PROCEDURE — 85025 COMPLETE CBC W/AUTO DIFF WBC: CPT | Performed by: INTERNAL MEDICINE

## 2022-04-18 PROCEDURE — 3052F HG A1C>EQUAL 8.0%<EQUAL 9.0%: CPT | Mod: CPTII,,, | Performed by: INTERNAL MEDICINE

## 2022-04-18 PROCEDURE — 1159F MED LIST DOCD IN RCRD: CPT | Mod: CPTII,,, | Performed by: INTERNAL MEDICINE

## 2022-04-18 PROCEDURE — 1160F RVW MEDS BY RX/DR IN RCRD: CPT | Mod: CPTII,,, | Performed by: INTERNAL MEDICINE

## 2022-04-18 PROCEDURE — 99215 OFFICE O/P EST HI 40 MIN: CPT | Mod: PBBFAC | Performed by: INTERNAL MEDICINE

## 2022-04-18 PROCEDURE — 3008F BODY MASS INDEX DOCD: CPT | Mod: CPTII,,, | Performed by: INTERNAL MEDICINE

## 2022-04-18 PROCEDURE — 1159F PR MEDICATION LIST DOCUMENTED IN MEDICAL RECORD: ICD-10-PCS | Mod: CPTII,,, | Performed by: INTERNAL MEDICINE

## 2022-04-18 PROCEDURE — 99999 PR PBB SHADOW E&M-EST. PATIENT-LVL V: ICD-10-PCS | Mod: PBBFAC,,, | Performed by: INTERNAL MEDICINE

## 2022-04-18 PROCEDURE — 99214 OFFICE O/P EST MOD 30 MIN: CPT | Mod: S$PBB,,, | Performed by: INTERNAL MEDICINE

## 2022-04-18 PROCEDURE — 3077F SYST BP >= 140 MM HG: CPT | Mod: CPTII,,, | Performed by: INTERNAL MEDICINE

## 2022-04-18 PROCEDURE — 4010F PR ACE/ARB THEARPY RXD/TAKEN: ICD-10-PCS | Mod: CPTII,,, | Performed by: INTERNAL MEDICINE

## 2022-04-18 PROCEDURE — 80053 COMPREHEN METABOLIC PANEL: CPT | Performed by: INTERNAL MEDICINE

## 2022-04-18 PROCEDURE — 99214 PR OFFICE/OUTPT VISIT, EST, LEVL IV, 30-39 MIN: ICD-10-PCS | Mod: S$PBB,,, | Performed by: INTERNAL MEDICINE

## 2022-04-18 PROCEDURE — 3078F PR MOST RECENT DIASTOLIC BLOOD PRESSURE < 80 MM HG: ICD-10-PCS | Mod: CPTII,,, | Performed by: INTERNAL MEDICINE

## 2022-04-18 PROCEDURE — 3052F PR MOST RECENT HEMOGLOBIN A1C LEVEL 8.0 - < 9.0%: ICD-10-PCS | Mod: CPTII,,, | Performed by: INTERNAL MEDICINE

## 2022-04-18 RX ORDER — HYDROCODONE BITARTRATE AND ACETAMINOPHEN 5; 325 MG/1; MG/1
1 TABLET ORAL EVERY 6 HOURS PRN
Qty: 30 TABLET | Refills: 0 | Status: SHIPPED | OUTPATIENT
Start: 2022-04-18 | End: 2022-07-20

## 2022-04-18 RX ORDER — MORPHINE SULFATE 15 MG/1
15 TABLET, FILM COATED, EXTENDED RELEASE ORAL EVERY 8 HOURS PRN
Qty: 60 TABLET | Refills: 0 | Status: SHIPPED | OUTPATIENT
Start: 2022-04-18 | End: 2022-04-28 | Stop reason: SDUPTHER

## 2022-04-18 NOTE — PROGRESS NOTES
Subjective:       Patient ID: Kristopher Ye is a 46 y.o. female.    Chief Complaint: Acute bilateral low back pain with sciatica, sciatica later    HPI     Went to ED last week with dizziness and shortness of breath after visit in clinic  Did receive fluids  Reports told she did not qualify for transfusion  States she continues to feel better off the Ibrance- cbc improving     Most recent imaging:  - 3/30/2022 ECHO:  · The left ventricle is moderately enlarged with mild eccentric hypertrophy and low normal systolic function.  · The estimated ejection fraction is 55%.  · The quantitatively derived ejection fraction is 52%.  · Indeterminate left ventricular diastolic function.  · Severe left atrial enlargement.  · Normal right ventricular size with normal right ventricular systolic function.     - 3/22/2022 CTA Chest:  FINDINGS:  Opacification of the pulmonary arteries is sufficient for evaluation to the segmental branches.  No filling defects were seen.  The heart size is normal.  The aorta and main pulmonary segment show normal diameter.  There is no pericardial effusion.  No new lymphadenopathy identified in the chest.  No radiographic evidence of pneumonia or pulmonary edema is identified.  Stable mild apical scarring.  No new or enlarging pulmonary nodule.  There is partial imaging of the upper abdomen.  There is diffusely diminished hepatic attenuation consistent fatty metamorphosis.  Stable, 10 mm sclerotic focus in the left 10th posterior vertebral body with a narrow zone of transition.  A similar appearing lesion is seen in the sternum.  No new suspicious bone lesions.  The site of the reported primary breast malignancy is not well identified.  Impression:  No evidence of pulmonary embolus to the segmental branches.  Other stable findings are as above.  No specific CT evidence for new metastatic disease in the chest.     Had been seeing Dr. Olivares and had a second opinion due to fears over  pacemaker     Currently on Arimidex and Ibrance (week off now)  Zoladex and Xgeva monthly     Recent Imaging:  - 2/2/2022 Bone scan:  FINDINGS:  No focal abnormal uptake suggestive of osseous metastases.  There is diffusely increased uptake in the calvarium in keeping with hyperostosis.  There is degenerative type uptake most prominent in the bilateral shoulders and knees.  The focal uptake previously described in the distal right femur is not visualized.  There is otherwise physiologic distribution of the radiopharmaceutical throughout the skeleton.  There is normal uptake in the genitourinary system and soft tissues.  Impression:  There is no scintigraphic evidence of osteoblastic metastatic disease.  Nonspecific uptake in the distal right femur has resolved.  Diffuse cranial hyperostosis and other findings as above.     - 2/1/2022 CT C/A/P:  FINDINGS:  CHEST  Support tubes and lines: None.  Aorta: Normal caliber.  Heart: Normal size..  Coronary arteries: No calcifications.  Pericardium: Normal. No effusion, thickening, or calcification.  Central pulmonary arteries: Normal caliber.  Base of neck/thyroid: Normal.  Lymph nodes: No supraclavicular, axillary, internal mammary, mediastinal or hilar lymphadenopathy.  Esophagus: Normal.  Pleura: No effusion, thickening or calcification.  Body wall: Unremarkable.  Airways: Normal.  Lungs: Clear without focal or diffuse abnormality.  Bones: Sclerotic lesions are seen throughout the spine as well as in the sternum, not substantially changed from 10/25/2021  ABDOMEN/PELVIS  Liver: Unremarkable  Gallbladder/bile ducts: Unremarkable. No intra or extrahepatic biliary ductal dilatation  Pancreas: Unremarkable.  Spleen: Unremarkable.  Adrenals: Unremarkable.  Kidneys: Simple cyst in the right kidney is unchanged  Lymph nodes: No abdominal or pelvic lymphadenopathy.  Bowel and mesentery: Unremarkable.  Abdominal aorta: Unremarkable.  Inferior vena cava: Unremarkable.  Free fluid  or free air: None.  Pelvis: Unremarkable.  Urinary bladder: Unremarkable.  Body wall: Unremarkable.  Bones: Sclerotic lesions seen in the spine are unchanged.  Impression:  1. No evidence of new recurrent or metastatic disease.  2. Sclerotic lesions seen in the spine and sternum, unchanged when compared to 10/25/2021.        Reason For Follow Up:   1. Stage IV ([id number]) invasive ductal carcinoma of right breast, upper inner quadrant, ER %, OH neg, Her2 neg, Grade 3, multifocal with one lesion appearing more aggressive (Ki67 80%), large LN +, bone mets.  Ibrance recently held with cytopenias    Oncologic History:  Presentation  - 9/11/19 - Screening mammogram showed multiple right breast masses lower inner quadrant  - 9/13/19 - Diagnostic mammogram and US showed a right breast, 3:00 position mass, 2 CFN measuring 17 mm x 16 mm x 14 mm.  There 2 smaller adjacent masses towards the nipple  - 9/19/19 - Biopsy -               A. Right breast subareolar: Grade 3 IDC, estrogen receptor 80%, progesterone receptor 0%, Her2 dago neg, Ki67 80%.                           B. Right breast mass 3:00: Grade 3 IDC with papillary features, estrogen receptor 100%, progesterone receptor 0%, Her2 dago 1+, Ki67 30%.              - 9/26/19: US right axilla with abnormal lymphadenopathy measuring 4.3 x 2.8 cm with biopsy + for metastatic breast cancer.   Surgery consultation with Dr Ferris on 9/25/19              - 9/25/19 - [facility]; VUS pending  Medical oncology consultation on 10/7/19              * 10/8/19 - CT C/A/P with several lytic lesions in thoracic and lumbar spine, iliac bones, left scapula in addition to 3 x 4.5 cm right axillary node and 2.1 cm right breast mass. Bone scan negative.              * 10/31/19 - started Ibrance, Arimidex, Zoladex; plan for Xgeva.               * 11/12/19 STRATA without targetable mutations.               * 12/16/19 - Bone biopsy + for met disease  "(initially read as negative, but we treated as metastatic disease given high suspicion).               * MRI brain: "Partially empty sella.  Question bilateral globe proptosis. Otherwise unremarkable MRI brain as detailed above specifically without evidence for intracranial enhancing lesion to suggest metastatic disease."              * 4/22/2020 PET - disease improvement. 8/2020 PET with disease improvement.               *  9/1/20 - Palliative RT to L1-L4     Of note, * Xgeva monthly  - we had been waiting on dental clearance, but she has been unable to get into dentistry and is accepting of risks. Has no active dental disease.      PET scan 4/22/2021:  FINDINGS:  Quality of the study: Mildly degraded due to patient's large body habitus and skin abutting the gantry.  In the head and neck, there are no hypermetabolic lesions worrisome for malignancy. There are no hypermetabolic mucosal lesions, and there are no pathologically enlarged or hypermetabolic lymph nodes.  In the chest, there are no hypermetabolic lesions worrisome for malignancy.  Stable CT appearance of a level 1 right axillary lymph node measuring 1.3 cm in short axis with normal background radiotracer uptake.  Previously with SUV max of 2.1.  There are no concerning pulmonary nodules or masses, and there are no pathologically enlarged or hypermetabolic lymph nodes.  In the abdomen and pelvis, there is physiologic tracer distribution within the abdominal organs and excretion into the genitourinary system.  In the bones, there are stable lytic lesions throughout the lumbar spine and pelvis and additional stable sclerotic lesions in the sternum and T3 vertebral body.  No associated focal abnormal increased radiotracer uptake.  Findings likely represent treated disease.  Impression:  Interval decreased uptake within a prominent right axillary lymph node, now with normal background uptake.  No new focal abnormal uptake.  Stable lytic and sclerotic lesions " without focal abnormal uptake.  Findings are compatible with treated metastasis.      8/25/2021 MRI brain   Impression:  No significant change from prior specifically without evidence for enhancing lesion to suggest intracranial metastatic disease.  Continued partially empty sella, intracranial hypertension to be included in differential in the appropriate clinical setting.  Clinical correlation and further evaluation as warranted.     10/14/2021 MRI thoracic/lumbar spine:  Impression:  Patient history of metastatic breast cancer.  Bone lesions at T10, L2, L3, and right iliac wing, as above, concerning for metastatic disease.  No pathologic fracture, soft tissue component, or epidural extension identified.      10/25/2021 CT chest/abd/pelvis:  Impression:  1.  Stable metastasis of the spine  No evidence of intra-abdominal or intrathoracic metastatic disease  2.  Stable prominent right axillary lymph node.  3.  Additional findings are detailed above.     FH:  Paternal side:  1st cousin- ovarian cancer (diagnosed at age 26) and her mother has thyroid cancer  Maternal grandmother- some type of likely metastatic GI cancer    Review of Systems   Constitutional: Negative for activity change, appetite change and unexpected weight change.   HENT: Negative for mouth sores, rhinorrhea and trouble swallowing.    Eyes: Negative for visual disturbance.   Respiratory: Positive for cough and shortness of breath.    Cardiovascular: Negative for chest pain.   Gastrointestinal: Negative for abdominal pain, change in bowel habit, constipation, diarrhea, nausea, vomiting and change in bowel habit.   Genitourinary: Negative for frequency.   Musculoskeletal: Positive for back pain (chronic). Negative for arthralgias, joint swelling and myalgias.   Integumentary:  Negative for rash.   Neurological: Negative for headaches.   Hematological: Negative for adenopathy.   Psychiatric/Behavioral: Positive for sleep disturbance. Negative for  dysphoric mood. The patient is nervous/anxious.          Objective:      Physical Exam  Vitals and nursing note reviewed.   Constitutional:       General: She is not in acute distress.     Appearance: Normal appearance. She is well-developed. She is obese.      Comments: Presents with daughter   JOE:      Head: Normocephalic and atraumatic.   Eyes:      General: Lids are normal. No scleral icterus.     Conjunctiva/sclera: Conjunctivae normal.      Pupils: Pupils are equal, round, and reactive to light.   Neck:      Thyroid: No thyromegaly.      Vascular: No JVD.      Trachea: Trachea normal.   Cardiovascular:      Rate and Rhythm: Normal rate and regular rhythm.      Heart sounds: Normal heart sounds.      Comments: Difficult to assess tones.   Pulmonary:      Effort: Pulmonary effort is normal. No respiratory distress.      Breath sounds: Normal breath sounds. No wheezing, rhonchi or rales.      Comments: distant  Chest:   Breasts:      Right: No axillary adenopathy or supraclavicular adenopathy.      Left: No axillary adenopathy or supraclavicular adenopathy.       Abdominal:      General: Bowel sounds are normal. There is no distension.      Palpations: Abdomen is soft. There is no mass.      Tenderness: There is no abdominal tenderness.      Comments: No organomegaly however difficult to assess.     Musculoskeletal:         General: Normal range of motion.      Cervical back: Normal range of motion and neck supple.      Comments: No spinal or paraspinal tenderness.    Lymphadenopathy:      Head:      Right side of head: No submental or submandibular adenopathy.      Left side of head: No submental or submandibular adenopathy.      Cervical: No cervical adenopathy.      Upper Body:      Right upper body: No supraclavicular or axillary adenopathy.      Left upper body: No supraclavicular or axillary adenopathy.   Skin:     General: Skin is warm and dry.      Capillary Refill: Capillary refill takes less than 2  seconds.      Coloration: Skin is not jaundiced or pale.      Findings: No bruising or rash.      Nails: There is no clubbing.   Neurological:      Mental Status: She is alert and oriented to person, place, and time.      Sensory: No sensory deficit.      Motor: No weakness.      Coordination: Coordination normal.      Gait: Gait normal.   Psychiatric:         Mood and Affect: Mood normal.         Speech: Speech normal.         Behavior: Behavior normal.         Thought Content: Thought content normal.         Judgment: Judgment normal.       Labs - reviewed  Assessment:       Problem List Items Addressed This Visit     Sleep difficulties    Malignant neoplasm of upper-inner quadrant of right breast in female, estrogen receptor positive - Primary    Bone metastases    Anemia associated with chemotherapy              Plan:       Sleep disturbance- will discuss with psychology  Anemia- improving with ibrance hold    Metastatic breast cancer to bone  Continue Arimidex  Dose adjust Ibrance to 100 mg  Recheck labs 2 weeks post    Route Chart for Scheduling    Med Onc Chart Routing      Follow up with physician    Follow up with [facility]IL[facility] 4 weeks. Labs prior- cbc, cmp   Labs    Imaging    Pharmacy appointment    Other referrals          Treatment Plan Information   OP ANASTROZOLE PALBOCICLIB Q4W   Jessica Mendez MD   Upcoming Treatment Dates - OP ANASTROZOLE PALBOCICLIB Q4W    No upcoming days in selected categories.    Supportive Plan Information  OP BREAST GOSERELIN & DENOSUMAB Q4W   Jessica Mendez MD   Upcoming Treatment Dates - OP BREAST GOSERELIN & DENOSUMAB Q4W    No upcoming days in selected categories.

## 2022-04-19 NOTE — TELEPHONE ENCOUNTER
Pt saw provider yesterday, 4/18. Will resume Ibrance at lower dose-100 mg daily for 21 days on 7 days off.    Received rx for decreased dose. Will call pt to complete refill

## 2022-04-19 NOTE — TELEPHONE ENCOUNTER
Specialty Pharmacy - Clinical Intervention  Specialty Pharmacy - Refill Coordination    Specialty Medication Orders Linked to Encounter    Flowsheet Row Most Recent Value   Medication #1 palbociclib (IBRANCE) 100 mg Cap (Order#[id number], Rx#[id number])        Will send calendar with refill. Pt stated that her next cycle will start on 5/3    Refill Questions - Documented Responses    Flowsheet Row Most Recent Value   Patient Availability and HIPAA Verification    Does patient want to proceed with activity? Yes   HIPAA/medical authority confirmed? Yes   Relationship to patient of person spoken to? Self   Refill Screening Questions    Changes to allergies? No   Changes to medications? No   New conditions since last clinic visit? No   Unplanned office visit, urgent care, ED, or hospital admission in the last 4 weeks? Yes  [ED visit on 4/12,  since has been feeling better]   How does patient/caregiver feel medication is working? Good   Financial problems or insurance changes? No   How many doses of your specialty medications were missed in the last 4 weeks? 0   Would patient like to speak to a pharmacist? No   When does the patient need to receive the medication? 04/29/22   Refill Delivery Questions    How will the patient receive the medication? Delivery Jesenia   When does the patient need to receive the medication? 04/29/22   Shipping Address Home   Address in [facility] confirmed and updated if neccessary? Yes   Expected Copay ($) 0   Is the patient able to afford the medication copay? Yes   Payment Method zero copay   Days supply of Refill 28   Supplies needed? No supplies needed   Refill activity completed? Yes   Refill activity plan Refill scheduled   Shipment/Pickup Date: 04/29/22          Current Outpatient Medications   Medication Sig    (Magic mouthwash) 1:1:1 Benadryl 12.5mg/5ml liq, aluminum & magnesium hydroxide-simehticone (Maalox), LIDOcaine viscous 2% Swish and spit 10 mLs every 4 (four) hours as  needed. for mouth sores    anastrozole (ARIMIDEX) 1 mg Tab TAKE 1 TABLET(1 MG) BY MOUTH EVERY DAY    atorvastatin (LIPITOR) 40 MG tablet TAKE 1 TABLET(40 MG) BY MOUTH EVERY DAY    blood sugar diagnostic Strp To check BG 4 times daily, to use with insurance preferred meter    blood-glucose meter kit To check BG 4 times daily, to use with insurance preferred meter    carvediloL (COREG) 25 MG tablet Take 1 tablet (25 mg total) by mouth 2 (two) times daily.    duke's soln (benadryl 30 mL, mylanta 30 mL, LIDOcaine 30 mL, nystatin 30 mL) 120mL Take 10 mLs by mouth 4 (four) times daily.    ergocalciferol (ERGOCALCIFEROL) 50,000 unit Cap Take 1 capsule (50,000 Units total) by mouth every 7 days.    glipiZIDE (GLUCOTROL) 10 MG tablet Take 1 tablet (10 mg total) by mouth 2 (two) times daily before meals.    goserelin (ZOLADEX) 3.6 mg injection Inject 3.6 mg into the skin every 28 days.    HYDROcodone-acetaminophen (NORCO) 5-325 mg per tablet Take 1 tablet by mouth every 6 (six) hours as needed for Pain.    ibuprofen (ADVIL,MOTRIN) 800 MG tablet Take 1 tablet (800 mg total) by mouth 2 (two) times daily as needed for Pain.    lancets Misc To check BG 4 times daily, to use with insurance preferred meter    LIDOcaine (LIDODERM) 5 % Place 2 patches onto the skin once daily. Remove & Discard patch within 12 hours or as directed by MD    morphine (MS CONTIN) 15 MG 12 hr tablet Take 1 tablet (15 mg total) by mouth every 8 (eight) hours as needed for Pain.    ondansetron (ZOFRAN-ODT) 4 MG TbDL Take 1 tablet (4 mg total) by mouth 2 (two) times daily.    ondansetron (ZOFRAN-ODT) 8 MG TbDL Take 1 tablet (8 mg total) by mouth every 8 (eight) hours as needed (nausea).    palbociclib (IBRANCE) 100 mg Cap Take 100 mg by mouth once daily Take daily for 21 days, followed by 7 days off.    potassium chloride (KLOR-CON) 10 MEQ TbSR TAKE 1 TABLET(10 MEQ) BY MOUTH EVERY DAY    promethazine (PHENERGAN) 25 MG tablet Take 1 tablet  (25 mg total) by mouth every 6 (six) hours as needed for Nausea.    sacubitriL-valsartan (ENTRESTO)  mg per tablet Take 1 tablet by mouth 2 (two) times daily.    sennosides (SENNA-C ORAL) Take 2 tablets by mouth daily as needed.    TRULICITY 0.75 mg/0.5 mL pen injector ADMINISTER 0.75 MG UNDER THE SKIN EVERY 7 DAYS    venlafaxine (EFFEXOR-XR) 150 MG Cp24 Take 1 capsule (150 mg total) by mouth once daily.    venlafaxine (EFFEXOR-XR) 75 MG 24 hr capsule Take 1 capsule (75 mg total) by mouth once daily. Take with 150 mg capsule for a total of 225 mg.   Last reviewed on 4/18/2022  1:22 PM by Phyllis Lei MD    Review of patient's allergies indicates:   Allergen Reactions    Keflex [cephalexin] Itching    Last reviewed on  4/18/2022 1:22 PM by Phyllis Lei    Interventions added this encounter   Closed: [facility] Patient Intervention - Drug therapy effectiveness: palbociclib (IBRANCE) 125 mg Cap     Tasks added this encounter   5/20/2022 - Refill Call (Auto Added)   Tasks due within next 3 months   No tasks due.     Rae Cota, PharmD  [facility]  [address] [place] [id number]  Phone: [phone]  Fax: [phone]

## 2022-04-21 ENCOUNTER — PATIENT MESSAGE (OUTPATIENT)
Dept: HEMATOLOGY/ONCOLOGY | Facility: CLINIC | Age: 47
End: 2022-04-21

## 2022-04-21 ENCOUNTER — TELEPHONE (OUTPATIENT)
Dept: HEMATOLOGY/ONCOLOGY | Facility: CLINIC | Age: 47
End: 2022-04-21

## 2022-04-21 NOTE — TELEPHONE ENCOUNTER
Attempted to call about the message below, she did not answer. Left a brief message with my direct call back number.    ----- Message from Valentino Roman DNP sent at 4/21/2022 10:46 AM CDT -----  Regarding: RE: genetic test  Lynn,    Can you reach out to Kristopher to follow up on her genetic testing?  I am going to re-send a consent form to her now and need that back prior to getting the saliva kit shipped to her; alternatively, we could set her up for a nurse visit for consent and sample collection which would allow us to get blood and test both DNA and RNA instead of just DNA with saliva.    Please keep us posted!    Thanks,  Valentino   ----- Message -----  From: Alfredo Bhatt NP  Sent: 4/21/2022  10:46 AM CDT  To: Phyllis Lei MD, Valentino Roman DNP  Subject: RE: genetic test                                 Hi Valentino,     Thanks for checking as I didn't know if I was missing her results somewhere.   Is this something you'll contact to follow up on or do you need me to call her?     I'll let Dr. Lei know as well that she did not have genetic testing.    Thanks,   RAS  ----- Message -----  From: Valentino Roman DNP  Sent: 4/21/2022  10:39 AM CDT  To: Alfredo Bhatt NP  Subject: RE: genetic test                                 Hey!  Sorry for the delay.  I started an [employer] order for her but never received her consent back.  I don't see any evidence in her chart that any genetic testing has been completed.    Valentino   ----- Message -----  From: Alfredo Bhatt NP  Sent: 4/13/2022  11:09 AM CDT  To: Valentino Roman DNP  Subject: genetic test                                     Hi! Im not seeing where her genetic test was completed? Am I missing it? Can you look and tell me if she had done.   RAS

## 2022-04-28 ENCOUNTER — TELEPHONE (OUTPATIENT)
Dept: HEMATOLOGY/ONCOLOGY | Facility: CLINIC | Age: 47
End: 2022-04-28

## 2022-04-28 ENCOUNTER — OFFICE VISIT (OUTPATIENT)
Dept: CARDIOLOGY | Facility: CLINIC | Age: 47
End: 2022-04-28
Payer: MEDICAID

## 2022-04-28 ENCOUNTER — PATIENT MESSAGE (OUTPATIENT)
Dept: CARDIOLOGY | Facility: CLINIC | Age: 47
End: 2022-04-28

## 2022-04-28 DIAGNOSIS — E78.2 MIXED HYPERLIPIDEMIA: ICD-10-CM

## 2022-04-28 DIAGNOSIS — C50.211 MALIGNANT NEOPLASM OF UPPER-INNER QUADRANT OF RIGHT BREAST IN FEMALE, ESTROGEN RECEPTOR POSITIVE: ICD-10-CM

## 2022-04-28 DIAGNOSIS — I42.0 DILATED CARDIOMYOPATHY: Primary | ICD-10-CM

## 2022-04-28 DIAGNOSIS — R06.09 DOE (DYSPNEA ON EXERTION): ICD-10-CM

## 2022-04-28 DIAGNOSIS — I10 PRIMARY HYPERTENSION: ICD-10-CM

## 2022-04-28 DIAGNOSIS — E11.65 TYPE 2 DIABETES MELLITUS WITH HYPERGLYCEMIA, WITHOUT LONG-TERM CURRENT USE OF INSULIN: Chronic | ICD-10-CM

## 2022-04-28 DIAGNOSIS — Z17.0 MALIGNANT NEOPLASM OF UPPER-INNER QUADRANT OF RIGHT BREAST IN FEMALE, ESTROGEN RECEPTOR POSITIVE: ICD-10-CM

## 2022-04-28 DIAGNOSIS — G89.3 NEOPLASM RELATED PAIN: ICD-10-CM

## 2022-04-28 DIAGNOSIS — R06.09 DYSPNEA ON EXERTION: ICD-10-CM

## 2022-04-28 PROCEDURE — 4010F PR ACE/ARB THEARPY RXD/TAKEN: ICD-10-PCS | Mod: CPTII,95,, | Performed by: INTERNAL MEDICINE

## 2022-04-28 PROCEDURE — 99214 OFFICE O/P EST MOD 30 MIN: CPT | Mod: 95,,, | Performed by: INTERNAL MEDICINE

## 2022-04-28 PROCEDURE — 4010F ACE/ARB THERAPY RXD/TAKEN: CPT | Mod: CPTII,95,, | Performed by: INTERNAL MEDICINE

## 2022-04-28 PROCEDURE — 3052F PR MOST RECENT HEMOGLOBIN A1C LEVEL 8.0 - < 9.0%: ICD-10-PCS | Mod: CPTII,95,, | Performed by: INTERNAL MEDICINE

## 2022-04-28 PROCEDURE — 99214 PR OFFICE/OUTPT VISIT, EST, LEVL IV, 30-39 MIN: ICD-10-PCS | Mod: 95,,, | Performed by: INTERNAL MEDICINE

## 2022-04-28 PROCEDURE — 3052F HG A1C>EQUAL 8.0%<EQUAL 9.0%: CPT | Mod: CPTII,95,, | Performed by: INTERNAL MEDICINE

## 2022-04-28 RX ORDER — MORPHINE SULFATE 15 MG/1
15 TABLET, FILM COATED, EXTENDED RELEASE ORAL EVERY 8 HOURS PRN
Qty: 60 TABLET | Refills: 0 | Status: SHIPPED | OUTPATIENT
Start: 2022-04-28 | End: 2023-03-07

## 2022-04-28 RX ORDER — FUROSEMIDE 20 MG/1
20 TABLET ORAL 2 TIMES DAILY
Qty: 60 TABLET | Refills: 11 | Status: SHIPPED | OUTPATIENT
Start: 2022-04-28 | End: 2023-04-24

## 2022-04-28 NOTE — PROGRESS NOTES
Subjective:   Patient ID:  Kristopher Ye is a 46 y.o. female who presents for follow-up of No chief complaint on file.    The patient location is: home  The chief complaint leading to consultation is: f/u cardiomyopathy    Visit type: audiovisual    Face to Face time with patient: 30 min  30 minutes of total time spent on the encounter, which includes face to face time and non-face to face time preparing to see the patient (eg, review of tests), Obtaining and/or reviewing separately obtained history, Documenting clinical information in the electronic or other health record, Independently interpreting results (not separately reported) and communicating results to the patient/family/caregiver, or Care coordination (not separately reported).         Each patient to whom he or she provides medical services by telemedicine is:  (1) informed of the relationship between the physician and patient and the respective role of any other health care provider with respect to management of the patient; and (2) notified that he or she may decline to receive medical services by telemedicine and may withdraw from such care at any time.          PROBLEM LIST:  Metastatic breast cancer , (Arimidex, palbociclib)  Dilated cardiomyopathy (LVEF 30% improved to 52%)  HTN  HLD  DM, not on insulin  Obesity    HPI: She was seen in the ED 4/14/22 for weakness, SOB and nausea. Given IV zofran. CXR with possible early pulmonary edema vs pneumonitis. She reports a 4-6 week h/o increasing BLUE to the point where she has trouble speaking. No PND/orthopnea/edema. Reports weight has been stable but has variations up to 15 lbs documented in Epic.  Recent echo with improved LVEF. CVP not reported. Ibrance on hold due to cytopenias.     ECG 13-APR-2022 12:26:34: Personally reviewed by me shows NSR with 1st degree AV block with inferolateral T wave inversions.    Echo 3/22:  Summary    · The left ventricle is moderately enlarged with mild  eccentric hypertrophy and low normal systolic function.  · The estimated ejection fraction is 55%.  · The quantitatively derived ejection fraction is 52%.  · Indeterminate left ventricular diastolic function.  · Severe left atrial enlargement.  · Normal right ventricular size with normal right ventricular systolic function.    Echo 10/19:  Summary    · Moderate left ventricular enlargement.  · Moderately decreased left ventricular systolic function. The estimated ejection fraction is 30%.  · The left ventricular global longitudinal strain is -11.6%.  · Normal right ventricular systolic function.  · Grade III (severe) left ventricular diastolic dysfunction consistent with restrictive physiology.  · Mild left atrial enlargement.  · Normal central venous pressure (3 mm Hg).     PYP scan;  · Study is negative for TTR amyloidosis      Past Medical History:   Diagnosis Date    Abnormal Pap smear     pt states 13yrs ago colpo was done    Anemia     Anxiety     Cancer     Cardiomyopathy     CHF (congestive heart failure)     Fibroid     Hx of psychiatric care     Hyperlipidemia     Hypertension     Psychiatric problem     Sleep difficulties     Therapy        Past Surgical History:   Procedure Laterality Date     SECTION      TONSILLECTOMY      TUBAL LIGATION      UTERINE FIBROID EMBOLIZATION         Social History     Socioeconomic History    Marital status:    Tobacco Use    Smoking status: Never Smoker    Smokeless tobacco: Never Used   Substance and Sexual Activity    Alcohol use: Not Currently     Alcohol/week: 0.0 standard drinks    Drug use: No    Sexual activity: Not Currently     Partners: Male     Birth control/protection: Surgical   Social History Narrative    Patient is a pleasant AAF,  x2. She has excellent social support, although she states they worry more than she does.     Former teacher English.    Disabled.    She lives with her 2 adult daughters - 19  and 21.     Social Determinants of Health     Financial Resource Strain: Low Risk     Difficulty of Paying Living Expenses: Not hard at all   Food Insecurity: No Food Insecurity    Worried About Running Out of Food in the Last Year: Never true    Ran Out of Food in the Last Year: Never true   Transportation Needs: Unmet Transportation Needs    Lack of Transportation (Non-Medical): Yes   Social Connections: Unknown    Frequency of Communication with Friends and Family: Three times a week    Frequency of Social Gatherings with Friends and Family: Never       Family History   Problem Relation Age of Onset    Arthritis Mother     Diabetes Mother     Heart disease Mother         CHF, CAD , 2 stents    Hypertension Mother     Hyperlipidemia Mother     Heart failure Mother     No Known Problems Sister     Prostate cancer Father     Hypertension Brother     No Known Problems Daughter     Asthma Daughter     No Known Problems Maternal Grandmother     Cancer Maternal Grandfather 79        abdominal origin?    No Known Problems Paternal Grandmother     No Known Problems Paternal Grandfather     Thyroid cancer Other         type? dx age?    Cancer Paternal Cousin         ovarian @ ?29 & cervical @ ?29    Endometriosis Paternal Cousin     Breast cancer Neg Hx     Ovarian cancer Neg Hx     Colon polyps Neg Hx        Patient's Medications   New Prescriptions    No medications on file   Previous Medications    (MAGIC MOUTHWASH) 1:1:1 BENADRYL 12.5MG/5ML LIQ, ALUMINUM & MAGNESIUM HYDROXIDE-SIMEHTICONE (MAALOX), LIDOCAINE VISCOUS 2%    Swish and spit 10 mLs every 4 (four) hours as needed. for mouth sores    ANASTROZOLE (ARIMIDEX) 1 MG TAB    TAKE 1 TABLET(1 MG) BY MOUTH EVERY DAY    ATORVASTATIN (LIPITOR) 40 MG TABLET    TAKE 1 TABLET(40 MG) BY MOUTH EVERY DAY    BLOOD SUGAR DIAGNOSTIC STRP    To check BG 4 times daily, to use with insurance preferred meter    BLOOD-GLUCOSE METER KIT    To check BG 4 times  daily, to use with insurance preferred meter    CARVEDILOL (COREG) 25 MG TABLET    Take 1 tablet (25 mg total) by mouth 2 (two) times daily.    DUKE'S SOLN (BENADRYL 30 ML, MYLANTA 30 ML, LIDOCAINE 30 ML, NYSTATIN 30 ML) 120ML    Take 10 mLs by mouth 4 (four) times daily.    ERGOCALCIFEROL (ERGOCALCIFEROL) 50,000 UNIT CAP    Take 1 capsule (50,000 Units total) by mouth every 7 days.    GLIPIZIDE (GLUCOTROL) 10 MG TABLET    Take 1 tablet (10 mg total) by mouth 2 (two) times daily before meals.    GOSERELIN (ZOLADEX) 3.6 MG INJECTION    Inject 3.6 mg into the skin every 28 days.    HYDROCODONE-ACETAMINOPHEN (NORCO) 5-325 MG PER TABLET    Take 1 tablet by mouth every 6 (six) hours as needed for Pain.    IBUPROFEN (ADVIL,MOTRIN) 800 MG TABLET    Take 1 tablet (800 mg total) by mouth 2 (two) times daily as needed for Pain.    LANCETS MISC    To check BG 4 times daily, to use with insurance preferred meter    LIDOCAINE (LIDODERM) 5 %    Place 2 patches onto the skin once daily. Remove & Discard patch within 12 hours or as directed by MD    MORPHINE (MS CONTIN) 15 MG 12 HR TABLET    Take 1 tablet (15 mg total) by mouth every 8 (eight) hours as needed for Pain.    ONDANSETRON (ZOFRAN-ODT) 4 MG TBDL    Take 1 tablet (4 mg total) by mouth 2 (two) times daily.    ONDANSETRON (ZOFRAN-ODT) 8 MG TBDL    Take 1 tablet (8 mg total) by mouth every 8 (eight) hours as needed (nausea).    PALBOCICLIB (IBRANCE) 100 MG CAP    Take 1 capsule (100 mg) by mouth once daily Take daily for 21 days, followed by 7 days off.    POTASSIUM CHLORIDE (KLOR-CON) 10 MEQ TBSR    TAKE 1 TABLET(10 MEQ) BY MOUTH EVERY DAY    PROMETHAZINE (PHENERGAN) 25 MG TABLET    Take 1 tablet (25 mg total) by mouth every 6 (six) hours as needed for Nausea.    SACUBITRIL-VALSARTAN (ENTRESTO)  MG PER TABLET    Take 1 tablet by mouth 2 (two) times daily.    SENNOSIDES (SENNA-C ORAL)    Take 2 tablets by mouth daily as needed.    TRULICITY 0.75 MG/0.5 ML PEN INJECTOR     ADMINISTER 0.75 MG UNDER THE SKIN EVERY 7 DAYS    VENLAFAXINE (EFFEXOR-XR) 150 MG CP24    Take 1 capsule (150 mg total) by mouth once daily.    VENLAFAXINE (EFFEXOR-XR) 75 MG 24 HR CAPSULE    Take 1 capsule (75 mg total) by mouth once daily. Take with 150 mg capsule for a total of 225 mg.   Modified Medications    No medications on file   Discontinued Medications    No medications on file       Review of Systems   Constitutional: Negative for malaise/fatigue and weight gain.   HENT: Negative for hearing loss.    Eyes: Negative for visual disturbance.   Cardiovascular: Positive for dyspnea on exertion. Negative for chest pain, claudication, leg swelling, near-syncope, orthopnea, palpitations, paroxysmal nocturnal dyspnea and syncope.   Respiratory: Negative for cough, shortness of breath, sleep disturbances due to breathing, snoring and wheezing.    Endocrine: Negative for cold intolerance, heat intolerance, polydipsia, polyphagia and polyuria.   Hematologic/Lymphatic: Negative for bleeding problem. Does not bruise/bleed easily.   Skin: Negative for rash and suspicious lesions.   Musculoskeletal: Negative for arthritis, falls, joint pain, muscle weakness and myalgias.   Gastrointestinal: Negative for abdominal pain, change in bowel habit, constipation, diarrhea, heartburn, hematochezia, melena and nausea.   Genitourinary: Negative for hematuria and nocturia.   Neurological: Negative for excessive daytime sleepiness, dizziness, headaches, light-headedness, loss of balance and weakness.   Psychiatric/Behavioral: Negative for depression. The patient is not nervous/anxious.    Allergic/Immunologic: Negative for environmental allergies.       There were no vitals taken for this visit.    Objective:   Physical Exam    Lab Results   Component Value Date     04/18/2022    K 3.7 04/18/2022     04/18/2022    CO2 24 04/18/2022    BUN 10 04/18/2022    CREATININE 0.8 04/18/2022     (H) 04/18/2022    HGBA1C  8.8 (H) 02/07/2022    MG 1.8 03/24/2022    AST 15 04/18/2022    ALT 17 04/18/2022    ALBUMIN 2.7 (L) 04/18/2022    ALBUMIN 3.5 (L) 01/28/2021    PROT 7.7 04/18/2022    BILITOT 0.4 04/18/2022    WBC 9.99 04/18/2022    HGB 9.3 (L) 04/18/2022    HCT 31.5 (L) 04/18/2022    MCV 91 04/18/2022     (H) 04/18/2022    INR 1.0 12/16/2019    TSH 1.384 03/30/2022    CHOL 119 (L) 11/03/2021    HDL 42 11/03/2021    LDLCALC 57.8 (L) 11/03/2021    TRIG 96 11/03/2021    BNP 21 03/24/2022       Assessment:     1. Dilated cardiomyopathy : LVEF improved to 52% on recent echo. Continue coreg/Entresto. Will consider adding SGLT-2 inhibitor at next visit.   2. Primary hypertension :Continue current regimen.   3. Mixed hyperlipidemia : Lipids are at goal. Continue statin therapy.   4. Malignant neoplasm of upper-inner quadrant of right breast in female, estrogen receptor positive : Ibrance on hold.   5. BMI 60.0-69.9, adult    6. Type 2 diabetes mellitus with hyperglycemia, without long-term current use of insulin : Consider SGLT-2 inhibitor at next visit.   7.      BLUE: Possible CHF vs pneumonitis. Will give trial of lasix 20 mg daily. If no response, increase to 40 mg daily. BMP/NTpro-BNP in one week.           Plan:     Diagnoses and all orders for this visit:    Dilated cardiomyopathy    Primary hypertension    Mixed hyperlipidemia    Malignant neoplasm of upper-inner quadrant of right breast in female, estrogen receptor positive    BMI 60.0-69.9, adult    Type 2 diabetes mellitus with hyperglycemia, without long-term current use of insulin        Thank you for allowing me to participate in this patient's care. Please do not hesitate to contact me with any questions or concerns.

## 2022-04-28 NOTE — TELEPHONE ENCOUNTER
Called and spoke w/ Kristopher, she has gotten the message but it still reviewing the consent information before signing. She is still interested and states she will call back if she has any questions and will send back the consent when ready.    ----- Message from Valentino Roman DNP sent at 4/21/2022 10:46 AM CDT -----  Regarding: RE: genetic test  Lynn,    Can you reach out to Kristopher to follow up on her genetic testing?  I am going to re-send a consent form to her now and need that back prior to getting the saliva kit shipped to her; alternatively, we could set her up for a nurse visit for consent and sample collection which would allow us to get blood and test both DNA and RNA instead of just DNA with saliva.    Please keep us posted!    Tona,  Valentino   ----- Message -----  From: Alfredo Bhatt NP  Sent: 4/21/2022  10:46 AM CDT  To: Phyllis Lei MD, Valentino Roman DNP  Subject: RE: genetic test                                 Hi Valentino,     Thanks for checking as I didn't know if I was missing her results somewhere.   Is this something you'll contact to follow up on or do you need me to call her?     I'll let Dr. Lei know as well that she did not have genetic testing.    RAS Carbone  ----- Message -----  From: Valentino Roman DNP  Sent: 4/21/2022  10:39 AM CDT  To: Alfredo Bhatt NP  Subject: RE: genetic test                                 Hey!  Sorry for the delay.  I started an [facility] order for her but never received her consent back.  I don't see any evidence in her chart that any genetic testing has been completed.    Valentino   ----- Message -----  From: Alfredo Bhatt NP  Sent: 4/13/2022  11:09 AM CDT  To: Valentino Roman DNP  Subject: genetic test                                     Hi! Im not seeing where her genetic test was completed? Am I missing it? Can you look and tell me if she had done.   RAS

## 2022-05-02 ENCOUNTER — HOSPITAL ENCOUNTER (OUTPATIENT)
Dept: CARDIOLOGY | Facility: OTHER | Age: 47
Discharge: HOME | End: 2022-05-02
Attending: INTERNAL MEDICINE
Payer: MEDICARE

## 2022-05-02 VITALS
HEIGHT: 64 IN | SYSTOLIC BLOOD PRESSURE: 155 MMHG | BODY MASS INDEX: 50.02 KG/M2 | DIASTOLIC BLOOD PRESSURE: 75 MMHG | WEIGHT: 293 LBS

## 2022-05-02 DIAGNOSIS — I50.42 CHRONIC COMBINED SYSTOLIC AND DIASTOLIC HEART FAILURE: ICD-10-CM

## 2022-05-02 LAB
ASCENDING AORTA: 3.05 CM
AV INDEX (PROSTH): 0.62
AV MEAN GRADIENT: 6 MMHG
AV PEAK GRADIENT: 11 MMHG
AV VALVE AREA: 3.07 CM2
AV VELOCITY RATIO: 0.55
BSA FOR ECHO PROCEDURE: 2.83 M2
CV ECHO LV RWT: 0.42 CM
DOP CALC AO PEAK VEL: 1.64 M/S
DOP CALC AO VTI: 28.28 CM
DOP CALC LVOT AREA: 5 CM2
DOP CALC LVOT DIAMETER: 2.52 CM
DOP CALC LVOT PEAK VEL: 0.91 M/S
DOP CALC LVOT STROKE VOLUME: 86.84 CM3
DOP CALCLVOT PEAK VEL VTI: 17.42 CM
E WAVE DECELERATION TIME: 121.86 MSEC
E/A RATIO: 0.84
E/E' RATIO: 12.57 M/S
ECHO LV POSTERIOR WALL: 1.24 CM (ref 0.6–1.1)
EJECTION FRACTION: 40 %
FRACTIONAL SHORTENING: 20 % (ref 28–44)
INTERVENTRICULAR SEPTUM: 0.92 CM (ref 0.6–1.1)
IVRT: 128.03 MSEC
LA MAJOR: 6 CM
LA MINOR: 6 CM
LA WIDTH: 4.5 CM
LEFT ATRIUM SIZE: 4.71 CM
LEFT ATRIUM VOLUME INDEX MOD: 22.3 ML/M2
LEFT ATRIUM VOLUME INDEX: 41.6 ML/M2
LEFT ATRIUM VOLUME MOD: 58 CM3
LEFT ATRIUM VOLUME: 108.09 CM3
LEFT INTERNAL DIMENSION IN SYSTOLE: 4.78 CM (ref 2.1–4)
LEFT VENTRICLE DIASTOLIC VOLUME INDEX: 68.06 ML/M2
LEFT VENTRICLE DIASTOLIC VOLUME: 176.95 ML
LEFT VENTRICLE MASS INDEX: 104 G/M2
LEFT VENTRICLE SYSTOLIC VOLUME INDEX: 40.9 ML/M2
LEFT VENTRICLE SYSTOLIC VOLUME: 106.3 ML
LEFT VENTRICULAR INTERNAL DIMENSION IN DIASTOLE: 5.96 CM (ref 3.5–6)
LEFT VENTRICULAR MASS: 269.9 G
LV LATERAL E/E' RATIO: 9.78 M/S
LV SEPTAL E/E' RATIO: 17.6 M/S
MV PEAK A VEL: 1.05 M/S
MV PEAK E VEL: 0.88 M/S
MV STENOSIS PRESSURE HALF TIME: 35.34 MS
MV VALVE AREA P 1/2 METHOD: 6.23 CM2
PISA MRMAX VEL: 0.03 M/S
PULM VEIN S/D RATIO: 1.31
PV PEAK D VEL: 0.39 M/S
PV PEAK S VEL: 0.51 M/S
PV PEAK VELOCITY: 0.8 CM/S
RA MAJOR: 4.16 CM
RIGHT VENTRICULAR END-DIASTOLIC DIMENSION: 3.32 CM
RV TISSUE DOPPLER FREE WALL SYSTOLIC VELOCITY 1 (APICAL 4 CHAMBER VIEW): 12.61 CM/S
SINUS: 3.02 CM
STJ: 2.92 CM
TDI LATERAL: 0.09 M/S
TDI SEPTAL: 0.05 M/S
TDI: 0.07 M/S
TRICUSPID ANNULAR PLANE SYSTOLIC EXCURSION: 1.95 CM

## 2022-05-02 PROCEDURE — 93306 TTE W/DOPPLER COMPLETE: CPT

## 2022-05-02 PROCEDURE — 93306 TTE W/DOPPLER COMPLETE: CPT | Mod: 26,,, | Performed by: INTERNAL MEDICINE

## 2022-05-02 PROCEDURE — 93306 ECHO (CUPID ONLY): ICD-10-PCS | Mod: 26,,, | Performed by: INTERNAL MEDICINE

## 2022-05-03 ENCOUNTER — PATIENT MESSAGE (OUTPATIENT)
Dept: CARDIOLOGY | Facility: CLINIC | Age: 47
End: 2022-05-03
Payer: MEDICARE

## 2022-05-03 ENCOUNTER — LAB VISIT (OUTPATIENT)
Dept: LAB | Facility: HOSPITAL | Age: 47
End: 2022-05-03
Attending: INTERNAL MEDICINE
Payer: MEDICARE

## 2022-05-03 ENCOUNTER — INFUSION (OUTPATIENT)
Dept: INFUSION THERAPY | Facility: HOSPITAL | Age: 47
End: 2022-05-03
Attending: INTERNAL MEDICINE
Payer: MEDICARE

## 2022-05-03 ENCOUNTER — OFFICE VISIT (OUTPATIENT)
Dept: HEMATOLOGY/ONCOLOGY | Facility: CLINIC | Age: 47
End: 2022-05-03
Payer: MEDICARE

## 2022-05-03 VITALS
HEIGHT: 64 IN | SYSTOLIC BLOOD PRESSURE: 155 MMHG | OXYGEN SATURATION: 100 % | HEART RATE: 71 BPM | RESPIRATION RATE: 18 BRPM | DIASTOLIC BLOOD PRESSURE: 72 MMHG | BODY MASS INDEX: 50.02 KG/M2 | WEIGHT: 293 LBS | TEMPERATURE: 98 F

## 2022-05-03 DIAGNOSIS — T45.1X5A ANEMIA ASSOCIATED WITH CHEMOTHERAPY: ICD-10-CM

## 2022-05-03 DIAGNOSIS — E46 PROTEIN-CALORIE MALNUTRITION, UNSPECIFIED SEVERITY: ICD-10-CM

## 2022-05-03 DIAGNOSIS — D64.81 ANEMIA ASSOCIATED WITH CHEMOTHERAPY: ICD-10-CM

## 2022-05-03 DIAGNOSIS — Z17.0 MALIGNANT NEOPLASM OF UPPER-INNER QUADRANT OF RIGHT BREAST IN FEMALE, ESTROGEN RECEPTOR POSITIVE: Primary | ICD-10-CM

## 2022-05-03 DIAGNOSIS — C50.211 MALIGNANT NEOPLASM OF UPPER-INNER QUADRANT OF RIGHT BREAST IN FEMALE, ESTROGEN RECEPTOR POSITIVE: Primary | ICD-10-CM

## 2022-05-03 DIAGNOSIS — F41.9 ANXIETY AND DEPRESSION: ICD-10-CM

## 2022-05-03 DIAGNOSIS — Z17.0 MALIGNANT NEOPLASM OF UPPER-INNER QUADRANT OF RIGHT BREAST IN FEMALE, ESTROGEN RECEPTOR POSITIVE: ICD-10-CM

## 2022-05-03 DIAGNOSIS — C50.211 MALIGNANT NEOPLASM OF UPPER-INNER QUADRANT OF RIGHT BREAST IN FEMALE, ESTROGEN RECEPTOR POSITIVE: ICD-10-CM

## 2022-05-03 DIAGNOSIS — C79.51 BONE METASTASES: ICD-10-CM

## 2022-05-03 DIAGNOSIS — E88.09 HYPOALBUMINEMIA: ICD-10-CM

## 2022-05-03 DIAGNOSIS — I42.0 DILATED CARDIOMYOPATHY: ICD-10-CM

## 2022-05-03 DIAGNOSIS — E11.65 TYPE 2 DIABETES MELLITUS WITH HYPERGLYCEMIA, WITHOUT LONG-TERM CURRENT USE OF INSULIN: Chronic | ICD-10-CM

## 2022-05-03 DIAGNOSIS — F32.A ANXIETY AND DEPRESSION: ICD-10-CM

## 2022-05-03 LAB
ALBUMIN SERPL BCP-MCNC: 2.6 G/DL (ref 3.5–5.2)
ALP SERPL-CCNC: 94 U/L (ref 55–135)
ALT SERPL W/O P-5'-P-CCNC: 12 U/L (ref 10–44)
ANION GAP SERPL CALC-SCNC: 7 MMOL/L (ref 8–16)
AST SERPL-CCNC: 14 U/L (ref 10–40)
BASOPHILS # BLD AUTO: 0.05 K/UL (ref 0–0.2)
BASOPHILS NFR BLD: 0.6 % (ref 0–1.9)
BILIRUB SERPL-MCNC: 0.4 MG/DL (ref 0.1–1)
BUN SERPL-MCNC: 10 MG/DL (ref 6–20)
CALCIUM SERPL-MCNC: 8.7 MG/DL (ref 8.7–10.5)
CHLORIDE SERPL-SCNC: 107 MMOL/L (ref 95–110)
CO2 SERPL-SCNC: 27 MMOL/L (ref 23–29)
CREAT SERPL-MCNC: 0.7 MG/DL (ref 0.5–1.4)
DIFFERENTIAL METHOD: ABNORMAL
EOSINOPHIL # BLD AUTO: 0.2 K/UL (ref 0–0.5)
EOSINOPHIL NFR BLD: 2.7 % (ref 0–8)
ERYTHROCYTE [DISTWIDTH] IN BLOOD BY AUTOMATED COUNT: 20.5 % (ref 11.5–14.5)
EST. GFR  (AFRICAN AMERICAN): >60 ML/MIN/1.73 M^2
EST. GFR  (NON AFRICAN AMERICAN): >60 ML/MIN/1.73 M^2
GLUCOSE SERPL-MCNC: 140 MG/DL (ref 70–110)
HCT VFR BLD AUTO: 29.1 % (ref 37–48.5)
HGB BLD-MCNC: 8.6 G/DL (ref 12–16)
IMM GRANULOCYTES # BLD AUTO: 0.04 K/UL (ref 0–0.04)
IMM GRANULOCYTES NFR BLD AUTO: 0.5 % (ref 0–0.5)
LYMPHOCYTES # BLD AUTO: 1.2 K/UL (ref 1–4.8)
LYMPHOCYTES NFR BLD: 14.2 % (ref 18–48)
MCH RBC QN AUTO: 26.5 PG (ref 27–31)
MCHC RBC AUTO-ENTMCNC: 29.6 G/DL (ref 32–36)
MCV RBC AUTO: 90 FL (ref 82–98)
MONOCYTES # BLD AUTO: 0.6 K/UL (ref 0.3–1)
MONOCYTES NFR BLD: 6.4 % (ref 4–15)
NEUTROPHILS # BLD AUTO: 6.5 K/UL (ref 1.8–7.7)
NEUTROPHILS NFR BLD: 75.6 % (ref 38–73)
NRBC BLD-RTO: 0 /100 WBC
PLATELET # BLD AUTO: 367 K/UL (ref 150–450)
PMV BLD AUTO: 10.2 FL (ref 9.2–12.9)
POTASSIUM SERPL-SCNC: 4.3 MMOL/L (ref 3.5–5.1)
PROT SERPL-MCNC: 6.7 G/DL (ref 6–8.4)
RBC # BLD AUTO: 3.24 M/UL (ref 4–5.4)
SODIUM SERPL-SCNC: 141 MMOL/L (ref 136–145)
WBC # BLD AUTO: 8.64 K/UL (ref 3.9–12.7)

## 2022-05-03 PROCEDURE — 3052F PR MOST RECENT HEMOGLOBIN A1C LEVEL 8.0 - < 9.0%: ICD-10-PCS | Mod: CPTII,S$GLB,, | Performed by: NURSE PRACTITIONER

## 2022-05-03 PROCEDURE — 3077F SYST BP >= 140 MM HG: CPT | Mod: CPTII,S$GLB,, | Performed by: NURSE PRACTITIONER

## 2022-05-03 PROCEDURE — 80053 COMPREHEN METABOLIC PANEL: CPT | Performed by: INTERNAL MEDICINE

## 2022-05-03 PROCEDURE — 99215 OFFICE O/P EST HI 40 MIN: CPT | Mod: S$GLB,,, | Performed by: NURSE PRACTITIONER

## 2022-05-03 PROCEDURE — 99499 RISK ADDL DX/OHS AUDIT: ICD-10-PCS | Mod: S$GLB,,, | Performed by: NURSE PRACTITIONER

## 2022-05-03 PROCEDURE — 1159F MED LIST DOCD IN RCRD: CPT | Mod: CPTII,S$GLB,, | Performed by: NURSE PRACTITIONER

## 2022-05-03 PROCEDURE — 99499 UNLISTED E&M SERVICE: CPT | Mod: S$GLB,,, | Performed by: NURSE PRACTITIONER

## 2022-05-03 PROCEDURE — 3008F PR BODY MASS INDEX (BMI) DOCUMENTED: ICD-10-PCS | Mod: CPTII,S$GLB,, | Performed by: NURSE PRACTITIONER

## 2022-05-03 PROCEDURE — 4010F ACE/ARB THERAPY RXD/TAKEN: CPT | Mod: CPTII,S$GLB,, | Performed by: NURSE PRACTITIONER

## 2022-05-03 PROCEDURE — 1159F PR MEDICATION LIST DOCUMENTED IN MEDICAL RECORD: ICD-10-PCS | Mod: CPTII,S$GLB,, | Performed by: NURSE PRACTITIONER

## 2022-05-03 PROCEDURE — 85025 COMPLETE CBC W/AUTO DIFF WBC: CPT | Performed by: INTERNAL MEDICINE

## 2022-05-03 PROCEDURE — 96372 THER/PROPH/DIAG INJ SC/IM: CPT | Mod: 59

## 2022-05-03 PROCEDURE — 36415 COLL VENOUS BLD VENIPUNCTURE: CPT | Performed by: INTERNAL MEDICINE

## 2022-05-03 PROCEDURE — 3077F PR MOST RECENT SYSTOLIC BLOOD PRESSURE >= 140 MM HG: ICD-10-PCS | Mod: CPTII,S$GLB,, | Performed by: NURSE PRACTITIONER

## 2022-05-03 PROCEDURE — 96402 CHEMO HORMON ANTINEOPL SQ/IM: CPT

## 2022-05-03 PROCEDURE — 4010F PR ACE/ARB THEARPY RXD/TAKEN: ICD-10-PCS | Mod: CPTII,S$GLB,, | Performed by: NURSE PRACTITIONER

## 2022-05-03 PROCEDURE — 99999 PR PBB SHADOW E&M-EST. PATIENT-LVL V: CPT | Mod: PBBFAC,,, | Performed by: NURSE PRACTITIONER

## 2022-05-03 PROCEDURE — 99215 PR OFFICE/OUTPT VISIT, EST, LEVL V, 40-54 MIN: ICD-10-PCS | Mod: S$GLB,,, | Performed by: NURSE PRACTITIONER

## 2022-05-03 PROCEDURE — 3078F PR MOST RECENT DIASTOLIC BLOOD PRESSURE < 80 MM HG: ICD-10-PCS | Mod: CPTII,S$GLB,, | Performed by: NURSE PRACTITIONER

## 2022-05-03 PROCEDURE — 63600175 PHARM REV CODE 636 W HCPCS: Mod: JG | Performed by: NURSE PRACTITIONER

## 2022-05-03 PROCEDURE — 3078F DIAST BP <80 MM HG: CPT | Mod: CPTII,S$GLB,, | Performed by: NURSE PRACTITIONER

## 2022-05-03 PROCEDURE — 3008F BODY MASS INDEX DOCD: CPT | Mod: CPTII,S$GLB,, | Performed by: NURSE PRACTITIONER

## 2022-05-03 PROCEDURE — 99999 PR PBB SHADOW E&M-EST. PATIENT-LVL V: ICD-10-PCS | Mod: PBBFAC,,, | Performed by: NURSE PRACTITIONER

## 2022-05-03 PROCEDURE — 3052F HG A1C>EQUAL 8.0%<EQUAL 9.0%: CPT | Mod: CPTII,S$GLB,, | Performed by: NURSE PRACTITIONER

## 2022-05-03 RX ORDER — LORAZEPAM 1 MG/1
1 TABLET ORAL EVERY 12 HOURS PRN
Qty: 30 TABLET | Refills: 0 | Status: SHIPPED | OUTPATIENT
Start: 2022-05-03 | End: 2023-09-05

## 2022-05-03 RX ADMIN — GOSERELIN ACETATE 3.6 MG: 3.6 IMPLANT SUBCUTANEOUS at 01:05

## 2022-05-03 RX ADMIN — DENOSUMAB 120 MG: 120 INJECTION SUBCUTANEOUS at 01:05

## 2022-05-03 NOTE — PROGRESS NOTES
Subjective:       Patient ID: Kristopher Ye is a 46 y.o. female.    Chief Complaint: No chief complaint on file.    HPI     Here for follow up  Currently on Arimidex and Ibrance (dose adjusted due to anemia but hasnt started).  Zoladex and Xgeva monthly    She recently received the Ibrance 100mg tabs and has not started this dose as of today. She will start tomorrow.     SOB seems a bit worse over the last 6 weeks.   ECHO yesterday- EF 40%. This is a drop from yesterday. She has messaged cards.   Cards appt 5/19.   Report mood is low today and Effexor not helping much. Psychiatry increased effexor- taking 150mg in am and 75 mg in PM  grieving mom's loss with mother's day coming up.   She has taken Ativan in past with help.   No suicidal ideation.   She has a cruise in October and would like to feel good. .     5/3/2022 ECHO:   Summary    · The left ventricle is mildly enlarged with eccentric hypertrophy and mildly decreased systolic function. The estimated ejection fraction is 40%.  · There is left ventricular global hypokinesis.  · There is abnormal septal wall motion.  · Grade I left ventricular diastolic dysfunction.  · Normal right ventricular size with mildly reduced right ventricular systolic function.  · Moderate left atrial enlargement.           Reason For Follow Up:   1. Stage IV ([id number]) invasive ductal carcinoma of right breast, upper inner quadrant, ER %, IN neg, Her2 neg, Grade 3, multifocal with one lesion appearing more aggressive (Ki67 80%), large LN +, bone mets.  Ibrance recently held with cytopenias    Oncologic History:  Presentation  - 9/11/19 - Screening mammogram showed multiple right breast masses lower inner quadrant  - 9/13/19 - Diagnostic mammogram and US showed a right breast, 3:00 position mass, 2 CFN measuring 17 mm x 16 mm x 14 mm.  There 2 smaller adjacent masses towards the nipple  - 9/19/19 - Biopsy -               A. Right breast subareolar: Grade 3 IDC, estrogen  "receptor 80%, progesterone receptor 0%, Her2 dago neg, Ki67 80%.                           B. Right breast mass 3:00: Grade 3 IDC with papillary features, estrogen receptor 100%, progesterone receptor 0%, Her2 dago 1+, Ki67 30%.              - 9/26/19: US right axilla with abnormal lymphadenopathy measuring 4.3 x 2.8 cm with biopsy + for metastatic breast cancer.   Surgery consultation with Dr Ferris on 9/25/19              - 9/25/19 - Genetics [facility] BRACAnalysis pending; VUS pending  Medical oncology consultation on 10/7/19              * 10/8/19 - CT C/A/P with several lytic lesions in thoracic and lumbar spine, iliac bones, left scapula in addition to 3 x 4.5 cm right axillary node and 2.1 cm right breast mass. Bone scan negative.              * 10/31/19 - started Ibrance, Arimidex, Zoladex; plan for Xgeva.               * 11/12/19 STRATA without targetable mutations.               * 12/16/19 - Bone biopsy + for met disease (initially read as negative, but we treated as metastatic disease given high suspicion).               * MRI brain: "Partially empty sella.  Question bilateral globe proptosis. Otherwise unremarkable MRI brain as detailed above specifically without evidence for intracranial enhancing lesion to suggest metastatic disease."              * 4/22/2020 PET - disease improvement. 8/2020 PET with disease improvement.               *  9/1/20 - Palliative RT to L1-L4     Of note, * Xgeva monthly  - we had been waiting on dental clearance, but she has been unable to get into dentistry and is accepting of risks. Has no active dental disease.      PET scan 4/22/2021:  FINDINGS:  Quality of the study: Mildly degraded due to patient's large body habitus and skin abutting the gantry.  In the head and neck, there are no hypermetabolic lesions worrisome for malignancy. There are no hypermetabolic mucosal lesions, and there are no pathologically enlarged or hypermetabolic lymph nodes.  In the " chest, there are no hypermetabolic lesions worrisome for malignancy.  Stable CT appearance of a level 1 right axillary lymph node measuring 1.3 cm in short axis with normal background radiotracer uptake.  Previously with SUV max of 2.1.  There are no concerning pulmonary nodules or masses, and there are no pathologically enlarged or hypermetabolic lymph nodes.  In the abdomen and pelvis, there is physiologic tracer distribution within the abdominal organs and excretion into the genitourinary system.  In the bones, there are stable lytic lesions throughout the lumbar spine and pelvis and additional stable sclerotic lesions in the sternum and T3 vertebral body.  No associated focal abnormal increased radiotracer uptake.  Findings likely represent treated disease.  Impression:  Interval decreased uptake within a prominent right axillary lymph node, now with normal background uptake.  No new focal abnormal uptake.  Stable lytic and sclerotic lesions without focal abnormal uptake.  Findings are compatible with treated metastasis.      8/25/2021 MRI brain   Impression:  No significant change from prior specifically without evidence for enhancing lesion to suggest intracranial metastatic disease.  Continued partially empty sella, intracranial hypertension to be included in differential in the appropriate clinical setting.  Clinical correlation and further evaluation as warranted.     10/14/2021 MRI thoracic/lumbar spine:  Impression:  Patient history of metastatic breast cancer.  Bone lesions at T10, L2, L3, and right iliac wing, as above, concerning for metastatic disease.  No pathologic fracture, soft tissue component, or epidural extension identified.      10/25/2021 CT chest/abd/pelvis:  Impression:  1.  Stable metastasis of the spine  No evidence of intra-abdominal or intrathoracic metastatic disease  2.  Stable prominent right axillary lymph node.  3.  Additional findings are detailed above.     Currently on Arimidex  and Ibrance  Zoladex and Xgeva monthly      - 2/2/2022 Bone scan:  FINDINGS:  No focal abnormal uptake suggestive of osseous metastases.  There is diffusely increased uptake in the calvarium in keeping with hyperostosis.  There is degenerative type uptake most prominent in the bilateral shoulders and knees.  The focal uptake previously described in the distal right femur is not visualized.  There is otherwise physiologic distribution of the radiopharmaceutical throughout the skeleton.  There is normal uptake in the genitourinary system and soft tissues.  Impression:  There is no scintigraphic evidence of osteoblastic metastatic disease.  Nonspecific uptake in the distal right femur has resolved.  Diffuse cranial hyperostosis and other findings as above.     - 2/1/2022 CT C/A/P:  FINDINGS:  CHEST  Support tubes and lines: None.  Aorta: Normal caliber.  Heart: Normal size..  Coronary arteries: No calcifications.  Pericardium: Normal. No effusion, thickening, or calcification.  Central pulmonary arteries: Normal caliber.  Base of neck/thyroid: Normal.  Lymph nodes: No supraclavicular, axillary, internal mammary, mediastinal or hilar lymphadenopathy.  Esophagus: Normal.  Pleura: No effusion, thickening or calcification.  Body wall: Unremarkable.  Airways: Normal.  Lungs: Clear without focal or diffuse abnormality.  Bones: Sclerotic lesions are seen throughout the spine as well as in the sternum, not substantially changed from 10/25/2021  ABDOMEN/PELVIS  Liver: Unremarkable  Gallbladder/bile ducts: Unremarkable. No intra or extrahepatic biliary ductal dilatation  Pancreas: Unremarkable.  Spleen: Unremarkable.  Adrenals: Unremarkable.  Kidneys: Simple cyst in the right kidney is unchanged  Lymph nodes: No abdominal or pelvic lymphadenopathy.  Bowel and mesentery: Unremarkable.  Abdominal aorta: Unremarkable.  Inferior vena cava: Unremarkable.  Free fluid or free air: None.  Pelvis: Unremarkable.  Urinary bladder:  Unremarkable.  Body wall: Unremarkable.  Bones: Sclerotic lesions seen in the spine are unchanged.  Impression:  1. No evidence of new recurrent or metastatic disease.  2. Sclerotic lesions seen in the spine and sternum, unchanged when compared to 10/25/2021.           - 3/22/2022 CTA Chest:  FINDINGS:  Opacification of the pulmonary arteries is sufficient for evaluation to the segmental branches.  No filling defects were seen.  The heart size is normal.  The aorta and main pulmonary segment show normal diameter.  There is no pericardial effusion.  No new lymphadenopathy identified in the chest.  No radiographic evidence of pneumonia or pulmonary edema is identified.  Stable mild apical scarring.  No new or enlarging pulmonary nodule.  There is partial imaging of the upper abdomen.  There is diffusely diminished hepatic attenuation consistent fatty metamorphosis.  Stable, 10 mm sclerotic focus in the left 10th posterior vertebral body with a narrow zone of transition.  A similar appearing lesion is seen in the sternum.  No new suspicious bone lesions.  The site of the reported primary breast malignancy is not well identified.  Impression:  No evidence of pulmonary embolus to the segmental branches.  Other stable findings are as above.  No specific CT evidence for new metastatic disease in the chest.     Had been seeing Dr. Olivares and had a second opinion due to fears over pacemaker        - 3/30/2022 ECHO:  · The left ventricle is moderately enlarged with mild eccentric hypertrophy and low normal systolic function.  · The estimated ejection fraction is 55%.  · The quantitatively derived ejection fraction is 52%.  · Indeterminate left ventricular diastolic function.  · Severe left atrial enlargement.  · Normal right ventricular size with normal right ventricular systolic function.      FH:  Paternal side:  1st cousin- ovarian cancer (diagnosed at age 26) and her mother has thyroid cancer  Maternal grandmother- some  type of likely metastatic GI cancer    Review of Systems   Constitutional: Positive for appetite change. Negative for activity change and unexpected weight change.   HENT: Negative for mouth sores, rhinorrhea and trouble swallowing.    Eyes: Negative for visual disturbance.   Respiratory: Positive for shortness of breath (on exertion). Negative for cough.    Cardiovascular: Negative for chest pain.   Gastrointestinal: Negative for abdominal pain, change in bowel habit, constipation, diarrhea, nausea, vomiting and change in bowel habit.   Genitourinary: Negative for frequency.   Musculoskeletal: Positive for back pain (chronic). Negative for arthralgias, joint swelling and myalgias.   Integumentary:  Negative for rash.   Neurological: Negative for headaches.   Hematological: Negative for adenopathy.   Psychiatric/Behavioral: Positive for dysphoric mood and sleep disturbance. The patient is nervous/anxious.          Objective:      Physical Exam  Vitals and nursing note reviewed.   Constitutional:       General: She is not in acute distress.     Appearance: Normal appearance. She is well-developed. She is obese.      Comments: Presents with daughter  In WC    HENT:      Head: Normocephalic and atraumatic.   Eyes:      General: Lids are normal. No scleral icterus.     Conjunctiva/sclera: Conjunctivae normal.      Pupils: Pupils are equal, round, and reactive to light.   Neck:      Thyroid: No thyromegaly.      Vascular: No JVD.      Trachea: Trachea normal.   Cardiovascular:      Rate and Rhythm: Normal rate and regular rhythm.      Heart sounds: Normal heart sounds.      Comments: Difficult to assess tones.   Pulmonary:      Effort: Pulmonary effort is normal. No respiratory distress.      Breath sounds: Normal breath sounds. No wheezing, rhonchi or rales.      Comments: distant  Chest:   Breasts:      Right: No axillary adenopathy or supraclavicular adenopathy.      Left: No axillary adenopathy or supraclavicular  adenopathy.       Abdominal:      General: Bowel sounds are normal. There is no distension.      Palpations: Abdomen is soft. There is no mass.      Tenderness: There is no abdominal tenderness.      Comments: No organomegaly however difficult to assess.     Musculoskeletal:         General: Normal range of motion.      Cervical back: Normal range of motion and neck supple.      Comments: No spinal or paraspinal tenderness.    Lymphadenopathy:      Head:      Right side of head: No submental or submandibular adenopathy.      Left side of head: No submental or submandibular adenopathy.      Cervical: No cervical adenopathy.      Upper Body:      Right upper body: No supraclavicular or axillary adenopathy.      Left upper body: No supraclavicular or axillary adenopathy.   Skin:     General: Skin is warm and dry.      Capillary Refill: Capillary refill takes less than 2 seconds.      Coloration: Skin is not jaundiced or pale.      Findings: No bruising or rash.      Nails: There is no clubbing.   Neurological:      Mental Status: She is alert and oriented to person, place, and time.      Sensory: No sensory deficit.      Motor: No weakness.      Coordination: Coordination normal.      Gait: Gait normal.   Psychiatric:         Speech: Speech normal.         Behavior: Behavior normal.         Thought Content: Thought content normal.         Judgment: Judgment normal.       Labs - reviewed  Assessment:       1. Malignant neoplasm of upper-inner quadrant of right breast in female, estrogen receptor positive  Urinalysis    CBC Oncology    Iron and TIBC    Ferritin    Folate    Vitamin B12    DISCONTINUED: denosumab (XGEVA) solution 120 mg    DISCONTINUED: goserelin (ZOLADEX) injection 3.6 mg   2. Bone metastases  Urinalysis    CBC Oncology    Iron and TIBC    Ferritin    Folate    Vitamin B12   3. Anemia associated with chemotherapy     4. Dilated cardiomyopathy     5. Anxiety and depression  LORazepam (ATIVAN) 1 MG tablet    6. Hypoalbuminemia  Ambulatory referral/consult to Hematology/Oncology/Nutrition   7. Type 2 diabetes mellitus with hyperglycemia, without long-term current use of insulin     8. Protein-calorie malnutrition, unspecified severity   Folate    Vitamin B12         Plan:       1-2. Labs reviewed. Ok to proceed with dose reduced Ibrance.   Continue Arimidex  Continue Xgeva and Zoladex.   3. Monitor anemia closely.   Add iron/tibc, ferritin, folate, Vit B12, Urinalysis to labs Friday. Add CBC to 5/19 labs.   4. F/u with cards as reduced EF on most recent ECHO. appt 5/19.   5. Rx Ativan- x 1 time. Appt with Dr. Odom for support and Psychiatry for possible med changes. Patient will make appt.   6. Nutrition discussed. Appetite poor as on trulicity.   Nutrition referral for hypoalbuminemia. (virtual)   7. continue current therapy.       Cc chart previously sent for RTC and appt is made.     Route Chart for Scheduling    Med Onc Chart Routing  Urgent    Follow up with physician    Follow up with CAMILA    Labs    Lab interval:  Add iron/tibc, ferritin, folate, Vit B12, Urinalysis to labs Friday 5/6. Add CBC to 5/19 labs.    Imaging    Pharmacy appointment    Other referrals Additional referrals needed           Patient is in agreement with the proposed treatment plan. All questions were answered to the patient's satisfaction. Pt knows to call clinic for any new or worsening symptoms and if anything is needed before the next clinic visit.      JUNG SanabriaP-C  Hematology & Oncology  [facility] [place] [address]  ph. [phone]  Fax. [phone]     Face to Face time with patient: 30 minutes  40 minutes of total time spent on the encounter, which includes face to face time and non-face to face time preparing to see the patient (eg, review of tests), Obtaining and/or reviewing separately obtained history, Documenting clinical information in the electronic or other health record, Independently  interpreting results (not separately reported) and communicating results to the patient/family/caregiver, or Care coordination (not separately reported).

## 2022-05-03 NOTE — Clinical Note
Add iron/tibc, ferritin, folate, Vit B12, Urinalysis to labs Friday 5/6.  Add CBC to 5/19 labs.  Thanks

## 2022-05-03 NOTE — NURSING
Pt here for zoladex and xgeva injections. Tolerated xgeva injection to lower right abdomen and zoladex injection to lower left abdomen without difficulty.

## 2022-05-05 ENCOUNTER — TELEPHONE (OUTPATIENT)
Dept: INFUSION THERAPY | Facility: HOSPITAL | Age: 47
End: 2022-05-05
Payer: MEDICARE

## 2022-05-05 ENCOUNTER — PATIENT MESSAGE (OUTPATIENT)
Dept: HEMATOLOGY/ONCOLOGY | Facility: CLINIC | Age: 47
End: 2022-05-05

## 2022-05-06 ENCOUNTER — LAB VISIT (OUTPATIENT)
Dept: LAB | Facility: OTHER | Age: 47
End: 2022-05-06
Attending: INTERNAL MEDICINE
Payer: MEDICARE

## 2022-05-06 DIAGNOSIS — I50.42 CHRONIC COMBINED SYSTOLIC AND DIASTOLIC HEART FAILURE: ICD-10-CM

## 2022-05-06 DIAGNOSIS — R06.09 DYSPNEA ON EXERTION: ICD-10-CM

## 2022-05-06 DIAGNOSIS — C79.51 BONE METASTASES: ICD-10-CM

## 2022-05-06 DIAGNOSIS — C50.211 MALIGNANT NEOPLASM OF UPPER-INNER QUADRANT OF RIGHT BREAST IN FEMALE, ESTROGEN RECEPTOR POSITIVE: ICD-10-CM

## 2022-05-06 DIAGNOSIS — E46 PROTEIN-CALORIE MALNUTRITION, UNSPECIFIED SEVERITY: ICD-10-CM

## 2022-05-06 DIAGNOSIS — I42.0 DILATED CARDIOMYOPATHY: ICD-10-CM

## 2022-05-06 DIAGNOSIS — Z17.0 MALIGNANT NEOPLASM OF UPPER-INNER QUADRANT OF RIGHT BREAST IN FEMALE, ESTROGEN RECEPTOR POSITIVE: ICD-10-CM

## 2022-05-06 LAB
ANION GAP SERPL CALC-SCNC: 9 MMOL/L (ref 8–16)
ANION GAP SERPL CALC-SCNC: 9 MMOL/L (ref 8–16)
BNP SERPL-MCNC: 33 PG/ML (ref 0–99)
BUN SERPL-MCNC: 10 MG/DL (ref 6–20)
BUN SERPL-MCNC: 10 MG/DL (ref 6–20)
CALCIUM SERPL-MCNC: 9.4 MG/DL (ref 8.7–10.5)
CALCIUM SERPL-MCNC: 9.4 MG/DL (ref 8.7–10.5)
CHLORIDE SERPL-SCNC: 105 MMOL/L (ref 95–110)
CHLORIDE SERPL-SCNC: 105 MMOL/L (ref 95–110)
CO2 SERPL-SCNC: 27 MMOL/L (ref 23–29)
CO2 SERPL-SCNC: 27 MMOL/L (ref 23–29)
CREAT SERPL-MCNC: 0.8 MG/DL (ref 0.5–1.4)
CREAT SERPL-MCNC: 0.8 MG/DL (ref 0.5–1.4)
EST. GFR  (AFRICAN AMERICAN): >60 ML/MIN/1.73 M^2
EST. GFR  (AFRICAN AMERICAN): >60 ML/MIN/1.73 M^2
EST. GFR  (NON AFRICAN AMERICAN): >60 ML/MIN/1.73 M^2
EST. GFR  (NON AFRICAN AMERICAN): >60 ML/MIN/1.73 M^2
FERRITIN SERPL-MCNC: 184 NG/ML (ref 20–300)
FOLATE SERPL-MCNC: 4.2 NG/ML (ref 4–24)
GLUCOSE SERPL-MCNC: 129 MG/DL (ref 70–110)
GLUCOSE SERPL-MCNC: 129 MG/DL (ref 70–110)
IRON SERPL-MCNC: 34 UG/DL (ref 30–160)
MAGNESIUM SERPL-MCNC: 2 MG/DL (ref 1.6–2.6)
POTASSIUM SERPL-SCNC: 3.8 MMOL/L (ref 3.5–5.1)
POTASSIUM SERPL-SCNC: 3.8 MMOL/L (ref 3.5–5.1)
SATURATED IRON: 15 % (ref 20–50)
SODIUM SERPL-SCNC: 141 MMOL/L (ref 136–145)
SODIUM SERPL-SCNC: 141 MMOL/L (ref 136–145)
TOTAL IRON BINDING CAPACITY: 222 UG/DL (ref 250–450)
TRANSFERRIN SERPL-MCNC: 150 MG/DL (ref 200–375)
VIT B12 SERPL-MCNC: 477 PG/ML (ref 210–950)

## 2022-05-06 PROCEDURE — 84466 ASSAY OF TRANSFERRIN: CPT | Performed by: NURSE PRACTITIONER

## 2022-05-06 PROCEDURE — 83735 ASSAY OF MAGNESIUM: CPT | Performed by: INTERNAL MEDICINE

## 2022-05-06 PROCEDURE — 82728 ASSAY OF FERRITIN: CPT | Performed by: NURSE PRACTITIONER

## 2022-05-06 PROCEDURE — 83880 ASSAY OF NATRIURETIC PEPTIDE: CPT | Performed by: INTERNAL MEDICINE

## 2022-05-06 PROCEDURE — 82607 VITAMIN B-12: CPT | Performed by: NURSE PRACTITIONER

## 2022-05-06 PROCEDURE — 83880 ASSAY OF NATRIURETIC PEPTIDE: CPT | Mod: 91 | Performed by: INTERNAL MEDICINE

## 2022-05-06 PROCEDURE — 82746 ASSAY OF FOLIC ACID SERUM: CPT | Performed by: NURSE PRACTITIONER

## 2022-05-06 PROCEDURE — 80048 BASIC METABOLIC PNL TOTAL CA: CPT | Performed by: INTERNAL MEDICINE

## 2022-05-07 LAB — NT-PROBNP SERPL-MCNC: 159 PG/ML

## 2022-05-09 ENCOUNTER — PATIENT MESSAGE (OUTPATIENT)
Dept: CARDIOLOGY | Facility: CLINIC | Age: 47
End: 2022-05-09
Payer: MEDICARE

## 2022-05-09 DIAGNOSIS — D50.8 OTHER IRON DEFICIENCY ANEMIA: Primary | ICD-10-CM

## 2022-05-09 RX ORDER — FERROUS SULFATE 324(65)MG
TABLET, DELAYED RELEASE (ENTERIC COATED) ORAL
Qty: 30 TABLET | Refills: 0 | Status: SHIPPED | OUTPATIENT
Start: 2022-05-09 | End: 2022-08-22

## 2022-05-09 NOTE — PROGRESS NOTES
"Oncology Nutrition Assessment for Medical Nutrition Therapy  Follow-up Visit    Kristopher Ye   1975    Referring Provider:  Bria Odom, PhD      Reason for Visit: Pt in for education and nutrition counseling     PMHx:   Past Medical History:   Diagnosis Date    Abnormal Pap smear     pt states 13yrs ago colpo was done    Anemia     Anxiety     Cancer     Cardiomyopathy     CHF (congestive heart failure)     Fibroid     Hx of psychiatric care     Hyperlipidemia     Hypertension     Psychiatric problem     Sleep difficulties     Therapy        Nutrition Assessment    This is a 46 y.o.female with metastatic breast cancer. Currently on Arimidex and Ibrance. Zoladex and Xgeva monthly. PMH also includes obesity, DM2, HTN, HLD. Referred back to nutrition due to poor appetite and hypoalbuminemia.   She reports nausea sometimes with vomiting, hot flashes, occasional dizzy spells especially in the shower, joint pain, irregular bowel movements. Recently has recent fluid retention and SOB (x6 weeks). Currently on lasix but doesn't notice a difference. Appetite fluctuates between poor to very good but mostly fair to poor. She reports she has always been a picky eater. She has two adult daughters who help out at home and keep her strict with her diet/water intake.   Newly diagnosed with diabetes in December. Drinking about 4L of water per day. She was walking for exercise up until she started having SOB.   Today all she has had to eat is a smoothie from smoothie [employer] (20oz)  Yesterday: caesar salad and 2 slices of pizza (ordered out for dinner), cereal at noon   She no longer snacks on sweets, chips, or other snack foods  Sometimes snacks on fruit   Drinks water, unsweet tea   Does not drink soda, alcohol, or coffee     Weight:(!) 177.3 kg (390 lb 12.3 oz)  Height:5' 4" (1.626 m)  BMI:Body mass index is 67.07 kg/m².   IBW: Ideal body weight: 54.7 kg (120 lb 9.5 oz)  Adjusted ideal " body weight: 103.7 kg (228 lb 10.6 oz)    Usual BW: 350lb   Weight Change: 390lb     Allergies: Keflex [cephalexin]    Current Medications:    Current Outpatient Medications:     (Magic mouthwash) 1:1:1 Benadryl 12.5mg/5ml liq, aluminum & magnesium hydroxide-simehticone (Maalox), LIDOcaine viscous 2%, Swish and spit 10 mLs every 4 (four) hours as needed. for mouth sores, Disp: 360 mL, Rfl: 0    anastrozole (ARIMIDEX) 1 mg Tab, TAKE 1 TABLET(1 MG) BY MOUTH EVERY DAY, Disp: 90 tablet, Rfl: 3    atorvastatin (LIPITOR) 40 MG tablet, TAKE 1 TABLET(40 MG) BY MOUTH EVERY DAY, Disp: 90 tablet, Rfl: 3    blood sugar diagnostic Strp, To check BG 4 times daily, to use with insurance preferred meter, Disp: 200 each, Rfl: 5    blood-glucose meter kit, To check BG 4 times daily, to use with insurance preferred meter, Disp: 1 each, Rfl: 0    carvediloL (COREG) 25 MG tablet, Take 1 tablet (25 mg total) by mouth 2 (two) times daily., Disp: 180 tablet, Rfl: 3    duke's soln (benadryl 30 mL, mylanta 30 mL, LIDOcaine 30 mL, nystatin 30 mL) 120mL, Take 10 mLs by mouth 4 (four) times daily., Disp: 120 mL, Rfl: 3    ergocalciferol (ERGOCALCIFEROL) 50,000 unit Cap, Take 1 capsule (50,000 Units total) by mouth every 7 days., Disp: 12 capsule, Rfl: 0    ferrous sulfate 324 mg (65 mg iron) TbEC, Take 1 tablet every Monday, Wednesday and Friday., Disp: 30 tablet, Rfl: 0    furosemide (LASIX) 20 MG tablet, Take 1 tablet (20 mg total) by mouth 2 (two) times daily., Disp: 60 tablet, Rfl: 11    glipiZIDE (GLUCOTROL) 10 MG tablet, Take 1 tablet (10 mg total) by mouth 2 (two) times daily before meals., Disp: 180 tablet, Rfl: 3    goserelin (ZOLADEX) 3.6 mg injection, Inject 3.6 mg into the skin every 28 days., Disp: , Rfl:     HYDROcodone-acetaminophen (NORCO) 5-325 mg per tablet, Take 1 tablet by mouth every 6 (six) hours as needed for Pain., Disp: 30 tablet, Rfl: 0    ibuprofen (ADVIL,MOTRIN) 800 MG tablet, Take 1 tablet (800 mg  total) by mouth 2 (two) times daily as needed for Pain., Disp: 45 tablet, Rfl: 2    lancets Misc, To check BG 4 times daily, to use with insurance preferred meter, Disp: 200 each, Rfl: 5    LIDOcaine (LIDODERM) 5 %, Place 2 patches onto the skin once daily. Remove & Discard patch within 12 hours or as directed by MD, Disp: 60 patch, Rfl: 0    LORazepam (ATIVAN) 1 MG tablet, Take 1 tablet (1 mg total) by mouth every 12 (twelve) hours as needed for Anxiety., Disp: 30 tablet, Rfl: 0    morphine (MS CONTIN) 15 MG 12 hr tablet, Take 1 tablet (15 mg total) by mouth every 8 (eight) hours as needed for Pain., Disp: 60 tablet, Rfl: 0    ondansetron (ZOFRAN-ODT) 4 MG TbDL, Take 1 tablet (4 mg total) by mouth 2 (two) times daily., Disp: 30 tablet, Rfl: 0    ondansetron (ZOFRAN-ODT) 8 MG TbDL, Take 1 tablet (8 mg total) by mouth every 8 (eight) hours as needed (nausea)., Disp: 30 tablet, Rfl: 2    palbociclib (IBRANCE) 100 mg Cap, Take 1 capsule (100 mg) by mouth once daily Take daily for 21 days, followed by 7 days off., Disp: 21 capsule, Rfl: 3    potassium chloride (KLOR-CON) 10 MEQ TbSR, TAKE 1 TABLET(10 MEQ) BY MOUTH EVERY DAY, Disp: 30 tablet, Rfl: 2    promethazine (PHENERGAN) 25 MG tablet, Take 1 tablet (25 mg total) by mouth every 6 (six) hours as needed for Nausea., Disp: 30 tablet, Rfl: 1    sacubitriL-valsartan (ENTRESTO)  mg per tablet, Take 1 tablet by mouth 2 (two) times daily., Disp: 60 tablet, Rfl: 11    sennosides (SENNA-C ORAL), Take 2 tablets by mouth daily as needed., Disp: , Rfl:     TRULICITY 0.75 mg/0.5 mL pen injector, ADMINISTER 0.75 MG UNDER THE SKIN EVERY 7 DAYS, Disp: 4 pen, Rfl: 2    venlafaxine (EFFEXOR-XR) 150 MG Cp24, Take 1 capsule (150 mg total) by mouth once daily., Disp: 30 capsule, Rfl: 2    venlafaxine (EFFEXOR-XR) 75 MG 24 hr capsule, Take 1 capsule (75 mg total) by mouth once daily. Take with 150 mg capsule for a total of 225 mg., Disp: 30 capsule, Rfl:  2    Vitamins/Supplements: Vitamin D, iron, potassium (all prescribed)     Labs: Reviewed from 5/3- glucose 140, Albumin 2.6, iron deficiency anemia noted     Nutrition Diagnosis    Problem: inadequate protein/energy intake  Etiology (related to): appetite loss   Signs/Symptoms (as evidenced by): diet recall, hypoalbuminemia     Nutrition Intervention    Nutrition Prescription   2100 Kcals (12kcal/kg)  106-141 g protein (0.6-0.8g/kg)   3500 mL fluid (20mL/kg)    Recommendations:  14 hour overnight fast (7pm-9am based on current schedule)   9am - breakfast + Glucerna   1pm- lunch/smoothie    6/7pm- dinner     Manage Weight/ Slim and Trim Smoothies   6-7 bottles of water daily   Eat food first then drink water   Resume walking once SOB is addressed/resolves     Nutrition Monitoring and Evaluation    Monitor: energy intake, diet tolerance , weight and diet education needs     Goals: slow weight loss; improved calorie intake     Follow up In 4-5 weeks     Communication to referring provider/care team: note available in chart     Counseling time: 30 Minutes    Yen Terrell, MPH, RD, , LDN, FAND   [phone]

## 2022-05-10 ENCOUNTER — PATIENT MESSAGE (OUTPATIENT)
Dept: CARDIOLOGY | Facility: CLINIC | Age: 47
End: 2022-05-10
Payer: MEDICARE

## 2022-05-10 ENCOUNTER — OFFICE VISIT (OUTPATIENT)
Dept: PSYCHIATRY | Facility: CLINIC | Age: 47
End: 2022-05-10
Payer: COMMERCIAL

## 2022-05-10 ENCOUNTER — CLINICAL SUPPORT (OUTPATIENT)
Dept: HEMATOLOGY/ONCOLOGY | Facility: CLINIC | Age: 47
End: 2022-05-10
Payer: MEDICARE

## 2022-05-10 VITALS — HEIGHT: 64 IN | BODY MASS INDEX: 50.02 KG/M2 | WEIGHT: 293 LBS

## 2022-05-10 DIAGNOSIS — F43.21 GRIEF: Primary | ICD-10-CM

## 2022-05-10 DIAGNOSIS — Z17.0 MALIGNANT NEOPLASM OF UPPER-INNER QUADRANT OF RIGHT BREAST IN FEMALE, ESTROGEN RECEPTOR POSITIVE: ICD-10-CM

## 2022-05-10 DIAGNOSIS — F33.1 MAJOR DEPRESSIVE DISORDER, RECURRENT EPISODE, MODERATE WITH ANXIOUS DISTRESS: ICD-10-CM

## 2022-05-10 DIAGNOSIS — C50.211 MALIGNANT NEOPLASM OF UPPER-INNER QUADRANT OF RIGHT BREAST IN FEMALE, ESTROGEN RECEPTOR POSITIVE: ICD-10-CM

## 2022-05-10 DIAGNOSIS — E11.65 TYPE 2 DIABETES MELLITUS WITH HYPERGLYCEMIA, WITHOUT LONG-TERM CURRENT USE OF INSULIN: ICD-10-CM

## 2022-05-10 DIAGNOSIS — E88.09 HYPOALBUMINEMIA: ICD-10-CM

## 2022-05-10 DIAGNOSIS — Z71.3 NUTRITIONAL COUNSELING: Primary | ICD-10-CM

## 2022-05-10 DIAGNOSIS — C79.51 BONE METASTASES: ICD-10-CM

## 2022-05-10 PROCEDURE — 97802 PR MED NUTR THER, 1ST, INDIV, EA 15 MIN: ICD-10-PCS | Mod: S$GLB,,, | Performed by: DIETITIAN, REGISTERED

## 2022-05-10 PROCEDURE — 90837 PR PSYCHOTHERAPY W/PATIENT, 60 MIN: ICD-10-PCS | Mod: S$GLB,,, | Performed by: PSYCHOLOGIST

## 2022-05-10 PROCEDURE — 1159F PR MEDICATION LIST DOCUMENTED IN MEDICAL RECORD: ICD-10-PCS | Mod: CPTII,S$GLB,, | Performed by: PSYCHOLOGIST

## 2022-05-10 PROCEDURE — 99999 PR PBB SHADOW E&M-EST. PATIENT-LVL II: ICD-10-PCS | Mod: PBBFAC,,, | Performed by: DIETITIAN, REGISTERED

## 2022-05-10 PROCEDURE — 4010F PR ACE/ARB THEARPY RXD/TAKEN: ICD-10-PCS | Mod: CPTII,S$GLB,, | Performed by: PSYCHOLOGIST

## 2022-05-10 PROCEDURE — 99999 PR PBB SHADOW E&M-EST. PATIENT-LVL II: CPT | Mod: PBBFAC,,, | Performed by: DIETITIAN, REGISTERED

## 2022-05-10 PROCEDURE — 90837 PSYTX W PT 60 MINUTES: CPT | Mod: S$GLB,,, | Performed by: PSYCHOLOGIST

## 2022-05-10 PROCEDURE — 4010F ACE/ARB THERAPY RXD/TAKEN: CPT | Mod: CPTII,S$GLB,, | Performed by: PSYCHOLOGIST

## 2022-05-10 PROCEDURE — 97802 MEDICAL NUTRITION INDIV IN: CPT | Mod: S$GLB,,, | Performed by: DIETITIAN, REGISTERED

## 2022-05-10 PROCEDURE — 3052F HG A1C>EQUAL 8.0%<EQUAL 9.0%: CPT | Mod: CPTII,S$GLB,, | Performed by: PSYCHOLOGIST

## 2022-05-10 PROCEDURE — 99999 PR PBB SHADOW E&M-EST. PATIENT-LVL I: CPT | Mod: PBBFAC,,, | Performed by: PSYCHOLOGIST

## 2022-05-10 PROCEDURE — 99999 PR PBB SHADOW E&M-EST. PATIENT-LVL I: ICD-10-PCS | Mod: PBBFAC,,, | Performed by: PSYCHOLOGIST

## 2022-05-10 PROCEDURE — 3052F PR MOST RECENT HEMOGLOBIN A1C LEVEL 8.0 - < 9.0%: ICD-10-PCS | Mod: CPTII,S$GLB,, | Performed by: PSYCHOLOGIST

## 2022-05-10 PROCEDURE — 1159F MED LIST DOCD IN RCRD: CPT | Mod: CPTII,S$GLB,, | Performed by: PSYCHOLOGIST

## 2022-05-10 NOTE — PROGRESS NOTES
INFORMED CONSENT: Kristopher Ye   is known to this provider and identity was confirmed via NAME and .  The patient has been informed of the risks and benefits associated with engaging in psychotherapy, the handling of protected health information, the rights of privacy and the limits of confidentiality. The patient has also been informed of the importance of reporting any suicidal or homicidal ideation to this or any provider to ensure safety of all parties, and the Kritsopher Ye expressed understanding. The patient was agreeable to these terms and freely participates in individual psychotherapy.    PSYCHO-ONCOLOGY NOTE/ Individual Psychotherapy     Date: 5/10/2022   Site:  [facility]        Therapeutic Intervention: Met with patient.  Outpatient - Behavior modifying psychotherapy 60 min - CPT code 90327      Patient was last seen by me on 3/28/2022    Problem list  Patient Active Problem List   Diagnosis    Hypertension    Iron deficiency anemia    Cardiomegaly    Fibroid uterus    BLUE (dyspnea on exertion)    Thalassemia    Major depressive disorder, recurrent episode, moderate with anxious distress    Chronic combined systolic and diastolic heart failure    Cardiomyopathy    Hyperlipidemia    Malignant neoplasm of upper-inner quadrant of right breast in female, estrogen receptor positive    Cancer associated pain    Pathological fracture of lumbar vertebra with routine healing    Bone metastases    Hot flashes    Prediabetes    BMI 60.0-69.9, adult    Elevated LDL cholesterol level    Anxiety    Vaginal dryness    Vitamin D deficiency    Grief    Hyperglycemia    Type 2 diabetes mellitus with hyperglycemia, without long-term current use of insulin    Benign paroxysmal positional vertigo    Neuropathy    Anemia associated with chemotherapy    Sleep difficulties       Chief complaint/reason for encounter: depression, anxiety and grief    Met with patient to  evaluate psychosocial adaptation to diagnosis/treatment/survivorship of Stage IV BC    Current Medications  Current Outpatient Medications   Medication    (Magic mouthwash) 1:1:1 Benadryl 12.5mg/5ml liq, aluminum & magnesium hydroxide-simehticone (Maalox), LIDOcaine viscous 2%    anastrozole (ARIMIDEX) 1 mg Tab    atorvastatin (LIPITOR) 40 MG tablet    blood sugar diagnostic Strp    blood-glucose meter kit    carvediloL (COREG) 25 MG tablet    duke's soln (benadryl 30 mL, mylanta 30 mL, LIDOcaine 30 mL, nystatin 30 mL) 120mL    ergocalciferol (ERGOCALCIFEROL) 50,000 unit Cap    ferrous sulfate 324 mg (65 mg iron) TbEC    furosemide (LASIX) 20 MG tablet    glipiZIDE (GLUCOTROL) 10 MG tablet    goserelin (ZOLADEX) 3.6 mg injection    HYDROcodone-acetaminophen (NORCO) 5-325 mg per tablet    ibuprofen (ADVIL,MOTRIN) 800 MG tablet    lancets Misc    LIDOcaine (LIDODERM) 5 %    LORazepam (ATIVAN) 1 MG tablet    morphine (MS CONTIN) 15 MG 12 hr tablet    ondansetron (ZOFRAN-ODT) 4 MG TbDL    ondansetron (ZOFRAN-ODT) 8 MG TbDL    palbociclib (IBRANCE) 100 mg Cap    potassium chloride (KLOR-CON) 10 MEQ TbSR    promethazine (PHENERGAN) 25 MG tablet    sacubitriL-valsartan (ENTRESTO)  mg per tablet    sennosides (SENNA-C ORAL)    TRULICITY 0.75 mg/0.5 mL pen injector    venlafaxine (EFFEXOR-XR) 150 MG Cp24    venlafaxine (EFFEXOR-XR) 75 MG 24 hr capsule     No current facility-administered medications for this visit.       Objective:  Kristopher Ye arrived promptly for the session.    Ms. Ye was independently ambulatory at the time of session. The patient was fully cooperative throughout the session.  Appearance: age appropriate, appropriately  dressed, adequately  groomed  Behavior/Cooperation: friendly and cooperative  Speech: normal in rate, volume, and tone and appropriate quality, quantity and organization of sentences  Mood: euthymic  Affect: mood congruent  Thought  Process: goal-directed, logical  Thought Content: normal,  No delusions or paranoia; did not appear to be responding to internal stimuli during the session  Orientation: grossly intact  Memory: Grossly intact  Attention Span/Concentration: Attends to session without distraction; reports no difficulty  Fund of Knowledge: average  Estimate of Intelligence: average from verbal skills and history  Cognition: grossly intact  Insight: patient has awareness of illness; good insight into own behavior and behavior of others  Judgment: the patient's behavior is adequate to circumstances    Interval history and content of current session: Patient is following with psychiatry for med management. Effexor was increased.   Discussed diagnosis, treatment, prognosis, current adaptation to disease and treatment status and family's adaptation to disease and treatment status. Reports to be coping with great difficulty. Evaluated cognitive response, paying particular attention to negative intrusive thoughts of a persistent and detrimental nature. Thoughts of this type are in evidence with severe distress. Provided cognitive behavioral therapy to address negative cognitions. Identified and evaluated psychosocial and environmental stressors secondary to diagnosis and treatment.  Examined proactive behaviors that may be implemented to minimize or ameliorate psychosocial stressors secondary to diagnosis and treatment.     Risk parameters:   Patient reports no suicidal ideation  Patient reports no homicidal ideation  Patient reports no self-injurious behavior  Patient reports no violent behavior   Safety needs:  None at this time      Verbal deficits: None     Patient's response to intervention:The patient's response to intervention is accepting.     Progress toward goals and other mental status changes:  The patient's progress toward goals is good.      Progress to date:Progress - Ongoing, but Slow      Goals from last visit:  Met     Patient reported outcomes:    Distress Score    Distress Score: 7        Practical Problems Physical Problems   : No Appearance: Yes   Housing: Yes Bathing / Dressing: Yes   Insurance / Financial: Yes Breathing: Yes    Transportation: No  Changes in Urination: No    Work / School: No  Constipation: No   Treatment Decisions: Yes  Diarrhea: No     Eating: Yes    Family Problems Fatigue: Yes    Dealing with Children: No Feeling Swollen: Yes    Dealing with Partner: No Fevers: No    Ability to Have Children: No  Getting Around: Yes       Indigestion: No     Memory / Concentration: Yes   Emotional Problems Mouth Sores: No    Depression: Yes  Nausea: Yes    Fears: Yes  Nose Dry / Congested: Yes    Nervousness: Yes  Pain: Yes    Sadness: Yes Sexual: No    Worry: Yes Skin Dry / Itchy: Yes    Loss of Interest in Usual Activities: Yes Sleep: No     Substance Abuse: No    Spiritual/Religions Concerns Tingling in Hands / Feet: Yes   Spritual / Muslim Concerns: Yes         Other Problems              PHQ ANSWERS    Q1. Little interest or pleasure in doing things: (P) More than half the days (05/09/22 1525)  Q2. Feeling down, depressed, or hopeless: (P) More than half the days (05/09/22 1525)  Q3. Trouble falling or staying asleep, or sleeping too much: (P) Not at all (05/09/22 1525)  Q4. Feeling tired or having little energy: (P) More than half the days (05/09/22 1525)  Q5. Poor appetite or overeating: (P) More than half the days (05/09/22 1525)  Q6. Feeling bad about yourself - or that you are a failure or have let yourself or your family down: (P) Not at all (05/09/22 1525)  Q7. Trouble concentrating on things, such as reading the newspaper or watching television: (P) More than half the days (05/09/22 1525)  Q8. Moving or speaking so slowly that other people could have noticed. Or the opposite - being so fidgety or restless that you have been moving around a lot more than usual: (P) More than half the days  (05/09/22 1525)  Q9.  No SI    PHQ8 Score : (P) 12 (05/09/22 1525)  PHQ-9 Total Score: (P) 12 (05/09/22 1525)     HUY-7 Answers    GAD7 5/9/2022   1. Feeling nervous, anxious, or on edge? 2   2. Not being able to stop or control worrying? 2   3. Worrying too much about different things? 2   4. Trouble relaxing? 2   5. Being so restless that it is hard to sit still? 2   6. Becoming easily annoyed or irritable? 2   7. Feeling afraid as if something awful might happen? 2   HUY-7 Score 14     HUY-7 Score: (P) 14  Interpretation: (P) Moderate Anxiety     Client Strengths: verbal, intelligent, successful, good social support, good insight, commitment to wellness, strong jd, strong cultural traditions     Diagnosis:     ICD-10-CM ICD-9-CM   1. Grief  F43.21 309.0   2. Major depressive disorder, recurrent episode, moderate with anxious distress  F33.1 296.32   3. Malignant neoplasm of upper-inner quadrant of right breast in female, estrogen receptor positive  C50.211 174.2    Z17.0 V86.0   4. Bone metastases  C79.51 198.5       Treatment Plan:individual psychotherapy and medication management by physician  · Target symptoms: depression, anxiety , grief  · Why chosen therapy is appropriate versus another modality: relevant to diagnosis, patient responds to this modality, evidence based practice  · Outcome monitoring methods: self-report, observation, checklist/rating scale  · Therapeutic intervention type: behavior modifying psychotherapy  · Prognosis: Good      Behavioral goals:    Exercise:   Stress management:   Social engagement:   Nutrition:   Smoking Cessation:   Therapy:  Increase daily self-care and attention to health management  Pleasant events scheduling and increased social interaction  Monitor stressors in writing and bring to next visit    Return to clinic: 3 weeks       Length of Service (minutes direct face-to-face contact): 60    Bria Odom, PhD  Clinical Psychologist  [place] License #[id number]  [place] License  #[id number]

## 2022-05-11 ENCOUNTER — PATIENT MESSAGE (OUTPATIENT)
Dept: HEMATOLOGY/ONCOLOGY | Facility: CLINIC | Age: 47
End: 2022-05-11
Payer: MEDICARE

## 2022-05-12 ENCOUNTER — TELEPHONE (OUTPATIENT)
Dept: HEMATOLOGY/ONCOLOGY | Facility: CLINIC | Age: 47
End: 2022-05-12
Payer: MEDICARE

## 2022-05-12 ENCOUNTER — PATIENT MESSAGE (OUTPATIENT)
Dept: HEMATOLOGY/ONCOLOGY | Facility: CLINIC | Age: 47
End: 2022-05-12
Payer: MEDICARE

## 2022-05-18 ENCOUNTER — PATIENT OUTREACH (OUTPATIENT)
Dept: ADMINISTRATIVE | Facility: OTHER | Age: 47
End: 2022-05-18
Payer: MEDICARE

## 2022-05-18 NOTE — PROGRESS NOTES
Health Maintenance Due   Topic Date Due    Diabetes Urine Screening  Never done    Pneumococcal Vaccines (Age 0-64) (1 - PCV) Never done    Foot Exam  Never done    Eye Exam  Never done    Mammogram  09/26/2020    Colorectal Cancer Screening  Never done    Hemoglobin A1c  05/07/2022     Updates were requested from care everywhere.  Chart was reviewed for overdue Proactive [facility] Encounters (AND[facility]) topics (CRS, Breast Cancer Screening, Eye exam)  Health Maintenance has been updated.  LINKS immunization registry triggered.  Immunizations were reconciled.

## 2022-05-19 ENCOUNTER — PATIENT MESSAGE (OUTPATIENT)
Dept: PSYCHIATRY | Facility: CLINIC | Age: 47
End: 2022-05-19
Payer: MEDICARE

## 2022-05-19 ENCOUNTER — OFFICE VISIT (OUTPATIENT)
Dept: PODIATRY | Facility: CLINIC | Age: 47
End: 2022-05-19
Payer: MEDICARE

## 2022-05-19 VITALS — WEIGHT: 293 LBS | BODY MASS INDEX: 50.02 KG/M2 | HEIGHT: 64 IN

## 2022-05-19 DIAGNOSIS — E11.65 TYPE 2 DIABETES MELLITUS WITH HYPERGLYCEMIA, WITHOUT LONG-TERM CURRENT USE OF INSULIN: Primary | Chronic | ICD-10-CM

## 2022-05-19 DIAGNOSIS — M20.40 HAMMER TOE, UNSPECIFIED LATERALITY: ICD-10-CM

## 2022-05-19 DIAGNOSIS — L60.0 INGROWN NAIL: ICD-10-CM

## 2022-05-19 DIAGNOSIS — B35.1 ONYCHOMYCOSIS DUE TO DERMATOPHYTE: ICD-10-CM

## 2022-05-19 PROCEDURE — 4010F PR ACE/ARB THEARPY RXD/TAKEN: ICD-10-PCS | Mod: CPTII,S$GLB,, | Performed by: PODIATRIST

## 2022-05-19 PROCEDURE — 1159F MED LIST DOCD IN RCRD: CPT | Mod: CPTII,S$GLB,, | Performed by: PODIATRIST

## 2022-05-19 PROCEDURE — 3008F PR BODY MASS INDEX (BMI) DOCUMENTED: ICD-10-PCS | Mod: CPTII,S$GLB,, | Performed by: PODIATRIST

## 2022-05-19 PROCEDURE — 99999 PR PBB SHADOW E&M-EST. PATIENT-LVL III: CPT | Mod: PBBFAC,,, | Performed by: PODIATRIST

## 2022-05-19 PROCEDURE — 1159F PR MEDICATION LIST DOCUMENTED IN MEDICAL RECORD: ICD-10-PCS | Mod: CPTII,S$GLB,, | Performed by: PODIATRIST

## 2022-05-19 PROCEDURE — 99203 PR OFFICE/OUTPT VISIT, NEW, LEVL III, 30-44 MIN: ICD-10-PCS | Mod: S$GLB,,, | Performed by: PODIATRIST

## 2022-05-19 PROCEDURE — 3052F PR MOST RECENT HEMOGLOBIN A1C LEVEL 8.0 - < 9.0%: ICD-10-PCS | Mod: CPTII,S$GLB,, | Performed by: PODIATRIST

## 2022-05-19 PROCEDURE — 3052F HG A1C>EQUAL 8.0%<EQUAL 9.0%: CPT | Mod: CPTII,S$GLB,, | Performed by: PODIATRIST

## 2022-05-19 PROCEDURE — 4010F ACE/ARB THERAPY RXD/TAKEN: CPT | Mod: CPTII,S$GLB,, | Performed by: PODIATRIST

## 2022-05-19 PROCEDURE — 99999 PR PBB SHADOW E&M-EST. PATIENT-LVL III: ICD-10-PCS | Mod: PBBFAC,,, | Performed by: PODIATRIST

## 2022-05-19 PROCEDURE — 3008F BODY MASS INDEX DOCD: CPT | Mod: CPTII,S$GLB,, | Performed by: PODIATRIST

## 2022-05-19 PROCEDURE — 99203 OFFICE O/P NEW LOW 30 MIN: CPT | Mod: S$GLB,,, | Performed by: PODIATRIST

## 2022-05-19 RX ORDER — CICLOPIROX 80 MG/ML
SOLUTION TOPICAL NIGHTLY
Qty: 6.6 ML | Refills: 3 | Status: SHIPPED | OUTPATIENT
Start: 2022-05-19 | End: 2023-04-12

## 2022-05-20 ENCOUNTER — PATIENT MESSAGE (OUTPATIENT)
Dept: PHARMACY | Facility: CLINIC | Age: 47
End: 2022-05-20
Payer: MEDICARE

## 2022-05-20 NOTE — PROGRESS NOTES
Subjective:      Patient ID: Kristopher Ye is a 46 y.o. female.    Chief Complaint: Diabetes Mellitus (12/21/21 Dr Walker PCP), Diabetic Foot Exam, and Ingrown Toenail    Kristopher is a 46 y.o. female who presents to the clinic upon referral from Dr. Walker  for evaluation and treatment of diabetic feet. Kristopher has a past medical history of Abnormal Pap smear, Anemia, Anxiety, Cancer, Cardiomyopathy, CHF (congestive heart failure), Fibroid, psychiatric care, Hyperlipidemia, Hypertension, Psychiatric problem, Sleep difficulties, and Therapy. Presents for diabetic foot risk assessment.   Reports painful ingrown nail B/L great toe B/L border and right 2nd and 3rd toe B/L as well. Denies noticing drainage to area.     PCP: Vito Walker MD    Date Last Seen by PCP: per above    Current shoe gear: Tennis shoes    Hemoglobin A1C   Date Value Ref Range Status   02/07/2022 8.8 (H) 4.0 - 5.6 % Final     Comment:     ADA Screening Guidelines:  5.7-6.4%  Consistent with prediabetes  >or=6.5%  Consistent with diabetes    High levels of fetal hemoglobin interfere with the HbA1C  assay. Heterozygous hemoglobin variants (HbS, HgC, etc)do  not significantly interfere with this assay.   However, presence of multiple variants may affect accuracy.     11/03/2021 8.5 (H) 4.0 - 5.6 % Final     Comment:     ADA Screening Guidelines:  5.7-6.4%  Consistent with prediabetes  >or=6.5%  Consistent with diabetes    High levels of fetal hemoglobin interfere with the HbA1C  assay. Heterozygous hemoglobin variants (HbS, HgC, etc)do  not significantly interfere with this assay.   However, presence of multiple variants may affect accuracy.     11/02/2020 6.3 (H) 4.0 - 5.6 % Final     Comment:     ADA Screening Guidelines:  5.7-6.4%  Consistent with prediabetes  >or=6.5%  Consistent with diabetes  High levels of fetal hemoglobin interfere with the HbA1C  assay. Heterozygous hemoglobin variants (HbS, HgC, etc)do  not significantly  interfere with this assay.   However, presence of multiple variants may affect accuracy.             Review of Systems   Constitutional: Negative for chills.   Cardiovascular: Negative for chest pain and claudication.   Respiratory: Negative for cough.    Skin: Positive for color change, dry skin and nail changes.   Musculoskeletal: Positive for joint pain.   Gastrointestinal: Negative for nausea.   Neurological: Positive for paresthesias. Negative for numbness.   Psychiatric/Behavioral: The patient is not nervous/anxious.            Objective:      Physical Exam  Constitutional:       Appearance: She is well-developed.      Comments: Oriented to time, place, and person.   Cardiovascular:      Comments: DP and PT pulses are palpable bilaterally. 3 sec capillary refill time and toes and feet are warm to touch proximally .  There is  hair growth on the feet and toes b/l. There is no edema b/l. No spider veins or varicosities present b/l.     Musculoskeletal:      Comments: Equinus noted b/l ankles with < 10 deg DF noted. MMT 5/5 in DF/PF/Inv/Ev resistance with no reproduction of pain in any direction. Passive range of motion of ankle and pedal joints is painless b/l.    Decreased stride, station of gait.  apropulsive toe off.  Increased angle and base of gait.      Patient has hammertoes of digits 2-5 bilateral partially reducible without symptom today.     Visible and palpable bunion without pain at dorsomedial 1st metatarsal head right and left.  Hallux abducted right and left partially reducible, tracks laterally without being track bound.  No ecchymosis, erythema, edema, or cardinal signs infection or signs of trauma same foot.     Fat pad atrophy to heels and met heads bilateral     Feet:      Right foot:      Skin integrity: No callus or dry skin.      Left foot:      Skin integrity: No callus or dry skin.   Lymphadenopathy:      Comments: Negative lymphadenopathy bilateral popliteal fossa and tarsal tunnel.    Skin:     Comments: No open lesions, lacerations or wounds noted.Interdigital spaces clean, dry and intact b/l. No erythema noted to b/l foot.    Toenails 1-5 bilaterally are elongated by 2-3 mm, thickened by 2-3 mm, discolored/yellowed, dystrophic, brittle with subungual debris.     Neurological:      Mental Status: She is alert.      Comments: Light touch, proprioception, and sharp/dull sensation are all intact bilaterally. Protective threshold with the Comstock-Wienstein monofilament is intact bilaterally.    Psychiatric:         Behavior: Behavior is cooperative.               Assessment:       Encounter Diagnoses   Name Primary?    Type 2 diabetes mellitus with hyperglycemia, without long-term current use of insulin Yes    Hammer toe, unspecified laterality     Onychomycosis due to dermatophyte     Ingrown nail          Plan:       Kristopher was seen today for diabetes mellitus, diabetic foot exam and ingrown toenail.    Diagnoses and all orders for this visit:    Type 2 diabetes mellitus with hyperglycemia, without long-term current use of insulin  Comments:  A1c 8.5 11/3/2021. Cont Glipizide 10 mg BID. Sending rx for Trulicity to [facility] w/ pharmacy assistance referral. Podiatry referral per pt request.   Orders:  -     Ambulatory referral/consult to Podiatry  -     DIABETIC SHOES FOR HOME USE    Hammer toe, unspecified laterality  -     DIABETIC SHOES FOR HOME USE    Onychomycosis due to dermatophyte    Ingrown nail    Other orders  -     ciclopirox (PENLAC) 8 % Soln; Apply topically nightly.      I counseled the patient on her conditions, their implications and medical management.      - Shoe inspection. Diabetic Foot Education. Patient reminded of the importance of good nutrition and blood sugar control to help prevent podiatric complications of diabetes. Patient instructed on proper foot hygeine. We discussed wearing proper shoe gear, daily foot inspections, never walking without protective shoe  gear, caution putting sharp instruments to feet     - Discussed DM foot care:  Wear comfortable, proper fitting shoes. Wash feet daily. Dry well. After drying, apply moisturizer to feet (no lotion to webspaces). Inspect feet daily for skin breaks, blisters, swelling, or redness. Wear cotton socks (preferably white)  Change socks every day. Do NOT walk barefoot. Do NOT use heating pads or warm/hot water soaks       Rx diabetic shoes with custom molded inserts to be worn at all times while ambulating. Prescription provided with list of local retailers.     At patient's request, I discussed different treatments for toenail fungus. We discussed oral antifungals but I did not recommend them as a first line treatment since the medication is taken internally and can have side effects such as rash, taste disturbances, and liver enzyme elevation. We discussed topical Penlac to be applied daily and removed weekly. Pt. Expresses understanding and would like to try the Penlac. Rx sent to the pharmacy.     Discussed treatment options with patient. Options included soaking, avulsion and matrixectomy. Risks and benefits discussed and all questions were answered. The patient wishes to proceed with  Nail avulsion at a later date      In depth conversation on the treatment of ingrown nail; partial nail avulsion vs chemical matrixectomy vs conservative treatment of soaking and nail trimming      - With patient's permission, nails were aggressively reduced and debrided x 10 to their soft tissue attachment mechanically and with electric , removing all offending nail and debris. Patient relates relief following the procedure. He will continue to monitor the areas daily, inspect his feet, wear protective shoe gear when ambulatory, moisturizer to maintain skin integrity and follow in this office in approximately 12 months, sooner p.r.n.     F/u one year DM foot exam sooner PRN.

## 2022-05-23 ENCOUNTER — SPECIALTY PHARMACY (OUTPATIENT)
Dept: PHARMACY | Facility: CLINIC | Age: 47
End: 2022-05-23
Payer: MEDICARE

## 2022-05-23 NOTE — TELEPHONE ENCOUNTER
Specialty Pharmacy - Refill Coordination    Specialty Medication Orders Linked to Encounter    Flowsheet Row Most Recent Value   Medication #1 palbociclib (IBRANCE) 100 mg Cap (Order#[id number], Rx#[id number])          Refill Questions - Documented Responses    Flowsheet Row Most Recent Value   Patient Availability and HIPAA Verification    Does patient want to proceed with activity? Yes   HIPAA/medical authority confirmed? Yes   Relationship to patient of person spoken to? Self   Refill Screening Questions    Changes to allergies? No   Changes to medications? No   New conditions since last clinic visit? No   Unplanned office visit, urgent care, ED, or hospital admission in the last 4 weeks? No   How does patient/caregiver feel medication is working? Good   Financial problems or insurance changes? No   How many doses of your specialty medications were missed in the last 4 weeks? 0   Would patient like to speak to a pharmacist? No   When does the patient need to receive the medication? 05/30/22   Refill Delivery Questions    How will the patient receive the medication? Delivery Jes[facility]   When does the patient need to receive the medication? 05/30/22   Shipping Address Home   Address in [facility] confirmed and updated if neccessary? Yes   Expected Copay ($) 0   Is the patient able to afford the medication copay? Yes   Payment Method zero copay   Days supply of Refill 28   Supplies needed? No supplies needed   Refill activity completed? Yes   Refill activity plan Refill scheduled   Shipment/Pickup Date: 05/26/22          Current Outpatient Medications   Medication Sig    (Magic mouthwash) 1:1:1 Benadryl 12.5mg/5ml liq, aluminum & magnesium hydroxide-simehticone (Maalox), LIDOcaine viscous 2% Swish and spit 10 mLs every 4 (four) hours as needed. for mouth sores    anastrozole (ARIMIDEX) 1 mg Tab TAKE 1 TABLET(1 MG) BY MOUTH EVERY DAY    atorvastatin (LIPITOR) 40 MG tablet TAKE 1 TABLET(40 MG) BY MOUTH EVERY DAY     blood sugar diagnostic Strp To check BG 4 times daily, to use with insurance preferred meter    blood-glucose meter kit To check BG 4 times daily, to use with insurance preferred meter    carvediloL (COREG) 25 MG tablet Take 1 tablet (25 mg total) by mouth 2 (two) times daily.    ciclopirox (PENLAC) 8 % Soln Apply topically nightly.    duke's soln (benadryl 30 mL, mylanta 30 mL, LIDOcaine 30 mL, nystatin 30 mL) 120mL Take 10 mLs by mouth 4 (four) times daily.    ergocalciferol (ERGOCALCIFEROL) 50,000 unit Cap TAKE 1 CAPSULE BY MOUTH EVERY 7 DAYS    ferrous sulfate 324 mg (65 mg iron) TbEC Take 1 tablet every Monday, Wednesday and Friday.    furosemide (LASIX) 20 MG tablet Take 1 tablet (20 mg total) by mouth 2 (two) times daily.    glipiZIDE (GLUCOTROL) 10 MG tablet Take 1 tablet (10 mg total) by mouth 2 (two) times daily before meals.    goserelin (ZOLADEX) 3.6 mg injection Inject 3.6 mg into the skin every 28 days.    HYDROcodone-acetaminophen (NORCO) 5-325 mg per tablet Take 1 tablet by mouth every 6 (six) hours as needed for Pain.    ibuprofen (ADVIL,MOTRIN) 800 MG tablet Take 1 tablet (800 mg total) by mouth 2 (two) times daily as needed for Pain.    lancets Misc To check BG 4 times daily, to use with insurance preferred meter    LIDOcaine (LIDODERM) 5 % Place 2 patches onto the skin once daily. Remove & Discard patch within 12 hours or as directed by MD    LORazepam (ATIVAN) 1 MG tablet Take 1 tablet (1 mg total) by mouth every 12 (twelve) hours as needed for Anxiety.    morphine (MS CONTIN) 15 MG 12 hr tablet Take 1 tablet (15 mg total) by mouth every 8 (eight) hours as needed for Pain.    ondansetron (ZOFRAN-ODT) 4 MG TbDL Take 1 tablet (4 mg total) by mouth 2 (two) times daily.    ondansetron (ZOFRAN-ODT) 8 MG TbDL Take 1 tablet (8 mg total) by mouth every 8 (eight) hours as needed (nausea).    palbociclib (IBRANCE) 100 mg Cap Take 1 capsule (100 mg) by mouth once daily Take daily for  21 days, followed by 7 days off.    potassium chloride (KLOR-CON) 10 MEQ TbSR TAKE 1 TABLET(10 MEQ) BY MOUTH EVERY DAY    promethazine (PHENERGAN) 25 MG tablet Take 1 tablet (25 mg total) by mouth every 6 (six) hours as needed for Nausea.    sacubitriL-valsartan (ENTRESTO)  mg per tablet Take 1 tablet by mouth 2 (two) times daily.    sennosides (SENNA-C ORAL) Take 2 tablets by mouth daily as needed.    TRULICITY 0.75 mg/0.5 mL pen injector ADMINISTER 0.75 MG UNDER THE SKIN EVERY 7 DAYS    venlafaxine (EFFEXOR-XR) 75 MG 24 hr capsule Take 1 capsule (75 mg total) by mouth once daily. Take with 150 mg capsule for a total of 225 mg.   Last reviewed on 5/19/2022  9:51 AM by Flor Carr MA    Review of patient's allergies indicates:   Allergen Reactions    Keflex [cephalexin] Itching    Last reviewed on  5/19/2022 9:51 AM by Flor Carr      Tasks added this encounter   6/20/2022 - Refill Call (Auto Added)   Tasks due within next 3 months   8/23/2022 - Clinical - Follow Up Assesement (180 day)     Dorian James, PharmD  [facility]  [address] [place] [id number]  Phone: [phone]  Fax: [phone]

## 2022-05-30 ENCOUNTER — LAB VISIT (OUTPATIENT)
Dept: LAB | Facility: HOSPITAL | Age: 47
End: 2022-05-30
Attending: INTERNAL MEDICINE
Payer: MEDICARE

## 2022-05-30 ENCOUNTER — INFUSION (OUTPATIENT)
Dept: INFUSION THERAPY | Facility: HOSPITAL | Age: 47
End: 2022-05-30
Attending: INTERNAL MEDICINE
Payer: MEDICARE

## 2022-05-30 ENCOUNTER — OFFICE VISIT (OUTPATIENT)
Dept: HEMATOLOGY/ONCOLOGY | Facility: CLINIC | Age: 47
End: 2022-05-30
Payer: MEDICARE

## 2022-05-30 ENCOUNTER — OFFICE VISIT (OUTPATIENT)
Dept: PSYCHIATRY | Facility: CLINIC | Age: 47
End: 2022-05-30

## 2022-05-30 VITALS
HEIGHT: 64 IN | SYSTOLIC BLOOD PRESSURE: 175 MMHG | BODY MASS INDEX: 50.02 KG/M2 | TEMPERATURE: 99 F | DIASTOLIC BLOOD PRESSURE: 94 MMHG | WEIGHT: 293 LBS | HEART RATE: 75 BPM | OXYGEN SATURATION: 100 % | RESPIRATION RATE: 16 BRPM

## 2022-05-30 DIAGNOSIS — R19.00 HARD MASS OF ABDOMEN: ICD-10-CM

## 2022-05-30 DIAGNOSIS — F43.21 GRIEF: ICD-10-CM

## 2022-05-30 DIAGNOSIS — F33.1 MAJOR DEPRESSIVE DISORDER, RECURRENT EPISODE, MODERATE WITH ANXIOUS DISTRESS: Primary | ICD-10-CM

## 2022-05-30 DIAGNOSIS — C79.51 BONE METASTASES: ICD-10-CM

## 2022-05-30 DIAGNOSIS — D64.81 ANEMIA ASSOCIATED WITH CHEMOTHERAPY: ICD-10-CM

## 2022-05-30 DIAGNOSIS — I10 HYPERTENSION, UNSPECIFIED TYPE: ICD-10-CM

## 2022-05-30 DIAGNOSIS — E11.65 TYPE 2 DIABETES MELLITUS WITH HYPERGLYCEMIA, WITHOUT LONG-TERM CURRENT USE OF INSULIN: Chronic | ICD-10-CM

## 2022-05-30 DIAGNOSIS — C50.211 MALIGNANT NEOPLASM OF UPPER-INNER QUADRANT OF RIGHT BREAST IN FEMALE, ESTROGEN RECEPTOR POSITIVE: Primary | ICD-10-CM

## 2022-05-30 DIAGNOSIS — Z17.0 MALIGNANT NEOPLASM OF UPPER-INNER QUADRANT OF RIGHT BREAST IN FEMALE, ESTROGEN RECEPTOR POSITIVE: ICD-10-CM

## 2022-05-30 DIAGNOSIS — C50.211 MALIGNANT NEOPLASM OF UPPER-INNER QUADRANT OF RIGHT BREAST IN FEMALE, ESTROGEN RECEPTOR POSITIVE: ICD-10-CM

## 2022-05-30 DIAGNOSIS — Z17.0 MALIGNANT NEOPLASM OF UPPER-INNER QUADRANT OF RIGHT BREAST IN FEMALE, ESTROGEN RECEPTOR POSITIVE: Primary | ICD-10-CM

## 2022-05-30 DIAGNOSIS — R19.00 INTRA-ABDOMINAL AND PELVIC SWELLING, MASS AND LUMP, UNSPECIFIED SITE: ICD-10-CM

## 2022-05-30 DIAGNOSIS — D50.8 OTHER IRON DEFICIENCY ANEMIA: ICD-10-CM

## 2022-05-30 DIAGNOSIS — R39.15 URINARY URGENCY: ICD-10-CM

## 2022-05-30 DIAGNOSIS — D56.3 THALASSEMIA MINOR: ICD-10-CM

## 2022-05-30 DIAGNOSIS — T45.1X5A ANEMIA ASSOCIATED WITH CHEMOTHERAPY: ICD-10-CM

## 2022-05-30 LAB
ALBUMIN SERPL BCP-MCNC: 2.8 G/DL (ref 3.5–5.2)
ALP SERPL-CCNC: 87 U/L (ref 55–135)
ALT SERPL W/O P-5'-P-CCNC: 14 U/L (ref 10–44)
ANION GAP SERPL CALC-SCNC: 13 MMOL/L (ref 8–16)
AST SERPL-CCNC: 12 U/L (ref 10–40)
BASOPHILS # BLD AUTO: 0.04 K/UL (ref 0–0.2)
BASOPHILS NFR BLD: 1.3 % (ref 0–1.9)
BILIRUB SERPL-MCNC: 0.4 MG/DL (ref 0.1–1)
BUN SERPL-MCNC: 7 MG/DL (ref 6–20)
CALCIUM SERPL-MCNC: 9.2 MG/DL (ref 8.7–10.5)
CHLORIDE SERPL-SCNC: 104 MMOL/L (ref 95–110)
CO2 SERPL-SCNC: 23 MMOL/L (ref 23–29)
CREAT SERPL-MCNC: 0.8 MG/DL (ref 0.5–1.4)
DIFFERENTIAL METHOD: ABNORMAL
EOSINOPHIL # BLD AUTO: 0 K/UL (ref 0–0.5)
EOSINOPHIL NFR BLD: 1.3 % (ref 0–8)
ERYTHROCYTE [DISTWIDTH] IN BLOOD BY AUTOMATED COUNT: 21.1 % (ref 11.5–14.5)
EST. GFR  (AFRICAN AMERICAN): >60 ML/MIN/1.73 M^2
EST. GFR  (NON AFRICAN AMERICAN): >60 ML/MIN/1.73 M^2
GLUCOSE SERPL-MCNC: 162 MG/DL (ref 70–110)
HCT VFR BLD AUTO: 30.7 % (ref 37–48.5)
HGB BLD-MCNC: 9.1 G/DL (ref 12–16)
IMM GRANULOCYTES # BLD AUTO: 0.02 K/UL (ref 0–0.04)
IMM GRANULOCYTES NFR BLD AUTO: 0.6 % (ref 0–0.5)
LYMPHOCYTES # BLD AUTO: 1 K/UL (ref 1–4.8)
LYMPHOCYTES NFR BLD: 31.1 % (ref 18–48)
MCH RBC QN AUTO: 25.9 PG (ref 27–31)
MCHC RBC AUTO-ENTMCNC: 29.6 G/DL (ref 32–36)
MCV RBC AUTO: 87 FL (ref 82–98)
MONOCYTES # BLD AUTO: 0.3 K/UL (ref 0.3–1)
MONOCYTES NFR BLD: 9.4 % (ref 4–15)
NEUTROPHILS # BLD AUTO: 1.8 K/UL (ref 1.8–7.7)
NEUTROPHILS NFR BLD: 56.3 % (ref 38–73)
NRBC BLD-RTO: 0 /100 WBC
PLATELET # BLD AUTO: 393 K/UL (ref 150–450)
PMV BLD AUTO: 12.3 FL (ref 9.2–12.9)
POTASSIUM SERPL-SCNC: 3.8 MMOL/L (ref 3.5–5.1)
PROT SERPL-MCNC: 7.7 G/DL (ref 6–8.4)
RBC # BLD AUTO: 3.52 M/UL (ref 4–5.4)
SODIUM SERPL-SCNC: 140 MMOL/L (ref 136–145)
WBC # BLD AUTO: 3.18 K/UL (ref 3.9–12.7)

## 2022-05-30 PROCEDURE — 99214 OFFICE O/P EST MOD 30 MIN: CPT | Mod: 95,,, | Performed by: NURSE PRACTITIONER

## 2022-05-30 PROCEDURE — 1159F MED LIST DOCD IN RCRD: CPT | Mod: CPTII,S$GLB,, | Performed by: INTERNAL MEDICINE

## 2022-05-30 PROCEDURE — 99999 PR PBB SHADOW E&M-EST. PATIENT-LVL III: ICD-10-PCS | Mod: PBBFAC,,, | Performed by: INTERNAL MEDICINE

## 2022-05-30 PROCEDURE — 1159F PR MEDICATION LIST DOCUMENTED IN MEDICAL RECORD: ICD-10-PCS | Mod: CPTII,S$GLB,, | Performed by: INTERNAL MEDICINE

## 2022-05-30 PROCEDURE — 3052F HG A1C>EQUAL 8.0%<EQUAL 9.0%: CPT | Mod: CPTII,S$GLB,, | Performed by: INTERNAL MEDICINE

## 2022-05-30 PROCEDURE — 3080F DIAST BP >= 90 MM HG: CPT | Mod: CPTII,S$GLB,, | Performed by: INTERNAL MEDICINE

## 2022-05-30 PROCEDURE — 99214 PR OFFICE/OUTPT VISIT, EST, LEVL IV, 30-39 MIN: ICD-10-PCS | Mod: 95,,, | Performed by: NURSE PRACTITIONER

## 2022-05-30 PROCEDURE — 80053 COMPREHEN METABOLIC PANEL: CPT | Performed by: INTERNAL MEDICINE

## 2022-05-30 PROCEDURE — 99999 PR PBB SHADOW E&M-EST. PATIENT-LVL III: CPT | Mod: PBBFAC,,, | Performed by: INTERNAL MEDICINE

## 2022-05-30 PROCEDURE — 36415 COLL VENOUS BLD VENIPUNCTURE: CPT | Performed by: INTERNAL MEDICINE

## 2022-05-30 PROCEDURE — 99499 UNLISTED E&M SERVICE: CPT | Mod: S$GLB,,, | Performed by: INTERNAL MEDICINE

## 2022-05-30 PROCEDURE — 63600175 PHARM REV CODE 636 W HCPCS: Mod: JG | Performed by: INTERNAL MEDICINE

## 2022-05-30 PROCEDURE — 3052F PR MOST RECENT HEMOGLOBIN A1C LEVEL 8.0 - < 9.0%: ICD-10-PCS | Mod: CPTII,S$GLB,, | Performed by: INTERNAL MEDICINE

## 2022-05-30 PROCEDURE — 99214 OFFICE O/P EST MOD 30 MIN: CPT | Mod: S$GLB,,, | Performed by: INTERNAL MEDICINE

## 2022-05-30 PROCEDURE — 85025 COMPLETE CBC W/AUTO DIFF WBC: CPT | Performed by: INTERNAL MEDICINE

## 2022-05-30 PROCEDURE — 3008F BODY MASS INDEX DOCD: CPT | Mod: CPTII,S$GLB,, | Performed by: INTERNAL MEDICINE

## 2022-05-30 PROCEDURE — 3077F SYST BP >= 140 MM HG: CPT | Mod: CPTII,S$GLB,, | Performed by: INTERNAL MEDICINE

## 2022-05-30 PROCEDURE — 3080F PR MOST RECENT DIASTOLIC BLOOD PRESSURE >= 90 MM HG: ICD-10-PCS | Mod: CPTII,S$GLB,, | Performed by: INTERNAL MEDICINE

## 2022-05-30 PROCEDURE — 96372 THER/PROPH/DIAG INJ SC/IM: CPT | Mod: 59

## 2022-05-30 PROCEDURE — 3008F PR BODY MASS INDEX (BMI) DOCUMENTED: ICD-10-PCS | Mod: CPTII,S$GLB,, | Performed by: INTERNAL MEDICINE

## 2022-05-30 PROCEDURE — 1160F RVW MEDS BY RX/DR IN RCRD: CPT | Mod: CPTII,S$GLB,, | Performed by: INTERNAL MEDICINE

## 2022-05-30 PROCEDURE — 99499 RISK ADDL DX/OHS AUDIT: ICD-10-PCS | Mod: 95,,, | Performed by: NURSE PRACTITIONER

## 2022-05-30 PROCEDURE — 4010F PR ACE/ARB THEARPY RXD/TAKEN: ICD-10-PCS | Mod: CPTII,S$GLB,, | Performed by: INTERNAL MEDICINE

## 2022-05-30 PROCEDURE — 99214 PR OFFICE/OUTPT VISIT, EST, LEVL IV, 30-39 MIN: ICD-10-PCS | Mod: S$GLB,,, | Performed by: INTERNAL MEDICINE

## 2022-05-30 PROCEDURE — 4010F ACE/ARB THERAPY RXD/TAKEN: CPT | Mod: CPTII,S$GLB,, | Performed by: INTERNAL MEDICINE

## 2022-05-30 PROCEDURE — 99499 RISK ADDL DX/OHS AUDIT: ICD-10-PCS | Mod: S$GLB,,, | Performed by: INTERNAL MEDICINE

## 2022-05-30 PROCEDURE — 1160F PR REVIEW ALL MEDS BY PRESCRIBER/CLIN PHARMACIST DOCUMENTED: ICD-10-PCS | Mod: CPTII,S$GLB,, | Performed by: INTERNAL MEDICINE

## 2022-05-30 PROCEDURE — 3077F PR MOST RECENT SYSTOLIC BLOOD PRESSURE >= 140 MM HG: ICD-10-PCS | Mod: CPTII,S$GLB,, | Performed by: INTERNAL MEDICINE

## 2022-05-30 PROCEDURE — 96402 CHEMO HORMON ANTINEOPL SQ/IM: CPT

## 2022-05-30 PROCEDURE — 99499 UNLISTED E&M SERVICE: CPT | Mod: 95,,, | Performed by: NURSE PRACTITIONER

## 2022-05-30 RX ORDER — VENLAFAXINE HYDROCHLORIDE 150 MG/1
150 CAPSULE, EXTENDED RELEASE ORAL DAILY
Qty: 30 CAPSULE | Refills: 0 | Status: SHIPPED | OUTPATIENT
Start: 2022-05-30 | End: 2022-07-25

## 2022-05-30 RX ORDER — VENLAFAXINE HYDROCHLORIDE 75 MG/1
75 CAPSULE, EXTENDED RELEASE ORAL DAILY
Qty: 30 CAPSULE | Refills: 0 | Status: SHIPPED | OUTPATIENT
Start: 2022-05-30 | End: 2022-06-27

## 2022-05-30 RX ADMIN — DENOSUMAB 120 MG: 120 INJECTION SUBCUTANEOUS at 09:05

## 2022-05-30 RX ADMIN — GOSERELIN ACETATE 3.6 MG: 3.6 IMPLANT SUBCUTANEOUS at 09:05

## 2022-05-30 NOTE — PROGRESS NOTES
5/30/2022   Kristopher Ye  1975  [id number]    Outpatient Psychiatry Follow-Up Visit (MD/NP) - Telemedicine Visit      The patient location is: Patient reported that her location at the time of this visit was in the [facility]       Visit type: audiovisual      Each patient to whom he or she provides medical services by telemedicine is:  (1) informed of the relationship between the physician and patient and the respective role of any other health care provider with respect to management of the patient; and (2) notified that he or she may decline to receive medical services by telemedicine and may withdraw from such care at any time.          Chief Complaint:  Kristopher Ye, a 46 y.o. female,who presents today for follow up of depression, anxiety and grief.  Met with patient.      Interval History/Subjective Report/Content of Current Session:     Pt is a 46 y.o. female with a hx of DM II, CHF, stage 4 breast cancer, anxiety, MDD and grief.    Pt was last seen on 2/14/2022 for an initial visit and was to return for follow up in 4 weeks.     Today the pt reports she has been out of Effexor XR 75 mg caps for about 1 week and she notices a difference. She is more depressed and mckenzie. She states she found the extra 75 mg to be very beneficial. When she was taking 225 mg, she was less emotional and was able to talk about her parents without crying. Denies anhedonia and hopelessness.    Sleep is hit or miss. Appetite is good.    She [place] her parents and was feeling sad.    ASE of psych meds: denies    Pt denies recurrent thoughts of death and denies any suicidal or homicidal plans or intentions.      Denies any sxs of keri or hypomania. Denies AVH, paranoia and delusions. No objective s/sx of psychosis or keri.     Her BP earlier today was 175/94. However, she had not yet taken her BP meds.          Psychotherapy:  · Target symptoms: depression, anxiety ,  "grief  · Why chosen therapy is appropriate versus another modality: relevant to diagnosis, patient responds to this modality  · Outcome monitoring methods: self-report, observation  · Therapeutic intervention type: supportive psychotherapy  · Topics discussed/themes: illness/death of a loved one, stress related to medical comorbidities, building skills sets for symptom management  · The patient's response to the intervention is accepting. The patient's progress toward treatment goals is good.   · Duration of intervention: 5 minutes.      Psychotropic medication review  Previous Trials-  Klonopin  Valium  Xanax  Vistaril  Celexa - interacts with one of her cancer meds  Wellbutrin - took for wt loss; made her "sad"  Prozac     Current meds-  Effexor XR          Review of Systems       Review of Systems   Constitutional: Negative for chills, fever and malaise/fatigue.   Respiratory: Negative for cough and shortness of breath.    Cardiovascular: Negative for chest pain and palpitations.   Gastrointestinal: Negative for abdominal pain, diarrhea and vomiting.   Genitourinary: Negative for dysuria and hematuria.   Musculoskeletal: Negative for falls and myalgias.   Skin: Negative for rash.   Neurological: Negative for tremors, seizures and headaches.   Psychiatric/Behavioral:        See HPI         Past Medical, Family and Social History: The patient's past medical, family and social history, allergies, current medications, past surgical history, and problem list have been reviewed and updated as appropriate within the electronic medical record.    Compliance: no - pt did not follow up as instructed    Side effects: None    Risk Parameters:  Patient reports no suicidal ideation  Patient reports no homicidal ideation  Patient reports no self-injurious behavior  Patient reports no violent behavior    Exam (detailed: at least 9 elements; comprehensive: all 15 elements)   Constitutional  Vitals:  Most recent vital signs, dated " less than 90 days prior to this appointment, were reviewed.   There were no vitals filed for this visit.     General:  unremarkable, age appropriate, well nourished, casually dressed, neatly groomed, obese       Musculoskeletal  Muscle Strength/Tone:  not examined - NISREEN due to virtual visit   Gait & Station:  NISREEN due to virtual visit     Psychiatric      Appearance:  unremarkable, age appropriate, well nourished, casually dressed, neatly groomed, obese   Behavior:  normal, friendly and cooperative, eye contact normal     Speech:  no latency; no press   Mood & Affect:  euthymic  congruent and appropriate   Thought Process:  normal and logical   Associations:  intact   Thought Content:  normal, no suicidality, no homicidality, delusions, or paranoia   Insight:  intact, has awareness of illness   Judgement: behavior is adequate to circumstances, age appropriate   Orientation:  grossly intact   Memory: intact for content of interview   Language: grossly intact   Attention Span & Concentration:  able to focus   Fund of Knowledge:  intact and appropriate to age and level of education     Medications:  Outpatient Encounter Medications as of 5/30/2022   Medication Sig Dispense Refill    (Magic mouthwash) 1:1:1 Benadryl 12.5mg/5ml liq, aluminum & magnesium hydroxide-simehticone (Maalox), LIDOcaine viscous 2% Swish and spit 10 mLs every 4 (four) hours as needed. for mouth sores 360 mL 0    anastrozole (ARIMIDEX) 1 mg Tab TAKE 1 TABLET(1 MG) BY MOUTH EVERY DAY 90 tablet 3    atorvastatin (LIPITOR) 40 MG tablet TAKE 1 TABLET(40 MG) BY MOUTH EVERY DAY 90 tablet 3    blood sugar diagnostic Strp To check BG 4 times daily, to use with insurance preferred meter 200 each 5    blood-glucose meter kit To check BG 4 times daily, to use with insurance preferred meter 1 each 0    carvediloL (COREG) 25 MG tablet Take 1 tablet (25 mg total) by mouth 2 (two) times daily. 180 tablet 3    ciclopirox (PENLAC) 8 % Soln Apply topically  nightly. 6.6 mL 3    duke's soln (benadryl 30 mL, mylanta 30 mL, LIDOcaine 30 mL, nystatin 30 mL) 120mL Take 10 mLs by mouth 4 (four) times daily. 120 mL 3    ergocalciferol (ERGOCALCIFEROL) 50,000 unit Cap TAKE 1 CAPSULE BY MOUTH EVERY 7 DAYS 12 capsule 0    ferrous sulfate 324 mg (65 mg iron) TbEC Take 1 tablet every Monday, Wednesday and Friday. 30 tablet 0    furosemide (LASIX) 20 MG tablet Take 1 tablet (20 mg total) by mouth 2 (two) times daily. 60 tablet 11    glipiZIDE (GLUCOTROL) 10 MG tablet Take 1 tablet (10 mg total) by mouth 2 (two) times daily before meals. 180 tablet 3    goserelin (ZOLADEX) 3.6 mg injection Inject 3.6 mg into the skin every 28 days.      HYDROcodone-acetaminophen (NORCO) 5-325 mg per tablet Take 1 tablet by mouth every 6 (six) hours as needed for Pain. 30 tablet 0    ibuprofen (ADVIL,MOTRIN) 800 MG tablet Take 1 tablet (800 mg total) by mouth 2 (two) times daily as needed for Pain. 45 tablet 2    lancets Misc To check BG 4 times daily, to use with insurance preferred meter 200 each 5    LIDOcaine (LIDODERM) 5 % Place 2 patches onto the skin once daily. Remove & Discard patch within 12 hours or as directed by MD 60 patch 0    LORazepam (ATIVAN) 1 MG tablet Take 1 tablet (1 mg total) by mouth every 12 (twelve) hours as needed for Anxiety. 30 tablet 0    morphine (MS CONTIN) 15 MG 12 hr tablet Take 1 tablet (15 mg total) by mouth every 8 (eight) hours as needed for Pain. 60 tablet 0    ondansetron (ZOFRAN-ODT) 4 MG TbDL Take 1 tablet (4 mg total) by mouth 2 (two) times daily. 30 tablet 0    ondansetron (ZOFRAN-ODT) 8 MG TbDL Take 1 tablet (8 mg total) by mouth every 8 (eight) hours as needed (nausea). 30 tablet 2    palbociclib (IBRANCE) 100 mg Cap Take 1 capsule (100 mg) by mouth once daily Take daily for 21 days, followed by 7 days off. 21 capsule 3    potassium chloride (KLOR-CON) 10 MEQ TbSR TAKE 1 TABLET(10 MEQ) BY MOUTH EVERY DAY 30 tablet 2    promethazine  (PHENERGAN) 25 MG tablet Take 1 tablet (25 mg total) by mouth every 6 (six) hours as needed for Nausea. 30 tablet 1    sacubitriL-valsartan (ENTRESTO)  mg per tablet Take 1 tablet by mouth 2 (two) times daily. 60 tablet 11    sennosides (SENNA-C ORAL) Take 2 tablets by mouth daily as needed.      TRULICITY 0.75 mg/0.5 mL pen injector ADMINISTER 0.75 MG UNDER THE SKIN EVERY 7 DAYS 4 pen 2    venlafaxine (EFFEXOR-XR) 75 MG 24 hr capsule Take 1 capsule (75 mg total) by mouth once daily. Take with 150 mg capsule for a total of 225 mg. 30 capsule 2    [DISCONTINUED] potassium chloride (KLOR-CON) 10 MEQ TbSR TAKE 1 TABLET(10 MEQ) BY MOUTH EVERY DAY 30 tablet 2     Facility-Administered Encounter Medications as of 5/30/2022   Medication Dose Route Frequency Provider Last Rate Last Admin    [COMPLETED] denosumab (XGEVA) solution 120 mg  120 mg Subcutaneous 1 time in Clinic/HOD Phyllis Lei MD   120 mg at 05/30/22 0936    [COMPLETED] goserelin (ZOLADEX) injection 3.6 mg  3.6 mg Subcutaneous 1 time in Clinic/HOD Phyllis Lei MD   3.6 mg at 05/30/22 0936       Allergy:  Review of patient's allergies indicates:   Allergen Reactions    Keflex [cephalexin] Itching         Assessment and Diagnosis   Status/Progress: Based on the examination today, the patient's problem(s) is/are improved.  New problems have not been presented today.   Lack of compliance are complicating management of the primary condition.  There are no active rule-out diagnoses for this patient at this time.       General Impression:       ICD-10-CM ICD-9-CM   1. Major depressive disorder, recurrent episode, moderate with anxious distress  F33.1 296.32   2. Grief  F43.21 309.0       Intervention/Counseling/Treatment Plan     · Medication Management:  · Continue Effexor  mg q day  · Labs: reviewed most recent  · The treatment plan and follow up plan were reviewed with the patient.  · Discussed with patient informed consent, risks vs.  benefits, alternative treatments, side effect profile and the inherent unpredictability of individual responses to treatments and all medications prescribed. The patient expresses understanding of the above and displays the capacity to agree with this current plan and had no other questions.  · Encouraged Patient to keep future appointments.   · Take medications as prescribed and abstain from substance abuse.   · Pt was told to present to ED or call 911 for SI/HI plan or intent, psychosis, or other psychiatric or medical emergency, and pt agrees to this and verbalized understanding.        Return to Clinic: 1-2 months      Face-to-face time with patient:  25 minutes  Total time:  30 minutes of total time spent on the encounter, which includes face to face time and non-face to face time preparing to see the patient (eg, review of tests), Obtaining and/or reviewing separately obtained history, Documenting clinical information in the electronic or other health record, Independently interpreting results (not separately reported) and communicating results to the patient/family/caregiver, or Care coordination (not separately reported).       Vandana Prara, MSN, APRN, PMHNP-BC  [facility]

## 2022-05-30 NOTE — PROGRESS NOTES
Subjective:       Patient ID: Kristopher Ye is a 46 y.o. female.    Chief Complaint: Malignant neoplasm of upper-inner quadrant of right breast     HPI     Here for follow up.  Reports did not take antihypertensives this AM  Reports cardiology discussed possibility of pneumonitis related to Ibrance as last ECHO stable  We reviewed recent CTA shows no evidence for this finding  3/24/2022 CTA:  FINDINGS:  Opacification of the pulmonary arteries is sufficient for evaluation to the segmental branches.  No filling defects were seen.  The heart size is normal.  The aorta and main pulmonary segment show normal diameter.  There is no pericardial effusion.  No new lymphadenopathy identified in the chest.  No radiographic evidence of pneumonia or pulmonary edema is identified.  Stable mild apical scarring.  No new or enlarging pulmonary nodule.  There is partial imaging of the upper abdomen.  There is diffusely diminished hepatic attenuation consistent fatty metamorphosis.  Stable, 10 mm sclerotic focus in the left 10th posterior vertebral body with a narrow zone of transition.  A similar appearing lesion is seen in the sternum.  No new suspicious bone lesions.  The site of the reported primary breast malignancy is not well identified.  Impression:  No evidence of pulmonary embolus to the segmental branches.  Other stable findings are as above.  No specific CT evidence for new metastatic disease in the chest.    - 4/13/2022 CT Abdomen  FINDINGS:  Images of the lower thorax are remarkable for mild bilateral dependent atelectasis.  The liver is hypoattenuating suggesting steatosis, correlation with LFTs recommended.  There is a punctate focus of low attenuation within the right hepatic dome, too small for characterization.  The spleen, pancreas, gallbladder and adrenal glands are grossly unremarkable.  There is biliary dilation or ascites.  The stomach is nondistended, no significant gastric wall thickening.  The  portal vein, splenic vein, SMV, celiac axis and SMA all are patent.  No significant abdominal lymphadenopathy.  The kidneys enhance symmetrically without hydronephrosis or nephrolithiasis.  There is a low attenuating lesion within the interpolar region of the right kidney measuring 1.4 cm, attenuation of which suggests cyst.   The bilateral ureters are unremarkable without calculi seen.  The urinary bladder is unremarkable.  The uterus and adnexa are grossly unremarkable noting calcified anterior fundal leiomyoma.  There are several scattered colonic diverticula without inflammation.  The terminal ileum and appendix are unremarkable.  The small bowel is grossly unremarkable.  There are a few scattered shotty periaortic and paracaval lymph nodes.  No focal organized pelvic fluid collection.  Degenerative changes are noted of the spine.  There is a lucent appearing lesion within the right iliac wing, stable.  Additional lucent/sclerotic appearing foci are noted within several vertebral bodies, also stable.  No significant inguinal lymphadenopathy.  Surgical changes are noted of the anterior abdominal wall.  Right anterior abdominal wall injection site noted.  Impression:  1. Findings suggesting hepatic steatosis, correlation with LFTs recommended.  2. Osseous findings concerning for metastatic disease, similar to the previous exam.  3. Several additional findings above.    5/2/2022 ECHO:  · The left ventricle is mildly enlarged with eccentric hypertrophy and mildly decreased systolic function. The estimated ejection fraction is 40%.  · There is left ventricular global hypokinesis.  · There is abnormal septal wall motion.  · Grade I left ventricular diastolic dysfunction.  · Normal right ventricular size with mildly reduced right ventricular systolic function.  · Moderate left atrial enlargement.    Currently on Arimidex and Ibrance (dose adjusted due to anemia to 100 mg)  Zoladex and Xgeva monthly    Dyspnea slightly  "improved but still noted with exertion  No chest pain or palpitations  No edema     Reason For Follow Up:   1. Stage IV ([id number]) invasive ductal carcinoma of right breast, upper inner quadrant, ER %, MD neg, Her2 neg, Grade 3, multifocal with one lesion appearing more aggressive (Ki67 80%), large LN +, bone mets.  Ibrance recently held with cytopenias     Oncologic History:  Presentation  - 9/11/19 - Screening mammogram showed multiple right breast masses lower inner quadrant  - 9/13/19 - Diagnostic mammogram and US showed a right breast, 3:00 position mass, 2 CFN measuring 17 mm x 16 mm x 14 mm.  There 2 smaller adjacent masses towards the nipple  - 9/19/19 - Biopsy -               A. Right breast subareolar: Grade 3 IDC, estrogen receptor 80%, progesterone receptor 0%, Her2 dago neg, Ki67 80%.                           B. Right breast mass 3:00: Grade 3 IDC with papillary features, estrogen receptor 100%, progesterone receptor 0%, Her2 dago 1+, Ki67 30%.              - 9/26/19: US right axilla with abnormal lymphadenopathy measuring 4.3 x 2.8 cm with biopsy + for metastatic breast cancer.   Surgery consultation with Dr Ferris on 9/25/19              - 9/25/19 - Genetics Genetics [facility] pending; VUS pending  Medical oncology consultation on 10/7/19              * 10/8/19 - CT C/A/P with several lytic lesions in thoracic and lumbar spine, iliac bones, left scapula in addition to 3 x 4.5 cm right axillary node and 2.1 cm right breast mass. Bone scan negative.              * 10/31/19 - started Ibrance, Arimidex, Zoladex; plan for Xgeva.               * 11/12/19 STRATA without targetable mutations.               * 12/16/19 - Bone biopsy + for met disease (initially read as negative, but we treated as metastatic disease given high suspicion).               * MRI brain: "Partially empty sella.  Question bilateral globe proptosis. Otherwise unremarkable MRI brain as detailed above specifically " "without evidence for intracranial enhancing lesion to suggest metastatic disease."              * 4/22/2020 PET - disease improvement. 8/2020 PET with disease improvement.               *  9/1/20 - Palliative RT to L1-L4     Of note, * Xgeva monthly  - we had been waiting on dental clearance, but she has been unable to get into dentistry and is accepting of risks. Has no active dental disease.      PET scan 4/22/2021:  FINDINGS:  Quality of the study: Mildly degraded due to patient's large body habitus and skin abutting the gantry.  In the head and neck, there are no hypermetabolic lesions worrisome for malignancy. There are no hypermetabolic mucosal lesions, and there are no pathologically enlarged or hypermetabolic lymph nodes.  In the chest, there are no hypermetabolic lesions worrisome for malignancy.  Stable CT appearance of a level 1 right axillary lymph node measuring 1.3 cm in short axis with normal background radiotracer uptake.  Previously with SUV max of 2.1.  There are no concerning pulmonary nodules or masses, and there are no pathologically enlarged or hypermetabolic lymph nodes.  In the abdomen and pelvis, there is physiologic tracer distribution within the abdominal organs and excretion into the genitourinary system.  In the bones, there are stable lytic lesions throughout the lumbar spine and pelvis and additional stable sclerotic lesions in the sternum and T3 vertebral body.  No associated focal abnormal increased radiotracer uptake.  Findings likely represent treated disease.  Impression:  Interval decreased uptake within a prominent right axillary lymph node, now with normal background uptake.  No new focal abnormal uptake.  Stable lytic and sclerotic lesions without focal abnormal uptake.  Findings are compatible with treated metastasis.      8/25/2021 MRI brain   Impression:  No significant change from prior specifically without evidence for enhancing lesion to suggest intracranial metastatic " disease.  Continued partially empty sella, intracranial hypertension to be included in differential in the appropriate clinical setting.  Clinical correlation and further evaluation as warranted.     10/14/2021 MRI thoracic/lumbar spine:  Impression:  Patient history of metastatic breast cancer.  Bone lesions at T10, L2, L3, and right iliac wing, as above, concerning for metastatic disease.  No pathologic fracture, soft tissue component, or epidural extension identified.      10/25/2021 CT chest/abd/pelvis:  Impression:  1.  Stable metastasis of the spine  No evidence of intra-abdominal or intrathoracic metastatic disease  2.  Stable prominent right axillary lymph node.  3.  Additional findings are detailed above.     Currently on Arimidex and Ibrance  Zoladex and Xgeva monthly     - 2/2/2022 Bone scan:  FINDINGS:  No focal abnormal uptake suggestive of osseous metastases.  There is diffusely increased uptake in the calvarium in keeping with hyperostosis.  There is degenerative type uptake most prominent in the bilateral shoulders and knees.  The focal uptake previously described in the distal right femur is not visualized.  There is otherwise physiologic distribution of the radiopharmaceutical throughout the skeleton.  There is normal uptake in the genitourinary system and soft tissues.  Impression:  There is no scintigraphic evidence of osteoblastic metastatic disease.  Nonspecific uptake in the distal right femur has resolved.  Diffuse cranial hyperostosis and other findings as above.     - 2/1/2022 CT C/A/P:  FINDINGS:  CHEST  Support tubes and lines: None.  Aorta: Normal caliber.  Heart: Normal size..  Coronary arteries: No calcifications.  Pericardium: Normal. No effusion, thickening, or calcification.  Central pulmonary arteries: Normal caliber.  Base of neck/thyroid: Normal.  Lymph nodes: No supraclavicular, axillary, internal mammary, mediastinal or hilar lymphadenopathy.  Esophagus: Normal.  Pleura: No  effusion, thickening or calcification.  Body wall: Unremarkable.  Airways: Normal.  Lungs: Clear without focal or diffuse abnormality.  Bones: Sclerotic lesions are seen throughout the spine as well as in the sternum, not substantially changed from 10/25/2021  ABDOMEN/PELVIS  Liver: Unremarkable  Gallbladder/bile ducts: Unremarkable. No intra or extrahepatic biliary ductal dilatation  Pancreas: Unremarkable.  Spleen: Unremarkable.  Adrenals: Unremarkable.  Kidneys: Simple cyst in the right kidney is unchanged  Lymph nodes: No abdominal or pelvic lymphadenopathy.  Bowel and mesentery: Unremarkable.  Abdominal aorta: Unremarkable.  Inferior vena cava: Unremarkable.  Free fluid or free air: None.  Pelvis: Unremarkable.  Urinary bladder: Unremarkable.  Body wall: Unremarkable.  Bones: Sclerotic lesions seen in the spine are unchanged.  Impression:  1. No evidence of new recurrent or metastatic disease.  2. Sclerotic lesions seen in the spine and sternum, unchanged when compared to 10/25/2021.     - 3/22/2022 CTA Chest:  FINDINGS:  Opacification of the pulmonary arteries is sufficient for evaluation to the segmental branches.  No filling defects were seen.  The heart size is normal.  The aorta and main pulmonary segment show normal diameter.  There is no pericardial effusion.  No new lymphadenopathy identified in the chest.  No radiographic evidence of pneumonia or pulmonary edema is identified.  Stable mild apical scarring.  No new or enlarging pulmonary nodule.  There is partial imaging of the upper abdomen.  There is diffusely diminished hepatic attenuation consistent fatty metamorphosis.  Stable, 10 mm sclerotic focus in the left 10th posterior vertebral body with a narrow zone of transition.  A similar appearing lesion is seen in the sternum.  No new suspicious bone lesions.  The site of the reported primary breast malignancy is not well identified.  Impression:  No evidence of pulmonary embolus to the segmental  branches.  Other stable findings are as above.  No specific CT evidence for new metastatic disease in the chest.     Had been seeing Dr. Olivares and had a second opinion due to fears over pacemaker     - 3/30/2022 ECHO:  · The left ventricle is moderately enlarged with mild eccentric hypertrophy and low normal systolic function.  · The estimated ejection fraction is 55%.  · The quantitatively derived ejection fraction is 52%.  · Indeterminate left ventricular diastolic function.  · Severe left atrial enlargement.  · Normal right ventricular size with normal right ventricular systolic function.    FH:  Paternal side:  1st cousin- ovarian cancer (diagnosed at age 26) and her mother has thyroid cancer  Maternal grandmother- some type of likely metastatic GI cancer       Review of Systems   Constitutional: Negative for activity change, appetite change and unexpected weight change.   HENT: Negative for mouth sores, rhinorrhea and trouble swallowing.    Eyes: Negative for visual disturbance.   Respiratory: Positive for shortness of breath. Negative for cough and wheezing.    Cardiovascular: Negative for chest pain.   Gastrointestinal: Positive for constipation. Negative for abdominal distention, abdominal pain, blood in stool, change in bowel habit, diarrhea, nausea, vomiting and change in bowel habit.   Genitourinary: Positive for urgency. Negative for difficulty urinating, dysuria and frequency.   Musculoskeletal: Positive for back pain (chronic). Negative for arthralgias, joint swelling and myalgias.   Integumentary:  Negative for rash.   Neurological: Negative for dizziness, weakness, numbness, headaches, disturbances in coordination and coordination difficulties.   Hematological: Negative for adenopathy. Does not bruise/bleed easily.   Psychiatric/Behavioral: Negative for dysphoric mood and sleep disturbance. The patient is nervous/anxious.          Objective:      Physical Exam  Vitals and nursing note reviewed.    Constitutional:       General: She is not in acute distress.     Appearance: Normal appearance. She is well-developed. She is obese. She is not ill-appearing.      Comments: Presents with her 2 daughters  ECOG= 0  Very pleasant   HENT:      Head: Normocephalic and atraumatic.   Eyes:      General: Lids are normal. No scleral icterus.     Extraocular Movements: Extraocular movements intact.      Conjunctiva/sclera: Conjunctivae normal.      Pupils: Pupils are equal, round, and reactive to light.   Neck:      Thyroid: No thyromegaly.      Vascular: No JVD.      Trachea: Trachea normal.   Cardiovascular:      Rate and Rhythm: Normal rate and regular rhythm.      Heart sounds: Normal heart sounds. No murmur heard.    No friction rub. No gallop.      Comments: Difficult to assess tones.   Pulmonary:      Effort: Pulmonary effort is normal. No respiratory distress.      Breath sounds: Normal breath sounds. No wheezing, rhonchi or rales.      Comments: distant  Chest:   Breasts:      Right: No axillary adenopathy or supraclavicular adenopathy.      Left: No axillary adenopathy or supraclavicular adenopathy.       Abdominal:      General: Bowel sounds are normal. There is no distension.      Palpations: Abdomen is soft. There is no mass.      Tenderness: There is no abdominal tenderness. There is no guarding or rebound.      Comments: No organomegaly however difficult to assess.    Hardness noted periumbilical area   Musculoskeletal:         General: Normal range of motion.      Cervical back: Normal range of motion and neck supple.      Comments: No spinal or paraspinal tenderness.    Lymphadenopathy:      Head:      Right side of head: No submental or submandibular adenopathy.      Left side of head: No submental or submandibular adenopathy.      Cervical: No cervical adenopathy.      Upper Body:      Right upper body: No supraclavicular or axillary adenopathy.      Left upper body: No supraclavicular or axillary  adenopathy.   Skin:     General: Skin is warm and dry.      Capillary Refill: Capillary refill takes less than 2 seconds.      Coloration: Skin is not jaundiced or pale.      Findings: No bruising or rash.      Nails: There is no clubbing.   Neurological:      Mental Status: She is alert and oriented to person, place, and time.      Sensory: No sensory deficit.      Motor: No weakness.      Coordination: Coordination normal.      Gait: Gait normal.   Psychiatric:         Mood and Affect: Mood normal.         Speech: Speech normal.         Behavior: Behavior normal.         Thought Content: Thought content normal.         Judgment: Judgment normal.       Labs- reviewed  Assessment:       Problem List Items Addressed This Visit     Type 2 diabetes mellitus with hyperglycemia, without long-term current use of insulin (Chronic)    Thalassemia    Malignant neoplasm of upper-inner quadrant of right breast in female, estrogen receptor positive - Primary    Iron deficiency anemia    Hypertension    Hard mass of abdomen    Bone metastases    Anemia associated with chemotherapy      Other Visit Diagnoses     Urinary urgency        Relevant Orders    Urinalysis, Reflex to Urine Culture Urine, Clean Catch          Plan:       DM, HTN- managed vy PCP and cardiology    Abdominal exam change- CT scan  Colonoscopy needed as well (constipated)    Anemia  Iron def- on oral iron  Ibrance will be adjusted to 75 mg at next cycle if cbc not improved in 2 weeks- take 5 days per 7 days for now  Monitor    Continue Ibrance,and Arimidex  Continue Zoladex and Xgeva    Route Chart for Scheduling    Med Onc Chart Routing      Follow up with physician 2 weeks. Scans this week (CT abd/pelvis), RTC 2 weeks cbc prior   Follow up with CAMILA 4 weeks. Cbc, cmp and Zoladex and Xgeva   Labs    Imaging    Pharmacy appointment    Other referrals          Treatment Plan Information   OP ANASTROZOLE PALBOCICLIB Q4W   Jessica Mendez MD   Upcoming Treatment  Dates - OP ANASTROZOLE PALBOCICLIB Q4W    No upcoming days in selected categories.    Supportive Plan Information  OP BREAST GOSERELIN & DENOSUMAB Q4W   Jessica Mendez MD   Upcoming Treatment Dates - OP BREAST GOSERELIN & DENOSUMAB Q4W    6/1/2022       Chemotherapy       denosumab (XGEVA) solution 120 mg       goserelin (ZOLADEX) injection 3.6 mg

## 2022-06-03 ENCOUNTER — DOCUMENTATION ONLY (OUTPATIENT)
Dept: HEMATOLOGY/ONCOLOGY | Facility: CLINIC | Age: 47
End: 2022-06-03
Payer: MEDICARE

## 2022-06-03 ENCOUNTER — TELEPHONE (OUTPATIENT)
Dept: HEMATOLOGY/ONCOLOGY | Facility: CLINIC | Age: 47
End: 2022-06-03
Payer: MEDICARE

## 2022-06-03 DIAGNOSIS — C50.211 MALIGNANT NEOPLASM OF UPPER-INNER QUADRANT OF RIGHT BREAST IN FEMALE, ESTROGEN RECEPTOR POSITIVE: Primary | ICD-10-CM

## 2022-06-03 DIAGNOSIS — Z17.0 MALIGNANT NEOPLASM OF UPPER-INNER QUADRANT OF RIGHT BREAST IN FEMALE, ESTROGEN RECEPTOR POSITIVE: Primary | ICD-10-CM

## 2022-06-03 NOTE — PROGRESS NOTES
At pt's request SW requested WC order--it was entered and Sw sent medical necessity form to [facility] and alerted GENARO Ramirez at [facility] that order is in.  Pt will call if she doesn't hear from [facility] in about 10 days. SW provided name and contact information and encouraged pt to call with any future needs.  Later: Evelyn CASTILLO from [facility] messaged that they could not accommodate this request.  Suggested SW send notice to N and ask them to send auth to [employer] ([phone]). Yolanda did so.

## 2022-06-06 ENCOUNTER — TELEPHONE (OUTPATIENT)
Dept: HEMATOLOGY/ONCOLOGY | Facility: CLINIC | Age: 47
End: 2022-06-06
Payer: MEDICARE

## 2022-06-07 ENCOUNTER — PATIENT MESSAGE (OUTPATIENT)
Dept: PRIMARY CARE CLINIC | Facility: CLINIC | Age: 47
End: 2022-06-07
Payer: MEDICARE

## 2022-06-08 DIAGNOSIS — Z17.0 MALIGNANT NEOPLASM OF UPPER-INNER QUADRANT OF RIGHT BREAST IN FEMALE, ESTROGEN RECEPTOR POSITIVE: Primary | ICD-10-CM

## 2022-06-08 DIAGNOSIS — C79.51 BONE METASTASES: ICD-10-CM

## 2022-06-08 DIAGNOSIS — C50.211 MALIGNANT NEOPLASM OF UPPER-INNER QUADRANT OF RIGHT BREAST IN FEMALE, ESTROGEN RECEPTOR POSITIVE: Primary | ICD-10-CM

## 2022-06-13 ENCOUNTER — PATIENT MESSAGE (OUTPATIENT)
Dept: HEMATOLOGY/ONCOLOGY | Facility: CLINIC | Age: 47
End: 2022-06-13
Payer: MEDICARE

## 2022-06-13 ENCOUNTER — TELEPHONE (OUTPATIENT)
Dept: HEMATOLOGY/ONCOLOGY | Facility: CLINIC | Age: 47
End: 2022-06-13
Payer: MEDICARE

## 2022-06-13 ENCOUNTER — HOSPITAL ENCOUNTER (OUTPATIENT)
Dept: RADIOLOGY | Facility: HOSPITAL | Age: 47
Discharge: HOME | End: 2022-06-13
Attending: INTERNAL MEDICINE
Payer: MEDICARE

## 2022-06-13 DIAGNOSIS — C50.211 MALIGNANT NEOPLASM OF UPPER-INNER QUADRANT OF RIGHT BREAST IN FEMALE, ESTROGEN RECEPTOR POSITIVE: ICD-10-CM

## 2022-06-13 DIAGNOSIS — C79.51 BONE METASTASES: ICD-10-CM

## 2022-06-13 DIAGNOSIS — Z17.0 MALIGNANT NEOPLASM OF UPPER-INNER QUADRANT OF RIGHT BREAST IN FEMALE, ESTROGEN RECEPTOR POSITIVE: ICD-10-CM

## 2022-06-13 PROCEDURE — 74176 CT ABD & PELVIS W/O CONTRAST: CPT | Mod: 26,,, | Performed by: INTERNAL MEDICINE

## 2022-06-13 PROCEDURE — 74176 CT ABDOMEN PELVIS WITHOUT CONTRAST: ICD-10-PCS | Mod: 26,,, | Performed by: INTERNAL MEDICINE

## 2022-06-13 PROCEDURE — 74176 CT ABD & PELVIS W/O CONTRAST: CPT | Mod: TC

## 2022-06-13 NOTE — TELEPHONE ENCOUNTER
Spoke with patient who called to say she would not make it to office visit this morning due to a flat tire.  Has CT scans rescheduled for this afternoon. Will get patient rescheduled following scans.

## 2022-06-20 ENCOUNTER — SPECIALTY PHARMACY (OUTPATIENT)
Dept: PHARMACY | Facility: CLINIC | Age: 47
End: 2022-06-20
Payer: MEDICARE

## 2022-06-20 NOTE — TELEPHONE ENCOUNTER
Specialty Pharmacy - Refill Coordination    Specialty Medication Orders Linked to Encounter    Flowsheet Row Most Recent Value   Medication #1 palbociclib (IBRANCE) 100 mg Cap (Order#[id number], Rx#[id number])          Refill Questions - Documented Responses    Flowsheet Row Most Recent Value   Patient Availability and HIPAA Verification    Does patient want to proceed with activity? Yes   HIPAA/medical authority confirmed? Yes   Relationship to patient of person spoken to? Self   Refill Screening Questions    Changes to allergies? No   Changes to medications? No   New conditions since last clinic visit? No   Unplanned office visit, urgent care, ED, or hospital admission in the last 4 weeks? No   How does patient/caregiver feel medication is working? Good   Financial problems or insurance changes? No   How many doses of your specialty medications were missed in the last 4 weeks? 0   Would patient like to speak to a pharmacist? No   When does the patient need to receive the medication? 06/27/22   Refill Delivery Questions    How will the patient receive the medication? Delivery Jesenia   When does the patient need to receive the medication? 06/27/22   Shipping Address Home   Address in [facility] confirmed and updated if neccessary? Yes   Expected Copay ($) 0   Is the patient able to afford the medication copay? Yes   Payment Method zero copay   Days supply of Refill 28   Supplies needed? No supplies needed   Refill activity completed? Yes   Refill activity plan Refill scheduled   Shipment/Pickup Date: 06/23/22          Current Outpatient Medications   Medication Sig    (Magic mouthwash) 1:1:1 Benadryl 12.5mg/5ml liq, aluminum & magnesium hydroxide-simehticone (Maalox), LIDOcaine viscous 2% Swish and spit 10 mLs every 4 (four) hours as needed. for mouth sores    anastrozole (ARIMIDEX) 1 mg Tab TAKE 1 TABLET(1 MG) BY MOUTH EVERY DAY    atorvastatin (LIPITOR) 40 MG tablet TAKE 1 TABLET(40 MG) BY MOUTH EVERY DAY     blood sugar diagnostic Strp To check BG 4 times daily, to use with insurance preferred meter    blood-glucose meter kit To check BG 4 times daily, to use with insurance preferred meter    carvediloL (COREG) 25 MG tablet Take 1 tablet (25 mg total) by mouth 2 (two) times daily.    ciclopirox (PENLAC) 8 % Soln Apply topically nightly.    duke's soln (benadryl 30 mL, mylanta 30 mL, LIDOcaine 30 mL, nystatin 30 mL) 120mL Take 10 mLs by mouth 4 (four) times daily.    ergocalciferol (ERGOCALCIFEROL) 50,000 unit Cap TAKE 1 CAPSULE BY MOUTH EVERY 7 DAYS    ferrous sulfate 324 mg (65 mg iron) TbEC Take 1 tablet every Monday, Wednesday and Friday.    furosemide (LASIX) 20 MG tablet Take 1 tablet (20 mg total) by mouth 2 (two) times daily.    glipiZIDE (GLUCOTROL) 10 MG tablet Take 1 tablet (10 mg total) by mouth 2 (two) times daily before meals.    goserelin (ZOLADEX) 3.6 mg injection Inject 3.6 mg into the skin every 28 days.    HYDROcodone-acetaminophen (NORCO) 5-325 mg per tablet Take 1 tablet by mouth every 6 (six) hours as needed for Pain.    ibuprofen (ADVIL,MOTRIN) 800 MG tablet Take 1 tablet (800 mg total) by mouth 2 (two) times daily as needed for Pain.    lancets Misc To check BG 4 times daily, to use with insurance preferred meter    LIDOcaine (LIDODERM) 5 % Place 2 patches onto the skin once daily. Remove & Discard patch within 12 hours or as directed by MD    LORazepam (ATIVAN) 1 MG tablet Take 1 tablet (1 mg total) by mouth every 12 (twelve) hours as needed for Anxiety.    morphine (MS CONTIN) 15 MG 12 hr tablet Take 1 tablet (15 mg total) by mouth every 8 (eight) hours as needed for Pain.    ondansetron (ZOFRAN-ODT) 4 MG TbDL Take 1 tablet (4 mg total) by mouth 2 (two) times daily.    ondansetron (ZOFRAN-ODT) 8 MG TbDL Take 1 tablet (8 mg total) by mouth every 8 (eight) hours as needed (nausea).    palbociclib (IBRANCE) 100 mg Cap Take 1 capsule (100 mg) by mouth once daily Take daily for  21 days, followed by 7 days off.    potassium chloride (KLOR-CON) 10 MEQ TbSR TAKE 1 TABLET(10 MEQ) BY MOUTH EVERY DAY    promethazine (PHENERGAN) 25 MG tablet Take 1 tablet (25 mg total) by mouth every 6 (six) hours as needed for Nausea.    sacubitriL-valsartan (ENTRESTO)  mg per tablet Take 1 tablet by mouth 2 (two) times daily.    sennosides (SENNA-C ORAL) Take 2 tablets by mouth daily as needed.    TRULICITY 0.75 mg/0.5 mL pen injector ADMINISTER 0.75 MG UNDER THE SKIN EVERY 7 DAYS    venlafaxine (EFFEXOR-XR) 150 MG Cp24 Take 1 capsule (150 mg total) by mouth once daily.    venlafaxine (EFFEXOR-XR) 75 MG 24 hr capsule Take 1 capsule (75 mg total) by mouth once daily. Take with 150 mg capsule for a total of 225 mg.   Last reviewed on 6/3/2022  2:08 PM by Mason Rausch, PharmD    Review of patient's allergies indicates:   Allergen Reactions    Keflex [cephalexin] Itching    Last reviewed on  6/3/2022 2:07 PM by Mason Rausch      Tasks added this encounter   7/18/2022 - Refill Call (Auto Added)   Tasks due within next 3 months   8/23/2022 - Clinical - Follow Up Assesement (180 day)     [facility]  [address] [place] [id number]  Phone: [phone]  Fax: [phone]

## 2022-06-24 ENCOUNTER — LAB VISIT (OUTPATIENT)
Dept: LAB | Facility: HOSPITAL | Age: 47
End: 2022-06-24
Attending: INTERNAL MEDICINE
Payer: MEDICARE

## 2022-06-24 DIAGNOSIS — Z17.0 MALIGNANT NEOPLASM OF UPPER-INNER QUADRANT OF RIGHT BREAST IN FEMALE, ESTROGEN RECEPTOR POSITIVE: ICD-10-CM

## 2022-06-24 DIAGNOSIS — C50.211 MALIGNANT NEOPLASM OF UPPER-INNER QUADRANT OF RIGHT BREAST IN FEMALE, ESTROGEN RECEPTOR POSITIVE: ICD-10-CM

## 2022-06-24 LAB
ALBUMIN SERPL BCP-MCNC: 3.1 G/DL (ref 3.5–5.2)
ALP SERPL-CCNC: 90 U/L (ref 55–135)
ALT SERPL W/O P-5'-P-CCNC: 14 U/L (ref 10–44)
ANION GAP SERPL CALC-SCNC: 12 MMOL/L (ref 8–16)
AST SERPL-CCNC: 11 U/L (ref 10–40)
BASOPHILS # BLD AUTO: 0.08 K/UL (ref 0–0.2)
BASOPHILS NFR BLD: 1.3 % (ref 0–1.9)
BILIRUB SERPL-MCNC: 0.4 MG/DL (ref 0.1–1)
BUN SERPL-MCNC: 8 MG/DL (ref 6–20)
CALCIUM SERPL-MCNC: 9.6 MG/DL (ref 8.7–10.5)
CHLORIDE SERPL-SCNC: 105 MMOL/L (ref 95–110)
CO2 SERPL-SCNC: 23 MMOL/L (ref 23–29)
CREAT SERPL-MCNC: 0.9 MG/DL (ref 0.5–1.4)
DIFFERENTIAL METHOD: ABNORMAL
EOSINOPHIL # BLD AUTO: 0.1 K/UL (ref 0–0.5)
EOSINOPHIL NFR BLD: 1 % (ref 0–8)
ERYTHROCYTE [DISTWIDTH] IN BLOOD BY AUTOMATED COUNT: 21.6 % (ref 11.5–14.5)
EST. GFR  (AFRICAN AMERICAN): >60 ML/MIN/1.73 M^2
EST. GFR  (NON AFRICAN AMERICAN): >60 ML/MIN/1.73 M^2
GLUCOSE SERPL-MCNC: 154 MG/DL (ref 70–110)
HCT VFR BLD AUTO: 30.5 % (ref 37–48.5)
HGB BLD-MCNC: 9.5 G/DL (ref 12–16)
IMM GRANULOCYTES # BLD AUTO: 0.02 K/UL (ref 0–0.04)
IMM GRANULOCYTES NFR BLD AUTO: 0.3 % (ref 0–0.5)
LYMPHOCYTES # BLD AUTO: 1.6 K/UL (ref 1–4.8)
LYMPHOCYTES NFR BLD: 27.3 % (ref 18–48)
MCH RBC QN AUTO: 26.6 PG (ref 27–31)
MCHC RBC AUTO-ENTMCNC: 31.1 G/DL (ref 32–36)
MCV RBC AUTO: 85 FL (ref 82–98)
MONOCYTES # BLD AUTO: 0.6 K/UL (ref 0.3–1)
MONOCYTES NFR BLD: 10.3 % (ref 4–15)
NEUTROPHILS # BLD AUTO: 3.6 K/UL (ref 1.8–7.7)
NEUTROPHILS NFR BLD: 59.8 % (ref 38–73)
NRBC BLD-RTO: 1 /100 WBC
PLATELET # BLD AUTO: 373 K/UL (ref 150–450)
PMV BLD AUTO: 11.8 FL (ref 9.2–12.9)
POTASSIUM SERPL-SCNC: 3.3 MMOL/L (ref 3.5–5.1)
PROT SERPL-MCNC: 7.9 G/DL (ref 6–8.4)
RBC # BLD AUTO: 3.57 M/UL (ref 4–5.4)
SODIUM SERPL-SCNC: 140 MMOL/L (ref 136–145)
WBC # BLD AUTO: 6.01 K/UL (ref 3.9–12.7)

## 2022-06-24 PROCEDURE — 36415 COLL VENOUS BLD VENIPUNCTURE: CPT | Performed by: INTERNAL MEDICINE

## 2022-06-24 PROCEDURE — 80053 COMPREHEN METABOLIC PANEL: CPT | Performed by: INTERNAL MEDICINE

## 2022-06-24 PROCEDURE — 85025 COMPLETE CBC W/AUTO DIFF WBC: CPT | Performed by: INTERNAL MEDICINE

## 2022-06-27 ENCOUNTER — OFFICE VISIT (OUTPATIENT)
Dept: HEMATOLOGY/ONCOLOGY | Facility: CLINIC | Age: 47
End: 2022-06-27
Payer: MEDICARE

## 2022-06-27 ENCOUNTER — INFUSION (OUTPATIENT)
Dept: INFUSION THERAPY | Facility: HOSPITAL | Age: 47
End: 2022-06-27
Payer: MEDICARE

## 2022-06-27 VITALS
BODY MASS INDEX: 50.02 KG/M2 | RESPIRATION RATE: 16 BRPM | WEIGHT: 293 LBS | OXYGEN SATURATION: 96 % | SYSTOLIC BLOOD PRESSURE: 148 MMHG | HEART RATE: 80 BPM | HEIGHT: 64 IN | DIASTOLIC BLOOD PRESSURE: 78 MMHG | TEMPERATURE: 99 F

## 2022-06-27 DIAGNOSIS — T45.1X5A ANEMIA ASSOCIATED WITH CHEMOTHERAPY: ICD-10-CM

## 2022-06-27 DIAGNOSIS — I50.42 CHRONIC COMBINED SYSTOLIC AND DIASTOLIC HEART FAILURE: ICD-10-CM

## 2022-06-27 DIAGNOSIS — E11.65 TYPE 2 DIABETES MELLITUS WITH HYPERGLYCEMIA, WITHOUT LONG-TERM CURRENT USE OF INSULIN: Chronic | ICD-10-CM

## 2022-06-27 DIAGNOSIS — D64.81 ANEMIA ASSOCIATED WITH CHEMOTHERAPY: ICD-10-CM

## 2022-06-27 DIAGNOSIS — C50.211 MALIGNANT NEOPLASM OF UPPER-INNER QUADRANT OF RIGHT BREAST IN FEMALE, ESTROGEN RECEPTOR POSITIVE: Primary | ICD-10-CM

## 2022-06-27 DIAGNOSIS — Z17.0 MALIGNANT NEOPLASM OF UPPER-INNER QUADRANT OF RIGHT BREAST IN FEMALE, ESTROGEN RECEPTOR POSITIVE: Primary | ICD-10-CM

## 2022-06-27 DIAGNOSIS — C79.51 BONE METASTASES: ICD-10-CM

## 2022-06-27 DIAGNOSIS — I10 BENIGN ESSENTIAL HTN: ICD-10-CM

## 2022-06-27 PROCEDURE — 1160F RVW MEDS BY RX/DR IN RCRD: CPT | Mod: CPTII,S$GLB,, | Performed by: INTERNAL MEDICINE

## 2022-06-27 PROCEDURE — 3078F PR MOST RECENT DIASTOLIC BLOOD PRESSURE < 80 MM HG: ICD-10-PCS | Mod: CPTII,S$GLB,, | Performed by: INTERNAL MEDICINE

## 2022-06-27 PROCEDURE — 3077F SYST BP >= 140 MM HG: CPT | Mod: CPTII,S$GLB,, | Performed by: INTERNAL MEDICINE

## 2022-06-27 PROCEDURE — 3008F PR BODY MASS INDEX (BMI) DOCUMENTED: ICD-10-PCS | Mod: CPTII,S$GLB,, | Performed by: INTERNAL MEDICINE

## 2022-06-27 PROCEDURE — 99214 PR OFFICE/OUTPT VISIT, EST, LEVL IV, 30-39 MIN: ICD-10-PCS | Mod: S$GLB,,, | Performed by: INTERNAL MEDICINE

## 2022-06-27 PROCEDURE — 99499 UNLISTED E&M SERVICE: CPT | Mod: S$GLB,,, | Performed by: INTERNAL MEDICINE

## 2022-06-27 PROCEDURE — 3052F HG A1C>EQUAL 8.0%<EQUAL 9.0%: CPT | Mod: CPTII,S$GLB,, | Performed by: INTERNAL MEDICINE

## 2022-06-27 PROCEDURE — 4010F PR ACE/ARB THEARPY RXD/TAKEN: ICD-10-PCS | Mod: CPTII,S$GLB,, | Performed by: INTERNAL MEDICINE

## 2022-06-27 PROCEDURE — 1160F PR REVIEW ALL MEDS BY PRESCRIBER/CLIN PHARMACIST DOCUMENTED: ICD-10-PCS | Mod: CPTII,S$GLB,, | Performed by: INTERNAL MEDICINE

## 2022-06-27 PROCEDURE — 63600175 PHARM REV CODE 636 W HCPCS: Mod: JG | Performed by: INTERNAL MEDICINE

## 2022-06-27 PROCEDURE — 99999 PR PBB SHADOW E&M-EST. PATIENT-LVL III: ICD-10-PCS | Mod: PBBFAC,,, | Performed by: INTERNAL MEDICINE

## 2022-06-27 PROCEDURE — 1159F MED LIST DOCD IN RCRD: CPT | Mod: CPTII,S$GLB,, | Performed by: INTERNAL MEDICINE

## 2022-06-27 PROCEDURE — 3052F PR MOST RECENT HEMOGLOBIN A1C LEVEL 8.0 - < 9.0%: ICD-10-PCS | Mod: CPTII,S$GLB,, | Performed by: INTERNAL MEDICINE

## 2022-06-27 PROCEDURE — 4010F ACE/ARB THERAPY RXD/TAKEN: CPT | Mod: CPTII,S$GLB,, | Performed by: INTERNAL MEDICINE

## 2022-06-27 PROCEDURE — 99499 RISK ADDL DX/OHS AUDIT: ICD-10-PCS | Mod: S$GLB,,, | Performed by: INTERNAL MEDICINE

## 2022-06-27 PROCEDURE — 99999 PR PBB SHADOW E&M-EST. PATIENT-LVL III: CPT | Mod: PBBFAC,,, | Performed by: INTERNAL MEDICINE

## 2022-06-27 PROCEDURE — 3077F PR MOST RECENT SYSTOLIC BLOOD PRESSURE >= 140 MM HG: ICD-10-PCS | Mod: CPTII,S$GLB,, | Performed by: INTERNAL MEDICINE

## 2022-06-27 PROCEDURE — 96372 THER/PROPH/DIAG INJ SC/IM: CPT

## 2022-06-27 PROCEDURE — 96402 CHEMO HORMON ANTINEOPL SQ/IM: CPT

## 2022-06-27 PROCEDURE — 1159F PR MEDICATION LIST DOCUMENTED IN MEDICAL RECORD: ICD-10-PCS | Mod: CPTII,S$GLB,, | Performed by: INTERNAL MEDICINE

## 2022-06-27 PROCEDURE — 99214 OFFICE O/P EST MOD 30 MIN: CPT | Mod: S$GLB,,, | Performed by: INTERNAL MEDICINE

## 2022-06-27 PROCEDURE — 3078F DIAST BP <80 MM HG: CPT | Mod: CPTII,S$GLB,, | Performed by: INTERNAL MEDICINE

## 2022-06-27 PROCEDURE — 3008F BODY MASS INDEX DOCD: CPT | Mod: CPTII,S$GLB,, | Performed by: INTERNAL MEDICINE

## 2022-06-27 RX ADMIN — DENOSUMAB 120 MG: 120 INJECTION SUBCUTANEOUS at 08:06

## 2022-06-27 RX ADMIN — GOSERELIN ACETATE 3.6 MG: 3.6 IMPLANT SUBCUTANEOUS at 08:06

## 2022-06-27 NOTE — PROGRESS NOTES
Subjective:       Patient ID: Kristopher Ye is a 46 y.o. female.    Chief Complaint: Malignant neoplasm of upper-inner quadrant of right breast     HPI     Here for follow up.  Reports feels stiff this morning- typical back area  Needs a wheelchair (reports due to weight, advised that order should be placed with [facility] or 28)  Energy adequate  Reports less of an appetite than normal- gained 2 lbs (on Trulicity)  Limits carbohydrates due to her DM  + constipation (Trulicity)    Most recent scans:  - 6/13/2022 CT C/A/P:  COMPARISON:  CT abdomen pelvis 04/13/2022, CT chest abdomen pelvis 02/01/2022, CT chest abdomen pelvis 10/25/2021  FINDINGS:  Decreased sensitivity for detecting solid organ abnormalities without intravenous contrast.  LUNG BASES & MEDIASTINUM (limited):  Unremarkable  HEPATOBILIARY: Borderline hepatomegaly with decreased parenchymal attenuation suggesting hepatic steatosis.  No focal hepatic lesions.No biliary ductal dilatation.Gallbladder is contracted.  SPLEEN:  No splenomegaly.  PANCREAS:  No focal masses or ductal dilatation.  ADRENALS:  No adrenal nodules.  KIDNEYS/URETERS: Stable 1.2 cm hypodensity in the upper pole cortex with fluid-level attenuation, likely simple cyst.  No hydronephrosis, stones or solid mass lesions.  PELVIC ORGANS/BLADDER:  Bladder is minimally distended without wall thickening.  Ureters are unremarkable.  Uterus contains a stable-appearing calcified anterior fundal leiomyoma and is otherwise unremarkable.  No adnexal masses.  PERITONEUM / RETROPERITONEUM:  No free air. No free fluid.  LYMPH NODES:  No lymphadenopathy.  VESSELS:  Unremarkable.  GI TRACT: Several high density foci within the gastric lumen, likely ingested material.  No distention or wall thickening. Normal appendix.  BONES AND SOFT TISSUES: Degenerative changes of the spine.  Stable-appearing lucent lesion along the right iliac wing.  Stable lucent/sclerotic foci throughout several vertebral  bodies.  No definitive new lucent/sclerotic lesions.  No fractures.  Impression:  1. In this patient with history of invasive ductal carcinoma of the right breast, there is no CT evidence of new metastatic disease.  Persistent lucent/sclerotic lesions throughout the pelvis and spine, without interval change, as above.  2. Decreased hepatic parenchymal attenuation suggestive of hepatic steatosis.  No focal hepatic lesions on this noncontrast examination.  3. Additional findings as above.     5/2/2022 ECHO:  · The left ventricle is mildly enlarged with eccentric hypertrophy and mildly decreased systolic function. The estimated ejection fraction is 40%.  · There is left ventricular global hypokinesis.  · There is abnormal septal wall motion.  · Grade I left ventricular diastolic dysfunction.  · Normal right ventricular size with mildly reduced right ventricular systolic function.  · Moderate left atrial enlargement.     Currently on Arimidex and Ibrance (dose adjusted due to anemia to 100 mg)  Zoladex and Xgeva monthly    Reason For Follow Up:   1. Stage IV ([id number]) invasive ductal carcinoma of right breast, upper inner quadrant, ER %, MO neg, Her2 neg, Grade 3, multifocal with one lesion appearing more aggressive (Ki67 80%), large LN +, bone mets.  Ibrance dose adjusted with cytopenias     Oncologic History:  Presentation  - 9/11/19 - Screening mammogram showed multiple right breast masses lower inner quadrant  - 9/13/19 - Diagnostic mammogram and US showed a right breast, 3:00 position mass, 2 CFN measuring 17 mm x 16 mm x 14 mm.  There 2 smaller adjacent masses towards the nipple  - 9/19/19 - Biopsy -               A. Right breast subareolar: Grade 3 IDC, estrogen receptor 80%, progesterone receptor 0%, Her2 dago neg, Ki67 80%.                           B. Right breast mass 3:00: Grade 3 IDC with papillary features, estrogen receptor 100%, progesterone receptor 0%, Her2 dago 1+, Ki67 30%.              -  "9/26/19: US right axilla with abnormal lymphadenopathy measuring 4.3 x 2.8 cm with biopsy + for metastatic breast cancer.   Surgery consultation with Dr Ferris on 9/25/19              - 9/25/19 - Genetics Genetics [facility] Neda DAWSONCAnalysis pending; VUS pending  Medical oncology consultation on 10/7/19              * 10/8/19 - CT C/A/P with several lytic lesions in thoracic and lumbar spine, iliac bones, left scapula in addition to 3 x 4.5 cm right axillary node and 2.1 cm right breast mass. Bone scan negative.              * 10/31/19 - started Ibrance, Arimidex, Zoladex; plan for Xgeva.               * 11/12/19 STRATA without targetable mutations.               * 12/16/19 - Bone biopsy + for met disease (initially read as negative, but we treated as metastatic disease given high suspicion).               * MRI brain: "Partially empty sella.  Question bilateral globe proptosis. Otherwise unremarkable MRI brain as detailed above specifically without evidence for intracranial enhancing lesion to suggest metastatic disease."              * 4/22/2020 PET - disease improvement. 8/2020 PET with disease improvement.               *  9/1/20 - Palliative RT to L1-L4     Of note, * Xgeva monthly  - we had been waiting on dental clearance, but she has been unable to get into dentistry and is accepting of risks. Has no active dental disease.      PET scan 4/22/2021:  FINDINGS:  Quality of the study: Mildly degraded due to patient's large body habitus and skin abutting the gantry.  In the head and neck, there are no hypermetabolic lesions worrisome for malignancy. There are no hypermetabolic mucosal lesions, and there are no pathologically enlarged or hypermetabolic lymph nodes.  In the chest, there are no hypermetabolic lesions worrisome for malignancy.  Stable CT appearance of a level 1 right axillary lymph node measuring 1.3 cm in short axis with normal background radiotracer uptake.  Previously with SUV max of 2.1.  There " are no concerning pulmonary nodules or masses, and there are no pathologically enlarged or hypermetabolic lymph nodes.  In the abdomen and pelvis, there is physiologic tracer distribution within the abdominal organs and excretion into the genitourinary system.  In the bones, there are stable lytic lesions throughout the lumbar spine and pelvis and additional stable sclerotic lesions in the sternum and T3 vertebral body.  No associated focal abnormal increased radiotracer uptake.  Findings likely represent treated disease.  Impression:  Interval decreased uptake within a prominent right axillary lymph node, now with normal background uptake.  No new focal abnormal uptake.  Stable lytic and sclerotic lesions without focal abnormal uptake.  Findings are compatible with treated metastasis.      8/25/2021 MRI brain   Impression:  No significant change from prior specifically without evidence for enhancing lesion to suggest intracranial metastatic disease.  Continued partially empty sella, intracranial hypertension to be included in differential in the appropriate clinical setting.  Clinical correlation and further evaluation as warranted.     10/14/2021 MRI thoracic/lumbar spine:  Impression:  Patient history of metastatic breast cancer.  Bone lesions at T10, L2, L3, and right iliac wing, as above, concerning for metastatic disease.  No pathologic fracture, soft tissue component, or epidural extension identified.      10/25/2021 CT chest/abd/pelvis:  Impression:  1.  Stable metastasis of the spine  No evidence of intra-abdominal or intrathoracic metastatic disease  2.  Stable prominent right axillary lymph node.  3.  Additional findings are detailed above.     Currently on Arimidex and Ibrance  Zoladex and Xgeva monthly     - 2/2/2022 Bone scan:  FINDINGS:  No focal abnormal uptake suggestive of osseous metastases.  There is diffusely increased uptake in the calvarium in keeping with hyperostosis.  There is degenerative  type uptake most prominent in the bilateral shoulders and knees.  The focal uptake previously described in the distal right femur is not visualized.  There is otherwise physiologic distribution of the radiopharmaceutical throughout the skeleton.  There is normal uptake in the genitourinary system and soft tissues.  Impression:  There is no scintigraphic evidence of osteoblastic metastatic disease.  Nonspecific uptake in the distal right femur has resolved.  Diffuse cranial hyperostosis and other findings as above.     - 2/1/2022 CT C/A/P:  FINDINGS:  CHEST  Support tubes and lines: None.  Aorta: Normal caliber.  Heart: Normal size..  Coronary arteries: No calcifications.  Pericardium: Normal. No effusion, thickening, or calcification.  Central pulmonary arteries: Normal caliber.  Base of neck/thyroid: Normal.  Lymph nodes: No supraclavicular, axillary, internal mammary, mediastinal or hilar lymphadenopathy.  Esophagus: Normal.  Pleura: No effusion, thickening or calcification.  Body wall: Unremarkable.  Airways: Normal.  Lungs: Clear without focal or diffuse abnormality.  Bones: Sclerotic lesions are seen throughout the spine as well as in the sternum, not substantially changed from 10/25/2021  ABDOMEN/PELVIS  Liver: Unremarkable  Gallbladder/bile ducts: Unremarkable. No intra or extrahepatic biliary ductal dilatation  Pancreas: Unremarkable.  Spleen: Unremarkable.  Adrenals: Unremarkable.  Kidneys: Simple cyst in the right kidney is unchanged  Lymph nodes: No abdominal or pelvic lymphadenopathy.  Bowel and mesentery: Unremarkable.  Abdominal aorta: Unremarkable.  Inferior vena cava: Unremarkable.  Free fluid or free air: None.  Pelvis: Unremarkable.  Urinary bladder: Unremarkable.  Body wall: Unremarkable.  Bones: Sclerotic lesions seen in the spine are unchanged.  Impression:  1. No evidence of new recurrent or metastatic disease.  2. Sclerotic lesions seen in the spine and sternum, unchanged when compared to  10/25/2021.     - 3/22/2022 CTA Chest:  FINDINGS:  Opacification of the pulmonary arteries is sufficient for evaluation to the segmental branches.  No filling defects were seen.  The heart size is normal.  The aorta and main pulmonary segment show normal diameter.  There is no pericardial effusion.  No new lymphadenopathy identified in the chest.  No radiographic evidence of pneumonia or pulmonary edema is identified.  Stable mild apical scarring.  No new or enlarging pulmonary nodule.  There is partial imaging of the upper abdomen.  There is diffusely diminished hepatic attenuation consistent fatty metamorphosis.  Stable, 10 mm sclerotic focus in the left 10th posterior vertebral body with a narrow zone of transition.  A similar appearing lesion is seen in the sternum.  No new suspicious bone lesions.  The site of the reported primary breast malignancy is not well identified.  Impression:  No evidence of pulmonary embolus to the segmental branches.  Other stable findings are as above.  No specific CT evidence for new metastatic disease in the chest.     Had been seeing Dr. Olivares and had a second opinion due to fears over pacemaker     - 3/30/2022 ECHO:  · The left ventricle is moderately enlarged with mild eccentric hypertrophy and low normal systolic function.  · The estimated ejection fraction is 55%.  · The quantitatively derived ejection fraction is 52%.  · Indeterminate left ventricular diastolic function.  · Severe left atrial enlargement.  · Normal right ventricular size with normal right ventricular systolic function.     FH:  Paternal side:  1st cousin- ovarian cancer (diagnosed at age 26) and her mother has thyroid cancer  Maternal grandmother- some type of likely metastatic GI cancer    Review of Systems   Constitutional: Negative for activity change, appetite change and unexpected weight change.   HENT: Negative for mouth sores, rhinorrhea and trouble swallowing.    Eyes: Negative for visual  disturbance.   Respiratory: Positive for shortness of breath (better). Negative for cough and wheezing.    Cardiovascular: Negative for chest pain.   Gastrointestinal: Positive for constipation. Negative for abdominal distention, abdominal pain, blood in stool, change in bowel habit, diarrhea, nausea, vomiting and change in bowel habit.   Genitourinary: Positive for urgency. Negative for difficulty urinating, dysuria and frequency.   Musculoskeletal: Positive for back pain (chronic). Negative for arthralgias, joint swelling and myalgias.   Integumentary:  Negative for rash.   Neurological: Negative for dizziness, weakness, numbness, headaches, disturbances in coordination and coordination difficulties.   Hematological: Negative for adenopathy. Does not bruise/bleed easily.   Psychiatric/Behavioral: Negative for dysphoric mood and sleep disturbance. The patient is not nervous/anxious.          Objective:      Physical Exam  Vitals and nursing note reviewed.   Constitutional:       General: She is not in acute distress.     Appearance: Normal appearance. She is well-developed. She is obese. She is not ill-appearing.      Comments: Presents with her 2 daughters  ECOG= 0  Very pleasant   HENT:      Head: Normocephalic and atraumatic.   Eyes:      General: Lids are normal. No scleral icterus.     Extraocular Movements: Extraocular movements intact.      Conjunctiva/sclera: Conjunctivae normal.      Pupils: Pupils are equal, round, and reactive to light.   Neck:      Thyroid: No thyromegaly.      Vascular: No JVD.      Trachea: Trachea normal.   Cardiovascular:      Rate and Rhythm: Normal rate and regular rhythm.      Heart sounds: Normal heart sounds. No murmur heard.    No friction rub. No gallop.      Comments: Difficult to assess tones.   Pulmonary:      Effort: Pulmonary effort is normal. No respiratory distress.      Breath sounds: Normal breath sounds. No wheezing, rhonchi or rales.      Comments: distant  Chest:    Breasts:      Right: No axillary adenopathy or supraclavicular adenopathy.      Left: No axillary adenopathy or supraclavicular adenopathy.       Abdominal:      General: Bowel sounds are normal. There is no distension.      Palpations: Abdomen is soft. There is no mass.      Tenderness: There is no abdominal tenderness. There is no guarding or rebound.      Comments: No organomegaly however difficult to assess.    Hardness noted periumbilical area   Musculoskeletal:         General: Normal range of motion.      Cervical back: Normal range of motion and neck supple.      Comments: No spinal or paraspinal tenderness.    Lymphadenopathy:      Head:      Right side of head: No submental or submandibular adenopathy.      Left side of head: No submental or submandibular adenopathy.      Cervical: No cervical adenopathy.      Upper Body:      Right upper body: No supraclavicular or axillary adenopathy.      Left upper body: No supraclavicular or axillary adenopathy.   Skin:     General: Skin is warm and dry.      Capillary Refill: Capillary refill takes less than 2 seconds.      Coloration: Skin is not jaundiced or pale.      Findings: No bruising or rash.      Nails: There is no clubbing.   Neurological:      Mental Status: She is alert and oriented to person, place, and time.      Sensory: No sensory deficit.      Motor: No weakness.      Coordination: Coordination normal.      Gait: Gait normal.   Psychiatric:         Mood and Affect: Mood normal.         Speech: Speech normal.         Behavior: Behavior normal.         Thought Content: Thought content normal.         Judgment: Judgment normal.       Labs- reviewed  Imaging- reviewed  Assessment:       Problem List Items Addressed This Visit     Type 2 diabetes mellitus with hyperglycemia, without long-term current use of insulin (Chronic)    Malignant neoplasm of upper-inner quadrant of right breast in female, estrogen receptor positive - Primary    Chronic combined  systolic and diastolic heart failure    Bone metastases    Benign essential HTN    Anemia associated with chemotherapy          Plan:     DM, HTN- better control  CHF- monitors with cardiology    Metastatic breast cancer- stable scans  RTC monthly with labs  Next scans 9/2022    Route Chart for Scheduling    Med Onc Chart Routing      Follow up with physician 2 months. With labs and chemo injection   Follow up with CAMILA 4 weeks. See PJ with labs and EP and chemo injection   Infusion scheduling note    Injection scheduling note    Labs    Imaging    Pharmacy appointment    Other referrals          Treatment Plan Information   OP ANASTROZOLE PALBOCICLIB Q4W   Jessica Mendez MD   Upcoming Treatment Dates - OP ANASTROZOLE PALBOCICLIB Q4W    No upcoming days in selected categories.    Supportive Plan Information  OP BREAST GOSERELIN & DENOSUMAB Q4W   Jessica Mendez MD   Upcoming Treatment Dates - OP BREAST GOSERELIN & DENOSUMAB Q4W    No upcoming days in selected categories.

## 2022-06-27 NOTE — NURSING
Pt arrived from [facility] for zoladex and xgeva.  Pt tolerated xgeva SQ to RLA and Zoladex SQ to LLA.  Pt discharged to home with daughters.

## 2022-07-06 ENCOUNTER — TELEPHONE (OUTPATIENT)
Dept: HEMATOLOGY/ONCOLOGY | Facility: CLINIC | Age: 47
End: 2022-07-06
Payer: MEDICARE

## 2022-07-06 ENCOUNTER — PATIENT MESSAGE (OUTPATIENT)
Dept: PODIATRY | Facility: CLINIC | Age: 47
End: 2022-07-06
Payer: MEDICARE

## 2022-07-18 ENCOUNTER — PATIENT MESSAGE (OUTPATIENT)
Dept: HEMATOLOGY/ONCOLOGY | Facility: CLINIC | Age: 47
End: 2022-07-18
Payer: MEDICARE

## 2022-07-18 ENCOUNTER — SPECIALTY PHARMACY (OUTPATIENT)
Dept: PHARMACY | Facility: CLINIC | Age: 47
End: 2022-07-18
Payer: MEDICARE

## 2022-07-18 NOTE — TELEPHONE ENCOUNTER
Specialty Pharmacy - Refill Coordination    Specialty Medication Orders Linked to Encounter    Flowsheet Row Most Recent Value   Medication #1 palbociclib (IBRANCE) 100 mg Cap (Order#[id number], Rx#[id number])          Refill Questions - Documented Responses    Flowsheet Row Most Recent Value   Patient Availability and HIPAA Verification    Does patient want to proceed with activity? Yes   HIPAA/medical authority confirmed? Yes   Relationship to patient of person spoken to? Self   Refill Screening Questions    Changes to allergies? No   Changes to medications? No   New conditions since last clinic visit? No   Unplanned office visit, urgent care, ED, or hospital admission in the last 4 weeks? No   How does patient/caregiver feel medication is working? Good   Financial problems or insurance changes? No   How many doses of your specialty medications were missed in the last 4 weeks? 0   Would patient like to speak to a pharmacist? No   When does the patient need to receive the medication? 07/21/22   Refill Delivery Questions    How will the patient receive the medication? Delivery [facility]   When does the patient need to receive the medication? 07/21/22   Shipping Address Home   Address in [facility] confirmed and updated if neccessary? Yes   Expected Copay ($) 0   Is the patient able to afford the medication copay? Yes   Payment Method zero copay   Days supply of Refill 28   Supplies needed? No supplies needed   Refill activity completed? Yes   Refill activity plan Refill scheduled   Shipment/Pickup Date: 07/20/22          Current Outpatient Medications   Medication Sig    (Magic mouthwash) 1:1:1 Benadryl 12.5mg/5ml liq, aluminum & magnesium hydroxide-simehticone (Maalox), LIDOcaine viscous 2% Swish and spit 10 mLs every 4 (four) hours as needed. for mouth sores    amLODIPine (NORVASC) 5 MG tablet TAKE 1 TABLET(5 MG) BY MOUTH EVERY DAY    anastrozole (ARIMIDEX) 1 mg Tab TAKE 1 TABLET(1 MG) BY MOUTH EVERY DAY     atorvastatin (LIPITOR) 40 MG tablet TAKE 1 TABLET(40 MG) BY MOUTH EVERY DAY    blood sugar diagnostic Strp To check BG 4 times daily, to use with insurance preferred meter    blood-glucose meter kit To check BG 4 times daily, to use with insurance preferred meter    carvediloL (COREG) 25 MG tablet TAKE 1 TABLET(25 MG) BY MOUTH TWICE DAILY    ciclopirox (PENLAC) 8 % Soln Apply topically nightly.    duke's soln (benadryl 30 mL, mylanta 30 mL, LIDOcaine 30 mL, nystatin 30 mL) 120mL Take 10 mLs by mouth 4 (four) times daily.    ergocalciferol (ERGOCALCIFEROL) 50,000 unit Cap TAKE 1 CAPSULE BY MOUTH EVERY 7 DAYS    ferrous sulfate 324 mg (65 mg iron) TbEC Take 1 tablet every Monday, Wednesday and Friday.    furosemide (LASIX) 20 MG tablet Take 1 tablet (20 mg total) by mouth 2 (two) times daily.    glipiZIDE (GLUCOTROL) 10 MG tablet Take 1 tablet (10 mg total) by mouth 2 (two) times daily before meals.    goserelin (ZOLADEX) 3.6 mg injection Inject 3.6 mg into the skin every 28 days.    HYDROcodone-acetaminophen (NORCO) 5-325 mg per tablet Take 1 tablet by mouth every 6 (six) hours as needed for Pain.    ibuprofen (ADVIL,MOTRIN) 800 MG tablet Take 1 tablet (800 mg total) by mouth 2 (two) times daily as needed for Pain.    lancets Misc To check BG 4 times daily, to use with insurance preferred meter    LIDOcaine (LIDODERM) 5 % Place 2 patches onto the skin once daily. Remove & Discard patch within 12 hours or as directed by MD    LORazepam (ATIVAN) 1 MG tablet Take 1 tablet (1 mg total) by mouth every 12 (twelve) hours as needed for Anxiety.    morphine (MS CONTIN) 15 MG 12 hr tablet Take 1 tablet (15 mg total) by mouth every 8 (eight) hours as needed for Pain.    ondansetron (ZOFRAN-ODT) 4 MG TbDL Take 1 tablet (4 mg total) by mouth 2 (two) times daily.    ondansetron (ZOFRAN-ODT) 8 MG TbDL Take 1 tablet (8 mg total) by mouth every 8 (eight) hours as needed (nausea).    palbociclib (IBRANCE) 100 mg Cap  Take 1 capsule (100 mg) by mouth once daily Take daily for 21 days, followed by 7 days off.    potassium chloride (KLOR-CON) 10 MEQ TbSR TAKE 1 TABLET(10 MEQ) BY MOUTH EVERY DAY    promethazine (PHENERGAN) 25 MG tablet Take 1 tablet (25 mg total) by mouth every 6 (six) hours as needed for Nausea.    sacubitriL-valsartan (ENTRESTO)  mg per tablet Take 1 tablet by mouth 2 (two) times daily.    sennosides (SENNA-C ORAL) Take 2 tablets by mouth daily as needed.    TRULICITY 0.75 mg/0.5 mL pen injector ADMINISTER 0.75 MG UNDER THE SKIN EVERY 7 DAYS    venlafaxine (EFFEXOR-XR) 75 MG 24 hr capsule TAKE ONE CAPSULE BY MOUTH DAILY WITH 150MG FOR A TOTAL DOSE OF 225MG   Last reviewed on 6/27/2022  7:08 AM by Phyllis Lei MD    Review of patient's allergies indicates:   Allergen Reactions    Keflex [cephalexin] Itching    Last reviewed on  6/27/2022 7:50 AM by Araseli Sweet      Tasks added this encounter   8/11/2022 - Refill Call (Auto Added)   Tasks due within next 3 months   8/23/2022 - Clinical - Follow Up Assesement (180 day)     [facility]  [address] [place] [id number]  Phone: [phone]  Fax: [phone]

## 2022-07-19 ENCOUNTER — TELEPHONE (OUTPATIENT)
Dept: PALLIATIVE MEDICINE | Facility: CLINIC | Age: 47
End: 2022-07-19
Payer: MEDICARE

## 2022-07-19 ENCOUNTER — PATIENT MESSAGE (OUTPATIENT)
Dept: HEMATOLOGY/ONCOLOGY | Facility: CLINIC | Age: 47
End: 2022-07-19
Payer: MEDICARE

## 2022-07-20 ENCOUNTER — OFFICE VISIT (OUTPATIENT)
Dept: PALLIATIVE MEDICINE | Facility: CLINIC | Age: 47
End: 2022-07-20
Payer: MEDICARE

## 2022-07-20 DIAGNOSIS — K59.00 CONSTIPATION, UNSPECIFIED CONSTIPATION TYPE: ICD-10-CM

## 2022-07-20 DIAGNOSIS — R53.0 NEOPLASTIC (MALIGNANT) RELATED FATIGUE: ICD-10-CM

## 2022-07-20 DIAGNOSIS — Z51.5 ENCOUNTER FOR PALLIATIVE CARE: ICD-10-CM

## 2022-07-20 DIAGNOSIS — Z71.89 ADVANCED CARE PLANNING/COUNSELING DISCUSSION: ICD-10-CM

## 2022-07-20 DIAGNOSIS — C50.211 MALIGNANT NEOPLASM OF UPPER-INNER QUADRANT OF RIGHT BREAST IN FEMALE, ESTROGEN RECEPTOR POSITIVE: Primary | ICD-10-CM

## 2022-07-20 DIAGNOSIS — R63.0 ANOREXIA: ICD-10-CM

## 2022-07-20 DIAGNOSIS — G47.00 INSOMNIA, UNSPECIFIED TYPE: ICD-10-CM

## 2022-07-20 DIAGNOSIS — R11.0 NAUSEA: ICD-10-CM

## 2022-07-20 DIAGNOSIS — G89.3 CANCER RELATED PAIN: ICD-10-CM

## 2022-07-20 DIAGNOSIS — Z17.0 MALIGNANT NEOPLASM OF UPPER-INNER QUADRANT OF RIGHT BREAST IN FEMALE, ESTROGEN RECEPTOR POSITIVE: Primary | ICD-10-CM

## 2022-07-20 DIAGNOSIS — R06.09 OTHER FORM OF DYSPNEA: ICD-10-CM

## 2022-07-20 DIAGNOSIS — F43.23 ADJUSTMENT DISORDER WITH MIXED ANXIETY AND DEPRESSED MOOD: ICD-10-CM

## 2022-07-20 PROCEDURE — 99214 PR OFFICE/OUTPT VISIT, EST, LEVL IV, 30-39 MIN: ICD-10-PCS | Mod: 95,,, | Performed by: STUDENT IN AN ORGANIZED HEALTH CARE EDUCATION/TRAINING PROGRAM

## 2022-07-20 PROCEDURE — 1160F PR REVIEW ALL MEDS BY PRESCRIBER/CLIN PHARMACIST DOCUMENTED: ICD-10-PCS | Mod: CPTII,95,, | Performed by: STUDENT IN AN ORGANIZED HEALTH CARE EDUCATION/TRAINING PROGRAM

## 2022-07-20 PROCEDURE — 99214 OFFICE O/P EST MOD 30 MIN: CPT | Mod: 95,,, | Performed by: STUDENT IN AN ORGANIZED HEALTH CARE EDUCATION/TRAINING PROGRAM

## 2022-07-20 PROCEDURE — 3052F HG A1C>EQUAL 8.0%<EQUAL 9.0%: CPT | Mod: CPTII,95,, | Performed by: STUDENT IN AN ORGANIZED HEALTH CARE EDUCATION/TRAINING PROGRAM

## 2022-07-20 PROCEDURE — 1159F PR MEDICATION LIST DOCUMENTED IN MEDICAL RECORD: ICD-10-PCS | Mod: CPTII,95,, | Performed by: STUDENT IN AN ORGANIZED HEALTH CARE EDUCATION/TRAINING PROGRAM

## 2022-07-20 PROCEDURE — 4010F ACE/ARB THERAPY RXD/TAKEN: CPT | Mod: CPTII,95,, | Performed by: STUDENT IN AN ORGANIZED HEALTH CARE EDUCATION/TRAINING PROGRAM

## 2022-07-20 PROCEDURE — 1159F MED LIST DOCD IN RCRD: CPT | Mod: CPTII,95,, | Performed by: STUDENT IN AN ORGANIZED HEALTH CARE EDUCATION/TRAINING PROGRAM

## 2022-07-20 PROCEDURE — 3052F PR MOST RECENT HEMOGLOBIN A1C LEVEL 8.0 - < 9.0%: ICD-10-PCS | Mod: CPTII,95,, | Performed by: STUDENT IN AN ORGANIZED HEALTH CARE EDUCATION/TRAINING PROGRAM

## 2022-07-20 PROCEDURE — 1160F RVW MEDS BY RX/DR IN RCRD: CPT | Mod: CPTII,95,, | Performed by: STUDENT IN AN ORGANIZED HEALTH CARE EDUCATION/TRAINING PROGRAM

## 2022-07-20 PROCEDURE — 4010F PR ACE/ARB THEARPY RXD/TAKEN: ICD-10-PCS | Mod: CPTII,95,, | Performed by: STUDENT IN AN ORGANIZED HEALTH CARE EDUCATION/TRAINING PROGRAM

## 2022-07-20 RX ORDER — METHOCARBAMOL 500 MG/1
500 TABLET, FILM COATED ORAL 4 TIMES DAILY PRN
Qty: 40 TABLET | Refills: 0 | Status: SHIPPED | OUTPATIENT
Start: 2022-07-20 | End: 2022-07-30

## 2022-07-20 RX ORDER — TRAMADOL HYDROCHLORIDE 50 MG/1
50 TABLET ORAL EVERY 6 HOURS PRN
Qty: 120 TABLET | Refills: 0 | Status: SHIPPED | OUTPATIENT
Start: 2022-07-20 | End: 2023-03-07

## 2022-07-20 NOTE — PROGRESS NOTES
Consult Note  Palliative Care    The [place] is: [place]  The chief complaint leading to consultation is: symptom management and ACP    Visit type: audiovisual    Face to Face time with patient: 28 minutes    33 minutes of total time spent on the encounter, which includes face to face time and non-face to face time preparing to see the patient (eg, review of tests), Obtaining and/or reviewing separately obtained history, Documenting clinical information in the electronic or other health record, Independently interpreting results (not separately reported) and communicating results to the patient/family/caregiver, or Care coordination (not separately reported).     Each patient to whom he or she provides medical services by telemedicine is:  (1) informed of the relationship between the physician and patient and the respective role of any other health care provider with respect to management of the patient; and (2) notified that he or she may decline to receive medical services by telemedicine and may withdraw from such care at any time.    Reason for Consult: symptom management and ACP      ASSESSMENT/PLAN:     Plan/Recommendations:  Diagnoses and all orders for this visit:    Malignant neoplasm of upper-inner quadrant of right breast in female, estrogen receptor positive with bone mets  - patient followed by Dr. Lei and VALERIE Akers  - currently on disease-directed therapy    Encounter for palliative care/Advanced care planning  - patient decisional  - patient by herself on telemedicine visit  - no ACP documents uploaded into EMR  - philosophy of Palliative Medicine reviewed with patient and family at first visit  - new patient folder given to and reviewed with patient and family at first visit  - goals: life prolonging  - ACP booklet given to and reviewed with patient and family at first visit including HCPOA and living will  - patient verbally stated that she would like her sister, Janel, to be her  "surrogate decision maker.   - request that patient review booklet and fill out form prior to next visit   - did not specifically discuss code status at this time  - will follow up at future appointments    Other form of dyspnea  - patient reporting moderate to severe dyspnea  - dyspnea worsens with exertion   - patient reports good days and bad days, though she noted that things have been improving recently with her dyspnea  - at one time she was unable to walk from her house to car without breaking, she is now able to make that length consistently  - dyspnea has limited desired daily activities    - referred to PT at last visit   - tips for shortness of breath in new patient folder for patient to review    Cancer-related pain  - patient reporting her pain is mainly in her lower back and all along her right side, describes pain and constant, dull  - pain rated between 5-9/10 on a bad day   - patient reports that the Norco, oxycodone, and MS Contin all make her very sleepy  - she does not use any pain medication consistently due to side effects  - reports stretching also helps  - continue lidocaine patches as well   - continue ibuprofen   - will start patient on Tramadol today as she previously tolerated this medication better and had relief of pain when fractured her back  - patient also describes a vibration pain/muscle spasms in her back. Will start Robaxin 500 mg qid prn   - opioid safety sheet in new patient folder for patient to review  - will continue to monitor    Nausea/Anorexia  - patient reports nausea and anorexia are tolerable  - recently diagnosed with DM  - patient reports intermittent decreased appetite has coincided with starting Trulicity    - overall dietary intake is good   - pt reports good fluid intake, ice from [employer] is her "guily pleasure",    - patient using anti-emetics on prn basis with good relief  - continue zofran PRN  - continue phenergan prn  - patient already following with " nutrition  - will continue to monitor    Adjustment disorder with mixed anxiety and depressed mood  - patient reports improvement in depression and anxiety  - she continues to grieve the recent loss of both of her parents, but she finds herself overall doing better  - patient reports good social support from 2 ex-husbands, siblings, daughters & friends  - additionally reports using journaling, music and [place] to cope with feelings of sadness; her 2 kittens also offer comfort   - reports increaesed Effexor is helping, prior to increase she was unable to discuss loss of parents without crying, this is now subsiding  - patient is planning a cruise with her siblings for her birthday in October. She discussed the trip as well as possible projects on her cricket for the project  - emotional support provided throughout visit  - will continue to monitor closely     Neoplastic (malignant) related fatigue/Insomnia  - patient reports improvement in insomnia & fatigue  - she is able to fall asleep and stay asleep when desired  - she is back into a routine since she had to transport her daughters in the morning  - she will experience some expected fatigue the day after staying up late, she limits naps to prevent further disruptions to sleep rhythm    - tips for better sleep in new patient folder for patient to review  - no pharmacologic intervention at this time  - will continue to monitor    Constipation  - patient reports regular, daily BM with the help of senna  - discussed using bowel regimen consistently   - adequate fluid intake   - constipation tip sheet in new patient folder for patient to review  - will continue close monitoring    Understanding of illness/Prognosis: patient has good understanding of disease at this time.     Goals of care: life-prolonging    Follow up: ~ 12 weeks    Patient's encounter and above plan of care discussed with patient's oncologist    SUBJECTIVE:     History of Present  "Illness:  Patient is a 46 y.o. year old female with anemia, anxiety, cardiomyopathy, CHF, HTN, HLD, and metastatic breast cancer presents to Palliative Medicine for symptom management and ACP. Patient was diagnosed with stage IV breast cancer in September 2019. She was started on Ibrance and Arimidex, and she continues on this regimen today. Please see oncology notes for full details regarding her oncologic treatment course.     07/20/2022:  [place]  reviewed and summarized:  05/03/2022 Lorazepam 1 mg disp: 30 for 15 days    Today patient states she is doing well overall. Her breathing has started to improve, and she is able to walk further without resting. Patient does still have dyspnea with exertion. Patient reporting continued pain as well as new vibrating/muscle spasm in her back. Patient states this is not all the time, but it can stop her in her tracks. Patient has been able to process her grief, and she is doing better emotionally. Patient discussed planning a trip with her siblings in October for her 47th birthday. She is even considering doing crafts with her cricket for the trip.     03/23/2022:  [place]  reviewed and summarized:  No new prescriptions reported    Patient seen by VALERIE Lucas. Today patient's primary complaint is dyspnea with exertion; on a bad day dyspnea is significantly limiting of desired activities. Patient reports recent visit to cardiologist with repeat echo scheduled for May. Her pain is reasonably controlled, only occasionally using norco. She states she is adjusting to "a new normal". She reports recent improvement of her depression is likely attributable to increase in Effexor, can now talk about recent loss of her parents without crying. She continues to see onc-psych. She is pleased with stability of recent scans. She reports all other symptoms are improved or tolerable.     01/12/2022:  [place]  reviewed and summarized:  08/23/2021 Oxycodone 5 mg disp: 45 for 11 days  08/23/2021 MS " Contin 15 mg Disp: 60 for 20 days    Today patient states overall she is doing okay. Patient continues to have severe back pain that limits her mobility. All of the pain medications that she has tried has significant side effect of sleepiness. Patient is open to meeting with pain management for intervention. Patient is also open to meeting with PT to help as well. Patient found the holidays difficult for her, but she was able to visit with her family. She did find enjoyment during that time as well. Patient has been following with Dr. Odom. Patient was having some difficulty with sleeping, but she is has been better with a routine this past week.     2021:  [place]  reviewed and summarized:  2021 Oxycodone 5 mg disp: 45 for 11 days  2021 MS Contin 15 mg Disp: 60 for 20 days    Patient presents to clinic today with her sister. Patient having moderate to severe pain in her back and right side. She has been following with radiation oncology as well. Patient uses both MS Contin and oxycodone intermittently. Patient also having some dyspnea. She is feeling very anxious and depressed recently. Patient also experiencing severe nausea and poor appetite. She has been using her anti-emetics intermittently. Patient also having trouble sleeping and feels very fatigued throughout the day. She is also intermittently constipated. Patient open to learning about ACP.    Past Medical History:   Diagnosis Date    Abnormal Pap smear     pt states 13yrs ago colpo was done    Anemia     Anxiety     Cancer     Cardiomyopathy     CHF (congestive heart failure)     Fibroid     Hx of psychiatric care     Hyperlipidemia     Hypertension     Psychiatric problem     Sleep difficulties     Therapy      Past Surgical History:   Procedure Laterality Date     SECTION      TONSILLECTOMY      TUBAL LIGATION      UTERINE FIBROID EMBOLIZATION       Family History   Problem Relation Age of Onset     Arthritis Mother     Diabetes Mother     Heart disease Mother         CHF, CAD , 2 stents    Hypertension Mother     Hyperlipidemia Mother     Heart failure Mother     No Known Problems Sister     Prostate cancer Father     Hypertension Brother     No Known Problems Daughter     Asthma Daughter     No Known Problems Maternal Grandmother     Cancer Maternal Grandfather 79        abdominal origin?    No Known Problems Paternal Grandmother     No Known Problems Paternal Grandfather     Thyroid cancer Other         type? dx age?    Cancer Paternal Cousin         ovarian @ ?29 & cervical @ ?29    Endometriosis Paternal Cousin     Breast cancer Neg Hx     Ovarian cancer Neg Hx     Colon polyps Neg Hx      Review of patient's allergies indicates:   Allergen Reactions    Keflex [cephalexin] Itching       Medications:    Current Outpatient Medications:     (Magic mouthwash) 1:1:1 Benadryl 12.5mg/5ml liq, aluminum & magnesium hydroxide-simehticone (Maalox), LIDOcaine viscous 2%, Swish and spit 10 mLs every 4 (four) hours as needed. for mouth sores, Disp: 360 mL, Rfl: 0    amLODIPine (NORVASC) 5 MG tablet, TAKE 1 TABLET(5 MG) BY MOUTH EVERY DAY, Disp: 30 tablet, Rfl: 11    anastrozole (ARIMIDEX) 1 mg Tab, TAKE 1 TABLET(1 MG) BY MOUTH EVERY DAY, Disp: 90 tablet, Rfl: 3    atorvastatin (LIPITOR) 40 MG tablet, TAKE 1 TABLET(40 MG) BY MOUTH EVERY DAY, Disp: 90 tablet, Rfl: 3    blood sugar diagnostic Strp, To check BG 4 times daily, to use with insurance preferred meter, Disp: 200 each, Rfl: 5    blood-glucose meter kit, To check BG 4 times daily, to use with insurance preferred meter, Disp: 1 each, Rfl: 0    carvediloL (COREG) 25 MG tablet, TAKE 1 TABLET(25 MG) BY MOUTH TWICE DAILY, Disp: 180 tablet, Rfl: 3    ciclopirox (PENLAC) 8 % Soln, Apply topically nightly., Disp: 6.6 mL, Rfl: 3    duke's soln (benadryl 30 mL, mylanta 30 mL, LIDOcaine 30 mL, nystatin 30 mL) 120mL, Take 10 mLs by mouth 4 (four)  times daily., Disp: 120 mL, Rfl: 3    ergocalciferol (ERGOCALCIFEROL) 50,000 unit Cap, TAKE 1 CAPSULE BY MOUTH EVERY 7 DAYS, Disp: 12 capsule, Rfl: 0    ferrous sulfate 324 mg (65 mg iron) TbEC, Take 1 tablet every Monday, Wednesday and Friday., Disp: 30 tablet, Rfl: 0    furosemide (LASIX) 20 MG tablet, Take 1 tablet (20 mg total) by mouth 2 (two) times daily., Disp: 60 tablet, Rfl: 11    glipiZIDE (GLUCOTROL) 10 MG tablet, Take 1 tablet (10 mg total) by mouth 2 (two) times daily before meals., Disp: 180 tablet, Rfl: 3    goserelin (ZOLADEX) 3.6 mg injection, Inject 3.6 mg into the skin every 28 days., Disp: , Rfl:     HYDROcodone-acetaminophen (NORCO) 5-325 mg per tablet, Take 1 tablet by mouth every 6 (six) hours as needed for Pain., Disp: 30 tablet, Rfl: 0    ibuprofen (ADVIL,MOTRIN) 800 MG tablet, Take 1 tablet (800 mg total) by mouth 2 (two) times daily as needed for Pain., Disp: 45 tablet, Rfl: 2    lancets Misc, To check BG 4 times daily, to use with insurance preferred meter, Disp: 200 each, Rfl: 5    LIDOcaine (LIDODERM) 5 %, Place 2 patches onto the skin once daily. Remove & Discard patch within 12 hours or as directed by MD, Disp: 60 patch, Rfl: 0    LORazepam (ATIVAN) 1 MG tablet, Take 1 tablet (1 mg total) by mouth every 12 (twelve) hours as needed for Anxiety., Disp: 30 tablet, Rfl: 0    morphine (MS CONTIN) 15 MG 12 hr tablet, Take 1 tablet (15 mg total) by mouth every 8 (eight) hours as needed for Pain., Disp: 60 tablet, Rfl: 0    ondansetron (ZOFRAN-ODT) 4 MG TbDL, Take 1 tablet (4 mg total) by mouth 2 (two) times daily., Disp: 30 tablet, Rfl: 0    ondansetron (ZOFRAN-ODT) 8 MG TbDL, Take 1 tablet (8 mg total) by mouth every 8 (eight) hours as needed (nausea)., Disp: 30 tablet, Rfl: 2    palbociclib (IBRANCE) 100 mg Cap, Take 1 capsule (100 mg) by mouth once daily Take daily for 21 days, followed by 7 days off., Disp: 21 capsule, Rfl: 3    potassium chloride (KLOR-CON) 10 MEQ TbSR,  TAKE 1 TABLET(10 MEQ) BY MOUTH EVERY DAY, Disp: 30 tablet, Rfl: 2    promethazine (PHENERGAN) 25 MG tablet, Take 1 tablet (25 mg total) by mouth every 6 (six) hours as needed for Nausea., Disp: 30 tablet, Rfl: 1    sacubitriL-valsartan (ENTRESTO)  mg per tablet, Take 1 tablet by mouth 2 (two) times daily., Disp: 60 tablet, Rfl: 11    sennosides (SENNA-C ORAL), Take 2 tablets by mouth daily as needed., Disp: , Rfl:     TRULICITY 0.75 mg/0.5 mL pen injector, ADMINISTER 0.75 MG UNDER THE SKIN EVERY 7 DAYS, Disp: 2 pen, Rfl: 0    venlafaxine (EFFEXOR-XR) 75 MG 24 hr capsule, TAKE ONE CAPSULE BY MOUTH DAILY WITH 150MG FOR A TOTAL DOSE OF 225MG, Disp: 30 capsule, Rfl: 0    OBJECTIVE:       ROS:  Review of Systems   Constitutional: Positive for activity change (2/2 dyspnea ), appetite change (improving) and fatigue.   HENT: Negative.    Eyes: Negative.    Respiratory: Positive for shortness of breath.    Cardiovascular: Negative.    Gastrointestinal: Positive for constipation and nausea.   Genitourinary: Negative.    Musculoskeletal: Positive for arthralgias, back pain and myalgias.   Skin: Negative.    Neurological: Negative.    Psychiatric/Behavioral: Positive for dysphoric mood (improved). Negative for sleep disturbance. The patient is nervous/anxious (improving).    All other systems reviewed and are negative.      Review of Symptoms    Symptom Assessment (ESAS 0-10 Scale)  Pain:  6  Dyspnea:  4  Anxiety:  1  Nausea:  2  Depression:  4  Anorexia:  2  Fatigue:  2  Insomnia:  0  Restlessness:  0  Agitation:  0     CAM / Delirium:  Negative  Constipation:  Positive  Diarrhea:  Negative    Bowel Management Plan (BMP):  Yes      Pain Assessment:  OME in 24 hours:  0  Location(s): back    Back       Location: lower        Quality: aching and dull        Quantity: 6/10 in intensity        Chronicity: Onset 1 year(s) ago, unchanged        Aggravating Factors: activity        Alleviating Factors: opiates and NSAIDs        Associated Symptoms: none    Modified Abigail Scale:  3 (with exertion )    ECOG Performance Status ndGndrndanddndend:nd nd2nd Living Arrangements:  Lives with family    Psychosocial/Cultural: Patient lives with her two adult daughters (18 and 20 years old)    Spiritual:  F - Sandra and Belief:  Catholic  I - Importance:  High  C - Community:  Prays regularly  A - Address in Care:   services offered but declined. Needs met at this time      Advance Care Planning   Advance Directives:   Living Will: No    [place]POST: No    Do Not Resuscitate Status: No    Medical Power of : No        Oral Declaration: Yes  Witnesses:  Ella James LCSW   Agent's Name:  Janel (her sister)    Decision Making:  Patient answered questions          Physical Exam: Limited due to telemedicine visit  Vitals:  Virtual visit, no vitals obtained  Physical Exam  Constitutional:       General: She is not in acute distress.     Appearance: She is not diaphoretic.   HENT:      Head: Normocephalic and atraumatic.      Right Ear: External ear normal.      Left Ear: External ear normal.      Nose: Nose normal.      Mouth/Throat:      Mouth: Mucous membranes are moist.   Eyes:      General: No scleral icterus.        Right eye: No discharge.         Left eye: No discharge.   Neck:      Comments: Trachea midline  Pulmonary:      Effort: Pulmonary effort is normal. No respiratory distress.   Musculoskeletal:      Cervical back: Normal range of motion.      Comments: Sitting up without assistance; able to move extremities without difficutly   Skin:     Coloration: Skin is not jaundiced.      Findings: No rash.   Neurological:      General: No focal deficit present.      Mental Status: She is alert and oriented to person, place, and time.      Cranial Nerves: No cranial nerve deficit.   Psychiatric:         Behavior: Behavior normal.         Thought Content: Thought content normal.         Judgment: Judgment normal.         Labs:  CBC:   WBC   Date  "Value Ref Range Status   06/24/2022 6.01 3.90 - 12.70 K/uL Final     Hemoglobin   Date Value Ref Range Status   06/24/2022 9.5 (L) 12.0 - 16.0 g/dL Final     Hematocrit   Date Value Ref Range Status   06/24/2022 30.5 (L) 37.0 - 48.5 % Final     MCV   Date Value Ref Range Status   06/24/2022 85 82 - 98 fL Final     Platelets   Date Value Ref Range Status   06/24/2022 373 150 - 450 K/uL Final       LFT:   Lab Results   Component Value Date    AST 11 06/24/2022    ALKPHOS 90 06/24/2022    BILITOT 0.4 06/24/2022       Albumin:   Albumin   Date Value Ref Range Status   06/24/2022 3.1 (L) 3.5 - 5.2 g/dL Final   01/28/2021 3.5 (L) 3.6 - 5.1 g/dL Final     Comment:     For additional information, please refer to   http://education.InboundWriter/faq/ELH107 (This link is   being provided for informational/ educational purposes only.)    This test was developed and its analytical performance   characteristics have been determined by [facility]. It has not been cleared or approved by the   [place] Food and Drug Administration. This assay has been validated   pursuant to the CLIA regulations and is used for clinical   purposes.  @ Test Performed By:  [facility]  John Valverde M.D.,   [address] [place] [id number]  CLIA  [id number]       Protein:   Total Protein   Date Value Ref Range Status   06/24/2022 7.9 6.0 - 8.4 g/dL Final       Radiology:I have reviewed all pertinent imaging results/findings within the past 24 hours.     06/13/2022 CT A/P: "1. In this patient with history of invasive ductal carcinoma of the right breast, there is no CT evidence of new metastatic disease.  Persistent lucent/sclerotic lesions throughout the pelvis and spine, without interval change, as above. 2. Decreased hepatic parenchymal attenuation suggestive of hepatic steatosis.  No focal hepatic lesions on this noncontrast examination. 3. Additional " "findings as above."    33 minutes of total time spent on the encounter, which includes face to face time and non-face to face time preparing to see the patient (eg, review of tests), Obtaining and/or reviewing separately obtained history, Documenting clinical information in the electronic or other health record, Independently interpreting results (not separately reported) and communicating results to the patient/family/caregiver, or Care coordination (not separately reported).    Signature: Aishwarya Portillo MD            "

## 2022-07-21 ENCOUNTER — OFFICE VISIT (OUTPATIENT)
Dept: PSYCHIATRY | Facility: CLINIC | Age: 47
End: 2022-07-21
Payer: MEDICARE

## 2022-07-21 DIAGNOSIS — C50.211 MALIGNANT NEOPLASM OF UPPER-INNER QUADRANT OF RIGHT BREAST IN FEMALE, ESTROGEN RECEPTOR POSITIVE: ICD-10-CM

## 2022-07-21 DIAGNOSIS — F33.1 MAJOR DEPRESSIVE DISORDER, RECURRENT EPISODE, MODERATE WITH ANXIOUS DISTRESS: Primary | ICD-10-CM

## 2022-07-21 DIAGNOSIS — Z17.0 MALIGNANT NEOPLASM OF UPPER-INNER QUADRANT OF RIGHT BREAST IN FEMALE, ESTROGEN RECEPTOR POSITIVE: ICD-10-CM

## 2022-07-21 PROCEDURE — 99999 PR PBB SHADOW E&M-EST. PATIENT-LVL I: ICD-10-PCS | Mod: PBBFAC,,, | Performed by: PSYCHOLOGIST

## 2022-07-21 PROCEDURE — 3052F PR MOST RECENT HEMOGLOBIN A1C LEVEL 8.0 - < 9.0%: ICD-10-PCS | Mod: CPTII,S$GLB,, | Performed by: PSYCHOLOGIST

## 2022-07-21 PROCEDURE — 4010F ACE/ARB THERAPY RXD/TAKEN: CPT | Mod: CPTII,S$GLB,, | Performed by: PSYCHOLOGIST

## 2022-07-21 PROCEDURE — 90837 PSYTX W PT 60 MINUTES: CPT | Mod: S$GLB,,, | Performed by: PSYCHOLOGIST

## 2022-07-21 PROCEDURE — 90837 PR PSYCHOTHERAPY W/PATIENT, 60 MIN: ICD-10-PCS | Mod: S$GLB,,, | Performed by: PSYCHOLOGIST

## 2022-07-21 PROCEDURE — 4010F PR ACE/ARB THEARPY RXD/TAKEN: ICD-10-PCS | Mod: CPTII,S$GLB,, | Performed by: PSYCHOLOGIST

## 2022-07-21 PROCEDURE — 3052F HG A1C>EQUAL 8.0%<EQUAL 9.0%: CPT | Mod: CPTII,S$GLB,, | Performed by: PSYCHOLOGIST

## 2022-07-21 PROCEDURE — 99999 PR PBB SHADOW E&M-EST. PATIENT-LVL I: CPT | Mod: PBBFAC,,, | Performed by: PSYCHOLOGIST

## 2022-07-21 NOTE — PROGRESS NOTES
INFORMED CONSENT: Kristopher Ye   is known to this provider and identity was confirmed via NAME and .  The patient has been informed of the risks and benefits associated with engaging in psychotherapy, the handling of protected health information, the rights of privacy and the limits of confidentiality. The patient has also been informed of the importance of reporting any suicidal or homicidal ideation to this or any provider to ensure safety of all parties, and the Kristopher Ye expressed understanding. The patient was agreeable to these terms and freely participates in individual psychotherapy.    PSYCHO-ONCOLOGY NOTE/ Individual Psychotherapy     Date: 2022   [facility]        Therapeutic Intervention: Met with patient.  Outpatient - Behavior modifying psychotherapy 60 min - CPT code 92204      Patient was last seen by me on 5/10/2022    Problem list  Patient Active Problem List   Diagnosis    Hypertension    Iron deficiency anemia    Cardiomegaly    Fibroid uterus    BLUE (dyspnea on exertion)    Thalassemia    Major depressive disorder, recurrent episode, moderate with anxious distress    Chronic combined systolic and diastolic heart failure    Cardiomyopathy    Hyperlipidemia    Malignant neoplasm of upper-inner quadrant of right breast in female, estrogen receptor positive    Cancer associated pain    Pathological fracture of lumbar vertebra with routine healing    Bone metastases    Hot flashes    Prediabetes    BMI 60.0-69.9, adult    Elevated LDL cholesterol level    Anxiety    Vaginal dryness    Vitamin D deficiency    Grief    Hyperglycemia    Type 2 diabetes mellitus with hyperglycemia, without long-term current use of insulin    Benign paroxysmal positional vertigo    Neuropathy    Anemia associated with chemotherapy    Sleep difficulties    Hard mass of abdomen    Benign essential HTN       Chief complaint/reason for encounter:  depression and anxiety   Met with patient to evaluate psychosocial adaptation to diagnosis/treatment/survivorship of BC    Current Medications  Current Outpatient Medications   Medication    (Magic mouthwash) 1:1:1 Benadryl 12.5mg/5ml liq, aluminum & magnesium hydroxide-simehticone (Maalox), LIDOcaine viscous 2%    amLODIPine (NORVASC) 5 MG tablet    anastrozole (ARIMIDEX) 1 mg Tab    atorvastatin (LIPITOR) 40 MG tablet    blood sugar diagnostic Strp    blood-glucose meter kit    carvediloL (COREG) 25 MG tablet    ciclopirox (PENLAC) 8 % Soln    duke's soln (benadryl 30 mL, mylanta 30 mL, LIDOcaine 30 mL, nystatin 30 mL) 120mL    ergocalciferol (ERGOCALCIFEROL) 50,000 unit Cap    ferrous sulfate 324 mg (65 mg iron) TbEC    furosemide (LASIX) 20 MG tablet    glipiZIDE (GLUCOTROL) 10 MG tablet    goserelin (ZOLADEX) 3.6 mg injection    ibuprofen (ADVIL,MOTRIN) 800 MG tablet    lancets Misc    LIDOcaine (LIDODERM) 5 %    LORazepam (ATIVAN) 1 MG tablet    methocarbamoL (ROBAXIN) 500 MG Tab    morphine (MS CONTIN) 15 MG 12 hr tablet    ondansetron (ZOFRAN-ODT) 4 MG TbDL    ondansetron (ZOFRAN-ODT) 8 MG TbDL    palbociclib (IBRANCE) 100 mg Cap    potassium chloride (KLOR-CON) 10 MEQ TbSR    promethazine (PHENERGAN) 25 MG tablet    sacubitriL-valsartan (ENTRESTO)  mg per tablet    sennosides (SENNA-C ORAL)    traMADoL (ULTRAM) 50 mg tablet    TRULICITY 0.75 mg/0.5 mL pen injector    venlafaxine (EFFEXOR-XR) 75 MG 24 hr capsule     No current facility-administered medications for this visit.       Objective:  Kristopher Ye arrived promptly for the session.  Ms. Ye was independently ambulatory at the time of session. The patient was fully cooperative throughout the session.  Appearance: age appropriate, appropriately  dressed, adequately  groomed  Behavior/Cooperation: friendly and cooperative  Speech: normal in rate, volume, and tone and appropriate quality, quantity and  organization of sentences  Mood: euthymic  Affect: mood congruent  Thought Process: goal-directed, logical  Thought Content: normal,  No delusions or paranoia; did not appear to be responding to internal stimuli during the session  Orientation: grossly intact  Memory: Grossly intact  Attention Span/Concentration: Attends to session without distraction; reports no difficulty  Fund of Knowledge: average  Estimate of Intelligence: average from verbal skills and history  Cognition: grossly intact  Insight: patient has awareness of illness; good insight into own behavior and behavior of others  Judgment: the patient's behavior is adequate to circumstances    Interval history and content of current session: Patient with moderate familial discord.  Discussed diagnosis, treatment, prognosis, current adaptation to disease and treatment status and family's adaptation to disease and treatment status. Reports to be coping in an adequate manner. Evaluated cognitive response, paying particular attention to negative intrusive thoughts of a persistent and detrimental nature. Thoughts of this type are in evidence with moderate distress. Provided cognitive behavioral therapy to address negative cognitions. Identified and evaluated psychosocial and environmental stressors secondary to diagnosis and treatment.  Examined proactive behaviors that may be implemented to minimize or ameliorate psychosocial stressors secondary to diagnosis and treatment.     Risk parameters:   Patient reports no suicidal ideation  Patient reports no homicidal ideation  Patient reports no self-injurious behavior  Patient reports no violent behavior   Safety needs:  None at this time      Verbal deficits: None     Patient's response to intervention:The patient's response to intervention is accepting.     Progress toward goals and other mental status changes:  The patient's progress toward goals is good.      Progress to date:Progress as Expected      Goals  from last visit: Met     Patient reported outcomes:    Distress Thermometer:   Distress Score    Distress Score: 7        Practical Problems Physical Problems                                                   Family Problems                                         Emotional Problems                                                         Spiritual/Religions Concerns     Spritual / Samaritan Concerns: No         Other Problems                  Client Strengths: verbal, intelligent, successful, good social support, good insight, commitment to wellness, strong jd, strong cultural traditions     Diagnosis:     ICD-10-CM ICD-9-CM   1. Major depressive disorder, recurrent episode, moderate with anxious distress  F33.1 296.32   2. Malignant neoplasm of upper-inner quadrant of right breast in female, estrogen receptor positive  C50.211 174.2    Z17.0 V86.0       Treatment Plan:individual psychotherapy and medication management by physician  · Target symptoms: depression, anxiety   · Why chosen therapy is appropriate versus another modality: relevant to diagnosis, patient responds to this modality, evidence based practice  · Outcome monitoring methods: self-report, observation, feedback from family  · Therapeutic intervention type: behavior modifying psychotherapy  · Prognosis: Good      Behavioral goals:     Increase daily self-care and attention to health management  Pleasant events scheduling and increased social interaction  Monitor stressors in writing and bring to next visit    Return to clinic: as scheduled       Length of Service (minutes direct face-to-face contact): 60    Bria Odom, PhD  Clinical Psychologist  [place] License #[id number]  [place] License #[id number]

## 2022-07-22 ENCOUNTER — LAB VISIT (OUTPATIENT)
Dept: LAB | Facility: HOSPITAL | Age: 47
End: 2022-07-22
Attending: INTERNAL MEDICINE
Payer: MEDICARE

## 2022-07-22 ENCOUNTER — OFFICE VISIT (OUTPATIENT)
Dept: HEMATOLOGY/ONCOLOGY | Facility: CLINIC | Age: 47
End: 2022-07-22
Payer: MEDICARE

## 2022-07-22 ENCOUNTER — DOCUMENTATION ONLY (OUTPATIENT)
Dept: HEMATOLOGY/ONCOLOGY | Facility: CLINIC | Age: 47
End: 2022-07-22

## 2022-07-22 VITALS
RESPIRATION RATE: 20 BRPM | DIASTOLIC BLOOD PRESSURE: 79 MMHG | WEIGHT: 293 LBS | TEMPERATURE: 98 F | SYSTOLIC BLOOD PRESSURE: 145 MMHG | HEART RATE: 83 BPM | OXYGEN SATURATION: 95 % | BODY MASS INDEX: 50.02 KG/M2 | HEIGHT: 64 IN

## 2022-07-22 DIAGNOSIS — E55.9 VITAMIN D DEFICIENCY: ICD-10-CM

## 2022-07-22 DIAGNOSIS — E11.65 TYPE 2 DIABETES MELLITUS WITH HYPERGLYCEMIA, WITHOUT LONG-TERM CURRENT USE OF INSULIN: Chronic | ICD-10-CM

## 2022-07-22 DIAGNOSIS — F33.1 MAJOR DEPRESSIVE DISORDER, RECURRENT EPISODE, MODERATE WITH ANXIOUS DISTRESS: ICD-10-CM

## 2022-07-22 DIAGNOSIS — Z17.0 MALIGNANT NEOPLASM OF UPPER-INNER QUADRANT OF RIGHT BREAST IN FEMALE, ESTROGEN RECEPTOR POSITIVE: ICD-10-CM

## 2022-07-22 DIAGNOSIS — D50.8 OTHER IRON DEFICIENCY ANEMIA: ICD-10-CM

## 2022-07-22 DIAGNOSIS — C79.51 BONE METASTASES: ICD-10-CM

## 2022-07-22 DIAGNOSIS — Z17.0 MALIGNANT NEOPLASM OF UPPER-INNER QUADRANT OF RIGHT BREAST IN FEMALE, ESTROGEN RECEPTOR POSITIVE: Primary | ICD-10-CM

## 2022-07-22 DIAGNOSIS — C50.211 MALIGNANT NEOPLASM OF UPPER-INNER QUADRANT OF RIGHT BREAST IN FEMALE, ESTROGEN RECEPTOR POSITIVE: ICD-10-CM

## 2022-07-22 DIAGNOSIS — C50.211 MALIGNANT NEOPLASM OF UPPER-INNER QUADRANT OF RIGHT BREAST IN FEMALE, ESTROGEN RECEPTOR POSITIVE: Primary | ICD-10-CM

## 2022-07-22 DIAGNOSIS — E78.2 MIXED HYPERLIPIDEMIA: ICD-10-CM

## 2022-07-22 DIAGNOSIS — I10 HYPERTENSION, UNSPECIFIED TYPE: ICD-10-CM

## 2022-07-22 LAB
ALBUMIN SERPL BCP-MCNC: 2.7 G/DL (ref 3.5–5.2)
ALP SERPL-CCNC: 86 U/L (ref 55–135)
ALT SERPL W/O P-5'-P-CCNC: 12 U/L (ref 10–44)
ANION GAP SERPL CALC-SCNC: 9 MMOL/L (ref 8–16)
ANISOCYTOSIS BLD QL SMEAR: SLIGHT
AST SERPL-CCNC: 11 U/L (ref 10–40)
BASOPHILS # BLD AUTO: 0.05 K/UL (ref 0–0.2)
BASOPHILS NFR BLD: 1.1 % (ref 0–1.9)
BILIRUB SERPL-MCNC: 0.3 MG/DL (ref 0.1–1)
BUN SERPL-MCNC: 12 MG/DL (ref 6–20)
CALCIUM SERPL-MCNC: 9.5 MG/DL (ref 8.7–10.5)
CHLORIDE SERPL-SCNC: 103 MMOL/L (ref 95–110)
CO2 SERPL-SCNC: 26 MMOL/L (ref 23–29)
CREAT SERPL-MCNC: 0.9 MG/DL (ref 0.5–1.4)
DACRYOCYTES BLD QL SMEAR: ABNORMAL
DIFFERENTIAL METHOD: ABNORMAL
EOSINOPHIL # BLD AUTO: 0.1 K/UL (ref 0–0.5)
EOSINOPHIL NFR BLD: 1.3 % (ref 0–8)
ERYTHROCYTE [DISTWIDTH] IN BLOOD BY AUTOMATED COUNT: 22.2 % (ref 11.5–14.5)
EST. GFR  (AFRICAN AMERICAN): >60 ML/MIN/1.73 M^2
EST. GFR  (NON AFRICAN AMERICAN): >60 ML/MIN/1.73 M^2
GLUCOSE SERPL-MCNC: 240 MG/DL (ref 70–110)
HCT VFR BLD AUTO: 31.3 % (ref 37–48.5)
HGB BLD-MCNC: 9.3 G/DL (ref 12–16)
HYPOCHROMIA BLD QL SMEAR: ABNORMAL
IMM GRANULOCYTES # BLD AUTO: 0.01 K/UL (ref 0–0.04)
IMM GRANULOCYTES NFR BLD AUTO: 0.2 % (ref 0–0.5)
LYMPHOCYTES # BLD AUTO: 1.2 K/UL (ref 1–4.8)
LYMPHOCYTES NFR BLD: 27.2 % (ref 18–48)
MCH RBC QN AUTO: 26 PG (ref 27–31)
MCHC RBC AUTO-ENTMCNC: 29.7 G/DL (ref 32–36)
MCV RBC AUTO: 87 FL (ref 82–98)
MONOCYTES # BLD AUTO: 0.4 K/UL (ref 0.3–1)
MONOCYTES NFR BLD: 8.4 % (ref 4–15)
NEUTROPHILS # BLD AUTO: 2.8 K/UL (ref 1.8–7.7)
NEUTROPHILS NFR BLD: 61.8 % (ref 38–73)
NRBC BLD-RTO: 0 /100 WBC
PLATELET # BLD AUTO: 316 K/UL (ref 150–450)
PLATELET BLD QL SMEAR: ABNORMAL
PMV BLD AUTO: 10.7 FL (ref 9.2–12.9)
POIKILOCYTOSIS BLD QL SMEAR: SLIGHT
POLYCHROMASIA BLD QL SMEAR: ABNORMAL
POTASSIUM SERPL-SCNC: 4 MMOL/L (ref 3.5–5.1)
PROT SERPL-MCNC: 7.4 G/DL (ref 6–8.4)
RBC # BLD AUTO: 3.58 M/UL (ref 4–5.4)
SODIUM SERPL-SCNC: 138 MMOL/L (ref 136–145)
WBC # BLD AUTO: 4.53 K/UL (ref 3.9–12.7)

## 2022-07-22 PROCEDURE — 36415 COLL VENOUS BLD VENIPUNCTURE: CPT | Performed by: INTERNAL MEDICINE

## 2022-07-22 PROCEDURE — 3077F SYST BP >= 140 MM HG: CPT | Mod: CPTII,S$GLB,, | Performed by: NURSE PRACTITIONER

## 2022-07-22 PROCEDURE — 1160F RVW MEDS BY RX/DR IN RCRD: CPT | Mod: CPTII,S$GLB,, | Performed by: NURSE PRACTITIONER

## 2022-07-22 PROCEDURE — 3077F PR MOST RECENT SYSTOLIC BLOOD PRESSURE >= 140 MM HG: ICD-10-PCS | Mod: CPTII,S$GLB,, | Performed by: NURSE PRACTITIONER

## 2022-07-22 PROCEDURE — 80053 COMPREHEN METABOLIC PANEL: CPT | Performed by: INTERNAL MEDICINE

## 2022-07-22 PROCEDURE — 3052F HG A1C>EQUAL 8.0%<EQUAL 9.0%: CPT | Mod: CPTII,S$GLB,, | Performed by: NURSE PRACTITIONER

## 2022-07-22 PROCEDURE — 3078F DIAST BP <80 MM HG: CPT | Mod: CPTII,S$GLB,, | Performed by: NURSE PRACTITIONER

## 2022-07-22 PROCEDURE — 1159F PR MEDICATION LIST DOCUMENTED IN MEDICAL RECORD: ICD-10-PCS | Mod: CPTII,S$GLB,, | Performed by: NURSE PRACTITIONER

## 2022-07-22 PROCEDURE — 85025 COMPLETE CBC W/AUTO DIFF WBC: CPT | Performed by: INTERNAL MEDICINE

## 2022-07-22 PROCEDURE — 1160F PR REVIEW ALL MEDS BY PRESCRIBER/CLIN PHARMACIST DOCUMENTED: ICD-10-PCS | Mod: CPTII,S$GLB,, | Performed by: NURSE PRACTITIONER

## 2022-07-22 PROCEDURE — 3078F PR MOST RECENT DIASTOLIC BLOOD PRESSURE < 80 MM HG: ICD-10-PCS | Mod: CPTII,S$GLB,, | Performed by: NURSE PRACTITIONER

## 2022-07-22 PROCEDURE — 99999 PR PBB SHADOW E&M-EST. PATIENT-LVL V: ICD-10-PCS | Mod: PBBFAC,,, | Performed by: NURSE PRACTITIONER

## 2022-07-22 PROCEDURE — 3008F BODY MASS INDEX DOCD: CPT | Mod: CPTII,S$GLB,, | Performed by: NURSE PRACTITIONER

## 2022-07-22 PROCEDURE — 3052F PR MOST RECENT HEMOGLOBIN A1C LEVEL 8.0 - < 9.0%: ICD-10-PCS | Mod: CPTII,S$GLB,, | Performed by: NURSE PRACTITIONER

## 2022-07-22 PROCEDURE — 3008F PR BODY MASS INDEX (BMI) DOCUMENTED: ICD-10-PCS | Mod: CPTII,S$GLB,, | Performed by: NURSE PRACTITIONER

## 2022-07-22 PROCEDURE — 99999 PR PBB SHADOW E&M-EST. PATIENT-LVL V: CPT | Mod: PBBFAC,,, | Performed by: NURSE PRACTITIONER

## 2022-07-22 PROCEDURE — 99499 UNLISTED E&M SERVICE: CPT | Mod: S$GLB,,, | Performed by: NURSE PRACTITIONER

## 2022-07-22 PROCEDURE — 4010F ACE/ARB THERAPY RXD/TAKEN: CPT | Mod: CPTII,S$GLB,, | Performed by: NURSE PRACTITIONER

## 2022-07-22 PROCEDURE — 1159F MED LIST DOCD IN RCRD: CPT | Mod: CPTII,S$GLB,, | Performed by: NURSE PRACTITIONER

## 2022-07-22 PROCEDURE — 99215 OFFICE O/P EST HI 40 MIN: CPT | Mod: S$GLB,,, | Performed by: NURSE PRACTITIONER

## 2022-07-22 PROCEDURE — 4010F PR ACE/ARB THEARPY RXD/TAKEN: ICD-10-PCS | Mod: CPTII,S$GLB,, | Performed by: NURSE PRACTITIONER

## 2022-07-22 PROCEDURE — 99215 PR OFFICE/OUTPT VISIT, EST, LEVL V, 40-54 MIN: ICD-10-PCS | Mod: S$GLB,,, | Performed by: NURSE PRACTITIONER

## 2022-07-22 PROCEDURE — 99499 RISK ADDL DX/OHS AUDIT: ICD-10-PCS | Mod: S$GLB,,, | Performed by: NURSE PRACTITIONER

## 2022-07-22 NOTE — PROGRESS NOTES
"Subjective:       Patient ID: Kristopher Ye is a 46 y.o. female.    Chief Complaint: Malignant neoplasm of upper-inner quadrant of right breast     HPI  Patient presents for follow up.   She is feeling "icky" today due to elevated sugars. She did eat a biscuit and red beans and rice last night  Bowel movements better with senna, she was constipated to due Trulicity   She forces herself to drink water.   Needs a wheelchair, still with some back pain and stiffness in the mornings.   Shortness of breath has improved, but still present.   Still with some hotflashes, but they have improved    She is taking anastrozole and Ibrance - she starts on Monday with her next cycle.  Due Monday for Zoladex and Zgeva        Per Dr. Lei's previous note: Most recent scans:  - 6/13/2022 CT C/A/P:  COMPARISON:  CT abdomen pelvis 04/13/2022, CT chest abdomen pelvis 02/01/2022, CT chest abdomen pelvis 10/25/2021  FINDINGS:  Decreased sensitivity for detecting solid organ abnormalities without intravenous contrast.  LUNG BASES & MEDIASTINUM (limited):  Unremarkable  HEPATOBILIARY: Borderline hepatomegaly with decreased parenchymal attenuation suggesting hepatic steatosis.  No focal hepatic lesions.No biliary ductal dilatation.Gallbladder is contracted.  SPLEEN:  No splenomegaly.  PANCREAS:  No focal masses or ductal dilatation.  ADRENALS:  No adrenal nodules.  KIDNEYS/URETERS: Stable 1.2 cm hypodensity in the upper pole cortex with fluid-level attenuation, likely simple cyst.  No hydronephrosis, stones or solid mass lesions.  PELVIC ORGANS/BLADDER:  Bladder is minimally distended without wall thickening.  Ureters are unremarkable.  Uterus contains a stable-appearing calcified anterior fundal leiomyoma and is otherwise unremarkable.  No adnexal masses.  PERITONEUM / RETROPERITONEUM:  No free air. No free fluid.  LYMPH NODES:  No lymphadenopathy.  VESSELS:  Unremarkable.  GI TRACT: Several high density foci within the gastric " lumen, likely ingested material.  No distention or wall thickening. Normal appendix.  BONES AND SOFT TISSUES: Degenerative changes of the spine.  Stable-appearing lucent lesion along the right iliac wing.  Stable lucent/sclerotic foci throughout several vertebral bodies.  No definitive new lucent/sclerotic lesions.  No fractures.  Impression:  1. In this patient with history of invasive ductal carcinoma of the right breast, there is no CT evidence of new metastatic disease.  Persistent lucent/sclerotic lesions throughout the pelvis and spine, without interval change, as above.  2. Decreased hepatic parenchymal attenuation suggestive of hepatic steatosis.  No focal hepatic lesions on this noncontrast examination.  3. Additional findings as above.     5/2/2022 ECHO:  · The left ventricle is mildly enlarged with eccentric hypertrophy and mildly decreased systolic function. The estimated ejection fraction is 40%.  · There is left ventricular global hypokinesis.  · There is abnormal septal wall motion.  · Grade I left ventricular diastolic dysfunction.  · Normal right ventricular size with mildly reduced right ventricular systolic function.  · Moderate left atrial enlargement.     Currently on Arimidex and Ibrance (dose adjusted due to anemia to 100 mg)  Zoladex and Xgeva monthly    Reason For Follow Up:   1. Stage IV ([id number]) invasive ductal carcinoma of right breast, upper inner quadrant, ER %, [place] neg, Her2 neg, Grade 3, multifocal with one lesion appearing more aggressive (Ki67 80%), large LN +, bone mets.  Ibrance dose adjusted with cytopenias     Oncologic History:  Presentation  - 9/11/19 - Screening mammogram showed multiple right breast masses lower inner quadrant  - 9/13/19 - Diagnostic mammogram and US showed a right breast, 3:00 position mass, 2 CFN measuring 17 mm x 16 mm x 14 mm.  There 2 smaller adjacent masses towards the nipple  - 9/19/19 - Biopsy -               A. Right breast subareolar: Grade  "3 IDC, estrogen receptor 80%, progesterone receptor 0%, Her2 dago neg, Ki67 80%.                           B. Right breast mass 3:00: Grade 3 IDC with papillary features, estrogen receptor 100%, progesterone receptor 0%, Her2 dago 1+, Ki67 30%.              - 9/26/19: US right axilla with abnormal lymphadenopathy measuring 4.3 x 2.8 cm with biopsy + for metastatic breast cancer.   Surgery consultation with Dr Ferris on 9/25/19              - 9/25/19 - Genetics [facility]AC[facility]; VUS pending  Medical oncology consultation on 10/7/19              * 10/8/19 - CT C/A/P with several lytic lesions in thoracic and lumbar spine, iliac bones, left scapula in addition to 3 x 4.5 cm right axillary node and 2.1 cm right breast mass. Bone scan negative.              * 10/31/19 - started Ibrance, Arimidex, Zoladex; plan for Xgeva.               * 11/12/19 STRATA without targetable mutations.               * 12/16/19 - Bone biopsy + for met disease (initially read as negative, but we treated as metastatic disease given high suspicion).               * MRI brain: "Partially empty sella.  Question bilateral globe proptosis. Otherwise unremarkable MRI brain as detailed above specifically without evidence for intracranial enhancing lesion to suggest metastatic disease."              * 4/22/2020 PET - disease improvement. 8/2020 PET with disease improvement.               *  9/1/20 - Palliative RT to L1-L4     Of note, * Xgeva monthly  - we had been waiting on dental clearance, but she has been unable to get into dentistry and is accepting of risks. Has no active dental disease.      PET scan 4/22/2021:  FINDINGS:  Quality of the study: Mildly degraded due to patient's large body habitus and skin abutting the gantry.  In the head and neck, there are no hypermetabolic lesions worrisome for malignancy. There are no hypermetabolic mucosal lesions, and there are no pathologically enlarged or hypermetabolic lymph " nodes.  In the chest, there are no hypermetabolic lesions worrisome for malignancy.  Stable CT appearance of a level 1 right axillary lymph node measuring 1.3 cm in short axis with normal background radiotracer uptake.  Previously with SUV max of 2.1.  There are no concerning pulmonary nodules or masses, and there are no pathologically enlarged or hypermetabolic lymph nodes.  In the abdomen and pelvis, there is physiologic tracer distribution within the abdominal organs and excretion into the genitourinary system.  In the bones, there are stable lytic lesions throughout the lumbar spine and pelvis and additional stable sclerotic lesions in the sternum and T3 vertebral body.  No associated focal abnormal increased radiotracer uptake.  Findings likely represent treated disease.  Impression:  Interval decreased uptake within a prominent right axillary lymph node, now with normal background uptake.  No new focal abnormal uptake.  Stable lytic and sclerotic lesions without focal abnormal uptake.  Findings are compatible with treated metastasis.      8/25/2021 MRI brain   Impression:  No significant change from prior specifically without evidence for enhancing lesion to suggest intracranial metastatic disease.  Continued partially empty sella, intracranial hypertension to be included in differential in the appropriate clinical setting.  Clinical correlation and further evaluation as warranted.     10/14/2021 MRI thoracic/lumbar spine:  Impression:  Patient history of metastatic breast cancer.  Bone lesions at T10, L2, L3, and right iliac wing, as above, concerning for metastatic disease.  No pathologic fracture, soft tissue component, or epidural extension identified.      10/25/2021 CT chest/abd/pelvis:  Impression:  1.  Stable metastasis of the spine  No evidence of intra-abdominal or intrathoracic metastatic disease  2.  Stable prominent right axillary lymph node.  3.  Additional findings are detailed  above.     Currently on Arimidex and Ibrance  Zoladex and Xgeva monthly     - 2/2/2022 Bone scan:  FINDINGS:  No focal abnormal uptake suggestive of osseous metastases.  There is diffusely increased uptake in the calvarium in keeping with hyperostosis.  There is degenerative type uptake most prominent in the bilateral shoulders and knees.  The focal uptake previously described in the distal right femur is not visualized.  There is otherwise physiologic distribution of the radiopharmaceutical throughout the skeleton.  There is normal uptake in the genitourinary system and soft tissues.  Impression:  There is no scintigraphic evidence of osteoblastic metastatic disease.  Nonspecific uptake in the distal right femur has resolved.  Diffuse cranial hyperostosis and other findings as above.     - 2/1/2022 CT C/A/P:  FINDINGS:  CHEST  Support tubes and lines: None.  Aorta: Normal caliber.  Heart: Normal size..  Coronary arteries: No calcifications.  Pericardium: Normal. No effusion, thickening, or calcification.  Central pulmonary arteries: Normal caliber.  Base of neck/thyroid: Normal.  Lymph nodes: No supraclavicular, axillary, internal mammary, mediastinal or hilar lymphadenopathy.  Esophagus: Normal.  Pleura: No effusion, thickening or calcification.  Body wall: Unremarkable.  Airways: Normal.  Lungs: Clear without focal or diffuse abnormality.  Bones: Sclerotic lesions are seen throughout the spine as well as in the sternum, not substantially changed from 10/25/2021  ABDOMEN/PELVIS  Liver: Unremarkable  Gallbladder/bile ducts: Unremarkable. No intra or extrahepatic biliary ductal dilatation  Pancreas: Unremarkable.  Spleen: Unremarkable.  Adrenals: Unremarkable.  Kidneys: Simple cyst in the right kidney is unchanged  Lymph nodes: No abdominal or pelvic lymphadenopathy.  Bowel and mesentery: Unremarkable.  Abdominal aorta: Unremarkable.  Inferior vena cava: Unremarkable.  Free fluid or free air: None.  Pelvis:  Unremarkable.  Urinary bladder: Unremarkable.  Body wall: Unremarkable.  Bones: Sclerotic lesions seen in the spine are unchanged.  Impression:  1. No evidence of new recurrent or metastatic disease.  2. Sclerotic lesions seen in the spine and sternum, unchanged when compared to 10/25/2021.     - 3/22/2022 CTA Chest:  FINDINGS:  Opacification of the pulmonary arteries is sufficient for evaluation to the segmental branches.  No filling defects were seen.  The heart size is normal.  The aorta and main pulmonary segment show normal diameter.  There is no pericardial effusion.  No new lymphadenopathy identified in the chest.  No radiographic evidence of pneumonia or pulmonary edema is identified.  Stable mild apical scarring.  No new or enlarging pulmonary nodule.  There is partial imaging of the upper abdomen.  There is diffusely diminished hepatic attenuation consistent fatty metamorphosis.  Stable, 10 mm sclerotic focus in the left 10th posterior vertebral body with a narrow zone of transition.  A similar appearing lesion is seen in the sternum.  No new suspicious bone lesions.  The site of the reported primary breast malignancy is not well identified.  Impression:  No evidence of pulmonary embolus to the segmental branches.  Other stable findings are as above.  No specific CT evidence for new metastatic disease in the chest.     Had been seeing Dr. Olivares and had a second opinion due to fears over pacemaker     - 3/30/2022 ECHO:  · The left ventricle is moderately enlarged with mild eccentric hypertrophy and low normal systolic function.  · The estimated ejection fraction is 55%.  · The quantitatively derived ejection fraction is 52%.  · Indeterminate left ventricular diastolic function.  · Severe left atrial enlargement.  · Normal right ventricular size with normal right ventricular systolic function.     FH:  Paternal side:  1st cousin- ovarian cancer (diagnosed at age 26) and her mother has thyroid  cancer  Maternal grandmother- some type of likely metastatic GI cancer    Review of Systems   Constitutional: Positive for fatigue (variable, better). Negative for activity change, appetite change and unexpected weight change.   HENT: Negative for mouth sores, rhinorrhea and trouble swallowing.    Eyes: Negative for visual disturbance.   Respiratory: Positive for shortness of breath (better). Negative for cough and wheezing.    Cardiovascular: Negative for chest pain.   Gastrointestinal: Positive for constipation (improved). Negative for abdominal distention, abdominal pain, blood in stool, change in bowel habit, diarrhea, nausea, vomiting and change in bowel habit.   Genitourinary: Negative for difficulty urinating, dysuria, frequency and urgency.   Musculoskeletal: Positive for back pain (chronic). Negative for arthralgias, joint swelling and myalgias.   Integumentary:  Negative for rash.   Neurological: Negative for dizziness, weakness, numbness, headaches, disturbances in coordination and coordination difficulties.   Hematological: Negative for adenopathy. Does not bruise/bleed easily.   Psychiatric/Behavioral: Negative for dysphoric mood and sleep disturbance. The patient is not nervous/anxious.          Objective:      Physical Exam  Vitals and nursing note reviewed.   Constitutional:       General: She is not in acute distress.     Appearance: Normal appearance. She is well-developed. She is obese. She is not ill-appearing.      Comments: Presents with her 2 daughters  ECOG= 0  Very pleasant   HENT:      Head: Normocephalic and atraumatic.   Eyes:      General: Lids are normal. No scleral icterus.     Extraocular Movements: Extraocular movements intact.      Conjunctiva/sclera: Conjunctivae normal.      Pupils: Pupils are equal, round, and reactive to light.   Neck:      Thyroid: No thyromegaly.      Vascular: No JVD.      Trachea: Trachea normal.   Cardiovascular:      Rate and Rhythm: Normal rate and  regular rhythm.      Heart sounds: Normal heart sounds. No murmur heard.    No friction rub. No gallop.      Comments: Difficult to assess tones.   Pulmonary:      Effort: Pulmonary effort is normal. No respiratory distress.      Breath sounds: Normal breath sounds. No wheezing, rhonchi or rales.      Comments: distant  Chest:   Breasts:      Right: No axillary adenopathy or supraclavicular adenopathy.      Left: No axillary adenopathy or supraclavicular adenopathy.       Abdominal:      General: Bowel sounds are normal. There is no distension.      Palpations: Abdomen is soft. There is no mass.      Tenderness: There is no abdominal tenderness. There is no guarding or rebound.      Comments: No organomegaly however difficult to assess.    Hardness noted periumbilical area   Musculoskeletal:         General: Normal range of motion.      Cervical back: Normal range of motion and neck supple.      Comments: No spinal or paraspinal tenderness.    Lymphadenopathy:      Head:      Right side of head: No submental or submandibular adenopathy.      Left side of head: No submental or submandibular adenopathy.      Cervical: No cervical adenopathy.      Upper Body:      Right upper body: No supraclavicular or axillary adenopathy.      Left upper body: No supraclavicular or axillary adenopathy.   Skin:     General: Skin is warm and dry.      Capillary Refill: Capillary refill takes less than 2 seconds.      Coloration: Skin is not jaundiced or pale.      Findings: No bruising or rash.      Nails: There is no clubbing.   Neurological:      Mental Status: She is alert and oriented to person, place, and time.      Sensory: No sensory deficit.      Motor: No weakness.      Coordination: Coordination normal.      Gait: Gait normal.   Psychiatric:         Mood and Affect: Mood normal.         Speech: Speech normal.         Behavior: Behavior normal.         Thought Content: Thought content normal.         Judgment: Judgment normal.        Labs- reviewed  Imaging- reviewed  Assessment:       1. Malignant neoplasm of upper-inner quadrant of right breast in female, estrogen receptor positive     2. Bone metastases     3. Other iron deficiency anemia     4. Hypertension, unspecified type     5. Mixed hyperlipidemia     6. Major depressive disorder, recurrent episode, moderate with anxious distress     7. Type 2 diabetes mellitus with hyperglycemia, without long-term current use of insulin     8. Vitamin D deficiency          Plan:    1-2.  Metastatic breast cancer- stable scans  - Next scans 9/2022  - Continue Ibrance, anastrozole, Zoladex and Xgeva    3. Stable will continue to check monthly     4. Monitored today; continue current medication and follow up with PCP     5. Continue current medication and follow up with PCP    6. Continue current medications    7. Monitored glucose today; continue current medications and follow up with endocrinology   - Encouraged to return blood sugar before eating today    8. Have level checked yearly and continue current replacement       Route Chart for Scheduling    Med Onc Chart Routing      Follow up with physician 2 months. With labs and chemo injection   Follow up with CAMILA 4 weeks. See PJ with labs and EP and chemo injection   Infusion scheduling note    Injection scheduling note    Labs    Imaging    Pharmacy appointment    Other referrals          Treatment Plan Information   OP ANASTROZOLE PALBOCICLIB Q4W   Jessica Mendez MD   Upcoming Treatment Dates - OP ANASTROZOLE PALBOCICLIB Q4W    No upcoming days in selected categories.    Supportive Plan Information  OP BREAST GOSERELIN & DENOSUMAB Q4W   Jessica Mendez MD   Upcoming Treatment Dates - OP BREAST GOSERELIN & DENOSUMAB Q4W    7/29/2022       Chemotherapy       denosumab (XGEVA) solution 120 mg       goserelin (ZOLADEX) injection 3.6 mg  8/26/2022       Chemotherapy       denosumab (XGEVA) solution 120 mg       goserelin (ZOLADEX) injection 3.6  mg  9/23/2022       Chemotherapy       denosumab (XGEVA) solution 120 mg       goserelin (ZOLADEX) injection 3.6 mg  10/21/2022       Chemotherapy       denosumab (XGEVA) solution 120 mg       goserelin (ZOLADEX) injection 3.6 mg

## 2022-07-22 NOTE — PROGRESS NOTES
In response to in basket message from Dr. Odom stating pt has an outstanding balance, SW called pt.  Pt was able to access her bills via [employer] and informed SW that one of the bills, which she just received, is from 2020 and the other is from 5/2022 when she was covered by both Medicare and Medicaid.  Pt agreed that something seems amiss.  SW provided pt with the phone number to pt acct services and she will call to gather information about these charges.    Yolanda was informed by TABATHA Akers NP that she entered a WC order for pt.  She asked that SW send it to [facility] as [facility] does not carry the size WC needed by pt.  SW will send order today and remains available. SW provided name and contact information and encouraged pt to call with any future needs.

## 2022-07-25 ENCOUNTER — INFUSION (OUTPATIENT)
Dept: INFUSION THERAPY | Facility: HOSPITAL | Age: 47
End: 2022-07-25
Attending: INTERNAL MEDICINE
Payer: MEDICARE

## 2022-07-25 DIAGNOSIS — C50.211 MALIGNANT NEOPLASM OF UPPER-INNER QUADRANT OF RIGHT BREAST IN FEMALE, ESTROGEN RECEPTOR POSITIVE: Primary | ICD-10-CM

## 2022-07-25 DIAGNOSIS — Z17.0 MALIGNANT NEOPLASM OF UPPER-INNER QUADRANT OF RIGHT BREAST IN FEMALE, ESTROGEN RECEPTOR POSITIVE: Primary | ICD-10-CM

## 2022-07-25 DIAGNOSIS — E11.65 TYPE 2 DIABETES MELLITUS WITH HYPERGLYCEMIA, WITHOUT LONG-TERM CURRENT USE OF INSULIN: Chronic | ICD-10-CM

## 2022-07-25 PROCEDURE — 96402 CHEMO HORMON ANTINEOPL SQ/IM: CPT

## 2022-07-25 PROCEDURE — 96372 THER/PROPH/DIAG INJ SC/IM: CPT

## 2022-07-25 PROCEDURE — 63600175 PHARM REV CODE 636 W HCPCS: Mod: JG | Performed by: NURSE PRACTITIONER

## 2022-07-25 RX ORDER — DULAGLUTIDE 0.75 MG/.5ML
INJECTION, SOLUTION SUBCUTANEOUS
Qty: 4 PEN | Refills: 0 | Status: SHIPPED | OUTPATIENT
Start: 2022-07-25 | End: 2022-09-01 | Stop reason: SDUPTHER

## 2022-07-25 RX ADMIN — GOSERELIN ACETATE 3.6 MG: 3.6 IMPLANT SUBCUTANEOUS at 11:07

## 2022-07-25 RX ADMIN — DENOSUMAB 120 MG: 120 INJECTION SUBCUTANEOUS at 11:07

## 2022-08-05 ENCOUNTER — PATIENT MESSAGE (OUTPATIENT)
Dept: ADMINISTRATIVE | Facility: HOSPITAL | Age: 47
End: 2022-08-05
Payer: MEDICARE

## 2022-08-09 ENCOUNTER — DOCUMENTATION ONLY (OUTPATIENT)
Dept: HEMATOLOGY/ONCOLOGY | Facility: CLINIC | Age: 47
End: 2022-08-09
Payer: MEDICARE

## 2022-08-09 DIAGNOSIS — C50.211 MALIGNANT NEOPLASM OF UPPER-INNER QUADRANT OF RIGHT BREAST IN FEMALE, ESTROGEN RECEPTOR POSITIVE: Primary | ICD-10-CM

## 2022-08-09 DIAGNOSIS — Z17.0 MALIGNANT NEOPLASM OF UPPER-INNER QUADRANT OF RIGHT BREAST IN FEMALE, ESTROGEN RECEPTOR POSITIVE: Primary | ICD-10-CM

## 2022-08-09 NOTE — PROGRESS NOTES
"Pt emailed stating she had not heard from [facility] about WC.  SUSAN called [facility]--they are requiring an order stating "heavy duty." SW sent order to [facility] attn [facility].     "

## 2022-08-11 ENCOUNTER — SPECIALTY PHARMACY (OUTPATIENT)
Dept: PHARMACY | Facility: CLINIC | Age: 47
End: 2022-08-11
Payer: MEDICARE

## 2022-08-11 DIAGNOSIS — Z17.0 MALIGNANT NEOPLASM OF UPPER-INNER QUADRANT OF RIGHT BREAST IN FEMALE, ESTROGEN RECEPTOR POSITIVE: ICD-10-CM

## 2022-08-11 DIAGNOSIS — C79.51 BONE METASTASES: ICD-10-CM

## 2022-08-11 DIAGNOSIS — C50.211 MALIGNANT NEOPLASM OF UPPER-INNER QUADRANT OF RIGHT BREAST IN FEMALE, ESTROGEN RECEPTOR POSITIVE: ICD-10-CM

## 2022-08-11 NOTE — TELEPHONE ENCOUNTER
Ibrance - Pt stated she has 3 days left, then 1 week off. She will start her next cycle on 08/22/22.

## 2022-08-12 NOTE — TELEPHONE ENCOUNTER
Specialty Pharmacy - Refill Coordination    Specialty Medication Orders Linked to Encounter    Flowsheet Row Most Recent Value   Medication #1 palbociclib (IBRANCE) 100 mg Cap (Order#[id number], Rx#[id number])          Refill Questions - Documented Responses    Flowsheet Row Most Recent Value   Patient Availability and HIPAA Verification    Does patient want to proceed with activity? Yes   HIPAA/medical authority confirmed? Yes   Relationship to patient of person spoken to? Self   Refill Screening Questions    Changes to allergies? No   Changes to medications? No   New conditions since last clinic visit? No   Unplanned office visit, urgent care, ED, or hospital admission in the last 4 weeks? No   How does patient/caregiver feel medication is working? Good   Financial problems or insurance changes? No   How many doses of your specialty medications were missed in the last 4 weeks? 0   Would patient like to speak to a pharmacist? No   When does the patient need to receive the medication? 08/22/22   Refill Delivery Questions    How will the patient receive the medication? Delivery Jesenia   When does the patient need to receive the medication? 08/22/22   Shipping Address Home   Address in [facility] confirmed and updated if neccessary? Yes   Expected Copay ($) 0   Is the patient able to afford the medication copay? Yes   Payment Method zero copay   Days supply of Refill 28   Supplies needed? No supplies needed   Refill activity completed? Yes   Refill activity plan Refill scheduled   Shipment/Pickup Date: 08/17/22          Current Outpatient Medications   Medication Sig    (Magic mouthwash) 1:1:1 Benadryl 12.5mg/5ml liq, aluminum & magnesium hydroxide-simehticone (Maalox), LIDOcaine viscous 2% Swish and spit 10 mLs every 4 (four) hours as needed. for mouth sores    amLODIPine (NORVASC) 5 MG tablet TAKE 1 TABLET(5 MG) BY MOUTH EVERY DAY    anastrozole (ARIMIDEX) 1 mg Tab TAKE 1 TABLET(1 MG) BY MOUTH EVERY DAY     atorvastatin (LIPITOR) 40 MG tablet TAKE 1 TABLET(40 MG) BY MOUTH EVERY DAY    blood sugar diagnostic Strp To check BG 4 times daily, to use with insurance preferred meter    blood-glucose meter kit To check BG 4 times daily, to use with insurance preferred meter    carvediloL (COREG) 25 MG tablet TAKE 1 TABLET(25 MG) BY MOUTH TWICE DAILY    ciclopirox (PENLAC) 8 % Soln Apply topically nightly.    duke's soln (benadryl 30 mL, mylanta 30 mL, LIDOcaine 30 mL, nystatin 30 mL) 120mL Take 10 mLs by mouth 4 (four) times daily.    ergocalciferol (ERGOCALCIFEROL) 50,000 unit Cap TAKE 1 CAPSULE BY MOUTH EVERY 7 DAYS    ferrous sulfate 324 mg (65 mg iron) TbEC Take 1 tablet every Monday, Wednesday and Friday.    furosemide (LASIX) 20 MG tablet Take 1 tablet (20 mg total) by mouth 2 (two) times daily.    glipiZIDE (GLUCOTROL) 10 MG tablet Take 1 tablet (10 mg total) by mouth 2 (two) times daily before meals.    goserelin (ZOLADEX) 3.6 mg injection Inject 3.6 mg into the skin every 28 days.    ibuprofen (ADVIL,MOTRIN) 800 MG tablet Take 1 tablet (800 mg total) by mouth 2 (two) times daily as needed for Pain.    lancets Misc To check BG 4 times daily, to use with insurance preferred meter    LIDOcaine (LIDODERM) 5 % Place 2 patches onto the skin once daily. Remove & Discard patch within 12 hours or as directed by MD    LORazepam (ATIVAN) 1 MG tablet Take 1 tablet (1 mg total) by mouth every 12 (twelve) hours as needed for Anxiety.    morphine (MS CONTIN) 15 MG 12 hr tablet Take 1 tablet (15 mg total) by mouth every 8 (eight) hours as needed for Pain.    ondansetron (ZOFRAN-ODT) 4 MG TbDL Take 1 tablet (4 mg total) by mouth 2 (two) times daily.    palbociclib (IBRANCE) 100 mg Cap Take 1 capsule (100 mg) by mouth once daily Take daily for 21 days, followed by 7 days off.    potassium chloride (KLOR-CON) 10 MEQ TbSR TAKE 1 TABLET(10 MEQ) BY MOUTH EVERY DAY    promethazine (PHENERGAN) 25 MG tablet Take 1 tablet  (25 mg total) by mouth every 6 (six) hours as needed for Nausea.    sacubitriL-valsartan (ENTRESTO)  mg per tablet Take 1 tablet by mouth 2 (two) times daily.    sennosides (SENNA-C ORAL) Take 2 tablets by mouth daily as needed.    traMADoL (ULTRAM) 50 mg tablet Take 1 tablet (50 mg total) by mouth every 6 (six) hours as needed for Pain.    TRULICITY 0.75 mg/0.5 mL pen injector ADMINISTER 0.75 MG UNDER THE SKIN EVERY 7 DAYS    venlafaxine (EFFEXOR-XR) 150 MG Cp24 TAKE 1 CAPSULE(150 MG) BY MOUTH EVERY DAY    venlafaxine (EFFEXOR-XR) 75 MG 24 hr capsule TAKE ONE CAPSULE BY MOUTH DAILY WITH 150MG FOR A TOTAL DOSE OF 225MG   Last reviewed on 7/25/2022  3:02 PM by Addis Rice, RN    Review of patient's allergies indicates:   Allergen Reactions    Keflex [cephalexin] Itching    Last reviewed on  7/25/2022 10:58 AM by Addis Rice      Tasks added this encounter   9/12/2022 - Refill Call (Auto Added)   Tasks due within next 3 months   8/23/2022 - Clinical - Follow Up Assesement (180 day)     [facility]  [address] [place] [id number]  Phone: [phone]  Fax: [phone]

## 2022-08-18 ENCOUNTER — OFFICE VISIT (OUTPATIENT)
Dept: HEMATOLOGY/ONCOLOGY | Facility: CLINIC | Age: 47
End: 2022-08-18
Payer: MEDICARE

## 2022-08-18 ENCOUNTER — HOSPITAL ENCOUNTER (OUTPATIENT)
Dept: RADIOLOGY | Facility: HOSPITAL | Age: 47
Discharge: HOME | End: 2022-08-18
Attending: INTERNAL MEDICINE
Payer: MEDICARE

## 2022-08-18 VITALS
OXYGEN SATURATION: 98 % | HEIGHT: 62 IN | DIASTOLIC BLOOD PRESSURE: 79 MMHG | TEMPERATURE: 98 F | BODY MASS INDEX: 53.92 KG/M2 | WEIGHT: 293 LBS | HEART RATE: 67 BPM | RESPIRATION RATE: 19 BRPM | SYSTOLIC BLOOD PRESSURE: 141 MMHG

## 2022-08-18 DIAGNOSIS — C50.211 MALIGNANT NEOPLASM OF UPPER-INNER QUADRANT OF RIGHT BREAST IN FEMALE, ESTROGEN RECEPTOR POSITIVE: ICD-10-CM

## 2022-08-18 DIAGNOSIS — D50.8 OTHER IRON DEFICIENCY ANEMIA: ICD-10-CM

## 2022-08-18 DIAGNOSIS — M53.3 SACRAL PAIN: Primary | ICD-10-CM

## 2022-08-18 DIAGNOSIS — I51.7 CARDIOMEGALY: ICD-10-CM

## 2022-08-18 DIAGNOSIS — Z17.0 MALIGNANT NEOPLASM OF UPPER-INNER QUADRANT OF RIGHT BREAST IN FEMALE, ESTROGEN RECEPTOR POSITIVE: ICD-10-CM

## 2022-08-18 DIAGNOSIS — E11.65 TYPE 2 DIABETES MELLITUS WITH HYPERGLYCEMIA, WITHOUT LONG-TERM CURRENT USE OF INSULIN: Chronic | ICD-10-CM

## 2022-08-18 DIAGNOSIS — C79.51 BONE METASTASES: ICD-10-CM

## 2022-08-18 DIAGNOSIS — T45.1X5A ANEMIA ASSOCIATED WITH CHEMOTHERAPY: ICD-10-CM

## 2022-08-18 DIAGNOSIS — M53.3 SACRAL PAIN: ICD-10-CM

## 2022-08-18 DIAGNOSIS — D64.81 ANEMIA ASSOCIATED WITH CHEMOTHERAPY: ICD-10-CM

## 2022-08-18 DIAGNOSIS — F33.1 MAJOR DEPRESSIVE DISORDER, RECURRENT EPISODE, MODERATE WITH ANXIOUS DISTRESS: ICD-10-CM

## 2022-08-18 PROCEDURE — 3078F PR MOST RECENT DIASTOLIC BLOOD PRESSURE < 80 MM HG: ICD-10-PCS | Mod: CPTII,S$GLB,, | Performed by: INTERNAL MEDICINE

## 2022-08-18 PROCEDURE — 3052F HG A1C>EQUAL 8.0%<EQUAL 9.0%: CPT | Mod: CPTII,S$GLB,, | Performed by: INTERNAL MEDICINE

## 2022-08-18 PROCEDURE — 3008F BODY MASS INDEX DOCD: CPT | Mod: CPTII,S$GLB,, | Performed by: INTERNAL MEDICINE

## 2022-08-18 PROCEDURE — 72220 XR SACRUM AND COCCYX: ICD-10-PCS | Mod: 26,,, | Performed by: RADIOLOGY

## 2022-08-18 PROCEDURE — 4010F PR ACE/ARB THEARPY RXD/TAKEN: ICD-10-PCS | Mod: CPTII,S$GLB,, | Performed by: INTERNAL MEDICINE

## 2022-08-18 PROCEDURE — 4010F ACE/ARB THERAPY RXD/TAKEN: CPT | Mod: CPTII,S$GLB,, | Performed by: INTERNAL MEDICINE

## 2022-08-18 PROCEDURE — 1159F MED LIST DOCD IN RCRD: CPT | Mod: CPTII,S$GLB,, | Performed by: INTERNAL MEDICINE

## 2022-08-18 PROCEDURE — 3077F PR MOST RECENT SYSTOLIC BLOOD PRESSURE >= 140 MM HG: ICD-10-PCS | Mod: CPTII,S$GLB,, | Performed by: INTERNAL MEDICINE

## 2022-08-18 PROCEDURE — 1160F PR REVIEW ALL MEDS BY PRESCRIBER/CLIN PHARMACIST DOCUMENTED: ICD-10-PCS | Mod: CPTII,S$GLB,, | Performed by: INTERNAL MEDICINE

## 2022-08-18 PROCEDURE — 3078F DIAST BP <80 MM HG: CPT | Mod: CPTII,S$GLB,, | Performed by: INTERNAL MEDICINE

## 2022-08-18 PROCEDURE — 3077F SYST BP >= 140 MM HG: CPT | Mod: CPTII,S$GLB,, | Performed by: INTERNAL MEDICINE

## 2022-08-18 PROCEDURE — 99999 PR PBB SHADOW E&M-EST. PATIENT-LVL III: CPT | Mod: PBBFAC,,, | Performed by: INTERNAL MEDICINE

## 2022-08-18 PROCEDURE — 72220 X-RAY EXAM SACRUM TAILBONE: CPT | Mod: TC

## 2022-08-18 PROCEDURE — 99999 PR PBB SHADOW E&M-EST. PATIENT-LVL III: ICD-10-PCS | Mod: PBBFAC,,, | Performed by: INTERNAL MEDICINE

## 2022-08-18 PROCEDURE — 72220 X-RAY EXAM SACRUM TAILBONE: CPT | Mod: 26,,, | Performed by: RADIOLOGY

## 2022-08-18 PROCEDURE — 3008F PR BODY MASS INDEX (BMI) DOCUMENTED: ICD-10-PCS | Mod: CPTII,S$GLB,, | Performed by: INTERNAL MEDICINE

## 2022-08-18 PROCEDURE — 99214 OFFICE O/P EST MOD 30 MIN: CPT | Mod: S$GLB,,, | Performed by: INTERNAL MEDICINE

## 2022-08-18 PROCEDURE — 99214 PR OFFICE/OUTPT VISIT, EST, LEVL IV, 30-39 MIN: ICD-10-PCS | Mod: S$GLB,,, | Performed by: INTERNAL MEDICINE

## 2022-08-18 PROCEDURE — 99499 UNLISTED E&M SERVICE: CPT | Mod: S$GLB,,, | Performed by: INTERNAL MEDICINE

## 2022-08-18 PROCEDURE — 1159F PR MEDICATION LIST DOCUMENTED IN MEDICAL RECORD: ICD-10-PCS | Mod: CPTII,S$GLB,, | Performed by: INTERNAL MEDICINE

## 2022-08-18 PROCEDURE — 1160F RVW MEDS BY RX/DR IN RCRD: CPT | Mod: CPTII,S$GLB,, | Performed by: INTERNAL MEDICINE

## 2022-08-18 PROCEDURE — 3052F PR MOST RECENT HEMOGLOBIN A1C LEVEL 8.0 - < 9.0%: ICD-10-PCS | Mod: CPTII,S$GLB,, | Performed by: INTERNAL MEDICINE

## 2022-08-18 PROCEDURE — 99499 RISK ADDL DX/OHS AUDIT: ICD-10-PCS | Mod: S$GLB,,, | Performed by: INTERNAL MEDICINE
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
## 2022-08-22 ENCOUNTER — INFUSION (OUTPATIENT)
Dept: INFUSION THERAPY | Facility: HOSPITAL | Age: 47
End: 2022-08-22
Payer: MEDICARE

## 2022-08-22 DIAGNOSIS — Z17.0 MALIGNANT NEOPLASM OF UPPER-INNER QUADRANT OF RIGHT BREAST IN FEMALE, ESTROGEN RECEPTOR POSITIVE: Primary | ICD-10-CM

## 2022-08-22 DIAGNOSIS — C50.211 MALIGNANT NEOPLASM OF UPPER-INNER QUADRANT OF RIGHT BREAST IN FEMALE, ESTROGEN RECEPTOR POSITIVE: Primary | ICD-10-CM

## 2022-08-22 PROCEDURE — 96402 CHEMO HORMON ANTINEOPL SQ/IM: CPT

## 2022-08-22 PROCEDURE — 63600175 PHARM REV CODE 636 W HCPCS: Mod: JG | Performed by: PHYSICIAN ASSISTANT

## 2022-08-22 PROCEDURE — 96372 THER/PROPH/DIAG INJ SC/IM: CPT

## 2022-08-22 RX ADMIN — DENOSUMAB 120 MG: 120 INJECTION SUBCUTANEOUS at 12:08

## 2022-08-22 RX ADMIN — GOSERELIN ACETATE 3.6 MG: 3.6 IMPLANT SUBCUTANEOUS at 12:08

## 2022-08-23 ENCOUNTER — SPECIALTY PHARMACY (OUTPATIENT)
Dept: PHARMACY | Facility: CLINIC | Age: 47
End: 2022-08-23
Payer: MEDICARE

## 2022-08-23 RX ORDER — METHOCARBAMOL 500 MG/1
500 TABLET, FILM COATED ORAL 4 TIMES DAILY
COMMUNITY
End: 2022-10-24 | Stop reason: DRUGHIGH

## 2022-08-23 NOTE — TELEPHONE ENCOUNTER
Specialty Pharmacy - Clinical Reassessment    Specialty Medication Orders Linked to Encounter    Flowsheet Row Most Recent Value   Medication #1 palbociclib (IBRANCE) 100 mg Cap (Order#[id number], Rx#[id number])        Patient Diagnosis   C50.211, Z17.0 - Malignant neoplasm of upper-inner quadrant of right breast in female, estrogen receptor positive    Pato Ye is a 46 y.o. female, who is followed by the specialty pharmacy service for management and education.    Recent Encounters     Date Type Provider Description    08/23/2022 Specialty Pharmacy ALISON SCRUGGS, Kelsey Follow-up Clinical Reassessment    08/11/2022 Specialty Pharmacy Miko Chapin PharmD Refill Coordination    07/18/2022 Specialty Pharmacy Pauline Lopez Refill Coordination    06/20/2022 Specialty Pharmacy Cheryle Cox Refill Coordination    05/23/2022 Specialty Pharmacy Dorian James, Kelsey Refill Coordination        Clinical call attempts since last clinical assessment   No call attempts found.     Current Outpatient Medications   Medication Sig    methocarbamoL (ROBAXIN) 500 MG Tab Take 500 mg by mouth 4 (four) times daily.    (Magic mouthwash) 1:1:1 Benadryl 12.5mg/5ml liq, aluminum & magnesium hydroxide-simehticone (Maalox), LIDOcaine viscous 2% Swish and spit 10 mLs every 4 (four) hours as needed. for mouth sores    amLODIPine (NORVASC) 5 MG tablet TAKE 1 TABLET(5 MG) BY MOUTH EVERY DAY    anastrozole (ARIMIDEX) 1 mg Tab TAKE 1 TABLET(1 MG) BY MOUTH EVERY DAY    atorvastatin (LIPITOR) 40 MG tablet TAKE 1 TABLET(40 MG) BY MOUTH EVERY DAY    blood sugar diagnostic Strp To check BG 4 times daily, to use with insurance preferred meter    blood-glucose meter kit To check BG 4 times daily, to use with insurance preferred meter    carvediloL (COREG) 25 MG tablet TAKE 1 TABLET(25 MG) BY MOUTH TWICE DAILY    ciclopirox (PENLAC) 8 % Soln Apply topically nightly.    duke's soln (benadryl 30 mL, mylanta 30 mL,  LIDOcaine 30 mL, nystatin 30 mL) 120mL Take 10 mLs by mouth 4 (four) times daily.    ergocalciferol (ERGOCALCIFEROL) 50,000 unit Cap TAKE 1 CAPSULE BY MOUTH EVERY 7 DAYS    ferrous sulfate 324 mg (65 mg iron) TbEC TAKE 1 TABLET BY MOUTH ON EVERY MONDAY, WEDNESDAY, AND FRIDAY    furosemide (LASIX) 20 MG tablet Take 1 tablet (20 mg total) by mouth 2 (two) times daily.    glipiZIDE (GLUCOTROL) 10 MG tablet Take 1 tablet (10 mg total) by mouth 2 (two) times daily before meals.    goserelin (ZOLADEX) 3.6 mg injection Inject 3.6 mg into the skin every 28 days.    ibuprofen (ADVIL,MOTRIN) 800 MG tablet Take 1 tablet (800 mg total) by mouth 2 (two) times daily as needed for Pain.    lancets Misc To check BG 4 times daily, to use with insurance preferred meter    LIDOcaine (LIDODERM) 5 % Place 2 patches onto the skin once daily. Remove & Discard patch within 12 hours or as directed by MD    LORazepam (ATIVAN) 1 MG tablet Take 1 tablet (1 mg total) by mouth every 12 (twelve) hours as needed for Anxiety.    morphine (MS CONTIN) 15 MG 12 hr tablet Take 1 tablet (15 mg total) by mouth every 8 (eight) hours as needed for Pain.    ondansetron (ZOFRAN-ODT) 4 MG TbDL Take 1 tablet (4 mg total) by mouth 2 (two) times daily.    palbociclib (IBRANCE) 100 mg Cap Take 1 capsule (100 mg) by mouth once daily for 21 days, followed by 7 days off.    potassium chloride (KLOR-CON) 10 MEQ TbSR TAKE 1 TABLET(10 MEQ) BY MOUTH EVERY DAY    promethazine (PHENERGAN) 25 MG tablet Take 1 tablet (25 mg total) by mouth every 6 (six) hours as needed for Nausea.    sacubitriL-valsartan (ENTRESTO)  mg per tablet Take 1 tablet by mouth 2 (two) times daily.    sennosides (SENNA-C ORAL) Take 2 tablets by mouth daily as needed.    traMADoL (ULTRAM) 50 mg tablet Take 1 tablet (50 mg total) by mouth every 6 (six) hours as needed for Pain.    TRULICITY 0.75 mg/0.5 mL pen injector ADMINISTER 0.75 MG UNDER THE SKIN EVERY 7 DAYS     venlafaxine (EFFEXOR-XR) 150 MG Cp24 TAKE 1 CAPSULE(150 MG) BY MOUTH EVERY DAY    venlafaxine (EFFEXOR-XR) 75 MG 24 hr capsule TAKE 1 CAPSULE BY MOUTH DAILY WITH 150MG FOR A TOTAL DOSE OF 225MG   Last reviewed on 8/22/2022 12:28 PM by Addis Rice, RN    Review of patient's allergies indicates:   Allergen Reactions    Keflex [cephalexin] Itching   Last reviewed on  8/22/2022 11:39 AM by Addis Rice    Drug Interactions    Drug interactions evaluated: yes  Clinically relevant drug interactions identified: no  Provided the patient with educational material regarding drug interactions: not applicable       Medication Adherence    Patient reported X missed doses in the last month: 0  Any gaps in refill history greater than 2 weeks in the last 3 months: no  Demonstrates understanding of importance of adherence: yes  Informant: patient  Reliability of informant: reliable  Provider-estimated medication adherence level: %  Reasons for non-adherence: no problems identified  Adherence tools used: directed education  Support network for adherence: healthcare provider  Confirmed plan for next specialty medication refill: delivery by pharmacy  Refills needed for supportive medications: not needed       Adverse Effects    Nausea: Pos  Back pain: Pos  *All other systems reviewed and are negative       Assessment Questions - Documented Responses    Flowsheet Row Most Recent Value   Assessment    Medication Reconciliation completed for patient No   During the past 4 weeks, has patient missed any activities due to condition or medication? No   During the past 4 weeks, did patient have any of the following urgent care visits? None   Goals of Therapy Status Achieving   All education points have been covered with patient? Patient does not need education at this time.   Assesment completed? Yes   Plan Therapy continued   Do you need to open a clinical intervention (i-vent)? No   Medication #1 Assessment Info    Patient status  "Existing medication, Exisiting to [facility]   Is this medication appropriate for the patient? Yes   Is this medication effective? Yes          Objective    She has a past medical history of Abnormal Pap smear, Anemia, Anxiety, Cancer, Cardiomyopathy, CHF (congestive heart failure), Fibroid, psychiatric care, Hyperlipidemia, Hypertension, Psychiatric problem, Sleep difficulties, and Therapy.    Tried/failed medications:none     BP Readings from Last 4 Encounters:   08/18/22 (!) 141/79   07/22/22 (!) 145/79   06/27/22 (!) 148/78   05/30/22 (!) 175/94     Ht Readings from Last 4 Encounters:   08/18/22 5' 2" (1.575 m)   07/22/22 5' 4" (1.626 m)   06/27/22 5' 4" (1.626 m)   05/30/22 5' 4" (1.626 m)     Wt Readings from Last 4 Encounters:   08/18/22 (!) 177.2 kg (390 lb 10.5 oz)   07/22/22 (!) 176.6 kg (389 lb 5.3 oz)   06/27/22 (!) 179 kg (394 lb 10 oz)   05/30/22 (!) 177.1 kg (390 lb 7 oz)     Recent Labs   Lab Result Units 08/18/22  1154 07/22/22  1012 06/24/22  0945 05/30/22  0729   RBC M/uL 3.43 L 3.58 L 3.57 L 3.52 L   Hemoglobin g/dL 9.1 L 9.3 L 9.5 L 9.1 L   Hematocrit % 29.7 L 31.3 L 30.5 L 30.7 L   WBC K/uL 3.53 L 4.53 6.01 3.18 L   Gran # (ANC) K/uL 2.1 2.8 3.6 1.8   Gran % % 60.1 61.8 59.8 56.3   Platelets K/uL 239 316 373 393   Sodium mmol/L 139 138 140 140   Potassium mmol/L 3.6 4.0 3.3 L 3.8   Chloride mmol/L 105 103 105 104   Glucose mg/dL 241 H 240 H 154 H 162 H   BUN mg/dL 9 12 8 7   Creatinine mg/dL 0.8 0.9 0.9 0.8   Calcium mg/dL 9.2 9.5 9.6 9.2   Total Protein g/dL 7.3 7.4 7.9 7.7   Albumin g/dL 2.8 L 2.7 L 3.1 L 2.8 L   Total Bilirubin mg/dL 0.4 0.3 0.4 0.4   Alkaline Phosphatase U/L 89 86 90 87   AST U/L 11 11 11 12   ALT U/L 15 12 14 14     The goals of cancer treatment include:  · Achieving remission of cancer, if possible  · Reducing tumor size and spread of cancer, if remission is not possible  · Minimizing pain and symptoms of the cancer  · Preventing infection and other complications of " treatment  · Promoting adequate nutrition  · Encouraging proper hydration  · Improving or maintaining quality of life  · Maintaining optimal therapy adherence  · Minimizing and managing side effects    Goals of Therapy Status: Achieving    Assessment/Plan  Patient plans to continue therapy without changes      Indication, dosage, appropriateness, effectiveness, safety and convenience of her specialty medication(s) were reviewed today.         Dosing, how taking Ibrance 100 mg capsules: Patient takes 1 capsule by mouth once daily 21 day on then 7 day. With food.   Patient also on Arimidex QD.   Missed doses: denies missed doses.   Storage: Ibrance is stored on bedroom dresser   Handling: Patient uses bottle top as a dosing cup.   Side effects: Positive for nausea after taking medication, managed with promethazine and zofran. Mouth sores, Patient taking magic mouthwash.   Recent infections: Denies recent infection or fever.   Pain: Spine pain, Patient is followed by Palliative care Tramadol and Robaxin. Patient states Robaxin and Ibu 800 are both helpful.   Appetite: Patient states she eats small frequent meals on days she feels nauseous.   Energy, fatigue: Patient states her energy is getting better, 7/10. Back pain affects her energy level due to back pain.   ED/UC visits: Denies UC or ER visits.   Denies new medications, allergies or health conditions.     Labs reviewed from 8/18: CBC and CMP unremarkable. Okay to proceed with current dose.   Next appt is on 9/19.     Tasks added this encounter   No tasks added.   Tasks due within next 3 months   8/23/2022 - Clinical - Follow Up Assesement (180 day)  9/12/2022 - Refill Call (Auto Added)     ALISON SCRUGGS, PharmD  [facility]  [address] [place] [id number]  Phone: [phone]  Fax: [phone]

## 2022-08-25 ENCOUNTER — TELEPHONE (OUTPATIENT)
Dept: HEMATOLOGY/ONCOLOGY | Facility: CLINIC | Age: 47
End: 2022-08-25
Payer: MEDICARE

## 2022-09-01 ENCOUNTER — PATIENT MESSAGE (OUTPATIENT)
Dept: PRIMARY CARE CLINIC | Facility: CLINIC | Age: 47
End: 2022-09-01
Payer: MEDICARE

## 2022-09-01 DIAGNOSIS — E11.65 TYPE 2 DIABETES MELLITUS WITH HYPERGLYCEMIA, WITHOUT LONG-TERM CURRENT USE OF INSULIN: Chronic | ICD-10-CM

## 2022-09-02 ENCOUNTER — TELEPHONE (OUTPATIENT)
Dept: INFUSION THERAPY | Facility: HOSPITAL | Age: 47
End: 2022-09-02
Payer: MEDICARE

## 2022-09-07 ENCOUNTER — PATIENT MESSAGE (OUTPATIENT)
Dept: PRIMARY CARE CLINIC | Facility: CLINIC | Age: 47
End: 2022-09-07
Payer: MEDICARE

## 2022-09-07 RX ORDER — DULAGLUTIDE 0.75 MG/.5ML
0.75 INJECTION, SOLUTION SUBCUTANEOUS
Qty: 4 PEN | Refills: 1 | Status: SHIPPED | OUTPATIENT
Start: 2022-09-07 | End: 2022-12-14 | Stop reason: SINTOL

## 2022-09-09 ENCOUNTER — TELEPHONE (OUTPATIENT)
Dept: INFUSION THERAPY | Facility: HOSPITAL | Age: 47
End: 2022-09-09
Payer: MEDICARE

## 2022-09-13 ENCOUNTER — SPECIALTY PHARMACY (OUTPATIENT)
Dept: PHARMACY | Facility: CLINIC | Age: 47
End: 2022-09-13
Payer: MEDICARE

## 2022-09-13 NOTE — TELEPHONE ENCOUNTER
Specialty Pharmacy - Refill Coordination    Specialty Medication Orders Linked to Encounter      Flowsheet Row Most Recent Value   Medication #1 palbociclib (IBRANCE) 100 mg Cap (Order#[id number], Rx#[id number])            Refill Questions - Documented Responses      Flowsheet Row Most Recent Value   Patient Availability and HIPAA Verification    Does patient want to proceed with activity? Yes   HIPAA/medical authority confirmed? Yes   Relationship to patient of person spoken to? Self   Refill Screening Questions    Changes to allergies? No   Changes to medications? No   New conditions since last clinic visit? No   Unplanned office visit, urgent care, ED, or hospital admission in the last 4 weeks? No   How does patient/caregiver feel medication is working? Good   Financial problems or insurance changes? No   How many doses of your specialty medications were missed in the last 4 weeks? 0   Would patient like to speak to a pharmacist? No   When does the patient need to receive the medication? 09/19/22   Refill Delivery Questions    How will the patient receive the medication? Delivery [facility]   When does the patient need to receive the medication? 09/19/22   Shipping Address Home   Address in [facility] confirmed and updated if neccessary? Yes   Expected Copay ($) 0   Is the patient able to afford the medication copay? Yes   Payment Method zero copay   Days supply of Refill 28   Supplies needed? No supplies needed   Refill activity completed? Yes   Refill activity plan Refill scheduled   Shipment/Pickup Date: 09/14/22            Current Outpatient Medications   Medication Sig    (Magic mouthwash) 1:1:1 Benadryl 12.5mg/5ml liq, aluminum & magnesium hydroxide-simehticone (Maalox), LIDOcaine viscous 2% Swish and spit 10 mLs every 4 (four) hours as needed. for mouth sores    amLODIPine (NORVASC) 5 MG tablet TAKE 1 TABLET(5 MG) BY MOUTH EVERY DAY    anastrozole (ARIMIDEX) 1 mg Tab TAKE 1 TABLET(1 MG) BY MOUTH EVERY DAY     atorvastatin (LIPITOR) 40 MG tablet TAKE 1 TABLET(40 MG) BY MOUTH EVERY DAY    blood sugar diagnostic Strp To check BG 4 times daily, to use with insurance preferred meter    blood-glucose meter kit To check BG 4 times daily, to use with insurance preferred meter    carvediloL (COREG) 25 MG tablet TAKE 1 TABLET(25 MG) BY MOUTH TWICE DAILY    ciclopirox (PENLAC) 8 % Soln Apply topically nightly.    duke's soln (benadryl 30 mL, mylanta 30 mL, LIDOcaine 30 mL, nystatin 30 mL) 120mL Take 10 mLs by mouth 4 (four) times daily.    dulaglutide (TRULICITY) 0.75 mg/0.5 mL pen injector Inject 0.75 mg into the skin every 7 days.    ergocalciferol (ERGOCALCIFEROL) 50,000 unit Cap TAKE 1 CAPSULE BY MOUTH EVERY 7 DAYS    ferrous sulfate 324 mg (65 mg iron) TbEC TAKE 1 TABLET BY MOUTH ON EVERY MONDAY, WEDNESDAY, AND FRIDAY    furosemide (LASIX) 20 MG tablet Take 1 tablet (20 mg total) by mouth 2 (two) times daily.    glipiZIDE (GLUCOTROL) 10 MG tablet Take 1 tablet (10 mg total) by mouth 2 (two) times daily before meals.    goserelin (ZOLADEX) 3.6 mg injection Inject 3.6 mg into the skin every 28 days.    ibuprofen (ADVIL,MOTRIN) 800 MG tablet Take 1 tablet (800 mg total) by mouth 2 (two) times daily as needed for Pain.    lancets Misc To check BG 4 times daily, to use with insurance preferred meter    LIDOcaine (LIDODERM) 5 % Place 2 patches onto the skin once daily. Remove & Discard patch within 12 hours or as directed by MD    LORazepam (ATIVAN) 1 MG tablet Take 1 tablet (1 mg total) by mouth every 12 (twelve) hours as needed for Anxiety.    methocarbamoL (ROBAXIN) 500 MG Tab Take 500 mg by mouth 4 (four) times daily.    morphine (MS CONTIN) 15 MG 12 hr tablet Take 1 tablet (15 mg total) by mouth every 8 (eight) hours as needed for Pain.    ondansetron (ZOFRAN-ODT) 4 MG TbDL Take 1 tablet (4 mg total) by mouth 2 (two) times daily.    palbociclib (IBRANCE) 100 mg Cap Take 1 capsule (100 mg) by mouth once daily for 21 days,  followed by 7 days off.    potassium chloride (KLOR-CON) 10 MEQ TbSR TAKE 1 TABLET(10 MEQ) BY MOUTH EVERY DAY    promethazine (PHENERGAN) 25 MG tablet Take 1 tablet (25 mg total) by mouth every 6 (six) hours as needed for Nausea.    sacubitriL-valsartan (ENTRESTO)  mg per tablet Take 1 tablet by mouth 2 (two) times daily.    sennosides (SENNA-C ORAL) Take 2 tablets by mouth daily as needed.    traMADoL (ULTRAM) 50 mg tablet Take 1 tablet (50 mg total) by mouth every 6 (six) hours as needed for Pain.    venlafaxine (EFFEXOR-XR) 150 MG Cp24 TAKE 1 CAPSULE(150 MG) BY MOUTH EVERY DAY    venlafaxine (EFFEXOR-XR) 75 MG 24 hr capsule TAKE 1 CAPSULE BY MOUTH DAILY WITH 150MG FOR A TOTAL DOSE OF 225MG   Last reviewed on 8/22/2022 12:28 PM by Addis Rice, RN    Review of patient's allergies indicates:   Allergen Reactions    Keflex [cephalexin] Itching    Last reviewed on  8/22/2022 11:39 AM by Addis Rice      Tasks added this encounter   10/10/2022 - Refill Call (Auto Added)   Tasks due within next 3 months   No tasks due.     [facility]  [address] [place] [id number]  Phone: [phone]  Fax: [phone]

## 2022-09-16 ENCOUNTER — OFFICE VISIT (OUTPATIENT)
Dept: HEMATOLOGY/ONCOLOGY | Facility: CLINIC | Age: 47
End: 2022-09-16
Payer: MEDICARE

## 2022-09-16 ENCOUNTER — LAB VISIT (OUTPATIENT)
Dept: LAB | Facility: HOSPITAL | Age: 47
End: 2022-09-16
Attending: INTERNAL MEDICINE
Payer: MEDICARE

## 2022-09-16 VITALS
RESPIRATION RATE: 18 BRPM | TEMPERATURE: 98 F | HEART RATE: 84 BPM | BODY MASS INDEX: 53.92 KG/M2 | SYSTOLIC BLOOD PRESSURE: 144 MMHG | OXYGEN SATURATION: 97 % | WEIGHT: 293 LBS | DIASTOLIC BLOOD PRESSURE: 76 MMHG | HEIGHT: 62 IN

## 2022-09-16 DIAGNOSIS — C79.51 BONE METASTASES: ICD-10-CM

## 2022-09-16 DIAGNOSIS — C50.211 MALIGNANT NEOPLASM OF UPPER-INNER QUADRANT OF RIGHT BREAST IN FEMALE, ESTROGEN RECEPTOR POSITIVE: ICD-10-CM

## 2022-09-16 DIAGNOSIS — Z17.0 MALIGNANT NEOPLASM OF UPPER-INNER QUADRANT OF RIGHT BREAST IN FEMALE, ESTROGEN RECEPTOR POSITIVE: ICD-10-CM

## 2022-09-16 DIAGNOSIS — Z17.0 MALIGNANT NEOPLASM OF UPPER-INNER QUADRANT OF RIGHT BREAST IN FEMALE, ESTROGEN RECEPTOR POSITIVE: Primary | ICD-10-CM

## 2022-09-16 DIAGNOSIS — C50.211 MALIGNANT NEOPLASM OF UPPER-INNER QUADRANT OF RIGHT BREAST IN FEMALE, ESTROGEN RECEPTOR POSITIVE: Primary | ICD-10-CM

## 2022-09-16 DIAGNOSIS — E11.65 TYPE 2 DIABETES MELLITUS WITH HYPERGLYCEMIA, WITHOUT LONG-TERM CURRENT USE OF INSULIN: ICD-10-CM

## 2022-09-16 LAB
ALBUMIN SERPL BCP-MCNC: 2.9 G/DL (ref 3.5–5.2)
ALP SERPL-CCNC: 88 U/L (ref 55–135)
ALT SERPL W/O P-5'-P-CCNC: 15 U/L (ref 10–44)
ANION GAP SERPL CALC-SCNC: 6 MMOL/L (ref 8–16)
AST SERPL-CCNC: 13 U/L (ref 10–40)
BASOPHILS # BLD AUTO: 0.06 K/UL (ref 0–0.2)
BASOPHILS NFR BLD: 1.2 % (ref 0–1.9)
BILIRUB SERPL-MCNC: 0.4 MG/DL (ref 0.1–1)
BUN SERPL-MCNC: 9 MG/DL (ref 6–20)
CALCIUM SERPL-MCNC: 9.2 MG/DL (ref 8.7–10.5)
CHLORIDE SERPL-SCNC: 101 MMOL/L (ref 95–110)
CO2 SERPL-SCNC: 27 MMOL/L (ref 23–29)
CREAT SERPL-MCNC: 0.9 MG/DL (ref 0.5–1.4)
DIFFERENTIAL METHOD: ABNORMAL
EOSINOPHIL # BLD AUTO: 0.1 K/UL (ref 0–0.5)
EOSINOPHIL NFR BLD: 1 % (ref 0–8)
ERYTHROCYTE [DISTWIDTH] IN BLOOD BY AUTOMATED COUNT: 20.1 % (ref 11.5–14.5)
EST. GFR  (NO RACE VARIABLE): >60 ML/MIN/1.73 M^2
GLUCOSE SERPL-MCNC: 369 MG/DL (ref 70–110)
HCT VFR BLD AUTO: 30.3 % (ref 37–48.5)
HGB BLD-MCNC: 9.8 G/DL (ref 12–16)
IMM GRANULOCYTES # BLD AUTO: 0.02 K/UL (ref 0–0.04)
IMM GRANULOCYTES NFR BLD AUTO: 0.4 % (ref 0–0.5)
LYMPHOCYTES # BLD AUTO: 1.1 K/UL (ref 1–4.8)
LYMPHOCYTES NFR BLD: 23.2 % (ref 18–48)
MCH RBC QN AUTO: 27.1 PG (ref 27–31)
MCHC RBC AUTO-ENTMCNC: 32.3 G/DL (ref 32–36)
MCV RBC AUTO: 84 FL (ref 82–98)
MONOCYTES # BLD AUTO: 0.4 K/UL (ref 0.3–1)
MONOCYTES NFR BLD: 8.5 % (ref 4–15)
NEUTROPHILS # BLD AUTO: 3.2 K/UL (ref 1.8–7.7)
NEUTROPHILS NFR BLD: 65.7 % (ref 38–73)
NRBC BLD-RTO: 0 /100 WBC
PLATELET # BLD AUTO: 280 K/UL (ref 150–450)
PMV BLD AUTO: 10.3 FL (ref 9.2–12.9)
POTASSIUM SERPL-SCNC: 3.3 MMOL/L (ref 3.5–5.1)
PROT SERPL-MCNC: 7.4 G/DL (ref 6–8.4)
RBC # BLD AUTO: 3.61 M/UL (ref 4–5.4)
SODIUM SERPL-SCNC: 134 MMOL/L (ref 136–145)
WBC # BLD AUTO: 4.83 K/UL (ref 3.9–12.7)

## 2022-09-16 PROCEDURE — 99214 OFFICE O/P EST MOD 30 MIN: CPT | Mod: S$GLB,,, | Performed by: INTERNAL MEDICINE

## 2022-09-16 PROCEDURE — 3078F PR MOST RECENT DIASTOLIC BLOOD PRESSURE < 80 MM HG: ICD-10-PCS | Mod: CPTII,S$GLB,, | Performed by: INTERNAL MEDICINE

## 2022-09-16 PROCEDURE — 4010F PR ACE/ARB THEARPY RXD/TAKEN: ICD-10-PCS | Mod: CPTII,S$GLB,, | Performed by: INTERNAL MEDICINE

## 2022-09-16 PROCEDURE — 3077F PR MOST RECENT SYSTOLIC BLOOD PRESSURE >= 140 MM HG: ICD-10-PCS | Mod: CPTII,S$GLB,, | Performed by: INTERNAL MEDICINE

## 2022-09-16 PROCEDURE — 4010F ACE/ARB THERAPY RXD/TAKEN: CPT | Mod: CPTII,S$GLB,, | Performed by: INTERNAL MEDICINE

## 2022-09-16 PROCEDURE — 3078F DIAST BP <80 MM HG: CPT | Mod: CPTII,S$GLB,, | Performed by: INTERNAL MEDICINE

## 2022-09-16 PROCEDURE — 3008F PR BODY MASS INDEX (BMI) DOCUMENTED: ICD-10-PCS | Mod: CPTII,S$GLB,, | Performed by: INTERNAL MEDICINE

## 2022-09-16 PROCEDURE — 1159F PR MEDICATION LIST DOCUMENTED IN MEDICAL RECORD: ICD-10-PCS | Mod: CPTII,S$GLB,, | Performed by: INTERNAL MEDICINE

## 2022-09-16 PROCEDURE — 1159F MED LIST DOCD IN RCRD: CPT | Mod: CPTII,S$GLB,, | Performed by: INTERNAL MEDICINE

## 2022-09-16 PROCEDURE — 99999 PR PBB SHADOW E&M-EST. PATIENT-LVL V: CPT | Mod: PBBFAC,,, | Performed by: INTERNAL MEDICINE

## 2022-09-16 PROCEDURE — 99499 RISK ADDL DX/OHS AUDIT: ICD-10-PCS | Mod: S$PBB,,, | Performed by: INTERNAL MEDICINE

## 2022-09-16 PROCEDURE — 3077F SYST BP >= 140 MM HG: CPT | Mod: CPTII,S$GLB,, | Performed by: INTERNAL MEDICINE

## 2022-09-16 PROCEDURE — 85025 COMPLETE CBC W/AUTO DIFF WBC: CPT | Performed by: INTERNAL MEDICINE

## 2022-09-16 PROCEDURE — 99999 PR PBB SHADOW E&M-EST. PATIENT-LVL V: ICD-10-PCS | Mod: PBBFAC,,, | Performed by: INTERNAL MEDICINE

## 2022-09-16 PROCEDURE — 3008F BODY MASS INDEX DOCD: CPT | Mod: CPTII,S$GLB,, | Performed by: INTERNAL MEDICINE

## 2022-09-16 PROCEDURE — 36415 COLL VENOUS BLD VENIPUNCTURE: CPT | Performed by: INTERNAL MEDICINE

## 2022-09-16 PROCEDURE — 3052F PR MOST RECENT HEMOGLOBIN A1C LEVEL 8.0 - < 9.0%: ICD-10-PCS | Mod: CPTII,S$GLB,, | Performed by: INTERNAL MEDICINE

## 2022-09-16 PROCEDURE — 80053 COMPREHEN METABOLIC PANEL: CPT | Performed by: INTERNAL MEDICINE

## 2022-09-16 PROCEDURE — 99214 PR OFFICE/OUTPT VISIT, EST, LEVL IV, 30-39 MIN: ICD-10-PCS | Mod: S$GLB,,, | Performed by: INTERNAL MEDICINE

## 2022-09-16 PROCEDURE — 3052F HG A1C>EQUAL 8.0%<EQUAL 9.0%: CPT | Mod: CPTII,S$GLB,, | Performed by: INTERNAL MEDICINE

## 2022-09-16 PROCEDURE — 99499 UNLISTED E&M SERVICE: CPT | Mod: S$PBB,,, | Performed by: INTERNAL MEDICINE

## 2022-09-16 NOTE — PROGRESS NOTES
Subjective:       Patient ID: Kristopher Ye is a 46 y.o. female.    Chief Complaint: Tailbone Pain    HPI     Here for follow up.   Feeling clammy. Went to lunch with her daughters and did not take her glipizide. She was out of the [facility] for 2 weeks and would like a referral for diabetes care    Needs a wheelchair (reports due to weight, advised that order should be placed with [facility] or 28)   Energy had actually been pretty good and was able to get things to do things around the house- slowed down with pain in her tail bone- had some relief with Ibuprofen 800 mg    Reports appetite comes and goes  Tries to incorporate more vegetables      Most recent scans:   - 6/13/2022 CT C/A/P:   COMPARISON:   CT abdomen pelvis 04/13/2022, CT chest abdomen pelvis 02/01/2022, CT chest abdomen pelvis 10/25/2021   FINDINGS:   Decreased sensitivity for detecting solid organ abnormalities without intravenous contrast.   LUNG BASES & MEDIASTINUM (limited): Unremarkable   HEPATOBILIARY: Borderline hepatomegaly with decreased parenchymal attenuation suggesting hepatic steatosis. No focal hepatic lesions.No biliary ductal dilatation.Gallbladder is contracted.   SPLEEN: No splenomegaly.   PANCREAS: No focal masses or ductal dilatation.   ADRENALS: No adrenal nodules.   KIDNEYS/URETERS: Stable 1.2 cm hypodensity in the upper pole cortex with fluid-level attenuation, likely simple cyst. No hydronephrosis, stones or solid mass lesions.   PELVIC ORGANS/BLADDER: Bladder is minimally distended without wall thickening. Ureters are unremarkable. Uterus contains a stable-appearing calcified anterior fundal leiomyoma and is otherwise unremarkable. No adnexal masses.   PERITONEUM / RETROPERITONEUM: No free air. No free fluid.   LYMPH NODES: No lymphadenopathy.   VESSELS: Unremarkable.   GI TRACT: Several high density foci within the gastric lumen, likely ingested material. No distention or wall thickening. Normal appendix.   BONES  AND SOFT TISSUES: Degenerative changes of the spine. Stable-appearing lucent lesion along the right iliac wing. Stable lucent/sclerotic foci throughout several vertebral bodies. No definitive new lucent/sclerotic lesions. No fractures.   Impression:   1. In this patient with history of invasive ductal carcinoma of the right breast, there is no CT evidence of new metastatic disease. Persistent lucent/sclerotic lesions throughout the pelvis and spine, without interval change, as above.   2. Decreased hepatic parenchymal attenuation suggestive of hepatic steatosis. No focal hepatic lesions on this noncontrast examination.   3. Additional findings as above.   5/2/2022 ECHO:   The left ventricle is mildly enlarged with eccentric hypertrophy and mildly decreased systolic function. The estimated ejection fraction is 40%.   There is left ventricular global hypokinesis.   There is abnormal septal wall motion.   Grade I left ventricular diastolic dysfunction.   Normal right ventricular size with mildly reduced right ventricular systolic function.   Moderate left atrial enlargement.  Currently on Arimidex and Ibrance (dose adjusted due to anemia to 100 mg)   Zoladex and Xgeva monthly   Reason For Follow Up:   1. Stage IV ([id number]) invasive ductal carcinoma of right breast, upper inner quadrant, ER %, TN neg, Her2 neg, Grade 3, multifocal with one lesion appearing more aggressive (Ki67 80%), large LN +, bone mets.   Ibrance dose adjusted with cytopenias   Oncologic History:   Presentation   - 9/11/19 - Screening mammogram showed multiple right breast masses lower inner quadrant   - 9/13/19 - Diagnostic mammogram and US showed a right breast, 3:00 position mass, 2 CFN measuring 17 mm x 16 mm x 14 mm. There 2 smaller adjacent masses towards the nipple   - 9/19/19 - Biopsy -   A. Right breast subareolar: Grade 3 IDC, estrogen receptor 80%, progesterone receptor 0%, Her2 dago neg, Ki67 80%.   B. Right breast mass 3:00: Grade  "3 IDC with papillary features, estrogen receptor 100%, progesterone receptor 0%, Her2 dago 1+, Ki67 30%.   - 9/26/19: US right axilla with abnormal lymphadenopathy measuring 4.3 x 2.8 cm with biopsy + for metastatic breast cancer.   Surgery consultation with Dr Ferris on 9/25/19   - 9/25/19 - Genetics [facility]etics [facility] Neda Pierrealysis pending; VUS pending   Medical oncology consultation on 10/7/19   * 10/8/19 - CT C/A/P with several lytic lesions in thoracic and lumbar spine, iliac bones, left scapula in addition to 3 x 4.5 cm right axillary node and 2.1 cm right breast mass. Bone scan negative.   * 10/31/19 - started Ibrance, Arimidex, Zoladex; plan for Xgeva.   * 11/12/19 STRATA without targetable mutations.   * 12/16/19 - Bone biopsy + for met disease (initially read as negative, but we treated as metastatic disease given high suspicion).   * MRI brain: "Partially empty sella. Question bilateral globe proptosis. Otherwise unremarkable MRI brain as detailed above specifically without evidence for intracranial enhancing lesion to suggest metastatic disease."   * 4/22/2020 PET - disease improvement. 8/2020 PET with disease improvement.   * 9/1/20 - Palliative RT to L1-L4   Of note, * Xgeva monthly - we had been waiting on dental clearance, but she has been unable to get into dentistry and is accepting of risks. Has no active dental disease.   PET scan 4/22/2021:   FINDINGS:   Quality of the study: Mildly degraded due to patient's large body habitus and skin abutting the gantry.   In the head and neck, there are no hypermetabolic lesions worrisome for malignancy. There are no hypermetabolic mucosal lesions, and there are no pathologically enlarged or hypermetabolic lymph nodes.   In the chest, there are no hypermetabolic lesions worrisome for malignancy.   Stable CT appearance of a level 1 right axillary lymph node measuring 1.3 cm in short axis with normal background radiotracer uptake. Previously with SUV max of " 2.1.   There are no concerning pulmonary nodules or masses, and there are no pathologically enlarged or hypermetabolic lymph nodes.   In the abdomen and pelvis, there is physiologic tracer distribution within the abdominal organs and excretion into the genitourinary system.   In the bones, there are stable lytic lesions throughout the lumbar spine and pelvis and additional stable sclerotic lesions in the sternum and T3 vertebral body. No associated focal abnormal increased radiotracer uptake. Findings likely represent treated disease.   Impression:   Interval decreased uptake within a prominent right axillary lymph node, now with normal background uptake. No new focal abnormal uptake.   Stable lytic and sclerotic lesions without focal abnormal uptake. Findings are compatible with treated metastasis.   8/25/2021 MRI brain   Impression:   No significant change from prior specifically without evidence for enhancing lesion to suggest intracranial metastatic disease.   Continued partially empty sella, intracranial hypertension to be included in differential in the appropriate clinical setting. Clinical correlation and further evaluation as warranted.   10/14/2021 MRI thoracic/lumbar spine:   Impression:   Patient history of metastatic breast cancer.   Bone lesions at T10, L2, L3, and right iliac wing, as above, concerning for metastatic disease. No pathologic fracture, soft tissue component, or epidural extension identified.   10/25/2021 CT chest/abd/pelvis:   Impression:   1. Stable metastasis of the spine   No evidence of intra-abdominal or intrathoracic metastatic disease   2. Stable prominent right axillary lymph node.   3. Additional findings are detailed above.   Currently on Arimidex and Ibrance   Zoladex and Xgeva monthly   - 2/2/2022 Bone scan:   FINDINGS:   No focal abnormal uptake suggestive of osseous metastases. There is diffusely increased uptake in the calvarium in keeping with hyperostosis. There is  degenerative type uptake most prominent in the bilateral shoulders and knees. The focal uptake previously described in the distal right femur is not visualized. There is otherwise physiologic distribution of the radiopharmaceutical throughout the skeleton.   There is normal uptake in the genitourinary system and soft tissues.   Impression:   There is no scintigraphic evidence of osteoblastic metastatic disease.   Nonspecific uptake in the distal right femur has resolved.   Diffuse cranial hyperostosis and other findings as above.   - 2/1/2022 CT C/A/P:   FINDINGS:   CHEST   Support tubes and lines: None.   Aorta: Normal caliber.   Heart: Normal size..   Coronary arteries: No calcifications.   Pericardium: Normal. No effusion, thickening, or calcification.   Central pulmonary arteries: Normal caliber.   Base of neck/thyroid: Normal.   Lymph nodes: No supraclavicular, axillary, internal mammary, mediastinal or hilar lymphadenopathy.   Esophagus: Normal.   Pleura: No effusion, thickening or calcification.   Body wall: Unremarkable.   Airways: Normal.   Lungs: Clear without focal or diffuse abnormality.   Bones: Sclerotic lesions are seen throughout the spine as well as in the sternum, not substantially changed from 10/25/2021   ABDOMEN/PELVIS   Liver: Unremarkable   Gallbladder/bile ducts: Unremarkable. No intra or extrahepatic biliary ductal dilatation   Pancreas: Unremarkable.   Spleen: Unremarkable.   Adrenals: Unremarkable.   Kidneys: Simple cyst in the right kidney is unchanged   Lymph nodes: No abdominal or pelvic lymphadenopathy.   Bowel and mesentery: Unremarkable.   Abdominal aorta: Unremarkable.   Inferior vena cava: Unremarkable.   Free fluid or free air: None.   Pelvis: Unremarkable.   Urinary bladder: Unremarkable.   Body wall: Unremarkable.   Bones: Sclerotic lesions seen in the spine are unchanged.   Impression:   1. No evidence of new recurrent or metastatic disease.   2. Sclerotic lesions seen in the  spine and sternum, unchanged when compared to 10/25/2021.   - 3/22/2022 CTA Chest:   FINDINGS:   Opacification of the pulmonary arteries is sufficient for evaluation to the segmental branches. No filling defects were seen.   The heart size is normal. The aorta and main pulmonary segment show normal diameter. There is no pericardial effusion.   No new lymphadenopathy identified in the chest.   No radiographic evidence of pneumonia or pulmonary edema is identified. Stable mild apical scarring. No new or enlarging pulmonary nodule.   There is partial imaging of the upper abdomen. There is diffusely diminished hepatic attenuation consistent fatty metamorphosis.   Stable, 10 mm sclerotic focus in the left 10th posterior vertebral body with a narrow zone of transition. A similar appearing lesion is seen in the sternum. No new suspicious bone lesions.   The site of the reported primary breast malignancy is not well identified.   Impression:   No evidence of pulmonary embolus to the segmental branches.   Other stable findings are as above.   No specific CT evidence for new metastatic disease in the chest.   Had been seeing Dr. Olivares and had a second opinion due to fears over pacemaker   - 3/30/2022 ECHO:   The left ventricle is moderately enlarged with mild eccentric hypertrophy and low normal systolic function.   The estimated ejection fraction is 55%.   The quantitatively derived ejection fraction is 52%.   Indeterminate left ventricular diastolic function.   Severe left atrial enlargement.   Normal right ventricular size with normal right ventricular systolic function.  FH:   Paternal side:   1st cousin- ovarian cancer (diagnosed at age 26) and her mother has thyroid cancer   Maternal grandmother- some type of likely metastatic GI cancer      Review of Systems   Constitutional:  Negative for activity change, appetite change and unexpected weight change.   HENT:  Negative for mouth sores, rhinorrhea and trouble  swallowing.    Eyes:  Negative for visual disturbance.   Respiratory:  Positive for shortness of breath (better). Negative for cough and wheezing.    Cardiovascular:  Negative for chest pain.   Gastrointestinal:  Positive for constipation. Negative for abdominal distention, abdominal pain, blood in stool, change in bowel habit, diarrhea, nausea, vomiting and change in bowel habit.   Genitourinary:  Positive for urgency. Negative for difficulty urinating, dysuria and frequency.   Musculoskeletal:  Positive for back pain (chronic). Negative for arthralgias, joint swelling and myalgias.   Integumentary:  Negative for rash.   Neurological:  Positive for dizziness. Negative for weakness, numbness, headaches, coordination difficulties and coordination difficulties.   Hematological:  Negative for adenopathy. Does not bruise/bleed easily.   Psychiatric/Behavioral:  Negative for dysphoric mood and sleep disturbance. The patient is not nervous/anxious.        Objective:      Physical Exam  Vitals and nursing note reviewed.   Constitutional:       General: She is not in acute distress.     Appearance: Normal appearance. She is well-developed. She is obese. She is not ill-appearing.      Comments: Presents with her daughter  Very pleasant     HENT:      Head: Normocephalic and atraumatic.   Eyes:      General: Lids are normal. No scleral icterus.     Extraocular Movements: Extraocular movements intact.      Conjunctiva/sclera: Conjunctivae normal.      Pupils: Pupils are equal, round, and reactive to light.   Neck:      Thyroid: No thyromegaly.      Vascular: No JVD.      Trachea: Trachea normal.   Cardiovascular:      Rate and Rhythm: Normal rate and regular rhythm.      Heart sounds: Normal heart sounds. No murmur heard.    No friction rub. No gallop.      Comments: Difficult to assess tones.   Pulmonary:      Effort: Pulmonary effort is normal. No respiratory distress.      Breath sounds: Normal breath sounds. No wheezing,  rhonchi or rales.      Comments: distant  Abdominal:      General: Bowel sounds are normal. There is no distension.      Palpations: Abdomen is soft. There is no mass.      Tenderness: There is no abdominal tenderness. There is no guarding or rebound.      Comments: No organomegaly however difficult to assess.    Hardness noted periumbilical area   Musculoskeletal:         General: Normal range of motion.      Cervical back: Normal range of motion and neck supple.      Comments: No spinal or paraspinal tenderness.    Lymphadenopathy:      Head:      Right side of head: No submental or submandibular adenopathy.      Left side of head: No submental or submandibular adenopathy.      Cervical: No cervical adenopathy.      Upper Body:      Right upper body: No supraclavicular or axillary adenopathy.      Left upper body: No supraclavicular or axillary adenopathy.   Skin:     General: Skin is warm and dry.      Capillary Refill: Capillary refill takes less than 2 seconds.      Coloration: Skin is not jaundiced or pale.      Findings: No bruising or rash.      Nails: There is no clubbing.   Neurological:      Mental Status: She is alert and oriented to person, place, and time.      Sensory: No sensory deficit.      Motor: No weakness.      Coordination: Coordination normal.      Gait: Gait normal.   Psychiatric:         Mood and Affect: Mood normal.         Speech: Speech normal.         Behavior: Behavior normal.         Thought Content: Thought content normal.         Judgment: Judgment normal.       Assessment:       Problem List Items Addressed This Visit    None      Plan:       1-2.  Metastatic breast cancer- stable scans  - Next scans 10/2022 (after return from family cruise)  - Continue Ibrance, Anastrozole, Zoladex and Xgeva (due Monday)  Monthly labs  Monitor anemia, parameters stable  ANC normal    CV and DM- managed by PCP- encouraged to hydrate and take glipizide. Offered IVF but patient would like to  rest  Will refer to diabetes       Route Chart for Scheduling    Med Onc Chart Routing      Follow up with physician 2 months. cbc, cmp and Xgeva- scans prior   Follow up with [facility] 1 month. cbc, cmp and Xgeva   Infusion scheduling note    Injection scheduling note    Labs    Imaging    Pharmacy appointment    Other referrals        Treatment Plan Information   OP ANASTROZOLE PALBOCICLIB Q4W   Jessica Mendez MD   Upcoming Treatment Dates - OP ANASTROZOLE PALBOCICLIB Q4W    No upcoming days in selected categories.    Supportive Plan Information  OP BREAST GOSERELIN & DENOSUMAB Q4W   Jessica Mendez MD   Upcoming Treatment Dates - OP BREAST GOSERELIN & DENOSUMAB Q4W    9/23/2022       Chemotherapy       denosumab (XGEVA) solution 120 mg       goserelin (ZOLADEX) injection 3.6 mg  10/21/2022       Chemotherapy       denosumab (XGEVA) solution 120 mg       goserelin (ZOLADEX) injection 3.6 mg        Total time of this visit, including time spent face to face with patient and/or via video/audio, and also in preparing for today's visit for MDM and documentation. (Medical Decision Making, including consideration of possible diagnoses, management options, complex medical record review, review of diagnostic tests and information, consideration and discussion of significant complications based on comorbidities, and discussion with providers involved with the care of the patient) 30 minutes. Greater than 50% was spent face to face with the patient counseling and coordinating care.

## 2022-09-19 ENCOUNTER — INFUSION (OUTPATIENT)
Dept: INFUSION THERAPY | Facility: HOSPITAL | Age: 47
End: 2022-09-19
Attending: INTERNAL MEDICINE
Payer: MEDICARE

## 2022-09-19 DIAGNOSIS — Z17.0 MALIGNANT NEOPLASM OF UPPER-INNER QUADRANT OF RIGHT BREAST IN FEMALE, ESTROGEN RECEPTOR POSITIVE: Primary | ICD-10-CM

## 2022-09-19 DIAGNOSIS — C50.211 MALIGNANT NEOPLASM OF UPPER-INNER QUADRANT OF RIGHT BREAST IN FEMALE, ESTROGEN RECEPTOR POSITIVE: Primary | ICD-10-CM

## 2022-09-19 PROCEDURE — 96372 THER/PROPH/DIAG INJ SC/IM: CPT | Mod: 59

## 2022-09-19 PROCEDURE — 63600175 PHARM REV CODE 636 W HCPCS: Mod: JG | Performed by: INTERNAL MEDICINE

## 2022-09-19 PROCEDURE — 96402 CHEMO HORMON ANTINEOPL SQ/IM: CPT

## 2022-09-19 RX ADMIN — GOSERELIN ACETATE 3.6 MG: 3.6 IMPLANT SUBCUTANEOUS at 12:09

## 2022-09-19 RX ADMIN — DENOSUMAB 120 MG: 120 INJECTION SUBCUTANEOUS at 12:09

## 2022-09-26 DIAGNOSIS — R11.0 NAUSEA: ICD-10-CM

## 2022-09-26 RX ORDER — ONDANSETRON 8 MG/1
TABLET, ORALLY DISINTEGRATING ORAL
Qty: 30 TABLET | Refills: 2 | Status: SHIPPED | OUTPATIENT
Start: 2022-09-26 | End: 2024-02-07

## 2022-09-26 NOTE — TELEPHONE ENCOUNTER
Called patient as request for zofran RX. - uses zofran as needed. Not having any nausea but likes to have on hand.   Will refill.   PJ

## 2022-09-27 ENCOUNTER — PATIENT MESSAGE (OUTPATIENT)
Dept: HEMATOLOGY/ONCOLOGY | Facility: CLINIC | Age: 47
End: 2022-09-27
Payer: MEDICARE

## 2022-10-03 ENCOUNTER — PATIENT MESSAGE (OUTPATIENT)
Dept: ADMINISTRATIVE | Facility: HOSPITAL | Age: 47
End: 2022-10-03
Payer: MEDICARE

## 2022-10-10 ENCOUNTER — SPECIALTY PHARMACY (OUTPATIENT)
Dept: PHARMACY | Facility: CLINIC | Age: 47
End: 2022-10-10
Payer: MEDICARE

## 2022-10-10 ENCOUNTER — HOSPITAL ENCOUNTER (OUTPATIENT)
Dept: RADIOLOGY | Facility: HOSPITAL | Age: 47
Discharge: HOME | End: 2022-10-10
Attending: INTERNAL MEDICINE
Payer: MEDICARE

## 2022-10-10 PROCEDURE — 74150 CT ABDOMEN W/O CONTRAST: CPT | Mod: TC

## 2022-10-10 PROCEDURE — 74150 CT ABDOMEN W/O CONTRAST: CPT | Mod: 26,,, | Performed by: STUDENT IN AN ORGANIZED HEALTH CARE EDUCATION/TRAINING PROGRAM

## 2022-10-10 PROCEDURE — 74150 CT ABDOMEN WITHOUT CONTRAST: ICD-10-PCS | Mod: 26,,, | Performed by: STUDENT IN AN ORGANIZED HEALTH CARE EDUCATION/TRAINING PROGRAM

## 2022-10-10 NOTE — TELEPHONE ENCOUNTER
Specialty Pharmacy - Refill Coordination    Specialty Medication Orders Linked to Encounter      Flowsheet Row Most Recent Value   Medication #1 palbociclib (IBRANCE) 100 mg Cap (Order#[id number], Rx#[id number])            Refill Questions - Documented Responses      Flowsheet Row Most Recent Value   Patient Availability and HIPAA Verification    Does patient want to proceed with activity? Yes   HIPAA/medical authority confirmed? Yes   Relationship to patient of person spoken to? Self   Refill Screening Questions    Changes to allergies? No   Changes to medications? No   New conditions since last clinic visit? No   Unplanned office visit, urgent care, ED, or hospital admission in the last 4 weeks? No   How does patient/caregiver feel medication is working? Good   Financial problems or insurance changes? No   How many doses of your specialty medications were missed in the last 4 weeks? 0   Would patient like to speak to a pharmacist? No   When does the patient need to receive the medication? 10/17/22   Refill Delivery Questions    How will the patient receive the medication? Pickup   When does the patient need to receive the medication? 10/17/22   Shipping Address Home   Address in [facility] confirmed and updated if neccessary? Yes   Expected Copay ($) 0   Is the patient able to afford the medication copay? Yes   Payment Method zero copay   Days supply of Refill 28   Supplies needed? No supplies needed   Refill activity completed? Yes   Refill activity plan Refill scheduled   Shipment/Pickup Date: 10/17/22            Current Outpatient Medications   Medication Sig    (Magic mouthwash) 1:1:1 Benadryl 12.5mg/5ml liq, aluminum & magnesium hydroxide-simehticone (Maalox), LIDOcaine viscous 2% Swish and spit 10 mLs every 4 (four) hours as needed. for mouth sores    amLODIPine (NORVASC) 5 MG tablet TAKE 1 TABLET(5 MG) BY MOUTH EVERY DAY    anastrozole (ARIMIDEX) 1 mg Tab TAKE 1 TABLET(1 MG) BY MOUTH EVERY DAY     atorvastatin (LIPITOR) 40 MG tablet TAKE 1 TABLET(40 MG) BY MOUTH EVERY DAY    blood sugar diagnostic Strp To check BG 4 times daily, to use with insurance preferred meter    blood-glucose meter kit To check BG 4 times daily, to use with insurance preferred meter    carvediloL (COREG) 25 MG tablet TAKE 1 TABLET(25 MG) BY MOUTH TWICE DAILY    ciclopirox (PENLAC) 8 % Soln Apply topically nightly.    duke's soln (benadryl 30 mL, mylanta 30 mL, LIDOcaine 30 mL, nystatin 30 mL) 120mL Take 10 mLs by mouth 4 (four) times daily.    dulaglutide (TRULICITY) 0.75 mg/0.5 mL pen injector Inject 0.75 mg into the skin every 7 days.    ergocalciferol (ERGOCALCIFEROL) 50,000 unit Cap TAKE 1 CAPSULE BY MOUTH EVERY 7 DAYS    ferrous sulfate 324 mg (65 mg iron) TbEC TAKE 1 TABLET BY MOUTH ON EVERY MONDAY, WEDNESDAY, AND FRIDAY    furosemide (LASIX) 20 MG tablet Take 1 tablet (20 mg total) by mouth 2 (two) times daily.    glipiZIDE (GLUCOTROL) 10 MG tablet Take 1 tablet (10 mg total) by mouth 2 (two) times daily before meals.    goserelin (ZOLADEX) 3.6 mg injection Inject 3.6 mg into the skin every 28 days.    ibuprofen (ADVIL,MOTRIN) 800 MG tablet Take 1 tablet (800 mg total) by mouth 2 (two) times daily as needed for Pain.    lancets Misc To check BG 4 times daily, to use with insurance preferred meter    LIDOcaine (LIDODERM) 5 % Place 2 patches onto the skin once daily. Remove & Discard patch within 12 hours or as directed by MD    LORazepam (ATIVAN) 1 MG tablet Take 1 tablet (1 mg total) by mouth every 12 (twelve) hours as needed for Anxiety.    methocarbamoL (ROBAXIN) 500 MG Tab Take 500 mg by mouth 4 (four) times daily.    morphine (MS CONTIN) 15 MG 12 hr tablet Take 1 tablet (15 mg total) by mouth every 8 (eight) hours as needed for Pain.    ondansetron (ZOFRAN-ODT) 4 MG TbDL Take 1 tablet (4 mg total) by mouth 2 (two) times daily.    ondansetron (ZOFRAN-ODT) 8 MG TbDL DISSOLVE 1 TABLET(8 MG) ON THE TONGUE EVERY 8 HOURS AS NEEDED  FOR NAUSEA    palbociclib (IBRANCE) 100 mg Cap Take 1 capsule (100 mg) by mouth once daily for 21 days, followed by 7 days off.    potassium chloride (KLOR-CON) 10 MEQ TbSR TAKE 1 TABLET(10 MEQ) BY MOUTH EVERY DAY    promethazine (PHENERGAN) 25 MG tablet Take 1 tablet (25 mg total) by mouth every 6 (six) hours as needed for Nausea.    sacubitriL-valsartan (ENTRESTO)  mg per tablet Take 1 tablet by mouth 2 (two) times daily.    sennosides (SENNA-C ORAL) Take 2 tablets by mouth daily as needed.    traMADoL (ULTRAM) 50 mg tablet Take 1 tablet (50 mg total) by mouth every 6 (six) hours as needed for Pain.    venlafaxine (EFFEXOR-XR) 150 MG Cp24 TAKE 1 CAPSULE(150 MG) BY MOUTH EVERY DAY   Last reviewed on 9/19/2022 11:59 AM by Addis Rice RN    Review of patient's allergies indicates:   Allergen Reactions    Keflex [cephalexin] Itching    Last reviewed on  9/19/2022 11:23 AM by Addis Rice      Tasks added this encounter   11/7/2022 - Refill Call (Auto Added)  10/18/2022 - Pickup Reminder   Tasks due within next 3 months   No tasks due.     [facility]  [address] [place] [id number]  Phone: [phone]  Fax: [phone]

## 2022-10-10 NOTE — TELEPHONE ENCOUNTER
Outgoing call regarding refill. Pt stated she restarts her new cycle on 10/17/22 and pt is going on a cruise and wanted to pick it up when she comes back on 10/17/22

## 2022-10-21 ENCOUNTER — HOSPITAL ENCOUNTER (EMERGENCY)
Facility: HOSPITAL | Age: 47
Discharge: HOME | End: 2022-10-21
Attending: INTERNAL MEDICINE
Payer: MEDICARE

## 2022-10-21 ENCOUNTER — TELEPHONE (OUTPATIENT)
Dept: PALLIATIVE MEDICINE | Facility: CLINIC | Age: 47
End: 2022-10-21
Payer: MEDICARE

## 2022-10-21 VITALS
OXYGEN SATURATION: 99 % | DIASTOLIC BLOOD PRESSURE: 90 MMHG | SYSTOLIC BLOOD PRESSURE: 180 MMHG | TEMPERATURE: 99 F | HEIGHT: 64 IN | BODY MASS INDEX: 50.02 KG/M2 | HEART RATE: 85 BPM | WEIGHT: 293 LBS | RESPIRATION RATE: 20 BRPM

## 2022-10-21 DIAGNOSIS — R73.9 HYPERGLYCEMIA: Primary | ICD-10-CM

## 2022-10-21 DIAGNOSIS — R06.02 SHORTNESS OF BREATH: ICD-10-CM

## 2022-10-21 DIAGNOSIS — R06.02 SOB (SHORTNESS OF BREATH): ICD-10-CM

## 2022-10-21 DIAGNOSIS — I50.9 CONGESTIVE HEART FAILURE, UNSPECIFIED HF CHRONICITY, UNSPECIFIED HEART FAILURE TYPE: ICD-10-CM

## 2022-10-21 LAB
ALBUMIN SERPL BCP-MCNC: 2.9 G/DL (ref 3.5–5.2)
ALP SERPL-CCNC: 85 U/L (ref 55–135)
ALT SERPL W/O P-5'-P-CCNC: 25 U/L (ref 10–44)
ANION GAP SERPL CALC-SCNC: 10 MMOL/L (ref 8–16)
AST SERPL-CCNC: 20 U/L (ref 10–40)
B-OH-BUTYR BLD STRIP-SCNC: 0 MMOL/L (ref 0–0.5)
BACTERIA #/AREA URNS HPF: ABNORMAL /HPF
BASOPHILS # BLD AUTO: 0.07 K/UL (ref 0–0.2)
BASOPHILS NFR BLD: 1.1 % (ref 0–1.9)
BILIRUB SERPL-MCNC: 0.3 MG/DL (ref 0.1–1)
BILIRUB UR QL STRIP: NEGATIVE
BNP SERPL-MCNC: 25 PG/ML (ref 0–99)
BUN SERPL-MCNC: 8 MG/DL (ref 6–20)
CALCIUM SERPL-MCNC: 9.5 MG/DL (ref 8.7–10.5)
CHLORIDE SERPL-SCNC: 102 MMOL/L (ref 95–110)
CLARITY UR: CLEAR
CO2 SERPL-SCNC: 24 MMOL/L (ref 23–29)
COLOR UR: COLORLESS
CREAT SERPL-MCNC: 0.9 MG/DL (ref 0.5–1.4)
CTP QC/QA: YES
CTP QC/QA: YES
DIFFERENTIAL METHOD: ABNORMAL
EOSINOPHIL # BLD AUTO: 0.1 K/UL (ref 0–0.5)
EOSINOPHIL NFR BLD: 0.9 % (ref 0–8)
ERYTHROCYTE [DISTWIDTH] IN BLOOD BY AUTOMATED COUNT: 19.9 % (ref 11.5–14.5)
EST. GFR  (NO RACE VARIABLE): >60 ML/MIN/1.73 M^2
GLUCOSE SERPL-MCNC: 382 MG/DL (ref 70–110)
GLUCOSE UR QL STRIP: ABNORMAL
HCT VFR BLD AUTO: 31.3 % (ref 37–48.5)
HGB BLD-MCNC: 9.8 G/DL (ref 12–16)
HGB UR QL STRIP: NEGATIVE
IMM GRANULOCYTES # BLD AUTO: 0.03 K/UL (ref 0–0.04)
IMM GRANULOCYTES NFR BLD AUTO: 0.5 % (ref 0–0.5)
KETONES UR QL STRIP: NEGATIVE
LEUKOCYTE ESTERASE UR QL STRIP: NEGATIVE
LYMPHOCYTES # BLD AUTO: 1.2 K/UL (ref 1–4.8)
LYMPHOCYTES NFR BLD: 17.4 % (ref 18–48)
MCH RBC QN AUTO: 26.8 PG (ref 27–31)
MCHC RBC AUTO-ENTMCNC: 31.3 G/DL (ref 32–36)
MCV RBC AUTO: 86 FL (ref 82–98)
MICROSCOPIC COMMENT: ABNORMAL
MONOCYTES # BLD AUTO: 0.6 K/UL (ref 0.3–1)
MONOCYTES NFR BLD: 9.7 % (ref 4–15)
NEUTROPHILS # BLD AUTO: 4.7 K/UL (ref 1.8–7.7)
NEUTROPHILS NFR BLD: 70.4 % (ref 38–73)
NITRITE UR QL STRIP: NEGATIVE
NRBC BLD-RTO: 1 /100 WBC
PH UR STRIP: 6 [PH] (ref 5–8)
PLATELET # BLD AUTO: 319 K/UL (ref 150–450)
PMV BLD AUTO: 10.5 FL (ref 9.2–12.9)
POC MOLECULAR INFLUENZA A AGN: NEGATIVE
POC MOLECULAR INFLUENZA B AGN: NEGATIVE
POCT GLUCOSE: 303 MG/DL (ref 70–110)
POCT GLUCOSE: 324 MG/DL (ref 70–110)
POCT GLUCOSE: 361 MG/DL (ref 70–110)
POCT GLUCOSE: 413 MG/DL (ref 70–110)
POTASSIUM SERPL-SCNC: 4 MMOL/L (ref 3.5–5.1)
PROT SERPL-MCNC: 7.1 G/DL (ref 6–8.4)
PROT UR QL STRIP: NEGATIVE
RBC # BLD AUTO: 3.65 M/UL (ref 4–5.4)
SARS-COV-2 RDRP RESP QL NAA+PROBE: NEGATIVE
SODIUM SERPL-SCNC: 136 MMOL/L (ref 136–145)
SP GR UR STRIP: 1.03 (ref 1–1.03)
URN SPEC COLLECT METH UR: ABNORMAL
UROBILINOGEN UR STRIP-ACNC: NEGATIVE EU/DL
WBC # BLD AUTO: 6.6 K/UL (ref 3.9–12.7)
YEAST URNS QL MICRO: ABNORMAL

## 2022-10-21 PROCEDURE — 87635 SARS-COV-2 COVID-19 AMP PRB: CPT

## 2022-10-21 PROCEDURE — 82010 KETONE BODYS QUAN: CPT | Performed by: NURSE PRACTITIONER

## 2022-10-21 PROCEDURE — 96374 THER/PROPH/DIAG INJ IV PUSH: CPT

## 2022-10-21 PROCEDURE — 80053 COMPREHEN METABOLIC PANEL: CPT | Mod: 91 | Performed by: NURSE PRACTITIONER

## 2022-10-21 PROCEDURE — 83880 ASSAY OF NATRIURETIC PEPTIDE: CPT

## 2022-10-21 PROCEDURE — 87502 INFLUENZA DNA AMP PROBE: CPT

## 2022-10-21 PROCEDURE — 96372 THER/PROPH/DIAG INJ SC/IM: CPT | Mod: 59

## 2022-10-21 PROCEDURE — 81000 URINALYSIS NONAUTO W/SCOPE: CPT | Performed by: NURSE PRACTITIONER

## 2022-10-21 PROCEDURE — 99285 EMERGENCY DEPT VISIT HI MDM: CPT | Mod: 25

## 2022-10-21 PROCEDURE — 63600175 PHARM REV CODE 636 W HCPCS

## 2022-10-21 PROCEDURE — 82962 GLUCOSE BLOOD TEST: CPT

## 2022-10-21 PROCEDURE — 85025 COMPLETE CBC W/AUTO DIFF WBC: CPT | Mod: 91 | Performed by: NURSE PRACTITIONER

## 2022-10-21 RX ORDER — FUROSEMIDE 10 MG/ML
80 INJECTION INTRAMUSCULAR; INTRAVENOUS
Status: COMPLETED | OUTPATIENT
Start: 2022-10-21 | End: 2022-10-21

## 2022-10-21 RX ADMIN — INSULIN HUMAN 8 UNITS: 100 INJECTION, SOLUTION PARENTERAL at 08:10

## 2022-10-21 RX ADMIN — FUROSEMIDE 80 MG: 10 INJECTION, SOLUTION INTRAMUSCULAR; INTRAVENOUS at 07:10

## 2022-10-21 NOTE — FIRST PROVIDER EVALUATION
Medical screening examination initiated.  I have conducted a focused provider triage encounter, findings are as follows:    Brief history of present illness:  elevated BG.  Sent from PCP.  Also being treated for breast ca at present.     There were no vitals filed for this visit.    Pertinent physical exam:  speaking in full sent. NAD     Brief workup plan:  labs    Preliminary workup initiated; this workup will be continued and followed by the physician or advanced practice provider that is assigned to the patient when roomed.

## 2022-10-21 NOTE — ED PROVIDER NOTES
Encounter Date: 10/21/2022       History     Chief Complaint   Patient presents with    Hyperglycemia     Per pt she was sent by PCP for hyperglycemia stats bsg was 430 on labs on 10/21 at around noon. Reports she has had increased urination x 1 week     Shortness of Breath     Kristopher Elmore is a 47-year-old female with past medical history of ER positive breast cancer, hypertension, hyperlipidemia, type 2 diabetes, and congestive heart failure with last known EF 40% who presents to the emergency department today with chief complaint of hyperglycemia.  She is undergoing treatment for her breast cancer that requires laboratory testing just prior to treatment.  Today she was getting her pre treatment labs done and her glucose came back as 430 mg/dl, so she was sent to the emergency department.  She reports polyuria but denies polydipsia, polyphagia, vision changes, headache, abdominal pain, nausea, or vomiting.  She was recently diagnosed with diabetes and is currently taking glipizide and Trulicity, but her blood sugars have not been well controlled and she is waiting for her appointment at the diabetes clinic.    Separately, she reports shortness of breath for the last day.  She endorses mild leg swelling bilaterally, but denies any cough, fever, chills, nausea, vomiting, sore throat, rhinorrhea, or sinus pain or pressure.  She reports CHF for which she takes medications including carvedilol, Entresto, and atorvastatin, with p.r.n. Lasix for leg swelling.  She has not taken any Lasix in the last few months.  She did recently returned from a cruise where she ate more salty food than she normally does.    She is a nonsmoker, does not use recreational drugs, and only occasionally drinks alcohol.  She reports an allergy to Keflex.    The history is provided by the patient.   Review of patient's allergies indicates:   Allergen Reactions    Keflex [cephalexin] Itching     Past Medical History:   Diagnosis Date     Abnormal Pap smear     pt states 13yrs ago colpo was done    Anemia     Anxiety     Cancer     Cardiomyopathy     CHF (congestive heart failure)     Fibroid     Hx of psychiatric care     Hyperlipidemia     Hypertension     Psychiatric problem     Sleep difficulties     Therapy      Past Surgical History:   Procedure Laterality Date     SECTION      TONSILLECTOMY      TUBAL LIGATION      UTERINE FIBROID EMBOLIZATION       Family History   Problem Relation Age of Onset    Arthritis Mother     Diabetes Mother     Heart disease Mother         CHF, CAD , 2 stents    Hypertension Mother     Hyperlipidemia Mother     Heart failure Mother     No Known Problems Sister     Prostate cancer Father     Hypertension Brother     No Known Problems Daughter     Asthma Daughter     No Known Problems Maternal Grandmother     Cancer Maternal Grandfather 79        abdominal origin?    No Known Problems Paternal Grandmother     No Known Problems Paternal Grandfather     Thyroid cancer Other         type? dx age?    Cancer Paternal Cousin         ovarian @ ?29 & cervical @ ?29    Endometriosis Paternal Cousin     Breast cancer Neg Hx     Ovarian cancer Neg Hx     Colon polyps Neg Hx      Social History     Tobacco Use    Smoking status: Never    Smokeless tobacco: Never   Substance Use Topics    Alcohol use: Not Currently     Alcohol/week: 0.0 standard drinks    Drug use: No     Review of Systems   Constitutional:  Negative for chills, diaphoresis, fatigue, fever and unexpected weight change.   HENT:  Negative for sinus pain and sore throat.    Eyes:  Negative for pain, redness and visual disturbance.   Respiratory:  Positive for shortness of breath. Negative for cough, chest tightness and wheezing.    Cardiovascular:  Negative for chest pain and palpitations.   Gastrointestinal:  Negative for abdominal pain, blood in stool, diarrhea, nausea and vomiting.   Endocrine: Positive for polyuria. Negative for polydipsia and  polyphagia.   Genitourinary:  Positive for frequency. Negative for dysuria, flank pain and urgency.   Musculoskeletal:  Negative for arthralgias, back pain and myalgias.   Skin:  Negative for rash.   Allergic/Immunologic: Negative for environmental allergies.   Neurological:  Negative for dizziness, seizures, syncope, weakness, numbness and headaches.     Physical Exam     Initial Vitals [10/21/22 1659]   BP Pulse Resp Temp SpO2   (!) 183/85 79 (!) 22 98.7 °F (37.1 °C) 100 %      MAP       --         Physical Exam    Nursing note and vitals reviewed.  Constitutional: She appears well-developed. She is not diaphoretic. She is Obese . She is cooperative.  Non-toxic appearance. She does not appear ill. No distress.   HENT:   Head: Normocephalic and atraumatic.   Right Ear: Hearing, tympanic membrane, external ear and ear canal normal.   Left Ear: Hearing, tympanic membrane, external ear and ear canal normal.   Nose: Rhinorrhea present. No mucosal edema or sinus tenderness. Right sinus exhibits no maxillary sinus tenderness and no frontal sinus tenderness. Left sinus exhibits no maxillary sinus tenderness and no frontal sinus tenderness.   Mouth/Throat: Oropharynx is clear and moist and mucous membranes are normal. No posterior oropharyngeal edema, posterior oropharyngeal erythema or tonsillar abscesses.   Eyes: Conjunctivae and EOM are normal. Pupils are equal, round, and reactive to light.   Neck: Neck supple.   Could not assess JVD due to patient's body habitus.    Full passive range of motion without pain.     Cardiovascular:  Normal rate, regular rhythm, S1 normal, S2 normal, normal heart sounds and normal pulses.           No murmur heard.  Pulses:       Radial pulses are 2+ on the right side and 2+ on the left side.        Dorsalis pedis pulses are 2+ on the right side and 2+ on the left side.   On my exam, I did not find any signs of pitting edema or lower extremity swelling.  Maybe mild swelling has there was  slight indentation from the patient's socks when they were removed.  Patient does report that her feet are more swollen than normal.   Pulmonary/Chest: Effort normal and breath sounds normal. No accessory muscle usage. No respiratory distress. She has no decreased breath sounds. She has no wheezes. She has no rhonchi. She has no rales.   I did not detect any rales, rhonchi, or crackles on lung exam.   Abdominal: Abdomen is soft and flat. She exhibits no distension. There is no abdominal tenderness.   Musculoskeletal:      Cervical back: Full passive range of motion without pain and neck supple. No edema or rigidity. No muscular tenderness. Normal range of motion.     Neurological: She is alert.   Skin: Skin is warm and dry. Capillary refill takes less than 2 seconds. No abrasion, no lesion and no rash noted.       ED Course   Procedures  Labs Reviewed   CBC W/ AUTO DIFFERENTIAL - Abnormal; Notable for the following components:       Result Value    RBC 3.65 (*)     Hemoglobin 9.8 (*)     Hematocrit 31.3 (*)     MCH 26.8 (*)     MCHC 31.3 (*)     RDW 19.9 (*)     nRBC 1 (*)     Lymph % 17.4 (*)     All other components within normal limits   COMPREHENSIVE METABOLIC PANEL - Abnormal; Notable for the following components:    Glucose 382 (*)     Albumin 2.9 (*)     All other components within normal limits   URINALYSIS, REFLEX TO URINE CULTURE - Abnormal; Notable for the following components:    Color, UA Colorless (*)     Glucose, UA 4+ (*)     All other components within normal limits    Narrative:     Specimen Source->Urine   URINALYSIS MICROSCOPIC - Abnormal; Notable for the following components:    Bacteria Few (*)     Yeast, UA Occasional (*)     All other components within normal limits    Narrative:     Specimen Source->Urine   POCT GLUCOSE - Abnormal; Notable for the following components:    POCT Glucose 413 (*)     All other components within normal limits   POCT GLUCOSE - Abnormal; Notable for the following  components:    POCT Glucose 361 (*)     All other components within normal limits   BETA - HYDROXYBUTYRATE, SERUM   B-TYPE NATRIURETIC PEPTIDE   SARS-COV-2 RDRP GENE   POCT INFLUENZA A/B MOLECULAR   POCT GLUCOSE MONITORING CONTINUOUS          Imaging Results              X-Ray Chest 1 View (Final result)  Result time 10/21/22 18:09:20   Procedure changed from X-Ray Chest PA And Lateral     Final result by Kleber Moralez MD (10/21/22 18:09:20)                   Impression:      No acute cardiopulmonary process identified.      Electronically signed by: Kleber Moralez MD  Date:    10/21/2022  Time:    18:09               Narrative:    EXAMINATION:  XR CHEST 1 VIEW    CLINICAL HISTORY:  SOB; Shortness of breath    TECHNIQUE:  Single frontal view of the chest was performed.    COMPARISON:  04/13/2022.    FINDINGS:  Cardiac silhouette is stable in size.  Lungs are hypoinflated.  No evidence of focal consolidative process, pneumothorax, or significant pleural effusion.  No acute osseous abnormality identified.                                    X-Rays:   Independently Interpreted Readings:   Chest X-Ray: No infiltrates. Cardiomegaly present. Cardiomegaly is present but stable, no acute consolidation or focal processes.  Poor inspiratory effort.  There is no pneumothorax.  There is no free air underneath the diaphragm.  No rib fractures present.   Medications   insulin regular injection 8 Units 0.08 mL (has no administration in time range)   furosemide injection 80 mg (80 mg Intravenous Given 10/21/22 1916)     Medical Decision Making:   Initial Assessment:   37-year-old female presenting from lab with elevated blood sugar, blood glucose 430.  Also reporting shortness of breath.  Differential Diagnosis:   Differential diagnosis includes but is not limited to DKA, HHS, hyperglycemia without complication, medication noncompliance, CHF exacerbation, upper respiratory infection including COVID-19, the flu, or another viral  respiratory infection, and pneumonia.  Clinical Tests:   Lab Tests: Ordered and Reviewed       <> Summary of Lab: POCT glucose on arrival 413.  CMP with glucose 382 mild hypoalbuminemia.   Beta hydroxybutyrate 0.0.  CBC with mild anemia, although this is the patient's baseline.  UA with 4+ glucose, no ketones.  Occasional yeast, few bacteria.  COVID and flu negative.  Radiological Study: Ordered and Reviewed  ED Management:  Patient presenting with a history of poorly-controlled diabetes and blood glucose of 430.  Vital signs at triage showed respirations of 22 per minute.  These factors raised suspicion for diabetic ketoacidosis. However, the patient's lab work that showed a glucose of 430 also showed a normal anion gap and normal CO2.  Furthermore, while she endorses some polyuria, she denies abdominal pain, changes in vision, polydipsia, and polyphagia.  On physical exam, she is in no respiratory distress and with breathing approximately 20 times per minute on my initial assessment.  No ketones in patient's urine.  Beta hydroxybutyrate is 0.  These factors make diabetic ketoacidosis highly unlikely. The patient's increase respiratory rate appear to be caused by his separate process rather than a compensatory mechanism for metabolic acidosis.  With no intervention, glucose fell to 361 mg/dL over the course of her stay in the emergency department.  Patient was given 1 dose of subcutaneous insulin prior to discharge to help further lower her glucose.    This patient shortness of breath may be secondary to a mild CHF exacerbation.  This patient has known CHF with an ejection fraction of 40% on last echo.  She does take carvedilol, Entresto, and atorvastatin but did not take his medications today.  Furthermore she only takes Lasix on a p.r.n. basis and has not taken any recently.  Patient reports that her legs are subjectively more swollen than normal, although this is not apparent on physical exam as there is no  pitting edema. Patient given 1 dose of IV Lasix to attempt to draw some fluid off and make it easier for her to breathe.  Patient with good response to diuretic, pure wick with approximately 400 mL of urine after 1 hour.  Patient also reports that she is breathing easier.  O2 saturation 99% on room air.  She never required any supplemental oxygen for hypoxia or comfort.  Chest x-ray without any signs of edema or acute pulmonary process.  BNP within normal limits.  COVID and flu swabs were both negative.    Close follow-up with the diabetes clinic will be the most effective method for definitive management of her diabetes and resulting hyperglycemia.  Fortuitously, she is already in process of setting up an appointment at the diabetes clinic.  She will also be referred to cardiology for management of her congestive heart failure.                        Clinical Impression:   Final diagnoses:  [R73.9] Hyperglycemia (Primary)  [I50.9] Congestive heart failure, unspecified HF chronicity, unspecified heart failure type  [R06.02] Shortness of breath        ED Disposition Condition    Discharge Stable          ED Prescriptions    None       Follow-up Information       Follow up With Specialties Details Why Contact Info    Floridalma Olivares MD Cardiology Schedule an appointment as soon as possible for a visit in 2 days  [address] [facility] [address]  [phone]      Vito Walker MD Family Medicine Schedule an appointment as soon as possible for a visit in 2 days  [address]  [address]  [facility] [id number] [phone]               John Neil PA-C  10/21/22 2040

## 2022-10-22 NOTE — DISCHARGE INSTRUCTIONS
Please call the diabetes clinic back to try to schedule an appointment. I also suggest follow-up with your primary care provider and your cardiologist.    Thank you for coming to our Emergency Department today. It is important to remember that some problems are difficult to diagnose and may not be found during your first visit. Be sure to follow up with your primary care doctor and review any labs/imaging that was performed with them. If you do not have a primary care doctor, you may contact the one listed on your discharge paperwork or you may also call the [facility] Appointment Desk at [phone] to schedule an appointment with one.     All medications may potentially have side effects and it is impossible to predict which medications may give you side effects. If you feel that you are having a negative effect of any medication you should immediately stop taking them and seek medical attention.    Return to the ER with any questions/concerns, new/concerning symptoms, worsening or failure to improve. Do not drive or make any important decisions for 24 hours if you have received any pain medications, sedatives or mood altering drugs during your ER visit.

## 2022-10-24 ENCOUNTER — INFUSION (OUTPATIENT)
Dept: INFUSION THERAPY | Facility: HOSPITAL | Age: 47
End: 2022-10-24
Attending: INTERNAL MEDICINE
Payer: MEDICARE

## 2022-10-24 ENCOUNTER — OFFICE VISIT (OUTPATIENT)
Dept: PSYCHIATRY | Facility: CLINIC | Age: 47
End: 2022-10-24
Payer: MEDICARE

## 2022-10-24 ENCOUNTER — OFFICE VISIT (OUTPATIENT)
Dept: HEMATOLOGY/ONCOLOGY | Facility: CLINIC | Age: 47
End: 2022-10-24
Payer: MEDICARE

## 2022-10-24 ENCOUNTER — OFFICE VISIT (OUTPATIENT)
Dept: PALLIATIVE MEDICINE | Facility: CLINIC | Age: 47
End: 2022-10-24
Payer: MEDICARE

## 2022-10-24 VITALS — DIASTOLIC BLOOD PRESSURE: 86 MMHG | SYSTOLIC BLOOD PRESSURE: 150 MMHG | HEART RATE: 79 BPM

## 2022-10-24 VITALS
BODY MASS INDEX: 50.02 KG/M2 | HEART RATE: 84 BPM | TEMPERATURE: 98 F | SYSTOLIC BLOOD PRESSURE: 144 MMHG | HEIGHT: 64 IN | OXYGEN SATURATION: 98 % | DIASTOLIC BLOOD PRESSURE: 79 MMHG | RESPIRATION RATE: 20 BRPM | WEIGHT: 293 LBS

## 2022-10-24 DIAGNOSIS — Z17.0 MALIGNANT NEOPLASM OF UPPER-INNER QUADRANT OF RIGHT BREAST IN FEMALE, ESTROGEN RECEPTOR POSITIVE: Primary | ICD-10-CM

## 2022-10-24 DIAGNOSIS — M84.48XD: ICD-10-CM

## 2022-10-24 DIAGNOSIS — E11.65 TYPE 2 DIABETES MELLITUS WITH HYPERGLYCEMIA, WITHOUT LONG-TERM CURRENT USE OF INSULIN: Chronic | ICD-10-CM

## 2022-10-24 DIAGNOSIS — R63.0 ANOREXIA: ICD-10-CM

## 2022-10-24 DIAGNOSIS — G47.00 INSOMNIA, UNSPECIFIED TYPE: ICD-10-CM

## 2022-10-24 DIAGNOSIS — Z51.5 ENCOUNTER FOR PALLIATIVE CARE: ICD-10-CM

## 2022-10-24 DIAGNOSIS — F43.23 ADJUSTMENT DISORDER WITH MIXED ANXIETY AND DEPRESSED MOOD: ICD-10-CM

## 2022-10-24 DIAGNOSIS — K59.00 CONSTIPATION, UNSPECIFIED CONSTIPATION TYPE: ICD-10-CM

## 2022-10-24 DIAGNOSIS — C50.211 MALIGNANT NEOPLASM OF UPPER-INNER QUADRANT OF RIGHT BREAST IN FEMALE, ESTROGEN RECEPTOR POSITIVE: Primary | ICD-10-CM

## 2022-10-24 DIAGNOSIS — I50.42 CHRONIC COMBINED SYSTOLIC AND DIASTOLIC HEART FAILURE: ICD-10-CM

## 2022-10-24 DIAGNOSIS — R53.0 NEOPLASTIC (MALIGNANT) RELATED FATIGUE: ICD-10-CM

## 2022-10-24 DIAGNOSIS — R06.09 OTHER FORM OF DYSPNEA: ICD-10-CM

## 2022-10-24 DIAGNOSIS — G89.3 CANCER ASSOCIATED PAIN: ICD-10-CM

## 2022-10-24 DIAGNOSIS — F43.21 GRIEF: ICD-10-CM

## 2022-10-24 DIAGNOSIS — F33.1 MAJOR DEPRESSIVE DISORDER, RECURRENT EPISODE, MODERATE WITH ANXIOUS DISTRESS: Primary | ICD-10-CM

## 2022-10-24 DIAGNOSIS — R11.0 NAUSEA: ICD-10-CM

## 2022-10-24 DIAGNOSIS — I10 HYPERTENSION, UNSPECIFIED TYPE: ICD-10-CM

## 2022-10-24 DIAGNOSIS — Z71.89 ADVANCED CARE PLANNING/COUNSELING DISCUSSION: ICD-10-CM

## 2022-10-24 PROCEDURE — 3052F PR MOST RECENT HEMOGLOBIN A1C LEVEL 8.0 - < 9.0%: ICD-10-PCS | Mod: CPTII,S$GLB,, | Performed by: PSYCHOLOGIST

## 2022-10-24 PROCEDURE — 63600175 PHARM REV CODE 636 W HCPCS: Mod: JG | Performed by: INTERNAL MEDICINE

## 2022-10-24 PROCEDURE — 99999 PR PBB SHADOW E&M-EST. PATIENT-LVL I: CPT | Mod: PBBFAC,,, | Performed by: PSYCHOLOGIST

## 2022-10-24 PROCEDURE — 90834 PSYTX W PT 45 MINUTES: CPT | Mod: S$GLB,,, | Performed by: PSYCHOLOGIST

## 2022-10-24 PROCEDURE — 4010F ACE/ARB THERAPY RXD/TAKEN: CPT | Mod: CPTII,S$GLB,, | Performed by: INTERNAL MEDICINE

## 2022-10-24 PROCEDURE — 99499 UNLISTED E&M SERVICE: CPT | Mod: S$PBB,,, | Performed by: INTERNAL MEDICINE

## 2022-10-24 PROCEDURE — 3077F PR MOST RECENT SYSTOLIC BLOOD PRESSURE >= 140 MM HG: ICD-10-PCS | Mod: CPTII,S$GLB,, | Performed by: INTERNAL MEDICINE

## 2022-10-24 PROCEDURE — 1159F MED LIST DOCD IN RCRD: CPT | Mod: CPTII,S$GLB,, | Performed by: INTERNAL MEDICINE

## 2022-10-24 PROCEDURE — 99999 PR PBB SHADOW E&M-EST. PATIENT-LVL V: CPT | Mod: PBBFAC,,, | Performed by: INTERNAL MEDICINE

## 2022-10-24 PROCEDURE — 3052F HG A1C>EQUAL 8.0%<EQUAL 9.0%: CPT | Mod: CPTII,S$GLB,, | Performed by: STUDENT IN AN ORGANIZED HEALTH CARE EDUCATION/TRAINING PROGRAM

## 2022-10-24 PROCEDURE — 3079F PR MOST RECENT DIASTOLIC BLOOD PRESSURE 80-89 MM HG: ICD-10-PCS | Mod: CPTII,S$GLB,, | Performed by: STUDENT IN AN ORGANIZED HEALTH CARE EDUCATION/TRAINING PROGRAM

## 2022-10-24 PROCEDURE — 4010F PR ACE/ARB THEARPY RXD/TAKEN: ICD-10-PCS | Mod: CPTII,S$GLB,, | Performed by: PSYCHOLOGIST

## 2022-10-24 PROCEDURE — 99499 RISK ADDL DX/OHS AUDIT: ICD-10-PCS | Mod: S$PBB,,, | Performed by: INTERNAL MEDICINE

## 2022-10-24 PROCEDURE — 3078F PR MOST RECENT DIASTOLIC BLOOD PRESSURE < 80 MM HG: ICD-10-PCS | Mod: CPTII,S$GLB,, | Performed by: INTERNAL MEDICINE

## 2022-10-24 PROCEDURE — 1159F PR MEDICATION LIST DOCUMENTED IN MEDICAL RECORD: ICD-10-PCS | Mod: CPTII,S$GLB,, | Performed by: PSYCHOLOGIST

## 2022-10-24 PROCEDURE — 4010F PR ACE/ARB THEARPY RXD/TAKEN: ICD-10-PCS | Mod: CPTII,S$GLB,, | Performed by: INTERNAL MEDICINE

## 2022-10-24 PROCEDURE — 96402 CHEMO HORMON ANTINEOPL SQ/IM: CPT

## 2022-10-24 PROCEDURE — 1159F PR MEDICATION LIST DOCUMENTED IN MEDICAL RECORD: ICD-10-PCS | Mod: CPTII,S$GLB,, | Performed by: INTERNAL MEDICINE

## 2022-10-24 PROCEDURE — 99215 OFFICE O/P EST HI 40 MIN: CPT | Mod: S$GLB,,, | Performed by: INTERNAL MEDICINE

## 2022-10-24 PROCEDURE — 1160F RVW MEDS BY RX/DR IN RCRD: CPT | Mod: CPTII,S$GLB,, | Performed by: STUDENT IN AN ORGANIZED HEALTH CARE EDUCATION/TRAINING PROGRAM

## 2022-10-24 PROCEDURE — 1159F PR MEDICATION LIST DOCUMENTED IN MEDICAL RECORD: ICD-10-PCS | Mod: CPTII,S$GLB,, | Performed by: STUDENT IN AN ORGANIZED HEALTH CARE EDUCATION/TRAINING PROGRAM

## 2022-10-24 PROCEDURE — 99215 PR OFFICE/OUTPT VISIT, EST, LEVL V, 40-54 MIN: ICD-10-PCS | Mod: S$GLB,,, | Performed by: INTERNAL MEDICINE

## 2022-10-24 PROCEDURE — 99999 PR PBB SHADOW E&M-EST. PATIENT-LVL II: ICD-10-PCS | Mod: PBBFAC,,, | Performed by: STUDENT IN AN ORGANIZED HEALTH CARE EDUCATION/TRAINING PROGRAM

## 2022-10-24 PROCEDURE — 3077F SYST BP >= 140 MM HG: CPT | Mod: CPTII,S$GLB,, | Performed by: INTERNAL MEDICINE

## 2022-10-24 PROCEDURE — 3077F PR MOST RECENT SYSTOLIC BLOOD PRESSURE >= 140 MM HG: ICD-10-PCS | Mod: CPTII,S$GLB,, | Performed by: STUDENT IN AN ORGANIZED HEALTH CARE EDUCATION/TRAINING PROGRAM

## 2022-10-24 PROCEDURE — 4010F PR ACE/ARB THEARPY RXD/TAKEN: ICD-10-PCS | Mod: CPTII,S$GLB,, | Performed by: STUDENT IN AN ORGANIZED HEALTH CARE EDUCATION/TRAINING PROGRAM

## 2022-10-24 PROCEDURE — 4010F ACE/ARB THERAPY RXD/TAKEN: CPT | Mod: CPTII,S$GLB,, | Performed by: STUDENT IN AN ORGANIZED HEALTH CARE EDUCATION/TRAINING PROGRAM

## 2022-10-24 PROCEDURE — 3052F PR MOST RECENT HEMOGLOBIN A1C LEVEL 8.0 - < 9.0%: ICD-10-PCS | Mod: CPTII,S$GLB,, | Performed by: INTERNAL MEDICINE

## 2022-10-24 PROCEDURE — 1159F MED LIST DOCD IN RCRD: CPT | Mod: CPTII,S$GLB,, | Performed by: PSYCHOLOGIST

## 2022-10-24 PROCEDURE — 1159F MED LIST DOCD IN RCRD: CPT | Mod: CPTII,S$GLB,, | Performed by: STUDENT IN AN ORGANIZED HEALTH CARE EDUCATION/TRAINING PROGRAM

## 2022-10-24 PROCEDURE — 1160F RVW MEDS BY RX/DR IN RCRD: CPT | Mod: CPTII,S$GLB,, | Performed by: INTERNAL MEDICINE

## 2022-10-24 PROCEDURE — 90834 PR PSYCHOTHERAPY W/PATIENT, 45 MIN: ICD-10-PCS | Mod: S$GLB,,, | Performed by: PSYCHOLOGIST

## 2022-10-24 PROCEDURE — 1160F PR REVIEW ALL MEDS BY PRESCRIBER/CLIN PHARMACIST DOCUMENTED: ICD-10-PCS | Mod: CPTII,S$GLB,, | Performed by: INTERNAL MEDICINE

## 2022-10-24 PROCEDURE — 96372 THER/PROPH/DIAG INJ SC/IM: CPT

## 2022-10-24 PROCEDURE — 3052F PR MOST RECENT HEMOGLOBIN A1C LEVEL 8.0 - < 9.0%: ICD-10-PCS | Mod: CPTII,S$GLB,, | Performed by: STUDENT IN AN ORGANIZED HEALTH CARE EDUCATION/TRAINING PROGRAM

## 2022-10-24 PROCEDURE — 3052F HG A1C>EQUAL 8.0%<EQUAL 9.0%: CPT | Mod: CPTII,S$GLB,, | Performed by: INTERNAL MEDICINE

## 2022-10-24 PROCEDURE — 99999 PR PBB SHADOW E&M-EST. PATIENT-LVL I: ICD-10-PCS | Mod: PBBFAC,,, | Performed by: PSYCHOLOGIST

## 2022-10-24 PROCEDURE — 3052F HG A1C>EQUAL 8.0%<EQUAL 9.0%: CPT | Mod: CPTII,S$GLB,, | Performed by: PSYCHOLOGIST

## 2022-10-24 PROCEDURE — 99497 PR ADVNCD CARE PLAN 30 MIN: ICD-10-PCS | Mod: S$GLB,,, | Performed by: STUDENT IN AN ORGANIZED HEALTH CARE EDUCATION/TRAINING PROGRAM

## 2022-10-24 PROCEDURE — 99999 PR PBB SHADOW E&M-EST. PATIENT-LVL II: CPT | Mod: PBBFAC,,, | Performed by: STUDENT IN AN ORGANIZED HEALTH CARE EDUCATION/TRAINING PROGRAM

## 2022-10-24 PROCEDURE — 99215 OFFICE O/P EST HI 40 MIN: CPT | Mod: S$GLB,,, | Performed by: STUDENT IN AN ORGANIZED HEALTH CARE EDUCATION/TRAINING PROGRAM

## 2022-10-24 PROCEDURE — 99497 ADVNCD CARE PLAN 30 MIN: CPT | Mod: S$GLB,,, | Performed by: STUDENT IN AN ORGANIZED HEALTH CARE EDUCATION/TRAINING PROGRAM

## 2022-10-24 PROCEDURE — 4010F ACE/ARB THERAPY RXD/TAKEN: CPT | Mod: CPTII,S$GLB,, | Performed by: PSYCHOLOGIST

## 2022-10-24 PROCEDURE — 99999 PR PBB SHADOW E&M-EST. PATIENT-LVL V: ICD-10-PCS | Mod: PBBFAC,,, | Performed by: INTERNAL MEDICINE

## 2022-10-24 PROCEDURE — 3078F DIAST BP <80 MM HG: CPT | Mod: CPTII,S$GLB,, | Performed by: INTERNAL MEDICINE

## 2022-10-24 PROCEDURE — 99215 PR OFFICE/OUTPT VISIT, EST, LEVL V, 40-54 MIN: ICD-10-PCS | Mod: S$GLB,,, | Performed by: STUDENT IN AN ORGANIZED HEALTH CARE EDUCATION/TRAINING PROGRAM

## 2022-10-24 PROCEDURE — 3079F DIAST BP 80-89 MM HG: CPT | Mod: CPTII,S$GLB,, | Performed by: STUDENT IN AN ORGANIZED HEALTH CARE EDUCATION/TRAINING PROGRAM

## 2022-10-24 PROCEDURE — 3077F SYST BP >= 140 MM HG: CPT | Mod: CPTII,S$GLB,, | Performed by: STUDENT IN AN ORGANIZED HEALTH CARE EDUCATION/TRAINING PROGRAM

## 2022-10-24 PROCEDURE — 1160F PR REVIEW ALL MEDS BY PRESCRIBER/CLIN PHARMACIST DOCUMENTED: ICD-10-PCS | Mod: CPTII,S$GLB,, | Performed by: STUDENT IN AN ORGANIZED HEALTH CARE EDUCATION/TRAINING PROGRAM

## 2022-10-24 RX ORDER — IBUPROFEN 800 MG/1
800 TABLET ORAL 2 TIMES DAILY PRN
Qty: 45 TABLET | Refills: 2 | Status: SHIPPED | OUTPATIENT
Start: 2022-10-24 | End: 2024-01-19 | Stop reason: SDUPTHER

## 2022-10-24 RX ORDER — METHOCARBAMOL 750 MG/1
750 TABLET, FILM COATED ORAL 4 TIMES DAILY
Qty: 40 TABLET | Refills: 0 | Status: SHIPPED | OUTPATIENT
Start: 2022-10-24 | End: 2022-11-03

## 2022-10-24 RX ORDER — VENLAFAXINE HYDROCHLORIDE 75 MG/1
CAPSULE, EXTENDED RELEASE ORAL
COMMUNITY
Start: 2022-07-25 | End: 2022-10-25

## 2022-10-24 RX ADMIN — DENOSUMAB 120 MG: 120 INJECTION SUBCUTANEOUS at 10:10

## 2022-10-24 RX ADMIN — GOSERELIN ACETATE 3.6 MG: 3.6 IMPLANT SUBCUTANEOUS at 10:10

## 2022-10-24 NOTE — NURSING
Pt here for Xgeva and Zoladex. Assessment complete and labs reviewed.  Pt endorses taking Ca++ and Vit D daily; denies jaw pain or recent dental surgery. Administered both injections in abdomen. No questions or concerns. Pt ambulated out of unit unassisted.

## 2022-10-24 NOTE — PROGRESS NOTES
INFORMED CONSENT: Kristopher Elmore   is known to this provider and identity was confirmed via NAME and .  The patient has been informed of the risks and benefits associated with engaging in psychotherapy, the handling of protected health information, the rights of privacy and the limits of confidentiality. The patient has also been informed of the importance of reporting any suicidal or homicidal ideation to this or any provider to ensure safety of all parties, and the Kristopher Elmore expressed understanding. The patient was agreeable to these terms and freely participates in individual psychotherapy.      PSYCHO-ONCOLOGY NOTE/ Individual Psychotherapy     Date: 10/24/2022   Site:  [facility]        Therapeutic Intervention: Met with patient.  Outpatient - Insight oriented psychotherapy 45 min - CPT code 16616      Patient was last seen by me on 2022    Problem list  Patient Active Problem List   Diagnosis    Hypertension    Iron deficiency anemia    Cardiomegaly    Fibroid uterus    BLUE (dyspnea on exertion)    Thalassemia    Major depressive disorder, recurrent episode, moderate with anxious distress    Chronic combined systolic and diastolic heart failure    Cardiomyopathy    Hyperlipidemia    Malignant neoplasm of upper-inner quadrant of right breast in female, estrogen receptor positive    Cancer associated pain    Pathological fracture of lumbar vertebra with routine healing    Bone metastases    Hot flashes    Prediabetes    BMI 60.0-69.9, adult    Elevated LDL cholesterol level    Anxiety    Vaginal dryness    Vitamin D deficiency    Grief    Hyperglycemia    Type 2 diabetes mellitus with hyperglycemia, without long-term current use of insulin    Benign paroxysmal positional vertigo    Neuropathy    Anemia associated with chemotherapy    Sleep difficulties    Hard mass of abdomen    Benign essential HTN       Chief complaint/reason for encounter: depression, anxiety, and  keseha   Met with patient to evaluate psychosocial adaptation to diagnosis/treatment/survivorship of [facility] BC    Current Medications  Current Outpatient Medications   Medication    (Magic mouthwash) 1:1:1 Benadryl 12.5mg/5ml liq, aluminum & magnesium hydroxide-simehticone (Maalox), LIDOcaine viscous 2%    amLODIPine (NORVASC) 5 MG tablet    anastrozole (ARIMIDEX) 1 mg Tab    atorvastatin (LIPITOR) 40 MG tablet    blood sugar diagnostic Strp    blood-glucose meter kit    carvediloL (COREG) 25 MG tablet    ciclopirox (PENLAC) 8 % Soln    duke's soln (benadryl 30 mL, mylanta 30 mL, LIDOcaine 30 mL, nystatin 30 mL) 120mL    duke's soln (benadryl 30 mL, mylanta 30 mL, LIDOcaine 30 mL, nystatin 30 mL) 120mL    dulaglutide (TRULICITY) 0.75 mg/0.5 mL pen injector    ergocalciferol (ERGOCALCIFEROL) 50,000 unit Cap    ferrous sulfate 324 mg (65 mg iron) TbEC    furosemide (LASIX) 20 MG tablet    glipiZIDE (GLUCOTROL) 10 MG tablet    goserelin (ZOLADEX) 3.6 mg injection    ibuprofen (ADVIL,MOTRIN) 800 MG tablet    lancets Misc    LIDOcaine (LIDODERM) 5 %    LORazepam (ATIVAN) 1 MG tablet    methocarbamoL (ROBAXIN) 750 MG Tab    morphine (MS CONTIN) 15 MG 12 hr tablet    ondansetron (ZOFRAN-ODT) 4 MG TbDL    ondansetron (ZOFRAN-ODT) 8 MG TbDL    palbociclib (IBRANCE) 100 mg Cap    potassium chloride (KLOR-CON) 10 MEQ TbSR    promethazine (PHENERGAN) 25 MG tablet    sacubitriL-valsartan (ENTRESTO)  mg per tablet    sennosides (SENNA-C ORAL)    traMADoL (ULTRAM) 50 mg tablet    venlafaxine (EFFEXOR-XR) 75 MG 24 hr capsule     No current facility-administered medications for this visit.       Objective:  Kristopher Elmore arrived promptly for the session.   Ms. Elmore ambulated with a walker at the time of session. The patient was fully cooperative throughout the session.  Appearance: age appropriate, appropriately  dressed, adequately  groomed  Behavior/Cooperation: friendly and cooperative  Speech: normal in  rate, volume, and tone and appropriate quality, quantity and organization of sentences  Mood: euthymic  Affect: mood congruent  Thought Process: goal-directed, logical  Thought Content: normal,  No delusions or paranoia; did not appear to be responding to internal stimuli during the session  Orientation: grossly intact  Memory: Grossly intact  Attention Span/Concentration: Attends to session without distraction; reports no difficulty  Fund of Knowledge: average  Estimate of Intelligence: average from verbal skills and history  Cognition: grossly intact  Insight: patient has awareness of illness; good insight into own behavior and behavior of others  Judgment: the patient's behavior is adequate to circumstances    Interval history and content of current session: Patietn with anxiety related to current health status. Increase in grief symptoms as holidays and death anniversary of mother approaches.  Discussed diagnosis, treatment, prognosis, current adaptation to disease and treatment status, and family's adaptation to disease and treatment status. Reports to be coping with great difficulty. Evaluated cognitive response, paying particular attention to negative intrusive thoughts of a persistent and detrimental nature. Thoughts of this type are in evidence with moderate distress. Provided cognitive behavioral therapy to address negative cognitions. Identified and evaluated psychosocial and environmental stressors secondary to diagnosis and treatment.  Examined proactive behaviors that may be implemented to minimize or ameliorate psychosocial stressors secondary to diagnosis and treatment.     Risk parameters:   Patient reports no suicidal ideation  Patient reports no homicidal ideation  Patient reports no self-injurious behavior  Patient reports no violent behavior   Safety needs:  None at this time      Verbal deficits: None     Patient's response to intervention:The patient's response to intervention is  accepting.     Progress toward goals and other mental status changes:  The patient's progress toward goals is good.      Progress to date:Progress - Ongoing, but Slow      Goals from last visit: Met     Patient reported outcomes:    Distress Thermometer:   Distress Score    Distress Score: 7        Practical Problems Physical Problems                                                   Family Problems                                         Emotional Problems                                                         Spiritual/Religions Concerns     Spiritual / Mosque Concerns: No         Other Problems                PHQ-9=    HUY-7=    PHQ ANSWERS    Q1. Little interest or pleasure in doing things: (P) Several days (10/24/22 1149)  Q2. Feeling down, depressed, or hopeless: (P) Several days (10/24/22 1149)  Q3. Trouble falling or staying asleep, or sleeping too much: (P) Several days (10/24/22 1149)  Q4. Feeling tired or having little energy: (P) Several days (10/24/22 1149)  Q5. Poor appetite or overeating: (P) Several days (10/24/22 1149)  Q6. Feeling bad about yourself - or that you are a failure or have let yourself or your family down: (P) Several days (10/24/22 1149)  Q7. Trouble concentrating on things, such as reading the newspaper or watching television: (P) Several days (10/24/22 1149)  Q8. Moving or speaking so slowly that other people could have noticed. Or the opposite - being so fidgety or restless that you have been moving around a lot more than usual: (P) Several days (10/24/22 1149)  Q9.      PHQ8 Score : (P) 8 (10/24/22 1149)  PHQ-9 Total Score: (P) 8 (10/24/22 1149)     HUY-7 Answers    GAD7 10/24/2022   1. Feeling nervous, anxious, or on edge? 1   2. Not being able to stop or control worrying? 1   3. Worrying too much about different things? 1   4. Trouble relaxing? 1   5. Being so restless that it is hard to sit still? 1   6. Becoming easily annoyed or irritable? 1   7. Feeling afraid as if  something awful might happen? 1   HUY-7 Score 7     HUY-7 Score: (P) 7  Interpretation: (P) Mild Anxiety     Client Strengths: verbal, intelligent, successful, good social support, good insight, commitment to wellness, strong jd, strong cultural traditions     Diagnosis:     ICD-10-CM ICD-9-CM   1. Major depressive disorder, recurrent episode, moderate with anxious distress  F33.1 296.32   2. Grief  F43.21 309.0       Treatment Plan:individual psychotherapy and medication management by physician  Target symptoms: depression, anxiety , grief  Why chosen therapy is appropriate versus another modality: relevant to diagnosis, patient responds to this modality, evidence based practice  Outcome monitoring methods: self-report, observation, checklist/rating scale  Therapeutic intervention type: insight oriented psychotherapy, behavior modifying psychotherapy  Prognosis: Good      Behavioral goals:    Exercise:   Stress management:   Social engagement:   Nutrition:   Smoking Cessation:   Therapy:  Increase daily self-care and attention to health management  Pleasant events scheduling and increased social interaction  Monitor stressors in writing and bring to next visit    Return to clinic: as scheduled    Next Session:      Length of Service (minutes direct face-to-face contact): 45    Bria Odom, PhD  Clinical Psychologist  [place] License #[id number]  [place] License #[id number]

## 2022-10-24 NOTE — PROGRESS NOTES
"Subjective:       Patient ID: Kristopher Elmore is a 47 y.o. female.    Chief Complaint: No chief complaint on file.    HPI    Here for follow up.   In the interval went to ED  BG > 400 on outpatient labs  When she arrived developed sudden dyspnea  Was given IV Lasix as volume overloaded and reports diuresed > 1.5 L  Also received insulin for her BG  Was referred for follow up to cardiology and diabetic clinic and discharged  She reports historic difficulty getting diabetic follow up despite multiple attempts  Reports no DM medications made-  states high 200s at home over the weekend    Last saw Cardiology Dr. Olivares on 4/28/2022 and at that time EF improved although ECHO shortly after this visit raised concern for  decreased EF-- see correspondence below  Patient reports she aw Dr. Mccormick at [facility] following as an urgent care request with cardiology for dyspnea and repeat ECHO done  Per Epic message after this visit he wrote:  "Just tried to call left message  I know you have seen Dr. Olivares more recently than I, but the recent blood work as well as lab work were the things I ordered one I saw you most recently in clinic.  The echocardiogram, and left ventricular ejection fraction is lower than prior, but I personally reviewed your prior echocardiogram and I think it may have been a little generous in its evaluation.  Thus I suspect your prior ejection fraction was slightly lower than stated, and this would make the current drop only slight.  Ejection fraction can fluctuate on a hour to hour day-to-day basis, depending upon hydration status, blood pressure, and other factors, thus I would not put too much stock in this relatively small change.  I would continue the medical therapy which we have you on, and is continuing to protect the heart, and continue to follow in clinic on a regular basis, but no additional immediate medical therapy needed at this time.  They may discuss some alternative medications when " "you are seen next in clinic.  The blood work this week also looked good, normal kidney function, and mildly elevated NT pro BNP is not concerning, no significant evidence the you are holding on to any large quantities of fluid.  Please let me know if you have any questions at all, would be happy to answer  Sincerely  -Sathya Mccormick"    - communication with Dr. Olivares via My Chart 5/2/2022  Faraz Summers,  The echo was read as the LVEF begin lower than your last study. Ill have to go over to the echo lab and ask someone to pull the studies up side by side to compare. It is not unusual to have the numbers vary, but I want to make sure there wasn't a misread on either study. If the EF is back down, I would like to start a medication called Farxiga. Are you feeling any better on the lasix?   Regards,  Floridalma Olivares    - communication with Dr. Olivares via My Chart 5/6/2022  Faraz Summers,  Your labs showed your kidney function is stable on the lasix and the BNP and NT-pro BNP are not consistent with volume overload from CHF. If you are still having shortness of breath I would recommend following up with Oncology to see if you have pneumonitis. I reviewed your last two echoes with Dr. Mccormick, and the most recent did show a little decrease in the ejection fraction. This is not uncommon. The heart function can change with different physiologic stressors. You are on a good medical regimen. There is a newly guideline recommend class of medication recommended as of a few weeks ago called SGLT-2 inhibitors (jardiance or farxiga). We can add one of these medications to your heart failure regimen. They have been shown to decrease hospitalizations and decrease mortality. Please let me know if you would like for me to call one in for you.  RegardsFloridalma    Note- we did follow up as requested regarding pneumonitis concern- 3/2022 and 6/2022 CT scans of chest without evidence to support and CXR in ED recently aslo negative for " this finding  Of note, not on medications that would typically cause    - 5/2/2022 ECHO:  The left ventricle is mildly enlarged with eccentric hypertrophy and mildly decreased systolic function. The estimated ejection fraction is 40%.  There is left ventricular global hypokinesis.  There is abnormal septal wall motion.  Grade I left ventricular diastolic dysfunction.  Normal right ventricular size with mildly reduced right ventricular systolic function.  Moderate left atrial enlargement.     - 10/10/2022 CXR:  FINDINGS:  Cardiac silhouette is stable in size.  Lungs are hypoinflated.  No evidence of focal consolidative process, pneumothorax, or significant pleural effusion.  No acute osseous abnormality identified.  Impression:  No acute cardiopulmonary process identified.    Restaging:  - 10/16/2022 CT Abdomen:  FINDINGS:  Lungs/Pleura: Lung bases are unremarkable.  No focal consolidation, pneumothorax, or pleural effusion is present.  Heart: The visualized portions of the heart are normal. No pericardial effusion.  Liver: Liver is mildly enlarged with diffuse hypoattenuation suggesting steatosis.  No focal hepatic abnormality.  Gallbladder/Bile ducts: The gallbladder is unremarkable.  No intrahepatic or extrahepatic biliary ductal dilatation.  Spleen:Unremarkable.  Stomach: Unremarkable  Pancreas: Unremarkable.  Adrenals: Unremarkable.  Renal/Ureters: The kidneys are normal in size and location. No hydronephrosis. Stable right upper pole renal cyst.  Visualized ureters are normal.  Partially visualized uterus demonstrates a calcified fibroid near the fundus.  Bowel: The visualized loops of small and large bowel show no evidence of obstruction or inflammation.  Peritoneum: No ascites, free fluid, or intraperitoneal free air.  Lymph Nodes: No pathologic hakeem enlargement in the abdomen.  Vasculature: The abdominal aorta is normal in course and caliber.  No atherosclerotic calcifications.  Bones: Age appropriate  degenerative changes.  Stable burden of osseous metastatic disease involving the pelvis and spine.  No evidence of new lytic or sclerotic lesions.  No evidence for acute fracture.  Soft Tissues: Mild nonspecific subcutaneous fat stranding within the ventral abdominal wall near the periumbilical region.  No organized fluid collection or solid mass detected.  Mild subcutaneous edema of the midline lower back.  Impression:  Patient with breast cancer. Stable lucent/sclerotic lesions throughout the pelvis and spine. No evidence of new metastatic disease.  Mild nonspecific subcutaneous fat stranding within the ventral abdominal wall near the periumbilical region.  Finding could represent simple edema or sequela of soft tissue infection/inflammation.  No organized fluid collection or solid mass detected.  Right renal cyst.  Hepatic steatosis.  Additional findings as above.    Prior scans:   - 6/13/2022 CT C/A/P:   COMPARISON:   CT abdomen pelvis 04/13/2022, CT chest abdomen pelvis 02/01/2022, CT chest abdomen pelvis 10/25/2021   FINDINGS:   Decreased sensitivity for detecting solid organ abnormalities without intravenous contrast.   LUNG BASES & MEDIASTINUM (limited): Unremarkable   HEPATOBILIARY: Borderline hepatomegaly with decreased parenchymal attenuation suggesting hepatic steatosis. No focal hepatic lesions.No biliary ductal dilatation.Gallbladder is contracted.   SPLEEN: No splenomegaly.   PANCREAS: No focal masses or ductal dilatation.   ADRENALS: No adrenal nodules.   KIDNEYS/URETERS: Stable 1.2 cm hypodensity in the upper pole cortex with fluid-level attenuation, likely simple cyst. No hydronephrosis, stones or solid mass lesions.   PELVIC ORGANS/BLADDER: Bladder is minimally distended without wall thickening. Ureters are unremarkable. Uterus contains a stable-appearing calcified anterior fundal leiomyoma and is otherwise unremarkable. No adnexal masses.   PERITONEUM / RETROPERITONEUM: No free air. No free  fluid.   LYMPH NODES: No lymphadenopathy.   VESSELS: Unremarkable.   GI TRACT: Several high density foci within the gastric lumen, likely ingested material. No distention or wall thickening. Normal appendix.   BONES AND SOFT TISSUES: Degenerative changes of the spine. Stable-appearing lucent lesion along the right iliac wing. Stable lucent/sclerotic foci throughout several vertebral bodies. No definitive new lucent/sclerotic lesions. No fractures.   Impression:   1. In this patient with history of invasive ductal carcinoma of the right breast, there is no CT evidence of new metastatic disease. Persistent lucent/sclerotic lesions throughout the pelvis and spine, without interval change, as above.   2. Decreased hepatic parenchymal attenuation suggestive of hepatic steatosis. No focal hepatic lesions on this noncontrast examination.   3. Additional findings as above.       In regards to breast cancer:  Currently on Arimidex and Ibrance (dose adjusted due to anemia to 100 mg)   Zoladex and Xgeva monthly   Reports mild pain at spine- states ibuprofen helps best and does not like narcotics as make her sleepy  Discussed need to monitor renal function with NSAIDs- states taking 1 x per week  No new pain  Recent cruise last week and walked more than usual which exacerbated pain    Reason For Follow Up:   1. Stage IV ([id number]) invasive ductal carcinoma of right breast, upper inner quadrant, ER %, SD neg, Her2 neg, Grade 3, multifocal with one lesion appearing more aggressive (Ki67 80%), large LN +, bone mets.   Ibrance dose adjusted with cytopenias   Oncologic History:   Presentation   - 9/11/19 - Screening mammogram showed multiple right breast masses lower inner quadrant   - 9/13/19 - Diagnostic mammogram and US showed a right breast, 3:00 position mass, 2 CFN measuring 17 mm x 16 mm x 14 mm. There 2 smaller adjacent masses towards the nipple   - 9/19/19 - Biopsy -   A. Right breast subareolar: Grade 3 IDC, estrogen  "receptor 80%, progesterone receptor 0%, Her2 dago neg, Ki67 80%.   B. Right breast mass 3:00: Grade 3 IDC with papillary features, estrogen receptor 100%, progesterone receptor 0%, Her2 dago 1+, Ki67 30%.   - 9/26/19: US right axilla with abnormal lymphadenopathy measuring 4.3 x 2.8 cm with biopsy + for metastatic breast cancer.   Surgery consultation with Dr Ferris on 9/25/19   - 9/25/19 - Genetics Genetics [facility] BRACAnalysis pending; VUS pending   Medical oncology consultation on 10/7/19   * 10/8/19 - CT C/A/P with several lytic lesions in thoracic and lumbar spine, iliac bones, left scapula in addition to 3 x 4.5 cm right axillary node and 2.1 cm right breast mass. Bone scan negative.   * 10/31/19 - started Ibrance, Arimidex, Zoladex; plan for Xgeva.   * 11/12/19 STRATA without targetable mutations.   * 12/16/19 - Bone biopsy + for met disease (initially read as negative, but we treated as metastatic disease given high suspicion).   * MRI brain: "Partially empty sella. Question bilateral globe proptosis. Otherwise unremarkable MRI brain as detailed above specifically without evidence for intracranial enhancing lesion to suggest metastatic disease."   * 4/22/2020 PET - disease improvement. 8/2020 PET with disease improvement.   * 9/1/20 - Palliative RT to L1-L4   Of note, * Xgeva monthly - we had been waiting on dental clearance, but she has been unable to get into dentistry and is accepting of risks. Has no active dental disease.   PET scan 4/22/2021:   FINDINGS:   Quality of the study: Mildly degraded due to patient's large body habitus and skin abutting the gantry.   In the head and neck, there are no hypermetabolic lesions worrisome for malignancy. There are no hypermetabolic mucosal lesions, and there are no pathologically enlarged or hypermetabolic lymph nodes.   In the chest, there are no hypermetabolic lesions worrisome for malignancy.   Stable CT appearance of a level 1 right axillary lymph node " measuring 1.3 cm in short axis with normal background radiotracer uptake. Previously with SUV max of 2.1.   There are no concerning pulmonary nodules or masses, and there are no pathologically enlarged or hypermetabolic lymph nodes.   In the abdomen and pelvis, there is physiologic tracer distribution within the abdominal organs and excretion into the genitourinary system.   In the bones, there are stable lytic lesions throughout the lumbar spine and pelvis and additional stable sclerotic lesions in the sternum and T3 vertebral body. No associated focal abnormal increased radiotracer uptake. Findings likely represent treated disease.   Impression:   Interval decreased uptake within a prominent right axillary lymph node, now with normal background uptake. No new focal abnormal uptake.   Stable lytic and sclerotic lesions without focal abnormal uptake. Findings are compatible with treated metastasis.   8/25/2021 MRI brain   Impression:   No significant change from prior specifically without evidence for enhancing lesion to suggest intracranial metastatic disease.   Continued partially empty sella, intracranial hypertension to be included in differential in the appropriate clinical setting. Clinical correlation and further evaluation as warranted.   10/14/2021 MRI thoracic/lumbar spine:   Impression:   Patient history of metastatic breast cancer.   Bone lesions at T10, L2, L3, and right iliac wing, as above, concerning for metastatic disease. No pathologic fracture, soft tissue component, or epidural extension identified.   10/25/2021 CT chest/abd/pelvis:   Impression:   1. Stable metastasis of the spine   No evidence of intra-abdominal or intrathoracic metastatic disease   2. Stable prominent right axillary lymph node.   3. Additional findings are detailed above.   Currently on Arimidex and Ibrance   Zoladex and Xgeva monthly   - 2/2/2022 Bone scan:   FINDINGS:   No focal abnormal uptake suggestive of osseous  metastases. There is diffusely increased uptake in the calvarium in keeping with hyperostosis. There is degenerative type uptake most prominent in the bilateral shoulders and knees. The focal uptake previously described in the distal right femur is not visualized. There is otherwise physiologic distribution of the radiopharmaceutical throughout the skeleton.   There is normal uptake in the genitourinary system and soft tissues.   Impression:   There is no scintigraphic evidence of osteoblastic metastatic disease.   Nonspecific uptake in the distal right femur has resolved.   Diffuse cranial hyperostosis and other findings as above.   - 2/1/2022 CT C/A/P:   FINDINGS:   CHEST   Support tubes and lines: None.   Aorta: Normal caliber.   Heart: Normal size..   Coronary arteries: No calcifications.   Pericardium: Normal. No effusion, thickening, or calcification.   Central pulmonary arteries: Normal caliber.   Base of neck/thyroid: Normal.   Lymph nodes: No supraclavicular, axillary, internal mammary, mediastinal or hilar lymphadenopathy.   Esophagus: Normal.   Pleura: No effusion, thickening or calcification.   Body wall: Unremarkable.   Airways: Normal.   Lungs: Clear without focal or diffuse abnormality.   Bones: Sclerotic lesions are seen throughout the spine as well as in the sternum, not substantially changed from 10/25/2021   ABDOMEN/PELVIS   Liver: Unremarkable   Gallbladder/bile ducts: Unremarkable. No intra or extrahepatic biliary ductal dilatation   Pancreas: Unremarkable.   Spleen: Unremarkable.   Adrenals: Unremarkable.   Kidneys: Simple cyst in the right kidney is unchanged   Lymph nodes: No abdominal or pelvic lymphadenopathy.   Bowel and mesentery: Unremarkable.   Abdominal aorta: Unremarkable.   Inferior vena cava: Unremarkable.   Free fluid or free air: None.   Pelvis: Unremarkable.   Urinary bladder: Unremarkable.   Body wall: Unremarkable.   Bones: Sclerotic lesions seen in the spine are unchanged.    Impression:   1. No evidence of new recurrent or metastatic disease.   2. Sclerotic lesions seen in the spine and sternum, unchanged when compared to 10/25/2021.     - more recent scans as above    FH:   Paternal side:   1st cousin- ovarian cancer (diagnosed at age 26) and her mother has thyroid cancer   Maternal grandmother- some type of likely metastatic GI cancer    Review of Systems   Constitutional:  Negative for activity change, appetite change, chills, fatigue (notes pretty good), fever and unexpected weight change.   HENT:  Negative for mouth sores, rhinorrhea and trouble swallowing.    Eyes:  Negative for visual disturbance.   Respiratory:  Positive for shortness of breath (better now). Negative for cough and wheezing.    Cardiovascular:  Negative for chest pain, palpitations and leg swelling.   Gastrointestinal:  Positive for nausea (mild on medication, controlled on zofran). Negative for abdominal distention, abdominal pain, blood in stool, change in bowel habit, constipation, diarrhea, vomiting and change in bowel habit.   Genitourinary:  Positive for frequency (with elevated BG and Lasix, though better). Negative for difficulty urinating, dysuria and urgency.   Musculoskeletal:  Positive for back pain (chronic). Negative for arthralgias, joint swelling and myalgias.   Integumentary:  Negative for rash.   Neurological:  Negative for dizziness, weakness, numbness, headaches, coordination difficulties and coordination difficulties.   Hematological:  Negative for adenopathy. Does not bruise/bleed easily.   Psychiatric/Behavioral:  Negative for dysphoric mood and sleep disturbance. The patient is not nervous/anxious.        Objective:      Physical Exam  Vitals and nursing note reviewed.   Constitutional:       General: She is not in acute distress.     Appearance: Normal appearance. She is well-developed. She is obese. She is not ill-appearing.      Comments: Presents with her daughter  Very pleasant      HENT:      Head: Normocephalic and atraumatic.   Eyes:      General: Lids are normal. No scleral icterus.     Extraocular Movements: Extraocular movements intact.      Conjunctiva/sclera: Conjunctivae normal.      Pupils: Pupils are equal, round, and reactive to light.   Neck:      Thyroid: No thyromegaly.      Vascular: No JVD.      Trachea: Trachea normal.   Cardiovascular:      Rate and Rhythm: Normal rate and regular rhythm.      Heart sounds: Normal heart sounds. No murmur heard.    No friction rub. No gallop.      Comments: Difficult to assess tones.   Pulmonary:      Effort: Pulmonary effort is normal. No respiratory distress.      Breath sounds: Normal breath sounds. No wheezing, rhonchi or rales.      Comments: distant  Abdominal:      General: Bowel sounds are normal. There is no distension.      Palpations: Abdomen is soft. There is no mass.      Tenderness: There is no abdominal tenderness. There is no guarding or rebound.      Comments: No organomegaly however difficult to assess.    Hardness noted periumbilical area   Musculoskeletal:         General: Normal range of motion.      Cervical back: Normal range of motion and neck supple.      Comments: No spinal or paraspinal tenderness.    Lymphadenopathy:      Head:      Right side of head: No submental or submandibular adenopathy.      Left side of head: No submental or submandibular adenopathy.      Cervical: No cervical adenopathy.      Upper Body:      Right upper body: No supraclavicular or axillary adenopathy.      Left upper body: No supraclavicular or axillary adenopathy.   Skin:     General: Skin is warm and dry.      Capillary Refill: Capillary refill takes less than 2 seconds.      Coloration: Skin is not jaundiced or pale.      Findings: No bruising or rash.      Nails: There is no clubbing.   Neurological:      Mental Status: She is alert and oriented to person, place, and time.      Sensory: No sensory deficit.      Motor: No weakness.       Coordination: Coordination normal.      Gait: Gait normal.   Psychiatric:         Mood and Affect: Mood normal.         Speech: Speech normal.         Behavior: Behavior normal.         Thought Content: Thought content normal.         Judgment: Judgment normal.       Labs- reviewed  Imaging- reviewed  Assessment:       Problem List Items Addressed This Visit       Type 2 diabetes mellitus with hyperglycemia, without long-term current use of insulin (Chronic)    Pathological fracture of lumbar vertebra with routine healing    Malignant neoplasm of upper-inner quadrant of right breast in female, estrogen receptor positive - Primary    Hypertension    Chronic combined systolic and diastolic heart failure    Cancer associated pain       Plan:     T2DM- needs diabetic follow up asap    Metastatic breast cancer-- continue Arimidex, Ibrance, Zoladex, and Xgeva  Tolerating well with stable disease    HTN- better control    CHF- will massage and arrange for follow up with Dr. Olivares; repeat ECHO ordered    Neoplasm related pain- stable disease, overall well controlled    Route Chart for Scheduling    Med Onc Chart Routing  Urgent    Follow up with physician . 12/19/2022 with cbc, cmp EP and chemo injections-- NOTE needs endocrine follow up asap order placed by ED, needs ECHO and follow up with Dr. Elise singh as well- thanks   Follow up with [facility]    Infusion scheduling note    Injection scheduling note    Labs    Imaging    Pharmacy appointment    Other referrals        Treatment Plan Information   OP ANASTROZOLE PALBOCICLIB Q4W   Jessica Mendez MD   Upcoming Treatment Dates - OP ANASTROZOLE PALBOCICLIB Q4W    No upcoming days in selected categories.    Supportive Plan Information  OP BREAST GOSERELIN & DENOSUMAB Q4W   Jessica Mendez MD   Upcoming Treatment Dates - OP BREAST GOSERELIN & DENOSUMAB Q4W    10/21/2022       Chemotherapy       denosumab (XGEVA) solution 120 mg       goserelin (ZOLADEX) injection 3.6 mg

## 2022-10-26 DIAGNOSIS — E11.65 TYPE 2 DIABETES MELLITUS WITH HYPERGLYCEMIA, WITHOUT LONG-TERM CURRENT USE OF INSULIN: Primary | ICD-10-CM

## 2022-10-26 NOTE — PROGRESS NOTES
Consult Note  Palliative Care    Reason for Consult: symptom management and ACP      ASSESSMENT/PLAN:     Plan/Recommendations:  Diagnoses and all orders for this visit:    Malignant neoplasm of upper-inner quadrant of right breast in female, estrogen receptor positive with bone mets  - patient followed by Dr. Lei and NP Delphine  - currently on disease-directed therapy    Encounter for palliative care/Advanced care planning  - patient decisional  - patient by herself in clinic today  - no ACP documents uploaded into EMR  - philosophy of Palliative Medicine reviewed with patient and family at first visit  - new patient folder given to and reviewed with patient and family at first visit  - goals: life prolonging  - ACP booklet given to and reviewed with patient and family at first visit including HCPOA and living will  - patient verbally stated that she would like her sister, Janel, to be her surrogate decision maker.   - reviewed ACP booklet again today along with [place]POST form. Patient has been thinking on these topics more with ongoing symptoms of likely congestive heart failure in setting of malignancy.   - did not specifically discuss code status at this time  - will follow up at future appointments    Other form of dyspnea  - patient reporting severe dyspnea, rated 10/10 on ESAS  - dyspnea worsens with exertion   - patient reports good days and bad days, and she was evaluated in ED for dyspnea a few days ago. She was treated with IV lasix and had > 1.5 L output.   - patient has been increasing water intake due to high glucose levels. Concern about patient's dyspnea stemming from CHF; oncology has ordered ECHO and looped in cardiology.   - patient spoke about concern for her CHF as her mother had heart disease prior to passing away.   - dyspnea has limited desired daily activities    - referred to PT at previous visit  - tips for shortness of breath in new patient folder for patient to  review    Cancer-related pain  - patient reporting her pain is mainly in her lower back and all along her right side, describes pain and constant, dull  - pain rated between 8/10   - patient reports that the Norco, oxycodone, and MS Contin all make her very sleepy  - she does not use any pain medication consistently due to side effects  - reports stretching also helps  - continue lidocaine patches as well   - continue ibuprofen 800 mg BID with some relief.  - patient uses tramadol but only if pain is very severe. She reports not needing a refill at this time  - patient also describes a vibration pain/muscle spasms in her back. She has tried tizanidine, but this made her feel sleepy.  - patient previously prescribed Robaxin 500 mg qid prn with mild relief; will rotate patient back to Robaxin but increase to 750 mg qid prn.  - opioid safety sheet in new patient folder for patient to review  - will continue to monitor    Nausea/Anorexia  - patient reports nausea and anorexia are tolerable  - patient has noted continued change in her appetite and nausea with diabetes medication, especially Trulicity  - patient has had increased fluid intake due to hyperglycemia  - patient using anti-emetics on prn basis with good relief  - continue zofran PRN  - continue phenergan prn  - patient already following with nutrition  - patient has already been referred to Endocrinology for better management of diabetes.   - patient denies any need for refills of anti-emetic medications at this time  - will continue to monitor    Adjustment disorder with mixed anxiety and depressed mood  - patient reports continued depression and anxiety  - patient reports good social support from 2 ex-husbands, siblings, daughters & friends  - additionally reports using journaling, music and [place] to cope with feelings of sadness; her 2 kittens also offer comfort   - reports increaesed Effexor is helping, prior to increase she was unable to  discuss loss of parents without crying, this is now subsiding  - patient spoke of her recent cruise with her family; she states this was a very good time. She was able to enjoy herself. The family is discussing other trips that they might be able to do together  - patient states she does think of her parents as the anniversary of her mother's death is coming up as well as the holidays.   - patient spoke about concern for her CHF as her mother had heart disease prior to passing away.   - emotional support provided throughout visit  - will continue to monitor closely     Neoplastic (malignant) related fatigue/Insomnia  - patient reports improvement in insomnia & fatigue  - she is able to fall asleep and stay asleep when desired  - she is back into a routine since she had to transport her daughters in the morning  - she will experience some expected fatigue the day after staying up late, she limits naps to prevent further disruptions to sleep rhythm    - tips for better sleep in new patient folder for patient to review  - no pharmacologic intervention at this time  - will continue to monitor    Constipation  - patient reports regular, daily BM with the help of senna  - discussed using bowel regimen consistently   - adequate fluid intake   - constipation tip sheet in new patient folder for patient to review  - will continue close monitoring    Understanding of illness/Prognosis: patient has good understanding of disease at this time.     Goals of care: life-prolonging    Follow up: ~ 8-10 weeks    Patient's encounter and above plan of care discussed with patient's oncology-psychologist in person after visit    SUBJECTIVE:     History of Present Illness:  Patient is a 47 y.o. year old female with anemia, anxiety, cardiomyopathy, CHF, HTN, HLD, and metastatic breast cancer presents to Palliative Medicine for symptom management and ACP. Patient was diagnosed with stage IV breast cancer in September 2019. She was started on  Ibrance and Arimidex, and she continues on this regimen today. Please see oncology notes for full details regarding her oncologic treatment course.     10/24/2022:  LA  reviewed and summarized:  07/20/2022 Tramadol 50 mg Disp: 120 for 30 days    Today patient states she is doing okay today. She continues to experience pain in her back and spasms. She uses ibuprofen about twice a day with good relief. Patient used tizanidine for muscle relaxation, which made her feel very sleepy. Patient uses tramadol if the pain is very severe. She has noted worsening dyspnea with minimal exertion. She was seen in ED recently and given IV lasix. She had output of > 1.5 L. Patient having continued anxiety and depression. It is coming up on the anniversary of her mother's passing as well as the holidays, which is making the anxiety/depression worse. Patient continues to have poor appetite. Her blood glucose levels have been elevated, which is causing her to drink more water. Patient feeling off with the diabetic medication, and this is affecting her appetite as well. Patient sleeping okay today. She wants to discuss ACP documents in more detail today.     07/20/2022:  LA  reviewed and summarized:  05/03/2022 Lorazepam 1 mg disp: 30 for 15 days    Today patient states she is doing well overall. Her breathing has started to improve, and she is able to walk further without resting. Patient does still have dyspnea with exertion. Patient reporting continued pain as well as new vibrating/muscle spasm in her back. Patient states this is not all the time, but it can stop her in her tracks. Patient has been able to process her grief, and she is doing better emotionally. Patient discussed planning a trip with her siblings in October for her 47th birthday. She is even considering doing crafts with her cricket for the trip.     03/23/2022:  LA  reviewed and summarized:  No new prescriptions reported    Patient seen by VALERIE Lucas. Today  "patient's primary complaint is dyspnea with exertion; on a bad day dyspnea is significantly limiting of desired activities. Patient reports recent visit to cardiologist with repeat echo scheduled for May. Her pain is reasonably controlled, only occasionally using norco. She states she is adjusting to "a new normal". She reports recent improvement of her depression is likely attributable to increase in Effexor, can now talk about recent loss of her parents without crying. She continues to see onc-psych. She is pleased with stability of recent scans. She reports all other symptoms are improved or tolerable.     01/12/2022:  LA  reviewed and summarized:  08/23/2021 Oxycodone 5 mg disp: 45 for 11 days  08/23/2021 MS Contin 15 mg Disp: 60 for 20 days    Today patient states overall she is doing okay. Patient continues to have severe back pain that limits her mobility. All of the pain medications that she has tried has significant side effect of sleepiness. Patient is open to meeting with pain management for intervention. Patient is also open to meeting with PT to help as well. Patient found the holidays difficult for her, but she was able to visit with her family. She did find enjoyment during that time as well. Patient has been following with Dr. Odom. Patient was having some difficulty with sleeping, but she is has been better with a routine this past week.     11/02/2021:  LA  reviewed and summarized:  08/23/2021 Oxycodone 5 mg disp: 45 for 11 days  08/23/2021 MS Contin 15 mg Disp: 60 for 20 days    Patient presents to clinic today with her sister. Patient having moderate to severe pain in her back and right side. She has been following with radiation oncology as well. Patient uses both MS Contin and oxycodone intermittently. Patient also having some dyspnea. She is feeling very anxious and depressed recently. Patient also experiencing severe nausea and poor appetite. She has been using her anti-emetics " intermittently. Patient also having trouble sleeping and feels very fatigued throughout the day. She is also intermittently constipated. Patient open to learning about ACP.    Past Medical History:   Diagnosis Date    Abnormal Pap smear     pt states 13yrs ago colpo was done    Anemia     Anxiety     Cancer     Cardiomyopathy     CHF (congestive heart failure)     Fibroid     Hx of psychiatric care     Hyperlipidemia     Hypertension     Psychiatric problem     Sleep difficulties     Therapy      Past Surgical History:   Procedure Laterality Date     SECTION      TONSILLECTOMY      TUBAL LIGATION      UTERINE FIBROID EMBOLIZATION       Family History   Problem Relation Age of Onset    Arthritis Mother     Diabetes Mother     Heart disease Mother         CHF, CAD , 2 stents    Hypertension Mother     Hyperlipidemia Mother     Heart failure Mother     No Known Problems Sister     Prostate cancer Father     Hypertension Brother     No Known Problems Daughter     Asthma Daughter     No Known Problems Maternal Grandmother     Cancer Maternal Grandfather 79        abdominal origin?    No Known Problems Paternal Grandmother     No Known Problems Paternal Grandfather     Thyroid cancer Other         type? dx age?    Cancer Paternal Cousin         ovarian @ ?29 & cervical @ ?29    Endometriosis Paternal Cousin     Breast cancer Neg Hx     Ovarian cancer Neg Hx     Colon polyps Neg Hx      Review of patient's allergies indicates:   Allergen Reactions    Keflex [cephalexin] Itching       Medications:    Current Outpatient Medications:     (Magic mouthwash) 1:1:1 Benadryl 12.5mg/5ml liq, aluminum & magnesium hydroxide-simehticone (Maalox), LIDOcaine viscous 2%, Swish and spit 10 mLs every 4 (four) hours as needed. for mouth sores, Disp: 360 mL, Rfl: 0    amLODIPine (NORVASC) 5 MG tablet, TAKE 1 TABLET(5 MG) BY MOUTH EVERY DAY, Disp: 30 tablet, Rfl: 11    anastrozole (ARIMIDEX) 1 mg Tab, TAKE 1 TABLET(1 MG) BY MOUTH  EVERY DAY, Disp: 90 tablet, Rfl: 3    atorvastatin (LIPITOR) 40 MG tablet, TAKE 1 TABLET(40 MG) BY MOUTH EVERY DAY, Disp: 90 tablet, Rfl: 3    blood sugar diagnostic Strp, To check BG 4 times daily, to use with insurance preferred meter, Disp: 200 each, Rfl: 5    blood-glucose meter kit, To check BG 4 times daily, to use with insurance preferred meter, Disp: 1 each, Rfl: 0    carvediloL (COREG) 25 MG tablet, TAKE 1 TABLET(25 MG) BY MOUTH TWICE DAILY, Disp: 180 tablet, Rfl: 3    ciclopirox (PENLAC) 8 % Soln, Apply topically nightly., Disp: 6.6 mL, Rfl: 3    duke's soln (benadryl 30 mL, mylanta 30 mL, LIDOcaine 30 mL, nystatin 30 mL) 120mL, Take 10 mLs by mouth 4 (four) times daily., Disp: 120 mL, Rfl: 3    duke's soln (benadryl 30 mL, mylanta 30 mL, LIDOcaine 30 mL, nystatin 30 mL) 120mL, TAKE 10MLS BY MOUTH FOUR TIMES DAILY, Disp: , Rfl:     dulaglutide (TRULICITY) 0.75 mg/0.5 mL pen injector, Inject 0.75 mg into the skin every 7 days., Disp: 4 pen, Rfl: 1    ergocalciferol (ERGOCALCIFEROL) 50,000 unit Cap, TAKE 1 CAPSULE BY MOUTH EVERY 7 DAYS, Disp: 12 capsule, Rfl: 0    ferrous sulfate 324 mg (65 mg iron) TbEC, TAKE 1 TABLET BY MOUTH ON EVERY MONDAY, WEDNESDAY, AND FRIDAY, Disp: 30 tablet, Rfl: 0    furosemide (LASIX) 20 MG tablet, Take 1 tablet (20 mg total) by mouth 2 (two) times daily., Disp: 60 tablet, Rfl: 11    glipiZIDE (GLUCOTROL) 10 MG tablet, Take 1 tablet (10 mg total) by mouth 2 (two) times daily before meals., Disp: 180 tablet, Rfl: 3    goserelin (ZOLADEX) 3.6 mg injection, Inject 3.6 mg into the skin every 28 days., Disp: , Rfl:     ibuprofen (ADVIL,MOTRIN) 800 MG tablet, Take 1 tablet (800 mg total) by mouth 2 (two) times daily as needed for Pain., Disp: 45 tablet, Rfl: 2    lancets Misc, To check BG 4 times daily, to use with insurance preferred meter, Disp: 200 each, Rfl: 5    LIDOcaine (LIDODERM) 5 %, Place 2 patches onto the skin once daily. Remove & Discard patch within 12 hours or as directed  by MD, Disp: 60 patch, Rfl: 0    LORazepam (ATIVAN) 1 MG tablet, Take 1 tablet (1 mg total) by mouth every 12 (twelve) hours as needed for Anxiety., Disp: 30 tablet, Rfl: 0    methocarbamoL (ROBAXIN) 750 MG Tab, Take 1 tablet (750 mg total) by mouth 4 (four) times daily. for 10 days, Disp: 40 tablet, Rfl: 0    morphine (MS CONTIN) 15 MG 12 hr tablet, Take 1 tablet (15 mg total) by mouth every 8 (eight) hours as needed for Pain. (Patient not taking: Reported on 10/24/2022), Disp: 60 tablet, Rfl: 0    ondansetron (ZOFRAN-ODT) 4 MG TbDL, Take 1 tablet (4 mg total) by mouth 2 (two) times daily., Disp: 30 tablet, Rfl: 0    ondansetron (ZOFRAN-ODT) 8 MG TbDL, DISSOLVE 1 TABLET(8 MG) ON THE TONGUE EVERY 8 HOURS AS NEEDED FOR NAUSEA, Disp: 30 tablet, Rfl: 2    palbociclib (IBRANCE) 100 mg Cap, Take 1 capsule (100 mg) by mouth once daily for 21 days, followed by 7 days off., Disp: 21 capsule, Rfl: 3    potassium chloride (KLOR-CON) 10 MEQ TbSR, TAKE 1 TABLET(10 MEQ) BY MOUTH EVERY DAY, Disp: 30 tablet, Rfl: 2    promethazine (PHENERGAN) 25 MG tablet, Take 1 tablet (25 mg total) by mouth every 6 (six) hours as needed for Nausea., Disp: 30 tablet, Rfl: 1    sacubitriL-valsartan (ENTRESTO)  mg per tablet, Take 1 tablet by mouth 2 (two) times daily., Disp: 60 tablet, Rfl: 11    sennosides (SENNA-C ORAL), Take 2 tablets by mouth daily as needed., Disp: , Rfl:     traMADoL (ULTRAM) 50 mg tablet, Take 1 tablet (50 mg total) by mouth every 6 (six) hours as needed for Pain., Disp: 120 tablet, Rfl: 0    venlafaxine (EFFEXOR-XR) 150 MG Cp24, TAKE 1 CAPSULE(150 MG) BY MOUTH EVERY DAY, Disp: 5 capsule, Rfl: 0    venlafaxine (EFFEXOR-XR) 75 MG 24 hr capsule, TAKE ONE CAPSULE BY MOUTH DAILY ALONG WITH 150 MG FOR A TOTAL DAILY DOSE  MG, Disp: 5 capsule, Rfl: 0    OBJECTIVE:       ROS:  Review of Systems   Constitutional:  Positive for activity change (2/2 dyspnea ), appetite change and fatigue.   HENT: Negative.     Eyes:  Negative.    Respiratory:  Positive for shortness of breath (worsening).    Cardiovascular: Negative.  Negative for leg swelling.   Gastrointestinal:  Positive for constipation and nausea.   Genitourinary: Negative.    Musculoskeletal:  Positive for arthralgias, back pain and myalgias.   Skin: Negative.    Neurological: Negative.  Negative for syncope and light-headedness.   Psychiatric/Behavioral:  Positive for dysphoric mood and sleep disturbance. The patient is nervous/anxious.    All other systems reviewed and are negative.    Review of Symptoms      Symptom Assessment (ESAS 0-10 Scale)  Pain:  8  Dyspnea:  10  Anxiety:  7  Nausea:  5  Depression:  9  Anorexia:  6  Fatigue:  8  Insomnia:  5  Restlessness:  0  Agitation:  0     CAM / Delirium:  Negative  Constipation:  Positive  Diarrhea:  Negative    Anxiety:  Is nervous/anxious  Constipation:  Constipation    Bowel Management Plan (BMP):  Yes      Pain Assessment:  OME in 24 hours:  0  Location(s): back    Back       Location: lower        Quality: Aching and dull        Quantity: 8/10 in intensity        Chronicity: Onset 1 (greater than) year(s) ago, unchanged        Aggravating Factors: Activity        Alleviating Factors: Opiates and NSAIDs       Associated Symptoms: None    Modified Abigail Scale:  3 (with exertion )    ECOG Performance Status ndGndrndanddndend:nd nd2nd Living Arrangements:  Lives with family    Psychosocial/Cultural: Patient lives with her two adult daughters (18 and 20 years old)    Spiritual:  F - Sandra and Belief:  Alcides  I - Importance:  High  C - Community:  Prays regularly  A - Address in Care:   services offered but declined. Needs met at this time    Advance Care Planning   Advance Directives:   Living Will: No    LaPOST: No    Do Not Resuscitate Status: No    Medical Power of : No        Oral Declaration: Yes   Witnesses:  Ella James LCSW   Agent's Name:  Janel (her sister)    Decision Making:  Patient answered  questions  Goals of Care: The patient endorses that what is most important right now is to focus on improvement in condition but with limits to invasive therapies    Accordingly, we have decided that the best plan to meet the patient's goals includes continuing with treatment        Physical Exam:   Vitals: Pulse: 79 (10/24/22 1116)  BP: (!) 150/86 (10/24/22 1116)    Physical Exam  Vitals reviewed.   Constitutional:       General: She is not in acute distress.     Appearance: She is obese. She is not diaphoretic.   HENT:      Head: Normocephalic and atraumatic.      Right Ear: External ear normal.      Left Ear: External ear normal.   Eyes:      General: No scleral icterus.        Right eye: No discharge.         Left eye: No discharge.   Neck:      Comments: Trachea midline  Pulmonary:      Effort: Pulmonary effort is normal. No respiratory distress.   Abdominal:      Palpations: Abdomen is soft. There is no mass.   Musculoskeletal:         General: No signs of injury.      Cervical back: Normal range of motion.      Right lower leg: No edema.      Left lower leg: No edema.   Skin:     Coloration: Skin is not jaundiced.      Findings: No rash.   Neurological:      Mental Status: She is alert and oriented to person, place, and time.      Gait: Gait normal.   Psychiatric:         Behavior: Behavior normal.         Thought Content: Thought content normal.         Judgment: Judgment normal.       Labs:  CBC:   WBC   Date Value Ref Range Status   10/21/2022 6.60 3.90 - 12.70 K/uL Final     Hemoglobin   Date Value Ref Range Status   10/21/2022 9.8 (L) 12.0 - 16.0 g/dL Final     Hematocrit   Date Value Ref Range Status   10/21/2022 31.3 (L) 37.0 - 48.5 % Final     MCV   Date Value Ref Range Status   10/21/2022 86 82 - 98 fL Final     Platelets   Date Value Ref Range Status   10/21/2022 319 150 - 450 K/uL Final       LFT:   Lab Results   Component Value Date    AST 20 10/21/2022    ALKPHOS 85 10/21/2022    BILITOT 0.3  "10/21/2022       Albumin:   Albumin   Date Value Ref Range Status   10/21/2022 2.9 (L) 3.5 - 5.2 g/dL Final   01/28/2021 3.5 (L) 3.6 - 5.1 g/dL Final     Comment:     For additional information, please refer to   http://education.Dmailer/faq/FVH934 (This link is   being provided for informational/ educational purposes only.)    This test was developed and its analytical performance   characteristics have been determined by [facility]. It has not been cleared or approved by the   [place] Food and Drug Administration. This assay has been validated   pursuant to the CLIA regulations and is used for clinical   purposes.  @ Test Performed By:  [facility]  John Valverde M.D.,   [address] [place] [id number]  CLIA  [id number]       Protein:   Total Protein   Date Value Ref Range Status   10/21/2022 7.1 6.0 - 8.4 g/dL Final       Radiology:I have reviewed all pertinent imaging results/findings within the past 24 hours.     10/10/2022 CT Abdomen: "Patient with breast cancer. Stable lucent/sclerotic lesions throughout the pelvis and spine. No evidence of new metastatic disease. Mild nonspecific subcutaneous fat stranding within the ventral abdominal wall near the periumbilical region.  Finding could represent simple edema or sequela of soft tissue infection/inflammation.  No organized fluid collection or solid mass detected. Right renal cyst. Hepatic steatosis."    16 minutes spent discussing ACP    Signature: Aishwarya Portillo MD                "

## 2022-11-04 ENCOUNTER — TELEPHONE (OUTPATIENT)
Dept: HEMATOLOGY/ONCOLOGY | Facility: CLINIC | Age: 47
End: 2022-11-04
Payer: MEDICARE

## 2022-11-04 NOTE — TELEPHONE ENCOUNTER
----- Message from Dawna Akers NP sent at 11/4/2022 10:35 AM CDT -----  Regarding: FW: CC Chart - Staff Pool - Urgent  Needs ECHO please  ----- Message -----  From: Phyllis Lei MD  Sent: 10/24/2022  10:19 AM CDT  To: Quique CLIFTON Staff  Subject: CC Chart - Staff Pool - Urgent

## 2022-11-07 ENCOUNTER — HOSPITAL ENCOUNTER (OUTPATIENT)
Dept: CARDIOLOGY | Facility: HOSPITAL | Age: 47
Discharge: HOME | End: 2022-11-07
Attending: INTERNAL MEDICINE
Payer: MEDICARE

## 2022-11-07 ENCOUNTER — PATIENT MESSAGE (OUTPATIENT)
Dept: ADMINISTRATIVE | Facility: HOSPITAL | Age: 47
End: 2022-11-07
Payer: MEDICARE

## 2022-11-07 ENCOUNTER — SPECIALTY PHARMACY (OUTPATIENT)
Dept: PHARMACY | Facility: CLINIC | Age: 47
End: 2022-11-07
Payer: MEDICARE

## 2022-11-07 VITALS
BODY MASS INDEX: 50.02 KG/M2 | DIASTOLIC BLOOD PRESSURE: 86 MMHG | HEART RATE: 70 BPM | WEIGHT: 293 LBS | HEIGHT: 64 IN | SYSTOLIC BLOOD PRESSURE: 140 MMHG

## 2022-11-07 DIAGNOSIS — I50.42 CHRONIC COMBINED SYSTOLIC AND DIASTOLIC HEART FAILURE: ICD-10-CM

## 2022-11-07 LAB
ASCENDING AORTA: 2.92 CM
AV INDEX (PROSTH): 0.7
AV MEAN GRADIENT: 6 MMHG
AV PEAK GRADIENT: 10 MMHG
AV VALVE AREA: 2.21 CM2
AV VELOCITY RATIO: 0.65
BSA FOR ECHO PROCEDURE: 2.78 M2
CV ECHO LV RWT: 0.33 CM
DOP CALC AO PEAK VEL: 1.59 M/S
DOP CALC AO VTI: 26.87 CM
DOP CALC LVOT AREA: 3.2 CM2
DOP CALC LVOT DIAMETER: 2.01 CM
DOP CALC LVOT PEAK VEL: 1.03 M/S
DOP CALC LVOT STROKE VOLUME: 59.4 CM3
DOP CALCLVOT PEAK VEL VTI: 18.73 CM
E WAVE DECELERATION TIME: 341.94 MSEC
E/A RATIO: 0.73
E/E' RATIO: 10.62 M/S
ECHO LV POSTERIOR WALL: 1.13 CM (ref 0.6–1.1)
EJECTION FRACTION: 35 %
FRACTIONAL SHORTENING: 20 % (ref 28–44)
INTERVENTRICULAR SEPTUM: 1.13 CM (ref 0.6–1.1)
LA MAJOR: 4.27 CM
LA MINOR: 4.32 CM
LA WIDTH: 3.43 CM
LEFT ATRIUM SIZE: 4.26 CM
LEFT ATRIUM VOLUME INDEX MOD: 12.1 ML/M2
LEFT ATRIUM VOLUME INDEX: 20.8 ML/M2
LEFT ATRIUM VOLUME MOD: 30.95 CM3
LEFT ATRIUM VOLUME: 53.34 CM3
LEFT INTERNAL DIMENSION IN SYSTOLE: 5.49 CM (ref 2.1–4)
LEFT VENTRICLE DIASTOLIC VOLUME INDEX: 95.52 ML/M2
LEFT VENTRICLE DIASTOLIC VOLUME: 244.54 ML
LEFT VENTRICLE MASS INDEX: 142 G/M2
LEFT VENTRICLE SYSTOLIC VOLUME INDEX: 57.2 ML/M2
LEFT VENTRICLE SYSTOLIC VOLUME: 146.54 ML
LEFT VENTRICULAR INTERNAL DIMENSION IN DIASTOLE: 6.87 CM (ref 3.5–6)
LEFT VENTRICULAR MASS: 364.02 G
LV LATERAL E/E' RATIO: 9.86 M/S
LV SEPTAL E/E' RATIO: 11.5 M/S
MV A" WAVE DURATION": 19.12 MSEC
MV PEAK A VEL: 0.94 M/S
MV PEAK E VEL: 0.69 M/S
MV STENOSIS PRESSURE HALF TIME: 99.16 MS
MV VALVE AREA P 1/2 METHOD: 2.22 CM2
PULM VEIN S/D RATIO: 0.75
PV PEAK D VEL: 0.6 M/S
PV PEAK S VEL: 0.45 M/S
RA MAJOR: 3.86 CM
RA WIDTH: 2.73 CM
RIGHT VENTRICULAR END-DIASTOLIC DIMENSION: 2.28 CM
RV TISSUE DOPPLER FREE WALL SYSTOLIC VELOCITY 1 (APICAL 4 CHAMBER VIEW): 15.22 CM/S
SINUS: 2.4 CM
STJ: 2.94 CM
TDI LATERAL: 0.07 M/S
TDI SEPTAL: 0.06 M/S
TDI: 0.07 M/S
TRICUSPID ANNULAR PLANE SYSTOLIC EXCURSION: 2.2 CM

## 2022-11-07 PROCEDURE — 25500020 PHARM REV CODE 255: Performed by: INTERNAL MEDICINE

## 2022-11-07 PROCEDURE — 93306 ECHO (CUPID ONLY): ICD-10-PCS | Mod: 26,,, | Performed by: INTERNAL MEDICINE

## 2022-11-07 PROCEDURE — 93306 TTE W/DOPPLER COMPLETE: CPT | Mod: 26,,, | Performed by: INTERNAL MEDICINE

## 2022-11-07 PROCEDURE — C8929 TTE W OR WO FOL WCON,DOPPLER: HCPCS

## 2022-11-07 RX ADMIN — HUMAN ALBUMIN MICROSPHERES AND PERFLUTREN 0.11 MG: 10; .22 INJECTION, SOLUTION INTRAVENOUS at 01:11

## 2022-11-07 NOTE — TELEPHONE ENCOUNTER
Pt schedule got off track due to Zoladex being given late. Will not start next cycle until 11/21. Pending call to 11/14 to set up refill.    Pt has ED encounter for hyperglycemia. Dr. Quique PICHARDO after discharge and continuing with Ibrance.

## 2022-11-10 ENCOUNTER — OFFICE VISIT (OUTPATIENT)
Dept: CARDIOLOGY | Facility: CLINIC | Age: 47
End: 2022-11-10
Payer: MEDICARE

## 2022-11-10 ENCOUNTER — PATIENT MESSAGE (OUTPATIENT)
Dept: CARDIOLOGY | Facility: CLINIC | Age: 47
End: 2022-11-10

## 2022-11-10 DIAGNOSIS — E11.9 TYPE 2 DIABETES MELLITUS WITHOUT COMPLICATION, WITH LONG-TERM CURRENT USE OF INSULIN: ICD-10-CM

## 2022-11-10 DIAGNOSIS — I10 BENIGN ESSENTIAL HTN: ICD-10-CM

## 2022-11-10 DIAGNOSIS — Z79.4 TYPE 2 DIABETES MELLITUS WITHOUT COMPLICATION, WITH LONG-TERM CURRENT USE OF INSULIN: ICD-10-CM

## 2022-11-10 DIAGNOSIS — I50.42 CHRONIC COMBINED SYSTOLIC AND DIASTOLIC HEART FAILURE: Primary | ICD-10-CM

## 2022-11-10 PROCEDURE — 1159F MED LIST DOCD IN RCRD: CPT | Mod: CPTII,95,, | Performed by: INTERNAL MEDICINE

## 2022-11-10 PROCEDURE — 99214 PR OFFICE/OUTPT VISIT, EST, LEVL IV, 30-39 MIN: ICD-10-PCS | Mod: 95,,, | Performed by: INTERNAL MEDICINE

## 2022-11-10 PROCEDURE — 1160F PR REVIEW ALL MEDS BY PRESCRIBER/CLIN PHARMACIST DOCUMENTED: ICD-10-PCS | Mod: CPTII,95,, | Performed by: INTERNAL MEDICINE

## 2022-11-10 PROCEDURE — 1160F RVW MEDS BY RX/DR IN RCRD: CPT | Mod: CPTII,95,, | Performed by: INTERNAL MEDICINE

## 2022-11-10 PROCEDURE — 99214 OFFICE O/P EST MOD 30 MIN: CPT | Mod: 95,,, | Performed by: INTERNAL MEDICINE

## 2022-11-10 PROCEDURE — 3052F HG A1C>EQUAL 8.0%<EQUAL 9.0%: CPT | Mod: CPTII,95,, | Performed by: INTERNAL MEDICINE

## 2022-11-10 PROCEDURE — 1159F PR MEDICATION LIST DOCUMENTED IN MEDICAL RECORD: ICD-10-PCS | Mod: CPTII,95,, | Performed by: INTERNAL MEDICINE

## 2022-11-10 PROCEDURE — 4010F ACE/ARB THERAPY RXD/TAKEN: CPT | Mod: CPTII,95,, | Performed by: INTERNAL MEDICINE

## 2022-11-10 PROCEDURE — 3052F PR MOST RECENT HEMOGLOBIN A1C LEVEL 8.0 - < 9.0%: ICD-10-PCS | Mod: CPTII,95,, | Performed by: INTERNAL MEDICINE

## 2022-11-10 PROCEDURE — 4010F PR ACE/ARB THEARPY RXD/TAKEN: ICD-10-PCS | Mod: CPTII,95,, | Performed by: INTERNAL MEDICINE

## 2022-11-10 RX ORDER — SPIRONOLACTONE 25 MG/1
25 TABLET ORAL DAILY
Qty: 90 TABLET | Refills: 1 | Status: SHIPPED | OUTPATIENT
Start: 2022-11-10 | End: 2023-09-05

## 2022-11-10 RX ORDER — DAPAGLIFLOZIN 5 MG/1
10 TABLET, FILM COATED ORAL DAILY
Qty: 90 TABLET | Refills: 1 | Status: SHIPPED | OUTPATIENT
Start: 2022-11-10 | End: 2022-12-14

## 2022-11-10 NOTE — PROGRESS NOTES
PCP - Vito Walker MD    VIRTUAL VISIT    Subjective:   Kristopher Ye is a 47 y.o. female who presents for follow-up of heart failure     The patient location is: home  The chief complaint leading to consultation is: f/u cardiomyopathy     Visit type: audiovisual     Face to Face time with patient: 30 min  30 minutes of total time spent on the encounter, which includes face to face time and non-face to face time preparing to see the patient (eg, review of tests), Obtaining and/or reviewing separately obtained history, Documenting clinical information in the electronic or other health record, Independently interpreting results (not separately reported) and communicating results to the patient/family/caregiver, or Care coordination (not separately reported).        Each patient to whom he or she provides medical services by telemedicine is:  (1) informed of the relationship between the physician and patient and the respective role of any other health care provider with respect to management of the patient; and (2) notified that he or she may decline to receive medical services by telemedicine and may withdraw from such care at any time.       PROBLEM LIST:  Metastatic breast cancer , (Arimidex, palbociclib)  Dilated cardiomyopathy (LVEF 30% improved to 52%, down again to 35%)  HTN  HLD  Uncontrolled DM, not on insulin  Morbid obesity     HPI:  Patient is doing regular follow-up visit.  Due to transportation issues she could not come in person.  She reports dyspnea with walking capacity less than a block but this is her baseline for the past 1-1/2 years.  Also reports palpitations and dizziness which is positional.  There has been no history of falls or chest pain.  She also denies leg swelling.  She had her follow-up echocardiogram done 4 days ago which showed a drop in ejection fraction to 35%.  Her GED MT includes high-dose Entresto and Coreg 25 b.i.d..  She takes oral Lasix 20 mg daily.  She had  an emergency department visit about 20 days ago upon direction of her oncologist for high blood sugars.  Her chest x-ray was unremarkable and BNP was also negative on that admit.  She lives in [facility] with her 2 adult daughters.  Her last hemoglobin A1c was 8.8 about 9 months ago.  She is going to see endocrinologist next week for her 1st appointment.    History:     Social History     Tobacco Use    Smoking status: Never    Smokeless tobacco: Never   Substance Use Topics    Alcohol use: Not Currently     Alcohol/week: 0.0 standard drinks     Family History   Problem Relation Age of Onset    Arthritis Mother     Diabetes Mother     Heart disease Mother         CHF, CAD , 2 stents    Hypertension Mother     Hyperlipidemia Mother     Heart failure Mother     No Known Problems Sister     Prostate cancer Father     Hypertension Brother     No Known Problems Daughter     Asthma Daughter     No Known Problems Maternal Grandmother     Cancer Maternal Grandfather 79        abdominal origin?    No Known Problems Paternal Grandmother     No Known Problems Paternal Grandfather     Thyroid cancer Other         type? dx age?    Cancer Paternal Cousin         ovarian @ ?29 & cervical @ ?29    Endometriosis Paternal Cousin     Breast cancer Neg Hx     Ovarian cancer Neg Hx     Colon polyps Neg Hx        Meds:     Review of patient's allergies indicates:   Allergen Reactions    Keflex [cephalexin] Itching       Current Outpatient Medications:     (Magic mouthwash) 1:1:1 Benadryl 12.5mg/5ml liq, aluminum & magnesium hydroxide-simehticone (Maalox), LIDOcaine viscous 2%, Swish and spit 10 mLs every 4 (four) hours as needed. for mouth sores, Disp: 360 mL, Rfl: 0    anastrozole (ARIMIDEX) 1 mg Tab, TAKE 1 TABLET(1 MG) BY MOUTH EVERY DAY, Disp: 90 tablet, Rfl: 3    atorvastatin (LIPITOR) 40 MG tablet, TAKE 1 TABLET(40 MG) BY MOUTH EVERY DAY, Disp: 90 tablet, Rfl: 3    blood sugar diagnostic Strp, To check BG 4 times daily, to use  with insurance preferred meter, Disp: 200 each, Rfl: 5    blood-glucose meter kit, To check BG 4 times daily, to use with insurance preferred meter, Disp: 1 each, Rfl: 0    carvediloL (COREG) 25 MG tablet, TAKE 1 TABLET(25 MG) BY MOUTH TWICE DAILY, Disp: 180 tablet, Rfl: 3    ciclopirox (PENLAC) 8 % Soln, Apply topically nightly., Disp: 6.6 mL, Rfl: 3    dapagliflozin (FARXIGA) 5 mg Tab tablet, Take 2 tablets (10 mg total) by mouth once daily., Disp: 90 tablet, Rfl: 1    duke's soln (benadryl 30 mL, mylanta 30 mL, LIDOcaine 30 mL, nystatin 30 mL) 120mL, Take 10 mLs by mouth 4 (four) times daily., Disp: 120 mL, Rfl: 3    duke's soln (benadryl 30 mL, mylanta 30 mL, LIDOcaine 30 mL, nystatin 30 mL) 120mL, TAKE 10MLS BY MOUTH FOUR TIMES DAILY, Disp: , Rfl:     dulaglutide (TRULICITY) 0.75 mg/0.5 mL pen injector, Inject 0.75 mg into the skin every 7 days., Disp: 4 pen, Rfl: 1    ergocalciferol (ERGOCALCIFEROL) 50,000 unit Cap, TAKE 1 CAPSULE BY MOUTH EVERY 7 DAYS, Disp: 12 capsule, Rfl: 0    ferrous sulfate 324 mg (65 mg iron) TbEC, TAKE 1 TABLET BY MOUTH ON EVERY MONDAY, WEDNESDAY, AND FRIDAY, Disp: 30 tablet, Rfl: 0    furosemide (LASIX) 20 MG tablet, Take 1 tablet (20 mg total) by mouth 2 (two) times daily., Disp: 60 tablet, Rfl: 11    glipiZIDE (GLUCOTROL) 10 MG tablet, Take 1 tablet (10 mg total) by mouth 2 (two) times daily before meals., Disp: 180 tablet, Rfl: 3    goserelin (ZOLADEX) 3.6 mg injection, Inject 3.6 mg into the skin every 28 days., Disp: , Rfl:     ibuprofen (ADVIL,MOTRIN) 800 MG tablet, Take 1 tablet (800 mg total) by mouth 2 (two) times daily as needed for Pain., Disp: 45 tablet, Rfl: 2    lancets Misc, To check BG 4 times daily, to use with insurance preferred meter, Disp: 200 each, Rfl: 5    LIDOcaine (LIDODERM) 5 %, Place 2 patches onto the skin once daily. Remove & Discard patch within 12 hours or as directed by MD, Disp: 60 patch, Rfl: 0    LORazepam (ATIVAN) 1 MG tablet, Take 1 tablet (1 mg  total) by mouth every 12 (twelve) hours as needed for Anxiety., Disp: 30 tablet, Rfl: 0    morphine (MS CONTIN) 15 MG 12 hr tablet, Take 1 tablet (15 mg total) by mouth every 8 (eight) hours as needed for Pain. (Patient not taking: Reported on 10/24/2022), Disp: 60 tablet, Rfl: 0    ondansetron (ZOFRAN-ODT) 4 MG TbDL, Take 1 tablet (4 mg total) by mouth 2 (two) times daily., Disp: 30 tablet, Rfl: 0    ondansetron (ZOFRAN-ODT) 8 MG TbDL, DISSOLVE 1 TABLET(8 MG) ON THE TONGUE EVERY 8 HOURS AS NEEDED FOR NAUSEA, Disp: 30 tablet, Rfl: 2    palbociclib (IBRANCE) 100 mg Cap, Take 1 capsule (100 mg) by mouth once daily for 21 days, followed by 7 days off., Disp: 21 capsule, Rfl: 3    potassium chloride (KLOR-CON) 10 MEQ TbSR, TAKE 1 TABLET(10 MEQ) BY MOUTH EVERY DAY, Disp: 30 tablet, Rfl: 2    promethazine (PHENERGAN) 25 MG tablet, Take 1 tablet (25 mg total) by mouth every 6 (six) hours as needed for Nausea., Disp: 30 tablet, Rfl: 1    sacubitriL-valsartan (ENTRESTO)  mg per tablet, Take 1 tablet by mouth 2 (two) times daily., Disp: 60 tablet, Rfl: 11    sennosides (SENNA-C ORAL), Take 2 tablets by mouth daily as needed., Disp: , Rfl:     spironolactone (ALDACTONE) 25 MG tablet, Take 1 tablet (25 mg total) by mouth once daily., Disp: 90 tablet, Rfl: 1    traMADoL (ULTRAM) 50 mg tablet, Take 1 tablet (50 mg total) by mouth every 6 (six) hours as needed for Pain., Disp: 120 tablet, Rfl: 0    venlafaxine (EFFEXOR-XR) 150 MG Cp24, TAKE 1 CAPSULE(150 MG) BY MOUTH EVERY DAY, Disp: 5 capsule, Rfl: 0    Review of Systems   Constitutional:  Positive for malaise/fatigue.   HENT:  Negative for ear pain.    Eyes:  Negative for photophobia.   Respiratory:  Positive for shortness of breath.    Cardiovascular:  Positive for palpitations. Negative for chest pain and leg swelling.   Gastrointestinal:  Negative for vomiting.   Genitourinary:  Negative for dysuria.   Musculoskeletal:  Negative for myalgias.   Neurological:  Positive  for dizziness.   Psychiatric/Behavioral:  Negative for suicidal ideas.      Objective:   There were no vitals taken for this visit.  Physical Exam  Constitutional:       Comments: Virtual visit - limited exam   Neurological:      General: No focal deficit present.      Mental Status: She is alert and oriented to person, place, and time.   Psychiatric:         Mood and Affect: Mood normal.         Behavior: Behavior normal.     Labs:     Lab Results   Component Value Date     10/21/2022    K 4.0 10/21/2022     10/21/2022    CO2 24 10/21/2022    BUN 8 10/21/2022    CREATININE 0.9 10/21/2022    ANIONGAP 10 10/21/2022     Lab Results   Component Value Date    HGBA1C 8.8 (H) 02/07/2022     Lab Results   Component Value Date    BNP 25 10/21/2022    BNP 33 05/06/2022    BNP 21 03/24/2022       Lab Results   Component Value Date    WBC 6.60 10/21/2022    HGB 9.8 (L) 10/21/2022    HCT 31.3 (L) 10/21/2022     10/21/2022    GRAN 4.7 10/21/2022    GRAN 70.4 10/21/2022     Lab Results   Component Value Date    CHOL 119 (L) 11/03/2021    HDL 42 11/03/2021    LDLCALC 57.8 (L) 11/03/2021    TRIG 96 11/03/2021       Lab Results   Component Value Date     10/21/2022    K 4.0 10/21/2022     10/21/2022    CO2 24 10/21/2022    BUN 8 10/21/2022    CREATININE 0.9 10/21/2022    ANIONGAP 10 10/21/2022     Lab Results   Component Value Date    HGBA1C 8.8 (H) 02/07/2022     Lab Results   Component Value Date    BNP 25 10/21/2022    BNP 33 05/06/2022    BNP 21 03/24/2022       Vital Signs:   There were no vitals taken for this visit.  Lab Results   Component Value Date    WBC 6.60 10/21/2022    HGB 9.8 (L) 10/21/2022    HCT 31.3 (L) 10/21/2022     10/21/2022    GRAN 4.7 10/21/2022    GRAN 70.4 10/21/2022     Lab Results   Component Value Date    CHOL 119 (L) 11/03/2021    HDL 42 11/03/2021    LDLCALC 57.8 (L) 11/03/2021    TRIG 96 11/03/2021        Virtual visit    Assessment & Plan:     1. Chronic  combined systolic and diastolic heart failure    2. Benign essential HTN    3. Type 2 diabetes mellitus without complication, with long-term current use of insulin      HFrEF, non ischemia dilated  -NYHA III (walking capacity <1 block x 1.5 years, also has palpitations and positional dizziness)  -declined ICD in the past and again today  -continue entresto 97/103 bid and coreg 25 bid  -started farxiga 10 qd and aldactone 25 qd today  -takes lasix 20 daily po at home, continue the same  -BMP, Mg with in 1 week    Uncontrolled DM  -going to start seeing endo from next week  -last a1c was 8.8 nine months ago, new a1c ordered     Morbid obesity  -BMI 64.7, counseling done    F/up In-person with Dr Olivares in 4 weeks    Signed:  Zak Rodriguez M.D.  Cardiovascular Fellow  [facility]

## 2022-11-14 ENCOUNTER — PATIENT MESSAGE (OUTPATIENT)
Dept: PSYCHIATRY | Facility: CLINIC | Age: 47
End: 2022-11-14
Payer: MEDICARE

## 2022-11-14 DIAGNOSIS — F33.1 MAJOR DEPRESSIVE DISORDER, RECURRENT EPISODE, MODERATE WITH ANXIOUS DISTRESS: ICD-10-CM

## 2022-11-14 RX ORDER — VENLAFAXINE HYDROCHLORIDE 150 MG/1
150 CAPSULE, EXTENDED RELEASE ORAL DAILY
Qty: 30 CAPSULE | Refills: 0 | Status: SHIPPED | OUTPATIENT
Start: 2022-11-14 | End: 2022-12-15

## 2022-11-14 RX ORDER — VENLAFAXINE HYDROCHLORIDE 75 MG/1
75 CAPSULE, EXTENDED RELEASE ORAL DAILY
Qty: 30 CAPSULE | Refills: 0 | Status: SHIPPED | OUTPATIENT
Start: 2022-11-14 | End: 2022-12-14

## 2022-11-14 NOTE — TELEPHONE ENCOUNTER
Specialty Pharmacy - Refill Coordination    Specialty Medication Orders Linked to Encounter      Flowsheet Row Most Recent Value   Medication #1 palbociclib (IBRANCE) 100 mg Cap (Order#[id number], Rx#[id number])            Refill Questions - Documented Responses      Flowsheet Row Most Recent Value   Patient Availability and HIPAA Verification    Does patient want to proceed with activity? Yes   HIPAA/medical authority confirmed? Yes   Relationship to patient of person spoken to? Self   Refill Screening Questions    Changes to allergies? No   Changes to medications? Yes  [adding on farxiga and spironolactone -- no ddis]   New conditions since last clinic visit? No   Unplanned office visit, urgent care, ED, or hospital admission in the last 4 weeks? No   How does patient/caregiver feel medication is working? Good   Financial problems or insurance changes? No   How many doses of your specialty medications were missed in the last 4 weeks? 0   Would patient like to speak to a pharmacist? No   When does the patient need to receive the medication? 11/21/22   Refill Delivery Questions    How will the patient receive the medication? [facility]   When does the patient need to receive the medication? 11/21/22   Shipping Address Home   Address in [facility] confirmed and updated if neccessary? Yes   Expected Copay ($) 0   Is the patient able to afford the medication copay? Yes   Payment Method zero copay   Days supply of Refill 28   Supplies needed? No supplies needed   Refill activity completed? Yes   Refill activity plan Refill scheduled   Shipment/Pickup Date: 11/17/22            Current Outpatient Medications   Medication Sig    (Magic mouthwash) 1:1:1 Benadryl 12.5mg/5ml liq, aluminum & magnesium hydroxide-simehticone (Maalox), LIDOcaine viscous 2% Swish and spit 10 mLs every 4 (four) hours as needed. for mouth sores    anastrozole (ARIMIDEX) 1 mg Tab TAKE 1 TABLET(1 MG) BY MOUTH EVERY DAY    atorvastatin (LIPITOR) 40  MG tablet TAKE 1 TABLET(40 MG) BY MOUTH EVERY DAY    blood sugar diagnostic Strp To check BG 4 times daily, to use with insurance preferred meter    blood-glucose meter kit To check BG 4 times daily, to use with insurance preferred meter    carvediloL (COREG) 25 MG tablet TAKE 1 TABLET(25 MG) BY MOUTH TWICE DAILY    ciclopirox (PENLAC) 8 % Soln Apply topically nightly.    dapagliflozin (FARXIGA) 5 mg Tab tablet Take 2 tablets (10 mg total) by mouth once daily.    duke's soln (benadryl 30 mL, mylanta 30 mL, LIDOcaine 30 mL, nystatin 30 mL) 120mL Take 10 mLs by mouth 4 (four) times daily.    duke's soln (benadryl 30 mL, mylanta 30 mL, LIDOcaine 30 mL, nystatin 30 mL) 120mL TAKE 10MLS BY MOUTH FOUR TIMES DAILY    dulaglutide (TRULICITY) 0.75 mg/0.5 mL pen injector Inject 0.75 mg into the skin every 7 days.    ergocalciferol (ERGOCALCIFEROL) 50,000 unit Cap TAKE 1 CAPSULE BY MOUTH EVERY 7 DAYS    ferrous sulfate 324 mg (65 mg iron) TbEC TAKE 1 TABLET BY MOUTH ON EVERY MONDAY, WEDNESDAY, AND FRIDAY    furosemide (LASIX) 20 MG tablet Take 1 tablet (20 mg total) by mouth 2 (two) times daily.    glipiZIDE (GLUCOTROL) 10 MG tablet Take 1 tablet (10 mg total) by mouth 2 (two) times daily before meals.    goserelin (ZOLADEX) 3.6 mg injection Inject 3.6 mg into the skin every 28 days.    ibuprofen (ADVIL,MOTRIN) 800 MG tablet Take 1 tablet (800 mg total) by mouth 2 (two) times daily as needed for Pain.    lancets Misc To check BG 4 times daily, to use with insurance preferred meter    LIDOcaine (LIDODERM) 5 % Place 2 patches onto the skin once daily. Remove & Discard patch within 12 hours or as directed by MD    LORazepam (ATIVAN) 1 MG tablet Take 1 tablet (1 mg total) by mouth every 12 (twelve) hours as needed for Anxiety.    morphine (MS CONTIN) 15 MG 12 hr tablet Take 1 tablet (15 mg total) by mouth every 8 (eight) hours as needed for Pain. (Patient not taking: Reported on 10/24/2022)    ondansetron (ZOFRAN-ODT) 4 MG TbDL  Take 1 tablet (4 mg total) by mouth 2 (two) times daily.    ondansetron (ZOFRAN-ODT) 8 MG TbDL DISSOLVE 1 TABLET(8 MG) ON THE TONGUE EVERY 8 HOURS AS NEEDED FOR NAUSEA    palbociclib (IBRANCE) 100 mg Cap Take 1 capsule (100 mg) by mouth once daily for 21 days, followed by 7 days off.    potassium chloride (KLOR-CON) 10 MEQ TbSR TAKE 1 TABLET(10 MEQ) BY MOUTH EVERY DAY    promethazine (PHENERGAN) 25 MG tablet Take 1 tablet (25 mg total) by mouth every 6 (six) hours as needed for Nausea.    sacubitriL-valsartan (ENTRESTO)  mg per tablet Take 1 tablet by mouth 2 (two) times daily.    sennosides (SENNA-C ORAL) Take 2 tablets by mouth daily as needed.    spironolactone (ALDACTONE) 25 MG tablet Take 1 tablet (25 mg total) by mouth once daily.    traMADoL (ULTRAM) 50 mg tablet Take 1 tablet (50 mg total) by mouth every 6 (six) hours as needed for Pain.    venlafaxine (EFFEXOR-XR) 150 MG Cp24 Take 1 capsule (150 mg total) by mouth once daily.    venlafaxine (EFFEXOR-XR) 75 MG 24 hr capsule Take 1 capsule (75 mg total) by mouth once daily.   Last reviewed on 11/10/2022  3:12 PM by Zak Rodriguez MD    Review of patient's allergies indicates:   Allergen Reactions    Keflex [cephalexin] Itching    Last reviewed on  11/14/2022 10:31 AM by Vandana Parra      Tasks added this encounter   12/12/2022 - Refill Call (Auto Added)   Tasks due within next 3 months   2/12/2023 - Clinical - Follow Up Assesement (180 day)     Lynn Ram, PharmD  [facility]  [address] [place] [id number]  Phone: [phone]  Fax: [phone]

## 2022-11-21 ENCOUNTER — INFUSION (OUTPATIENT)
Dept: INFUSION THERAPY | Facility: HOSPITAL | Age: 47
End: 2022-11-21
Attending: INTERNAL MEDICINE
Payer: MEDICARE

## 2022-11-21 ENCOUNTER — TELEPHONE (OUTPATIENT)
Dept: PSYCHIATRY | Facility: CLINIC | Age: 47
End: 2022-11-21
Payer: MEDICARE

## 2022-11-21 ENCOUNTER — LAB VISIT (OUTPATIENT)
Dept: LAB | Facility: HOSPITAL | Age: 47
End: 2022-11-21
Attending: INTERNAL MEDICINE
Payer: MEDICARE

## 2022-11-21 ENCOUNTER — OFFICE VISIT (OUTPATIENT)
Dept: HEMATOLOGY/ONCOLOGY | Facility: CLINIC | Age: 47
End: 2022-11-21
Payer: MEDICARE

## 2022-11-21 VITALS
DIASTOLIC BLOOD PRESSURE: 58 MMHG | HEIGHT: 64 IN | TEMPERATURE: 98 F | HEART RATE: 70 BPM | BODY MASS INDEX: 50.02 KG/M2 | WEIGHT: 293 LBS | SYSTOLIC BLOOD PRESSURE: 112 MMHG | OXYGEN SATURATION: 98 % | RESPIRATION RATE: 16 BRPM

## 2022-11-21 DIAGNOSIS — T45.1X5A ANEMIA ASSOCIATED WITH CHEMOTHERAPY: ICD-10-CM

## 2022-11-21 DIAGNOSIS — Z17.0 MALIGNANT NEOPLASM OF UPPER-INNER QUADRANT OF RIGHT BREAST IN FEMALE, ESTROGEN RECEPTOR POSITIVE: ICD-10-CM

## 2022-11-21 DIAGNOSIS — C50.211 MALIGNANT NEOPLASM OF UPPER-INNER QUADRANT OF RIGHT BREAST IN FEMALE, ESTROGEN RECEPTOR POSITIVE: ICD-10-CM

## 2022-11-21 DIAGNOSIS — Z79.4 TYPE 2 DIABETES MELLITUS WITHOUT COMPLICATION, WITH LONG-TERM CURRENT USE OF INSULIN: ICD-10-CM

## 2022-11-21 DIAGNOSIS — C79.51 BONE METASTASES: ICD-10-CM

## 2022-11-21 DIAGNOSIS — E11.9 TYPE 2 DIABETES MELLITUS WITHOUT COMPLICATION, WITH LONG-TERM CURRENT USE OF INSULIN: ICD-10-CM

## 2022-11-21 DIAGNOSIS — C50.211 MALIGNANT NEOPLASM OF UPPER-INNER QUADRANT OF RIGHT BREAST IN FEMALE, ESTROGEN RECEPTOR POSITIVE: Primary | ICD-10-CM

## 2022-11-21 DIAGNOSIS — Z17.0 MALIGNANT NEOPLASM OF UPPER-INNER QUADRANT OF RIGHT BREAST IN FEMALE, ESTROGEN RECEPTOR POSITIVE: Primary | ICD-10-CM

## 2022-11-21 DIAGNOSIS — B37.9 CANDIDIASIS: ICD-10-CM

## 2022-11-21 DIAGNOSIS — G89.3 CANCER ASSOCIATED PAIN: ICD-10-CM

## 2022-11-21 DIAGNOSIS — D64.81 ANEMIA ASSOCIATED WITH CHEMOTHERAPY: ICD-10-CM

## 2022-11-21 DIAGNOSIS — I50.42 CHRONIC COMBINED SYSTOLIC AND DIASTOLIC HEART FAILURE: ICD-10-CM

## 2022-11-21 LAB
ALBUMIN SERPL BCP-MCNC: 3.1 G/DL (ref 3.5–5.2)
ALP SERPL-CCNC: 90 U/L (ref 55–135)
ALT SERPL W/O P-5'-P-CCNC: 17 U/L (ref 10–44)
ANION GAP SERPL CALC-SCNC: 9 MMOL/L (ref 8–16)
AST SERPL-CCNC: 12 U/L (ref 10–40)
BILIRUB SERPL-MCNC: 0.4 MG/DL (ref 0.1–1)
BUN SERPL-MCNC: 10 MG/DL (ref 6–20)
CALCIUM SERPL-MCNC: 9.8 MG/DL (ref 8.7–10.5)
CHLORIDE SERPL-SCNC: 104 MMOL/L (ref 95–110)
CO2 SERPL-SCNC: 25 MMOL/L (ref 23–29)
CREAT SERPL-MCNC: 0.9 MG/DL (ref 0.5–1.4)
ERYTHROCYTE [DISTWIDTH] IN BLOOD BY AUTOMATED COUNT: 19.2 % (ref 11.5–14.5)
EST. GFR  (NO RACE VARIABLE): >60 ML/MIN/1.73 M^2
GLUCOSE SERPL-MCNC: 265 MG/DL (ref 70–110)
HCT VFR BLD AUTO: 34.1 % (ref 37–48.5)
HGB BLD-MCNC: 10.3 G/DL (ref 12–16)
IMM GRANULOCYTES # BLD AUTO: 0.02 K/UL (ref 0–0.04)
MCH RBC QN AUTO: 26.9 PG (ref 27–31)
MCHC RBC AUTO-ENTMCNC: 30.2 G/DL (ref 32–36)
MCV RBC AUTO: 89 FL (ref 82–98)
NEUTROPHILS # BLD AUTO: 2.8 K/UL (ref 1.8–7.7)
PLATELET # BLD AUTO: 353 K/UL (ref 150–450)
PMV BLD AUTO: 9.8 FL (ref 9.2–12.9)
POTASSIUM SERPL-SCNC: 3.9 MMOL/L (ref 3.5–5.1)
PROT SERPL-MCNC: 7.2 G/DL (ref 6–8.4)
RBC # BLD AUTO: 3.83 M/UL (ref 4–5.4)
SODIUM SERPL-SCNC: 138 MMOL/L (ref 136–145)
WBC # BLD AUTO: 4.92 K/UL (ref 3.9–12.7)

## 2022-11-21 PROCEDURE — 99215 PR OFFICE/OUTPT VISIT, EST, LEVL V, 40-54 MIN: ICD-10-PCS | Mod: S$GLB,,, | Performed by: NURSE PRACTITIONER

## 2022-11-21 PROCEDURE — 85027 COMPLETE CBC AUTOMATED: CPT | Performed by: NURSE PRACTITIONER

## 2022-11-21 PROCEDURE — 3008F PR BODY MASS INDEX (BMI) DOCUMENTED: ICD-10-PCS | Mod: CPTII,S$GLB,, | Performed by: NURSE PRACTITIONER

## 2022-11-21 PROCEDURE — 3008F BODY MASS INDEX DOCD: CPT | Mod: CPTII,S$GLB,, | Performed by: NURSE PRACTITIONER

## 2022-11-21 PROCEDURE — 3074F PR MOST RECENT SYSTOLIC BLOOD PRESSURE < 130 MM HG: ICD-10-PCS | Mod: CPTII,S$GLB,, | Performed by: NURSE PRACTITIONER

## 2022-11-21 PROCEDURE — 3052F HG A1C>EQUAL 8.0%<EQUAL 9.0%: CPT | Mod: CPTII,S$GLB,, | Performed by: NURSE PRACTITIONER

## 2022-11-21 PROCEDURE — 96372 THER/PROPH/DIAG INJ SC/IM: CPT | Mod: 59

## 2022-11-21 PROCEDURE — 3078F DIAST BP <80 MM HG: CPT | Mod: CPTII,S$GLB,, | Performed by: NURSE PRACTITIONER

## 2022-11-21 PROCEDURE — 36415 COLL VENOUS BLD VENIPUNCTURE: CPT | Performed by: INTERNAL MEDICINE

## 2022-11-21 PROCEDURE — 99499 UNLISTED E&M SERVICE: CPT | Mod: S$PBB,,, | Performed by: NURSE PRACTITIONER

## 2022-11-21 PROCEDURE — 99999 PR PBB SHADOW E&M-EST. PATIENT-LVL V: CPT | Mod: PBBFAC,,, | Performed by: NURSE PRACTITIONER

## 2022-11-21 PROCEDURE — 99499 RISK ADDL DX/OHS AUDIT: ICD-10-PCS | Mod: S$PBB,,, | Performed by: NURSE PRACTITIONER

## 2022-11-21 PROCEDURE — 99215 OFFICE O/P EST HI 40 MIN: CPT | Mod: S$GLB,,, | Performed by: NURSE PRACTITIONER

## 2022-11-21 PROCEDURE — 3078F PR MOST RECENT DIASTOLIC BLOOD PRESSURE < 80 MM HG: ICD-10-PCS | Mod: CPTII,S$GLB,, | Performed by: NURSE PRACTITIONER

## 2022-11-21 PROCEDURE — 63600175 PHARM REV CODE 636 W HCPCS: Mod: JG | Performed by: NURSE PRACTITIONER

## 2022-11-21 PROCEDURE — 96402 CHEMO HORMON ANTINEOPL SQ/IM: CPT

## 2022-11-21 PROCEDURE — 4010F ACE/ARB THERAPY RXD/TAKEN: CPT | Mod: CPTII,S$GLB,, | Performed by: NURSE PRACTITIONER

## 2022-11-21 PROCEDURE — 4010F PR ACE/ARB THEARPY RXD/TAKEN: ICD-10-PCS | Mod: CPTII,S$GLB,, | Performed by: NURSE PRACTITIONER

## 2022-11-21 PROCEDURE — 80053 COMPREHEN METABOLIC PANEL: CPT | Performed by: INTERNAL MEDICINE

## 2022-11-21 PROCEDURE — 3052F PR MOST RECENT HEMOGLOBIN A1C LEVEL 8.0 - < 9.0%: ICD-10-PCS | Mod: CPTII,S$GLB,, | Performed by: NURSE PRACTITIONER

## 2022-11-21 PROCEDURE — 99999 PR PBB SHADOW E&M-EST. PATIENT-LVL V: ICD-10-PCS | Mod: PBBFAC,,, | Performed by: NURSE PRACTITIONER

## 2022-11-21 PROCEDURE — 3074F SYST BP LT 130 MM HG: CPT | Mod: CPTII,S$GLB,, | Performed by: NURSE PRACTITIONER

## 2022-11-21 RX ORDER — NYSTATIN 100000 [USP'U]/ML
6 SUSPENSION ORAL 4 TIMES DAILY
Qty: 240 ML | Refills: 0 | Status: SHIPPED | OUTPATIENT
Start: 2022-11-21 | End: 2022-12-01

## 2022-11-21 RX ADMIN — GOSERELIN ACETATE 3.6 MG: 3.6 IMPLANT SUBCUTANEOUS at 10:11

## 2022-11-21 RX ADMIN — DENOSUMAB 120 MG: 120 INJECTION SUBCUTANEOUS at 10:11

## 2022-11-21 NOTE — TELEPHONE ENCOUNTER
Spoke with pt and confirmed appt time change with Dr. Odom on Friday virtually , per 's request.       ----- Message from Bria Odom, PhD sent at 11/21/2022 10:18 AM CST -----  Regarding: Appt  Hey I need to see an urget patient int multi D on Friday 11/25 at 2pm. Can you move this patient to 12pm and block my 2pm

## 2022-11-21 NOTE — PROGRESS NOTES
Subjective:       Patient ID: Kristopher Elmore is a 47 y.o. female.    Chief Complaint: Breast Cancer    HPI      Currently on Arimidex and Ibrance (dose adjusted due to anemia to 100 mg)   Zoladex and Xgeva monthly     Here for follow up.   In the interval, saw cards and med changes noted.   Today, reports feeling better with the addition if Farxiga  Does not feel as sick and muscle spasms resolved. She was having muscle spasms in neck and ribs but resolved once started Farxiga.   Breathing seems to be improving.   No fever/chills.   No CP.   Appetite good  Occasional nausea. No vomiting.   +urinary frequency - due to DM. Seeing Endo on 12/16/2022. She is not checking her sugars every day due to neuropathy in fingers.   No other urinary complaints and bowels normal.   Mild dizziness today- feels related to not eating this morning.   Pain - 7/10 today but was unable to take pain medication. Has IBP and tramadol - last doses was last week. Not in pain everyday.   4 out of 7 days -feels this pain when more active.   Unable to take pain medication daily due to sedative effect.   Pain today not radiating. No numbness or tingling. Pain not more intense or more frequent than usual.   Not walking too much due to SOB. She uses walker       Previously,   11/7/2022 ECHO: previous study compared- no significant decrease noted.   Summary    Technically difficult study  The left ventricle is severely enlarged with eccentric hypertrophy and moderately decreased systolic function. The estimated ejection fraction is 35%.  Normal right ventricular size with normal right ventricular systolic function.  Grade I left ventricular diastolic dysfunction. Left atrial pressures do not appear to be significantly elevated.  The IVC is not visualized.      - 5/2/2022 ECHO:  The left ventricle is mildly enlarged with eccentric hypertrophy and mildly decreased systolic function. The estimated ejection fraction is 40%.  There is left  ventricular global hypokinesis.  There is abnormal septal wall motion.  Grade I left ventricular diastolic dysfunction.  Normal right ventricular size with mildly reduced right ventricular systolic function.  Moderate left atrial enlargement.     - 10/10/2022 CXR:  FINDINGS:  Cardiac silhouette is stable in size.  Lungs are hypoinflated.  No evidence of focal consolidative process, pneumothorax, or significant pleural effusion.  No acute osseous abnormality identified.  Impression:  No acute cardiopulmonary process identified.    Restaging:  - 10/16/2022 CT Abdomen:  FINDINGS:  Lungs/Pleura: Lung bases are unremarkable.  No focal consolidation, pneumothorax, or pleural effusion is present.  Heart: The visualized portions of the heart are normal. No pericardial effusion.  Liver: Liver is mildly enlarged with diffuse hypoattenuation suggesting steatosis.  No focal hepatic abnormality.  Gallbladder/Bile ducts: The gallbladder is unremarkable.  No intrahepatic or extrahepatic biliary ductal dilatation.  Spleen:Unremarkable.  Stomach: Unremarkable  Pancreas: Unremarkable.  Adrenals: Unremarkable.  Renal/Ureters: The kidneys are normal in size and location. No hydronephrosis. Stable right upper pole renal cyst.  Visualized ureters are normal.  Partially visualized uterus demonstrates a calcified fibroid near the fundus.  Bowel: The visualized loops of small and large bowel show no evidence of obstruction or inflammation.  Peritoneum: No ascites, free fluid, or intraperitoneal free air.  Lymph Nodes: No pathologic hakeem enlargement in the abdomen.  Vasculature: The abdominal aorta is normal in course and caliber.  No atherosclerotic calcifications.  Bones: Age appropriate degenerative changes.  Stable burden of osseous metastatic disease involving the pelvis and spine.  No evidence of new lytic or sclerotic lesions.  No evidence for acute fracture.  Soft Tissues: Mild nonspecific subcutaneous fat stranding within the  ventral abdominal wall near the periumbilical region.  No organized fluid collection or solid mass detected.  Mild subcutaneous edema of the midline lower back.  Impression:  Patient with breast cancer. Stable lucent/sclerotic lesions throughout the pelvis and spine. No evidence of new metastatic disease.  Mild nonspecific subcutaneous fat stranding within the ventral abdominal wall near the periumbilical region.  Finding could represent simple edema or sequela of soft tissue infection/inflammation.  No organized fluid collection or solid mass detected.  Right renal cyst.  Hepatic steatosis.  Additional findings as above.    Prior scans:   - 6/13/2022 CT C/A/P:   COMPARISON:   CT abdomen pelvis 04/13/2022, CT chest abdomen pelvis 02/01/2022, CT chest abdomen pelvis 10/25/2021   FINDINGS:   Decreased sensitivity for detecting solid organ abnormalities without intravenous contrast.   LUNG BASES & MEDIASTINUM (limited): Unremarkable   HEPATOBILIARY: Borderline hepatomegaly with decreased parenchymal attenuation suggesting hepatic steatosis. No focal hepatic lesions.No biliary ductal dilatation.Gallbladder is contracted.   SPLEEN: No splenomegaly.   PANCREAS: No focal masses or ductal dilatation.   ADRENALS: No adrenal nodules.   KIDNEYS/URETERS: Stable 1.2 cm hypodensity in the upper pole cortex with fluid-level attenuation, likely simple cyst. No hydronephrosis, stones or solid mass lesions.   PELVIC ORGANS/BLADDER: Bladder is minimally distended without wall thickening. Ureters are unremarkable. Uterus contains a stable-appearing calcified anterior fundal leiomyoma and is otherwise unremarkable. No adnexal masses.   PERITONEUM / RETROPERITONEUM: No free air. No free fluid.   LYMPH NODES: No lymphadenopathy.   VESSELS: Unremarkable.   GI TRACT: Several high density foci within the gastric lumen, likely ingested material. No distention or wall thickening. Normal appendix.   BONES AND SOFT TISSUES: Degenerative changes  of the spine. Stable-appearing lucent lesion along the right iliac wing. Stable lucent/sclerotic foci throughout several vertebral bodies. No definitive new lucent/sclerotic lesions. No fractures.   Impression:   1. In this patient with history of invasive ductal carcinoma of the right breast, there is no CT evidence of new metastatic disease. Persistent lucent/sclerotic lesions throughout the pelvis and spine, without interval change, as above.   2. Decreased hepatic parenchymal attenuation suggestive of hepatic steatosis. No focal hepatic lesions on this noncontrast examination.   3. Additional findings as above.       Reason For Follow Up:   1. Stage IV ([id number]) invasive ductal carcinoma of right breast, upper inner quadrant, ER %, ID neg, Her2 neg, Grade 3, multifocal with one lesion appearing more aggressive (Ki67 80%), large LN +, bone mets.   Ibrance dose adjusted with cytopenias   Oncologic History:   Presentation   - 9/11/19 - Screening mammogram showed multiple right breast masses lower inner quadrant   - 9/13/19 - Diagnostic mammogram and US showed a right breast, 3:00 position mass, 2 CFN measuring 17 mm x 16 mm x 14 mm. There 2 smaller adjacent masses towards the nipple   - 9/19/19 - Biopsy -   A. Right breast subareolar: Grade 3 IDC, estrogen receptor 80%, progesterone receptor 0%, Her2 dago neg, Ki67 80%.   B. Right breast mass 3:00: Grade 3 IDC with papillary features, estrogen receptor 100%, progesterone receptor 0%, Her2 dago 1+, Ki67 30%.   - 9/26/19: US right axilla with abnormal lymphadenopathy measuring 4.3 x 2.8 cm with biopsy + for metastatic breast cancer.   Surgery consultation with Dr Ferris on 9/25/19   - 9/25/19 - Genetics Genetics [facility] BRACAnalysis pending; VUS pending   Medical oncology consultation on 10/7/19   * 10/8/19 - CT C/A/P with several lytic lesions in thoracic and lumbar spine, iliac bones, left scapula in addition to 3 x 4.5 cm right axillary node and 2.1 cm  "right breast mass. Bone scan negative.   * 10/31/19 - started Ibrance, Arimidex, Zoladex; plan for Xgeva.   * 11/12/19 STRATA without targetable mutations.   * 12/16/19 - Bone biopsy + for met disease (initially read as negative, but we treated as metastatic disease given high suspicion).   * MRI brain: "Partially empty sella. Question bilateral globe proptosis. Otherwise unremarkable MRI brain as detailed above specifically without evidence for intracranial enhancing lesion to suggest metastatic disease."   * 4/22/2020 PET - disease improvement. 8/2020 PET with disease improvement.   * 9/1/20 - Palliative RT to L1-L4   Of note, * Xgeva monthly - we had been waiting on dental clearance, but she has been unable to get into dentistry and is accepting of risks. Has no active dental disease.   PET scan 4/22/2021:   FINDINGS:   Quality of the study: Mildly degraded due to patient's large body habitus and skin abutting the gantry.   In the head and neck, there are no hypermetabolic lesions worrisome for malignancy. There are no hypermetabolic mucosal lesions, and there are no pathologically enlarged or hypermetabolic lymph nodes.   In the chest, there are no hypermetabolic lesions worrisome for malignancy.   Stable CT appearance of a level 1 right axillary lymph node measuring 1.3 cm in short axis with normal background radiotracer uptake. Previously with SUV max of 2.1.   There are no concerning pulmonary nodules or masses, and there are no pathologically enlarged or hypermetabolic lymph nodes.   In the abdomen and pelvis, there is physiologic tracer distribution within the abdominal organs and excretion into the genitourinary system.   In the bones, there are stable lytic lesions throughout the lumbar spine and pelvis and additional stable sclerotic lesions in the sternum and T3 vertebral body. No associated focal abnormal increased radiotracer uptake. Findings likely represent treated disease.   Impression:   Interval " decreased uptake within a prominent right axillary lymph node, now with normal background uptake. No new focal abnormal uptake.   Stable lytic and sclerotic lesions without focal abnormal uptake. Findings are compatible with treated metastasis.   8/25/2021 MRI brain   Impression:   No significant change from prior specifically without evidence for enhancing lesion to suggest intracranial metastatic disease.   Continued partially empty sella, intracranial hypertension to be included in differential in the appropriate clinical setting. Clinical correlation and further evaluation as warranted.   10/14/2021 MRI thoracic/lumbar spine:   Impression:   Patient history of metastatic breast cancer.   Bone lesions at T10, L2, L3, and right iliac wing, as above, concerning for metastatic disease. No pathologic fracture, soft tissue component, or epidural extension identified.   10/25/2021 CT chest/abd/pelvis:   Impression:   1. Stable metastasis of the spine   No evidence of intra-abdominal or intrathoracic metastatic disease   2. Stable prominent right axillary lymph node.   3. Additional findings are detailed above.   Currently on Arimidex and Ibrance   Zoladex and Xgeva monthly   - 2/2/2022 Bone scan:   FINDINGS:   No focal abnormal uptake suggestive of osseous metastases. There is diffusely increased uptake in the calvarium in keeping with hyperostosis. There is degenerative type uptake most prominent in the bilateral shoulders and knees. The focal uptake previously described in the distal right femur is not visualized. There is otherwise physiologic distribution of the radiopharmaceutical throughout the skeleton.   There is normal uptake in the genitourinary system and soft tissues.   Impression:   There is no scintigraphic evidence of osteoblastic metastatic disease.   Nonspecific uptake in the distal right femur has resolved.   Diffuse cranial hyperostosis and other findings as above.   - 2/1/2022 CT C/A/P:   FINDINGS:    CHEST   Support tubes and lines: None.   Aorta: Normal caliber.   Heart: Normal size..   Coronary arteries: No calcifications.   Pericardium: Normal. No effusion, thickening, or calcification.   Central pulmonary arteries: Normal caliber.   Base of neck/thyroid: Normal.   Lymph nodes: No supraclavicular, axillary, internal mammary, mediastinal or hilar lymphadenopathy.   Esophagus: Normal.   Pleura: No effusion, thickening or calcification.   Body wall: Unremarkable.   Airways: Normal.   Lungs: Clear without focal or diffuse abnormality.   Bones: Sclerotic lesions are seen throughout the spine as well as in the sternum, not substantially changed from 10/25/2021   ABDOMEN/PELVIS   Liver: Unremarkable   Gallbladder/bile ducts: Unremarkable. No intra or extrahepatic biliary ductal dilatation   Pancreas: Unremarkable.   Spleen: Unremarkable.   Adrenals: Unremarkable.   Kidneys: Simple cyst in the right kidney is unchanged   Lymph nodes: No abdominal or pelvic lymphadenopathy.   Bowel and mesentery: Unremarkable.   Abdominal aorta: Unremarkable.   Inferior vena cava: Unremarkable.   Free fluid or free air: None.   Pelvis: Unremarkable.   Urinary bladder: Unremarkable.   Body wall: Unremarkable.   Bones: Sclerotic lesions seen in the spine are unchanged.   Impression:   1. No evidence of new recurrent or metastatic disease.   2. Sclerotic lesions seen in the spine and sternum, unchanged when compared to 10/25/2021.     - more recent scans as above    FH:   Paternal side:   1st cousin- ovarian cancer (diagnosed at age 26) and her mother has thyroid cancer   Maternal grandmother- some type of likely metastatic GI cancer    Review of Systems   Constitutional:         See above   All other systems reviewed and are negative.      Objective:      Physical Exam  Vitals and nursing note reviewed.   Constitutional:       General: She is not in acute distress.     Appearance: Normal appearance. She is well-developed. She is  obese. She is not ill-appearing.      Comments: Presents with her daughters  Very pleasant     HENT:      Head: Normocephalic and atraumatic.      Mouth/Throat:      Comments: White patches to tongue.   Eyes:      General: Lids are normal. No scleral icterus.     Extraocular Movements: Extraocular movements intact.      Conjunctiva/sclera: Conjunctivae normal.      Pupils: Pupils are equal, round, and reactive to light.   Neck:      Thyroid: No thyromegaly.      Vascular: No JVD.      Trachea: Trachea normal.   Cardiovascular:      Rate and Rhythm: Normal rate and regular rhythm.      Heart sounds: Normal heart sounds. No murmur heard.    No friction rub. No gallop.      Comments: Difficult to assess tones.   Pulmonary:      Effort: Pulmonary effort is normal. No respiratory distress.      Breath sounds: Normal breath sounds. No wheezing, rhonchi or rales.      Comments: distant  Abdominal:      General: Bowel sounds are normal. There is no distension.      Palpations: Abdomen is soft. There is no mass.      Tenderness: There is no abdominal tenderness. There is no guarding or rebound.      Comments: No organomegaly however difficult to assess.    Hardness noted periumbilical area   Musculoskeletal:         General: Normal range of motion.      Cervical back: Normal range of motion and neck supple.      Comments: No spinal or paraspinal tenderness.    Lymphadenopathy:      Head:      Right side of head: No submental or submandibular adenopathy.      Left side of head: No submental or submandibular adenopathy.      Cervical: No cervical adenopathy.      Upper Body:      Right upper body: No supraclavicular or axillary adenopathy.      Left upper body: No supraclavicular or axillary adenopathy.   Skin:     General: Skin is warm and dry.      Capillary Refill: Capillary refill takes less than 2 seconds.      Coloration: Skin is not jaundiced or pale.      Findings: No bruising or rash.      Nails: There is no clubbing.    Neurological:      Mental Status: She is alert and oriented to person, place, and time.      Sensory: No sensory deficit.      Motor: No weakness.      Coordination: Coordination normal.      Gait: Gait normal.   Psychiatric:         Mood and Affect: Mood normal.         Speech: Speech normal.         Behavior: Behavior normal.         Thought Content: Thought content normal.         Judgment: Judgment normal.       Labs- reviewed  Imaging- reviewed  Assessment:       Problem List Items Addressed This Visit          Oncology    Malignant neoplasm of upper-inner quadrant of right breast in female, estrogen receptor positive - Primary    Cancer associated pain    Bone metastases    Anemia associated with chemotherapy       Endocrine    BMI 60.0-69.9, adult    Type 2 diabetes mellitus without complication, with long-term current use of insulin     Other Visit Diagnoses       Candidiasis        Relevant Medications    nystatin (MYCOSTATIN) 100,000 unit/mL suspension            Plan:               Metastatic breast cancer--   Clinically doing well  continue Arimidex, Ibrance, Zoladex, and Xgeva  Tolerating well with stable disease on most recent scans  Labs reviewed and adequate.   Order for shower chair. Due to dizziness and pain. SW.     HTN-   Well controlled  Seeing cards    CHF-   Continue f/u with Dr. Olivares  No evidence of decompensation.     Neoplasm related pain-   stable disease, overall well controlled  Tylenol prn ok to take  Avoid daily IBP use - possible adverse effects discussed including kidney and gastristis     Candidiasis  Rx Nystatin    DM  Seeing Endo next month    Route Chart for Scheduling    Med Onc Chart Routing      Follow up with physician 4 weeks. with cbc, cmp   Follow up with CAMILA    Infusion scheduling note    Injection scheduling note    Labs    Imaging    Pharmacy appointment    Other referrals        Treatment Plan Information   OP ANASTROZOLE PALBOCICLIB Q4W   Jessica Mendez MD    Upcoming Treatment Dates - OP ANASTROZOLE PALBOCICLIB Q4W    No upcoming days in selected categories.    Supportive Plan Information  OP BREAST GOSERELIN & DENOSUMAB Q4W   Jessica Mendez MD   Upcoming Treatment Dates - OP BREAST GOSERELIN & DENOSUMAB Q4W    12/16/2022       Chemotherapy       denosumab (XGEVA) solution 120 mg       goserelin (ZOLADEX) injection 3.6 mg        Patient is in agreement with the proposed treatment plan. All questions were answered to the patient's satisfaction. Pt knows to call clinic for any new or worsening symptoms and if anything is needed before the next clinic visit.      LEILA Sanabria-C  Hematology & Oncology  [facility] [place] [address]  ph. [phone]  Fax. [phone]     Face to Face time with patient: 25 minutes  40 minutes of total time spent on the encounter, which includes face to face time and non-face to face time preparing to see the patient (eg, review of tests), Obtaining and/or reviewing separately obtained history, Documenting clinical information in the electronic or other health record, Independently interpreting results (not separately reported) and communicating results to the patient/family/caregiver, or Care coordination (not separately reported).

## 2022-11-21 NOTE — ED TRIAGE NOTES
Pt states approx 1 week ago, pt states she had sharp pain in eye and L arm and L tingling and numbness, off and on. Pt states episode happened again today around 1530. Pt denies headache at this time. Pt gross neuro intact.    yes

## 2022-11-22 ENCOUNTER — PATIENT MESSAGE (OUTPATIENT)
Dept: CARDIOLOGY | Facility: CLINIC | Age: 47
End: 2022-11-22
Payer: MEDICARE

## 2022-11-23 ENCOUNTER — TELEPHONE (OUTPATIENT)
Dept: PHARMACY | Facility: CLINIC | Age: 47
End: 2022-11-23
Payer: MEDICARE

## 2022-11-23 DIAGNOSIS — I50.42 CHRONIC COMBINED SYSTOLIC AND DIASTOLIC HEART FAILURE: ICD-10-CM

## 2022-11-23 DIAGNOSIS — I51.7 CARDIOMEGALY: Primary | ICD-10-CM

## 2022-11-23 NOTE — TELEPHONE ENCOUNTER
I have spoken with Kristopher Elmore and informed her of the [employer] application process for Farxiga and what's required to apply.  Kristopher Elmore will provide the following documents: Proof of household Income( such as social security statement, 1099 form, pension statement or 3 consecutive pay stubs and Copy of all Insurance cards( front and back)      I will follow up with the patient in 5 business days.

## 2022-11-29 ENCOUNTER — PATIENT MESSAGE (OUTPATIENT)
Dept: CARDIOLOGY | Facility: CLINIC | Age: 47
End: 2022-11-29
Payer: MEDICARE

## 2022-12-06 ENCOUNTER — LAB VISIT (OUTPATIENT)
Dept: LAB | Facility: HOSPITAL | Age: 47
End: 2022-12-06
Attending: INTERNAL MEDICINE
Payer: MEDICARE

## 2022-12-06 DIAGNOSIS — E11.9 TYPE 2 DIABETES MELLITUS WITHOUT COMPLICATION, WITH LONG-TERM CURRENT USE OF INSULIN: ICD-10-CM

## 2022-12-06 DIAGNOSIS — I50.42 CHRONIC COMBINED SYSTOLIC AND DIASTOLIC HEART FAILURE: ICD-10-CM

## 2022-12-06 DIAGNOSIS — Z79.4 TYPE 2 DIABETES MELLITUS WITHOUT COMPLICATION, WITH LONG-TERM CURRENT USE OF INSULIN: ICD-10-CM

## 2022-12-06 LAB
CHOLEST SERPL-MCNC: 111 MG/DL (ref 120–199)
CHOLEST/HDLC SERPL: 3.1 {RATIO} (ref 2–5)
ESTIMATED AVG GLUCOSE: 286 MG/DL (ref 68–131)
HBA1C MFR BLD: 11.6 % (ref 4–5.6)
HDLC SERPL-MCNC: 36 MG/DL (ref 40–75)
HDLC SERPL: 32.4 % (ref 20–50)
LDLC SERPL CALC-MCNC: 58.2 MG/DL (ref 63–159)
MAGNESIUM SERPL-MCNC: 1.7 MG/DL (ref 1.6–2.6)
NONHDLC SERPL-MCNC: 75 MG/DL
TRIGL SERPL-MCNC: 84 MG/DL (ref 30–150)

## 2022-12-06 PROCEDURE — 83735 ASSAY OF MAGNESIUM: CPT | Performed by: INTERNAL MEDICINE

## 2022-12-06 PROCEDURE — 80061 LIPID PANEL: CPT | Performed by: INTERNAL MEDICINE

## 2022-12-06 PROCEDURE — 83036 HEMOGLOBIN GLYCOSYLATED A1C: CPT | Performed by: INTERNAL MEDICINE

## 2022-12-06 PROCEDURE — 36415 COLL VENOUS BLD VENIPUNCTURE: CPT | Performed by: INTERNAL MEDICINE

## 2022-12-09 ENCOUNTER — TELEPHONE (OUTPATIENT)
Dept: INFUSION THERAPY | Facility: HOSPITAL | Age: 47
End: 2022-12-09
Payer: MEDICARE

## 2022-12-12 ENCOUNTER — TELEPHONE (OUTPATIENT)
Dept: HEMATOLOGY/ONCOLOGY | Facility: CLINIC | Age: 47
End: 2022-12-12
Payer: MEDICARE

## 2022-12-12 ENCOUNTER — OFFICE VISIT (OUTPATIENT)
Dept: PSYCHIATRY | Facility: CLINIC | Age: 47
End: 2022-12-12
Payer: MEDICARE

## 2022-12-12 ENCOUNTER — CLINICAL SUPPORT (OUTPATIENT)
Dept: DIABETES | Facility: CLINIC | Age: 47
End: 2022-12-12
Payer: MEDICARE

## 2022-12-12 ENCOUNTER — SPECIALTY PHARMACY (OUTPATIENT)
Dept: PHARMACY | Facility: CLINIC | Age: 47
End: 2022-12-12
Payer: MEDICARE

## 2022-12-12 VITALS — WEIGHT: 293 LBS | BODY MASS INDEX: 50.02 KG/M2 | HEIGHT: 64 IN

## 2022-12-12 DIAGNOSIS — C79.51 BONE METASTASES: ICD-10-CM

## 2022-12-12 DIAGNOSIS — Z17.0 MALIGNANT NEOPLASM OF UPPER-INNER QUADRANT OF RIGHT BREAST IN FEMALE, ESTROGEN RECEPTOR POSITIVE: Primary | ICD-10-CM

## 2022-12-12 DIAGNOSIS — Z17.0 MALIGNANT NEOPLASM OF UPPER-INNER QUADRANT OF RIGHT BREAST IN FEMALE, ESTROGEN RECEPTOR POSITIVE: ICD-10-CM

## 2022-12-12 DIAGNOSIS — F41.9 ANXIETY: Primary | ICD-10-CM

## 2022-12-12 DIAGNOSIS — C50.211 MALIGNANT NEOPLASM OF UPPER-INNER QUADRANT OF RIGHT BREAST IN FEMALE, ESTROGEN RECEPTOR POSITIVE: Primary | ICD-10-CM

## 2022-12-12 DIAGNOSIS — E11.65 TYPE 2 DIABETES MELLITUS WITH HYPERGLYCEMIA, WITHOUT LONG-TERM CURRENT USE OF INSULIN: ICD-10-CM

## 2022-12-12 DIAGNOSIS — C50.211 MALIGNANT NEOPLASM OF UPPER-INNER QUADRANT OF RIGHT BREAST IN FEMALE, ESTROGEN RECEPTOR POSITIVE: ICD-10-CM

## 2022-12-12 PROCEDURE — 4010F PR ACE/ARB THEARPY RXD/TAKEN: ICD-10-PCS | Mod: CPTII,95,, | Performed by: PSYCHOLOGIST

## 2022-12-12 PROCEDURE — 1159F PR MEDICATION LIST DOCUMENTED IN MEDICAL RECORD: ICD-10-PCS | Mod: CPTII,95,, | Performed by: PSYCHOLOGIST

## 2022-12-12 PROCEDURE — 99999 PR PBB SHADOW E&M-EST. PATIENT-LVL II: CPT | Mod: PBBFAC,,,

## 2022-12-12 PROCEDURE — G0108 DIAB MANAGE TRN  PER INDIV: HCPCS | Mod: S$GLB,,, | Performed by: FAMILY MEDICINE

## 2022-12-12 PROCEDURE — 1159F MED LIST DOCD IN RCRD: CPT | Mod: CPTII,95,, | Performed by: PSYCHOLOGIST

## 2022-12-12 PROCEDURE — 90832 PR PSYCHOTHERAPY W/PATIENT, 30 MIN: ICD-10-PCS | Mod: 95,,, | Performed by: PSYCHOLOGIST

## 2022-12-12 PROCEDURE — 3046F PR MOST RECENT HEMOGLOBIN A1C LEVEL > 9.0%: ICD-10-PCS | Mod: CPTII,95,, | Performed by: PSYCHOLOGIST

## 2022-12-12 PROCEDURE — G0108 PR DIAB MANAGE TRN  PER INDIV: ICD-10-PCS | Mod: S$GLB,,, | Performed by: FAMILY MEDICINE

## 2022-12-12 PROCEDURE — 90832 PSYTX W PT 30 MINUTES: CPT | Mod: 95,,, | Performed by: PSYCHOLOGIST

## 2022-12-12 PROCEDURE — 99999 PR PBB SHADOW E&M-EST. PATIENT-LVL II: ICD-10-PCS | Mod: PBBFAC,,,

## 2022-12-12 PROCEDURE — 4010F ACE/ARB THERAPY RXD/TAKEN: CPT | Mod: CPTII,95,, | Performed by: PSYCHOLOGIST

## 2022-12-12 PROCEDURE — 3046F HEMOGLOBIN A1C LEVEL >9.0%: CPT | Mod: CPTII,95,, | Performed by: PSYCHOLOGIST

## 2022-12-12 NOTE — PROGRESS NOTES
INFORMED CONSENT: Kristopher Elmore   is known to this provider and identity was confirmed via NAME and .  The patient has been informed of the risks and benefits associated with engaging in psychotherapy, the handling of protected health information, the rights of privacy and the limits of confidentiality. The patient has also been informed of the importance of reporting any suicidal or homicidal ideation to this or any provider to ensure safety of all parties, and the Kristopher Elmore expressed understanding. The patient was agreeable to these terms and freely participates in individual psychotherapy.    Telemedicine PSYCHO-ONCOLOGY NOTE/ Individual Psychotherapy     Consultation started: 2022 at 3:02 PM   The chief complaint leading to consultation is: anxiety  The patient location is:[place], Patient home  Virtual visit with synchronous audio and video   Patient alone at the time of consultation      Crisis Disclaimer: Patient was informed that due to the virtual nature of the visit, that if a crisis develops, protocols will be implemented to ensure patient safety, including but not limited to: 1) Initiating a welfare check with local Law Enforcement, 2) Calling [facility]/National Crisis Hotline, and/or 3) Initiating PEC/CEC procedures.      Each patient provided medical services by telemedicine is:  (1) informed of the relationship between the physician and patient and the respective role of any other health care provider with respect to management of the patient; and (2) notified that he or she may decline to receive medical services by telemedicine and may withdraw from such care at any time.    Date: 2022   Site of therapist:  [facility]         Therapeutic Intervention: Met with patient.  Outpatient - Insight oriented psychotherapy 30 min - CPT code 45279    Referring provider:    Chief complaint/reason for encounter: depression and anxiety   Met with patient to evaluate  psychosocial adaptation to survivorship of Stage IV BC    Patient was last seen by me on 10/24/2022    Objective:      Kristopher Elmore arrived promptly for the session. The patient was fully cooperative throughout the session.  Appearance: age appropriate, appropriately  dressed, adequately  groomed  Behavior/Cooperation: friendly and cooperative  Speech: normal in rate, volume, and tone and appropriate quality, quantity and organization of sentences  Mood: euthymic  Affect: mood congruent  Thought Process: goal-directed, logical  Thought Content: normal,  No delusions or paranoia; did not appear to be responding to internal stimuli during the session  Orientation: grossly intact  Memory: Grossly intact  Attention Span/Concentration: Attends to session without distraction; reports no difficulty  Fund of Knowledge: average  Estimate of Intelligence: average from verbal skills and history  Cognition: grossly intact  Insight: patient has awareness of illness; good insight into own behavior and behavior of others  Judgment: the patient's behavior is adequate to circumstances      Interval history and content of current session: Patient with uncontrolled DM2. Will see endo soon. Patient's mother's death anniversary's reaction improvement.Discussed diagnosis, treatment, prognosis, current adaptation to disease and treatment status, and family's adaptation to disease and treatment status. Reports to be coping  with some difficulty . Evaluated cognitive response, paying particular attention to negative intrusive thoughts of a persistent and detrimental nature. Thoughts of this type are in evidence with mild distress. Identified and evaluated psychosocial and environmental stressors secondary to diagnosis and treatment.     Focused on providing cognitive behavioral therapy to address negative cognitions. Examined proactive behaviors that may be implemented to minimize or ameliorate psychosocial stressors secondary  to diagnosis and treatment.     Risk parameters:   Patient reports no suicidal ideation  Patient reports no homicidal ideation  Patient reports no self-injurious behavior  Patient reports no violent behavior     Safety needs:  None at this time      Verbal deficits: None     Patient's response to intervention:The patient's response to intervention is accepting.     Progress toward goals and other mental status changes:  The patient's progress toward goals is fair , good.      Progress to date:Progress as Expected      Goals from last visit: Met      Patient reported outcomes:      Distress Thermometer:   Distress Score    Distress Score: 3        Practical Problems Physical Problems                                                   Family Problems                                         Emotional Problems                                                         Spiritual/Religions Concerns     Spiritual / Islam Concerns: No         Other Problems             PHQ ANSWERS    Q1. Little interest or pleasure in doing things: (P) Several days (12/12/22 0907)  Q2. Feeling down, depressed, or hopeless: (P) Several days (12/12/22 0907)  Q3. Trouble falling or staying asleep, or sleeping too much: (P) Several days (12/12/22 0907)  Q4. Feeling tired or having little energy: (P) Several days (12/12/22 0907)  Q5. Poor appetite or overeating: (P) More than half the days (12/12/22 0907)  Q6. Feeling bad about yourself - or that you are a failure or have let yourself or your family down: (P) Not at all (12/12/22 0907)  Q7. Trouble concentrating on things, such as reading the newspaper or watching television: (P) Several days (12/12/22 0907)  Q8. Moving or speaking so slowly that other people could have noticed. Or the opposite - being so fidgety or restless that you have been moving around a lot more than usual: (P) Several days (12/12/22 0907)  Q9.  0    PHQ8 Score : (P) 8 (12/12/22 0907)  PHQ-9 Total Score: (P) 8 (12/12/22  0907)     HUY-7 Answers    GAD7 12/12/2022   1. Feeling nervous, anxious, or on edge? 2   2. Not being able to stop or control worrying? 2   3. Worrying too much about different things? 1   4. Trouble relaxing? 1   5. Being so restless that it is hard to sit still? 1   6. Becoming easily annoyed or irritable? 1   7. Feeling afraid as if something awful might happen? 0   HUY-7 Score 8     HUY-7 Score: (P) 8  Interpretation: (P) Mild Anxiety       Client Strengths: verbal, intelligent, successful, good social support, good insight, commitment to wellness, strong jd, strong cultural traditions      ICD-10-CM ICD-9-CM   1. Anxiety  F41.9 300.00   2. Malignant neoplasm of upper-inner quadrant of right breast in female, estrogen receptor positive  C50.211 174.2    Z17.0 V86.0        Treatment Plan:individual psychotherapy and consult psychiatrist for medication evaluation  Target symptoms: depression, anxiety   Why chosen therapy is appropriate versus another modality: relevant to diagnosis, patient responds to this modality, evidence based practice  Outcome monitoring methods: self-report, observation, checklist/rating scale  Therapeutic intervention type: insight oriented psychotherapy, behavior modifying psychotherapy  Prognosis: Good      Behavioral goals:     Increase daily self-care and attention to health management  Pleasant events scheduling and increased social interaction  Monitor stressors in writing and bring to next visit    Return to clinic: as needed     Length of Service (minutes direct face-to-face contact): 30          Bria Odom, PhD  Clinical Psychologist  [place] License #[id number]  [place] License #[id number]

## 2022-12-14 ENCOUNTER — OFFICE VISIT (OUTPATIENT)
Dept: ENDOCRINOLOGY | Facility: CLINIC | Age: 47
End: 2022-12-14
Payer: MEDICARE

## 2022-12-14 VITALS
TEMPERATURE: 99 F | DIASTOLIC BLOOD PRESSURE: 82 MMHG | WEIGHT: 293 LBS | SYSTOLIC BLOOD PRESSURE: 130 MMHG | HEART RATE: 78 BPM | BODY MASS INDEX: 63.51 KG/M2

## 2022-12-14 DIAGNOSIS — E11.65 TYPE 2 DIABETES MELLITUS WITH HYPERGLYCEMIA, WITH LONG-TERM CURRENT USE OF INSULIN: Primary | ICD-10-CM

## 2022-12-14 DIAGNOSIS — E66.01 MORBID OBESITY, UNSPECIFIED OBESITY TYPE: ICD-10-CM

## 2022-12-14 DIAGNOSIS — I50.42 CHRONIC COMBINED SYSTOLIC AND DIASTOLIC HEART FAILURE: ICD-10-CM

## 2022-12-14 DIAGNOSIS — Z79.4 TYPE 2 DIABETES MELLITUS WITH HYPERGLYCEMIA, WITH LONG-TERM CURRENT USE OF INSULIN: Primary | ICD-10-CM

## 2022-12-14 LAB — GLUCOSE SERPL-MCNC: 265 MG/DL (ref 70–110)

## 2022-12-14 PROCEDURE — 3046F HEMOGLOBIN A1C LEVEL >9.0%: CPT | Mod: CPTII,S$GLB,, | Performed by: NURSE PRACTITIONER

## 2022-12-14 PROCEDURE — 4010F ACE/ARB THERAPY RXD/TAKEN: CPT | Mod: CPTII,S$GLB,, | Performed by: NURSE PRACTITIONER

## 2022-12-14 PROCEDURE — 3079F PR MOST RECENT DIASTOLIC BLOOD PRESSURE 80-89 MM HG: ICD-10-PCS | Mod: CPTII,S$GLB,, | Performed by: NURSE PRACTITIONER

## 2022-12-14 PROCEDURE — 1159F MED LIST DOCD IN RCRD: CPT | Mod: CPTII,S$GLB,, | Performed by: NURSE PRACTITIONER

## 2022-12-14 PROCEDURE — 1160F RVW MEDS BY RX/DR IN RCRD: CPT | Mod: CPTII,S$GLB,, | Performed by: NURSE PRACTITIONER

## 2022-12-14 PROCEDURE — 3046F PR MOST RECENT HEMOGLOBIN A1C LEVEL > 9.0%: ICD-10-PCS | Mod: CPTII,S$GLB,, | Performed by: NURSE PRACTITIONER

## 2022-12-14 PROCEDURE — 3008F PR BODY MASS INDEX (BMI) DOCUMENTED: ICD-10-PCS | Mod: CPTII,S$GLB,, | Performed by: NURSE PRACTITIONER

## 2022-12-14 PROCEDURE — 99204 OFFICE O/P NEW MOD 45 MIN: CPT | Mod: S$GLB,,, | Performed by: NURSE PRACTITIONER

## 2022-12-14 PROCEDURE — 99999 PR PBB SHADOW E&M-EST. PATIENT-LVL III: ICD-10-PCS | Mod: PBBFAC,,, | Performed by: NURSE PRACTITIONER

## 2022-12-14 PROCEDURE — 82962 POCT GLUCOSE, HAND-HELD DEVICE: ICD-10-PCS | Mod: S$GLB,,, | Performed by: NURSE PRACTITIONER

## 2022-12-14 PROCEDURE — 82962 GLUCOSE BLOOD TEST: CPT | Mod: S$GLB,,, | Performed by: NURSE PRACTITIONER

## 2022-12-14 PROCEDURE — 1160F PR REVIEW ALL MEDS BY PRESCRIBER/CLIN PHARMACIST DOCUMENTED: ICD-10-PCS | Mod: CPTII,S$GLB,, | Performed by: NURSE PRACTITIONER

## 2022-12-14 PROCEDURE — 3075F PR MOST RECENT SYSTOLIC BLOOD PRESS GE 130-139MM HG: ICD-10-PCS | Mod: CPTII,S$GLB,, | Performed by: NURSE PRACTITIONER

## 2022-12-14 PROCEDURE — 99204 PR OFFICE/OUTPT VISIT, NEW, LEVL IV, 45-59 MIN: ICD-10-PCS | Mod: S$GLB,,, | Performed by: NURSE PRACTITIONER

## 2022-12-14 PROCEDURE — 1159F PR MEDICATION LIST DOCUMENTED IN MEDICAL RECORD: ICD-10-PCS | Mod: CPTII,S$GLB,, | Performed by: NURSE PRACTITIONER

## 2022-12-14 PROCEDURE — 3075F SYST BP GE 130 - 139MM HG: CPT | Mod: CPTII,S$GLB,, | Performed by: NURSE PRACTITIONER

## 2022-12-14 PROCEDURE — 4010F PR ACE/ARB THEARPY RXD/TAKEN: ICD-10-PCS | Mod: CPTII,S$GLB,, | Performed by: NURSE PRACTITIONER

## 2022-12-14 PROCEDURE — 3008F BODY MASS INDEX DOCD: CPT | Mod: CPTII,S$GLB,, | Performed by: NURSE PRACTITIONER

## 2022-12-14 PROCEDURE — 3079F DIAST BP 80-89 MM HG: CPT | Mod: CPTII,S$GLB,, | Performed by: NURSE PRACTITIONER

## 2022-12-14 PROCEDURE — 99999 PR PBB SHADOW E&M-EST. PATIENT-LVL III: CPT | Mod: PBBFAC,,, | Performed by: NURSE PRACTITIONER

## 2022-12-14 RX ORDER — BLOOD-GLUCOSE SENSOR
2 EACH MISCELLANEOUS
Qty: 2 EACH | Refills: 11 | Status: SHIPPED | OUTPATIENT
Start: 2022-12-14 | End: 2023-06-19

## 2022-12-14 RX ORDER — PEN NEEDLE, DIABETIC 30 GX3/16"
1 NEEDLE, DISPOSABLE MISCELLANEOUS DAILY
Qty: 100 EACH | Refills: 4 | Status: SHIPPED | OUTPATIENT
Start: 2022-12-14 | End: 2023-06-19

## 2022-12-14 RX ORDER — FLASH GLUCOSE SENSOR
KIT MISCELLANEOUS
Qty: 2 KIT | Refills: 11 | Status: SHIPPED | OUTPATIENT
Start: 2022-12-14 | End: 2022-12-14

## 2022-12-14 RX ORDER — METFORMIN HYDROCHLORIDE 500 MG/1
1000 TABLET, EXTENDED RELEASE ORAL 2 TIMES DAILY WITH MEALS
Qty: 360 TABLET | Refills: 0 | Status: SHIPPED | OUTPATIENT
Start: 2022-12-14 | End: 2023-02-27

## 2022-12-14 RX ORDER — GLIPIZIDE 10 MG/1
10 TABLET, FILM COATED, EXTENDED RELEASE ORAL
Qty: 90 TABLET | Refills: 0 | Status: SHIPPED | OUTPATIENT
Start: 2022-12-14 | End: 2023-02-27

## 2022-12-14 RX ORDER — BLOOD-GLUCOSE,RECEIVER,CONT
EACH MISCELLANEOUS
Qty: 1 EACH | Refills: 0 | Status: SHIPPED | OUTPATIENT
Start: 2022-12-14 | End: 2023-06-19

## 2022-12-14 RX ORDER — BLOOD-GLUCOSE SENSOR
EACH MISCELLANEOUS
Qty: 3 EACH | Refills: 11 | Status: SHIPPED | OUTPATIENT
Start: 2022-12-14 | End: 2023-06-19

## 2022-12-14 RX ORDER — BLOOD-GLUCOSE TRANSMITTER
EACH MISCELLANEOUS
Qty: 1 EACH | Refills: 3 | Status: SHIPPED | OUTPATIENT
Start: 2022-12-14 | End: 2023-06-19

## 2022-12-14 NOTE — PATIENT INSTRUCTIONS
A1C goal: <7%  Fasting/premeal blood glucose goal:   2 hour post-meal blood glucose goal: less than 180      Change glipizide to glipizide XL 10 mg tablet and take before breakfast.   Change from Farxiga to Jardiance 25 mg once daily.   Start metformin  mg tablet once daily with breakfast for 1 week. If no diarrhea/upset stomach, the increase to 1 tablet twice daily with meals.     Start Tresiba 36 units once daily.     Prescription for Dexcom Continuous Glucose Monitor (CGM) sent to [facility]. May not be covered.   If not covered, monitor with fingersticks or Dayo 3 CGM from pharmacy out-of-pocket.   With fingersticks, test glucose 2x/day - before breakfast and bedtime.   Improve diet, reviewed importance of meal planning.     Contact office if blood sugars remain > 200 after a couple of weeks.   Return to clinic in a month.

## 2022-12-14 NOTE — PROGRESS NOTES
CC: This 47 y.o. Black or  female  is here for evaluation of  T2DM along with comorbidities indicated in the Visit Diagnosis section of this encounter.    HPI: Kristopher Elmore was diagnosed with T2DM in 2021 at age 46; A1c at 8.5% upon diagnosis.   Medical hx: cardiomegaly, chronic combined systolic and diastolic HF, anxiety     DM COMPLICATIONS: peripheral neuropathy      New to Endocrine.   A1c fariha from 8.8% in Feb to 11.6% in Nov.   Doesn't test BG d/t neuropathic pain to her fingers.   She started Farxiga 10 mg a month ago. Last dose yesterday. However, it's not covered. She did submit application for Pharmacy Assistance.     She is open to starting insulin d/t frequent urination and polydipsia.     LAST DIABETES EDUCATION: 12/12/22 with JANE Foreman, found visit helpful     HOSPITALIZED FOR DIABETES  -  Yes - for hyperglycemia 2x in 2022 for 300 and 400s     SIGNIFICANT DIABETES MED HISTORY:   Trulicity 0.75 mg started in 1/2021, stopped very soon afterward d/t nausea and headache.   Metformin 1000 mg tablet bid - diarrhea     PRESCRIBED DIABETES MEDICATIONS:   Glipizide 10 mg bid   Farxiga 10 mg (2 tablets) once daily     Misses medication doses - No      SELF MONITORING BLOOD GLUCOSE: Checks blood glucose at home - none   Poct glucose today 238, 3 hours after meal today       HYPOGLYCEMIC EPISODES: denies symptoms      CURRENT DIET: drinks water.    eats 2 meals/day, no lunch.   Snacks - not much, sometimes SF jello or fruit.   Diet recall: 1/2 muffin and water. Dinner was skipped but ate 2 cups of popcorn in the late afternoon. Lunch was pasta and crabcake.     Admits diet is not always healthy when she's eating restaurant, half the time.     CURRENT EXERCISE:       SLEEP: Do you have difficulty with sleep? No   How many hours do you sleep per night? 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 No acute distress.   PSYCH: AAOx3, appropriate mood and affect, conversant, well-groomed. Judgement and insight good.   NEURO: Cranial nerves grossly intact. Speech clear, no tremor.   NECK: Trachea midline, no thyromegaly or lymphadenopathy.   CHEST: Respirations even and unlabored. CTA bilaterally.  CARDIOVASCULAR: Regular rate and rhythm. No bruits. No murmur. No edema.   ABDOMEN: Soft, non-tender, non-distended. Bowel sounds present.   MS: Gait steady. No clubbing.   SKIN: Normal skin turgor. Skin warm and dry. No areas of breakdown. + nuchal  acanthosis nigricans.      Hemoglobin A1C   Date Value Ref Range Status   12/06/2022 11.6 (H) 4.0 - 5.6 % Final     Comment:     ADA Screening Guidelines:  5.7-6.4%  Consistent with prediabetes  >or=6.5%  Consistent with diabetes    High levels of fetal hemoglobin interfere with the HbA1C  assay. Heterozygous hemoglobin variants (HbS, HgC, etc)do  not significantly interfere with this assay.   However, presence of multiple variants may affect accuracy.     02/07/2022 8.8 (H) 4.0 - 5.6 % Final     Comment:     ADA Screening Guidelines:  5.7-6.4%  Consistent with prediabetes  >or=6.5%  Consistent with diabetes    High levels of fetal hemoglobin interfere with the HbA1C  assay. Heterozygous hemoglobin variants (HbS, HgC, etc)do  not significantly interfere with this assay.   However, presence of multiple variants may affect accuracy.     11/03/2021 8.5 (H) 4.0 - 5.6 % Final     Comment:     ADA Screening Guidelines:  5.7-6.4%   Consistent with prediabetes  >or=6.5%  Consistent with diabetes    High levels of fetal hemoglobin interfere with the HbA1C  assay. Heterozygous hemoglobin variants (HbS, HgC, etc)do  not significantly interfere with this assay.   However, presence of multiple variants may affect accuracy.         No results found for: CPEPTIDE, GLUTAMICACID, ISLETCELLANT, FRUCTOSAMINE     Lab Results   Component Value Date    CHOL 111 (L) 12/06/2022    CHOL 119 (L) 11/03/2021    CHOL 109 (L) 11/02/2020     Lab Results   Component Value Date    HDL 36 (L) 12/06/2022    HDL 42 11/03/2021    HDL 34 (L) 11/02/2020     Lab Results   Component Value Date    LDLCALC 58.2 (L) 12/06/2022    LDLCALC 57.8 (L) 11/03/2021    LDLCALC 61.2 (L) 11/02/2020     Lab Results   Component Value Date    TRIG 84 12/06/2022    TRIG 96 11/03/2021    TRIG 69 11/02/2020     Lab Results   Component Value Date    CHOLHDL 32.4 12/06/2022    CHOLHDL 35.3 11/03/2021    CHOLHDL 31.2 11/02/2020         Chemistry        Component Value Date/Time     11/21/2022 0815    K 3.9 11/21/2022 0815     11/21/2022 0815    CO2 25 11/21/2022 0815    BUN 10 11/21/2022 0815    CREATININE 0.9 11/21/2022 0815     (H) 11/21/2022 0815        Component Value Date/Time    CALCIUM 9.8 11/21/2022 0815    ALKPHOS 90 11/21/2022 0815    AST 12 11/21/2022 0815    ALT 17 11/21/2022 0815    BILITOT 0.4 11/21/2022 0815    ESTGFRAFRICA >60.0 07/22/2022 1012    EGFRNONAA >60.0 07/22/2022 1012              No results found for: LABMICR, CREATRANDUR, MICALBCREAT          ASSESSMENT and PLAN:    A1C GOAL: < 7 %       1. Type 2 diabetes mellitus with hyperglycemia, with long-term current use of insulin  Discussed progression of DM disease, long term complications, and tx options. Reviewed A1c and BG goals. Reviewed hypoglycemia, s/s, and appropriate tx options.     Advised pt re: importance of sleep quality and quantity on effects of glycemic control.     Change glipizide to  glipizide XL 10 mg tablet and take before breakfast.   Change from Farxiga to Jardiance 25 mg once daily.   Start metformin  mg tablet once daily with breakfast for 1 week. If no diarrhea/upset stomach, the increase to 1 tablet twice daily with meals.     Start Tresiba 36 units once daily.     Prescription for Dexcom Continuous Glucose Monitor (CGM) sent to [email or web address]. May not be covered.   If not covered, monitor with fingersticks or Dayo 3 CGM from pharmacy out-of-pocket.   With fingersticks, test glucose 2x/day - before breakfast and bedtime.   Improve diet, reviewed importance of meal planning.     Contact office if blood sugars remain > 200 after a couple of weeks.   Return to clinic in a month.     empagliflozin (JARDIANCE) 25 mg tablet    POCT Glucose, Hand-Held Device      2. Chronic combined systolic and diastolic heart failure  Continue SLGT2-I   Start Jardiance.       3. Morbid obesity, unspecified obesity type  Increases insulin resistance.   Unfortunately she will gain weight from insulin.              Orders Placed This Encounter   Procedures    POCT Glucose, Hand-Held Device        Follow up in about 4 weeks (around 1/11/2023).     Thank you very much for allowing me to participate in Kristopher Elmore's care.

## 2022-12-15 NOTE — TELEPHONE ENCOUNTER
Outgoing call regarding [facility] refill, pt sateted she started  two new meds, which is in chart review for diabetes, transferred to [facility]asso

## 2022-12-15 NOTE — PROGRESS NOTES
Diabetes Care Specialist Progress Note  Author: Graciela Foreman RN  Date: 12/12/2022    Program Intake  Reason for Diabetes Program Visit:: Initial Diabetes Assessment  Current diabetes risk level:: moderate  In the last 12 months, have you:: been admitted to a hospital  Was the ER or hospital admission related to diabetes?: Yes  Permission to speak with others about care:: yes    Lab Results   Component Value Date    HGBA1C 11.6 (H) 12/06/2022       Clinical  Patient Health Rating  Compared to other people your age, how would you rate your health?: Fair    Problem Review  Active comorbidities affecting diabetes self-care.: yes  Comorbidities: Hypertension, Cancer    Clinical Assessment  Current Diabetes Treatment: Oral Medication (Glipizide 10 mg daily, Farxiga 5mg daily, and Trulicity 0.75 mg weekly (stopped 1 mth ago due to side effects))  Have you ever experienced hypoglycemia (low blood sugar)?: yes  Are you able to tell when your blood sugar is low?: No  What symptoms do you experience?: Tiredness  Have you ever been hospitalized because your blood sugar was too low?: no  Have you ever experienced hyperglycemia (high blood sugar)?: yes  Are you able to tell when your blood sugar is high?: Yes  What are your symptoms?: dizziness, blurry vision, nausea, thirst  Have you ever been hospitalized because your blood sugar was high?: yes (see comments)    Medication Information  How do you obtain your medications?: Patient drives  How many days a week do you miss your medications?: Never  Do you sometimes have difficulty refilling your medications?: No  Medication adherence impacting ability to self-manage diabetes?: No    Labs  Do you have regular lab work to monitor your medications?: Yes  Type of Regular Lab Work: A1c  Where do you get your labs drawn?: [facility]  Lab Compliance Barriers: No    Nutritional Status  Meal Plan 24 Hour Recall: Breakfast, Lunch, Dinner, Snack  Meal Plan 24 Hour Recall - Breakfast: richar  w/syrup and water OR cereal w/milk and toast  Meal Plan 24 Hour Recall - Lunch: skips  Meal Plan 24 Hour Recall - Dinner: lasagne, garlic bread, snapple juice  Meal Plan 24 Hour Recall - Snack: fruits (apple, watermelon, grapes)  Change in appetite?: No  Recent Changes in Weight: Weight Loss  Was weight loss intentional or unintentional?: Intentional  Current nutritional status an area of need that is impacting patient's ability to self-manage diabetes?: No    Additional Social History  Support  Does anyone support you with your diabetes care?: yes  Who supports you?: son/daughter  Who takes you to your medical appointments?: self  Does the current support meet the patient's needs?: Yes  Is Support an area impacting ability to self-manage diabetes?: No    Access to Mass Media & Technology  Does the patient have access to any of the following devices or technologies?: Smart phone  Media or technology needs impacting ability to self-manage diabetes?: No    Cognitive/Behavioral Health  Alert and Oriented: Yes  Difficulty Thinking: No  Requires Prompting: No  Requires assistance for routine expression?: No  Cognitive or behavioral barriers impacting ability to self-manage diabetes?: No    Culture/Voodoo  Culture or Shinto beliefs that may impact ability to access healthcare: No    Communication  Language preference: English  Hearing Problems: No  Vision Problems: No  Communication needs impacting ability to self-manage diabetes?: No    Health Literacy  Preferred Learning Method: Face to Face, Reading Materials  How often do you need to have someone help you read instructions, pamphlets, or written material from your doctor or pharmacy?: Never  Health literacy needs impacting ability to self-manage diabetes?: No      Diabetes Self-Management Skills Assessment  Diabetes Disease Process/Treatment Options  Patient/caregiver able to state what happens when someone has diabetes.: somewhat  Patient/caregiver knows what  type of diabetes they have.: yes  Diabetes Type : Type II  Patient/caregiver able to identify at least three signs and symptoms of diabetes.: no  Patient able to identify at least three risk factors for diabetes.: no  Diabetes Disease Process/Treatment Options: Skills Assessment Completed: Yes  Assessment indicates:: Instruction Needed, Knowledge deficit  Area of need?: Yes    Nutrition/Healthy Eating  Challenges to healthy eating:: portion control  Method of carbohydrate measurement:: no method  Patient can identify foods that impact blood sugar.: yes  Patient-identified foods:: starchy vegetables (corn, peas, beans), starches (bread, pasta, rice, cereal)  Nutrition/Healthy Eating Skills Assessment Completed:: Yes  Assessment indicates:: Instruction Needed, Knowledge deficit  Area of need?: Yes    Physical Activity/Exercise  Patient's daily activity level:: sedentary  Patient formally exercises outside of work.: no  Reasons for not exercising:: other (see comments) (Pt diagnosed with CA in the spine)  Patient can identify forms of physical activity.: yes  Stated forms of physical activity:: any movement performed by muscles that uses energy  Patient can identify reasons why exercise/physical activity is important in diabetes management.: no  Physical Activity/Exercise Skills Assessment Completed: : Yes  Assessment indicates:: Instruction Needed, Knowledge deficit  Area of need?: Yes    Medications  Patient is able to describe current diabetes management routine.: yes  Diabetes management routine:: insulin, injectable medications  Patient is able to identify current diabetes medications, dosages, and appropriate timing of medications.: yes  Patient understands the purpose of the medications taken for diabetes.: no  Patient reports problems or concerns with current medication regimen.: no  Medication Skills Assessment Completed:: Yes  Assessment indicates:: Instruction Needed, Knowledge deficit  Area of need?:  Yes    Home Blood Glucose Monitoring  Patient states that blood sugar is checked at home daily.: yes  Monitoring Method:: home glucometer  Home glucometer meter type:: Insurance Preferred Meter  How often do you check your blood sugar?: Occasionally  When do you check your blood sugar?: Before breakfast  When you check what is your typical blood sugar range? : 370  Blood glucose logs:: no, encouraged to keep logs, encouraged to bring logs to provider visits  Blood glucose logs reviewed today?: no  Home Blood Glucose Monitoring Skills Assessment Completed: : Yes  Assessment indicates:: Knowledge deficit, Instruction Needed  Area of need?: Yes    Acute Complications  Patient is able to identify types of acute complications: No  Acute Complications Skills Assessment Completed: : Yes  Assessment indicates:: Instruction Needed, Knowledge deficit  Area of need?: Yes    Chronic Complications  Chronic Complications Skills Assessment Completed: : No  Deferred due to:: Time    Psychosocial/Coping  Psychosocial/Coping Skills Assessment Completed: : No  Deffered due to:: Time    Assessment Summary and Plan    Based on today's diabetes care assessment, the following areas of need were identified:      Social 12/12/2022   Support No   Access to Mass Media/Tech No   Cognitive/Behavioral Health No   Culture/Restorationism No   Communication No   Health Literacy No        Clinical 12/12/2022   Medication Adherence No   Lab Compliance No   Nutritional Status No        Diabetes Self-Management Skills 12/12/2022   Diabetes Disease Process/Treatment Options Yes- Provided DM management guide and discussed risk factors of diabetes. Also, instructed patient on what is diabetes and the progression of the disease. Discussed significance of current A1c and blood glucose goals   Nutrition/Healthy Eating Yes- see care planning   Physical Activity/Exercise Yes-Discussed benefits of exercise as it relates to insulin resistance and weight loss    Medication Yes-Reviewed Patient's current medication regimen. MOA reviewed including common side effects   Home Blood Glucose Monitoring Yes-Discussed BS goals and importance of monitoring and recording BS for review and maintenance of medication.   Acute Complications Yes-Reviewed blood glucose goals, prevention, detection, signs and symptoms, and treatment of hypoglycemia following rule of 15. Advised to carry a source of fast acting carbs   Chronic Complications -   Psychosocial/Coping -          Today's interventions were provided through individual discussion, instruction, and written materials were provided.      Patient verbalized understanding of instruction and written materials.  Pt was able to return back demonstration of instructions today. Patient understood key points, needs reinforcement and further instruction.     Diabetes Self-Management Care Plan:    Today's Diabetes Self-Management Care Plan was developed with Kristopher's input. Kristopher has agreed to work toward the following goal(s) to improve his/her overall diabetes control.      Care Plan: Diabetes Management   Updates made since 11/15/2022 12:00 AM        Problem: Healthy Eating         Goal: Eat 2-3 meals daily with 30-45g/2-3 servings of Carbohydrate per meal. Limit snacking in between meal to 1 serving (15 grams).    Start Date: 12/12/2022   Expected End Date: 1/5/2023   Priority: High   Barriers: No Barriers Identified   Note:    12/12/22- Reviewed basic food groups with patient (carbohydrate, protein, and fat). Carbohydrate sources reviewed in detail. Typical food intake obtained from patient.  Practiced reading food labels with patient focusing on serving size and total carbohydrate intake (not sugar intake). Practiced meal planning with foods typically eaten to promote balanced eating.       - Discussed and showed examples of portion sizes of various foods pt likes to eat. Patient encouraged to measure food portions. Discussed having  lean protein at all meals and to also add non starchy vegetables at lunch and dinner.  Written education information provided to patient for use at home.          Task: Reviewed the sources and role of Carbohydrate, Protein, and Fat and how each nutrient impacts blood sugar. Completed 12/12/2022        Task: Provided visual examples using dry measuring cups, food models, and other familiar objects such as computer mouse, deck or cards, tennis ball etc. to help with visualization of portions. Completed 12/12/2022        Task: Explained how to count carbohydrates using the food label and the use of dry measuring cups for accurate carb counting. Completed 12/12/2022       Task: Review the importance of balancing carbohydrates with each meal using portion control techniques to count servings of carbohydrate and label reading to identify serving size and amount of total carbs per serving. Completed 12/12/2022        Task: Provided Sample plate method and reviewed the use of the plate to estimate amounts of carbohydrate per meal. Completed 12/12/2022          Follow Up Plan     Follow up in about 24 days (around 1/5/2023) for F/U BS and meal planning .    Today's care plan and follow up schedule was discussed with patient.  Kristopher verbalized understanding of the care plan, goals, and agrees to follow up plan.        The patient was encouraged to communicate with his/her health care provider/physician and care team regarding his/her condition(s) and treatment.  I provided the patient with my contact information today and encouraged to contact me via phone or [facility] Patient Portal as needed.     Length of Visit   Total Time: 60 Minutes

## 2022-12-15 NOTE — TELEPHONE ENCOUNTER
Specialty Pharmacy - Refill Coordination    Specialty Medication Orders Linked to Encounter      Flowsheet Row Most Recent Value   Medication #1 palbociclib (IBRANCE) 100 mg Cap (Order#[id number], Rx#[id number])            Refill Questions - Documented Responses      Flowsheet Row Most Recent Value   Patient Availability and HIPAA Verification    Does patient want to proceed with activity? Yes   HIPAA/medical authority confirmed? Yes   Relationship to patient of person spoken to? Self   Refill Screening Questions    Changes to allergies? No   Changes to medications? Yes  [Patient started Jardiance and metformin. No DDIs with Ibrance]   New conditions since last clinic visit? No   Unplanned office visit, urgent care, ED, or hospital admission in the last 4 weeks? No   How does patient/caregiver feel medication is working? Good   Financial problems or insurance changes? No   How many doses of your specialty medications were missed in the last 4 weeks? 0   Would patient like to speak to a pharmacist? No   When does the patient need to receive the medication? 12/19/22   Refill Delivery Questions    How will the patient receive the medication? Pickup   When does the patient need to receive the medication? 12/19/22   Expected Copay ($) 0   Is the patient able to afford the medication copay? Yes   Payment Method zero copay   Days supply of Refill 28   Supplies needed? No supplies needed   Refill activity completed? Yes   Refill activity plan Refill scheduled   Shipment/Pickup Date: 12/16/22            Current Outpatient Medications   Medication Sig    (Magic mouthwash) 1:1:1 Benadryl 12.5mg/5ml liq, aluminum & magnesium hydroxide-simehticone (Maalox), LIDOcaine viscous 2% Swish and spit 10 mLs every 4 (four) hours as needed. for mouth sores    anastrozole (ARIMIDEX) 1 mg Tab TAKE 1 TABLET(1 MG) BY MOUTH EVERY DAY    atorvastatin (LIPITOR) 40 MG tablet TAKE 1 TABLET(40 MG) BY MOUTH EVERY DAY    blood sugar diagnostic Strp  To check BG 4 times daily, to use with insurance preferred meter    blood-glucose meter kit To check BG 4 times daily, to use with insurance preferred meter    blood-glucose meter,continuous (DEXCOM G6 ) Misc Use with dexcom G6 system    blood-glucose sensor (DEXCOM G6 SENSOR) Monique Change sensor every 10 days    blood-glucose sensor (FREESTYLE MARGARITA 3 SENSOR) Moniuqe 2 each by Misc.(Non-Drug; Combo Route) route every 14 (fourteen) days.    blood-glucose transmitter (DEXCOM G6 TRANSMITTER) Monique Change every 3 months    carvediloL (COREG) 25 MG tablet TAKE 1 TABLET(25 MG) BY MOUTH TWICE DAILY    ciclopirox (PENLAC) 8 % Soln Apply topically nightly.    duke's soln (benadryl 30 mL, mylanta 30 mL, LIDOcaine 30 mL, nystatin 30 mL) 120mL Take 10 mLs by mouth 4 (four) times daily.    duke's soln (benadryl 30 mL, mylanta 30 mL, LIDOcaine 30 mL, nystatin 30 mL) 120mL TAKE 10MLS BY MOUTH FOUR TIMES DAILY    empagliflozin (JARDIANCE) 25 mg tablet Take 1 tablet (25 mg total) by mouth once daily.    ergocalciferol (ERGOCALCIFEROL) 50,000 unit Cap TAKE 1 CAPSULE BY MOUTH EVERY 7 DAYS    ferrous sulfate 324 mg (65 mg iron) TbEC TAKE 1 TABLET BY MOUTH ON EVERY MONDAY, WEDNESDAY, AND FRIDAY    furosemide (LASIX) 20 MG tablet Take 1 tablet (20 mg total) by mouth 2 (two) times daily.    glipiZIDE (GLUCOTROL) 10 MG TR24 Take 1 tablet (10 mg total) by mouth daily with breakfast.    goserelin (ZOLADEX) 3.6 mg injection Inject 3.6 mg into the skin every 28 days.    ibuprofen (ADVIL,MOTRIN) 800 MG tablet Take 1 tablet (800 mg total) by mouth 2 (two) times daily as needed for Pain.    lancets Misc To check BG 4 times daily, to use with insurance preferred meter    LIDOcaine (LIDODERM) 5 % Place 2 patches onto the skin once daily. Remove & Discard patch within 12 hours or as directed by MD    LORazepam (ATIVAN) 1 MG tablet Take 1 tablet (1 mg total) by mouth every 12 (twelve) hours as needed for Anxiety.    metFORMIN (GLUCOPHAGE-XR) 500  "MG ER 24hr tablet Take 2 tablets (1,000 mg total) by mouth 2 (two) times daily with meals.    morphine (MS CONTIN) 15 MG 12 hr tablet Take 1 tablet (15 mg total) by mouth every 8 (eight) hours as needed for Pain.    ondansetron (ZOFRAN-ODT) 4 MG TbDL Take 1 tablet (4 mg total) by mouth 2 (two) times daily.    ondansetron (ZOFRAN-ODT) 8 MG TbDL DISSOLVE 1 TABLET(8 MG) ON THE TONGUE EVERY 8 HOURS AS NEEDED FOR NAUSEA    palbociclib (IBRANCE) 100 mg Cap Take 1 capsule (100 mg) by mouth once daily for 21 days, followed by 7 days off.    pen needle, diabetic (BD ULTRA-FINE VERONICA PEN NEEDLE) 32 gauge x 5/32" Ndle 1 Device by Misc.(Non-Drug; Combo Route) route Daily.    potassium chloride (KLOR-CON) 10 MEQ TbSR Take 1 tablet (10 mEq total) by mouth once daily.    promethazine (PHENERGAN) 25 MG tablet Take 1 tablet (25 mg total) by mouth every 6 (six) hours as needed for Nausea.    sacubitriL-valsartan (ENTRESTO)  mg per tablet Take 1 tablet by mouth 2 (two) times daily.    sennosides (SENNA-C ORAL) Take 2 tablets by mouth daily as needed.    spironolactone (ALDACTONE) 25 MG tablet Take 1 tablet (25 mg total) by mouth once daily.    traMADoL (ULTRAM) 50 mg tablet Take 1 tablet (50 mg total) by mouth every 6 (six) hours as needed for Pain.    venlafaxine (EFFEXOR-XR) 150 MG Cp24 Take 1 capsule (150 mg total) by mouth once daily.   Last reviewed on 12/14/2022 11:33 AM by Yaneth Giraldo NP    Review of patient's allergies indicates:   Allergen Reactions    Keflex [cephalexin] Itching    Last reviewed on  12/14/2022 11:33 AM by Yaneth Giraldo      Tasks added this encounter   No tasks added.   Tasks due within next 3 months   2/12/2023 - Clinical - Follow Up Assesement (180 day)  12/12/2022 - Refill Call (Auto Added)     ALISON SCRUGGS, [facility]  [address]  [facility] [address]  Phone: [phone]  Fax: [phone]        "

## 2022-12-16 ENCOUNTER — PATIENT MESSAGE (OUTPATIENT)
Dept: ENDOCRINOLOGY | Facility: CLINIC | Age: 47
End: 2022-12-16
Payer: MEDICARE

## 2022-12-19 ENCOUNTER — TELEPHONE (OUTPATIENT)
Dept: ENDOCRINOLOGY | Facility: CLINIC | Age: 47
End: 2022-12-19
Payer: MEDICARE

## 2022-12-19 ENCOUNTER — PATIENT MESSAGE (OUTPATIENT)
Dept: ENDOCRINOLOGY | Facility: CLINIC | Age: 47
End: 2022-12-19
Payer: MEDICARE

## 2022-12-19 ENCOUNTER — INFUSION (OUTPATIENT)
Dept: INFUSION THERAPY | Facility: HOSPITAL | Age: 47
End: 2022-12-19
Attending: INTERNAL MEDICINE
Payer: MEDICARE

## 2022-12-19 ENCOUNTER — LAB VISIT (OUTPATIENT)
Dept: LAB | Facility: HOSPITAL | Age: 47
End: 2022-12-19
Attending: INTERNAL MEDICINE
Payer: MEDICARE

## 2022-12-19 ENCOUNTER — OFFICE VISIT (OUTPATIENT)
Dept: HEMATOLOGY/ONCOLOGY | Facility: CLINIC | Age: 47
End: 2022-12-19
Payer: MEDICARE

## 2022-12-19 ENCOUNTER — PATIENT MESSAGE (OUTPATIENT)
Dept: PHARMACY | Facility: CLINIC | Age: 47
End: 2022-12-19
Payer: MEDICARE

## 2022-12-19 VITALS
SYSTOLIC BLOOD PRESSURE: 148 MMHG | DIASTOLIC BLOOD PRESSURE: 63 MMHG | RESPIRATION RATE: 18 BRPM | SYSTOLIC BLOOD PRESSURE: 132 MMHG | HEART RATE: 72 BPM | TEMPERATURE: 99 F | BODY MASS INDEX: 50.02 KG/M2 | TEMPERATURE: 98 F | WEIGHT: 293 LBS | DIASTOLIC BLOOD PRESSURE: 69 MMHG | OXYGEN SATURATION: 99 % | HEIGHT: 64 IN | HEART RATE: 73 BPM

## 2022-12-19 DIAGNOSIS — E11.9 TYPE 2 DIABETES MELLITUS WITHOUT COMPLICATION, WITH LONG-TERM CURRENT USE OF INSULIN: ICD-10-CM

## 2022-12-19 DIAGNOSIS — Z17.0 MALIGNANT NEOPLASM OF UPPER-INNER QUADRANT OF RIGHT BREAST IN FEMALE, ESTROGEN RECEPTOR POSITIVE: Primary | ICD-10-CM

## 2022-12-19 DIAGNOSIS — C50.211 MALIGNANT NEOPLASM OF UPPER-INNER QUADRANT OF RIGHT BREAST IN FEMALE, ESTROGEN RECEPTOR POSITIVE: ICD-10-CM

## 2022-12-19 DIAGNOSIS — C79.51 BONE METASTASES: ICD-10-CM

## 2022-12-19 DIAGNOSIS — I50.42 CHRONIC COMBINED SYSTOLIC AND DIASTOLIC HEART FAILURE: ICD-10-CM

## 2022-12-19 DIAGNOSIS — E78.2 MIXED HYPERLIPIDEMIA: ICD-10-CM

## 2022-12-19 DIAGNOSIS — R00.2 PALPITATIONS: ICD-10-CM

## 2022-12-19 DIAGNOSIS — C50.211 MALIGNANT NEOPLASM OF UPPER-INNER QUADRANT OF RIGHT BREAST IN FEMALE, ESTROGEN RECEPTOR POSITIVE: Primary | ICD-10-CM

## 2022-12-19 DIAGNOSIS — T45.1X5A ANEMIA ASSOCIATED WITH CHEMOTHERAPY: ICD-10-CM

## 2022-12-19 DIAGNOSIS — Z79.4 TYPE 2 DIABETES MELLITUS WITHOUT COMPLICATION, WITH LONG-TERM CURRENT USE OF INSULIN: ICD-10-CM

## 2022-12-19 DIAGNOSIS — D64.81 ANEMIA ASSOCIATED WITH CHEMOTHERAPY: ICD-10-CM

## 2022-12-19 DIAGNOSIS — Z17.0 MALIGNANT NEOPLASM OF UPPER-INNER QUADRANT OF RIGHT BREAST IN FEMALE, ESTROGEN RECEPTOR POSITIVE: ICD-10-CM

## 2022-12-19 LAB
ALBUMIN SERPL BCP-MCNC: 3 G/DL (ref 3.5–5.2)
ALP SERPL-CCNC: 87 U/L (ref 55–135)
ALT SERPL W/O P-5'-P-CCNC: 18 U/L (ref 10–44)
ANION GAP SERPL CALC-SCNC: 8 MMOL/L (ref 8–16)
AST SERPL-CCNC: 14 U/L (ref 10–40)
BASOPHILS # BLD AUTO: 0.07 K/UL (ref 0–0.2)
BASOPHILS NFR BLD: 1.3 % (ref 0–1.9)
BILIRUB SERPL-MCNC: 0.3 MG/DL (ref 0.1–1)
BUN SERPL-MCNC: 10 MG/DL (ref 6–20)
CALCIUM SERPL-MCNC: 8.9 MG/DL (ref 8.7–10.5)
CHLORIDE SERPL-SCNC: 106 MMOL/L (ref 95–110)
CHOLEST SERPL-MCNC: 113 MG/DL (ref 120–199)
CHOLEST/HDLC SERPL: 3.2 {RATIO} (ref 2–5)
CO2 SERPL-SCNC: 22 MMOL/L (ref 23–29)
CREAT SERPL-MCNC: 0.9 MG/DL (ref 0.5–1.4)
DIFFERENTIAL METHOD: ABNORMAL
EOSINOPHIL # BLD AUTO: 0.1 K/UL (ref 0–0.5)
EOSINOPHIL NFR BLD: 1.2 % (ref 0–8)
ERYTHROCYTE [DISTWIDTH] IN BLOOD BY AUTOMATED COUNT: 19.1 % (ref 11.5–14.5)
EST. GFR  (NO RACE VARIABLE): >60 ML/MIN/1.73 M^2
GLUCOSE SERPL-MCNC: 319 MG/DL (ref 70–110)
HCT VFR BLD AUTO: 31.9 % (ref 37–48.5)
HDLC SERPL-MCNC: 35 MG/DL (ref 40–75)
HDLC SERPL: 31 % (ref 20–50)
HGB BLD-MCNC: 9.6 G/DL (ref 12–16)
IMM GRANULOCYTES # BLD AUTO: 0.02 K/UL (ref 0–0.04)
IMM GRANULOCYTES NFR BLD AUTO: 0.4 % (ref 0–0.5)
LDLC SERPL CALC-MCNC: 57.2 MG/DL (ref 63–159)
LYMPHOCYTES # BLD AUTO: 1.5 K/UL (ref 1–4.8)
LYMPHOCYTES NFR BLD: 28.1 % (ref 18–48)
MCH RBC QN AUTO: 26.8 PG (ref 27–31)
MCHC RBC AUTO-ENTMCNC: 30.1 G/DL (ref 32–36)
MCV RBC AUTO: 89 FL (ref 82–98)
MONOCYTES # BLD AUTO: 0.5 K/UL (ref 0.3–1)
MONOCYTES NFR BLD: 10.2 % (ref 4–15)
NEUTROPHILS # BLD AUTO: 3.1 K/UL (ref 1.8–7.7)
NEUTROPHILS NFR BLD: 58.8 % (ref 38–73)
NONHDLC SERPL-MCNC: 78 MG/DL
NRBC BLD-RTO: 0 /100 WBC
PLATELET # BLD AUTO: 305 K/UL (ref 150–450)
PMV BLD AUTO: 10.2 FL (ref 9.2–12.9)
POTASSIUM SERPL-SCNC: 4 MMOL/L (ref 3.5–5.1)
PROT SERPL-MCNC: 7.1 G/DL (ref 6–8.4)
RBC # BLD AUTO: 3.58 M/UL (ref 4–5.4)
SODIUM SERPL-SCNC: 136 MMOL/L (ref 136–145)
TRIGL SERPL-MCNC: 104 MG/DL (ref 30–150)
WBC # BLD AUTO: 5.19 K/UL (ref 3.9–12.7)

## 2022-12-19 PROCEDURE — 3008F PR BODY MASS INDEX (BMI) DOCUMENTED: ICD-10-PCS | Mod: CPTII,S$GLB,, | Performed by: INTERNAL MEDICINE

## 2022-12-19 PROCEDURE — 99214 OFFICE O/P EST MOD 30 MIN: CPT | Mod: S$GLB,,, | Performed by: INTERNAL MEDICINE

## 2022-12-19 PROCEDURE — 80053 COMPREHEN METABOLIC PANEL: CPT | Performed by: INTERNAL MEDICINE

## 2022-12-19 PROCEDURE — 4010F ACE/ARB THERAPY RXD/TAKEN: CPT | Mod: CPTII,S$GLB,, | Performed by: INTERNAL MEDICINE

## 2022-12-19 PROCEDURE — 85025 COMPLETE CBC W/AUTO DIFF WBC: CPT | Performed by: INTERNAL MEDICINE

## 2022-12-19 PROCEDURE — 3078F DIAST BP <80 MM HG: CPT | Mod: CPTII,S$GLB,, | Performed by: INTERNAL MEDICINE

## 2022-12-19 PROCEDURE — 3046F PR MOST RECENT HEMOGLOBIN A1C LEVEL > 9.0%: ICD-10-PCS | Mod: CPTII,S$GLB,, | Performed by: INTERNAL MEDICINE

## 2022-12-19 PROCEDURE — 3046F HEMOGLOBIN A1C LEVEL >9.0%: CPT | Mod: CPTII,S$GLB,, | Performed by: INTERNAL MEDICINE

## 2022-12-19 PROCEDURE — 99499 RISK ADDL DX/OHS AUDIT: ICD-10-PCS | Mod: S$GLB,,, | Performed by: INTERNAL MEDICINE

## 2022-12-19 PROCEDURE — 4010F PR ACE/ARB THEARPY RXD/TAKEN: ICD-10-PCS | Mod: CPTII,S$GLB,, | Performed by: INTERNAL MEDICINE

## 2022-12-19 PROCEDURE — 3008F BODY MASS INDEX DOCD: CPT | Mod: CPTII,S$GLB,, | Performed by: INTERNAL MEDICINE

## 2022-12-19 PROCEDURE — 96402 CHEMO HORMON ANTINEOPL SQ/IM: CPT

## 2022-12-19 PROCEDURE — 99999 PR PBB SHADOW E&M-EST. PATIENT-LVL V: CPT | Mod: PBBFAC,,, | Performed by: INTERNAL MEDICINE

## 2022-12-19 PROCEDURE — 1159F PR MEDICATION LIST DOCUMENTED IN MEDICAL RECORD: ICD-10-PCS | Mod: CPTII,S$GLB,, | Performed by: INTERNAL MEDICINE

## 2022-12-19 PROCEDURE — 1159F MED LIST DOCD IN RCRD: CPT | Mod: CPTII,S$GLB,, | Performed by: INTERNAL MEDICINE

## 2022-12-19 PROCEDURE — 96401 CHEMO ANTI-NEOPL SQ/IM: CPT

## 2022-12-19 PROCEDURE — 36415 COLL VENOUS BLD VENIPUNCTURE: CPT | Performed by: INTERNAL MEDICINE

## 2022-12-19 PROCEDURE — 99214 PR OFFICE/OUTPT VISIT, EST, LEVL IV, 30-39 MIN: ICD-10-PCS | Mod: S$GLB,,, | Performed by: INTERNAL MEDICINE

## 2022-12-19 PROCEDURE — 96372 THER/PROPH/DIAG INJ SC/IM: CPT | Mod: 59

## 2022-12-19 PROCEDURE — 3077F PR MOST RECENT SYSTOLIC BLOOD PRESSURE >= 140 MM HG: ICD-10-PCS | Mod: CPTII,S$GLB,, | Performed by: INTERNAL MEDICINE

## 2022-12-19 PROCEDURE — 99499 UNLISTED E&M SERVICE: CPT | Mod: S$GLB,,, | Performed by: INTERNAL MEDICINE

## 2022-12-19 PROCEDURE — 99999 PR PBB SHADOW E&M-EST. PATIENT-LVL V: ICD-10-PCS | Mod: PBBFAC,,, | Performed by: INTERNAL MEDICINE

## 2022-12-19 PROCEDURE — 63600175 PHARM REV CODE 636 W HCPCS: Mod: JG | Performed by: INTERNAL MEDICINE

## 2022-12-19 PROCEDURE — 3078F PR MOST RECENT DIASTOLIC BLOOD PRESSURE < 80 MM HG: ICD-10-PCS | Mod: CPTII,S$GLB,, | Performed by: INTERNAL MEDICINE

## 2022-12-19 PROCEDURE — 80061 LIPID PANEL: CPT | Performed by: FAMILY MEDICINE

## 2022-12-19 PROCEDURE — 3077F SYST BP >= 140 MM HG: CPT | Mod: CPTII,S$GLB,, | Performed by: INTERNAL MEDICINE

## 2022-12-19 RX ORDER — INSULIN DEGLUDEC 100 U/ML
36 INJECTION, SOLUTION SUBCUTANEOUS DAILY
Qty: 15 ML | Refills: 1 | Status: SHIPPED | OUTPATIENT
Start: 2022-12-19 | End: 2023-06-19

## 2022-12-19 RX ORDER — INSULIN DEGLUDEC 200 U/ML
36 INJECTION, SOLUTION SUBCUTANEOUS DAILY
Qty: 3 PEN | Refills: 1 | Status: SHIPPED | OUTPATIENT
Start: 2022-12-19 | End: 2022-12-19

## 2022-12-19 RX ADMIN — GOSERELIN ACETATE 3.6 MG: 3.6 IMPLANT SUBCUTANEOUS at 11:12

## 2022-12-19 RX ADMIN — DENOSUMAB 120 MG: 120 INJECTION SUBCUTANEOUS at 11:12

## 2022-12-19 NOTE — PROGRESS NOTES
Subjective:       Patient ID: Kristopher Elmore is a 47 y.o. female.    Chief Complaint: Follow-up    HPI    Losing weight  Reports DM got out of control and now seeing an endocrinologist as BG was out of control and urinary frequency  She decided to take insulin  She has also changed her diet- cut out soft drinks, only drinking water and no sugar drinks  Working on portion control  Already feeling better although prepped she may feel slightly poorer when BG under better control briefly    Currently on Arimidex and Ibrance (dose adjusted due to anemia to 100 mg)   Zoladex and Xgeva monthly      Follows with cardiology regularly  Previously,   11/7/2022 ECHO: previous study compared- no significant decrease noted.   Summary     Technically difficult study  The left ventricle is severely enlarged with eccentric hypertrophy and moderately decreased systolic function. The estimated ejection fraction is 35%.  Normal right ventricular size with normal right ventricular systolic function.  Grade I left ventricular diastolic dysfunction. Left atrial pressures do not appear to be significantly elevated.  The IVC is not visualized.        - 5/2/2022 ECHO:  The left ventricle is mildly enlarged with eccentric hypertrophy and mildly decreased systolic function. The estimated ejection fraction is 40%.  There is left ventricular global hypokinesis.  There is abnormal septal wall motion.  Grade I left ventricular diastolic dysfunction.  Normal right ventricular size with mildly reduced right ventricular systolic function.  Moderate left atrial enlargement.     - 10/10/2022 CXR:  FINDINGS:  Cardiac silhouette is stable in size.  Lungs are hypoinflated.  No evidence of focal consolidative process, pneumothorax, or significant pleural effusion.  No acute osseous abnormality identified.  Impression:  No acute cardiopulmonary process identified.     Restaging:  - 10/16/2022 CT Abdomen:  FINDINGS:  Lungs/Pleura: Lung bases are  unremarkable.  No focal consolidation, pneumothorax, or pleural effusion is present.  Heart: The visualized portions of the heart are normal. No pericardial effusion.  Liver: Liver is mildly enlarged with diffuse hypoattenuation suggesting steatosis.  No focal hepatic abnormality.  Gallbladder/Bile ducts: The gallbladder is unremarkable.  No intrahepatic or extrahepatic biliary ductal dilatation.  Spleen:Unremarkable.  Stomach: Unremarkable  Pancreas: Unremarkable.  Adrenals: Unremarkable.  Renal/Ureters: The kidneys are normal in size and location. No hydronephrosis. Stable right upper pole renal cyst.  Visualized ureters are normal.  Partially visualized uterus demonstrates a calcified fibroid near the fundus.  Bowel: The visualized loops of small and large bowel show no evidence of obstruction or inflammation.  Peritoneum: No ascites, free fluid, or intraperitoneal free air.  Lymph Nodes: No pathologic hakeem enlargement in the abdomen.  Vasculature: The abdominal aorta is normal in course and caliber.  No atherosclerotic calcifications.  Bones: Age appropriate degenerative changes.  Stable burden of osseous metastatic disease involving the pelvis and spine.  No evidence of new lytic or sclerotic lesions.  No evidence for acute fracture.  Soft Tissues: Mild nonspecific subcutaneous fat stranding within the ventral abdominal wall near the periumbilical region.  No organized fluid collection or solid mass detected.  Mild subcutaneous edema of the midline lower back.  Impression:  Patient with breast cancer. Stable lucent/sclerotic lesions throughout the pelvis and spine. No evidence of new metastatic disease.  Mild nonspecific subcutaneous fat stranding within the ventral abdominal wall near the periumbilical region.  Finding could represent simple edema or sequela of soft tissue infection/inflammation.  No organized fluid collection or solid mass detected.  Right renal cyst.  Hepatic steatosis.  Additional findings  as above.     Prior scans:   - 6/13/2022 CT C/A/P:   COMPARISON:   CT abdomen pelvis 04/13/2022, CT chest abdomen pelvis 02/01/2022, CT chest abdomen pelvis 10/25/2021   FINDINGS:   Decreased sensitivity for detecting solid organ abnormalities without intravenous contrast.   LUNG BASES & MEDIASTINUM (limited): Unremarkable   HEPATOBILIARY: Borderline hepatomegaly with decreased parenchymal attenuation suggesting hepatic steatosis. No focal hepatic lesions.No biliary ductal dilatation.Gallbladder is contracted.   SPLEEN: No splenomegaly.   PANCREAS: No focal masses or ductal dilatation.   ADRENALS: No adrenal nodules.   KIDNEYS/URETERS: Stable 1.2 cm hypodensity in the upper pole cortex with fluid-level attenuation, likely simple cyst. No hydronephrosis, stones or solid mass lesions.   PELVIC ORGANS/BLADDER: Bladder is minimally distended without wall thickening. Ureters are unremarkable. Uterus contains a stable-appearing calcified anterior fundal leiomyoma and is otherwise unremarkable. No adnexal masses.   PERITONEUM / RETROPERITONEUM: No free air. No free fluid.   LYMPH NODES: No lymphadenopathy.   VESSELS: Unremarkable.   GI TRACT: Several high density foci within the gastric lumen, likely ingested material. No distention or wall thickening. Normal appendix.   BONES AND SOFT TISSUES: Degenerative changes of the spine. Stable-appearing lucent lesion along the right iliac wing. Stable lucent/sclerotic foci throughout several vertebral bodies. No definitive new lucent/sclerotic lesions. No fractures.   Impression:   1. In this patient with history of invasive ductal carcinoma of the right breast, there is no CT evidence of new metastatic disease. Persistent lucent/sclerotic lesions throughout the pelvis and spine, without interval change, as above.   2. Decreased hepatic parenchymal attenuation suggestive of hepatic steatosis. No focal hepatic lesions on this noncontrast examination.   3. Additional findings as  "above.         Reason For Follow Up:   1. Stage IV ([id number]) invasive ductal carcinoma of right breast, upper inner quadrant, ER %, CA neg, Her2 neg, Grade 3, multifocal with one lesion appearing more aggressive (Ki67 80%), large LN +, bone mets.   Ibrance dose adjusted with cytopenias   Oncologic History:   Presentation   - 9/11/19 - Screening mammogram showed multiple right breast masses lower inner quadrant   - 9/13/19 - Diagnostic mammogram and US showed a right breast, 3:00 position mass, 2 CFN measuring 17 mm x 16 mm x 14 mm. There 2 smaller adjacent masses towards the nipple   - 9/19/19 - Biopsy -   A. Right breast subareolar: Grade 3 IDC, estrogen receptor 80%, progesterone receptor 0%, Her2 dago neg, Ki67 80%.   B. Right breast mass 3:00: Grade 3 IDC with papillary features, estrogen receptor 100%, progesterone receptor 0%, Her2 dago 1+, Ki67 30%.   - 9/26/19: US right axilla with abnormal lymphadenopathy measuring 4.3 x 2.8 cm with biopsy + for metastatic breast cancer.   Surgery consultation with Dr Ferris on 9/25/19   - 9/25/19 - Gen[facility] pending; VUS pending   Medical oncology consultation on 10/7/19   * 10/8/19 - CT C/A/P with several lytic lesions in thoracic and lumbar spine, iliac bones, left scapula in addition to 3 x 4.5 cm right axillary node and 2.1 cm right breast mass. Bone scan negative.   * 10/31/19 - started Ibrance, Arimidex, Zoladex; plan for Xgeva.   * 11/12/19 STRATA without targetable mutations.   * 12/16/19 - Bone biopsy + for met disease (initially read as negative, but we treated as metastatic disease given high suspicion).   * MRI brain: "Partially empty sella. Question bilateral globe proptosis. Otherwise unremarkable MRI brain as detailed above specifically without evidence for intracranial enhancing lesion to suggest metastatic disease."   * 4/22/2020 PET - disease improvement. 8/2020 PET with disease improvement.   * 9/1/20 - Palliative " RT to L1-L4   Of note, * Xgeva monthly - we had been waiting on dental clearance, but she has been unable to get into dentistry and is accepting of risks. Has no active dental disease.   PET scan 4/22/2021:   FINDINGS:   Quality of the study: Mildly degraded due to patient's large body habitus and skin abutting the gantry.   In the head and neck, there are no hypermetabolic lesions worrisome for malignancy. There are no hypermetabolic mucosal lesions, and there are no pathologically enlarged or hypermetabolic lymph nodes.   In the chest, there are no hypermetabolic lesions worrisome for malignancy.   Stable CT appearance of a level 1 right axillary lymph node measuring 1.3 cm in short axis with normal background radiotracer uptake. Previously with SUV max of 2.1.   There are no concerning pulmonary nodules or masses, and there are no pathologically enlarged or hypermetabolic lymph nodes.   In the abdomen and pelvis, there is physiologic tracer distribution within the abdominal organs and excretion into the genitourinary system.   In the bones, there are stable lytic lesions throughout the lumbar spine and pelvis and additional stable sclerotic lesions in the sternum and T3 vertebral body. No associated focal abnormal increased radiotracer uptake. Findings likely represent treated disease.   Impression:   Interval decreased uptake within a prominent right axillary lymph node, now with normal background uptake. No new focal abnormal uptake.   Stable lytic and sclerotic lesions without focal abnormal uptake. Findings are compatible with treated metastasis.   8/25/2021 MRI brain   Impression:   No significant change from prior specifically without evidence for enhancing lesion to suggest intracranial metastatic disease.   Continued partially empty sella, intracranial hypertension to be included in differential in the appropriate clinical setting. Clinical correlation and further evaluation as warranted.   10/14/2021 MRI  thoracic/lumbar spine:   Impression:   Patient history of metastatic breast cancer.   Bone lesions at T10, L2, L3, and right iliac wing, as above, concerning for metastatic disease. No pathologic fracture, soft tissue component, or epidural extension identified.   10/25/2021 CT chest/abd/pelvis:   Impression:   1. Stable metastasis of the spine   No evidence of intra-abdominal or intrathoracic metastatic disease   2. Stable prominent right axillary lymph node.   3. Additional findings are detailed above.   Currently on Arimidex and Ibrance   Zoladex and Xgeva monthly   - 2/2/2022 Bone scan:   FINDINGS:   No focal abnormal uptake suggestive of osseous metastases. There is diffusely increased uptake in the calvarium in keeping with hyperostosis. There is degenerative type uptake most prominent in the bilateral shoulders and knees. The focal uptake previously described in the distal right femur is not visualized. There is otherwise physiologic distribution of the radiopharmaceutical throughout the skeleton.   There is normal uptake in the genitourinary system and soft tissues.   Impression:   There is no scintigraphic evidence of osteoblastic metastatic disease.   Nonspecific uptake in the distal right femur has resolved.   Diffuse cranial hyperostosis and other findings as above.   - 2/1/2022 CT C/A/P:   FINDINGS:   CHEST   Support tubes and lines: None.   Aorta: Normal caliber.   Heart: Normal size..   Coronary arteries: No calcifications.   Pericardium: Normal. No effusion, thickening, or calcification.   Central pulmonary arteries: Normal caliber.   Base of neck/thyroid: Normal.   Lymph nodes: No supraclavicular, axillary, internal mammary, mediastinal or hilar lymphadenopathy.   Esophagus: Normal.   Pleura: No effusion, thickening or calcification.   Body wall: Unremarkable.   Airways: Normal.   Lungs: Clear without focal or diffuse abnormality.   Bones: Sclerotic lesions are seen throughout the spine as well as  in the sternum, not substantially changed from 10/25/2021   ABDOMEN/PELVIS   Liver: Unremarkable   Gallbladder/bile ducts: Unremarkable. No intra or extrahepatic biliary ductal dilatation   Pancreas: Unremarkable.   Spleen: Unremarkable.   Adrenals: Unremarkable.   Kidneys: Simple cyst in the right kidney is unchanged   Lymph nodes: No abdominal or pelvic lymphadenopathy.   Bowel and mesentery: Unremarkable.   Abdominal aorta: Unremarkable.   Inferior vena cava: Unremarkable.   Free fluid or free air: None.   Pelvis: Unremarkable.   Urinary bladder: Unremarkable.   Body wall: Unremarkable.   Bones: Sclerotic lesions seen in the spine are unchanged.   Impression:   1. No evidence of new recurrent or metastatic disease.   2. Sclerotic lesions seen in the spine and sternum, unchanged when compared to 10/25/2021.      - more recent scans as above     FH:   Paternal side:   1st cousin- ovarian cancer (diagnosed at age 26) and her mother has thyroid cancer   Maternal grandmother- some type of likely metastatic GI cancer    Review of Systems   Constitutional:  Negative for activity change, appetite change, chills, fatigue (notes pretty good), fever and unexpected weight change.   HENT:  Negative for mouth sores, rhinorrhea and trouble swallowing.    Eyes:  Negative for visual disturbance.   Respiratory:  Negative for cough, shortness of breath and wheezing.    Cardiovascular:  Negative for chest pain, palpitations and leg swelling.   Gastrointestinal:  Positive for nausea (mild on medication, controlled on zofran). Negative for abdominal distention, abdominal pain, blood in stool, change in bowel habit, constipation, diarrhea, vomiting and change in bowel habit.   Genitourinary:  Positive for frequency (with elevated BG and Lasix, though better). Negative for difficulty urinating, dysuria and urgency.   Musculoskeletal:  Positive for back pain (chronic). Negative for arthralgias, joint swelling and myalgias.    Integumentary:  Negative for rash.   Neurological:  Negative for dizziness, weakness, numbness, headaches, coordination difficulties and coordination difficulties.   Hematological:  Negative for adenopathy. Does not bruise/bleed easily.   Psychiatric/Behavioral:  Negative for dysphoric mood and sleep disturbance. The patient is not nervous/anxious.        Objective:      Physical Exam  Vitals and nursing note reviewed.   Constitutional:       General: She is not in acute distress.     Appearance: Normal appearance. She is well-developed. She is obese. She is not ill-appearing.      Comments: Presents with her daughter  Very pleasant  ECOG= 0  Uses wheelchair for distance, but walks into exam room     HENT:      Head: Normocephalic and atraumatic.   Eyes:      General: Lids are normal. No scleral icterus.     Extraocular Movements: Extraocular movements intact.      Conjunctiva/sclera: Conjunctivae normal.      Pupils: Pupils are equal, round, and reactive to light.   Neck:      Thyroid: No thyromegaly.      Vascular: No JVD.      Trachea: Trachea normal.   Cardiovascular:      Rate and Rhythm: Normal rate and regular rhythm.      Heart sounds: Normal heart sounds. No murmur heard.    No friction rub. No gallop.      Comments: Difficult to assess tones.   Pulmonary:      Effort: Pulmonary effort is normal. No respiratory distress.      Breath sounds: Normal breath sounds. No wheezing, rhonchi or rales.      Comments: distant  Abdominal:      General: Bowel sounds are normal. There is no distension.      Palpations: Abdomen is soft. There is no mass.      Tenderness: There is no abdominal tenderness. There is no guarding or rebound.      Comments: No organomegaly however difficult to assess.    Hardness noted periumbilical area   Musculoskeletal:         General: Normal range of motion.      Cervical back: Normal range of motion and neck supple.      Comments: No spinal or paraspinal tenderness.    Lymphadenopathy:       Head:      Right side of head: No submental or submandibular adenopathy.      Left side of head: No submental or submandibular adenopathy.      Cervical: No cervical adenopathy.      Upper Body:      Right upper body: No supraclavicular or axillary adenopathy.      Left upper body: No supraclavicular or axillary adenopathy.   Skin:     General: Skin is warm and dry.      Capillary Refill: Capillary refill takes less than 2 seconds.      Coloration: Skin is not jaundiced or pale.      Findings: No bruising or rash.      Nails: There is no clubbing.   Neurological:      Mental Status: She is alert and oriented to person, place, and time.      Sensory: No sensory deficit.      Motor: No weakness.      Coordination: Coordination normal.      Gait: Gait normal.   Psychiatric:         Mood and Affect: Mood normal.         Speech: Speech normal.         Behavior: Behavior normal.         Thought Content: Thought content normal.         Judgment: Judgment normal.       Labs- reviewed  Assessment:       Problem List Items Addressed This Visit       Type 2 diabetes mellitus without complication, with long-term current use of insulin    RESOLVED: Palpitations    Malignant neoplasm of upper-inner quadrant of right breast in female, estrogen receptor positive - Primary    Chronic combined systolic and diastolic heart failure    Bone metastases    Anemia associated with chemotherapy       Plan:       DM< CHF- improved management  Doing well with cardiology, endocrinology    Metastatic breast cancer  Continue AI, Zoaldex, Ibrance and Xgeva  Scan at next visit    Route Chart for Scheduling    Med Onc Chart Routing      Follow up with physician 4 weeks. labs and CT scans prior   Follow up with [facility]    Infusion scheduling note    Injection scheduling note    Labs    Imaging    Pharmacy appointment    Other referrals        Treatment Plan Information   OP ANASTROZOLE PALBOCICLIB Q4W   Jessica Mendez MD   Upcoming Treatment Dates  - OP ANASTROZOLE PALBOCICLIB Q4W    No upcoming days in selected categories.    Supportive Plan Information  OP BREAST GOSERELIN & DENOSUMAB Q4W   Jessica Mendez MD   Upcoming Treatment Dates - OP BREAST GOSERELIN & DENOSUMAB Q4W    No upcoming days in selected categories.

## 2022-12-19 NOTE — TELEPHONE ENCOUNTER
----- Message from Liz Stratton sent at 12/19/2022  2:04 PM CST -----  Type: Patient Call Back    Who called: [email or web address]/ [email or web address]     What is the request in detail: Asking for a prior auth for medication for her Insulin     Can the clinic reply by [facility]? No     Would the patient rather a call back or a response via [facility]? Call     Best call back number: [phone]

## 2022-12-19 NOTE — NURSING
Pt arrives after appointment with provider and labs for xgeva and zolodex inj. Verbalized compliance with po home vitamins, denies jaw pain. Pt tolerated inj well. D/c home in w/c with family member.

## 2022-12-20 ENCOUNTER — PATIENT MESSAGE (OUTPATIENT)
Dept: ENDOCRINOLOGY | Facility: CLINIC | Age: 47
End: 2022-12-20
Payer: MEDICARE

## 2023-01-03 ENCOUNTER — PATIENT MESSAGE (OUTPATIENT)
Dept: PALLIATIVE MEDICINE | Facility: CLINIC | Age: 48
End: 2023-01-03

## 2023-01-03 ENCOUNTER — OFFICE VISIT (OUTPATIENT)
Dept: PALLIATIVE MEDICINE | Facility: CLINIC | Age: 48
End: 2023-01-03
Payer: MEDICARE

## 2023-01-03 DIAGNOSIS — G89.3 CANCER ASSOCIATED PAIN: ICD-10-CM

## 2023-01-03 DIAGNOSIS — Z51.5 ENCOUNTER FOR PALLIATIVE CARE: ICD-10-CM

## 2023-01-03 DIAGNOSIS — Z17.0 MALIGNANT NEOPLASM OF UPPER-INNER QUADRANT OF RIGHT BREAST IN FEMALE, ESTROGEN RECEPTOR POSITIVE: Primary | ICD-10-CM

## 2023-01-03 DIAGNOSIS — R63.0 ANOREXIA: ICD-10-CM

## 2023-01-03 DIAGNOSIS — C50.211 MALIGNANT NEOPLASM OF UPPER-INNER QUADRANT OF RIGHT BREAST IN FEMALE, ESTROGEN RECEPTOR POSITIVE: Primary | ICD-10-CM

## 2023-01-03 DIAGNOSIS — K59.00 CONSTIPATION, UNSPECIFIED CONSTIPATION TYPE: ICD-10-CM

## 2023-01-03 DIAGNOSIS — Z71.89 ADVANCED CARE PLANNING/COUNSELING DISCUSSION: ICD-10-CM

## 2023-01-03 DIAGNOSIS — R06.09 OTHER FORM OF DYSPNEA: ICD-10-CM

## 2023-01-03 DIAGNOSIS — R53.0 NEOPLASTIC (MALIGNANT) RELATED FATIGUE: ICD-10-CM

## 2023-01-03 DIAGNOSIS — F43.23 ADJUSTMENT DISORDER WITH MIXED ANXIETY AND DEPRESSED MOOD: ICD-10-CM

## 2023-01-03 DIAGNOSIS — R11.0 NAUSEA: ICD-10-CM

## 2023-01-03 DIAGNOSIS — G47.00 INSOMNIA, UNSPECIFIED TYPE: ICD-10-CM

## 2023-01-03 PROCEDURE — 99215 PR OFFICE/OUTPT VISIT, EST, LEVL V, 40-54 MIN: ICD-10-PCS | Mod: 95,,, | Performed by: STUDENT IN AN ORGANIZED HEALTH CARE EDUCATION/TRAINING PROGRAM

## 2023-01-03 PROCEDURE — 99215 OFFICE O/P EST HI 40 MIN: CPT | Mod: 95,,, | Performed by: STUDENT IN AN ORGANIZED HEALTH CARE EDUCATION/TRAINING PROGRAM

## 2023-01-03 PROCEDURE — 1160F RVW MEDS BY RX/DR IN RCRD: CPT | Mod: CPTII,95,, | Performed by: STUDENT IN AN ORGANIZED HEALTH CARE EDUCATION/TRAINING PROGRAM

## 2023-01-03 PROCEDURE — 1160F PR REVIEW ALL MEDS BY PRESCRIBER/CLIN PHARMACIST DOCUMENTED: ICD-10-PCS | Mod: CPTII,95,, | Performed by: STUDENT IN AN ORGANIZED HEALTH CARE EDUCATION/TRAINING PROGRAM

## 2023-01-03 PROCEDURE — 1159F MED LIST DOCD IN RCRD: CPT | Mod: CPTII,95,, | Performed by: STUDENT IN AN ORGANIZED HEALTH CARE EDUCATION/TRAINING PROGRAM

## 2023-01-03 PROCEDURE — 1159F PR MEDICATION LIST DOCUMENTED IN MEDICAL RECORD: ICD-10-PCS | Mod: CPTII,95,, | Performed by: STUDENT IN AN ORGANIZED HEALTH CARE EDUCATION/TRAINING PROGRAM

## 2023-01-03 NOTE — PROGRESS NOTES
Consult Note  Palliative Care    The patient location is: home  The chief complaint leading to consultation is: symptom management and ACP    Visit type: audiovisual    Face to Face time with patient: 29 minutes    40 minutes of total time spent on the encounter, which includes face to face time and non-face to face time preparing to see the patient (eg, review of tests), Obtaining and/or reviewing separately obtained history, Documenting clinical information in the electronic or other health record, Independently interpreting results (not separately reported) and communicating results to the patient/family/caregiver, or Care coordination (not separately reported).     Each patient to whom he or she provides medical services by telemedicine is:  (1) informed of the relationship between the physician and patient and the respective role of any other health care provider with respect to management of the patient; and (2) notified that he or she may decline to receive medical services by telemedicine and may withdraw from such care at any time.    Reason for Consult: symptom management and ACP      ASSESSMENT/PLAN:     Plan/Recommendations:  Diagnoses and all orders for this visit:    Malignant neoplasm of upper-inner quadrant of right breast in female, estrogen receptor positive with bone mets  - patient followed by Dr. Lei and VALERIE Akers  - currently on disease-directed therapy    Encounter for palliative care/Advanced care planning  - patient decisional  - patient by herself on telemedicine visit   - no ACP documents uploaded into EMR  - philosophy of Palliative Medicine reviewed with patient and family at first visit  - new patient folder given to and reviewed with patient and family at first visit  - goals: life prolonging  - ACP booklet given to and reviewed with patient and family at first visit including HCPOA and living will  - patient verbally stated that she would like her sister, Janel, to be her  surrogate decision maker.   - reviewed ACP booklet again at previous visit along with [place]POST form. Patient has been thinking on these topics more with ongoing symptoms of likely congestive heart failure in setting of malignancy.   - did not specifically discuss code status at this time  - will follow up at future appointments    Other form of dyspnea  - patient reporting improvement in her dyspnea since last visit  - dyspnea worsens with exertion   - patient has been seen previously in ED due to dyspnea and concern for heart failure and worsening control of DM  - patient states that she has been able to walk around the house now as well as tries to make it from the parking lot to appointments without wheelchair  - referred to PT at previous visit  - continue treatment for other conditions (DM, CHF) as prescribed by other specialists  - tips for shortness of breath in new patient folder for patient to review    Cancer-related pain  - patient reporting her pain is mainly in her lower back and all along her right side, describes pain and constant, dull  - pain rated between 8/10   - patient reports that the Norco, oxycodone, and MS Contin all make her very sleepy  - she does not use any pain medication consistently due to side effects  - reports stretching also helps  - continue lidocaine patches as well   - continue ibuprofen 800 mg BID with some relief.  - patient uses tramadol but only if pain is very severe. She reports not needing a refill at this time  - patient also describes a vibration pain/muscle spasms in her back. She has tried tizanidine, but this made her feel sleepy.  - patient previously prescribed Robaxin 500 mg qid prn with mild relief; rotated patient back to Robaxin but increased to 750 mg qid prn.  - today patient reports her back is hurting from driving to sister's house and back for the holidays. She has not used any medication today for pain. After completion of visit, patient plans to take  ibuprofen and if needs additional relief she will use the muscle relaxer  - opioid safety sheet in new patient folder for patient to review  - will continue to monitor    Nausea/Anorexia  - patient reports nausea and anorexia are a daily occurrence  - her nausea is worse this morning than previously. She reports taking her all of her medications, but she did not eat last night.   - patient has noted continued change in her appetite and nausea with diabetes medication, especially Trulicity  - patient has had increased fluid intake due to hyperglycemia  - patient using anti-emetics every day with good relief still   - continue zofran and phenergan prn  - patient already following with nutrition  - patient has already been referred to Endocrinology for better management of diabetes. She states she has not been able to get her insulin filled for last two weeks; she is working to get it moved to [facility]  - patient denies any need for refills of anti-emetic medications at this time  - will continue to monitor    Adjustment disorder with mixed anxiety and depressed mood  - patient reports continued depression and anxiety  - patient reports good social support from 2 ex-husbands, siblings, daughters & friends  - additionally reports using journaling, [place] feelings of sadness; her 2 kittens also offer comfort   - reports increaesed Effexor is helping, prior to increase she was unable to discuss loss of parents without crying  - patient spoke of the challenges with the holidays. She spoke of how this brought up her remembrance of her parents and their deaths. Patient has noted that she is crying more. She does note a difference with the Effexor dosing.   - patient has follow up with psychiatry tomorrow for medication management; defer all changes to psychiatry at this time  - emotional support provided throughout visit  - will continue to monitor closely     Neoplastic (malignant)  "related fatigue/Insomnia  - patient reports improvement in insomnia & fatigue  - she is able to fall asleep and stay asleep when desired  - she is back into a routine since she had to transport her daughters in the morning  - she will experience some expected fatigue the day after staying up late, she limits naps to prevent further disruptions to sleep rhythm    - patient has been feeling fatigued with increased pain since trip to and from sisters with the holidays  - patient states that her goal is to continue improvement in symptom management as above so she is able to go to a [facility] Parade  - tips for better sleep in new patient folder for patient to review  - no pharmacologic intervention at this time  - will continue to monitor    Constipation  - patient reports regular, daily BM with the help of senna  - discussed using bowel regimen consistently   - adequate fluid intake   - constipation tip sheet in new patient folder for patient to review  - will continue close monitoring    Understanding of illness/Prognosis: patient has good understanding of disease at this time.     Goals of care: life-prolonging    Follow up: ~ 9-10 weeks    Patient's encounter and above plan of care discussed with patient's oncology-psychologist in person after visit    SUBJECTIVE:     History of Present Illness:  Patient is a 47 y.o. year old female with anemia, anxiety, cardiomyopathy, CHF, HTN, HLD, and metastatic breast cancer presents to Palliative Medicine for symptom management and ACP. Patient was diagnosed with stage IV breast cancer in September 2019. She was started on Ibrance and Arimidex, and she continues on this regimen today. Please see oncology notes for full details regarding her oncologic treatment course.     01/03/2023:  LA  reviewed and summarized:  07/20/2022 Tramadol 50 mg Disp: 120 for 30 days    Patient following with endocrinology about blood glucose levels. Today patient states she is "feeling crummy" " today. Patient states that her pain in her back is worse after road trip to her sister's house for the holidays and back. Patient reporting her nausea is worse today. She took all her medication yesterday, but she did not eat last night. Patient has been having nausea daily, but it is controlled by zofran. Patient's breathing is better as she is able to walk around the house and trying to walk to appointments without a wheelchair. Patient has noted worsening of her mood, especially around the holidays. Patient has been crying more, but she has an appointment on 1/4/23 with her psychiatrist. Patient has also had trouble getting her insulin, so she is trying to get it moved to [facility].     10/24/2022:  LA  reviewed and summarized:  07/20/2022 Tramadol 50 mg Disp: 120 for 30 days    Today patient states she is doing okay today. She continues to experience pain in her back and spasms. She uses ibuprofen about twice a day with good relief. Patient used tizanidine for muscle relaxation, which made her feel very sleepy. Patient uses tramadol if the pain is very severe. She has noted worsening dyspnea with minimal exertion. She was seen in ED recently and given IV lasix. She had output of > 1.5 L. Patient having continued anxiety and depression. It is coming up on the anniversary of her mother's passing as well as the holidays, which is making the anxiety/depression worse. Patient continues to have poor appetite. Her blood glucose levels have been elevated, which is causing her to drink more water. Patient feeling off with the diabetic medication, and this is affecting her appetite as well. Patient sleeping okay today. She wants to discuss ACP documents in more detail today.     07/20/2022:  LA  reviewed and summarized:  05/03/2022 Lorazepam 1 mg disp: 30 for 15 days    Today patient states she is doing well overall. Her breathing has started to improve, and she is able to walk further without resting.  "Patient does still have dyspnea with exertion. Patient reporting continued pain as well as new vibrating/muscle spasm in her back. Patient states this is not all the time, but it can stop her in her tracks. Patient has been able to process her grief, and she is doing better emotionally. Patient discussed planning a trip with her siblings in October for her 47th birthday. She is even considering doing crafts with her cricket for the trip.     03/23/2022:  VALERIA ALEJANDRO reviewed and summarized:  No new prescriptions reported    Patient seen by VALERIE Lucas. Today patient's primary complaint is dyspnea with exertion; on a bad day dyspnea is significantly limiting of desired activities. Patient reports recent visit to cardiologist with repeat echo scheduled for May. Her pain is reasonably controlled, only occasionally using norco. She states she is adjusting to "a new normal". She reports recent improvement of her depression is likely attributable to increase in Effexor, can now talk about recent loss of her parents without crying. She continues to see onc-psych. She is pleased with stability of recent scans. She reports all other symptoms are improved or tolerable.     01/12/2022:  VALERIA ALEJANDRO reviewed and summarized:  08/23/2021 Oxycodone 5 mg disp: 45 for 11 days  08/23/2021 MS Contin 15 mg Disp: 60 for 20 days    Today patient states overall she is doing okay. Patient continues to have severe back pain that limits her mobility. All of the pain medications that she has tried has significant side effect of sleepiness. Patient is open to meeting with pain management for intervention. Patient is also open to meeting with PT to help as well. Patient found the holidays difficult for her, but she was able to visit with her family. She did find enjoyment during that time as well. Patient has been following with Dr. Odom. Patient was having some difficulty with sleeping, but she is has been better with a routine this past week. "     2021:  LA  reviewed and summarized:  2021 Oxycodone 5 mg disp: 45 for 11 days  2021 MS Contin 15 mg Disp: 60 for 20 days    Patient presents to clinic today with her sister. Patient having moderate to severe pain in her back and right side. She has been following with radiation oncology as well. Patient uses both MS Contin and oxycodone intermittently. Patient also having some dyspnea. She is feeling very anxious and depressed recently. Patient also experiencing severe nausea and poor appetite. She has been using her anti-emetics intermittently. Patient also having trouble sleeping and feels very fatigued throughout the day. She is also intermittently constipated. Patient open to learning about ACP.    Past Medical History:   Diagnosis Date    Abnormal Pap smear     pt states 13yrs ago colpo was done    Anemia     Anxiety     Cancer     Cardiomyopathy     CHF (congestive heart failure)     Fibroid     Hx of psychiatric care     Hyperlipidemia     Hypertension     Psychiatric problem     Sleep difficulties     Therapy      Past Surgical History:   Procedure Laterality Date     SECTION      TONSILLECTOMY      TUBAL LIGATION      UTERINE FIBROID EMBOLIZATION       Family History   Problem Relation Age of Onset    Arthritis Mother     Diabetes Mother     Heart disease Mother         CHF, CAD , 2 stents    Hypertension Mother     Hyperlipidemia Mother     Heart failure Mother     No Known Problems Sister     Prostate cancer Father     Hypertension Brother     No Known Problems Daughter     Asthma Daughter     No Known Problems Maternal Grandmother     Cancer Maternal Grandfather 79        abdominal origin?    No Known Problems Paternal Grandmother     No Known Problems Paternal Grandfather     Thyroid cancer Other         type? dx age?    Cancer Paternal Cousin         ovarian @ ?29 & cervical @ ?29    Endometriosis Paternal Cousin     Breast cancer Neg Hx     Ovarian cancer Neg Hx      Colon polyps Neg Hx      Review of patient's allergies indicates:   Allergen Reactions    Keflex [cephalexin] Itching       Medications:    Current Outpatient Medications:     (Magic mouthwash) 1:1:1 Benadryl 12.5mg/5ml liq, aluminum & magnesium hydroxide-simehticone (Maalox), LIDOcaine viscous 2%, Swish and spit 10 mLs every 4 (four) hours as needed. for mouth sores, Disp: 360 mL, Rfl: 0    anastrozole (ARIMIDEX) 1 mg Tab, TAKE 1 TABLET(1 MG) BY MOUTH EVERY DAY, Disp: 90 tablet, Rfl: 3    atorvastatin (LIPITOR) 40 MG tablet, TAKE 1 TABLET(40 MG) BY MOUTH EVERY DAY, Disp: 90 tablet, Rfl: 3    blood sugar diagnostic Strp, To check BG 4 times daily, to use with insurance preferred meter, Disp: 200 each, Rfl: 5    blood-glucose meter kit, To check BG 4 times daily, to use with insurance preferred meter, Disp: 1 each, Rfl: 0    blood-glucose meter,continuous (DEXCOM G6 ) Misc, Use with dexcom G6 system, Disp: 1 each, Rfl: 0    blood-glucose sensor (DEXCOM G6 SENSOR) Monique, Change sensor every 10 days, Disp: 3 each, Rfl: 11    blood-glucose sensor (FREESTYLE MARGARITA 3 SENSOR) Monique, 2 each by Misc.(Non-Drug; Combo Route) route every 14 (fourteen) days., Disp: 2 each, Rfl: 11    blood-glucose transmitter (DEXCOM G6 TRANSMITTER) Monique, Change every 3 months, Disp: 1 each, Rfl: 3    carvediloL (COREG) 25 MG tablet, TAKE 1 TABLET(25 MG) BY MOUTH TWICE DAILY, Disp: 180 tablet, Rfl: 3    ciclopirox (PENLAC) 8 % Soln, Apply topically nightly., Disp: 6.6 mL, Rfl: 3    duke's soln (benadryl 30 mL, mylanta 30 mL, LIDOcaine 30 mL, nystatin 30 mL) 120mL, Take 10 mLs by mouth 4 (four) times daily. (Patient not taking: Reported on 12/19/2022), Disp: 120 mL, Rfl: 3    duke's soln (benadryl 30 mL, mylanta 30 mL, LIDOcaine 30 mL, nystatin 30 mL) 120mL, TAKE 10MLS BY MOUTH FOUR TIMES DAILY, Disp: , Rfl:     empagliflozin (JARDIANCE) 25 mg tablet, Take 1 tablet (25 mg total) by mouth once daily., Disp: 30 tablet, Rfl: 3     ergocalciferol (ERGOCALCIFEROL) 50,000 unit Cap, TAKE 1 CAPSULE BY MOUTH EVERY 7 DAYS, Disp: 12 capsule, Rfl: 0    ferrous sulfate 324 mg (65 mg iron) TbEC, TAKE 1 TABLET BY MOUTH ON EVERY MONDAY, WEDNESDAY, AND FRIDAY, Disp: 30 tablet, Rfl: 0    furosemide (LASIX) 20 MG tablet, Take 1 tablet (20 mg total) by mouth 2 (two) times daily., Disp: 60 tablet, Rfl: 11    glipiZIDE (GLUCOTROL) 10 MG TR24, Take 1 tablet (10 mg total) by mouth daily with breakfast., Disp: 90 tablet, Rfl: 0    goserelin (ZOLADEX) 3.6 mg injection, Inject 3.6 mg into the skin every 28 days., Disp: , Rfl:     ibuprofen (ADVIL,MOTRIN) 800 MG tablet, Take 1 tablet (800 mg total) by mouth 2 (two) times daily as needed for Pain., Disp: 45 tablet, Rfl: 2    insulin degludec (TRESIBA U-100 INSULIN) 100 unit/mL injection, Inject 0.36 mLs (36 Units total) into the skin once daily. Titrate to 50 units daily, Disp: 15 mL, Rfl: 1    lancets Misc, To check BG 4 times daily, to use with insurance preferred meter, Disp: 200 each, Rfl: 5    LIDOcaine (LIDODERM) 5 %, Place 2 patches onto the skin once daily. Remove & Discard patch within 12 hours or as directed by MD, Disp: 60 patch, Rfl: 0    LORazepam (ATIVAN) 1 MG tablet, Take 1 tablet (1 mg total) by mouth every 12 (twelve) hours as needed for Anxiety., Disp: 30 tablet, Rfl: 0    metFORMIN (GLUCOPHAGE-XR) 500 MG ER 24hr tablet, Take 2 tablets (1,000 mg total) by mouth 2 (two) times daily with meals., Disp: 360 tablet, Rfl: 0    morphine (MS CONTIN) 15 MG 12 hr tablet, Take 1 tablet (15 mg total) by mouth every 8 (eight) hours as needed for Pain., Disp: 60 tablet, Rfl: 0    ondansetron (ZOFRAN-ODT) 4 MG TbDL, Take 1 tablet (4 mg total) by mouth 2 (two) times daily., Disp: 30 tablet, Rfl: 0    ondansetron (ZOFRAN-ODT) 8 MG TbDL, DISSOLVE 1 TABLET(8 MG) ON THE TONGUE EVERY 8 HOURS AS NEEDED FOR NAUSEA, Disp: 30 tablet, Rfl: 2    palbociclib (IBRANCE) 100 mg Cap, Take 1 capsule (100 mg) by mouth once daily for  "21 days, followed by 7 days off., Disp: 21 capsule, Rfl: 3    pen needle, diabetic (BD ULTRA-FINE VERONICA PEN NEEDLE) 32 gauge x 5/32" Ndle, 1 Device by Misc.(Non-Drug; Combo Route) route Daily., Disp: 100 each, Rfl: 4    potassium chloride (KLOR-CON) 10 MEQ TbSR, Take 1 tablet (10 mEq total) by mouth once daily., Disp: 30 tablet, Rfl: 2    promethazine (PHENERGAN) 25 MG tablet, Take 1 tablet (25 mg total) by mouth every 6 (six) hours as needed for Nausea., Disp: 30 tablet, Rfl: 1    sacubitriL-valsartan (ENTRESTO)  mg per tablet, Take 1 tablet by mouth 2 (two) times daily., Disp: 60 tablet, Rfl: 11    sennosides (SENNA-C ORAL), Take 2 tablets by mouth daily as needed., Disp: , Rfl:     spironolactone (ALDACTONE) 25 MG tablet, Take 1 tablet (25 mg total) by mouth once daily., Disp: 90 tablet, Rfl: 1    traMADoL (ULTRAM) 50 mg tablet, Take 1 tablet (50 mg total) by mouth every 6 (six) hours as needed for Pain., Disp: 120 tablet, Rfl: 0    venlafaxine (EFFEXOR-XR) 150 MG Cp24, TAKE 1 CAPSULE(150 MG) BY MOUTH EVERY DAY, Disp: 15 capsule, Rfl: 0    OBJECTIVE:       ROS:  Review of Systems   Constitutional:  Positive for activity change, appetite change and fatigue.   HENT: Negative.     Eyes: Negative.    Respiratory:  Positive for shortness of breath.    Cardiovascular: Negative.  Negative for leg swelling.   Gastrointestinal:  Positive for constipation and nausea.   Genitourinary: Negative.    Musculoskeletal:  Positive for arthralgias, back pain and myalgias.   Skin: Negative.    Neurological: Negative.  Negative for syncope and light-headedness.   Psychiatric/Behavioral:  Positive for dysphoric mood and sleep disturbance. The patient is nervous/anxious.    All other systems reviewed and are negative.    Review of Symptoms      Symptom Assessment (ESAS 0-10 Scale)  Pain:  8  Dyspnea:  4  Anxiety:  8  Nausea:  8  Depression:  7  Anorexia:  6  Fatigue:  3  Insomnia:  0  Restlessness:  0  Agitation:  0     CAM / " Delirium:  Negative  Constipation:  Positive  Diarrhea:  Negative    Anxiety:  Is nervous/anxious  Constipation:  Constipation    Bowel Management Plan (BMP):  Yes      Pain Assessment:  OME in 24 hours:  0  Location(s): back    Back       Location: lower        Quality: Aching and dull        Quantity: 8/10 in intensity        Chronicity: Onset 1 (greater than) year(s) ago, unchanged        Aggravating Factors: Activity        Alleviating Factors: Opiates and NSAIDs       Associated Symptoms: None    Modified Abigail Scale:  3 (with exertion )    ECOG Performance Status ndGndrndanddndend:nd nd2nd Living Arrangements:  Lives with family    Psychosocial/Cultural:   See Palliative Psychosocial Note: No  Patient lives with her two adult daughters (18 and 20 years old)  **Primary  to Follow**  Palliative Care  Consult: No    Spiritual:  F - Sandra and Belief:  Alcides  I - Importance:  High  C - Community:  Prays regularly  A - Address in Care:   services offered but declined. Needs met at this time    Advance Care Planning   Advance Directives:   Living Will: No    LaPOST: No    Do Not Resuscitate Status: No    Medical Power of : No        Oral Declaration: Yes   Witnesses:  Ella James LCSW   Agent's Name:  Janel (her sister)    Decision Making:  Patient answered questions  Goals of Care: What is most important right now is to focus on improvement in condition but with limits to invasive therapies. Accordingly, we have decided that the best plan to meet the patient's goals includes continuing with treatment.        Physical Exam: limited due to telemedicine visit  Vitals:      Physical Exam  Constitutional:       General: She is not in acute distress.     Appearance: She is obese. She is not diaphoretic.   HENT:      Head: Normocephalic and atraumatic.      Right Ear: External ear normal.      Left Ear: External ear normal.      Nose: Nose normal.      Mouth/Throat:      Mouth: Mucous  membranes are moist.   Eyes:      General: No scleral icterus.        Right eye: No discharge.         Left eye: No discharge.   Neck:      Comments: Trachea midline  Pulmonary:      Effort: Pulmonary effort is normal. No respiratory distress.   Musculoskeletal:      Cervical back: Normal range of motion.      Comments: Sitting up without difficulty; able to move upper extremities with no limitations   Skin:     Coloration: Skin is not jaundiced.      Findings: No rash.   Neurological:      Mental Status: She is alert and oriented to person, place, and time.      Gait: Gait normal.   Psychiatric:         Behavior: Behavior normal.         Thought Content: Thought content normal.         Judgment: Judgment normal.       Labs:  CBC:   WBC   Date Value Ref Range Status   12/19/2022 5.19 3.90 - 12.70 K/uL Final     Hemoglobin   Date Value Ref Range Status   12/19/2022 9.6 (L) 12.0 - 16.0 g/dL Final     Hematocrit   Date Value Ref Range Status   12/19/2022 31.9 (L) 37.0 - 48.5 % Final     MCV   Date Value Ref Range Status   12/19/2022 89 82 - 98 fL Final     Platelets   Date Value Ref Range Status   12/19/2022 305 150 - 450 K/uL Final       LFT:   Lab Results   Component Value Date    AST 14 12/19/2022    ALKPHOS 87 12/19/2022    BILITOT 0.3 12/19/2022       Albumin:   Albumin   Date Value Ref Range Status   12/19/2022 3.0 (L) 3.5 - 5.2 g/dL Final   01/28/2021 3.5 (L) 3.6 - 5.1 g/dL Final     Comment:     For additional information, please refer to   http://education.[employer]/faq/IPH104 (This link is   being provided for informational/ educational purposes only.)    This test was developed and its analytical performance   characteristics have been determined by [facility]. It has not been cleared or approved by the   [place] Food and Drug Administration. This assay has been validated   pursuant to the CLIA regulations and is used for clinical   purposes.  @ Test Performed  "By:  [facility]  John Valverde M.D.,   [address] [place] [address]  [place]  [id number]       Protein:   Total Protein   Date Value Ref Range Status   12/19/2022 7.1 6.0 - 8.4 g/dL Final       Radiology:I have reviewed all pertinent imaging results/findings within the past 24 hours.     10/10/2022 CT Abdomen: "Patient with breast cancer. Stable lucent/sclerotic lesions throughout the pelvis and spine. No evidence of new metastatic disease. Mild nonspecific subcutaneous fat stranding within the ventral abdominal wall near the periumbilical region.  Finding could represent simple edema or sequela of soft tissue infection/inflammation.  No organized fluid collection or solid mass detected. Right renal cyst. Hepatic steatosis."    40 minutes of total time spent on the encounter, which includes face to face time and non-face to face time preparing to see the patient (eg, review of tests), Obtaining and/or reviewing separately obtained history, Documenting clinical information in the electronic or other health record, Independently interpreting results (not separately reported) and communicating results to the patient/family/caregiver, or Care coordination (not separately reported).    Signature: Aishwarya Portillo MD                    "

## 2023-01-04 ENCOUNTER — OFFICE VISIT (OUTPATIENT)
Dept: PSYCHIATRY | Facility: CLINIC | Age: 48
End: 2023-01-04
Payer: COMMERCIAL

## 2023-01-04 VITALS
BODY MASS INDEX: 63.57 KG/M2 | HEART RATE: 76 BPM | DIASTOLIC BLOOD PRESSURE: 83 MMHG | WEIGHT: 293 LBS | SYSTOLIC BLOOD PRESSURE: 150 MMHG

## 2023-01-04 DIAGNOSIS — F33.1 MAJOR DEPRESSIVE DISORDER, RECURRENT EPISODE, MODERATE WITH ANXIOUS DISTRESS: Primary | ICD-10-CM

## 2023-01-04 DIAGNOSIS — F43.21 GRIEF: ICD-10-CM

## 2023-01-04 DIAGNOSIS — I10 HYPERTENSION, UNSPECIFIED TYPE: ICD-10-CM

## 2023-01-04 PROCEDURE — 3008F PR BODY MASS INDEX (BMI) DOCUMENTED: ICD-10-PCS | Mod: CPTII,S$GLB,, | Performed by: NURSE PRACTITIONER

## 2023-01-04 PROCEDURE — 1159F PR MEDICATION LIST DOCUMENTED IN MEDICAL RECORD: ICD-10-PCS | Mod: CPTII,S$GLB,, | Performed by: NURSE PRACTITIONER

## 2023-01-04 PROCEDURE — 1160F PR REVIEW ALL MEDS BY PRESCRIBER/CLIN PHARMACIST DOCUMENTED: ICD-10-PCS | Mod: CPTII,S$GLB,, | Performed by: NURSE PRACTITIONER

## 2023-01-04 PROCEDURE — 99214 PR OFFICE/OUTPT VISIT, EST, LEVL IV, 30-39 MIN: ICD-10-PCS | Mod: S$GLB,,, | Performed by: NURSE PRACTITIONER

## 2023-01-04 PROCEDURE — 99214 OFFICE O/P EST MOD 30 MIN: CPT | Mod: S$GLB,,, | Performed by: NURSE PRACTITIONER

## 2023-01-04 PROCEDURE — 3077F SYST BP >= 140 MM HG: CPT | Mod: CPTII,S$GLB,, | Performed by: NURSE PRACTITIONER

## 2023-01-04 PROCEDURE — 3008F BODY MASS INDEX DOCD: CPT | Mod: CPTII,S$GLB,, | Performed by: NURSE PRACTITIONER

## 2023-01-04 PROCEDURE — 1160F RVW MEDS BY RX/DR IN RCRD: CPT | Mod: CPTII,S$GLB,, | Performed by: NURSE PRACTITIONER

## 2023-01-04 PROCEDURE — 3077F PR MOST RECENT SYSTOLIC BLOOD PRESSURE >= 140 MM HG: ICD-10-PCS | Mod: CPTII,S$GLB,, | Performed by: NURSE PRACTITIONER

## 2023-01-04 PROCEDURE — 3079F DIAST BP 80-89 MM HG: CPT | Mod: CPTII,S$GLB,, | Performed by: NURSE PRACTITIONER

## 2023-01-04 PROCEDURE — 99999 PR PBB SHADOW E&M-EST. PATIENT-LVL II: CPT | Mod: PBBFAC,,, | Performed by: NURSE PRACTITIONER

## 2023-01-04 PROCEDURE — 1159F MED LIST DOCD IN RCRD: CPT | Mod: CPTII,S$GLB,, | Performed by: NURSE PRACTITIONER

## 2023-01-04 PROCEDURE — 99999 PR PBB SHADOW E&M-EST. PATIENT-LVL II: ICD-10-PCS | Mod: PBBFAC,,, | Performed by: NURSE PRACTITIONER

## 2023-01-04 PROCEDURE — 3079F PR MOST RECENT DIASTOLIC BLOOD PRESSURE 80-89 MM HG: ICD-10-PCS | Mod: CPTII,S$GLB,, | Performed by: NURSE PRACTITIONER

## 2023-01-04 RX ORDER — VENLAFAXINE HYDROCHLORIDE 150 MG/1
150 CAPSULE, EXTENDED RELEASE ORAL DAILY
Qty: 90 CAPSULE | Refills: 1 | Status: SHIPPED | OUTPATIENT
Start: 2023-01-04 | End: 2023-06-22

## 2023-01-04 RX ORDER — VENLAFAXINE HYDROCHLORIDE 75 MG/1
75 CAPSULE, EXTENDED RELEASE ORAL DAILY
Qty: 90 CAPSULE | Refills: 1 | Status: SHIPPED | OUTPATIENT
Start: 2023-01-04 | End: 2023-07-03

## 2023-01-04 NOTE — PROGRESS NOTES
1/4/2023   Kristopher Elmore  1975  [id number]    Outpatient Psychiatry Follow-Up Visit (MD/NP)         Chief Complaint:  Kristopher Elmore, a 47 y.o. female,who presents today for follow up of depression, anxiety and grief.  Met with patient.      Interval History/Subjective Report/Content of Current Session:     Pt is a 47 y.o. female with a hx of DM II, CHF, stage 4 breast cancer, anxiety, MDD and grief.    Pt reports that she ran out of Effexor XR 75 mg capsules about 2 weeks ago, and she noticed a decrease in mood. She was tearful with a depressed mood. This Gam[place] holiday was particularly difficult without her parents. However, she spent the holiday with at her sister's home in [place]. She states it was nice. They all wore pajamas and baked cookies. They created new memories and traditions. She states she found the extra 75 mg of Effexor XR to be very beneficial. Denies anhedonia and hopelessness. States she was discharged from Dr. Sher's care but can always return in the future if needed. She reports she has a really strong support system. This includes her two adult daughters (ages 19 and 21), her best friend, and her brother and sister. She states she feels like she is doing really well.    Sleeping well but reports her sleep schedule has been off since she has been staying up late watching movies with her daughters. Appetite is good.    ASE of psych meds: denies    Pt denies recurrent thoughts of death and denies any suicidal or homicidal plans or intentions.      Denies any sxs of keri or hypomania. Denies AVH, paranoia and delusions. No objective s/sx of psychosis or keri.     Her BP is elevated today. However, she had not yet taken her BP meds.          Psychotherapy:  Target symptoms: depression, anxiety , grief  Why chosen therapy is appropriate versus another modality: relevant to diagnosis, patient responds to this modality  Outcome monitoring methods: self-report,  "observation  Therapeutic intervention type: supportive psychotherapy  Topics discussed/themes: illness/death of a loved one, stress related to medical comorbidities, building skills sets for symptom management  The patient's response to the intervention is accepting. The patient's progress toward treatment goals is good.   Duration of intervention: 7 minutes.      Psychotropic medication review  Previous Trials-  Klonopin  Valium  Xanax  Vistaril  Celexa - interacts with one of her cancer meds  Wellbutrin - took for wt loss; made her "sad"  Prozac     Current meds-  Effexor XR          Review of Systems       Review of Systems   Constitutional:  Negative for chills, fever and malaise/fatigue.   Respiratory:  Negative for cough and shortness of breath.    Cardiovascular:  Negative for chest pain and palpitations.   Gastrointestinal:  Negative for abdominal pain, diarrhea and vomiting.   Genitourinary:  Negative for dysuria and hematuria.   Musculoskeletal:  Negative for falls and myalgias.   Skin:  Negative for rash.   Neurological:  Negative for tremors, seizures and headaches.   Psychiatric/Behavioral:          See HPI       Past Medical, Family and Social History: The patient's past medical, family and social history, allergies, current medications, past surgical history, and problem list have been reviewed and updated as appropriate within the electronic medical record.    Compliance: no - pt did not follow up as instructed    Side effects: None    Risk Parameters:  Patient reports no suicidal ideation  Patient reports no homicidal ideation  Patient reports no self-injurious behavior  Patient reports no violent behavior    Exam (detailed: at least 9 elements; comprehensive: all 15 elements)   Constitutional  Vitals:  Most recent vital signs, dated less than 90 days prior to this appointment, were reviewed.   Vitals:    01/04/23 1404   BP: (!) 150/83   Pulse: 76   Weight: (!) 168 kg (370 lb 6 oz)        General:  " unremarkable, age appropriate, well nourished, casually dressed, neatly groomed, obese       Musculoskeletal  Muscle Strength/Tone:  no dyskinesia, no dystonia, no tremor, no tic   Gait & Station:  non-ataxic     Psychiatric      Appearance:  unremarkable, age appropriate, well nourished, casually dressed, neatly groomed, obese   Behavior:  normal, friendly and cooperative, eye contact normal     Speech:  no latency; no press   Mood & Affect:  euthymic  congruent and appropriate   Thought Process:  normal and logical   Associations:  intact   Thought Content:  normal, no suicidality, no homicidality, delusions, or paranoia   Insight:  intact, has awareness of illness   Judgement: behavior is adequate to circumstances, age appropriate   Orientation:  grossly intact   Memory: intact for content of interview   Language: grossly intact   Attention Span & Concentration:  able to focus   Fund of Knowledge:  intact and appropriate to age and level of education     Medications:  Outpatient Encounter Medications as of 1/4/2023   Medication Sig Dispense Refill    (Magic mouthwash) 1:1:1 Benadryl 12.5mg/5ml liq, aluminum & magnesium hydroxide-simehticone (Maalox), LIDOcaine viscous 2% Swish and spit 10 mLs every 4 (four) hours as needed. for mouth sores 360 mL 0    anastrozole (ARIMIDEX) 1 mg Tab TAKE 1 TABLET(1 MG) BY MOUTH EVERY DAY 90 tablet 3    atorvastatin (LIPITOR) 40 MG tablet TAKE 1 TABLET(40 MG) BY MOUTH EVERY DAY 90 tablet 3    blood sugar diagnostic Strp To check BG 4 times daily, to use with insurance preferred meter 200 each 5    blood-glucose meter kit To check BG 4 times daily, to use with insurance preferred meter 1 each 0    blood-glucose meter,continuous (DEXCOM G6 ) Misc Use with dexcom G6 system 1 each 0    blood-glucose sensor (DEXCOM G6 SENSOR) Monique Change sensor every 10 days 3 each 11    blood-glucose sensor (FREESTYLE MARGARITA 3 SENSOR) Monique 2 each by Misc.(Non-Drug; Combo Route) route every 14  (fourteen) days. 2 each 11    blood-glucose transmitter (DEXCOM G6 TRANSMITTER) Monique Change every 3 months 1 each 3    carvediloL (COREG) 25 MG tablet TAKE 1 TABLET(25 MG) BY MOUTH TWICE DAILY 180 tablet 3    ciclopirox (PENLAC) 8 % Soln Apply topically nightly. 6.6 mL 3    duke's soln (benadryl 30 mL, mylanta 30 mL, LIDOcaine 30 mL, nystatin 30 mL) 120mL Take 10 mLs by mouth 4 (four) times daily. (Patient not taking: Reported on 12/19/2022) 120 mL 3    duke's soln (benadryl 30 mL, mylanta 30 mL, LIDOcaine 30 mL, nystatin 30 mL) 120mL TAKE 10MLS BY MOUTH FOUR TIMES DAILY      empagliflozin (JARDIANCE) 25 mg tablet Take 1 tablet (25 mg total) by mouth once daily. 30 tablet 3    ergocalciferol (ERGOCALCIFEROL) 50,000 unit Cap TAKE 1 CAPSULE BY MOUTH EVERY 7 DAYS 12 capsule 0    ferrous sulfate 324 mg (65 mg iron) TbEC TAKE 1 TABLET BY MOUTH ON EVERY MONDAY, WEDNESDAY, AND FRIDAY 30 tablet 0    furosemide (LASIX) 20 MG tablet Take 1 tablet (20 mg total) by mouth 2 (two) times daily. 60 tablet 11    glipiZIDE (GLUCOTROL) 10 MG TR24 Take 1 tablet (10 mg total) by mouth daily with breakfast. 90 tablet 0    goserelin (ZOLADEX) 3.6 mg injection Inject 3.6 mg into the skin every 28 days.      ibuprofen (ADVIL,MOTRIN) 800 MG tablet Take 1 tablet (800 mg total) by mouth 2 (two) times daily as needed for Pain. 45 tablet 2    insulin degludec (TRESIBA U-100 INSULIN) 100 unit/mL injection Inject 0.36 mLs (36 Units total) into the skin once daily. Titrate to 50 units daily 15 mL 1    lancets Misc To check BG 4 times daily, to use with insurance preferred meter 200 each 5    LIDOcaine (LIDODERM) 5 % Place 2 patches onto the skin once daily. Remove & Discard patch within 12 hours or as directed by MD 60 patch 0    LORazepam (ATIVAN) 1 MG tablet Take 1 tablet (1 mg total) by mouth every 12 (twelve) hours as needed for Anxiety. 30 tablet 0    metFORMIN (GLUCOPHAGE-XR) 500 MG ER 24hr tablet Take 2 tablets (1,000 mg total) by mouth 2  "(two) times daily with meals. 360 tablet 0    morphine (MS CONTIN) 15 MG 12 hr tablet Take 1 tablet (15 mg total) by mouth every 8 (eight) hours as needed for Pain. 60 tablet 0    ondansetron (ZOFRAN-ODT) 4 MG TbDL Take 1 tablet (4 mg total) by mouth 2 (two) times daily. 30 tablet 0    ondansetron (ZOFRAN-ODT) 8 MG TbDL DISSOLVE 1 TABLET(8 MG) ON THE TONGUE EVERY 8 HOURS AS NEEDED FOR NAUSEA 30 tablet 2    palbociclib (IBRANCE) 100 mg Cap Take 1 capsule (100 mg) by mouth once daily for 21 days, followed by 7 days off. 21 capsule 3    pen needle, diabetic (BD ULTRA-FINE VERONICA PEN NEEDLE) 32 gauge x 5/32" Ndle 1 Device by Misc.(Non-Drug; Combo Route) route Daily. 100 each 4    potassium chloride (KLOR-CON) 10 MEQ TbSR Take 1 tablet (10 mEq total) by mouth once daily. 30 tablet 2    promethazine (PHENERGAN) 25 MG tablet Take 1 tablet (25 mg total) by mouth every 6 (six) hours as needed for Nausea. 30 tablet 1    sacubitriL-valsartan (ENTRESTO)  mg per tablet Take 1 tablet by mouth 2 (two) times daily. 60 tablet 11    sennosides (SENNA-C ORAL) Take 2 tablets by mouth daily as needed.      spironolactone (ALDACTONE) 25 MG tablet Take 1 tablet (25 mg total) by mouth once daily. 90 tablet 1    traMADoL (ULTRAM) 50 mg tablet Take 1 tablet (50 mg total) by mouth every 6 (six) hours as needed for Pain. 120 tablet 0    venlafaxine (EFFEXOR-XR) 150 MG Cp24 TAKE 1 CAPSULE(150 MG) BY MOUTH EVERY DAY 15 capsule 0     No facility-administered encounter medications on file as of 1/4/2023.       Allergy:  Review of patient's allergies indicates:   Allergen Reactions    Keflex [cephalexin] Itching         Assessment and Diagnosis   Status/Progress: Based on the examination today, the patient's problem(s) is/are well controlled.  New problems have not been presented today.   Co-morbidities are not complicating management of the primary condition.  There are no active rule-out diagnoses for this patient at this time.       General " Impression:       ICD-10-CM ICD-9-CM   1. Major depressive disorder, recurrent episode, moderate with anxious distress  F33.1 296.32   2. Grief  F43.21 309.0   3. Hypertension, unspecified type  I10 401.9       Intervention/Counseling/Treatment Plan     Medication Management:  Continue Effexor  mg q day  Labs: reviewed most recent  The treatment plan and follow up plan were reviewed with the patient.  Discussed with patient informed consent, risks vs. benefits, alternative treatments, side effect profile and the inherent unpredictability of individual responses to treatments and all medications prescribed. The patient expresses understanding of the above and displays the capacity to agree with this current plan and had no other questions.  Encouraged Patient to keep future appointments.   Take medications as prescribed and abstain from substance abuse.   Pt was told to present to ED or call 911 for SI/HI plan or intent, psychosis, or other psychiatric or medical emergency, and pt agrees to this and verbalized understanding.        Return to Clinic: 6 months, or sooner if needed      Face-to-face time with patient:  23 minutes  Total time:  30 minutes of total time spent on the encounter, which includes face to face time and non-face to face time preparing to see the patient (eg, review of tests), Obtaining and/or reviewing separately obtained history, Documenting clinical information in the electronic or other health record, Independently interpreting results (not separately reported) and communicating results to the patient/family/caregiver, or Care coordination (not separately reported).       Vandana Parra, MSN, APRN, PMHNP-BC [facility]

## 2023-01-10 ENCOUNTER — PATIENT MESSAGE (OUTPATIENT)
Dept: ENDOCRINOLOGY | Facility: CLINIC | Age: 48
End: 2023-01-10
Payer: MEDICARE

## 2023-01-10 ENCOUNTER — SPECIALTY PHARMACY (OUTPATIENT)
Dept: PHARMACY | Facility: CLINIC | Age: 48
End: 2023-01-10
Payer: MEDICARE

## 2023-01-10 DIAGNOSIS — E11.65 TYPE 2 DIABETES MELLITUS WITH HYPERGLYCEMIA, WITHOUT LONG-TERM CURRENT USE OF INSULIN: Chronic | ICD-10-CM

## 2023-01-10 RX ORDER — LANCETS
EACH MISCELLANEOUS
Qty: 200 EACH | Refills: 3 | Status: SHIPPED | OUTPATIENT
Start: 2023-01-10

## 2023-01-10 RX ORDER — INSULIN PUMP SYRINGE, 3 ML
EACH MISCELLANEOUS
Qty: 1 EACH | Refills: 0 | Status: SHIPPED | OUTPATIENT
Start: 2023-01-10 | End: 2024-01-10

## 2023-01-10 NOTE — TELEPHONE ENCOUNTER
Outgoing call regarding Ibrance refill,  pt stated she was prescribed some new medication, but has not started it yet. Transferred to [facility]

## 2023-01-10 NOTE — TELEPHONE ENCOUNTER
Specialty Pharmacy - Refill Coordination    Specialty Medication Orders Linked to Encounter      Flowsheet Row Most Recent Value   Medication #1 palbociclib (IBRANCE) 100 mg Cap (Order#[id number], Rx#[id number])            Refill Questions - Documented Responses      Flowsheet Row Most Recent Value   Patient Availability and HIPAA Verification    Does patient want to proceed with activity? Yes   HIPAA/medical authority confirmed? Yes   Relationship to patient of person spoken to? Self   Refill Screening Questions    Changes to allergies? No   Changes to medications? Yes  [Patient started Jardiance and Metformin on 12/14. These medications do not interact with Ibrance.]   New conditions since last clinic visit? No   Unplanned office visit, urgent care, ED, or hospital admission in the last 4 weeks? No   How does patient/caregiver feel medication is working? Good   Financial problems or insurance changes? No   How many doses of your specialty medications were missed in the last 4 weeks? 0   Would patient like to speak to a pharmacist? No   When does the patient need to receive the medication? 01/10/23   Refill Delivery Questions    How will the patient receive the medication? [facility]   When does the patient need to receive the medication? 01/10/23   Shipping Address Home   Address in [facility] confirmed and updated if neccessary? Yes   Expected Copay ($) 0   Is the patient able to afford the medication copay? Yes   Payment Method zero copay   Days supply of Refill 28   Supplies needed? No supplies needed   Refill activity completed? Yes   Refill activity plan Refill scheduled   Shipment/Pickup Date: 01/10/23            Current Outpatient Medications   Medication Sig    (Magic mouthwash) 1:1:1 Benadryl 12.5mg/5ml liq, aluminum & magnesium hydroxide-simehticone (Maalox), LIDOcaine viscous 2% Swish and spit 10 mLs every 4 (four) hours as needed. for mouth sores    anastrozole (ARIMIDEX) 1 mg Tab TAKE 1 TABLET(1 MG)  BY MOUTH EVERY DAY    atorvastatin (LIPITOR) 40 MG tablet TAKE 1 TABLET(40 MG) BY MOUTH EVERY DAY    blood sugar diagnostic Strp To check BG 4 times daily, to use with insurance preferred meter    blood-glucose meter kit To check BG 4 times daily, to use with insurance preferred meter    blood-glucose meter,continuous (DEXCOM G6 ) Misc Use with dexcom G6 system    blood-glucose sensor (DEXCOM G6 SENSOR) Monique Change sensor every 10 days    blood-glucose sensor (FREESTYLE MARGARITA 3 SENSOR) Monique 2 each by Misc.(Non-Drug; Combo Route) route every 14 (fourteen) days.    blood-glucose transmitter (DEXCOM G6 TRANSMITTER) Monique Change every 3 months    carvediloL (COREG) 25 MG tablet TAKE 1 TABLET(25 MG) BY MOUTH TWICE DAILY    ciclopirox (PENLAC) 8 % Soln Apply topically nightly.    duke's soln (benadryl 30 mL, mylanta 30 mL, LIDOcaine 30 mL, nystatin 30 mL) 120mL Take 10 mLs by mouth 4 (four) times daily. (Patient not taking: Reported on 12/19/2022)    duke's soln (benadryl 30 mL, mylanta 30 mL, LIDOcaine 30 mL, nystatin 30 mL) 120mL TAKE 10MLS BY MOUTH FOUR TIMES DAILY    empagliflozin (JARDIANCE) 25 mg tablet Take 1 tablet (25 mg total) by mouth once daily.    ergocalciferol (ERGOCALCIFEROL) 50,000 unit Cap TAKE 1 CAPSULE BY MOUTH EVERY 7 DAYS    ferrous sulfate 324 mg (65 mg iron) TbEC TAKE 1 TABLET BY MOUTH ON EVERY MONDAY, WEDNESDAY, AND FRIDAY    furosemide (LASIX) 20 MG tablet Take 1 tablet (20 mg total) by mouth 2 (two) times daily.    glipiZIDE (GLUCOTROL) 10 MG TR24 Take 1 tablet (10 mg total) by mouth daily with breakfast.    goserelin (ZOLADEX) 3.6 mg injection Inject 3.6 mg into the skin every 28 days.    ibuprofen (ADVIL,MOTRIN) 800 MG tablet Take 1 tablet (800 mg total) by mouth 2 (two) times daily as needed for Pain.    insulin degludec (TRESIBA U-100 INSULIN) 100 unit/mL injection Inject 0.36 mLs (36 Units total) into the skin once daily. Titrate to 50 units daily    lancets Misc To check BG 4 times  "daily, to use with insurance preferred meter    LIDOcaine (LIDODERM) 5 % Place 2 patches onto the skin once daily. Remove & Discard patch within 12 hours or as directed by MD    LORazepam (ATIVAN) 1 MG tablet Take 1 tablet (1 mg total) by mouth every 12 (twelve) hours as needed for Anxiety.    metFORMIN (GLUCOPHAGE-XR) 500 MG ER 24hr tablet Take 2 tablets (1,000 mg total) by mouth 2 (two) times daily with meals.    morphine (MS CONTIN) 15 MG 12 hr tablet Take 1 tablet (15 mg total) by mouth every 8 (eight) hours as needed for Pain.    ondansetron (ZOFRAN-ODT) 4 MG TbDL Take 1 tablet (4 mg total) by mouth 2 (two) times daily.    ondansetron (ZOFRAN-ODT) 8 MG TbDL DISSOLVE 1 TABLET(8 MG) ON THE TONGUE EVERY 8 HOURS AS NEEDED FOR NAUSEA    palbociclib (IBRANCE) 100 mg Cap Take 1 capsule (100 mg) by mouth once daily for 21 days, followed by 7 days off.    pen needle, diabetic (BD ULTRA-FINE VERONICA PEN NEEDLE) 32 gauge x 5/32" Ndle 1 Device by Misc.(Non-Drug; Combo Route) route Daily.    potassium chloride (KLOR-CON) 10 MEQ TbSR Take 1 tablet (10 mEq total) by mouth once daily.    promethazine (PHENERGAN) 25 MG tablet Take 1 tablet (25 mg total) by mouth every 6 (six) hours as needed for Nausea.    sacubitriL-valsartan (ENTRESTO)  mg per tablet Take 1 tablet by mouth 2 (two) times daily.    sennosides (SENNA-C ORAL) Take 2 tablets by mouth daily as needed.    spironolactone (ALDACTONE) 25 MG tablet Take 1 tablet (25 mg total) by mouth once daily.    traMADoL (ULTRAM) 50 mg tablet Take 1 tablet (50 mg total) by mouth every 6 (six) hours as needed for Pain.    venlafaxine (EFFEXOR-XR) 150 MG Cp24 Take 1 capsule (150 mg total) by mouth once daily.    venlafaxine (EFFEXOR-XR) 75 MG 24 hr capsule Take 1 capsule (75 mg total) by mouth once daily. Take with 150 mg capsule for a total of 225 mg.   Last reviewed on 1/4/2023  2:28 PM by Vandana Parra, NP-C    Review of patient's allergies indicates:   Allergen " Reactions    Keflex [cephalexin] Itching    Last reviewed on  1/4/2023 2:28 PM by Vandana Parra      Tasks added this encounter   No tasks added.   Tasks due within next 3 months   2/12/2023 - Clinical - Follow Up Assesement (180 day)  1/9/2023 - Refill Call (Auto Added)     ALISON SCRUGGS, PharmD  [facility]  [address]  [facility] [address]  Phone: [phone]  Fax: [phone]

## 2023-01-18 ENCOUNTER — HOSPITAL ENCOUNTER (OUTPATIENT)
Dept: RADIOLOGY | Facility: HOSPITAL | Age: 48
Discharge: HOME | End: 2023-01-18
Attending: INTERNAL MEDICINE
Payer: MEDICARE

## 2023-01-18 DIAGNOSIS — Z17.0 MALIGNANT NEOPLASM OF UPPER-INNER QUADRANT OF RIGHT BREAST IN FEMALE, ESTROGEN RECEPTOR POSITIVE: ICD-10-CM

## 2023-01-18 DIAGNOSIS — C79.51 BONE METASTASES: ICD-10-CM

## 2023-01-18 DIAGNOSIS — C50.211 MALIGNANT NEOPLASM OF UPPER-INNER QUADRANT OF RIGHT BREAST IN FEMALE, ESTROGEN RECEPTOR POSITIVE: ICD-10-CM

## 2023-01-18 PROCEDURE — 74177 CT CHEST ABDOMEN PELVIS WITH CONTRAST (XPD): ICD-10-PCS | Mod: 26,,, | Performed by: RADIOLOGY

## 2023-01-18 PROCEDURE — 25500020 PHARM REV CODE 255: Performed by: INTERNAL MEDICINE

## 2023-01-18 PROCEDURE — A9698 NON-RAD CONTRAST MATERIALNOC: HCPCS | Performed by: INTERNAL MEDICINE

## 2023-01-18 PROCEDURE — 71260 CT THORAX DX C+: CPT | Mod: 26,,, | Performed by: RADIOLOGY

## 2023-01-18 PROCEDURE — 71260 CT THORAX DX C+: CPT | Mod: TC

## 2023-01-18 PROCEDURE — 71260 CT CHEST ABDOMEN PELVIS WITH CONTRAST (XPD): ICD-10-PCS | Mod: 26,,, | Performed by: RADIOLOGY

## 2023-01-18 PROCEDURE — 74177 CT ABD & PELVIS W/CONTRAST: CPT | Mod: TC

## 2023-01-18 PROCEDURE — 74177 CT ABD & PELVIS W/CONTRAST: CPT | Mod: 26,,, | Performed by: RADIOLOGY

## 2023-01-18 RX ADMIN — Medication 450 ML: at 12:01

## 2023-01-18 RX ADMIN — IOHEXOL 100 ML: 350 INJECTION, SOLUTION INTRAVENOUS at 12:01

## 2023-01-23 ENCOUNTER — OFFICE VISIT (OUTPATIENT)
Dept: HEMATOLOGY/ONCOLOGY | Facility: CLINIC | Age: 48
End: 2023-01-23
Payer: MEDICARE

## 2023-01-23 VITALS
SYSTOLIC BLOOD PRESSURE: 138 MMHG | HEIGHT: 64 IN | BODY MASS INDEX: 63.57 KG/M2 | OXYGEN SATURATION: 98 % | RESPIRATION RATE: 18 BRPM | HEART RATE: 80 BPM | DIASTOLIC BLOOD PRESSURE: 74 MMHG | TEMPERATURE: 98 F

## 2023-01-23 DIAGNOSIS — I50.42 CHRONIC COMBINED SYSTOLIC AND DIASTOLIC HEART FAILURE: ICD-10-CM

## 2023-01-23 DIAGNOSIS — Z17.0 MALIGNANT NEOPLASM OF UPPER-INNER QUADRANT OF RIGHT BREAST IN FEMALE, ESTROGEN RECEPTOR POSITIVE: Primary | ICD-10-CM

## 2023-01-23 DIAGNOSIS — C50.211 MALIGNANT NEOPLASM OF UPPER-INNER QUADRANT OF RIGHT BREAST IN FEMALE, ESTROGEN RECEPTOR POSITIVE: Primary | ICD-10-CM

## 2023-01-23 DIAGNOSIS — G89.3 CANCER ASSOCIATED PAIN: ICD-10-CM

## 2023-01-23 DIAGNOSIS — C79.51 BONE METASTASES: ICD-10-CM

## 2023-01-23 PROCEDURE — 99499 UNLISTED E&M SERVICE: CPT | Mod: S$GLB,,, | Performed by: INTERNAL MEDICINE

## 2023-01-23 PROCEDURE — 3075F PR MOST RECENT SYSTOLIC BLOOD PRESS GE 130-139MM HG: ICD-10-PCS | Mod: CPTII,S$GLB,, | Performed by: INTERNAL MEDICINE

## 2023-01-23 PROCEDURE — 99214 PR OFFICE/OUTPT VISIT, EST, LEVL IV, 30-39 MIN: ICD-10-PCS | Mod: S$GLB,,, | Performed by: INTERNAL MEDICINE

## 2023-01-23 PROCEDURE — 99999 PR PBB SHADOW E&M-EST. PATIENT-LVL V: CPT | Mod: PBBFAC,,, | Performed by: INTERNAL MEDICINE

## 2023-01-23 PROCEDURE — 3078F PR MOST RECENT DIASTOLIC BLOOD PRESSURE < 80 MM HG: ICD-10-PCS | Mod: CPTII,S$GLB,, | Performed by: INTERNAL MEDICINE

## 2023-01-23 PROCEDURE — 99999 PR PBB SHADOW E&M-EST. PATIENT-LVL V: ICD-10-PCS | Mod: PBBFAC,,, | Performed by: INTERNAL MEDICINE

## 2023-01-23 PROCEDURE — 3008F PR BODY MASS INDEX (BMI) DOCUMENTED: ICD-10-PCS | Mod: CPTII,S$GLB,, | Performed by: INTERNAL MEDICINE

## 2023-01-23 PROCEDURE — 3078F DIAST BP <80 MM HG: CPT | Mod: CPTII,S$GLB,, | Performed by: INTERNAL MEDICINE

## 2023-01-23 PROCEDURE — 99499 RISK ADDL DX/OHS AUDIT: ICD-10-PCS | Mod: S$GLB,,, | Performed by: INTERNAL MEDICINE

## 2023-01-23 PROCEDURE — 3008F BODY MASS INDEX DOCD: CPT | Mod: CPTII,S$GLB,, | Performed by: INTERNAL MEDICINE

## 2023-01-23 PROCEDURE — 1159F MED LIST DOCD IN RCRD: CPT | Mod: CPTII,S$GLB,, | Performed by: INTERNAL MEDICINE

## 2023-01-23 PROCEDURE — 1159F PR MEDICATION LIST DOCUMENTED IN MEDICAL RECORD: ICD-10-PCS | Mod: CPTII,S$GLB,, | Performed by: INTERNAL MEDICINE

## 2023-01-23 PROCEDURE — 1160F PR REVIEW ALL MEDS BY PRESCRIBER/CLIN PHARMACIST DOCUMENTED: ICD-10-PCS | Mod: CPTII,S$GLB,, | Performed by: INTERNAL MEDICINE

## 2023-01-23 PROCEDURE — 1160F RVW MEDS BY RX/DR IN RCRD: CPT | Mod: CPTII,S$GLB,, | Performed by: INTERNAL MEDICINE

## 2023-01-23 PROCEDURE — 99214 OFFICE O/P EST MOD 30 MIN: CPT | Mod: S$GLB,,, | Performed by: INTERNAL MEDICINE

## 2023-01-23 PROCEDURE — 3075F SYST BP GE 130 - 139MM HG: CPT | Mod: CPTII,S$GLB,, | Performed by: INTERNAL MEDICINE

## 2023-01-23 NOTE — PROGRESS NOTES
Subjective:       Patient ID: Kristopher Elmore is a 47 y.o. female.    Chief Complaint: Malignant neoplasm of upper-inner quadrant of right breast     HPI    Presents for follow up and scan results    Notes aching in right shoulder- off and on  Notes pain mid to above buttock- pain began this AM, states is started this AM  Has not taken any pain medication yet  Notes no changes in activity, sleeping and denies falls  As scans stable, and just developed she will monitor and keep us posted    BG better  Weight better  Neuropathy better    - 1/18/2023 CT C/A/P:  FINDINGS:  Base of Neck: No significant abnormality.  CHEST:  -Heart: Normal size. No pericardial effusion.  -Pulmonary vasculature: Pulmonary arteries distribute normally.  There are four pulmonary veins.  -Jane/Mediastinum: No pathologic hakeem enlargement.  -Trachea and Proximal airways: Patent.  -Lungs/Pleura: Symmetrically expanded without consolidation, pneumothorax, or mass.  No pleural effusion or thickening.  Bilateral bandlike opacifications in the lung bases favored to represent scarring or subsegmental atelectasis.  -Esophagus: Normal course and caliber.  ABDOMEN:  - Liver: Normal in size and attenuation with no focal hepatic abnormality  - Gallbladder: No calcified gallstones.  No wall thickening or pericholecystic fluid.  - Bile Ducts: No intra or extrahepatic biliary ductal dilatation.  - Stomach/Duodenum: Unremarkable.  - Spleen: Unremarkable.  - Pancreas: Unremarkable.  - Adrenals: Unremarkable.  - Kidneys/ureters/urinary bladder: Normal in size and location.  Right kidney 1.0 cm simple renal cyst (axial series 3, image 72).  No hydronephrosis or stones.  The ureters appear normal in course and caliber without evidence of ureteral dilatation.  The urinary bladder is unremarkable.  - Retroperitoneum: No significant adenopathy.  PELVIS:  - Reproductive: Calcific lesion within the fundus of the uterus, likely representing involuted  fibroid.  - Other: No pelvic adenopathy, free fluid, or mass.  BOWEL/MESENTERY:  No evidence of bowel obstruction or inflammatory process. Scattered colonic diverticulosis without adjacent inflammatory changes to suggest acute diverticulitis.  VASCULATURE: Left-sided aortic arch with 3 branch vessels.  No aneurysm and no significant atherosclerosis.  BONES: No acute fracture.  Stable lucent lesion involving the right iliac wing.  Stable lucent/sclerotic lesions throughout the vertebral spine consistent with metastatic disease.  EXTRATHORACIC/EXTRAPERITONEAL SOFT TISSUES: Mild abdominal wall edema along the anterior abdominal wall.  Impression:  In this patient with history of invasive ductal carcinoma of the right breast, there is no CT evidence of new metastatic disease.  Persistent lucent/sclerotic lesions throughout the pelvis and spine, similar appearance to prior.    Review of Systems   Constitutional:  Negative for activity change, appetite change, chills, fatigue (notes pretty good), fever and unexpected weight change.   HENT:  Negative for mouth sores, rhinorrhea and trouble swallowing.    Eyes:  Negative for visual disturbance.   Respiratory:  Negative for cough, shortness of breath and wheezing.    Cardiovascular:  Negative for chest pain, palpitations and leg swelling.   Gastrointestinal:  Positive for nausea (mild on medication, controlled on zofran). Negative for abdominal distention, abdominal pain, blood in stool, change in bowel habit, constipation, diarrhea, vomiting and change in bowel habit.   Genitourinary:  Positive for frequency (better with BG control). Negative for difficulty urinating, dysuria and urgency.   Musculoskeletal:  Positive for back pain (chronic). Negative for arthralgias, joint swelling and myalgias.   Integumentary:  Negative for rash.   Neurological:  Negative for dizziness, weakness, numbness, headaches, coordination difficulties and coordination difficulties.    Hematological:  Negative for adenopathy. Does not bruise/bleed easily.   Psychiatric/Behavioral:  Negative for dysphoric mood and sleep disturbance. The patient is not nervous/anxious.        Objective:      Physical Exam  Vitals and nursing note reviewed.   Constitutional:       General: She is not in acute distress.     Appearance: Normal appearance. She is well-developed. She is obese. She is not ill-appearing.      Comments: Presents with her 2 daughter  Very pleasant  ECOG= 0  Uses wheelchair for distance, but walks into exam room     HENT:      Head: Normocephalic and atraumatic.   Eyes:      General: Lids are normal. No scleral icterus.     Extraocular Movements: Extraocular movements intact.      Conjunctiva/sclera: Conjunctivae normal.      Pupils: Pupils are equal, round, and reactive to light.   Neck:      Thyroid: No thyromegaly.      Vascular: No JVD.      Trachea: Trachea normal.   Cardiovascular:      Rate and Rhythm: Normal rate and regular rhythm.      Heart sounds: Normal heart sounds. No murmur heard.    No friction rub. No gallop.      Comments: Difficult to assess tones.   Pulmonary:      Effort: Pulmonary effort is normal. No respiratory distress.      Breath sounds: Normal breath sounds. No wheezing, rhonchi or rales.      Comments: distant  Abdominal:      General: Bowel sounds are normal. There is no distension.      Palpations: Abdomen is soft. There is no mass.      Tenderness: There is no abdominal tenderness. There is no guarding or rebound.      Comments: No organomegaly however difficult to assess.    Hardness noted periumbilical area   Musculoskeletal:         General: Normal range of motion.      Cervical back: Normal range of motion and neck supple.      Comments: No spinal or paraspinal tenderness.    Lymphadenopathy:      Head:      Right side of head: No submental or submandibular adenopathy.      Left side of head: No submental or submandibular adenopathy.      Cervical: No  cervical adenopathy.      Upper Body:      Right upper body: No supraclavicular or axillary adenopathy.      Left upper body: No supraclavicular or axillary adenopathy.   Skin:     General: Skin is warm and dry.      Capillary Refill: Capillary refill takes less than 2 seconds.      Coloration: Skin is not jaundiced or pale.      Findings: No bruising or rash.      Nails: There is no clubbing.   Neurological:      Mental Status: She is alert and oriented to person, place, and time.      Sensory: No sensory deficit.      Motor: No weakness.      Coordination: Coordination normal.      Gait: Gait normal.   Psychiatric:         Mood and Affect: Mood normal.         Speech: Speech normal.         Behavior: Behavior normal.         Thought Content: Thought content normal.         Judgment: Judgment normal.       Labs- reviewed  Imaging- reviewed  Assessment:       Problem List Items Addressed This Visit       Malignant neoplasm of upper-inner quadrant of right breast in female, estrogen receptor positive - Primary    Chronic combined systolic and diastolic heart failure    Cancer associated pain    Bone metastases       Plan:       DM, CHF- improved management  Doing well with cardiology, endocrinology     Metastatic breast cancer  Continue AI and Ibrance and Zoladex and Xgeva monthly  Scan reviewed and stable    Knows to call for any issues    Route Chart for Scheduling    Med Onc Chart Routing      Follow up with physician . EP-cbc, cmp and Injections- zoladex and Xgeva   Follow up with CAMILA 4 weeks. PJ cbc, cmp and Injections- zoladex and Xgeva   Infusion scheduling note    Injection scheduling note    Labs    Imaging    Pharmacy appointment    Other referrals        Treatment Plan Information   OP ANASTROZOLE PALBOCICLIB Q4W   Jessica Mendez MD   Upcoming Treatment Dates - OP ANASTROZOLE PALBOCICLIB Q4W    No upcoming days in selected categories.    Supportive Plan Information  OP BREAST GOSERELIN & DENOSUMAB Q4W    Jessica Mendez MD   Upcoming Treatment Dates - OP BREAST GOSERELIN & DENOSUMAB Q4W    1/13/2023       Chemotherapy       denosumab (XGEVA) solution 120 mg       goserelin (ZOLADEX) injection 3.6 mg  2/10/2023       Chemotherapy       denosumab (XGEVA) solution 120 mg       goserelin (ZOLADEX) injection 3.6 mg

## 2023-01-23 NOTE — PROGRESS NOTES
Patient, Kristopher Elmore (MRN #[id number]), presented with a recorded BMI of 63.57 kg/m^2 consistent with the definition of morbid obesity (ICD-10 E66.01). The patient's morbid obesity was monitored, evaluated, addressed and/or treated. This addendum to the medical record is made on 01/23/2023.

## 2023-01-25 ENCOUNTER — PATIENT MESSAGE (OUTPATIENT)
Dept: ENDOCRINOLOGY | Facility: CLINIC | Age: 48
End: 2023-01-25
Payer: MEDICARE

## 2023-01-25 ENCOUNTER — INFUSION (OUTPATIENT)
Dept: INFUSION THERAPY | Facility: HOSPITAL | Age: 48
End: 2023-01-25
Payer: MEDICARE

## 2023-01-25 DIAGNOSIS — Z17.0 MALIGNANT NEOPLASM OF UPPER-INNER QUADRANT OF RIGHT BREAST IN FEMALE, ESTROGEN RECEPTOR POSITIVE: Primary | ICD-10-CM

## 2023-01-25 DIAGNOSIS — C50.211 MALIGNANT NEOPLASM OF UPPER-INNER QUADRANT OF RIGHT BREAST IN FEMALE, ESTROGEN RECEPTOR POSITIVE: Primary | ICD-10-CM

## 2023-01-25 PROCEDURE — 96372 THER/PROPH/DIAG INJ SC/IM: CPT

## 2023-01-25 PROCEDURE — 96402 CHEMO HORMON ANTINEOPL SQ/IM: CPT

## 2023-01-25 PROCEDURE — 63600175 PHARM REV CODE 636 W HCPCS: Mod: JG | Performed by: INTERNAL MEDICINE

## 2023-01-25 RX ADMIN — DENOSUMAB 120 MG: 120 INJECTION SUBCUTANEOUS at 02:01

## 2023-01-25 RX ADMIN — GOSERELIN ACETATE 3.6 MG: 3.6 IMPLANT SUBCUTANEOUS at 02:01

## 2023-01-31 ENCOUNTER — SPECIALTY PHARMACY (OUTPATIENT)
Dept: PHARMACY | Facility: CLINIC | Age: 48
End: 2023-01-31
Payer: MEDICARE

## 2023-01-31 NOTE — TELEPHONE ENCOUNTER
Specialty Pharmacy - Refill Coordination    Specialty Medication Orders Linked to Encounter      Flowsheet Row Most Recent Value   Medication #1 palbociclib (IBRANCE) 100 mg Cap (Order#[id number], Rx#[id number])            Refill Questions - Documented Responses      Flowsheet Row Most Recent Value   Patient Availability and HIPAA Verification    Does patient want to proceed with activity? Yes   HIPAA/medical authority confirmed? Yes   Relationship to patient of person spoken to? Self   Refill Screening Questions    Changes to allergies? No   Changes to medications? No   New conditions since last clinic visit? No   Unplanned office visit, urgent care, ED, or hospital admission in the last 4 weeks? No   How does patient/caregiver feel medication is working? Good   Financial problems or insurance changes? No   How many doses of your specialty medications were missed in the last 4 weeks? 0   Would patient like to speak to a pharmacist? No   When does the patient need to receive the medication? 02/06/23   Refill Delivery Questions    How will the patient receive the medication? [facility]   When does the patient need to receive the medication? 02/06/23   Shipping Address Home   Address in [facility] confirmed and updated if neccessary? Yes   Expected Copay ($) 0   Is the patient able to afford the medication copay? Yes   Payment Method zero copay   Days supply of Refill 28   Supplies needed? No supplies needed   Refill activity completed? Yes   Refill activity plan Refill scheduled   Shipment/Pickup Date: 02/02/23            Current Outpatient Medications   Medication Sig    (Magic mouthwash) 1:1:1 Benadryl 12.5mg/5ml liq, aluminum & magnesium hydroxide-simehticone (Maalox), LIDOcaine viscous 2% Swish and spit 10 mLs every 4 (four) hours as needed. for mouth sores    anastrozole (ARIMIDEX) 1 mg Tab TAKE 1 TABLET(1 MG) BY MOUTH EVERY DAY    atorvastatin (LIPITOR) 40 MG tablet TAKE 1 TABLET(40 MG) BY MOUTH EVERY DAY     blood sugar diagnostic Strp To check BG 2 times daily, to use with insurance preferred meter    blood-glucose meter kit To check BG 2 times daily, to use with insurance preferred meter    blood-glucose meter,continuous (DEXCOM G6 ) Misc Use with dexcom G6 system    blood-glucose sensor (DEXCOM G6 SENSOR) Monique Change sensor every 10 days    blood-glucose sensor (FREESTYLE MARGARITA 3 SENSOR) Monique 2 each by Misc.(Non-Drug; Combo Route) route every 14 (fourteen) days.    blood-glucose transmitter (DEXCOM G6 TRANSMITTER) Monique Change every 3 months    carvediloL (COREG) 25 MG tablet TAKE 1 TABLET(25 MG) BY MOUTH TWICE DAILY    ciclopirox (PENLAC) 8 % Soln Apply topically nightly.    duke's soln (benadryl 30 mL, mylanta 30 mL, LIDOcaine 30 mL, nystatin 30 mL) 120mL Take 10 mLs by mouth 4 (four) times daily.    duke's soln (benadryl 30 mL, mylanta 30 mL, LIDOcaine 30 mL, nystatin 30 mL) 120mL TAKE 10MLS BY MOUTH FOUR TIMES DAILY    empagliflozin (JARDIANCE) 25 mg tablet Take 1 tablet (25 mg total) by mouth once daily.    ergocalciferol (ERGOCALCIFEROL) 50,000 unit Cap TAKE 1 CAPSULE BY MOUTH EVERY 7 DAYS    ferrous sulfate 324 mg (65 mg iron) TbEC TAKE 1 TABLET BY MOUTH ON EVERY MONDAY, WEDNESDAY, AND FRIDAY    furosemide (LASIX) 20 MG tablet Take 1 tablet (20 mg total) by mouth 2 (two) times daily.    glipiZIDE (GLUCOTROL) 10 MG TR24 Take 1 tablet (10 mg total) by mouth daily with breakfast.    goserelin (ZOLADEX) 3.6 mg injection Inject 3.6 mg into the skin every 28 days.    ibuprofen (ADVIL,MOTRIN) 800 MG tablet Take 1 tablet (800 mg total) by mouth 2 (two) times daily as needed for Pain.    insulin degludec (TRESIBA U-100 INSULIN) 100 unit/mL injection Inject 0.36 mLs (36 Units total) into the skin once daily. Titrate to 50 units daily    lancets Misc To check BG 2 times daily, to use with insurance preferred meter    LIDOcaine (LIDODERM) 5 % Place 2 patches onto the skin once daily. Remove & Discard patch within  "12 hours or as directed by MD    LORazepam (ATIVAN) 1 MG tablet Take 1 tablet (1 mg total) by mouth every 12 (twelve) hours as needed for Anxiety.    metFORMIN (GLUCOPHAGE-XR) 500 MG ER 24hr tablet Take 2 tablets (1,000 mg total) by mouth 2 (two) times daily with meals.    morphine (MS CONTIN) 15 MG 12 hr tablet Take 1 tablet (15 mg total) by mouth every 8 (eight) hours as needed for Pain.    ondansetron (ZOFRAN-ODT) 4 MG TbDL Take 1 tablet (4 mg total) by mouth 2 (two) times daily.    ondansetron (ZOFRAN-ODT) 8 MG TbDL DISSOLVE 1 TABLET(8 MG) ON THE TONGUE EVERY 8 HOURS AS NEEDED FOR NAUSEA    palbociclib (IBRANCE) 100 mg Cap Take 1 capsule (100 mg) by mouth once daily for 21 days, followed by 7 days off.    pen needle, diabetic (BD ULTRA-FINE VERONICA PEN NEEDLE) 32 gauge x 5/32" Ndle 1 Device by Misc.(Non-Drug; Combo Route) route Daily.    potassium chloride (KLOR-CON) 10 MEQ TbSR Take 1 tablet (10 mEq total) by mouth once daily.    promethazine (PHENERGAN) 25 MG tablet Take 1 tablet (25 mg total) by mouth every 6 (six) hours as needed for Nausea.    sacubitriL-valsartan (ENTRESTO)  mg per tablet Take 1 tablet by mouth 2 (two) times daily.    sennosides (SENNA-C ORAL) Take 2 tablets by mouth daily as needed.    spironolactone (ALDACTONE) 25 MG tablet Take 1 tablet (25 mg total) by mouth once daily.    traMADoL (ULTRAM) 50 mg tablet Take 1 tablet (50 mg total) by mouth every 6 (six) hours as needed for Pain.    venlafaxine (EFFEXOR-XR) 150 MG Cp24 Take 1 capsule (150 mg total) by mouth once daily.    venlafaxine (EFFEXOR-XR) 75 MG 24 hr capsule Take 1 capsule (75 mg total) by mouth once daily. Take with 150 mg capsule for a total of 225 mg.   Last reviewed on 1/25/2023  2:45 PM by Addis Rice RN    Review of patient's allergies indicates:   Allergen Reactions    Keflex [cephalexin] Itching    Last reviewed on  1/25/2023 2:32 PM by Addis Rice      Tasks added this encounter   2/27/2023 - Refill Call (Auto " Added)   Tasks due within next 3 months   2/12/2023 - Clinical - Follow Up Assesement (180 day)     [facility]  [address]  [facility] [address]  Phone: [phone]  Fax: [phone]

## 2023-02-16 ENCOUNTER — SPECIALTY PHARMACY (OUTPATIENT)
Dept: PHARMACY | Facility: CLINIC | Age: 48
End: 2023-02-16
Payer: MEDICARE

## 2023-02-16 NOTE — TELEPHONE ENCOUNTER
Specialty Pharmacy - Clinical Reassessment    Specialty Medication Orders Linked to Encounter      Flowsheet Row Most Recent Value   Medication #1 palbociclib (IBRANCE) 100 mg Cap (Order#[id number], Rx#[id number])          Patient Diagnosis   C50.211, Z17.0 - Malignant neoplasm of upper-inner quadrant of right breast in female, estrogen receptor positive    Kristopher Elmore is a 47 y.o. female, who is followed by the specialty pharmacy service for management and education of her Ibrance.  She has been on therapy with Ibrance for 3 years 2.  I have reviewed her electronic medical record and current medication list and determined that specialty medication adjustment Is not needed at this time.    Patient has not experienced adverse events.  She Is adherent reporting Taking with dinner missed doses since last review.  Adherence has been encouraged with the following mechanism(s): Habit.  She is meeting goals of therapy and will continue treatment.        1/31/2023 1/10/2023 12/15/2022 11/14/2022 10/10/2022 9/13/2022 8/23/2022   Follow Up Review   # of missed doses 0 0 0 0 0 0    New Medications? No Yes       Patient started Jardiance and Metformin on 12/14. These medications do not interact with Ibrance. Yes       Patient started Jardiance and metformin. No DDIs with Ibrance Yes       adding on farxiga and spironolactone -- no ddis No No    New Conditions? No No No No No No    New Allergies? No No No No No No    Med Effective? Good Good Good Good Good Good    Missed activities?       No   Urgent Care? No No No No No No    Requested Pharmacist? No No No No No No        Multiple values from one day are sorted in reverse-chronological order         Therapy is appropriate to continue.    Therapy is effective: Yes  On scale of 1 to 10, how does patient rank quality of life? (10 - Best): 8  Recommendations: none at this time.  Review Method: Patient Contact    Tasks added this encounter   No tasks added.   Tasks due  within next 3 months   2/12/2023 - Clinical - Follow Up Assesement (180 day)  2/27/2023 - Refill Call (Auto Added)     Alison Ponce, PharmD  [facility]  [address]  [facility] [address]  Phone: [phone]  Fax: [phone]

## 2023-02-24 ENCOUNTER — OFFICE VISIT (OUTPATIENT)
Dept: HEMATOLOGY/ONCOLOGY | Facility: CLINIC | Age: 48
End: 2023-02-24
Payer: MEDICARE

## 2023-02-24 ENCOUNTER — INFUSION (OUTPATIENT)
Dept: INFUSION THERAPY | Facility: HOSPITAL | Age: 48
End: 2023-02-24
Payer: MEDICARE

## 2023-02-24 VITALS
HEIGHT: 64 IN | WEIGHT: 293 LBS | TEMPERATURE: 98 F | OXYGEN SATURATION: 99 % | BODY MASS INDEX: 50.02 KG/M2 | RESPIRATION RATE: 18 BRPM | SYSTOLIC BLOOD PRESSURE: 124 MMHG | DIASTOLIC BLOOD PRESSURE: 61 MMHG | HEART RATE: 94 BPM

## 2023-02-24 DIAGNOSIS — E11.65 TYPE 2 DIABETES MELLITUS WITH HYPERGLYCEMIA, WITH LONG-TERM CURRENT USE OF INSULIN: ICD-10-CM

## 2023-02-24 DIAGNOSIS — I10 HYPERTENSION, UNSPECIFIED TYPE: ICD-10-CM

## 2023-02-24 DIAGNOSIS — D64.81 ANEMIA ASSOCIATED WITH CHEMOTHERAPY: ICD-10-CM

## 2023-02-24 DIAGNOSIS — Z17.0 MALIGNANT NEOPLASM OF UPPER-INNER QUADRANT OF RIGHT BREAST IN FEMALE, ESTROGEN RECEPTOR POSITIVE: Primary | ICD-10-CM

## 2023-02-24 DIAGNOSIS — G89.3 CANCER ASSOCIATED PAIN: ICD-10-CM

## 2023-02-24 DIAGNOSIS — Z79.4 TYPE 2 DIABETES MELLITUS WITHOUT COMPLICATION, WITH LONG-TERM CURRENT USE OF INSULIN: ICD-10-CM

## 2023-02-24 DIAGNOSIS — I50.42 CHRONIC COMBINED SYSTOLIC AND DIASTOLIC HEART FAILURE: ICD-10-CM

## 2023-02-24 DIAGNOSIS — C79.51 BONE METASTASES: ICD-10-CM

## 2023-02-24 DIAGNOSIS — Z79.4 TYPE 2 DIABETES MELLITUS WITH HYPERGLYCEMIA, WITH LONG-TERM CURRENT USE OF INSULIN: ICD-10-CM

## 2023-02-24 DIAGNOSIS — T45.1X5A ANEMIA ASSOCIATED WITH CHEMOTHERAPY: ICD-10-CM

## 2023-02-24 DIAGNOSIS — M25.511 RIGHT SHOULDER PAIN, UNSPECIFIED CHRONICITY: ICD-10-CM

## 2023-02-24 DIAGNOSIS — E87.6 HYPOKALEMIA: ICD-10-CM

## 2023-02-24 DIAGNOSIS — E11.9 TYPE 2 DIABETES MELLITUS WITHOUT COMPLICATION, WITH LONG-TERM CURRENT USE OF INSULIN: ICD-10-CM

## 2023-02-24 DIAGNOSIS — E66.01 MORBID OBESITY, UNSPECIFIED OBESITY TYPE: ICD-10-CM

## 2023-02-24 DIAGNOSIS — C50.211 MALIGNANT NEOPLASM OF UPPER-INNER QUADRANT OF RIGHT BREAST IN FEMALE, ESTROGEN RECEPTOR POSITIVE: Primary | ICD-10-CM

## 2023-02-24 DIAGNOSIS — E46 PROTEIN-CALORIE MALNUTRITION, UNSPECIFIED SEVERITY: ICD-10-CM

## 2023-02-24 PROCEDURE — 99999 PR PBB SHADOW E&M-EST. PATIENT-LVL V: CPT | Mod: PBBFAC,,, | Performed by: NURSE PRACTITIONER

## 2023-02-24 PROCEDURE — 96402 CHEMO HORMON ANTINEOPL SQ/IM: CPT

## 2023-02-24 PROCEDURE — 99499 RISK ADDL DX/OHS AUDIT: ICD-10-PCS | Mod: S$GLB,,, | Performed by: NURSE PRACTITIONER

## 2023-02-24 PROCEDURE — 3078F DIAST BP <80 MM HG: CPT | Mod: CPTII,S$GLB,, | Performed by: NURSE PRACTITIONER

## 2023-02-24 PROCEDURE — 3074F PR MOST RECENT SYSTOLIC BLOOD PRESSURE < 130 MM HG: ICD-10-PCS | Mod: CPTII,S$GLB,, | Performed by: NURSE PRACTITIONER

## 2023-02-24 PROCEDURE — 96372 THER/PROPH/DIAG INJ SC/IM: CPT | Mod: 59

## 2023-02-24 PROCEDURE — 3074F SYST BP LT 130 MM HG: CPT | Mod: CPTII,S$GLB,, | Performed by: NURSE PRACTITIONER

## 2023-02-24 PROCEDURE — 99499 UNLISTED E&M SERVICE: CPT | Mod: S$GLB,,, | Performed by: NURSE PRACTITIONER

## 2023-02-24 PROCEDURE — 99215 PR OFFICE/OUTPT VISIT, EST, LEVL V, 40-54 MIN: ICD-10-PCS | Mod: S$GLB,,, | Performed by: NURSE PRACTITIONER

## 2023-02-24 PROCEDURE — 99999 PR PBB SHADOW E&M-EST. PATIENT-LVL V: ICD-10-PCS | Mod: PBBFAC,,, | Performed by: NURSE PRACTITIONER

## 2023-02-24 PROCEDURE — 4010F PR ACE/ARB THEARPY RXD/TAKEN: ICD-10-PCS | Mod: CPTII,S$GLB,, | Performed by: NURSE PRACTITIONER

## 2023-02-24 PROCEDURE — 1159F PR MEDICATION LIST DOCUMENTED IN MEDICAL RECORD: ICD-10-PCS | Mod: CPTII,S$GLB,, | Performed by: NURSE PRACTITIONER

## 2023-02-24 PROCEDURE — 1159F MED LIST DOCD IN RCRD: CPT | Mod: CPTII,S$GLB,, | Performed by: NURSE PRACTITIONER

## 2023-02-24 PROCEDURE — 3008F PR BODY MASS INDEX (BMI) DOCUMENTED: ICD-10-PCS | Mod: CPTII,S$GLB,, | Performed by: NURSE PRACTITIONER

## 2023-02-24 PROCEDURE — 63600175 PHARM REV CODE 636 W HCPCS: Mod: JG | Performed by: NURSE PRACTITIONER

## 2023-02-24 PROCEDURE — 4010F ACE/ARB THERAPY RXD/TAKEN: CPT | Mod: CPTII,S$GLB,, | Performed by: NURSE PRACTITIONER

## 2023-02-24 PROCEDURE — 3078F PR MOST RECENT DIASTOLIC BLOOD PRESSURE < 80 MM HG: ICD-10-PCS | Mod: CPTII,S$GLB,, | Performed by: NURSE PRACTITIONER

## 2023-02-24 PROCEDURE — 3008F BODY MASS INDEX DOCD: CPT | Mod: CPTII,S$GLB,, | Performed by: NURSE PRACTITIONER

## 2023-02-24 PROCEDURE — 99215 OFFICE O/P EST HI 40 MIN: CPT | Mod: S$GLB,,, | Performed by: NURSE PRACTITIONER

## 2023-02-24 RX ORDER — OXYCODONE AND ACETAMINOPHEN 5; 325 MG/1; MG/1
1 TABLET ORAL EVERY 4 HOURS PRN
Qty: 60 TABLET | Refills: 0 | Status: SHIPPED | OUTPATIENT
Start: 2023-02-24 | End: 2023-04-12

## 2023-02-24 RX ORDER — NALOXONE HYDROCHLORIDE 4 MG/.1ML
SPRAY NASAL
Qty: 1 EACH | Refills: 11 | Status: SHIPPED | OUTPATIENT
Start: 2023-02-24

## 2023-02-24 RX ADMIN — DENOSUMAB 120 MG: 120 INJECTION SUBCUTANEOUS at 12:02

## 2023-02-24 RX ADMIN — GOSERELIN ACETATE 3.6 MG: 3.6 IMPLANT SUBCUTANEOUS at 12:02

## 2023-02-24 NOTE — PROGRESS NOTES
Subjective:       Patient ID: Kristopher Elmore is a 47 y.o. female.    Chief Complaint: Malignant neoplasm of upper-inner quadrant of right breast     HPI    Presents for follow up  Currently on Arimidex and Ibrance (dose adjusted due to anemia to 100 mg M-F, off Sa&Baltazar)   Zoladex and Xgeva monthly   Most recent scans stable.     Did not get genetic testing  but ready to   Right shoulder aches- heating pad helps.  Pain feels as though getting worse. Started around thanksgiving.   Does not like  tramadol  Taking  - she does report diarrhea after taking.  Back pain stable. Worse with more activity.   No neuropathy.   Diabetes better controlled. Checks sugars at home.   Had mc griddle this morning so will account for elevated sugar  No CP/SOB/coughing  No jaw or dental pain    Follows with cardiology regularly.     Most recent imagine-  - 1/18/2023 CT C/A/P:  FINDINGS:  Base of Neck: No significant abnormality.  CHEST:  -Heart: Normal size. No pericardial effusion.  -Pulmonary vasculature: Pulmonary arteries distribute normally.  There are four pulmonary veins.  -Jane/Mediastinum: No pathologic hakeem enlargement.  -Trachea and Proximal airways: Patent.  -Lungs/Pleura: Symmetrically expanded without consolidation, pneumothorax, or mass.  No pleural effusion or thickening.  Bilateral bandlike opacifications in the lung bases favored to represent scarring or subsegmental atelectasis.  -Esophagus: Normal course and caliber.  ABDOMEN:  - Liver: Normal in size and attenuation with no focal hepatic abnormality  - Gallbladder: No calcified gallstones.  No wall thickening or pericholecystic fluid.  - Bile Ducts: No intra or extrahepatic biliary ductal dilatation.  - Stomach/Duodenum: Unremarkable.  - Spleen: Unremarkable.  - Pancreas: Unremarkable.  - Adrenals: Unremarkable.  - Kidneys/ureters/urinary bladder: Normal in size and location.  Right kidney 1.0 cm simple renal cyst (axial series 3, image 72).  No  hydronephrosis or stones.  The ureters appear normal in course and caliber without evidence of ureteral dilatation.  The urinary bladder is unremarkable.  - Retroperitoneum: No significant adenopathy.  PELVIS:  - Reproductive: Calcific lesion within the fundus of the uterus, likely representing involuted fibroid.  - Other: No pelvic adenopathy, free fluid, or mass.  BOWEL/MESENTERY:  No evidence of bowel obstruction or inflammatory process. Scattered colonic diverticulosis without adjacent inflammatory changes to suggest acute diverticulitis.  VASCULATURE: Left-sided aortic arch with 3 branch vessels.  No aneurysm and no significant atherosclerosis.  BONES: No acute fracture.  Stable lucent lesion involving the right iliac wing.  Stable lucent/sclerotic lesions throughout the vertebral spine consistent with metastatic disease.  EXTRATHORACIC/EXTRAPERITONEAL SOFT TISSUES: Mild abdominal wall edema along the anterior abdominal wall.  Impression:  In this patient with history of invasive ductal carcinoma of the right breast, there is no CT evidence of new metastatic disease.  Persistent lucent/sclerotic lesions throughout the pelvis and spine, similar appearance to prior.      Reason For Follow Up:   1. Stage IV ([id number]) invasive ductal carcinoma of right breast, upper inner quadrant, ER %, MI neg, Her2 neg, Grade 3, multifocal with one lesion appearing more aggressive (Ki67 80%), large LN +, bone mets.   Ibrance dose adjusted with cytopenias   Oncologic History:   Presentation   - 9/11/19 - Screening mammogram showed multiple right breast masses lower inner quadrant   - 9/13/19 - Diagnostic mammogram and US showed a right breast, 3:00 position mass, 2 CFN measuring 17 mm x 16 mm x 14 mm. There 2 smaller adjacent masses towards the nipple   - 9/19/19 - Biopsy -   A. Right breast subareolar: Grade 3 IDC, estrogen receptor 80%, progesterone receptor 0%, Her2 dago neg, Ki67 80%.   B. Right breast mass 3:00: Grade 3  "IDC with papillary features, estrogen receptor 100%, progesterone receptor 0%, Her2 dago 1+, Ki67 30%.   - 9/26/19: US right axilla with abnormal lymphadenopathy measuring 4.3 x 2.8 cm with biopsy + for metastatic breast cancer.   Surgery consultation with Dr Ferris on 9/25/19   - 9/25/19 - Genetics Genetics Myriad Neda Pierrealysis pending; VUS pending   Medical oncology consultation on 10/7/19   * 10/8/19 - CT C/A/P with several lytic lesions in thoracic and lumbar spine, iliac bones, left scapula in addition to 3 x 4.5 cm right axillary node and 2.1 cm right breast mass. Bone scan negative.   * 10/31/19 - started Ibrance, Arimidex, Zoladex; plan for Xgeva.   * 11/12/19 STRATA without targetable mutations.   * 12/16/19 - Bone biopsy + for met disease (initially read as negative, but we treated as metastatic disease given high suspicion).   * MRI brain: "Partially empty sella. Question bilateral globe proptosis. Otherwise unremarkable MRI brain as detailed above specifically without evidence for intracranial enhancing lesion to suggest metastatic disease."   * 4/22/2020 PET - disease improvement. 8/2020 PET with disease improvement.   * 9/1/20 - Palliative RT to L1-L4   Of note, * Xgeva monthly - we had been waiting on dental clearance, but she has been unable to get into dentistry and is accepting of risks. Has no active dental disease.   PET scan 4/22/2021:   FINDINGS:   Quality of the study: Mildly degraded due to patient's large body habitus and skin abutting the gantry.   In the head and neck, there are no hypermetabolic lesions worrisome for malignancy. There are no hypermetabolic mucosal lesions, and there are no pathologically enlarged or hypermetabolic lymph nodes.   In the chest, there are no hypermetabolic lesions worrisome for malignancy.   Stable CT appearance of a level 1 right axillary lymph node measuring 1.3 cm in short axis with normal background radiotracer uptake. Previously with SUV max of " 2.1.   There are no concerning pulmonary nodules or masses, and there are no pathologically enlarged or hypermetabolic lymph nodes.   In the abdomen and pelvis, there is physiologic tracer distribution within the abdominal organs and excretion into the genitourinary system.   In the bones, there are stable lytic lesions throughout the lumbar spine and pelvis and additional stable sclerotic lesions in the sternum and T3 vertebral body. No associated focal abnormal increased radiotracer uptake. Findings likely represent treated disease.   Impression:   Interval decreased uptake within a prominent right axillary lymph node, now with normal background uptake. No new focal abnormal uptake.   Stable lytic and sclerotic lesions without focal abnormal uptake. Findings are compatible with treated metastasis.   8/25/2021 MRI brain   Impression:   No significant change from prior specifically without evidence for enhancing lesion to suggest intracranial metastatic disease.   Continued partially empty sella, intracranial hypertension to be included in differential in the appropriate clinical setting. Clinical correlation and further evaluation as warranted.   10/14/2021 MRI thoracic/lumbar spine:   Impression:   Patient history of metastatic breast cancer.   Bone lesions at T10, L2, L3, and right iliac wing, as above, concerning for metastatic disease. No pathologic fracture, soft tissue component, or epidural extension identified.   10/25/2021 CT chest/abd/pelvis:   Impression:   1. Stable metastasis of the spine   No evidence of intra-abdominal or intrathoracic metastatic disease   2. Stable prominent right axillary lymph node.   3. Additional findings are detailed above.   Currently on Arimidex and Ibrance   Zoladex and Xgeva monthly   - 2/2/2022 Bone scan:   FINDINGS:   No focal abnormal uptake suggestive of osseous metastases. There is diffusely increased uptake in the calvarium in keeping with hyperostosis. There is  degenerative type uptake most prominent in the bilateral shoulders and knees. The focal uptake previously described in the distal right femur is not visualized. There is otherwise physiologic distribution of the radiopharmaceutical throughout the skeleton.   There is normal uptake in the genitourinary system and soft tissues.   Impression:   There is no scintigraphic evidence of osteoblastic metastatic disease.   Nonspecific uptake in the distal right femur has resolved.   Diffuse cranial hyperostosis and other findings as above.   - 2/1/2022 CT C/A/P:   FINDINGS:   CHEST   Support tubes and lines: None.   Aorta: Normal caliber.   Heart: Normal size..   Coronary arteries: No calcifications.   Pericardium: Normal. No effusion, thickening, or calcification.   Central pulmonary arteries: Normal caliber.   Base of neck/thyroid: Normal.   Lymph nodes: No supraclavicular, axillary, internal mammary, mediastinal or hilar lymphadenopathy.   Esophagus: Normal.   Pleura: No effusion, thickening or calcification.   Body wall: Unremarkable.   Airways: Normal.   Lungs: Clear without focal or diffuse abnormality.   Bones: Sclerotic lesions are seen throughout the spine as well as in the sternum, not substantially changed from 10/25/2021   ABDOMEN/PELVIS   Liver: Unremarkable   Gallbladder/bile ducts: Unremarkable. No intra or extrahepatic biliary ductal dilatation   Pancreas: Unremarkable.   Spleen: Unremarkable.   Adrenals: Unremarkable.   Kidneys: Simple cyst in the right kidney is unchanged   Lymph nodes: No abdominal or pelvic lymphadenopathy.   Bowel and mesentery: Unremarkable.   Abdominal aorta: Unremarkable.   Inferior vena cava: Unremarkable.   Free fluid or free air: None.   Pelvis: Unremarkable.   Urinary bladder: Unremarkable.   Body wall: Unremarkable.   Bones: Sclerotic lesions seen in the spine are unchanged.   Impression:   1. No evidence of new recurrent or metastatic disease.   2. Sclerotic lesions seen in the  spine and sternum, unchanged when compared to 10/25/2021.      - more recent scans as above      Review of Systems   Constitutional:         See above   All other systems reviewed and are negative.      Objective:      Physical Exam  Vitals and nursing note reviewed.   Constitutional:       General: She is not in acute distress.     Appearance: Normal appearance. She is well-developed. She is obese. She is not ill-appearing.      Comments: Presents with her 1 daughter  Very pleasant  ECOG= 0  Uses wheelchair for distance, but walks into exam room     HENT:      Head: Normocephalic and atraumatic.   Eyes:      General: Lids are normal. No scleral icterus.     Extraocular Movements: Extraocular movements intact.      Conjunctiva/sclera: Conjunctivae normal.      Pupils: Pupils are equal, round, and reactive to light.   Neck:      Thyroid: No thyromegaly.      Vascular: No JVD.      Trachea: Trachea normal.   Cardiovascular:      Rate and Rhythm: Normal rate and regular rhythm.      Heart sounds: Normal heart sounds. No murmur heard.    No friction rub. No gallop.      Comments: Difficult to assess tones.   Pulmonary:      Effort: Pulmonary effort is normal. No respiratory distress.      Breath sounds: Normal breath sounds. No wheezing, rhonchi or rales.      Comments: distant  Abdominal:      General: Bowel sounds are normal. There is no distension.      Palpations: Abdomen is soft. There is no mass.      Tenderness: There is no abdominal tenderness. There is no guarding or rebound.      Comments: No organomegaly however difficult to assess as seated in chair.    Hardness noted periumbilical area   Musculoskeletal:         General: Normal range of motion.      Cervical back: Normal range of motion and neck supple.      Comments: No spinal or paraspinal tenderness.    Lymphadenopathy:      Head:      Right side of head: No submental or submandibular adenopathy.      Left side of head: No submental or submandibular  adenopathy.      Cervical: No cervical adenopathy.      Upper Body:      Right upper body: No supraclavicular or axillary adenopathy.      Left upper body: No supraclavicular or axillary adenopathy.   Skin:     General: Skin is warm and dry.      Capillary Refill: Capillary refill takes less than 2 seconds.      Coloration: Skin is not jaundiced or pale.      Findings: No bruising or rash.      Nails: There is no clubbing.   Neurological:      Mental Status: She is alert and oriented to person, place, and time.      Sensory: No sensory deficit.      Coordination: Coordination normal.      Gait: Gait normal.   Psychiatric:         Mood and Affect: Mood normal.         Speech: Speech normal.         Behavior: Behavior normal.         Thought Content: Thought content normal.         Judgment: Judgment normal.       Labs- reviewed  Imaging- reviewed  Assessment:       Problem List Items Addressed This Visit          Cardiac/Vascular    Hypertension    Chronic combined systolic and diastolic heart failure       Oncology    Malignant neoplasm of upper-inner quadrant of right breast in female, estrogen receptor positive - Primary    Relevant Medications    oxyCODONE-acetaminophen (PERCOCET) 5-325 mg per tablet    naloxone (NARCAN) 4 mg/actuation Spry    Other Relevant Orders    MRI Shoulder W WO Contrast Right    Cancer associated pain    Relevant Medications    oxyCODONE-acetaminophen (PERCOCET) 5-325 mg per tablet    Other Relevant Orders    MRI Shoulder W WO Contrast Right    Bone metastases    Relevant Medications    oxyCODONE-acetaminophen (PERCOCET) 5-325 mg per tablet    naloxone (NARCAN) 4 mg/actuation Spry    Other Relevant Orders    MRI Shoulder W WO Contrast Right    Anemia associated with chemotherapy       Endocrine    Type 2 diabetes mellitus without complication, with long-term current use of insulin     Other Visit Diagnoses       Right shoulder pain, unspecified chronicity        Relevant Medications     oxyCODONE-acetaminophen (PERCOCET) 5-325 mg per tablet    Other Relevant Orders    MRI Shoulder W WO Contrast Right    Hypokalemia                  Plan:         Metastatic breast cancer--   Clinically doing well  continue Arimidex, Ibrance, Zoladex, and Xgeva  Tolerating well with stable disease on most recent scans  Labs reviewed and adequate. will monitor  Order for shower chair/bench- 2nd request. Due to dizziness and pain. SW.   Needs genetic testing- message to [facility] shoulder pain-  Percocet rx- start low dose.   Stop NSAIDS  MRI right shoulder.       HTN-   Well controlled  Seeing cards     CHF-   Continue f/u with Dr. Olivares  No evidence of decompensation.      Neoplasm related pain-   stable disease, overall well controlled  Tylenol prn ok to take but not with percocet  Avoid daily IBP use - possible adverse effects discussed including kidney and gastristis      Hypokalemia  Increase potassium rich foods.      DM  Seeing Endo   Nutrition discussed        Route Chart for Scheduling    Med Onc Chart Routing  Urgent    Follow up with physician 4 weeks. appts are appropriate   Follow up with CAMILA    Infusion scheduling note    Injection scheduling note    Labs    Imaging   MRI shoulder asap   Pharmacy appointment    Other referrals        Treatment Plan Information   OP ANASTROZOLE PALBOCICLIB Q4W   Jessica Mendez MD   Upcoming Treatment Dates - OP ANASTROZOLE PALBOCICLIB Q4W    No upcoming days in selected categories.    Supportive Plan Information  OP BREAST GOSERELIN & DENOSUMAB Q4W   Jessica Mendez MD   Upcoming Treatment Dates - OP BREAST GOSERELIN & DENOSUMAB Q4W    4/7/2023       Chemotherapy       denosumab (XGEVA) solution 120 mg       goserelin (ZOLADEX) injection 3.6 mg          Patient is in agreement with the proposed treatment plan. All questions were answered to the patient's satisfaction. Pt knows to call clinic for any new or worsening symptoms and if anything is needed before the  next clinic visit.      LEILA Sanabria-EDWARD  Hematology & Oncology  [address] [place] [address]  ph. [phone]  Fax. [phone]       45 minutes of total time spent on the encounter, which includes face to face time and non-face to face time preparing to see the patient (eg, review of tests), Obtaining and/or reviewing separately obtained history, Documenting clinical information in the electronic or other health record, Independently interpreting results (not separately reported) and communicating results to the patient/family/caregiver, or Care coordination (not separately reported).

## 2023-02-24 NOTE — NURSING
Pt here for Xgeva and Zoladex injections. Pt endorses taking home Vit D and Ca++; denies jaw pain and recent dental surgery. Administered both injections in abdominal tissue. No questions or concerns. Pt ambulated out of unit unassisted.

## 2023-02-27 ENCOUNTER — SPECIALTY PHARMACY (OUTPATIENT)
Dept: PHARMACY | Facility: CLINIC | Age: 48
End: 2023-02-27
Payer: MEDICARE

## 2023-02-27 NOTE — TELEPHONE ENCOUNTER
Outgoing call regarding Ibrance refill, pt stated she does not start off week until the week of 3/6 and would like a follow up then

## 2023-02-28 ENCOUNTER — TELEPHONE (OUTPATIENT)
Dept: CARDIOLOGY | Facility: CLINIC | Age: 48
End: 2023-02-28
Payer: MEDICARE

## 2023-02-28 ENCOUNTER — TELEPHONE (OUTPATIENT)
Dept: PHARMACY | Facility: CLINIC | Age: 48
End: 2023-02-28
Payer: MEDICARE

## 2023-02-28 NOTE — TELEPHONE ENCOUNTER
I have confirmed with Ms. by PHONE that she does not need prescription assistance at this time. Ms.Kristopher VAZ Ramírez stated, her medication is affordable and she no longer needs assistance.

## 2023-02-28 NOTE — TELEPHONE ENCOUNTER
Spoke with pt and [employer]. Per [employer], application filled for assistance w. Entresto for dr Mccormick but missing prescription and assistance approved. Pt updated stated that she only applied for assistance for Farxiga but not an issue anymore (on Jardiance). States Entresto covered by her 2 insurances. She was updated on assistance approved, will get med shipped to her house after they contact her. She verbalized understanding. Prescription faxed to [employer].

## 2023-02-28 NOTE — TELEPHONE ENCOUNTER
----- Message from Amanda Rayo sent at 2/27/2023  4:20 PM CST -----  Regarding: order  Pls fax a standing order with directions for entresto to [facility] at [facility] [phone].    Phone [phone]      Dr. Mccormick/ Zak Rodriguez MD  Cardiology      LOV 11/10/22    Thank you

## 2023-03-02 ENCOUNTER — PATIENT MESSAGE (OUTPATIENT)
Dept: HEMATOLOGY/ONCOLOGY | Facility: CLINIC | Age: 48
End: 2023-03-02
Payer: MEDICARE

## 2023-03-06 NOTE — TELEPHONE ENCOUNTER
Specialty Pharmacy - Refill Coordination    Specialty Medication Orders Linked to Encounter      Flowsheet Row Most Recent Value   Medication #1 palbociclib (IBRANCE) 100 mg Cap (Order#[id number], Rx#[id number])            Refill Questions - Documented Responses      Flowsheet Row Most Recent Value   Patient Availability and HIPAA Verification    Does patient want to proceed with activity? Yes   HIPAA/medical authority confirmed? Yes   Relationship to patient of person spoken to? Self   Refill Screening Questions    Changes to allergies? No   Changes to medications? No   New conditions since last clinic visit? No   Unplanned office visit, urgent care, ED, or hospital admission in the last 4 weeks? No   How does patient/caregiver feel medication is working? Good   Financial problems or insurance changes? No   How many doses of your specialty medications were missed in the last 4 weeks? 0   Would patient like to speak to a pharmacist? No   When does the patient need to receive the medication? 03/13/23   Refill Delivery Questions    How will the patient receive the medication? [facility]   When does the patient need to receive the medication? 03/13/23   Shipping Address Home   Address in [facility] confirmed and updated if neccessary? Yes   Expected Copay ($) 0   Is the patient able to afford the medication copay? Yes   Payment Method zero copay   Days supply of Refill 28   Supplies needed? No supplies needed   Refill activity completed? Yes   Refill activity plan Refill scheduled   Shipment/Pickup Date: 03/08/23            Current Outpatient Medications   Medication Sig    (Magic mouthwash) 1:1:1 Benadryl 12.5mg/5ml liq, aluminum & magnesium hydroxide-simehticone (Maalox), LIDOcaine viscous 2% Swish and spit 10 mLs every 4 (four) hours as needed. for mouth sores (Patient not taking: Reported on 2/24/2023)    anastrozole (ARIMIDEX) 1 mg Tab TAKE 1 TABLET(1 MG) BY MOUTH EVERY DAY    atorvastatin (LIPITOR) 40 MG tablet  TAKE 1 TABLET(40 MG) BY MOUTH EVERY DAY    blood sugar diagnostic Strp To check BG 2 times daily, to use with insurance preferred meter    blood-glucose meter kit To check BG 2 times daily, to use with insurance preferred meter    blood-glucose meter,continuous (DEXCOM G6 ) Misc Use with dexcom G6 system    blood-glucose sensor (DEXCOM G6 SENSOR) Monique Change sensor every 10 days    blood-glucose sensor (FREESTYLE MARGARITA 3 SENSOR) Monique 2 each by Misc.(Non-Drug; Combo Route) route every 14 (fourteen) days.    blood-glucose transmitter (DEXCOM G6 TRANSMITTER) Monique Change every 3 months    carvediloL (COREG) 25 MG tablet TAKE 1 TABLET(25 MG) BY MOUTH TWICE DAILY    ciclopirox (PENLAC) 8 % Soln Apply topically nightly.    duke's soln (benadryl 30 mL, mylanta 30 mL, LIDOcaine 30 mL, nystatin 30 mL) 120mL Take 10 mLs by mouth 4 (four) times daily. (Patient not taking: Reported on 2/24/2023)    duke's soln (benadryl 30 mL, mylanta 30 mL, LIDOcaine 30 mL, nystatin 30 mL) 120mL TAKE 10MLS BY MOUTH FOUR TIMES DAILY    empagliflozin (JARDIANCE) 25 mg tablet Take 1 tablet (25 mg total) by mouth once daily.    ENTRESTO  mg per tablet TAKE 1 TABLET BY MOUTH TWICE DAILY    ergocalciferol (ERGOCALCIFEROL) 50,000 unit Cap TAKE 1 CAPSULE BY MOUTH EVERY 7 DAYS    ferrous sulfate 324 mg (65 mg iron) TbEC TAKE 1 TABLET BY MOUTH ON EVERY MONDAY, WEDNESDAY, AND FRIDAY.    furosemide (LASIX) 20 MG tablet Take 1 tablet (20 mg total) by mouth 2 (two) times daily.    glipiZIDE (GLUCOTROL) 10 MG TR24 TAKE 1 TABLET(10 MG) BY MOUTH DAILY WITH BREAKFAST    goserelin (ZOLADEX) 3.6 mg injection Inject 3.6 mg into the skin every 28 days.    ibuprofen (ADVIL,MOTRIN) 800 MG tablet Take 1 tablet (800 mg total) by mouth 2 (two) times daily as needed for Pain.    insulin degludec (TRESIBA U-100 INSULIN) 100 unit/mL injection Inject 0.36 mLs (36 Units total) into the skin once daily. Titrate to 50 units daily    lancets Misc To check BG 2  "times daily, to use with insurance preferred meter    LIDOcaine (LIDODERM) 5 % Place 2 patches onto the skin once daily. Remove & Discard patch within 12 hours or as directed by MD (Patient not taking: Reported on 2/24/2023)    LORazepam (ATIVAN) 1 MG tablet Take 1 tablet (1 mg total) by mouth every 12 (twelve) hours as needed for Anxiety.    metFORMIN (GLUCOPHAGE-XR) 500 MG ER 24hr tablet TAKE 2 TABLETS(1000 MG) BY MOUTH TWICE DAILY WITH MEALS    morphine (MS CONTIN) 15 MG 12 hr tablet Take 1 tablet (15 mg total) by mouth every 8 (eight) hours as needed for Pain. (Patient not taking: Reported on 2/24/2023)    naloxone (NARCAN) 4 mg/actuation Spry 4mg by nasal route as needed for opioid overdose; may repeat every 2-3 minutes in alternating nostrils until medical help arrives. Call 911    ondansetron (ZOFRAN-ODT) 4 MG TbDL Take 1 tablet (4 mg total) by mouth 2 (two) times daily.    ondansetron (ZOFRAN-ODT) 8 MG TbDL DISSOLVE 1 TABLET(8 MG) ON THE TONGUE EVERY 8 HOURS AS NEEDED FOR NAUSEA    oxyCODONE-acetaminophen (PERCOCET) 5-325 mg per tablet Take 1 tablet by mouth every 4 (four) hours as needed for Pain.    palbociclib (IBRANCE) 100 mg Cap Take 1 capsule (100 mg) by mouth once daily for 21 days, followed by 7 days off.    pen needle, diabetic (BD ULTRA-FINE VERONICA PEN NEEDLE) 32 gauge x 5/32" Ndle 1 Device by Misc.(Non-Drug; Combo Route) route Daily.    potassium chloride (KLOR-CON) 10 MEQ TbSR Take 1 tablet (10 mEq total) by mouth once daily.    promethazine (PHENERGAN) 25 MG tablet Take 1 tablet (25 mg total) by mouth every 6 (six) hours as needed for Nausea. (Patient not taking: Reported on 2/24/2023)    sennosides (SENNA-C ORAL) Take 2 tablets by mouth daily as needed.    spironolactone (ALDACTONE) 25 MG tablet Take 1 tablet (25 mg total) by mouth once daily.    traMADoL (ULTRAM) 50 mg tablet Take 1 tablet (50 mg total) by mouth every 6 (six) hours as needed for Pain.    venlafaxine (EFFEXOR-XR) 150 MG Cp24 Take " 1 capsule (150 mg total) by mouth once daily.    venlafaxine (EFFEXOR-XR) 75 MG 24 hr capsule Take 1 capsule (75 mg total) by mouth once daily. Take with 150 mg capsule for a total of 225 mg.   Last reviewed on 2/24/2023 11:50 AM by Emilee Bennett MA    Review of patient's allergies indicates:   Allergen Reactions    Keflex [cephalexin] Itching    Last reviewed on  2/24/2023 12:33 PM by Charlette Mckoy      Tasks added this encounter   No tasks added.   Tasks due within next 3 months   2/27/2023 - Refill Call (Auto Added)     [facility]  [address]  [facility] [address]  Phone: [phone]  Fax: [phone]

## 2023-03-07 ENCOUNTER — OFFICE VISIT (OUTPATIENT)
Dept: PALLIATIVE MEDICINE | Facility: CLINIC | Age: 48
End: 2023-03-07
Payer: MEDICARE

## 2023-03-07 ENCOUNTER — TELEPHONE (OUTPATIENT)
Dept: HEMATOLOGY/ONCOLOGY | Facility: CLINIC | Age: 48
End: 2023-03-07
Payer: MEDICARE

## 2023-03-07 ENCOUNTER — TELEPHONE (OUTPATIENT)
Dept: INFUSION THERAPY | Facility: HOSPITAL | Age: 48
End: 2023-03-07
Payer: MEDICARE

## 2023-03-07 ENCOUNTER — OFFICE VISIT (OUTPATIENT)
Dept: HEMATOLOGY/ONCOLOGY | Facility: CLINIC | Age: 48
End: 2023-03-07
Payer: MEDICARE

## 2023-03-07 DIAGNOSIS — Z17.0 MALIGNANT NEOPLASM OF UPPER-INNER QUADRANT OF RIGHT BREAST IN FEMALE, ESTROGEN RECEPTOR POSITIVE: Primary | ICD-10-CM

## 2023-03-07 DIAGNOSIS — C50.211 MALIGNANT NEOPLASM OF UPPER-INNER QUADRANT OF RIGHT BREAST IN FEMALE, ESTROGEN RECEPTOR POSITIVE: Primary | ICD-10-CM

## 2023-03-07 DIAGNOSIS — Z80.41 FAMILY HISTORY OF OVARIAN CANCER: ICD-10-CM

## 2023-03-07 DIAGNOSIS — Z80.51 FAMILY HISTORY OF KIDNEY CANCER: ICD-10-CM

## 2023-03-07 DIAGNOSIS — Z51.5 ENCOUNTER FOR PALLIATIVE CARE: ICD-10-CM

## 2023-03-07 DIAGNOSIS — G89.3 CANCER ASSOCIATED PAIN: ICD-10-CM

## 2023-03-07 DIAGNOSIS — G47.00 INSOMNIA, UNSPECIFIED TYPE: ICD-10-CM

## 2023-03-07 DIAGNOSIS — F41.9 ANXIETY: ICD-10-CM

## 2023-03-07 DIAGNOSIS — Z71.83 ENCOUNTER FOR NONPROCREATIVE GENETIC COUNSELING: Primary | ICD-10-CM

## 2023-03-07 DIAGNOSIS — C50.919 METASTATIC BREAST CANCER: ICD-10-CM

## 2023-03-07 DIAGNOSIS — F43.23 ADJUSTMENT DISORDER WITH MIXED ANXIETY AND DEPRESSED MOOD: ICD-10-CM

## 2023-03-07 DIAGNOSIS — Z86.018 HISTORY OF UTERINE FIBROID: ICD-10-CM

## 2023-03-07 PROCEDURE — 99214 OFFICE O/P EST MOD 30 MIN: CPT | Mod: 95,,, | Performed by: NURSE PRACTITIONER

## 2023-03-07 PROCEDURE — 4010F PR ACE/ARB THEARPY RXD/TAKEN: ICD-10-PCS | Mod: CPTII,95,, | Performed by: NURSE PRACTITIONER

## 2023-03-07 PROCEDURE — 4010F PR ACE/ARB THEARPY RXD/TAKEN: ICD-10-PCS | Mod: CPTII,95,, | Performed by: EMERGENCY MEDICINE

## 2023-03-07 PROCEDURE — 99215 PR OFFICE/OUTPT VISIT, EST, LEVL V, 40-54 MIN: ICD-10-PCS | Mod: 95,,, | Performed by: EMERGENCY MEDICINE

## 2023-03-07 PROCEDURE — 1159F PR MEDICATION LIST DOCUMENTED IN MEDICAL RECORD: ICD-10-PCS | Mod: CPTII,95,, | Performed by: EMERGENCY MEDICINE

## 2023-03-07 PROCEDURE — 99214 PR OFFICE/OUTPT VISIT, EST, LEVL IV, 30-39 MIN: ICD-10-PCS | Mod: 95,,, | Performed by: NURSE PRACTITIONER

## 2023-03-07 PROCEDURE — 1159F MED LIST DOCD IN RCRD: CPT | Mod: CPTII,95,, | Performed by: EMERGENCY MEDICINE

## 2023-03-07 PROCEDURE — 4010F ACE/ARB THERAPY RXD/TAKEN: CPT | Mod: CPTII,95,, | Performed by: NURSE PRACTITIONER

## 2023-03-07 PROCEDURE — 4010F ACE/ARB THERAPY RXD/TAKEN: CPT | Mod: CPTII,95,, | Performed by: EMERGENCY MEDICINE

## 2023-03-07 PROCEDURE — 99215 OFFICE O/P EST HI 40 MIN: CPT | Mod: 95,,, | Performed by: EMERGENCY MEDICINE

## 2023-03-07 RX ORDER — METHADONE HYDROCHLORIDE 5 MG/1
2.5 TABLET ORAL 2 TIMES DAILY
Qty: 15 TABLET | Refills: 0 | Status: SHIPPED | OUTPATIENT
Start: 2023-03-07 | End: 2023-03-07 | Stop reason: SDUPTHER

## 2023-03-07 RX ORDER — HYDROXYZINE HYDROCHLORIDE 25 MG/1
25 TABLET, FILM COATED ORAL NIGHTLY PRN
Qty: 30 TABLET | Refills: 0 | Status: SHIPPED | OUTPATIENT
Start: 2023-03-07 | End: 2023-04-24

## 2023-03-07 RX ORDER — METHADONE HYDROCHLORIDE 5 MG/1
2.5 TABLET ORAL 2 TIMES DAILY
Qty: 15 TABLET | Refills: 0 | Status: SHIPPED | OUTPATIENT
Start: 2023-03-07 | End: 2023-04-12 | Stop reason: SDUPTHER

## 2023-03-07 NOTE — PROGRESS NOTES
[facility]  [facility]  Follow up note    The patient location is: home  The chief complaint leading to consultation is: symptom management and ACP    Visit type: audiovisual    Face to Face time with patient:40 minutes    55 minutes of total time spent on the encounter, which includes face to face time and non-face to face time preparing to see the patient (eg, review of tests), Obtaining and/or reviewing separately obtained history, Documenting clinical information in the electronic or other health record, Independently interpreting results (not separately reported) and communicating results to the patient/family/caregiver, or Care coordination (not separately reported).     Each patient to whom he or she provides medical services by telemedicine is:  (1) informed of the relationship between the physician and patient and the respective role of any other health care provider with respect to management of the patient; and (2) notified that he or she may decline to receive medical services by telemedicine and may withdraw from such care at any time.    Reason for Consult: symptom management and ACP      ASSESSMENT/PLAN:     Plan/Recommendations:  Diagnoses and all orders for this visit:    Malignant neoplasm of upper-inner quadrant of right breast in female, estrogen receptor positive with bone mets  - patient followed by Dr. Lei and NP Delphine  - currently on disease-directed therapy  -mets to sternum, right iliac wing, spine  - MRI right shoulder pending; missed appointment last week; rescheduling    Encounter for palliative care/Advanced care planning  - patient decisional  - patient by herself on telemedicine visit   - no ACP documents uploaded into EMR  - philosophy of Palliative Medicine reviewed with patient and family at first visit  - new patient folder given to and reviewed with patient and family at first visit  - goals: life prolonging  - ACP  booklet given to and reviewed with patient and family at first visit including HCPOA and living will  - patient verbally stated that she would like her sister, Janel, to be her surrogate decision maker.   - reviewed ACP booklet again at previous visit along with [place]POST form. Patient has been thinking on these topics more with ongoing symptoms of likely congestive heart failure in setting of malignancy.   - did not specifically discuss code status at this time  - will follow up at future appointments    Other form of dyspnea  - patient reporting improvement in her dyspnea since last visit  - dyspnea worsens with exertion   - patient has been seen previously in ED due to dyspnea and concern for heart failure and worsening control of DM  - patient states that she has been able to walk around the house now as well as tries to make it from the parking lot to appointments without wheelchair  - referred to PT at previous visit  - continue treatment for other conditions (DM, CHF) as prescribed by other specialists  - tips for shortness of breath in new patient folder for patient to review    Cancer-related pain  - patient reporting her pain is mainly in her lower back and all along her right side, describes pain and constant, dull  - pain rated between 8/10   - patient reports that the Norco, oxycodone, and MS Contin all make her very sleepy  - stopped muscle relaxers due to side effects  - encouraged low dose methadone for better long acting pain relief and use of percocet for breakthrough pain  - Qtc 461 on last EKG; will recheck at next onc appointment    Nausea/Anorexia  - patient reports nausea and anorexia are improved  - patient has noted continued change in her appetite and nausea with diabetes medication, especially Trulicity  - patient has had increased fluid intake due to hyperglycemia  - patient using anti-emetics every day with good relief still   - continue zofran and phenergan prn  - patient already  following with nutrition  - patient has already been referred to Endocrinology for better management of diabetes. She states she has not been able to get her insulin filled for last two weeks; she is working to get it moved to [facility]  - patient denies any need for refills of anti-emetic medications at this time  - will continue to monitor    Adjustment disorder with mixed anxiety and depressed mood  Neoplastic (malignant) related fatigue/Insomnia  - patient reports continued depression and anxiety  - patient reports good social support from 2 ex-husbands, siblings, daughters & friends  - additionally reports using journaling, music and [place] to cope with feelings of sadness; her 2 kittens also offer comfort   - reports increaesed Effexor (225 mg) is helping, prior to increase she was unable to discuss loss of parents without crying; Rx by psych NP [place]  - communicated with psych NP; agrees with addition of hydroxyzine 25 mg qhs  - emotional support provided throughout visit  - Able to fall asleep but awakens with anxious thoughts not driven by any particular fear  - Continues to enjoy time with family and friends; making plans for a cruise  - will continue to monitor closely     Constipation  - patient reports regular, daily BM with the help of senna  - discussed using bowel regimen consistently   - adequate fluid intake   - constipation tip sheet in new patient folder for patient to review  - will continue close monitoring    Understanding of illness/Prognosis: patient has good understanding of disease at this time.     Goals of care: life-prolonging    Follow up: 4/13/2023    Patient's encounter and above plan of care discussed with patient's oncology-psychologist in person after visit    SUBJECTIVE:     History of Present Illness:  Patient is a 47 y.o. year old female with anemia, anxiety, cardiomyopathy, CHF, HTN, HLD, and metastatic breast cancer presents to Palliative Medicine for  "symptom management and ACP. Patient was diagnosed with stage IV breast cancer in September 2019. She was started on Ibrance and Arimidex, and she continues on this regimen today. Please see oncology notes for full details regarding her oncologic treatment course.     3/7/2023  Pt doing well on virtual visit.  Continues to play meaningful part in the lives of her daughters and spends time with family and friends.  Has continued to have difficulties in finding a pain regimen that works for her due to side effects.  Has continued to develop strategies of dealing with discomfort and maintaining a positive attitude and outlook.  Depression reported as better with med adjust ment an increase in effexor to 225 mg/day.  Reports increased anxiety that disturbs sleep but not related to any specific thought.  She endorses she is accepting of what ever is to come and hoping to just "live her best life."  Feels good support from her daughter and family members    01/03/2023:  VALERIA ALEJANDRO reviewed and summarized:  07/20/2022 Tramadol 50 mg Disp: 120 for 30 days    Patient following with endocrinology about blood glucose levels. Today patient states she is "feeling crummy" today. Patient states that her pain in her back is worse after road trip to her sister's house for the holidays and back. Patient reporting her nausea is worse today. She took all her medication yesterday, but she did not eat last night. Patient has been having nausea daily, but it is controlled by zofran. Patient's breathing is better as she is able to walk around the house and trying to walk to appointments without a wheelchair. Patient has noted worsening of her mood, especially around the holidays. Patient has been crying more, but she has an appointment on 1/4/23 with her psychiatrist. Patient has also had trouble getting her insulin, so she is trying to get it moved to [facility].     10/24/2022:  VALERIA ALEJANDRO reviewed and summarized:  07/20/2022 Tramadol 50 mg " Disp: 120 for 30 days    Today patient states she is doing okay today. She continues to experience pain in her back and spasms. She uses ibuprofen about twice a day with good relief. Patient used tizanidine for muscle relaxation, which made her feel very sleepy. Patient uses tramadol if the pain is very severe. She has noted worsening dyspnea with minimal exertion. She was seen in ED recently and given IV lasix. She had output of > 1.5 L. Patient having continued anxiety and depression. It is coming up on the anniversary of her mother's passing as well as the holidays, which is making the anxiety/depression worse. Patient continues to have poor appetite. Her blood glucose levels have been elevated, which is causing her to drink more water. Patient feeling off with the diabetic medication, and this is affecting her appetite as well. Patient sleeping okay today. She wants to discuss ACP documents in more detail today.     07/20/2022:  VALERIA ALEJANDRO reviewed and summarized:  05/03/2022 Lorazepam 1 mg disp: 30 for 15 days    Today patient states she is doing well overall. Her breathing has started to improve, and she is able to walk further without resting. Patient does still have dyspnea with exertion. Patient reporting continued pain as well as new vibrating/muscle spasm in her back. Patient states this is not all the time, but it can stop her in her tracks. Patient has been able to process her grief, and she is doing better emotionally. Patient discussed planning a trip with her siblings in October for her 47th birthday. She is even considering doing crafts with her cricket for the trip.     03/23/2022:  VALERIA ALEJANDRO reviewed and summarized:  No new prescriptions reported    Patient seen by VALERIE Lucas. Today patient's primary complaint is dyspnea with exertion; on a bad day dyspnea is significantly limiting of desired activities. Patient reports recent visit to cardiologist with repeat echo scheduled for May. Her pain is  "reasonably controlled, only occasionally using norco. She states she is adjusting to "a new normal". She reports recent improvement of her depression is likely attributable to increase in Effexor, can now talk about recent loss of her parents without crying. She continues to see onc-psych. She is pleased with stability of recent scans. She reports all other symptoms are improved or tolerable.     01/12/2022:  LA  reviewed and summarized:  08/23/2021 Oxycodone 5 mg disp: 45 for 11 days  08/23/2021 MS Contin 15 mg Disp: 60 for 20 days    Today patient states overall she is doing okay. Patient continues to have severe back pain that limits her mobility. All of the pain medications that she has tried has significant side effect of sleepiness. Patient is open to meeting with pain management for intervention. Patient is also open to meeting with PT to help as well. Patient found the holidays difficult for her, but she was able to visit with her family. She did find enjoyment during that time as well. Patient has been following with Dr. Odom. Patient was having some difficulty with sleeping, but she is has been better with a routine this past week.     11/02/2021:  LA  reviewed and summarized:  08/23/2021 Oxycodone 5 mg disp: 45 for 11 days  08/23/2021 MS Contin 15 mg Disp: 60 for 20 days    Patient presents to clinic today with her sister. Patient having moderate to severe pain in her back and right side. She has been following with radiation oncology as well. Patient uses both MS Contin and oxycodone intermittently. Patient also having some dyspnea. She is feeling very anxious and depressed recently. Patient also experiencing severe nausea and poor appetite. She has been using her anti-emetics intermittently. Patient also having trouble sleeping and feels very fatigued throughout the day. She is also intermittently constipated. Patient open to learning about ACP.    Past Medical History:   Diagnosis Date    " Abnormal Pap smear     pt states 13yrs ago colpo was done    Anemia     Anxiety     Cancer     Cardiomyopathy     CHF (congestive heart failure)     Fibroid     Hx of psychiatric care     Hyperlipidemia     Hypertension     Psychiatric problem     Sleep difficulties     Therapy      Past Surgical History:   Procedure Laterality Date     SECTION      TONSILLECTOMY      TUBAL LIGATION      UTERINE FIBROID EMBOLIZATION       Family History   Problem Relation Age of Onset    Arthritis Mother     Diabetes Mother     Heart disease Mother         CHF, CAD , 2 stents    Hypertension Mother     Hyperlipidemia Mother     Heart failure Mother     No Known Problems Sister     Prostate cancer Father     Hypertension Brother     No Known Problems Daughter     Asthma Daughter     No Known Problems Maternal Grandmother     Cancer Maternal Grandfather 79        abdominal origin?    No Known Problems Paternal Grandmother     No Known Problems Paternal Grandfather     Thyroid cancer Other         type? dx age?    Cancer Paternal Cousin         ovarian @ ?29 & cervical @ ?29    Endometriosis Paternal Cousin     Breast cancer Neg Hx     Ovarian cancer Neg Hx     Colon polyps Neg Hx      Review of patient's allergies indicates:   Allergen Reactions    Keflex [cephalexin] Itching       Medications:    Current Outpatient Medications:     (Magic mouthwash) 1:1:1 Benadryl 12.5mg/5ml liq, aluminum & magnesium hydroxide-simehticone (Maalox), LIDOcaine viscous 2%, Swish and spit 10 mLs every 4 (four) hours as needed. for mouth sores (Patient not taking: Reported on 2023), Disp: 360 mL, Rfl: 0    anastrozole (ARIMIDEX) 1 mg Tab, TAKE 1 TABLET(1 MG) BY MOUTH EVERY DAY, Disp: 90 tablet, Rfl: 3    atorvastatin (LIPITOR) 40 MG tablet, TAKE 1 TABLET(40 MG) BY MOUTH EVERY DAY, Disp: 90 tablet, Rfl: 3    blood sugar diagnostic Strp, To check BG 2 times daily, to use with insurance preferred meter, Disp: 200 each, Rfl: 3     blood-glucose meter kit, To check BG 2 times daily, to use with insurance preferred meter, Disp: 1 each, Rfl: 0    blood-glucose meter,continuous (DEXCOM G6 ) Misc, Use with dexcom G6 system, Disp: 1 each, Rfl: 0    blood-glucose sensor (DEXCOM G6 SENSOR) Monique, Change sensor every 10 days, Disp: 3 each, Rfl: 11    blood-glucose sensor (FREESTYLE MARGARITA 3 SENSOR) Monique, 2 each by Misc.(Non-Drug; Combo Route) route every 14 (fourteen) days., Disp: 2 each, Rfl: 11    blood-glucose transmitter (DEXCOM G6 TRANSMITTER) Monique, Change every 3 months, Disp: 1 each, Rfl: 3    carvediloL (COREG) 25 MG tablet, TAKE 1 TABLET(25 MG) BY MOUTH TWICE DAILY, Disp: 180 tablet, Rfl: 3    ciclopirox (PENLAC) 8 % Soln, Apply topically nightly., Disp: 6.6 mL, Rfl: 3    duke's soln (benadryl 30 mL, mylanta 30 mL, LIDOcaine 30 mL, nystatin 30 mL) 120mL, Take 10 mLs by mouth 4 (four) times daily. (Patient not taking: Reported on 2/24/2023), Disp: 120 mL, Rfl: 3    duke's soln (benadryl 30 mL, mylanta 30 mL, LIDOcaine 30 mL, nystatin 30 mL) 120mL, TAKE 10MLS BY MOUTH FOUR TIMES DAILY, Disp: , Rfl:     empagliflozin (JARDIANCE) 25 mg tablet, Take 1 tablet (25 mg total) by mouth once daily., Disp: 30 tablet, Rfl: 3    ENTRESTO  mg per tablet, TAKE 1 TABLET BY MOUTH TWICE DAILY, Disp: 60 tablet, Rfl: 11    ergocalciferol (ERGOCALCIFEROL) 50,000 unit Cap, TAKE 1 CAPSULE BY MOUTH EVERY 7 DAYS, Disp: 12 capsule, Rfl: 0    ferrous sulfate 324 mg (65 mg iron) TbEC, TAKE 1 TABLET BY MOUTH ON EVERY MONDAY, WEDNESDAY, AND FRIDAY., Disp: 30 tablet, Rfl: 0    furosemide (LASIX) 20 MG tablet, Take 1 tablet (20 mg total) by mouth 2 (two) times daily., Disp: 60 tablet, Rfl: 11    glipiZIDE (GLUCOTROL) 10 MG TR24, TAKE 1 TABLET(10 MG) BY MOUTH DAILY WITH BREAKFAST, Disp: 90 tablet, Rfl: 0    goserelin (ZOLADEX) 3.6 mg injection, Inject 3.6 mg into the skin every 28 days., Disp: , Rfl:     ibuprofen (ADVIL,MOTRIN) 800 MG tablet, Take 1 tablet (800  "mg total) by mouth 2 (two) times daily as needed for Pain., Disp: 45 tablet, Rfl: 2    insulin degludec (TRESIBA U-100 INSULIN) 100 unit/mL injection, Inject 0.36 mLs (36 Units total) into the skin once daily. Titrate to 50 units daily, Disp: 15 mL, Rfl: 1    lancets Misc, To check BG 2 times daily, to use with insurance preferred meter, Disp: 200 each, Rfl: 3    LIDOcaine (LIDODERM) 5 %, Place 2 patches onto the skin once daily. Remove & Discard patch within 12 hours or as directed by MD (Patient not taking: Reported on 2/24/2023), Disp: 60 patch, Rfl: 0    LORazepam (ATIVAN) 1 MG tablet, Take 1 tablet (1 mg total) by mouth every 12 (twelve) hours as needed for Anxiety., Disp: 30 tablet, Rfl: 0    metFORMIN (GLUCOPHAGE-XR) 500 MG ER 24hr tablet, TAKE 2 TABLETS(1000 MG) BY MOUTH TWICE DAILY WITH MEALS, Disp: 360 tablet, Rfl: 0    morphine (MS CONTIN) 15 MG 12 hr tablet, Take 1 tablet (15 mg total) by mouth every 8 (eight) hours as needed for Pain. (Patient not taking: Reported on 2/24/2023), Disp: 60 tablet, Rfl: 0    naloxone (NARCAN) 4 mg/actuation Spry, 4mg by nasal route as needed for opioid overdose; may repeat every 2-3 minutes in alternating nostrils until medical help arrives. Call 911, Disp: 1 each, Rfl: 11    ondansetron (ZOFRAN-ODT) 4 MG TbDL, Take 1 tablet (4 mg total) by mouth 2 (two) times daily., Disp: 30 tablet, Rfl: 0    ondansetron (ZOFRAN-ODT) 8 MG TbDL, DISSOLVE 1 TABLET(8 MG) ON THE TONGUE EVERY 8 HOURS AS NEEDED FOR NAUSEA, Disp: 30 tablet, Rfl: 2    oxyCODONE-acetaminophen (PERCOCET) 5-325 mg per tablet, Take 1 tablet by mouth every 4 (four) hours as needed for Pain., Disp: 60 tablet, Rfl: 0    palbociclib (IBRANCE) 100 mg Cap, Take 1 capsule (100 mg) by mouth once daily for 21 days, followed by 7 days off., Disp: 21 capsule, Rfl: 3    pen needle, diabetic (BD ULTRA-FINE VERONICA PEN NEEDLE) 32 gauge x 5/32" Ndle, 1 Device by Misc.(Non-Drug; Combo Route) route Daily., Disp: 100 each, Rfl: 4    " potassium chloride (KLOR-CON) 10 MEQ TbSR, Take 1 tablet (10 mEq total) by mouth once daily., Disp: 30 tablet, Rfl: 2    promethazine (PHENERGAN) 25 MG tablet, Take 1 tablet (25 mg total) by mouth every 6 (six) hours as needed for Nausea. (Patient not taking: Reported on 2/24/2023), Disp: 30 tablet, Rfl: 1    sennosides (SENNA-C ORAL), Take 2 tablets by mouth daily as needed., Disp: , Rfl:     spironolactone (ALDACTONE) 25 MG tablet, Take 1 tablet (25 mg total) by mouth once daily., Disp: 90 tablet, Rfl: 1    traMADoL (ULTRAM) 50 mg tablet, Take 1 tablet (50 mg total) by mouth every 6 (six) hours as needed for Pain., Disp: 120 tablet, Rfl: 0    venlafaxine (EFFEXOR-XR) 150 MG Cp24, Take 1 capsule (150 mg total) by mouth once daily., Disp: 90 capsule, Rfl: 1    venlafaxine (EFFEXOR-XR) 75 MG 24 hr capsule, Take 1 capsule (75 mg total) by mouth once daily. Take with 150 mg capsule for a total of 225 mg., Disp: 90 capsule, Rfl: 1    OBJECTIVE:       ROS:  Review of Systems   Constitutional:  Positive for activity change, appetite change and fatigue.   HENT: Negative.     Eyes: Negative.    Respiratory:  Positive for shortness of breath.    Cardiovascular: Negative.  Negative for leg swelling.   Gastrointestinal:  Positive for constipation and nausea.   Genitourinary: Negative.    Musculoskeletal:  Positive for arthralgias, back pain and myalgias.   Skin: Negative.    Neurological: Negative.  Negative for syncope and light-headedness.   Psychiatric/Behavioral:  Positive for dysphoric mood and sleep disturbance. The patient is nervous/anxious.    All other systems reviewed and are negative.    Review of Symptoms      Symptom Assessment (ESAS 0-10 Scale)  Pain:  5  Dyspnea:  0  Anxiety:  10  Nausea:  0  Depression:  6  Anorexia:  6  Fatigue:  8  Insomnia:  10  Restlessness:  0  Agitation:  0     CAM / Delirium:  Negative  Constipation:  Positive  Diarrhea:  Negative    Anxiety:  Is nervous/anxious  Constipation:   Constipation    Bowel Management Plan (BMP):  Yes      Pain Assessment:  OME in 24 hours:  0  Location(s): back    Back       Location: lower        Quality: Aching and dull        Quantity: 5/10 in intensity        Chronicity: Onset 1 (greater than) year(s) ago, unchanged        Aggravating Factors: Activity        Alleviating Factors: Opiates and NSAIDs (doesn't like taking opioids due to sleepiness)       Associated Symptoms: None    Modified Abigail Scale:  3 (with exertion )    ECOG Performance Status ndGndrndanddndend:nd nd2nd Living Arrangements:  Lives with family    Psychosocial/Cultural:   See Palliative Psychosocial Note: No  Patient lives with her two adult daughters (18 and 20 years old)    Parents both  since cancer diagnosis    On disability and lives in her childhood home without a mortgage    5 sisters and 3 brothers (2 bio siblings and several step and half siblings)    Medically retried since diagnosis 7th grade ; still helps to develop lesson plans with friends sometimes and helps to  kids and prepare for ACT test  **Primary  to Follow**  Palliative Care  Consult: No    Spiritual:  F - Sandra and Belief:  Gnosticist  I - Importance:  High  C - Community:  Prays regularly  A - Address in Care:   services offered but declined. Needs met at this time    Advance Care Planning   Advance Directives:   Living Will: No    [place]POST: No    Do Not Resuscitate Status: No    Medical Power of : No        Oral Declaration: Yes   Witnesses:  Ella James LCSW   Agent's Name:  Janel (her sister)    Decision Making:  Patient answered questions  Goals of Care: What is most important right now is to focus on improvement in condition but with limits to invasive therapies. Accordingly, we have decided that the best plan to meet the patient's goals includes continuing with treatment.        Physical Exam: limited due to telemedicine visit  Vitals:      Physical  Exam  Constitutional:       General: She is not in acute distress.     Appearance: She is obese. She is not diaphoretic.   HENT:      Head: Normocephalic and atraumatic.      Right Ear: External ear normal.      Left Ear: External ear normal.      Nose: Nose normal.      Mouth/Throat:      Mouth: Mucous membranes are moist.   Eyes:      General: No scleral icterus.        Right eye: No discharge.         Left eye: No discharge.   Neck:      Comments: Trachea midline  Pulmonary:      Effort: Pulmonary effort is normal. No respiratory distress.   Musculoskeletal:      Cervical back: Normal range of motion.      Comments: Sitting up without difficulty; able to move upper extremities with no limitations   Skin:     Coloration: Skin is not jaundiced.      Findings: No rash.   Neurological:      Mental Status: She is alert and oriented to person, place, and time.      Gait: Gait normal.   Psychiatric:         Behavior: Behavior normal.         Thought Content: Thought content normal.         Judgment: Judgment normal.       Labs:  CBC:   WBC   Date Value Ref Range Status   02/24/2023 5.93 3.90 - 12.70 K/uL Final     Hemoglobin   Date Value Ref Range Status   02/24/2023 9.7 (L) 12.0 - 16.0 g/dL Final     Hematocrit   Date Value Ref Range Status   02/24/2023 33.5 (L) 37.0 - 48.5 % Final     MCV   Date Value Ref Range Status   02/24/2023 90 82 - 98 fL Final     Platelets   Date Value Ref Range Status   02/24/2023 522 (H) 150 - 450 K/uL Final       LFT:   Lab Results   Component Value Date    AST 8 (L) 02/24/2023    ALKPHOS 81 02/24/2023    BILITOT 0.3 02/24/2023       Albumin:   Albumin   Date Value Ref Range Status   02/24/2023 3.0 (L) 3.5 - 5.2 g/dL Final   01/28/2021 3.5 (L) 3.6 - 5.1 g/dL Final     Comment:     For additional information, please refer to   http://education.Usound.CueThink/faq/WGH791 (This link is   being provided for informational/ educational purposes only.)    This test was developed and its  "analytical performance   characteristics have been determined by [facility]. It has not been cleared or approved by the   [place] Food and Drug Administration. This assay has been validated   pursuant to the CLIA regulations and is used for clinical   purposes.  @ Test Performed By:  [facility]  John Valverde M.D.,   [address] [place] [address]  CLIA  [id number]       Protein:   Total Protein   Date Value Ref Range Status   02/24/2023 7.4 6.0 - 8.4 g/dL Final       Radiology:I have reviewed all pertinent imaging results/findings within the past 24 hours.     10/10/2022 CT Abdomen: "Patient with breast cancer. Stable lucent/sclerotic lesions throughout the pelvis and spine. No evidence of new metastatic disease. Mild nonspecific subcutaneous fat stranding within the ventral abdominal wall near the periumbilical region.  Finding could represent simple edema or sequela of soft tissue infection/inflammation.  No organized fluid collection or solid mass detected. Right renal cyst. Hepatic steatosis."    40 minutes of total time spent on the encounter, which includes face to face time and non-face to face time preparing to see the patient (eg, review of tests), Obtaining and/or reviewing separately obtained history, Documenting clinical information in the electronic or other health record, Independently interpreting results (not separately reported) and communicating results to the patient/family/caregiver, or Care coordination (not separately reported).    Signature: John Bach MD                      "

## 2023-03-07 NOTE — TELEPHONE ENCOUNTER
Of course, thanks you for seeing her. I will cc our schedulers to get her set up for the MRI shoulder and an EKG.     PJ  ===View-only below this line===  ----- Message -----  From: John Bach MD  Sent: 3/7/2023  12:35 PM CST  To: Phyllis Lei MD, *  Subject: updates and suggestions                          Hi all, got to meet her for the first time and what a pleasure.  I know I  am late to the game and you all have done a fantastic job at supporting her, but had a few questions/updates:    @Parris Bhatt, she got confused about the MRI date and missed the appointment for her right shoulder. Can that be reordered?  I also spoke with her about the reluctance and intolerance of analgesics thus far.  I encouraged her to try the percocet you recently Rx and she will also try super low dose methadone 2.5 mg bid as our long acting agent. Can you please also add that an EKG is done to check a new baseline Qtc interval?      @Bria Christos, is it possible for her to start seeing you again?  She is having some nighttime anxiety leading to insomnia and fatigue.  She can't put her finger on what is triggering these episodes as nothing new has occurred with regards to diagnosis or prognosis.  Mentally she has accepted the outcome and feels liek she still has some control of her life.      @Vandana Preston, would you agree with vistiril to help with night time anxiety?  I worry about anything we typically use first line, I.e. trazadone, remeron, etc with her effexor dose where it currently lies.      Thanks all for the care of this nice lady.     Lan

## 2023-03-07 NOTE — PROGRESS NOTES
"Cancer Genetics  Hereditary and High-Risk Clinic  Department of Hematology and Oncology  [facility]    Date of Service:  3/7/23  Visit Provider:  Valentino Roman DNP  Collaborating Physician:  Phyllis Lei MD    Patient ID  Name: Kristopher Elmore    : 1975    MRN: [id number]      Referring Provider  No referring provider defined for this encounter.    Televisit Information  The patient location is: [facility]).    The chief complaint leading to consultation is:  As below.    Visit type: audiovisual.    Face-to-face time with patient:  Approximately 22 minutes.    Approximately 36 minutes in total were spent on this encounter, which includes face-to-face time and non-face-to-face time preparing to see the patient (e.g., review of tests), obtaining and/or reviewing separately obtained history, documenting clinical information in the electronic or other health record, independently interpreting results (not separately reported) and communicating results to the patient/family/caregiver, or care coordination (not separately reported).  Each patient to whom he or she provides medical services by telemedicine is:  (1) informed of the relationship between the physician and patient and the respective role of any other health care provider with respect to management of the patient; and (2) notified that he or she may decline to receive medical services by telemedicine and may withdraw from such care at any time.  Notes:  As below.    SUBJECTIVE      Chief Complaint: Follow-up    History of Present Illness (HPI):  Kristopher Elmore ("Kristopher"), 47 y.o., assigned female sex at birth, is an established patient who initially presented for cancer-genetic risk assessment on 2021.  She has not yet proceeded with germline cancer-genetic testing and returns today to re-discuss such.    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  If it is appearing small/illegible on your screen, expand this note window horizontally. **    Hereditary cancer genetic testing in blood relatives:  No  Other than noted, no known history of cancer in relatives depicted in the pedigree or in:  maternal first cousins, maternal extended family, paternal first cousins, paternal extended family, siblings' descendants.  Other than noted, no known family history of benign tumors or of colon polyps.    Review of Systems  See HPI.      OBJECTIVE     Physical Exam  Significantly limited secondary to the inherent nature of a virtual visit.  Very pleasant patient.  Constitutional      Appearance:  Appears well developed and well nourished. No distress.   Neurological     Mental Status:  Alert and oriented.  Psychiatric         Mood and Affect:  Normal.     Thought Content:  Normal.     Speech:  Normal.     Behavior:  Normal.     Judgment:  Normal.  Genetics-specific     It is my assessment that the patient is ready to proceed with cancer genetic testing from a psychosocial perspective.    CANCER GENETIC COUNSELING      Cancer Genetics     Germline cancer genetic testing is the testing of genes associated with cancer, known as cancer susceptibility genes.  Just as these genes are inherited from parents--one copy of each gene from each parent--mutations in these genes can be inherited, " as well.  A mutation in a cancer susceptibility gene adversely affects the gene's ability to prevent cancer; therefore, carriers of cancer susceptibility gene mutations may be at increased risk for certain cancers.     Causes of Cancer    Only approximately 5%-10% of cancers are caused by an inherited cancer susceptibility gene mutation; rather, the majority of cancers are sporadic.  Causes of sporadic cancers may include environmental risk factors, lifestyle risk factors, and non-modifiable risk factors.  It is important to note that members of a family often share not only their genetics but also other risk factors, including environmental and lifestyle risk factors, so cancers can be familial.     Potential Results of Genetic Testing, and Their Implications     Potential results of genetic testing include positive, negative, and variant of unknown significance (VUS).    Positive:  A positive result indicates the presence of at least one clinically significant gene mutation, and the individual's associated cancer risks vary depending upon the cancer susceptibility gene(s) in which there is/are a mutation(s).  With a positive result, depending upon the specific result and the individual's clinical history, modified risk management may be recommended, including measures for risk reduction and/or surveillance; however, there are not always effective strategies for modified risk management.  Negative:  A negative result indicates that no clinically significant mutations were identified in the gene(s) tested.  In the event of a negative result in the patient, her relatives may still be at increased risk for breast cancer based on risk factors shared with Kristopher.  VUS:  A VUS indicates that there is not presently enough data for the laboratory to make a determination as to whether the genetic variant is clinically significant.  VUSs are not typically acted upon clinically.       Genetic Mutation Inheritance     When an  individual tests positive for a gene mutation, her first-degree relatives each have a 50% chance of carrying the same mutation, and other, more distant blood relatives can also be at risk of carrying the same mutation.       Genetic Discrimination     Genetic discrimination occurs when individuals are discriminated against on the basis of their genetic information.    The Genetic Information Nondiscrimination Act of 2008 (MARTHA) is [place] federal legislation that provides some protections against use of an individual's genetic information by their health insurer and by their employer.      Title I of MARTHA prohibits most health insurers from utilizing an individual's genetic information to make decisions regarding insurance eligibility or premium charges.  This protection does not apply to health insurance obtained through a job with the , and it is unclear whether it applies to health insurance obtained through the Federal Employees Health Benefits Plan.    Title II of MARTHA prohibits covered entities, in many cases, from requesting or requiring the genetic information of employees and applicants and from using said information to make employment decisions.  This protection does not apply to employers with fewer than 15 employees or to the .    MARTHA does not protect individuals from genetic discrimination by any other type of policy or entity, including but not limited to life insurance, disability insurance, long-term care insurance,  benefits, and [facility] benefits.    Genetic Testing Logistics     An outside laboratory would perform the testing after a blood sample is collected here at the [facility] or a saliva sample is collected.    There is a potential for the patient to incur out-of-pocket costs related to genetic testing.    One can expect this genetic testing to take approximately three weeks to result from the time the specimen is collected.    Post-test genetic  "counseling can be conducted once the genetic testing results are available.     Cancer Genetics Impression    From a clinical standpoint, hereditary cancer susceptibility gene testing is recommended for Kristopher given her diagnosis of early-onset breast cancer.  Offered Kristopher options of proceeding with hereditary cancer susceptibility gene testing at this time versus delaying/declining such.  Kristopher desires to proceed with such testing at this time, with sample collection added onto her already scheduled blood work for 3/23/23.  Provided germline cancer genetic test options of focused panel versus broad panel, and Kristopher opts for the latter and specifically agrees to proceed with a 91-gene multiple-cancer panel through [facility].    ASSESSMENT / PLAN        ICD-10-CM ICD-9-CM   1. Encounter for nonprocreative genetic counseling  Z71.83 V26.33   2. Metastatic breast cancer  C50.919 174.9   3. Family history of ovarian cancer  Z80.41 V16.41   4. History of uterine fibroid  Z86.018 V13.29   5. Family history of kidney cancer  Z80.51 V16.51     1. Encounter for nonprocreative genetic counseling  Kristopher Elmore ("Kristopher"), 47 y.o., assigned female sex at birth, is an established patient who initially presented for cancer-genetic risk assessment on 06/24/2021.  She has not yet proceeded with germline cancer-genetic testing and returns today to re-discuss such.  Cancer genetic risk assessment and pre-test cancer genetic counseling were conducted.  From a clinical standpoint, hereditary cancer susceptibility gene testing is recommended for Kristopher given her diagnosis of early-onset breast cancer.  Offered Kristopher options of proceeding with hereditary cancer susceptibility gene testing at this time versus delaying/declining such.  Kristopher desires to proceed with such testing at this time, with sample collection added onto her already scheduled blood work for 3/23/23.  Provided germline cancer genetic test options " of focused panel versus broad panel, and Kristopher opts for the latter and specifically agrees to proceed with a 91-gene multiple-cancer panel through [facility].    Genetic Test Information  Testing lab: [facility]   Test panel: 9510 (91 genes) +RNAinsight (Core panel:  BRCA)   ICD-10 code(s): C50.919   Z80.41   Z86.018   Z80.51      Verbal informed consent: Obtained   Written informed consent: To be obtained   Specimen type: Blood  (Patient denies blood disorders that would necessitate a skin fibroblast specimen)   Specimen collection by: [facility]   Specimen collection date: 03/23/2023   Results expected by: Approximately 2-3 weeks after the genetic testing lab receives the specimen   Results disclosure plan: Post-test visit if positive or complex result; otherwise, results letter or optional post-test visit     2. Metastatic breast cancer  - See #1    3. Family history of ovarian cancer  - See #1    4. History of uterine fibroid  - See #1    5. Family history of kidney cancer  - See #1     Follow-up:  Follow up in 16 days (on 3/23/2023) for genetic test consent-signing and sample collection.    Questions were encouraged and answered to the patient's satisfaction, and she verbalized understanding of the information and agreement with the plan.           Valentino Roman, DNP, APRN, FNP-BC, AOCNP, CGRA  Nurse Practitioner, Hereditary and High-Risk Clinic  Department of Hematology and Oncology  [facility]        CC:  Phyllis Lei MD; Alfredo Bhatt NP

## 2023-03-07 NOTE — Clinical Note
Please schedule nurse visit for immediately before blood work already scheduled for 3/23/23 - pt is aware of nurse visit already. Thanks!

## 2023-03-15 ENCOUNTER — TELEPHONE (OUTPATIENT)
Dept: INFUSION THERAPY | Facility: HOSPITAL | Age: 48
End: 2023-03-15
Payer: MEDICARE

## 2023-03-16 ENCOUNTER — TELEPHONE (OUTPATIENT)
Dept: INFUSION THERAPY | Facility: HOSPITAL | Age: 48
End: 2023-03-16
Payer: MEDICARE

## 2023-03-22 ENCOUNTER — PATIENT MESSAGE (OUTPATIENT)
Dept: PALLIATIVE MEDICINE | Facility: CLINIC | Age: 48
End: 2023-03-22
Payer: MEDICARE

## 2023-03-22 ENCOUNTER — TELEPHONE (OUTPATIENT)
Dept: PALLIATIVE MEDICINE | Facility: CLINIC | Age: 48
End: 2023-03-22
Payer: MEDICARE

## 2023-03-23 ENCOUNTER — CLINICAL SUPPORT (OUTPATIENT)
Dept: HEMATOLOGY/ONCOLOGY | Facility: CLINIC | Age: 48
End: 2023-03-23
Payer: MEDICARE

## 2023-03-23 ENCOUNTER — OFFICE VISIT (OUTPATIENT)
Dept: HEMATOLOGY/ONCOLOGY | Facility: CLINIC | Age: 48
End: 2023-03-23
Payer: MEDICARE

## 2023-03-23 ENCOUNTER — INFUSION (OUTPATIENT)
Dept: INFUSION THERAPY | Facility: HOSPITAL | Age: 48
End: 2023-03-23
Payer: MEDICARE

## 2023-03-23 VITALS
SYSTOLIC BLOOD PRESSURE: 129 MMHG | HEIGHT: 64 IN | DIASTOLIC BLOOD PRESSURE: 61 MMHG | TEMPERATURE: 98 F | HEART RATE: 96 BPM | WEIGHT: 293 LBS | RESPIRATION RATE: 18 BRPM | BODY MASS INDEX: 50.02 KG/M2 | OXYGEN SATURATION: 96 %

## 2023-03-23 DIAGNOSIS — T45.1X5A ANEMIA ASSOCIATED WITH CHEMOTHERAPY: ICD-10-CM

## 2023-03-23 DIAGNOSIS — E66.01 MORBID OBESITY, UNSPECIFIED OBESITY TYPE: ICD-10-CM

## 2023-03-23 DIAGNOSIS — D56.3 THALASSEMIA MINOR: ICD-10-CM

## 2023-03-23 DIAGNOSIS — C50.211 MALIGNANT NEOPLASM OF UPPER-INNER QUADRANT OF RIGHT BREAST IN FEMALE, ESTROGEN RECEPTOR POSITIVE: Primary | ICD-10-CM

## 2023-03-23 DIAGNOSIS — D64.81 ANEMIA ASSOCIATED WITH CHEMOTHERAPY: ICD-10-CM

## 2023-03-23 DIAGNOSIS — Z86.018 HISTORY OF UTERINE FIBROID: ICD-10-CM

## 2023-03-23 DIAGNOSIS — Z17.0 MALIGNANT NEOPLASM OF UPPER-INNER QUADRANT OF RIGHT BREAST IN FEMALE, ESTROGEN RECEPTOR POSITIVE: Primary | ICD-10-CM

## 2023-03-23 DIAGNOSIS — C79.51 BONE METASTASES: ICD-10-CM

## 2023-03-23 DIAGNOSIS — C50.919 METASTATIC BREAST CANCER: Primary | ICD-10-CM

## 2023-03-23 DIAGNOSIS — Z79.4 TYPE 2 DIABETES MELLITUS WITHOUT COMPLICATION, WITH LONG-TERM CURRENT USE OF INSULIN: ICD-10-CM

## 2023-03-23 DIAGNOSIS — Z80.51 FAMILY HISTORY OF KIDNEY CANCER: ICD-10-CM

## 2023-03-23 DIAGNOSIS — Z80.41 FAMILY HISTORY OF OVARIAN CANCER: ICD-10-CM

## 2023-03-23 DIAGNOSIS — D50.8 OTHER IRON DEFICIENCY ANEMIA: ICD-10-CM

## 2023-03-23 DIAGNOSIS — E11.9 TYPE 2 DIABETES MELLITUS WITHOUT COMPLICATION, WITH LONG-TERM CURRENT USE OF INSULIN: ICD-10-CM

## 2023-03-23 DIAGNOSIS — G89.3 CANCER ASSOCIATED PAIN: ICD-10-CM

## 2023-03-23 PROCEDURE — 4010F PR ACE/ARB THEARPY RXD/TAKEN: ICD-10-PCS | Mod: CPTII,S$GLB,, | Performed by: INTERNAL MEDICINE

## 2023-03-23 PROCEDURE — 99999 PR PBB SHADOW E&M-EST. PATIENT-LVL V: CPT | Mod: PBBFAC,,, | Performed by: INTERNAL MEDICINE

## 2023-03-23 PROCEDURE — 3008F PR BODY MASS INDEX (BMI) DOCUMENTED: ICD-10-PCS | Mod: CPTII,S$GLB,, | Performed by: INTERNAL MEDICINE

## 2023-03-23 PROCEDURE — 3078F DIAST BP <80 MM HG: CPT | Mod: CPTII,S$GLB,, | Performed by: INTERNAL MEDICINE

## 2023-03-23 PROCEDURE — 1159F MED LIST DOCD IN RCRD: CPT | Mod: CPTII,S$GLB,, | Performed by: INTERNAL MEDICINE

## 2023-03-23 PROCEDURE — 99215 OFFICE O/P EST HI 40 MIN: CPT | Mod: S$GLB,,, | Performed by: INTERNAL MEDICINE

## 2023-03-23 PROCEDURE — 96402 CHEMO HORMON ANTINEOPL SQ/IM: CPT

## 2023-03-23 PROCEDURE — 99215 PR OFFICE/OUTPT VISIT, EST, LEVL V, 40-54 MIN: ICD-10-PCS | Mod: S$GLB,,, | Performed by: INTERNAL MEDICINE

## 2023-03-23 PROCEDURE — 99999 PR PBB SHADOW E&M-EST. PATIENT-LVL V: ICD-10-PCS | Mod: PBBFAC,,, | Performed by: INTERNAL MEDICINE

## 2023-03-23 PROCEDURE — 4010F ACE/ARB THERAPY RXD/TAKEN: CPT | Mod: CPTII,S$GLB,, | Performed by: INTERNAL MEDICINE

## 2023-03-23 PROCEDURE — 3078F PR MOST RECENT DIASTOLIC BLOOD PRESSURE < 80 MM HG: ICD-10-PCS | Mod: CPTII,S$GLB,, | Performed by: INTERNAL MEDICINE

## 2023-03-23 PROCEDURE — 3074F SYST BP LT 130 MM HG: CPT | Mod: CPTII,S$GLB,, | Performed by: INTERNAL MEDICINE

## 2023-03-23 PROCEDURE — 96372 THER/PROPH/DIAG INJ SC/IM: CPT

## 2023-03-23 PROCEDURE — 1159F PR MEDICATION LIST DOCUMENTED IN MEDICAL RECORD: ICD-10-PCS | Mod: CPTII,S$GLB,, | Performed by: INTERNAL MEDICINE

## 2023-03-23 PROCEDURE — 63600175 PHARM REV CODE 636 W HCPCS: Mod: JZ,JG | Performed by: INTERNAL MEDICINE

## 2023-03-23 PROCEDURE — 3008F BODY MASS INDEX DOCD: CPT | Mod: CPTII,S$GLB,, | Performed by: INTERNAL MEDICINE

## 2023-03-23 PROCEDURE — 3074F PR MOST RECENT SYSTOLIC BLOOD PRESSURE < 130 MM HG: ICD-10-PCS | Mod: CPTII,S$GLB,, | Performed by: INTERNAL MEDICINE

## 2023-03-23 RX ADMIN — GOSERELIN ACETATE 3.6 MG: 3.6 IMPLANT SUBCUTANEOUS at 03:03

## 2023-03-23 RX ADMIN — DENOSUMAB 120 MG: 120 INJECTION SUBCUTANEOUS at 03:03

## 2023-03-23 NOTE — NURSING
Patient received Xgeva injection SQ to ABD.  Tolerated well.  Patient denies jaw pain, upcoming dental procedures.  Also reports taking daily Calcium and Vit. D.  Patient received Zoladex injection SQ to ABD.  Tolerated well.  Educated patient regarding medication prior to receiving injection.  Band-aid applied to site.  C/D/I.

## 2023-03-23 NOTE — PROGRESS NOTES
Subjective:       Patient ID: Kristopher Elmore is a 47 y.o. female.    Chief Complaint: Malignant neoplasm of upper-inner quadrant of right breast     HPI    Presents for follow up  Currently on Arimidex and Ibrance (dose adjusted due to anemia to 100 mg M-F, off Sat&Sun)   Zoladex and Xgeva monthly   Most recent scans stable.     She proceeded with genetic testing today    Reports in the interval the following:  Chronic, unchanged pain   She does not like how narcotics make her feel  She recently was placed on methadone by palliative and reports the dosing is too strong- notes makes her too sleepy so only tried 2 doses  Feels ibuprofen works best at high doses but aware and concerned of the side effects  Recently has noted more issues with ambulating related to pain and feels medicines make her unsteady at times  Denies weakness or numbness- denies headaches or nausea  She is agreeable to adding brain imaging to next imaging  BP and BG under better control     Follows with cardiology regularly.      Most recent imaging:  - 1/18/2023 CT C/A/P:  FINDINGS:  Base of Neck: No significant abnormality.  CHEST:  -Heart: Normal size. No pericardial effusion.  -Pulmonary vasculature: Pulmonary arteries distribute normally.  There are four pulmonary veins.  -Jane/Mediastinum: No pathologic hakeem enlargement.  -Trachea and Proximal airways: Patent.  -Lungs/Pleura: Symmetrically expanded without consolidation, pneumothorax, or mass.  No pleural effusion or thickening.  Bilateral bandlike opacifications in the lung bases favored to represent scarring or subsegmental atelectasis.  -Esophagus: Normal course and caliber.  ABDOMEN:  - Liver: Normal in size and attenuation with no focal hepatic abnormality  - Gallbladder: No calcified gallstones.  No wall thickening or pericholecystic fluid.  - Bile Ducts: No intra or extrahepatic biliary ductal dilatation.  - Stomach/Duodenum: Unremarkable.  - Spleen: Unremarkable.  -  Pancreas: Unremarkable.  - Adrenals: Unremarkable.  - Kidneys/ureters/urinary bladder: Normal in size and location.  Right kidney 1.0 cm simple renal cyst (axial series 3, image 72).  No hydronephrosis or stones.  The ureters appear normal in course and caliber without evidence of ureteral dilatation.  The urinary bladder is unremarkable.  - Retroperitoneum: No significant adenopathy.  PELVIS:  - Reproductive: Calcific lesion within the fundus of the uterus, likely representing involuted fibroid.  - Other: No pelvic adenopathy, free fluid, or mass.  BOWEL/MESENTERY:  No evidence of bowel obstruction or inflammatory process. Scattered colonic diverticulosis without adjacent inflammatory changes to suggest acute diverticulitis.  VASCULATURE: Left-sided aortic arch with 3 branch vessels.  No aneurysm and no significant atherosclerosis.  BONES: No acute fracture.  Stable lucent lesion involving the right iliac wing.  Stable lucent/sclerotic lesions throughout the vertebral spine consistent with metastatic disease.  EXTRATHORACIC/EXTRAPERITONEAL SOFT TISSUES: Mild abdominal wall edema along the anterior abdominal wall.  Impression:  In this patient with history of invasive ductal carcinoma of the right breast, there is no CT evidence of new metastatic disease.  Persistent lucent/sclerotic lesions throughout the pelvis and spine, similar appearance to prior.     Diagnosis:  1. Stage IV ([id number]) invasive ductal carcinoma of right breast, upper inner quadrant, ER %, ID neg, Her2 neg, Grade 3, multifocal with one lesion appearing more aggressive (Ki67 80%), large LN +, bone mets.   Ibrance dose adjusted with cytopenias   Oncologic History:   Presentation   - 9/11/19 - Screening mammogram showed multiple right breast masses lower inner quadrant   - 9/13/19 - Diagnostic mammogram and US showed a right breast, 3:00 position mass, 2 CFN measuring 17 mm x 16 mm x 14 mm. There 2 smaller adjacent masses towards the nipple  "  - 9/19/19 - Biopsy -   A. Right breast subareolar: Grade 3 IDC, estrogen receptor 80%, progesterone receptor 0%, Her2 dago neg, Ki67 80%.   B. Right breast mass 3:00: Grade 3 IDC with papillary features, estrogen receptor 100%, progesterone receptor 0%, Her2 dago 1+, Ki67 30%.   - 9/26/19: US right axilla with abnormal lymphadenopathy measuring 4.3 x 2.8 cm with biopsy + for metastatic breast cancer.   Surgery consultation with Dr Ferris on 9/25/19   - 9/25/19 - Genetics Genetics [facility] ([facility] BRACAnalysis pending; VUS pending   Medical oncology consultation on 10/7/19   * 10/8/19 - CT C/A/P with several lytic lesions in thoracic and lumbar spine, iliac bones, left scapula in addition to 3 x 4.5 cm right axillary node and 2.1 cm right breast mass. Bone scan negative.   * 10/31/19 - started Ibrance, Arimidex, Zoladex; plan for Xgeva.   * 11/12/19 STRATA without targetable mutations.   * 12/16/19 - Bone biopsy + for met disease (initially read as negative, but we treated as metastatic disease given high suspicion).   * MRI brain: "Partially empty sella. Question bilateral globe proptosis. Otherwise unremarkable MRI brain as detailed above specifically without evidence for intracranial enhancing lesion to suggest metastatic disease."   * 4/22/2020 PET - disease improvement. 8/2020 PET with disease improvement.   * 9/1/20 - Palliative RT to L1-L4   Of note, * Xgeva monthly - we had been waiting on dental clearance, but she has been unable to get into dentistry and is accepting of risks. Has no active dental disease.   PET scan 4/22/2021:   FINDINGS:   Quality of the study: Mildly degraded due to patient's large body habitus and skin abutting the gantry.   In the head and neck, there are no hypermetabolic lesions worrisome for malignancy. There are no hypermetabolic mucosal lesions, and there are no pathologically enlarged or hypermetabolic lymph nodes.   In the chest, there are no hypermetabolic lesions worrisome for " malignancy.   Stable CT appearance of a level 1 right axillary lymph node measuring 1.3 cm in short axis with normal background radiotracer uptake. Previously with SUV max of 2.1.   There are no concerning pulmonary nodules or masses, and there are no pathologically enlarged or hypermetabolic lymph nodes.   In the abdomen and pelvis, there is physiologic tracer distribution within the abdominal organs and excretion into the genitourinary system.   In the bones, there are stable lytic lesions throughout the lumbar spine and pelvis and additional stable sclerotic lesions in the sternum and T3 vertebral body. No associated focal abnormal increased radiotracer uptake. Findings likely represent treated disease.   Impression:   Interval decreased uptake within a prominent right axillary lymph node, now with normal background uptake. No new focal abnormal uptake.   Stable lytic and sclerotic lesions without focal abnormal uptake. Findings are compatible with treated metastasis.   8/25/2021 MRI brain   Impression:   No significant change from prior specifically without evidence for enhancing lesion to suggest intracranial metastatic disease.   Continued partially empty sella, intracranial hypertension to be included in differential in the appropriate clinical setting. Clinical correlation and further evaluation as warranted.   10/14/2021 MRI thoracic/lumbar spine:   Impression:   Patient history of metastatic breast cancer.   Bone lesions at T10, L2, L3, and right iliac wing, as above, concerning for metastatic disease. No pathologic fracture, soft tissue component, or epidural extension identified.   10/25/2021 CT chest/abd/pelvis:   Impression:   1. Stable metastasis of the spine   No evidence of intra-abdominal or intrathoracic metastatic disease   2. Stable prominent right axillary lymph node.   3. Additional findings are detailed above.   Currently on Arimidex and Ibrance   Zoladex and Xgeva monthly   - 2/2/2022 Bone  scan:   FINDINGS:   No focal abnormal uptake suggestive of osseous metastases. There is diffusely increased uptake in the calvarium in keeping with hyperostosis. There is degenerative type uptake most prominent in the bilateral shoulders and knees. The focal uptake previously described in the distal right femur is not visualized. There is otherwise physiologic distribution of the radiopharmaceutical throughout the skeleton.   There is normal uptake in the genitourinary system and soft tissues.   Impression:   There is no scintigraphic evidence of osteoblastic metastatic disease.   Nonspecific uptake in the distal right femur has resolved.   Diffuse cranial hyperostosis and other findings as above.   - 2/1/2022 CT C/A/P:   FINDINGS:   CHEST   Support tubes and lines: None.   Aorta: Normal caliber.   Heart: Normal size..   Coronary arteries: No calcifications.   Pericardium: Normal. No effusion, thickening, or calcification.   Central pulmonary arteries: Normal caliber.   Base of neck/thyroid: Normal.   Lymph nodes: No supraclavicular, axillary, internal mammary, mediastinal or hilar lymphadenopathy.   Esophagus: Normal.   Pleura: No effusion, thickening or calcification.   Body wall: Unremarkable.   Airways: Normal.   Lungs: Clear without focal or diffuse abnormality.   Bones: Sclerotic lesions are seen throughout the spine as well as in the sternum, not substantially changed from 10/25/2021   ABDOMEN/PELVIS   Liver: Unremarkable   Gallbladder/bile ducts: Unremarkable. No intra or extrahepatic biliary ductal dilatation   Pancreas: Unremarkable.   Spleen: Unremarkable.   Adrenals: Unremarkable.   Kidneys: Simple cyst in the right kidney is unchanged   Lymph nodes: No abdominal or pelvic lymphadenopathy.   Bowel and mesentery: Unremarkable.   Abdominal aorta: Unremarkable.   Inferior vena cava: Unremarkable.   Free fluid or free air: None.   Pelvis: Unremarkable.   Urinary bladder: Unremarkable.   Body wall:  Unremarkable.   Bones: Sclerotic lesions seen in the spine are unchanged.   Impression:   1. No evidence of new recurrent or metastatic disease.   2. Sclerotic lesions seen in the spine and sternum, unchanged when compared to 10/25/2021.      - more recent scans as above    Review of Systems   Constitutional:  Negative for activity change, appetite change, chills, fatigue (notes pretty good), fever and unexpected weight change.   HENT:  Negative for mouth sores, rhinorrhea and trouble swallowing.    Eyes:  Negative for visual disturbance.   Respiratory:  Negative for cough, shortness of breath and wheezing.    Cardiovascular:  Negative for chest pain, palpitations and leg swelling.   Gastrointestinal:  Negative for abdominal distention, abdominal pain, blood in stool, change in bowel habit, constipation, diarrhea, nausea, vomiting and change in bowel habit.   Genitourinary:  Negative for difficulty urinating, dysuria, frequency and urgency.   Musculoskeletal:  Positive for arthralgias and back pain (chronic). Negative for joint swelling and myalgias.   Integumentary:  Negative for rash.   Neurological:  Positive for coordination difficulties and coordination difficulties. Negative for dizziness, weakness, numbness and headaches.   Hematological:  Negative for adenopathy. Does not bruise/bleed easily.   Psychiatric/Behavioral:  Negative for dysphoric mood and sleep disturbance. The patient is not nervous/anxious.        Objective:      Physical Exam  Vitals and nursing note reviewed.   Constitutional:       General: She is not in acute distress.     Appearance: Normal appearance. She is well-developed. She is obese. She is not ill-appearing.      Comments: Presents with one of her daughters  Very pleasant  ECOG= 0  Uses wheelchair for distance, but walks into exam room     HENT:      Head: Normocephalic and atraumatic.   Eyes:      General: Lids are normal. No scleral icterus.     Extraocular Movements: Extraocular  movements intact.      Conjunctiva/sclera: Conjunctivae normal.      Pupils: Pupils are equal, round, and reactive to light.   Neck:      Thyroid: No thyromegaly.      Vascular: No JVD.      Trachea: Trachea normal.   Cardiovascular:      Rate and Rhythm: Normal rate and regular rhythm.      Heart sounds: Normal heart sounds. No murmur heard.    No friction rub. No gallop.      Comments: Difficult to assess tones.   Pulmonary:      Effort: Pulmonary effort is normal. No respiratory distress.      Breath sounds: Normal breath sounds. No wheezing, rhonchi or rales.      Comments: distant  Abdominal:      General: Bowel sounds are normal. There is no distension.      Palpations: Abdomen is soft. There is no mass.      Tenderness: There is no abdominal tenderness. There is no guarding or rebound.      Comments: No organomegaly however difficult to assess.    Hardness noted periumbilical area   Musculoskeletal:         General: Normal range of motion.      Cervical back: Normal range of motion and neck supple.      Comments: No spinal or paraspinal tenderness.    Lymphadenopathy:      Head:      Right side of head: No submental or submandibular adenopathy.      Left side of head: No submental or submandibular adenopathy.      Cervical: No cervical adenopathy.      Upper Body:      Right upper body: No supraclavicular or axillary adenopathy.      Left upper body: No supraclavicular or axillary adenopathy.   Skin:     General: Skin is warm and dry.      Capillary Refill: Capillary refill takes less than 2 seconds.      Coloration: Skin is not jaundiced or pale.      Findings: No bruising or rash.      Nails: There is no clubbing.   Neurological:      Mental Status: She is alert and oriented to person, place, and time.      Sensory: No sensory deficit.      Motor: No weakness.      Coordination: Coordination normal.      Gait: Gait normal.   Psychiatric:         Mood and Affect: Mood normal.         Speech: Speech normal.          Behavior: Behavior normal.         Thought Content: Thought content normal.         Judgment: Judgment normal.       Assessment:       Problem List Items Addressed This Visit       Type 2 diabetes mellitus without complication, with long-term current use of insulin    Thalassemia    Morbid obesity, unspecified obesity type    Malignant neoplasm of upper-inner quadrant of right breast in female, estrogen receptor positive - Primary    Relevant Orders    BATH/SHOWER CHAIR FOR HOME USE    Iron deficiency anemia    Cancer associated pain    Bone metastases    Anemia associated with chemotherapy       Plan:       Type 2 DM- continued work on better control    Anemia- has underlying thalassemia and prior iron def- will recheck iron parameters  Ibrance also impacting- have dose adjusted an monitoring closely  Knows to call for any signs of worsening anemia, or report any bleeding    Obesity- exercise limited by disease limitations  Improved diet    Cancer related pain- will review with palliative and discuss other options    Metastatic breast cancer to bone  Continue Ibrance with dose adjustments, Arimidex and monthly Xgeva  Due for repeat imaging and will include brain- opts to do all imaging at one time  Knows to call for any changes    Route Chart for Scheduling    Med Onc Chart Routing  Urgent    Follow up with physician . MRI brain and CT C/A/P, bone scan in the interval; RTC post in next week or 2   Follow up with [facility] 4 weeks. cbc, iron studies, cmp and Xgeva and EP   Infusion scheduling note    Injection scheduling note    Labs CMP, CBC and iron and TIBC   Scheduling:  Preferred lab:  Lab interval:     Imaging Bone scans, CT chest abdomen pelvis and MRI      Pharmacy appointment    Other referrals         Treatment Plan Information   OP ANASTROZOLE PALBOCICLIB Q4W   Jessica Mendez MD   Upcoming Treatment Dates - OP ANASTROZOLE PALBOCICLIB Q4W    No upcoming days in selected categories.    Supportive Plan  Information  OP BREAST GOSERELIN & DENOSUMAB Q4W   Jessica Mendez MD   Upcoming Treatment Dates - OP BREAST GOSERELIN & DENOSUMAB Q4W    No upcoming days in selected categories.    45 minutes total on encounter

## 2023-03-23 NOTE — PROGRESS NOTES
Met with the patient to review and sign consents for genetic testing through [facility]. Reviewed follow up timeline and procedures, and escorted the patient to the lobby.

## 2023-03-27 ENCOUNTER — DOCUMENTATION ONLY (OUTPATIENT)
Dept: HEMATOLOGY/ONCOLOGY | Facility: CLINIC | Age: 48
End: 2023-03-27
Payer: MEDICARE

## 2023-03-27 ENCOUNTER — HOSPITAL ENCOUNTER (OUTPATIENT)
Dept: RADIOLOGY | Facility: HOSPITAL | Age: 48
Discharge: HOME | End: 2023-03-27
Attending: INTERNAL MEDICINE
Payer: MEDICARE

## 2023-03-27 DIAGNOSIS — C50.211 MALIGNANT NEOPLASM OF UPPER-INNER QUADRANT OF RIGHT BREAST IN FEMALE, ESTROGEN RECEPTOR POSITIVE: ICD-10-CM

## 2023-03-27 DIAGNOSIS — C79.51 BONE METASTASES: ICD-10-CM

## 2023-03-27 DIAGNOSIS — Z17.0 MALIGNANT NEOPLASM OF UPPER-INNER QUADRANT OF RIGHT BREAST IN FEMALE, ESTROGEN RECEPTOR POSITIVE: ICD-10-CM

## 2023-03-27 DIAGNOSIS — G89.3 CANCER ASSOCIATED PAIN: ICD-10-CM

## 2023-03-27 PROCEDURE — 25500020 PHARM REV CODE 255: Performed by: INTERNAL MEDICINE

## 2023-03-27 PROCEDURE — 70553 MRI BRAIN STEM W/O & W/DYE: CPT | Mod: TC

## 2023-03-27 PROCEDURE — A9585 GADOBUTROL INJECTION: HCPCS | Performed by: INTERNAL MEDICINE

## 2023-03-27 PROCEDURE — 70553 MRI BRAIN W WO CONTRAST: ICD-10-PCS | Mod: 26,,, | Performed by: RADIOLOGY

## 2023-03-27 PROCEDURE — 70553 MRI BRAIN STEM W/O & W/DYE: CPT | Mod: 26,,, | Performed by: RADIOLOGY

## 2023-03-27 RX ORDER — GADOBUTROL 604.72 MG/ML
10 INJECTION INTRAVENOUS
Status: COMPLETED | OUTPATIENT
Start: 2023-03-27 | End: 2023-03-27

## 2023-03-27 RX ADMIN — GADOBUTROL 10 ML: 604.72 INJECTION INTRAVENOUS at 11:03

## 2023-03-27 NOTE — PROGRESS NOTES
Received referral from the clinic that the patient is in need of a shower chair. Faxed the referral to her insurance provider. [email or web address]. They will process the order and assign a vendor.

## 2023-04-03 ENCOUNTER — PATIENT MESSAGE (OUTPATIENT)
Dept: PHARMACY | Facility: CLINIC | Age: 48
End: 2023-04-03
Payer: MEDICARE

## 2023-04-03 DIAGNOSIS — C79.51 BONE METASTASES: ICD-10-CM

## 2023-04-03 DIAGNOSIS — Z17.0 MALIGNANT NEOPLASM OF UPPER-INNER QUADRANT OF RIGHT BREAST IN FEMALE, ESTROGEN RECEPTOR POSITIVE: ICD-10-CM

## 2023-04-03 DIAGNOSIS — C50.211 MALIGNANT NEOPLASM OF UPPER-INNER QUADRANT OF RIGHT BREAST IN FEMALE, ESTROGEN RECEPTOR POSITIVE: ICD-10-CM

## 2023-04-05 ENCOUNTER — HOSPITAL ENCOUNTER (OUTPATIENT)
Dept: RADIOLOGY | Facility: HOSPITAL | Age: 48
Discharge: HOME | End: 2023-04-05
Attending: INTERNAL MEDICINE
Payer: MEDICARE

## 2023-04-05 DIAGNOSIS — C79.51 MALIGNANT NEOPLASM METASTATIC TO BONE: ICD-10-CM

## 2023-04-05 DIAGNOSIS — Z17.0 MALIGNANT NEOPLASM OF UPPER-INNER QUADRANT OF RIGHT BREAST IN FEMALE, ESTROGEN RECEPTOR POSITIVE: ICD-10-CM

## 2023-04-05 DIAGNOSIS — C50.211 MALIGNANT NEOPLASM OF UPPER-INNER QUADRANT OF RIGHT BREAST IN FEMALE, ESTROGEN RECEPTOR POSITIVE: ICD-10-CM

## 2023-04-05 DIAGNOSIS — G89.3 CANCER ASSOCIATED PAIN: ICD-10-CM

## 2023-04-05 PROCEDURE — 74177 CT ABD & PELVIS W/CONTRAST: CPT | Mod: 26,,, | Performed by: RADIOLOGY

## 2023-04-05 PROCEDURE — 71260 CT THORAX DX C+: CPT | Mod: 26,,, | Performed by: RADIOLOGY

## 2023-04-05 PROCEDURE — 25500020 PHARM REV CODE 255: Performed by: INTERNAL MEDICINE

## 2023-04-05 PROCEDURE — 74177 CT CHEST ABDOMEN PELVIS WITH CONTRAST (XPD): ICD-10-PCS | Mod: 26,,, | Performed by: RADIOLOGY

## 2023-04-05 PROCEDURE — 74177 CT ABD & PELVIS W/CONTRAST: CPT | Mod: TC

## 2023-04-05 PROCEDURE — 71260 CT CHEST ABDOMEN PELVIS WITH CONTRAST (XPD): ICD-10-PCS | Mod: 26,,, | Performed by: RADIOLOGY

## 2023-04-05 PROCEDURE — 71260 CT THORAX DX C+: CPT | Mod: TC

## 2023-04-05 RX ADMIN — IOHEXOL 100 ML: 350 INJECTION, SOLUTION INTRAVENOUS at 08:04

## 2023-04-06 ENCOUNTER — SPECIALTY PHARMACY (OUTPATIENT)
Dept: PHARMACY | Facility: CLINIC | Age: 48
End: 2023-04-06
Payer: MEDICARE

## 2023-04-06 NOTE — TELEPHONE ENCOUNTER
Specialty Pharmacy - Refill Coordination    Specialty Medication Orders Linked to Encounter      Flowsheet Row Most Recent Value   Medication #1 palbociclib (IBRANCE) 100 mg Cap (Order#[id number], Rx#[id number])            Refill Questions - Documented Responses      Flowsheet Row Most Recent Value   Patient Availability and HIPAA Verification    Does patient want to proceed with activity? Yes   HIPAA/medical authority confirmed? Yes   Relationship to patient of person spoken to? Self   Refill Screening Questions    Changes to allergies? No   Changes to medications? No   New conditions since last clinic visit? No   Unplanned office visit, urgent care, ED, or hospital admission in the last 4 weeks? No   How does patient/caregiver feel medication is working? Good   Financial problems or insurance changes? No   How many doses of your specialty medications were missed in the last 4 weeks? 0   Would patient like to speak to a pharmacist? No   When does the patient need to receive the medication? 04/11/23   Refill Delivery Questions    How will the patient receive the medication? Pickup   When does the patient need to receive the medication? 04/11/23   Expected Copay ($) 0   Is the patient able to afford the medication copay? Yes   Payment Method zero copay   Days supply of Refill 28   Supplies needed? No supplies needed   Refill activity completed? Yes   Refill activity plan Refill scheduled   Shipment/Pickup Date: 04/10/23            Current Outpatient Medications   Medication Sig    (Magic mouthwash) 1:1:1 Benadryl 12.5mg/5ml liq, aluminum & magnesium hydroxide-simehticone (Maalox), LIDOcaine viscous 2% Swish and spit 10 mLs every 4 (four) hours as needed. for mouth sores (Patient not taking: Reported on 3/7/2023)    anastrozole (ARIMIDEX) 1 mg Tab TAKE 1 TABLET(1 MG) BY MOUTH EVERY DAY    atorvastatin (LIPITOR) 40 MG tablet TAKE 1 TABLET(40 MG) BY MOUTH EVERY DAY    blood sugar diagnostic Strp To check BG 2 times  daily, to use with insurance preferred meter    blood-glucose meter kit To check BG 2 times daily, to use with insurance preferred meter    blood-glucose meter,continuous (DEXCOM G6 ) Misc Use with dexcom G6 system    blood-glucose sensor (DEXCOM G6 SENSOR) Monique Change sensor every 10 days    blood-glucose sensor (FREESTYLE MARGARITA 3 SENSOR) Monique 2 each by Misc.(Non-Drug; Combo Route) route every 14 (fourteen) days.    blood-glucose transmitter (DEXCOM G6 TRANSMITTER) Monique Change every 3 months    carvediloL (COREG) 25 MG tablet TAKE 1 TABLET(25 MG) BY MOUTH TWICE DAILY    ciclopirox (PENLAC) 8 % Soln Apply topically nightly.    duke's soln (benadryl 30 mL, mylanta 30 mL, LIDOcaine 30 mL, nystatin 30 mL) 120mL Take 10 mLs by mouth 4 (four) times daily.    duke's soln (benadryl 30 mL, mylanta 30 mL, LIDOcaine 30 mL, nystatin 30 mL) 120mL TAKE 10MLS BY MOUTH FOUR TIMES DAILY    empagliflozin (JARDIANCE) 25 mg tablet Take 1 tablet (25 mg total) by mouth once daily.    ENTRESTO  mg per tablet TAKE 1 TABLET BY MOUTH TWICE DAILY    ergocalciferol (ERGOCALCIFEROL) 50,000 unit Cap TAKE 1 CAPSULE BY MOUTH EVERY 7 DAYS    ferrous sulfate 324 mg (65 mg iron) TbEC TAKE 1 TABLET BY MOUTH ON EVERY MONDAY, WEDNESDAY, AND FRIDAY.    furosemide (LASIX) 20 MG tablet Take 1 tablet (20 mg total) by mouth 2 (two) times daily.    glipiZIDE (GLUCOTROL) 10 MG TR24 TAKE 1 TABLET(10 MG) BY MOUTH DAILY WITH BREAKFAST    goserelin (ZOLADEX) 3.6 mg injection Inject 3.6 mg into the skin every 28 days.    hydrOXYzine HCL (ATARAX) 25 MG tablet Take 1 tablet (25 mg total) by mouth nightly as needed for Anxiety.    ibuprofen (ADVIL,MOTRIN) 800 MG tablet Take 1 tablet (800 mg total) by mouth 2 (two) times daily as needed for Pain.    insulin degludec (TRESIBA U-100 INSULIN) 100 unit/mL injection Inject 0.36 mLs (36 Units total) into the skin once daily. Titrate to 50 units daily    lancets [facility] To check BG 2 times daily, to use with  "insurance preferred meter    LIDOcaine (LIDODERM) 5 % Place 2 patches onto the skin once daily. Remove & Discard patch within 12 hours or as directed by MD (Patient not taking: Reported on 3/7/2023)    LORazepam (ATIVAN) 1 MG tablet Take 1 tablet (1 mg total) by mouth every 12 (twelve) hours as needed for Anxiety. (Patient not taking: Reported on 3/7/2023)    metFORMIN (GLUCOPHAGE-XR) 500 MG ER 24hr tablet TAKE 2 TABLETS(1000 MG) BY MOUTH TWICE DAILY WITH MEALS    methadone (DOLOPHINE) 5 MG tablet Take 0.5 tablets (2.5 mg total) by mouth 2 (two) times a day.    naloxone (NARCAN) 4 mg/actuation Spry 4mg by nasal route as needed for opioid overdose; may repeat every 2-3 minutes in alternating nostrils until medical help arrives. Call 911    ondansetron (ZOFRAN-ODT) 4 MG TbDL Take 1 tablet (4 mg total) by mouth 2 (two) times daily.    ondansetron (ZOFRAN-ODT) 8 MG TbDL DISSOLVE 1 TABLET(8 MG) ON THE TONGUE EVERY 8 HOURS AS NEEDED FOR NAUSEA    oxyCODONE-acetaminophen (PERCOCET) 5-325 mg per tablet Take 1 tablet by mouth every 4 (four) hours as needed for Pain.    palbociclib (IBRANCE) 100 mg Cap Take 1 capsule (100 mg) by mouth once daily for 21 days, followed by 7 days off.    pen needle, diabetic (BD ULTRA-FINE VERONICA PEN NEEDLE) 32 gauge x 5/32" Ndle 1 Device by Misc.(Non-Drug; Combo Route) route Daily.    potassium chloride (KLOR-CON) 10 MEQ TbSR TAKE 1 TABLET(10 MEQ) BY MOUTH EVERY DAY    promethazine (PHENERGAN) 25 MG tablet Take 1 tablet (25 mg total) by mouth every 6 (six) hours as needed for Nausea. (Patient not taking: Reported on 3/7/2023)    sennosides (SENNA-C ORAL) Take 2 tablets by mouth daily as needed.    spironolactone (ALDACTONE) 25 MG tablet Take 1 tablet (25 mg total) by mouth once daily.    venlafaxine (EFFEXOR-XR) 150 MG Cp24 Take 1 capsule (150 mg total) by mouth once daily.    venlafaxine (EFFEXOR-XR) 75 MG 24 hr capsule Take 1 capsule (75 mg total) by mouth once daily. Take with 150 mg capsule " for a total of 225 mg.   Last reviewed on 3/23/2023  2:37 PM by Connie Sweet MA    Review of patient's allergies indicates:   Allergen Reactions    Keflex [cephalexin] Itching    Last reviewed on  4/5/2023 8:14 PM by Hardik Vazquez      Tasks added this encounter   5/2/2023 - Refill Call (Auto Added)  4/11/2023 - Pickup Reminder   Tasks due within next 3 months   No tasks due.     Francois Rangel, PharmD  [facility]  [address] [place] [id number]  Phone: [phone]  Fax: [phone]

## 2023-04-12 ENCOUNTER — HOSPITAL ENCOUNTER (OUTPATIENT)
Dept: RADIOLOGY | Facility: HOSPITAL | Age: 48
Discharge: HOME | End: 2023-04-12
Attending: INTERNAL MEDICINE
Payer: MEDICARE

## 2023-04-12 ENCOUNTER — OFFICE VISIT (OUTPATIENT)
Dept: PALLIATIVE MEDICINE | Facility: CLINIC | Age: 48
End: 2023-04-12
Payer: MEDICARE

## 2023-04-12 DIAGNOSIS — Z51.5 ENCOUNTER FOR PALLIATIVE CARE: ICD-10-CM

## 2023-04-12 DIAGNOSIS — Z17.0 MALIGNANT NEOPLASM OF UPPER-INNER QUADRANT OF RIGHT BREAST IN FEMALE, ESTROGEN RECEPTOR POSITIVE: ICD-10-CM

## 2023-04-12 DIAGNOSIS — R53.0 NEOPLASTIC (MALIGNANT) RELATED FATIGUE: ICD-10-CM

## 2023-04-12 DIAGNOSIS — C50.211 MALIGNANT NEOPLASM OF UPPER-INNER QUADRANT OF RIGHT BREAST IN FEMALE, ESTROGEN RECEPTOR POSITIVE: Primary | ICD-10-CM

## 2023-04-12 DIAGNOSIS — C50.211 MALIGNANT NEOPLASM OF UPPER-INNER QUADRANT OF RIGHT BREAST IN FEMALE, ESTROGEN RECEPTOR POSITIVE: ICD-10-CM

## 2023-04-12 DIAGNOSIS — G89.3 CANCER ASSOCIATED PAIN: ICD-10-CM

## 2023-04-12 DIAGNOSIS — C79.51 MALIGNANT NEOPLASM METASTATIC TO BONE: ICD-10-CM

## 2023-04-12 DIAGNOSIS — Z17.0 MALIGNANT NEOPLASM OF UPPER-INNER QUADRANT OF RIGHT BREAST IN FEMALE, ESTROGEN RECEPTOR POSITIVE: Primary | ICD-10-CM

## 2023-04-12 PROCEDURE — 1159F PR MEDICATION LIST DOCUMENTED IN MEDICAL RECORD: ICD-10-PCS | Mod: CPTII,95,, | Performed by: EMERGENCY MEDICINE

## 2023-04-12 PROCEDURE — 99215 OFFICE O/P EST HI 40 MIN: CPT | Mod: 95,,, | Performed by: EMERGENCY MEDICINE

## 2023-04-12 PROCEDURE — 4010F PR ACE/ARB THEARPY RXD/TAKEN: ICD-10-PCS | Mod: CPTII,95,, | Performed by: EMERGENCY MEDICINE

## 2023-04-12 PROCEDURE — A9503 TC99M MEDRONATE: HCPCS

## 2023-04-12 PROCEDURE — 4010F ACE/ARB THERAPY RXD/TAKEN: CPT | Mod: CPTII,95,, | Performed by: EMERGENCY MEDICINE

## 2023-04-12 PROCEDURE — 78306 BONE IMAGING WHOLE BODY: CPT | Mod: 26,,, | Performed by: RADIOLOGY

## 2023-04-12 PROCEDURE — 99215 PR OFFICE/OUTPT VISIT, EST, LEVL V, 40-54 MIN: ICD-10-PCS | Mod: 95,,, | Performed by: EMERGENCY MEDICINE

## 2023-04-12 PROCEDURE — 1159F MED LIST DOCD IN RCRD: CPT | Mod: CPTII,95,, | Performed by: EMERGENCY MEDICINE

## 2023-04-12 PROCEDURE — 78306 BONE IMAGING WHOLE BODY: CPT | Mod: TC

## 2023-04-12 PROCEDURE — 78306 NM BONE SCAN WHOLE BODY: ICD-10-PCS | Mod: 26,,, | Performed by: RADIOLOGY

## 2023-04-12 RX ORDER — METHADONE HYDROCHLORIDE 5 MG/1
2.5 TABLET ORAL NIGHTLY
Qty: 15 TABLET | Refills: 0 | Status: SHIPPED | OUTPATIENT
Start: 2023-04-12 | End: 2023-05-12

## 2023-04-12 NOTE — PROGRESS NOTES
[facility]  [facility]  Follow up visit    The patient location is: home  The chief complaint leading to consultation is: symptom management and ACP    Visit type: audiovisual    Face to Face time with patient:32 minutes    40 minutes of total time spent on the encounter, which includes face to face time and non-face to face time preparing to see the patient (eg, review of tests), Obtaining and/or reviewing separately obtained history, Documenting clinical information in the electronic or other health record, Independently interpreting results (not separately reported) and communicating results to the patient/family/caregiver, or Care coordination (not separately reported).     Each patient to whom he or she provides medical services by telemedicine is:  (1) informed of the relationship between the physician and patient and the respective role of any other health care provider with respect to management of the patient; and (2) notified that he or she may decline to receive medical services by telemedicine and may withdraw from such care at any time.    Reason for Consult: symptom management and ACP      ASSESSMENT/PLAN:     Plan/Recommendations:  Diagnoses and all orders for this visit:    Malignant neoplasm of upper-inner quadrant of right breast in female, estrogen receptor positive with bone mets  - patient followed by Dr. Lei and NP Delphine  - currently on disease-directed therapy  - mets to sternum, right iliac wing, spine  - encouraged by recent MRI brain on 3/23 and surveillance CT 4/5 showing stable disease  BONE scan today    Encounter for palliative care/Advanced care planning  - patient decisional  - patient by herself on telemedicine visit   - no ACP documents uploaded into EMR  - philosophy of Palliative Medicine reviewed with patient and family at first visit  - new patient folder given to and reviewed with patient and family at first visit  -  goals: life prolonging  - ACP booklet given to and reviewed with patient and family at first visit including HCPOA and living will  - patient verbally stated that she would like her sister, Janel, to be her surrogate decision maker.   - reviewed ACP booklet again at previous visit along with [place] form. Patient has been thinking on these topics more with ongoing symptoms of likely congestive heart failure in setting of malignancy.   - did not specifically discuss code status at this time  - will follow up at future appointments    Other form of dyspnea  - patient reporting improvement in her dyspnea since last visit  - dyspnea worsens with exertion   - patient has been seen previously in ED due to dyspnea and concern for heart failure and worsening control of DM  - patient states that she has been able to walk around the house now as well as tries to make it from the parking lot to appointments without wheelchair  - referred to PT at previous visit  - continue treatment for other conditions (DM, CHF) as prescribed by other specialists  - tips for shortness of breath in new patient folder for patient to review    Cancer-related pain  - patient reporting her pain is mainly in her lower back and all along her right side, describes pain and constant, dull  - pain rated 0/10 today  (prev 8/10 and on some days)  - patient reports that the Norco, oxycodone, and MS Contin all make her very sleepy; only takes motrin or APAP for BTP  - stopped muscle relaxers due to side effects  - has taken methadone sporadically but with improvement when she does so  - discussed strategy of taking at night at bedtime to deliver maintenance results  - Qtc 461 on last EKG; will recheck at next onc appointment    Nausea/Anorexia  - patient reports nausea and anorexia are improved  - patient has noted continued change in her appetite and nausea with diabetes medication, especially Trulicity  - patient has had increased fluid intake due to  hyperglycemia  - patient using anti-emetics every day with good relief still   - continue zofran and phenergan prn  - patient already following with nutrition  - patient has already been referred to Endocrinology for better management of diabetes. She states she has not been able to get her insulin filled for last two weeks; she is working to get it moved to [facility]  - patient denies any need for refills of anti-emetic medications at this time  - will continue to monitor    Adjustment disorder with mixed anxiety and depressed mood  Neoplastic (malignant) related fatigue/Insomnia  - patient reports continued depression and anxiety  - patient reports good social support from 2 ex-husbands, siblings, daughters & friends  - additionally reports using journaling, music and [place] to cope with feelings of sadness; her 2 kittens also offer comfort   - reports increaesed Effexor (225 mg) is helping, prior to increase she was unable to discuss loss of parents without crying; Rx by psych NP [place]  - communicated with psych NP; agrees with addition of hydroxyzine 25 mg qhs  - emotional support provided throughout visit  - Able to fall asleep but awakens with anxious thoughts not driven by any particular fear  - Continues to enjoy time with family and friends; making plans for a cruise or [place]  - will continue to monitor closely     Constipation  - patient reports regular, daily BM with the help of senna  - discussed using bowel regimen consistently   - adequate fluid intake   - constipation tip sheet in new patient folder for patient to review  - will continue close monitoring    Understanding of illness/Prognosis: patient has good understanding of disease at this time.     Goals of care: life-prolonging but not at the expense of QOL; hopes to avoid unnecessary suffering    Follow up: 2 months    Patient's encounter and above plan of care discussed with patient's oncology-psychologist  "in person after visit    SUBJECTIVE:     History of Present Illness:  Patient is a 47 y.o. year old female with anemia, anxiety, cardiomyopathy, CHF, HTN, HLD, and metastatic breast cancer presents to Palliative Medicine for symptom management and ACP. Patient was diagnosed with stage IV breast cancer in September 2019. She was started on Ibrance and Arimidex, and she continues on this regimen today. Please see oncology notes for full details regarding her oncologic treatment course.     4/12/2023  MAR reviewed:  03/07/2023 Methadone Hcl 5 Mg Tablet 15.00 15 Tho [place] 0 225.00 15.00   Pt seen via video visit.  Doing well with improved pain score of 6/10.  Cont to only take APAP and ibuprofen.  Mood good; no falls; no N/V; spiritis buoyed by stable imaging recently; has taken methadone after our last visit but inconsistently and at different times.  Feels it also make her sleepy but admits it helps significantly with bony related pain.    Enjoyed time over Easter with her girls.  Has been placed in charge of planning for family trip, either to islands/beach or cruise.      3/7/2023  Pt doing well on virtual visit.  Continues to play meaningful part in the lives of her daughters and spends time with family and friends.  Has continued to have difficulties in finding a pain regimen that works for her due to side effects.  Has continued to develop strategies of dealing with discomfort and maintaining a positive attitude and outlook.  Depression reported as better with med adjust ment an increase in effexor to 225 mg/day.  Reports increased anxiety that disturbs sleep but not related to any specific thought.  She endorses she is accepting of what ever is to come and hoping to just "live her best life."  Feels good support from her daughter and family members    01/03/2023:  LA  reviewed and summarized:  07/20/2022 Tramadol 50 mg Disp: 120 for 30 days    Patient following with endocrinology about blood glucose " "levels. Today patient states she is "feeling crummy" today. Patient states that her pain in her back is worse after road trip to her sister's house for the holidays and back. Patient reporting her nausea is worse today. She took all her medication yesterday, but she did not eat last night. Patient has been having nausea daily, but it is controlled by zofran. Patient's breathing is better as she is able to walk around the house and trying to walk to appointments without a wheelchair. Patient has noted worsening of her mood, especially around the holidays. Patient has been crying more, but she has an appointment on 1/4/23 with her psychiatrist. Patient has also had trouble getting her insulin, so she is trying to get it moved to [facility].     10/24/2022:  LA  reviewed and summarized:  07/20/2022 Tramadol 50 mg Disp: 120 for 30 days    Today patient states she is doing okay today. She continues to experience pain in her back and spasms. She uses ibuprofen about twice a day with good relief. Patient used tizanidine for muscle relaxation, which made her feel very sleepy. Patient uses tramadol if the pain is very severe. She has noted worsening dyspnea with minimal exertion. She was seen in ED recently and given IV lasix. She had output of > 1.5 L. Patient having continued anxiety and depression. It is coming up on the anniversary of her mother's passing as well as the holidays, which is making the anxiety/depression worse. Patient continues to have poor appetite. Her blood glucose levels have been elevated, which is causing her to drink more water. Patient feeling off with the diabetic medication, and this is affecting her appetite as well. Patient sleeping okay today. She wants to discuss ACP documents in more detail today.     07/20/2022:  LA  reviewed and summarized:  05/03/2022 Lorazepam 1 mg disp: 30 for 15 days    Today patient states she is doing well overall. Her breathing has started to improve, " "and she is able to walk further without resting. Patient does still have dyspnea with exertion. Patient reporting continued pain as well as new vibrating/muscle spasm in her back. Patient states this is not all the time, but it can stop her in her tracks. Patient has been able to process her grief, and she is doing better emotionally. Patient discussed planning a trip with her siblings in October for her 47th birthday. She is even considering doing crafts with her cricket for the trip.     03/23/2022:  VALERIA ALEJANDRO reviewed and summarized:  No new prescriptions reported    Patient seen by VALERIE Lucas. Today patient's primary complaint is dyspnea with exertion; on a bad day dyspnea is significantly limiting of desired activities. Patient reports recent visit to cardiologist with repeat echo scheduled for May. Her pain is reasonably controlled, only occasionally using norco. She states she is adjusting to "a new normal". She reports recent improvement of her depression is likely attributable to increase in Effexor, can now talk about recent loss of her parents without crying. She continues to see onc-psych. She is pleased with stability of recent scans. She reports all other symptoms are improved or tolerable.     01/12/2022:  VALERIA ALEJANDRO reviewed and summarized:  08/23/2021 Oxycodone 5 mg disp: 45 for 11 days  08/23/2021 MS Contin 15 mg Disp: 60 for 20 days    Today patient states overall she is doing okay. Patient continues to have severe back pain that limits her mobility. All of the pain medications that she has tried has significant side effect of sleepiness. Patient is open to meeting with pain management for intervention. Patient is also open to meeting with PT to help as well. Patient found the holidays difficult for her, but she was able to visit with her family. She did find enjoyment during that time as well. Patient has been following with Dr. Odom. Patient was having some difficulty with sleeping, but she is has " been better with a routine this past week.     2021:  LA  reviewed and summarized:  2021 Oxycodone 5 mg disp: 45 for 11 days  2021 MS Contin 15 mg Disp: 60 for 20 days    Patient presents to clinic today with her sister. Patient having moderate to severe pain in her back and right side. She has been following with radiation oncology as well. Patient uses both MS Contin and oxycodone intermittently. Patient also having some dyspnea. She is feeling very anxious and depressed recently. Patient also experiencing severe nausea and poor appetite. She has been using her anti-emetics intermittently. Patient also having trouble sleeping and feels very fatigued throughout the day. She is also intermittently constipated. Patient open to learning about ACP.    Past Medical History:   Diagnosis Date    Abnormal Pap smear     pt states 13yrs ago colpo was done    Anemia     Anxiety     Cancer     Cardiomyopathy     CHF (congestive heart failure)     Fibroid     Hx of psychiatric care     Hyperlipidemia     Hypertension     Psychiatric problem     Sleep difficulties     Therapy      Past Surgical History:   Procedure Laterality Date     SECTION      TONSILLECTOMY      TUBAL LIGATION      UTERINE FIBROID EMBOLIZATION       Family History   Problem Relation Age of Onset    Other Mother         breast lesions had to be surgically removed    Arthritis Mother     Diabetes Mother     Heart disease Mother         CHF, CAD , 2 stents    Hypertension Mother     Hyperlipidemia Mother     Heart failure Mother     Hypertension Brother     No Known Problems Maternal Grandmother     Kidney cancer Maternal Grandfather 79    Rashes / Skin problems Daughter         boils/cysts    Asthma Daughter     Cervical cancer Paternal Cousin         dx age 29?    Ovarian cancer Paternal Cousin         dx age 29?    Endometriosis Paternal Cousin     Fibroids Other         uterine    Thyroid cancer Other         type? dx age?     Fibroids Other         uterine    Fibroids Other         uterine    Fibroids Other         uterine    Fibroids Other         uterine    Breast cancer Neg Hx     Colon polyps Neg Hx      Review of patient's allergies indicates:   Allergen Reactions    Keflex [cephalexin] Itching       Medications:    Current Outpatient Medications:     anastrozole (ARIMIDEX) 1 mg Tab, TAKE 1 TABLET(1 MG) BY MOUTH EVERY DAY, Disp: 90 tablet, Rfl: 3    atorvastatin (LIPITOR) 40 MG tablet, TAKE 1 TABLET(40 MG) BY MOUTH EVERY DAY, Disp: 90 tablet, Rfl: 3    blood sugar diagnostic Strp, To check BG 2 times daily, to use with insurance preferred meter, Disp: 200 each, Rfl: 3    blood-glucose meter kit, To check BG 2 times daily, to use with insurance preferred meter, Disp: 1 each, Rfl: 0    carvediloL (COREG) 25 MG tablet, TAKE 1 TABLET(25 MG) BY MOUTH TWICE DAILY, Disp: 180 tablet, Rfl: 3    empagliflozin (JARDIANCE) 25 mg tablet, Take 1 tablet (25 mg total) by mouth once daily., Disp: 30 tablet, Rfl: 3    ENTRESTO  mg per tablet, TAKE 1 TABLET BY MOUTH TWICE DAILY, Disp: 60 tablet, Rfl: 11    ergocalciferol (ERGOCALCIFEROL) 50,000 unit Cap, TAKE 1 CAPSULE BY MOUTH EVERY 7 DAYS, Disp: 12 capsule, Rfl: 0    ferrous sulfate 324 mg (65 mg iron) TbEC, TAKE 1 TABLET BY MOUTH ON EVERY MONDAY, WEDNESDAY, AND FRIDAY., Disp: 30 tablet, Rfl: 0    furosemide (LASIX) 20 MG tablet, Take 1 tablet (20 mg total) by mouth 2 (two) times daily., Disp: 60 tablet, Rfl: 11    glipiZIDE (GLUCOTROL) 10 MG TR24, TAKE 1 TABLET(10 MG) BY MOUTH DAILY WITH BREAKFAST, Disp: 90 tablet, Rfl: 0    goserelin (ZOLADEX) 3.6 mg injection, Inject 3.6 mg into the skin every 28 days., Disp: , Rfl:     ibuprofen (ADVIL,MOTRIN) 800 MG tablet, Take 1 tablet (800 mg total) by mouth 2 (two) times daily as needed for Pain., Disp: 45 tablet, Rfl: 2    insulin degludec (TRESIBA U-100 INSULIN) 100 unit/mL injection, Inject 0.36 mLs (36 Units total) into the skin once daily.  "Titrate to 50 units daily, Disp: 15 mL, Rfl: 1    lancets Misc, To check BG 2 times daily, to use with insurance preferred meter, Disp: 200 each, Rfl: 3    LORazepam (ATIVAN) 1 MG tablet, Take 1 tablet (1 mg total) by mouth every 12 (twelve) hours as needed for Anxiety., Disp: 30 tablet, Rfl: 0    metFORMIN (GLUCOPHAGE-XR) 500 MG ER 24hr tablet, TAKE 2 TABLETS(1000 MG) BY MOUTH TWICE DAILY WITH MEALS, Disp: 360 tablet, Rfl: 0    naloxone (NARCAN) 4 mg/actuation Spry, 4mg by nasal route as needed for opioid overdose; may repeat every 2-3 minutes in alternating nostrils until medical help arrives. Call 911, Disp: 1 each, Rfl: 11    ondansetron (ZOFRAN-ODT) 8 MG TbDL, DISSOLVE 1 TABLET(8 MG) ON THE TONGUE EVERY 8 HOURS AS NEEDED FOR NAUSEA, Disp: 30 tablet, Rfl: 2    oxyCODONE-acetaminophen (PERCOCET) 5-325 mg per tablet, Take 1 tablet by mouth every 4 (four) hours as needed for Pain., Disp: 60 tablet, Rfl: 0    palbociclib (IBRANCE) 100 mg Cap, Take 1 capsule (100 mg) by mouth once daily for 21 days, followed by 7 days off., Disp: 21 capsule, Rfl: 3    pen needle, diabetic (BD ULTRA-FINE VERONICA PEN NEEDLE) 32 gauge x 5/32" Ndle, 1 Device by Misc.(Non-Drug; Combo Route) route Daily., Disp: 100 each, Rfl: 4    potassium chloride (KLOR-CON) 10 MEQ TbSR, TAKE 1 TABLET(10 MEQ) BY MOUTH EVERY DAY, Disp: 30 tablet, Rfl: 2    sennosides (SENNA-C ORAL), Take 2 tablets by mouth daily as needed., Disp: , Rfl:     spironolactone (ALDACTONE) 25 MG tablet, Take 1 tablet (25 mg total) by mouth once daily., Disp: 90 tablet, Rfl: 1    venlafaxine (EFFEXOR-XR) 150 MG Cp24, Take 1 capsule (150 mg total) by mouth once daily., Disp: 90 capsule, Rfl: 1    venlafaxine (EFFEXOR-XR) 75 MG 24 hr capsule, Take 1 capsule (75 mg total) by mouth once daily. Take with 150 mg capsule for a total of 225 mg., Disp: 90 capsule, Rfl: 1    (Magic mouthwash) 1:1:1 Benadryl 12.5mg/5ml liq, aluminum & magnesium hydroxide-simehticone (Maalox), LIDOcaine " viscous 2%, Swish and spit 10 mLs every 4 (four) hours as needed. for mouth sores (Patient not taking: Reported on 3/7/2023), Disp: 360 mL, Rfl: 0    blood-glucose meter,continuous (DEXCOM G6 ) Misc, Use with dexcom G6 system, Disp: 1 each, Rfl: 0    blood-glucose sensor (DEXCOM G6 SENSOR) Monique, Change sensor every 10 days, Disp: 3 each, Rfl: 11    blood-glucose sensor (FREESTYLE MARGARITA 3 SENSOR) Monique, 2 each by Misc.(Non-Drug; Combo Route) route every 14 (fourteen) days., Disp: 2 each, Rfl: 11    blood-glucose transmitter (DEXCOM G6 TRANSMITTER) Monique, Change every 3 months, Disp: 1 each, Rfl: 3    ciclopirox (PENLAC) 8 % Soln, Apply topically nightly., Disp: 6.6 mL, Rfl: 3    duke's soln (benadryl 30 mL, mylanta 30 mL, LIDOcaine 30 mL, nystatin 30 mL) 120mL, Take 10 mLs by mouth 4 (four) times daily., Disp: 120 mL, Rfl: 3    duke's soln (benadryl 30 mL, mylanta 30 mL, LIDOcaine 30 mL, nystatin 30 mL) 120mL, TAKE 10MLS BY MOUTH FOUR TIMES DAILY, Disp: , Rfl:     LIDOcaine (LIDODERM) 5 %, Place 2 patches onto the skin once daily. Remove & Discard patch within 12 hours or as directed by MD (Patient not taking: Reported on 3/7/2023), Disp: 60 patch, Rfl: 0    ondansetron (ZOFRAN-ODT) 4 MG TbDL, Take 1 tablet (4 mg total) by mouth 2 (two) times daily., Disp: 30 tablet, Rfl: 0    promethazine (PHENERGAN) 25 MG tablet, Take 1 tablet (25 mg total) by mouth every 6 (six) hours as needed for Nausea. (Patient not taking: Reported on 3/7/2023), Disp: 30 tablet, Rfl: 1    OBJECTIVE:       ROS:  Review of Systems   Constitutional:  Positive for activity change, appetite change and fatigue.   HENT: Negative.     Eyes: Negative.    Respiratory:  Positive for shortness of breath.    Cardiovascular: Negative.  Negative for leg swelling.   Gastrointestinal:  Positive for constipation and nausea.   Genitourinary: Negative.    Musculoskeletal:  Positive for arthralgias, back pain and myalgias.   Skin: Negative.    Neurological:  Negative.  Negative for syncope and light-headedness.   Psychiatric/Behavioral:  Positive for dysphoric mood and sleep disturbance. The patient is nervous/anxious.    All other systems reviewed and are negative.    Review of Symptoms      Symptom Assessment (ESAS 0-10 Scale)  Pain:  0  Dyspnea:  0  Anxiety:  0  Nausea:  5  Depression:  3  Anorexia:  7  Fatigue:  0  Insomnia:  0  Restlessness:  0  Agitation:  0     CAM / Delirium:  Negative  Constipation:  Positive  Diarrhea:  Negative    Anxiety:  Is nervous/anxious  Constipation:  Constipation    Bowel Management Plan (BMP):  Yes      Pain Assessment:  OME in 24 hours:  0  Location(s): back    Back       Location: lower        Quality: Aching and dull        Quantity: 0/10 in intensity        Chronicity: Onset 1 (greater than) year(s) ago, unchanged        Aggravating Factors: Activity        Alleviating Factors: Opiates, NSAIDs and acetaminophen (doesn't like taking opioids due to sleepiness)       Associated Symptoms: None    Modified Abigail Scale:  3 (with exertion )    ECOG Performance Status ndGndrndanddndend:nd nd2nd Living Arrangements:  Lives with family    Psychosocial/Cultural:   See Palliative Psychosocial Note: No  Patient lives with her two adult daughters (18 and 20 years old)    Parents both  since cancer diagnosis    On disability and lives in her childhood home without a mortgage    5 sisters and 3 brothers (2 bio siblings and several step and half siblings)    Medically retried since diagnosis 7th grade ; still helps to develop lesson plans with friends sometimes and helps to  kids and prepare for ACT test  **Primary  to Follow**  Palliative Care  Consult: No    Spiritual:  F - Sandra and Belief:  Zoroastrianism  I - Importance:  High  C - Community:  Prays regularly  A - Address in Care:   services offered but declined. Needs met at this time    Advance Care Planning   Advance Directives:   Living Will: No     [place]POST: No    Do Not Resuscitate Status: No    Medical Power of : No        Oral Declaration: Yes   Witnesses:  Ella James LCSW   Agent's Name:  Janel (her sister)    Decision Making:  Patient answered questions  Goals of Care: What is most important right now is to focus on improvement in condition but with limits to invasive therapies. Accordingly, we have decided that the best plan to meet the patient's goals includes continuing with treatment.        Physical Exam: limited due to telemedicine visit  Vitals: BP:  (virtual) (04/12/23 1001)    Physical Exam  Constitutional:       General: She is not in acute distress.     Appearance: She is obese. She is not diaphoretic.   HENT:      Head: Normocephalic and atraumatic.      Right Ear: External ear normal.      Left Ear: External ear normal.      Nose: Nose normal.      Mouth/Throat:      Mouth: Mucous membranes are moist.   Eyes:      General: No scleral icterus.        Right eye: No discharge.         Left eye: No discharge.   Neck:      Comments: Trachea midline  Pulmonary:      Effort: Pulmonary effort is normal. No respiratory distress.   Musculoskeletal:      Cervical back: Normal range of motion.      Comments: Sitting up without difficulty; able to move upper extremities with no limitations   Skin:     Coloration: Skin is not jaundiced.      Findings: No rash.   Neurological:      Mental Status: She is alert and oriented to person, place, and time.      Gait: Gait normal.   Psychiatric:         Behavior: Behavior normal.         Thought Content: Thought content normal.         Judgment: Judgment normal.       Labs:  CBC:   WBC   Date Value Ref Range Status   03/23/2023 5.75 3.90 - 12.70 K/uL Final     Hemoglobin   Date Value Ref Range Status   03/23/2023 9.3 (L) 12.0 - 16.0 g/dL Final     Hematocrit   Date Value Ref Range Status   03/23/2023 31.5 (L) 37.0 - 48.5 % Final     MCV   Date Value Ref Range Status   03/23/2023 89 82 - 98 fL Final  "    Platelets   Date Value Ref Range Status   03/23/2023 512 (H) 150 - 450 K/uL Final       LFT:   Lab Results   Component Value Date    AST 10 03/23/2023    ALKPHOS 81 03/23/2023    BILITOT 0.3 03/23/2023       Albumin:   Albumin   Date Value Ref Range Status   03/23/2023 2.9 (L) 3.5 - 5.2 g/dL Final   01/28/2021 3.5 (L) 3.6 - 5.1 g/dL Final     Comment:     For additional information, please refer to   http://education.[employer]/faq/LJJ301 (This link is   being provided for informational/ educational purposes only.)    This test was developed and its analytical performance   characteristics have [facility] by [facility]. It has not been cleared or approved by the   [place] Food and Drug Administration. This assay has been validated   pursuant to the CLIA regulations and is used for clinical   purposes.  @ Test Performed By:  [facility]  John Valverde M.D.,   [address] [place] [id number]  [facility]  [id number]       Protein:   Total Protein   Date Value Ref Range Status   03/23/2023 7.2 6.0 - 8.4 g/dL Final       Radiology:I have reviewed all pertinent imaging results/findings within the past 24 hours.   4/5/2023  CT Chest/Abd/Pelv  Impression:     1. In this patient with history of invasive ductal carcinoma of the right breast there are multiple stable lucent and sclerotic lesions throughout the vertebral spine consistent with metastatic disease.  No findings concerning for new metastatic disease.  2. Additional findings, as above.  10/10/2022 CT Abdomen: "Patient with breast cancer. Stable lucent/sclerotic lesions throughout the pelvis and spine. No evidence of new metastatic disease. Mild nonspecific subcutaneous fat stranding within the ventral abdominal wall near the periumbilical region.  Finding could represent simple edema or sequela of soft tissue infection/inflammation.  No organized fluid collection or " "solid mass detected. Right renal cyst. Hepatic steatosis."    3/27/2023  MRI Brain  Impression:     No acute intracranial abnormalities, specifically no evidence of intracranial metastatic disease.    Specifically:  BONES: No acute fracture.  Stable lucent lesion involving the right iliac wing.  Stable lucent and sclerotic lesions throughout the vertebral spine consistent with metastatic disease.  No definite new osseous lesions.    40 minutes of total time spent on the encounter, which includes face to face time and non-face to face time preparing to see the patient (eg, review of tests), Obtaining and/or reviewing separately obtained history, Documenting clinical information in the electronic or other health record, Independently interpreting results (not separately reported) and communicating results to the patient/family/caregiver, or Care coordination (not separately reported).    Signature: John Bach MD                        "

## 2023-04-18 NOTE — TELEPHONE ENCOUNTER
Outgoing call regarding Ibrance refill, informed pt contacting provider for refill approval, pt stated she's on her off week. Informed pt will follow up once provider response    Patient was seen in the emergency department on 4/2/23 for chest discomfort. Patient reports she is still having similar symptoms including chest pain although this is now right-sided vs. center and shortness of breath while at rest. Note, patient is able to hold conversation over the phone without difficulty. Patient states she now also has a dry cough and feels wheezy when she coughs. Patient denies previous symptoms she had while in the ED including nausea and sweating. Patient states she feels her symptoms have gotten progressively worse since being seen in the ED. Advised patient that with worsening symptoms, she should return to the ED however patient declines. Patient also declines being seen in urgent care. Patient's PCP does not have availability to be seen today or this week. Patient states RAUL Choi is somebody she trusts and notes she did not have good care previously in the ED or recently when she took her child to . Patient requests having a chest x-ray done or getting an inhaler. Peri advise.     Note- patient last saw RAUL Choi on 2/24/23. Patient did have labs done today (4/18/23).

## 2023-04-19 ENCOUNTER — LAB VISIT (OUTPATIENT)
Dept: LAB | Facility: HOSPITAL | Age: 48
End: 2023-04-19
Attending: INTERNAL MEDICINE
Payer: MEDICARE

## 2023-04-19 DIAGNOSIS — C50.211 MALIGNANT NEOPLASM OF UPPER-INNER QUADRANT OF RIGHT BREAST IN FEMALE, ESTROGEN RECEPTOR POSITIVE: ICD-10-CM

## 2023-04-19 DIAGNOSIS — G89.3 CANCER ASSOCIATED PAIN: ICD-10-CM

## 2023-04-19 DIAGNOSIS — Z17.0 MALIGNANT NEOPLASM OF UPPER-INNER QUADRANT OF RIGHT BREAST IN FEMALE, ESTROGEN RECEPTOR POSITIVE: ICD-10-CM

## 2023-04-19 DIAGNOSIS — C79.51 MALIGNANT NEOPLASM METASTATIC TO BONE: ICD-10-CM

## 2023-04-19 LAB
ALBUMIN SERPL BCP-MCNC: 2.9 G/DL (ref 3.5–5.2)
ALP SERPL-CCNC: 89 U/L (ref 55–135)
ALT SERPL W/O P-5'-P-CCNC: 14 U/L (ref 10–44)
ANION GAP SERPL CALC-SCNC: 10 MMOL/L (ref 8–16)
AST SERPL-CCNC: 10 U/L (ref 10–40)
BASOPHILS # BLD AUTO: 0.06 K/UL (ref 0–0.2)
BASOPHILS NFR BLD: 1 % (ref 0–1.9)
BILIRUB SERPL-MCNC: 0.3 MG/DL (ref 0.1–1)
BUN SERPL-MCNC: 9 MG/DL (ref 6–20)
CALCIUM SERPL-MCNC: 9.6 MG/DL (ref 8.7–10.5)
CHLORIDE SERPL-SCNC: 106 MMOL/L (ref 95–110)
CO2 SERPL-SCNC: 24 MMOL/L (ref 23–29)
CREAT SERPL-MCNC: 0.9 MG/DL (ref 0.5–1.4)
DIFFERENTIAL METHOD: ABNORMAL
EOSINOPHIL # BLD AUTO: 0.1 K/UL (ref 0–0.5)
EOSINOPHIL NFR BLD: 1.4 % (ref 0–8)
ERYTHROCYTE [DISTWIDTH] IN BLOOD BY AUTOMATED COUNT: 20.7 % (ref 11.5–14.5)
EST. GFR  (NO RACE VARIABLE): >60 ML/MIN/1.73 M^2
GLUCOSE SERPL-MCNC: 241 MG/DL (ref 70–110)
HCT VFR BLD AUTO: 30.8 % (ref 37–48.5)
HGB BLD-MCNC: 9.4 G/DL (ref 12–16)
IMM GRANULOCYTES # BLD AUTO: 0.02 K/UL (ref 0–0.04)
IMM GRANULOCYTES NFR BLD AUTO: 0.3 % (ref 0–0.5)
IRON SERPL-MCNC: 30 UG/DL (ref 30–160)
LYMPHOCYTES # BLD AUTO: 1.1 K/UL (ref 1–4.8)
LYMPHOCYTES NFR BLD: 19.1 % (ref 18–48)
MCH RBC QN AUTO: 26.9 PG (ref 27–31)
MCHC RBC AUTO-ENTMCNC: 30.5 G/DL (ref 32–36)
MCV RBC AUTO: 88 FL (ref 82–98)
MONOCYTES # BLD AUTO: 0.3 K/UL (ref 0.3–1)
MONOCYTES NFR BLD: 4.3 % (ref 4–15)
NEUTROPHILS # BLD AUTO: 4.3 K/UL (ref 1.8–7.7)
NEUTROPHILS NFR BLD: 73.9 % (ref 38–73)
NRBC BLD-RTO: 1 /100 WBC
PLATELET # BLD AUTO: 420 K/UL (ref 150–450)
PMV BLD AUTO: 10.4 FL (ref 9.2–12.9)
POTASSIUM SERPL-SCNC: 3.7 MMOL/L (ref 3.5–5.1)
PROT SERPL-MCNC: 7.1 G/DL (ref 6–8.4)
RBC # BLD AUTO: 3.5 M/UL (ref 4–5.4)
SATURATED IRON: 14 % (ref 20–50)
SODIUM SERPL-SCNC: 140 MMOL/L (ref 136–145)
TOTAL IRON BINDING CAPACITY: 216 UG/DL (ref 250–450)
TRANSFERRIN SERPL-MCNC: 146 MG/DL (ref 200–375)
WBC # BLD AUTO: 5.8 K/UL (ref 3.9–12.7)

## 2023-04-19 PROCEDURE — 84466 ASSAY OF TRANSFERRIN: CPT | Performed by: INTERNAL MEDICINE

## 2023-04-19 PROCEDURE — 85025 COMPLETE CBC W/AUTO DIFF WBC: CPT | Performed by: INTERNAL MEDICINE

## 2023-04-19 PROCEDURE — 80053 COMPREHEN METABOLIC PANEL: CPT | Performed by: INTERNAL MEDICINE

## 2023-04-19 PROCEDURE — 36415 COLL VENOUS BLD VENIPUNCTURE: CPT | Performed by: INTERNAL MEDICINE

## 2023-04-21 ENCOUNTER — INFUSION (OUTPATIENT)
Dept: INFUSION THERAPY | Facility: HOSPITAL | Age: 48
End: 2023-04-21
Payer: MEDICARE

## 2023-04-21 ENCOUNTER — OFFICE VISIT (OUTPATIENT)
Dept: HEMATOLOGY/ONCOLOGY | Facility: CLINIC | Age: 48
End: 2023-04-21
Payer: MEDICARE

## 2023-04-21 ENCOUNTER — TELEPHONE (OUTPATIENT)
Dept: HEMATOLOGY/ONCOLOGY | Facility: CLINIC | Age: 48
End: 2023-04-21
Payer: MEDICARE

## 2023-04-21 VITALS
DIASTOLIC BLOOD PRESSURE: 75 MMHG | TEMPERATURE: 99 F | OXYGEN SATURATION: 97 % | WEIGHT: 293 LBS | HEIGHT: 64 IN | RESPIRATION RATE: 18 BRPM | BODY MASS INDEX: 50.02 KG/M2 | HEART RATE: 75 BPM | SYSTOLIC BLOOD PRESSURE: 119 MMHG

## 2023-04-21 DIAGNOSIS — C50.211 MALIGNANT NEOPLASM OF UPPER-INNER QUADRANT OF RIGHT BREAST IN FEMALE, ESTROGEN RECEPTOR POSITIVE: Primary | ICD-10-CM

## 2023-04-21 DIAGNOSIS — D56.9 THALASSEMIA, UNSPECIFIED TYPE: ICD-10-CM

## 2023-04-21 DIAGNOSIS — E88.09 HYPOALBUMINEMIA: ICD-10-CM

## 2023-04-21 DIAGNOSIS — T45.1X5A ANEMIA ASSOCIATED WITH CHEMOTHERAPY: ICD-10-CM

## 2023-04-21 DIAGNOSIS — Z17.0 MALIGNANT NEOPLASM OF UPPER-INNER QUADRANT OF RIGHT BREAST IN FEMALE, ESTROGEN RECEPTOR POSITIVE: Primary | ICD-10-CM

## 2023-04-21 DIAGNOSIS — E11.9 TYPE 2 DIABETES MELLITUS WITHOUT COMPLICATION, WITH LONG-TERM CURRENT USE OF INSULIN: ICD-10-CM

## 2023-04-21 DIAGNOSIS — D50.9 IRON DEFICIENCY ANEMIA, UNSPECIFIED IRON DEFICIENCY ANEMIA TYPE: ICD-10-CM

## 2023-04-21 DIAGNOSIS — G89.3 CANCER ASSOCIATED PAIN: ICD-10-CM

## 2023-04-21 DIAGNOSIS — C79.51 MALIGNANT NEOPLASM METASTATIC TO BONE: ICD-10-CM

## 2023-04-21 DIAGNOSIS — Z79.4 TYPE 2 DIABETES MELLITUS WITHOUT COMPLICATION, WITH LONG-TERM CURRENT USE OF INSULIN: ICD-10-CM

## 2023-04-21 DIAGNOSIS — D64.81 ANEMIA ASSOCIATED WITH CHEMOTHERAPY: ICD-10-CM

## 2023-04-21 DIAGNOSIS — D50.8 IRON DEFICIENCY ANEMIA SECONDARY TO INADEQUATE DIETARY IRON INTAKE: ICD-10-CM

## 2023-04-21 PROCEDURE — 99215 PR OFFICE/OUTPT VISIT, EST, LEVL V, 40-54 MIN: ICD-10-PCS | Mod: S$GLB,,, | Performed by: NURSE PRACTITIONER

## 2023-04-21 PROCEDURE — 99499 RISK ADDL DX/OHS AUDIT: ICD-10-PCS | Mod: S$GLB,,, | Performed by: NURSE PRACTITIONER

## 2023-04-21 PROCEDURE — 4010F PR ACE/ARB THEARPY RXD/TAKEN: ICD-10-PCS | Mod: CPTII,S$GLB,, | Performed by: NURSE PRACTITIONER

## 2023-04-21 PROCEDURE — 4010F ACE/ARB THERAPY RXD/TAKEN: CPT | Mod: CPTII,S$GLB,, | Performed by: NURSE PRACTITIONER

## 2023-04-21 PROCEDURE — 63600175 PHARM REV CODE 636 W HCPCS: Mod: JZ,JG | Performed by: NURSE PRACTITIONER

## 2023-04-21 PROCEDURE — 3078F PR MOST RECENT DIASTOLIC BLOOD PRESSURE < 80 MM HG: ICD-10-PCS | Mod: CPTII,S$GLB,, | Performed by: NURSE PRACTITIONER

## 2023-04-21 PROCEDURE — 3078F DIAST BP <80 MM HG: CPT | Mod: CPTII,S$GLB,, | Performed by: NURSE PRACTITIONER

## 2023-04-21 PROCEDURE — 96402 CHEMO HORMON ANTINEOPL SQ/IM: CPT

## 2023-04-21 PROCEDURE — 3074F SYST BP LT 130 MM HG: CPT | Mod: CPTII,S$GLB,, | Performed by: NURSE PRACTITIONER

## 2023-04-21 PROCEDURE — 3008F BODY MASS INDEX DOCD: CPT | Mod: CPTII,S$GLB,, | Performed by: NURSE PRACTITIONER

## 2023-04-21 PROCEDURE — 3074F PR MOST RECENT SYSTOLIC BLOOD PRESSURE < 130 MM HG: ICD-10-PCS | Mod: CPTII,S$GLB,, | Performed by: NURSE PRACTITIONER

## 2023-04-21 PROCEDURE — 1159F PR MEDICATION LIST DOCUMENTED IN MEDICAL RECORD: ICD-10-PCS | Mod: CPTII,S$GLB,, | Performed by: NURSE PRACTITIONER

## 2023-04-21 PROCEDURE — 99499 UNLISTED E&M SERVICE: CPT | Mod: S$GLB,,, | Performed by: NURSE PRACTITIONER

## 2023-04-21 PROCEDURE — 1159F MED LIST DOCD IN RCRD: CPT | Mod: CPTII,S$GLB,, | Performed by: NURSE PRACTITIONER

## 2023-04-21 PROCEDURE — 3008F PR BODY MASS INDEX (BMI) DOCUMENTED: ICD-10-PCS | Mod: CPTII,S$GLB,, | Performed by: NURSE PRACTITIONER

## 2023-04-21 PROCEDURE — 99999 PR PBB SHADOW E&M-EST. PATIENT-LVL V: CPT | Mod: PBBFAC,,, | Performed by: NURSE PRACTITIONER

## 2023-04-21 PROCEDURE — 99215 OFFICE O/P EST HI 40 MIN: CPT | Mod: S$GLB,,, | Performed by: NURSE PRACTITIONER

## 2023-04-21 PROCEDURE — 99999 PR PBB SHADOW E&M-EST. PATIENT-LVL V: ICD-10-PCS | Mod: PBBFAC,,, | Performed by: NURSE PRACTITIONER

## 2023-04-21 RX ORDER — METHYLPREDNISOLONE SOD SUCC 125 MG
125 VIAL (EA) INJECTION ONCE AS NEEDED
Status: CANCELLED | OUTPATIENT
Start: 2023-04-24

## 2023-04-21 RX ORDER — EPINEPHRINE 0.3 MG/.3ML
0.3 INJECTION SUBCUTANEOUS ONCE AS NEEDED
Status: CANCELLED | OUTPATIENT
Start: 2023-04-24

## 2023-04-21 RX ORDER — HEPARIN 100 UNIT/ML
5 SYRINGE INTRAVENOUS
Status: CANCELLED | OUTPATIENT
Start: 2023-04-24

## 2023-04-21 RX ORDER — SODIUM CHLORIDE 0.9 % (FLUSH) 0.9 %
10 SYRINGE (ML) INJECTION
Status: CANCELLED | OUTPATIENT
Start: 2023-04-24

## 2023-04-21 RX ORDER — SODIUM CHLORIDE 9 MG/ML
INJECTION, SOLUTION INTRAVENOUS CONTINUOUS
Status: CANCELLED | OUTPATIENT
Start: 2023-04-24

## 2023-04-21 RX ORDER — DIPHENHYDRAMINE HYDROCHLORIDE 50 MG/ML
50 INJECTION INTRAMUSCULAR; INTRAVENOUS ONCE AS NEEDED
Status: CANCELLED | OUTPATIENT
Start: 2023-04-24

## 2023-04-21 RX ADMIN — GOSERELIN ACETATE 3.6 MG: 3.6 IMPLANT SUBCUTANEOUS at 09:04

## 2023-04-21 NOTE — PROGRESS NOTES
Subjective:       Patient ID: Kristopher Elmore is a 47 y.o. female.    Chief Complaint: Malignant neoplasm of upper-inner quadrant of right breast     HPI    Presents for follow up      Currently on Arimidex and Ibrance (dose adjusted due to anemia to 100 mg M-F, off Sat&Sun)   Zoladex and Xgeva monthly   Most recent scans stable. see below  Genetic testing negative.   On day 14 of Ibrance cycle    Reports jaw pain the past 2 weeks. This stops her form eating.   Low grade once a week - maybe a hot flash.   No jaw or facial swelling.   No CP/SOB  Had to take percocet last week and this helped with pain but caused sedation. She only requires 1-2 days per week.   Does not take methadone daily either.   Home blood sugars- 160's-170's.   Takes iron MWF (1 tab). This constipates her. Takes yellow dragonfruit.        Most recent imagin2023 Bone Scan:   FINDINGS:  There is physiologic distribution of the radiopharmaceutical throughout the skeleton.  Diffusely increased uptake in the calvarium in keeping with hyperostosis.  There is normal uptake in the genitourinary system and soft tissues.  Impression:  There is no scintigraphic evidence of osteoblastic metastatic disease.  I, Antony Suarez MD, attest that I reviewed and interpreted the images.      2023 CT C/A/P:  FINDINGS:  Base of Neck: No significant abnormality.  CHEST:  -Heart: Normal size. No pericardial effusion.  -Pulmonary vasculature: Pulmonary arteries distribute normally.  There are four pulmonary veins.  -Jane/Mediastinum: No pathologic hakeem enlargement.  -Trachea and Proximal airways: Patent.  -Lungs/Pleura: Bilateral bandlike opacifications in the lung bases favored to represent scarring or subsegmental atelectasis.  No focal consolidation.  No pleural effusion.  -Esophagus: Normal course and caliber.  ABDOMEN:  - Liver: Normal in size and attenuation.  Subtle subcentimeter hypodensity in the right hepatic dome, too small to  characterize.  - Gallbladder: No calcified gallstones.  No wall thickening or pericholecystic fluid.  - Bile Ducts: No intra or extrahepatic biliary ductal dilatation.  - Stomach/Duodenum: Unremarkable.  - Spleen: Unremarkable.  - Pancreas: Unremarkable.  - Adrenals: Unremarkable.  - Kidneys/ureters/urinary bladder: Normal in size and location.  Subcentimeter hypodensity in the right kidney, too small to characterize.  No hydronephrosis or stones.  The ureters appear normal in course and caliber without evidence of ureteral dilatation.  The urinary bladder is unremarkable.  - Retroperitoneum: No significant adenopathy.  PELVIS:  - Reproductive: Calcification within the fundus of the uterus, likely representing involuted fibroid.  - Other: No pelvic adenopathy, free fluid, or mass.  BOWEL/MESENTERY:  No evidence of bowel obstruction or inflammatory process.  Scattered colonic diverticulosis without adjacent inflammatory changes to suggest acute diverticulitis.  VASCULATURE: Left-sided aortic arch with 3 branch vessels.  No aneurysm and no significant atherosclerosis.  The celiac artery, SMA, ROMAINE, and bilateral renal arteries are patent.  The portal vein, SMV, and splenic vein are patent.  BONES: No acute fracture.  Stable lucent lesion involving the right iliac wing.  Stable lucent and sclerotic lesions throughout the vertebral spine consistent with metastatic disease.  No definite new osseous lesions.  EXTRATHORACIC/EXTRAPERITONEAL SOFT TISSUES: Small fat containing umbilical hernia.  Mild anterior abdominal wall edema, similar to prior.  Impression:  1. In this patient with history of invasive ductal carcinoma of the right breast there are multiple stable lucent and sclerotic lesions throughout the vertebral spine consistent with metastatic disease.  No findings concerning for new metastatic disease.  2. Additional findings, as above.  Electronically signed by resident: Vashti Kennedy  Date:                                             04/06/2023  Time:                                           09:06  Electronically signed by: Edward Rosario MD  Date:                                            04/06/2023  Time:                                           10:06        3/27/2023 MRI brain:   Postcontrast images are slightly degraded by motion artifact.  No midline shift, hydrocephalus or mass effect.  No acute infarction.  No acute or significant remote intracranial hemorrhage.  No space-occupying mass or abnormal extra-axial fluid collection.  No abnormal intracranial enhancing lesions.  Major skull base flow voids are present.  Paranasal sinuses and mastoid air cells are essentially clear.  There is a partially empty sella.  Structures at the craniocervical junction show no significant abnormalities.  Impression:  No acute intracranial abnormalities, specifically no evidence of intracranial metastatic disease.         Diagnosis:  1. Stage IV ([id number]) invasive ductal carcinoma of right breast, upper inner quadrant, ER %, AR neg, Her2 neg, Grade 3, multifocal with one lesion appearing more aggressive (Ki67 80%), large LN +, bone mets.   Ibrance dose adjusted with cytopenias   Oncologic History:   Presentation   - 9/11/19 - Screening mammogram showed multiple right breast masses lower inner quadrant   - 9/13/19 - Diagnostic mammogram and US showed a right breast, 3:00 position mass, 2 CFN measuring 17 mm x 16 mm x 14 mm. There 2 smaller adjacent masses towards the nipple   - 9/19/19 - Biopsy -   A. Right breast subareolar: Grade 3 IDC, estrogen receptor 80%, progesterone receptor 0%, Her2 dago neg, Ki67 80%.   B. Right breast mass 3:00: Grade 3 IDC with papillary features, estrogen receptor 100%, progesterone receptor 0%, Her2 dago 1+, Ki67 30%.   - 9/26/19: US right axilla with abnormal lymphadenopathy measuring 4.3 x 2.8 cm with biopsy + for metastatic breast cancer.   Surgery consultation with Dr Ferris on 9/25/19   - 9/25/19 -  "Genetics [facility] BRACAnalysis pending; VUS pending   Medical oncology consultation on 10/7/19   * 10/8/19 - CT C/A/P with several lytic lesions in thoracic and lumbar spine, iliac bones, left scapula in addition to 3 x 4.5 cm right axillary node and 2.1 cm right breast mass. Bone scan negative.   * 10/31/19 - started Ibrance, Arimidex, Zoladex; plan for Xgeva.   * 11/12/19 STRATA without targetable mutations.   * 12/16/19 - Bone biopsy + for met disease (initially read as negative, but we treated as metastatic disease given high suspicion).   * MRI brain: "Partially empty sella. Question bilateral globe proptosis. Otherwise unremarkable MRI brain as detailed above specifically without evidence for intracranial enhancing lesion to suggest metastatic disease."   * 4/22/2020 PET - disease improvement. 8/2020 PET with disease improvement.   * 9/1/20 - Palliative RT to L1-L4   Of note, * Xgeva monthly - we had been waiting on dental clearance, but she has been unable to get into dentistry and is accepting of risks. Has no active dental disease.   PET scan 4/22/2021:   FINDINGS:   Quality of the study: Mildly degraded due to patient's large body habitus and skin abutting the gantry.   In the head and neck, there are no hypermetabolic lesions worrisome for malignancy. There are no hypermetabolic mucosal lesions, and there are no pathologically enlarged or hypermetabolic lymph nodes.   In the chest, there are no hypermetabolic lesions worrisome for malignancy.   Stable CT appearance of a level 1 right axillary lymph node measuring 1.3 cm in short axis with normal background radiotracer uptake. Previously with SUV max of 2.1.   There are no concerning pulmonary nodules or masses, and there are no pathologically enlarged or hypermetabolic lymph nodes.   In the abdomen and pelvis, there is physiologic tracer distribution within the abdominal organs and excretion into the genitourinary system.   In the bones, " there are stable lytic lesions throughout the lumbar spine and pelvis and additional stable sclerotic lesions in the sternum and T3 vertebral body. No associated focal abnormal increased radiotracer uptake. Findings likely represent treated disease.   Impression:   Interval decreased uptake within a prominent right axillary lymph node, now with normal background uptake. No new focal abnormal uptake.   Stable lytic and sclerotic lesions without focal abnormal uptake. Findings are compatible with treated metastasis.   8/25/2021 MRI brain   Impression:   No significant change from prior specifically without evidence for enhancing lesion to suggest intracranial metastatic disease.   Continued partially empty sella, intracranial hypertension to be included in differential in the appropriate clinical setting. Clinical correlation and further evaluation as warranted.   10/14/2021 MRI thoracic/lumbar spine:   Impression:   Patient history of metastatic breast cancer.   Bone lesions at T10, L2, L3, and right iliac wing, as above, concerning for metastatic disease. No pathologic fracture, soft tissue component, or epidural extension identified.   10/25/2021 CT chest/abd/pelvis:   Impression:   1. Stable metastasis of the spine   No evidence of intra-abdominal or intrathoracic metastatic disease   2. Stable prominent right axillary lymph node.   3. Additional findings are detailed above.   Currently on Arimidex and Ibrance   Zoladex and Xgeva monthly   - 2/2/2022 Bone scan:   FINDINGS:   No focal abnormal uptake suggestive of osseous metastases. There is diffusely increased uptake in the calvarium in keeping with hyperostosis. There is degenerative type uptake most prominent in the bilateral shoulders and knees. The focal uptake previously described in the distal right femur is not visualized. There is otherwise physiologic distribution of the radiopharmaceutical throughout the skeleton.   There is normal uptake in the  genitourinary system and soft tissues.   Impression:   There is no scintigraphic evidence of osteoblastic metastatic disease.   Nonspecific uptake in the distal right femur has resolved.   Diffuse cranial hyperostosis and other findings as above.   - 2/1/2022 CT C/A/P:   FINDINGS:   CHEST   Support tubes and lines: None.   Aorta: Normal caliber.   Heart: Normal size..   Coronary arteries: No calcifications.   Pericardium: Normal. No effusion, thickening, or calcification.   Central pulmonary arteries: Normal caliber.   Base of neck/thyroid: Normal.   Lymph nodes: No supraclavicular, axillary, internal mammary, mediastinal or hilar lymphadenopathy.   Esophagus: Normal.   Pleura: No effusion, thickening or calcification.   Body wall: Unremarkable.   Airways: Normal.   Lungs: Clear without focal or diffuse abnormality.   Bones: Sclerotic lesions are seen throughout the spine as well as in the sternum, not substantially changed from 10/25/2021   ABDOMEN/PELVIS   Liver: Unremarkable   Gallbladder/bile ducts: Unremarkable. No intra or extrahepatic biliary ductal dilatation   Pancreas: Unremarkable.   Spleen: Unremarkable.   Adrenals: Unremarkable.   Kidneys: Simple cyst in the right kidney is unchanged   Lymph nodes: No abdominal or pelvic lymphadenopathy.   Bowel and mesentery: Unremarkable.   Abdominal aorta: Unremarkable.   Inferior vena cava: Unremarkable.   Free fluid or free air: None.   Pelvis: Unremarkable.   Urinary bladder: Unremarkable.   Body wall: Unremarkable.   Bones: Sclerotic lesions seen in the spine are unchanged.   Impression:   1. No evidence of new recurrent or metastatic disease.   2. Sclerotic lesions seen in the spine and sternum, unchanged when compared to 10/25/2021.      Genetic testing 3/23/2023        Review of Systems   Constitutional:         See above   All other systems reviewed and are negative.      Objective:      Physical Exam  Vitals and nursing note reviewed.   Constitutional:        General: She is not in acute distress.     Appearance: Normal appearance. She is well-developed. She is obese. She is not ill-appearing.      Comments: Presents with one of her daughters  Very pleasant  ECOG= 0  Uses wheelchair for distance, but walks into exam room     HENT:      Head: Normocephalic and atraumatic.   Eyes:      General: Lids are normal. No scleral icterus.     Extraocular Movements: Extraocular movements intact.      Conjunctiva/sclera: Conjunctivae normal.      Pupils: Pupils are equal, round, and reactive to light.   Neck:      Thyroid: No thyromegaly.      Vascular: No JVD.      Trachea: Trachea normal.   Cardiovascular:      Rate and Rhythm: Normal rate and regular rhythm.      Heart sounds: Normal heart sounds. No murmur heard.    No friction rub. No gallop.      Comments: Difficult to assess tones.   Pulmonary:      Effort: Pulmonary effort is normal. No respiratory distress.      Breath sounds: Normal breath sounds. No wheezing, rhonchi or rales.      Comments: distant  Abdominal:      General: Bowel sounds are normal. There is no distension.      Palpations: Abdomen is soft. There is no mass.      Tenderness: There is no abdominal tenderness. There is no guarding or rebound.      Comments: No organomegaly however difficult to assess.    Hardness noted periumbilical area   Musculoskeletal:         General: Normal range of motion.      Cervical back: Normal range of motion and neck supple.      Comments: No spinal or paraspinal tenderness.    Lymphadenopathy:      Head:      Right side of head: No submental or submandibular adenopathy.      Left side of head: No submental or submandibular adenopathy.      Cervical: No cervical adenopathy.      Upper Body:      Right upper body: No supraclavicular or axillary adenopathy.      Left upper body: No supraclavicular or axillary adenopathy.   Skin:     General: Skin is warm and dry.      Capillary Refill: Capillary refill takes less than 2 seconds.       Coloration: Skin is not jaundiced or pale.      Findings: No bruising or rash.      Nails: There is no clubbing.   Neurological:      Mental Status: She is alert and oriented to person, place, and time.      Sensory: No sensory deficit.      Motor: Weakness (generalized) present.      Gait: Gait normal.   Psychiatric:         Mood and Affect: Mood normal.         Speech: Speech normal.         Behavior: Behavior normal.         Thought Content: Thought content normal.         Judgment: Judgment normal.         Labs: reviewed  Assessment:       1. Malignant neoplasm of upper-inner quadrant of right breast in female, estrogen receptor positive  CBC Oncology    Comprehensive Metabolic Panel    DISCONTINUED: goserelin (ZOLADEX) injection 3.6 mg      2. Bone metastases        3. Iron deficiency anemia, unspecified iron deficiency anemia type        4. Thalassemia, unspecified type        5. Cancer associated pain        6. Hypoalbuminemia        7. Anemia associated with chemotherapy        8. Type 2 diabetes mellitus without complication, with long-term current use of insulin        9. Iron deficiency anemia secondary to inadequate dietary iron intake                  Plan:        Metastatic breast cancer to bone  Continue Ibrance with dose adjustments as above.   Continue Arimidex.   Hold Xgeva as with jaw pain. Needs dental appt and will need clearance to resume.   Zoladex monthly.     Anemia- has underlying thalassemia and prior iron def-  Unable to increase iron due to constipation. Will give IV iron. Ok to stop oral iron once received.     Obesity- exercise limited by disease limitations  Improved diet    Hypoalbuminemia  Increase protein as albumin low. Offered nutrition but opts out as seen previously    Cancer related pain- continue f/u with palliative     Type 2 DM- continued work on better control    Knows to call for any changes    Route Chart for Scheduling    Med Onc Chart Routing  Urgent    Follow up  with physician 4 weeks. cbc, cmp, zoladex, possible xgeva   Follow up with CAMILA    Infusion scheduling note    Injection scheduling note zoladex monthly; injectafer day 1 and day 8 once authorized   Labs    Imaging    Pharmacy appointment    Other referrals         Treatment Plan Information   OP ANASTROZOLE PALBOCICLIB Q4W   Jessica Mendez MD   Upcoming Treatment Dates - OP ANASTROZOLE PALBOCICLIB Q4W    No upcoming days in selected categories.    Supportive Plan Information  OP BREAST GOSERELIN & DENOSUMAB Q4W   Jessica Mednez MD   Upcoming Treatment Dates - OP BREAST GOSERELIN & DENOSUMAB Q4W    No upcoming days in selected categories.    Therapy Plan Information  Medications  ferric carboxymaltose (INJECTAFER) 750 mg in sodium chloride 0.9% 265 mL infusion  750 mg, Intravenous, 1 time a week  IV Fluids  0.9%  NaCl infusion  Intravenous, 1 time a week  Flushes  sodium chloride 0.9% flush 10 mL  10 mL, Intravenous, 1 time a week  heparin, porcine (PF) 100 unit/mL injection flush 500 Units  500 Units, Intravenous, 1 time a week  sodium chloride 0.9% 100 mL flush bag  Intravenous, 1 time a week  PRN Medications  EPINEPHrine (EPIPEN) 0.3 mg/0.3 mL pen injection 0.3 mg  0.3 mg, Intramuscular, PRN  diphenhydrAMINE injection 50 mg  50 mg, Intravenous, PRN  methylPREDNISolone sodium succinate injection 125 mg  125 mg, Intravenous, PRN  sodium chloride 0.9% bolus 1,000 mL 1,000 mL  1,000 mL, Intravenous, PRN              Patient is in agreement with the proposed treatment plan. All questions were answered to the patient's satisfaction. Pt knows to call clinic for any new or worsening symptoms and if anything is needed before the next clinic visit.      LEILA Sanabria-EDWARD  Hematology & Oncology  [address] [place] [address]  ph. [phone]  Fax. [phone]       40 minutes of total time spent on the encounter, which includes face to face time and non-face to face time preparing to see the  patient (eg, review of tests), Obtaining and/or reviewing separately obtained history, Documenting clinical information in the electronic or other health record, Independently interpreting results (not separately reported) and communicating results to the patient/family/caregiver, or Care coordination (not separately reported).

## 2023-04-21 NOTE — NURSING
Presents to clinic for Zoladex injection w/o acute complaints. Medication administered per orders. Tolerated well. Discharged off unit in NAD

## 2023-04-27 ENCOUNTER — TELEPHONE (OUTPATIENT)
Dept: INFUSION THERAPY | Facility: HOSPITAL | Age: 48
End: 2023-04-27
Payer: MEDICARE

## 2023-04-27 DIAGNOSIS — E11.65 TYPE 2 DIABETES MELLITUS WITH HYPERGLYCEMIA, WITH LONG-TERM CURRENT USE OF INSULIN: ICD-10-CM

## 2023-04-27 DIAGNOSIS — Z79.4 TYPE 2 DIABETES MELLITUS WITH HYPERGLYCEMIA, WITH LONG-TERM CURRENT USE OF INSULIN: ICD-10-CM

## 2023-04-27 RX ORDER — EMPAGLIFLOZIN 25 MG/1
TABLET, FILM COATED ORAL
Qty: 30 TABLET | Refills: 3 | Status: SHIPPED | OUTPATIENT
Start: 2023-04-27 | End: 2023-06-19 | Stop reason: SDUPTHER

## 2023-04-27 NOTE — TELEPHONE ENCOUNTER
LVM asking for a return call to schedule office visit  
Please see the attached refill request.  
No

## 2023-04-30 DIAGNOSIS — D50.8 OTHER IRON DEFICIENCY ANEMIA: ICD-10-CM

## 2023-04-30 DIAGNOSIS — E55.9 VITAMIN D DEFICIENCY: ICD-10-CM

## 2023-04-30 DIAGNOSIS — E87.6 HYPOKALEMIA: ICD-10-CM

## 2023-05-01 RX ORDER — INSULIN DEGLUDEC 200 U/ML
INJECTION, SOLUTION SUBCUTANEOUS
Qty: 3 PEN | Refills: 1 | Status: SHIPPED | OUTPATIENT
Start: 2023-05-01 | End: 2023-06-19

## 2023-05-02 ENCOUNTER — PATIENT MESSAGE (OUTPATIENT)
Dept: PHARMACY | Facility: CLINIC | Age: 48
End: 2023-05-02
Payer: MEDICARE

## 2023-05-02 RX ORDER — FERROUS SULFATE 324(65)MG
TABLET, DELAYED RELEASE (ENTERIC COATED) ORAL
Qty: 30 TABLET | Refills: 0 | Status: SHIPPED | OUTPATIENT
Start: 2023-05-02 | End: 2023-09-05

## 2023-05-02 RX ORDER — ERGOCALCIFEROL 1.25 MG/1
CAPSULE ORAL
Qty: 12 CAPSULE | Refills: 0 | Status: SHIPPED | OUTPATIENT
Start: 2023-05-02 | End: 2023-07-25

## 2023-05-02 RX ORDER — POTASSIUM CHLORIDE 750 MG/1
TABLET, EXTENDED RELEASE ORAL
Qty: 30 TABLET | Refills: 2 | Status: SHIPPED | OUTPATIENT
Start: 2023-05-02 | End: 2023-07-06 | Stop reason: SDUPTHER

## 2023-05-05 ENCOUNTER — SPECIALTY PHARMACY (OUTPATIENT)
Dept: PHARMACY | Facility: CLINIC | Age: 48
End: 2023-05-05
Payer: MEDICARE

## 2023-05-05 ENCOUNTER — PATIENT MESSAGE (OUTPATIENT)
Dept: PHARMACY | Facility: CLINIC | Age: 48
End: 2023-05-05
Payer: MEDICARE

## 2023-05-05 NOTE — TELEPHONE ENCOUNTER
Specialty Pharmacy - Refill Coordination    Specialty Medication Orders Linked to Encounter      Flowsheet Row Most Recent Value   Medication #1 palbociclib (IBRANCE) 100 mg Cap (Order#[id number], Rx#[id number])            Refill Questions - Documented Responses      Flowsheet Row Most Recent Value   Refill Screening Questions    Changes to allergies? No   Changes to medications? No   New conditions since last clinic visit? No   Unplanned office visit, urgent care, ED, or hospital admission in the last 4 weeks? No   How does patient/caregiver feel medication is working? Good   Financial problems or insurance changes? No   How many doses of your specialty medications were missed in the last 4 weeks? 0   Would patient like to speak to a pharmacist? No   When does the patient need to receive the medication? 05/08/23   Refill Delivery Questions    How will the patient receive the medication? MED[facility]   When does the patient need to receive the medication? 05/08/23   Shipping Address Home   Address in [facility] confirmed and updated if neccessary? Yes   Expected Copay ($) 0   Is the patient able to afford the medication copay? Yes   Payment Method zero copay   Days supply of Refill 28   Supplies needed? No supplies needed   Refill activity completed? Yes   Refill activity plan Refill scheduled   Shipment/Pickup Date: 05/05/23            Current Outpatient Medications   Medication Sig    anastrozole (ARIMIDEX) 1 mg Tab TAKE 1 TABLET(1 MG) BY MOUTH EVERY DAY    atorvastatin (LIPITOR) 40 MG tablet TAKE 1 TABLET(40 MG) BY MOUTH EVERY DAY    blood sugar diagnostic Strp To check BG 2 times daily, to use with insurance preferred meter    blood-glucose meter kit To check BG 2 times daily, to use with insurance preferred meter    blood-glucose meter,continuous (DEXCOM G6 ) Misc Use with dexcom G6 system    blood-glucose sensor (DEXCOM G6 SENSOR) Monique Change sensor every 10 days    blood-glucose sensor (FREESTYLE  MARGARITA 3 SENSOR) Monique 2 each by Misc.(Non-Drug; Combo Route) route every 14 (fourteen) days.    blood-glucose transmitter (DEXCOM G6 TRANSMITTER) Monique Change every 3 months    carvediloL (COREG) 25 MG tablet TAKE 1 TABLET(25 MG) BY MOUTH TWICE DAILY    ENTRESTO  mg per tablet TAKE 1 TABLET BY MOUTH TWICE DAILY    ergocalciferol (ERGOCALCIFEROL) 50,000 unit Cap TAKE 1 CAPSULE BY MOUTH EVERY 7 DAYS    ferrous sulfate 324 mg (65 mg iron) TbEC TAKE 1 TABLET BY MOUTH EVERY MONDAY, WEDNESDAY, FRIDAY    furosemide (LASIX) 20 MG tablet Take 1 tablet (20 mg total) by mouth 2 (two) times daily.    glipiZIDE (GLUCOTROL) 10 MG TR24 TAKE 1 TABLET(10 MG) BY MOUTH DAILY WITH BREAKFAST    goserelin (ZOLADEX) 3.6 mg injection Inject 3.6 mg into the skin every 28 days.    hydrOXYzine HCL (ATARAX) 25 MG tablet TAKE 1 TABLET(25 MG) BY MOUTH EVERY NIGHT AS NEEDED FOR ANXIETY    ibuprofen (ADVIL,MOTRIN) 800 MG tablet Take 1 tablet (800 mg total) by mouth 2 (two) times daily as needed for Pain.    insulin degludec (TRESIBA FLEXTOUCH U-200) 200 unit/mL (3 mL) insulin pen ADMINISTER 36 UNITS UNDER THE SKIN EVERY DAY. TITRATE UP TO 50 UNITS DAILY AS DIRECTED.    insulin degludec (TRESIBA U-100 INSULIN) 100 unit/mL injection Inject 0.36 mLs (36 Units total) into the skin once daily. Titrate to 50 units daily    JARDIANCE 25 mg tablet TAKE 1 TABLET BY MOUTH EVERY DAILY    lancets [facility] To check BG 2 times daily, to use with insurance preferred meter    LIDOcaine (LIDODERM) 5 % Place 2 patches onto the skin once daily. Remove & Discard patch within 12 hours or as directed by MD (Patient not taking: Reported on 3/7/2023)    LORazepam (ATIVAN) 1 MG tablet Take 1 tablet (1 mg total) by mouth every 12 (twelve) hours as needed for Anxiety.    metFORMIN (GLUCOPHAGE-XR) 500 MG ER 24hr tablet TAKE 2 TABLETS(1000 MG) BY MOUTH TWICE DAILY WITH MEALS    methadone (DOLOPHINE) 5 MG tablet Take 0.5 tablets (2.5 mg total) by mouth every evening.     "naloxone (NARCAN) 4 mg/actuation Spry 4mg by nasal route as needed for opioid overdose; may repeat every 2-3 minutes in alternating nostrils until medical help arrives. Call 911    ondansetron (ZOFRAN-ODT) 4 MG TbDL Take 1 tablet (4 mg total) by mouth 2 (two) times daily.    ondansetron (ZOFRAN-ODT) 8 MG TbDL DISSOLVE 1 TABLET(8 MG) ON THE TONGUE EVERY 8 HOURS AS NEEDED FOR NAUSEA    palbociclib (IBRANCE) 100 mg Cap Take 1 capsule (100 mg) by mouth once daily for 21 days, followed by 7 days off.    pen needle, diabetic (BD ULTRA-FINE VERONICA PEN NEEDLE) 32 gauge x 5/32" Ndle 1 Device by Misc.(Non-Drug; Combo Route) route Daily.    potassium chloride (KLOR-CON) 10 MEQ TbSR TAKE 1 TABLET(10 MEQ) BY MOUTH EVERY DAY    promethazine (PHENERGAN) 25 MG tablet Take 1 tablet (25 mg total) by mouth every 6 (six) hours as needed for Nausea. (Patient not taking: Reported on 3/7/2023)    sennosides (SENNA-C ORAL) Take 2 tablets by mouth daily as needed.    spironolactone (ALDACTONE) 25 MG tablet Take 1 tablet (25 mg total) by mouth once daily.    venlafaxine (EFFEXOR-XR) 150 MG Cp24 Take 1 capsule (150 mg total) by mouth once daily.    venlafaxine (EFFEXOR-XR) 75 MG 24 hr capsule Take 1 capsule (75 mg total) by mouth once daily. Take with 150 mg capsule for a total of 225 mg.   Last reviewed on 4/21/2023  8:12 AM by Glory Rodriguez MA    Review of patient's allergies indicates:   Allergen Reactions    Keflex [cephalexin] Itching    Last reviewed on  4/21/2023 8:51 AM by Ezekiel Drew      Tasks added this encounter   No tasks added.   Tasks due within next 3 months   5/8/2023 - Refill Coordination Outreach (1 time occurrence)     [facility]  [address] [place] [id number]  Phone: [phone]  Fax: [phone]        "

## 2023-05-18 ENCOUNTER — DOCUMENTATION ONLY (OUTPATIENT)
Dept: HEMATOLOGY/ONCOLOGY | Facility: CLINIC | Age: 48
End: 2023-05-18
Payer: MEDICARE

## 2023-05-18 RX ORDER — HYDROXYZINE HYDROCHLORIDE 25 MG/1
TABLET, FILM COATED ORAL
Qty: 30 TABLET | Refills: 2 | Status: SHIPPED | OUTPATIENT
Start: 2023-05-18 | End: 2023-09-05

## 2023-05-18 RX ORDER — METFORMIN HYDROCHLORIDE 500 MG/1
TABLET, EXTENDED RELEASE ORAL
Qty: 360 TABLET | Refills: 0 | Status: SHIPPED | OUTPATIENT
Start: 2023-05-18 | End: 2023-06-19 | Stop reason: SDUPTHER

## 2023-05-18 RX ORDER — GLIPIZIDE 10 MG/1
TABLET, FILM COATED, EXTENDED RELEASE ORAL
Qty: 90 TABLET | Refills: 0 | Status: SHIPPED | OUTPATIENT
Start: 2023-05-18 | End: 2023-06-19 | Stop reason: SDUPTHER

## 2023-05-18 NOTE — Clinical Note
Babita,     Please place recall for patient to follow up with me in 3 year(s).  Thank you, Valentino

## 2023-05-18 NOTE — LETTER
May 18, 2023    Kristopher Elmore  [address]  [facility] [address]         Dear Kristopher,    You underwent germline (ie, hereditary) cancer-genetic testing after meeting with me, Valentino Roman NP, with the [facility].  Below is important information pertaining to your genetic testing results as well as other aspects of your health care.  Please read the letter at your earliest convenience and reach out to me with any questions or concerns.      If you would like to have an in-person or a virtual appointment for post-test genetic counseling, please message me through your [facility] patient portal or call my office at [phone] to schedule an appointment.    Your Germline (Hereditary) Cancer Susceptibility Gene Testing      No clinically significant mutations or variants of unknown significance were identified; in other words, your test came back negative (normal).     With regard to your own diagnosis of breast cancer, negative (normal) results in the associated genes represent a true-negative (or an informative) result, meaning that your breast cancer was more likely to have been sporadic than hereditary; however, these genetic testing results reduce--but do not eliminate--the possibility of previously diagnosed cancers of yours having been hereditary or the possibility of your developing a hereditary cancer in the future.    Your negative results for genes associated with cancers/other conditions in your relatives, with which you have not personally been affected, are uninformative.  In other words, your affected relatives' cancers/other conditions may have been hereditary or may have been sporadic, and without knowing that information, your negative results in the associated genes only tell us that you likely don't have a hereditary predisposition to those cancers/other conditions; however, you can still develop those cancers/other conditions and  in fact may even have an increased risk for them based in part on your family history.  If the appropriate, affected relatives are found to carry a pathogenic (harmful) variant that explains their cancers/other conditions, and you do not carry that variant, your negative results would then be considered a true-negative.    Health Maintenance / Cancer Risks and Risk Management    Only a small percentage (approximately 5%-10%) of cancers are hereditary, meaning caused by an inherited gene mutation; rather, most cancers are sporadic.  Environmental factors, lifestyle factors, and even factors beyond our control play a significant role in the development of many cancers.  As such, an individual can develop cancer even with negative genetic testing.  Furthermore, even with negative genetic testing, you can still be at increased risk for certain cancers, as you may independently have risk factors for those cancers and/or you may share risk factors with your family members affected by cancer.  For these reasons, it is strongly recommended that you ensure that your healthcare providers are aware of and up-to-date on your personal and family history so that your medical management including cancer screenings can be based in part off of this information.      Regarding Your Relatives    Your relatives also may be at increased risk for certain cancers, depending upon their own personal and family history; therefore, it is important that they, too, ensure that their own healthcare providers are aware of and up-to-date on their personal and family history so that their medical management including cancer screenings can be based in part off of this information.      Additionally, it is possible for your relatives to have hereditary cancer susceptibility gene mutations even when you have negative genetic testing.      Your relatives should speak with a healthcare provider about their cancer risks and whether genetic counseling and  potentially testing may be indicated for them.  Anyone interested in pursuing cancer-genetic counseling/testing may contact the [facility] at [phone] to schedule an appointment or may visit [email or web address] to locate a genetic specialist near them.    Follow-up    Please continue to follow up with all healthcare providers as directed.    Moving forward, please update me with any changes to--or new information learned about--your personal or family history and with any relative's genetic testing results.  Barring any such changes, please follow up with me in 3 years for determination as to whether updated genetic testing is indicated for you at that time.    In the meantime, please don't hesitate to reach out with any questions, concerns, or if I can otherwise assist.    Sincerely,        Valentino Roman DNP, APRN, FNP-BC, AOCNP, CGRA  Nurse Practitioner, Hereditary and High-Risk Clinic  Department of Hematology and Oncology  [facility]  Phone:  [phone]        CC:  Phyllis Lei MD; Alfredo Bhatt NP

## 2023-05-18 NOTE — PROGRESS NOTES
"Cancer Genetics  Hereditary and High-Risk Clinic  Department of Hematology and Oncology  [facility]      Germline Testing for Hereditary Cancer Susceptibility  Results Note and Letter to Patient    Patient Identification  Name: Kristopher Elmore ("Kristopher")  : 1975  MRN: [id number]        Dear Kristopher,    You underwent germline (ie, hereditary) cancer-genetic testing after meeting with me, Valentino Roman NP, with the [facility].  Below is important information pertaining to your genetic testing results as well as other aspects of your health care.  Please read the letter at your earliest convenience and reach out to me with any questions or concerns.      If you would like to have an in-person or a virtual appointment for post-test genetic counseling, please message me through your [facility] patient portal or call my office at [phone] to schedule an appointment.    Your Germline (Hereditary) Cancer Susceptibility Gene Testing      No clinically significant mutations or variants of unknown significance were identified; in other words, your test came back negative (normal).     With regard to your own diagnosis of breast cancer, negative (normal) results in the associated genes represent a true-negative (or an informative) result, meaning that your breast cancer was more likely to have been sporadic than hereditary; however, these genetic testing results reduce--but do not eliminate--the possibility of previously diagnosed cancers of yours having been hereditary or the possibility of your developing a hereditary cancer in the future.    Your negative results for genes associated with cancers/other conditions in your relatives, with which you have not personally been affected, are uninformative.  In other words, your affected relatives' cancers/other conditions may have been hereditary or may have been sporadic, and without " knowing that information, your negative results in the associated genes only tell us that you likely don't have a hereditary predisposition to those cancers/other conditions; however, you can still develop those cancers/other conditions and in fact may even have an increased risk for them based in part on your family history.  If the appropriate, affected relatives are found to carry a pathogenic (harmful) variant that explains their cancers/other conditions, and you do not carry that variant, your negative results would then be considered a true-negative.    Health Maintenance / Cancer Risks and Risk Management    Only a small percentage (approximately 5%-10%) of cancers are hereditary, meaning caused by an inherited gene mutation; rather, most cancers are sporadic.  Environmental factors, lifestyle factors, and even factors beyond our control play a significant role in the development of many cancers.  As such, an individual can develop cancer even with negative genetic testing.  Furthermore, even with negative genetic testing, you can still be at increased risk for certain cancers, as you may independently have risk factors for those cancers and/or you may share risk factors with your family members affected by cancer.  For these reasons, it is strongly recommended that you ensure that your healthcare providers are aware of and up-to-date on your personal and family history so that your medical management including cancer screenings can be based in part off of this information.      Regarding Your Relatives    Your relatives also may be at increased risk for certain cancers, depending upon their own personal and family history; therefore, it is important that they, too, ensure that their own healthcare providers are aware of and up-to-date on their personal and family history so that their medical management including cancer screenings can be based in part off of this information.      Additionally, it is  possible for your relatives to have hereditary cancer susceptibility gene mutations even when you have negative genetic testing.      Your relatives should speak with a healthcare provider about their cancer risks and whether genetic counseling and potentially testing may be indicated for them.  Anyone interested in pursuing cancer-genetic counseling/testing may contact the [facility] at [phone] to schedule an appointment or may visit [email or web address] to locate a genetic specialist near them.    Follow-up    Please continue to follow up with all healthcare providers as directed.    Moving forward, please update me with any changes to--or new information learned about--your personal or family history and with any relative's genetic testing results.  Barring any such changes, please follow up with me in 3 years for determination as to whether updated genetic testing is indicated for you at that time.    In the meantime, please don't hesitate to reach out with any questions, concerns, or if I can otherwise assist.    Sincerely,        Valentino Roman, GABRIEL, APRN, FNP-BC, AOCNP, CGRA  Nurse Practitioner, Hereditary and High-Risk Clinic  Department of Hematology and Oncology  [facility]  Phone:  [phone]        CC:  Phyllis Lei MD; Alfredo Bhatt NP        Results Disclosure:  The above letter has been sent to patient's [facility] portal.  The above letter has been copied to the referring provider if applicable.  Cancer Genetics Navigator to:  Phone patient with notification that the above letter has been sent, document said call, and CC me on the telephone encounter note.  Ensure that the results report is available in patient's [facility] record.  Send patient an e-copy of the results report.

## 2023-05-18 NOTE — Clinical Note
· Cancer Genetics Navigator to: 1. Phone patient with notification that the above letter has been sent, document said call, and CC me on the telephone encounter note. 2. Ensure that the results report is available in patient's [facility] record. 3. Send patient an e-copy of the results report.

## 2023-05-19 ENCOUNTER — PATIENT MESSAGE (OUTPATIENT)
Dept: HEMATOLOGY/ONCOLOGY | Facility: CLINIC | Age: 48
End: 2023-05-19
Payer: MEDICARE

## 2023-05-19 ENCOUNTER — PATIENT MESSAGE (OUTPATIENT)
Dept: ENDOCRINOLOGY | Facility: CLINIC | Age: 48
End: 2023-05-19
Payer: MEDICARE

## 2023-05-19 ENCOUNTER — TELEPHONE (OUTPATIENT)
Dept: HEMATOLOGY/ONCOLOGY | Facility: CLINIC | Age: 48
End: 2023-05-19
Payer: MEDICARE

## 2023-05-22 ENCOUNTER — INFUSION (OUTPATIENT)
Dept: INFUSION THERAPY | Facility: HOSPITAL | Age: 48
End: 2023-05-22
Payer: MEDICARE

## 2023-05-22 ENCOUNTER — OFFICE VISIT (OUTPATIENT)
Dept: HEMATOLOGY/ONCOLOGY | Facility: CLINIC | Age: 48
End: 2023-05-22
Payer: MEDICARE

## 2023-05-22 VITALS
WEIGHT: 293 LBS | OXYGEN SATURATION: 97 % | SYSTOLIC BLOOD PRESSURE: 119 MMHG | BODY MASS INDEX: 50.02 KG/M2 | HEIGHT: 64 IN | RESPIRATION RATE: 18 BRPM | DIASTOLIC BLOOD PRESSURE: 57 MMHG | HEART RATE: 88 BPM | TEMPERATURE: 98 F

## 2023-05-22 VITALS — HEART RATE: 75 BPM | SYSTOLIC BLOOD PRESSURE: 128 MMHG | RESPIRATION RATE: 18 BRPM | DIASTOLIC BLOOD PRESSURE: 51 MMHG

## 2023-05-22 DIAGNOSIS — D50.8 IRON DEFICIENCY ANEMIA SECONDARY TO INADEQUATE DIETARY IRON INTAKE: ICD-10-CM

## 2023-05-22 DIAGNOSIS — T45.1X5A ANEMIA ASSOCIATED WITH CHEMOTHERAPY: ICD-10-CM

## 2023-05-22 DIAGNOSIS — C79.51 MALIGNANT NEOPLASM METASTATIC TO BONE: ICD-10-CM

## 2023-05-22 DIAGNOSIS — C50.211 MALIGNANT NEOPLASM OF UPPER-INNER QUADRANT OF RIGHT BREAST IN FEMALE, ESTROGEN RECEPTOR POSITIVE: Primary | ICD-10-CM

## 2023-05-22 DIAGNOSIS — Z17.0 MALIGNANT NEOPLASM OF UPPER-INNER QUADRANT OF RIGHT BREAST IN FEMALE, ESTROGEN RECEPTOR POSITIVE: Primary | ICD-10-CM

## 2023-05-22 DIAGNOSIS — M25.511 ACUTE PAIN OF RIGHT SHOULDER: ICD-10-CM

## 2023-05-22 DIAGNOSIS — C50.211 MALIGNANT NEOPLASM OF UPPER-INNER QUADRANT OF RIGHT BREAST IN FEMALE, ESTROGEN RECEPTOR POSITIVE: ICD-10-CM

## 2023-05-22 DIAGNOSIS — E66.01 MORBID OBESITY, UNSPECIFIED OBESITY TYPE: ICD-10-CM

## 2023-05-22 DIAGNOSIS — D64.81 ANEMIA ASSOCIATED WITH CHEMOTHERAPY: ICD-10-CM

## 2023-05-22 DIAGNOSIS — E11.65 TYPE 2 DIABETES MELLITUS WITH HYPERGLYCEMIA, WITH LONG-TERM CURRENT USE OF INSULIN: ICD-10-CM

## 2023-05-22 DIAGNOSIS — D50.8 IRON DEFICIENCY ANEMIA SECONDARY TO INADEQUATE DIETARY IRON INTAKE: Primary | ICD-10-CM

## 2023-05-22 DIAGNOSIS — Z17.0 MALIGNANT NEOPLASM OF UPPER-INNER QUADRANT OF RIGHT BREAST IN FEMALE, ESTROGEN RECEPTOR POSITIVE: ICD-10-CM

## 2023-05-22 DIAGNOSIS — Z79.4 TYPE 2 DIABETES MELLITUS WITH HYPERGLYCEMIA, WITH LONG-TERM CURRENT USE OF INSULIN: ICD-10-CM

## 2023-05-22 DIAGNOSIS — G89.3 CANCER ASSOCIATED PAIN: ICD-10-CM

## 2023-05-22 PROCEDURE — 3078F PR MOST RECENT DIASTOLIC BLOOD PRESSURE < 80 MM HG: ICD-10-PCS | Mod: CPTII,S$GLB,, | Performed by: INTERNAL MEDICINE

## 2023-05-22 PROCEDURE — 3078F DIAST BP <80 MM HG: CPT | Mod: CPTII,S$GLB,, | Performed by: INTERNAL MEDICINE

## 2023-05-22 PROCEDURE — 99499 RISK ADDL DX/OHS AUDIT: ICD-10-PCS | Mod: S$GLB,,, | Performed by: INTERNAL MEDICINE

## 2023-05-22 PROCEDURE — A4216 STERILE WATER/SALINE, 10 ML: HCPCS | Performed by: NURSE PRACTITIONER

## 2023-05-22 PROCEDURE — 63600175 PHARM REV CODE 636 W HCPCS: Mod: JZ,JG | Performed by: INTERNAL MEDICINE

## 2023-05-22 PROCEDURE — 3074F PR MOST RECENT SYSTOLIC BLOOD PRESSURE < 130 MM HG: ICD-10-PCS | Mod: CPTII,S$GLB,, | Performed by: INTERNAL MEDICINE

## 2023-05-22 PROCEDURE — 99215 OFFICE O/P EST HI 40 MIN: CPT | Mod: 25 | Performed by: INTERNAL MEDICINE

## 2023-05-22 PROCEDURE — 3008F PR BODY MASS INDEX (BMI) DOCUMENTED: ICD-10-PCS | Mod: CPTII,S$GLB,, | Performed by: INTERNAL MEDICINE

## 2023-05-22 PROCEDURE — 1160F PR REVIEW ALL MEDS BY PRESCRIBER/CLIN PHARMACIST DOCUMENTED: ICD-10-PCS | Mod: CPTII,S$GLB,, | Performed by: INTERNAL MEDICINE

## 2023-05-22 PROCEDURE — 25000003 PHARM REV CODE 250: Performed by: NURSE PRACTITIONER

## 2023-05-22 PROCEDURE — 63600175 PHARM REV CODE 636 W HCPCS: Mod: JZ,JG | Performed by: NURSE PRACTITIONER

## 2023-05-22 PROCEDURE — 1159F PR MEDICATION LIST DOCUMENTED IN MEDICAL RECORD: ICD-10-PCS | Mod: CPTII,S$GLB,, | Performed by: INTERNAL MEDICINE

## 2023-05-22 PROCEDURE — 3051F HG A1C>EQUAL 7.0%<8.0%: CPT | Mod: CPTII,S$GLB,, | Performed by: INTERNAL MEDICINE

## 2023-05-22 PROCEDURE — 3008F BODY MASS INDEX DOCD: CPT | Mod: CPTII,S$GLB,, | Performed by: INTERNAL MEDICINE

## 2023-05-22 PROCEDURE — 1159F MED LIST DOCD IN RCRD: CPT | Mod: CPTII,S$GLB,, | Performed by: INTERNAL MEDICINE

## 2023-05-22 PROCEDURE — 4010F PR ACE/ARB THEARPY RXD/TAKEN: ICD-10-PCS | Mod: CPTII,S$GLB,, | Performed by: INTERNAL MEDICINE

## 2023-05-22 PROCEDURE — 99499 UNLISTED E&M SERVICE: CPT | Mod: S$GLB,,, | Performed by: INTERNAL MEDICINE

## 2023-05-22 PROCEDURE — 96372 THER/PROPH/DIAG INJ SC/IM: CPT | Mod: 59

## 2023-05-22 PROCEDURE — 99999 PR PBB SHADOW E&M-EST. PATIENT-LVL V: CPT | Mod: PBBFAC,,, | Performed by: INTERNAL MEDICINE

## 2023-05-22 PROCEDURE — 96367 TX/PROPH/DG ADDL SEQ IV INF: CPT

## 2023-05-22 PROCEDURE — 3074F SYST BP LT 130 MM HG: CPT | Mod: CPTII,S$GLB,, | Performed by: INTERNAL MEDICINE

## 2023-05-22 PROCEDURE — 3051F PR MOST RECENT HEMOGLOBIN A1C LEVEL 7.0 - < 8.0%: ICD-10-PCS | Mod: CPTII,S$GLB,, | Performed by: INTERNAL MEDICINE

## 2023-05-22 PROCEDURE — 4010F ACE/ARB THERAPY RXD/TAKEN: CPT | Mod: CPTII,S$GLB,, | Performed by: INTERNAL MEDICINE

## 2023-05-22 PROCEDURE — 99999 PR PBB SHADOW E&M-EST. PATIENT-LVL V: ICD-10-PCS | Mod: PBBFAC,,, | Performed by: INTERNAL MEDICINE

## 2023-05-22 PROCEDURE — 99214 OFFICE O/P EST MOD 30 MIN: CPT | Mod: S$GLB,,, | Performed by: INTERNAL MEDICINE

## 2023-05-22 PROCEDURE — 96365 THER/PROPH/DIAG IV INF INIT: CPT

## 2023-05-22 PROCEDURE — 1160F RVW MEDS BY RX/DR IN RCRD: CPT | Mod: CPTII,S$GLB,, | Performed by: INTERNAL MEDICINE

## 2023-05-22 PROCEDURE — 99214 PR OFFICE/OUTPT VISIT, EST, LEVL IV, 30-39 MIN: ICD-10-PCS | Mod: S$GLB,,, | Performed by: INTERNAL MEDICINE

## 2023-05-22 RX ORDER — SODIUM CHLORIDE 9 MG/ML
INJECTION, SOLUTION INTRAVENOUS CONTINUOUS
Status: DISCONTINUED | OUTPATIENT
Start: 2023-05-22 | End: 2023-05-22 | Stop reason: HOSPADM

## 2023-05-22 RX ORDER — EPINEPHRINE 0.3 MG/.3ML
0.3 INJECTION SUBCUTANEOUS ONCE AS NEEDED
Status: CANCELLED | OUTPATIENT
Start: 2023-05-29

## 2023-05-22 RX ORDER — SODIUM CHLORIDE 0.9 % (FLUSH) 0.9 %
10 SYRINGE (ML) INJECTION
Status: DISCONTINUED | OUTPATIENT
Start: 2023-05-22 | End: 2023-05-22 | Stop reason: HOSPADM

## 2023-05-22 RX ORDER — HEPARIN 100 UNIT/ML
5 SYRINGE INTRAVENOUS
Status: DISCONTINUED | OUTPATIENT
Start: 2023-05-22 | End: 2023-05-22 | Stop reason: HOSPADM

## 2023-05-22 RX ORDER — SODIUM CHLORIDE 9 MG/ML
INJECTION, SOLUTION INTRAVENOUS CONTINUOUS
Status: CANCELLED | OUTPATIENT
Start: 2023-05-29

## 2023-05-22 RX ORDER — DIPHENHYDRAMINE HYDROCHLORIDE 50 MG/ML
50 INJECTION INTRAMUSCULAR; INTRAVENOUS ONCE AS NEEDED
Status: CANCELLED | OUTPATIENT
Start: 2023-05-29

## 2023-05-22 RX ORDER — HEPARIN 100 UNIT/ML
5 SYRINGE INTRAVENOUS
Status: CANCELLED | OUTPATIENT
Start: 2023-05-29

## 2023-05-22 RX ORDER — METHYLPREDNISOLONE SOD SUCC 125 MG
125 VIAL (EA) INJECTION ONCE AS NEEDED
Status: CANCELLED | OUTPATIENT
Start: 2023-05-29

## 2023-05-22 RX ORDER — SODIUM CHLORIDE 0.9 % (FLUSH) 0.9 %
10 SYRINGE (ML) INJECTION
Status: CANCELLED | OUTPATIENT
Start: 2023-05-29

## 2023-05-22 RX ADMIN — Medication 10 ML: at 12:05

## 2023-05-22 RX ADMIN — SODIUM CHLORIDE: 9 INJECTION, SOLUTION INTRAVENOUS at 10:05

## 2023-05-22 RX ADMIN — FERRIC CARBOXYMALTOSE INJECTION 750 MG: 50 INJECTION, SOLUTION INTRAVENOUS at 10:05

## 2023-05-22 RX ADMIN — GOSERELIN ACETATE 3.6 MG: 3.6 IMPLANT SUBCUTANEOUS at 10:05

## 2023-05-22 NOTE — PROGRESS NOTES
"Subjective     Patient ID: Kristopher Elmore is a 47 y.o. female.    Chief Complaint: Malignant neoplasm of upper-inner quadrant of right breast     HPI    Presents for follow up  Reports right shoulder pain- has had t take some percocet- approx 5 this month and notes feels much better after  Does not recall any injury or strain  States pain present all the time (7/10 level)  Feels "like I had a heavy purse right here"  Points to an area a purse strap would sit    Nausea still there but better at lower dose  Denies diarrhea- tends towards constipation    Currently on Arimidex and Ibrance (dose adjusted due to anemia to 100 mg M-F, off Sat&Sun)   Zoladex and Xgeva monthly (Xgeva held with dental assessment needed)  Most recent scans stable. see below  Genetic testing negative.      Most recent imagin2023 Bone Scan:   FINDINGS:  There is physiologic distribution of the radiopharmaceutical throughout the skeleton.  Diffusely increased uptake in the calvarium in keeping with hyperostosis.  There is normal uptake in the genitourinary system and soft tissues.  Impression:  There is no scintigraphic evidence of osteoblastic metastatic disease.  I, Antony Suarez MD, attest that I reviewed and interpreted the images.     2023 CT C/A/P:  FINDINGS:  Base of Neck: No significant abnormality.  CHEST:  -Heart: Normal size. No pericardial effusion.  -Pulmonary vasculature: Pulmonary arteries distribute normally.  There are four pulmonary veins.  -Jane/Mediastinum: No pathologic hakeem enlargement.  -Trachea and Proximal airways: Patent.  -Lungs/Pleura: Bilateral bandlike opacifications in the lung bases favored to represent scarring or subsegmental atelectasis.  No focal consolidation.  No pleural effusion.  -Esophagus: Normal course and caliber.  ABDOMEN:  - Liver: Normal in size and attenuation.  Subtle subcentimeter hypodensity in the right hepatic dome, too small to characterize.  - Gallbladder: No calcified " gallstones.  No wall thickening or pericholecystic fluid.  - Bile Ducts: No intra or extrahepatic biliary ductal dilatation.  - Stomach/Duodenum: Unremarkable.  - Spleen: Unremarkable.  - Pancreas: Unremarkable.  - Adrenals: Unremarkable.  - Kidneys/ureters/urinary bladder: Normal in size and location.  Subcentimeter hypodensity in the right kidney, too small to characterize.  No hydronephrosis or stones.  The ureters appear normal in course and caliber without evidence of ureteral dilatation.  The urinary bladder is unremarkable.  - Retroperitoneum: No significant adenopathy.  PELVIS:  - Reproductive: Calcification within the fundus of the uterus, likely representing involuted fibroid.  - Other: No pelvic adenopathy, free fluid, or mass.  BOWEL/MESENTERY:  No evidence of bowel obstruction or inflammatory process.  Scattered colonic diverticulosis without adjacent inflammatory changes to suggest acute diverticulitis.  VASCULATURE: Left-sided aortic arch with 3 branch vessels.  No aneurysm and no significant atherosclerosis.  The celiac artery, SMA, ROMAINE, and bilateral renal arteries are patent.  The portal vein, SMV, and splenic vein are patent.  BONES: No acute fracture.  Stable lucent lesion involving the right iliac wing.  Stable lucent and sclerotic lesions throughout the vertebral spine consistent with metastatic disease.  No definite new osseous lesions.  EXTRATHORACIC/EXTRAPERITONEAL SOFT TISSUES: Small fat containing umbilical hernia.  Mild anterior abdominal wall edema, similar to prior.  Impression:  1. In this patient with history of invasive ductal carcinoma of the right breast there are multiple stable lucent and sclerotic lesions throughout the vertebral spine consistent with metastatic disease.  No findings concerning for new metastatic disease.  2. Additional findings, as above.  Electronically signed by resident: Vashti Kennedy     3/27/2023 MRI brain:   Postcontrast images are slightly degraded by  motion artifact.  No midline shift, hydrocephalus or mass effect.  No acute infarction.  No acute or significant remote intracranial hemorrhage.  No space-occupying mass or abnormal extra-axial fluid collection.  No abnormal intracranial enhancing lesions.  Major skull base flow voids are present.  Paranasal sinuses and mastoid air cells are essentially clear.  There is a partially empty sella.  Structures at the craniocervical junction show no significant abnormalities.  Impression:  No acute intracranial abnormalities, specifically no evidence of intracranial metastatic disease.     Diagnosis:  1. Stage IV ([id number]) invasive ductal carcinoma of right breast, upper inner quadrant, ER %, [place] neg, Her2 neg, Grade 3, multifocal with one lesion appearing more aggressive (Ki67 80%), large LN +, bone mets.   Ibrance dose adjusted with cytopenias   Oncologic History:   Presentation   - 9/11/19 - Screening mammogram showed multiple right breast masses lower inner quadrant   - 9/13/19 - Diagnostic mammogram and US showed a right breast, 3:00 position mass, 2 CFN measuring 17 mm x 16 mm x 14 mm. There 2 smaller adjacent masses towards the nipple   - 9/19/19 - Biopsy -   A. Right breast subareolar: Grade 3 IDC, estrogen receptor 80%, progesterone receptor 0%, Her2 dago neg, Ki67 80%.   B. Right breast mass 3:00: Grade 3 IDC with papillary features, estrogen receptor 100%, progesterone receptor 0%, Her2 dago 1+, Ki67 30%.   - 9/26/19: US right axilla with abnormal lymphadenopathy measuring 4.3 x 2.8 cm with biopsy + for metastatic breast cancer.   Surgery consultation with Dr Ferris on 9/25/19   - [facility]   Medical oncology consultation on 10/7/19   * 10/8/19 - CT C/A/P with several lytic lesions in thoracic and lumbar spine, iliac bones, left scapula in addition to 3 x 4.5 cm right axillary node and 2.1 cm right breast mass. Bone scan negative.   *  "10/31/19 - started Ibrance, Arimidex, Zoladex; plan for Xgeva.   * 11/12/19 STRATA without targetable mutations.   * 12/16/19 - Bone biopsy + for met disease (initially read as negative, but we treated as metastatic disease given high suspicion).   * MRI brain: "Partially empty sella. Question bilateral globe proptosis. Otherwise unremarkable MRI brain as detailed above specifically without evidence for intracranial enhancing lesion to suggest metastatic disease."   * 4/22/2020 PET - disease improvement. 8/2020 PET with disease improvement.   * 9/1/20 - Palliative RT to L1-L4   Of note, * Xgeva monthly - we had been waiting on dental clearance, but she has been unable to get into dentistry and is accepting of risks. Has no active dental disease.   PET scan 4/22/2021:   FINDINGS:   Quality of the study: Mildly degraded due to patient's large body habitus and skin abutting the gantry.   In the head and neck, there are no hypermetabolic lesions worrisome for malignancy. There are no hypermetabolic mucosal lesions, and there are no pathologically enlarged or hypermetabolic lymph nodes.   In the chest, there are no hypermetabolic lesions worrisome for malignancy.   Stable CT appearance of a level 1 right axillary lymph node measuring 1.3 cm in short axis with normal background radiotracer uptake. Previously with SUV max of 2.1.   There are no concerning pulmonary nodules or masses, and there are no pathologically enlarged or hypermetabolic lymph nodes.   In the abdomen and pelvis, there is physiologic tracer distribution within the abdominal organs and excretion into the genitourinary system.   In the bones, there are stable lytic lesions throughout the lumbar spine and pelvis and additional stable sclerotic lesions in the sternum and T3 vertebral body. No associated focal abnormal increased radiotracer uptake. Findings likely represent treated disease.   Impression:   Interval decreased uptake within a prominent right " axillary lymph node, now with normal background uptake. No new focal abnormal uptake.   Stable lytic and sclerotic lesions without focal abnormal uptake. Findings are compatible with treated metastasis.   8/25/2021 MRI brain   Impression:   No significant change from prior specifically without evidence for enhancing lesion to suggest intracranial metastatic disease.   Continued partially empty sella, intracranial hypertension to be included in differential in the appropriate clinical setting. Clinical correlation and further evaluation as warranted.   10/14/2021 MRI thoracic/lumbar spine:   Impression:   Patient history of metastatic breast cancer.   Bone lesions at T10, L2, L3, and right iliac wing, as above, concerning for metastatic disease. No pathologic fracture, soft tissue component, or epidural extension identified.   10/25/2021 CT chest/abd/pelvis:   Impression:   1. Stable metastasis of the spine   No evidence of intra-abdominal or intrathoracic metastatic disease   2. Stable prominent right axillary lymph node.   3. Additional findings are detailed above.   Currently on Arimidex and Ibrance   Zoladex and Xgeva monthly   - 2/2/2022 Bone scan:   FINDINGS:   No focal abnormal uptake suggestive of osseous metastases. There is diffusely increased uptake in the calvarium in keeping with hyperostosis. There is degenerative type uptake most prominent in the bilateral shoulders and knees. The focal uptake previously described in the distal right femur is not visualized. There is otherwise physiologic distribution of the radiopharmaceutical throughout the skeleton.   There is normal uptake in the genitourinary system and soft tissues.   Impression:   There is no scintigraphic evidence of osteoblastic metastatic disease.   Nonspecific uptake in the distal right femur has resolved.   Diffuse cranial hyperostosis and other findings as above.   - 2/1/2022 CT C/A/P:   FINDINGS:   CHEST   Support tubes and lines: None.    Aorta: Normal caliber.   Heart: Normal size..   Coronary arteries: No calcifications.   Pericardium: Normal. No effusion, thickening, or calcification.   Central pulmonary arteries: Normal caliber.   Base of neck/thyroid: Normal.   Lymph nodes: No supraclavicular, axillary, internal mammary, mediastinal or hilar lymphadenopathy.   Esophagus: Normal.   Pleura: No effusion, thickening or calcification.   Body wall: Unremarkable.   Airways: Normal.   Lungs: Clear without focal or diffuse abnormality.   Bones: Sclerotic lesions are seen throughout the spine as well as in the sternum, not substantially changed from 10/25/2021   ABDOMEN/PELVIS   Liver: Unremarkable   Gallbladder/bile ducts: Unremarkable. No intra or extrahepatic biliary ductal dilatation   Pancreas: Unremarkable.   Spleen: Unremarkable.   Adrenals: Unremarkable.   Kidneys: Simple cyst in the right kidney is unchanged   Lymph nodes: No abdominal or pelvic lymphadenopathy.   Bowel and mesentery: Unremarkable.   Abdominal aorta: Unremarkable.   Inferior vena cava: Unremarkable.   Free fluid or free air: None.   Pelvis: Unremarkable.   Urinary bladder: Unremarkable.   Body wall: Unremarkable.   Bones: Sclerotic lesions seen in the spine are unchanged.   Impression:   1. No evidence of new recurrent or metastatic disease.   2. Sclerotic lesions seen in the spine and sternum, unchanged when compared to 10/25/2021.      Genetic testing 3/23/2023            Review of Systems   Constitutional:  Negative for activity change, appetite change, chills, fatigue (notes pretty good), fever and unexpected weight change.   HENT:  Negative for mouth sores, rhinorrhea and trouble swallowing.    Eyes:  Negative for visual disturbance.   Respiratory:  Negative for cough, shortness of breath and wheezing.    Cardiovascular:  Negative for chest pain, palpitations and leg swelling.   Gastrointestinal:  Negative for abdominal distention, abdominal pain, blood in stool, change in  bowel habit, constipation, diarrhea, nausea, vomiting and change in bowel habit.   Genitourinary:  Negative for difficulty urinating, dysuria, frequency and urgency.   Musculoskeletal:  Positive for arthralgias, back pain (chronic) and joint deformity (decr ROM right shoulder). Negative for joint swelling and myalgias.   Integumentary:  Negative for rash.   Neurological:  Positive for coordination difficulties and coordination difficulties. Negative for dizziness, weakness, numbness and headaches.   Hematological:  Negative for adenopathy. Does not bruise/bleed easily.   Psychiatric/Behavioral:  Negative for dysphoric mood and sleep disturbance. The patient is not nervous/anxious.         Objective     Physical Exam  Vitals and nursing note reviewed.   Constitutional:       General: She is not in acute distress.     Appearance: Normal appearance. She is well-developed. She is obese. She is not ill-appearing.      Comments: Presents with one of her daughters  Very pleasant  ECOG= 0  Uses wheelchair for distance, but walks into exam room     HENT:      Head: Normocephalic and atraumatic.   Eyes:      General: Lids are normal. No scleral icterus.     Extraocular Movements: Extraocular movements intact.      Conjunctiva/sclera: Conjunctivae normal.      Pupils: Pupils are equal, round, and reactive to light.   Neck:      Thyroid: No thyromegaly.      Vascular: No JVD.      Trachea: Trachea normal.   Cardiovascular:      Rate and Rhythm: Normal rate and regular rhythm.      Heart sounds: Normal heart sounds. No murmur heard.    No friction rub. No gallop.      Comments: Difficult to assess tones.   Pulmonary:      Effort: Pulmonary effort is normal. No respiratory distress.      Breath sounds: Normal breath sounds. No wheezing, rhonchi or rales.      Comments: distant  Abdominal:      General: Bowel sounds are normal. There is no distension.      Palpations: Abdomen is soft. There is no mass.      Tenderness: There is no  abdominal tenderness. There is no guarding or rebound.      Comments: No organomegaly however difficult to assess.    Hardness noted periumbilical area   Musculoskeletal:         General: Tenderness and deformity (decr rom RUE) present. Normal range of motion.      Cervical back: Normal range of motion and neck supple.      Right lower leg: No edema.      Left lower leg: No edema.      Comments: No spinal or paraspinal tenderness.    Lymphadenopathy:      Head:      Right side of head: No submental or submandibular adenopathy.      Left side of head: No submental or submandibular adenopathy.      Cervical: No cervical adenopathy.      Upper Body:      Right upper body: No supraclavicular or axillary adenopathy.      Left upper body: No supraclavicular or axillary adenopathy.   Skin:     General: Skin is warm and dry.      Capillary Refill: Capillary refill takes less than 2 seconds.      Coloration: Skin is not jaundiced or pale.      Findings: No bruising or rash.      Nails: There is no clubbing.   Neurological:      General: No focal deficit present.      Mental Status: She is alert and oriented to person, place, and time.      Sensory: No sensory deficit.      Motor: No weakness.      Coordination: Coordination normal.      Gait: Gait normal.   Psychiatric:         Mood and Affect: Mood normal.         Speech: Speech normal.         Behavior: Behavior normal.         Thought Content: Thought content normal.         Judgment: Judgment normal.          Assessment and Plan     Problem List Items Addressed This Visit       Type 2 diabetes mellitus with hyperglycemia, with long-term current use of insulin    Right shoulder pain    Morbid obesity, unspecified obesity type    Malignant neoplasm of upper-inner quadrant of right breast in female, estrogen receptor positive - Primary    Iron deficiency anemia secondary to inadequate dietary iron intake    Cancer associated pain    Bone metastases    Anemia associated with  chemotherapy       MRI Right shoulder    Metastatic breast cancer to bone  Continue Ibrance with dose adjustments as above.   Continue Arimidex.   Hold Xgeva as with jaw pain. Needs dental appt and will need clearance to resume.   Zoladex monthly- due today     Anemia- has underlying thalassemia and prior iron def-  Unable to increase iron due to constipation. Will give IV iron- starts today     Obesity- exercise limited by disease limitations  Improved diet     Cancer related pain- continue f/u with palliative   MRI new area of pain as above     Type 2 DM- continued work on better control     Knows to call for any changes    Route Chart for Scheduling    Med Onc Chart Routing  Urgent    Follow up with physician . MRI right shoulder asap   Follow up with [facility] 4 weeks. cbc, cmp and EP and chemo injections   Infusion scheduling note    Injection scheduling note    Labs    Imaging    Pharmacy appointment    Other referrals         Treatment Plan Information   OP ANASTROZOLE PALBOCICLIB Q4W   Jessica Mendez MD   Upcoming Treatment Dates - OP ANASTROZOLE PALBOCICLIB Q4W    No upcoming days in selected categories.    Supportive Plan Information  OP BREAST GOSERELIN & DENOSUMAB Q4W   Jessica Mendez MD   Upcoming Treatment Dates - OP BREAST GOSERELIN & DENOSUMAB Q4W    7/10/2023       Chemotherapy       goserelin (ZOLADEX) injection 3.6 mg    Therapy Plan Information  Medications  ferric carboxymaltose (INJECTAFER) 750 mg in sodium chloride 0.9% 265 mL infusion  750 mg, Intravenous, 1 time a week  IV Fluids  0.9%  NaCl infusion  Intravenous, 1 time a week  Flushes  sodium chloride 0.9% flush 10 mL  10 mL, Intravenous, 1 time a week  heparin, porcine (PF) 100 unit/mL injection flush 500 Units  500 Units, Intravenous, 1 time a week  sodium chloride 0.9% 100 mL flush bag  Intravenous, 1 time a week  PRN Medications  EPINEPHrine (EPIPEN) 0.3 mg/0.3 mL pen injection 0.3 mg  0.3 mg, Intramuscular, PRN  diphenhydrAMINE  injection 50 mg  50 mg, Intravenous, PRN  methylPREDNISolone sodium succinate injection 125 mg  125 mg, Intravenous, PRN  sodium chloride 0.9% bolus 1,000 mL 1,000 mL  1,000 mL, Intravenous, PRN     30 minutes total patient care, 25 minutes direct with patient

## 2023-05-22 NOTE — PLAN OF CARE
1014-Labs, hx, and medications reviewed, pt meets parameters for treatment today. Assessment completed and plan of care reviewed. Pt verbalized understanding. PIV accessed with no complications. Pt voices no new complaints or concerns, will continue to monitor for safety.

## 2023-05-22 NOTE — PLAN OF CARE
1205-Pt tolerated Injectafer #1 infusion (30 min obs completed) and Zoladex injection well today, no complaints or complications. VSS through duration of treatment. Pt aware to call provider with any questions or concerns and is aware of upcoming appts. Pt ambulatory from clinic with steady gait, no distress noted.

## 2023-05-24 ENCOUNTER — OFFICE VISIT (OUTPATIENT)
Dept: PSYCHIATRY | Facility: CLINIC | Age: 48
End: 2023-05-24
Payer: COMMERCIAL

## 2023-05-24 DIAGNOSIS — C50.211 MALIGNANT NEOPLASM OF UPPER-INNER QUADRANT OF RIGHT BREAST IN FEMALE, ESTROGEN RECEPTOR POSITIVE: ICD-10-CM

## 2023-05-24 DIAGNOSIS — Z17.0 MALIGNANT NEOPLASM OF UPPER-INNER QUADRANT OF RIGHT BREAST IN FEMALE, ESTROGEN RECEPTOR POSITIVE: ICD-10-CM

## 2023-05-24 DIAGNOSIS — F33.1 MAJOR DEPRESSIVE DISORDER, RECURRENT EPISODE, MODERATE WITH ANXIOUS DISTRESS: Primary | ICD-10-CM

## 2023-05-24 PROCEDURE — 99999 PR PBB SHADOW E&M-EST. PATIENT-LVL II: ICD-10-PCS | Mod: PBBFAC,,, | Performed by: PSYCHOLOGIST

## 2023-05-24 PROCEDURE — 99999 PR PBB SHADOW E&M-EST. PATIENT-LVL II: CPT | Mod: PBBFAC,,, | Performed by: PSYCHOLOGIST

## 2023-05-24 PROCEDURE — 3051F HG A1C>EQUAL 7.0%<8.0%: CPT | Mod: CPTII,S$GLB,, | Performed by: PSYCHOLOGIST

## 2023-05-24 PROCEDURE — 1159F PR MEDICATION LIST DOCUMENTED IN MEDICAL RECORD: ICD-10-PCS | Mod: CPTII,S$GLB,, | Performed by: PSYCHOLOGIST

## 2023-05-24 PROCEDURE — 4010F PR ACE/ARB THEARPY RXD/TAKEN: ICD-10-PCS | Mod: CPTII,S$GLB,, | Performed by: PSYCHOLOGIST

## 2023-05-24 PROCEDURE — 4010F ACE/ARB THERAPY RXD/TAKEN: CPT | Mod: CPTII,S$GLB,, | Performed by: PSYCHOLOGIST

## 2023-05-24 PROCEDURE — 90834 PSYTX W PT 45 MINUTES: CPT | Mod: S$GLB,,, | Performed by: PSYCHOLOGIST

## 2023-05-24 PROCEDURE — 90834 PR PSYCHOTHERAPY W/PATIENT, 45 MIN: ICD-10-PCS | Mod: S$GLB,,, | Performed by: PSYCHOLOGIST

## 2023-05-24 PROCEDURE — 1159F MED LIST DOCD IN RCRD: CPT | Mod: CPTII,S$GLB,, | Performed by: PSYCHOLOGIST

## 2023-05-24 PROCEDURE — 3051F PR MOST RECENT HEMOGLOBIN A1C LEVEL 7.0 - < 8.0%: ICD-10-PCS | Mod: CPTII,S$GLB,, | Performed by: PSYCHOLOGIST

## 2023-05-24 NOTE — PROGRESS NOTES
INFORMED CONSENT: Patient was identified using two patient-identifiers. The patient has been informed of the risks and benefits associated with engaging in psychotherapy, the handling of protected health information, the rights of privacy and the limits of confidentiality. The patient has also been informed of the importance of reporting any suicidal or homicidal ideation to this or any provider to ensure safety of all parties, and the Kristopher Elmore expressed understanding. The patient was agreeable to these terms and freely participates in individual psychotherapy.    PSYCHO-ONCOLOGY NOTE/ Individual Psychotherapy     Date: 5/24/2023   Site:  [facility]        Therapeutic Intervention: Met with patient.  Outpatient - Insight oriented psychotherapy 45 min - CPT code 78755      Patient was last seen by me on 12/12/2022    Problem list  Patient Active Problem List   Diagnosis    Hypertension    Iron deficiency anemia    Cardiomegaly    Fibroid uterus    BLUE (dyspnea on exertion)    Thalassemia    Major depressive disorder, recurrent episode, moderate with anxious distress    Chronic combined systolic and diastolic heart failure    Cardiomyopathy    Hyperlipidemia    Malignant neoplasm of upper-inner quadrant of right breast in female, estrogen receptor positive    Cancer associated pain    Pathological fracture of lumbar vertebra with routine healing    Bone metastases    Hot flashes    Morbid obesity, unspecified obesity type    Elevated LDL cholesterol level    Anxiety    Vaginal dryness    Vitamin D deficiency    Grief    Hyperglycemia    Type 2 diabetes mellitus without complication, with long-term current use of insulin    Benign paroxysmal positional vertigo    Neuropathy    Anemia associated with chemotherapy    Sleep difficulties    Hard mass of abdomen    Benign essential HTN    Type 2 diabetes mellitus with hyperglycemia, with long-term current use of insulin    Iron deficiency anemia secondary  "to inadequate dietary iron intake    Right shoulder pain       Chief complaint/reason for encounter: adjustment   Met with patient to evaluate psychosocial adaptation to diagnosis/treatment/survivorship of BC    Current Medications  Current Outpatient Medications   Medication    anastrozole (ARIMIDEX) 1 mg Tab    atorvastatin (LIPITOR) 40 MG tablet    blood sugar diagnostic Strp    blood-glucose meter kit    blood-glucose meter,continuous (DEXCOM G6 ) Misc    blood-glucose sensor (DEXCOM G6 SENSOR) Monique    blood-glucose sensor (FREESTYLE MARGARITA 3 SENSOR) Monique    blood-glucose transmitter (DEXCOM G6 TRANSMITTER) Monique    carvediloL (COREG) 25 MG tablet    ENTRESTO  mg per tablet    ergocalciferol (ERGOCALCIFEROL) 50,000 unit Cap    ferrous sulfate 324 mg (65 mg iron) TbEC    furosemide (LASIX) 20 MG tablet    glipiZIDE (GLUCOTROL) 10 MG TR24    goserelin (ZOLADEX) 3.6 mg injection    hydrOXYzine HCL (ATARAX) 25 MG tablet    ibuprofen (ADVIL,MOTRIN) 800 MG tablet    insulin degludec (TRESIBA FLEXTOUCH U-200) 200 unit/mL (3 mL) insulin pen    insulin degludec (TRESIBA U-100 INSULIN) 100 unit/mL injection    JARDIANCE 25 mg tablet    lancets Misc    LIDOcaine (LIDODERM) 5 %    LORazepam (ATIVAN) 1 MG tablet    metFORMIN (GLUCOPHAGE-XR) 500 MG ER 24hr tablet    naloxone (NARCAN) 4 mg/actuation Spry    ondansetron (ZOFRAN-ODT) 4 MG TbDL    ondansetron (ZOFRAN-ODT) 8 MG TbDL    palbociclib (IBRANCE) 100 mg Cap    pen needle, diabetic (BD ULTRA-FINE VERONICA PEN NEEDLE) 32 gauge x 5/32" Ndle    potassium chloride (KLOR-CON) 10 MEQ TbSR    promethazine (PHENERGAN) 25 MG tablet    sennosides (SENNA-C ORAL)    spironolactone (ALDACTONE) 25 MG tablet    venlafaxine (EFFEXOR-XR) 150 MG Cp24    venlafaxine (EFFEXOR-XR) 75 MG 24 hr capsule     No current facility-administered medications for this visit.       Objective:  Kristopher lEmore arrived promptly for the session.   The patient was fully cooperative " throughout the session.  Appearance: age appropriate, appropriately  dressed, adequately  groomed  Behavior/Cooperation: friendly and cooperative  Speech: normal in rate, volume, and tone and appropriate quality, quantity and organization of sentences  Mood: euthymic  Affect: mood congruent  Thought Process: goal-directed, logical  Thought Content: normal,  No delusions or paranoia; did not appear to be responding to internal stimuli during the session  Orientation: grossly intact  Attention Span/Concentration: Attends to session without distraction; reports no difficulty  Insight: patient has awareness of illness; good insight into own behavior and behavior of others  Judgment: the patient's behavior is adequate to circumstances    Interval history and content of current session: Patient cotninues to adjsut well to current disease status. Continues to engage in all skills utilized in treatment.  Discussed diagnosis, treatment, prognosis, current adaptation to disease and treatment status, and family's adaptation to disease and treatment status. Reports to be coping in an adequate manner. Evaluated cognitive response, paying particular attention to negative intrusive thoughts of a persistent and detrimental nature. Thoughts of this type are in evidence with mild distress. Provided cognitive behavioral therapy to address negative cognitions. Identified and evaluated psychosocial and environmental stressors secondary to diagnosis and treatment.  Examined proactive behaviors that may be implemented to minimize or ameliorate psychosocial stressors secondary to diagnosis and treatment.     Risk parameters:   Patient reports no suicidal ideation  Patient reports no homicidal ideation  Patient reports no self-injurious behavior  Patient reports no violent behavior   Safety needs:  None at this time      Verbal deficits: None     Patient's response to intervention:The patient's response to intervention is  accepting.     Progress toward goals and other mental status changes:  The patient's progress toward goals is good.      Progress to date:Progress as Expected      Goals from last visit: Met     Patient reported outcomes:    Distress Thermometer:   Distress Score    Distress Score: 7        Practical Problems Physical Problems                                                   Family Problems                                         Emotional Problems                                                         Spiritual/Religions Concerns     Spiritual / Oriental orthodox Concerns: No         Other Problems                 PHQ ANSWERS    Q1. Little interest or pleasure in doing things: (P) Several days (05/24/23 1045)  Q2. Feeling down, depressed, or hopeless: (P) Several days (05/24/23 1045)  Q3. Trouble falling or staying asleep, or sleeping too much: (P) Several days (05/24/23 1045)  Q4. Feeling tired or having little energy: (P) Several days (05/24/23 1045)  Q5. Poor appetite or overeating: (P) Several days (05/24/23 1045)  Q6. Feeling bad about yourself - or that you are a failure or have let yourself or your family down: (P) Not at all (05/24/23 1045)  Q7. Trouble concentrating on things, such as reading the newspaper or watching television: (P) Several days (05/24/23 1045)  Q8. Moving or speaking so slowly that other people could have noticed. Or the opposite - being so fidgety or restless that you have been moving around a lot more than usual: (P) Several days (05/24/23 1045)  Q9.  0    PHQ8 Score : (P) 7 (05/24/23 1045)  PHQ-9 Total Score: (P) 7 (05/24/23 1045)     HUY-7 Answers    GAD7 5/24/2023   1. Feeling nervous, anxious, or on edge? 1   2. Not being able to stop or control worrying? 1   3. Worrying too much about different things? 1   4. Trouble relaxing? 1   5. Being so restless that it is hard to sit still? 1   6. Becoming easily annoyed or irritable? 1   7. Feeling afraid as if something awful might happen? 1    HUY-7 Score 7     HUY-7 Score: (P) 7  Interpretation: (P) Mild Anxiety     Client Strengths: verbal, intelligent, successful, good social support, good insight, commitment to wellness, strong jd, strong cultural traditions     Diagnosis:     ICD-10-CM ICD-9-CM   1. Major depressive disorder, recurrent episode, moderate with anxious distress  F33.1 296.32   2. Malignant neoplasm of upper-inner quadrant of right breast in female, estrogen receptor positive  C50.211 174.2    Z17.0 V86.0       Treatment Plan:individual psychotherapy and medication management by physician  Target symptoms: depression, anxiety   Why chosen therapy is appropriate versus another modality: relevant to diagnosis, patient responds to this modality, evidence based practice  Outcome monitoring methods: self-report, observation, checklist/rating scale  Therapeutic intervention type: insight oriented psychotherapy, behavior modifying psychotherapy  Prognosis: Good      Behavioral goals:    Exercise:   Stress management:   Social engagement:   Nutrition:   Smoking Cessation:   Therapy:  Increase daily self-care and attention to health management  Pleasant events scheduling and increased social interaction  Monitor stressors in writing and bring to next visit    Return to clinic: as needed    Next Session:      Length of Service (minutes direct face-to-face contact): 45    Bria Odom, PhD  Clinical Psychologist  [place] License #[id number]  [place] License #[id number]

## 2023-05-26 ENCOUNTER — PATIENT MESSAGE (OUTPATIENT)
Dept: PHARMACY | Facility: CLINIC | Age: 48
End: 2023-05-26
Payer: MEDICARE

## 2023-05-26 ENCOUNTER — SPECIALTY PHARMACY (OUTPATIENT)
Dept: PHARMACY | Facility: CLINIC | Age: 48
End: 2023-05-26
Payer: MEDICARE

## 2023-05-26 NOTE — TELEPHONE ENCOUNTER
Specialty Pharmacy - Refill Coordination    Specialty Medication Orders Linked to Encounter      Flowsheet Row Most Recent Value   Medication #1 palbociclib (IBRANCE) 100 mg Cap (Order#[id number], Rx#[id number])            Refill Questions - Documented Responses      Flowsheet Row Most Recent Value   Patient Availability and HIPAA Verification    Does patient want to proceed with activity? Yes   HIPAA/medical authority confirmed? Yes   Relationship to patient of person spoken to? Self   Refill Screening Questions    Changes to allergies? No   Changes to medications? No   New conditions since last clinic visit? No   Unplanned office visit, urgent care, ED, or hospital admission in the last 4 weeks? No   How does patient/caregiver feel medication is working? Good   Financial problems or insurance changes? No   How many doses of your specialty medications were missed in the last 4 weeks? 0   Would patient like to speak to a pharmacist? No   When does the patient need to receive the medication? 06/05/23   Refill Delivery Questions    How will the patient receive the medication? [facility]   When does the patient need to receive the medication? 06/05/23   Shipping Address Home   Address in [facility] confirmed and updated if neccessary? Yes   Expected Copay ($) 0   Is the patient able to afford the medication copay? Yes   Payment Method zero copay   Days supply of Refill 28   Supplies needed? No supplies needed   Refill activity completed? Yes   Refill activity plan Refill scheduled   Shipment/Pickup Date: 05/31/23            Current Outpatient Medications   Medication Sig    anastrozole (ARIMIDEX) 1 mg Tab TAKE 1 TABLET(1 MG) BY MOUTH EVERY DAY    atorvastatin (LIPITOR) 40 MG tablet TAKE 1 TABLET(40 MG) BY MOUTH EVERY DAY    blood sugar diagnostic Strp To check BG 2 times daily, to use with insurance preferred meter    blood-glucose meter kit To check BG 2 times daily, to use with insurance preferred meter     blood-glucose meter,continuous (DEXCOM G6 ) Misc Use with dexcom G6 system    blood-glucose sensor (DEXCOM G6 SENSOR) Monique Change sensor every 10 days    blood-glucose sensor (FREESTYLE MARGARITA 3 SENSOR) Moinque 2 each by Misc.(Non-Drug; Combo Route) route every 14 (fourteen) days.    blood-glucose transmitter (DEXCOM G6 TRANSMITTER) Monique Change every 3 months    carvediloL (COREG) 25 MG tablet TAKE 1 TABLET(25 MG) BY MOUTH TWICE DAILY    ENTRESTO  mg per tablet TAKE 1 TABLET BY MOUTH TWICE DAILY    ergocalciferol (ERGOCALCIFEROL) 50,000 unit Cap TAKE 1 CAPSULE BY MOUTH EVERY 7 DAYS    ferrous sulfate 324 mg (65 mg iron) TbEC TAKE 1 TABLET BY MOUTH EVERY MONDAY, WEDNESDAY, FRIDAY    furosemide (LASIX) 20 MG tablet Take 1 tablet (20 mg total) by mouth 2 (two) times daily.    glipiZIDE (GLUCOTROL) 10 MG TR24 TAKE 1 TABLET(10 MG) BY MOUTH DAILY WITH BREAKFAST    goserelin (ZOLADEX) 3.6 mg injection Inject 3.6 mg into the skin every 28 days.    hydrOXYzine HCL (ATARAX) 25 MG tablet TAKE 1 TABLET(25 MG) BY MOUTH EVERY NIGHT AS NEEDED FOR ANXIETY    ibuprofen (ADVIL,MOTRIN) 800 MG tablet Take 1 tablet (800 mg total) by mouth 2 (two) times daily as needed for Pain.    insulin degludec (TRESIBA FLEXTOUCH U-200) 200 unit/mL (3 mL) insulin pen ADMINISTER 36 UNITS UNDER THE SKIN EVERY DAY. TITRATE UP TO 50 UNITS DAILY AS DIRECTED.    insulin degludec (TRESIBA U-100 INSULIN) 100 unit/mL injection Inject 0.36 mLs (36 Units total) into the skin once daily. Titrate to 50 units daily    JARDIANCE 25 mg tablet TAKE 1 TABLET BY MOUTH EVERY DAILY    lancets [facility] To check BG 2 times daily, to use with insurance preferred meter    LIDOcaine (LIDODERM) 5 % Place 2 patches onto the skin once daily. Remove & Discard patch within 12 hours or as directed by MD (Patient not taking: Reported on 3/7/2023)    LORazepam (ATIVAN) 1 MG tablet Take 1 tablet (1 mg total) by mouth every 12 (twelve) hours as needed for Anxiety.    metFORMIN  "(GLUCOPHAGE-XR) 500 MG ER 24hr tablet TAKE 2 TABLETS(1000 MG) BY MOUTH TWICE DAILY WITH MEALS    naloxone (NARCAN) 4 mg/actuation Spry 4mg by nasal route as needed for opioid overdose; may repeat every 2-3 minutes in alternating nostrils until medical help arrives. Call 911    ondansetron (ZOFRAN-ODT) 4 MG TbDL Take 1 tablet (4 mg total) by mouth 2 (two) times daily.    ondansetron (ZOFRAN-ODT) 8 MG TbDL DISSOLVE 1 TABLET(8 MG) ON THE TONGUE EVERY 8 HOURS AS NEEDED FOR NAUSEA    palbociclib (IBRANCE) 100 mg Cap Take 1 capsule (100 mg) by mouth once daily for 21 days, followed by 7 days off.    pen needle, diabetic (BD ULTRA-FINE VERONICA PEN NEEDLE) 32 gauge x 5/32" Ndle 1 Device by Misc.(Non-Drug; Combo Route) route Daily.    potassium chloride (KLOR-CON) 10 MEQ TbSR TAKE 1 TABLET(10 MEQ) BY MOUTH EVERY DAY    promethazine (PHENERGAN) 25 MG tablet Take 1 tablet (25 mg total) by mouth every 6 (six) hours as needed for Nausea. (Patient not taking: Reported on 3/7/2023)    sennosides (SENNA-C ORAL) Take 2 tablets by mouth daily as needed.    spironolactone (ALDACTONE) 25 MG tablet Take 1 tablet (25 mg total) by mouth once daily.    venlafaxine (EFFEXOR-XR) 150 MG Cp24 Take 1 capsule (150 mg total) by mouth once daily.    venlafaxine (EFFEXOR-XR) 75 MG 24 hr capsule Take 1 capsule (75 mg total) by mouth once daily. Take with 150 mg capsule for a total of 225 mg.   Last reviewed on 5/24/2023  1:26 PM by Bria Odom, PhD    Review of patient's allergies indicates:   Allergen Reactions    Keflex [cephalexin] Itching    Last reviewed on  5/24/2023 1:01 PM by Bria Odom      Tasks added this encounter   No tasks added.   Tasks due within next 3 months   8/8/2023 - Clinical Assessment (6 month recurrence)     Charito Giraldo, PharmD  [facility]  [address] [place] [id number]  Phone: [phone]  Fax: [phone] "

## 2023-05-29 ENCOUNTER — HOSPITAL ENCOUNTER (OUTPATIENT)
Dept: RADIOLOGY | Facility: HOSPITAL | Age: 48
Discharge: HOME | End: 2023-05-29
Attending: INTERNAL MEDICINE
Payer: MEDICARE

## 2023-05-29 ENCOUNTER — TELEPHONE (OUTPATIENT)
Dept: HEMATOLOGY/ONCOLOGY | Facility: CLINIC | Age: 48
End: 2023-05-29
Payer: MEDICARE

## 2023-05-29 DIAGNOSIS — M25.511 ACUTE PAIN OF RIGHT SHOULDER: ICD-10-CM

## 2023-05-29 PROCEDURE — 73223 MRI JOINT UPR EXTR W/O&W/DYE: CPT | Mod: TC,RT

## 2023-05-29 PROCEDURE — A9585 GADOBUTROL INJECTION: HCPCS | Performed by: INTERNAL MEDICINE

## 2023-05-29 PROCEDURE — 73223 MRI SHOULDER W WO CONTRAST RIGHT: ICD-10-PCS | Mod: 26,RT,, | Performed by: RADIOLOGY

## 2023-05-29 PROCEDURE — 25500020 PHARM REV CODE 255: Performed by: INTERNAL MEDICINE

## 2023-05-29 PROCEDURE — 73223 MRI JOINT UPR EXTR W/O&W/DYE: CPT | Mod: 26,RT,, | Performed by: RADIOLOGY

## 2023-05-29 RX ORDER — GADOBUTROL 604.72 MG/ML
10 INJECTION INTRAVENOUS
Status: COMPLETED | OUTPATIENT
Start: 2023-05-29 | End: 2023-05-29

## 2023-05-29 RX ADMIN — GADOBUTROL 10 ML: 604.72 INJECTION INTRAVENOUS at 05:05

## 2023-05-29 NOTE — TELEPHONE ENCOUNTER
Spoke to pt  Rescheduled for 415 today at [facility]  Pt appreciative    ----- Message from Brittany Schoen, RT sent at 5/29/2023  8:59 AM CDT -----  Regarding: Reschedule MRI  Good morning!    This patient is scheduled for an MRI at [facility] this evening, however she exceeds the weight limit of our table which is 350lbs. She will need to be rescheduled to main campus.     Thanks!

## 2023-05-30 ENCOUNTER — PATIENT MESSAGE (OUTPATIENT)
Dept: HEMATOLOGY/ONCOLOGY | Facility: CLINIC | Age: 48
End: 2023-05-30
Payer: MEDICARE

## 2023-05-30 DIAGNOSIS — M25.511 ACUTE PAIN OF RIGHT SHOULDER: Primary | ICD-10-CM

## 2023-06-06 ENCOUNTER — TELEPHONE (OUTPATIENT)
Dept: SURGERY | Facility: CLINIC | Age: 48
End: 2023-06-06
Payer: MEDICARE

## 2023-06-06 ENCOUNTER — OFFICE VISIT (OUTPATIENT)
Dept: PODIATRY | Facility: CLINIC | Age: 48
End: 2023-06-06
Payer: MEDICARE

## 2023-06-06 VITALS — BODY MASS INDEX: 50.02 KG/M2 | WEIGHT: 293 LBS | HEIGHT: 64 IN

## 2023-06-06 DIAGNOSIS — M20.40 HAMMER TOE, UNSPECIFIED LATERALITY: ICD-10-CM

## 2023-06-06 DIAGNOSIS — E11.65 TYPE 2 DIABETES MELLITUS WITH HYPERGLYCEMIA, WITHOUT LONG-TERM CURRENT USE OF INSULIN: Primary | ICD-10-CM

## 2023-06-06 PROCEDURE — 3008F BODY MASS INDEX DOCD: CPT | Mod: CPTII,S$GLB,, | Performed by: PODIATRIST

## 2023-06-06 PROCEDURE — 99999 PR PBB SHADOW E&M-EST. PATIENT-LVL IV: CPT | Mod: PBBFAC,,, | Performed by: PODIATRIST

## 2023-06-06 PROCEDURE — 99214 OFFICE O/P EST MOD 30 MIN: CPT | Mod: S$GLB,,, | Performed by: PODIATRIST

## 2023-06-06 PROCEDURE — 3008F PR BODY MASS INDEX (BMI) DOCUMENTED: ICD-10-PCS | Mod: CPTII,S$GLB,, | Performed by: PODIATRIST

## 2023-06-06 PROCEDURE — 3051F HG A1C>EQUAL 7.0%<8.0%: CPT | Mod: CPTII,S$GLB,, | Performed by: PODIATRIST

## 2023-06-06 PROCEDURE — 99999 PR PBB SHADOW E&M-EST. PATIENT-LVL IV: ICD-10-PCS | Mod: PBBFAC,,, | Performed by: PODIATRIST

## 2023-06-06 PROCEDURE — 1159F MED LIST DOCD IN RCRD: CPT | Mod: CPTII,S$GLB,, | Performed by: PODIATRIST

## 2023-06-06 PROCEDURE — 99214 PR OFFICE/OUTPT VISIT, EST, LEVL IV, 30-39 MIN: ICD-10-PCS | Mod: S$GLB,,, | Performed by: PODIATRIST

## 2023-06-06 PROCEDURE — 4010F PR ACE/ARB THEARPY RXD/TAKEN: ICD-10-PCS | Mod: CPTII,S$GLB,, | Performed by: PODIATRIST

## 2023-06-06 PROCEDURE — 3051F PR MOST RECENT HEMOGLOBIN A1C LEVEL 7.0 - < 8.0%: ICD-10-PCS | Mod: CPTII,S$GLB,, | Performed by: PODIATRIST

## 2023-06-06 PROCEDURE — 4010F ACE/ARB THERAPY RXD/TAKEN: CPT | Mod: CPTII,S$GLB,, | Performed by: PODIATRIST

## 2023-06-06 PROCEDURE — 1159F PR MEDICATION LIST DOCUMENTED IN MEDICAL RECORD: ICD-10-PCS | Mod: CPTII,S$GLB,, | Performed by: PODIATRIST

## 2023-06-06 RX ORDER — AMMONIUM LACTATE 12 G/100G
1 CREAM TOPICAL DAILY
Qty: 140 G | Refills: 5 | Status: SHIPPED | OUTPATIENT
Start: 2023-06-06 | End: 2023-11-17 | Stop reason: CLARIF

## 2023-06-06 NOTE — TELEPHONE ENCOUNTER
"----- Message from Kelsey Chang sent at 6/5/2023  9:15 AM CDT -----  Reschedule Existing Appointment     Appt Date: 06/05    Type of appt: Infusion      Physician: Quique    Reason for rescheduling?  Requesting to r/s for soonest available    Caller: Self    Contact Preference: [phone]                   Additional Information:  "Thank you for all that you do for our patients"         "

## 2023-06-06 NOTE — PROGRESS NOTES
Subjective:      Patient ID: Kristopher Elmore is a 47 y.o. female.    Chief Complaint: Diabetes Mellitus and Diabetic Foot Exam (12/14/22 Eusebio Giraldo)    Kristopher is a 47 y.o. female who presents to the clinic upon referral from Dr. Altamirano  for evaluation and treatment of diabetic feet. Kristopher has a past medical history of Abnormal Pap smear, Anemia, Anxiety, Cancer, Cardiomyopathy, CHF (congestive heart failure), Fibroid, psychiatric care, Hyperlipidemia, Hypertension, Psychiatric problem, Sleep difficulties, and Therapy. Presents for diabetic foot risk assessment.   Reports painful ingrown nail B/L great toe B/L border .     PCP: Vito Walker MD    Date Last Seen by PCP: per above    Current shoe gear: Tennis shoes    Hemoglobin A1C   Date Value Ref Range Status   05/22/2023 7.5 (H) 4.0 - 5.6 % Final     Comment:     ADA Screening Guidelines:  5.7-6.4%  Consistent with prediabetes  >or=6.5%  Consistent with diabetes    High levels of fetal hemoglobin interfere with the HbA1C  assay. Heterozygous hemoglobin variants (HbS, HgC, etc)do  not significantly interfere with this assay.   However, presence of multiple variants may affect accuracy.     12/06/2022 11.6 (H) 4.0 - 5.6 % Final     Comment:     ADA Screening Guidelines:  5.7-6.4%  Consistent with prediabetes  >or=6.5%  Consistent with diabetes    High levels of fetal hemoglobin interfere with the HbA1C  assay. Heterozygous hemoglobin variants (HbS, HgC, etc)do  not significantly interfere with this assay.   However, presence of multiple variants may affect accuracy.     02/07/2022 8.8 (H) 4.0 - 5.6 % Final     Comment:     ADA Screening Guidelines:  5.7-6.4%  Consistent with prediabetes  >or=6.5%  Consistent with diabetes    High levels of fetal hemoglobin interfere with the HbA1C  assay. Heterozygous hemoglobin variants (HbS, HgC, etc)do  not significantly interfere with this assay.   However, presence of multiple variants may affect accuracy.              Review of Systems   Constitutional: Negative for chills.   Cardiovascular:  Negative for chest pain and claudication.   Respiratory:  Negative for cough.    Skin:  Positive for color change, dry skin and nail changes.   Musculoskeletal:  Positive for joint pain.   Gastrointestinal:  Negative for nausea.   Neurological:  Positive for paresthesias. Negative for numbness.   Psychiatric/Behavioral:  The patient is not nervous/anxious.          Objective:      Physical Exam  Constitutional:       Appearance: She is well-developed.      Comments: Oriented to time, place, and person.   Cardiovascular:      Comments: DP and PT pulses are palpable bilaterally. 3 sec capillary refill time and toes and feet are warm to touch proximally .  There is  hair growth on the feet and toes b/l. There is no edema b/l. No spider veins or varicosities present b/l.     Musculoskeletal:      Comments: Equinus noted b/l ankles with < 10 deg DF noted. MMT 5/5 in DF/PF/Inv/Ev resistance with no reproduction of pain in any direction. Passive range of motion of ankle and pedal joints is painless b/l.    Decreased stride, station of gait.  apropulsive toe off.  Increased angle and base of gait.      Patient has hammertoes of digits 2-5 bilateral partially reducible without symptom today.     Visible and palpable bunion without pain at dorsomedial 1st metatarsal head right and left.  Hallux abducted right and left partially reducible, tracks laterally without being track bound.  No ecchymosis, erythema, edema, or cardinal signs infection or signs of trauma same foot.     Fat pad atrophy to heels and met heads bilateral     Feet:      Right foot:      Skin integrity: No callus or dry skin.      Left foot:      Skin integrity: No callus or dry skin.   Lymphadenopathy:      Comments: Negative lymphadenopathy bilateral popliteal fossa and tarsal tunnel.   Skin:     Comments: No open lesions, lacerations or wounds noted.Interdigital spaces  clean, dry and intact b/l. No erythema noted to b/l foot.    Toenails 1-5 bilaterally are elongated by 2-3 mm, thickened by 2-3 mm, discolored/yellowed, dystrophic, brittle with subungual debris.     Neurological:      Mental Status: She is alert.      Comments: Light touch, proprioception, and sharp/dull sensation are all intact bilaterally. Protective threshold with the [place]-Wienstein monofilament is intact bilaterally.    Psychiatric:         Behavior: Behavior is cooperative.             Assessment:       Encounter Diagnoses   Name Primary?    Type 2 diabetes mellitus with hyperglycemia, without long-term current use of insulin Yes    Hammer toe, unspecified laterality          Plan:       Kristopher was seen today for diabetes mellitus and diabetic foot exam.    Diagnoses and all orders for this visit:    Type 2 diabetes mellitus with hyperglycemia, without long-term current use of insulin  -     DIABETIC SHOES FOR HOME USE    Hammer toe, unspecified laterality  -     DIABETIC SHOES FOR HOME USE    Other orders  -     ammonium lactate 12 % Crea; Apply 1 application topically once daily.      I counseled the patient on her conditions, their implications and medical management.      - Shoe inspection. Diabetic Foot Education. Patient reminded of the importance of good nutrition and blood sugar control to help prevent podiatric complications of diabetes. Patient instructed on proper foot hygeine. We discussed wearing proper shoe gear, daily foot inspections, never walking without protective shoe gear, caution putting sharp instruments to feet     - Discussed DM foot care:  Wear comfortable, proper fitting shoes. Wash feet daily. Dry well. After drying, apply moisturizer to feet (no lotion to webspaces). Inspect feet daily for skin breaks, blisters, swelling, or redness. Wear cotton socks (preferably white)  Change socks every day. Do NOT walk barefoot. Do NOT use heating pads or warm/hot water soaks       Rx  diabetic shoes with custom molded inserts to be worn at all times while ambulating. Prescription provided with list of local retailers.     At patient's request, I discussed different treatments for toenail fungus. We discussed oral antifungals but I did not recommend them as a first line treatment since the medication is taken internally and can have side effects such as rash, taste disturbances, and liver enzyme elevation. We discussed topical Penlac to be applied daily and removed weekly. Pt. Expresses understanding and would like to try the Penlac. Rx sent to the pharmacy.     Discussed treatment options with patient. Options included soaking, avulsion and matrixectomy. Risks and benefits discussed and all questions were answered. The patient wishes to proceed with  Nail avulsion at a later date      In depth conversation on the treatment of ingrown nail; partial nail avulsion vs chemical matrixectomy vs conservative treatment of soaking and nail trimming  Rx. Amlactin    - With patient's permission, nails were aggressively reduced and debrided x 10 to their soft tissue attachment mechanically and with electric , removing all offending nail and debris. Patient relates relief following the procedure. He will continue to monitor the areas daily, inspect his feet, wear protective shoe gear when ambulatory, moisturizer to maintain skin integrity and follow in this office in approximately 12 months, sooner p.r.n.     F/u one year DM foot exam sooner PRN.

## 2023-06-09 ENCOUNTER — TELEPHONE (OUTPATIENT)
Dept: PALLIATIVE MEDICINE | Facility: CLINIC | Age: 48
End: 2023-06-09
Payer: MEDICARE

## 2023-06-12 ENCOUNTER — TELEPHONE (OUTPATIENT)
Dept: PALLIATIVE MEDICINE | Facility: CLINIC | Age: 48
End: 2023-06-12
Payer: MEDICARE

## 2023-06-12 ENCOUNTER — OFFICE VISIT (OUTPATIENT)
Dept: PALLIATIVE MEDICINE | Facility: CLINIC | Age: 48
End: 2023-06-12
Payer: MEDICARE

## 2023-06-12 DIAGNOSIS — R53.0 NEOPLASTIC (MALIGNANT) RELATED FATIGUE: ICD-10-CM

## 2023-06-12 DIAGNOSIS — Z17.0 MALIGNANT NEOPLASM OF UPPER-INNER QUADRANT OF RIGHT BREAST IN FEMALE, ESTROGEN RECEPTOR POSITIVE: Primary | ICD-10-CM

## 2023-06-12 DIAGNOSIS — G47.00 INSOMNIA, UNSPECIFIED TYPE: ICD-10-CM

## 2023-06-12 DIAGNOSIS — Z71.89 ADVANCED CARE PLANNING/COUNSELING DISCUSSION: ICD-10-CM

## 2023-06-12 DIAGNOSIS — R63.0 ANOREXIA: ICD-10-CM

## 2023-06-12 DIAGNOSIS — R11.0 NAUSEA: ICD-10-CM

## 2023-06-12 DIAGNOSIS — K59.00 CONSTIPATION, UNSPECIFIED CONSTIPATION TYPE: ICD-10-CM

## 2023-06-12 DIAGNOSIS — C50.211 MALIGNANT NEOPLASM OF UPPER-INNER QUADRANT OF RIGHT BREAST IN FEMALE, ESTROGEN RECEPTOR POSITIVE: Primary | ICD-10-CM

## 2023-06-12 DIAGNOSIS — Z51.5 ENCOUNTER FOR PALLIATIVE CARE: ICD-10-CM

## 2023-06-12 DIAGNOSIS — R06.09 OTHER FORM OF DYSPNEA: ICD-10-CM

## 2023-06-12 DIAGNOSIS — G89.3 CANCER ASSOCIATED PAIN: ICD-10-CM

## 2023-06-12 DIAGNOSIS — F41.9 ANXIETY: ICD-10-CM

## 2023-06-12 DIAGNOSIS — F43.23 ADJUSTMENT DISORDER WITH MIXED ANXIETY AND DEPRESSED MOOD: ICD-10-CM

## 2023-06-12 PROCEDURE — 1160F PR REVIEW ALL MEDS BY PRESCRIBER/CLIN PHARMACIST DOCUMENTED: ICD-10-PCS | Mod: CPTII,95,, | Performed by: STUDENT IN AN ORGANIZED HEALTH CARE EDUCATION/TRAINING PROGRAM

## 2023-06-12 PROCEDURE — 1159F MED LIST DOCD IN RCRD: CPT | Mod: CPTII,95,, | Performed by: STUDENT IN AN ORGANIZED HEALTH CARE EDUCATION/TRAINING PROGRAM

## 2023-06-12 PROCEDURE — 4010F PR ACE/ARB THEARPY RXD/TAKEN: ICD-10-PCS | Mod: CPTII,95,, | Performed by: STUDENT IN AN ORGANIZED HEALTH CARE EDUCATION/TRAINING PROGRAM

## 2023-06-12 PROCEDURE — 1160F RVW MEDS BY RX/DR IN RCRD: CPT | Mod: CPTII,95,, | Performed by: STUDENT IN AN ORGANIZED HEALTH CARE EDUCATION/TRAINING PROGRAM

## 2023-06-12 PROCEDURE — 1159F PR MEDICATION LIST DOCUMENTED IN MEDICAL RECORD: ICD-10-PCS | Mod: CPTII,95,, | Performed by: STUDENT IN AN ORGANIZED HEALTH CARE EDUCATION/TRAINING PROGRAM

## 2023-06-12 PROCEDURE — 3051F PR MOST RECENT HEMOGLOBIN A1C LEVEL 7.0 - < 8.0%: ICD-10-PCS | Mod: CPTII,95,, | Performed by: STUDENT IN AN ORGANIZED HEALTH CARE EDUCATION/TRAINING PROGRAM

## 2023-06-12 PROCEDURE — 99214 OFFICE O/P EST MOD 30 MIN: CPT | Mod: 95,,, | Performed by: STUDENT IN AN ORGANIZED HEALTH CARE EDUCATION/TRAINING PROGRAM

## 2023-06-12 PROCEDURE — 3051F HG A1C>EQUAL 7.0%<8.0%: CPT | Mod: CPTII,95,, | Performed by: STUDENT IN AN ORGANIZED HEALTH CARE EDUCATION/TRAINING PROGRAM

## 2023-06-12 PROCEDURE — 99214 PR OFFICE/OUTPT VISIT, EST, LEVL IV, 30-39 MIN: ICD-10-PCS | Mod: 95,,, | Performed by: STUDENT IN AN ORGANIZED HEALTH CARE EDUCATION/TRAINING PROGRAM

## 2023-06-12 PROCEDURE — 4010F ACE/ARB THERAPY RXD/TAKEN: CPT | Mod: CPTII,95,, | Performed by: STUDENT IN AN ORGANIZED HEALTH CARE EDUCATION/TRAINING PROGRAM
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
## 2023-06-14 ENCOUNTER — PATIENT MESSAGE (OUTPATIENT)
Dept: ADMINISTRATIVE | Facility: HOSPITAL | Age: 48
End: 2023-06-14
Payer: MEDICARE

## 2023-06-19 ENCOUNTER — INFUSION (OUTPATIENT)
Dept: INFUSION THERAPY | Facility: HOSPITAL | Age: 48
End: 2023-06-19
Payer: MEDICARE

## 2023-06-19 ENCOUNTER — OFFICE VISIT (OUTPATIENT)
Dept: ENDOCRINOLOGY | Facility: CLINIC | Age: 48
End: 2023-06-19
Payer: MEDICARE

## 2023-06-19 ENCOUNTER — OFFICE VISIT (OUTPATIENT)
Dept: HEMATOLOGY/ONCOLOGY | Facility: CLINIC | Age: 48
End: 2023-06-19
Payer: MEDICARE

## 2023-06-19 VITALS
SYSTOLIC BLOOD PRESSURE: 103 MMHG | TEMPERATURE: 98 F | DIASTOLIC BLOOD PRESSURE: 68 MMHG | BODY MASS INDEX: 60.08 KG/M2 | WEIGHT: 293 LBS | HEART RATE: 68 BPM

## 2023-06-19 VITALS
OXYGEN SATURATION: 90 % | SYSTOLIC BLOOD PRESSURE: 103 MMHG | HEIGHT: 64 IN | HEART RATE: 68 BPM | DIASTOLIC BLOOD PRESSURE: 68 MMHG | TEMPERATURE: 98 F | BODY MASS INDEX: 50.02 KG/M2 | WEIGHT: 293 LBS

## 2023-06-19 DIAGNOSIS — C50.211 MALIGNANT NEOPLASM OF UPPER-INNER QUADRANT OF RIGHT BREAST IN FEMALE, ESTROGEN RECEPTOR POSITIVE: Primary | ICD-10-CM

## 2023-06-19 DIAGNOSIS — I50.42 CHRONIC COMBINED SYSTOLIC AND DIASTOLIC HEART FAILURE: ICD-10-CM

## 2023-06-19 DIAGNOSIS — R11.2 NAUSEA AND VOMITING, UNSPECIFIED VOMITING TYPE: ICD-10-CM

## 2023-06-19 DIAGNOSIS — Z79.4 TYPE 2 DIABETES MELLITUS WITH HYPERGLYCEMIA, WITH LONG-TERM CURRENT USE OF INSULIN: ICD-10-CM

## 2023-06-19 DIAGNOSIS — C79.51 MALIGNANT NEOPLASM METASTATIC TO BONE: ICD-10-CM

## 2023-06-19 DIAGNOSIS — G89.3 CANCER ASSOCIATED PAIN: ICD-10-CM

## 2023-06-19 DIAGNOSIS — E66.01 MORBID OBESITY, UNSPECIFIED OBESITY TYPE: ICD-10-CM

## 2023-06-19 DIAGNOSIS — D56.9 THALASSEMIA, UNSPECIFIED TYPE: ICD-10-CM

## 2023-06-19 DIAGNOSIS — Z17.0 MALIGNANT NEOPLASM OF UPPER-INNER QUADRANT OF RIGHT BREAST IN FEMALE, ESTROGEN RECEPTOR POSITIVE: Primary | ICD-10-CM

## 2023-06-19 DIAGNOSIS — E11.65 TYPE 2 DIABETES MELLITUS WITH HYPERGLYCEMIA, WITH LONG-TERM CURRENT USE OF INSULIN: ICD-10-CM

## 2023-06-19 DIAGNOSIS — E11.65 TYPE 2 DIABETES MELLITUS WITH HYPERGLYCEMIA, WITHOUT LONG-TERM CURRENT USE OF INSULIN: Primary | ICD-10-CM

## 2023-06-19 DIAGNOSIS — Z79.4 TYPE 2 DIABETES MELLITUS WITHOUT COMPLICATION, WITH LONG-TERM CURRENT USE OF INSULIN: ICD-10-CM

## 2023-06-19 DIAGNOSIS — E11.9 TYPE 2 DIABETES MELLITUS WITHOUT COMPLICATION, WITH LONG-TERM CURRENT USE OF INSULIN: ICD-10-CM

## 2023-06-19 DIAGNOSIS — D50.8 OTHER IRON DEFICIENCY ANEMIA: ICD-10-CM

## 2023-06-19 DIAGNOSIS — I10 BENIGN ESSENTIAL HTN: ICD-10-CM

## 2023-06-19 PROCEDURE — 99999 PR PBB SHADOW E&M-EST. PATIENT-LVL IV: ICD-10-PCS | Mod: PBBFAC,,, | Performed by: NURSE PRACTITIONER

## 2023-06-19 PROCEDURE — 3078F PR MOST RECENT DIASTOLIC BLOOD PRESSURE < 80 MM HG: ICD-10-PCS | Mod: CPTII,S$GLB,, | Performed by: INTERNAL MEDICINE

## 2023-06-19 PROCEDURE — 3074F SYST BP LT 130 MM HG: CPT | Mod: CPTII,S$GLB,, | Performed by: NURSE PRACTITIONER

## 2023-06-19 PROCEDURE — 99499 RISK ADDL DX/OHS AUDIT: ICD-10-PCS | Mod: S$GLB,,, | Performed by: INTERNAL MEDICINE

## 2023-06-19 PROCEDURE — 4010F ACE/ARB THERAPY RXD/TAKEN: CPT | Mod: CPTII,S$GLB,, | Performed by: INTERNAL MEDICINE

## 2023-06-19 PROCEDURE — 1160F RVW MEDS BY RX/DR IN RCRD: CPT | Mod: CPTII,S$GLB,, | Performed by: NURSE PRACTITIONER

## 2023-06-19 PROCEDURE — 3078F DIAST BP <80 MM HG: CPT | Mod: CPTII,S$GLB,, | Performed by: INTERNAL MEDICINE

## 2023-06-19 PROCEDURE — 99999 PR PBB SHADOW E&M-EST. PATIENT-LVL IV: CPT | Mod: PBBFAC,,, | Performed by: NURSE PRACTITIONER

## 2023-06-19 PROCEDURE — 3008F BODY MASS INDEX DOCD: CPT | Mod: CPTII,S$GLB,, | Performed by: INTERNAL MEDICINE

## 2023-06-19 PROCEDURE — 99214 OFFICE O/P EST MOD 30 MIN: CPT | Mod: S$GLB,,, | Performed by: NURSE PRACTITIONER

## 2023-06-19 PROCEDURE — 3051F PR MOST RECENT HEMOGLOBIN A1C LEVEL 7.0 - < 8.0%: ICD-10-PCS | Mod: CPTII,S$GLB,, | Performed by: INTERNAL MEDICINE

## 2023-06-19 PROCEDURE — 99499 UNLISTED E&M SERVICE: CPT | Mod: S$GLB,,, | Performed by: INTERNAL MEDICINE

## 2023-06-19 PROCEDURE — 99214 OFFICE O/P EST MOD 30 MIN: CPT | Mod: S$GLB,,, | Performed by: INTERNAL MEDICINE

## 2023-06-19 PROCEDURE — 3074F PR MOST RECENT SYSTOLIC BLOOD PRESSURE < 130 MM HG: ICD-10-PCS | Mod: CPTII,S$GLB,, | Performed by: INTERNAL MEDICINE

## 2023-06-19 PROCEDURE — 3074F SYST BP LT 130 MM HG: CPT | Mod: CPTII,S$GLB,, | Performed by: INTERNAL MEDICINE

## 2023-06-19 PROCEDURE — 3074F PR MOST RECENT SYSTOLIC BLOOD PRESSURE < 130 MM HG: ICD-10-PCS | Mod: CPTII,S$GLB,, | Performed by: NURSE PRACTITIONER

## 2023-06-19 PROCEDURE — 99214 PR OFFICE/OUTPT VISIT, EST, LEVL IV, 30-39 MIN: ICD-10-PCS | Mod: S$GLB,,, | Performed by: INTERNAL MEDICINE

## 2023-06-19 PROCEDURE — 99214 PR OFFICE/OUTPT VISIT, EST, LEVL IV, 30-39 MIN: ICD-10-PCS | Mod: S$GLB,,, | Performed by: NURSE PRACTITIONER

## 2023-06-19 PROCEDURE — 3008F PR BODY MASS INDEX (BMI) DOCUMENTED: ICD-10-PCS | Mod: CPTII,S$GLB,, | Performed by: NURSE PRACTITIONER

## 2023-06-19 PROCEDURE — 4010F PR ACE/ARB THEARPY RXD/TAKEN: ICD-10-PCS | Mod: CPTII,S$GLB,, | Performed by: INTERNAL MEDICINE

## 2023-06-19 PROCEDURE — 63600175 PHARM REV CODE 636 W HCPCS: Mod: JZ,JG | Performed by: INTERNAL MEDICINE

## 2023-06-19 PROCEDURE — 96372 THER/PROPH/DIAG INJ SC/IM: CPT

## 2023-06-19 PROCEDURE — 1159F MED LIST DOCD IN RCRD: CPT | Mod: CPTII,S$GLB,, | Performed by: NURSE PRACTITIONER

## 2023-06-19 PROCEDURE — 99999 PR PBB SHADOW E&M-EST. PATIENT-LVL V: ICD-10-PCS | Mod: PBBFAC,,, | Performed by: INTERNAL MEDICINE

## 2023-06-19 PROCEDURE — 3078F DIAST BP <80 MM HG: CPT | Mod: CPTII,S$GLB,, | Performed by: NURSE PRACTITIONER

## 2023-06-19 PROCEDURE — 3051F HG A1C>EQUAL 7.0%<8.0%: CPT | Mod: CPTII,S$GLB,, | Performed by: NURSE PRACTITIONER

## 2023-06-19 PROCEDURE — 3008F BODY MASS INDEX DOCD: CPT | Mod: CPTII,S$GLB,, | Performed by: NURSE PRACTITIONER

## 2023-06-19 PROCEDURE — 96402 CHEMO HORMON ANTINEOPL SQ/IM: CPT

## 2023-06-19 PROCEDURE — 3051F PR MOST RECENT HEMOGLOBIN A1C LEVEL 7.0 - < 8.0%: ICD-10-PCS | Mod: CPTII,S$GLB,, | Performed by: NURSE PRACTITIONER

## 2023-06-19 PROCEDURE — 3008F PR BODY MASS INDEX (BMI) DOCUMENTED: ICD-10-PCS | Mod: CPTII,S$GLB,, | Performed by: INTERNAL MEDICINE

## 2023-06-19 PROCEDURE — 3078F PR MOST RECENT DIASTOLIC BLOOD PRESSURE < 80 MM HG: ICD-10-PCS | Mod: CPTII,S$GLB,, | Performed by: NURSE PRACTITIONER

## 2023-06-19 PROCEDURE — 4010F PR ACE/ARB THEARPY RXD/TAKEN: ICD-10-PCS | Mod: CPTII,S$GLB,, | Performed by: NURSE PRACTITIONER

## 2023-06-19 PROCEDURE — 4010F ACE/ARB THERAPY RXD/TAKEN: CPT | Mod: CPTII,S$GLB,, | Performed by: NURSE PRACTITIONER

## 2023-06-19 PROCEDURE — 1160F PR REVIEW ALL MEDS BY PRESCRIBER/CLIN PHARMACIST DOCUMENTED: ICD-10-PCS | Mod: CPTII,S$GLB,, | Performed by: NURSE PRACTITIONER

## 2023-06-19 PROCEDURE — 1159F PR MEDICATION LIST DOCUMENTED IN MEDICAL RECORD: ICD-10-PCS | Mod: CPTII,S$GLB,, | Performed by: NURSE PRACTITIONER

## 2023-06-19 PROCEDURE — 99999 PR PBB SHADOW E&M-EST. PATIENT-LVL V: CPT | Mod: PBBFAC,,, | Performed by: INTERNAL MEDICINE

## 2023-06-19 PROCEDURE — 3051F HG A1C>EQUAL 7.0%<8.0%: CPT | Mod: CPTII,S$GLB,, | Performed by: INTERNAL MEDICINE

## 2023-06-19 RX ORDER — BLOOD-GLUCOSE SENSOR
EACH MISCELLANEOUS
Qty: 3 EACH | Refills: 11 | Status: SHIPPED | OUTPATIENT
Start: 2023-06-19

## 2023-06-19 RX ORDER — SEMAGLUTIDE 0.68 MG/ML
INJECTION, SOLUTION SUBCUTANEOUS
Qty: 1 EACH | Refills: 3 | Status: SHIPPED | OUTPATIENT
Start: 2023-06-19 | End: 2023-09-19 | Stop reason: ALTCHOICE

## 2023-06-19 RX ORDER — GLIPIZIDE 10 MG/1
TABLET, FILM COATED, EXTENDED RELEASE ORAL
Qty: 90 TABLET | Refills: 2 | Status: SHIPPED | OUTPATIENT
Start: 2023-06-19 | End: 2023-12-21 | Stop reason: SDUPTHER

## 2023-06-19 RX ORDER — METFORMIN HYDROCHLORIDE 500 MG/1
1000 TABLET, EXTENDED RELEASE ORAL 2 TIMES DAILY WITH MEALS
Qty: 360 TABLET | Refills: 2 | Status: SHIPPED | OUTPATIENT
Start: 2023-06-19 | End: 2023-12-21 | Stop reason: SDUPTHER

## 2023-06-19 RX ADMIN — GOSERELIN ACETATE 3.6 MG: 3.6 IMPLANT SUBCUTANEOUS at 10:06

## 2023-06-19 RX ADMIN — DENOSUMAB 120 MG: 120 INJECTION SUBCUTANEOUS at 10:06

## 2023-06-19 NOTE — PROGRESS NOTES
CC: This 47 y.o. Black or  female  is here for evaluation of  T2DM along with comorbidities indicated in the Visit Diagnosis section of this encounter.    HPI: Kristopher Elmore was diagnosed with T2DM in 2021 at age 46; A1c at 8.5% upon diagnosis.   Medical hx: cardiomegaly, chronic combined systolic and diastolic HF, anxiety     DM COMPLICATIONS: peripheral neuropathy    Initial visit 12/2022  New to End[facility].   A1c fariha from 8.8% in Feb to 11.6% in Nov.   Doesn't test BG d/t neuropathic pain to her fingers.   She started Farxiga 10 mg a month ago. Last dose yesterday. However, it's not covered. She did submit application for Pharmacy Assistance.   She is open to starting insulin d/t frequent urination and polydipsia.   Plan   Change glipizide to glipizide XL 10 mg tablet and take before breakfast.   Change from Farxiga to Jardiance 25 mg once daily.   Start metformin  mg tablet once daily with breakfast for 1 week. If no diarrhea/upset stomach, the increase to 1 tablet twice daily with meals.   Start Tresiba 36 units once daily.   Prescription for Dexcom Continuous Glucose Monitor (CGM) sent to [facility]. May not be covered.   If not covered, monitor with fingersticks or Dayo 3 CGM from pharmacy out-of-pocket.   With fingersticks, test glucose 2x/day - before breakfast and bedtime.   Improve diet, reviewed importance of meal planning.   Contact office if blood sugars remain > 200 after a couple of weeks.   Return to clinic in a month.     Interval hx  A1c down from 11.6% in Nov to 7.5% in May.   Pt did not start insulin because glucoses were low on glipizide, Jardiance and metformin.   She also started eating better. + 20 lb weight loss since lov.   Tolerating metformin well; GI upset only with certain foods.       LAST DIABETES EDUCATION: 12/12/22 with JANE Foreman, found visit helpful     HOSPITALIZED FOR DIABETES  -  Yes - for hyperglycemia 2x in 2022 for 300 and 400s      SIGNIFICANT DIABETES MED HISTORY:   Trulicity 0.75 mg started in 1/2021, stopped very soon afterward d/t nausea and headache.       PRESCRIBED DIABETES MEDICATIONS:   Glipizide XL 10 mg once daily   Jardiance 25 mg once daily   Metformin ER 1000 mg bid     Misses medication doses - No      SELF MONITORING BLOOD GLUCOSE: Checks blood glucose at home - about 2x/week because of painful fingersticks. Recalls 140-190s.     Dayo 3 CGM sensors fell off even with using [place].     HYPOGLYCEMIC EPISODES: denies      CURRENT DIET: drinks water.    eats 2 meals/day, no lunch.   Snacks - not much, sometimes SF jello or fruit.   Not eating out as much.     CURRENT EXERCISE:       /68   Pulse 68   Temp 97.8 °F (36.6 °C)   Wt (!) 158.8 kg (350 lb)   BMI 60.08 kg/m²     ROS:   CONSTITUTIONAL: Appetite good, denies fatigue  EYES: +  visual disturbances  OTHER: + polydipsia - resolved; + hot flashes     PHYSICAL EXAM:  GENERAL: Well developed, well nourished. No acute distress.   PSYCH: AAOx3, appropriate mood and affect, conversant, well-groomed. Judgement and insight good.   NEURO: Cranial nerves grossly intact. Speech clear, no tremor.   CHEST: Respirations even and unlabored.      Hemoglobin A1C   Date Value Ref Range Status   05/22/2023 7.5 (H) 4.0 - 5.6 % Final     Comment:     ADA Screening Guidelines:  5.7-6.4%  Consistent with prediabetes  >or=6.5%  Consistent with diabetes    High levels of fetal hemoglobin interfere with the HbA1C  assay. Heterozygous hemoglobin variants (HbS, HgC, etc)do  not significantly interfere with this assay.   However, presence of multiple variants may affect accuracy.     12/06/2022 11.6 (H) 4.0 - 5.6 % Final     Comment:     ADA Screening Guidelines:  5.7-6.4%  Consistent with prediabetes  >or=6.5%  Consistent with diabetes    High levels of fetal hemoglobin interfere with the HbA1C  assay. Heterozygous hemoglobin variants (HbS, HgC, etc)do  not significantly interfere with this  assay.   However, presence of multiple variants may affect accuracy.     02/07/2022 8.8 (H) 4.0 - 5.6 % Final     Comment:     ADA Screening Guidelines:  5.7-6.4%  Consistent with prediabetes  >or=6.5%  Consistent with diabetes    High levels of fetal hemoglobin interfere with the HbA1C  assay. Heterozygous hemoglobin variants (HbS, HgC, etc)do  not significantly interfere with this assay.   However, presence of multiple variants may affect accuracy.         No results found for: CPEPTIDE, GLUTAMICACID, ISLETCELLANT, FRUCTOSAMINE     Lab Results   Component Value Date    CHOL 113 (L) 12/19/2022    CHOL 111 (L) 12/06/2022    CHOL 119 (L) 11/03/2021     Lab Results   Component Value Date    HDL 35 (L) 12/19/2022    HDL 36 (L) 12/06/2022    HDL 42 11/03/2021     Lab Results   Component Value Date    LDLCALC 57.2 (L) 12/19/2022    LDLCALC 58.2 (L) 12/06/2022    LDLCALC 57.8 (L) 11/03/2021     Lab Results   Component Value Date    TRIG 104 12/19/2022    TRIG 84 12/06/2022    TRIG 96 11/03/2021     Lab Results   Component Value Date    CHOLHDL 31.0 12/19/2022    CHOLHDL 32.4 12/06/2022    CHOLHDL 35.3 11/03/2021         Chemistry        Component Value Date/Time     06/19/2023 0821    K 4.3 06/19/2023 0821     06/19/2023 0821    CO2 23 06/19/2023 0821    BUN 12 06/19/2023 0821    CREATININE 1.0 06/19/2023 0821     (H) 06/19/2023 0821        Component Value Date/Time    CALCIUM 10.7 (H) 06/19/2023 0821    ALKPHOS 93 06/19/2023 0821    AST 11 06/19/2023 0821    ALT 17 06/19/2023 0821    BILITOT 0.4 06/19/2023 0821    ESTGFRAFRICA >60.0 07/22/2022 1012    EGFRNONAA >60.0 07/22/2022 1012           Latest Reference Range & Units 06/19/23 08:21   BUN 6 - 20 mg/dL 12   Creatinine 0.5 - 1.4 mg/dL 1.0   eGFR >60 mL/min/1.73 m^2 >60.0       No results found for: LABMICR, CREATRANDUR, MICALBCREAT          ASSESSMENT and PLAN:    A1C GOAL: < 7 %     1. Type 2 diabetes mellitus with hyperglycemia, without long-term  current use of insulin  Continue glipizide, Jardiance and metformin.   Start Ozempic. Inject 0.25 mg once weekly for 4 weeks, then increase to 0.5 mg weekly.   Potential side effects: nausea, diarrhea, constipation, bloating. Nausea for the first week or two is common.  Avoid big food portions and greasy/heavy foods.   Reviewed administration of injection.   Prescription for Dexcom G7 sent and sample provided. Pt will pay out of pocket. Has tried and failed Dayo 3 in the past.   Return to clinic in 3 months with labs prior.     semaglutide (OZEMPIC) 0.25 mg or 0.5 mg (2 mg/3 mL) pen injector    blood-glucose sensor (DEXCOM G7 SENSOR) Monique    Hemoglobin A1C    Microalbumin/Creatinine Ratio, Urine      2. Chronic combined systolic and diastolic heart failure  Continue Jardiance.       3. Morbid obesity, unspecified obesity type  Start Ozempic.   Weight loss noted.       4. Type 2 diabetes mellitus with hyperglycemia, with long-term current use of insulin  empagliflozin (JARDIANCE) 25 mg tablet        Orders Placed This Encounter   Procedures    Hemoglobin A1C     Standing Status:   Future     Standing Expiration Date:   8/17/2024    Microalbumin/Creatinine Ratio, Urine     Standing Status:   Future     Standing Expiration Date:   8/17/2024     Order Specific Question:   Specimen Source     Answer:   Urine        Follow up in about 3 months (around 9/19/2023).

## 2023-06-19 NOTE — PATIENT INSTRUCTIONS
Continue glipizide, Jardiance and metformin.   Start Ozempic. Inject 0.25 mg once weekly for 4 weeks, then increase to 0.5 mg weekly.   Potential side effects: nausea, diarrhea, constipation, bloating. Nausea for the first week or two is common.  Avoid big food portions and greasy/heavy foods.   Reviewed administration of injection.   Prescription for Dexcom G7 sent. Pt will pay out of pocket. Has tried and failed Dayo 3 in the past.   Return to clinic in 3 months with labs prior.

## 2023-06-19 NOTE — PROGRESS NOTES
Subjective     Patient ID: Kristopher Elmore is a 47 y.o. female.    Chief Complaint: Breast Cancer    HPI    Presents for follow up  Today reports:  Presents with nausea- woke up at 5:30 AM and vomited  States used to nausea and notes sometimes when she takes her medications, rare vomiting  Had been better with dose adjustments  Denies recent elevated BG or hypoglycemia (states running between 140-160)  Denies recent GI illness or exposures  Denies fevers or chills  Hydrating and eating well    She has noted a weight loss-  Weight today 330 lbs (previously 355 lbs)    Last visit reported new right shoulder pain and was imagged  - 5/22/2023 MRI right shoulder:  FINDINGS:  ROTATOR CUFF:  Supraspinatus: Intact.  Moderate tendinosis.  Infraspinatus: Intact.  No tendinosis.  Subscapularis: Intact.  No tendinosis.  Teres Minor: Intact.  No tendinosis.  There is minimal reactive fluid within the subacromial/subdeltoid bursa.  CSA (critical shoulder angle):  LABRUM: Labral fraying on this standard non arthrogram exam. Anterior inferior labrum appears intact.Posterior labrum is unremarkable.  LONG HEAD BICEPS TENDON: Located within bicipital groove and intact.Biceps-labral anchor is intact. No tendinosis.  No tenosynovitis. Rotator Interval is normal. Biceps pulley is intact.  BONES: No evident fracture.No aggressive marrow lesions identified.  Patchy marrow signal intensity may represent red marrow hyperplasia relating to anemia or and or known underlying thalassemia.  AC joint demonstrates normal alignment with moderate hypertrophy.No significant osteo-acromial outlet narrowing.  There is no evident os acromiale.  CARTILAGE: Preserved without focal defects or subchondral marrow edema.No synovial abnormality or intra-articular loose bodies. Glenoid fossa demonstrates no sclerosis.  MUSCLES:  Normal bulk and signal.  Impression:  Myotendinous edema and tendinosis supraspinatus tendon.  No evidence for full-thickness  rotator cuff tear.  No focal osseous lesions with mild nonspecific patchy marrow signal intensity, likely related to red marrow hyperplasia in light of the history of underlying thalassemia/iron deficiency.  - she subsequently declined PT and is doing exercises at home    Currently on Arimidex and Ibrance (dose adjusted due to anemia to 100 mg M-F, off Sat&Sun)   Zoladex and Xgeva monthly (Xgeva was held with dental assessment needed)  Most recent scans stable. see below  Genetic testing negative.      Most recent imagin2023 Bone Scan:   FINDINGS:  There is physiologic distribution of the radiopharmaceutical throughout the skeleton.  Diffusely increased uptake in the calvarium in keeping with hyperostosis.  There is normal uptake in the genitourinary system and soft tissues.  Impression:  There is no scintigraphic evidence of osteoblastic metastatic disease.  I, Antony Suarez MD, attest that I reviewed and interpreted the images.     2023 CT C/A/P:  FINDINGS:  Base of Neck: No significant abnormality.  CHEST:  -Heart: Normal size. No pericardial effusion.  -Pulmonary vasculature: Pulmonary arteries distribute normally.  There are four pulmonary veins.  -Jane/Mediastinum: No pathologic hakeem enlargement.  -Trachea and Proximal airways: Patent.  -Lungs/Pleura: Bilateral bandlike opacifications in the lung bases favored to represent scarring or subsegmental atelectasis.  No focal consolidation.  No pleural effusion.  -Esophagus: Normal course and caliber.  ABDOMEN:  - Liver: Normal in size and attenuation.  Subtle subcentimeter hypodensity in the right hepatic dome, too small to characterize.  - Gallbladder: No calcified gallstones.  No wall thickening or pericholecystic fluid.  - Bile Ducts: No intra or extrahepatic biliary ductal dilatation.  - Stomach/Duodenum: Unremarkable.  - Spleen: Unremarkable.  - Pancreas: Unremarkable.  - Adrenals: Unremarkable.  - Kidneys/ureters/urinary bladder: Normal in size  and location.  Subcentimeter hypodensity in the right kidney, too small to characterize.  No hydronephrosis or stones.  The ureters appear normal in course and caliber without evidence of ureteral dilatation.  The urinary bladder is unremarkable.  - Retroperitoneum: No significant adenopathy.  PELVIS:  - Reproductive: Calcification within the fundus of the uterus, likely representing involuted fibroid.  - Other: No pelvic adenopathy, free fluid, or mass.  BOWEL/MESENTERY:  No evidence of bowel obstruction or inflammatory process.  Scattered colonic diverticulosis without adjacent inflammatory changes to suggest acute diverticulitis.  VASCULATURE: Left-sided aortic arch with 3 branch vessels.  No aneurysm and no significant atherosclerosis.  The celiac artery, SMA, ROMAINE, and bilateral renal arteries are patent.  The portal vein, SMV, and splenic vein are patent.  BONES: No acute fracture.  Stable lucent lesion involving the right iliac wing.  Stable lucent and sclerotic lesions throughout the vertebral spine consistent with metastatic disease.  No definite new osseous lesions.  EXTRATHORACIC/EXTRAPERITONEAL SOFT TISSUES: Small fat containing umbilical hernia.  Mild anterior abdominal wall edema, similar to prior.  Impression:  1. In this patient with history of invasive ductal carcinoma of the right breast there are multiple stable lucent and sclerotic lesions throughout the vertebral spine consistent with metastatic disease.  No findings concerning for new metastatic disease.  2. Additional findings, as above.  Electronically signed by resident: Vashti Kennedy     3/27/2023 MRI brain:   Postcontrast images are slightly degraded by motion artifact.  No midline shift, hydrocephalus or mass effect.  No acute infarction.  No acute or significant remote intracranial hemorrhage.  No space-occupying mass or abnormal extra-axial fluid collection.  No abnormal intracranial enhancing lesions.  Major skull base flow voids are  "present.  Paranasal sinuses and mastoid air cells are essentially clear.  There is a partially empty sella.  Structures at the craniocervical junction show no significant abnormalities.  Impression:  No acute intracranial abnormalities, specifically no evidence of intracranial metastatic disease.     Diagnosis:  1. Stage IV ([id number]) invasive ductal carcinoma of right breast, upper inner quadrant, ER %, VA neg, Her2 neg, Grade 3, multifocal with one lesion appearing more aggressive (Ki67 80%), large LN +, bone mets.   Ibrance dose adjusted with cytopenias   Oncologic History:   Presentation   - 9/11/19 - Screening mammogram showed multiple right breast masses lower inner quadrant   - 9/13/19 - Diagnostic mammogram and US showed a right breast, 3:00 position mass, 2 CFN measuring 17 mm x 16 mm x 14 mm. There 2 smaller adjacent masses towards the nipple   - 9/19/19 - Biopsy -   A. Right breast subareolar: Grade 3 IDC, estrogen receptor 80%, progesterone receptor 0%, Her2 dago neg, Ki67 80%.   B. Right breast mass 3:00: Grade 3 IDC with papillary features, estrogen receptor 100%, progesterone receptor 0%, Her2 dago 1+, Ki67 30%.   - 9/26/19: US right axilla with abnormal lymphadenopathy measuring 4.3 x 2.8 cm with biopsy + for metastatic breast cancer.   Surgery consultation with Dr Ferris on 9/25/19   - [facility] V[facility] pending   Medical oncology consultation on 10/7/19   * 10/8/19 - CT C/A/P with several lytic lesions in thoracic and lumbar spine, iliac bones, left scapula in addition to 3 x 4.5 cm right axillary node and 2.1 cm right breast mass. Bone scan negative.   * 10/31/19 - started Ibrance, Arimidex, Zoladex; plan for Xgeva.   * 11/12/19 STRATA without targetable mutations.   * 12/16/19 - Bone biopsy + for met disease (initially read as negative, but we treated as metastatic disease given high suspicion).   * MRI brain: "Partially empty sella. Question " "bilateral globe proptosis. Otherwise unremarkable MRI brain as detailed above specifically without evidence for intracranial enhancing lesion to suggest metastatic disease."   * 4/22/2020 PET - disease improvement. 8/2020 PET with disease improvement.   * 9/1/20 - Palliative RT to L1-L4   Of note, * Xgeva monthly - we had been waiting on dental clearance, but she has been unable to get into dentistry and is accepting of risks. Has no active dental disease.   PET scan 4/22/2021:   FINDINGS:   Quality of the study: Mildly degraded due to patient's large body habitus and skin abutting the gantry.   In the head and neck, there are no hypermetabolic lesions worrisome for malignancy. There are no hypermetabolic mucosal lesions, and there are no pathologically enlarged or hypermetabolic lymph nodes.   In the chest, there are no hypermetabolic lesions worrisome for malignancy.   Stable CT appearance of a level 1 right axillary lymph node measuring 1.3 cm in short axis with normal background radiotracer uptake. Previously with SUV max of 2.1.   There are no concerning pulmonary nodules or masses, and there are no pathologically enlarged or hypermetabolic lymph nodes.   In the abdomen and pelvis, there is physiologic tracer distribution within the abdominal organs and excretion into the genitourinary system.   In the bones, there are stable lytic lesions throughout the lumbar spine and pelvis and additional stable sclerotic lesions in the sternum and T3 vertebral body. No associated focal abnormal increased radiotracer uptake. Findings likely represent treated disease.   Impression:   Interval decreased uptake within a prominent right axillary lymph node, now with normal background uptake. No new focal abnormal uptake.   Stable lytic and sclerotic lesions without focal abnormal uptake. Findings are compatible with treated metastasis.   8/25/2021 MRI brain   Impression:   No significant change from prior specifically without " evidence for enhancing lesion to suggest intracranial metastatic disease.   Continued partially empty sella, intracranial hypertension to be included in differential in the appropriate clinical setting. Clinical correlation and further evaluation as warranted.   10/14/2021 MRI thoracic/lumbar spine:   Impression:   Patient history of metastatic breast cancer.   Bone lesions at T10, L2, L3, and right iliac wing, as above, concerning for metastatic disease. No pathologic fracture, soft tissue component, or epidural extension identified.   10/25/2021 CT chest/abd/pelvis:   Impression:   1. Stable metastasis of the spine   No evidence of intra-abdominal or intrathoracic metastatic disease   2. Stable prominent right axillary lymph node.   3. Additional findings are detailed above.   Currently on Arimidex and Ibrance   Zoladex and Xgeva monthly   - 2/2/2022 Bone scan:   FINDINGS:   No focal abnormal uptake suggestive of osseous metastases. There is diffusely increased uptake in the calvarium in keeping with hyperostosis. There is degenerative type uptake most prominent in the bilateral shoulders and knees. The focal uptake previously described in the distal right femur is not visualized. There is otherwise physiologic distribution of the radiopharmaceutical throughout the skeleton.   There is normal uptake in the genitourinary system and soft tissues.   Impression:   There is no scintigraphic evidence of osteoblastic metastatic disease.   Nonspecific uptake in the distal right femur has resolved.   Diffuse cranial hyperostosis and other findings as above.   - 2/1/2022 CT C/A/P:   FINDINGS:   CHEST   Support tubes and lines: None.   Aorta: Normal caliber.   Heart: Normal size..   Coronary arteries: No calcifications.   Pericardium: Normal. No effusion, thickening, or calcification.   Central pulmonary arteries: Normal caliber.   Base of neck/thyroid: Normal.   Lymph nodes: No supraclavicular, axillary, internal mammary,  mediastinal or hilar lymphadenopathy.   Esophagus: Normal.   Pleura: No effusion, thickening or calcification.   Body wall: Unremarkable.   Airways: Normal.   Lungs: Clear without focal or diffuse abnormality.   Bones: Sclerotic lesions are seen throughout the spine as well as in the sternum, not substantially changed from 10/25/2021   ABDOMEN/PELVIS   Liver: Unremarkable   Gallbladder/bile ducts: Unremarkable. No intra or extrahepatic biliary ductal dilatation   Pancreas: Unremarkable.   Spleen: Unremarkable.   Adrenals: Unremarkable.   Kidneys: Simple cyst in the right kidney is unchanged   Lymph nodes: No abdominal or pelvic lymphadenopathy.   Bowel and mesentery: Unremarkable.   Abdominal aorta: Unremarkable.   Inferior vena cava: Unremarkable.   Free fluid or free air: None.   Pelvis: Unremarkable.   Urinary bladder: Unremarkable.   Body wall: Unremarkable.   Bones: Sclerotic lesions seen in the spine are unchanged.   Impression:   1. No evidence of new recurrent or metastatic disease.   2. Sclerotic lesions seen in the spine and sternum, unchanged when compared to 10/25/2021.      Genetic testing 3/23/2023            Review of Systems   Constitutional:  Negative for activity change, appetite change, chills, fatigue (notes pretty good), fever and unexpected weight change.   HENT:  Negative for mouth sores, rhinorrhea and trouble swallowing.    Eyes:  Negative for visual disturbance.   Respiratory:  Negative for cough, shortness of breath and wheezing.    Cardiovascular:  Negative for chest pain, palpitations and leg swelling.   Gastrointestinal:  Positive for nausea. Negative for abdominal distention, abdominal pain, blood in stool, change in bowel habit, constipation, diarrhea, vomiting, reflux and change in bowel habit.   Genitourinary:  Negative for difficulty urinating, dysuria, frequency and urgency.   Musculoskeletal:  Positive for arthralgias, back pain (chronic) and joint deformity (decr ROM right  shoulder). Negative for joint swelling and myalgias.   Integumentary:  Negative for rash.   Neurological:  Negative for dizziness, weakness, numbness, headaches, coordination difficulties and coordination difficulties.   Hematological:  Negative for adenopathy. Does not bruise/bleed easily.   Psychiatric/Behavioral:  Negative for dysphoric mood and sleep disturbance. The patient is not nervous/anxious.         Objective     Physical Exam  Vitals and nursing note reviewed.   Constitutional:       General: She is not in acute distress.     Appearance: Normal appearance. She is well-developed. She is obese. She is not ill-appearing.      Comments: Presents with one of her daughters  Very pleasant  ECOG= 0  Uses wheelchair for distance, but walks into exam room     HENT:      Head: Normocephalic and atraumatic.   Eyes:      General: Lids are normal. No scleral icterus.     Extraocular Movements: Extraocular movements intact.      Conjunctiva/sclera: Conjunctivae normal.      Pupils: Pupils are equal, round, and reactive to light.   Neck:      Thyroid: No thyromegaly.      Vascular: No JVD.      Trachea: Trachea normal.   Cardiovascular:      Rate and Rhythm: Normal rate and regular rhythm.      Heart sounds: Normal heart sounds. No murmur heard.    No friction rub. No gallop.      Comments: Difficult to assess tones.   Pulmonary:      Effort: Pulmonary effort is normal. No respiratory distress.      Breath sounds: Normal breath sounds. No wheezing, rhonchi or rales.      Comments: distant  Abdominal:      General: Bowel sounds are normal. There is no distension.      Palpations: Abdomen is soft. There is no mass.      Tenderness: There is no abdominal tenderness. There is no guarding or rebound.      Comments: No organomegaly however difficult to assess.    Hardness noted periumbilical area   Musculoskeletal:         General: Tenderness and deformity (decr rom RUE) present. Normal range of motion.      Cervical back:  Normal range of motion and neck supple.      Right lower leg: No edema.      Left lower leg: No edema.      Comments: No spinal or paraspinal tenderness.    Lymphadenopathy:      Head:      Right side of head: No submental or submandibular adenopathy.      Left side of head: No submental or submandibular adenopathy.      Cervical: No cervical adenopathy.      Upper Body:      Right upper body: No supraclavicular or axillary adenopathy.      Left upper body: No supraclavicular or axillary adenopathy.   Skin:     General: Skin is warm and dry.      Capillary Refill: Capillary refill takes less than 2 seconds.      Coloration: Skin is not jaundiced or pale.      Findings: No bruising or rash.      Nails: There is no clubbing.   Neurological:      General: No focal deficit present.      Mental Status: She is alert and oriented to person, place, and time.      Sensory: No sensory deficit.      Motor: No weakness.      Coordination: Coordination normal.      Gait: Gait normal.   Psychiatric:         Mood and Affect: Mood normal.         Speech: Speech normal.         Behavior: Behavior normal.         Thought Content: Thought content normal.         Judgment: Judgment normal.     Labs-reviewed     Assessment and Plan     1. Malignant neoplasm of upper-inner quadrant of right breast in female, estrogen receptor positive  -     Cancel: goserelin (ZOLADEX) injection 3.6 mg  -     Cancel: denosumab (XGEVA) solution 120 mg    2. Bone metastases  Overview:  IMO Regualtory Update 4/1/23      3. Cancer associated pain    4. Morbid obesity, unspecified obesity type    5. Type 2 diabetes mellitus without complication, with long-term current use of insulin  Overview:  A1c 8.5 11/3/2021. Cont Glipizide 10 mg BID. Sending rx for Trulicity to [facility] pharmacy with pharmacy assistance referral. Podiatry referral per pt request      6. Thalassemia, unspecified type    7. Other iron deficiency anemia    8. Benign essential HTN    9.  Chronic combined systolic and diastolic heart failure    10. Nausea and vomiting, unspecified vomiting type          Metastatic breast cancer to bone  Continue Ibrance with dose adjustments as above.   Continue Arimidex.   Continue monthly Zoladex  Restart Xgeva      Anemia- has underlying thalassemia and prior iron def-  Post IV iron  H/H improved     Obesity- exercise limited by disease limitations  Improved diet however  + weight loss noted  Unclear if all related to diet however and will restage      Cancer related pain- continue f/u with palliative   Continue current regimen  Shoulder pain related to tendinitis     Type 2 DM- continued work on better control  HTN, CHF- continue current medications  Knows to call for any changes    Nausea- historically related to Ibrance  Prior Brain MRI negative  Meds refilled    Route Chart for Scheduling    Med Onc Chart Routing      Follow up with physician . - scans in next week or 2; virtual post   Follow up with [facility]ILA 4 weeks. cbc, cmp and injections post and EP- PJ   Infusion scheduling note    Injection scheduling note    Labs    Imaging    Pharmacy appointment    Other referrals          Treatment Plan Information   OP ANASTROZOLE PALBOCICLIB Q4W   Jessica Mendez MD   Upcoming Treatment Dates - OP ANASTROZOLE PALBOCICLIB Q4W    No upcoming days in selected categories.    Supportive Plan Information  OP BREAST GOSERELIN & DENOSUMAB Q4W   Jessica Mendez MD   Upcoming Treatment Dates - OP BREAST GOSERELIN & DENOSUMAB Q4W    No upcoming days in selected categories.    Therapy Plan Information  Medications  ferric carboxymaltose (INJECTAFER) 750 mg in sodium chloride 0.9% 265 mL IVPB (ready to mix)  750 mg, Intravenous, 1 time a week  IV Fluids  0.9%  NaCl infusion  Intravenous, 1 time a week  Flushes  sodium chloride 0.9% flush 10 mL  10 mL, Intravenous, 1 time a week  heparin, porcine (PF) 100 unit/mL injection flush 500 Units  500 Units, Intravenous, 1 time a  week  sodium chloride 0.9% 100 mL flush bag  Intravenous, 1 time a week  PRN Medications  EPINEPHrine (EPIPEN) 0.3 mg/0.3 mL pen injection 0.3 mg  0.3 mg, Intramuscular, PRN  diphenhydrAMINE injection 50 mg  50 mg, Intravenous, PRN  methylPREDNISolone sodium succinate injection 125 mg  125 mg, Intravenous, PRN  sodium chloride 0.9% bolus 1,000 mL 1,000 mL  1,000 mL, Intravenous, PRN         30 minutes time direct with patient

## 2023-06-20 PROBLEM — R11.2 NAUSEA & VOMITING: Status: ACTIVE | Noted: 2023-06-20

## 2023-06-21 ENCOUNTER — SPECIALTY PHARMACY (OUTPATIENT)
Dept: PHARMACY | Facility: CLINIC | Age: 48
End: 2023-06-21
Payer: MEDICARE

## 2023-06-21 ENCOUNTER — TELEPHONE (OUTPATIENT)
Dept: HEMATOLOGY/ONCOLOGY | Facility: CLINIC | Age: 48
End: 2023-06-21
Payer: MEDICARE

## 2023-06-21 NOTE — TELEPHONE ENCOUNTER
Specialty Pharmacy - Refill Coordination    Specialty Medication Orders Linked to Encounter      Flowsheet Row Most Recent Value   Medication #1 palbociclib (IBRANCE) 100 mg Cap (Order#[id number], Rx#[id number])            Refill Questions - Documented Responses      Flowsheet Row Most Recent Value   Patient Availability and HIPAA Verification    Does patient want to proceed with activity? Yes   HIPAA/medical authority confirmed? Yes   Relationship to patient of person spoken to? Self   Refill Screening Questions    Changes to allergies? No   Changes to medications? Yes  [Ozempic.  No DDIs.]   New conditions since last clinic visit? No   Unplanned office visit, urgent care, ED, or hospital admission in the last 4 weeks? No   How does patient/caregiver feel medication is working? Good   Financial problems or insurance changes? No   How many doses of your specialty medications were missed in the last 4 weeks? 0   Would patient like to speak to a pharmacist? No   When does the patient need to receive the medication? 06/26/23   Refill Delivery Questions    How will the patient receive the medication? [facility]   When does the patient need to receive the medication? 06/26/23   Shipping Address Home   Address in [facility] confirmed and updated if neccessary? Yes   Expected Copay ($) 0   Is the patient able to afford the medication copay? Yes   Payment Method zero copay   Days supply of Refill 28   Supplies needed? No supplies needed   Refill activity completed? Yes   Refill activity plan Refill scheduled   Shipment/Pickup Date: 06/22/23            Current Outpatient Medications   Medication Sig    ammonium lactate 12 % Crea Apply 1 application topically once daily.    anastrozole (ARIMIDEX) 1 mg Tab TAKE 1 TABLET(1 MG) BY MOUTH EVERY DAY    atorvastatin (LIPITOR) 40 MG tablet TAKE 1 TABLET(40 MG) BY MOUTH EVERY DAY    blood sugar diagnostic Strp To check BG 2 times daily, to use with insurance preferred meter     blood-glucose meter kit To check BG 2 times daily, to use with insurance preferred meter    blood-glucose sensor (DEXCOM G7 SENSOR) Monique Change every 10 days    carvediloL (COREG) 25 MG tablet TAKE 1 TABLET(25 MG) BY MOUTH TWICE DAILY    empagliflozin (JARDIANCE) 25 mg tablet Take 1 tablet (25 mg total) by mouth once daily.    ENTRESTO  mg per tablet TAKE 1 TABLET BY MOUTH TWICE DAILY    ergocalciferol (ERGOCALCIFEROL) 50,000 unit Cap TAKE 1 CAPSULE BY MOUTH EVERY 7 DAYS    ferrous sulfate 324 mg (65 mg iron) TbEC TAKE 1 TABLET BY MOUTH EVERY MONDAY, WEDNESDAY, FRIDAY    furosemide (LASIX) 20 MG tablet Take 1 tablet (20 mg total) by mouth 2 (two) times daily.    glipiZIDE (GLUCOTROL) 10 MG TR24 TAKE 1 TABLET(10 MG) BY MOUTH DAILY WITH BREAKFAST    goserelin (ZOLADEX) 3.6 mg injection Inject 3.6 mg into the skin every 28 days.    hydrOXYzine HCL (ATARAX) 25 MG tablet TAKE 1 TABLET(25 MG) BY MOUTH EVERY NIGHT AS NEEDED FOR ANXIETY    ibuprofen (ADVIL,MOTRIN) 800 MG tablet Take 1 tablet (800 mg total) by mouth 2 (two) times daily as needed for Pain.    lancets Misc To check BG 2 times daily, to use with insurance preferred meter    LORazepam (ATIVAN) 1 MG tablet Take 1 tablet (1 mg total) by mouth every 12 (twelve) hours as needed for Anxiety.    metFORMIN (GLUCOPHAGE-XR) 500 MG ER 24hr tablet Take 2 tablets (1,000 mg total) by mouth 2 (two) times daily with meals.    naloxone (NARCAN) 4 mg/actuation Spry 4mg by nasal route as needed for opioid overdose; may repeat every 2-3 minutes in alternating nostrils until medical help arrives. Call 911    ondansetron (ZOFRAN-ODT) 4 MG TbDL Take 1 tablet (4 mg total) by mouth 2 (two) times daily.    ondansetron (ZOFRAN-ODT) 8 MG TbDL DISSOLVE 1 TABLET(8 MG) ON THE TONGUE EVERY 8 HOURS AS NEEDED FOR NAUSEA    palbociclib (IBRANCE) 100 mg Cap Take 1 capsule (100 mg) by mouth once daily for 21 days, followed by 7 days off.    potassium chloride (KLOR-CON) 10 MEQ TbSR TAKE 1  TABLET(10 MEQ) BY MOUTH EVERY DAY    semaglutide (OZEMPIC) 0.25 mg or 0.5 mg (2 mg/3 mL) pen injector Start Ozempic. Inject 0.25 mg once weekly for 4 weeks, then increase to 0.5 mg weekly.    sennosides (SENNA-C ORAL) Take 2 tablets by mouth daily as needed.    spironolactone (ALDACTONE) 25 MG tablet Take 1 tablet (25 mg total) by mouth once daily.    venlafaxine (EFFEXOR-XR) 150 MG Cp24 Take 1 capsule (150 mg total) by mouth once daily.    venlafaxine (EFFEXOR-XR) 75 MG 24 hr capsule Take 1 capsule (75 mg total) by mouth once daily. Take with 150 mg capsule for a total of 225 mg.   Last reviewed on 6/19/2023  3:14 PM by Yaneth Giraldo NP    Review of patient's allergies indicates:   Allergen Reactions    Keflex [cephalexin] Itching    Last reviewed on  6/19/2023 3:14 PM by Yaneth Giraldo      Tasks added this encounter   No tasks added.   Tasks due within next 3 months   8/8/2023 - Clinical Assessment (6 month recurrence)     Charito Giraldo, PharmD  [facility]  [address] [place] [id number]  Phone: [phone]  Fax: [phone]

## 2023-06-21 NOTE — TELEPHONE ENCOUNTER
LVM  Updated labs, appt, infusion for 7/17        ----- Message from Janet Dao sent at 6/21/2023 12:57 PM CDT -----  Contact: Patient  Name of Who is Calling: ZOEY BARBOSA [[id number]]      What is the request in detail: Pt states her injection is scheduled for August 2nd and suppose to be scheduled a month from last injection. Her last injection was on 6/19/2023. Pt is requesting to verify that appt. Please advise!        Can the clinic reply by [facility]: NO        [facility] [phone]

## 2023-06-22 ENCOUNTER — INFUSION (OUTPATIENT)
Dept: INFUSION THERAPY | Facility: HOSPITAL | Age: 48
End: 2023-06-22
Payer: MEDICARE

## 2023-06-22 VITALS
HEART RATE: 84 BPM | TEMPERATURE: 99 F | DIASTOLIC BLOOD PRESSURE: 77 MMHG | OXYGEN SATURATION: 98 % | SYSTOLIC BLOOD PRESSURE: 124 MMHG | RESPIRATION RATE: 16 BRPM

## 2023-06-22 DIAGNOSIS — F33.1 MAJOR DEPRESSIVE DISORDER, RECURRENT EPISODE, MODERATE WITH ANXIOUS DISTRESS: ICD-10-CM

## 2023-06-22 DIAGNOSIS — D50.8 IRON DEFICIENCY ANEMIA SECONDARY TO INADEQUATE DIETARY IRON INTAKE: Primary | ICD-10-CM

## 2023-06-22 PROCEDURE — 25000003 PHARM REV CODE 250: Performed by: INTERNAL MEDICINE

## 2023-06-22 PROCEDURE — 96365 THER/PROPH/DIAG IV INF INIT: CPT

## 2023-06-22 PROCEDURE — 63600175 PHARM REV CODE 636 W HCPCS: Mod: JZ,JG | Performed by: INTERNAL MEDICINE

## 2023-06-22 RX ORDER — HEPARIN 100 UNIT/ML
5 SYRINGE INTRAVENOUS
Status: DISCONTINUED | OUTPATIENT
Start: 2023-06-22 | End: 2023-06-22 | Stop reason: HOSPADM

## 2023-06-22 RX ORDER — EPINEPHRINE 0.3 MG/.3ML
0.3 INJECTION SUBCUTANEOUS ONCE AS NEEDED
OUTPATIENT
Start: 2023-06-22

## 2023-06-22 RX ORDER — SODIUM CHLORIDE 0.9 % (FLUSH) 0.9 %
10 SYRINGE (ML) INJECTION
Status: CANCELLED | OUTPATIENT
Start: 2023-06-22

## 2023-06-22 RX ORDER — SODIUM CHLORIDE 0.9 % (FLUSH) 0.9 %
10 SYRINGE (ML) INJECTION
Status: DISCONTINUED | OUTPATIENT
Start: 2023-06-22 | End: 2023-06-22 | Stop reason: HOSPADM

## 2023-06-22 RX ORDER — VENLAFAXINE HYDROCHLORIDE 150 MG/1
CAPSULE, EXTENDED RELEASE ORAL
Qty: 90 CAPSULE | Refills: 0 | Status: SHIPPED | OUTPATIENT
Start: 2023-06-22 | End: 2023-09-05 | Stop reason: SDUPTHER

## 2023-06-22 RX ORDER — HEPARIN 100 UNIT/ML
5 SYRINGE INTRAVENOUS
Status: CANCELLED | OUTPATIENT
Start: 2023-06-22

## 2023-06-22 RX ORDER — DIPHENHYDRAMINE HYDROCHLORIDE 50 MG/ML
50 INJECTION INTRAMUSCULAR; INTRAVENOUS ONCE AS NEEDED
OUTPATIENT
Start: 2023-06-22

## 2023-06-22 RX ORDER — SODIUM CHLORIDE 9 MG/ML
INJECTION, SOLUTION INTRAVENOUS CONTINUOUS
Status: CANCELLED | OUTPATIENT
Start: 2023-06-22

## 2023-06-22 RX ORDER — METHYLPREDNISOLONE SOD SUCC 125 MG
125 VIAL (EA) INJECTION ONCE AS NEEDED
OUTPATIENT
Start: 2023-06-22

## 2023-06-22 RX ORDER — SODIUM CHLORIDE 9 MG/ML
INJECTION, SOLUTION INTRAVENOUS CONTINUOUS
Status: DISCONTINUED | OUTPATIENT
Start: 2023-06-22 | End: 2023-06-22 | Stop reason: HOSPADM

## 2023-06-22 RX ADMIN — SODIUM CHLORIDE: 0.9 INJECTION, SOLUTION INTRAVENOUS at 10:06

## 2023-06-22 RX ADMIN — FERRIC CARBOXYMALTOSE INJECTION 750 MG: 50 INJECTION, SOLUTION INTRAVENOUS at 10:06

## 2023-06-22 NOTE — PLAN OF CARE
Patient arrived at clinic in wheelchair. Injectafer infused and patient tolerated well. No s/sx of adverse reaction noted. Patient left clinic in wheelchair with family. NAD noted.

## 2023-07-05 ENCOUNTER — HOSPITAL ENCOUNTER (OUTPATIENT)
Dept: RADIOLOGY | Facility: HOSPITAL | Age: 48
Discharge: HOME | End: 2023-07-05
Attending: INTERNAL MEDICINE
Payer: MEDICARE

## 2023-07-05 DIAGNOSIS — Z17.0 MALIGNANT NEOPLASM OF UPPER-INNER QUADRANT OF RIGHT BREAST IN FEMALE, ESTROGEN RECEPTOR POSITIVE: ICD-10-CM

## 2023-07-05 DIAGNOSIS — C50.211 MALIGNANT NEOPLASM OF UPPER-INNER QUADRANT OF RIGHT BREAST IN FEMALE, ESTROGEN RECEPTOR POSITIVE: ICD-10-CM

## 2023-07-05 PROCEDURE — 71260 CT CHEST ABDOMEN PELVIS WITH CONTRAST (XPD): ICD-10-PCS | Mod: 26,,, | Performed by: STUDENT IN AN ORGANIZED HEALTH CARE EDUCATION/TRAINING PROGRAM

## 2023-07-05 PROCEDURE — A9698 NON-RAD CONTRAST MATERIALNOC: HCPCS | Performed by: INTERNAL MEDICINE

## 2023-07-05 PROCEDURE — 71260 CT THORAX DX C+: CPT | Mod: TC

## 2023-07-05 PROCEDURE — 25500020 PHARM REV CODE 255: Performed by: INTERNAL MEDICINE

## 2023-07-05 PROCEDURE — 74177 CT ABD & PELVIS W/CONTRAST: CPT | Mod: TC

## 2023-07-05 PROCEDURE — 71260 CT THORAX DX C+: CPT | Mod: 26,,, | Performed by: STUDENT IN AN ORGANIZED HEALTH CARE EDUCATION/TRAINING PROGRAM

## 2023-07-05 PROCEDURE — 74177 CT CHEST ABDOMEN PELVIS WITH CONTRAST (XPD): ICD-10-PCS | Mod: 26,,, | Performed by: STUDENT IN AN ORGANIZED HEALTH CARE EDUCATION/TRAINING PROGRAM

## 2023-07-05 PROCEDURE — 74177 CT ABD & PELVIS W/CONTRAST: CPT | Mod: 26,,, | Performed by: STUDENT IN AN ORGANIZED HEALTH CARE EDUCATION/TRAINING PROGRAM

## 2023-07-05 RX ADMIN — Medication 450 ML: at 01:07

## 2023-07-05 RX ADMIN — IOHEXOL 100 ML: 350 INJECTION, SOLUTION INTRAVENOUS at 01:07

## 2023-07-06 DIAGNOSIS — E87.6 HYPOKALEMIA: ICD-10-CM

## 2023-07-07 ENCOUNTER — OFFICE VISIT (OUTPATIENT)
Dept: HEMATOLOGY/ONCOLOGY | Facility: CLINIC | Age: 48
End: 2023-07-07
Payer: MEDICARE

## 2023-07-07 DIAGNOSIS — D64.81 ANEMIA ASSOCIATED WITH CHEMOTHERAPY: ICD-10-CM

## 2023-07-07 DIAGNOSIS — C79.51 MALIGNANT NEOPLASM METASTATIC TO BONE: ICD-10-CM

## 2023-07-07 DIAGNOSIS — G89.3 CANCER ASSOCIATED PAIN: ICD-10-CM

## 2023-07-07 DIAGNOSIS — Z17.0 MALIGNANT NEOPLASM OF UPPER-INNER QUADRANT OF RIGHT BREAST IN FEMALE, ESTROGEN RECEPTOR POSITIVE: Primary | ICD-10-CM

## 2023-07-07 DIAGNOSIS — C50.211 MALIGNANT NEOPLASM OF UPPER-INNER QUADRANT OF RIGHT BREAST IN FEMALE, ESTROGEN RECEPTOR POSITIVE: Primary | ICD-10-CM

## 2023-07-07 DIAGNOSIS — T45.1X5A ANEMIA ASSOCIATED WITH CHEMOTHERAPY: ICD-10-CM

## 2023-07-07 PROCEDURE — 4010F ACE/ARB THERAPY RXD/TAKEN: CPT | Mod: CPTII,95,, | Performed by: INTERNAL MEDICINE

## 2023-07-07 PROCEDURE — 4010F PR ACE/ARB THEARPY RXD/TAKEN: ICD-10-PCS | Mod: CPTII,95,, | Performed by: INTERNAL MEDICINE

## 2023-07-07 PROCEDURE — 3051F HG A1C>EQUAL 7.0%<8.0%: CPT | Mod: CPTII,95,, | Performed by: INTERNAL MEDICINE

## 2023-07-07 PROCEDURE — 1160F RVW MEDS BY RX/DR IN RCRD: CPT | Mod: CPTII,95,, | Performed by: INTERNAL MEDICINE

## 2023-07-07 PROCEDURE — 1159F MED LIST DOCD IN RCRD: CPT | Mod: CPTII,95,, | Performed by: INTERNAL MEDICINE

## 2023-07-07 PROCEDURE — 99213 PR OFFICE/OUTPT VISIT, EST, LEVL III, 20-29 MIN: ICD-10-PCS | Mod: 95,,, | Performed by: INTERNAL MEDICINE

## 2023-07-07 PROCEDURE — 1160F PR REVIEW ALL MEDS BY PRESCRIBER/CLIN PHARMACIST DOCUMENTED: ICD-10-PCS | Mod: CPTII,95,, | Performed by: INTERNAL MEDICINE

## 2023-07-07 PROCEDURE — 1159F PR MEDICATION LIST DOCUMENTED IN MEDICAL RECORD: ICD-10-PCS | Mod: CPTII,95,, | Performed by: INTERNAL MEDICINE

## 2023-07-07 PROCEDURE — 3051F PR MOST RECENT HEMOGLOBIN A1C LEVEL 7.0 - < 8.0%: ICD-10-PCS | Mod: CPTII,95,, | Performed by: INTERNAL MEDICINE

## 2023-07-07 PROCEDURE — 99213 OFFICE O/P EST LOW 20 MIN: CPT | Mod: 95,,, | Performed by: INTERNAL MEDICINE

## 2023-07-07 RX ORDER — POTASSIUM CHLORIDE 750 MG/1
10 TABLET, EXTENDED RELEASE ORAL DAILY
Qty: 30 TABLET | Refills: 2 | Status: SHIPPED | OUTPATIENT
Start: 2023-07-07 | End: 2023-10-10 | Stop reason: SDUPTHER

## 2023-07-07 NOTE — PROGRESS NOTES
Subjective     Patient ID: Kristopher Elmore is a 47 y.o. female.    Chief Complaint: Breast Cancer    HPI    The patient location is: home  The chief complaint leading to consultation is: follow up after scans  Visit type: audiovisual  Face to Face time with patient: 10 minutes  15 minutes of total time spent on the encounter, which includes face to face time and non-face to face time preparing to see the patient (eg, review of tests), Obtaining and/or reviewing separately obtained history, Documenting clinical information in the electronic or other health record, Independently interpreting results (not separately reported) and communicating results to the patient/family/caregiver, or Care coordination (not separately reported).   Each patient to whom he or she provides medical services by telemedicine is:  (1) informed of the relationship between the physician and patient and the respective role of any other health care provider with respect to management of the patient; and (2) notified that he or she may decline to receive medical services by telemedicine and may withdraw from such care at any time.    Notes:     Presents for follow up:  Reviewed scans     - 7/5/2023 CT C/A/P:  FINDINGS:  Some peripheral portions of the body wall are not covered on exam field of view due to patient body habitus  Base of neck: Unremarkable.  Thoracic soft tissues: No significant abnormality.  Aorta: Normal caliber.  Heart: Normal in size. No pericardial effusion.  Ajne/Mediastinum: No pathologic lymphadenopathy.  Lungs: Well aerated, without consolidation or pleural fluid.  Liver: Diffusely hypodense parenchyma suggestive for steatosis.  No focal lesion.  Portal veins are patent.  Gallbladder: Unremarkable.  Bile Ducts: No evidence of dilated ducts.  Pancreas: No mass or ductal dilatation.  Spleen: Unremarkable.  Adrenals: Unremarkable.  Kidneys/ Ureters: Small right renal cyst.  No hydronephrosis or nephrolithiasis.  No  ureteral dilatation.  Bladder: No evidence of wall thickening.  Reproductive organs: Calcified uterine fibroid.  GI Tract/Mesentery: No evidence of bowel obstruction or inflammation.  Peritoneal Space: No ascites.  No free air.  Retroperitoneum: No significant adenopathy.  Abdominal wall: Minimal body wall edema.  Vasculature: Abdominal aorta is normal caliber.  Bones: Stable mixed lytic and sclerotic lesions throughout the spine, sternum, and right iliac wing.  No definite new lesion.  No acute fracture.  Impression:  1. Stable mixed lytic and sclerotic osseous lesions.  No evidence of new metastatic disease.  2. Additional findings as above.     Currently on Arimidex and Ibrance (dose adjusted due to anemia to 100 mg M-F, off Sat&Sun)   Zoladex and Xgeva monthly (Xgeva was held with dental assessment needed)  Most recent scans stable. see below  Genetic testing negative.      Diagnosis:  1. Stage IV ([id number]) invasive ductal carcinoma of right breast, upper inner quadrant, ER %, NV neg, Her2 neg, Grade 3, multifocal with one lesion appearing more aggressive (Ki67 80%), large LN +, bone mets.   Ibrance dose adjusted with cytopenias   Oncologic History:   Presentation   - 9/11/19 - Screening mammogram showed multiple right breast masses lower inner quadrant   - 9/13/19 - Diagnostic mammogram and US showed a right breast, 3:00 position mass, 2 CFN measuring 17 mm x 16 mm x 14 mm. There 2 smaller adjacent masses towards the nipple   - 9/19/19 - Biopsy -   A. Right breast subareolar: Grade 3 IDC, estrogen receptor 80%, progesterone receptor 0%, Her2 dago neg, Ki67 80%.   B. Right breast mass 3:00: Grade 3 IDC with papillary features, estrogen receptor 100%, progesterone receptor 0%, Her2 dago 1+, Ki67 30%.   - 9/26/19: US right axilla with abnormal lymphadenopathy measuring 4.3 x 2.8 cm with biopsy + for metastatic breast cancer.   Surgery consultation with Dr Ferris on 9/25/19   - 9/25/19 [facility]  [facility]   Medical oncology consultation on 10/7/19   * 10/8/19 - CT C/A/P with several lytic lesions in thoracic and lumbar spine, iliac bones, left scapula in addition to 3 x 4.5 cm right axillary node and 2.1 cm right breast mass. Bone scan negative.   * 10/31/19 - started Ibrance, Arimidex, Zoladex; plan for Xgeva.   * 11/12/19 STRATA without targetable mutations.   * 12/16/19 - Bone biopsy + for met disease (initially read as negative, but we treated as metastatic disease given high suspicion).   * MRI brain: "Partially empty sella. Question bilateral globe proptosis. Otherwise unremarkable MRI brain as detailed above specifically without evidence for intracranial enhancing lesion to suggest metastatic disease."   * 4/22/2020 PET - disease improvement. 8/2020 PET with disease improvement.   * 9/1/20 - Palliative RT to L1-L4   Of note, * Xgeva monthly - we had been waiting on dental clearance, but she has been unable to get into dentistry and is accepting of risks. Has no active dental disease.   PET scan 4/22/2021:   FINDINGS:   Quality of the study: Mildly degraded due to patient's large body habitus and skin abutting the gantry.   In the head and neck, there are no hypermetabolic lesions worrisome for malignancy. There are no hypermetabolic mucosal lesions, and there are no pathologically enlarged or hypermetabolic lymph nodes.   In the chest, there are no hypermetabolic lesions worrisome for malignancy.   Stable CT appearance of a level 1 right axillary lymph node measuring 1.3 cm in short axis with normal background radiotracer uptake. Previously with SUV max of 2.1.   There are no concerning pulmonary nodules or masses, and there are no pathologically enlarged or hypermetabolic lymph nodes.   In the abdomen and pelvis, there is physiologic tracer distribution within the abdominal organs and excretion into the genitourinary system.   In the bones, there are stable " lytic lesions throughout the lumbar spine and pelvis and additional stable sclerotic lesions in the sternum and T3 vertebral body. No associated focal abnormal increased radiotracer uptake. Findings likely represent treated disease.   Impression:   Interval decreased uptake within a prominent right axillary lymph node, now with normal background uptake. No new focal abnormal uptake.   Stable lytic and sclerotic lesions without focal abnormal uptake. Findings are compatible with treated metastasis.   8/25/2021 MRI brain   Impression:   No significant change from prior specifically without evidence for enhancing lesion to suggest intracranial metastatic disease.   Continued partially empty sella, intracranial hypertension to be included in differential in the appropriate clinical setting. Clinical correlation and further evaluation as warranted.   10/14/2021 MRI thoracic/lumbar spine:   Impression:   Patient history of metastatic breast cancer.   Bone lesions at T10, L2, L3, and right iliac wing, as above, concerning for metastatic disease. No pathologic fracture, soft tissue component, or epidural extension identified.   10/25/2021 CT chest/abd/pelvis:   Impression:   1. Stable metastasis of the spine   No evidence of intra-abdominal or intrathoracic metastatic disease   2. Stable prominent right axillary lymph node.   3. Additional findings are detailed above.   Currently on Arimidex and Ibrance   Zoladex and Xgeva monthly   - 2/2/2022 Bone scan:   FINDINGS:   No focal abnormal uptake suggestive of osseous metastases. There is diffusely increased uptake in the calvarium in keeping with hyperostosis. There is degenerative type uptake most prominent in the bilateral shoulders and knees. The focal uptake previously described in the distal right femur is not visualized. There is otherwise physiologic distribution of the radiopharmaceutical throughout the skeleton.   There is normal uptake in the genitourinary system and  soft tissues.   Impression:   There is no scintigraphic evidence of osteoblastic metastatic disease.   Nonspecific uptake in the distal right femur has resolved.   Diffuse cranial hyperostosis and other findings as above.   - 2/1/2022 CT C/A/P:   FINDINGS:   CHEST   Support tubes and lines: None.   Aorta: Normal caliber.   Heart: Normal size..   Coronary arteries: No calcifications.   Pericardium: Normal. No effusion, thickening, or calcification.   Central pulmonary arteries: Normal caliber.   Base of neck/thyroid: Normal.   Lymph nodes: No supraclavicular, axillary, internal mammary, mediastinal or hilar lymphadenopathy.   Esophagus: Normal.   Pleura: No effusion, thickening or calcification.   Body wall: Unremarkable.   Airways: Normal.   Lungs: Clear without focal or diffuse abnormality.   Bones: Sclerotic lesions are seen throughout the spine as well as in the sternum, not substantially changed from 10/25/2021   ABDOMEN/PELVIS   Liver: Unremarkable   Gallbladder/bile ducts: Unremarkable. No intra or extrahepatic biliary ductal dilatation   Pancreas: Unremarkable.   Spleen: Unremarkable.   Adrenals: Unremarkable.   Kidneys: Simple cyst in the right kidney is unchanged   Lymph nodes: No abdominal or pelvic lymphadenopathy.   Bowel and mesentery: Unremarkable.   Abdominal aorta: Unremarkable.   Inferior vena cava: Unremarkable.   Free fluid or free air: None.   Pelvis: Unremarkable.   Urinary bladder: Unremarkable.   Body wall: Unremarkable.   Bones: Sclerotic lesions seen in the spine are unchanged.   Impression:   1. No evidence of new recurrent or metastatic disease.   2. Sclerotic lesions seen in the spine and sternum, unchanged when compared to 10/25/2021.      Genetic testing 3/23/2023            Review of Systems     Virtual  Objective     Physical Exam  Virtual     Assessment and Plan     1. Malignant neoplasm of upper-inner quadrant of right breast in female, estrogen receptor positive    2. Bone  metastases  Overview:  IMO Regualtory Update 4/1/23      3. Cancer associated pain    4. Anemia associated with chemotherapy    Stable scans    Route Chart for Scheduling    Med Onc Chart Routing      Follow up with physician . Has follow up scheduled   Follow up with CAMILA    Infusion scheduling note    Injection scheduling note    Labs    Imaging    Pharmacy appointment    Other referrals          Treatment Plan Information   OP ANASTROZOLE PALBOCICLIB Q4W   Jessica Mendez MD   Upcoming Treatment Dates - OP ANASTROZOLE PALBOCICLIB Q4W    No upcoming days in selected categories.    Supportive Plan Information  OP BREAST GOSERELIN & DENOSUMAB Q4W   Jessica Mendez MD   Upcoming Treatment Dates - OP BREAST GOSERELIN & DENOSUMAB Q4W    No upcoming days in selected categories.    Therapy Plan Information  EPINEPHrine (EPIPEN) 0.3 mg/0.3 mL pen injection 0.3 mg  0.3 mg, Intramuscular, PRN  diphenhydrAMINE injection 50 mg  50 mg, Intravenous, PRN  methylPREDNISolone sodium succinate injection 125 mg  125 mg, Intravenous, PRN  sodium chloride 0.9% bolus 1,000 mL 1,000 mL  1,000 mL, Intravenous, PRN

## 2023-07-13 ENCOUNTER — PATIENT MESSAGE (OUTPATIENT)
Dept: PHARMACY | Facility: CLINIC | Age: 48
End: 2023-07-13
Payer: MEDICARE

## 2023-07-13 DIAGNOSIS — C50.211 MALIGNANT NEOPLASM OF UPPER-INNER QUADRANT OF RIGHT BREAST IN FEMALE, ESTROGEN RECEPTOR POSITIVE: ICD-10-CM

## 2023-07-13 DIAGNOSIS — Z17.0 MALIGNANT NEOPLASM OF UPPER-INNER QUADRANT OF RIGHT BREAST IN FEMALE, ESTROGEN RECEPTOR POSITIVE: ICD-10-CM

## 2023-07-13 DIAGNOSIS — C79.51 BONE METASTASES: ICD-10-CM

## 2023-07-17 ENCOUNTER — INFUSION (OUTPATIENT)
Dept: INFUSION THERAPY | Facility: HOSPITAL | Age: 48
End: 2023-07-17
Payer: MEDICARE

## 2023-07-17 ENCOUNTER — OFFICE VISIT (OUTPATIENT)
Dept: HEMATOLOGY/ONCOLOGY | Facility: CLINIC | Age: 48
End: 2023-07-17
Payer: MEDICARE

## 2023-07-17 ENCOUNTER — TELEPHONE (OUTPATIENT)
Dept: HEMATOLOGY/ONCOLOGY | Facility: CLINIC | Age: 48
End: 2023-07-17
Payer: MEDICARE

## 2023-07-17 VITALS
SYSTOLIC BLOOD PRESSURE: 132 MMHG | WEIGHT: 293 LBS | RESPIRATION RATE: 20 BRPM | OXYGEN SATURATION: 98 % | BODY MASS INDEX: 50.02 KG/M2 | DIASTOLIC BLOOD PRESSURE: 79 MMHG | HEIGHT: 64 IN | HEART RATE: 86 BPM

## 2023-07-17 DIAGNOSIS — D64.81 ANEMIA ASSOCIATED WITH CHEMOTHERAPY: ICD-10-CM

## 2023-07-17 DIAGNOSIS — Z17.0 MALIGNANT NEOPLASM OF UPPER-INNER QUADRANT OF RIGHT BREAST IN FEMALE, ESTROGEN RECEPTOR POSITIVE: Primary | ICD-10-CM

## 2023-07-17 DIAGNOSIS — T45.1X5A ANEMIA ASSOCIATED WITH CHEMOTHERAPY: ICD-10-CM

## 2023-07-17 DIAGNOSIS — G89.3 CANCER ASSOCIATED PAIN: ICD-10-CM

## 2023-07-17 DIAGNOSIS — C50.211 MALIGNANT NEOPLASM OF UPPER-INNER QUADRANT OF RIGHT BREAST IN FEMALE, ESTROGEN RECEPTOR POSITIVE: Primary | ICD-10-CM

## 2023-07-17 DIAGNOSIS — E11.9 TYPE 2 DIABETES MELLITUS WITHOUT COMPLICATION, WITH LONG-TERM CURRENT USE OF INSULIN: ICD-10-CM

## 2023-07-17 DIAGNOSIS — M54.40 LOW BACK PAIN WITH SCIATICA, SCIATICA LATERALITY UNSPECIFIED, UNSPECIFIED BACK PAIN LATERALITY, UNSPECIFIED CHRONICITY: ICD-10-CM

## 2023-07-17 DIAGNOSIS — I50.42 CHRONIC COMBINED SYSTOLIC AND DIASTOLIC HEART FAILURE: ICD-10-CM

## 2023-07-17 DIAGNOSIS — D56.1 BETA-THALASSEMIA: ICD-10-CM

## 2023-07-17 DIAGNOSIS — E66.01 MORBID OBESITY, UNSPECIFIED OBESITY TYPE: ICD-10-CM

## 2023-07-17 DIAGNOSIS — C79.51 MALIGNANT NEOPLASM METASTATIC TO BONE: ICD-10-CM

## 2023-07-17 DIAGNOSIS — Z79.4 TYPE 2 DIABETES MELLITUS WITHOUT COMPLICATION, WITH LONG-TERM CURRENT USE OF INSULIN: ICD-10-CM

## 2023-07-17 PROCEDURE — 99215 PR OFFICE/OUTPT VISIT, EST, LEVL V, 40-54 MIN: ICD-10-PCS | Mod: S$GLB,,, | Performed by: NURSE PRACTITIONER

## 2023-07-17 PROCEDURE — 3078F PR MOST RECENT DIASTOLIC BLOOD PRESSURE < 80 MM HG: ICD-10-PCS | Mod: CPTII,S$GLB,, | Performed by: NURSE PRACTITIONER

## 2023-07-17 PROCEDURE — 99999 PR PBB SHADOW E&M-EST. PATIENT-LVL III: CPT | Mod: PBBFAC,,, | Performed by: NURSE PRACTITIONER

## 2023-07-17 PROCEDURE — 4010F ACE/ARB THERAPY RXD/TAKEN: CPT | Mod: CPTII,S$GLB,, | Performed by: NURSE PRACTITIONER

## 2023-07-17 PROCEDURE — 99999 PR PBB SHADOW E&M-EST. PATIENT-LVL III: ICD-10-PCS | Mod: PBBFAC,,, | Performed by: NURSE PRACTITIONER

## 2023-07-17 PROCEDURE — 3075F PR MOST RECENT SYSTOLIC BLOOD PRESS GE 130-139MM HG: ICD-10-PCS | Mod: CPTII,S$GLB,, | Performed by: NURSE PRACTITIONER

## 2023-07-17 PROCEDURE — 3051F HG A1C>EQUAL 7.0%<8.0%: CPT | Mod: CPTII,S$GLB,, | Performed by: NURSE PRACTITIONER

## 2023-07-17 PROCEDURE — 96402 CHEMO HORMON ANTINEOPL SQ/IM: CPT

## 2023-07-17 PROCEDURE — 99215 OFFICE O/P EST HI 40 MIN: CPT | Mod: S$GLB,,, | Performed by: NURSE PRACTITIONER

## 2023-07-17 PROCEDURE — 4010F PR ACE/ARB THEARPY RXD/TAKEN: ICD-10-PCS | Mod: CPTII,S$GLB,, | Performed by: NURSE PRACTITIONER

## 2023-07-17 PROCEDURE — 63600175 PHARM REV CODE 636 W HCPCS: Mod: JZ,JG | Performed by: NURSE PRACTITIONER

## 2023-07-17 PROCEDURE — 96372 THER/PROPH/DIAG INJ SC/IM: CPT | Mod: 59

## 2023-07-17 PROCEDURE — 1159F PR MEDICATION LIST DOCUMENTED IN MEDICAL RECORD: ICD-10-PCS | Mod: CPTII,S$GLB,, | Performed by: NURSE PRACTITIONER

## 2023-07-17 PROCEDURE — 3051F PR MOST RECENT HEMOGLOBIN A1C LEVEL 7.0 - < 8.0%: ICD-10-PCS | Mod: CPTII,S$GLB,, | Performed by: NURSE PRACTITIONER

## 2023-07-17 PROCEDURE — 3008F PR BODY MASS INDEX (BMI) DOCUMENTED: ICD-10-PCS | Mod: CPTII,S$GLB,, | Performed by: NURSE PRACTITIONER

## 2023-07-17 PROCEDURE — 1159F MED LIST DOCD IN RCRD: CPT | Mod: CPTII,S$GLB,, | Performed by: NURSE PRACTITIONER

## 2023-07-17 PROCEDURE — 3008F BODY MASS INDEX DOCD: CPT | Mod: CPTII,S$GLB,, | Performed by: NURSE PRACTITIONER

## 2023-07-17 PROCEDURE — 3078F DIAST BP <80 MM HG: CPT | Mod: CPTII,S$GLB,, | Performed by: NURSE PRACTITIONER

## 2023-07-17 PROCEDURE — 3075F SYST BP GE 130 - 139MM HG: CPT | Mod: CPTII,S$GLB,, | Performed by: NURSE PRACTITIONER

## 2023-07-17 RX ADMIN — DENOSUMAB 120 MG: 120 INJECTION SUBCUTANEOUS at 10:07

## 2023-07-17 RX ADMIN — GOSERELIN ACETATE 3.6 MG: 3.6 IMPLANT SUBCUTANEOUS at 10:07

## 2023-07-17 NOTE — PROGRESS NOTES
Subjective     Patient ID: Kristopher Elmore is a 47 y.o. female.    Chief Complaint: Follow-up      Presents for follow up:  Currently on Arimidex and Ibrance (dose adjusted due to anemia to 100 mg M-F, off Sat&Sun)   Zoladex and Xgeva monthly   Most recent scans stable. see below    Today, overall feels good  Lost 4 lbs this past month  On metformin and now Ozempic.   Increased Ozempic dose tomorrow.   No  new pain issues.   She does report her usual back pain (mid to lower back) and felt slightly more intense last week but back to baseline.    Had to take percocet every 4 hours last week. Pain down the tailbone.   Now taking percocet 3-4 times a week which was her usual frequency.  No leg weakness. She had one night of pins and needles last week as well.   Feels may have done too much as was going more than usual.  Not sleeping at night. Taking Rx per palliative care.   No fever/chills  No dental or jaw pain.           Most recent scan.   - 7/5/2023 CT C/A/P:  FINDINGS:  Some peripheral portions of the body wall are not covered on exam field of view due to patient body habitus  Base of neck: Unremarkable.  Thoracic soft tissues: No significant abnormality.  Aorta: Normal caliber.  Heart: Normal in size. No pericardial effusion.  Jane/Mediastinum: No pathologic lymphadenopathy.  Lungs: Well aerated, without consolidation or pleural fluid.  Liver: Diffusely hypodense parenchyma suggestive for steatosis.  No focal lesion.  Portal veins are patent.  Gallbladder: Unremarkable.  Bile Ducts: No evidence of dilated ducts.  Pancreas: No mass or ductal dilatation.  Spleen: Unremarkable.  Adrenals: Unremarkable.  Kidneys/ Ureters: Small right renal cyst.  No hydronephrosis or nephrolithiasis.  No ureteral dilatation.  Bladder: No evidence of wall thickening.  Reproductive organs: Calcified uterine fibroid.  GI Tract/Mesentery: No evidence of bowel obstruction or inflammation.  Peritoneal Space: No ascites.  No free  air.  Retroperitoneum: No significant adenopathy.  Abdominal wall: Minimal body wall edema.  Vasculature: Abdominal aorta is normal caliber.  Bones: Stable mixed lytic and sclerotic lesions throughout the spine, sternum, and right iliac wing.  No definite new lesion.  No acute fracture.  Impression:  1. Stable mixed lytic and sclerotic osseous lesions.  No evidence of new metastatic disease.  2. Additional findings as above.          Diagnosis:  1. Stage IV ([id number]) invasive ductal carcinoma of right breast, upper inner quadrant, ER %, W[place] neg, Her2 neg, Grade 3, multifocal with one lesion appearing more aggressive (Ki67 80%), large LN +, bone mets.   Ibrance dose adjusted with cytopenias   Oncologic History:   Presentation   - 9/11/19 - Screening mammogram showed multiple right breast masses lower inner quadrant   - 9/13/19 - Diagnostic mammogram and US showed a right breast, 3:00 position mass, 2 CFN measuring 17 mm x 16 mm x 14 mm. There 2 smaller adjacent masses towards the nipple   - 9/19/19 - Biopsy -   A. Right breast subareolar: Grade 3 IDC, estrogen receptor 80%, progesterone receptor 0%, Her2 dago neg, Ki67 80%.   B. Right breast mass 3:00: Grade 3 IDC with papillary features, estrogen receptor 100%, progesterone receptor 0%, Her2 dago 1+, Ki67 30%.   - 9/26/19: US right axilla with abnormal lymphadenopathy measuring 4.3 x 2.8 cm with biopsy + for metastatic breast cancer.   Surgery consultation with Dr Ferris on 9/25/19   - 9/25/19 - Genetics [facility]etics [facility] pending; VUS pending   Medical oncology consultation on 10/7/19   * 10/8/19 - CT C/A/P with several lytic lesions in thoracic and lumbar spine, iliac bones, left scapula in addition to 3 x 4.5 cm right axillary node and 2.1 cm right breast mass. Bone scan negative.   * 10/31/19 - started Ibrance, Arimidex, Zoladex; plan for Xgeva.   * 11/12/19 STRATA without targetable mutations.   * 12/16/19 - Bone biopsy + for met disease  "(initially read as negative, but we treated as metastatic disease given high suspicion).   * MRI brain: "Partially empty sella. Question bilateral globe proptosis. Otherwise unremarkable MRI brain as detailed above specifically without evidence for intracranial enhancing lesion to suggest metastatic disease."   * 4/22/2020 PET - disease improvement. 8/2020 PET with disease improvement.   * 9/1/20 - Palliative RT to L1-L4   Of note, * Xgeva monthly - we had been waiting on dental clearance, but she has been unable to get into dentistry and is accepting of risks. Has no active dental disease.   PET scan 4/22/2021:   FINDINGS:   Quality of the study: Mildly degraded due to patient's large body habitus and skin abutting the gantry.   In the head and neck, there are no hypermetabolic lesions worrisome for malignancy. There are no hypermetabolic mucosal lesions, and there are no pathologically enlarged or hypermetabolic lymph nodes.   In the chest, there are no hypermetabolic lesions worrisome for malignancy.   Stable CT appearance of a level 1 right axillary lymph node measuring 1.3 cm in short axis with normal background radiotracer uptake. Previously with SUV max of 2.1.   There are no concerning pulmonary nodules or masses, and there are no pathologically enlarged or hypermetabolic lymph nodes.   In the abdomen and pelvis, there is physiologic tracer distribution within the abdominal organs and excretion into the genitourinary system.   In the bones, there are stable lytic lesions throughout the lumbar spine and pelvis and additional stable sclerotic lesions in the sternum and T3 vertebral body. No associated focal abnormal increased radiotracer uptake. Findings likely represent treated disease.   Impression:   Interval decreased uptake within a prominent right axillary lymph node, now with normal background uptake. No new focal abnormal uptake.   Stable lytic and sclerotic lesions without focal abnormal uptake. " Findings are compatible with treated metastasis.   8/25/2021 MRI brain   Impression:   No significant change from prior specifically without evidence for enhancing lesion to suggest intracranial metastatic disease.   Continued partially empty sella, intracranial hypertension to be included in differential in the appropriate clinical setting. Clinical correlation and further evaluation as warranted.   10/14/2021 MRI thoracic/lumbar spine:   Impression:   Patient history of metastatic breast cancer.   Bone lesions at T10, L2, L3, and right iliac wing, as above, concerning for metastatic disease. No pathologic fracture, soft tissue component, or epidural extension identified.   10/25/2021 CT chest/abd/pelvis:   Impression:   1. Stable metastasis of the spine   No evidence of intra-abdominal or intrathoracic metastatic disease   2. Stable prominent right axillary lymph node.   3. Additional findings are detailed above.   Currently on Arimidex and Ibrance   Zoladex and Xgeva monthly   - 2/2/2022 Bone scan:   FINDINGS:   No focal abnormal uptake suggestive of osseous metastases. There is diffusely increased uptake in the calvarium in keeping with hyperostosis. There is degenerative type uptake most prominent in the bilateral shoulders and knees. The focal uptake previously described in the distal right femur is not visualized. There is otherwise physiologic distribution of the radiopharmaceutical throughout the skeleton.   There is normal uptake in the genitourinary system and soft tissues.   Impression:   There is no scintigraphic evidence of osteoblastic metastatic disease.   Nonspecific uptake in the distal right femur has resolved.   Diffuse cranial hyperostosis and other findings as above.   - 2/1/2022 CT C/A/P:   FINDINGS:   CHEST   Support tubes and lines: None.   Aorta: Normal caliber.   Heart: Normal size..   Coronary arteries: No calcifications.   Pericardium: Normal. No effusion, thickening, or calcification.    Central pulmonary arteries: Normal caliber.   Base of neck/thyroid: Normal.   Lymph nodes: No supraclavicular, axillary, internal mammary, mediastinal or hilar lymphadenopathy.   Esophagus: Normal.   Pleura: No effusion, thickening or calcification.   Body wall: Unremarkable.   Airways: Normal.   Lungs: Clear without focal or diffuse abnormality.   Bones: Sclerotic lesions are seen throughout the spine as well as in the sternum, not substantially changed from 10/25/2021   ABDOMEN/PELVIS   Liver: Unremarkable   Gallbladder/bile ducts: Unremarkable. No intra or extrahepatic biliary ductal dilatation   Pancreas: Unremarkable.   Spleen: Unremarkable.   Adrenals: Unremarkable.   Kidneys: Simple cyst in the right kidney is unchanged   Lymph nodes: No abdominal or pelvic lymphadenopathy.   Bowel and mesentery: Unremarkable.   Abdominal aorta: Unremarkable.   Inferior vena cava: Unremarkable.   Free fluid or free air: None.   Pelvis: Unremarkable.   Urinary bladder: Unremarkable.   Body wall: Unremarkable.   Bones: Sclerotic lesions seen in the spine are unchanged.   Impression:   1. No evidence of new recurrent or metastatic disease.   2. Sclerotic lesions seen in the spine and sternum, unchanged when compared to 10/25/2021.      Genetic testing 3/23/2023            Review of Systems   Constitutional:         See above   All other systems reviewed and are negative.     Objective     Physical Exam  Constitutional:       Comments: Presents with daughter.   In WC due to difficulty walking long distance.   Examined in chair as difficulty getting in exam table.      HENT:      Head: Normocephalic.      Nose: Nose normal.      Mouth/Throat:      Mouth: Mucous membranes are moist.      Pharynx: Oropharynx is clear.      Comments: Tongue geography no different than usual  Eyes:      Pupils: Pupils are equal, round, and reactive to light.   Cardiovascular:      Rate and Rhythm: Normal rate and regular rhythm.   Pulmonary:       Effort: Pulmonary effort is normal.      Breath sounds: Normal breath sounds.   Abdominal:      General: Bowel sounds are normal.      Tenderness: There is no abdominal tenderness.   Musculoskeletal:         General: No swelling.      Cervical back: Normal range of motion.      Comments: Point tenderness in lower thoracic and lumbar spine.    Lymphadenopathy:      Cervical: No cervical adenopathy.   Skin:     General: Skin is warm and dry.      Findings: No bruising or rash.   Neurological:      Mental Status: She is oriented to person, place, and time.   Psychiatric:         Mood and Affect: Mood normal.         Behavior: Behavior normal.          Assessment and Plan     1. Malignant neoplasm of upper-inner quadrant of right breast in female, estrogen receptor positive  -     MRI Thoracic Spine W WO Contrast; Future; Expected date: 07/17/2023  -     MRI Lumbar Spine W WO Contrast; Future; Expected date: 07/17/2023  -     CBC Oncology; Standing  -     Comprehensive Metabolic Panel; Future    2. Bone metastases  Overview:  O Regualtory Update 4/1/23    Orders:  -     MRI Thoracic Spine W WO Contrast; Future; Expected date: 07/17/2023  -     MRI Lumbar Spine W WO Contrast; Future; Expected date: 07/17/2023  -     CBC Oncology; Standing  -     Comprehensive Metabolic Panel; Future    3. Cancer associated pain  -     MRI Thoracic Spine W WO Contrast; Future; Expected date: 07/17/2023    4. Anemia associated with chemotherapy    5. Morbid obesity, unspecified obesity type    6. Type 2 diabetes mellitus without complication, with long-term current use of insulin  Overview:  A1c 8.5 11/3/2021. Cont Glipizide 10 mg BID. Sending rx for Trulicity to [facility] pharmacy with pharmacy assistance referral. Podiatry referral per pt request      7. Beta-thalassemia    8. Chronic combined systolic and diastolic heart failure    9. Low back pain with sciatica, sciatica laterality unspecified, unspecified back pain laterality,  unspecified chronicity  -     MRI Thoracic Spine W WO Contrast; Future; Expected date: 07/17/2023  -     MRI Lumbar Spine W WO Contrast; Future; Expected date: 07/17/2023    Other orders  -     denosumab (XGEVA) solution 120 mg  -     goserelin (ZOLADEX) injection 3.6 mg        Labs reviewed and adequate. Continue to monitor.  Continue arimidex and Ibrance (100 mg M-F, off Sat&Sun).   Xgeva and Zoladex today and monthly  Anemia parameters stable.   Continue with weight loss. Now on ozempic.   Continue percocet for neoplasm related pain. Go to ED/call for worsening or new pains.   MRI T/L spine as with worsening back pain.  Continue med regimen and follow up with all other established providers.   No evidence of cardiac decompensation.           Route Chart for Scheduling    Med Onc Chart Routing  Urgent    Follow up with physician 4 weeks. with cbc, cmp, and xgeva and zoladex   Follow up with CAMILA    Infusion scheduling note    Injection scheduling note    Labs    Imaging   Mri T/L spine now please   Pharmacy appointment    Other referrals          Treatment Plan Information   OP ANASTROZOLE PALBOCICLIB Q4W   Jessica Mendez MD   Upcoming Treatment Dates - OP ANASTROZOLE PALBOCICLIB Q4W    No upcoming days in selected categories.    Supportive Plan Information  OP BREAST GOSERELIN & DENOSUMAB Q4W   Jessica Mendez MD   Upcoming Treatment Dates - OP BREAST GOSERELIN & DENOSUMAB Q4W    1/17/2024       Chemotherapy       denosumab (XGEVA) solution 120 mg       goserelin (ZOLADEX) injection 3.6 mg    Therapy Plan Information  EPINEPHrine (EPIPEN) 0.3 mg/0.3 mL pen injection 0.3 mg  0.3 mg, Intramuscular, PRN  diphenhydrAMINE injection 50 mg  50 mg, Intravenous, PRN  methylPREDNISolone sodium succinate injection 125 mg  125 mg, Intravenous, PRN  sodium chloride 0.9% bolus 1,000 mL 1,000 mL  1,000 mL, Intravenous, PRN    Patient is in agreement with the proposed treatment plan. All questions were answered to the  patient's satisfaction. Pt knows to call clinic for any new or worsening symptoms and if anything is needed before the next clinic visit.      LEILA Sanabria-EDWARD  Hematology & Oncology  [facility] [place] [address]  ph. [phone]  Fax. [phone]       40 minutes of total time spent on the encounter, which includes face to face time and non-face to face time preparing to see the patient (eg, review of tests), Obtaining and/or reviewing separately obtained history, Documenting clinical information in the electronic or other health record, Independently interpreting results (not separately reported) and communicating results to the patient/family/caregiver, or Care coordination (not separately reported).

## 2023-07-20 ENCOUNTER — PATIENT MESSAGE (OUTPATIENT)
Dept: PHARMACY | Facility: CLINIC | Age: 48
End: 2023-07-20
Payer: MEDICARE

## 2023-07-20 ENCOUNTER — SPECIALTY PHARMACY (OUTPATIENT)
Dept: PHARMACY | Facility: CLINIC | Age: 48
End: 2023-07-20
Payer: MEDICARE

## 2023-07-20 DIAGNOSIS — Z17.0 MALIGNANT NEOPLASM OF UPPER-INNER QUADRANT OF RIGHT BREAST IN FEMALE, ESTROGEN RECEPTOR POSITIVE: Primary | ICD-10-CM

## 2023-07-20 DIAGNOSIS — C50.211 MALIGNANT NEOPLASM OF UPPER-INNER QUADRANT OF RIGHT BREAST IN FEMALE, ESTROGEN RECEPTOR POSITIVE: Primary | ICD-10-CM

## 2023-07-20 NOTE — TELEPHONE ENCOUNTER
Hemoglobin A1C (%)   Date Value   07/15/2023 8.0 (H)   04/15/2023 7.6 (H)            ----- Message from Daria Rdz NP sent at 3/14/2018  2:38 PM CDT -----  Please notify patient her culture was positive for bacteria vaginosis. Please continue with current recommendations

## 2023-07-20 NOTE — TELEPHONE ENCOUNTER
Specialty Pharmacy - Refill Coordination    Specialty Medication Orders Linked to Encounter      Flowsheet Row Most Recent Value   Medication #1 palbociclib (IBRANCE) 100 mg Cap (Order#[id number], Rx#[id number])          Refill Questions - Documented Responses      Flowsheet Row Most Recent Value   Patient Availability and HIPAA Verification    Does patient want to proceed with activity? Unable to Reach          We have had multiple attempts to the patient and have been unsuccessful to reach the patient. We will stop reaching out to the patient but in the event that the patient needs the med and contacts us, we will communicate and begin dispensing for the patient. At your next visit with the patient, please review the importance of being in contact with our specialty pharmacy as a part of our care team.      Alison Putnam, PharmD  [facility]  [address] [facility] [address]  Phone: [phone]  Fax: [phone]

## 2023-07-22 DIAGNOSIS — E55.9 VITAMIN D DEFICIENCY: ICD-10-CM

## 2023-07-24 ENCOUNTER — SPECIALTY PHARMACY (OUTPATIENT)
Dept: PHARMACY | Facility: CLINIC | Age: 48
End: 2023-07-24
Payer: MEDICARE

## 2023-07-24 NOTE — TELEPHONE ENCOUNTER
Specialty Pharmacy - Refill Coordination    Specialty Medication Orders Linked to Encounter      Flowsheet Row Most Recent Value   Medication #1 palbociclib (IBRANCE) 100 mg Cap (Order#[id number], Rx#[id number])            Refill Questions - Documented Responses      Flowsheet Row Most Recent Value   Patient Availability and HIPAA Verification    Does patient want to proceed with activity? Yes   HIPAA/medical authority confirmed? Yes   Relationship to patient of person spoken to? Self   Refill Screening Questions    Changes to allergies? No   Changes to medications? No   New conditions since last clinic visit? No   Unplanned office visit, urgent care, ED, or hospital admission in the last 4 weeks? No   How does patient/caregiver feel medication is working? Good   Financial problems or insurance changes? No   How many doses of your specialty medications were missed in the last 4 weeks? 0   Would patient like to speak to a pharmacist? No   When does the patient need to receive the medication? 07/31/23   Refill Delivery Questions    How will the patient receive the medication? MEDRx   When does the patient need to receive the medication? 07/31/23   Shipping Address Home   Address in [facility] confirmed and updated if neccessary? Yes   Expected Copay ($) 0   Is the patient able to afford the medication copay? Yes   Payment Method zero copay   Days supply of Refill 28   Supplies needed? No supplies needed   Refill activity completed? Yes   Refill activity plan Refill scheduled   Shipment/Pickup Date: 07/27/23            Current Outpatient Medications   Medication Sig    ammonium lactate 12 % Crea Apply 1 application topically once daily.    anastrozole (ARIMIDEX) 1 mg Tab TAKE 1 TABLET(1 MG) BY MOUTH EVERY DAY    atorvastatin (LIPITOR) 40 MG tablet TAKE 1 TABLET(40 MG) BY MOUTH EVERY DAY    blood sugar diagnostic Strp To check BG 2 times daily, to use with insurance preferred meter    blood-glucose meter kit To  check BG 2 times daily, to use with insurance preferred meter    blood-glucose sensor (DEXCOM G7 SENSOR) Monique Change every 10 days    carvediloL (COREG) 25 MG tablet TAKE 1 TABLET(25 MG) BY MOUTH TWICE DAILY    empagliflozin (JARDIANCE) 25 mg tablet Take 1 tablet (25 mg total) by mouth once daily.    ENTRESTO  mg per tablet TAKE 1 TABLET BY MOUTH TWICE DAILY    ergocalciferol (ERGOCALCIFEROL) 50,000 unit Cap TAKE 1 CAPSULE BY MOUTH EVERY 7 DAYS    ferrous sulfate 324 mg (65 mg iron) TbEC TAKE 1 TABLET BY MOUTH EVERY MONDAY, WEDNESDAY, FRIDAY    furosemide (LASIX) 20 MG tablet Take 1 tablet (20 mg total) by mouth 2 (two) times daily.    glipiZIDE (GLUCOTROL) 10 MG TR24 TAKE 1 TABLET(10 MG) BY MOUTH DAILY WITH BREAKFAST    goserelin (ZOLADEX) 3.6 mg injection Inject 3.6 mg into the skin every 28 days.    hydrOXYzine HCL (ATARAX) 25 MG tablet TAKE 1 TABLET(25 MG) BY MOUTH EVERY NIGHT AS NEEDED FOR ANXIETY    ibuprofen (ADVIL,MOTRIN) 800 MG tablet Take 1 tablet (800 mg total) by mouth 2 (two) times daily as needed for Pain.    lancets Misc To check BG 2 times daily, to use with insurance preferred meter    LORazepam (ATIVAN) 1 MG tablet Take 1 tablet (1 mg total) by mouth every 12 (twelve) hours as needed for Anxiety.    metFORMIN (GLUCOPHAGE-XR) 500 MG ER 24hr tablet Take 2 tablets (1,000 mg total) by mouth 2 (two) times daily with meals.    naloxone (NARCAN) 4 mg/actuation Spry 4mg by nasal route as needed for opioid overdose; may repeat every 2-3 minutes in alternating nostrils until medical help arrives. Call 911    ondansetron (ZOFRAN-ODT) 4 MG TbDL Take 1 tablet (4 mg total) by mouth 2 (two) times daily.    ondansetron (ZOFRAN-ODT) 8 MG TbDL DISSOLVE 1 TABLET(8 MG) ON THE TONGUE EVERY 8 HOURS AS NEEDED FOR NAUSEA    palbociclib (IBRANCE) 100 mg Cap Take 1 capsule (100 mg) by mouth once daily for 21 days, followed by 7 days off.    potassium chloride (KLOR-CON) 10 MEQ TbSR Take 1 tablet (10 mEq total) by  mouth once daily.    semaglutide (OZEMPIC) 0.25 mg or 0.5 mg (2 mg/3 mL) pen injector Start Ozempic. Inject 0.25 mg once weekly for 4 weeks, then increase to 0.5 mg weekly.    sennosides (SENNA-C ORAL) Take 2 tablets by mouth daily as needed.    spironolactone (ALDACTONE) 25 MG tablet Take 1 tablet (25 mg total) by mouth once daily.    venlafaxine (EFFEXOR-XR) 150 MG Cp24 TAKE 1 CAPSULE(150 MG) BY MOUTH EVERY DAY   Last reviewed on 7/17/2023 10:48 AM by Addis Rice, RN    Review of patient's allergies indicates:   Allergen Reactions    Keflex [cephalexin] Itching    Last reviewed on  7/17/2023 10:24 AM by Addis Rice      Tasks added this encounter   No tasks added.   Tasks due within next 3 months   8/8/2023 - Clinical Assessment (6 month recurrence)     Allyn Snyder, PharmD  [facility]  [address] [place] [address]  Phone: [phone]  Fax: [phone]

## 2023-07-25 RX ORDER — ERGOCALCIFEROL 1.25 MG/1
CAPSULE ORAL
Qty: 12 CAPSULE | Refills: 0 | Status: SHIPPED | OUTPATIENT
Start: 2023-07-25 | End: 2023-10-09 | Stop reason: SDUPTHER

## 2023-08-11 ENCOUNTER — SPECIALTY PHARMACY (OUTPATIENT)
Dept: PHARMACY | Facility: CLINIC | Age: 48
End: 2023-08-11
Payer: MEDICARE

## 2023-08-11 DIAGNOSIS — C50.211 MALIGNANT NEOPLASM OF UPPER-INNER QUADRANT OF RIGHT BREAST IN FEMALE, ESTROGEN RECEPTOR POSITIVE: Primary | ICD-10-CM

## 2023-08-11 DIAGNOSIS — Z17.0 MALIGNANT NEOPLASM OF UPPER-INNER QUADRANT OF RIGHT BREAST IN FEMALE, ESTROGEN RECEPTOR POSITIVE: Primary | ICD-10-CM

## 2023-08-11 NOTE — TELEPHONE ENCOUNTER
Specialty Pharmacy - Clinical Reassessment    Specialty Medication Orders Linked to Encounter      Flowsheet Row Most Recent Value   Medication #1 palbociclib (IBRANCE) 100 mg Cap (Order#[id number], Rx#[id number])          Patient Diagnosis   C50.211, Z17.0 - Malignant neoplasm of upper-inner quadrant of right breast in female, estrogen receptor positive    Kristopher Elmore is a 47 y.o. female, who is followed by the specialty pharmacy service for management and education of her Ibrance.  She has been on therapy with Ibrance for 3 years 2 months.  I have reviewed her electronic medical record and current medication list and determined that specialty medication adjustment Is not needed at this time.    Patient has not experienced adverse events.  She Is adherent reporting 0  missed doses since last review.  Adherence has been encouraged with the following mechanism(s): Habit.  She is on target to meet goals of therapy and will continue treatment.        7/24/2023 6/21/2023 5/26/2023 5/5/2023 4/6/2023 3/6/2023 1/31/2023   Follow Up Review   # of missed doses 0 0 0 0 0 0 0   New Medications? No Yes No No No No No   New Conditions? No No No No No No No   New Allergies? No No No No No No No   Med Effective? Good Good Good Good Good Good Good   Urgent Care? No No No No No No No   Requested Pharmacist? No No No No No No No         Therapy is appropriate to continue.    Therapy is effective: Yes  On scale of 1 to 10, how does patient rank quality of life? (10 - Best): Unable to Assess  Recommendations: none at this time.  Review Method: Chart Review        Tasks added this encounter   No tasks added.   Tasks due within next 3 months   8/8/2023 - Clinical Assessment (6 month recurrence)  8/17/2023 - Refill Coordination Outreach (1 time occurrence)     Alison Putnam, PharmD  [facility]  [address] [facility]  [facility] [address]  Phone: [phone]  Fax: [phone]

## 2023-08-13 ENCOUNTER — PATIENT MESSAGE (OUTPATIENT)
Dept: PALLIATIVE MEDICINE | Facility: CLINIC | Age: 48
End: 2023-08-13
Payer: MEDICARE

## 2023-08-14 ENCOUNTER — PATIENT MESSAGE (OUTPATIENT)
Dept: PALLIATIVE MEDICINE | Facility: CLINIC | Age: 48
End: 2023-08-14
Payer: MEDICARE

## 2023-08-14 DIAGNOSIS — C50.211 MALIGNANT NEOPLASM OF UPPER-INNER QUADRANT OF RIGHT BREAST IN FEMALE, ESTROGEN RECEPTOR POSITIVE: Primary | ICD-10-CM

## 2023-08-14 DIAGNOSIS — Z17.0 MALIGNANT NEOPLASM OF UPPER-INNER QUADRANT OF RIGHT BREAST IN FEMALE, ESTROGEN RECEPTOR POSITIVE: Primary | ICD-10-CM

## 2023-08-14 DIAGNOSIS — G47.00 INSOMNIA, UNSPECIFIED TYPE: ICD-10-CM

## 2023-08-14 DIAGNOSIS — R11.0 NAUSEA: ICD-10-CM

## 2023-08-14 DIAGNOSIS — G89.3 CANCER ASSOCIATED PAIN: ICD-10-CM

## 2023-08-14 RX ORDER — OXYCODONE AND ACETAMINOPHEN 5; 325 MG/1; MG/1
1 TABLET ORAL EVERY 4 HOURS PRN
Qty: 60 TABLET | Refills: 0 | Status: SHIPPED | OUTPATIENT
Start: 2023-08-14 | End: 2024-01-22

## 2023-08-14 RX ORDER — PROCHLORPERAZINE MALEATE 5 MG
5 TABLET ORAL 4 TIMES DAILY PRN
Qty: 40 TABLET | Refills: 2 | Status: SHIPPED | OUTPATIENT
Start: 2023-08-14 | End: 2023-11-17 | Stop reason: CLARIF

## 2023-08-14 RX ORDER — TRAZODONE HYDROCHLORIDE 50 MG/1
50 TABLET ORAL NIGHTLY
Qty: 30 TABLET | Refills: 11 | Status: SHIPPED | OUTPATIENT
Start: 2023-08-14 | End: 2024-08-13

## 2023-08-16 RX ORDER — CARVEDILOL 25 MG/1
TABLET ORAL
Qty: 180 TABLET | Refills: 3 | Status: SHIPPED | OUTPATIENT
Start: 2023-08-16

## 2023-08-16 NOTE — PROGRESS NOTES
Subjective     Patient ID: Kristopher Elmore is a 47 y.o. female.    Chief Complaint: Malignant neoplasm of upper-inner quadrant of right breast     HPI    Presents for follow up:    Notes some insomnia but lately feels she caught up  Pain is stable and no changes  Weight stable- just started Ozempic  Mild nausea- controlled- palliative called in Compazine     Most recent scans:   - 7/5/2023 CT C/A/P:  FINDINGS:  Some peripheral portions of the body wall are not covered on exam field of view due to patient body habitus  Base of neck: Unremarkable.  Thoracic soft tissues: No significant abnormality.  Aorta: Normal caliber.  Heart: Normal in size. No pericardial effusion.  Jane/Mediastinum: No pathologic lymphadenopathy.  Lungs: Well aerated, without consolidation or pleural fluid.  Liver: Diffusely hypodense parenchyma suggestive for steatosis.  No focal lesion.  Portal veins are patent.  Gallbladder: Unremarkable.  Bile Ducts: No evidence of dilated ducts.  Pancreas: No mass or ductal dilatation.  Spleen: Unremarkable.  Adrenals: Unremarkable.  Kidneys/ Ureters: Small right renal cyst.  No hydronephrosis or nephrolithiasis.  No ureteral dilatation.  Bladder: No evidence of wall thickening.  Reproductive organs: Calcified uterine fibroid.  GI Tract/Mesentery: No evidence of bowel obstruction or inflammation.  Peritoneal Space: No ascites.  No free air.  Retroperitoneum: No significant adenopathy.  Abdominal wall: Minimal body wall edema.  Vasculature: Abdominal aorta is normal caliber.  Bones: Stable mixed lytic and sclerotic lesions throughout the spine, sternum, and right iliac wing.  No definite new lesion.  No acute fracture.  Impression:  1. Stable mixed lytic and sclerotic osseous lesions.  No evidence of new metastatic disease.  2. Additional findings as above.     Currently on Arimidex and Ibrance (dose adjusted due to anemia to 100 mg M-F, off Sat&Sun)   Zoladex and Xgeva monthly (Xgeva was held with  "dental assessment needed)  Most recent scans stable.  Genetic testing negative.      Diagnosis:  1. Stage IV ([id number]) invasive ductal carcinoma of right breast, upper inner quadrant, ER %, IA neg, Her2 neg, Grade 3, multifocal with one lesion appearing more aggressive (Ki67 80%), large LN +, bone mets.   Ibrance dose adjusted with cytopenias   Oncologic History:   Presentation   - 9/11/19 - Screening mammogram showed multiple right breast masses lower inner quadrant   - 9/13/19 - Diagnostic mammogram and US showed a right breast, 3:00 position mass, 2 CFN measuring 17 mm x 16 mm x 14 mm. There 2 smaller adjacent masses towards the nipple   - 9/19/19 - Biopsy -   A. Right breast subareolar: Grade 3 IDC, estrogen receptor 80%, progesterone receptor 0%, Her2 dago neg, Ki67 80%.   B. Right breast mass 3:00: Grade 3 IDC with papillary features, estrogen receptor 100%, progesterone receptor 0%, Her2 dago 1+, Ki67 30%.   - 9/26/19: US right axilla with abnormal lymphadenopathy measuring 4.3 x 2.8 cm with biopsy + for metastatic breast cancer.   Surgery consultation with Dr Ferris on 9/25/19   - 9/25/19 - Genetics Genetics iConclude BRACAnalysis pending; VUS pending   Medical oncology consultation on 10/7/19   * 10/8/19 - CT C/A/P with several lytic lesions in thoracic and lumbar spine, iliac bones, left scapula in addition to 3 x 4.5 cm right axillary node and 2.1 cm right breast mass. Bone scan negative.   * 10/31/19 - started Ibrance, Arimidex, Zoladex; plan for Xgeva.   * 11/12/19 STRATA without targetable mutations.   * 12/16/19 - Bone biopsy + for met disease (initially read as negative, but we treated as metastatic disease given high suspicion).   * MRI brain: "Partially empty sella. Question bilateral globe proptosis. Otherwise unremarkable MRI brain as detailed above specifically without evidence for intracranial enhancing lesion to suggest metastatic disease."   * 4/22/2020 PET - disease improvement. " 8/2020 PET with disease improvement.   * 9/1/20 - Palliative RT to L1-L4   Of note, * Xgeva monthly - we had been waiting on dental clearance, but she has been unable to get into dentistry and is accepting of risks. Has no active dental disease.   PET scan 4/22/2021:   FINDINGS:   Quality of the study: Mildly degraded due to patient's large body habitus and skin abutting the gantry.   In the head and neck, there are no hypermetabolic lesions worrisome for malignancy. There are no hypermetabolic mucosal lesions, and there are no pathologically enlarged or hypermetabolic lymph nodes.   In the chest, there are no hypermetabolic lesions worrisome for malignancy.   Stable CT appearance of a level 1 right axillary lymph node measuring 1.3 cm in short axis with normal background radiotracer uptake. Previously with SUV max of 2.1.   There are no concerning pulmonary nodules or masses, and there are no pathologically enlarged or hypermetabolic lymph nodes.   In the abdomen and pelvis, there is physiologic tracer distribution within the abdominal organs and excretion into the genitourinary system.   In the bones, there are stable lytic lesions throughout the lumbar spine and pelvis and additional stable sclerotic lesions in the sternum and T3 vertebral body. No associated focal abnormal increased radiotracer uptake. Findings likely represent treated disease.   Impression:   Interval decreased uptake within a prominent right axillary lymph node, now with normal background uptake. No new focal abnormal uptake.   Stable lytic and sclerotic lesions without focal abnormal uptake. Findings are compatible with treated metastasis.   8/25/2021 MRI brain   Impression:   No significant change from prior specifically without evidence for enhancing lesion to suggest intracranial metastatic disease.   Continued partially empty sella, intracranial hypertension to be included in differential in the appropriate clinical setting. Clinical  correlation and further evaluation as warranted.   10/14/2021 MRI thoracic/lumbar spine:   Impression:   Patient history of metastatic breast cancer.   Bone lesions at T10, L2, L3, and right iliac wing, as above, concerning for metastatic disease. No pathologic fracture, soft tissue component, or epidural extension identified.   10/25/2021 CT chest/abd/pelvis:   Impression:   1. Stable metastasis of the spine   No evidence of intra-abdominal or intrathoracic metastatic disease   2. Stable prominent right axillary lymph node.   3. Additional findings are detailed above.   Currently on Arimidex and Ibrance   Zoladex and Xgeva monthly   - 2/2/2022 Bone scan:   FINDINGS:   No focal abnormal uptake suggestive of osseous metastases. There is diffusely increased uptake in the calvarium in keeping with hyperostosis. There is degenerative type uptake most prominent in the bilateral shoulders and knees. The focal uptake previously described in the distal right femur is not visualized. There is otherwise physiologic distribution of the radiopharmaceutical throughout the skeleton.   There is normal uptake in the genitourinary system and soft tissues.   Impression:   There is no scintigraphic evidence of osteoblastic metastatic disease.   Nonspecific uptake in the distal right femur has resolved.   Diffuse cranial hyperostosis and other findings as above.   - 2/1/2022 CT C/A/P:   FINDINGS:   CHEST   Support tubes and lines: None.   Aorta: Normal caliber.   Heart: Normal size..   Coronary arteries: No calcifications.   Pericardium: Normal. No effusion, thickening, or calcification.   Central pulmonary arteries: Normal caliber.   Base of neck/thyroid: Normal.   Lymph nodes: No supraclavicular, axillary, internal mammary, mediastinal or hilar lymphadenopathy.   Esophagus: Normal.   Pleura: No effusion, thickening or calcification.   Body wall: Unremarkable.   Airways: Normal.   Lungs: Clear without focal or diffuse abnormality.    Bones: Sclerotic lesions are seen throughout the spine as well as in the sternum, not substantially changed from 10/25/2021   ABDOMEN/PELVIS   Liver: Unremarkable   Gallbladder/bile ducts: Unremarkable. No intra or extrahepatic biliary ductal dilatation   Pancreas: Unremarkable.   Spleen: Unremarkable.   Adrenals: Unremarkable.   Kidneys: Simple cyst in the right kidney is unchanged   Lymph nodes: No abdominal or pelvic lymphadenopathy.   Bowel and mesentery: Unremarkable.   Abdominal aorta: Unremarkable.   Inferior vena cava: Unremarkable.   Free fluid or free air: None.   Pelvis: Unremarkable.   Urinary bladder: Unremarkable.   Body wall: Unremarkable.   Bones: Sclerotic lesions seen in the spine are unchanged.   Impression:   1. No evidence of new recurrent or metastatic disease.   2. Sclerotic lesions seen in the spine and sternum, unchanged when compared to 10/25/2021.      Genetic testing 3/23/2023                 Review of Systems   Constitutional:  Negative for activity change, appetite change, chills, fatigue (notes pretty good), fever and unexpected weight change.   HENT:  Negative for mouth sores, rhinorrhea and trouble swallowing.    Eyes:  Negative for visual disturbance.   Respiratory:  Negative for cough, shortness of breath and wheezing.    Cardiovascular:  Negative for chest pain, palpitations and leg swelling.   Gastrointestinal:  Positive for nausea. Negative for abdominal distention, abdominal pain, blood in stool, change in bowel habit, constipation, diarrhea, vomiting, reflux and change in bowel habit.   Genitourinary:  Negative for difficulty urinating, dysuria, frequency and urgency.   Musculoskeletal:  Positive for arthralgias, back pain (chronic) and joint deformity (decr ROM right shoulder). Negative for joint swelling and myalgias.   Integumentary:  Negative for rash.   Neurological:  Negative for dizziness, weakness, numbness, headaches, coordination difficulties and coordination  difficulties.   Hematological:  Negative for adenopathy. Does not bruise/bleed easily.   Psychiatric/Behavioral:  Negative for dysphoric mood and sleep disturbance. The patient is not nervous/anxious.           Objective     Physical Exam  Vitals and nursing note reviewed.   Constitutional:       General: She is not in acute distress.     Appearance: Normal appearance. She is obese. She is not ill-appearing.      Comments: Presents with daughter.   In WC due to difficulty walking long distance.   Examined in chair as difficulty getting in exam table.      HENT:      Head: Normocephalic and atraumatic.      Mouth/Throat:      Comments: Tongue geography no different than usual  Eyes:      General: No scleral icterus.     Extraocular Movements: Extraocular movements intact.      Pupils: Pupils are equal, round, and reactive to light.   Cardiovascular:      Rate and Rhythm: Normal rate and regular rhythm.      Heart sounds: No murmur heard.     No friction rub. No gallop.   Pulmonary:      Effort: Pulmonary effort is normal.      Breath sounds: Normal breath sounds.   Abdominal:      General: Bowel sounds are normal. There is no distension.      Palpations: There is no mass.      Tenderness: There is no abdominal tenderness.   Musculoskeletal:         General: No swelling.      Cervical back: Normal range of motion.      Right lower leg: Edema present.      Left lower leg: Edema present.      Comments: Point tenderness in lower thoracic and lumbar spine.    Lymphadenopathy:      Cervical: No cervical adenopathy.   Skin:     General: Skin is warm and dry.      Findings: No bruising or rash.   Neurological:      General: No focal deficit present.      Mental Status: She is alert and oriented to person, place, and time.      Sensory: No sensory deficit.      Motor: No weakness.   Psychiatric:         Mood and Affect: Mood normal.         Behavior: Behavior normal.         Thought Content: Thought content normal.          Judgment: Judgment normal.     Labs- reviewed     Assessment and Plan     1. Malignant neoplasm of upper-inner quadrant of right breast in female, estrogen receptor positive  -     Cancel: denosumab (XGEVA) solution 120 mg  -     Cancel: goserelin (ZOLADEX) injection 3.6 mg    2. Bone metastases  Overview:  IMO Regualtory Update 4/1/23      3. Cancer associated pain    4. Anemia associated with chemotherapy    5. Morbid obesity, unspecified obesity type    6. Benign essential HTN    7. Chronic combined systolic and diastolic heart failure    8. Type 2 diabetes mellitus with hyperglycemia, with long-term current use of insulin      Metastatic breast cancer  Labs reviewed and adequate to continue therapy  Continue arimidex and Ibrance (100 mg M-F, off Sat&Sun).   Xgeva and Zoladex today and monthly    Anemia parameters stable.     Continue with weight loss. Now on ozempic.     Neoplasm related pain- continue current regimen    DM/HTN.Hyperlipidemia/CHF- better control    Route Chart for Scheduling    Med Onc Chart Routing      Follow up with physician    Follow up with CAMILA 4 weeks. cbc, cmp, and injection-   Infusion scheduling note    Injection scheduling note    Labs    Imaging    Pharmacy appointment    Other referrals          Treatment Plan Information   OP ANASTROZOLE PALBOCICLIB Q4W   Jessica Mendez MD   Upcoming Treatment Dates - OP ANASTROZOLE PALBOCICLIB Q4W    No upcoming days in selected categories.    Supportive Plan Information  OP BREAST GOSERELIN & DENOSUMAB Q4W   Jessica Mendez MD   Upcoming Treatment Dates - OP BREAST GOSERELIN & DENOSUMAB Q4W    No upcoming days in selected categories.    Therapy Plan Information  EPINEPHrine (EPIPEN) 0.3 mg/0.3 mL pen injection 0.3 mg  0.3 mg, Intramuscular, PRN  diphenhydrAMINE injection 50 mg  50 mg, Intravenous, PRN  methylPREDNISolone sodium succinate injection 125 mg  125 mg, Intravenous, PRN  sodium chloride 0.9% bolus 1,000 mL 1,000 mL  1,000 mL,  Intravenous, PRN

## 2023-08-17 ENCOUNTER — OFFICE VISIT (OUTPATIENT)
Dept: HEMATOLOGY/ONCOLOGY | Facility: CLINIC | Age: 48
End: 2023-08-17
Payer: MEDICARE

## 2023-08-17 ENCOUNTER — INFUSION (OUTPATIENT)
Dept: INFUSION THERAPY | Facility: HOSPITAL | Age: 48
End: 2023-08-17
Payer: MEDICAID

## 2023-08-17 VITALS
WEIGHT: 293 LBS | OXYGEN SATURATION: 98 % | BODY MASS INDEX: 50.02 KG/M2 | HEART RATE: 87 BPM | RESPIRATION RATE: 18 BRPM | TEMPERATURE: 97 F | SYSTOLIC BLOOD PRESSURE: 140 MMHG | DIASTOLIC BLOOD PRESSURE: 65 MMHG | HEIGHT: 64 IN

## 2023-08-17 DIAGNOSIS — T45.1X5A ANEMIA ASSOCIATED WITH CHEMOTHERAPY: ICD-10-CM

## 2023-08-17 DIAGNOSIS — C50.211 MALIGNANT NEOPLASM OF UPPER-INNER QUADRANT OF RIGHT BREAST IN FEMALE, ESTROGEN RECEPTOR POSITIVE: Primary | ICD-10-CM

## 2023-08-17 DIAGNOSIS — I10 BENIGN ESSENTIAL HTN: ICD-10-CM

## 2023-08-17 DIAGNOSIS — C79.51 MALIGNANT NEOPLASM METASTATIC TO BONE: ICD-10-CM

## 2023-08-17 DIAGNOSIS — D64.81 ANEMIA ASSOCIATED WITH CHEMOTHERAPY: ICD-10-CM

## 2023-08-17 DIAGNOSIS — Z17.0 MALIGNANT NEOPLASM OF UPPER-INNER QUADRANT OF RIGHT BREAST IN FEMALE, ESTROGEN RECEPTOR POSITIVE: Primary | ICD-10-CM

## 2023-08-17 DIAGNOSIS — G89.3 CANCER ASSOCIATED PAIN: ICD-10-CM

## 2023-08-17 DIAGNOSIS — I50.42 CHRONIC COMBINED SYSTOLIC AND DIASTOLIC HEART FAILURE: ICD-10-CM

## 2023-08-17 DIAGNOSIS — E11.65 TYPE 2 DIABETES MELLITUS WITH HYPERGLYCEMIA, WITH LONG-TERM CURRENT USE OF INSULIN: ICD-10-CM

## 2023-08-17 DIAGNOSIS — E66.01 MORBID OBESITY, UNSPECIFIED OBESITY TYPE: ICD-10-CM

## 2023-08-17 DIAGNOSIS — Z79.4 TYPE 2 DIABETES MELLITUS WITH HYPERGLYCEMIA, WITH LONG-TERM CURRENT USE OF INSULIN: ICD-10-CM

## 2023-08-17 PROCEDURE — 96402 CHEMO HORMON ANTINEOPL SQ/IM: CPT

## 2023-08-17 PROCEDURE — 3077F SYST BP >= 140 MM HG: CPT | Mod: CPTII,S$GLB,, | Performed by: INTERNAL MEDICINE

## 2023-08-17 PROCEDURE — 1160F PR REVIEW ALL MEDS BY PRESCRIBER/CLIN PHARMACIST DOCUMENTED: ICD-10-PCS | Mod: CPTII,S$GLB,, | Performed by: INTERNAL MEDICINE

## 2023-08-17 PROCEDURE — 1160F RVW MEDS BY RX/DR IN RCRD: CPT | Mod: CPTII,S$GLB,, | Performed by: INTERNAL MEDICINE

## 2023-08-17 PROCEDURE — 1159F PR MEDICATION LIST DOCUMENTED IN MEDICAL RECORD: ICD-10-PCS | Mod: CPTII,S$GLB,, | Performed by: INTERNAL MEDICINE

## 2023-08-17 PROCEDURE — 99999 PR PBB SHADOW E&M-EST. PATIENT-LVL V: CPT | Mod: PBBFAC,,, | Performed by: INTERNAL MEDICINE

## 2023-08-17 PROCEDURE — 99214 PR OFFICE/OUTPT VISIT, EST, LEVL IV, 30-39 MIN: ICD-10-PCS | Mod: S$GLB,,, | Performed by: INTERNAL MEDICINE

## 2023-08-17 PROCEDURE — 4010F PR ACE/ARB THEARPY RXD/TAKEN: ICD-10-PCS | Mod: CPTII,S$GLB,, | Performed by: INTERNAL MEDICINE

## 2023-08-17 PROCEDURE — 3078F DIAST BP <80 MM HG: CPT | Mod: CPTII,S$GLB,, | Performed by: INTERNAL MEDICINE

## 2023-08-17 PROCEDURE — 3008F BODY MASS INDEX DOCD: CPT | Mod: CPTII,S$GLB,, | Performed by: INTERNAL MEDICINE

## 2023-08-17 PROCEDURE — 99214 OFFICE O/P EST MOD 30 MIN: CPT | Mod: S$GLB,,, | Performed by: INTERNAL MEDICINE

## 2023-08-17 PROCEDURE — 99999 PR PBB SHADOW E&M-EST. PATIENT-LVL V: ICD-10-PCS | Mod: PBBFAC,,, | Performed by: INTERNAL MEDICINE

## 2023-08-17 PROCEDURE — 3077F PR MOST RECENT SYSTOLIC BLOOD PRESSURE >= 140 MM HG: ICD-10-PCS | Mod: CPTII,S$GLB,, | Performed by: INTERNAL MEDICINE

## 2023-08-17 PROCEDURE — 96372 THER/PROPH/DIAG INJ SC/IM: CPT | Mod: 59

## 2023-08-17 PROCEDURE — 3078F PR MOST RECENT DIASTOLIC BLOOD PRESSURE < 80 MM HG: ICD-10-PCS | Mod: CPTII,S$GLB,, | Performed by: INTERNAL MEDICINE

## 2023-08-17 PROCEDURE — 3008F PR BODY MASS INDEX (BMI) DOCUMENTED: ICD-10-PCS | Mod: CPTII,S$GLB,, | Performed by: INTERNAL MEDICINE

## 2023-08-17 PROCEDURE — 3051F HG A1C>EQUAL 7.0%<8.0%: CPT | Mod: CPTII,S$GLB,, | Performed by: INTERNAL MEDICINE

## 2023-08-17 PROCEDURE — 1159F MED LIST DOCD IN RCRD: CPT | Mod: CPTII,S$GLB,, | Performed by: INTERNAL MEDICINE

## 2023-08-17 PROCEDURE — 63600175 PHARM REV CODE 636 W HCPCS: Mod: JZ,JG | Performed by: INTERNAL MEDICINE

## 2023-08-17 PROCEDURE — 4010F ACE/ARB THERAPY RXD/TAKEN: CPT | Mod: CPTII,S$GLB,, | Performed by: INTERNAL MEDICINE

## 2023-08-17 PROCEDURE — 3051F PR MOST RECENT HEMOGLOBIN A1C LEVEL 7.0 - < 8.0%: ICD-10-PCS | Mod: CPTII,S$GLB,, | Performed by: INTERNAL MEDICINE

## 2023-08-17 RX ADMIN — GOSERELIN ACETATE 3.6 MG: 3.6 IMPLANT SUBCUTANEOUS at 10:08

## 2023-08-17 RX ADMIN — DENOSUMAB 120 MG: 120 INJECTION SUBCUTANEOUS at 10:08

## 2023-08-21 ENCOUNTER — SPECIALTY PHARMACY (OUTPATIENT)
Dept: PHARMACY | Facility: CLINIC | Age: 48
End: 2023-08-21
Payer: MEDICARE

## 2023-08-21 NOTE — TELEPHONE ENCOUNTER
Specialty Pharmacy - Refill Coordination    Specialty Medication Orders Linked to Encounter      Flowsheet Row Most Recent Value   Medication #1 palbociclib (IBRANCE) 100 mg Cap (Order#[id number], Rx#[id number])            Refill Questions - Documented Responses      Flowsheet Row Most Recent Value   Patient Availability and HIPAA Verification    Does patient want to proceed with activity? Yes   HIPAA/medical authority confirmed? Yes   Relationship to patient of person spoken to? Self   Refill Screening Questions    Changes to allergies? No   Changes to medications? No   New conditions since last clinic visit? No   Unplanned office visit, urgent care, ED, or hospital admission in the last 4 weeks? No   How does patient/caregiver feel medication is working? Good   Financial problems or insurance changes? No   How many doses of your specialty medications were missed in the last 4 weeks? 0   Would patient like to speak to a pharmacist? No   When does the patient need to receive the medication? 08/28/23   Refill Delivery Questions    How will the patient receive the medication? [facility]   When does the patient need to receive the medication? 08/28/23   Shipping Address Home   Address in [facility] confirmed and updated if neccessary? Yes   Expected Copay ($) 0   Is the patient able to afford the medication copay? Yes   Payment Method zero copay   Days supply of Refill 28   Supplies needed? No supplies needed   Refill activity completed? Yes   Refill activity plan Refill scheduled   Shipment/Pickup Date: 08/24/23            Current Outpatient Medications   Medication Sig    ammonium lactate 12 % Crea Apply 1 application topically once daily.    anastrozole (ARIMIDEX) 1 mg Tab TAKE 1 TABLET(1 MG) BY MOUTH EVERY DAY    atorvastatin (LIPITOR) 40 MG tablet TAKE 1 TABLET(40 MG) BY MOUTH EVERY DAY    blood sugar diagnostic Strp To check BG 2 times daily, to use with insurance preferred meter    blood-glucose meter kit To  check BG 2 times daily, to use with insurance preferred meter    blood-glucose sensor (DEXCOM G7 SENSOR) Monique Change every 10 days    carvediloL (COREG) 25 MG tablet TAKE 1 TABLET(25 MG) BY MOUTH TWICE DAILY    empagliflozin (JARDIANCE) 25 mg tablet Take 1 tablet (25 mg total) by mouth once daily.    ENTRESTO  mg per tablet TAKE 1 TABLET BY MOUTH TWICE DAILY    ergocalciferol (ERGOCALCIFEROL) 50,000 unit Cap TAKE 1 CAPSULE BY MOUTH EVERY 7 DAYS    ferrous sulfate 324 mg (65 mg iron) TbEC TAKE 1 TABLET BY MOUTH EVERY MONDAY, WEDNESDAY, FRIDAY    furosemide (LASIX) 20 MG tablet Take 1 tablet (20 mg total) by mouth 2 (two) times daily.    glipiZIDE (GLUCOTROL) 10 MG TR24 TAKE 1 TABLET(10 MG) BY MOUTH DAILY WITH BREAKFAST    goserelin (ZOLADEX) 3.6 mg injection Inject 3.6 mg into the skin every 28 days.    hydrOXYzine HCL (ATARAX) 25 MG tablet TAKE 1 TABLET(25 MG) BY MOUTH EVERY NIGHT AS NEEDED FOR ANXIETY    ibuprofen (ADVIL,MOTRIN) 800 MG tablet Take 1 tablet (800 mg total) by mouth 2 (two) times daily as needed for Pain.    lancets Misc To check BG 2 times daily, to use with insurance preferred meter    LORazepam (ATIVAN) 1 MG tablet Take 1 tablet (1 mg total) by mouth every 12 (twelve) hours as needed for Anxiety.    metFORMIN (GLUCOPHAGE-XR) 500 MG ER 24hr tablet Take 2 tablets (1,000 mg total) by mouth 2 (two) times daily with meals.    naloxone (NARCAN) 4 mg/actuation Spry 4mg by nasal route as needed for opioid overdose; may repeat every 2-3 minutes in alternating nostrils until medical help arrives. Call 911    ondansetron (ZOFRAN-ODT) 4 MG TbDL Take 1 tablet (4 mg total) by mouth 2 (two) times daily.    ondansetron (ZOFRAN-ODT) 8 MG TbDL DISSOLVE 1 TABLET(8 MG) ON THE TONGUE EVERY 8 HOURS AS NEEDED FOR NAUSEA    oxyCODONE-acetaminophen (PERCOCET) 5-325 mg per tablet Take 1 tablet by mouth every 4 (four) hours as needed for Pain.    palbociclib (IBRANCE) 100 mg Cap Take 1 capsule (100 mg) by mouth once  daily for 21 days, followed by 7 days off.    potassium chloride (KLOR-CON) 10 MEQ TbSR Take 1 tablet (10 mEq total) by mouth once daily.    prochlorperazine (COMPAZINE) 5 MG tablet Take 1 tablet (5 mg total) by mouth 4 (four) times daily as needed for Nausea.    semaglutide (OZEMPIC) 0.25 mg or 0.5 mg (2 mg/3 mL) pen injector Start Ozempic. Inject 0.25 mg once weekly for 4 weeks, then increase to 0.5 mg weekly.    sennosides (SENNA-C ORAL) Take 2 tablets by mouth daily as needed.    spironolactone (ALDACTONE) 25 MG tablet Take 1 tablet (25 mg total) by mouth once daily.    traZODone (DESYREL) 50 MG tablet Take 1 tablet (50 mg total) by mouth every evening.    venlafaxine (EFFEXOR-XR) 150 MG Cp24 TAKE 1 CAPSULE(150 MG) BY MOUTH EVERY DAY   Last reviewed on 8/17/2023  9:05 AM by Phyllis Lei MD    Review of patient's allergies indicates:   Allergen Reactions    Keflex [cephalexin] Itching    Last reviewed on  8/17/2023 9:35 AM by Nayana Alcocer      Tasks added this encounter   No tasks added.   Tasks due within next 3 months   8/21/2023 - Refill Coordination Outreach (1 time occurrence)     [facility]  [address] [place] [id number]  Phone: [phone]  Fax: [phone]

## 2023-08-29 ENCOUNTER — PATIENT MESSAGE (OUTPATIENT)
Dept: PSYCHIATRY | Facility: CLINIC | Age: 48
End: 2023-08-29
Payer: MEDICARE

## 2023-09-05 ENCOUNTER — OFFICE VISIT (OUTPATIENT)
Dept: PSYCHIATRY | Facility: CLINIC | Age: 48
End: 2023-09-05
Payer: COMMERCIAL

## 2023-09-05 VITALS
HEART RATE: 82 BPM | SYSTOLIC BLOOD PRESSURE: 126 MMHG | BODY MASS INDEX: 58.65 KG/M2 | WEIGHT: 293 LBS | DIASTOLIC BLOOD PRESSURE: 63 MMHG

## 2023-09-05 DIAGNOSIS — F33.1 MAJOR DEPRESSIVE DISORDER, RECURRENT EPISODE, MODERATE WITH ANXIOUS DISTRESS: Primary | ICD-10-CM

## 2023-09-05 DIAGNOSIS — F41.9 ANXIETY: ICD-10-CM

## 2023-09-05 DIAGNOSIS — F43.21 GRIEF: ICD-10-CM

## 2023-09-05 PROCEDURE — 1159F MED LIST DOCD IN RCRD: CPT | Mod: CPTII,S$GLB,, | Performed by: NURSE PRACTITIONER

## 2023-09-05 PROCEDURE — 3008F PR BODY MASS INDEX (BMI) DOCUMENTED: ICD-10-PCS | Mod: CPTII,S$GLB,, | Performed by: NURSE PRACTITIONER

## 2023-09-05 PROCEDURE — 99999 PR PBB SHADOW E&M-EST. PATIENT-LVL II: ICD-10-PCS | Mod: PBBFAC,,, | Performed by: NURSE PRACTITIONER

## 2023-09-05 PROCEDURE — 3074F SYST BP LT 130 MM HG: CPT | Mod: CPTII,S$GLB,, | Performed by: NURSE PRACTITIONER

## 2023-09-05 PROCEDURE — 99214 PR OFFICE/OUTPT VISIT, EST, LEVL IV, 30-39 MIN: ICD-10-PCS | Mod: S$GLB,,, | Performed by: NURSE PRACTITIONER

## 2023-09-05 PROCEDURE — 4010F ACE/ARB THERAPY RXD/TAKEN: CPT | Mod: CPTII,S$GLB,, | Performed by: NURSE PRACTITIONER

## 2023-09-05 PROCEDURE — 3051F PR MOST RECENT HEMOGLOBIN A1C LEVEL 7.0 - < 8.0%: ICD-10-PCS | Mod: CPTII,S$GLB,, | Performed by: NURSE PRACTITIONER

## 2023-09-05 PROCEDURE — 99214 OFFICE O/P EST MOD 30 MIN: CPT | Mod: S$GLB,,, | Performed by: NURSE PRACTITIONER

## 2023-09-05 PROCEDURE — 1159F PR MEDICATION LIST DOCUMENTED IN MEDICAL RECORD: ICD-10-PCS | Mod: CPTII,S$GLB,, | Performed by: NURSE PRACTITIONER

## 2023-09-05 PROCEDURE — 3051F HG A1C>EQUAL 7.0%<8.0%: CPT | Mod: CPTII,S$GLB,, | Performed by: NURSE PRACTITIONER

## 2023-09-05 PROCEDURE — 99999 PR PBB SHADOW E&M-EST. PATIENT-LVL II: CPT | Mod: PBBFAC,,, | Performed by: NURSE PRACTITIONER

## 2023-09-05 PROCEDURE — 4010F PR ACE/ARB THEARPY RXD/TAKEN: ICD-10-PCS | Mod: CPTII,S$GLB,, | Performed by: NURSE PRACTITIONER

## 2023-09-05 PROCEDURE — 3074F PR MOST RECENT SYSTOLIC BLOOD PRESSURE < 130 MM HG: ICD-10-PCS | Mod: CPTII,S$GLB,, | Performed by: NURSE PRACTITIONER

## 2023-09-05 PROCEDURE — 3078F DIAST BP <80 MM HG: CPT | Mod: CPTII,S$GLB,, | Performed by: NURSE PRACTITIONER

## 2023-09-05 PROCEDURE — 3008F BODY MASS INDEX DOCD: CPT | Mod: CPTII,S$GLB,, | Performed by: NURSE PRACTITIONER

## 2023-09-05 PROCEDURE — 3078F PR MOST RECENT DIASTOLIC BLOOD PRESSURE < 80 MM HG: ICD-10-PCS | Mod: CPTII,S$GLB,, | Performed by: NURSE PRACTITIONER

## 2023-09-05 PROCEDURE — 1160F PR REVIEW ALL MEDS BY PRESCRIBER/CLIN PHARMACIST DOCUMENTED: ICD-10-PCS | Mod: CPTII,S$GLB,, | Performed by: NURSE PRACTITIONER

## 2023-09-05 PROCEDURE — 1160F RVW MEDS BY RX/DR IN RCRD: CPT | Mod: CPTII,S$GLB,, | Performed by: NURSE PRACTITIONER

## 2023-09-05 RX ORDER — VENLAFAXINE HYDROCHLORIDE 75 MG/1
75 CAPSULE, EXTENDED RELEASE ORAL DAILY
Qty: 90 CAPSULE | Refills: 0 | Status: SHIPPED | OUTPATIENT
Start: 2023-09-05 | End: 2024-01-19 | Stop reason: SDUPTHER

## 2023-09-05 RX ORDER — VENLAFAXINE HYDROCHLORIDE 150 MG/1
150 CAPSULE, EXTENDED RELEASE ORAL DAILY
Qty: 90 CAPSULE | Refills: 0 | Status: SHIPPED | OUTPATIENT
Start: 2023-09-05 | End: 2024-01-19 | Stop reason: SDUPTHER

## 2023-09-05 NOTE — PROGRESS NOTES
9/5/2023   Kristopher Elmore  1975  [id number]    Outpatient Psychiatry Follow-Up Visit (MD/NP)         Chief Complaint:  Kristopher Elmore, a 47 y.o. female,who presents today for follow up of depression, anxiety and grief.  Met with patient.      Interval History/Subjective Report/Content of Current Session:     Pt is a 47 y.o. female with a hx of DM II, CHF, stage 4 breast cancer, anxiety, MDD and grief.    Pt states that she has been out of Effexor XR 75 mg capsules for a while and she noticed an improvement in mood and decrease in general anxiety when she was on the higher dose. Denies anhedonia and hopelessness.  She is reporting an increase in general anxiety and low mood. Has times where she feels overwhelmed with increased anxiety. Used to take lorazepam and would like to resume taking it. Upon review of the LA , it is noted that she is taking oxycodone. She states she has cancer in her spine and has to take the med for the pain. I explained that, due to the FDA advisory against taking benzos with narcotics, I will be unable to prescribe any benzos to her while she taking narcotics. She verbalized understanding. We discussed other options including hydroxyzine and gabapentin. Also discussed increasing the Effexor XR back up to 225 mg q day.     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    She reports she has a really strong support system. This includes her two adult daughters, her best friend, and her brother and sister.     ASE of psych meds: denies    Pt denies recurrent thoughts of death and denies any suicidal or homicidal plans or intentions.      Denies any sxs of keri or hypomania. Denies AVH, paranoia and delusions. No objective s/sx of psychosis or keri.         Psychotherapy:  Target symptoms: depression, anxiety , grief  Why chosen therapy is appropriate versus another modality: relevant to diagnosis,  "patient responds to this modality  Outcome monitoring methods: self-report, observation  Therapeutic intervention type: supportive psychotherapy  Topics discussed/themes: illness/death of a loved one, stress related to medical comorbidities, building skills sets for symptom management  The patient's response to the intervention is accepting. The patient's progress toward treatment goals is good.   Duration of intervention: 5 minutes.      Psychotropic medication review  Previous Trials-  Klonopin  Valium  Xanax  Vistaril  Celexa - never took it because it interacts with one of her cancer meds  Wellbutrin - took for wt loss; made her "sad"  Prozac     Current meds-  Effexor XR          Review of Systems       Review of Systems   Constitutional:  Negative for chills, fever and malaise/fatigue.   Respiratory:  Negative for cough and shortness of breath.    Cardiovascular:  Negative for chest pain and palpitations.   Gastrointestinal:  Negative for abdominal pain, diarrhea and vomiting.   Genitourinary:  Negative for dysuria and hematuria.   Musculoskeletal:  Negative for falls and myalgias.   Skin:  Negative for rash.   Neurological:  Negative for tremors, seizures and headaches.   Psychiatric/Behavioral:          See HPI         Past Medical, Family and Social History: The patient's past medical, family and social history, allergies, current medications, past surgical history, and problem list have been reviewed and updated as appropriate within the electronic medical record.    Compliance: yes    Risk Parameters:  Patient reports no suicidal ideation  Patient reports no homicidal ideation  Patient reports no self-injurious behavior  Patient reports no violent behavior    Exam (detailed: at least 9 elements; comprehensive: all 15 elements)   Constitutional  Vitals:  Most recent vital signs, dated less than 90 days prior to this appointment, were reviewed.   Vitals:    09/05/23 1050   BP: 126/63   Pulse: 82   Weight: " (!) 155 kg (341 lb 11.4 oz)            Musculoskeletal  Muscle Strength/Tone:  no dyskinesia, no dystonia, no tremor, no tic   Gait & Station:  non-ataxic     Psychiatric      Appearance:  unremarkable, age appropriate, well nourished, casually dressed, neatly groomed, obese   Behavior:  normal, friendly and cooperative, eye contact normal     Speech:  no latency; no press   Mood & Affect:  euthymic  congruent and appropriate   Thought Process:  normal and logical   Associations:  intact   Thought Content:  normal, no suicidality, no homicidality, delusions, or paranoia   Insight:  intact, has awareness of illness   Judgement: behavior is adequate to circumstances, age appropriate   Orientation:  grossly intact   Memory: intact for content of interview   Language: grossly intact   Attention Span & Concentration:  able to focus   Fund of Knowledge:  intact and appropriate to age and level of education     Medications:  Outpatient Encounter Medications as of 9/5/2023   Medication Sig Dispense Refill    ammonium lactate 12 % Crea Apply 1 application topically once daily. 140 g 5    anastrozole (ARIMIDEX) 1 mg Tab TAKE 1 TABLET(1 MG) BY MOUTH EVERY DAY 90 tablet 3    atorvastatin (LIPITOR) 40 MG tablet TAKE 1 TABLET(40 MG) BY MOUTH EVERY DAY 90 tablet 3    blood sugar diagnostic Strp To check BG 2 times daily, to use with insurance preferred meter 200 each 3    blood-glucose meter kit To check BG 2 times daily, to use with insurance preferred meter 1 each 0    blood-glucose sensor (DEXCOM G7 SENSOR) Monique Change every 10 days 3 each 11    carvediloL (COREG) 25 MG tablet TAKE 1 TABLET(25 MG) BY MOUTH TWICE DAILY 180 tablet 3    empagliflozin (JARDIANCE) 25 mg tablet Take 1 tablet (25 mg total) by mouth once daily. 90 tablet 2    ENTRESTO  mg per tablet TAKE 1 TABLET BY MOUTH TWICE DAILY 60 tablet 11    ergocalciferol (ERGOCALCIFEROL) 50,000 unit Cap TAKE 1 CAPSULE BY MOUTH EVERY 7 DAYS 12 capsule 0    ferrous  sulfate 324 mg (65 mg iron) TbEC TAKE 1 TABLET BY MOUTH EVERY MONDAY, WEDNESDAY, FRIDAY 30 tablet 0    furosemide (LASIX) 20 MG tablet Take 1 tablet (20 mg total) by mouth 2 (two) times daily. 180 tablet 3    glipiZIDE (GLUCOTROL) 10 MG TR24 TAKE 1 TABLET(10 MG) BY MOUTH DAILY WITH BREAKFAST 90 tablet 2    goserelin (ZOLADEX) 3.6 mg injection Inject 3.6 mg into the skin every 28 days.      hydrOXYzine HCL (ATARAX) 25 MG tablet TAKE 1 TABLET(25 MG) BY MOUTH EVERY NIGHT AS NEEDED FOR ANXIETY 30 tablet 2    ibuprofen (ADVIL,MOTRIN) 800 MG tablet Take 1 tablet (800 mg total) by mouth 2 (two) times daily as needed for Pain. 45 tablet 2    lancets Misc To check BG 2 times daily, to use with insurance preferred meter 200 each 3    LORazepam (ATIVAN) 1 MG tablet Take 1 tablet (1 mg total) by mouth every 12 (twelve) hours as needed for Anxiety. 30 tablet 0    metFORMIN (GLUCOPHAGE-XR) 500 MG ER 24hr tablet Take 2 tablets (1,000 mg total) by mouth 2 (two) times daily with meals. 360 tablet 2    naloxone (NARCAN) 4 mg/actuation Spry 4mg by nasal route as needed for opioid overdose; may repeat every 2-3 minutes in alternating nostrils until medical help arrives. Call 911 1 each 11    ondansetron (ZOFRAN-ODT) 4 MG TbDL Take 1 tablet (4 mg total) by mouth 2 (two) times daily. 30 tablet 0    ondansetron (ZOFRAN-ODT) 8 MG TbDL DISSOLVE 1 TABLET(8 MG) ON THE TONGUE EVERY 8 HOURS AS NEEDED FOR NAUSEA 30 tablet 2    oxyCODONE-acetaminophen (PERCOCET) 5-325 mg per tablet Take 1 tablet by mouth every 4 (four) hours as needed for Pain. 60 tablet 0    palbociclib (IBRANCE) 100 mg Cap Take 1 capsule (100 mg) by mouth once daily for 21 days, followed by 7 days off. 21 capsule 3    potassium chloride (KLOR-CON) 10 MEQ TbSR Take 1 tablet (10 mEq total) by mouth once daily. 30 tablet 2    prochlorperazine (COMPAZINE) 5 MG tablet Take 1 tablet (5 mg total) by mouth 4 (four) times daily as needed for Nausea. 40 tablet 2    semaglutide (OZEMPIC)  0.25 mg or 0.5 mg (2 mg/3 mL) pen injector Start Ozempic. Inject 0.25 mg once weekly for 4 weeks, then increase to 0.5 mg weekly. 1 each 3    sennosides (SENNA-C ORAL) Take 2 tablets by mouth daily as needed.      spironolactone (ALDACTONE) 25 MG tablet Take 1 tablet (25 mg total) by mouth once daily. 90 tablet 1    traZODone (DESYREL) 50 MG tablet Take 1 tablet (50 mg total) by mouth every evening. 30 tablet 11    venlafaxine (EFFEXOR-XR) 150 MG Cp24 TAKE 1 CAPSULE(150 MG) BY MOUTH EVERY DAY 90 capsule 0     No facility-administered encounter medications on file as of 9/5/2023.       Allergy:  Review of patient's allergies indicates:   Allergen Reactions    Keflex [cephalexin] Itching         Assessment and Diagnosis   Status/Progress: Based on the examination today, the patient's problem(s) is/are adequately but not ideally controlled.  New problems have not been presented today.   Co-morbidities are not complicating management of the primary condition.  There are no active rule-out diagnoses for this patient at this time.       General Impression:       ICD-10-CM ICD-9-CM   1. Major depressive disorder, recurrent episode, moderate with anxious distress  F33.1 296.32   2. Anxiety  F41.9 300.00   3. Grief  F43.21 309.0       Intervention/Counseling/Treatment Plan     Medication Management:  Continue Effexor  mg q day  Start trial of hydroxyzine 25-50 mg q day prn anxiety. Pt was told not drive or to take this med with ETOH or other sedating meds/substance. Pt verbalized understanding.  Labs: reviewed most recent  The treatment plan and follow up plan were reviewed with the patient.  Discussed with patient informed consent, risks vs. benefits, alternative treatments, side effect profile and the inherent unpredictability of individual responses to treatments and all medications prescribed. The patient expresses understanding of the above and displays the capacity to agree with this current plan and had no other  questions.  Encouraged Patient to keep future appointments.   Take medications as prescribed and abstain from substance abuse.   Pt was told to present to ED or call 911 for SI/HI plan or intent, psychosis, or other psychiatric or medical emergency, and pt agrees to this and verbalized understanding.        Return to Clinic: 6 months, or sooner if needed      Face-to-face time with patient:  26 minutes  Total time:  35 minutes of total time spent on the encounter, which includes face to face time and non-face to face time preparing to see the patient (eg, review of tests), Obtaining and/or reviewing separately obtained history, Documenting clinical information in the electronic or other health record, Independently interpreting results (not separately reported) and communicating results to the patient/family/caregiver, or Care coordination (not separately reported).       Vandana Parra, MSN, APRN, PMHNP-BC [facility]

## 2023-09-07 ENCOUNTER — PATIENT MESSAGE (OUTPATIENT)
Dept: PSYCHIATRY | Facility: CLINIC | Age: 48
End: 2023-09-07
Payer: MEDICARE

## 2023-09-11 ENCOUNTER — PATIENT MESSAGE (OUTPATIENT)
Dept: ENDOCRINOLOGY | Facility: CLINIC | Age: 48
End: 2023-09-11
Payer: MEDICARE

## 2023-09-12 ENCOUNTER — OFFICE VISIT (OUTPATIENT)
Dept: PALLIATIVE MEDICINE | Facility: CLINIC | Age: 48
End: 2023-09-12
Payer: MEDICARE

## 2023-09-12 ENCOUNTER — PATIENT MESSAGE (OUTPATIENT)
Dept: HEMATOLOGY/ONCOLOGY | Facility: CLINIC | Age: 48
End: 2023-09-12
Payer: MEDICARE

## 2023-09-12 ENCOUNTER — DOCUMENTATION ONLY (OUTPATIENT)
Dept: HEMATOLOGY/ONCOLOGY | Facility: CLINIC | Age: 48
End: 2023-09-12
Payer: MEDICARE

## 2023-09-12 DIAGNOSIS — R06.09 OTHER FORM OF DYSPNEA: ICD-10-CM

## 2023-09-12 DIAGNOSIS — Z17.0 MALIGNANT NEOPLASM OF UPPER-INNER QUADRANT OF RIGHT BREAST IN FEMALE, ESTROGEN RECEPTOR POSITIVE: Primary | ICD-10-CM

## 2023-09-12 DIAGNOSIS — Z71.89 ADVANCED CARE PLANNING/COUNSELING DISCUSSION: ICD-10-CM

## 2023-09-12 DIAGNOSIS — R35.0 FREQUENCY OF URINATION: Primary | ICD-10-CM

## 2023-09-12 DIAGNOSIS — R63.0 ANOREXIA: ICD-10-CM

## 2023-09-12 DIAGNOSIS — R53.0 NEOPLASTIC (MALIGNANT) RELATED FATIGUE: ICD-10-CM

## 2023-09-12 DIAGNOSIS — R53.1 WEAKNESS: ICD-10-CM

## 2023-09-12 DIAGNOSIS — K59.00 CONSTIPATION, UNSPECIFIED CONSTIPATION TYPE: ICD-10-CM

## 2023-09-12 DIAGNOSIS — F43.23 ADJUSTMENT DISORDER WITH MIXED ANXIETY AND DEPRESSED MOOD: ICD-10-CM

## 2023-09-12 DIAGNOSIS — R42 DIZZINESS: ICD-10-CM

## 2023-09-12 DIAGNOSIS — G89.3 CANCER ASSOCIATED PAIN: ICD-10-CM

## 2023-09-12 DIAGNOSIS — C50.211 MALIGNANT NEOPLASM OF UPPER-INNER QUADRANT OF RIGHT BREAST IN FEMALE, ESTROGEN RECEPTOR POSITIVE: Primary | ICD-10-CM

## 2023-09-12 DIAGNOSIS — Z51.5 ENCOUNTER FOR PALLIATIVE CARE: ICD-10-CM

## 2023-09-12 DIAGNOSIS — G47.00 INSOMNIA, UNSPECIFIED TYPE: ICD-10-CM

## 2023-09-12 DIAGNOSIS — R11.0 NAUSEA: ICD-10-CM

## 2023-09-12 PROCEDURE — 1159F PR MEDICATION LIST DOCUMENTED IN MEDICAL RECORD: ICD-10-PCS | Mod: CPTII,95,, | Performed by: STUDENT IN AN ORGANIZED HEALTH CARE EDUCATION/TRAINING PROGRAM

## 2023-09-12 PROCEDURE — 99215 OFFICE O/P EST HI 40 MIN: CPT | Mod: 95,,, | Performed by: STUDENT IN AN ORGANIZED HEALTH CARE EDUCATION/TRAINING PROGRAM

## 2023-09-12 PROCEDURE — 4010F ACE/ARB THERAPY RXD/TAKEN: CPT | Mod: CPTII,95,, | Performed by: STUDENT IN AN ORGANIZED HEALTH CARE EDUCATION/TRAINING PROGRAM

## 2023-09-12 PROCEDURE — 99215 PR OFFICE/OUTPT VISIT, EST, LEVL V, 40-54 MIN: ICD-10-PCS | Mod: 95,,, | Performed by: STUDENT IN AN ORGANIZED HEALTH CARE EDUCATION/TRAINING PROGRAM

## 2023-09-12 PROCEDURE — 1160F RVW MEDS BY RX/DR IN RCRD: CPT | Mod: CPTII,95,, | Performed by: STUDENT IN AN ORGANIZED HEALTH CARE EDUCATION/TRAINING PROGRAM

## 2023-09-12 PROCEDURE — 1159F MED LIST DOCD IN RCRD: CPT | Mod: CPTII,95,, | Performed by: STUDENT IN AN ORGANIZED HEALTH CARE EDUCATION/TRAINING PROGRAM

## 2023-09-12 PROCEDURE — 3051F HG A1C>EQUAL 7.0%<8.0%: CPT | Mod: CPTII,95,, | Performed by: STUDENT IN AN ORGANIZED HEALTH CARE EDUCATION/TRAINING PROGRAM

## 2023-09-12 PROCEDURE — 4010F PR ACE/ARB THEARPY RXD/TAKEN: ICD-10-PCS | Mod: CPTII,95,, | Performed by: STUDENT IN AN ORGANIZED HEALTH CARE EDUCATION/TRAINING PROGRAM

## 2023-09-12 PROCEDURE — 1160F PR REVIEW ALL MEDS BY PRESCRIBER/CLIN PHARMACIST DOCUMENTED: ICD-10-PCS | Mod: CPTII,95,, | Performed by: STUDENT IN AN ORGANIZED HEALTH CARE EDUCATION/TRAINING PROGRAM

## 2023-09-12 PROCEDURE — 3051F PR MOST RECENT HEMOGLOBIN A1C LEVEL 7.0 - < 8.0%: ICD-10-PCS | Mod: CPTII,95,, | Performed by: STUDENT IN AN ORGANIZED HEALTH CARE EDUCATION/TRAINING PROGRAM

## 2023-09-12 RX ORDER — DRONABINOL 2.5 MG/1
2.5 CAPSULE ORAL
Qty: 60 CAPSULE | Refills: 0 | Status: SHIPPED | OUTPATIENT
Start: 2023-09-12 | End: 2023-11-17 | Stop reason: CLARIF
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
## 2023-09-12 NOTE — Clinical Note
Good afternoon,   I met with Ms. Elmore this morning. She has developed worsening leg weakness and dizziness. Patient also describing feeling lightheaded and fight/flight response for past few days. No fevers, chest pain, vision changes, palpitations, etc. I did review ED precautions. I advised her to also contact her PCP.   Thanks, Aishwarya

## 2023-09-12 NOTE — PROGRESS NOTES
Patient was called as received message below. Jasmyne MCGINNIS    Communication:     Faraz Neff! Thanks for letting me know! I called and left her a message to return my call if she felt she needed to be seen tomorrow. (We have an appt on Thursday). I also advised she go to ED for any new or worsening symptoms.   Thanks,   RAS  ===View-only below this line===  ----- Message -----  From: Aishwarya Portillo MD  Sent: 9/12/2023   3:26 PM CDT  To: Alfredo Bhatt NP    Good afternoon,    I met with Ms. Elmore this morning. She has developed worsening leg weakness and dizziness. Patient also describing feeling lightheaded and fight/flight response for past few days. No fevers, chest pain, vision changes, palpitations, etc. I did review ED precautions. I advised her to also contact her PCP.     Aishwarya Carbone

## 2023-09-14 ENCOUNTER — OFFICE VISIT (OUTPATIENT)
Dept: HEMATOLOGY/ONCOLOGY | Facility: CLINIC | Age: 48
End: 2023-09-14
Payer: MEDICARE

## 2023-09-14 ENCOUNTER — LAB VISIT (OUTPATIENT)
Dept: LAB | Facility: HOSPITAL | Age: 48
End: 2023-09-14
Attending: INTERNAL MEDICINE
Payer: MEDICARE

## 2023-09-14 VITALS
WEIGHT: 293 LBS | OXYGEN SATURATION: 97 % | HEIGHT: 64 IN | SYSTOLIC BLOOD PRESSURE: 137 MMHG | RESPIRATION RATE: 17 BRPM | TEMPERATURE: 98 F | HEART RATE: 78 BPM | DIASTOLIC BLOOD PRESSURE: 86 MMHG | BODY MASS INDEX: 50.02 KG/M2

## 2023-09-14 DIAGNOSIS — Z17.0 MALIGNANT NEOPLASM OF UPPER-INNER QUADRANT OF RIGHT BREAST IN FEMALE, ESTROGEN RECEPTOR POSITIVE: ICD-10-CM

## 2023-09-14 DIAGNOSIS — C79.51 MALIGNANT NEOPLASM METASTATIC TO BONE: ICD-10-CM

## 2023-09-14 DIAGNOSIS — E08.65 DIABETES MELLITUS DUE TO UNDERLYING CONDITION WITH HYPERGLYCEMIA, WITHOUT LONG-TERM CURRENT USE OF INSULIN: ICD-10-CM

## 2023-09-14 DIAGNOSIS — E11.65 TYPE 2 DIABETES MELLITUS WITH HYPERGLYCEMIA, WITHOUT LONG-TERM CURRENT USE OF INSULIN: ICD-10-CM

## 2023-09-14 DIAGNOSIS — T45.1X5A ANEMIA ASSOCIATED WITH CHEMOTHERAPY: ICD-10-CM

## 2023-09-14 DIAGNOSIS — C50.211 MALIGNANT NEOPLASM OF UPPER-INNER QUADRANT OF RIGHT BREAST IN FEMALE, ESTROGEN RECEPTOR POSITIVE: ICD-10-CM

## 2023-09-14 DIAGNOSIS — M54.2 NECK PAIN: ICD-10-CM

## 2023-09-14 DIAGNOSIS — R51.9 WORSENING HEADACHES: ICD-10-CM

## 2023-09-14 DIAGNOSIS — D64.81 ANEMIA ASSOCIATED WITH CHEMOTHERAPY: ICD-10-CM

## 2023-09-14 DIAGNOSIS — E88.09 HYPOALBUMINEMIA: ICD-10-CM

## 2023-09-14 DIAGNOSIS — I50.42 CHRONIC COMBINED SYSTOLIC AND DIASTOLIC HEART FAILURE: ICD-10-CM

## 2023-09-14 DIAGNOSIS — G89.3 CANCER ASSOCIATED PAIN: ICD-10-CM

## 2023-09-14 DIAGNOSIS — M54.50 ACUTE BILATERAL LOW BACK PAIN WITHOUT SCIATICA: Primary | ICD-10-CM

## 2023-09-14 PROBLEM — M89.8X8 MASS OF SPINE: Status: ACTIVE | Noted: 2023-09-14

## 2023-09-14 LAB
ALBUMIN SERPL BCP-MCNC: 3 G/DL (ref 3.5–5.2)
ALP SERPL-CCNC: 75 U/L (ref 55–135)
ALT SERPL W/O P-5'-P-CCNC: 18 U/L (ref 10–44)
ANION GAP SERPL CALC-SCNC: 14 MMOL/L (ref 8–16)
AST SERPL-CCNC: 12 U/L (ref 10–40)
BILIRUB SERPL-MCNC: 0.3 MG/DL (ref 0.1–1)
BUN SERPL-MCNC: 9 MG/DL (ref 6–20)
CALCIUM SERPL-MCNC: 9.8 MG/DL (ref 8.7–10.5)
CHLORIDE SERPL-SCNC: 106 MMOL/L (ref 95–110)
CO2 SERPL-SCNC: 23 MMOL/L (ref 23–29)
CREAT SERPL-MCNC: 0.9 MG/DL (ref 0.5–1.4)
ERYTHROCYTE [DISTWIDTH] IN BLOOD BY AUTOMATED COUNT: 17.3 % (ref 11.5–14.5)
EST. GFR  (NO RACE VARIABLE): >60 ML/MIN/1.73 M^2
ESTIMATED AVG GLUCOSE: 134 MG/DL (ref 68–131)
GLUCOSE SERPL-MCNC: 137 MG/DL (ref 70–110)
HBA1C MFR BLD: 6.3 % (ref 4–5.6)
HCT VFR BLD AUTO: 34 % (ref 37–48.5)
HGB BLD-MCNC: 10.2 G/DL (ref 12–16)
IMM GRANULOCYTES # BLD AUTO: 0.02 K/UL (ref 0–0.04)
MCH RBC QN AUTO: 28.2 PG (ref 27–31)
MCHC RBC AUTO-ENTMCNC: 30 G/DL (ref 32–36)
MCV RBC AUTO: 94 FL (ref 82–98)
NEUTROPHILS # BLD AUTO: 2.7 K/UL (ref 1.8–7.7)
PLATELET # BLD AUTO: 339 K/UL (ref 150–450)
PMV BLD AUTO: 11.2 FL (ref 9.2–12.9)
POTASSIUM SERPL-SCNC: 3.7 MMOL/L (ref 3.5–5.1)
PROT SERPL-MCNC: 6.9 G/DL (ref 6–8.4)
RBC # BLD AUTO: 3.62 M/UL (ref 4–5.4)
SODIUM SERPL-SCNC: 143 MMOL/L (ref 136–145)
WBC # BLD AUTO: 4.15 K/UL (ref 3.9–12.7)

## 2023-09-14 PROCEDURE — 3079F DIAST BP 80-89 MM HG: CPT | Mod: CPTII,S$GLB,, | Performed by: NURSE PRACTITIONER

## 2023-09-14 PROCEDURE — 3008F PR BODY MASS INDEX (BMI) DOCUMENTED: ICD-10-PCS | Mod: CPTII,S$GLB,, | Performed by: NURSE PRACTITIONER

## 2023-09-14 PROCEDURE — 99215 OFFICE O/P EST HI 40 MIN: CPT | Mod: S$GLB,,, | Performed by: NURSE PRACTITIONER

## 2023-09-14 PROCEDURE — 3075F SYST BP GE 130 - 139MM HG: CPT | Mod: CPTII,S$GLB,, | Performed by: NURSE PRACTITIONER

## 2023-09-14 PROCEDURE — 3051F HG A1C>EQUAL 7.0%<8.0%: CPT | Mod: CPTII,S$GLB,, | Performed by: NURSE PRACTITIONER

## 2023-09-14 PROCEDURE — 4010F PR ACE/ARB THEARPY RXD/TAKEN: ICD-10-PCS | Mod: CPTII,S$GLB,, | Performed by: NURSE PRACTITIONER

## 2023-09-14 PROCEDURE — 3008F BODY MASS INDEX DOCD: CPT | Mod: CPTII,S$GLB,, | Performed by: NURSE PRACTITIONER

## 2023-09-14 PROCEDURE — 99999 PR PBB SHADOW E&M-EST. PATIENT-LVL III: ICD-10-PCS | Mod: PBBFAC,,, | Performed by: NURSE PRACTITIONER

## 2023-09-14 PROCEDURE — 99999 PR PBB SHADOW E&M-EST. PATIENT-LVL III: CPT | Mod: PBBFAC,,, | Performed by: NURSE PRACTITIONER

## 2023-09-14 PROCEDURE — 3079F PR MOST RECENT DIASTOLIC BLOOD PRESSURE 80-89 MM HG: ICD-10-PCS | Mod: CPTII,S$GLB,, | Performed by: NURSE PRACTITIONER

## 2023-09-14 PROCEDURE — 80053 COMPREHEN METABOLIC PANEL: CPT | Performed by: INTERNAL MEDICINE

## 2023-09-14 PROCEDURE — 4010F ACE/ARB THERAPY RXD/TAKEN: CPT | Mod: CPTII,S$GLB,, | Performed by: NURSE PRACTITIONER

## 2023-09-14 PROCEDURE — 3075F PR MOST RECENT SYSTOLIC BLOOD PRESS GE 130-139MM HG: ICD-10-PCS | Mod: CPTII,S$GLB,, | Performed by: NURSE PRACTITIONER

## 2023-09-14 PROCEDURE — 36415 COLL VENOUS BLD VENIPUNCTURE: CPT | Performed by: INTERNAL MEDICINE

## 2023-09-14 PROCEDURE — 99215 PR OFFICE/OUTPT VISIT, EST, LEVL V, 40-54 MIN: ICD-10-PCS | Mod: S$GLB,,, | Performed by: NURSE PRACTITIONER

## 2023-09-14 PROCEDURE — 83036 HEMOGLOBIN GLYCOSYLATED A1C: CPT | Performed by: NURSE PRACTITIONER

## 2023-09-14 PROCEDURE — 1159F MED LIST DOCD IN RCRD: CPT | Mod: CPTII,S$GLB,, | Performed by: NURSE PRACTITIONER

## 2023-09-14 PROCEDURE — 3051F PR MOST RECENT HEMOGLOBIN A1C LEVEL 7.0 - < 8.0%: ICD-10-PCS | Mod: CPTII,S$GLB,, | Performed by: NURSE PRACTITIONER

## 2023-09-14 PROCEDURE — 1159F PR MEDICATION LIST DOCUMENTED IN MEDICAL RECORD: ICD-10-PCS | Mod: CPTII,S$GLB,, | Performed by: NURSE PRACTITIONER

## 2023-09-14 PROCEDURE — 85027 COMPLETE CBC AUTOMATED: CPT | Performed by: NURSE PRACTITIONER

## 2023-09-14 NOTE — PROGRESS NOTES
Subjective     Patient ID: Kristopher Elmore is a 47 y.o. female.    Chief Complaint: Malignant neoplasm of upper-inner quadrant of right breast     HPI    Presents for follow up:  In the interval, she saw palliative and reported fatigue and leg weakness.     Today,   New pain across her upper back and neck. Started about a week ago. Now taking percocet daily as was not requiring daily pain med. She would use IBP prior to last week.   Percocet relieves.   Lower back pain the same.   Generalized weakness noted and seems to be getting worse a week ago.   She feels like if she sits too long, she will get dizzy if stands up. Takes her longer to get to her destination.   Her legs feels like spaghetti at times. She comes close to falling but has not.    +HA - this is not normal for her. HA are daily now. This started about a week ago as well.   Notes HA across forehead.   Occasional nausea- no worse than usual and attributes to medication  Not as active bc of weakness  Appetite poor.   Ozempic not helping weight loss but has been controlling sugars.   Hydrating well.   Starting marinol per palliative for nausea and anxiety.   Feels like she can't empty bladder all the way. Seems like it getting worse.   She did have incontinence yesterday      Currently on Arimidex and Ibrance (dose adjusted due to anemia to 100 mg M-F, off Sat&Sun)   Zoladex and Xgeva monthly   Most recent scans stable.  Genetic testing negative.      Diagnosis:  1. Stage IV ([id number]) invasive ductal carcinoma of right breast, upper inner quadrant, ER %, SC neg, Her2 neg, Grade 3, multifocal with one lesion appearing more aggressive (Ki67 80%), large LN +, bone mets.   Ibrance dose adjusted with cytopenias   Oncologic History:   Presentation   - 9/11/19 - Screening mammogram showed multiple right breast masses lower inner quadrant   - 9/13/19 - Diagnostic mammogram and US showed a right breast, 3:00 position mass, 2 CFN measuring 17 mm x 16  "mm x 14 mm. There 2 smaller adjacent masses towards the nipple   - 9/19/19 - Biopsy -   A. Right breast subareolar: Grade 3 IDC, estrogen receptor 80%, progesterone receptor 0%, Her2 dago neg, Ki67 80%.   B. Right breast mass 3:00: Grade 3 IDC with papillary features, estrogen receptor 100%, progesterone receptor 0%, Her2 dago 1+, Ki67 30%.   - 9/26/19: US right axilla with abnormal lymphadenopathy measuring 4.3 x 2.8 cm with biopsy + for metastatic breast cancer.   Surgery consultation with Dr Ferris on 9/25/19   - 9/25/19 - Genetics Genetics [facility] BRACAnalysis pending; VUS pending   Medical oncology consultation on 10/7/19   * 10/8/19 - CT C/A/P with several lytic lesions in thoracic and lumbar spine, iliac bones, left scapula in addition to 3 x 4.5 cm right axillary node and 2.1 cm right breast mass. Bone scan negative.   * 10/31/19 - started Ibrance, Arimidex, Zoladex; plan for Xgeva.   * 11/12/19 STRATA without targetable mutations.   * 12/16/19 - Bone biopsy + for met disease (initially read as negative, but we treated as metastatic disease given high suspicion).   * MRI brain: "Partially empty sella. Question bilateral globe proptosis. Otherwise unremarkable MRI brain as detailed above specifically without evidence for intracranial enhancing lesion to suggest metastatic disease."   * 4/22/2020 PET - disease improvement. 8/2020 PET with disease improvement.   * 9/1/20 - Palliative RT to L1-L4   Of note, * Xgeva monthly - we had been waiting on dental clearance, but she has been unable to get into dentistry and is accepting of risks. Has no active dental disease.   PET scan 4/22/2021:   FINDINGS:   Quality of the study: Mildly degraded due to patient's large body habitus and skin abutting the gantry.   In the head and neck, there are no hypermetabolic lesions worrisome for malignancy. There are no hypermetabolic mucosal lesions, and there are no pathologically enlarged or hypermetabolic lymph nodes.   In " the chest, there are no hypermetabolic lesions worrisome for malignancy.   Stable CT appearance of a level 1 right axillary lymph node measuring 1.3 cm in short axis with normal background radiotracer uptake. Previously with SUV max of 2.1.   There are no concerning pulmonary nodules or masses, and there are no pathologically enlarged or hypermetabolic lymph nodes.   In the abdomen and pelvis, there is physiologic tracer distribution within the abdominal organs and excretion into the genitourinary system.   In the bones, there are stable lytic lesions throughout the lumbar spine and pelvis and additional stable sclerotic lesions in the sternum and T3 vertebral body. No associated focal abnormal increased radiotracer uptake. Findings likely represent treated disease.   Impression:   Interval decreased uptake within a prominent right axillary lymph node, now with normal background uptake. No new focal abnormal uptake.   Stable lytic and sclerotic lesions without focal abnormal uptake. Findings are compatible with treated metastasis.   8/25/2021 MRI brain   Impression:   No significant change from prior specifically without evidence for enhancing lesion to suggest intracranial metastatic disease.   Continued partially empty sella, intracranial hypertension to be included in differential in the appropriate clinical setting. Clinical correlation and further evaluation as warranted.   10/14/2021 MRI thoracic/lumbar spine:   Impression:   Patient history of metastatic breast cancer.   Bone lesions at T10, L2, L3, and right iliac wing, as above, concerning for metastatic disease. No pathologic fracture, soft tissue component, or epidural extension identified.   10/25/2021 CT chest/abd/pelvis:   Impression:   1. Stable metastasis of the spine   No evidence of intra-abdominal or intrathoracic metastatic disease   2. Stable prominent right axillary lymph node.   3. Additional findings are detailed above.   Currently on  Arimidex and Ibrance   Zoladex and Xgeva monthly   - 2/2/2022 Bone scan:   FINDINGS:   No focal abnormal uptake suggestive of osseous metastases. There is diffusely increased uptake in the calvarium in keeping with hyperostosis. There is degenerative type uptake most prominent in the bilateral shoulders and knees. The focal uptake previously described in the distal right femur is not visualized. There is otherwise physiologic distribution of the radiopharmaceutical throughout the skeleton.   There is normal uptake in the genitourinary system and soft tissues.   Impression:   There is no scintigraphic evidence of osteoblastic metastatic disease.   Nonspecific uptake in the distal right femur has resolved.   Diffuse cranial hyperostosis and other findings as above.   - 2/1/2022 CT C/A/P:   FINDINGS:   CHEST   Support tubes and lines: None.   Aorta: Normal caliber.   Heart: Normal size..   Coronary arteries: No calcifications.   Pericardium: Normal. No effusion, thickening, or calcification.   Central pulmonary arteries: Normal caliber.   Base of neck/thyroid: Normal.   Lymph nodes: No supraclavicular, axillary, internal mammary, mediastinal or hilar lymphadenopathy.   Esophagus: Normal.   Pleura: No effusion, thickening or calcification.   Body wall: Unremarkable.   Airways: Normal.   Lungs: Clear without focal or diffuse abnormality.   Bones: Sclerotic lesions are seen throughout the spine as well as in the sternum, not substantially changed from 10/25/2021   ABDOMEN/PELVIS   Liver: Unremarkable   Gallbladder/bile ducts: Unremarkable. No intra or extrahepatic biliary ductal dilatation   Pancreas: Unremarkable.   Spleen: Unremarkable.   Adrenals: Unremarkable.   Kidneys: Simple cyst in the right kidney is unchanged   Lymph nodes: No abdominal or pelvic lymphadenopathy.   Bowel and mesentery: Unremarkable.   Abdominal aorta: Unremarkable.   Inferior vena cava: Unremarkable.   Free fluid or free air: None.   Pelvis:  Unremarkable.   Urinary bladder: Unremarkable.   Body wall: Unremarkable.   Bones: Sclerotic lesions seen in the spine are unchanged.   Impression:   1. No evidence of new recurrent or metastatic disease.   2. Sclerotic lesions seen in the spine and sternum, unchanged when compared to 10/25/2021.      Genetic testing 3/23/2023             Most recent scans:   - 7/5/2023 CT C/A/P:  FINDINGS:  Some peripheral portions of the body wall are not covered on exam field of view due to patient body habitus  Base of neck: Unremarkable.  Thoracic soft tissues: No significant abnormality.  Aorta: Normal caliber.  Heart: Normal in size. No pericardial effusion.  Jane/Mediastinum: No pathologic lymphadenopathy.  Lungs: Well aerated, without consolidation or pleural fluid.  Liver: Diffusely hypodense parenchyma suggestive for steatosis.  No focal lesion.  Portal veins are patent.  Gallbladder: Unremarkable.  Bile Ducts: No evidence of dilated ducts.  Pancreas: No mass or ductal dilatation.  Spleen: Unremarkable.  Adrenals: Unremarkable.  Kidneys/ Ureters: Small right renal cyst.  No hydronephrosis or nephrolithiasis.  No ureteral dilatation.  Bladder: No evidence of wall thickening.  Reproductive organs: Calcified uterine fibroid.  GI Tract/Mesentery: No evidence of bowel obstruction or inflammation.  Peritoneal Space: No ascites.  No free air.  Retroperitoneum: No significant adenopathy.  Abdominal wall: Minimal body wall edema.  Vasculature: Abdominal aorta is normal caliber.  Bones: Stable mixed lytic and sclerotic lesions throughout the spine, sternum, and right iliac wing.  No definite new lesion.  No acute fracture.  Impression:  1. Stable mixed lytic and sclerotic osseous lesions.  No evidence of new metastatic disease.  2. Additional findings as above.    Review of Systems   Constitutional:         See above   All other systems reviewed and are negative.         Objective     Physical Exam  Vitals and nursing note  reviewed.   Constitutional:       General: She is not in acute distress.     Appearance: Normal appearance. She is obese. She is not ill-appearing.      Comments: Presents with daughters.   In WC due to difficulty walking long distance.   Able to transfer to exam table with assistance     HENT:      Head: Normocephalic and atraumatic.      Mouth/Throat:      Comments: Tongue geography no different than usual  Eyes:      General: No scleral icterus.     Extraocular Movements: Extraocular movements intact.      Pupils: Pupils are equal, round, and reactive to light.   Cardiovascular:      Rate and Rhythm: Normal rate and regular rhythm.      Heart sounds: No murmur heard.     No friction rub. No gallop.   Pulmonary:      Effort: Pulmonary effort is normal.      Breath sounds: Normal breath sounds.      Comments: Breasts- no masses felt.   Abdominal:      General: Bowel sounds are normal. There is no distension.      Tenderness: There is no abdominal tenderness.   Musculoskeletal:         General: No swelling.      Cervical back: Normal range of motion.      Right lower leg: Edema present.      Left lower leg: Edema present.      Comments: Point tenderness in cervical, thoracic and lumbar spine. Pain became intense in lumbar area.    Lymphadenopathy:      Cervical: No cervical adenopathy.   Skin:     General: Skin is warm and dry.      Findings: No bruising or rash.   Neurological:      General: No focal deficit present.      Mental Status: She is alert and oriented to person, place, and time.      Sensory: No sensory deficit.      Motor: Weakness present.   Psychiatric:         Mood and Affect: Mood normal.         Behavior: Behavior normal.         Thought Content: Thought content normal.         Judgment: Judgment normal.       Labs- reviewed     Assessment and Plan     1. Acute bilateral low back pain without sciatica        2. Neck pain        3. Malignant neoplasm of upper-inner quadrant of right breast in  female, estrogen receptor positive        4. Bone metastases        5. Cancer associated pain        6. Worsening headaches        7. Hypoalbuminemia        8. Chronic combined systolic and diastolic heart failure        9. Diabetes mellitus due to underlying condition with hyperglycemia, without long-term current use of insulin        10. Anemia associated with chemotherapy              Metastatic breast cancer  Patient presents with new and worsening pain along spine (cervical, thoracic, lumbar), more so lower back. She is having more leg weakness and had an episode of urinary incontinence yesterday. Also  reports having trouble emptying her bladder. Also with daily headaches and dizziness.   Will need cord compression ruled out along with MRI brain.   Patient sent to ED and report called to [facility].       Labs reviewed and overall stable.   Continue arimidex and Ibrance (100 mg M-F, off Sat&Sun).   Xgeva and Zoladex - due today but patient going to ED.     Anemia parameters stable.   Continue f/u with palliative.   Nutrition discussed - increase protein.         Route Chart for Scheduling    Med Onc Chart Routing  Urgent    Follow up with physician    Follow up with [facility] 1 week. virtual urgent care   Infusion scheduling note    Injection scheduling note    Labs    Imaging    Pharmacy appointment    Other referrals            Treatment Plan Information   OP ANASTROZOLE PALBOCICLIB Q4W   Jessica Mendez MD   Upcoming Treatment Dates - OP ANASTROZOLE PALBOCICLIB Q4W    No upcoming days in selected categories.    Supportive Plan Information  OP BREAST GOSERELIN & DENOSUMAB Q4W   Jessica Mendez MD   Upcoming Treatment Dates - OP BREAST GOSERELIN & DENOSUMAB Q4W    9/1/2025       Chemotherapy       denosumab (XGEVA) solution 120 mg       goserelin (ZOLADEX) injection 3.6 mg    Therapy Plan Information  EPINEPHrine (EPIPEN) 0.3 mg/0.3 mL pen injection 0.3 mg  0.3 mg, Intramuscular, PRN  diphenhydrAMINE injection 50  mg  50 mg, Intravenous, PRN  methylPREDNISolone sodium succinate injection 125 mg  125 mg, Intravenous, PRN  sodium chloride 0.9% bolus 1,000 mL 1,000 mL  1,000 mL, Intravenous, PRN             Patient is in agreement with the proposed treatment plan. All questions were answered to the patient's satisfaction. Pt knows to call clinic for any new or worsening symptoms and if anything is needed before the next clinic visit.    Alfredo Bhatt, MSN, APRN, FNP-C  Nurse Practitioner to Dr. Phyllis Lei  [facility]  [facility]  Hematology & Medical Oncology  [address] [place] [id number]  ph. [phone] ext [phone]  Fax. [phone]              60 minutes of total time spent on the encounter, which includes face to face time and non-face to face time preparing to see the patient (eg, review of tests), Obtaining and/or reviewing separately obtained history, Documenting clinical information in the electronic or other health record, Independently interpreting results (not separately reported) and communicating results to the patient/family/caregiver, or Care coordination (not separately reported).

## 2023-09-15 ENCOUNTER — PATIENT MESSAGE (OUTPATIENT)
Dept: HEMATOLOGY/ONCOLOGY | Facility: CLINIC | Age: 48
End: 2023-09-15
Payer: MEDICARE

## 2023-09-15 ENCOUNTER — PATIENT OUTREACH (OUTPATIENT)
Dept: EMERGENCY MEDICINE | Facility: HOSPITAL | Age: 48
End: 2023-09-15

## 2023-09-18 ENCOUNTER — PATIENT MESSAGE (OUTPATIENT)
Dept: ENDOCRINOLOGY | Facility: CLINIC | Age: 48
End: 2023-09-18
Payer: MEDICARE

## 2023-09-19 ENCOUNTER — OFFICE VISIT (OUTPATIENT)
Dept: ENDOCRINOLOGY | Facility: CLINIC | Age: 48
End: 2023-09-19
Payer: MEDICARE

## 2023-09-19 DIAGNOSIS — E11.65 TYPE 2 DIABETES MELLITUS WITH HYPERGLYCEMIA, WITHOUT LONG-TERM CURRENT USE OF INSULIN: Primary | ICD-10-CM

## 2023-09-19 DIAGNOSIS — E66.01 MORBID OBESITY, UNSPECIFIED OBESITY TYPE: ICD-10-CM

## 2023-09-19 DIAGNOSIS — I50.42 CHRONIC COMBINED SYSTOLIC AND DIASTOLIC HEART FAILURE: ICD-10-CM

## 2023-09-19 PROCEDURE — 1159F PR MEDICATION LIST DOCUMENTED IN MEDICAL RECORD: ICD-10-PCS | Mod: CPTII,95,, | Performed by: NURSE PRACTITIONER

## 2023-09-19 PROCEDURE — 3044F PR MOST RECENT HEMOGLOBIN A1C LEVEL <7.0%: ICD-10-PCS | Mod: CPTII,95,, | Performed by: NURSE PRACTITIONER

## 2023-09-19 PROCEDURE — 1159F MED LIST DOCD IN RCRD: CPT | Mod: CPTII,95,, | Performed by: NURSE PRACTITIONER

## 2023-09-19 PROCEDURE — 3044F HG A1C LEVEL LT 7.0%: CPT | Mod: CPTII,95,, | Performed by: NURSE PRACTITIONER

## 2023-09-19 PROCEDURE — 1160F PR REVIEW ALL MEDS BY PRESCRIBER/CLIN PHARMACIST DOCUMENTED: ICD-10-PCS | Mod: CPTII,95,, | Performed by: NURSE PRACTITIONER

## 2023-09-19 PROCEDURE — 99214 OFFICE O/P EST MOD 30 MIN: CPT | Mod: 95,,, | Performed by: NURSE PRACTITIONER

## 2023-09-19 PROCEDURE — 1160F RVW MEDS BY RX/DR IN RCRD: CPT | Mod: CPTII,95,, | Performed by: NURSE PRACTITIONER

## 2023-09-19 PROCEDURE — 4010F ACE/ARB THERAPY RXD/TAKEN: CPT | Mod: CPTII,95,, | Performed by: NURSE PRACTITIONER

## 2023-09-19 PROCEDURE — 4010F PR ACE/ARB THEARPY RXD/TAKEN: ICD-10-PCS | Mod: CPTII,95,, | Performed by: NURSE PRACTITIONER

## 2023-09-19 PROCEDURE — 99214 PR OFFICE/OUTPT VISIT, EST, LEVL IV, 30-39 MIN: ICD-10-PCS | Mod: 95,,, | Performed by: NURSE PRACTITIONER

## 2023-09-19 NOTE — PROGRESS NOTES
CC: This 47 y.o. Black or  female  is here for evaluation of  T2DM along with comorbidities indicated in the Visit Diagnosis section of this encounter.    HPI: Kristopher Elmore was diagnosed with T2DM in 2021 at age 46; A1c at 8.5% upon diagnosis.   Medical hx: cardiomegaly, chronic combined systolic and diastolic HF, anxiety     DM COMPLICATIONS: peripheral neuropathy        Prior visit 6/2023  A1c down from 11.6% in Nov to 7.5% in May.   Pt did not start insulin because glucoses were low on glipizide, Jardiance and metformin.   She also started eating better. + 20 lb weight loss since lov.   Tolerating metformin well; GI upset only with certain foods.   Plan Continue glipizide, Jardiance and metformin.   Start Ozempic. Inject 0.25 mg once weekly for 4 weeks, then increase to 0.5 mg weekly.   Potential side effects: nausea, diarrhea, constipation, bloating. Nausea for the first week or two is common.  Avoid big food portions and greasy/heavy foods.   Reviewed administration of injection.   Prescription for Dexcom G7 sent and sample provided. Pt will pay out of pocket. Has tried and failed Dayo 3 in the past.   Return to clinic in 3 months with labs prior.     Interval hx virtual visit   A1c is down form 7.5 to 6.3%.   She has started Ozempic. She continues to have nausea and constipation but unchanged with Ozempic.   Pt is using Dexcom G7.   Appetite is lower. Reports 9-10 lb weight loss since lov. Wants to get down to 300 lb by [place].   C/o urinary retention. Currently using Irizarry catheter.         LAST DIABETES EDUCATION: 12/12/22 with JANE Foreman, found visit helpful     HOSPITALIZED FOR DIABETES  -  Yes - for hyperglycemia 2x in 2022 for 300 and 400s     SIGNIFICANT DIABETES MED HISTORY:   Trulicity 0.75 mg started in 1/2021, stopped very soon afterward d/t nausea and headache.   Metformin ER started at initial visit 9/2022    PRESCRIBED DIABETES MEDICATIONS:   Glipizide XL 10 mg once  daily   Jardiance 25 mg once daily   Metformin ER 1000 mg bid   Ozempic 1 mg weekly     Misses medication doses - No      SELF MONITORING BLOOD GLUCOSE: Dayo 3 CGM sensors fell off even with using [place].   monitors glucoses with Dexcom G7 and fingersticks 2x/day. Pt unable to log into Clarity to share CGM readings.       Fingersticks:   -160s, 99  About 3 hours after supper - 150-178    HYPOGLYCEMIC EPISODES: denies      CURRENT DIET: drinks water.    eats 2 meals/day, no lunch.   Snacks - not much, sometimes SF jello or fruit.       CURRENT EXERCISE: activity limited by back pain.       There were no vitals taken for this visit.    ROS:   CONSTITUTIONAL: Appetite good, denies fatigue  GI: + nausea + constipation, stable     PHYSICAL EXAM:  GENERAL: Well developed, well nourished. No acute distress.   PSYCH: AAOx3, appropriate mood and affect, conversant, well-groomed. Judgement and insight good.   NEURO: Cranial nerves grossly intact. Speech clear, no tremor.   CHEST: Respirations even and unlabored.      Hemoglobin A1C   Date Value Ref Range Status   09/14/2023 6.3 (H) 4.0 - 5.6 % Final     Comment:     ADA Screening Guidelines:  5.7-6.4%  Consistent with prediabetes  >or=6.5%  Consistent with diabetes    High levels of fetal hemoglobin interfere with the HbA1C  assay. Heterozygous hemoglobin variants (HbS, HgC, etc)do  not significantly interfere with this assay.   However, presence of multiple variants may affect accuracy.     05/22/2023 7.5 (H) 4.0 - 5.6 % Final     Comment:     ADA Screening Guidelines:  5.7-6.4%  Consistent with prediabetes  >or=6.5%  Consistent with diabetes    High levels of fetal hemoglobin interfere with the HbA1C  assay. Heterozygous hemoglobin variants (HbS, HgC, etc)do  not significantly interfere with this assay.   However, presence of multiple variants may affect accuracy.     12/06/2022 11.6 (H) 4.0 - 5.6 % Final     Comment:     ADA Screening Guidelines:  5.7-6.4%   "Consistent with prediabetes  >or=6.5%  Consistent with diabetes    High levels of fetal hemoglobin interfere with the HbA1C  assay. Heterozygous hemoglobin variants (HbS, HgC, etc)do  not significantly interfere with this assay.   However, presence of multiple variants may affect accuracy.         No results found for: "CPEPTIDE", "GLUTAMICACID", "ISLETCELLANT", "FRUCTOSAMINE"     Lab Results   Component Value Date    CHOL 113 (L) 12/19/2022    CHOL 111 (L) 12/06/2022    CHOL 119 (L) 11/03/2021     Lab Results   Component Value Date    HDL 35 (L) 12/19/2022    HDL 36 (L) 12/06/2022    HDL 42 11/03/2021     Lab Results   Component Value Date    LDLCALC 57.2 (L) 12/19/2022    LDLCALC 58.2 (L) 12/06/2022    LDLCALC 57.8 (L) 11/03/2021     Lab Results   Component Value Date    TRIG 104 12/19/2022    TRIG 84 12/06/2022    TRIG 96 11/03/2021     Lab Results   Component Value Date    CHOLHDL 31.0 12/19/2022    CHOLHDL 32.4 12/06/2022    CHOLHDL 35.3 11/03/2021         Component Value Date/Time     09/14/2023 1245    K 4.0 09/14/2023 1245     09/14/2023 1245    CO2 27 09/14/2023 1245    BUN 8 09/14/2023 1245    CREATININE 1.0 09/14/2023 1245     (H) 09/14/2023 1245    CALCIUM 10.1 09/14/2023 1245    ALKPHOS 81 09/14/2023 1245    AST 14 09/14/2023 1245    ALT 21 09/14/2023 1245    BILITOT 0.3 09/14/2023 1245    EGFRNORACEVR >60.0 09/14/2023 1245    ESTGFRAFRICA >60.0 07/22/2022 1012         No results found for: "LABMICR", "CREATRANDUR", "MICALBCREAT"          ASSESSMENT and PLAN:    A1C GOAL: n/a d/t anemia     1. Type 2 diabetes mellitus with hyperglycemia, without long-term current use of insulin  A1c shows falsely low glucose average, likely d/t anemia. However, A1c and glucose readings both indicate much lower glucoses.   She has done well with Ozempic but BGs still a little high and she would benefit from more weight loss.   Continue glipizide, Jardiance, and metformin.   Switch from Ozempic 1 mg to " Mounjaro 7.5 mg weekly.   Hold Jardiance to see if urinary retention improves.  Share code provided for Clarity access.    Return to clinic in 3 months with labs prior.     Hemoglobin A1C    Microalbumin/Creatinine Ratio, Urine      2. Morbid obesity, unspecified obesity type  Switch to Mounjaro       3. Chronic combined systolic and diastolic heart failure  Improve glycemic control.                 Orders Placed This Encounter   Procedures    Hemoglobin A1C     Standing Status:   Future     Standing Expiration Date:   11/17/2024    Microalbumin/Creatinine Ratio, Urine     Standing Status:   Future     Standing Expiration Date:   11/17/2024     Order Specific Question:   Specimen Source     Answer:   Urine        Follow up in about 3 months (around 12/19/2023).       The patient location is: [place]  The chief complaint leading to consultation is: type 2 dm     Visit type: audiovisual    Face to Face time with patient: 25 minutes of total time spent on the encounter, which includes face to face time and non-face to face time preparing to see the patient (eg, review of tests), Obtaining and/or reviewing separately obtained history, Documenting clinical information in the electronic or other health record, Independently interpreting results (not separately reported) and communicating results to the patient/family/caregiver, or Care coordination (not separately reported).         Each patient to whom he or she provides medical services by telemedicine is:  (1) informed of the relationship between the physician and patient and the respective role of any other health care provider with respect to management of the patient; and (2) notified that he or she may decline to receive medical services by telemedicine and may withdraw from such care at any time.    Notes:

## 2023-09-19 NOTE — PATIENT INSTRUCTIONS
A1c shows falsely low glucose average, likely d/t anemia. However, A1c and glucose readings both indicate much lower glucoses.   She has done well with Ozempic but BGs still a little high and she would benefit from more weight loss.   Continue glipizide, Jardiance, and metformin.   Switch from Ozempic 1 mg to Mounjaro 7.5 mg weekly.   Hold Jardiance to see if urinary retention improves.  Share code provided for Clarity access.    Return to clinic in 3 months with labs prior.

## 2023-09-21 ENCOUNTER — OFFICE VISIT (OUTPATIENT)
Dept: NEUROSURGERY | Facility: CLINIC | Age: 48
End: 2023-09-21
Payer: MEDICARE

## 2023-09-21 ENCOUNTER — INFUSION (OUTPATIENT)
Dept: INFUSION THERAPY | Facility: HOSPITAL | Age: 48
End: 2023-09-21
Payer: MEDICAID

## 2023-09-21 ENCOUNTER — OFFICE VISIT (OUTPATIENT)
Dept: HEMATOLOGY/ONCOLOGY | Facility: CLINIC | Age: 48
End: 2023-09-21
Payer: MEDICARE

## 2023-09-21 VITALS
DIASTOLIC BLOOD PRESSURE: 65 MMHG | HEART RATE: 88 BPM | WEIGHT: 293 LBS | SYSTOLIC BLOOD PRESSURE: 111 MMHG | HEIGHT: 64 IN | BODY MASS INDEX: 50.02 KG/M2

## 2023-09-21 VITALS
SYSTOLIC BLOOD PRESSURE: 128 MMHG | BODY MASS INDEX: 50.02 KG/M2 | BODY MASS INDEX: 50.02 KG/M2 | HEIGHT: 64 IN | OXYGEN SATURATION: 98 % | SYSTOLIC BLOOD PRESSURE: 128 MMHG | WEIGHT: 293 LBS | HEART RATE: 84 BPM | WEIGHT: 293 LBS | HEART RATE: 84 BPM | DIASTOLIC BLOOD PRESSURE: 78 MMHG | RESPIRATION RATE: 18 BRPM | DIASTOLIC BLOOD PRESSURE: 78 MMHG | TEMPERATURE: 99 F | HEIGHT: 64 IN

## 2023-09-21 DIAGNOSIS — M54.50 ACUTE BILATERAL LOW BACK PAIN WITHOUT SCIATICA: ICD-10-CM

## 2023-09-21 DIAGNOSIS — C79.51 MALIGNANT NEOPLASM METASTATIC TO BONE: ICD-10-CM

## 2023-09-21 DIAGNOSIS — R33.9 URINARY RETENTION: ICD-10-CM

## 2023-09-21 DIAGNOSIS — R33.9 URINARY RETENTION WITH INCOMPLETE BLADDER EMPTYING: ICD-10-CM

## 2023-09-21 DIAGNOSIS — C50.211 MALIGNANT NEOPLASM OF UPPER-INNER QUADRANT OF RIGHT BREAST IN FEMALE, ESTROGEN RECEPTOR POSITIVE: Primary | ICD-10-CM

## 2023-09-21 DIAGNOSIS — Z17.0 MALIGNANT NEOPLASM OF UPPER-INNER QUADRANT OF RIGHT BREAST IN FEMALE, ESTROGEN RECEPTOR POSITIVE: Primary | ICD-10-CM

## 2023-09-21 DIAGNOSIS — G62.9 NEUROPATHY: Primary | ICD-10-CM

## 2023-09-21 DIAGNOSIS — M54.9 DORSALGIA, UNSPECIFIED: ICD-10-CM

## 2023-09-21 DIAGNOSIS — G89.3 CANCER ASSOCIATED PAIN: ICD-10-CM

## 2023-09-21 DIAGNOSIS — R51.9 WORSENING HEADACHES: ICD-10-CM

## 2023-09-21 PROCEDURE — 99999 PR PBB SHADOW E&M-EST. PATIENT-LVL III: CPT | Mod: PBBFAC,,, | Performed by: NURSE PRACTITIONER

## 2023-09-21 PROCEDURE — 99214 PR OFFICE/OUTPT VISIT, EST, LEVL IV, 30-39 MIN: ICD-10-PCS | Mod: S$GLB,,, | Performed by: NURSE PRACTITIONER

## 2023-09-21 PROCEDURE — 3044F HG A1C LEVEL LT 7.0%: CPT | Mod: CPTII,S$GLB,, | Performed by: NURSE PRACTITIONER

## 2023-09-21 PROCEDURE — 3044F PR MOST RECENT HEMOGLOBIN A1C LEVEL <7.0%: ICD-10-PCS | Mod: CPTII,S$GLB,, | Performed by: NURSE PRACTITIONER

## 2023-09-21 PROCEDURE — 1159F MED LIST DOCD IN RCRD: CPT | Mod: CPTII,S$GLB,, | Performed by: NURSE PRACTITIONER

## 2023-09-21 PROCEDURE — 99999 PR PBB SHADOW E&M-EST. PATIENT-LVL III: ICD-10-PCS | Mod: PBBFAC,,, | Performed by: NURSE PRACTITIONER

## 2023-09-21 PROCEDURE — 3008F PR BODY MASS INDEX (BMI) DOCUMENTED: ICD-10-PCS | Mod: CPTII,S$GLB,, | Performed by: NURSE PRACTITIONER

## 2023-09-21 PROCEDURE — 1159F PR MEDICATION LIST DOCUMENTED IN MEDICAL RECORD: ICD-10-PCS | Mod: CPTII,S$GLB,, | Performed by: NURSE PRACTITIONER

## 2023-09-21 PROCEDURE — 3008F BODY MASS INDEX DOCD: CPT | Mod: CPTII,S$GLB,, | Performed by: NURSE PRACTITIONER

## 2023-09-21 PROCEDURE — 3074F SYST BP LT 130 MM HG: CPT | Mod: CPTII,S$GLB,, | Performed by: NURSE PRACTITIONER

## 2023-09-21 PROCEDURE — 3078F PR MOST RECENT DIASTOLIC BLOOD PRESSURE < 80 MM HG: ICD-10-PCS | Mod: CPTII,S$GLB,, | Performed by: NURSE PRACTITIONER

## 2023-09-21 PROCEDURE — 99204 OFFICE O/P NEW MOD 45 MIN: CPT | Mod: S$GLB,,, | Performed by: NURSE PRACTITIONER

## 2023-09-21 PROCEDURE — 4010F PR ACE/ARB THEARPY RXD/TAKEN: ICD-10-PCS | Mod: CPTII,S$GLB,, | Performed by: NURSE PRACTITIONER

## 2023-09-21 PROCEDURE — 3078F DIAST BP <80 MM HG: CPT | Mod: CPTII,S$GLB,, | Performed by: NURSE PRACTITIONER

## 2023-09-21 PROCEDURE — 99204 PR OFFICE/OUTPT VISIT, NEW, LEVL IV, 45-59 MIN: ICD-10-PCS | Mod: S$GLB,,, | Performed by: NURSE PRACTITIONER

## 2023-09-21 PROCEDURE — 99499 UNLISTED E&M SERVICE: CPT | Mod: S$GLB,,, | Performed by: NURSE PRACTITIONER

## 2023-09-21 PROCEDURE — 63600175 PHARM REV CODE 636 W HCPCS: Mod: JZ,JG | Performed by: NURSE PRACTITIONER

## 2023-09-21 PROCEDURE — 3074F PR MOST RECENT SYSTOLIC BLOOD PRESSURE < 130 MM HG: ICD-10-PCS | Mod: CPTII,S$GLB,, | Performed by: NURSE PRACTITIONER

## 2023-09-21 PROCEDURE — 4010F ACE/ARB THERAPY RXD/TAKEN: CPT | Mod: CPTII,S$GLB,, | Performed by: NURSE PRACTITIONER

## 2023-09-21 PROCEDURE — 96402 CHEMO HORMON ANTINEOPL SQ/IM: CPT

## 2023-09-21 PROCEDURE — 1160F PR REVIEW ALL MEDS BY PRESCRIBER/CLIN PHARMACIST DOCUMENTED: ICD-10-PCS | Mod: CPTII,S$GLB,, | Performed by: NURSE PRACTITIONER

## 2023-09-21 PROCEDURE — 99214 OFFICE O/P EST MOD 30 MIN: CPT | Mod: S$GLB,,, | Performed by: NURSE PRACTITIONER

## 2023-09-21 PROCEDURE — 1160F RVW MEDS BY RX/DR IN RCRD: CPT | Mod: CPTII,S$GLB,, | Performed by: NURSE PRACTITIONER

## 2023-09-21 PROCEDURE — 96372 THER/PROPH/DIAG INJ SC/IM: CPT | Mod: 59

## 2023-09-21 RX ADMIN — DENOSUMAB 120 MG: 120 INJECTION SUBCUTANEOUS at 10:09

## 2023-09-21 RX ADMIN — GOSERELIN ACETATE 3.6 MG: 3.6 IMPLANT SUBCUTANEOUS at 10:09

## 2023-09-21 NOTE — PROGRESS NOTES
Subjective     Patient ID: Kristopher Elmore is a 47 y.o. female.    Chief Complaint: Malignant neoplasm of upper-inner quadrant of right breast     HPI    Presents for an urgent care follow up today.  She saw RAS Bhatt NP last week and was sent to the ER for upper and lower back pain, leg weakness and new urinary retention.  Thoracic and lumbar MRIs were done and did not show cord compression or new lesions.  She was given pain medication and a salcedo catheter was placed.  She was scheduled a follow up with urology for next week.  Labs, imagine, and notes reviewed today.      Interval hx:     Patient felt symptoms had improved/resolved after ER visit.  She took out her catheter at home on Tuesday because she thought she did not need it and it was irritating her.  Since then her symptoms of back pain, leg weakness, headaches, and urinary retention have returned.  She is having urine output, but not much.   Only taking ibuprofen for pain, but that has not helped.  Has not taken percocet.  No fevers.  No sob or chest pain.  No nausea or vomiting.  Appetite poor.       Currently on Arimidex and Ibrance (dose adjusted due to anemia to 100 mg M-F, off Sat&Sun)   Zoladex and Xgeva monthly   Most recent scans stable.  Genetic testing negative.      Diagnosis:  1. Stage IV ([id number]) invasive ductal carcinoma of right breast, upper inner quadrant, ER %, ND neg, Her2 neg, Grade 3, multifocal with one lesion appearing more aggressive (Ki67 80%), large LN +, bone mets.   Ibrance dose adjusted with cytopenias   Oncologic History:   Presentation   - 9/11/19 - Screening mammogram showed multiple right breast masses lower inner quadrant   - 9/13/19 - Diagnostic mammogram and US showed a right breast, 3:00 position mass, 2 CFN measuring 17 mm x 16 mm x 14 mm. There 2 smaller adjacent masses towards the nipple   - 9/19/19 - Biopsy -   A. Right breast subareolar: Grade 3 IDC, estrogen receptor 80%, progesterone receptor  "0%, Her2 dago neg, Ki67 80%.   B. Right breast mass 3:00: Grade 3 IDC with papillary features, estrogen receptor 100%, progesterone receptor 0%, Her2 dago 1+, Ki67 30%.   - 9/26/19: US right axilla with abnormal lymphadenopathy measuring 4.3 x 2.8 cm with biopsy + for metastatic breast cancer.   Surgery consultation with Dr Ferris on 9/25/19   - 9/25/19 - Genetics [facility] pending; VUS pending   Medical oncology consultation on 10/7/19   * 10/8/19 - CT C/A/P with several lytic lesions in thoracic and lumbar spine, iliac bones, left scapula in addition to 3 x 4.5 cm right axillary node and 2.1 cm right breast mass. Bone scan negative.   * 10/31/19 - started Ibrance, Arimidex, Zoladex; plan for Xgeva.   * 11/12/19 STRATA without targetable mutations.   * 12/16/19 - Bone biopsy + for met disease (initially read as negative, but we treated as metastatic disease given high suspicion).   * MRI brain: "Partially empty sella. Question bilateral globe proptosis. Otherwise unremarkable MRI brain as detailed above specifically without evidence for intracranial enhancing lesion to suggest metastatic disease."   * 4/22/2020 PET - disease improvement. 8/2020 PET with disease improvement.   * 9/1/20 - Palliative RT to L1-L4   Of note, * Xgeva monthly - we had been waiting on dental clearance, but she has been unable to get into dentistry and is accepting of risks. Has no active dental disease.   PET scan 4/22/2021:   FINDINGS:   Quality of the study: Mildly degraded due to patient's large body habitus and skin abutting the gantry.   In the head and neck, there are no hypermetabolic lesions worrisome for malignancy. There are no hypermetabolic mucosal lesions, and there are no pathologically enlarged or hypermetabolic lymph nodes.   In the chest, there are no hypermetabolic lesions worrisome for malignancy.   Stable CT appearance of a level 1 right axillary lymph node measuring 1.3 cm in short axis with " normal background radiotracer uptake. Previously with SUV max of 2.1.   There are no concerning pulmonary nodules or masses, and there are no pathologically enlarged or hypermetabolic lymph nodes.   In the abdomen and pelvis, there is physiologic tracer distribution within the abdominal organs and excretion into the genitourinary system.   In the bones, there are stable lytic lesions throughout the lumbar spine and pelvis and additional stable sclerotic lesions in the sternum and T3 vertebral body. No associated focal abnormal increased radiotracer uptake. Findings likely represent treated disease.   Impression:   Interval decreased uptake within a prominent right axillary lymph node, now with normal background uptake. No new focal abnormal uptake.   Stable lytic and sclerotic lesions without focal abnormal uptake. Findings are compatible with treated metastasis.   8/25/2021 MRI brain   Impression:   No significant change from prior specifically without evidence for enhancing lesion to suggest intracranial metastatic disease.   Continued partially empty sella, intracranial hypertension to be included in differential in the appropriate clinical setting. Clinical correlation and further evaluation as warranted.   10/14/2021 MRI thoracic/lumbar spine:   Impression:   Patient history of metastatic breast cancer.   Bone lesions at T10, L2, L3, and right iliac wing, as above, concerning for metastatic disease. No pathologic fracture, soft tissue component, or epidural extension identified.   10/25/2021 CT chest/abd/pelvis:   Impression:   1. Stable metastasis of the spine   No evidence of intra-abdominal or intrathoracic metastatic disease   2. Stable prominent right axillary lymph node.   3. Additional findings are detailed above.   Currently on Arimidex and Ibrance   Zoladex and Xgeva monthly   - 2/2/2022 Bone scan:   FINDINGS:   No focal abnormal uptake suggestive of osseous metastases. There is diffusely increased  uptake in the calvarium in keeping with hyperostosis. There is degenerative type uptake most prominent in the bilateral shoulders and knees. The focal uptake previously described in the distal right femur is not visualized. There is otherwise physiologic distribution of the radiopharmaceutical throughout the skeleton.   There is normal uptake in the genitourinary system and soft tissues.   Impression:   There is no scintigraphic evidence of osteoblastic metastatic disease.   Nonspecific uptake in the distal right femur has resolved.   Diffuse cranial hyperostosis and other findings as above.   - 2/1/2022 CT C/A/P:   FINDINGS:   CHEST   Support tubes and lines: None.   Aorta: Normal caliber.   Heart: Normal size..   Coronary arteries: No calcifications.   Pericardium: Normal. No effusion, thickening, or calcification.   Central pulmonary arteries: Normal caliber.   Base of neck/thyroid: Normal.   Lymph nodes: No supraclavicular, axillary, internal mammary, mediastinal or hilar lymphadenopathy.   Esophagus: Normal.   Pleura: No effusion, thickening or calcification.   Body wall: Unremarkable.   Airways: Normal.   Lungs: Clear without focal or diffuse abnormality.   Bones: Sclerotic lesions are seen throughout the spine as well as in the sternum, not substantially changed from 10/25/2021   ABDOMEN/PELVIS   Liver: Unremarkable   Gallbladder/bile ducts: Unremarkable. No intra or extrahepatic biliary ductal dilatation   Pancreas: Unremarkable.   Spleen: Unremarkable.   Adrenals: Unremarkable.   Kidneys: Simple cyst in the right kidney is unchanged   Lymph nodes: No abdominal or pelvic lymphadenopathy.   Bowel and mesentery: Unremarkable.   Abdominal aorta: Unremarkable.   Inferior vena cava: Unremarkable.   Free fluid or free air: None.   Pelvis: Unremarkable.   Urinary bladder: Unremarkable.   Body wall: Unremarkable.   Bones: Sclerotic lesions seen in the spine are unchanged.   Impression:   1. No evidence of new  recurrent or metastatic disease.   2. Sclerotic lesions seen in the spine and sternum, unchanged when compared to 10/25/2021.      Genetic testing 3/23/2023             Most recent scans:   - 7/5/2023 CT C/A/P:  FINDINGS:  Some peripheral portions of the body wall are not covered on exam field of view due to patient body habitus  Base of neck: Unremarkable.  Thoracic soft tissues: No significant abnormality.  Aorta: Normal caliber.  Heart: Normal in size. No pericardial effusion.  Jane/Mediastinum: No pathologic lymphadenopathy.  Lungs: Well aerated, without consolidation or pleural fluid.  Liver: Diffusely hypodense parenchyma suggestive for steatosis.  No focal lesion.  Portal veins are patent.  Gallbladder: Unremarkable.  Bile Ducts: No evidence of dilated ducts.  Pancreas: No mass or ductal dilatation.  Spleen: Unremarkable.  Adrenals: Unremarkable.  Kidneys/ Ureters: Small right renal cyst.  No hydronephrosis or nephrolithiasis.  No ureteral dilatation.  Bladder: No evidence of wall thickening.  Reproductive organs: Calcified uterine fibroid.  GI Tract/Mesentery: No evidence of bowel obstruction or inflammation.  Peritoneal Space: No ascites.  No free air.  Retroperitoneum: No significant adenopathy.  Abdominal wall: Minimal body wall edema.  Vasculature: Abdominal aorta is normal caliber.  Bones: Stable mixed lytic and sclerotic lesions throughout the spine, sternum, and right iliac wing.  No definite new lesion.  No acute fracture.  Impression:  1. Stable mixed lytic and sclerotic osseous lesions.  No evidence of new metastatic disease.  2. Additional findings as above.    Review of Systems   Constitutional:         See above   All other systems reviewed and are negative.       Objective     Physical Exam  Vitals and nursing note reviewed.   Constitutional:       General: She is not in acute distress.     Appearance: Normal appearance. She is obese. She is not ill-appearing.      Comments: Presents with  daughters.   In WC due to difficulty walking long distance.   Able to transfer to exam table with assistance     HENT:      Head: Normocephalic and atraumatic.      Mouth/Throat:      Comments: Tongue geography no different than usual  Eyes:      General: No scleral icterus.     Extraocular Movements: Extraocular movements intact.      Pupils: Pupils are equal, round, and reactive to light.   Cardiovascular:      Rate and Rhythm: Normal rate and regular rhythm.      Heart sounds: No murmur heard.     No friction rub. No gallop.   Pulmonary:      Effort: Pulmonary effort is normal.      Breath sounds: Normal breath sounds.      Comments: Breasts- deferred today  Abdominal:      General: Bowel sounds are normal. There is no distension.      Tenderness: There is no abdominal tenderness.   Musculoskeletal:         General: No swelling.      Cervical back: Normal range of motion.      Right lower leg: Edema present.      Left lower leg: Edema present.      Comments: Point tenderness in cervical, thoracic and lumbar spine. Also with cva tenderness today   Lymphadenopathy:      Cervical: No cervical adenopathy.   Skin:     General: Skin is warm and dry.      Findings: No bruising or rash.   Neurological:      General: No focal deficit present.      Mental Status: She is alert and oriented to person, place, and time.      Sensory: No sensory deficit.      Motor: Weakness present.   Psychiatric:         Mood and Affect: Mood normal.         Behavior: Behavior normal.         Thought Content: Thought content normal.         Judgment: Judgment normal.       Labs- reviewed     Assessment and Plan     1. Malignant neoplasm of upper-inner quadrant of right breast in female, estrogen receptor positive        2. Bone metastases        3. Urinary retention with incomplete bladder emptying  Refer to Emergency Dept.      4. Cancer associated pain        5. Worsening headaches  Refer to Emergency Dept.      6. Acute bilateral low back  pain without sciatica            Metastatic breast cancer  Patient presents with new and worsening pain along spine (cervical, thoracic, lumbar), more so lower back. She is having more leg weakness and had an episode of urinary incontinence yesterday. Also  reports having trouble emptying her bladder. Also with daily headaches and dizziness.  All symptoms had improved after ER visit when salcedo catheter was in place, now starting to return after salcedo self-removed.  Spoke with Dr. Lei, will send back to ER to have salcedo repalced  Will have patient follow up in clinic in one week for urgent care referral  Report called to ED    Labs from ER reviewed and overall stable.   Continue arimidex and Ibrance (100 mg M-F, off Sat&Sun).   Xgeva and Zoladex - due today but patient going to ED.     Anemia parameters stable.   Continue f/u with palliative.   Nutrition discussed - increase protein.     Route Chart for Scheduling    Med Onc Chart Routing  Urgent    Follow up with physician    Follow up with CAMILA . With PJ in one week for an ER follow up   Infusion scheduling note    Injection scheduling note    Labs    Imaging    Pharmacy appointment    Other referrals                Treatment Plan Information   OP ANASTROZOLE PALBOCICLIB Q4W   Jessica Mendez MD   Upcoming Treatment Dates - OP ANASTROZOLE PALBOCICLIB Q4W    No upcoming days in selected categories.    Supportive Plan Information  OP BREAST GOSERELIN & DENOSUMAB Q4W   Jessica Mendez MD   Upcoming Treatment Dates - OP BREAST GOSERELIN & DENOSUMAB Q4W    No upcoming days in selected categories.    Therapy Plan Information  EPINEPHrine (EPIPEN) 0.3 mg/0.3 mL pen injection 0.3 mg  0.3 mg, Intramuscular, PRN  diphenhydrAMINE injection 50 mg  50 mg, Intravenous, PRN  methylPREDNISolone sodium succinate injection 125 mg  125 mg, Intravenous, PRN  sodium chloride 0.9% bolus 1,000 mL 1,000 mL  1,000 mL, Intravenous, PRN       Patient is in agreement with the proposed  treatment plan. All questions were answered to the patient's satisfaction. Pt knows to call clinic for any new or worsening symptoms and if anything is needed before the next clinic visit.    SHWETHA Patel  Hematology & Medical Oncology  [address] [place] [id number]  ph. [phone] ext [phone]  Fax. [phone]        Urgent Care Oncology Visit    Date and Time: 09/21/2023 10:45 AM   Patient MRN: [id number]   Chief Complaint:   Chief Complaint   Patient presents with    Malignant neoplasm of upper-inner quadrant of right breast      Reason for Urgent Care Visit:  urgent care follow up  Patient Type: active chemotherapy/immunotherapy treatment  Intervention: emergency department  Dispo: admit / ED  UrgOnc appointment addressed via:  was a follow up to urgent care visit  This UrgOnc visit was in person  Time spent handling UC issue:  30 minutes    Was this virtual or in person UrgOnc visit appropriate? Yes    30 minutes of total time spent on the encounter, which includes face to face time and non-face to face time preparing to see the patient (eg, review of tests), Obtaining and/or reviewing separately obtained history, Documenting clinical information in the electronic or other health record, Independently interpreting results (not separately reported) and communicating results to the patient/family/caregiver, or Care coordination (not separately reported).

## 2023-09-25 ENCOUNTER — PATIENT OUTREACH (OUTPATIENT)
Dept: EMERGENCY MEDICINE | Facility: HOSPITAL | Age: 48
End: 2023-09-25

## 2023-09-26 ENCOUNTER — OFFICE VISIT (OUTPATIENT)
Dept: UROLOGY | Facility: CLINIC | Age: 48
End: 2023-09-26
Payer: MEDICARE

## 2023-09-26 ENCOUNTER — PATIENT MESSAGE (OUTPATIENT)
Dept: HEMATOLOGY/ONCOLOGY | Facility: CLINIC | Age: 48
End: 2023-09-26
Payer: MEDICARE

## 2023-09-26 VITALS
HEART RATE: 82 BPM | SYSTOLIC BLOOD PRESSURE: 140 MMHG | WEIGHT: 293 LBS | BODY MASS INDEX: 58.5 KG/M2 | DIASTOLIC BLOOD PRESSURE: 93 MMHG

## 2023-09-26 DIAGNOSIS — R33.9 URINARY RETENTION: ICD-10-CM

## 2023-09-26 PROCEDURE — 99999 PR PBB SHADOW E&M-EST. PATIENT-LVL III: CPT | Mod: PBBFAC,,, | Performed by: UROLOGY

## 2023-09-26 PROCEDURE — 3077F SYST BP >= 140 MM HG: CPT | Mod: CPTII,S$GLB,, | Performed by: UROLOGY

## 2023-09-26 PROCEDURE — 3008F BODY MASS INDEX DOCD: CPT | Mod: CPTII,S$GLB,, | Performed by: UROLOGY

## 2023-09-26 PROCEDURE — 3080F DIAST BP >= 90 MM HG: CPT | Mod: CPTII,S$GLB,, | Performed by: UROLOGY

## 2023-09-26 PROCEDURE — 99204 OFFICE O/P NEW MOD 45 MIN: CPT | Mod: S$GLB,,, | Performed by: UROLOGY

## 2023-09-26 NOTE — PROGRESS NOTES
Neurosurgery  History & Physical    SUBJECTIVE:     Chief Complaint: Back Pain    History of Present Illness: Kristopher Elmore is a 47 y.o. female with a PMHx of breast cancer with stable lytic lesions throughout the lumbar spine and pelvis and additional stable sclerotic lesions in the sternum and T3 vertebral body. She is being seen in clinic today to follow-up on her recent ED visit from 9/14/23 for worsening back pain and urinary retention. States that her pain is in the low back which radiates to the left leg and neck pain that worsened a couple of weeks ago. Denies trauma. She was given pain medication and a salcedo catheter was placed. A MRI thoracic and lumbar spine were also obtained, which showed stable lesions involving T10, L2, L3, and the right iliac wing. No new lesion. No high-grade spinal canal stenosis or neural foraminal narrowing.     She was discharged home in stable condition with a Salcedo that she continues to need. She is also now taking percocet daily as prescribed by palliative care. She also endorses generalized weakness and seems to be getting worse; she will get dizzy if stands up. Headaches are daily for her now as well.     Review of patient's allergies indicates:   Allergen Reactions    Keflex [cephalexin] Itching       Current Outpatient Medications   Medication Sig Dispense Refill    ammonium lactate 12 % Crea Apply 1 application topically once daily. 140 g 5    anastrozole (ARIMIDEX) 1 mg Tab TAKE 1 TABLET(1 MG) BY MOUTH EVERY DAY 90 tablet 3    atorvastatin (LIPITOR) 40 MG tablet TAKE 1 TABLET(40 MG) BY MOUTH EVERY DAY 90 tablet 3    blood sugar diagnostic Strp To check BG 2 times daily, to use with insurance preferred meter 200 each 3    blood-glucose meter kit To check BG 2 times daily, to use with insurance preferred meter 1 each 0    blood-glucose sensor (DEXCOM G7 SENSOR) Monique Change every 10 days 3 each 11    carvediloL (COREG) 25 MG tablet TAKE 1 TABLET(25 MG) BY MOUTH  TWICE DAILY 180 tablet 3    droNABinol (MARINOL) 2.5 MG capsule Take 1 capsule (2.5 mg total) by mouth 2 (two) times daily before meals. 60 capsule 0    empagliflozin (JARDIANCE) 25 mg tablet Take 1 tablet (25 mg total) by mouth once daily. 90 tablet 2    ENTRESTO  mg per tablet TAKE 1 TABLET BY MOUTH TWICE DAILY 60 tablet 11    ergocalciferol (ERGOCALCIFEROL) 50,000 unit Cap TAKE 1 CAPSULE BY MOUTH EVERY 7 DAYS 12 capsule 0    furosemide (LASIX) 20 MG tablet Take 1 tablet (20 mg total) by mouth 2 (two) times daily. 180 tablet 3    glipiZIDE (GLUCOTROL) 10 MG TR24 TAKE 1 TABLET(10 MG) BY MOUTH DAILY WITH BREAKFAST 90 tablet 2    goserelin (ZOLADEX) 3.6 mg injection Inject 3.6 mg into the skin every 28 days.      ibuprofen (ADVIL,MOTRIN) 800 MG tablet Take 1 tablet (800 mg total) by mouth 2 (two) times daily as needed for Pain. 45 tablet 2    lancets Misc To check BG 2 times daily, to use with insurance preferred meter 200 each 3    metFORMIN (GLUCOPHAGE-XR) 500 MG ER 24hr tablet Take 2 tablets (1,000 mg total) by mouth 2 (two) times daily with meals. 360 tablet 2    naloxone (NARCAN) 4 mg/actuation Spry 4mg by nasal route as needed for opioid overdose; may repeat every 2-3 minutes in alternating nostrils until medical help arrives. Call 911 1 each 11    nitrofurantoin, macrocrystal-monohydrate, (MACROBID) 100 MG capsule Take 1 capsule (100 mg total) by mouth 2 (two) times daily. for 5 days 10 capsule 0    ondansetron (ZOFRAN-ODT) 8 MG TbDL DISSOLVE 1 TABLET(8 MG) ON THE TONGUE EVERY 8 HOURS AS NEEDED FOR NAUSEA 30 tablet 2    oxyCODONE-acetaminophen (PERCOCET) 5-325 mg per tablet Take 1 tablet by mouth every 4 (four) hours as needed for Pain. 60 tablet 0    palbociclib (IBRANCE) 100 mg Cap Take 1 capsule (100 mg) by mouth once daily for 21 days, followed by 7 days off. 21 capsule 3    potassium chloride (KLOR-CON) 10 MEQ TbSR Take 1 tablet (10 mEq total) by mouth once daily. 30 tablet 2    prochlorperazine  (COMPAZINE) 5 MG tablet Take 1 tablet (5 mg total) by mouth 4 (four) times daily as needed for Nausea. 40 tablet 2    sennosides (SENNA-C ORAL) Take 2 tablets by mouth daily as needed.      tirzepatide 7.5 mg/0.5 mL PnIj Inject 7.5 mg into the skin every 7 days. 4 pen 3    traZODone (DESYREL) 50 MG tablet Take 1 tablet (50 mg total) by mouth every evening. 30 tablet 11    venlafaxine (EFFEXOR-XR) 150 MG Cp24 Take 1 capsule (150 mg total) by mouth once daily. 90 capsule 0    venlafaxine (EFFEXOR-XR) 75 MG 24 hr capsule Take 1 capsule (75 mg total) by mouth once daily. 90 capsule 0     No current facility-administered medications for this visit.       Past Medical History:   Diagnosis Date    Abnormal Pap smear     pt states 13yrs ago colpo was done    Anemia     Anxiety     Cancer     Cardiomyopathy     CHF (congestive heart failure)     Fibroid     Hx of psychiatric care     Hyperlipidemia     Hypertension     Psychiatric problem     Sleep difficulties     Therapy      Past Surgical History:   Procedure Laterality Date     SECTION      TONSILLECTOMY      TUBAL LIGATION      UTERINE FIBROID EMBOLIZATION       Family History       Problem Relation (Age of Onset)    Arthritis Mother    Asthma Daughter    Cervical cancer Paternal Cousin    Diabetes Mother    Endometriosis Paternal Cousin    Fibroids Other, Other, Other, Other, Other    Heart disease Mother    Heart failure Mother    Hyperlipidemia Mother    Hypertension Mother, Brother    Kidney cancer Maternal Grandfather (79)    No Known Problems Maternal Grandmother    Other Mother    Ovarian cancer Paternal Cousin    Rashes / Skin problems Daughter    Thyroid cancer Other          Social History     Socioeconomic History    Marital status:    Tobacco Use    Smoking status: Never    Smokeless tobacco: Never   Substance and Sexual Activity    Alcohol use: Not Currently     Alcohol/week: 0.0 standard drinks of alcohol    Drug use: No    Sexual  "activity: Not Currently     Partners: Male     Birth control/protection: Surgical   Social History Narrative    Patient is a pleasant AAF,  x2. She has excellent social support, although she states they worry more than she does.     Former teacher English.    Disabled.    She lives with her 2 adult daughters - 19 and 21.     Social Determinants of Health     Financial Resource Strain: Low Risk  (8/17/2021)    Overall Financial Resource Strain (CARDIA)     Difficulty of Paying Living Expenses: Not hard at all   Food Insecurity: No Food Insecurity (8/17/2021)    Hunger Vital Sign     Worried About Running Out of Food in the Last Year: Never true     Ran Out of Food in the Last Year: Never true   Transportation Needs: Unmet Transportation Needs (8/17/2021)    PRAPARE - Transportation     Lack of Transportation (Non-Medical): Yes   Social Connections: Unknown (8/17/2021)    Social Connection and Isolation Panel [NHANES]     Frequency of Communication with Friends and Family: Three times a week       Review of Systems   Constitutional:  Negative for activity change, appetite change and fever.   HENT:  Negative for ear discharge.    Eyes:  Negative for pain and visual disturbance.   Respiratory:  Negative for cough, chest tightness and shortness of breath.    Cardiovascular:  Negative for chest pain and palpitations.   Gastrointestinal:  Negative for abdominal distention and abdominal pain.   Endocrine: Negative for polydipsia, polyphagia and polyuria.   Musculoskeletal:  Positive for arthralgias, back pain, myalgias and neck pain.   Skin:  Negative for color change and wound.   Neurological:  Positive for dizziness, weakness, numbness and headaches.   Psychiatric/Behavioral:  Negative for behavioral problems, confusion and dysphoric mood.        OBJECTIVE:     Vital Signs  Pulse: 88  BP: 111/65  Pain Score:   7  Height: 5' 4" (162.6 cm)  Weight: (!) 155 kg (341 lb 11.4 oz)  Body mass index is 58.65 " kg/m².      Neurosurgery Physical Exam  General: well developed, well nourished, no distress.   Head: normocephalic, atraumatic  Neurologic: Alert and oriented. Thought content appropriate.  GCS: Motor: 6/Verbal: 5/Eyes: 4 GCS Total: 15  Mental Status: Awake, Alert, Oriented x 4  Language: No aphasia  Speech: No dysarthria  Cranial nerves: face symmetric, tongue midline, CN II-XII grossly intact.   Eyes: pupils equal, round, reactive to light with accomodation, EOMI.   Pulmonary: normal respirations, no signs of respiratory distress  Abdomen: soft, non-distended  Skin: Skin is warm, dry and intact.  Sensory: intact to light touch throughout  Motor Strength:Moves all extremities spontaneously with good tone.    Strength  Deltoids Triceps Biceps Wrist Extension Wrist Flexion Hand    Upper: R 5/5 5/5 5/5 5/5 5/5 5/5    L 5/5 5/5 5/5 5/5 5/5 5/5     HF KE KF DF PF EHL   Lower: R 5/5 5/5 5/5 5/5 5/5 5/5    L 5/5 5/5 5/5 5/5 5/5 5/5     Reflexes:   Brandt's: Negative.    Cervical:   ROM: Full with flexion, extension, lateral rotation and ear-to-shoulder bend.   Midline TTP: Negative.     Thoracic:  Midline TTP: Negative.     Lumbar:  Midline TTP: Negative.    Diagnostic Results:  I have personally reviewed the imaging as mentioned in the HPI with Dr. Fields.     ASSESSMENT/PLAN:   Kristopher Elmore is a 47 y.o. female with a PMHx of breast cancer with stable lytic lesions throughout the lumbar spine and pelvis and additional stable sclerotic lesions in the sternum and T3 vertebral body. She was seen in clinic today to follow-up on her recent ED visit from 9/14/23 for worsening back pain and urinary retention. After reviewing the imaging and concerns with Dr. Fields, he recommended a MRI of the cervical spine and brain to evaluate for metastatic lesions or cord compression. A referral to pain management has also been placed.     I would like the patient to follow-up in clinic in 4 weeks to discuss the image  findings. I have encouraged her to contact the clinic with any questions, concerns, or adverse clinical changes. She verbalized understanding.      ADELINE Thomson  Neurosurgery  [facility]. [place].      Note dictated with voice recognition software, please excuse any grammatical errors.

## 2023-09-26 NOTE — PROGRESS NOTES
[facility]      New Urinary Retention Note    9/26/2023    Referred by:  Karen Verma MD    HPI: Kristopher Elmore is a very pleasant 47 y.o. female referred for evaluation of acute urinary retention of  unknown  duration. She currently  does not have a catheter in place. She has had a catheter placed in the ED a couple of times over the last 2 weeks. She then removes the catheter and returns for difficulty voiding. However, I have not seen her catheterized residuals quantified in the notes (there is a notation of a bladder scan of 204mL, which would not be accurate in this lady given her body habitus)  She does have a history of metastatic breast cancer with lytic spinal lesions. These have previously been noted to be stable.     She voided just prior to coming to the office and her catheterized PVR was about 50 mL. I did not leave a catheter in place.     A review of 10+ systems was conducted with pertinent positive and negative findings documented in HPI with all other systems reviewed and negative.    Past medical, family, surgical and social history reviewed as documented in chart with pertinent positive medical, family, surgical and social history detailed in HPI.    Exam Findings:    Const: no acute distress, conversant and alert  Eyes: anicteric, extraocular muscles intact  ENMT: normocephalic, Nl oral membranes  Cardio: no cyanosis, nl cap refill  Pulm: no tachypnea; no resp distress  Abd: soft, no tenderness  Musc: no laceration, no tenderness  Neuro: alert; oriented x 3  Skin: warm, dry; no petichiae  Psych: no anxiety; normal speech          Assessment/Plan:    Chronic Urinary Retention (new, addt'l workup): It's not clear in reviewing her ED notes what her previous catheterized post void residuals have been. A bladder scan would not be accurate for this lady. She empties completely today. She does not like having catheters in place and has removed them on her own 3x. I  would not recommend replacing a catheter in this lady unless she clearly demonstrates retention with catheterized residuals. Return in 1-2 weeks to repeat a catheterized PVR.

## 2023-09-27 ENCOUNTER — OFFICE VISIT (OUTPATIENT)
Dept: HEMATOLOGY/ONCOLOGY | Facility: CLINIC | Age: 48
End: 2023-09-27
Payer: MEDICARE

## 2023-09-27 DIAGNOSIS — Z17.0 MALIGNANT NEOPLASM OF UPPER-INNER QUADRANT OF RIGHT BREAST IN FEMALE, ESTROGEN RECEPTOR POSITIVE: Primary | ICD-10-CM

## 2023-09-27 DIAGNOSIS — G89.3 CANCER ASSOCIATED PAIN: ICD-10-CM

## 2023-09-27 DIAGNOSIS — D64.81 ANEMIA ASSOCIATED WITH CHEMOTHERAPY: ICD-10-CM

## 2023-09-27 DIAGNOSIS — C50.211 MALIGNANT NEOPLASM OF UPPER-INNER QUADRANT OF RIGHT BREAST IN FEMALE, ESTROGEN RECEPTOR POSITIVE: Primary | ICD-10-CM

## 2023-09-27 DIAGNOSIS — T45.1X5A ANEMIA ASSOCIATED WITH CHEMOTHERAPY: ICD-10-CM

## 2023-09-27 DIAGNOSIS — C79.51 MALIGNANT NEOPLASM METASTATIC TO BONE: ICD-10-CM

## 2023-09-27 PROCEDURE — 3044F HG A1C LEVEL LT 7.0%: CPT | Mod: CPTII,95,, | Performed by: NURSE PRACTITIONER

## 2023-09-27 PROCEDURE — 4010F PR ACE/ARB THEARPY RXD/TAKEN: ICD-10-PCS | Mod: CPTII,95,, | Performed by: NURSE PRACTITIONER

## 2023-09-27 PROCEDURE — 3044F PR MOST RECENT HEMOGLOBIN A1C LEVEL <7.0%: ICD-10-PCS | Mod: CPTII,95,, | Performed by: NURSE PRACTITIONER

## 2023-09-27 PROCEDURE — 99215 PR OFFICE/OUTPT VISIT, EST, LEVL V, 40-54 MIN: ICD-10-PCS | Mod: 95,,, | Performed by: NURSE PRACTITIONER

## 2023-09-27 PROCEDURE — 99499 UNLISTED E&M SERVICE: CPT | Mod: 95,,, | Performed by: NURSE PRACTITIONER

## 2023-09-27 PROCEDURE — 99215 OFFICE O/P EST HI 40 MIN: CPT | Mod: 95,,, | Performed by: NURSE PRACTITIONER

## 2023-09-27 PROCEDURE — 4010F ACE/ARB THERAPY RXD/TAKEN: CPT | Mod: CPTII,95,, | Performed by: NURSE PRACTITIONER

## 2023-09-27 NOTE — Clinical Note
Hi! Please make sure she gets mri thoracic and lumbar spine with contrast this Friday. I only see a cervical. thanks

## 2023-09-27 NOTE — PROGRESS NOTES
Subjective     Patient ID: Kristopher Elmore is a 47 y.o. female.    Chief Complaint: No chief complaint on file.    HPI    Presents for an urgent care follow up today. Of note, She did receive her injections 9/21/2023  I saw her on 9/14/2023 and she has been to ED 3 times since then.   Initially sent for imaging of her spine as with episode of incontinence.   Sent back to ED last week for replacement of salcedo, however had to return within 24 hours as reports placement was not correct.   Most recent catheter lasted until this past Sunday.   Saw urology yesterday and did not require reinsertion as little/no retention.   MRI with contrast ordered and scheduled for Friday and she has a f/u on Tuesday.     Started with vomiting and diarrhea at 9pm last night.   Ate left over spaghetti.   Last episode was this morning.   Took and zofran and imodium this morning and feeling better.   No fever/chills  Back pain stable  +HA since last night and feels yucky.   Also on Mounjaro and feels full quickly.   Urinating well and no episodes of incontinence.           Currently on Arimidex and Ibrance (dose adjusted due to anemia to 100 mg M-F, off Sat&Sun)   Zoladex and Xgeva monthly   Most recent scans stable.  Genetic testing negative.      Diagnosis:  1. Stage IV ([id number]) invasive ductal carcinoma of right breast, upper inner quadrant, ER %, W[place] neg, Her2 neg, Grade 3, multifocal with one lesion appearing more aggressive (Ki67 80%), large LN +, bone mets.   Ibrance dose adjusted with cytopenias   Oncologic History:   Presentation   - 9/11/19 - Screening mammogram showed multiple right breast masses lower inner quadrant   - 9/13/19 - Diagnostic mammogram and US showed a right breast, 3:00 position mass, 2 CFN measuring 17 mm x 16 mm x 14 mm. There 2 smaller adjacent masses towards the nipple   - 9/19/19 - Biopsy -   A. Right breast subareolar: Grade 3 IDC, estrogen receptor 80%, progesterone receptor 0%, Her2 dago  "neg, Ki67 80%.   B. Right breast mass 3:00: Grade 3 IDC with papillary features, estrogen receptor 100%, progesterone receptor 0%, Her2 dago 1+, Ki67 30%.   - 9/26/19: US right axilla with abnormal lymphadenopathy measuring 4.3 x 2.8 cm with biopsy + for metastatic breast cancer.   Surgery consultation with Dr Ferris on 9/25/19   - 9/25/19 - Genetics Genetics Fluid BRACAnalysis pending; VUS pending   Medical oncology consultation on 10/7/19   * 10/8/19 - CT C/A/P with several lytic lesions in thoracic and lumbar spine, iliac bones, left scapula in addition to 3 x 4.5 cm right axillary node and 2.1 cm right breast mass. Bone scan negative.   * 10/31/19 - started Ibrance, Arimidex, Zoladex; plan for Xgeva.   * 11/12/19 STRATA without targetable mutations.   * 12/16/19 - Bone biopsy + for met disease (initially read as negative, but we treated as metastatic disease given high suspicion).   * MRI brain: "Partially empty sella. Question bilateral globe proptosis. Otherwise unremarkable MRI brain as detailed above specifically without evidence for intracranial enhancing lesion to suggest metastatic disease."   * 4/22/2020 PET - disease improvement. 8/2020 PET with disease improvement.   * 9/1/20 - Palliative RT to L1-L4   Of note, * Xgeva monthly - we had been waiting on dental clearance, but she has been unable to get into dentistry and is accepting of risks. Has no active dental disease.   PET scan 4/22/2021:   FINDINGS:   Quality of the study: Mildly degraded due to patient's large body habitus and skin abutting the gantry.   In the head and neck, there are no hypermetabolic lesions worrisome for malignancy. There are no hypermetabolic mucosal lesions, and there are no pathologically enlarged or hypermetabolic lymph nodes.   In the chest, there are no hypermetabolic lesions worrisome for malignancy.   Stable CT appearance of a level 1 right axillary lymph node measuring 1.3 cm in short axis with normal " background radiotracer uptake. Previously with SUV max of 2.1.   There are no concerning pulmonary nodules or masses, and there are no pathologically enlarged or hypermetabolic lymph nodes.   In the abdomen and pelvis, there is physiologic tracer distribution within the abdominal organs and excretion into the genitourinary system.   In the bones, there are stable lytic lesions throughout the lumbar spine and pelvis and additional stable sclerotic lesions in the sternum and T3 vertebral body. No associated focal abnormal increased radiotracer uptake. Findings likely represent treated disease.   Impression:   Interval decreased uptake within a prominent right axillary lymph node, now with normal background uptake. No new focal abnormal uptake.   Stable lytic and sclerotic lesions without focal abnormal uptake. Findings are compatible with treated metastasis.   8/25/2021 MRI brain   Impression:   No significant change from prior specifically without evidence for enhancing lesion to suggest intracranial metastatic disease.   Continued partially empty sella, intracranial hypertension to be included in differential in the appropriate clinical setting. Clinical correlation and further evaluation as warranted.   10/14/2021 MRI thoracic/lumbar spine:   Impression:   Patient history of metastatic breast cancer.   Bone lesions at T10, L2, L3, and right iliac wing, as above, concerning for metastatic disease. No pathologic fracture, soft tissue component, or epidural extension identified.   10/25/2021 CT chest/abd/pelvis:   Impression:   1. Stable metastasis of the spine   No evidence of intra-abdominal or intrathoracic metastatic disease   2. Stable prominent right axillary lymph node.   3. Additional findings are detailed above.   Currently on Arimidex and Ibrance   Zoladex and Xgeva monthly   - 2/2/2022 Bone scan:   FINDINGS:   No focal abnormal uptake suggestive of osseous metastases. There is diffusely increased uptake in  the calvarium in keeping with hyperostosis. There is degenerative type uptake most prominent in the bilateral shoulders and knees. The focal uptake previously described in the distal right femur is not visualized. There is otherwise physiologic distribution of the radiopharmaceutical throughout the skeleton.   There is normal uptake in the genitourinary system and soft tissues.   Impression:   There is no scintigraphic evidence of osteoblastic metastatic disease.   Nonspecific uptake in the distal right femur has resolved.   Diffuse cranial hyperostosis and other findings as above.   - 2/1/2022 CT C/A/P:   FINDINGS:   CHEST   Support tubes and lines: None.   Aorta: Normal caliber.   Heart: Normal size..   Coronary arteries: No calcifications.   Pericardium: Normal. No effusion, thickening, or calcification.   Central pulmonary arteries: Normal caliber.   Base of neck/thyroid: Normal.   Lymph nodes: No supraclavicular, axillary, internal mammary, mediastinal or hilar lymphadenopathy.   Esophagus: Normal.   Pleura: No effusion, thickening or calcification.   Body wall: Unremarkable.   Airways: Normal.   Lungs: Clear without focal or diffuse abnormality.   Bones: Sclerotic lesions are seen throughout the spine as well as in the sternum, not substantially changed from 10/25/2021   ABDOMEN/PELVIS   Liver: Unremarkable   Gallbladder/bile ducts: Unremarkable. No intra or extrahepatic biliary ductal dilatation   Pancreas: Unremarkable.   Spleen: Unremarkable.   Adrenals: Unremarkable.   Kidneys: Simple cyst in the right kidney is unchanged   Lymph nodes: No abdominal or pelvic lymphadenopathy.   Bowel and mesentery: Unremarkable.   Abdominal aorta: Unremarkable.   Inferior vena cava: Unremarkable.   Free fluid or free air: None.   Pelvis: Unremarkable.   Urinary bladder: Unremarkable.   Body wall: Unremarkable.   Bones: Sclerotic lesions seen in the spine are unchanged.   Impression:   1. No evidence of new recurrent or  metastatic disease.   2. Sclerotic lesions seen in the spine and sternum, unchanged when compared to 10/25/2021.      Genetic testing 3/23/2023             Most recent scans:   - 7/5/2023 CT C/A/P:  FINDINGS:  Some peripheral portions of the body wall are not covered on exam field of view due to patient body habitus  Base of neck: Unremarkable.  Thoracic soft tissues: No significant abnormality.  Aorta: Normal caliber.  Heart: Normal in size. No pericardial effusion.  Jane/Mediastinum: No pathologic lymphadenopathy.  Lungs: Well aerated, without consolidation or pleural fluid.  Liver: Diffusely hypodense parenchyma suggestive for steatosis.  No focal lesion.  Portal veins are patent.  Gallbladder: Unremarkable.  Bile Ducts: No evidence of dilated ducts.  Pancreas: No mass or ductal dilatation.  Spleen: Unremarkable.  Adrenals: Unremarkable.  Kidneys/ Ureters: Small right renal cyst.  No hydronephrosis or nephrolithiasis.  No ureteral dilatation.  Bladder: No evidence of wall thickening.  Reproductive organs: Calcified uterine fibroid.  GI Tract/Mesentery: No evidence of bowel obstruction or inflammation.  Peritoneal Space: No ascites.  No free air.  Retroperitoneum: No significant adenopathy.  Abdominal wall: Minimal body wall edema.  Vasculature: Abdominal aorta is normal caliber.  Bones: Stable mixed lytic and sclerotic lesions throughout the spine, sternum, and right iliac wing.  No definite new lesion.  No acute fracture.  Impression:  1. Stable mixed lytic and sclerotic osseous lesions.  No evidence of new metastatic disease.  2. Additional findings as above.    Review of Systems   Constitutional:         See above   All other systems reviewed and are negative.       Objective     Physical Exam  Vitals and nursing note reviewed.   Constitutional:       General: She is not in acute distress.     Appearance: Normal appearance. She is obese. She is not ill-appearing.      Comments: Presents with daughters.   In WC  due to difficulty walking long distance.   Able to transfer to exam table with assistance     HENT:      Head: Normocephalic and atraumatic.      Mouth/Throat:      Comments: Tongue geography no different than usual  Eyes:      General: No scleral icterus.     Extraocular Movements: Extraocular movements intact.      Pupils: Pupils are equal, round, and reactive to light.   Cardiovascular:      Rate and Rhythm: Normal rate and regular rhythm.      Heart sounds: No murmur heard.     No friction rub. No gallop.   Pulmonary:      Effort: Pulmonary effort is normal.      Breath sounds: Normal breath sounds.      Comments: Breasts- deferred today  Abdominal:      General: Bowel sounds are normal. There is no distension.      Tenderness: There is no abdominal tenderness.   Musculoskeletal:         General: No swelling.      Cervical back: Normal range of motion.      Right lower leg: Edema present.      Left lower leg: Edema present.      Comments: Point tenderness in cervical, thoracic and lumbar spine. Also with cva tenderness today   Lymphadenopathy:      Cervical: No cervical adenopathy.   Skin:     General: Skin is warm and dry.      Findings: No bruising or rash.   Neurological:      General: No focal deficit present.      Mental Status: She is alert and oriented to person, place, and time.      Sensory: No sensory deficit.      Motor: Weakness present.   Psychiatric:         Mood and Affect: Mood normal.         Behavior: Behavior normal.         Thought Content: Thought content normal.         Judgment: Judgment normal.       Labs- reviewed     Assessment and Plan     1. Malignant neoplasm of upper-inner quadrant of right breast in female, estrogen receptor positive        2. Bone metastases        3. Cancer associated pain        4. Anemia associated with chemotherapy            Presents for urgent care as went back to ED last week. Patient asked to be virtual as developed vomiting and diarrhea last night after  dinner. Symptoms improved now.   Saw urology and not requiring catheter as no post void residual. Has f/u next week.   Needs MRI T/L spine with contrast   She understands to proceed to Ed for any new or worsening issues.     Continue arimidex and Ibrance (100 mg M-F, off Sat&Sun).   Xgeva and Zoladex -monthly    Anemia parameters stable.   Continue f/u with palliative.   Nutrition discussed - increase protein.     Full chart review  Route Chart for Scheduling    Med Onc Chart Routing  Urgent    Follow up with physician 2 weeks. with labs and for injections   Follow up with CAMILA    Infusion scheduling note    Injection scheduling note    Labs    Imaging   Please make sure she gets MRI T/L spine with contrast - thanks   Pharmacy appointment    Other referrals              Urgent Care Oncology Visit    Date and Time: 09/27/2023 1:58 PM   Patient MRN: [id number]   Chief Complaint: No chief complaint on file.    Reason for Urgent Care Visit: out of hospital rapid follow up  Patient Type: active chemotherapy/immunotherapy treatment  Intervention: education provided and imaging ordered  Dispo: return to clinic as previous  UrgOnc appointment addressed via: [facility] message  This UrgOnc visit was virtual  Time spent handling UC issue:  60 minutes    Was this virtual or in person UrgOnc visit appropriate? Yes    Treatment Plan Information   OP ANASTROZOLE PALBOCICLIB Q4W   Jessica Mendez MD   Upcoming Treatment Dates - OP ANASTROZOLE PALBOCICLIB Q4W    No upcoming days in selected categories.    Supportive Plan Information  OP BREAST GOSERELIN & DENOSUMAB Q4W   Jessica Mendez MD   Upcoming Treatment Dates - OP BREAST GOSERELIN & DENOSUMAB Q4W    No upcoming days in selected categories.    Therapy Plan Information  EPINEPHrine (EPIPEN) 0.3 mg/0.3 mL pen injection 0.3 mg  0.3 mg, Intramuscular, PRN  diphenhydrAMINE injection 50 mg  50 mg, Intravenous, PRN  methylPREDNISolone sodium succinate injection 125 mg  125 mg,  Intravenous, PRN  sodium chloride 0.9% bolus 1,000 mL 1,000 mL  1,000 mL, Intravenous, PRN       Patient is in agreement with the proposed treatment plan. All questions were answered to the patient's satisfaction. Pt knows to call clinic for any new or worsening symptoms and if anything is needed before the next clinic visit.    CASSIDY PatelC  Hematology & Medical Oncology  [address] [place] [id number]  ph. [phone] ext [phone]  Fax. [phone]        Urgent Care Oncology Visit    Date and Time: 09/27/2023 10:45 AM   Patient MRN: [id number]   Chief Complaint:   No chief complaint on file.    Reason for Urgent Care Visit:  urgent care follow up  Patient Type: active chemotherapy/immunotherapy treatment  Intervention: emergency department  Dispo: admit / ED  UrgOnc appointment addressed via:  was a follow up to urgent care visit  This UrgOnc visit was in person  Time spent handling UC issue:  30 minutes    Was this virtual or in person UrgOnc visit appropriate? Yes    60 minutes of total time spent on the encounter, which includes face to face time and non-face to face time preparing to see the patient (eg, review of tests), Obtaining and/or reviewing separately obtained history, Documenting clinical information in the electronic or other health record, Independently interpreting results (not separately reported) and communicating results to the patient/family/caregiver, or Care coordination (not separately reported).

## 2023-09-28 DIAGNOSIS — C50.211 MALIGNANT NEOPLASM OF UPPER-INNER QUADRANT OF RIGHT BREAST IN FEMALE, ESTROGEN RECEPTOR POSITIVE: Primary | ICD-10-CM

## 2023-09-28 DIAGNOSIS — Z17.0 MALIGNANT NEOPLASM OF UPPER-INNER QUADRANT OF RIGHT BREAST IN FEMALE, ESTROGEN RECEPTOR POSITIVE: Primary | ICD-10-CM

## 2023-09-29 ENCOUNTER — HOSPITAL ENCOUNTER (OUTPATIENT)
Dept: RADIOLOGY | Facility: HOSPITAL | Age: 48
Discharge: HOME | End: 2023-09-29
Attending: NURSE PRACTITIONER
Payer: MEDICARE

## 2023-09-29 DIAGNOSIS — M54.9 DORSALGIA, UNSPECIFIED: ICD-10-CM

## 2023-09-29 DIAGNOSIS — C79.51 MALIGNANT NEOPLASM METASTATIC TO BONE: ICD-10-CM

## 2023-09-29 DIAGNOSIS — R33.9 URINARY RETENTION: ICD-10-CM

## 2023-09-29 PROCEDURE — 72156 MRI NECK SPINE W/O & W/DYE: CPT | Mod: TC

## 2023-09-29 PROCEDURE — 70553 MRI BRAIN STEM W/O & W/DYE: CPT | Mod: 26,,, | Performed by: RADIOLOGY

## 2023-09-29 PROCEDURE — 70553 MRI BRAIN W WO CONTRAST: ICD-10-PCS | Mod: 26,,, | Performed by: RADIOLOGY

## 2023-09-29 PROCEDURE — A9585 GADOBUTROL INJECTION: HCPCS | Performed by: NURSE PRACTITIONER

## 2023-09-29 PROCEDURE — 72156 MRI CERVICAL SPINE W WO CONTRAST: ICD-10-PCS | Mod: 26,,, | Performed by: RADIOLOGY

## 2023-09-29 PROCEDURE — 70553 MRI BRAIN STEM W/O & W/DYE: CPT | Mod: TC

## 2023-09-29 PROCEDURE — 25500020 PHARM REV CODE 255: Performed by: NURSE PRACTITIONER

## 2023-09-29 PROCEDURE — 72156 MRI NECK SPINE W/O & W/DYE: CPT | Mod: 26,,, | Performed by: RADIOLOGY

## 2023-09-29 RX ORDER — GADOBUTROL 604.72 MG/ML
10 INJECTION INTRAVENOUS
Status: COMPLETED | OUTPATIENT
Start: 2023-09-29 | End: 2023-09-29

## 2023-09-29 RX ADMIN — GADOBUTROL 10 ML: 604.72 INJECTION INTRAVENOUS at 07:09

## 2023-10-02 ENCOUNTER — PATIENT MESSAGE (OUTPATIENT)
Dept: NEUROSURGERY | Facility: CLINIC | Age: 48
End: 2023-10-02
Payer: MEDICARE

## 2023-10-05 ENCOUNTER — OFFICE VISIT (OUTPATIENT)
Dept: UROLOGY | Facility: CLINIC | Age: 48
End: 2023-10-05
Payer: MEDICARE

## 2023-10-05 VITALS
HEART RATE: 83 BPM | WEIGHT: 293 LBS | SYSTOLIC BLOOD PRESSURE: 124 MMHG | DIASTOLIC BLOOD PRESSURE: 85 MMHG | BODY MASS INDEX: 57.75 KG/M2

## 2023-10-05 DIAGNOSIS — R33.9 URINARY RETENTION: Primary | ICD-10-CM

## 2023-10-05 PROCEDURE — 3079F PR MOST RECENT DIASTOLIC BLOOD PRESSURE 80-89 MM HG: ICD-10-PCS | Mod: CPTII,S$GLB,, | Performed by: UROLOGY

## 2023-10-05 PROCEDURE — 3044F HG A1C LEVEL LT 7.0%: CPT | Mod: CPTII,S$GLB,, | Performed by: UROLOGY

## 2023-10-05 PROCEDURE — 1159F MED LIST DOCD IN RCRD: CPT | Mod: CPTII,S$GLB,, | Performed by: UROLOGY

## 2023-10-05 PROCEDURE — 99214 OFFICE O/P EST MOD 30 MIN: CPT | Mod: S$GLB,,, | Performed by: UROLOGY

## 2023-10-05 PROCEDURE — 99214 PR OFFICE/OUTPT VISIT, EST, LEVL IV, 30-39 MIN: ICD-10-PCS | Mod: S$GLB,,, | Performed by: UROLOGY

## 2023-10-05 PROCEDURE — 4010F PR ACE/ARB THEARPY RXD/TAKEN: ICD-10-PCS | Mod: CPTII,S$GLB,, | Performed by: UROLOGY

## 2023-10-05 PROCEDURE — 3074F SYST BP LT 130 MM HG: CPT | Mod: CPTII,S$GLB,, | Performed by: UROLOGY

## 2023-10-05 PROCEDURE — 99999 PR PBB SHADOW E&M-EST. PATIENT-LVL II: CPT | Mod: PBBFAC,,, | Performed by: UROLOGY

## 2023-10-05 PROCEDURE — 4010F ACE/ARB THERAPY RXD/TAKEN: CPT | Mod: CPTII,S$GLB,, | Performed by: UROLOGY

## 2023-10-05 PROCEDURE — 3074F PR MOST RECENT SYSTOLIC BLOOD PRESSURE < 130 MM HG: ICD-10-PCS | Mod: CPTII,S$GLB,, | Performed by: UROLOGY

## 2023-10-05 PROCEDURE — 3044F PR MOST RECENT HEMOGLOBIN A1C LEVEL <7.0%: ICD-10-PCS | Mod: CPTII,S$GLB,, | Performed by: UROLOGY

## 2023-10-05 PROCEDURE — 3008F PR BODY MASS INDEX (BMI) DOCUMENTED: ICD-10-PCS | Mod: CPTII,S$GLB,, | Performed by: UROLOGY

## 2023-10-05 PROCEDURE — 3008F BODY MASS INDEX DOCD: CPT | Mod: CPTII,S$GLB,, | Performed by: UROLOGY

## 2023-10-05 PROCEDURE — 99999 PR PBB SHADOW E&M-EST. PATIENT-LVL II: ICD-10-PCS | Mod: PBBFAC,,, | Performed by: UROLOGY

## 2023-10-05 PROCEDURE — 1159F PR MEDICATION LIST DOCUMENTED IN MEDICAL RECORD: ICD-10-PCS | Mod: CPTII,S$GLB,, | Performed by: UROLOGY

## 2023-10-05 PROCEDURE — 3079F DIAST BP 80-89 MM HG: CPT | Mod: CPTII,S$GLB,, | Performed by: UROLOGY

## 2023-10-05 NOTE — PROGRESS NOTES
[facility] U[facility]        Urinary Retention Note  10/5/2023     Referred by:  Karen Verma MD     HPI: Kristopher Elmore is a very pleasant 47 y.o. female referred for evaluation of acute urinary retention of  unknown  duration. She currently  does not have a catheter in place. She has had a catheter placed in the ED a couple of times over the last 2 weeks. She then removed the catheter and returned for difficulty voiding. However, I have not seen her catheterized residuals quantified in the notes (there is a notation of a bladder scan of 204mL, which would not be accurate in this lady given her body habitus)  She does have a history of metastatic breast cancer with lytic spinal lesions. These have previously been noted to be stable. PVR (catheterized) last week was 50 mL.      Catheterized residual today was 5 mL.      A review of 10+ systems was conducted with pertinent positive and negative findings documented in HPI with all other systems reviewed and negative.     Past medical, family, surgical and social history reviewed as documented in chart with pertinent positive medical, family, surgical and social history detailed in HPI.     Exam Findings:     Const: no acute distress, conversant and alert  Eyes: anicteric, extraocular muscles intact  ENMT: normocephalic, Nl oral membranes  Cardio: no cyanosis, nl cap refill  Pulm: no tachypnea; no resp distress  Abd: soft, no tenderness  Musc: no laceration, no tenderness  Neuro: alert; oriented x 3  Skin: warm, dry; no petichiae  Psych: no anxiety; normal speech           Assessment/Plan:     Chronic Urinary Retention (established): It's not clear in reviewing her ED notes what her previous catheterized post void residuals have been. A bladder scan would not be accurate for this lady. She empties completely today and last week. She does not like having catheters in place and has removed them on her own 3x. I would not recommend replacing a  catheter in this lady unless she clearly demonstrates retention with catheterized residuals. Return in 6 weeks and can repeat at that time.

## 2023-10-06 ENCOUNTER — TELEPHONE (OUTPATIENT)
Dept: NEUROSURGERY | Facility: CLINIC | Age: 48
End: 2023-10-06
Payer: MEDICARE

## 2023-10-06 ENCOUNTER — OFFICE VISIT (OUTPATIENT)
Dept: NEUROSURGERY | Facility: CLINIC | Age: 48
End: 2023-10-06
Payer: MEDICARE

## 2023-10-06 VITALS
HEART RATE: 85 BPM | BODY MASS INDEX: 50.02 KG/M2 | DIASTOLIC BLOOD PRESSURE: 89 MMHG | WEIGHT: 293 LBS | HEIGHT: 64 IN | SYSTOLIC BLOOD PRESSURE: 131 MMHG

## 2023-10-06 DIAGNOSIS — G98.8 PARANEOPLASTIC NEUROLOGIC DISORDER: Primary | ICD-10-CM

## 2023-10-06 DIAGNOSIS — C80.1 PARANEOPLASTIC NEUROLOGIC DISORDER: Primary | ICD-10-CM

## 2023-10-06 DIAGNOSIS — G93.89 BRAIN MASS: ICD-10-CM

## 2023-10-06 DIAGNOSIS — H91.90 HEARING LOSS, UNSPECIFIED HEARING LOSS TYPE, UNSPECIFIED LATERALITY: ICD-10-CM

## 2023-10-06 DIAGNOSIS — C79.51 MALIGNANT NEOPLASM METASTATIC TO BONE: ICD-10-CM

## 2023-10-06 PROCEDURE — 3079F PR MOST RECENT DIASTOLIC BLOOD PRESSURE 80-89 MM HG: ICD-10-PCS | Mod: CPTII,S$GLB,, | Performed by: NEUROLOGICAL SURGERY

## 2023-10-06 PROCEDURE — 3044F HG A1C LEVEL LT 7.0%: CPT | Mod: CPTII,S$GLB,, | Performed by: NEUROLOGICAL SURGERY

## 2023-10-06 PROCEDURE — 99214 OFFICE O/P EST MOD 30 MIN: CPT | Mod: S$GLB,,, | Performed by: NEUROLOGICAL SURGERY

## 2023-10-06 PROCEDURE — 3008F BODY MASS INDEX DOCD: CPT | Mod: CPTII,S$GLB,, | Performed by: NEUROLOGICAL SURGERY

## 2023-10-06 PROCEDURE — 1159F MED LIST DOCD IN RCRD: CPT | Mod: CPTII,S$GLB,, | Performed by: NEUROLOGICAL SURGERY

## 2023-10-06 PROCEDURE — 3079F DIAST BP 80-89 MM HG: CPT | Mod: CPTII,S$GLB,, | Performed by: NEUROLOGICAL SURGERY

## 2023-10-06 PROCEDURE — 3008F PR BODY MASS INDEX (BMI) DOCUMENTED: ICD-10-PCS | Mod: CPTII,S$GLB,, | Performed by: NEUROLOGICAL SURGERY

## 2023-10-06 PROCEDURE — 4010F ACE/ARB THERAPY RXD/TAKEN: CPT | Mod: CPTII,S$GLB,, | Performed by: NEUROLOGICAL SURGERY

## 2023-10-06 PROCEDURE — 3075F SYST BP GE 130 - 139MM HG: CPT | Mod: CPTII,S$GLB,, | Performed by: NEUROLOGICAL SURGERY

## 2023-10-06 PROCEDURE — 1159F PR MEDICATION LIST DOCUMENTED IN MEDICAL RECORD: ICD-10-PCS | Mod: CPTII,S$GLB,, | Performed by: NEUROLOGICAL SURGERY

## 2023-10-06 PROCEDURE — 3075F PR MOST RECENT SYSTOLIC BLOOD PRESS GE 130-139MM HG: ICD-10-PCS | Mod: CPTII,S$GLB,, | Performed by: NEUROLOGICAL SURGERY

## 2023-10-06 PROCEDURE — 4010F PR ACE/ARB THEARPY RXD/TAKEN: ICD-10-PCS | Mod: CPTII,S$GLB,, | Performed by: NEUROLOGICAL SURGERY

## 2023-10-06 PROCEDURE — 3044F PR MOST RECENT HEMOGLOBIN A1C LEVEL <7.0%: ICD-10-PCS | Mod: CPTII,S$GLB,, | Performed by: NEUROLOGICAL SURGERY

## 2023-10-06 PROCEDURE — 99214 PR OFFICE/OUTPT VISIT, EST, LEVL IV, 30-39 MIN: ICD-10-PCS | Mod: S$GLB,,, | Performed by: NEUROLOGICAL SURGERY

## 2023-10-06 NOTE — PATIENT INSTRUCTIONS
I have personally reviewed the MRI brain with the pt which shows no evidence of intracranial metastatic disease.  Nonspecific marrow heterogeneity in the clivus is similar to 2020 without definite focal lesion.     I have personally reviewed the MRI cervical with the pt which shows:  1. Abnormal marrow signal and enhancement in the clivus concerning for metastatic disease.  2. No evidence of metastatic disease in the cervical spine.    I have personally reviewed the MRI T&L with the pt which shows limited evaluation without intravenous contrast.  Stable lesions involving T10, L2, L3, and the right iliac wing.  No pathologic fracture.  No evidence of epidural extension.  No new lesion. No high-grade spinal canal stenosis or neural foraminal narrowing.  Mild degenerative changes are stable from prior.    I will schedule the pt for a formal hearing test, HVF, and paraneoplastic profile serum. I also recommend a lumbar puncture. I have discussed the risks/benefits, indications, and alternatives for the proposed procedure in detail. I have answered all of their questions and patient wish to proceed with surgery. We will schedule patient.     A referral to Dr. Mccullough, Neuro-Oncology, will also be placed.

## 2023-10-06 NOTE — PROGRESS NOTES
Subjective:   I, Debbie Stevens, attest that this documentation has been prepared under the direction and in the presence of Jayme Villa MD.     Patient ID: Kristopher Elmore is a 47 y.o. female     Chief Complaint: No chief complaint on file.      HPI  Ms. Kristopher Elmore is a 47 y.o. woman with PMHx of metastatic breast cancer, referred to me by Dr. Fields, Neurosurgery, who presents today for evaluation of brain mass. Pt was seen in Neurosurgery for lytic lesions throughout the lumbar spine and pelvis, as well as additional stable sclerotic lesions in the sternum and T3 vertebral body. An MRI of the cervical spine and brain was recommended to evaluate for metastatic lesions. MRI cervical demonstrated abnormal marrow signal and enhancement in the clivus. She was referred to me thereafter. Upon further evaluation, the pt reports acute onset of weakness and tremors within the last month. Symptoms occur after remaining sitting for prolonged periods of time. Pt has had difficulty with her balance for quite some time now. Associated symptoms include dizziness and progressive hearing loss. She c/o acute headaches. She has not had frequent headaches occur in the past and states this is new for her. Additionally, pt endorses urinary retention of unknown duration. She has been seen and evaluated by Urology for this.     Review of Systems   Constitutional:  Negative for activity change, appetite change, fatigue, fever and unexpected weight change.   HENT:  Positive for hearing loss. Negative for facial swelling.    Eyes: Negative.    Respiratory: Negative.     Cardiovascular: Negative.    Gastrointestinal:  Negative for diarrhea, nausea and vomiting.   Endocrine: Negative.    Genitourinary:  Positive for difficulty urinating.   Musculoskeletal:  Positive for gait problem. Negative for back pain, joint swelling, myalgias and neck pain.   Neurological:  Positive for dizziness, tremors, weakness and  headaches. Negative for seizures and numbness.   Psychiatric/Behavioral: Negative.          Past Medical History:   Diagnosis Date    Abnormal Pap smear     pt states 13yrs ago colpo was done    Anemia     Anxiety     Cancer     Cardiomyopathy     CHF (congestive heart failure)     Fibroid     Hx of psychiatric care     Hyperlipidemia     Hypertension     Psychiatric problem     Sleep difficulties     Therapy        Objective:      Vitals:    10/06/23 1241   BP: 131/89   Pulse: 85      Physical Exam  Constitutional:       General: She is not in acute distress.     Appearance: Normal appearance.   HENT:      Head: Normocephalic and atraumatic.   Pulmonary:      Effort: Pulmonary effort is normal.   Musculoskeletal:      Cervical back: Neck supple.   Neurological:      Mental Status: She is alert and oriented to person, place, and time.      GCS: GCS eye subscore is 4. GCS verbal subscore is 5. GCS motor subscore is 6.      Cranial Nerves: No cranial nerve deficit.      Sensory: Sensation is intact.      Motor: Motor function is intact.      Gait: Gait is intact.            IMAGING:  MRI Brain W WO Contrast (9/29/2023):  No evidence of intracranial metastatic disease.  Nonspecific marrow heterogeneity in the clivus is similar to 2020 without definite focal lesion.     MRI Cervical Spine WO Contrast (9/29/2023):  1. Abnormal marrow signal and enhancement in the clivus concerning for metastatic disease.  2. No evidence of metastatic disease in the cervical spine.    MRI Thoracic & Lumbar Spine WO Contrast (9/14/2023):  Limited evaluation without intravenous contrast.  Stable lesions involving T10, L2, L3, and the right iliac wing.  No pathologic fracture.  No evidence of epidural extension.  No new lesion. No high-grade spinal canal stenosis or neural foraminal narrowing.  Mild degenerative changes are stable from prior.      I have personally reviewed the images with the pt.      I, Dr. Jayme Villa, personally  performed the services described in this documentation. All medical record entries made by the scribe, Debbie Stevens, were at my direction and in my presence.  I have reviewed the chart and agree that the record reflects my personal performance and is accurate and complete. Jayme Villa MD. 10/06/2023    Assessment:       1. Paraneoplastic neurologic disorder         Plan:   I have personally reviewed the MRI brain with the pt which shows no evidence of intracranial metastatic disease.  Nonspecific marrow heterogeneity in the clivus is similar to 2020 without definite focal lesion.     I have personally reviewed the MRI cervical with the pt which shows:  1. Abnormal marrow signal and enhancement in the clivus concerning for metastatic disease.  2. No evidence of metastatic disease in the cervical spine.    I have personally reviewed the MRI T&L with the pt which shows limited evaluation without intravenous contrast.  Stable lesions involving T10, L2, L3, and the right iliac wing.  No pathologic fracture.  No evidence of epidural extension.  No new lesion. No high-grade spinal canal stenosis or neural foraminal narrowing.  Mild degenerative changes are stable from prior.    I will schedule the pt for a formal hearing test, HVF, and paraneoplastic profile serum. I also recommend a lumbar puncture. I have discussed the risks/benefits, indications, and alternatives for the proposed procedure in detail. I have answered all of their questions and patient wish to proceed with surgery. We will schedule patient.     A referral to Dr. Mccullough, Neuro-Oncology, will also be placed.

## 2023-10-09 ENCOUNTER — ANESTHESIA EVENT (OUTPATIENT)
Dept: SURGERY | Facility: HOSPITAL | Age: 48
End: 2023-10-09
Payer: MEDICARE

## 2023-10-09 ENCOUNTER — PATIENT MESSAGE (OUTPATIENT)
Dept: PAIN MEDICINE | Facility: CLINIC | Age: 48
End: 2023-10-09
Payer: MEDICARE

## 2023-10-09 DIAGNOSIS — E55.9 VITAMIN D DEFICIENCY: ICD-10-CM

## 2023-10-09 RX ORDER — ERGOCALCIFEROL 1.25 MG/1
50000 CAPSULE ORAL
Qty: 12 CAPSULE | Refills: 0 | Status: SHIPPED | OUTPATIENT
Start: 2023-10-09 | End: 2024-01-05

## 2023-10-10 DIAGNOSIS — E87.6 HYPOKALEMIA: ICD-10-CM

## 2023-10-10 RX ORDER — POTASSIUM CHLORIDE 750 MG/1
10 TABLET, EXTENDED RELEASE ORAL DAILY
Qty: 30 TABLET | Refills: 2 | Status: SHIPPED | OUTPATIENT
Start: 2023-10-10

## 2023-10-12 ENCOUNTER — HOSPITAL ENCOUNTER (OUTPATIENT)
Dept: PREADMISSION TESTING | Facility: HOSPITAL | Age: 48
Discharge: HOME | End: 2023-10-12
Attending: NEUROLOGICAL SURGERY
Payer: MEDICARE

## 2023-10-12 ENCOUNTER — TELEPHONE (OUTPATIENT)
Dept: UROLOGY | Facility: CLINIC | Age: 48
End: 2023-10-12
Payer: MEDICARE

## 2023-10-12 VITALS — WEIGHT: 293 LBS | BODY MASS INDEX: 50.02 KG/M2 | HEIGHT: 64 IN

## 2023-10-12 NOTE — PLAN OF CARE
Pre-operative instructions, medication directives and pain scales reviewed with patient. All questions the patient had were answered. Re-assurance about surgical procedure and day of surgery routine given as needed, patient verbalized understanding of the pre-op instructions.  Patient instructed to report to [facility] for surgery.     Phone preop done.  Patient instructed to not take any NSAIDS, fish oil, vit E.

## 2023-10-12 NOTE — TELEPHONE ENCOUNTER
Called pt in regards to rescheduling appt on 11/28/23 w/ Dr Becker. Provider will be out of office that day in the PM. No answer, left detailed message for pt. Will call back.

## 2023-10-13 ENCOUNTER — TELEPHONE (OUTPATIENT)
Dept: NEUROSURGERY | Facility: CLINIC | Age: 48
End: 2023-10-13
Payer: MEDICARE

## 2023-10-13 NOTE — TELEPHONE ENCOUNTER
RN noticed pts surgery no longer scheduled for 10/16 upon scrubbing schedule.  Called pt who said she was told by the preop department that surgery will need to be rescheduled due to being on mounjaro within last 7 days.    Discussed with Dr. Villa, pt will be rescheduled to [facility] 10/26.  Schedulers notified.  Pt made aware of surgery change, and will be notified when surgery scheduling is finalized.

## 2023-10-16 ENCOUNTER — ANESTHESIA (OUTPATIENT)
Dept: SURGERY | Facility: HOSPITAL | Age: 48
End: 2023-10-16
Payer: MEDICARE

## 2023-10-16 ENCOUNTER — PATIENT MESSAGE (OUTPATIENT)
Dept: NEUROSURGERY | Facility: CLINIC | Age: 48
End: 2023-10-16
Payer: MEDICARE

## 2023-10-17 NOTE — PROGRESS NOTES
Subjective     Patient ID: Kristopher Elmore is a 48 y.o. female.    Chief Complaint: Malignant neoplasm of upper-inner quadrant of right breast     HPI    Post last ED visit- 9/21/2023 for urinary retention  Irizarry placed  She was subsequently seen by urology  Per their note:  Chronic Urinary Retention (established): It's not clear in reviewing her ED notes what her previous catheterized post void residuals have been. A bladder scan would not be accurate for this lady. She empties completely today and last week. She does not like having catheters in place and has removed them on her own 3x. I would not recommend replacing a catheter in this lady unless she clearly demonstrates retention with catheterized residuals. Return in 6 weeks and can repeat at that time.     She has had a neurosurgery visit with Dr. Villa  Spinal tap pending for finding on MRI of nonspecific marrow heterogeneity in the clivus is similar to 2020 without definite focal lesion.     Reports the following:  No headaches today but has noted more frequently  Dizziness noted after sitting for awhile  Some shaking when stands after sitting a while  Has had some falls -- last was 1 month ago, missed chair upon sitting  N/V imrpoved  Constipation has been an issue--   Required Senna  Urinating well and no episodes of incontinence or retention.    Most recent imaging:  - 9/29/2023 MRI Brain:  FINDINGS:  Ventricles stable in size without evidence of hydrocephalus.  The brain parenchyma maintains normal signal intensity..  No new parenchymal mass effect, hemorrhage, edema or recent or remote major vascular distribution infarct.  No enhancing intracranial lesion.  No new extra-axial blood or fluid collections.  The T2 skull base flow voids are preserved.  Dural venous sinuses are patent.  Marrow heterogeneity of the clivus is stable from 2020 and not particularly suspicious for underlying lesion.  The visualized sinuses and mastoid air cells are  clear.  Radiographic proptosis again noted.  Impression:  No evidence of intracranial metastatic disease.  Nonspecific marrow heterogeneity in the clivus is similar to 2020 without definite focal lesion.  Metastatic disease is thought unlikely.     - 9/29/2023 MRI C Spine:  FINDINGS:  Alignment: Straightening of the normal cervical lordosis.  Vertebrae: Normal marrow signal. No fracture.  Discs: Mild multilevel disc height loss.  Cord: Normal.  Abnormal marrow signal and enhancement in the clivus.  Cerebellar tonsils are in their expected location.  Visualized brainstem is normal.  Vertebral artery flow voids are present.  Prevertebral soft tissues are normal.  Retropharyngeal course of the bilateral common carotid arteries.  Visualized parotid glands are unremarkable.  No cervical lymph node enlargement.  Paraspinal musculature demonstrates normal bulk and signal intensity.  Degenerative findings:  C2-C3: No neural foraminal narrowing or spinal canal stenosis.  C3-C4: No neural foraminal narrowing or spinal canal stenosis.  C4-C5: Mild left facet hypertrophy.  Mild left neural foraminal narrowing.  No spinal canal stenosis.  C5-C6: No neural foraminal narrowing or spinal canal stenosis.  C6-C7: No neural foraminal narrowing or spinal canal stenosis.  C7-T1: No neural foraminal narrowing or spinal canal stenosis.  Impression:  1. Abnormal marrow signal and enhancement in the clivus concerning for metastatic disease.  2. No evidence of metastatic disease in the cervical spine.    - 9/14/2023 MRI T/L spine:  FINDINGS:  ALIGNMENT: Sagittal alignment is maintained.  BONE: No evidence of acute fracture.  Evaluation for focal lesions limited without intravenous contrast.  Stable subtle T1 hypointense lesions involving the inferior aspect of the T10 vertebral body, inferior endplate of L2, inferior endplate of L3, as well as the right iliac wing.  No definite new lesions are identified.  Disc: Intervertebral disc heights  are grossly maintained without evidence of severe disc narrowing.  No bone marrow edema.  SPINAL CANAL: The thoracic spinal cord is unremarkable.  The conus medullaris has a normal appearance and terminates at the L1-L2 level.  No epidural mass or collection.  PARASPINAL SOFT TISSUES: Stable right renal cyst.  Lower lumbar soft tissue edema.  Similar probable subcutaneous lipoma in the right upper back.  DEGENERATIVE FINDINGS:  No significant degenerative changes of the thoracic spine are identified.  There is unchanged appearance of a prominent ligamentum flavum hypertrophy at the T2-T3 level.  No high-grade spinal canal stenosis or neural foraminal narrowing.  Minor degenerative changes of the lumbar spine involving minimal broad-based posterior disc bulges at L4-L5 and L5-S1.  Mild facet arthropathy involving L3-S1 levels.  Possible small stable caudally extending left paracentral disc extrusion at L1-L2.  There is no high-grade spinal canal stenosis or neural foraminal narrowing in the lumbar spine.  Impression:  Limited evaluation without intravenous contrast.  Stable lesions involving T10, L2, L3, and the right iliac wing.  No pathologic fracture.  No evidence of epidural extension.  No new lesion  No high-grade spinal canal stenosis or neural foraminal narrowing.  Mild degenerative changes are stable from prior.  Follow-up with contrast enhanced examination, as clinically warranted.    Currently on Arimidex and Ibrance (dose adjusted due to anemia to 100 mg M-F, off Sat&Sun)   Zoladex and Xgeva monthly   Most recent scans stable.  Genetic testing negative.      Diagnosis:  1. Stage IV ([id number]) invasive ductal carcinoma of right breast, upper inner quadrant, ER %, WV neg, Her2 neg, Grade 3, multifocal with one lesion appearing more aggressive (Ki67 80%), large LN +, bone mets.   Ibrance dose adjusted with cytopenias   Oncologic History:   Presentation   - 9/11/19 - Screening mammogram showed multiple  "right breast masses lower inner quadrant   - 9/13/19 - Diagnostic mammogram and US showed a right breast, 3:00 position mass, 2 CFN measuring 17 mm x 16 mm x 14 mm. There 2 smaller adjacent masses towards the nipple   - 9/19/19 - Biopsy -   A. Right breast subareolar: Grade 3 IDC, estrogen receptor 80%, progesterone receptor 0%, Her2 dago neg, Ki67 80%.   B. Right breast mass 3:00: Grade 3 IDC with papillary features, estrogen receptor 100%, progesterone receptor 0%, Her2 dago 1+, Ki67 30%.   - 9/26/19: US right axilla with abnormal lymphadenopathy measuring 4.3 x 2.8 cm with biopsy + for metastatic breast cancer.   Surgery consultation with Dr Ferris on 9/25/19   - 9/25/19 - Genetics [facility]etics [employer] [facility]ACAnalysis pending; VUS pending   Medical oncology consultation on 10/7/19   * 10/8/19 - CT C/A/P with several lytic lesions in thoracic and lumbar spine, iliac bones, left scapula in addition to 3 x 4.5 cm right axillary node and 2.1 cm right breast mass. Bone scan negative.   * 10/31/19 - started Ibrance, Arimidex, Zoladex; plan for Xgeva.   * 11/12/19 STRATA without targetable mutations.   * 12/16/19 - Bone biopsy + for met disease (initially read as negative, but we treated as metastatic disease given high suspicion).   * MRI brain: "Partially empty sella. Question bilateral globe proptosis. Otherwise unremarkable MRI brain as detailed above specifically without evidence for intracranial enhancing lesion to suggest metastatic disease."   * 4/22/2020 PET - disease improvement. 8/2020 PET with disease improvement.   * 9/1/20 - Palliative RT to L1-L4   Of note, * Xgeva monthly - we had been waiting on dental clearance, but she has been unable to get into dentistry and is accepting of risks. Has no active dental disease.   PET scan 4/22/2021:   FINDINGS:   Quality of the study: Mildly degraded due to patient's large body habitus and skin abutting the gantry.   In the head and neck, there are no hypermetabolic " lesions worrisome for malignancy. There are no hypermetabolic mucosal lesions, and there are no pathologically enlarged or hypermetabolic lymph nodes.   In the chest, there are no hypermetabolic lesions worrisome for malignancy.   Stable CT appearance of a level 1 right axillary lymph node measuring 1.3 cm in short axis with normal background radiotracer uptake. Previously with SUV max of 2.1.   There are no concerning pulmonary nodules or masses, and there are no pathologically enlarged or hypermetabolic lymph nodes.   In the abdomen and pelvis, there is physiologic tracer distribution within the abdominal organs and excretion into the genitourinary system.   In the bones, there are stable lytic lesions throughout the lumbar spine and pelvis and additional stable sclerotic lesions in the sternum and T3 vertebral body. No associated focal abnormal increased radiotracer uptake. Findings likely represent treated disease.   Impression:   Interval decreased uptake within a prominent right axillary lymph node, now with normal background uptake. No new focal abnormal uptake.   Stable lytic and sclerotic lesions without focal abnormal uptake. Findings are compatible with treated metastasis.   8/25/2021 MRI brain   Impression:   No significant change from prior specifically without evidence for enhancing lesion to suggest intracranial metastatic disease.   Continued partially empty sella, intracranial hypertension to be included in differential in the appropriate clinical setting. Clinical correlation and further evaluation as warranted.   10/14/2021 MRI thoracic/lumbar spine:   Impression:   Patient history of metastatic breast cancer.   Bone lesions at T10, L2, L3, and right iliac wing, as above, concerning for metastatic disease. No pathologic fracture, soft tissue component, or epidural extension identified.   10/25/2021 CT chest/abd/pelvis:   Impression:   1. Stable metastasis of the spine   No evidence of  intra-abdominal or intrathoracic metastatic disease   2. Stable prominent right axillary lymph node.   3. Additional findings are detailed above.   Currently on Arimidex and Ibrance   Zoladex and Xgeva monthly   - 2/2/2022 Bone scan:   FINDINGS:   No focal abnormal uptake suggestive of osseous metastases. There is diffusely increased uptake in the calvarium in keeping with hyperostosis. There is degenerative type uptake most prominent in the bilateral shoulders and knees. The focal uptake previously described in the distal right femur is not visualized. There is otherwise physiologic distribution of the radiopharmaceutical throughout the skeleton.   There is normal uptake in the genitourinary system and soft tissues.   Impression:   There is no scintigraphic evidence of osteoblastic metastatic disease.   Nonspecific uptake in the distal right femur has resolved.   Diffuse cranial hyperostosis and other findings as above.   - 2/1/2022 CT C/A/P:   FINDINGS:   CHEST   Support tubes and lines: None.   Aorta: Normal caliber.   Heart: Normal size..   Coronary arteries: No calcifications.   Pericardium: Normal. No effusion, thickening, or calcification.   Central pulmonary arteries: Normal caliber.   Base of neck/thyroid: Normal.   Lymph nodes: No supraclavicular, axillary, internal mammary, mediastinal or hilar lymphadenopathy.   Esophagus: Normal.   Pleura: No effusion, thickening or calcification.   Body wall: Unremarkable.   Airways: Normal.   Lungs: Clear without focal or diffuse abnormality.   Bones: Sclerotic lesions are seen throughout the spine as well as in the sternum, not substantially changed from 10/25/2021   ABDOMEN/PELVIS   Liver: Unremarkable   Gallbladder/bile ducts: Unremarkable. No intra or extrahepatic biliary ductal dilatation   Pancreas: Unremarkable.   Spleen: Unremarkable.   Adrenals: Unremarkable.   Kidneys: Simple cyst in the right kidney is unchanged   Lymph nodes: No abdominal or pelvic  lymphadenopathy.   Bowel and mesentery: Unremarkable.   Abdominal aorta: Unremarkable.   Inferior vena cava: Unremarkable.   Free fluid or free air: None.   Pelvis: Unremarkable.   Urinary bladder: Unremarkable.   Body wall: Unremarkable.   Bones: Sclerotic lesions seen in the spine are unchanged.   Impression:   1. No evidence of new recurrent or metastatic disease.   2. Sclerotic lesions seen in the spine and sternum, unchanged when compared to 10/25/2021.      Genetic testing 3/23/2023             Most recent scans:   - 7/5/2023 CT C/A/P:  FINDINGS:  Some peripheral portions of the body wall are not covered on exam field of view due to patient body habitus  Base of neck: Unremarkable.  Thoracic soft tissues: No significant abnormality.  Aorta: Normal caliber.  Heart: Normal in size. No pericardial effusion.  Jaen/Mediastinum: No pathologic lymphadenopathy.  Lungs: Well aerated, without consolidation or pleural fluid.  Liver: Diffusely hypodense parenchyma suggestive for steatosis.  No focal lesion.  Portal veins are patent.  Gallbladder: Unremarkable.  Bile Ducts: No evidence of dilated ducts.  Pancreas: No mass or ductal dilatation.  Spleen: Unremarkable.  Adrenals: Unremarkable.  Kidneys/ Ureters: Small right renal cyst.  No hydronephrosis or nephrolithiasis.  No ureteral dilatation.  Bladder: No evidence of wall thickening.  Reproductive organs: Calcified uterine fibroid.  GI Tract/Mesentery: No evidence of bowel obstruction or inflammation.  Peritoneal Space: No ascites.  No free air.  Retroperitoneum: No significant adenopathy.  Abdominal wall: Minimal body wall edema.  Vasculature: Abdominal aorta is normal caliber.  Bones: Stable mixed lytic and sclerotic lesions throughout the spine, sternum, and right iliac wing.  No definite new lesion.  No acute fracture.  Impression:  1. Stable mixed lytic and sclerotic osseous lesions.  No evidence of new metastatic disease.  2. Additional findings as  above.    Review of Systems       Objective     Physical Exam  Vitals and nursing note reviewed.   Constitutional:       General: She is not in acute distress.     Appearance: Normal appearance. She is obese. She is not ill-appearing or toxic-appearing.      Comments: Presents with daughters.   In WC due to difficulty walking long distance.   Able to transfer to exam table without assistance     HENT:      Head: Normocephalic and atraumatic.      Mouth/Throat:      Comments: Tongue geography no different than usual  Eyes:      General: No scleral icterus.     Extraocular Movements: Extraocular movements intact.      Conjunctiva/sclera: Conjunctivae normal.      Pupils: Pupils are equal, round, and reactive to light.   Cardiovascular:      Rate and Rhythm: Normal rate and regular rhythm.      Heart sounds: Normal heart sounds. No murmur heard.     No friction rub. No gallop.   Pulmonary:      Effort: Pulmonary effort is normal. No respiratory distress.      Breath sounds: Normal breath sounds. No wheezing, rhonchi or rales.      Comments: Breasts- deferred today  Chest:      Chest wall: No tenderness.   Abdominal:      General: Bowel sounds are normal. There is no distension.      Palpations: Abdomen is soft. There is no mass.      Tenderness: There is no abdominal tenderness. There is no guarding or rebound.      Comments: No palpable masses   Musculoskeletal:         General: No swelling.      Cervical back: Normal range of motion and neck supple. No tenderness.      Right lower leg: No edema.      Left lower leg: No edema.      Comments: Point tenderness in cervical, thoracic and lumbar spine. Also with cva tenderness today   Lymphadenopathy:      Cervical: No cervical adenopathy.   Skin:     General: Skin is warm and dry.      Findings: No bruising or rash.   Neurological:      General: No focal deficit present.      Mental Status: She is alert and oriented to person, place, and time.      Sensory: No sensory  deficit.      Motor: No weakness.      Gait: Gait normal.   Psychiatric:         Mood and Affect: Mood normal.         Behavior: Behavior normal.         Thought Content: Thought content normal.         Judgment: Judgment normal.     Labs- reviewed  Interval care assessments reviewed     Assessment and Plan     1. Malignant neoplasm of upper-inner quadrant of right breast in female, estrogen receptor positive  -     Cancel: denosumab (XGEVA) solution 120 mg  -     Cancel: goserelin (ZOLADEX) injection 3.6 mg    2. Bone metastases  Overview:  IMO Regualtory Update 4/1/23      3. Beta-thalassemia    4. Anemia associated with chemotherapy    5. Morbid obesity, unspecified obesity type    6. Type 2 diabetes mellitus without complication, with long-term current use of insulin  Overview:  A1c 8.5 11/3/2021. Cont Glipizide 10 mg BID. Sending rx for Trulicity to [facility] with pharmacy assistance referral. Podiatry referral per pt request      7. Chronic combined systolic and diastolic heart failure    8. Benign essential HTN    9. Mixed hyperlipidemia    10. Pathological fracture of lumbar vertebra with routine healing    11. Major depressive disorder, recurrent episode, moderate with anxious distress      Metastatic breast cancer to bone  Stable disease on recent assessments  Continue Arimidex and Ibrance (100 mg M-F, off Sat&Sun).   Xgeva and Zoladex -monthly- due today  CT scans due  MRIs stable (not disease historically bone only)  Path fracture treated -- improved on imaging    Anemia parameters stable.   Baseline B thal  Monitor    Morbid obesity  Losing weight on Mounjaro    DM parameters improving    CHF/HTN/Hyperlipidemia  Managed with Cardio Onc    Depression- sees psychology  Doing well with good support    Continue f/u with palliative.     Cbc behrman wed    Route Chart for Scheduling    Med Onc Chart Routing  Urgent    Follow up with physician . Wed 10/25 cbc at lab closest to her home; 4 weeks cbc, cmp  and injections needs CT scans prior   Follow up with [facility]    Infusion scheduling note    Injection scheduling note    Labs    Imaging    Pharmacy appointment    Other referrals              Treatment Plan Information   OP ANASTROZOLE PALBOCICLIB Q4W   Jessica Mendez MD   Upcoming Treatment Dates - OP ANASTROZOLE PALBOCICLIB Q4W    No upcoming days in selected categories.    Supportive Plan Information  OP BREAST GOSERELIN & DENOSUMAB Q4W   Jessica Mendez MD   Upcoming Treatment Dates - OP BREAST GOSERELIN & DENOSUMAB Q4W    No upcoming days in selected categories.    Therapy Plan Information  EPINEPHrine (EPIPEN) 0.3 mg/0.3 mL pen injection 0.3 mg  0.3 mg, Intramuscular, PRN  diphenhydrAMINE injection 50 mg  50 mg, Intravenous, PRN  methylPREDNISolone sodium succinate injection 125 mg  125 mg, Intravenous, PRN  sodium chloride 0.9% bolus 1,000 mL 1,000 mL  1,000 mL, Intravenous, PRN

## 2023-10-19 ENCOUNTER — INFUSION (OUTPATIENT)
Dept: INFUSION THERAPY | Facility: HOSPITAL | Age: 48
End: 2023-10-19
Payer: MEDICAID

## 2023-10-19 ENCOUNTER — OFFICE VISIT (OUTPATIENT)
Dept: HEMATOLOGY/ONCOLOGY | Facility: CLINIC | Age: 48
End: 2023-10-19
Payer: MEDICARE

## 2023-10-19 VITALS
SYSTOLIC BLOOD PRESSURE: 134 MMHG | RESPIRATION RATE: 17 BRPM | HEART RATE: 77 BPM | OXYGEN SATURATION: 96 % | WEIGHT: 293 LBS | BODY MASS INDEX: 50.02 KG/M2 | HEIGHT: 64 IN | DIASTOLIC BLOOD PRESSURE: 81 MMHG | TEMPERATURE: 97 F

## 2023-10-19 DIAGNOSIS — E11.9 TYPE 2 DIABETES MELLITUS WITHOUT COMPLICATION, WITH LONG-TERM CURRENT USE OF INSULIN: ICD-10-CM

## 2023-10-19 DIAGNOSIS — F33.1 MAJOR DEPRESSIVE DISORDER, RECURRENT EPISODE, MODERATE WITH ANXIOUS DISTRESS: ICD-10-CM

## 2023-10-19 DIAGNOSIS — Z17.0 MALIGNANT NEOPLASM OF UPPER-INNER QUADRANT OF RIGHT BREAST IN FEMALE, ESTROGEN RECEPTOR POSITIVE: Primary | ICD-10-CM

## 2023-10-19 DIAGNOSIS — C50.211 MALIGNANT NEOPLASM OF UPPER-INNER QUADRANT OF RIGHT BREAST IN FEMALE, ESTROGEN RECEPTOR POSITIVE: Primary | ICD-10-CM

## 2023-10-19 DIAGNOSIS — Z79.4 TYPE 2 DIABETES MELLITUS WITHOUT COMPLICATION, WITH LONG-TERM CURRENT USE OF INSULIN: ICD-10-CM

## 2023-10-19 DIAGNOSIS — T45.1X5A ANEMIA ASSOCIATED WITH CHEMOTHERAPY: ICD-10-CM

## 2023-10-19 DIAGNOSIS — D64.81 ANEMIA ASSOCIATED WITH CHEMOTHERAPY: ICD-10-CM

## 2023-10-19 DIAGNOSIS — M84.48XD: ICD-10-CM

## 2023-10-19 DIAGNOSIS — I50.42 CHRONIC COMBINED SYSTOLIC AND DIASTOLIC HEART FAILURE: ICD-10-CM

## 2023-10-19 DIAGNOSIS — C79.51 MALIGNANT NEOPLASM METASTATIC TO BONE: ICD-10-CM

## 2023-10-19 DIAGNOSIS — E78.2 MIXED HYPERLIPIDEMIA: ICD-10-CM

## 2023-10-19 DIAGNOSIS — E66.01 MORBID OBESITY, UNSPECIFIED OBESITY TYPE: ICD-10-CM

## 2023-10-19 DIAGNOSIS — D56.1 BETA-THALASSEMIA: ICD-10-CM

## 2023-10-19 DIAGNOSIS — I10 BENIGN ESSENTIAL HTN: ICD-10-CM

## 2023-10-19 PROCEDURE — 99499 UNLISTED E&M SERVICE: CPT | Mod: S$GLB,,, | Performed by: INTERNAL MEDICINE

## 2023-10-19 PROCEDURE — 96402 CHEMO HORMON ANTINEOPL SQ/IM: CPT

## 2023-10-19 PROCEDURE — 99215 OFFICE O/P EST HI 40 MIN: CPT | Mod: S$GLB,,, | Performed by: INTERNAL MEDICINE

## 2023-10-19 PROCEDURE — 3075F SYST BP GE 130 - 139MM HG: CPT | Mod: CPTII,S$GLB,, | Performed by: INTERNAL MEDICINE

## 2023-10-19 PROCEDURE — 3075F PR MOST RECENT SYSTOLIC BLOOD PRESS GE 130-139MM HG: ICD-10-PCS | Mod: CPTII,S$GLB,, | Performed by: INTERNAL MEDICINE

## 2023-10-19 PROCEDURE — 99999 PR PBB SHADOW E&M-EST. PATIENT-LVL III: CPT | Mod: PBBFAC,,, | Performed by: INTERNAL MEDICINE

## 2023-10-19 PROCEDURE — 96372 THER/PROPH/DIAG INJ SC/IM: CPT | Mod: 59

## 2023-10-19 PROCEDURE — 1160F PR REVIEW ALL MEDS BY PRESCRIBER/CLIN PHARMACIST DOCUMENTED: ICD-10-PCS | Mod: CPTII,S$GLB,, | Performed by: INTERNAL MEDICINE

## 2023-10-19 PROCEDURE — 4010F ACE/ARB THERAPY RXD/TAKEN: CPT | Mod: CPTII,S$GLB,, | Performed by: INTERNAL MEDICINE

## 2023-10-19 PROCEDURE — 3008F PR BODY MASS INDEX (BMI) DOCUMENTED: ICD-10-PCS | Mod: CPTII,S$GLB,, | Performed by: INTERNAL MEDICINE

## 2023-10-19 PROCEDURE — 3079F PR MOST RECENT DIASTOLIC BLOOD PRESSURE 80-89 MM HG: ICD-10-PCS | Mod: CPTII,S$GLB,, | Performed by: INTERNAL MEDICINE

## 2023-10-19 PROCEDURE — 1159F PR MEDICATION LIST DOCUMENTED IN MEDICAL RECORD: ICD-10-PCS | Mod: CPTII,S$GLB,, | Performed by: INTERNAL MEDICINE

## 2023-10-19 PROCEDURE — 1159F MED LIST DOCD IN RCRD: CPT | Mod: CPTII,S$GLB,, | Performed by: INTERNAL MEDICINE

## 2023-10-19 PROCEDURE — 3008F BODY MASS INDEX DOCD: CPT | Mod: CPTII,S$GLB,, | Performed by: INTERNAL MEDICINE

## 2023-10-19 PROCEDURE — 3044F HG A1C LEVEL LT 7.0%: CPT | Mod: CPTII,S$GLB,, | Performed by: INTERNAL MEDICINE

## 2023-10-19 PROCEDURE — 3079F DIAST BP 80-89 MM HG: CPT | Mod: CPTII,S$GLB,, | Performed by: INTERNAL MEDICINE

## 2023-10-19 PROCEDURE — 99999 PR PBB SHADOW E&M-EST. PATIENT-LVL III: ICD-10-PCS | Mod: PBBFAC,,, | Performed by: INTERNAL MEDICINE

## 2023-10-19 PROCEDURE — 4010F PR ACE/ARB THEARPY RXD/TAKEN: ICD-10-PCS | Mod: CPTII,S$GLB,, | Performed by: INTERNAL MEDICINE

## 2023-10-19 PROCEDURE — 99215 PR OFFICE/OUTPT VISIT, EST, LEVL V, 40-54 MIN: ICD-10-PCS | Mod: S$GLB,,, | Performed by: INTERNAL MEDICINE

## 2023-10-19 PROCEDURE — 1160F RVW MEDS BY RX/DR IN RCRD: CPT | Mod: CPTII,S$GLB,, | Performed by: INTERNAL MEDICINE

## 2023-10-19 PROCEDURE — 63600175 PHARM REV CODE 636 W HCPCS: Mod: JZ,JG | Performed by: INTERNAL MEDICINE

## 2023-10-19 PROCEDURE — 3044F PR MOST RECENT HEMOGLOBIN A1C LEVEL <7.0%: ICD-10-PCS | Mod: CPTII,S$GLB,, | Performed by: INTERNAL MEDICINE

## 2023-10-19 RX ADMIN — DENOSUMAB 120 MG: 120 INJECTION SUBCUTANEOUS at 08:10

## 2023-10-19 RX ADMIN — GOSERELIN ACETATE 3.6 MG: 3.6 IMPLANT SUBCUTANEOUS at 08:10

## 2023-10-19 NOTE — LETTER
October 19, 2023        Kristopher Elmore  [address]  [facility] [address]             [facility] 3rd Fl  [facility] [facility] [address]  Phone: [phone]   Patient: Kristopher Elmore   MR Number: [id number]   YOB: 1975   Date of Visit: 10/19/2023     To Whom It May Concern:     [facility], [address] [facility]  [id number]  [place] [address]    Ms. Elmore is currently being treated for Stage 4 (metastatic) breast cancer and remains on active therapy.  Thank you for your consideration of financial assistance.     Sincerely,      Phyllis Lei MD

## 2023-10-25 ENCOUNTER — TELEPHONE (OUTPATIENT)
Dept: NEUROSURGERY | Facility: CLINIC | Age: 48
End: 2023-10-25
Payer: MEDICARE

## 2023-10-25 ENCOUNTER — LAB VISIT (OUTPATIENT)
Dept: LAB | Facility: HOSPITAL | Age: 48
End: 2023-10-25
Attending: INTERNAL MEDICINE
Payer: MEDICARE

## 2023-10-25 DIAGNOSIS — Z17.0 MALIGNANT NEOPLASM OF UPPER-INNER QUADRANT OF RIGHT BREAST IN FEMALE, ESTROGEN RECEPTOR POSITIVE: ICD-10-CM

## 2023-10-25 DIAGNOSIS — C50.211 MALIGNANT NEOPLASM OF UPPER-INNER QUADRANT OF RIGHT BREAST IN FEMALE, ESTROGEN RECEPTOR POSITIVE: ICD-10-CM

## 2023-10-25 DIAGNOSIS — C79.51 MALIGNANT NEOPLASM METASTATIC TO BONE: ICD-10-CM

## 2023-10-25 LAB
ALBUMIN SERPL BCP-MCNC: 3.1 G/DL (ref 3.5–5.2)
ALP SERPL-CCNC: 91 U/L (ref 55–135)
ALT SERPL W/O P-5'-P-CCNC: 24 U/L (ref 10–44)
ANION GAP SERPL CALC-SCNC: 8 MMOL/L (ref 8–16)
AST SERPL-CCNC: 14 U/L (ref 10–40)
BASOPHILS # BLD AUTO: 0.09 K/UL (ref 0–0.2)
BASOPHILS NFR BLD: 1.5 % (ref 0–1.9)
BILIRUB SERPL-MCNC: 0.3 MG/DL (ref 0.1–1)
BUN SERPL-MCNC: 7 MG/DL (ref 6–20)
CALCIUM SERPL-MCNC: 9.3 MG/DL (ref 8.7–10.5)
CHLORIDE SERPL-SCNC: 106 MMOL/L (ref 95–110)
CO2 SERPL-SCNC: 27 MMOL/L (ref 23–29)
CREAT SERPL-MCNC: 1 MG/DL (ref 0.5–1.4)
DIFFERENTIAL METHOD: ABNORMAL
EOSINOPHIL # BLD AUTO: 0.2 K/UL (ref 0–0.5)
EOSINOPHIL NFR BLD: 2.9 % (ref 0–8)
ERYTHROCYTE [DISTWIDTH] IN BLOOD BY AUTOMATED COUNT: 18.6 % (ref 11.5–14.5)
EST. GFR  (NO RACE VARIABLE): >60 ML/MIN/1.73 M^2
GLUCOSE SERPL-MCNC: 170 MG/DL (ref 70–110)
HCT VFR BLD AUTO: 35.8 % (ref 37–48.5)
HGB BLD-MCNC: 10.5 G/DL (ref 12–16)
IMM GRANULOCYTES # BLD AUTO: 0.01 K/UL (ref 0–0.04)
IMM GRANULOCYTES NFR BLD AUTO: 0.2 % (ref 0–0.5)
LYMPHOCYTES # BLD AUTO: 1.2 K/UL (ref 1–4.8)
LYMPHOCYTES NFR BLD: 20 % (ref 18–48)
MCH RBC QN AUTO: 27.7 PG (ref 27–31)
MCHC RBC AUTO-ENTMCNC: 29.3 G/DL (ref 32–36)
MCV RBC AUTO: 95 FL (ref 82–98)
MONOCYTES # BLD AUTO: 0.5 K/UL (ref 0.3–1)
MONOCYTES NFR BLD: 8.7 % (ref 4–15)
NEUTROPHILS # BLD AUTO: 3.9 K/UL (ref 1.8–7.7)
NEUTROPHILS NFR BLD: 66.7 % (ref 38–73)
NRBC BLD-RTO: 1 /100 WBC
PLATELET # BLD AUTO: 316 K/UL (ref 150–450)
PMV BLD AUTO: 10.8 FL (ref 9.2–12.9)
POTASSIUM SERPL-SCNC: 4 MMOL/L (ref 3.5–5.1)
PROT SERPL-MCNC: 7.3 G/DL (ref 6–8.4)
RBC # BLD AUTO: 3.79 M/UL (ref 4–5.4)
SODIUM SERPL-SCNC: 141 MMOL/L (ref 136–145)
WBC # BLD AUTO: 5.85 K/UL (ref 3.9–12.7)

## 2023-10-25 PROCEDURE — 85025 COMPLETE CBC W/AUTO DIFF WBC: CPT | Performed by: INTERNAL MEDICINE

## 2023-10-25 PROCEDURE — 80053 COMPREHEN METABOLIC PANEL: CPT | Performed by: INTERNAL MEDICINE

## 2023-10-25 PROCEDURE — 36415 COLL VENOUS BLD VENIPUNCTURE: CPT | Mod: PO | Performed by: INTERNAL MEDICINE

## 2023-10-25 NOTE — PRE-PROCEDURE INSTRUCTIONS
PREOP INSTRUCTIONS:  No food,milk or milk products after 900AM  Clear liquids like water,gatorade,apple juice are allowed up until 1100 AM  Instructed to follow the surgeon's instructions if they differ from these.  Shower instructions as well as directions to the Surgery Center were given.  Encouraged to wear loose fitting,comfortable clothing.  Medication instructions for pm prior to and am of procedure reviewed.  Instructed to avoid taking vitamins,supplements,aspirin and ibuprofen the morning of surgery.    Patient denies any side effects or issues with anesthesia or sedation.

## 2023-10-25 NOTE — TELEPHONE ENCOUNTER
Ms. Elmore notified of arrival time for procedure.  Pt instructed to be at the [facility] desk at 3:15pm [facility]. No solids after 9am, clear liquids until 11am.  Shower with dial antibacterial soap tonight + tomorrow morning.  Shampoo hair with regular shampoo. Questions and concerns addressed.

## 2023-10-26 ENCOUNTER — HOSPITAL ENCOUNTER (OUTPATIENT)
Facility: HOSPITAL | Age: 48
Discharge: HOME | End: 2023-10-26
Attending: NEUROLOGICAL SURGERY | Admitting: NEUROLOGICAL SURGERY
Payer: MEDICARE

## 2023-10-26 VITALS
RESPIRATION RATE: 18 BRPM | BODY MASS INDEX: 57.67 KG/M2 | DIASTOLIC BLOOD PRESSURE: 60 MMHG | TEMPERATURE: 99 F | WEIGHT: 293 LBS | OXYGEN SATURATION: 97 % | HEART RATE: 70 BPM | SYSTOLIC BLOOD PRESSURE: 127 MMHG

## 2023-10-26 DIAGNOSIS — E11.9 TYPE 2 DIABETES MELLITUS WITHOUT COMPLICATION, WITH LONG-TERM CURRENT USE OF INSULIN: ICD-10-CM

## 2023-10-26 DIAGNOSIS — Z79.4 TYPE 2 DIABETES MELLITUS WITHOUT COMPLICATION, WITH LONG-TERM CURRENT USE OF INSULIN: ICD-10-CM

## 2023-10-26 DIAGNOSIS — C79.9 METASTATIC NEOPLASTIC DISEASE: Primary | ICD-10-CM

## 2023-10-26 LAB
APTT PPP: 25.9 SEC (ref 21–32)
CLARITY CSF: CLEAR
CLARITY CSF: CLEAR
COLOR CSF: ABNORMAL
COLOR CSF: COLORLESS
CSF TUBE NUMBER: 2
CSF TUBE NUMBER: 2
GLUCOSE CSF-MCNC: 67 MG/DL (ref 40–70)
INR PPP: 1 (ref 0.8–1.2)
LYMPHOCYTES NFR CSF MANUAL: 65 % (ref 40–80)
LYMPHOCYTES NFR CSF MANUAL: 85 % (ref 40–80)
MONOS+MACROS NFR CSF MANUAL: 21 % (ref 15–45)
MONOS+MACROS NFR CSF MANUAL: 6 % (ref 15–45)
NEUTROPHILS NFR CSF MANUAL: 14 % (ref 0–6)
NEUTROPHILS NFR CSF MANUAL: 9 % (ref 0–6)
POCT GLUCOSE: 106 MG/DL (ref 70–110)
PROT CSF-MCNC: 29 MG/DL (ref 15–40)
PROTHROMBIN TIME: 10.6 SEC (ref 9–12.5)
RBC # CSF: 360 /CU MM
RBC # CSF: 56 /CU MM
SPECIMEN VOL CSF: 4 ML
SPECIMEN VOL CSF: 8 ML
WBC # CSF: 1 /CU MM (ref 0–5)
WBC # CSF: 1 /CU MM (ref 0–5)

## 2023-10-26 PROCEDURE — 89051 BODY FLUID CELL COUNT: CPT | Mod: 91 | Performed by: STUDENT IN AN ORGANIZED HEALTH CARE EDUCATION/TRAINING PROGRAM

## 2023-10-26 PROCEDURE — D9220A PRA ANESTHESIA: Mod: CRNA,,, | Performed by: NURSE ANESTHETIST, CERTIFIED REGISTERED

## 2023-10-26 PROCEDURE — 63600175 PHARM REV CODE 636 W HCPCS: Performed by: ANESTHESIOLOGY

## 2023-10-26 PROCEDURE — 25000003 PHARM REV CODE 250: Performed by: NURSE ANESTHETIST, CERTIFIED REGISTERED

## 2023-10-26 PROCEDURE — 88108 CYTOPATH CONCENTRATE TECH: CPT | Mod: 26,,, | Performed by: STUDENT IN AN ORGANIZED HEALTH CARE EDUCATION/TRAINING PROGRAM

## 2023-10-26 PROCEDURE — 36000704 HC OR TIME LEV I 1ST 15 MIN: Performed by: NEUROLOGICAL SURGERY

## 2023-10-26 PROCEDURE — 25000003 PHARM REV CODE 250: Performed by: NEUROLOGICAL SURGERY

## 2023-10-26 PROCEDURE — 82945 GLUCOSE OTHER FLUID: CPT | Performed by: STUDENT IN AN ORGANIZED HEALTH CARE EDUCATION/TRAINING PROGRAM

## 2023-10-26 PROCEDURE — 85610 PROTHROMBIN TIME: CPT | Performed by: STUDENT IN AN ORGANIZED HEALTH CARE EDUCATION/TRAINING PROGRAM

## 2023-10-26 PROCEDURE — D9220A PRA ANESTHESIA: ICD-10-PCS | Mod: ANES,,, | Performed by: ANESTHESIOLOGY

## 2023-10-26 PROCEDURE — 71000044 HC DOSC ROUTINE RECOVERY FIRST HOUR: Performed by: NEUROLOGICAL SURGERY

## 2023-10-26 PROCEDURE — 37000009 HC ANESTHESIA EA ADD 15 MINS: Performed by: NEUROLOGICAL SURGERY

## 2023-10-26 PROCEDURE — 99000 SPECIMEN HANDLING OFFICE-LAB: CPT | Performed by: STUDENT IN AN ORGANIZED HEALTH CARE EDUCATION/TRAINING PROGRAM

## 2023-10-26 PROCEDURE — D9220A PRA ANESTHESIA: ICD-10-PCS | Mod: CRNA,,, | Performed by: NURSE ANESTHETIST, CERTIFIED REGISTERED

## 2023-10-26 PROCEDURE — 88108 PR  CYTOPATH FLUIDS,CONCENTRATN,INTERP: ICD-10-PCS | Mod: 26,,, | Performed by: STUDENT IN AN ORGANIZED HEALTH CARE EDUCATION/TRAINING PROGRAM

## 2023-10-26 PROCEDURE — 36000705 HC OR TIME LEV I EA ADD 15 MIN: Performed by: NEUROLOGICAL SURGERY

## 2023-10-26 PROCEDURE — 25000003 PHARM REV CODE 250: Performed by: STUDENT IN AN ORGANIZED HEALTH CARE EDUCATION/TRAINING PROGRAM

## 2023-10-26 PROCEDURE — D9220A PRA ANESTHESIA: Mod: ANES,,, | Performed by: ANESTHESIOLOGY

## 2023-10-26 PROCEDURE — 84157 ASSAY OF PROTEIN OTHER: CPT | Performed by: STUDENT IN AN ORGANIZED HEALTH CARE EDUCATION/TRAINING PROGRAM

## 2023-10-26 PROCEDURE — 71000015 HC POSTOP RECOV 1ST HR: Performed by: NEUROLOGICAL SURGERY

## 2023-10-26 PROCEDURE — 62270 PR SPINAL PUNCTURE,LUMBAR,DIAGNOSTIC: ICD-10-PCS | Mod: ,,, | Performed by: NEUROLOGICAL SURGERY

## 2023-10-26 PROCEDURE — 62270 DX LMBR SPI PNXR: CPT | Mod: ,,, | Performed by: NEUROLOGICAL SURGERY

## 2023-10-26 PROCEDURE — 71000016 HC POSTOP RECOV ADDL HR: Performed by: NEUROLOGICAL SURGERY

## 2023-10-26 PROCEDURE — 37000008 HC ANESTHESIA 1ST 15 MINUTES: Performed by: NEUROLOGICAL SURGERY

## 2023-10-26 PROCEDURE — 63600175 PHARM REV CODE 636 W HCPCS: Performed by: NURSE ANESTHETIST, CERTIFIED REGISTERED

## 2023-10-26 PROCEDURE — 86255 FLUORESCENT ANTIBODY SCREEN: CPT | Performed by: STUDENT IN AN ORGANIZED HEALTH CARE EDUCATION/TRAINING PROGRAM

## 2023-10-26 PROCEDURE — 88108 CYTOPATH CONCENTRATE TECH: CPT | Performed by: STUDENT IN AN ORGANIZED HEALTH CARE EDUCATION/TRAINING PROGRAM

## 2023-10-26 PROCEDURE — 85730 THROMBOPLASTIN TIME PARTIAL: CPT | Performed by: STUDENT IN AN ORGANIZED HEALTH CARE EDUCATION/TRAINING PROGRAM

## 2023-10-26 RX ORDER — LIDOCAINE HYDROCHLORIDE 20 MG/ML
INJECTION, SOLUTION EPIDURAL; INFILTRATION; INTRACAUDAL; PERINEURAL
Status: DISCONTINUED | OUTPATIENT
Start: 2023-10-26 | End: 2023-10-26

## 2023-10-26 RX ORDER — ACETAMINOPHEN 325 MG/1
650 TABLET ORAL EVERY 6 HOURS PRN
Status: DISCONTINUED | OUTPATIENT
Start: 2023-10-26 | End: 2023-10-26 | Stop reason: HOSPADM

## 2023-10-26 RX ORDER — DEXMEDETOMIDINE HYDROCHLORIDE 100 UG/ML
INJECTION, SOLUTION INTRAVENOUS
Status: DISCONTINUED | OUTPATIENT
Start: 2023-10-26 | End: 2023-10-26

## 2023-10-26 RX ORDER — KETAMINE HCL IN 0.9 % NACL 50 MG/5 ML
SYRINGE (ML) INTRAVENOUS
Status: DISCONTINUED | OUTPATIENT
Start: 2023-10-26 | End: 2023-10-26

## 2023-10-26 RX ORDER — PROPOFOL 10 MG/ML
VIAL (ML) INTRAVENOUS
Status: DISCONTINUED | OUTPATIENT
Start: 2023-10-26 | End: 2023-10-26

## 2023-10-26 RX ORDER — MIDAZOLAM HYDROCHLORIDE 1 MG/ML
INJECTION, SOLUTION INTRAMUSCULAR; INTRAVENOUS
Status: DISCONTINUED | OUTPATIENT
Start: 2023-10-26 | End: 2023-10-26

## 2023-10-26 RX ORDER — MUPIROCIN 20 MG/G
1 OINTMENT TOPICAL 2 TIMES DAILY
Status: ACTIVE | OUTPATIENT
Start: 2023-10-26 | End: 2023-10-27

## 2023-10-26 RX ORDER — FENTANYL CITRATE 50 UG/ML
INJECTION, SOLUTION INTRAMUSCULAR; INTRAVENOUS
Status: DISCONTINUED | OUTPATIENT
Start: 2023-10-26 | End: 2023-10-26

## 2023-10-26 RX ORDER — MUPIROCIN 20 MG/G
OINTMENT TOPICAL
Status: DISPENSED | OUTPATIENT
Start: 2023-10-26

## 2023-10-26 RX ORDER — FENTANYL CITRATE 50 UG/ML
25 INJECTION, SOLUTION INTRAMUSCULAR; INTRAVENOUS EVERY 5 MIN PRN
Status: COMPLETED | OUTPATIENT
Start: 2023-10-26 | End: 2023-10-26

## 2023-10-26 RX ORDER — LIDOCAINE HYDROCHLORIDE 10 MG/ML
INJECTION INFILTRATION; PERINEURAL
Status: DISCONTINUED | OUTPATIENT
Start: 2023-10-26 | End: 2023-10-26 | Stop reason: HOSPADM

## 2023-10-26 RX ORDER — SODIUM CHLORIDE 9 MG/ML
INJECTION, SOLUTION INTRAVENOUS CONTINUOUS
Status: ACTIVE | OUTPATIENT
Start: 2023-10-26

## 2023-10-26 RX ADMIN — LIDOCAINE HYDROCHLORIDE 100 MG: 20 INJECTION, SOLUTION EPIDURAL; INFILTRATION; INTRACAUDAL at 05:10

## 2023-10-26 RX ADMIN — PROPOFOL 30 MG: 10 INJECTION, EMULSION INTRAVENOUS at 05:10

## 2023-10-26 RX ADMIN — FENTANYL CITRATE 25 MCG: 50 INJECTION INTRAMUSCULAR; INTRAVENOUS at 06:10

## 2023-10-26 RX ADMIN — FENTANYL CITRATE 25 MCG: 50 INJECTION INTRAMUSCULAR; INTRAVENOUS at 07:10

## 2023-10-26 RX ADMIN — DEXMEDETOMIDINE 10 MCG: 200 INJECTION, SOLUTION INTRAVENOUS at 05:10

## 2023-10-26 RX ADMIN — MUPIROCIN: 20 OINTMENT TOPICAL at 04:10

## 2023-10-26 RX ADMIN — PROPOFOL 30 MG: 10 INJECTION, EMULSION INTRAVENOUS at 06:10

## 2023-10-26 RX ADMIN — Medication 25 MG: at 05:10

## 2023-10-26 RX ADMIN — DEXMEDETOMIDINE 10 MCG: 200 INJECTION, SOLUTION INTRAVENOUS at 06:10

## 2023-10-26 RX ADMIN — SODIUM CHLORIDE: 0.9 INJECTION, SOLUTION INTRAVENOUS at 05:10

## 2023-10-26 RX ADMIN — MIDAZOLAM HYDROCHLORIDE 2 MG: 1 INJECTION, SOLUTION INTRAMUSCULAR; INTRAVENOUS at 05:10

## 2023-10-26 RX ADMIN — FENTANYL CITRATE 50 MCG: 50 INJECTION, SOLUTION INTRAMUSCULAR; INTRAVENOUS at 06:10

## 2023-10-26 RX ADMIN — FENTANYL CITRATE 50 MCG: 50 INJECTION, SOLUTION INTRAMUSCULAR; INTRAVENOUS at 05:10

## 2023-10-26 RX ADMIN — PROPOFOL 50 MG: 10 INJECTION, EMULSION INTRAVENOUS at 05:10

## 2023-10-26 NOTE — BRIEF OP NOTE
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]     * Brain mass [G93.89]    Procedure(s) (LRB):  Lumbar Puncture (N/A)    Anesthesia: General/MAC    Operative Findings: see full op note; lumbar puncture performed, opening pressure 41, closing pressure 20 mmH20, sent for paraneoplastic panel/cytology/cell count, diff, glc, protein    Estimated Blood Loss: * No values recorded between 10/26/2023 12:00 AM and 10/26/2023  3:10 PM *         Specimens:   Specimen (24h ago, onward)       Start     Ordered    10/26/23 1703  Cytology, Fluid/Wash/Brush  Once        Question Answer Comment   Source: Cerebrospinal fluid (CSF)    Clinical Information: metastatic cancer    Specific Site: lumbar puncture    Other Requests: n/        10/26/23 1702                      Discharge Note    OUTCOME: Patient tolerated treatment/procedure well without complication and is now ready for discharge.    DISPOSITION: Home or Self Care    FINAL DIAGNOSIS:  metastatic cancer    FOLLOWUP: In clinic    DISCHARGE INSTRUCTIONS:    Discharge Procedure Orders   Diet Adult Regular     Notify your health care provider if you experience any of the following:  temperature >100.4     Notify your health care provider if you experience any of the following:  persistent nausea and vomiting or diarrhea     Notify your health care provider if you experience any of the following:  severe uncontrolled pain     Notify your health care provider if you experience any of the following:  severe persistent headache     Notify your health care provider if you experience any of the following:  persistent dizziness,  light-headedness, or visual disturbances     Remove dressing in 48 hours   Order Comments: No soaking or submerging of incision     Activity as tolerated   Order Comments: See instructions        Clinical Reference Documents Added to Patient Instructions         Document    LUMBAR PUNCTURE DISCHARGE INSTRUCTIONS (ENGLISH)            Current Outpatient Medications on File Prior to Encounter   Medication Sig Dispense Refill Last Dose    anastrozole (ARIMIDEX) 1 mg Tab TAKE 1 TABLET(1 MG) BY MOUTH EVERY DAY (Patient taking differently: Take by mouth every evening.) 90 tablet 3 10/25/2023    atorvastatin (LIPITOR) 40 MG tablet TAKE 1 TABLET(40 MG) BY MOUTH EVERY DAY (Patient taking differently: Take by mouth every evening.) 90 tablet 3 10/25/2023    carvediloL (COREG) 25 MG tablet TAKE 1 TABLET(25 MG) BY MOUTH TWICE DAILY 180 tablet 3 10/26/2023    droNABinol (MARINOL) 2.5 MG capsule Take 1 capsule (2.5 mg total) by mouth 2 (two) times daily before meals. (Patient taking differently: Take 2.5 mg by mouth 2 (two) times daily before meals. TAKES PRN 10/25/2023) 60 capsule 0 Past Month    empagliflozin (JARDIANCE) 25 mg tablet Take 1 tablet (25 mg total) by mouth once daily. (Patient taking differently: Take 25 mg by mouth nightly.) 90 tablet 2 Past Week    ENTRESTO  mg per tablet TAKE 1 TABLET BY MOUTH TWICE DAILY 60 tablet 11 10/25/2023    glipiZIDE (GLUCOTROL) 10 MG TR24 TAKE 1 TABLET(10 MG) BY MOUTH DAILY WITH BREAKFAST 90 tablet 2 10/25/2023    ibuprofen (ADVIL,MOTRIN) 800 MG tablet Take 1 tablet (800 mg total) by mouth 2 (two) times daily as needed for Pain. 45 tablet 2 Past Month    metFORMIN (GLUCOPHAGE-XR) 500 MG ER 24hr tablet Take 2 tablets (1,000 mg total) by mouth 2 (two) times daily with meals. 360 tablet 2 Past Week    ondansetron (ZOFRAN-ODT) 8 MG TbDL DISSOLVE 1 TABLET(8 MG) ON THE TONGUE EVERY 8 HOURS AS NEEDED FOR NAUSEA 30 tablet 2 Past Week    oxyCODONE-acetaminophen (PERCOCET) 5-325 mg per  tablet Take 1 tablet by mouth every 4 (four) hours as needed for Pain. 60 tablet 0 Past Week    palbociclib (IBRANCE) 100 mg Cap Take 1 capsule (100 mg) by mouth once daily for 21 days, followed by 7 days off. (Patient taking differently: Take 100 mg by mouth every evening.) 21 capsule 3 10/25/2023    sennosides (SENNA-C ORAL) Take 2 tablets by mouth daily as needed.   Past Week    tirzepatide 7.5 mg/0.5 mL PnIj Inject 7.5 mg into the skin every 7 days. 4 pen 3 Past Week    traZODone (DESYREL) 50 MG tablet Take 1 tablet (50 mg total) by mouth every evening. 30 tablet 11 Past Week    venlafaxine (EFFEXOR-XR) 150 MG Cp24 Take 1 capsule (150 mg total) by mouth once daily. 90 capsule 0 10/25/2023    venlafaxine (EFFEXOR-XR) 75 MG 24 hr capsule Take 1 capsule (75 mg total) by mouth once daily. 90 capsule 0 10/25/2023    ammonium lactate 12 % Crea Apply 1 application topically once daily. 140 g 5     blood sugar diagnostic Strp To check BG 2 times daily, to use with insurance preferred meter 200 each 3     blood-glucose meter kit To check BG 2 times daily, to use with insurance preferred meter 1 each 0     blood-glucose sensor (DEXCOM G7 SENSOR) Monique Change every 10 days 3 each 11     furosemide (LASIX) 20 MG tablet Take 1 tablet (20 mg total) by mouth 2 (two) times daily. (Patient taking differently: Take 20 mg by mouth 2 (two) times daily as needed.) 180 tablet 3 More than a month    goserelin (ZOLADEX) 3.6 mg injection Inject 3.6 mg into the skin every 28 days.       lancets Misc To check BG 2 times daily, to use with insurance preferred meter 200 each 3     naloxone (NARCAN) 4 mg/actuation Spry 4mg by nasal route as needed for opioid overdose; may repeat every 2-3 minutes in alternating nostrils until medical help arrives. Call 911 1 each 11     prochlorperazine (COMPAZINE) 5 MG tablet Take 1 tablet (5 mg total) by mouth 4 (four) times daily as needed for Nausea. 40 tablet 2 Unknown         Neurosurgery Patient  Information  -Return to work will be determined on an individual basis.  -No driving until released by .   -Do not take any OTC products containing acetaminophen at the same time as you take your narcotic pain medication. Medications that may contain acetaminophen include but are not limited to: Excedrin and other headache medications, arthritis medications, cold and sinus medications, etc. Please review the list of active ingredients in any OTC medication prior to taking it.  -Do not take any Aspirin or Aspirin-containing products for 2 weeks after surgery.  -Do not take any Aleve, Naprosyn, Naproxen, Ibuprofen, Advil or any other nonsteroidal anti-inflammatory drug (NSAID) for 2 weeks after surgery.  -Do not take any herbal supplements for 2 weeks after surgery.   -Do not consume any alcoholic beverages until released by your neurosurgeon  -Do not perform any excessive bending over or leaning forward as this is a fall hazard.  -Do not perform any heavy lifting or lifting more than 5-10 lbs from the ground level as this is a fall hazard.  -Slowly increase your ambulation [walking] over the next 2 weeks as tolerated. The goal is to be walking 1-2 miles by the time of your 2 week post op appointment.   -Walk on paved surfaces only. It is okay to walk up and down stairs while holding onto a side rail.      Contact the Neurosurgery Office immediately if:  If you begin to notice any neurologic changes such as:           -Sudden onset of lethargy or sleepiness           -Sudden confusion, trouble speaking, or understanding            -Sudden trouble seeing in one or both eyes            -Sudden trouble walking, dizziness, loss of coordination            -Sudden severe headache with no known cause            -Sudden onset of numbness or weakness     Wound Care:  Keep your incision open to air. You may shower on the 2nd day after your surgery. Keep the incision clean and dry at all times. Please cover the incision with  saran wrap or other occlusive dressing while showering and REMOVE once you have completed taking your shower. Do not allow the force of water to hit the incision. If the incision gets damp, gently pat it dry. Do not rub or scrub the incision. You cannot take a bath/swim/submerge the incision until 8 weeks after surgery.    The incision does not need to be cleaned with any water, soap, alcohol, peroxide, or other substance.      Call your doctor or go to the Emergency Room for any signs of infection including: increased redness, drainage, pain or fever (temperature greater than or equal to 101.4).       Miscellaneous:    -Follow up with Dr. Villa in 2 weeks for a wound check and pathology results. Appointment will be mailed to you.        [facility] Neurosurgery Office: [phone]              [facility] Office: [phone]   [place] Neurosurgery Office: [phone]     Follow-up Information       Jayme Villa MD Follow up in 2 week(s).    Specialties: Neurosurgery, Spine Surgery  Why: For wound re-check  Contact information:  [address]  [facility] [id number] [phone]                           Esther Coreas MD  Neurosurgery  [facility] [address]

## 2023-10-26 NOTE — ANESTHESIA PREPROCEDURE EVALUATION
10/26/2023  Kristopher Elmore is a 48 y.o., female.      Pre-op Assessment    I have reviewed the Patient Summary Reports.     I have reviewed the Nursing Notes. I have reviewed the NPO Status.   I have reviewed the Medications.     Review of Systems  Anesthesia Hx:  No problems with previous Anesthesia  History of prior surgery of interest to airway management or planning: Previous anesthesia: General  Denies Personal Hx of Anesthesia complications.   Hematology/Oncology:        Hematology Comments: Beta thalassemia   Cardiovascular:   Hypertension CHF hyperlipidemia BLUE    Pulmonary:   Shortness of breath    Renal/:  Renal/ Normal     Hepatic/GI:  Hepatic/GI Normal    Neurological:  Neurology Normal    Endocrine:   Diabetes    Psych:   Psychiatric History anxiety depression        Patient Active Problem List   Diagnosis    Hypertension    Iron deficiency anemia    Cardiomegaly    Fibroid uterus    BLUE (dyspnea on exertion)    Beta-thalassemia    Major depressive disorder, recurrent episode, moderate with anxious distress    Chronic combined systolic and diastolic heart failure    Cardiomyopathy    Hyperlipidemia    Malignant neoplasm of upper-inner quadrant of right breast in female, estrogen receptor positive    Cancer associated pain    Pathological fracture of lumbar vertebra with routine healing    Bone metastases    Hot flashes    Morbid obesity, unspecified obesity type    Elevated LDL cholesterol level    Anxiety    Vaginal dryness    Vitamin D deficiency    Grief    Hyperglycemia    Type 2 diabetes mellitus without complication, with long-term current use of insulin    Benign paroxysmal positional vertigo    Neuropathy    Anemia associated with chemotherapy    Sleep difficulties    Hard mass of abdomen    Benign essential HTN    Type 2 diabetes mellitus with  hyperglycemia, with long-term current use of insulin    Iron deficiency anemia secondary to inadequate dietary iron intake    Right shoulder pain    Nausea & vomiting    Mass of spine     Past Medical History:   Diagnosis Date    Abnormal Pap smear     pt states 13yrs ago colpo was done    Anemia     Anxiety     Cancer     Cardiomyopathy     CHF (congestive heart failure)     Fibroid     Hx of psychiatric care     Hyperlipidemia     Hypertension     Psychiatric problem     Sleep difficulties     Therapy      Past Surgical History:   Procedure Laterality Date     SECTION      TONSILLECTOMY      TUBAL LIGATION      UTERINE FIBROID EMBOLIZATION           Physical Exam  General: Well nourished, Cooperative and Alert    Airway:  Mallampati: II   Mouth Opening: Normal  TM Distance: Normal  Tongue: Normal  Neck ROM: Normal ROM    Dental:  Intact      Lab Results   Component Value Date    WBC 5.85 10/25/2023    HGB 10.5 (L) 10/25/2023    HCT 35.8 (L) 10/25/2023    MCV 95 10/25/2023     10/25/2023       BMP  Lab Results   Component Value Date     10/25/2023    K 4.0 10/25/2023     10/25/2023    CO2 27 10/25/2023    BUN 7 10/25/2023    CREATININE 1.0 10/25/2023    CALCIUM 9.3 10/25/2023    ANIONGAP 8 10/25/2023    EGFRNORACEVR >60 10/25/2023         Anesthesia Plan  Type of Anesthesia, risks & benefits discussed:    Anesthesia Type: Gen Natural Airway, Gen ETT, MAC  Intra-op Monitoring Plan: Standard ASA Monitors  Post Op Pain Control Plan: multimodal analgesia  Induction:  IV  Airway Plan: Direct, Post-Induction  Informed Consent: Informed consent signed with the Patient and all parties understand the risks and agree with anesthesia plan.  All questions answered.   ASA Score: 3  Day of Surgery Review of History & Physical: H&P Update referred to the surgeon/provider.    Ready For Surgery From Anesthesia Perspective.     .

## 2023-10-26 NOTE — TRANSFER OF CARE
Anesthesia Transfer of Care Note    Patient: Kristopher Elmore    Procedure(s) Performed: Procedure(s) (LRB):  Lumbar Puncture (N/A)    Patient location: [facility]    Anesthesia Type: general    Transport from OR: Transported from OR on 6-10 L/min O2 by face mask with adequate spontaneous ventilation    Post pain: adequate analgesia    Post assessment: no apparent anesthetic complications and tolerated procedure well    Post vital signs: stable    Level of consciousness: awake    Nausea/Vomiting: no nausea/vomiting    Complications: none    Transfer of care protocol was followed      Last vitals:   Visit Vitals  BP (!) 107/55 (BP Location: Left arm, Patient Position: Lying)   Pulse 75   Temp 36.4 °C (97.5 °F) (Temporal)   Resp 16   Wt (!) 152.4 kg (336 lb)   SpO2 100%   Breastfeeding No   BMI 57.67 kg/m²

## 2023-10-26 NOTE — DISCHARGE INSTRUCTIONS
Neurosurgery Patient Information  -Return to work will be determined on an individual basis.  -No driving until released by Dr.   -Do not take any OTC products containing acetaminophen at the same time as you take your narcotic pain medication. Medications that may contain acetaminophen include but are not limited to: Excedrin and other headache medications, arthritis medications, cold and sinus medications, etc. Please review the list of active ingredients in any OTC medication prior to taking it.  -Do not take any Aspirin or Aspirin-containing products for 2 weeks after surgery.  -Do not take any Aleve, Naprosyn, Naproxen, Ibuprofen, Advil or any other nonsteroidal anti-inflammatory drug (NSAID) for 2 weeks after surgery.  -Do not take any herbal supplements for 2 weeks after surgery.   -Do not consume any alcoholic beverages until released by your neurosurgeon  -Do not perform any excessive bending over or leaning forward as this is a fall hazard.  -Do not perform any heavy lifting or lifting more than 5-10 lbs from the ground level as this is a fall hazard.  -Slowly increase your ambulation [walking] over the next 2 weeks as tolerated. The goal is to be walking 1-2 miles by the time of your 2 week post op appointment.   -Walk on paved surfaces only. It is okay to walk up and down stairs while holding onto a side rail.      Contact the Neurosurgery Office immediately if:  If you begin to notice any neurologic changes such as:           -Sudden onset of lethargy or sleepiness           -Sudden confusion, trouble speaking, or understanding            -Sudden trouble seeing in one or both eyes            -Sudden trouble walking, dizziness, loss of coordination            -Sudden severe headache with no known cause            -Sudden onset of numbness or weakness     Wound Care:  Keep your incision open to air. You may shower on the 2nd day after your surgery. Keep the incision clean and dry at all times. Please  cover the incision with saran wrap or other occlusive dressing while showering and REMOVE once you have completed taking your shower. Do not allow the force of water to hit the incision. If the incision gets damp, gently pat it dry. Do not rub or scrub the incision. You cannot take a bath/swim/submerge the incision until 8 weeks after surgery.    The incision does not need to be cleaned with any water, soap, alcohol, peroxide, or other substance.    Call your doctor or go to the Emergency Room for any signs of infection including: increased redness, drainage, pain or fever (temperature greater than or equal to 101.4).       -Follow up with Dr. Villa in 2 weeks for a wound check and pathology results. Appointment will be mailed to you.        [facility] Neurosurgery Office: [phone]              [facility] Neurosurgery Office: [phone]   [place] Neurosurgery Office: [phone]

## 2023-10-27 NOTE — OP NOTE
DATE OF PROCEDURE:  10/26/2023     SURGEON:  Jayme Villa M.D., Ph.D.     ASSISTANT:  Esther Coreas M.D.  (the assistant is a [facility] resident).     PREOPERATIVE DIAGNOSES:  Metastatic breast cancer.  Headaches.  Hearing loss.      POSTOPERATIVE DIAGNOSES:  Metastatic breast cancer.  Headaches.  Hearing loss.      PROCEDURE:  Diagnostic lumbar puncture.     INDICATIONS IN DETAIL:   Ms. Kristopher Elmore is a 47 y.o. woman with PMHx of metastatic breast cancer, referred to me by Dr. Fields, Neurosurgery, who presents today for lumbar puncture. Pt was seen in Neurosurgery for lytic lesions throughout the lumbar spine and pelvis, as well as additional stable sclerotic lesions in the sternum and T3 vertebral body. An MRI of the cervical spine and brain was recommended to evaluate for metastatic lesions. MRI cervical demonstrated abnormal marrow signal and enhancement in the clivus. She was referred to me thereafter. Upon further evaluation, the pt reports acute onset of weakness and tremors within the last month. Symptoms occur after remaining sitting for prolonged periods of time. Pt has had difficulty with her balance for quite some time now. Associated symptoms include dizziness and progressive hearing loss. She c/o acute headaches. She has not had frequent headaches occur in the past and states this is new for her. Additionally, pt endorses urinary retention of unknown duration. She has been seen and evaluated by Urology for this.  MRI Brain W WO Contrast (9/29/2023): No evidence of intracranial metastatic disease.  Nonspecific marrow heterogeneity in the clivus is similar to 2020 without definite focal lesion.  MRI Cervical Spine WO Contrast (9/29/2023):  Abnormal marrow signal and enhancement in the clivus concerning for metastatic disease.  MRI Thoracic & Lumbar Spine WO Contrast (9/14/2023): Limited evaluation without intravenous contrast.  Stable lesions involving T10, L2, L3, and  the right iliac wing.  No pathologic fracture.  No evidence of epidural extension.  No new lesion. No high-grade spinal canal stenosis or neural foraminal narrowing.  Mild degenerative changes are stable from prior. I will schedule the pt for a formal hearing test, HVF, and paraneoplastic profile serum. I also recommend a lumbar puncture. I have discussed the risks/benefits, indications, and alternatives for the proposed procedure in detail.      PROCEDURE IN DETAIL:    The patient was seen in the pretreatment area and the risks, benefits, and alternatives were discussed.  The patient was brought to the Operating Room and a general monitored awake anesthetic was administered.  The patient was placed in the lateral decubitus position with the right side up.  The patient's back was cleaned, prepped, and draped in the usual fashion.  Lidocaine was infiltrated in the midline at approximately the L3-4 level. We then pierced the skin going in between the spinous processes using a spinal needle.  No CSF was obtained initially.  We obtained fluoroscopy to verify our position.  The needle was advanced and clear CSF was obtained.      The opening pressure for this patient was 42 cm of water.  We drained approximately 28 mL of CSF, at which time her closing pressure was 20 cm of water.      The trocar was placed back into the spinal needle and the spinal needle was removed.  Pressure was held for a few minutes.  A bandage was placed over the puncture marks.  The patient was then allowed to recover from this anesthetic and was placed on a gurney.      The patient was transferred to the Postanesthesia Care Unit in excellent condition.  EBL was less minimal.     Sample included CSF, which was sent for cell count, protein, and glucose.  We also sent samples for cytology and a paraneoplastic panel.     All counts were correct at the end of surgery.     Dr. Jayme Villa was present during the entire procedure.

## 2023-10-30 LAB
FINAL PATHOLOGIC DIAGNOSIS: NORMAL
Lab: NORMAL
MICROSCOPIC EXAM: NORMAL

## 2023-10-30 NOTE — ANESTHESIA POSTPROCEDURE EVALUATION
Anesthesia Post Evaluation    Patient: Kristopher Elmore    Procedure(s) Performed: Procedure(s) (LRB):  Lumbar Puncture (N/A)    Final Anesthesia Type: general      Patient location during evaluation: PACU  Patient participation: Yes- Able to Participate  Level of consciousness: awake and alert  Post-procedure vital signs: reviewed and stable  Pain management: adequate  Airway patency: patent    PONV status at discharge: No PONV  Anesthetic complications: no      Cardiovascular status: blood pressure returned to baseline and hemodynamically stable  Respiratory status: unassisted and spontaneous ventilation  Hydration status: euvolemic  Follow-up not needed.          Vitals Value Taken Time   /60 10/26/23 2015   Temp 36.9 °C (98.5 °F) 10/26/23 2000   Pulse 70 10/26/23 2015   Resp 18 10/26/23 2015   SpO2 97 % 10/26/23 2015         No case tracking events are documented in the log.      Pain/Ellen Score: No data recorded

## 2023-11-03 LAB
AMPHIPHYSIN AB TITR CSF: NEGATIVE {TITER}
ANNOTATION COMMENT IMP: NORMAL
CV2 IGG TITR CSF: NEGATIVE {TITER}
GLIAL NUC TYPE 1 AB TITR CSF: NEGATIVE {TITER}
HU1 AB TITR CSF IF: NEGATIVE {TITER}
HU2 AB TITR CSF IF: NEGATIVE {TITER}
HU3 AB TITR CSF: NEGATIVE {TITER}
PARANEOPLASTIC INTERPRETATION,CSF: NORMAL
PCA-2 AB TITR CSF: NEGATIVE {TITER}
PCA-TR AB TITR CSF: NEGATIVE {TITER}
PURKINJE CELLS AB TITR CSF IF: NEGATIVE {TITER}

## 2023-11-06 ENCOUNTER — PATIENT MESSAGE (OUTPATIENT)
Dept: CARDIOLOGY | Facility: CLINIC | Age: 48
End: 2023-11-06
Payer: MEDICARE

## 2023-11-06 ENCOUNTER — PATIENT MESSAGE (OUTPATIENT)
Dept: PALLIATIVE MEDICINE | Facility: CLINIC | Age: 48
End: 2023-11-06

## 2023-11-06 ENCOUNTER — OFFICE VISIT (OUTPATIENT)
Dept: PALLIATIVE MEDICINE | Facility: CLINIC | Age: 48
End: 2023-11-06
Payer: MEDICARE

## 2023-11-06 VITALS — HEIGHT: 64 IN | BODY MASS INDEX: 50.02 KG/M2 | WEIGHT: 293 LBS

## 2023-11-06 DIAGNOSIS — R06.09 OTHER FORM OF DYSPNEA: ICD-10-CM

## 2023-11-06 DIAGNOSIS — Z51.5 ENCOUNTER FOR PALLIATIVE CARE: ICD-10-CM

## 2023-11-06 DIAGNOSIS — K59.00 CONSTIPATION, UNSPECIFIED CONSTIPATION TYPE: ICD-10-CM

## 2023-11-06 DIAGNOSIS — G47.00 INSOMNIA, UNSPECIFIED TYPE: ICD-10-CM

## 2023-11-06 DIAGNOSIS — R42 DIZZINESS: ICD-10-CM

## 2023-11-06 DIAGNOSIS — R11.0 NAUSEA: ICD-10-CM

## 2023-11-06 DIAGNOSIS — R53.0 NEOPLASTIC (MALIGNANT) RELATED FATIGUE: ICD-10-CM

## 2023-11-06 DIAGNOSIS — Z17.0 MALIGNANT NEOPLASM OF UPPER-INNER QUADRANT OF RIGHT BREAST IN FEMALE, ESTROGEN RECEPTOR POSITIVE: Primary | ICD-10-CM

## 2023-11-06 DIAGNOSIS — F43.23 ADJUSTMENT DISORDER WITH MIXED ANXIETY AND DEPRESSED MOOD: ICD-10-CM

## 2023-11-06 DIAGNOSIS — R53.1 WEAKNESS: ICD-10-CM

## 2023-11-06 DIAGNOSIS — R63.0 ANOREXIA: ICD-10-CM

## 2023-11-06 DIAGNOSIS — Z71.89 ADVANCED CARE PLANNING/COUNSELING DISCUSSION: ICD-10-CM

## 2023-11-06 DIAGNOSIS — G89.3 CANCER ASSOCIATED PAIN: ICD-10-CM

## 2023-11-06 DIAGNOSIS — C50.211 MALIGNANT NEOPLASM OF UPPER-INNER QUADRANT OF RIGHT BREAST IN FEMALE, ESTROGEN RECEPTOR POSITIVE: Primary | ICD-10-CM

## 2023-11-06 PROCEDURE — 1160F RVW MEDS BY RX/DR IN RCRD: CPT | Mod: CPTII,S$GLB,, | Performed by: STUDENT IN AN ORGANIZED HEALTH CARE EDUCATION/TRAINING PROGRAM

## 2023-11-06 PROCEDURE — 1159F MED LIST DOCD IN RCRD: CPT | Mod: CPTII,S$GLB,, | Performed by: STUDENT IN AN ORGANIZED HEALTH CARE EDUCATION/TRAINING PROGRAM

## 2023-11-06 PROCEDURE — 3044F HG A1C LEVEL LT 7.0%: CPT | Mod: CPTII,S$GLB,, | Performed by: STUDENT IN AN ORGANIZED HEALTH CARE EDUCATION/TRAINING PROGRAM

## 2023-11-06 PROCEDURE — 99215 OFFICE O/P EST HI 40 MIN: CPT | Mod: S$GLB,,, | Performed by: STUDENT IN AN ORGANIZED HEALTH CARE EDUCATION/TRAINING PROGRAM

## 2023-11-06 PROCEDURE — 4010F PR ACE/ARB THEARPY RXD/TAKEN: ICD-10-PCS | Mod: CPTII,S$GLB,, | Performed by: STUDENT IN AN ORGANIZED HEALTH CARE EDUCATION/TRAINING PROGRAM

## 2023-11-06 PROCEDURE — 3008F PR BODY MASS INDEX (BMI) DOCUMENTED: ICD-10-PCS | Mod: CPTII,S$GLB,, | Performed by: STUDENT IN AN ORGANIZED HEALTH CARE EDUCATION/TRAINING PROGRAM

## 2023-11-06 PROCEDURE — 3044F PR MOST RECENT HEMOGLOBIN A1C LEVEL <7.0%: ICD-10-PCS | Mod: CPTII,S$GLB,, | Performed by: STUDENT IN AN ORGANIZED HEALTH CARE EDUCATION/TRAINING PROGRAM

## 2023-11-06 PROCEDURE — 1160F PR REVIEW ALL MEDS BY PRESCRIBER/CLIN PHARMACIST DOCUMENTED: ICD-10-PCS | Mod: CPTII,S$GLB,, | Performed by: STUDENT IN AN ORGANIZED HEALTH CARE EDUCATION/TRAINING PROGRAM

## 2023-11-06 PROCEDURE — 4010F ACE/ARB THERAPY RXD/TAKEN: CPT | Mod: CPTII,S$GLB,, | Performed by: STUDENT IN AN ORGANIZED HEALTH CARE EDUCATION/TRAINING PROGRAM

## 2023-11-06 PROCEDURE — 99999 PR PBB SHADOW E&M-EST. PATIENT-LVL IV: CPT | Mod: PBBFAC,,, | Performed by: STUDENT IN AN ORGANIZED HEALTH CARE EDUCATION/TRAINING PROGRAM

## 2023-11-06 PROCEDURE — 99215 PR OFFICE/OUTPT VISIT, EST, LEVL V, 40-54 MIN: ICD-10-PCS | Mod: S$GLB,,, | Performed by: STUDENT IN AN ORGANIZED HEALTH CARE EDUCATION/TRAINING PROGRAM

## 2023-11-06 PROCEDURE — 3008F BODY MASS INDEX DOCD: CPT | Mod: CPTII,S$GLB,, | Performed by: STUDENT IN AN ORGANIZED HEALTH CARE EDUCATION/TRAINING PROGRAM

## 2023-11-06 PROCEDURE — 99497 PR ADVNCD CARE PLAN 30 MIN: ICD-10-PCS | Mod: S$GLB,,, | Performed by: STUDENT IN AN ORGANIZED HEALTH CARE EDUCATION/TRAINING PROGRAM

## 2023-11-06 PROCEDURE — 1159F PR MEDICATION LIST DOCUMENTED IN MEDICAL RECORD: ICD-10-PCS | Mod: CPTII,S$GLB,, | Performed by: STUDENT IN AN ORGANIZED HEALTH CARE EDUCATION/TRAINING PROGRAM

## 2023-11-06 PROCEDURE — 99999 PR PBB SHADOW E&M-EST. PATIENT-LVL IV: ICD-10-PCS | Mod: PBBFAC,,, | Performed by: STUDENT IN AN ORGANIZED HEALTH CARE EDUCATION/TRAINING PROGRAM

## 2023-11-06 PROCEDURE — 99497 ADVNCD CARE PLAN 30 MIN: CPT | Mod: S$GLB,,, | Performed by: STUDENT IN AN ORGANIZED HEALTH CARE EDUCATION/TRAINING PROGRAM

## 2023-11-06 RX ORDER — PEN NEEDLE, DIABETIC 32GX 5/32"
NEEDLE, DISPOSABLE MISCELLANEOUS
COMMUNITY
Start: 2023-09-10 | End: 2023-12-21

## 2023-11-06 RX ORDER — HYDROXYZINE HYDROCHLORIDE 25 MG/1
25 TABLET, FILM COATED ORAL
COMMUNITY
Start: 2023-09-10 | End: 2023-11-17 | Stop reason: CLARIF
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
## 2023-11-06 NOTE — Clinical Note
Good morning,   I saw Ms. Elmore this morning. Since the LP, she has noted improvement in the weakness/tremors/dizziness. She states she felt so much better post LP and removal of the CSF. She has a follow up appt with Dr. Villa on Wednesday. Otherwise, no changes. We did do some ACP docs today.  Thanks, Aishwarya

## 2023-11-08 ENCOUNTER — OFFICE VISIT (OUTPATIENT)
Dept: NEUROSURGERY | Facility: CLINIC | Age: 48
End: 2023-11-08
Payer: MEDICARE

## 2023-11-08 DIAGNOSIS — C79.51 BONE METASTASES: ICD-10-CM

## 2023-11-08 DIAGNOSIS — G93.2 IIH (IDIOPATHIC INTRACRANIAL HYPERTENSION): Primary | ICD-10-CM

## 2023-11-08 DIAGNOSIS — Z17.0 MALIGNANT NEOPLASM OF UPPER-INNER QUADRANT OF RIGHT BREAST IN FEMALE, ESTROGEN RECEPTOR POSITIVE: ICD-10-CM

## 2023-11-08 DIAGNOSIS — C50.211 MALIGNANT NEOPLASM OF UPPER-INNER QUADRANT OF RIGHT BREAST IN FEMALE, ESTROGEN RECEPTOR POSITIVE: ICD-10-CM

## 2023-11-08 PROCEDURE — 3044F HG A1C LEVEL LT 7.0%: CPT | Mod: CPTII,95,, | Performed by: NEUROLOGICAL SURGERY

## 2023-11-08 PROCEDURE — 3044F PR MOST RECENT HEMOGLOBIN A1C LEVEL <7.0%: ICD-10-PCS | Mod: CPTII,95,, | Performed by: NEUROLOGICAL SURGERY

## 2023-11-08 PROCEDURE — 4010F PR ACE/ARB THEARPY RXD/TAKEN: ICD-10-PCS | Mod: CPTII,95,, | Performed by: NEUROLOGICAL SURGERY

## 2023-11-08 PROCEDURE — 99214 OFFICE O/P EST MOD 30 MIN: CPT | Mod: 95,,, | Performed by: NEUROLOGICAL SURGERY

## 2023-11-08 PROCEDURE — 4010F ACE/ARB THERAPY RXD/TAKEN: CPT | Mod: CPTII,95,, | Performed by: NEUROLOGICAL SURGERY

## 2023-11-08 PROCEDURE — 99214 PR OFFICE/OUTPT VISIT, EST, LEVL IV, 30-39 MIN: ICD-10-PCS | Mod: 95,,, | Performed by: NEUROLOGICAL SURGERY

## 2023-11-08 NOTE — PATIENT INSTRUCTIONS
Given the pt's positive outcome after the lumbar puncture, I recommend  shunt placement. I have discussed the risks/benefits, indications, and alternatives for the proposed procedure in detail. I have answered all of their questions and the pt wishes to proceed with surgery.  We will schedule the pt.

## 2023-11-08 NOTE — PROGRESS NOTES
The patient location is: [place]  The chief complaint leading to consultation is: follow up LP    Visit type: audiovisual    Face to Face time with patient: 15 minutes  30 minutes of total time spent on the encounter, which includes face to face time and non-face to face time preparing to see the patient (eg, review of tests), Obtaining and/or reviewing separately obtained history, Documenting clinical information in the electronic or other health record, Independently interpreting results (not separately reported) and communicating results to the patient/family/caregiver, or Care coordination (not separately reported).         Each patient to whom he or she provides medical services by telemedicine is:  (1) informed of the relationship between the physician and patient and the respective role of any other health care provider with respect to management of the patient; and (2) notified that he or she may decline to receive medical services by telemedicine and may withdraw from such care at any time.    Notes:    Subjective:   IDebbie, attest that this documentation has been prepared under the direction and in the presence of Jayme Villa MD.     Patient ID: Kristopher Elmore is a 48 y.o. female     Chief Complaint: No chief complaint on file.      HPI  Ms. Kristopher Elmore is a 48 y.o. woman with PMHx of metastatic breast cancer, who presents today for follow up of lumbar puncture done on 10/26/2023. This is a patient who was being seen in Neurosurgery for metastatic lesions in the spine and pelvis. MRI cervical demonstrated abnormal marrow signal and enhancement in the clivus. She was referred to me thereafter. The pt c/o acute headaches. She has not had frequent headaches occur in the past and states this is new for her. In addition to this, the pt reports dizziness and progressive hearing loss. MRI Cervical Spine WO Contrast (9/29/2023):  Abnormal marrow signal and enhancement in the clivus  concerning for metastatic disease. I have scheduled the pt for a formal hearing test, HVF, and paraneoplastic profile serum. I recommend performing a lumbar puncture for IIH and pt wished to proceed.    The opening pressure for this patient was 42 cm of water.  We drained approximately 28 mL of CSF, at which time her closing pressure was 20 cm of water. The pt reports significant improvement following the spinal tap. She has had complete resolution of her headaches and back pain since the procedure.   Her paraneoplastic profile was negative.  Cytology was negative for malignant cells.     Review of Systems   Constitutional:  Negative for activity change, appetite change, fatigue, fever and unexpected weight change.   HENT:  Negative for facial swelling.    Eyes: Negative.    Respiratory: Negative.     Cardiovascular: Negative.    Gastrointestinal:  Negative for diarrhea, nausea and vomiting.   Endocrine: Negative.    Genitourinary: Negative.    Musculoskeletal:  Negative for back pain, joint swelling, myalgias and neck pain.   Neurological:  Negative for dizziness, seizures, weakness, numbness and headaches.   Psychiatric/Behavioral: Negative.          Past Medical History:   Diagnosis Date    Abnormal Pap smear     pt states 13yrs ago colpo was done    Anemia     Anxiety     Cancer     Cardiomyopathy     CHF (congestive heart failure)     Fibroid     Hx of psychiatric care     Hyperlipidemia     Hypertension     Psychiatric problem     Sleep difficulties     Therapy        Objective:    There were no vitals filed for this visit.   Physical Exam  Constitutional:       General: She is not in acute distress.     Appearance: Normal appearance.   HENT:      Head: Normocephalic and atraumatic.   Neurological:      Mental Status: She is alert and oriented to person, place, and time.          I, Dr. Jayme Villa, personally performed the services described in this documentation. All medical record entries made by the  bryant, Debbie Stevens, were at my direction and in my presence.  I have reviewed the chart and agree that the record reflects my personal performance and is accurate and complete. Jayme Villa MD. 11/08/2023    Assessment:       II    Plan:   Given the pt's positive outcome after the lumbar puncture, I recommend  shunt placement. I have discussed the risks/benefits, indications, and alternatives for the proposed procedure in detail. I have answered all of their questions and the pt wishes to proceed with surgery.  We will schedule the pt.

## 2023-11-09 ENCOUNTER — TELEPHONE (OUTPATIENT)
Dept: NEUROSURGERY | Facility: CLINIC | Age: 48
End: 2023-11-09
Payer: MEDICARE

## 2023-11-09 DIAGNOSIS — C50.211 MALIGNANT NEOPLASM OF UPPER-INNER QUADRANT OF RIGHT BREAST IN FEMALE, ESTROGEN RECEPTOR POSITIVE: ICD-10-CM

## 2023-11-09 DIAGNOSIS — G93.2 PSEUDOTUMOR CEREBRI SYNDROME: ICD-10-CM

## 2023-11-09 DIAGNOSIS — G93.2 PSEUDOTUMOR CEREBRI: Primary | ICD-10-CM

## 2023-11-09 DIAGNOSIS — Z17.0 MALIGNANT NEOPLASM OF UPPER-INNER QUADRANT OF RIGHT BREAST IN FEMALE, ESTROGEN RECEPTOR POSITIVE: ICD-10-CM

## 2023-11-09 RX ORDER — ANASTROZOLE 1 MG/1
TABLET ORAL
Qty: 90 TABLET | Refills: 0 | Status: SHIPPED | OUTPATIENT
Start: 2023-11-09 | End: 2024-02-06

## 2023-11-16 ENCOUNTER — PATIENT MESSAGE (OUTPATIENT)
Dept: ENDOCRINOLOGY | Facility: CLINIC | Age: 48
End: 2023-11-16
Payer: MEDICARE

## 2023-11-16 ENCOUNTER — HOSPITAL ENCOUNTER (OUTPATIENT)
Dept: RADIOLOGY | Facility: HOSPITAL | Age: 48
Discharge: HOME | End: 2023-11-16
Attending: INTERNAL MEDICINE
Payer: MEDICARE

## 2023-11-16 DIAGNOSIS — C79.51 MALIGNANT NEOPLASM METASTATIC TO BONE: ICD-10-CM

## 2023-11-16 DIAGNOSIS — Z17.0 MALIGNANT NEOPLASM OF UPPER-INNER QUADRANT OF RIGHT BREAST IN FEMALE, ESTROGEN RECEPTOR POSITIVE: ICD-10-CM

## 2023-11-16 DIAGNOSIS — C50.211 MALIGNANT NEOPLASM OF UPPER-INNER QUADRANT OF RIGHT BREAST IN FEMALE, ESTROGEN RECEPTOR POSITIVE: ICD-10-CM

## 2023-11-16 PROCEDURE — 25500020 PHARM REV CODE 255: Performed by: INTERNAL MEDICINE

## 2023-11-16 PROCEDURE — 74177 CT ABD & PELVIS W/CONTRAST: CPT | Mod: TC

## 2023-11-16 PROCEDURE — 74177 CT CHEST ABDOMEN PELVIS WITH IV CONTRAST (XPD): ICD-10-PCS | Mod: 26,,, | Performed by: STUDENT IN AN ORGANIZED HEALTH CARE EDUCATION/TRAINING PROGRAM

## 2023-11-16 PROCEDURE — 74177 CT ABD & PELVIS W/CONTRAST: CPT | Mod: 26,,, | Performed by: STUDENT IN AN ORGANIZED HEALTH CARE EDUCATION/TRAINING PROGRAM

## 2023-11-16 PROCEDURE — 71260 CT CHEST ABDOMEN PELVIS WITH IV CONTRAST (XPD): ICD-10-PCS | Mod: 26,,, | Performed by: STUDENT IN AN ORGANIZED HEALTH CARE EDUCATION/TRAINING PROGRAM

## 2023-11-16 PROCEDURE — 71260 CT THORAX DX C+: CPT | Mod: 26,,, | Performed by: STUDENT IN AN ORGANIZED HEALTH CARE EDUCATION/TRAINING PROGRAM

## 2023-11-16 PROCEDURE — 71260 CT THORAX DX C+: CPT | Mod: TC

## 2023-11-16 PROCEDURE — A9698 NON-RAD CONTRAST MATERIALNOC: HCPCS | Performed by: INTERNAL MEDICINE

## 2023-11-16 RX ADMIN — BARIUM SULFATE 450 ML: 20 SUSPENSION ORAL at 01:11

## 2023-11-16 RX ADMIN — IOHEXOL 100 ML: 350 INJECTION, SOLUTION INTRAVENOUS at 01:11

## 2023-11-17 ENCOUNTER — TELEPHONE (OUTPATIENT)
Dept: PREADMISSION TESTING | Facility: HOSPITAL | Age: 48
End: 2023-11-17
Payer: MEDICARE

## 2023-11-17 DIAGNOSIS — Z01.818 PREOPERATIVE TESTING: Primary | ICD-10-CM

## 2023-11-17 NOTE — PRE-PROCEDURE INSTRUCTIONS
Patient stated has not had any problem with anesthesia in the past.Will need medical optimization by   or NP , poc and T& S. Our  will call to set up these appts.    Preop instructions given. Hold aspirin, aspirin containing products, nsaids(aleve, advil, motrin, ibuprofen, naprosyn, naproxen, voltaren, diclofenac), vitamins ( Ergocalciferol. Vitamin D) and supplements one week prior to surgery.     May take Tylenol.  Hold tirzepatide( Mounjaro) one week prior to surgery.( Also sent to [facility] portal)  Verbalizes understanding.

## 2023-11-17 NOTE — ANESTHESIA PAT ROS NOTE
11/17/2023  Kristopher Elmore is a 48 y.o., female.      Pre-op Assessment          Review of Systems           Anesthesia Assessment: Preoperative EQUATION    Planned Procedure: Procedure(s) (LRB):  INSERTION, SHUNT, VENTRICULOPERITONEAL (Right)  Requested Anesthesia Type:General  Surgeon: Jayme Villa MD  Service: Neurosurgery  Known or anticipated Date of Surgery:12/14/2023    Surgeon notes: reviewed    Electronic QUestionnaire Assessment completed via nurse interview with patient.        Triage considerations:       Previous anesthesia records:GETA and No problems  10/26/2023   Lumbar Puncture (Spine Lumbar)   Airway:  Mallampati: II   Mouth Opening: Normal  TM Distance: Normal  Tongue: Normal  Neck ROM: Normal ROM    Last PCP note: outside [facility]   Subspecialty notes: Cardiology: General, Endocrinology, Hematology/Oncology, Neurosurgery, Psychiatry, Urology, PODIATRY, PALLIATIVE CARE    Other important co-morbidities:PER EPIC:  DM2, HLD, HTN, Morbid Obesity, and ANEMIA, PSEUDOTUMOR CEREBRI       Tests already available:  Available tests,  within 3 months , within [facility] .   10/26/2023 PT/INR, PTT, CMP, CBC, 9/29/2023 MRI BRAIN W W/O CONTRAST, 9/21/2023 UA, MICRO UA, 9/14/2023 EKG, HGA1C          Instructions given. (See in Nurse's note)    Optimization:  Anesthesia Preop Clinic Assessment  Indicated    Medical Opinion Indicated           Plan:    Testing:  T&S   Pre-anesthesia  visit       Visit focus: concerns in complex and/or prolonged anesthesia, COMORBIDITIES     Consultation:IM Perioperative Hospitalist OR NP     Patient  has previously scheduled Medical Appointment:11/20 DR LANE, 11/20 CHEMO, 11/24 MRI, 12/8 UROL,   Navigation: Tests Scheduled. TBD             Consults scheduled.TBD             Results will be tracked by Preop Clinic.  12/7 Medical optimization by Dr. Farias on  12/7 .  12/8 T&S resulted and noted by .  Juana Gan RN BSN

## 2023-11-20 ENCOUNTER — INFUSION (OUTPATIENT)
Dept: INFUSION THERAPY | Facility: HOSPITAL | Age: 48
End: 2023-11-20
Payer: MEDICAID

## 2023-11-20 ENCOUNTER — OFFICE VISIT (OUTPATIENT)
Dept: HEMATOLOGY/ONCOLOGY | Facility: CLINIC | Age: 48
End: 2023-11-20
Payer: MEDICARE

## 2023-11-20 VITALS
DIASTOLIC BLOOD PRESSURE: 71 MMHG | WEIGHT: 293 LBS | HEIGHT: 64 IN | RESPIRATION RATE: 17 BRPM | OXYGEN SATURATION: 97 % | HEART RATE: 87 BPM | SYSTOLIC BLOOD PRESSURE: 113 MMHG | BODY MASS INDEX: 50.02 KG/M2 | TEMPERATURE: 97 F

## 2023-11-20 DIAGNOSIS — I50.42 CHRONIC COMBINED SYSTOLIC AND DIASTOLIC HEART FAILURE: ICD-10-CM

## 2023-11-20 DIAGNOSIS — T45.1X5A ANEMIA ASSOCIATED WITH CHEMOTHERAPY: ICD-10-CM

## 2023-11-20 DIAGNOSIS — G89.3 CANCER ASSOCIATED PAIN: ICD-10-CM

## 2023-11-20 DIAGNOSIS — C79.51 MALIGNANT NEOPLASM METASTATIC TO BONE: ICD-10-CM

## 2023-11-20 DIAGNOSIS — C50.211 MALIGNANT NEOPLASM OF UPPER-INNER QUADRANT OF RIGHT BREAST IN FEMALE, ESTROGEN RECEPTOR POSITIVE: Primary | ICD-10-CM

## 2023-11-20 DIAGNOSIS — C50.211 MALIGNANT NEOPLASM OF UPPER-INNER QUADRANT OF RIGHT BREAST IN FEMALE, ESTROGEN RECEPTOR POSITIVE: ICD-10-CM

## 2023-11-20 DIAGNOSIS — Z17.0 MALIGNANT NEOPLASM OF UPPER-INNER QUADRANT OF RIGHT BREAST IN FEMALE, ESTROGEN RECEPTOR POSITIVE: ICD-10-CM

## 2023-11-20 DIAGNOSIS — I10 HYPERTENSION, UNSPECIFIED TYPE: ICD-10-CM

## 2023-11-20 DIAGNOSIS — Z79.4 TYPE 2 DIABETES MELLITUS WITHOUT COMPLICATION, WITH LONG-TERM CURRENT USE OF INSULIN: ICD-10-CM

## 2023-11-20 DIAGNOSIS — Z17.0 MALIGNANT NEOPLASM OF UPPER-INNER QUADRANT OF RIGHT BREAST IN FEMALE, ESTROGEN RECEPTOR POSITIVE: Primary | ICD-10-CM

## 2023-11-20 DIAGNOSIS — C79.51 MALIGNANT NEOPLASM METASTATIC TO BONE: Primary | ICD-10-CM

## 2023-11-20 DIAGNOSIS — E66.01 MORBID OBESITY, UNSPECIFIED OBESITY TYPE: ICD-10-CM

## 2023-11-20 DIAGNOSIS — E11.9 TYPE 2 DIABETES MELLITUS WITHOUT COMPLICATION, WITH LONG-TERM CURRENT USE OF INSULIN: ICD-10-CM

## 2023-11-20 DIAGNOSIS — D64.81 ANEMIA ASSOCIATED WITH CHEMOTHERAPY: ICD-10-CM

## 2023-11-20 PROCEDURE — 3008F BODY MASS INDEX DOCD: CPT | Mod: CPTII,S$GLB,, | Performed by: INTERNAL MEDICINE

## 2023-11-20 PROCEDURE — 1159F PR MEDICATION LIST DOCUMENTED IN MEDICAL RECORD: ICD-10-PCS | Mod: CPTII,S$GLB,, | Performed by: INTERNAL MEDICINE

## 2023-11-20 PROCEDURE — 1160F RVW MEDS BY RX/DR IN RCRD: CPT | Mod: CPTII,S$GLB,, | Performed by: INTERNAL MEDICINE

## 2023-11-20 PROCEDURE — 4010F ACE/ARB THERAPY RXD/TAKEN: CPT | Mod: CPTII,S$GLB,, | Performed by: INTERNAL MEDICINE

## 2023-11-20 PROCEDURE — 3044F HG A1C LEVEL LT 7.0%: CPT | Mod: CPTII,S$GLB,, | Performed by: INTERNAL MEDICINE

## 2023-11-20 PROCEDURE — 3074F SYST BP LT 130 MM HG: CPT | Mod: CPTII,S$GLB,, | Performed by: INTERNAL MEDICINE

## 2023-11-20 PROCEDURE — 3008F PR BODY MASS INDEX (BMI) DOCUMENTED: ICD-10-PCS | Mod: CPTII,S$GLB,, | Performed by: INTERNAL MEDICINE

## 2023-11-20 PROCEDURE — 1159F MED LIST DOCD IN RCRD: CPT | Mod: CPTII,S$GLB,, | Performed by: INTERNAL MEDICINE

## 2023-11-20 PROCEDURE — 99214 PR OFFICE/OUTPT VISIT, EST, LEVL IV, 30-39 MIN: ICD-10-PCS | Mod: S$GLB,,, | Performed by: INTERNAL MEDICINE

## 2023-11-20 PROCEDURE — 99999 PR PBB SHADOW E&M-EST. PATIENT-LVL V: ICD-10-PCS | Mod: PBBFAC,,, | Performed by: INTERNAL MEDICINE

## 2023-11-20 PROCEDURE — 1160F PR REVIEW ALL MEDS BY PRESCRIBER/CLIN PHARMACIST DOCUMENTED: ICD-10-PCS | Mod: CPTII,S$GLB,, | Performed by: INTERNAL MEDICINE

## 2023-11-20 PROCEDURE — 99214 OFFICE O/P EST MOD 30 MIN: CPT | Mod: S$GLB,,, | Performed by: INTERNAL MEDICINE

## 2023-11-20 PROCEDURE — 96402 CHEMO HORMON ANTINEOPL SQ/IM: CPT

## 2023-11-20 PROCEDURE — 3044F PR MOST RECENT HEMOGLOBIN A1C LEVEL <7.0%: ICD-10-PCS | Mod: CPTII,S$GLB,, | Performed by: INTERNAL MEDICINE

## 2023-11-20 PROCEDURE — 3078F PR MOST RECENT DIASTOLIC BLOOD PRESSURE < 80 MM HG: ICD-10-PCS | Mod: CPTII,S$GLB,, | Performed by: INTERNAL MEDICINE

## 2023-11-20 PROCEDURE — 3078F DIAST BP <80 MM HG: CPT | Mod: CPTII,S$GLB,, | Performed by: INTERNAL MEDICINE

## 2023-11-20 PROCEDURE — 3074F PR MOST RECENT SYSTOLIC BLOOD PRESSURE < 130 MM HG: ICD-10-PCS | Mod: CPTII,S$GLB,, | Performed by: INTERNAL MEDICINE

## 2023-11-20 PROCEDURE — 99999 PR PBB SHADOW E&M-EST. PATIENT-LVL V: CPT | Mod: PBBFAC,,, | Performed by: INTERNAL MEDICINE

## 2023-11-20 PROCEDURE — 4010F PR ACE/ARB THEARPY RXD/TAKEN: ICD-10-PCS | Mod: CPTII,S$GLB,, | Performed by: INTERNAL MEDICINE

## 2023-11-20 PROCEDURE — 63600175 PHARM REV CODE 636 W HCPCS: Mod: JZ,JG | Performed by: INTERNAL MEDICINE

## 2023-11-20 PROCEDURE — 96372 THER/PROPH/DIAG INJ SC/IM: CPT

## 2023-11-20 RX ADMIN — GOSERELIN ACETATE 3.6 MG: 3.6 IMPLANT SUBCUTANEOUS at 09:11

## 2023-11-20 RX ADMIN — DENOSUMAB 120 MG: 120 INJECTION SUBCUTANEOUS at 09:11

## 2023-11-20 NOTE — PROGRESS NOTES
Subjective     Patient ID: Kristopher Elmore is a 48 y.o. female.    Chief Complaint: Malignant neoplasm of upper-inner quadrant of right breast     HPI    Reports  shunt planned 12/14/2023  Notes after LP Headaches resolved, back pain better, balance and gait better x 1 week - this relief led her to make decision to proceed    Last scans:  - 11/16/2023 CT C/A/P:  FINDINGS:  Base of neck: Unremarkable.  Thoracic soft tissues: No significant abnormality.  Aorta: Normal caliber.  Heart: Normal in size. No pericardial effusion.  Jane/Mediastinum: No pathologic lymphadenopathy.  Lungs: Thin bandlike density within the left lower lobe suggestive for subsegmental atelectasis or pulmonary scarring.  Otherwise, lungs are clear.  Liver: Diffusely hypodense parenchyma suggestive for steatosis.  No focal lesion.  Portal veins are patent.  Gallbladder: Unremarkable.  Bile Ducts: No evidence of dilated ducts.  Pancreas: No mass or ductal dilatation.  Spleen: Unremarkable.  Adrenals: Unremarkable.  Kidneys/ Ureters: Small right renal cyst.  No hydronephrosis or renal stone.  No ureteral dilatation.  Bladder: No evidence of wall thickening.  Reproductive organs: Calcified uterine fibroid.  GI Tract/Mesentery: No evidence of bowel obstruction or inflammation.  Peritoneal Space: No ascites.  No free air.  Retroperitoneum: No significant adenopathy.  Abdominal wall: Mild patchy body wall edema.  Vasculature: Abdominal aorta is normal caliber.  Bones: Stable mixed lytic and sclerotic lesions throughout the spine, sternum, and pelvis.  Stable lytic focus within the left humeral head.  No definite new lesion.  No acute fracture.  Impression:  1. Stable mixed lytic and sclerotic osseous lesions.  No evidence of new metastatic disease.  2. Suspect hepatic steatosis.  3. Additional findings as above.    - 9/29/2023 MRI Brain:  FINDINGS:  Ventricles stable in size without evidence of hydrocephalus.  The brain parenchyma maintains  normal signal intensity..  No new parenchymal mass effect, hemorrhage, edema or recent or remote major vascular distribution infarct.  No enhancing intracranial lesion.  No new extra-axial blood or fluid collections.  The T2 skull base flow voids are preserved.  Dural venous sinuses are patent.  Marrow heterogeneity of the clivus is stable from 2020 and not particularly suspicious for underlying lesion.  The visualized sinuses and mastoid air cells are clear.  Radiographic proptosis again noted.  Impression:  No evidence of intracranial metastatic disease.  Nonspecific marrow heterogeneity in the clivus is similar to 2020 without definite focal lesion.  Metastatic disease is thought unlikely.     - 9/29/2023 MRI C Spine:  FINDINGS:  Alignment: Straightening of the normal cervical lordosis.  Vertebrae: Normal marrow signal. No fracture.  Discs: Mild multilevel disc height loss.  Cord: Normal.  Abnormal marrow signal and enhancement in the clivus.  Cerebellar tonsils are in their expected location.  Visualized brainstem is normal.  Vertebral artery flow voids are present.  Prevertebral soft tissues are normal.  Retropharyngeal course of the bilateral common carotid arteries.  Visualized parotid glands are unremarkable.  No cervical lymph node enlargement.  Paraspinal musculature demonstrates normal bulk and signal intensity.  Degenerative findings:  C2-C3: No neural foraminal narrowing or spinal canal stenosis.  C3-C4: No neural foraminal narrowing or spinal canal stenosis.  C4-C5: Mild left facet hypertrophy.  Mild left neural foraminal narrowing.  No spinal canal stenosis.  C5-C6: No neural foraminal narrowing or spinal canal stenosis.  C6-C7: No neural foraminal narrowing or spinal canal stenosis.  C7-T1: No neural foraminal narrowing or spinal canal stenosis.  Impression:  1. Abnormal marrow signal and enhancement in the clivus concerning for metastatic disease.  2. No evidence of metastatic disease in the  cervical spine.     - 9/14/2023 MRI T/L spine:  FINDINGS:  ALIGNMENT: Sagittal alignment is maintained.  BONE: No evidence of acute fracture.  Evaluation for focal lesions limited without intravenous contrast.  Stable subtle T1 hypointense lesions involving the inferior aspect of the T10 vertebral body, inferior endplate of L2, inferior endplate of L3, as well as the right iliac wing.  No definite new lesions are identified.  Disc: Intervertebral disc heights are grossly maintained without evidence of severe disc narrowing.  No bone marrow edema.  SPINAL CANAL: The thoracic spinal cord is unremarkable.  The conus medullaris has a normal appearance and terminates at the L1-L2 level.  No epidural mass or collection.  PARASPINAL SOFT TISSUES: Stable right renal cyst.  Lower lumbar soft tissue edema.  Similar probable subcutaneous lipoma in the right upper back.  DEGENERATIVE FINDINGS:  No significant degenerative changes of the thoracic spine are identified.  There is unchanged appearance of a prominent ligamentum flavum hypertrophy at the T2-T3 level.  No high-grade spinal canal stenosis or neural foraminal narrowing.  Minor degenerative changes of the lumbar spine involving minimal broad-based posterior disc bulges at L4-L5 and L5-S1.  Mild facet arthropathy involving L3-S1 levels.  Possible small stable caudally extending left paracentral disc extrusion at L1-L2.  There is no high-grade spinal canal stenosis or neural foraminal narrowing in the lumbar spine.  Impression:  Limited evaluation without intravenous contrast.  Stable lesions involving T10, L2, L3, and the right iliac wing.  No pathologic fracture.  No evidence of epidural extension.  No new lesion  No high-grade spinal canal stenosis or neural foraminal narrowing.  Mild degenerative changes are stable from prior.  Follow-up with contrast enhanced examination, as clinically warranted.     Currently on Arimidex and Ibrance (dose adjusted due to anemia to  "100 mg M-F, off Sat&Sun)   Zoladex and Xgeva monthly   Most recent scans stable.  Genetic testing negative.      Diagnosis:  1. Stage IV ([id number]) invasive ductal carcinoma of right breast, upper inner quadrant, ER %, MI neg, Her2 neg, Grade 3, multifocal with one lesion appearing more aggressive (Ki67 80%), large LN +, bone mets.   Ibrance dose adjusted with cytopenias   Oncologic History:   Presentation   - 9/11/19 - Screening mammogram showed multiple right breast masses lower inner quadrant   - 9/13/19 - Diagnostic mammogram and US showed a right breast, 3:00 position mass, 2 CFN measuring 17 mm x 16 mm x 14 mm. There 2 smaller adjacent masses towards the nipple   - 9/19/19 - Biopsy -   A. Right breast subareolar: Grade 3 IDC, estrogen receptor 80%, progesterone receptor 0%, Her2 dago neg, Ki67 80%.   B. Right breast mass 3:00: Grade 3 IDC with papillary features, estrogen receptor 100%, progesterone receptor 0%, Her2 dago 1+, Ki67 30%.   - 9/26/19: US right axilla with abnormal lymphadenopathy measuring 4.3 x 2.8 cm with biopsy + for metastatic breast cancer.   Surgery consultation with Dr Ferris on 9/25/19   - [facility] pending   Medical oncology consultation on 10/7/19   * 10/8/19 - CT C/A/P with several lytic lesions in thoracic and lumbar spine, iliac bones, left scapula in addition to 3 x 4.5 cm right axillary node and 2.1 cm right breast mass. Bone scan negative.   * 10/31/19 - started Ibrance, Arimidex, Zoladex; plan for Xgeva.   * 11/12/19 STRATA without targetable mutations.   * 12/16/19 - Bone biopsy + for met disease (initially read as negative, but we treated as metastatic disease given high suspicion).   * MRI brain: "Partially empty sella. Question bilateral globe proptosis. Otherwise unremarkable MRI brain as detailed above specifically without evidence for intracranial enhancing lesion to suggest metastatic disease."   * 4/22/2020 " PET - disease improvement. 8/2020 PET with disease improvement.   * 9/1/20 - Palliative RT to L1-L4   Of note, * Xgeva monthly - we had been waiting on dental clearance, but she has been unable to get into dentistry and is accepting of risks. Has no active dental disease.   PET scan 4/22/2021:   FINDINGS:   Quality of the study: Mildly degraded due to patient's large body habitus and skin abutting the gantry.   In the head and neck, there are no hypermetabolic lesions worrisome for malignancy. There are no hypermetabolic mucosal lesions, and there are no pathologically enlarged or hypermetabolic lymph nodes.   In the chest, there are no hypermetabolic lesions worrisome for malignancy.   Stable CT appearance of a level 1 right axillary lymph node measuring 1.3 cm in short axis with normal background radiotracer uptake. Previously with SUV max of 2.1.   There are no concerning pulmonary nodules or masses, and there are no pathologically enlarged or hypermetabolic lymph nodes.   In the abdomen and pelvis, there is physiologic tracer distribution within the abdominal organs and excretion into the genitourinary system.   In the bones, there are stable lytic lesions throughout the lumbar spine and pelvis and additional stable sclerotic lesions in the sternum and T3 vertebral body. No associated focal abnormal increased radiotracer uptake. Findings likely represent treated disease.   Impression:   Interval decreased uptake within a prominent right axillary lymph node, now with normal background uptake. No new focal abnormal uptake.   Stable lytic and sclerotic lesions without focal abnormal uptake. Findings are compatible with treated metastasis.   8/25/2021 MRI brain   Impression:   No significant change from prior specifically without evidence for enhancing lesion to suggest intracranial metastatic disease.   Continued partially empty sella, intracranial hypertension to be included in differential in the appropriate  clinical setting. Clinical correlation and further evaluation as warranted.   10/14/2021 MRI thoracic/lumbar spine:   Impression:   Patient history of metastatic breast cancer.   Bone lesions at T10, L2, L3, and right iliac wing, as above, concerning for metastatic disease. No pathologic fracture, soft tissue component, or epidural extension identified.   10/25/2021 CT chest/abd/pelvis:   Impression:   1. Stable metastasis of the spine   No evidence of intra-abdominal or intrathoracic metastatic disease   2. Stable prominent right axillary lymph node.   3. Additional findings are detailed above.   Currently on Arimidex and Ibrance   Zoladex and Xgeva monthly   - 2/2/2022 Bone scan:   FINDINGS:   No focal abnormal uptake suggestive of osseous metastases. There is diffusely increased uptake in the calvarium in keeping with hyperostosis. There is degenerative type uptake most prominent in the bilateral shoulders and knees. The focal uptake previously described in the distal right femur is not visualized. There is otherwise physiologic distribution of the radiopharmaceutical throughout the skeleton.   There is normal uptake in the genitourinary system and soft tissues.   Impression:   There is no scintigraphic evidence of osteoblastic metastatic disease.   Nonspecific uptake in the distal right femur has resolved.   Diffuse cranial hyperostosis and other findings as above.   - 2/1/2022 CT C/A/P:   FINDINGS:   CHEST   Support tubes and lines: None.   Aorta: Normal caliber.   Heart: Normal size..   Coronary arteries: No calcifications.   Pericardium: Normal. No effusion, thickening, or calcification.   Central pulmonary arteries: Normal caliber.   Base of neck/thyroid: Normal.   Lymph nodes: No supraclavicular, axillary, internal mammary, mediastinal or hilar lymphadenopathy.   Esophagus: Normal.   Pleura: No effusion, thickening or calcification.   Body wall: Unremarkable.   Airways: Normal.   Lungs: Clear without focal  or diffuse abnormality.   Bones: Sclerotic lesions are seen throughout the spine as well as in the sternum, not substantially changed from 10/25/2021   ABDOMEN/PELVIS   Liver: Unremarkable   Gallbladder/bile ducts: Unremarkable. No intra or extrahepatic biliary ductal dilatation   Pancreas: Unremarkable.   Spleen: Unremarkable.   Adrenals: Unremarkable.   Kidneys: Simple cyst in the right kidney is unchanged   Lymph nodes: No abdominal or pelvic lymphadenopathy.   Bowel and mesentery: Unremarkable.   Abdominal aorta: Unremarkable.   Inferior vena cava: Unremarkable.   Free fluid or free air: None.   Pelvis: Unremarkable.   Urinary bladder: Unremarkable.   Body wall: Unremarkable.   Bones: Sclerotic lesions seen in the spine are unchanged.   Impression:   1. No evidence of new recurrent or metastatic disease.   2. Sclerotic lesions seen in the spine and sternum, unchanged when compared to 10/25/2021.      Genetic testing 3/23/2023             Most recent scans:   - 7/5/2023 CT C/A/P:  FINDINGS:  Some peripheral portions of the body wall are not covered on exam field of view due to patient body habitus  Base of neck: Unremarkable.  Thoracic soft tissues: No significant abnormality.  Aorta: Normal caliber.  Heart: Normal in size. No pericardial effusion.  Jane/Mediastinum: No pathologic lymphadenopathy.  Lungs: Well aerated, without consolidation or pleural fluid.  Liver: Diffusely hypodense parenchyma suggestive for steatosis.  No focal lesion.  Portal veins are patent.  Gallbladder: Unremarkable.  Bile Ducts: No evidence of dilated ducts.  Pancreas: No mass or ductal dilatation.  Spleen: Unremarkable.  Adrenals: Unremarkable.  Kidneys/ Ureters: Small right renal cyst.  No hydronephrosis or nephrolithiasis.  No ureteral dilatation.  Bladder: No evidence of wall thickening.  Reproductive organs: Calcified uterine fibroid.  GI Tract/Mesentery: No evidence of bowel obstruction or inflammation.  Peritoneal Space: No  ascites.  No free air.  Retroperitoneum: No significant adenopathy.  Abdominal wall: Minimal body wall edema.  Vasculature: Abdominal aorta is normal caliber.  Bones: Stable mixed lytic and sclerotic lesions throughout the spine, sternum, and right iliac wing.  No definite new lesion.  No acute fracture.  Impression:  1. Stable mixed lytic and sclerotic osseous lesions.  No evidence of new metastatic disease.  2. Additional findings as above.    Review of Systems   Constitutional:  Negative for activity change, appetite change, chills, fatigue (notes pretty good), fever and unexpected weight change.   HENT:  Negative for mouth sores, rhinorrhea and trouble swallowing.    Eyes:  Negative for visual disturbance.   Respiratory:  Negative for cough, shortness of breath and wheezing.    Cardiovascular:  Negative for chest pain, palpitations and leg swelling.   Gastrointestinal:  Negative for abdominal distention, abdominal pain, blood in stool, change in bowel habit, constipation, diarrhea, nausea, vomiting and reflux.   Genitourinary:  Negative for difficulty urinating, dysuria, frequency and urgency.   Musculoskeletal:  Positive for arthralgias, back pain (chronic) and joint deformity (decr ROM right shoulder). Negative for joint swelling and myalgias.   Integumentary:  Negative for rash.   Neurological:  Positive for headaches. Negative for dizziness, weakness, numbness and coordination difficulties.   Hematological:  Negative for adenopathy. Does not bruise/bleed easily.   Psychiatric/Behavioral:  Negative for dysphoric mood and sleep disturbance. The patient is not nervous/anxious.           Objective     Physical Exam  Vitals and nursing note reviewed.   Constitutional:       General: She is not in acute distress.     Appearance: Normal appearance. She is obese. She is not ill-appearing or toxic-appearing.      Comments: Presents with daughters.   In WC due to difficulty walking long distance.   Able to transfer to  exam table without assistance     HENT:      Head: Normocephalic and atraumatic.      Mouth/Throat:      Comments: Tongue geography no different than usual  Eyes:      General: No scleral icterus.     Extraocular Movements: Extraocular movements intact.      Conjunctiva/sclera: Conjunctivae normal.      Pupils: Pupils are equal, round, and reactive to light.   Cardiovascular:      Rate and Rhythm: Normal rate and regular rhythm.      Heart sounds: Normal heart sounds. No murmur heard.     No friction rub. No gallop.   Pulmonary:      Effort: Pulmonary effort is normal. No respiratory distress.      Breath sounds: Normal breath sounds. No wheezing, rhonchi or rales.      Comments: Breasts- deferred today  Chest:      Chest wall: No tenderness.   Abdominal:      General: Bowel sounds are normal. There is no distension.      Palpations: Abdomen is soft. There is no mass.      Tenderness: There is no abdominal tenderness. There is no guarding or rebound.      Comments: No palpable masses   Musculoskeletal:         General: No swelling.      Cervical back: Normal range of motion and neck supple. No tenderness.      Right lower leg: No edema.      Left lower leg: No edema.      Comments: Point tenderness in cervical, thoracic and lumbar spine. Also with cva tenderness today   Lymphadenopathy:      Cervical: No cervical adenopathy.   Skin:     General: Skin is warm and dry.      Coloration: Skin is not jaundiced.      Findings: No bruising or rash.   Neurological:      General: No focal deficit present.      Mental Status: She is alert and oriented to person, place, and time.      Sensory: No sensory deficit.      Motor: No weakness.      Gait: Gait normal.   Psychiatric:         Mood and Affect: Mood normal.         Behavior: Behavior normal.         Thought Content: Thought content normal.         Judgment: Judgment normal.   Labs- reviewed  Imaging- reviewed       Assessment and Plan     1. Malignant neoplasm of  upper-inner quadrant of right breast in female, estrogen receptor positive    2. Bone metastases  Overview:  IMO Regualtory Update 4/1/23      3. Cancer associated pain    4. Anemia associated with chemotherapy    5. Hypertension, unspecified type    6. Chronic combined systolic and diastolic heart failure    7. Morbid obesity, unspecified obesity type    8. Type 2 diabetes mellitus without complication, with long-term current use of insulin  Overview:  A1c 8.5 11/3/2021. Cont Glipizide 10 mg BID. Sending rx for Trulicity to [facility] pharmacy with pharmacy assistance referral. Podiatry referral per pt request      Other orders  -     Cancel: denosumab (XGEVA) solution 120 mg  -     Cancel: goserelin (ZOLADEX) injection 3.6 mg      Metastatic breast cancer to bone  Stable disease on scans  Continue Arimidex and Ibrance (100 mg M-F, off Sat&Sun).   Xgeva and Zoladex -monthly- due today    Anemia parameters stable.   Baseline B thal  Monitor     Morbid obesity  Losing weight on Mounjaro     DM parameters improving     CHF/HTN/Hyperlipidemia  Managed with Cardio Onc     Neurosurg procedure upcoming      Route Chart for Scheduling    Med Onc Chart Routing      Follow up with physician . 8 weeks cbc, cmp and EP injections   Follow up with [facility] . 4 weeks cbc, cmp and EP and injections   Infusion scheduling note    Injection scheduling note    Labs    Imaging    Pharmacy appointment    Other referrals            Treatment Plan Information   OP ANASTROZOLE PALBOCICLIB Q4W   Jessica Mendez MD   Upcoming Treatment Dates - OP ANASTROZOLE PALBOCICLIB Q4W    No upcoming days in selected categories.    Supportive Plan Information  OP BREAST GOSERELIN & DENOSUMAB Q4W   Jessica Mendez MD   Upcoming Treatment Dates - OP BREAST GOSERELIN & DENOSUMAB Q4W    No upcoming days in selected categories.    Therapy Plan Information  EPINEPHrine (EPIPEN) 0.3 mg/0.3 mL pen injection 0.3 mg  0.3 mg, Intramuscular, PRN  diphenhydrAMINE  injection 50 mg  50 mg, Intravenous, PRN  methylPREDNISolone sodium succinate injection 125 mg  125 mg, Intravenous, PRN  sodium chloride 0.9% bolus 1,000 mL 1,000 mL  1,000 mL, Intravenous, PRN

## 2023-11-20 NOTE — NURSING
Pt here for Zoladex and Xgeva injections. Assessment complete and labs reviewed. Pt endorses taking Ca++ and Vit D daily; denies jaw pain or recent/upcoming dental procedures. Administered both injections in abdominal tissue. No questions or concerns. Pt left unit in WC with family member.

## 2023-11-24 ENCOUNTER — HOSPITAL ENCOUNTER (OUTPATIENT)
Dept: RADIOLOGY | Facility: HOSPITAL | Age: 48
Discharge: HOME | End: 2023-11-24
Attending: NURSE PRACTITIONER
Payer: MEDICARE

## 2023-11-24 PROCEDURE — A9585 GADOBUTROL INJECTION: HCPCS | Performed by: NURSE PRACTITIONER

## 2023-11-24 PROCEDURE — 72158 MRI LUMBAR SPINE W WO CONTRAST: ICD-10-PCS | Mod: 26,,, | Performed by: RADIOLOGY

## 2023-11-24 PROCEDURE — 72157 MRI CHEST SPINE W/O & W/DYE: CPT | Mod: TC

## 2023-11-24 PROCEDURE — 72157 MRI CHEST SPINE W/O & W/DYE: CPT | Mod: 26,,, | Performed by: RADIOLOGY

## 2023-11-24 PROCEDURE — 25500020 PHARM REV CODE 255: Performed by: NURSE PRACTITIONER

## 2023-11-24 PROCEDURE — 72158 MRI LUMBAR SPINE W/O & W/DYE: CPT | Mod: TC

## 2023-11-24 PROCEDURE — 72157 MRI THORACIC SPINE W WO CONTRAST: ICD-10-PCS | Mod: 26,,, | Performed by: RADIOLOGY

## 2023-11-24 PROCEDURE — 72158 MRI LUMBAR SPINE W/O & W/DYE: CPT | Mod: 26,,, | Performed by: RADIOLOGY

## 2023-11-24 RX ORDER — GADOBUTROL 604.72 MG/ML
10 INJECTION INTRAVENOUS
Status: COMPLETED | OUTPATIENT
Start: 2023-11-24 | End: 2023-11-24

## 2023-11-24 RX ADMIN — GADOBUTROL 10 ML: 604.72 INJECTION INTRAVENOUS at 06:11

## 2023-12-06 ENCOUNTER — TELEPHONE (OUTPATIENT)
Dept: PREADMISSION TESTING | Facility: HOSPITAL | Age: 48
End: 2023-12-06
Payer: MEDICARE

## 2023-12-07 ENCOUNTER — HOSPITAL ENCOUNTER (OUTPATIENT)
Dept: PREADMISSION TESTING | Facility: HOSPITAL | Age: 48
Discharge: HOME | End: 2023-12-07
Attending: NEUROLOGICAL SURGERY
Payer: MEDICARE

## 2023-12-07 ENCOUNTER — OFFICE VISIT (OUTPATIENT)
Dept: OTOLARYNGOLOGY | Facility: CLINIC | Age: 48
End: 2023-12-07
Payer: MEDICARE

## 2023-12-07 ENCOUNTER — CLINICAL SUPPORT (OUTPATIENT)
Dept: AUDIOLOGY | Facility: CLINIC | Age: 48
End: 2023-12-07
Payer: MEDICARE

## 2023-12-07 ENCOUNTER — ANESTHESIA EVENT (OUTPATIENT)
Dept: SURGERY | Facility: HOSPITAL | Age: 48
DRG: 032 | End: 2023-12-07
Payer: MEDICARE

## 2023-12-07 ENCOUNTER — OFFICE VISIT (OUTPATIENT)
Dept: INTERNAL MEDICINE | Facility: CLINIC | Age: 48
End: 2023-12-07
Payer: MEDICARE

## 2023-12-07 ENCOUNTER — TELEPHONE (OUTPATIENT)
Dept: INTERNAL MEDICINE | Facility: CLINIC | Age: 48
End: 2023-12-07
Payer: MEDICARE

## 2023-12-07 VITALS
BODY MASS INDEX: 50.02 KG/M2 | HEART RATE: 88 BPM | SYSTOLIC BLOOD PRESSURE: 113 MMHG | HEIGHT: 64 IN | OXYGEN SATURATION: 98 % | RESPIRATION RATE: 14 BRPM | WEIGHT: 293 LBS | DIASTOLIC BLOOD PRESSURE: 67 MMHG | TEMPERATURE: 98 F

## 2023-12-07 DIAGNOSIS — R06.83 SNORING: ICD-10-CM

## 2023-12-07 DIAGNOSIS — D56.1 BETA-THALASSEMIA: ICD-10-CM

## 2023-12-07 DIAGNOSIS — H93.293 ABNORMAL AUDITORY PERCEPTION OF BOTH EARS: Primary | ICD-10-CM

## 2023-12-07 DIAGNOSIS — Z01.818 PREOP EXAMINATION: Primary | ICD-10-CM

## 2023-12-07 DIAGNOSIS — R23.2 HOT FLASHES: ICD-10-CM

## 2023-12-07 DIAGNOSIS — C79.51 MALIGNANT NEOPLASM METASTATIC TO BONE: ICD-10-CM

## 2023-12-07 DIAGNOSIS — D25.9 UTERINE LEIOMYOMA, UNSPECIFIED LOCATION: ICD-10-CM

## 2023-12-07 DIAGNOSIS — G98.8 PARANEOPLASTIC NEUROLOGIC DISORDER: ICD-10-CM

## 2023-12-07 DIAGNOSIS — E11.9 TYPE 2 DIABETES MELLITUS WITHOUT COMPLICATION, WITH LONG-TERM CURRENT USE OF INSULIN: ICD-10-CM

## 2023-12-07 DIAGNOSIS — Z79.4 TYPE 2 DIABETES MELLITUS WITHOUT COMPLICATION, WITH LONG-TERM CURRENT USE OF INSULIN: ICD-10-CM

## 2023-12-07 DIAGNOSIS — Z86.16 HISTORY OF COVID-19: ICD-10-CM

## 2023-12-07 DIAGNOSIS — F43.21 GRIEF: ICD-10-CM

## 2023-12-07 DIAGNOSIS — E78.5 HYPERLIPIDEMIA, UNSPECIFIED HYPERLIPIDEMIA TYPE: ICD-10-CM

## 2023-12-07 DIAGNOSIS — G47.9 SLEEP DIFFICULTIES: ICD-10-CM

## 2023-12-07 DIAGNOSIS — R19.00 HARD MASS OF ABDOMEN: ICD-10-CM

## 2023-12-07 DIAGNOSIS — C80.1 PARANEOPLASTIC NEUROLOGIC DISORDER: ICD-10-CM

## 2023-12-07 DIAGNOSIS — I51.89 COMBINED SYSTOLIC AND DIASTOLIC CARDIAC DYSFUNCTION: ICD-10-CM

## 2023-12-07 DIAGNOSIS — H91.13 PRESBYCUSIS OF BOTH EARS: Primary | ICD-10-CM

## 2023-12-07 DIAGNOSIS — M25.511 RIGHT SHOULDER PAIN, UNSPECIFIED CHRONICITY: ICD-10-CM

## 2023-12-07 DIAGNOSIS — I10 BENIGN ESSENTIAL HTN: ICD-10-CM

## 2023-12-07 DIAGNOSIS — R11.2 NAUSEA AND VOMITING, UNSPECIFIED VOMITING TYPE: ICD-10-CM

## 2023-12-07 DIAGNOSIS — F41.9 ANXIETY: ICD-10-CM

## 2023-12-07 DIAGNOSIS — K76.0 FATTY LIVER: ICD-10-CM

## 2023-12-07 PROBLEM — N89.8 VAGINAL DRYNESS: Status: RESOLVED | Noted: 2021-02-04 | Resolved: 2023-12-07

## 2023-12-07 PROBLEM — G62.9 NEUROPATHY: Status: RESOLVED | Noted: 2022-01-10 | Resolved: 2023-12-07

## 2023-12-07 PROCEDURE — 4010F ACE/ARB THERAPY RXD/TAKEN: CPT | Mod: CPTII,S$GLB,, | Performed by: HOSPITALIST

## 2023-12-07 PROCEDURE — 3008F BODY MASS INDEX DOCD: CPT | Mod: CPTII,S$GLB,, | Performed by: HOSPITALIST

## 2023-12-07 PROCEDURE — 99215 OFFICE O/P EST HI 40 MIN: CPT | Mod: S$GLB,,, | Performed by: HOSPITALIST

## 2023-12-07 PROCEDURE — 3008F PR BODY MASS INDEX (BMI) DOCUMENTED: ICD-10-PCS | Mod: CPTII,S$GLB,, | Performed by: HOSPITALIST

## 2023-12-07 PROCEDURE — 92567 PR TYMPA2METRY: ICD-10-PCS | Mod: S$GLB,,,

## 2023-12-07 PROCEDURE — 4010F ACE/ARB THERAPY RXD/TAKEN: CPT | Mod: CPTII,S$GLB,, | Performed by: OTOLARYNGOLOGY

## 2023-12-07 PROCEDURE — 99499 UNLISTED E&M SERVICE: CPT | Mod: S$GLB,,, | Performed by: HOSPITALIST

## 2023-12-07 PROCEDURE — 99999 PR PBB SHADOW E&M-EST. PATIENT-LVL I: ICD-10-PCS | Mod: PBBFAC,,,

## 2023-12-07 PROCEDURE — 1159F PR MEDICATION LIST DOCUMENTED IN MEDICAL RECORD: ICD-10-PCS | Mod: CPTII,S$GLB,, | Performed by: OTOLARYNGOLOGY

## 2023-12-07 PROCEDURE — 1159F MED LIST DOCD IN RCRD: CPT | Mod: CPTII,S$GLB,, | Performed by: OTOLARYNGOLOGY

## 2023-12-07 PROCEDURE — 99999 PR PBB SHADOW E&M-EST. PATIENT-LVL III: CPT | Mod: PBBFAC,,, | Performed by: OTOLARYNGOLOGY

## 2023-12-07 PROCEDURE — 99999 PR PBB SHADOW E&M-EST. PATIENT-LVL V: CPT | Mod: PBBFAC,,, | Performed by: HOSPITALIST

## 2023-12-07 PROCEDURE — 3044F HG A1C LEVEL LT 7.0%: CPT | Mod: CPTII,S$GLB,, | Performed by: OTOLARYNGOLOGY

## 2023-12-07 PROCEDURE — 3074F SYST BP LT 130 MM HG: CPT | Mod: CPTII,S$GLB,, | Performed by: HOSPITALIST

## 2023-12-07 PROCEDURE — 99999 PR PBB SHADOW E&M-EST. PATIENT-LVL V: ICD-10-PCS | Mod: PBBFAC,,, | Performed by: HOSPITALIST

## 2023-12-07 PROCEDURE — 92567 TYMPANOMETRY: CPT | Mod: S$GLB,,,

## 2023-12-07 PROCEDURE — 99999 PR PBB SHADOW E&M-EST. PATIENT-LVL I: CPT | Mod: PBBFAC,,,

## 2023-12-07 PROCEDURE — 3044F PR MOST RECENT HEMOGLOBIN A1C LEVEL <7.0%: ICD-10-PCS | Mod: CPTII,S$GLB,, | Performed by: HOSPITALIST

## 2023-12-07 PROCEDURE — 99215 PR OFFICE/OUTPT VISIT, EST, LEVL V, 40-54 MIN: ICD-10-PCS | Mod: S$GLB,,, | Performed by: HOSPITALIST

## 2023-12-07 PROCEDURE — 3078F DIAST BP <80 MM HG: CPT | Mod: CPTII,S$GLB,, | Performed by: HOSPITALIST

## 2023-12-07 PROCEDURE — 99204 OFFICE O/P NEW MOD 45 MIN: CPT | Mod: S$GLB,,, | Performed by: OTOLARYNGOLOGY

## 2023-12-07 PROCEDURE — 4010F PR ACE/ARB THEARPY RXD/TAKEN: ICD-10-PCS | Mod: CPTII,S$GLB,, | Performed by: HOSPITALIST

## 2023-12-07 PROCEDURE — 99999 PR PBB SHADOW E&M-EST. PATIENT-LVL III: ICD-10-PCS | Mod: PBBFAC,,, | Performed by: OTOLARYNGOLOGY

## 2023-12-07 PROCEDURE — 3078F PR MOST RECENT DIASTOLIC BLOOD PRESSURE < 80 MM HG: ICD-10-PCS | Mod: CPTII,S$GLB,, | Performed by: HOSPITALIST

## 2023-12-07 PROCEDURE — 4010F PR ACE/ARB THEARPY RXD/TAKEN: ICD-10-PCS | Mod: CPTII,S$GLB,, | Performed by: OTOLARYNGOLOGY

## 2023-12-07 PROCEDURE — 92557 COMPREHENSIVE HEARING TEST: CPT | Mod: S$GLB,,,

## 2023-12-07 PROCEDURE — 92557 PR COMPREHENSIVE HEARING TEST: ICD-10-PCS | Mod: S$GLB,,,

## 2023-12-07 PROCEDURE — 3044F PR MOST RECENT HEMOGLOBIN A1C LEVEL <7.0%: ICD-10-PCS | Mod: CPTII,S$GLB,, | Performed by: OTOLARYNGOLOGY

## 2023-12-07 PROCEDURE — 3074F PR MOST RECENT SYSTOLIC BLOOD PRESSURE < 130 MM HG: ICD-10-PCS | Mod: CPTII,S$GLB,, | Performed by: HOSPITALIST

## 2023-12-07 PROCEDURE — 99204 PR OFFICE/OUTPT VISIT, NEW, LEVL IV, 45-59 MIN: ICD-10-PCS | Mod: S$GLB,,, | Performed by: OTOLARYNGOLOGY

## 2023-12-07 PROCEDURE — 3044F HG A1C LEVEL LT 7.0%: CPT | Mod: CPTII,S$GLB,, | Performed by: HOSPITALIST

## 2023-12-07 NOTE — PROGRESS NOTES
Kristopher Elmore was seen today in the clinic for an audiologic evaluation.  Patient's main complaint was decreased hearing and dizziness.  Ms. Elmore reported a progressive decline in hearing abilities and true spinning vertigo when bending down or sitting to standing too quickly. She also reported intermittent tinnitus bilaterally. Ms. Elmore denied otalgia and aural fullness today.    Tympanometry revealed Type A in the right ear and Type A in the left ear.     Audiogram results revealed normal hearing sensitivity in the right ear and normal hearing sensitivity in the left ear.      Speech reception thresholds were noted at 5 dB in the right ear and 5 dB in the left ear.    Speech discrimination scores were 100% in the right ear and 100% in the left ear.    Recommendations:  Otologic evaluation  Repeat audiogram as needed  Hearing protection when in noise

## 2023-12-07 NOTE — ANESTHESIA PREPROCEDURE EVALUATION
[facility]  Anesthesia Pre-Operative Evaluation         Patient Name: Kristopher Elmore  YOB: 1975  MRN: [id number]    SUBJECTIVE:     Pre-operative evaluation for Procedure(s) (LRB):  INSERTION, SHUNT, VENTRICULOPERITONEAL (Right)     12/07/2023    Kristopher Elmore is a 48 y.o. female w/ a significant PMHx of HTN, HLD, BRITTANI, cardiomegaly, BLUE, beta-thalassemia, CHF (EF 35%), anxiety, MDD, T2DM, ER+ breast cancer with bony mets (on chemo), BPPV, chemo-related N/V, IIH, and morbid obesity (BMI 57.63). On GLP-1 agonist.    Events leading to presentation for  shunt: Seen in Neurosurgery clinic for metastatic lesions in the spine and pelvis. MRI cervical demonstrated abnormal marrow signal and enhancement in the clivus. The pt c/o new onset acute headaches, dizziness, and progressive hearing loss. MRI Cervical Spine WO Contrast (9/29/2023):  Abnormal marrow signal and enhancement in the clivus concerning for metastatic disease. LP performed for IIH. The opening pressure for this patient was 42 cm of water. 28 mL of CSF was drained, at which time her closing pressure was 20 cm of water. The pt reported significant improvement following the spinal tap. She has had complete resolution of her headaches and back pain since the procedure. Her paraneoplastic profile was negative. Cytology was negative for malignant cells.     Patient now presents for the above procedure(s).    Results for orders placed during the hospital encounter of 11/07/22    Echo    Interpretation Summary  · Technically difficult study  · The left ventricle is severely enlarged with eccentric hypertrophy and moderately decreased systolic function. The estimated ejection fraction is 35%.  · Normal right ventricular size with normal right ventricular systolic function.  · Grade I left ventricular diastolic dysfunction. Left atrial pressures do not appear to be significantly elevated.  · The IVC is not  "visualized.      LDA: None documented.       Prev airway: None documented.    Drips: None documented.    Oncologic History:   Presentation   - 9/11/19 - Screening mammogram showed multiple right breast masses lower inner quadrant   - 9/13/19 - Diagnostic mammogram and US showed a right breast, 3:00 position mass, 2 CFN measuring 17 mm x 16 mm x 14 mm. There 2 smaller adjacent masses towards the nipple   - 9/19/19 - Biopsy -   A. Right breast subareolar: Grade 3 IDC, estrogen receptor 80%, progesterone receptor 0%, Her2 dago neg, Ki67 80%.   B. Right breast mass 3:00: Grade 3 IDC with papillary features, estrogen receptor 100%, progesterone receptor 0%, Her2 dago 1+, Ki67 30%.   - 9/26/19: US right axilla with abnormal lymphadenopathy measuring 4.3 x 2.8 cm with biopsy + for metastatic breast cancer.   Surgery consultation with Dr Ferris on 9/25/19   - 9/25/19 - Genetics Genetics [email or web address] BRACAnalysis pending; VUS pending   Medical oncology consultation on 10/7/19   * 10/8/19 - CT C/A/P with several lytic lesions in thoracic and lumbar spine, iliac bones, left scapula in addition to 3 x 4.5 cm right axillary node and 2.1 cm right breast mass. Bone scan negative.   * 10/31/19 - started Ibrance, Arimidex, Zoladex; plan for Xgeva.   * 11/12/19 STRATA without targetable mutations.   * 12/16/19 - Bone biopsy + for met disease (initially read as negative, but we treated as metastatic disease given high suspicion).   * MRI brain: "Partially empty sella. Question bilateral globe proptosis. Otherwise unremarkable MRI brain as detailed above specifically without evidence for intracranial enhancing lesion to suggest metastatic disease."   * 4/22/2020 PET - disease improvement. 8/2020 PET with disease improvement.   * 9/1/20 - Palliative RT to L1-L4   Of note, * Xgeva monthly - we had been waiting on dental clearance, but she has been unable to get into dentistry and is accepting of risks. Has no active dental disease.   PET " scan 4/22/2021:   FINDINGS:   Quality of the study: Mildly degraded due to patient's large body habitus and skin abutting the gantry.   In the head and neck, there are no hypermetabolic lesions worrisome for malignancy. There are no hypermetabolic mucosal lesions, and there are no pathologically enlarged or hypermetabolic lymph nodes.   In the chest, there are no hypermetabolic lesions worrisome for malignancy.   Stable CT appearance of a level 1 right axillary lymph node measuring 1.3 cm in short axis with normal background radiotracer uptake. Previously with SUV max of 2.1.   There are no concerning pulmonary nodules or masses, and there are no pathologically enlarged or hypermetabolic lymph nodes.   In the abdomen and pelvis, there is physiologic tracer distribution within the abdominal organs and excretion into the genitourinary system.   In the bones, there are stable lytic lesions throughout the lumbar spine and pelvis and additional stable sclerotic lesions in the sternum and T3 vertebral body. No associated focal abnormal increased radiotracer uptake. Findings likely represent treated disease.   Impression:   Interval decreased uptake within a prominent right axillary lymph node, now with normal background uptake. No new focal abnormal uptake.   Stable lytic and sclerotic lesions without focal abnormal uptake. Findings are compatible with treated metastasis.   8/25/2021 MRI brain   Impression:   No significant change from prior specifically without evidence for enhancing lesion to suggest intracranial metastatic disease.   Continued partially empty sella, intracranial hypertension to be included in differential in the appropriate clinical setting. Clinical correlation and further evaluation as warranted.   10/14/2021 MRI thoracic/lumbar spine:   Impression:   Patient history of metastatic breast cancer.   Bone lesions at T10, L2, L3, and right iliac wing, as above, concerning for metastatic disease. No  pathologic fracture, soft tissue component, or epidural extension identified.   10/25/2021 CT chest/abd/pelvis:   Impression:   1. Stable metastasis of the spine   No evidence of intra-abdominal or intrathoracic metastatic disease   2. Stable prominent right axillary lymph node.   3. Additional findings are detailed above.   Currently on Arimidex and Ibrance   Zoladex and Xgeva monthly   - 2/2/2022 Bone scan:   FINDINGS:   No focal abnormal uptake suggestive of osseous metastases. There is diffusely increased uptake in the calvarium in keeping with hyperostosis. There is degenerative type uptake most prominent in the bilateral shoulders and knees. The focal uptake previously described in the distal right femur is not visualized. There is otherwise physiologic distribution of the radiopharmaceutical throughout the skeleton.   There is normal uptake in the genitourinary system and soft tissues.   Impression:   There is no scintigraphic evidence of osteoblastic metastatic disease.   Nonspecific uptake in the distal right femur has resolved.   Diffuse cranial hyperostosis and other findings as above.   - 2/1/2022 CT C/A/P:   FINDINGS:   CHEST   Support tubes and lines: None.   Aorta: Normal caliber.   Heart: Normal size..   Coronary arteries: No calcifications.   Pericardium: Normal. No effusion, thickening, or calcification.   Central pulmonary arteries: Normal caliber.   Base of neck/thyroid: Normal.   Lymph nodes: No supraclavicular, axillary, internal mammary, mediastinal or hilar lymphadenopathy.   Esophagus: Normal.   Pleura: No effusion, thickening or calcification.   Body wall: Unremarkable.   Airways: Normal.   Lungs: Clear without focal or diffuse abnormality.   Bones: Sclerotic lesions are seen throughout the spine as well as in the sternum, not substantially changed from 10/25/2021   ABDOMEN/PELVIS   Liver: Unremarkable   Gallbladder/bile ducts: Unremarkable. No intra or extrahepatic biliary ductal  dilatation   Pancreas: Unremarkable.   Spleen: Unremarkable.   Adrenals: Unremarkable.   Kidneys: Simple cyst in the right kidney is unchanged   Lymph nodes: No abdominal or pelvic lymphadenopathy.   Bowel and mesentery: Unremarkable.   Abdominal aorta: Unremarkable.   Inferior vena cava: Unremarkable.   Free fluid or free air: None.   Pelvis: Unremarkable.   Urinary bladder: Unremarkable.   Body wall: Unremarkable.   Bones: Sclerotic lesions seen in the spine are unchanged.   Impression:   1. No evidence of new recurrent or metastatic disease.   2. Sclerotic lesions seen in the spine and sternum, unchanged when compared to 10/25/2021.      Genetic testing 3/23/2023             Most recent scans:   - 7/5/2023 CT C/A/P:  FINDINGS:  Some peripheral portions of the body wall are not covered on exam field of view due to patient body habitus  Base of neck: Unremarkable.  Thoracic soft tissues: No significant abnormality.  Aorta: Normal caliber.  Heart: Normal in size. No pericardial effusion.  Jane/Mediastinum: No pathologic lymphadenopathy.  Lungs: Well aerated, without consolidation or pleural fluid.  Liver: Diffusely hypodense parenchyma suggestive for steatosis.  No focal lesion.  Portal veins are patent.  Gallbladder: Unremarkable.  Bile Ducts: No evidence of dilated ducts.  Pancreas: No mass or ductal dilatation.  Spleen: Unremarkable.  Adrenals: Unremarkable.  Kidneys/ Ureters: Small right renal cyst.  No hydronephrosis or nephrolithiasis.  No ureteral dilatation.  Bladder: No evidence of wall thickening.  Reproductive organs: Calcified uterine fibroid.  GI Tract/Mesentery: No evidence of bowel obstruction or inflammation.  Peritoneal Space: No ascites.  No free air.  Retroperitoneum: No significant adenopathy.  Abdominal wall: Minimal body wall edema.  Vasculature: Abdominal aorta is normal caliber.  Bones: Stable mixed lytic and sclerotic lesions throughout the spine, sternum, and right iliac wing.  No definite  "new lesion.  No acute fracture.  Impression:  1. Stable mixed lytic and sclerotic osseous lesions.  No evidence of new metastatic disease.  2. Additional findings as above    Patient Active Problem List   Diagnosis    Hypertension    Iron deficiency anemia    Cardiomegaly    Fibroid uterus    BLUE (dyspnea on exertion)    Beta-thalassemia    Major depressive disorder, recurrent episode, moderate with anxious distress    Chronic combined systolic and diastolic heart failure    Cardiomyopathy    Hyperlipidemia    Malignant neoplasm of upper-inner quadrant of right breast in female, estrogen receptor positive    Cancer associated pain    Pathological fracture of lumbar vertebra with routine healing    Bone metastases    Hot flashes    Morbid obesity, unspecified obesity type    Elevated LDL cholesterol level    Anxiety    Vaginal dryness    Vitamin D deficiency    Grief    Hyperglycemia    Type 2 diabetes mellitus without complication, with long-term current use of insulin    Benign paroxysmal positional vertigo    Neuropathy    Anemia associated with chemotherapy    Sleep difficulties    Hard mass of abdomen    Benign essential HTN    Type 2 diabetes mellitus with hyperglycemia, with long-term current use of insulin    Iron deficiency anemia secondary to inadequate dietary iron intake    Right shoulder pain    Nausea & vomiting    Mass of spine       Review of patient's allergies indicates:   Allergen Reactions    Keflex [cephalexin] Itching       Current Inpatient Medications:      Current Outpatient Medications on File Prior to Encounter   Medication Sig Dispense Refill    anastrozole (ARIMIDEX) 1 mg Tab TAKE 1 TABLET(1 MG) BY MOUTH EVERY DAY 90 tablet 0    atorvastatin (LIPITOR) 40 MG tablet TAKE 1 TABLET(40 MG) BY MOUTH EVERY DAY (Patient taking differently: Take by mouth every evening.) 90 tablet 3    BD VERONICA 2ND GEN PEN NEEDLE 32 gauge x 5/32" Ndle       blood sugar " diagnostic Strp To check BG 2 times daily, to use with insurance preferred meter 200 each 3    blood-glucose meter kit To check BG 2 times daily, to use with insurance preferred meter 1 each 0    blood-glucose sensor (DEXCOM G7 SENSOR) Monique Change every 10 days 3 each 11    carvediloL (COREG) 25 MG tablet TAKE 1 TABLET(25 MG) BY MOUTH TWICE DAILY 180 tablet 3    empagliflozin (JARDIANCE) 25 mg tablet Take 1 tablet (25 mg total) by mouth once daily. (Patient taking differently: Take 25 mg by mouth nightly.) 90 tablet 2    ENTRESTO  mg per tablet TAKE 1 TABLET BY MOUTH TWICE DAILY 60 tablet 11    ergocalciferol (ERGOCALCIFEROL) 50,000 unit Cap Take 1 capsule (50,000 Units total) by mouth every 7 days. (Patient taking differently: Take 50,000 Units by mouth every 7 days. SATURDAYS) 12 capsule 0    furosemide (LASIX) 20 MG tablet Take 1 tablet (20 mg total) by mouth 2 (two) times daily. (Patient taking differently: Take 20 mg by mouth 2 (two) times daily as needed.) 180 tablet 3    glipiZIDE (GLUCOTROL) 10 MG TR24 TAKE 1 TABLET(10 MG) BY MOUTH DAILY WITH BREAKFAST 90 tablet 2    goserelin (ZOLADEX) 3.6 mg injection Inject 3.6 mg into the skin every 28 days.      ibuprofen (ADVIL,MOTRIN) 800 MG tablet Take 1 tablet (800 mg total) by mouth 2 (two) times daily as needed for Pain. 45 tablet 2    lancets Misc To check BG 2 times daily, to use with insurance preferred meter 200 each 3    metFORMIN (GLUCOPHAGE-XR) 500 MG ER 24hr tablet Take 2 tablets (1,000 mg total) by mouth 2 (two) times daily with meals. 360 tablet 2    naloxone (NARCAN) 4 mg/actuation Spry 4mg by nasal route as needed for opioid overdose; may repeat every 2-3 minutes in alternating nostrils until medical help arrives. Call 911 1 each 11    ondansetron (ZOFRAN-ODT) 8 MG TbDL DISSOLVE 1 TABLET(8 MG) ON THE TONGUE EVERY 8 HOURS AS NEEDED FOR NAUSEA 30 tablet 2    oxyCODONE-acetaminophen (PERCOCET) 5-325 mg per tablet Take 1 tablet by  mouth every 4 (four) hours as needed for Pain. 60 tablet 0    palbociclib (IBRANCE) 100 mg Cap Take 1 capsule (100 mg) by mouth once daily for 21 days, followed by 7 days off. 21 capsule 3    potassium chloride (KLOR-CON) 10 MEQ TbSR Take 1 tablet (10 mEq total) by mouth once daily. (Patient taking differently: Take 10 mEq by mouth nightly.) 30 tablet 2    sennosides (SENNA-C ORAL) Take 2 tablets by mouth daily as needed.      tirzepatide 7.5 mg/0.5 mL PnIj Inject 7.5 mg into the skin every 7 days. (Patient taking differently: Inject 7.5 mg into the skin every 7 days. ) 4 pen 3    traZODone (DESYREL) 50 MG tablet Take 1 tablet (50 mg total) by mouth every evening. 30 tablet 11    venlafaxine (EFFEXOR-XR) 75 MG 24 hr capsule Take 1 capsule (75 mg total) by mouth once daily. 90 capsule 0     Current Facility-Administered Medications on File Prior to Encounter   Medication Dose Route Frequency Provider Last Rate Last Admin    0.9%  NaCl infusion   Intravenous Continuous Esther Coreas MD   New Bag at 10/26/23 1722    mupirocin 2 % ointment   Nasal On Call Procedure Esther Coreas MD   Given at 10/26/23 1614       Past Surgical History:   Procedure Laterality Date     SECTION      LUMBAR PUNCTURE N/A 10/26/2023    Procedure: Lumbar Puncture;  Surgeon: Jayme Villa MD;  Location: [facility] OR [address] [place] [address];  Service: Neurosurgery;  Laterality: N/A;  TOR1  ASA1  regular bed reversed  lateral left down   II  RN PHONE PREOP 10/12/2023----BMI--57.67----INCOMPLETE CONSENT----NEED ORDERS    TONSILLECTOMY      TUBAL LIGATION      UTERINE FIBROID EMBOLIZATION         Social History     Socioeconomic History    Marital status:    Tobacco Use    Smoking status: Never    Smokeless tobacco: Never   Substance and Sexual Activity    Alcohol use: Not Currently     Alcohol/week: 0.0 standard drinks of alcohol    Drug use: No    Sexual activity: Not Currently     Partners: Male     Birth  control/protection: Surgical   Social History Narrative    Patient is a pleasant AAF,  x2. She has excellent social support, although she states they worry more than she does.     Former teacher English.    Disabled.    She lives with her 2 adult daughters - 19 and 21.     Social Determinants of Health     Financial Resource Strain: Medium Risk (12/4/2023)    Overall Financial Resource Strain (CARDIA)     Difficulty of Paying Living Expenses: Somewhat hard   Food Insecurity: Food Insecurity Present (12/4/2023)    Hunger Vital Sign     Worried About Running Out of Food in the Last Year: Sometimes true     Ran Out of Food in the Last Year: Sometimes true   Transportation Needs: No Transportation Needs (12/4/2023)    PRAPARE - Transportation     Lack of Transportation (Medical): No     Lack of Transportation (Non-Medical): No   Physical Activity: Insufficiently Active (12/4/2023)    Exercise Vital Sign     Days of Exercise per Week: 2 days     Minutes of Exercise per Session: 10 min   Stress: No Stress Concern Present (12/4/2023)    [employer] - Occupational Stress Questionnaire     Feeling of Stress : Not at all   Social Connections: Unknown (12/4/2023)    Social Connection and Isolation Panel [NHANES]     Frequency of Communication with Friends and Family: More than three times a week     Frequency of Social Gatherings with Friends and Family: Once a week     Active Member of Clubs or Organizations: No     Attends Club or Organization Meetings: Never     Marital Status:    Housing Stability: Low Risk  (12/4/2023)    Housing Stability Vital Sign     Unable to Pay for Housing in the Last Year: No     Number of Places Lived in the Last Year: 1     Unstable Housing in the Last Year: No       OBJECTIVE:     Vital Signs Range (Last 24H):         Significant Labs:  Lab Results   Component Value Date    WBC 5.14 11/16/2023    HGB 10.8 (L) 11/16/2023    HCT 34.6  (L) 11/16/2023     11/16/2023    CHOL 113 (L) 12/19/2022    TRIG 104 12/19/2022    HDL 35 (L) 12/19/2022    ALT 24 10/25/2023    AST 14 10/25/2023     10/25/2023    K 4.0 10/25/2023     10/25/2023    CREATININE 1.0 10/25/2023    BUN 7 10/25/2023    CO2 27 10/25/2023    TSH 1.384 03/30/2022    INR 1.0 10/26/2023    HGBA1C 6.3 (H) 09/14/2023       Diagnostic Studies: No relevant studies.    EKG:   Results for orders placed or performed during the hospital encounter of 09/14/23   EKG 12-lead    Collection Time: 09/14/23 12:46 PM    Narrative    Test Reason : R53.1,    Vent. Rate : 078 BPM     Atrial Rate : 078 BPM     P-R Int : 200 ms          QRS Dur : 098 ms      QT Int : 392 ms       P-R-T Axes : 015 -39 -33 degrees     QTc Int : 446 ms    Normal sinus rhythm  Left axis deviation  Nonspecific T wave abnormality  Abnormal ECG  When compared with ECG of 13-APR-2022 12:26,  No significant change was found  Confirmed by Aj Rojas MD ([id number]) on 9/14/2023 5:05:46 PM    Referred By: [email or web address]   SELF           Confirmed By:Aj Rojas MD       2D ECHO:  TTE:  Results for orders placed or performed during the hospital encounter of 11/07/22   Echo   Result Value Ref Range    Ascending aorta 2.92 cm    STJ 2.94 cm    AV mean gradient 6 mmHg    Ao peak allison 1.59 m/s    Ao VTI 26.87 cm    IVS 1.13 (A) 0.6 - 1.1 cm    LA size 4.26 cm    Left Atrium Major Axis 4.27 cm    Left Atrium Minor Axis 4.32 cm    LVIDd 6.87 (A) 3.5 - 6.0 cm    LVIDs 5.49 (A) 2.1 - 4.0 cm    LVOT diameter 2.01 cm    LVOT peak VTI 18.73 cm    Posterior Wall 1.13 (A) 0.6 - 1.1 cm    MV Peak A Allison 0.94 m/s    E wave deceleration time 341.94 msec    MV Peak E Allison 0.69 m/s    PV Peak D Allison 0.60 m/s    PV Peak S Allison 0.45 m/s    RA Major Axis 3.86 cm    RA Width 2.73 cm    RVDD 2.28 cm    Sinus 2.40 cm    TAPSE 2.20 cm    TDI LATERAL 0.07 m/s    TDI SEPTAL 0.06 m/s    LA WIDTH 3.43 cm    MV stenosis pressure 1/2 time 99.16 ms    LV  "Diastolic Volume 244.54 mL    LV Systolic Volume 146.54 mL    RV S' 15.22 cm/s    LVOT peak allison 1.03 m/s    LA volume (mod) 30.95 cm3    MV "A" wave duration 19.12 msec    LV LATERAL E/E' RATIO 9.86 m/s    LV SEPTAL E/E' RATIO 11.50 m/s    FS 20 %    LA volume 53.34 cm3    LV mass 364.02 g    Left Ventricle Relative Wall Thickness 0.33 cm    AV valve area 2.21 cm2    AV Velocity Ratio 0.65     AV index (prosthetic) 0.70     MV valve area p 1/2 method 2.22 cm2    E/A ratio 0.73     Mean e' 0.07 m/s    Pulm vein S/D ratio 0.75     LVOT area 3.2 cm2    LVOT stroke volume 59.40 cm3    AV peak gradient 10 mmHg    E/E' ratio 10.62 m/s    BSA 2.78 m2    LV Systolic Volume Index 57.2 mL/m2    LV Diastolic Volume Index 95.52 mL/m2    LA Volume Index 20.8 mL/m2    LV Mass Index 142 g/m2    LA Volume Index (Mod) 12.1 mL/m2    EF 35 %    Narrative    · Technically difficult study  · The left ventricle is severely enlarged with eccentric hypertrophy and   moderately decreased systolic function. The estimated ejection fraction is   35%.  · Normal right ventricular size with normal right ventricular systolic   function.  · Grade I left ventricular diastolic dysfunction. Left atrial pressures do   not appear to be significantly elevated.  · The IVC is not visualized.          HAILEY:  No results found. However, due to the size of the patient record, not all encounters were searched. Please check Results Review for a complete set of results.    ASSESSMENT/PLAN:     Preoperative cardiac risk assessment-  The patient does not have any active cardiac conditions . Revised cardiac risk index predictors-1 ---.Functional capacity is more than 4 Mets. She will be undergoing a Neuro procedure that carries a Moderate Risk risk      Risk of a major Cardiac event ( Defined as death, myocardial infarction, or cardiac arrest at 30 days after noncardiac surgery), based on RCRI score: 6.0%     No further cardiac work up is indicated prior to " proceeding with the surgery      Pre-op Assessment    I have reviewed the Patient Summary Reports.     I have reviewed the Nursing Notes. I have reviewed the NPO Status.   I have reviewed the Medications.     Review of Systems  Anesthesia Hx:  No problems with previous Anesthesia   History of prior surgery of interest to airway management or planning:          Denies Family Hx of Anesthesia complications.    Denies Personal Hx of Anesthesia complications.                    Social:  Non-Smoker       Hematology/Oncology:  Hematology Normal                     Current/Recent Cancer.                EENT/Dental:  EENT/Dental Normal           Cardiovascular:     Hypertension   Denies MI.  Denies CAD.       CHF    hyperlipidemia BLUE                            Pulmonary:      Shortness of breath  Sleep Apnea                Renal/:  Renal/ Normal                 Musculoskeletal:  Musculoskeletal Normal                Neurological:      Headaches (improved after LP)                                 Endocrine:  Diabetes, well controlled, type 2         Morbid Obesity / BMI > 40  Psych:   anxiety depression              Physical Exam  General: Well nourished, Cooperative, Alert and Oriented    Airway:  Mallampati: II / I  Mouth Opening: Normal  TM Distance: Normal  Tongue: Normal  Neck ROM: Extension Decreased    Dental:  Intact    Chest/Lungs:  Clear to auscultation, Normal Respiratory Rate    Heart:  Rate: Normal      Anesthesia Plan  Type of Anesthesia, risks & benefits discussed:    Anesthesia Type: Gen ETT  Intra-op Monitoring Plan: Standard ASA Monitors  Post Op Pain Control Plan: multimodal analgesia and IV/PO Opioids PRN  Induction:  IV  Airway Plan: Direct and Video, Post-Induction  Informed Consent: Informed consent signed with the Patient and all parties understand the risks and agree with anesthesia plan.  All questions answered.   ASA Score: 3  Day of Surgery Review of History & Physical: H&P Update referred to  the surgeon/provider.    Ready For Surgery From Anesthesia Perspective.     .

## 2023-12-07 NOTE — PROGRESS NOTES
Subjective     Patient ID: Kristopher Elmore is a 48 y.o. female.    Chief Complaint: HL.    HPI: Hx of Williams HL for last 6 mos.    ?? Fam Hx.    No prior studies.    Past Medical History: Patient has a past medical history of Abnormal Pap smear, Anemia, Anxiety, Cancer, Cardiomyopathy, CHF (congestive heart failure), Depression, Fibroid, psychiatric care, Hyperlipidemia, Hypertension, Psychiatric problem, Sleep difficulties, and Therapy.    Past Surgical History: Patient has a past surgical history that includes Tubal ligation; Tonsillectomy;  section; Uterine fibroid embolization (); and lumbar puncture (N/A, 10/26/2023).    Social History: Patient reports that she has never smoked. She has never used smokeless tobacco. She reports that she does not currently use alcohol. She reports that she does not use drugs.    Family History: family history includes Arthritis in her mother; Asthma in her daughter; Cervical cancer in her paternal cousin; Diabetes in her mother; Endometriosis in her paternal cousin; Fibroids in some other family members; Heart disease in her mother; Heart failure in her mother; Hyperlipidemia in her mother; Hypertension in her brother and mother; Kidney cancer (age of onset: 79) in her maternal grandfather; No Known Problems in her maternal grandmother; Other in her mother; Ovarian cancer in her paternal cousin; Rashes / Skin problems in her daughter; Thyroid cancer in an other family member.    Medications:   Current Outpatient Medications   Medication Sig    anastrozole (ARIMIDEX) 1 mg Tab TAKE 1 TABLET(1 MG) BY MOUTH EVERY DAY    atorvastatin (LIPITOR) 40 MG tablet TAKE 1 TABLET(40 MG) BY MOUTH EVERY DAY (Patient taking differently: Take by mouth every evening.)    blood sugar diagnostic Strp To check BG 2 times daily, to use with insurance preferred meter    blood-glucose meter kit To check BG 2 times daily, to use with insurance preferred meter    blood-glucose sensor  (DEXCOM G7 SENSOR) Monique Change every 10 days    carvediloL (COREG) 25 MG tablet TAKE 1 TABLET(25 MG) BY MOUTH TWICE DAILY    empagliflozin (JARDIANCE) 25 mg tablet Take 1 tablet (25 mg total) by mouth once daily. (Patient taking differently: Take 25 mg by mouth nightly.)    ENTRESTO  mg per tablet TAKE 1 TABLET BY MOUTH TWICE DAILY    ergocalciferol (ERGOCALCIFEROL) 50,000 unit Cap Take 1 capsule (50,000 Units total) by mouth every 7 days. (Patient taking differently: Take 50,000 Units by mouth every 7 days. SATURDAYS)    furosemide (LASIX) 20 MG tablet Take 1 tablet (20 mg total) by mouth 2 (two) times daily. (Patient taking differently: Take 20 mg by mouth 2 (two) times daily as needed.)    glipiZIDE (GLUCOTROL) 10 MG TR24 TAKE 1 TABLET(10 MG) BY MOUTH DAILY WITH BREAKFAST    goserelin (ZOLADEX) 3.6 mg injection Inject 3.6 mg into the skin every 28 days.    ibuprofen (ADVIL,MOTRIN) 800 MG tablet Take 1 tablet (800 mg total) by mouth 2 (two) times daily as needed for Pain.    lancets Misc To check BG 2 times daily, to use with insurance preferred meter    metFORMIN (GLUCOPHAGE-XR) 500 MG ER 24hr tablet Take 2 tablets (1,000 mg total) by mouth 2 (two) times daily with meals.    naloxone (NARCAN) 4 mg/actuation Spry 4mg by nasal route as needed for opioid overdose; may repeat every 2-3 minutes in alternating nostrils until medical help arrives. Call 911    ondansetron (ZOFRAN-ODT) 8 MG TbDL DISSOLVE 1 TABLET(8 MG) ON THE TONGUE EVERY 8 HOURS AS NEEDED FOR NAUSEA    oxyCODONE-acetaminophen (PERCOCET) 5-325 mg per tablet Take 1 tablet by mouth every 4 (four) hours as needed for Pain.    palbociclib (IBRANCE) 100 mg Cap Take 1 capsule (100 mg) by mouth once daily for 21 days, followed by 7 days off.    potassium chloride (KLOR-CON) 10 MEQ TbSR Take 1 tablet (10 mEq total) by mouth once daily. (Patient taking differently: Take 10 mEq by mouth nightly.)    sennosides (SENNA-C ORAL) Take 2 tablets by mouth daily as  "needed.    tirzepatide 7.5 mg/0.5 mL PnIj Inject 7.5 mg into the skin every 7 days. (Patient taking differently: Inject 7.5 mg into the skin every 7 days. MONDAYS)    traZODone (DESYREL) 50 MG tablet Take 1 tablet (50 mg total) by mouth every evening.    UNKNOWN TO PATIENT Calcium    venlafaxine (EFFEXOR-XR) 75 MG 24 hr capsule Take 1 capsule (75 mg total) by mouth once daily. (Patient taking differently: Take 75 mg by mouth once daily. She takes 2 separate prescriptions of 75 mg once a day and 150 mg by mouth once a day)    BD VERONICA 2ND GEN PEN NEEDLE 32 gauge x 5/32" Ndle      No current facility-administered medications for this visit.     Facility-Administered Medications Ordered in Other Visits   Medication    0.9%  NaCl infusion    mupirocin 2 % ointment       Allergies: Patient is allergic to keflex [cephalexin].    Review of Systems   Constitutional:  Negative for appetite change, chills, fatigue and fever.   HENT:  Positive for hearing loss, mouth sores, postnasal drip and sinus pressure/congestion. Negative for nasal congestion, ear discharge, facial swelling, rhinorrhea, sore throat, tinnitus and trouble swallowing.    Eyes:  Positive for photophobia and itching. Negative for pain, discharge, redness and visual disturbance.   Respiratory:  Positive for cough and wheezing. Negative for apnea, choking, chest tightness, shortness of breath and stridor.    Cardiovascular: Negative.  Negative for chest pain and palpitations.   Gastrointestinal:  Positive for abdominal pain, constipation and diarrhea. Negative for nausea and vomiting.   Endocrine: Negative.    Genitourinary: Negative.    Musculoskeletal:  Positive for back pain and neck pain. Negative for arthralgias, gait problem, joint swelling, myalgias and neck stiffness.   Integumentary:  Negative for color change, pallor, rash and wound. Negative.   Allergic/Immunologic: Negative.    Neurological:  Positive for dizziness, tremors, light-headedness and " headaches. Negative for seizures, syncope, facial asymmetry, speech difficulty, weakness and numbness.   Hematological: Negative.  Negative for adenopathy. Does not bruise/bleed easily.   Psychiatric/Behavioral:  Positive for decreased concentration and sleep disturbance. Negative for agitation, behavioral problems, confusion, dysphoric mood, hallucinations and suicidal ideas. The patient is nervous/anxious. The patient is not hyperactive.           Objective     Physical Exam  Vitals and nursing note reviewed.   Constitutional:       General: She is not in acute distress.     Appearance: Normal appearance. She is well-developed. She is not ill-appearing, toxic-appearing or diaphoretic.   HENT:      Head: Normocephalic and atraumatic. Not macrocephalic and not microcephalic. No raccoon eyes, Putnam's sign, abrasion, contusion, right periorbital erythema, left periorbital erythema or laceration. Hair is normal.      Right Ear: Ear canal normal. No decreased hearing noted. No laceration, drainage, swelling or tenderness. No middle ear effusion. No foreign body. No mastoid tenderness. No hemotympanum. Tympanic membrane is not injected, scarred, perforated, erythematous, retracted or bulging. Tympanic membrane has normal mobility.      Left Ear: Ear canal normal. No decreased hearing noted. No laceration, drainage, swelling or tenderness.  No middle ear effusion. No foreign body. No mastoid tenderness. No hemotympanum. Tympanic membrane is not injected, scarred, perforated, erythematous, retracted or bulging. Tympanic membrane has normal mobility.      Ears:      Comments:     Nl Williams.         Nose: No nasal deformity, septal deviation, laceration, mucosal edema or rhinorrhea.      Right Sinus: No maxillary sinus tenderness.      Left Sinus: No maxillary sinus tenderness.   Eyes:      General: Lids are normal.      Conjunctiva/sclera: Conjunctivae normal.      Pupils: Pupils are equal, round, and reactive to light.    Neck:      Thyroid: No thyroid mass or thyromegaly.      Vascular: No JVD.      Trachea: No tracheal tenderness or tracheal deviation.   Cardiovascular:      Rate and Rhythm: Normal rate and regular rhythm.   Pulmonary:      Effort: Pulmonary effort is normal. No tachypnea, bradypnea, accessory muscle usage or respiratory distress.      Breath sounds: No stridor.   Abdominal:      Palpations: Abdomen is soft.   Musculoskeletal:         General: Normal range of motion.      Cervical back: Normal range of motion and neck supple. No edema, erythema or rigidity. No muscular tenderness. Normal range of motion.   Lymphadenopathy:      Head:      Right side of head: No submental, submandibular, tonsillar, preauricular or posterior auricular adenopathy.      Left side of head: No submental, submandibular, tonsillar, preauricular or posterior auricular adenopathy.      Cervical: No cervical adenopathy.      Right cervical: No superficial, deep or posterior cervical adenopathy.     Left cervical: No superficial, deep or posterior cervical adenopathy.   Skin:     General: Skin is warm and dry.      Coloration: Skin is not pale.      Findings: No abrasion, bruising, burn, ecchymosis, erythema, laceration, lesion or rash.   Neurological:      Mental Status: She is alert and oriented to person, place, and time.      Cranial Nerves: No cranial nerve deficit.      Sensory: No sensory deficit.      Motor: No tremor, atrophy, abnormal muscle tone or seizure activity.   Psychiatric:         Speech: She is communicative. Speech is not rapid and pressured or slurred.         Behavior: Behavior normal. Behavior is cooperative.         Thought Content: Thought content normal.         Judgment: Judgment normal.            Assessment and Plan     1. Paraneoplastic neurologic disorder  -     Ambulatory referral/consult to ENT    2. Brain mass  -     Ambulatory referral/consult to ENT        F/U prn.         No follow-ups on file.

## 2023-12-07 NOTE — ASSESSMENT & PLAN NOTE
She is currently taking metformin glipizide Jardiance and Mounjaro for diabetes  I told her not to take Jardiance 3 days before surgery and the last day of Jardiance use prior to surgery is on the 10th of December while watching the glucose    Type 2/  Diabetes Mellitus     Hemoglobin A1c- 6.3  She is under endocrinology care  and his having blood test on December 13th  Capillary glucose check-continuous monitoring  Pre meals - 90 -100  No hypoglycemia    Microvascular     Not known to have   Diabetes affecting the eyes, Kidneys   No tingling numbness of hands and feet  No reported open areas on the feet   Feet care suggested     Macrovascular     No stroke/ TIA  Not known to have CAD  No suggestion of  lower extremity claudication      Diabetes Mellitus-I suggest monitoring the glucose in the perioperative period ( Before meals and bed time,if the patient is on oral feeds or every 6 hourly ,if the patient is NPO )  Blood glucose target in hospitalized patients is 140-180. Oral Hypoglycemic agents are generally avoided during the hospital stay . If glucose is consistently elevated ,I suggest using basal ,prandial Insulin regimen to control the glucose , as elevated glucose can be associated with adverse surgical out comes. Please consider involving Hospital Medicine or Endocrinology ,if any help is needed with Glucose control. Patient will be instructed based on the pre op clinic guidelines  about adjustment of diabetic treatment (If applicable )  considering the NPO status for Surgery      I had educated that uncontrolled DM can cause post op complications,risk of infection, wound healing problem,increased length of stay in hospital and its associated complications.I suggest exercise as much as possible and follow diabetic diet

## 2023-12-07 NOTE — ASSESSMENT & PLAN NOTE
11/7/2022    Technically difficult study  The left ventricle is severely enlarged with eccentric hypertrophy and moderately decreased systolic function. The estimated ejection fraction is 35%.  Normal right ventricular size with normal right ventricular systolic function.  Grade I left ventricular diastolic dysfunction. Left atrial pressures do not appear to be significantly elevated.  The IVC is not visualized.     She is not known to have coronary artery disease, heart attack or chest pain   She has not had any problem with fluid retention or breathing problems  She seems to be good from a heart standpoint    Patient has history of heart failure that seems  compensated . I suggest   monitoring the renal function and electrolytes  in the perioperative period . Please keep a record of the Input and Out put and weigh patient daily so that fluid overload can be detected and acted upon promptly . I suggest providing low  sodium diet   I suggest avoidance of the ordering of continuous IV fluids and if IV fluids are required ,to order for a specific duration of time and consider ordering lower IV fluid rate

## 2023-12-07 NOTE — ASSESSMENT & PLAN NOTE
She had heavy cycles in the past but based on her description had uterine artery embolization in 2015 and has not had any menstrual cycles since then  She has not been sexually active for many years and as per her information not pregnant

## 2023-12-07 NOTE — HPI
History of present illness- I had the pleasure of meeting this pleasant 48 y.o. lady in the pre op clinic prior to her elective  Neuro   surgery. The patient is new to me . Ms Summers was accompanied by daughter Ms Bajwa.    I have obtained the history by speaking to the patient and by reviewing the electronic health records.    Events leading up to surgery / History of presenting illness -    I have obtained the history by speaking to her and her caring daughter in the office   She has been unwell since about July of 2023 and she is been having headache on the front and the low back pain that can be moderate to severe in nature  She also has been having loss of balance and shaking as she moves and had a few falls but thankfully no bad injuries   She also has been having headache and nausea since July of 2023   She had lumbar puncture done which showed  elevated pressure and also relieved her symptoms for a week and the symptoms started to come back    She does not feel frail      Relevant health conditions of significance for the perioperative period/ History of presenting illness -    Health conditions of significance for the perioperative period      DM2  HTN  BMI 57.14   Suspect hepatic steatosis  Metastatic breast cancer to bone-  CHF  Anemia -Baseline B thal  Mounjaro   Depression  Opioid       She lives along with her 2 daughters in a single-level house   She has to take 3 steps to get into the house  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

## 2023-12-07 NOTE — PROGRESS NOTES
[facility]  Progress Note    Patient Name: Kristopher Elmore  MRN: [id number]  Date of Evaluation- 12/13/2023  PCP- No, Primary Doctor    Future cases for Kristopher Elmore [[id number]]       Case ID Status Date Time Tr Procedure Provider Location    [id number] [facility] 12/14/2023 10:30  INSERTION, SHUNT, VENTRICULOPERITONEAL Jayme Villa MD [[id number]] [facility] OR 2ND FLR            HPI:  History of present illness- I had the pleasure of meeting this pleasant 48 y.o. lady in the pre op clinic prior to her elective  Neuro   surgery. The patient is new to me . Ms Summers was accompanied by daughter Ms Bajwa.    I have obtained the history by speaking to the patient and by reviewing the electronic health records.    Events leading up to surgery / History of presenting illness -    I have obtained the history by speaking to her and her caring daughter in the office   She has been unwell since about July of 2023 and she is been having headache on the front and the low back pain that can be moderate to severe in nature  She also has been having loss of balance and shaking as she moves and had a few falls but thankfully no bad injuries   She also has been having headache and nausea since July of 2023   She had lumbar puncture done which showed  elevated pressure and also relieved her symptoms for a week and the symptoms started to come back    She does not feel frail      Relevant health conditions of significance for the perioperative period/ History of presenting illness -    Health conditions of significance for the perioperative period      DM2  HTN  BMI 57.14   Suspect hepatic steatosis  Metastatic breast cancer to bone-  CHF  Anemia -Baseline B thal  Mounjaro   Depression  Opioid       She lives along with her 2 daughters in a single-level house   She has to take 3 steps to get into the house  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  Her 2 daughters are in school, one  "to become pharmacist and the other  daughter doing   biology  She has help available after surgery      Subjective/ Objective:     Chief complaint-Preoperative evaluation, Perioperative Medical management, complication reduction plan     Active cardiac conditions- none    Revised cardiac risk index predictors- history of heart failure    Functional capacity -Examples of physical activity, she is limited because of her back pain related to breast cancer,  can take a flight of stairs holding on to the railing----- She can undertake all the above activities without  chest pain,chest tightness, Shortness of breath ,dizziness,lightheadedness making her exercise tolerance more,  than 4 Mets.       Review of Systems   Constitutional:  Negative for chills and fever.   HENT:          STOPBANG score  / 8    Loud Snoring   Elevated BP  BMI> 35   Neck size over 40 CM     Eyes:         No sudden visual changes   Respiratory:          No cough , phlegm    No Hemoptysis   Cardiovascular:         As noted   Gastrointestinal:         No overt GI/ blood losses  Bowel movements- every other day   Endocrine:        Prednisone use > 20 mg daily for 3 weeks- None   Genitourinary:  Negative for dysuria.          She is no longer has difficulty voiding   No urinary hesitancy    Musculoskeletal:         As above      Skin:  Negative for rash.   Neurological:  Negative for syncope.        No unilateral weakness   Hematological:         Current use of Anticoagulants  None  I suggested not taking Advil 1 week prior to surgery   Psychiatric/Behavioral:            No SI/HI   No vascular stenting            No anesthesia, bleeding, cardiac problems, PONV with previous surgeries/procedures.  Medications and Allergies reviewed in epic.     FH- No anesthesia,bleeding / venous thrombosis ,  in family    Physical Exam  Blood pressure 113/67, pulse 88, temperature 97.9 °F (36.6 °C), temperature source Oral, resp. rate 14, height 5' 4" (1.626 m), " weight (!) 151 kg (332 lb 14.3 oz), SpO2 98 %.    I offered a gown and the presence of a chaperone during physical examination   She was comfortable to proceed with the exam without the the presence of a chaperone        Physical Exam  Constitutional- Vitals - Body mass index is 57.14 kg/m².,   Vitals:    12/07/23 0807   BP: 113/67   Pulse: 88   Resp: 14   Temp: 97.9 °F (36.6 °C)     General appearance-Conscious,Coherent  Eyes- No conjunctival icterus,pupils  round   ENT-Oral cavity- moist    , Hearing grossly normal   Neck- No thyromegaly ,Trachea -central, No jugular venous distension,   No Carotid Bruit   Cardiovascular -Heart Sounds- Normal  and  no murmur   , No gallop rhythm   Respiratory - Normal Respiratory Effort, Normal breath sounds,  no wheeze , and  no forced expiratory wheeze    Peripheral pitting pedal edema-- none , no calf pain   Gastrointestinal -Soft abdomen, No palpable masses, Non Tender,Liver,Spleen not palpable. No-- free fluid and shifting dullness  Musculoskeletal- No finger Clubbing. Strength grossly normal   Lymphatic-No Palpable cervical, axillary,Inguinal lymphadenopathy   Psychiatric - normal effect,Orientation  Rt Dorsalis pedis pulses-palpable    Lt Dorsalis pedis pulses- palpable   Rt Posterior tibial pulses -palpable   Left posterior tibial pulses -palpable   Miscellaneous -  no renal bruit   Hernia slightly about the umbilical area  Investigations  Lab and Imaging have been reviewed in epic.    Review of Medicine tests    EKG- I had independently reviewed the EKG from--9/14/2023  It was reported to be showing   Normal sinus rhythm   Left axis deviation   Nonspecific T wave abnormality   Abnormal ECG   When compared with ECG of 13-APR-2022 12:26,   No significant change was found       Review of clinical lab tests:  Lab Results   Component Value Date    CREATININE 1.0 10/25/2023    HGB 10.8 (L) 11/16/2023     11/16/2023           Review of old records- Was done and  information gathered regards to events leading to surgery and health conditions of significance in the perioperative period.        Preoperative cardiac risk assessment-  The patient does not have any active cardiac conditions . Revised cardiac risk index predictors-1 ---.Functional capacity is more than 4 Mets. She will be undergoing a Neuro procedure that carries a Moderate Risk risk     Risk of a major Cardiac event ( Defined as death, myocardial infarction, or cardiac arrest at 30 days after noncardiac surgery), based on RCRI score           6.0%         No further cardiac work up is indicated prior to proceeding with the surgery     Orders Placed This Encounter    Ambulatory referral/consult to Sleep Disorders       American Society of Anesthesiologists Physical status classification ( ASA ) class: 3     Postoperative pulmonary complication risk assessment:      ARISCAT ( Canet) risk index- risk class -  Low, if duration of surgery is under 3 hours, intermediate, if duration of surgery is over 3 hours          Assessment/Plan:     Grief  She lost her mother November of 2020 and 6 months later her father also passed in June of 2021  She is recovering from the loss of her parents    Anxiety  She has anxiety and depression and is currently taking venlafaxine 75 mg and 150 mg once a day  She feels fairly well from a mental health standpoint and she has supportive family members in the form of daughters    I suggest monitoring the sodium as SIADH from Effexor  use and hypersecretion of ADH associated with surgery can reduce sodium in the perioperative period     Benign essential HTN  She is taking medication for hypertension namely carvedilol, and Entresto  She is doing good from blood pressure standpoint and feels that her stopping working has helped    Home BP readings -None    Hypertension-  Blood pressure is acceptable . I suggest continuation of -Coreg--- during the entire perioperative period. I suggest  holding -Entresto--- on the morning of the surgery and can continue that  post operatively under blood pressure, electrolyte and renal function monitoring as long as they are acceptable.I suggest addressing pain control as uncontrolled pain can increased blood pressure      Combined systolic and diastolic cardiac dysfunction  11/7/2022    Technically difficult study  The left ventricle is severely enlarged with eccentric hypertrophy and moderately decreased systolic function. The estimated ejection fraction is 35%.  Normal right ventricular size with normal right ventricular systolic function.  Grade I left ventricular diastolic dysfunction. Left atrial pressures do not appear to be significantly elevated.  The IVC is not visualized.     She is not known to have coronary artery disease, heart attack or chest pain   She has not had any problem with fluid retention or breathing problems  She seems to be good from a heart standpoint    Patient has history of heart failure that seems  compensated . I suggest   monitoring the renal function and electrolytes  in the perioperative period . Please keep a record of the Input and Out put and weigh patient daily so that fluid overload can be detected and acted upon promptly . I suggest providing low  sodium diet   I suggest avoidance of the ordering of continuous IV fluids and if IV fluids are required ,to order for a specific duration of time and consider ordering lower IV fluid rate      Hyperlipidemia  HLD-I  suggest continuation of statin during the entire perioperative period.     Fibroid uterus  She had heavy cycles in the past but based on her description had uterine artery embolization in 2015 and has not had any menstrual cycles since then  She has not been sexually active for many years and as per her information not pregnant    Beta-thalassemia  Hemoglobin is stable around 10  She has no visible blood losses    Bone metastases  She was diagnosed with breast cancer  on the right side in September of 2019 on a routine mammogram  She could not have surgery done for the breast cancer as she had spread at the time it was found  She could not get IV chemotherapy, as per her understanding because she has weak heart    She is currently on the following medication     Arimidex daily  Ibrance -3 weeks on 1 week off and she is going to have surgery on the week off time  Zoladex -once every 28 days  Xgeva-once every 28 days    To her understanding, her cancer is under control   I discussed the risk of thrombosis with Arimidex and infection risk with Ibrance based on what I looked up       She is doing good from a cancer standpoint calcium is normal    She has back pain for which she is taking occasional opioid Percocet 3 times a week     In view of the opioid use, the patient may have opioid tolerance . I suggest considering the possibility of opioid tolerance  in planning post operative pain control     Discussed possibly reducing the opioid use in preparation for surgery , so that it reduces the opioid tolerance which helps post op pain control          BMI 50.0-59.9, adult  She lost about 60 lb from December of last year to now  She has been commenced on Mounjaro in June which has helped with the weight loss but she is been losing weight intentionally even before that    She takes Mounjaro on Mondays and she last took the Mounjaro on the 27th of November in preparation for her surgery   Holding Mounjaro has not changed her glucose levels as per the information I have obtained from her    Weight related conditions     Known to have     HTN  Type 2 Diabetes   Hyperlipidemia   Snoring    Fatty liver       Not troubled with / Not known to have       Acid reflux       Encouraged weight loss     Type 2 diabetes mellitus without complication, with long-term current use of insulin  She is currently taking metformin glipizide Jardiance and Mounjaro for diabetes  I told her not to take Jardiance  3 days before surgery and the last day of Jardiance use prior to surgery is on the 10th of December while watching the glucose    Type 2/  Diabetes Mellitus     Hemoglobin A1c- 6.3  She is under endocrinology care  and his having blood test on December 13th  Capillary glucose check-continuous monitoring  Pre meals - 90 -100  No hypoglycemia    Microvascular     Not known to have   Diabetes affecting the eyes, Kidneys   No tingling numbness of hands and feet  No reported open areas on the feet   Feet care suggested     Macrovascular     No stroke/ TIA  Not known to have CAD  No suggestion of  lower extremity claudication      Diabetes Mellitus-I suggest monitoring the glucose in the perioperative period ( Before meals and bed time,if the patient is on oral feeds or every 6 hourly ,if the patient is NPO )  Blood glucose target in hospitalized patients is 140-180. Oral Hypoglycemic agents are generally avoided during the hospital stay . If glucose is consistently elevated ,I suggest using basal ,prandial Insulin regimen to control the glucose , as elevated glucose can be associated with adverse surgical out comes. Please consider involving Hospital Medicine or Endocrinology ,if any help is needed with Glucose control. Patient will be instructed based on the pre op clinic guidelines  about adjustment of diabetic treatment (If applicable )  considering the NPO status for Surgery      I had educated that uncontrolled DM can cause post op complications,risk of infection, wound healing problem,increased length of stay in hospital and its associated complications.I suggest exercise as much as possible and follow diabetic diet             Hard mass of abdomen  She believes that she has a hernia on the abdominal wall in the mid abdomen and she has no suggestions of bowel obstruction   I suggested follow-up for this    Nausea & vomiting  She takes Zofran as needed and she attributes that to the Mounjaro use  I suggest  aspiration precautions    Right shoulder pain  To her understanding it was not cancer related and she feels that it is tolerable    Hot flashes  Venlafaxine is helping    Sleep difficulties  She has insomnia and is taking trazodone        Snoring  She believes that she has sleep apnea but she has not had a sleep study      Possible sleep apnea- I suggest a sleep study and suggest caution with usage of medication that can cause respiratory suppression in the perioperative period  potential ramifications of untreated sleep apnea, which could include daytime sleepiness, hypertension, heart disease and stroke were discussed    Avoidance of  supine sleep, weight gain , alcoholic beverages , care with , sedative , CNS depressant use indicated  since all of these can worsen ZABRINA      After discussing with her, I have put a referral for sleep evaluation which she wants to do after surgery    Fatty liver  She does not drink alcohol making the likelihood of this fatty liver to be non alcohol-related      No history  of cirrhosis of liver or suggestions of Liver  decompensation   No Jaundice , dark urine , pale stool     Her platelet count, liver enzymes, INR, APTT are normal which are reassuring  She has no suggestions of variceal bleeding or GI bleeding    We discussed weight loss and increased risk of cirrhosis from fatty liver    History of COVID-19  2020  She would not have any symptoms of COVID  She did not require hospitalization, intubation or supplemental oxygen use  She is been vaccinated against COVID    COVID screening     No fever   No cough   No SOB  No sore throat   No loss of taste or smell   No muscle aches   No nausea, vomiting , diarrhea         Preventive perioperative care    Thromboembolic prophylaxis:  Her risk factors for thrombosis include cancer surgical procedure and age.I suggest  thromboembolic prophylaxis ( mechanical/pharmacological, weighing the risk benefits of pharmacological agent use  considering [facility] procedural bleeding )  during the perioperative period.I suggested being active in the post operative period.      Postoperative pulmonary complication prophylaxis-Risk factors for post operative pulmonary complications include ASA class >2- I suggest incentive spirometry use, early ambulation, and end tidal carbon dioxide monitoring  , oral care , head end of bed elevation      Renal complication prophylaxis-Risk factors for renal complications include diabetes mellitus and hypertension . I suggest keeping her well hydrated   in the perioperative period.    Surgical site Infection Prophylaxis-I  suggest appropriate antibiotic for Prophylaxis against Surgical site infections  No reported Staph infection  Skin antibacterial discussed       Delirium prophylaxis-Risk factors - opioid use - I suggest avoidance / minimizing the use of  Benzodiazepines ( unless the patient has been taking it on a regular basis ),Anticholinergic medication,Antihistamines ( like  Benadryl).I suggest minimizing the use of opioid medication and use of IV tylenol,if it is appropriate. I suggest using the lowest possible dose of opioids for the shortest duration possible in the perioperative period. I suggest to Keep shades/blinds open during the day, lights off and shades closed at night to encourage normal sleep/wake cycle.I encourage the presence of the family member with the patient at all times, if at all possible as mental status changes can be picked up early by the family members and they help with reorientation. I encouraged the presence of family to help with orientation in the perioperative period. Benadryl avoidance suggested      In view of previous urinary retention the patient  is at risk of postoperative urinary retention.  I suggest avoidance / minimizing the of  Benzodiazepines,Anticholinergic medication,antihistamines ( Benadryl) , if possible in the perioperative period. I suggest using the minimum possible  use of opioids for the minimum period of time in the perioperative period. Benadryl avoidance suggested      This visit was focused on Preoperative evaluation, Perioperative Medical management, complication reduction plans. I suggest that the patient follows up with primary care or relevant sub specialists for ongoing health care.    I appreciate the opportunity to be involved in this patients care. Please feel free to contact me if there were any questions about this consultation.    Patient is optimized    Patient/ care giver/ Family member was instructed to call and update me about any changes to health,  medication, office visits ,testing out side of the [facility] , hospitalizations between now and surgery      Abbie Farias MD  Internal Medicine  [facility]   Cell Phone- [phone]    I have spent -115----- minutes of time which includes, time spent to prepare to see the patient , obtaining history ,performing examination, counseling/Educating the patient , Documenting clinical information in the record    --  12/7/2023- 1644    Checked for over-the-counter medication  --  12/13/2023- 1914    A1c 6.2    Called to follow up , spoke to her to address any concerns with the up coming surgery or any questions on Medication instructions -  Doing well ,No changes to Medication, Health -

## 2023-12-07 NOTE — ASSESSMENT & PLAN NOTE
She does not drink alcohol making the likelihood of this fatty liver to be non alcohol-related      No history  of cirrhosis of liver or suggestions of Liver  decompensation   No Jaundice , dark urine , pale stool     Her platelet count, liver enzymes, INR, APTT are normal which are reassuring  She has no suggestions of variceal bleeding or GI bleeding    We discussed weight loss and increased risk of cirrhosis from fatty liver

## 2023-12-07 NOTE — ASSESSMENT & PLAN NOTE
She lost her mother November of 2020 and 6 months later her father also passed in June of 2021  She is recovering from the loss of her parents

## 2023-12-07 NOTE — OUTPATIENT SUBJECTIVE & OBJECTIVE
"Outpatient Subjective & Objective     Chief complaint-Preoperative evaluation, Perioperative Medical management, complication reduction plan     Active cardiac conditions- none    Revised cardiac risk index predictors- history of heart failure    Functional capacity -Examples of physical activity, she is limited because of her back pain related to breast cancer,  can take a flight of stairs holding on to the railing----- She can undertake all the above activities without  chest pain,chest tightness, Shortness of breath ,dizziness,lightheadedness making her exercise tolerance more,  than 4 Mets.       Review of Systems   Constitutional:  Negative for chills and fever.   HENT:          STOPBANG score  / 8    Loud Snoring   Elevated BP  BMI> 35   Neck size over 40 CM     Eyes:         No sudden visual changes   Respiratory:          No cough , phlegm    No Hemoptysis   Cardiovascular:         As noted   Gastrointestinal:         No overt GI/ blood losses  Bowel movements- every other day   Endocrine:        Prednisone use > 20 mg daily for 3 weeks- None   Genitourinary:  Negative for dysuria.          She is no longer has difficulty voiding   No urinary hesitancy    Musculoskeletal:         As above      Skin:  Negative for rash.   Neurological:  Negative for syncope.        No unilateral weakness   Hematological:         Current use of Anticoagulants  None  I suggested not taking Advil 1 week prior to surgery   Psychiatric/Behavioral:            No SI/HI   No vascular stenting            No anesthesia, bleeding, cardiac problems, PONV with previous surgeries/procedures.  Medications and Allergies reviewed in epic.     FH- No anesthesia,bleeding / venous thrombosis ,  in family    Physical Exam  Blood pressure 113/67, pulse 88, temperature 97.9 °F (36.6 °C), temperature source Oral, resp. rate 14, height 5' 4" (1.626 m), weight (!) 151 kg (332 lb 14.3 oz), SpO2 98 %.    I offered a gown and the presence of a " chaperone during physical examination   She was comfortable to proceed with the exam without the the presence of a chaperone        Physical Exam  Constitutional- Vitals - Body mass index is 57.14 kg/m².,   Vitals:    12/07/23 0807   BP: 113/67   Pulse: 88   Resp: 14   Temp: 97.9 °F (36.6 °C)     General appearance-Conscious,Coherent  Eyes- No conjunctival icterus,pupils  round   ENT-Oral cavity- moist    , Hearing grossly normal   Neck- No thyromegaly ,Trachea -central, No jugular venous distension,   No Carotid Bruit   Cardiovascular -Heart Sounds- Normal  and  no murmur   , No gallop rhythm   Respiratory - Normal Respiratory Effort, Normal breath sounds,  no wheeze , and  no forced expiratory wheeze    Peripheral pitting pedal edema-- none , no calf pain   Gastrointestinal -Soft abdomen, No palpable masses, Non Tender,Liver,Spleen not palpable. No-- free fluid and shifting dullness  Musculoskeletal- No finger Clubbing. Strength grossly normal   Lymphatic-No Palpable cervical, axillary,Inguinal lymphadenopathy   Psychiatric - normal effect,Orientation  Rt Dorsalis pedis pulses-palpable    Lt Dorsalis pedis pulses- palpable   Rt Posterior tibial pulses -palpable   Left posterior tibial pulses -palpable   Miscellaneous -  no renal bruit   Hernia slightly about the umbilical area  Investigations  Lab and Imaging have been reviewed in epic.    Review of Medicine tests    EKG- I had independently reviewed the EKG from--9/14/2023  It was reported to be showing   Normal sinus rhythm   Left axis deviation   Nonspecific T wave abnormality   Abnormal ECG   When compared with ECG of 13-APR-2022 12:26,   No significant change was found       Review of clinical lab tests:  Lab Results   Component Value Date    CREATININE 1.0 10/25/2023    HGB 10.8 (L) 11/16/2023     11/16/2023           Review of old records- Was done and information gathered regards to events leading to surgery and health conditions of significance in  the perioperative period.    Outpatient Subjective & Objective

## 2023-12-07 NOTE — ASSESSMENT & PLAN NOTE
She believes that she has sleep apnea but she has not had a sleep study      Possible sleep apnea- I suggest a sleep study and suggest caution with usage of medication that can cause respiratory suppression in the perioperative period  potential ramifications of untreated sleep apnea, which could include daytime sleepiness, hypertension, heart disease and stroke were discussed    Avoidance of  supine sleep, weight gain , alcoholic beverages , care with , sedative , CNS depressant use indicated  since all of these can worsen ZABRINA      After discussing with her, I have put a referral for sleep evaluation which she wants to do after surgery

## 2023-12-07 NOTE — ASSESSMENT & PLAN NOTE
2020  She would not have any symptoms of COVID  She did not require hospitalization, intubation or supplemental oxygen use  She is been vaccinated against COVID    COVID screening     No fever   No cough   No SOB  No sore throat   No loss of taste or smell   No muscle aches   No nausea, vomiting , diarrhea    Elidel Pregnancy And Lactation Text: This medication is Pregnancy Category C. It is unknown if this medication is excreted in breast milk.

## 2023-12-07 NOTE — ASSESSMENT & PLAN NOTE
She is taking medication for hypertension namely carvedilol, and Entresto  She is doing good from blood pressure standpoint and feels that her stopping working has helped    Home BP readings -None    Hypertension-  Blood pressure is acceptable . I suggest continuation of -Coreg--- during the entire perioperative period. I suggest holding -Entresto--- on the morning of the surgery and can continue that  post operatively under blood pressure, electrolyte and renal function monitoring as long as they are acceptable.I suggest addressing pain control as uncontrolled pain can increased blood pressure

## 2023-12-07 NOTE — ASSESSMENT & PLAN NOTE
She has anxiety and depression and is currently taking venlafaxine 75 mg and 150 mg once a day  She feels fairly well from a mental health standpoint and she has supportive family members in the form of daughters    I suggest monitoring the sodium as SIADH from Effexor  use and hypersecretion of ADH associated with surgery can reduce sodium in the perioperative period

## 2023-12-07 NOTE — ASSESSMENT & PLAN NOTE
She takes Zofran as needed and she attributes that to the Mounjaro use  I suggest aspiration precautions

## 2023-12-07 NOTE — ASSESSMENT & PLAN NOTE
She lost about 60 lb from December of last year to now  She has been commenced on Mounjaro in June which has helped with the weight loss but she is been losing weight intentionally even before that    She takes Mounjaro on Mondays and she last took the Mounjaro on the 27th of November in preparation for her surgery   Holding Mounjaro has not changed her glucose levels as per the information I have obtained from her    Weight related conditions     Known to have     HTN  Type 2 Diabetes   Hyperlipidemia   Snoring    Fatty liver       Not troubled with / Not known to have       Acid reflux       Encouraged weight loss

## 2023-12-07 NOTE — ASSESSMENT & PLAN NOTE
She believes that she has a hernia on the abdominal wall in the mid abdomen and she has no suggestions of bowel obstruction   I suggested follow-up for this

## 2023-12-09 ENCOUNTER — PATIENT MESSAGE (OUTPATIENT)
Dept: ADMINISTRATIVE | Facility: OTHER | Age: 48
End: 2023-12-09
Payer: MEDICARE

## 2023-12-12 ENCOUNTER — OFFICE VISIT (OUTPATIENT)
Dept: SLEEP MEDICINE | Facility: CLINIC | Age: 48
End: 2023-12-12
Attending: HOSPITALIST
Payer: MEDICARE

## 2023-12-12 DIAGNOSIS — G47.30 SLEEP APNEA, UNSPECIFIED TYPE: Primary | ICD-10-CM

## 2023-12-12 DIAGNOSIS — I10 BENIGN ESSENTIAL HTN: ICD-10-CM

## 2023-12-12 DIAGNOSIS — Z79.4 TYPE 2 DIABETES MELLITUS WITHOUT COMPLICATION, WITH LONG-TERM CURRENT USE OF INSULIN: ICD-10-CM

## 2023-12-12 DIAGNOSIS — R06.83 SNORING: ICD-10-CM

## 2023-12-12 DIAGNOSIS — E11.9 TYPE 2 DIABETES MELLITUS WITHOUT COMPLICATION, WITH LONG-TERM CURRENT USE OF INSULIN: ICD-10-CM

## 2023-12-12 DIAGNOSIS — I50.42 CHRONIC COMBINED SYSTOLIC AND DIASTOLIC HEART FAILURE: ICD-10-CM

## 2023-12-12 PROCEDURE — 99204 PR OFFICE/OUTPT VISIT, NEW, LEVL IV, 45-59 MIN: ICD-10-PCS | Mod: 95,,, | Performed by: NURSE PRACTITIONER

## 2023-12-12 PROCEDURE — 3044F HG A1C LEVEL LT 7.0%: CPT | Mod: CPTII,95,, | Performed by: NURSE PRACTITIONER

## 2023-12-12 PROCEDURE — 99499 UNLISTED E&M SERVICE: CPT | Mod: 95,,, | Performed by: NURSE PRACTITIONER

## 2023-12-12 PROCEDURE — 4010F PR ACE/ARB THEARPY RXD/TAKEN: ICD-10-PCS | Mod: CPTII,95,, | Performed by: NURSE PRACTITIONER

## 2023-12-12 PROCEDURE — 4010F ACE/ARB THERAPY RXD/TAKEN: CPT | Mod: CPTII,95,, | Performed by: NURSE PRACTITIONER

## 2023-12-12 PROCEDURE — 99204 OFFICE O/P NEW MOD 45 MIN: CPT | Mod: 95,,, | Performed by: NURSE PRACTITIONER

## 2023-12-12 PROCEDURE — 3044F PR MOST RECENT HEMOGLOBIN A1C LEVEL <7.0%: ICD-10-PCS | Mod: CPTII,95,, | Performed by: NURSE PRACTITIONER

## 2023-12-12 NOTE — PROGRESS NOTES
The patient location is: [place]  The chief complaint leading to consultation is: Sleep evaluation    Visit type: audiovisual    Face to Face time with patient: 15 minutes of total time spent on the encounter, which includes face to face time and non-face to face time preparing to see the patient (eg, review of tests), Obtaining and/or reviewing separately obtained history, Documenting clinical information in the electronic or other health record, Independently interpreting results (not separately reported) and communicating results to the patient/family/caregiver, or Care coordination (not separately reported). Each patient to whom he or she provides medical services by telemedicine is:  (1) informed of the relationship between the physician and patient and the respective role of any other health care provider with respect to management of the patient; and (2) notified that he or she may decline to receive medical services by telemedicine and may withdraw from such care at any time.  Referred by Dr. Farias    HISTORY OF PRESENT ILLNESS: She has never had a sleep study. +witnessed apneic pauses.Snoring may awaken her or she may jolt from sleep. +daytime sleepiness. Naps 2-3x/week. Takes prn percocet occasionally-makes her more sleepy. Trazadone 50mg stopped working. She hasn't slept uninterrupted in a very long time     Echo 2022 EF 35%  HgBA1c 6.3   shunt insertion DR Villa 12/14/23  On todays [place] Sleepiness Scale the patient scores a 11/24.       FAMILY HISTORY: brother ZABRINA       ASSESSMENT:   Unspecified Sleep Apnea, with symptoms of snoring, witnessed apneic pauses, un-refreshing disrupted sleep and excessive daytime sleepiness,  with medical comorbidities of  cardiomyopathy, DM2, metastatic breast cancer, hypertension. Warrants further investigation for untreated sleep apnea.     PLAN:   1. Polysomnogram, discussed plan of care    2. Discussed etiology of ZABRINA and plan (CPAP-definitive) pending study results.       Thank you for allowing me the opportunity to participate in the care of your patient

## 2023-12-13 ENCOUNTER — TELEPHONE (OUTPATIENT)
Dept: NEUROSURGERY | Facility: CLINIC | Age: 48
End: 2023-12-13
Payer: MEDICARE

## 2023-12-13 NOTE — TELEPHONE ENCOUNTER
Ms. Elmore notified of arrival time for procedure.  Pt instructed to be at the [facility] desk at [address] [facility].  NPO at midnight.  Shower with dial antibacterial soap tonight + tomorrow morning.  Shampoo hair with regular shampoo. Questions and concerns addressed.

## 2023-12-13 NOTE — TELEPHONE ENCOUNTER
----- Message from Jerry Jimenez sent at 12/13/2023  9:13 AM CST -----  Regarding: Arrival time for 12/14 procedure  Contact: [phone]  Patient is calling in regards to speaking with nurse to confirm report time for upcoming procedure/surgery. Please call back as soon as possible to confirm details or leave message on pt portal.

## 2023-12-14 ENCOUNTER — ANESTHESIA (OUTPATIENT)
Dept: SURGERY | Facility: HOSPITAL | Age: 48
DRG: 032 | End: 2023-12-14
Payer: MEDICARE

## 2023-12-14 ENCOUNTER — HOSPITAL ENCOUNTER (INPATIENT)
Facility: HOSPITAL | Age: 48
LOS: 1 days | Discharge: HOME | DRG: 032 | End: 2023-12-15
Attending: NEUROLOGICAL SURGERY | Admitting: NEUROLOGICAL SURGERY
Payer: MEDICARE

## 2023-12-14 DIAGNOSIS — G93.2 IDIOPATHIC INTRACRANIAL HYPERTENSION: ICD-10-CM

## 2023-12-14 LAB
POCT GLUCOSE: 129 MG/DL (ref 70–110)
POCT GLUCOSE: 148 MG/DL (ref 70–110)
POCT GLUCOSE: 275 MG/DL (ref 70–110)

## 2023-12-14 PROCEDURE — 27201423 OPTIME MED/SURG SUP & DEVICES STERILE SUPPLY: Performed by: NEUROLOGICAL SURGERY

## 2023-12-14 PROCEDURE — 36000711: Performed by: NEUROLOGICAL SURGERY

## 2023-12-14 PROCEDURE — 62223 PR CREATE SHUNT:VENTRIC-PERITONEAL: ICD-10-PCS | Mod: 62,,, | Performed by: SURGERY

## 2023-12-14 PROCEDURE — 37000009 HC ANESTHESIA EA ADD 15 MINS: Performed by: NEUROLOGICAL SURGERY

## 2023-12-14 PROCEDURE — 61781 PR STEREOTACTIC COMP ASSIST PROC,CRANIAL,INTRADURAL: ICD-10-PCS | Mod: ,,, | Performed by: NEUROLOGICAL SURGERY

## 2023-12-14 PROCEDURE — 63600175 PHARM REV CODE 636 W HCPCS

## 2023-12-14 PROCEDURE — 36000713 HC OR TIME LEV V EA ADD 15 MIN: Performed by: NEUROLOGICAL SURGERY

## 2023-12-14 PROCEDURE — 62223 ESTABLISH BRAIN CAVITY SHUNT: CPT | Mod: 62,,, | Performed by: SURGERY

## 2023-12-14 PROCEDURE — 63600175 PHARM REV CODE 636 W HCPCS: Performed by: SURGERY

## 2023-12-14 PROCEDURE — 99900035 HC TECH TIME PER 15 MIN (STAT)

## 2023-12-14 PROCEDURE — C1729 CATH, DRAINAGE: HCPCS | Performed by: NEUROLOGICAL SURGERY

## 2023-12-14 PROCEDURE — 82962 GLUCOSE BLOOD TEST: CPT | Performed by: NEUROLOGICAL SURGERY

## 2023-12-14 PROCEDURE — 27201037 HC PRESSURE MONITORING SET UP

## 2023-12-14 PROCEDURE — 63600175 PHARM REV CODE 636 W HCPCS: Performed by: STUDENT IN AN ORGANIZED HEALTH CARE EDUCATION/TRAINING PROGRAM

## 2023-12-14 PROCEDURE — 25000003 PHARM REV CODE 250: Performed by: STUDENT IN AN ORGANIZED HEALTH CARE EDUCATION/TRAINING PROGRAM

## 2023-12-14 PROCEDURE — 63600175 PHARM REV CODE 636 W HCPCS: Performed by: NEUROLOGICAL SURGERY

## 2023-12-14 PROCEDURE — 36000712 HC OR TIME LEV V 1ST 15 MIN: Performed by: NEUROLOGICAL SURGERY

## 2023-12-14 PROCEDURE — 25000003 PHARM REV CODE 250: Performed by: NEUROLOGICAL SURGERY

## 2023-12-14 PROCEDURE — 36620 INSERTION CATHETER ARTERY: CPT

## 2023-12-14 PROCEDURE — 94761 N-INVAS EAR/PLS OXIMETRY MLT: CPT

## 2023-12-14 PROCEDURE — 27800903 OPTIME MED/SURG SUP & DEVICES OTHER IMPLANTS: Performed by: NEUROLOGICAL SURGERY

## 2023-12-14 PROCEDURE — 62223 PR CREATE SHUNT:VENTRIC-PERITONEAL: ICD-10-PCS | Mod: 62,,, | Performed by: NEUROLOGICAL SURGERY

## 2023-12-14 PROCEDURE — 36620 ARTERIAL: ICD-10-PCS | Mod: GC,,, | Performed by: ANESTHESIOLOGY

## 2023-12-14 PROCEDURE — 94799 UNLISTED PULMONARY SVC/PX: CPT | Mod: XB

## 2023-12-14 PROCEDURE — 62223 ESTABLISH BRAIN CAVITY SHUNT: CPT | Mod: 62,,, | Performed by: NEUROLOGICAL SURGERY

## 2023-12-14 PROCEDURE — 36000710: Performed by: NEUROLOGICAL SURGERY

## 2023-12-14 PROCEDURE — 71000015 HC POSTOP RECOV 1ST HR: Performed by: NEUROLOGICAL SURGERY

## 2023-12-14 PROCEDURE — D9220A PRA ANESTHESIA: ICD-10-PCS | Mod: ,,, | Performed by: ANESTHESIOLOGY

## 2023-12-14 PROCEDURE — 71000033 HC RECOVERY, INTIAL HOUR: Performed by: NEUROLOGICAL SURGERY

## 2023-12-14 PROCEDURE — 37000008 HC ANESTHESIA 1ST 15 MINUTES: Performed by: NEUROLOGICAL SURGERY

## 2023-12-14 PROCEDURE — 27000221 HC OXYGEN, UP TO 24 HOURS

## 2023-12-14 PROCEDURE — 25000003 PHARM REV CODE 250: Performed by: PHYSICIAN ASSISTANT

## 2023-12-14 PROCEDURE — D9220A PRA ANESTHESIA: Mod: ,,, | Performed by: ANESTHESIOLOGY

## 2023-12-14 PROCEDURE — 36620 INSERTION CATHETER ARTERY: CPT | Mod: GC,,, | Performed by: ANESTHESIOLOGY

## 2023-12-14 PROCEDURE — 61781 SCAN PROC CRANIAL INTRA: CPT | Mod: ,,, | Performed by: NEUROLOGICAL SURGERY

## 2023-12-14 PROCEDURE — 25000003 PHARM REV CODE 250

## 2023-12-14 PROCEDURE — 11000001 HC ACUTE MED/SURG PRIVATE ROOM

## 2023-12-14 DEVICE — VALVE CHPU PROGRAMMABLE: Type: IMPLANTABLE DEVICE | Site: CRANIAL | Status: FUNCTIONAL

## 2023-12-14 DEVICE — KIT CATH WITH BACTISEAL: Type: IMPLANTABLE DEVICE | Site: CRANIAL | Status: FUNCTIONAL

## 2023-12-14 RX ORDER — HALOPERIDOL 5 MG/ML
0.5 INJECTION INTRAMUSCULAR EVERY 10 MIN PRN
Status: DISCONTINUED | OUTPATIENT
Start: 2023-12-14 | End: 2023-12-14 | Stop reason: HOSPADM

## 2023-12-14 RX ORDER — DEXAMETHASONE SODIUM PHOSPHATE 4 MG/ML
INJECTION, SOLUTION INTRA-ARTICULAR; INTRALESIONAL; INTRAMUSCULAR; INTRAVENOUS; SOFT TISSUE
Status: DISCONTINUED | OUTPATIENT
Start: 2023-12-14 | End: 2023-12-14

## 2023-12-14 RX ORDER — OXYCODONE HYDROCHLORIDE 5 MG/1
5 TABLET ORAL ONCE AS NEEDED
Status: DISCONTINUED | OUTPATIENT
Start: 2023-12-14 | End: 2023-12-14

## 2023-12-14 RX ORDER — HALOPERIDOL 5 MG/ML
INJECTION INTRAMUSCULAR
Status: DISCONTINUED | OUTPATIENT
Start: 2023-12-14 | End: 2023-12-14

## 2023-12-14 RX ORDER — IBUPROFEN 200 MG
24 TABLET ORAL
Status: DISCONTINUED | OUTPATIENT
Start: 2023-12-14 | End: 2023-12-15 | Stop reason: HOSPADM

## 2023-12-14 RX ORDER — ETOMIDATE 2 MG/ML
INJECTION INTRAVENOUS
Status: DISCONTINUED | OUTPATIENT
Start: 2023-12-14 | End: 2023-12-14

## 2023-12-14 RX ORDER — LIDOCAINE HYDROCHLORIDE 20 MG/ML
INJECTION, SOLUTION EPIDURAL; INFILTRATION; INTRACAUDAL; PERINEURAL
Status: DISCONTINUED | OUTPATIENT
Start: 2023-12-14 | End: 2023-12-14

## 2023-12-14 RX ORDER — LABETALOL HYDROCHLORIDE 5 MG/ML
INJECTION, SOLUTION INTRAVENOUS
Status: DISCONTINUED | OUTPATIENT
Start: 2023-12-14 | End: 2023-12-14

## 2023-12-14 RX ORDER — MUPIROCIN 20 MG/G
OINTMENT TOPICAL
Status: DISCONTINUED | OUTPATIENT
Start: 2023-12-14 | End: 2023-12-14

## 2023-12-14 RX ORDER — VENLAFAXINE HYDROCHLORIDE 37.5 MG/1
75 CAPSULE, EXTENDED RELEASE ORAL DAILY
Status: DISCONTINUED | OUTPATIENT
Start: 2023-12-14 | End: 2023-12-15 | Stop reason: HOSPADM

## 2023-12-14 RX ORDER — MUPIROCIN 20 MG/G
1 OINTMENT TOPICAL 2 TIMES DAILY
Status: DISCONTINUED | OUTPATIENT
Start: 2023-12-14 | End: 2023-12-14

## 2023-12-14 RX ORDER — HYDROMORPHONE HYDROCHLORIDE 1 MG/ML
0.2 INJECTION, SOLUTION INTRAMUSCULAR; INTRAVENOUS; SUBCUTANEOUS EVERY 5 MIN PRN
Status: DISCONTINUED | OUTPATIENT
Start: 2023-12-14 | End: 2023-12-14 | Stop reason: HOSPADM

## 2023-12-14 RX ORDER — TRAZODONE HYDROCHLORIDE 50 MG/1
50 TABLET ORAL NIGHTLY
Status: DISCONTINUED | OUTPATIENT
Start: 2023-12-14 | End: 2023-12-15 | Stop reason: HOSPADM

## 2023-12-14 RX ORDER — LIDOCAINE HYDROCHLORIDE 10 MG/ML
1 INJECTION, SOLUTION EPIDURAL; INFILTRATION; INTRACAUDAL; PERINEURAL ONCE AS NEEDED
Status: COMPLETED | OUTPATIENT
Start: 2023-12-14 | End: 2023-12-14

## 2023-12-14 RX ORDER — ONDANSETRON 2 MG/ML
INJECTION INTRAMUSCULAR; INTRAVENOUS
Status: DISCONTINUED | OUTPATIENT
Start: 2023-12-14 | End: 2023-12-14

## 2023-12-14 RX ORDER — BUPIVACAINE HYDROCHLORIDE AND EPINEPHRINE 5; 5 MG/ML; UG/ML
INJECTION, SOLUTION EPIDURAL; INTRACAUDAL; PERINEURAL
Status: DISCONTINUED | OUTPATIENT
Start: 2023-12-14 | End: 2023-12-14

## 2023-12-14 RX ORDER — LIDOCAINE HYDROCHLORIDE AND EPINEPHRINE 10; 10 MG/ML; UG/ML
INJECTION, SOLUTION INFILTRATION; PERINEURAL
Status: DISCONTINUED | OUTPATIENT
Start: 2023-12-14 | End: 2023-12-14

## 2023-12-14 RX ORDER — SODIUM CHLORIDE 9 MG/ML
INJECTION, SOLUTION INTRAVENOUS CONTINUOUS
Status: DISCONTINUED | OUTPATIENT
Start: 2023-12-14 | End: 2023-12-15 | Stop reason: HOSPADM

## 2023-12-14 RX ORDER — ACETAMINOPHEN 325 MG/1
650 TABLET ORAL EVERY 4 HOURS PRN
Status: DISCONTINUED | OUTPATIENT
Start: 2023-12-14 | End: 2023-12-15 | Stop reason: HOSPADM

## 2023-12-14 RX ORDER — ONDANSETRON 2 MG/ML
4 INJECTION INTRAMUSCULAR; INTRAVENOUS EVERY 12 HOURS PRN
Status: DISCONTINUED | OUTPATIENT
Start: 2023-12-14 | End: 2023-12-15 | Stop reason: HOSPADM

## 2023-12-14 RX ORDER — BUPIVACAINE HYDROCHLORIDE 2.5 MG/ML
INJECTION, SOLUTION EPIDURAL; INFILTRATION; INTRACAUDAL
Status: DISCONTINUED | OUTPATIENT
Start: 2023-12-14 | End: 2023-12-14

## 2023-12-14 RX ORDER — ATORVASTATIN CALCIUM 40 MG/1
40 TABLET, FILM COATED ORAL NIGHTLY
Status: DISCONTINUED | OUTPATIENT
Start: 2023-12-14 | End: 2023-12-15 | Stop reason: HOSPADM

## 2023-12-14 RX ORDER — MIDAZOLAM HYDROCHLORIDE 1 MG/ML
INJECTION, SOLUTION INTRAMUSCULAR; INTRAVENOUS
Status: DISCONTINUED | OUTPATIENT
Start: 2023-12-14 | End: 2023-12-14

## 2023-12-14 RX ORDER — SODIUM CHLORIDE 0.9 % (FLUSH) 0.9 %
10 SYRINGE (ML) INJECTION
Status: DISCONTINUED | OUTPATIENT
Start: 2023-12-14 | End: 2023-12-14 | Stop reason: HOSPADM

## 2023-12-14 RX ORDER — SENNOSIDES 8.6 MG/1
8.6 TABLET ORAL DAILY PRN
Status: DISCONTINUED | OUTPATIENT
Start: 2023-12-14 | End: 2023-12-15 | Stop reason: HOSPADM

## 2023-12-14 RX ORDER — HYDROCODONE BITARTRATE AND ACETAMINOPHEN 5; 325 MG/1; MG/1
1 TABLET ORAL EVERY 4 HOURS PRN
Status: DISCONTINUED | OUTPATIENT
Start: 2023-12-14 | End: 2023-12-14

## 2023-12-14 RX ORDER — OXYCODONE AND ACETAMINOPHEN 5; 325 MG/1; MG/1
1 TABLET ORAL EVERY 4 HOURS PRN
Status: DISCONTINUED | OUTPATIENT
Start: 2023-12-14 | End: 2023-12-15 | Stop reason: HOSPADM

## 2023-12-14 RX ORDER — HYDROCODONE BITARTRATE AND ACETAMINOPHEN 10; 325 MG/1; MG/1
1 TABLET ORAL EVERY 4 HOURS PRN
Status: DISCONTINUED | OUTPATIENT
Start: 2023-12-14 | End: 2023-12-14

## 2023-12-14 RX ORDER — CARVEDILOL 25 MG/1
25 TABLET ORAL 2 TIMES DAILY
Status: DISCONTINUED | OUTPATIENT
Start: 2023-12-14 | End: 2023-12-15 | Stop reason: HOSPADM

## 2023-12-14 RX ORDER — MEPERIDINE HYDROCHLORIDE 50 MG/ML
12.5 INJECTION INTRAMUSCULAR; INTRAVENOUS; SUBCUTANEOUS EVERY 10 MIN PRN
Status: DISCONTINUED | OUTPATIENT
Start: 2023-12-14 | End: 2023-12-14 | Stop reason: HOSPADM

## 2023-12-14 RX ORDER — FENTANYL CITRATE 50 UG/ML
25 INJECTION, SOLUTION INTRAMUSCULAR; INTRAVENOUS EVERY 5 MIN PRN
Status: DISCONTINUED | OUTPATIENT
Start: 2023-12-14 | End: 2023-12-14

## 2023-12-14 RX ORDER — IBUPROFEN 200 MG
16 TABLET ORAL
Status: DISCONTINUED | OUTPATIENT
Start: 2023-12-14 | End: 2023-12-15 | Stop reason: HOSPADM

## 2023-12-14 RX ORDER — FENTANYL CITRATE 50 UG/ML
INJECTION, SOLUTION INTRAMUSCULAR; INTRAVENOUS
Status: DISCONTINUED | OUTPATIENT
Start: 2023-12-14 | End: 2023-12-14

## 2023-12-14 RX ORDER — VANCOMYCIN HYDROCHLORIDE 500 MG/10ML
INJECTION, POWDER, LYOPHILIZED, FOR SOLUTION INTRAVENOUS
Status: DISCONTINUED | OUTPATIENT
Start: 2023-12-14 | End: 2023-12-14

## 2023-12-14 RX ORDER — GLUCAGON 1 MG
1 KIT INJECTION
Status: DISCONTINUED | OUTPATIENT
Start: 2023-12-14 | End: 2023-12-15 | Stop reason: HOSPADM

## 2023-12-14 RX ORDER — NOREPINEPHRINE BITARTRATE 1 MG/ML
INJECTION, SOLUTION INTRAVENOUS
Status: DISCONTINUED | OUTPATIENT
Start: 2023-12-14 | End: 2023-12-14

## 2023-12-14 RX ORDER — CEFTRIAXONE 1 G/1
INJECTION, POWDER, FOR SOLUTION INTRAMUSCULAR; INTRAVENOUS
Status: DISCONTINUED | OUTPATIENT
Start: 2023-12-14 | End: 2023-12-14

## 2023-12-14 RX ORDER — ROCURONIUM BROMIDE 10 MG/ML
INJECTION, SOLUTION INTRAVENOUS
Status: DISCONTINUED | OUTPATIENT
Start: 2023-12-14 | End: 2023-12-14

## 2023-12-14 RX ORDER — BACITRACIN ZINC 500 UNIT/G
OINTMENT (GRAM) TOPICAL
Status: DISCONTINUED | OUTPATIENT
Start: 2023-12-14 | End: 2023-12-14

## 2023-12-14 RX ORDER — INSULIN ASPART 100 [IU]/ML
0-10 INJECTION, SOLUTION INTRAVENOUS; SUBCUTANEOUS
Status: DISCONTINUED | OUTPATIENT
Start: 2023-12-14 | End: 2023-12-15 | Stop reason: HOSPADM

## 2023-12-14 RX ADMIN — FENTANYL CITRATE 75 MCG: 50 INJECTION INTRAMUSCULAR; INTRAVENOUS at 10:12

## 2023-12-14 RX ADMIN — LIDOCAINE HYDROCHLORIDE 100 MG: 20 INJECTION, SOLUTION EPIDURAL; INFILTRATION; INTRACAUDAL at 10:12

## 2023-12-14 RX ADMIN — SUGAMMADEX 200 MG: 100 INJECTION, SOLUTION INTRAVENOUS at 11:12

## 2023-12-14 RX ADMIN — NOREPINEPHRINE BITARTRATE 0.04 MCG/KG/MIN: 1 INJECTION, SOLUTION, CONCENTRATE INTRAVENOUS at 10:12

## 2023-12-14 RX ADMIN — ROCURONIUM BROMIDE 80 MG: 10 INJECTION INTRAVENOUS at 10:12

## 2023-12-14 RX ADMIN — SODIUM CHLORIDE: 9 INJECTION, SOLUTION INTRAVENOUS at 09:12

## 2023-12-14 RX ADMIN — LABETALOL HYDROCHLORIDE 10 MG: 5 INJECTION, SOLUTION INTRAVENOUS at 12:12

## 2023-12-14 RX ADMIN — CEFTRIAXONE 2 G: 2 INJECTION, POWDER, FOR SOLUTION INTRAMUSCULAR; INTRAVENOUS at 10:12

## 2023-12-14 RX ADMIN — VENLAFAXINE HYDROCHLORIDE 75 MG: 37.5 CAPSULE, EXTENDED RELEASE ORAL at 08:12

## 2023-12-14 RX ADMIN — HALOPERIDOL LACTATE 0.5 MG: 5 INJECTION, SOLUTION INTRAMUSCULAR at 11:12

## 2023-12-14 RX ADMIN — OXYCODONE HYDROCHLORIDE AND ACETAMINOPHEN 1 TABLET: 5; 325 TABLET ORAL at 06:12

## 2023-12-14 RX ADMIN — MUPIROCIN: 20 OINTMENT TOPICAL at 09:12

## 2023-12-14 RX ADMIN — SODIUM CHLORIDE 0.2 MCG/KG/MIN: 9 INJECTION, SOLUTION INTRAVENOUS at 10:12

## 2023-12-14 RX ADMIN — OXYCODONE HYDROCHLORIDE AND ACETAMINOPHEN 1 TABLET: 5; 325 TABLET ORAL at 10:12

## 2023-12-14 RX ADMIN — ATORVASTATIN CALCIUM 40 MG: 40 TABLET, FILM COATED ORAL at 08:12

## 2023-12-14 RX ADMIN — CARVEDILOL 25 MG: 25 TABLET, FILM COATED ORAL at 08:12

## 2023-12-14 RX ADMIN — TRAZODONE HYDROCHLORIDE 50 MG: 50 TABLET ORAL at 08:12

## 2023-12-14 RX ADMIN — LIDOCAINE HYDROCHLORIDE 1 MG: 10 INJECTION, SOLUTION EPIDURAL; INFILTRATION; INTRACAUDAL; PERINEURAL at 09:12

## 2023-12-14 RX ADMIN — HYDROMORPHONE HYDROCHLORIDE 0.2 MG: 1 INJECTION, SOLUTION INTRAMUSCULAR; INTRAVENOUS; SUBCUTANEOUS at 02:12

## 2023-12-14 RX ADMIN — SODIUM CHLORIDE: 0.9 INJECTION, SOLUTION INTRAVENOUS at 10:12

## 2023-12-14 RX ADMIN — CEFTRIAXONE SODIUM 2 G: 1 INJECTION, POWDER, FOR SOLUTION INTRAMUSCULAR; INTRAVENOUS at 10:12

## 2023-12-14 RX ADMIN — HYDROMORPHONE HYDROCHLORIDE 0.2 MG: 1 INJECTION, SOLUTION INTRAMUSCULAR; INTRAVENOUS; SUBCUTANEOUS at 01:12

## 2023-12-14 RX ADMIN — ROCURONIUM BROMIDE 50 MG: 10 INJECTION INTRAVENOUS at 10:12

## 2023-12-14 RX ADMIN — ROCURONIUM BROMIDE 20 MG: 10 INJECTION INTRAVENOUS at 11:12

## 2023-12-14 RX ADMIN — OXYCODONE HYDROCHLORIDE AND ACETAMINOPHEN 1 TABLET: 5; 325 TABLET ORAL at 01:12

## 2023-12-14 RX ADMIN — ONDANSETRON 4 MG: 2 INJECTION INTRAMUSCULAR; INTRAVENOUS at 11:12

## 2023-12-14 RX ADMIN — DEXAMETHASONE SODIUM PHOSPHATE 8 MG: 4 INJECTION INTRA-ARTICULAR; INTRALESIONAL; INTRAMUSCULAR; INTRAVENOUS; SOFT TISSUE at 10:12

## 2023-12-14 RX ADMIN — ETOMIDATE 30 MG: 2 INJECTION INTRAVENOUS at 10:12

## 2023-12-14 RX ADMIN — MIDAZOLAM 2 MG: 1 INJECTION INTRAMUSCULAR; INTRAVENOUS at 10:12

## 2023-12-14 RX ADMIN — NOREPINEPHRINE BITARTRATE 8 MCG: 1 INJECTION, SOLUTION, CONCENTRATE INTRAVENOUS at 10:12

## 2023-12-14 NOTE — ANESTHESIA PROCEDURE NOTES
Arterial    Diagnosis: IIH    Patient location during procedure: pre-op    Staffing  Authorizing Provider: Timoteo Clarke MD  Performing Provider: Antony Potter MD    Staffing  Performed by: Antony Potter MD  Authorized by: Timoteo Clarke MD    Anesthesiologist was present at the time of the procedure.    Preanesthetic Checklist  Completed: patient identified, IV checked, site marked, risks and benefits discussed, surgical consent, monitors and equipment checked, pre-op evaluation, timeout performed and anesthesia consent givenArterial  Skin Prep: chlorhexidine gluconate  Local Infiltration: lidocaine  Orientation: left  Location: radial    Catheter Size: 20 G  Catheter placement by Ultrasound guidance. Heme positive aspiration all ports.   Vessel Caliber: patent  Needle advanced into vessel with real time Ultrasound guidance.Insertion Attempts: 1  Assessment  Dressing: secured with tape and tegaderm  Patient: Tolerated well

## 2023-12-14 NOTE — OP NOTE
DATE OF PROCEDURE:  12/14/2023     SURGEON:  Jayme Villa M.D., Ph.D.     ASSISTANT: Patrick Georges M.D. (the assistant is a [facility] resident.      CO-SURGEON:  Frederick Kohler M.D.     PREOPERATIVE DIAGNOSES:  Idiopathic intracranial hypertension.   Breast cancer.  Morbid obesity.      POSTOPERATIVE DIAGNOSES:  Idiopathic intracranial hypertension.   Breast cancer.  Morbid obesity.      PROCEDURES:  1.  Placement of ventriculoperitoneal shunt.  2.  Stealth navigation.  3.  Laparoscopic placement of peritoneal catheter.     INDICATIONS IN DETAIL:   Ms. Kristopher Elmore is a 48 y.o. woman with PMHx of metastatic breast cancer, who presents today for VPS placement. This is a patient who was being seen in Neurosurgery for metastatic lesions in the spine and pelvis. MRI cervical demonstrated abnormal marrow signal and enhancement in the clivus. She was referred to me thereafter. The pt c/o acute headaches. She has not had frequent headaches occur in the past and states this is new for her. In addition to this, the pt reports dizziness and progressive hearing loss. MRI Cervical Spine WO Contrast (9/29/2023):  Abnormal marrow signal and enhancement in the clivus concerning for metastatic disease. I have scheduled the pt for a formal hearing test, HVF, and paraneoplastic profile serum. I recommend performing a lumbar puncture for IIH and pt wished to proceed.The opening pressure for this patient was 42 cm of water.  We drained approximately 28 mL of CSF, at which time her closing pressure was 20 cm of water. The pt reports significant improvement following the spinal tap. She has had complete resolution of her headaches and back pain since the procedure.   Her paraneoplastic profile was negative.  Cytology was negative for malignant cells.  Given the pt's positive outcome after the lumbar puncture, I have recommended  shunt placement. I have discussed the risks/benefits, indications, and alternatives  for the proposed procedure in detail. I have answered all of their questions and the pt wishes to proceed with surgery.      PROCEDURE IN DETAIL:    The patient was seen in the pretreatment area and the risks, benefits and alternatives were described.  The patient and the patient's family wished to proceed.  The patient was brought to the operating room and a general anesthetic was administered.  All proper lines were placed.      The patient was placed in the supine position with a bump underneath the right shoulder and the arms were tucked.  The patient's head was turned to the left to allow access to the right parietal region.  The Stealth navigation system was brought to the field and accurate registration was achieved using the tracer function and an MRI that the patient had previously.  The starting point in the right parietal region had been chosen.  This was marked on the patient's head.  The target was the area above the [place].  The patient's hair was clipped in the area of the starting region and the parietal and posterior auricular region.  The patient's head, neck, chest and abdomen were cleaned, prepped and draped in the usual fashion.  A lidocaine-bupivacaine mix was injected around a curvilinear line made near the starting site.  An incision was made along that aforementioned line and carried down to the galea using Bovie cautery.  The flap was dissected and a pocket was made in the posterior auricular region.  A Codman Hakim valve was obtained and set at 150. This was attached to a peritoneal catheter.  Using a shunt passer, a path between the posterior auricular region and the abdomen was made.  Using a #2 tie, we attached this to the peritoneal catheter and pulled the peritoneal catheter and the shunt valve in the subcutaneous region down to the abdomen.  Once this was performed, the pericranium was removed from the bone using Bovie cautery.  Using a high-speed [employer] drill with an  acorn judah, a judah hole was made.  The dura was cut and coagulated as well as the hermilo.  The ventricular catheter was placed over a shunt probe and this was passed through the substance of the brain into the ventricle and towards the midline using the Stealth navigation system.  Once this was performed, the catheter was cut to the appropriate length and attached to the valve.      At this point, Dr. Kohler entered the room for placement of the peritoneal catheter.  Please see his notes for the details of this.  Once this was complete, the wounds were irrigated copiously.  All bleeding points were coagulated.  The incisions were closed in layers with a 4-0 Monocryl in the skin.  Clean dressings were placed and the patient was then awakened by Anesthesia staff.  At this point, the patient was awake and following commands and she was extubated.     Estimated blood loss was less than 5 mL.     There were no intraprocedural complications.     All counts were correct at the end of surgery.     Dr. Jayme Villa was present during the entire procedure.

## 2023-12-14 NOTE — ANESTHESIA POSTPROCEDURE EVALUATION
Anesthesia Post Evaluation    Patient: Kristopher Elmore    Procedure(s) Performed: Procedure(s) (LRB):  INSERTION, SHUNT, VENTRICULOPERITONEAL (Right)  INSERTION, SHUNT, VENTRICULOPERITONEAL (N/A)    Final Anesthesia Type: general      Patient location during evaluation: PACU  Patient participation: Yes- Able to Participate  Level of consciousness: awake and alert  Post-procedure vital signs: reviewed and stable  Pain management: adequate  Airway patency: patent    PONV status at discharge: No PONV  Anesthetic complications: no      Cardiovascular status: blood pressure returned to baseline  Respiratory status: unassisted  Hydration status: euvolemic  Follow-up not needed.          Vitals Value Taken Time   /67 12/14/23 1232   Temp 36.3 °C (97.3 °F) 12/14/23 1231   Pulse 79 12/14/23 1235   Resp 20 12/14/23 1235   SpO2 100 % 12/14/23 1235   Vitals shown include unvalidated device data.      No case tracking events are documented in the log.      Pain/Ellen Score: Ellen Score: 7 (12/14/2023 12:31 PM)

## 2023-12-14 NOTE — TRANSFER OF CARE
"Anesthesia Transfer of Care Note    Patient: Kristopher Elmore    Procedure(s) Performed: Procedure(s) (LRB):  INSERTION, SHUNT, VENTRICULOPERITONEAL (Right)  INSERTION, SHUNT, VENTRICULOPERITONEAL (N/A)    Patient location: PACU    Anesthesia Type: general    Transport from OR: Transported from OR on 6-10 L/min O2 by face mask with adequate spontaneous ventilation    Post pain: adequate analgesia    Post assessment: no apparent anesthetic complications    Post vital signs: stable    Level of consciousness: awake and alert    Nausea/Vomiting: no nausea/vomiting    Complications: none    Transfer of care protocol was followed      Last vitals: Visit Vitals  /78 (BP Location: Left leg, Patient Position: Lying)   Pulse 83   Temp 36.3 °C (97.3 °F) (Temporal)   Resp 16   Ht 5' 4" (1.626 m)   Wt (!) 150.6 kg (332 lb)   SpO2 99%   Breastfeeding No   BMI 56.99 kg/m²     "

## 2023-12-14 NOTE — NURSING
Patient transferred from PACU to room 905. Patient Aox4, but slightly sedated. Patient does not complain of pain. Family at bedside.

## 2023-12-14 NOTE — H&P
I have reviewed the following below and agree with findings. No changes have been made. Plan to proceed with elective procedure. All questions have been answered.    Patrick Georges MD  Neurosurgery    ________________________________________________________________________________  The patient location is: [place]  The chief complaint leading to consultation is: follow up LP     Visit type: audiovisual     Face to Face time with patient: 15 minutes  30 minutes of total time spent on the encounter, which includes face to face time and non-face to face time preparing to see the patient (eg, review of tests), Obtaining and/or reviewing separately obtained history, Documenting clinical information in the electronic or other health record, Independently interpreting results (not separately reported) and communicating results to the patient/family/caregiver, or Care coordination (not separately reported).            Each patient to whom he or she provides medical services by telemedicine is:  (1) informed of the relationship between the physician and patient and the respective role of any other health care provider with respect to management of the patient; and (2) notified that he or she may decline to receive medical services by telemedicine and may withdraw from such care at any time.     Notes:    Subjective:   I, Debbie Stevens, attest that this documentation has been prepared under the direction and in the presence of Jayme Villa MD.      Patient ID: Kristopher Elmore is a 48 y.o. female      Chief Complaint: No chief complaint on file.        HPI  Ms. Kristopher Elmore is a 48 y.o. woman with PMHx of metastatic breast cancer, who presents today for follow up of lumbar puncture done on 10/26/2023. This is a patient who was being seen in Neurosurgery for metastatic lesions in the spine and pelvis. MRI cervical demonstrated abnormal marrow signal and enhancement in the clivus. She was referred to me  thereafter. The pt c/o acute headaches. She has not had frequent headaches occur in the past and states this is new for her. In addition to this, the pt reports dizziness and progressive hearing loss. MRI Cervical Spine WO Contrast (9/29/2023):  Abnormal marrow signal and enhancement in the clivus concerning for metastatic disease. I have scheduled the pt for a formal hearing test, HVF, and paraneoplastic profile serum. I recommend performing a lumbar puncture for IIH and pt wished to proceed.     The opening pressure for this patient was 42 cm of water.  We drained approximately 28 mL of CSF, at which time her closing pressure was 20 cm of water. The pt reports significant improvement following the spinal tap. She has had complete resolution of her headaches and back pain since the procedure.   Her paraneoplastic profile was negative.  Cytology was negative for malignant cells.      Review of Systems   Constitutional:  Negative for activity change, appetite change, fatigue, fever and unexpected weight change.   HENT:  Negative for facial swelling.    Eyes: Negative.    Respiratory: Negative.     Cardiovascular: Negative.    Gastrointestinal:  Negative for diarrhea, nausea and vomiting.   Endocrine: Negative.    Genitourinary: Negative.    Musculoskeletal:  Negative for back pain, joint swelling, myalgias and neck pain.   Neurological:  Negative for dizziness, seizures, weakness, numbness and headaches.   Psychiatric/Behavioral: Negative.                Past Medical History:   Diagnosis Date    Abnormal Pap smear       pt states 13yrs ago colpo was done    Anemia      Anxiety      Cancer      Cardiomyopathy      CHF (congestive heart failure)      Fibroid      Hx of psychiatric care      Hyperlipidemia      Hypertension      Psychiatric problem      Sleep difficulties      Therapy           Objective:    There were no vitals filed for this visit.   Physical Exam  Constitutional:       General: She is not in acute  distress.     Appearance: Normal appearance.   HENT:      Head: Normocephalic and atraumatic.   Neurological:      Mental Status: She is alert and oriented to person, place, and time.            I, Dr. Jayme Villa, personally performed the services described in this documentation. All medical record entries made by the scribe, Debbie Stevens, were at my direction and in my presence.  I have reviewed the chart and agree that the record reflects my personal performance and is accurate and complete. Jayme Villa MD. 11/08/2023     Assessment:       II     Plan:   Given the pt's positive outcome after the lumbar puncture, I recommend  shunt placement. I have discussed the risks/benefits, indications, and alternatives for the proposed procedure in detail. I have answered all of their questions and the pt wishes to proceed with surgery.  We will schedule the pt.

## 2023-12-14 NOTE — BRIEF OP NOTE
Operative Note       Surgery Date: 12/14/2023     Surgeon(s) and Role:  Panel 1:     * Jayme Villa MD - Primary     * Patrick Georges MD - Resident - Assisting  Panel 2:     * Frederick Kohler MD - Primary    Pre-op Diagnosis:  Pseudotumor cerebri [G93.2]    Post-op Diagnosis:  Pseudotumor cerebri [G93.2]    Procedure(s) (LRB):  INSERTION, SHUNT, VENTRICULOPERITONEAL (Right)  INSERTION, SHUNT, VENTRICULOPERITONEAL (N/A)    Anesthesia: General    Procedure in Detail/Findings:   without apparent complication.  No adhesions noted.    Estimated Blood Loss: Minimal           Specimens (From admission, onward)      None          Implants:   Implant Name Type Inv. Item Serial No.  Lot No. LRB No. Used Action   KIT CATH WITH BACTISEAL - [id number]  KIT CATH WITH BACTISEAL  [employer] [id number] Right 1 Implanted   VALVE CHPU PROGRAMMABLE - [id number]  VALVE CHPU PROGRAMMABLE  [employer] [id number] Right 1 Implanted              Disposition: PACU - hemodynamically stable.           Condition: Good    Attestation:  I was present and scrubbed for the entire procedure.

## 2023-12-14 NOTE — ANESTHESIA PROCEDURE NOTES
Intubation    Date/Time: 12/14/2023 10:19 AM    Performed by: Antony Potter MD  Authorized by: Timoteo Clarke MD    Intubation:     Induction:  Intravenous    Intubated:  Postinduction    Mask Ventilation:  Moderately difficult with oral airway    Attempts:  1    Attempted By:  Student    Method of Intubation:  Video laryngoscopy    Blade:  Larson 3    Laryngeal View Grade: Grade I - full view of cords      Difficult Airway Encountered?: No      Complications:  None    Airway Device:  Oral endotracheal tube    Airway Device Size:  7.5    Style/Cuff Inflation:  Cuffed (inflated to minimal occlusive pressure)    Tube secured:  22    Secured at:  The teeth    Placement Verified By:  Capnometry    Complicating Factors:  Obesity    Findings Post-Intubation:  BS equal bilateral and atraumatic/condition of teeth unchanged

## 2023-12-14 NOTE — NURSING TRANSFER
Nursing Transfer Note      12/14/2023   2:56 PM    Reason patient is being transferred: post-procedure    Transfer To: CT then room 905    Transfer via bed    Transfer with none    Transported by RNs    Order for Tele Monitor? No    Medicines sent: none    Any special needs or follow-up needed: routine    Patient belongings transferred with patient: No    Chart send with patient: Yes    Notified: daughter

## 2023-12-14 NOTE — OP NOTE
Surgery Date: 12/14/2023     Surgeon(s) and Role:  Panel 1:     * Jayme Villa MD - Primary     * Patrick Georges MD - Resident - Assisting  Panel 2:     * Frederick Kohler MD - Primary    Pre-op Diagnosis:  Pseudotumor cerebri [G93.2]    Post-op Diagnosis:  Pseudotumor cerebri [G93.2]    Procedure(s) (LRB):  INSERTION, SHUNT, VENTRICULOPERITONEAL (Right)  INSERTION, SHUNT, VENTRICULOPERITONEAL (N/A)    Anesthesia: General    PROCEDURE: The patient was placed under general anesthesia. The patient was   prepped and draped in the usual manner. The access to the peritoneum was gained  through ruq abdomen using Optiview trocar under direct vision.   Pneumoperitoneum to 15 mmHg with CO2 gas was attained. The shunt was in the   right upper quadrant. It was brought into the abdominal cavity on a mosquito. The suture passer was placed through the right upper quadrant under direct vision and pulled the shunt into the abdominal cavity to its full extent. The abdomen was inspected for hemostasis. Pneumoperitoneum was released. The trocar was removed. The skin incisions were closed with 4-0 plain catgut, and reinforced   with Dermabond. The patient tolerated the procedure  well, was brought to Recovery Room in stable condition. Sponge and needle   counts were correct at the end of the case.    PATHOLOGY:  for my part of the procedure is none.  COMPLICATIONS: None.  BLOOD LOSS: Minimal.

## 2023-12-14 NOTE — BRIEF OP NOTE
Derrick Nevarez - Surgery ([facility])  Brief Operative Note    SUMMARY     Surgery Date: 12/14/2023     Surgeon(s) and Role:  Panel 1:     * Jayme Villa MD - Primary     * Patrick Georges MD - Resident - Assisting  Panel 2:     * Frederick Kohler MD - Primary        Pre-op Diagnosis:  Pseudotumor cerebri [G93.2]    Post-op Diagnosis:  Post-Op Diagnosis Codes:     * Pseudotumor cerebri [G93.2]    Procedure(s) (LRB):  INSERTION, SHUNT, VENTRICULOPERITONEAL (Right)  INSERTION, SHUNT, VENTRICULOPERITONEAL (N/A)    Anesthesia: General    Implants:  Implant Name Type Inv. Item Serial No.  Lot No. LRB No. Used Action   KIT CATH WITH BACTISEAL - [id number]  KIT CATH WITH BACTISEAL  [employer] [id number] Right 1 Implanted   VALVE CHPU PROGRAMMABLE - [id number]  VALVE CHPU PROGRAMMABLE  [employer] [id number] Right 1 Implanted       Operative Findings: VPS insertion. Right. Hakim at 150. See full op report for further detailsw    Estimated Blood Loss: * No values recorded between 12/14/2023 11:04 AM and 12/14/2023 12:01 PM *    Estimated Blood Loss has been documented.         Specimens:   Specimen (24h ago, onward)      None            [id number]

## 2023-12-14 NOTE — NURSING
Nurses Note -- 4 Eyes      12/14/2023   3:02 PM      Skin assessed during: Transfer      [x] No Altered Skin Integrity Present    []Prevention Measures Documented      [] Yes- Altered Skin Integrity Present or Discovered   [] LDA Added if Not in Epic (Describe Wound)   [] New Altered Skin Integrity was Present on Admit and Documented in LDA   [] Wound Image Taken    Wound Care Consulted? No    Attending Nurse:  Adolph Adam RN/Staff Member:   Consuelo

## 2023-12-15 VITALS
HEIGHT: 64 IN | BODY MASS INDEX: 50.02 KG/M2 | TEMPERATURE: 98 F | RESPIRATION RATE: 20 BRPM | DIASTOLIC BLOOD PRESSURE: 71 MMHG | HEART RATE: 80 BPM | WEIGHT: 293 LBS | SYSTOLIC BLOOD PRESSURE: 120 MMHG | OXYGEN SATURATION: 95 %

## 2023-12-15 PROBLEM — I50.22 HEART FAILURE WITH MILDLY REDUCED EJECTION FRACTION: Status: ACTIVE | Noted: 2023-12-15

## 2023-12-15 PROBLEM — G93.2 IDIOPATHIC INTRACRANIAL HYPERTENSION: Status: ACTIVE | Noted: 2023-12-15

## 2023-12-15 PROBLEM — C50.919: Status: ACTIVE | Noted: 2023-12-15

## 2023-12-15 PROBLEM — Z98.2 VENTRICULAR SHUNT IN PLACE: Status: ACTIVE | Noted: 2023-12-15

## 2023-12-15 PROCEDURE — 99239 HOSP IP/OBS DSCHRG MGMT >30: CPT | Mod: ,,, | Performed by: PHYSICIAN ASSISTANT

## 2023-12-15 PROCEDURE — 25000003 PHARM REV CODE 250: Performed by: PHYSICIAN ASSISTANT

## 2023-12-15 PROCEDURE — 25000003 PHARM REV CODE 250: Performed by: STUDENT IN AN ORGANIZED HEALTH CARE EDUCATION/TRAINING PROGRAM

## 2023-12-15 PROCEDURE — 99239 PR HOSPITAL DISCHARGE DAY,>30 MIN: ICD-10-PCS | Mod: ,,, | Performed by: PHYSICIAN ASSISTANT

## 2023-12-15 RX ORDER — OXYCODONE AND ACETAMINOPHEN 5; 325 MG/1; MG/1
1 TABLET ORAL EVERY 6 HOURS PRN
Qty: 28 TABLET | Refills: 0 | Status: SHIPPED | OUTPATIENT
Start: 2023-12-15 | End: 2024-01-22 | Stop reason: ALTCHOICE

## 2023-12-15 RX ADMIN — CARVEDILOL 25 MG: 25 TABLET, FILM COATED ORAL at 08:12

## 2023-12-15 RX ADMIN — OXYCODONE HYDROCHLORIDE AND ACETAMINOPHEN 1 TABLET: 5; 325 TABLET ORAL at 08:12

## 2023-12-15 NOTE — NURSING
Nurses Note -- 4 Eyes      12/15/2023   7:01 AM      Skin assessed during: Q Shift Change      [x] No Altered Skin Integrity Present    []Prevention Measures Documented      [] Yes- Altered Skin Integrity Present or Discovered   [] LDA Added if Not in Epic (Describe Wound)   [] New Altered Skin Integrity was Present on Admit and Documented in LDA   [] Wound Image Taken    Wound Care Consulted? No    Attending Nurse:  Adolph Adam RN/Staff Member:   Mita

## 2023-12-15 NOTE — DISCHARGE SUMMARY
Derrick Nevarez - Neurosurgery ([facility])  Neurosurgery  Discharge Summary      Patient Name: Kristopher Elmore  MRN: [id number]  Admission Date: 12/14/2023  Hospital Length of Stay: 1 days  Discharge Date and Time:  12/15/2023 10:49 AM  Attending Physician: Jayme Villa MD   Discharging Provider: Lainey Hitchcock PA-C  Primary Care Provider: Sonia, Primary Doctor    HPI:   Ms. Kristopher Elmore is a 48 y.o. woman with PMHx of metastatic breast cancer, who presents today for VPS placement. This is a patient who was being seen in Neurosurgery for metastatic lesions in the spine and pelvis. MRI cervical demonstrated abnormal marrow signal and enhancement in the clivus. She was referred to me thereafter. The pt c/o acute headaches. She has not had frequent headaches occur in the past and states this is new for her. In addition to this, the pt reports dizziness and progressive hearing loss. MRI Cervical Spine WO Contrast (9/29/2023):  Abnormal marrow signal and enhancement in the clivus concerning for metastatic disease. I have scheduled the pt for a formal hearing test, HVF, and paraneoplastic profile serum. I recommend performing a lumbar puncture for IIH and pt wished to proceed.The opening pressure for this patient was 42 cm of water.  We drained approximately 28 mL of CSF, at which time her closing pressure was 20 cm of water. The pt reports significant improvement following the spinal tap. She has had complete resolution of her headaches and back pain since the procedure.   Her paraneoplastic profile was negative.  Cytology was negative for malignant cells.  Given the pt's positive outcome after the lumbar puncture, I have recommended  shunt placement. I have discussed the risks/benefits, indications, and alternatives for the proposed procedure in detail. I have answered all of their questions and the pt wishes to proceed with surgery.       Procedure(s) (LRB):  INSERTION, SHUNT, VENTRICULOPERITONEAL,  USING COMPUTER-ASSISTED NAVIGATION (Right)  INSERTION, SHUNT, VENTRICULOPERITONEAL, USING COMPUTER-ASSISTED NAVIGATION (N/A)     Hospital Course: The patient presented to [facility] on 12/14/23 for the above stated procedure. The patient tolerated the procedure well and there were no intra-operative complications. After surgery, the patient was extubated and taken to recovery in stable condition.  After observation in recovery, the patient was transferred to the neurosurgical floor.  Post-op CTH and shunt series XR showed good placement of the catheter that is contiguous throughout and the hakim at the desired setting of 150. PT/OT were consulted, the patient progressed, and was cleared to go home.  On 15/15/23 she was discharged home with pain medication and follow up appointments. The patient was tolerating a regular diet and voiding without difficulty. Activity as tolerated. At the time of discharge, the patient was neurologically stable, was afebrile, and vital signs were stable. Discharge instructions were given verbally/written to the patient and their family, including wound care and follow-up appointments, and all of their questions were answered. The patient was also given a discharge instruction sheet explaining the aforementioned discussion.  The patient verbalized understanding of instructions and agreed to the plan. They were encouraged to call the clinic with any questions they might have prior to the follow up appointments.     Physical exam 12/15/23:  General: well developed, well nourished, no distress.   Head: normocephalic, atraumatic  Neurologic: Alert and oriented. Thought content appropriate.  GCS: Motor: 6/Verbal: 5/Eyes: 4 GCS Total: 15  Mental Status: Awake, Alert, Oriented x 4  Language: No aphasia  Speech: No dysarthria  Cranial nerves: face symmetric, tongue midline, CN II-XII grossly intact.   Eyes: pupils equal, round, reactive to light with accommodation, EOMI.   Pulmonary: normal  "respirations, no signs of respiratory distress  Abdomen: soft, non-distended, not tender to palpation; trochar incision with dermabond intact  Skin: Skin is warm, dry and intact.  Sensory: intact to light touch throughout  Motor Strength:Moves all extremities spontaneously with good tone.  Full strength upper and lower extremities. No abnormal movements seen.   Cranial incision with monocryl C/D/I                Goals of Care Treatment Preferences:  Code Status: Full Code    Health care agent: Garett Shelton  [facility] care agent number: [phone]          What is most important right now is to focus on avoiding the hospital, remaining as independent as possible, symptom/pain control, improvement in condition but with limits to invasive therapies.  Accordingly, we have decided that the best plan to meet the patient's goals includes continuing with treatment.      Consults:     Significant Diagnostic Studies: Labs: BMP: No results for input(s): "GLU", "NA", "K", "CL", "CO2", "BUN", "CREATININE", "CALCIUM", "MG" in the last 48 hours., CBC No results for input(s): "WBC", "HGB", "HCT", "PLT" in the last 48 hours., and All labs within the past 24 hours have been reviewed  Radiology: X-Ray:  shunt series  CT scan:  head wo contrast     Pending Diagnostic Studies:       None          Final Active Diagnoses:    Diagnosis Date Noted POA    PRINCIPAL PROBLEM:  Idiopathic intracranial hypertension [G93.2] 12/15/2023 Yes    Primary breast cancer with metastasis to other site [C50.919] 12/15/2023 Yes    Ventricular shunt in place [Z98.2] 12/15/2023 Not Applicable    Heart failure with mildly reduced ejection fraction [I50.22] 12/15/2023 Yes      Problems Resolved During this Admission:      Discharged Condition: stable     Disposition: Home or Self Care    Follow Up:   Future Appointments   Date Time Provider Department Center   12/19/2023  1:00 PM Mando Ortiz MD [facility]   12/20/2023 12:00 PM LAB, HEMON CANCER " BLDG [facility]   12/20/2023  2:00 PM INJECTION, [facility]ION [facility]   12/21/2023  9:30 AM Yaneth Giraldo NP [facility] ENDOCRN [facility]   1/4/2024 10:20 AM Fauzia Hickey RN [facility]   1/11/2024  2:30 PM KO, VISUAL FIELDS [facility]THAL [facility]   1/11/2024  3:00 PM Patrick Case MD [facility]AL [facility]   1/16/2024  1:00 PM Aishwarya Portillo MD [facility] PLMDBEN [facility]   1/18/2024 12:00 PM LAB, HEMONC CANCER BLDG [facility]   1/18/2024  1:00 PM hPyllis Lei MD [facility] HEMONC3 [facility]   1/18/2024  2:15 PM INJECTION, [facility] INFUSION [facility]   1/22/2024  9:40 AM Enedina De La Torre MD [facility]         Patient Instructions:      CT Head Without Contrast   Standing Status: Future Standing Exp. Date: 12/15/24     Order Specific Question Answer Comments   May the Radiologist modify the order per protocol to meet the clinical needs of the patient? Yes      Notify your health care provider if you experience any of the following:  increased confusion or weakness     Notify your health care provider if you experience any of the following:  persistent dizziness, light-headedness, or visual disturbances     Notify your health care provider if you experience any of the following:  worsening rash     Notify your health care provider if you experience any of the following:  severe persistent headache     Notify your health care provider if you experience any of the following:  difficulty breathing or increased cough     Notify your health care provider if you experience any of the following:  redness, tenderness, or signs of infection (pain, swelling, redness, odor or green/yellow discharge around incision site)     Notify your health care provider if you experience any of the following:  severe uncontrolled pain     Notify your health care provider if you experience any of the following:  persistent nausea and vomiting or  diarrhea     Notify your health care provider if you experience any of the following:  temperature >100.4     Activity as tolerated     Medications:  Reconciled Home Medications:      Medication List        CHANGE how you take these medications      atorvastatin 40 MG tablet  Commonly known as: LIPITOR  TAKE 1 TABLET(40 MG) BY MOUTH EVERY DAY  What changed:   how much to take  when to take this     empagliflozin 25 mg tablet  Commonly known as: JARDIANCE  Take 1 tablet (25 mg total) by mouth once daily.  What changed: when to take this     * oxyCODONE-acetaminophen 5-325 mg per tablet  Commonly known as: PERCOCET  Take 1 tablet by mouth every 4 (four) hours as needed for Pain.  What changed: Another medication with the same name was added. Make sure you understand how and when to take each.     * oxyCODONE-acetaminophen 5-325 mg per tablet  Commonly known as: PERCOCET  Take 1 tablet by mouth every 6 (six) hours as needed for Pain.  What changed: You were already taking a medication with the same name, and this prescription was added. Make sure you understand how and when to take each.     potassium chloride 10 MEQ Tbsr  Commonly known as: KLOR-CON  Take 1 tablet (10 mEq total) by mouth once daily.  What changed: when to take this     venlafaxine 75 MG 24 hr capsule  Commonly known as: EFFEXOR-XR  Take 1 capsule (75 mg total) by mouth once daily.  What changed: additional instructions           * This list has 2 medication(s) that are the same as other medications prescribed for you. Read the directions carefully, and ask your doctor or other care provider to review them with you.                CONTINUE taking these medications      anastrozole 1 mg Tab  Commonly known as: ARIMIDEX  TAKE 1 TABLET(1 MG) BY MOUTH EVERY DAY     blood sugar diagnostic Strp  To check BG 2 times daily, to use with insurance preferred meter     blood-glucose meter kit  To check BG 2 times daily, to use with insurance preferred meter    "  carvediloL 25 MG tablet  Commonly known as: COREG  TAKE 1 TABLET(25 MG) BY MOUTH TWICE DAILY     DEXCOM G7 SENSOR Monique  Generic drug: blood-glucose sensor  Change every 10 days     ENTRESTO  mg per tablet  Generic drug: sacubitriL-valsartan  TAKE 1 TABLET BY MOUTH TWICE DAILY     ergocalciferol 50,000 unit Cap  Commonly known as: ERGOCALCIFEROL  Take 1 capsule (50,000 Units total) by mouth every 7 days.     furosemide 20 MG tablet  Commonly known as: LASIX  Take 1 tablet (20 mg total) by mouth 2 (two) times daily.     glipiZIDE 10 MG Tr24  Commonly known as: GLUCOTROL  TAKE 1 TABLET(10 MG) BY MOUTH DAILY WITH BREAKFAST     goserelin 3.6 mg injection  Commonly known as: ZOLADEX  Inject 3.6 mg into the skin every 28 days.     IBRANCE 100 mg Cap  Generic drug: palbociclib  Take 1 capsule (100 mg) by mouth once daily for 21 days, followed by 7 days off.     ibuprofen 800 MG tablet  Commonly known as: ADVIL,MOTRIN  Take 1 tablet (800 mg total) by mouth 2 (two) times daily as needed for Pain.     lancets Misc  To check BG 2 times daily, to use with insurance preferred meter     metFORMIN 500 MG ER 24hr tablet  Commonly known as: GLUCOPHAGE-XR  Take 2 tablets (1,000 mg total) by mouth 2 (two) times daily with meals.     naloxone 4 mg/actuation Spry  Commonly known as: NARCAN  4mg by nasal route as needed for opioid overdose; may repeat every 2-3 minutes in alternating nostrils until medical help arrives. Call 911     ondansetron 8 MG Tbdl  Commonly known as: ZOFRAN-ODT  DISSOLVE 1 TABLET(8 MG) ON THE TONGUE EVERY 8 HOURS AS NEEDED FOR NAUSEA     SENNA-C ORAL  Take 2 tablets by mouth daily as needed.     tirzepatide 7.5 mg/0.5 mL Pnij  Inject 7.5 mg into the skin every 7 days.     traZODone 50 MG tablet  Commonly known as: DESYREL  Take 1 tablet (50 mg total) by mouth every evening.     UNKNOWN TO PATIENT  Calcium            ASK your doctor about these medications      BD VERONICA 2ND GEN PEN NEEDLE 32 gauge x 5/32" " Ndle  Generic drug: pen needle, diabetic              Lainey Hitchcock PA-C  Neurosurgery  Derrick Nevarez - Neurosurgery ([facility])

## 2023-12-15 NOTE — NURSING
Nurses Note -- 4 Eyes      12/15/2023   1:56 AM      Skin assessed during: Q Shift Change      [] No Altered Skin Integrity Present    []Prevention Measures Documented      [x] Yes- Altered Skin Integrity Present or Discovered   [] LDA Added if Not in Epic (Describe Wound)   [] New Altered Skin Integrity was Present on Admit and Documented in LDA   [] Wound Image Taken    Wound Care Consulted? No    Attending Nurse:  Adolph Adam RN/Staff Member:   Mita BRITT

## 2023-12-15 NOTE — PLAN OF CARE
[facility] - Neurosurgery (Hospital)  Discharge Final Note    Primary Care Provider: No, Primary Doctor    Expected Discharge Date: 12/15/2023      Patient discharged home.  The patient does not have any home needs.  Family provided transportation.  Neurosurgery clinic to schedule follow up appointment.    Future Appointments   Date Time Provider Department Center   12/19/2023  1:00 PM Mando Ortiz MD [facility]   12/20/2023 12:00 PM LAB, HEMONC CANCER BLDG [facility]   12/20/2023  2:00 PM INJECTION, [facility] INFUSION [facility]   12/21/2023  9:30 AM Yaneth Giraldo NP [facility] ENDOCRN [facility]   1/4/2024 10:20 AM Fauzia Hickey RN [facility] NEUROS8 [facility]   1/11/2024  2:30 PM KO, VISUAL FIELDS [facility] OPHTHAL [facility]   1/11/2024  3:00 PM Patrick Case MD [facility] OPHTHAL [facility]   1/16/2024  1:00 PM Aishwarya Portillo MD [facility]MDBEN [facility]   1/18/2024 12:00 PM LAB, HEMONC CANCER BLDG [facility]   1/18/2024  1:00 PM Phyllis Lei MD [facility] HEMONC3 [facility]   1/18/2024  2:15 PM INJECTION, [facility] INFUSION [facility]   1/22/2024  9:40 AM [facility]      Final Discharge Note (most recent)       Final Note - 12/15/23 1105          Final Note    Assessment Type Final Discharge Note     Anticipated Discharge Disposition Home or Self Care        Post-Acute Status    Post-Acute Authorization Other     Other Status No Post-Acute Service Needs     Discharge Delays None known at this time                     Important Message from Medicare

## 2023-12-15 NOTE — NURSING
"Patient reports new "ringing in ears," and cold sweats w/o fever. (97.9F)    Neurosurgery called to report change. MD ordered to continue to monitor.  "

## 2023-12-15 NOTE — PLAN OF CARE
Problem: Adult Inpatient Plan of Care  Goal: Plan of Care Review  Outcome: Ongoing, Progressing  Goal: Patient-Specific Goal (Individualized)  Outcome: Ongoing, Progressing  Goal: Absence of Hospital-Acquired Illness or Injury  Outcome: Ongoing, Progressing  Goal: Optimal Comfort and Wellbeing  Outcome: Ongoing, Progressing  Goal: Readiness for Transition of Care  Outcome: Ongoing, Progressing     Problem: Diabetes Comorbidity  Goal: Blood Glucose Level Within Targeted Range  Outcome: Ongoing, Progressing     Problem: Bariatric Environmental Safety  Goal: Safety Maintained with Care  Outcome: Ongoing, Progressing     Problem: Fall Injury Risk  Goal: Absence of Fall and Fall-Related Injury  Outcome: Ongoing, Progressing     Poc reviewed with patient and family. Safety measures maintained. Patient bed in low position. Bed alarm set. Patient call bell with in reach. Patient belongings with in reach. VSS. Full assessment in chart. CARLOS

## 2023-12-15 NOTE — DISCHARGE INSTRUCTIONS
Neurosurgery Patient Information  -Return to work will be determined on an individual basis.  -No driving until released by .   -Do not take any OTC products containing acetaminophen at the same time as you take your narcotic pain medication. Medications that may contain acetaminophen include but are not limited to: Excedrin and other headache medications, arthritis medications, cold and sinus medications, etc. Please review the list of active ingredients in any OTC medication prior to taking it.  -Do not take any Aspirin or Aspirin-containing products for 2 weeks after surgery.  -Do not take any Aleve, Naprosyn, Naproxen, Ibuprofen, Advil or any other nonsteroidal anti-inflammatory drug (NSAID) for 2 weeks after surgery.  -Do not take any herbal supplements for 2 weeks after surgery.   -Do not consume any alcoholic beverages until released by your neurosurgeon  -Do not perform any excessive bending over or leaning forward as this is a fall hazard.  -Do not perform any heavy lifting or lifting more than 5-10 lbs from the ground level as this is a fall hazard.  -Slowly increase your ambulation [walking] over the next 2 weeks as tolerated. The goal is to be walking 1-2 miles by the time of your 2 week post op appointment.   -Walk on paved surfaces only. It is okay to walk up and down stairs while holding onto a side rail.      Contact the Neurosurgery Office immediately if:  If you begin to notice any neurologic changes such as:           -Sudden onset of lethargy or sleepiness           -Sudden confusion, trouble speaking, or understanding            -Sudden trouble seeing in one or both eyes            -Sudden trouble walking, dizziness, loss of coordination            -Sudden severe headache with no known cause            -Sudden onset of numbness or weakness     Wound Care:  Keep your incision open to air. Keep incision clean and dry at all times. You may shower on the 2nd day after your surgery. You may wash  your hair with baby shampoo. Do not rub or scrub the incision. Do not allow the force of water to hit the incision. If the incision gets damp, gently pat it dry. You cannot take a bath/swim/submerge the incision until 8 weeks after surgery.    The incision does not need to be cleaned with any water, soap, alcohol, peroxide, or other substance.    Call your doctor or go to the Emergency Room for any signs of infection including: increased redness, drainage, pain or fever (temperature greater than or equal to 101.4).     Magnetic resonance imaging (MRI) and your shunt:  -If youre having an MRI scan, tell the MRI technologist you have a programmable  shunt before you have the scan. They will need to know your shunts model and setting. You have a Hakim valve set at 150. The magnet in the MRI scanner may change your shunts pressure setting. After your MRI, your shunts pressure setting will need to be checked, reprogrammed, or both. You may need to have x-rays to help see if the pressure setting has changed.  -Before your MRI scan, make arrangements to have your shunt reprogrammed after your MRI scan. Your shunt should be reprogrammed within 4 hours after your MRI.    -You dont need to take any precautions if youre having a computed tomography (CT) scan or x-ray.    Miscellaneous:  -Follow up with neurosurgery in 2 weeks for a wound check. Appointment will be mailed to you.    Future Appointments   Date Time Provider Department Center   12/19/2023  1:00 PM Mando Ortiz MD [facility]   12/20/2023 12:00 PM LAB, HEMONC CANCER BLDG [facility]   12/20/2023  2:00 PM INJECTION, [facility] INFUSION [facility]   12/21/2023  9:30 AM Yaneth Giraldo NP [facility] ENDOCRN [facility]   1/4/2024 10:20 AM Fauzia Hickey RN [facility] NEUROS8 [facility]   1/11/2024  2:30 PM KO, VISUAL FIELDS [facility] OPHTHAL [facility]   1/11/2024  3:00 PM Patrick Case MD [facility] OPHTHAL [facility]   1/16/2024  1:00 PM  Aishwarya Portillo MD [facility] PLMDBEN [facility]   [facility]18[facility]2024 12:00 [facility]   1/18/2024  1:00 PM Phyllis Lei MD [facility] HEMONC3 [facility]   1/18/2024  2:15 PM INJECTION, NOM INFUSION [facility] CHEMO [facility]   1/22/2024  9:40 AM Enedina De La Torre MD [facility] Neurosurgery Office: [phone]              [facility] Neuro[facility] Office: [phone]

## 2023-12-15 NOTE — PT/OT/SLP PROGRESS
Physical Therapy      Patient Name:  Kristopher Elmore   MRN:  [id number]    Per conversation with RN, patient has been out of bed and ambulating in room since arrival without deficits. Conversation was held with patient and patient reported she is feeling even better than her baseline and has not noted any changes in strength, sensation, or mobility since her procedure. PT determined that patient does not require evaluation prior to discharge. Please re-consult if necessary.

## 2023-12-15 NOTE — PLAN OF CARE
I have reviewed the chart of Kristopher Elmore and participated in the care of the patient who is hospitalized for the following:    Active Hospital Problems    Diagnosis    *Idiopathic intracranial hypertension    Primary breast cancer with metastasis to other site    Ventricular shunt in place    Heart failure with mildly reduced ejection fraction          I have reviewed the Kristopher Elmore with the multidisciplinary team during rounds.      Kenzie Garcia NP  Unit Based [facility]

## 2023-12-15 NOTE — PLAN OF CARE
Problem: Adult Inpatient Plan of Care  Goal: Patient-Specific Goal (Individualized)  Outcome: Ongoing, Progressing  Goal: Absence of Hospital-Acquired Illness or Injury  Outcome: Ongoing, Progressing  Intervention: Identify and Manage Fall Risk  Flowsheets (Taken 12/15/2023 0422)  Safety Promotion/Fall Prevention:   assistive device/personal item within reach   bed alarm refused   Fall Risk reviewed with patient/family   family to remain at bedside   instructed to call staff for mobility   side rails raised x 2   nonskid shoes/socks when out of bed   medications reviewed   muscle strengthening facilitated  Intervention: Prevent and Manage VTE (Venous Thromboembolism) Risk  Flowsheets (Taken 12/15/2023 0422)  VTE Prevention/Management:   remove, assess skin, and reapply sequential compression device   ROM (active) performed     Problem: Diabetes Comorbidity  Goal: Blood Glucose Level Within Targeted Range  Outcome: Ongoing, Progressing  Intervention: Monitor and Manage Glycemia  Flowsheets (Taken 12/15/2023 0422)  Glycemic Management: blood glucose monitored     Problem: Bariatric Environmental Safety  Goal: Safety Maintained with Care  Outcome: Ongoing, Progressing

## 2023-12-15 NOTE — PLAN OF CARE
[facility] - Neurosurgery (Hospital)  Discharge Assessment    Primary Care Provider: No, Primary Doctor     Discharge Assessment (most recent)       BRIEF DISCHARGE ASSESSMENT - 12/15/23 1103          Discharge Planning    Assessment Type Discharge Planning Brief Assessment     Resource/Environmental Concerns none     Support Systems Family members     Equipment Currently Used at Home none     Current Living Arrangements home     Patient/Family Anticipates Transition to home with family     Patient/Family Anticipated Services at Transition none     DME Needed Upon Discharge  none     Discharge Plan A Home with family     Discharge Plan B Home with family;Home Health

## 2023-12-15 NOTE — HPI
Ms. Kristopher Elmore is a 48 y.o. woman with PMHx of metastatic breast cancer, who presents today for VPS placement. This is a patient who was being seen in Neurosurgery for metastatic lesions in the spine and pelvis. MRI cervical demonstrated abnormal marrow signal and enhancement in the clivus. She was referred to me thereafter. The pt c/o acute headaches. She has not had frequent headaches occur in the past and states this is new for her. In addition to this, the pt reports dizziness and progressive hearing loss. MRI Cervical Spine WO Contrast (9/29/2023):  Abnormal marrow signal and enhancement in the clivus concerning for metastatic disease. I have scheduled the pt for a formal hearing test, HVF, and paraneoplastic profile serum. I recommend performing a lumbar puncture for IIH and pt wished to proceed.The opening pressure for this patient was 42 cm of water.  We drained approximately 28 mL of CSF, at which time her closing pressure was 20 cm of water. The pt reports significant improvement following the spinal tap. She has had complete resolution of her headaches and back pain since the procedure.   Her paraneoplastic profile was negative.  Cytology was negative for malignant cells.  Given the pt's positive outcome after the lumbar puncture, I have recommended  shunt placement. I have discussed the risks/benefits, indications, and alternatives for the proposed procedure in detail. I have answered all of their questions and the pt wishes to proceed with surgery.

## 2023-12-15 NOTE — HOSPITAL COURSE
The [place] [facility] [place] for the above stated procedure. The patient tolerated the procedure well and there were no intra-operative complications. After surgery, the patient was extubated and taken to recovery in stable condition.  After observation in recovery, the patient was transferred to the neurosurgical floor.  Post-op CTH and shunt series XR showed good placement of the catheter that is contiguous throughout and the hakim at the desired setting of 150. PT/OT were consulted, the patient progressed, and was cleared to go home.  On 15/15/23 she was discharged home with pain medication and follow up appointments. The patient was tolerating a regular diet and voiding without difficulty. Activity as tolerated. At the time of discharge, the patient was neurologically stable, was afebrile, and vital signs were stable. Discharge instructions were given verbally/written to the patient and their family, including wound care and follow-up appointments, and all of their questions were answered. The patient was also given a discharge instruction sheet explaining the aforementioned discussion.  The patient verbalized understanding of instructions and agreed to the plan. They were encouraged to call the clinic with any questions they might have prior to the follow up appointments.     Physical exam 12/15/23:  General: well developed, well nourished, no distress.   Head: normocephalic, atraumatic  Neurologic: Alert and oriented. Thought content appropriate.  GCS: Motor: 6/Verbal: 5/Eyes: 4 GCS Total: 15  Mental Status: Awake, Alert, Oriented x 4  Language: No aphasia  Speech: No dysarthria  Cranial nerves: face symmetric, tongue midline, CN II-XII grossly intact.   Eyes: pupils equal, round, reactive to light with accommodation, EOMI.   Pulmonary: normal respirations, no signs of respiratory distress  Abdomen: soft, non-distended, not tender to palpation; trochar incision with dermabond intact  Skin: Skin is  warm, dry and intact.  Sensory: intact to light touch throughout  Motor Strength:Moves all extremities spontaneously with good tone.  Full strength upper and lower extremities. No abnormal movements seen.   Cranial incision with monocryl C/D/I

## 2023-12-18 ENCOUNTER — PATIENT MESSAGE (OUTPATIENT)
Dept: NEUROSURGERY | Facility: CLINIC | Age: 48
End: 2023-12-18
Payer: MEDICARE

## 2023-12-18 ENCOUNTER — PATIENT OUTREACH (OUTPATIENT)
Dept: ADMINISTRATIVE | Facility: CLINIC | Age: 48
End: 2023-12-18
Payer: MEDICARE

## 2023-12-18 ENCOUNTER — PATIENT MESSAGE (OUTPATIENT)
Dept: HEMATOLOGY/ONCOLOGY | Facility: CLINIC | Age: 48
End: 2023-12-18
Payer: MEDICARE

## 2023-12-18 DIAGNOSIS — Z98.2 S/P VP SHUNT: Primary | ICD-10-CM

## 2023-12-18 RX ORDER — BUTALBITAL, ACETAMINOPHEN AND CAFFEINE 50; 325; 40 MG/1; MG/1; MG/1
1 TABLET ORAL EVERY 6 HOURS PRN
Qty: 8 TABLET | Refills: 0 | Status: SHIPPED | OUTPATIENT
Start: 2023-12-18 | End: 2024-01-17

## 2023-12-20 ENCOUNTER — INFUSION (OUTPATIENT)
Dept: INFUSION THERAPY | Facility: HOSPITAL | Age: 48
End: 2023-12-20
Payer: MEDICARE

## 2023-12-20 ENCOUNTER — HOSPITAL ENCOUNTER (OUTPATIENT)
Dept: RADIOLOGY | Facility: HOSPITAL | Age: 48
Discharge: HOME | End: 2023-12-20
Attending: PHYSICIAN ASSISTANT
Payer: MEDICARE

## 2023-12-20 DIAGNOSIS — Z98.2 S/P VP SHUNT: Primary | ICD-10-CM

## 2023-12-20 DIAGNOSIS — C50.211 MALIGNANT NEOPLASM OF UPPER-INNER QUADRANT OF RIGHT BREAST IN FEMALE, ESTROGEN RECEPTOR POSITIVE: ICD-10-CM

## 2023-12-20 DIAGNOSIS — Z98.2 S/P VP SHUNT: ICD-10-CM

## 2023-12-20 DIAGNOSIS — C79.51 MALIGNANT NEOPLASM METASTATIC TO BONE: Primary | ICD-10-CM

## 2023-12-20 DIAGNOSIS — Z17.0 MALIGNANT NEOPLASM OF UPPER-INNER QUADRANT OF RIGHT BREAST IN FEMALE, ESTROGEN RECEPTOR POSITIVE: ICD-10-CM

## 2023-12-20 PROCEDURE — 63600175 PHARM REV CODE 636 W HCPCS: Mod: JZ,JG | Performed by: NURSE PRACTITIONER

## 2023-12-20 PROCEDURE — 96402 CHEMO HORMON ANTINEOPL SQ/IM: CPT

## 2023-12-20 PROCEDURE — 70450 CT HEAD/BRAIN W/O DYE: CPT | Mod: 26,,, | Performed by: RADIOLOGY

## 2023-12-20 PROCEDURE — 96372 THER/PROPH/DIAG INJ SC/IM: CPT | Mod: 59

## 2023-12-20 PROCEDURE — 70450 CT HEAD WITHOUT CONTRAST: ICD-10-PCS | Mod: 26,,, | Performed by: RADIOLOGY

## 2023-12-20 PROCEDURE — 70450 CT HEAD/BRAIN W/O DYE: CPT | Mod: TC

## 2023-12-20 RX ADMIN — DENOSUMAB 120 MG: 120 INJECTION SUBCUTANEOUS at 01:12

## 2023-12-20 RX ADMIN — GOSERELIN ACETATE 3.6 MG: 3.6 IMPLANT SUBCUTANEOUS at 01:12

## 2023-12-20 NOTE — NURSING
Pt here for zoladex and xgeva injections.  Ca++ 10.6.  Pt is taking Ca and vitamin D at home.  Denies jaw pain or invasive dental procedures.  Xgeva administered subQ to abdomen.  Zoladex administered subQ to abdomen.  Pt tolerated both injections.  Escorted out in W/C with daughters.

## 2023-12-20 NOTE — TELEPHONE ENCOUNTER
PT reports having incision pain. C/o tightness at the incision causing difficulty eating and talking.  Per Lainey Hitchcock PA plan to get a CT today and call with results.  PA to order a muscle relaxer for pain.

## 2023-12-20 NOTE — PROGRESS NOTES
CC: This 48 y.o. Black or  female  is here for evaluation of  T2DM along with comorbidities indicated in the Visit Diagnosis section of this encounter.    HPI: Kristopher Elmore was diagnosed with T2DM in 2021 at age 46; A1c at 8.5% upon diagnosis.   Medical hx: cardiomegaly, chronic combined systolic and diastolic HF, anxiety     DM COMPLICATIONS: peripheral neuropathy      Prior visit 9/2023 virtual visit   A1c is down form 7.5 to 6.3%.   She has started Ozempic. She continues to have nausea and constipation but unchanged with Ozempic.   Pt is using Dexcom G7.   Appetite is lower. Reports 9-10 lb weight loss since lov. Wants to get down to 300 lb by [place].   C/o urinary retention. Currently using Irizarry catheter.   Plan A1c shows falsely low glucose average, likely d/t anemia. However, A1c and glucose readings both indicate much lower glucoses.   She has done well with Ozempic but BGs still a little high and she would benefit from more weight loss.   Continue glipizide, Jardiance, and metformin.   Switch from Ozempic 1 mg to Mounjaro 7.5 mg weekly.   Hold Jardiance to see if urinary retention improves.  Share code provided for Clarity access.    Return to clinic in 3 months with labs prior.     Interval hx virtual visit   A1c remains about the same from 6.3 to 6.2%. Anemia has improved with hgb at 12.2. previously has had falsely low A1c's.   She reports that she feels better on Mounjaro because it doesn't cut her appetite as much as Ozempic 1 mg did. She has actually lose 8 lb since switching to Mounjaro. BGs have also improved as noted from fingersticks. See below.    Pt had ventriculoperitoneal () shunt  for intracranial HTN. Pt has h/o breast cancer. She did not take Mounjaro for 2 weeks d/t surgical procedures.       LAST DIABETES EDUCATION: 12/12/22 with JANE Foreman, found visit helpful     HOSPITALIZED FOR DIABETES  -  Yes - for hyperglycemia 2x in 2022 for 300 and 400s      SIGNIFICANT DIABETES MED HISTORY:   Trulicity 0.75 mg started in 1/2021, stopped very soon afterward d/t nausea and headache.   Metformin ER started at initial visit 9/2022  Ozempic started 6/2023, switched to Mounajro in Sept     PRESCRIBED DIABETES MEDICATIONS:   Glipizide XL 10 mg once daily   Jardiance 25 mg once daily   Metformin ER 1000 mg bid   Mounjaro 7.5 mg weekly Mondays    Misses medication doses - No      SELF MONITORING BLOOD GLUCOSE: Dayo 3 CGM sensors fell off even with using [place].   monitors glucoses with Dexcom G7 (pays out of pocket) and more often with fingersticks 2x/day. Could not use Dexcom sensors d/t multiple scans/procedures.     Fingersticks:   FBG   After supper 140s    HYPOGLYCEMIC EPISODES: denies      CURRENT DIET: drinks water.    eats 2 meals/day, no lunch.   Snacks - not much, sometimes SF jello or fruit.       CURRENT EXERCISE: activity limited by back pain.       LMP 06/20/2019     ROS:   CONSTITUTIONAL: Appetite good, +  fatigue      PHYSICAL EXAM:  GENERAL: Well developed, well nourished. No acute distress.   PSYCH: AAOx3, appropriate mood and affect, conversant, well-groomed. Judgement and insight good.   NEURO: Cranial nerves grossly intact. Speech clear, no tremor.   CHEST: Respirations even and unlabored.      Hemoglobin A1C   Date Value Ref Range Status   12/13/2023 6.2 (H) 4.0 - 5.6 % Final     Comment:     ADA Screening Guidelines:  5.7-6.4%  Consistent with prediabetes  >or=6.5%  Consistent with diabetes    High levels of fetal hemoglobin interfere with the HbA1C  assay. Heterozygous hemoglobin variants (HbS, HgC, etc)do  not significantly interfere with this assay.   However, presence of multiple variants may affect accuracy.     09/14/2023 6.3 (H) 4.0 - 5.6 % Final     Comment:     ADA Screening Guidelines:  5.7-6.4%  Consistent with prediabetes  >or=6.5%  Consistent with diabetes    High levels of fetal hemoglobin interfere with the HbA1C  assay.  "Heterozygous hemoglobin variants (HbS, HgC, etc)do  not significantly interfere with this assay.   However, presence of multiple variants may affect accuracy.     05/22/2023 7.5 (H) 4.0 - 5.6 % Final     Comment:     ADA Screening Guidelines:  5.7-6.4%  Consistent with prediabetes  >or=6.5%  Consistent with diabetes    High levels of fetal hemoglobin interfere with the HbA1C  assay. Heterozygous hemoglobin variants (HbS, HgC, etc)do  not significantly interfere with this assay.   However, presence of multiple variants may affect accuracy.         No results found for: "CPEPTIDE", "GLUTAMICACID", "ISLETCELLANT", "FRUCTOSAMINE"     Lab Results   Component Value Date    CHOL 116 (L) 12/13/2023    CHOL 113 (L) 12/19/2022    CHOL 111 (L) 12/06/2022     Lab Results   Component Value Date    HDL 43 12/13/2023    HDL 35 (L) 12/19/2022    HDL 36 (L) 12/06/2022     Lab Results   Component Value Date    LDLCALC 53.8 (L) 12/13/2023    LDLCALC 57.2 (L) 12/19/2022    LDLCALC 58.2 (L) 12/06/2022     Lab Results   Component Value Date    TRIG 96 12/13/2023    TRIG 104 12/19/2022    TRIG 84 12/06/2022     Lab Results   Component Value Date    CHOLHDL 37.1 12/13/2023    CHOLHDL 31.0 12/19/2022    CHOLHDL 32.4 12/06/2022         Component Value Date/Time     12/20/2023 1231    K 4.3 12/20/2023 1231     12/20/2023 1231    CO2 25 12/20/2023 1231    BUN 15 12/20/2023 1231    CREATININE 1.2 12/20/2023 1231     (H) 12/20/2023 1231    CALCIUM 10.6 (H) 12/20/2023 1231    ALKPHOS 98 12/20/2023 1231    AST 13 12/20/2023 1231    ALT 28 12/20/2023 1231    BILITOT 0.4 12/20/2023 1231    EGFRNORACEVR 55.8 (A) 12/20/2023 1231    ESTGFRAFRICA >60.0 07/22/2022 1012         Lab Results   Component Value Date    LABMICR 25.0 12/13/2023    CREATRANDUR 69.0 12/13/2023    MICALBCREAT 36.2 (H) 12/13/2023             ASSESSMENT and PLAN:    A1C GOAL: < 7 %     1. Type 2 diabetes mellitus with microalbuminuria, without long-term current " use of insulin  Reported BGs and A1c indicate controlled diabetes control. Continue current tx. Rtc in 3 mo with labs prior.     empagliflozin (JARDIANCE) 25 mg tablet    glipiZIDE (GLUCOTROL) 10 MG TR24    metFORMIN (GLUCOPHAGE-XR) 500 MG ER 24hr tablet    tirzepatide 7.5 mg/0.5 mL PnIj    Hemoglobin A1C      2. Chronic combined systolic and diastolic heart failure  Continue Jardiance       3. Morbid obesity, unspecified obesity type  tirzepatide 7.5 mg/0.5 mL PnIj              Orders Placed This Encounter   Procedures    Hemoglobin A1C     Standing Status:   Future     Standing Expiration Date:   2/18/2025        No follow-ups on file.       The patient location is: [place]  The chief complaint leading to consultation is: type 2 dm     Visit type: audiovisual    Face to Face time with patient: 20 minutes of total time spent on the encounter, which includes face to face time and non-face to face time preparing to see the patient (eg, review of tests), Obtaining and/or reviewing separately obtained history, Documenting clinical information in the electronic or other health record, Independently interpreting results (not separately reported) and communicating results to the patient/family/caregiver, or Care coordination (not separately reported).         Each patient to whom he or she provides medical services by telemedicine is:  (1) informed of the relationship between the physician and patient and the respective role of any other health care provider with respect to management of the patient; and (2) notified that he or she may decline to receive medical services by telemedicine and may withdraw from such care at any time.    Notes:

## 2023-12-21 ENCOUNTER — OFFICE VISIT (OUTPATIENT)
Dept: ENDOCRINOLOGY | Facility: CLINIC | Age: 48
End: 2023-12-21
Payer: MEDICARE

## 2023-12-21 ENCOUNTER — TELEPHONE (OUTPATIENT)
Dept: NEUROSURGERY | Facility: CLINIC | Age: 48
End: 2023-12-21
Payer: MEDICARE

## 2023-12-21 DIAGNOSIS — E11.29 TYPE 2 DIABETES MELLITUS WITH MICROALBUMINURIA, WITHOUT LONG-TERM CURRENT USE OF INSULIN: Primary | ICD-10-CM

## 2023-12-21 DIAGNOSIS — R80.9 TYPE 2 DIABETES MELLITUS WITH MICROALBUMINURIA, WITHOUT LONG-TERM CURRENT USE OF INSULIN: Primary | ICD-10-CM

## 2023-12-21 DIAGNOSIS — I50.42 CHRONIC COMBINED SYSTOLIC AND DIASTOLIC HEART FAILURE: ICD-10-CM

## 2023-12-21 DIAGNOSIS — E66.01 MORBID OBESITY, UNSPECIFIED OBESITY TYPE: ICD-10-CM

## 2023-12-21 PROCEDURE — 3066F PR DOCUMENTATION OF TREATMENT FOR NEPHROPATHY: ICD-10-PCS | Mod: CPTII,95,, | Performed by: NURSE PRACTITIONER

## 2023-12-21 PROCEDURE — 3066F NEPHROPATHY DOC TX: CPT | Mod: CPTII,95,, | Performed by: NURSE PRACTITIONER

## 2023-12-21 PROCEDURE — 1160F PR REVIEW ALL MEDS BY PRESCRIBER/CLIN PHARMACIST DOCUMENTED: ICD-10-PCS | Mod: CPTII,95,, | Performed by: NURSE PRACTITIONER

## 2023-12-21 PROCEDURE — 99214 OFFICE O/P EST MOD 30 MIN: CPT | Mod: 95,,, | Performed by: NURSE PRACTITIONER

## 2023-12-21 PROCEDURE — 3044F PR MOST RECENT HEMOGLOBIN A1C LEVEL <7.0%: ICD-10-PCS | Mod: CPTII,95,, | Performed by: NURSE PRACTITIONER

## 2023-12-21 PROCEDURE — 1160F RVW MEDS BY RX/DR IN RCRD: CPT | Mod: CPTII,95,, | Performed by: NURSE PRACTITIONER

## 2023-12-21 PROCEDURE — 1159F PR MEDICATION LIST DOCUMENTED IN MEDICAL RECORD: ICD-10-PCS | Mod: CPTII,95,, | Performed by: NURSE PRACTITIONER

## 2023-12-21 PROCEDURE — 1111F PR DISCHARGE MEDS RECONCILED W/ CURRENT OUTPATIENT MED LIST: ICD-10-PCS | Mod: CPTII,95,, | Performed by: NURSE PRACTITIONER

## 2023-12-21 PROCEDURE — 3060F PR POS MICROALBUMINURIA RESULT DOCUMENTED/REVIEW: ICD-10-PCS | Mod: CPTII,95,, | Performed by: NURSE PRACTITIONER

## 2023-12-21 PROCEDURE — 1111F DSCHRG MED/CURRENT MED MERGE: CPT | Mod: CPTII,95,, | Performed by: NURSE PRACTITIONER

## 2023-12-21 PROCEDURE — 3044F HG A1C LEVEL LT 7.0%: CPT | Mod: CPTII,95,, | Performed by: NURSE PRACTITIONER

## 2023-12-21 PROCEDURE — 99214 PR OFFICE/OUTPT VISIT, EST, LEVL IV, 30-39 MIN: ICD-10-PCS | Mod: 95,,, | Performed by: NURSE PRACTITIONER

## 2023-12-21 PROCEDURE — 1159F MED LIST DOCD IN RCRD: CPT | Mod: CPTII,95,, | Performed by: NURSE PRACTITIONER

## 2023-12-21 PROCEDURE — 4010F PR ACE/ARB THEARPY RXD/TAKEN: ICD-10-PCS | Mod: CPTII,95,, | Performed by: NURSE PRACTITIONER

## 2023-12-21 PROCEDURE — 3060F POS MICROALBUMINURIA REV: CPT | Mod: CPTII,95,, | Performed by: NURSE PRACTITIONER

## 2023-12-21 PROCEDURE — 4010F ACE/ARB THERAPY RXD/TAKEN: CPT | Mod: CPTII,95,, | Performed by: NURSE PRACTITIONER

## 2023-12-21 RX ORDER — GLIPIZIDE 10 MG/1
TABLET, FILM COATED, EXTENDED RELEASE ORAL
Qty: 90 TABLET | Refills: 2 | Status: SHIPPED | OUTPATIENT
Start: 2023-12-21

## 2023-12-21 RX ORDER — METFORMIN HYDROCHLORIDE 500 MG/1
1000 TABLET, EXTENDED RELEASE ORAL 2 TIMES DAILY WITH MEALS
Qty: 360 TABLET | Refills: 2 | Status: SHIPPED | OUTPATIENT
Start: 2023-12-21

## 2023-12-21 RX ORDER — CYCLOBENZAPRINE HCL 5 MG
5 TABLET ORAL 3 TIMES DAILY PRN
Qty: 30 TABLET | Refills: 0 | Status: SHIPPED | OUTPATIENT
Start: 2023-12-21 | End: 2023-12-31

## 2023-12-21 NOTE — TELEPHONE ENCOUNTER
----- Message from Jerry Jimenez sent at 12/21/2023  9:48 AM CST -----  Regarding: Fauzia  Contact: [phone]  Calling to speak with nurse regarding pt CT scan and pain medication. Please call and discuss with patient as soon as possible.

## 2024-01-04 ENCOUNTER — TELEPHONE (OUTPATIENT)
Dept: SLEEP MEDICINE | Facility: HOSPITAL | Age: 49
End: 2024-01-04
Payer: MEDICARE

## 2024-01-04 ENCOUNTER — CLINICAL SUPPORT (OUTPATIENT)
Dept: NEUROSURGERY | Facility: CLINIC | Age: 49
End: 2024-01-04
Payer: MEDICARE

## 2024-01-04 DIAGNOSIS — Z98.2 S/P VP SHUNT: Primary | ICD-10-CM

## 2024-01-04 NOTE — PROGRESS NOTES
\Ms. Elmore is a 49 y/o who underwent  shunt insertion by Dr. Villa on 12/14.  (For complete diagnosis and procedure, see OP note.) Pt presents via VV in NAD. Denies any fever, chills.     She reports doing overall well since surgery.  Symptoms prior to surgery almost fully resolved.  Some minor discomfort over the shunt site, but she reports is improving.  She reports decreased need for pain medication, and hasn't needed any in a couple days.      Patient sent incision photo through the MyChart. Incision C/D/I without any signs of redness or infection. No drainage noted. Wound edges are approximated. Suture line intact. Dissolvable Monocryl in place.      Pt received updated post-op care instructions. Accepted next po appt and CT times.  All questions were answered. Patient was encouraged to call clinic with any future concerns.

## 2024-01-05 DIAGNOSIS — E55.9 VITAMIN D DEFICIENCY: ICD-10-CM

## 2024-01-05 RX ORDER — ERGOCALCIFEROL 1.25 MG/1
50000 CAPSULE ORAL
Qty: 12 CAPSULE | Refills: 0 | Status: SHIPPED | OUTPATIENT
Start: 2024-01-05

## 2024-01-11 ENCOUNTER — TELEPHONE (OUTPATIENT)
Dept: SLEEP MEDICINE | Facility: HOSPITAL | Age: 49
End: 2024-01-11
Payer: MEDICARE

## 2024-01-11 PROCEDURE — 95811 POLYSOM 6/>YRS CPAP 4/> PARM: CPT | Mod: 26,52,, | Performed by: PSYCHIATRY & NEUROLOGY

## 2024-01-18 ENCOUNTER — INFUSION (OUTPATIENT)
Dept: INFUSION THERAPY | Facility: HOSPITAL | Age: 49
End: 2024-01-18
Payer: MEDICARE

## 2024-01-18 ENCOUNTER — OFFICE VISIT (OUTPATIENT)
Dept: HEMATOLOGY/ONCOLOGY | Facility: CLINIC | Age: 49
End: 2024-01-18
Payer: MEDICARE

## 2024-01-18 VITALS
HEIGHT: 64 IN | BODY MASS INDEX: 50.02 KG/M2 | SYSTOLIC BLOOD PRESSURE: 142 MMHG | OXYGEN SATURATION: 98 % | HEART RATE: 89 BPM | WEIGHT: 293 LBS | DIASTOLIC BLOOD PRESSURE: 90 MMHG | TEMPERATURE: 97 F | RESPIRATION RATE: 17 BRPM

## 2024-01-18 VITALS
RESPIRATION RATE: 18 BRPM | HEART RATE: 85 BPM | DIASTOLIC BLOOD PRESSURE: 79 MMHG | SYSTOLIC BLOOD PRESSURE: 119 MMHG | OXYGEN SATURATION: 96 %

## 2024-01-18 DIAGNOSIS — E11.29 TYPE 2 DIABETES MELLITUS WITH MICROALBUMINURIA, WITHOUT LONG-TERM CURRENT USE OF INSULIN: ICD-10-CM

## 2024-01-18 DIAGNOSIS — C50.211 MALIGNANT NEOPLASM OF UPPER-INNER QUADRANT OF RIGHT BREAST IN FEMALE, ESTROGEN RECEPTOR POSITIVE: Primary | ICD-10-CM

## 2024-01-18 DIAGNOSIS — T45.1X5A ANEMIA ASSOCIATED WITH CHEMOTHERAPY: ICD-10-CM

## 2024-01-18 DIAGNOSIS — C79.51 MALIGNANT NEOPLASM METASTATIC TO BONE: ICD-10-CM

## 2024-01-18 DIAGNOSIS — D64.81 ANEMIA ASSOCIATED WITH CHEMOTHERAPY: ICD-10-CM

## 2024-01-18 DIAGNOSIS — G89.3 CANCER ASSOCIATED PAIN: ICD-10-CM

## 2024-01-18 DIAGNOSIS — E78.5 HYPERLIPIDEMIA, UNSPECIFIED HYPERLIPIDEMIA TYPE: ICD-10-CM

## 2024-01-18 DIAGNOSIS — C50.211 MALIGNANT NEOPLASM OF UPPER-INNER QUADRANT OF RIGHT BREAST IN FEMALE, ESTROGEN RECEPTOR POSITIVE: ICD-10-CM

## 2024-01-18 DIAGNOSIS — R80.9 TYPE 2 DIABETES MELLITUS WITH MICROALBUMINURIA, WITHOUT LONG-TERM CURRENT USE OF INSULIN: ICD-10-CM

## 2024-01-18 DIAGNOSIS — Z17.0 MALIGNANT NEOPLASM OF UPPER-INNER QUADRANT OF RIGHT BREAST IN FEMALE, ESTROGEN RECEPTOR POSITIVE: ICD-10-CM

## 2024-01-18 DIAGNOSIS — I10 BENIGN ESSENTIAL HTN: ICD-10-CM

## 2024-01-18 DIAGNOSIS — F33.1 MAJOR DEPRESSIVE DISORDER, RECURRENT EPISODE, MODERATE WITH ANXIOUS DISTRESS: ICD-10-CM

## 2024-01-18 DIAGNOSIS — C79.51 MALIGNANT NEOPLASM METASTATIC TO BONE: Primary | ICD-10-CM

## 2024-01-18 DIAGNOSIS — E66.01 MORBID OBESITY, UNSPECIFIED OBESITY TYPE: ICD-10-CM

## 2024-01-18 DIAGNOSIS — E46 PROTEIN-CALORIE MALNUTRITION, UNSPECIFIED SEVERITY: ICD-10-CM

## 2024-01-18 DIAGNOSIS — I50.42 CHRONIC COMBINED SYSTOLIC AND DIASTOLIC HEART FAILURE: ICD-10-CM

## 2024-01-18 DIAGNOSIS — D56.1 BETA-THALASSEMIA: ICD-10-CM

## 2024-01-18 DIAGNOSIS — G93.2 IDIOPATHIC INTRACRANIAL HYPERTENSION: ICD-10-CM

## 2024-01-18 DIAGNOSIS — Z17.0 MALIGNANT NEOPLASM OF UPPER-INNER QUADRANT OF RIGHT BREAST IN FEMALE, ESTROGEN RECEPTOR POSITIVE: Primary | ICD-10-CM

## 2024-01-18 DIAGNOSIS — Z98.2 VENTRICULAR SHUNT IN PLACE: ICD-10-CM

## 2024-01-18 PROBLEM — C50.919: Status: RESOLVED | Noted: 2023-12-15 | Resolved: 2024-01-18

## 2024-01-18 PROCEDURE — 96402 CHEMO HORMON ANTINEOPL SQ/IM: CPT

## 2024-01-18 PROCEDURE — 96372 THER/PROPH/DIAG INJ SC/IM: CPT

## 2024-01-18 PROCEDURE — 63600175 PHARM REV CODE 636 W HCPCS: Mod: JZ,JG | Performed by: INTERNAL MEDICINE

## 2024-01-18 PROCEDURE — 99214 OFFICE O/P EST MOD 30 MIN: CPT | Mod: S$GLB,,, | Performed by: INTERNAL MEDICINE

## 2024-01-18 PROCEDURE — 99999 PR PBB SHADOW E&M-EST. PATIENT-LVL V: CPT | Mod: PBBFAC,,, | Performed by: INTERNAL MEDICINE

## 2024-01-18 RX ADMIN — GOSERELIN ACETATE 3.6 MG: 3.6 IMPLANT SUBCUTANEOUS at 02:01

## 2024-01-18 RX ADMIN — DENOSUMAB 120 MG: 120 INJECTION SUBCUTANEOUS at 02:01

## 2024-01-18 NOTE — NURSING
Wheeled patient into injection area.  Patient transferred from wheel chair to chair with stand by assist. VSS, assessment done, Xgeva sq injection administered to right abdominal area, tolerated well. Zoladex SQ injection administered to left abdominal tissue, tolerated well.  Patient transferred from chair to wheel chair with stand by assist.  Patient wheeled into waiting area to grand daughter to discharge home.

## 2024-01-18 NOTE — ASSESSMENT & PLAN NOTE
Stable disease on scans- repeat mid 2/2024  Continue Arimidex and Ibrance (100 mg M-F, off Sat&Sun).   Xgeva and Zoladex -monthly- due today

## 2024-01-18 NOTE — PROGRESS NOTES
Subjective     Patient ID: Kristopher Elmore is a 48 y.o. female.    Chief Complaint: Malignant neoplasm of upper-inner quadrant of right breast     HPI    Reports  shunt completed 12/14/2023  She had already noted after the LP that the symptoms of headaches back pain, balance and gait were better.  Procedural pain better  PT is recommended     Last scans:  - 11/16/2023 CT C/A/P:  FINDINGS:  Base of neck: Unremarkable.  Thoracic soft tissues: No significant abnormality.  Aorta: Normal caliber.  Heart: Normal in size. No pericardial effusion.  Jane/Mediastinum: No pathologic lymphadenopathy.  Lungs: Thin bandlike density within the left lower lobe suggestive for subsegmental atelectasis or pulmonary scarring.  Otherwise, lungs are clear.  Liver: Diffusely hypodense parenchyma suggestive for steatosis.  No focal lesion.  Portal veins are patent.  Gallbladder: Unremarkable.  Bile Ducts: No evidence of dilated ducts.  Pancreas: No mass or ductal dilatation.  Spleen: Unremarkable.  Adrenals: Unremarkable.  Kidneys/ Ureters: Small right renal cyst.  No hydronephrosis or renal stone.  No ureteral dilatation.  Bladder: No evidence of wall thickening.  Reproductive organs: Calcified uterine fibroid.  GI Tract/Mesentery: No evidence of bowel obstruction or inflammation.  Peritoneal Space: No ascites.  No free air.  Retroperitoneum: No significant adenopathy.  Abdominal wall: Mild patchy body wall edema.  Vasculature: Abdominal aorta is normal caliber.  Bones: Stable mixed lytic and sclerotic lesions throughout the spine, sternum, and pelvis.  Stable lytic focus within the left humeral head.  No definite new lesion.  No acute fracture.  Impression:  1. Stable mixed lytic and sclerotic osseous lesions.  No evidence of new metastatic disease.  2. Suspect hepatic steatosis.  3. Additional findings as above.     - 9/29/2023 MRI Brain:  FINDINGS:  Ventricles stable in size without evidence of hydrocephalus.  The brain  parenchyma maintains normal signal intensity..  No new parenchymal mass effect, hemorrhage, edema or recent or remote major vascular distribution infarct.  No enhancing intracranial lesion.  No new extra-axial blood or fluid collections.  The T2 skull base flow voids are preserved.  Dural venous sinuses are patent.  Marrow heterogeneity of the clivus is stable from 2020 and not particularly suspicious for underlying lesion.  The visualized sinuses and mastoid air cells are clear.  Radiographic proptosis again noted.  Impression:  No evidence of intracranial metastatic disease.  Nonspecific marrow heterogeneity in the clivus is similar to 2020 without definite focal lesion.  Metastatic disease is thought unlikely.     - 9/29/2023 MRI C Spine:  FINDINGS:  Alignment: Straightening of the normal cervical lordosis.  Vertebrae: Normal marrow signal. No fracture.  Discs: Mild multilevel disc height loss.  Cord: Normal.  Abnormal marrow signal and enhancement in the clivus.  Cerebellar tonsils are in their expected location.  Visualized brainstem is normal.  Vertebral artery flow voids are present.  Prevertebral soft tissues are normal.  Retropharyngeal course of the bilateral common carotid arteries.  Visualized parotid glands are unremarkable.  No cervical lymph node enlargement.  Paraspinal musculature demonstrates normal bulk and signal intensity.  Degenerative findings:  C2-C3: No neural foraminal narrowing or spinal canal stenosis.  C3-C4: No neural foraminal narrowing or spinal canal stenosis.  C4-C5: Mild left facet hypertrophy.  Mild left neural foraminal narrowing.  No spinal canal stenosis.  C5-C6: No neural foraminal narrowing or spinal canal stenosis.  C6-C7: No neural foraminal narrowing or spinal canal stenosis.  C7-T1: No neural foraminal narrowing or spinal canal stenosis.  Impression:  1. Abnormal marrow signal and enhancement in the clivus concerning for metastatic disease.  2. No evidence of metastatic  disease in the cervical spine.     - 9/14/2023 MRI T/L spine:  FINDINGS:  ALIGNMENT: Sagittal alignment is maintained.  BONE: No evidence of acute fracture.  Evaluation for focal lesions limited without intravenous contrast.  Stable subtle T1 hypointense lesions involving the inferior aspect of the T10 vertebral body, inferior endplate of L2, inferior endplate of L3, as well as the right iliac wing.  No definite new lesions are identified.  Disc: Intervertebral disc heights are grossly maintained without evidence of severe disc narrowing.  No bone marrow edema.  SPINAL CANAL: The thoracic spinal cord is unremarkable.  The conus medullaris has a normal appearance and terminates at the L1-L2 level.  No epidural mass or collection.  PARASPINAL SOFT TISSUES: Stable right renal cyst.  Lower lumbar soft tissue edema.  Similar probable subcutaneous lipoma in the right upper back.  DEGENERATIVE FINDINGS:  No significant degenerative changes of the thoracic spine are identified.  There is unchanged appearance of a prominent ligamentum flavum hypertrophy at the T2-T3 level.  No high-grade spinal canal stenosis or neural foraminal narrowing.  Minor degenerative changes of the lumbar spine involving minimal broad-based posterior disc bulges at L4-L5 and L5-S1.  Mild facet arthropathy involving L3-S1 levels.  Possible small stable caudally extending left paracentral disc extrusion at L1-L2.  There is no high-grade spinal canal stenosis or neural foraminal narrowing in the lumbar spine.  Impression:  Limited evaluation without intravenous contrast.  Stable lesions involving T10, L2, L3, and the right iliac wing.  No pathologic fracture.  No evidence of epidural extension.  No new lesion  No high-grade spinal canal stenosis or neural foraminal narrowing.  Mild degenerative changes are stable from prior.  Follow-up with contrast enhanced examination, as clinically warranted.     Currently on Arimidex and Ibrance (dose adjusted due  "to anemia to 100 mg M-F, off Sat&Sun)   Zoladex and Xgeva monthly   Most recent scans stable.  Genetic testing negative.      Diagnosis:  1. Stage IV ([id number]) invasive ductal carcinoma of right breast, upper inner quadrant, ER %, MO neg, Her2 neg, Grade 3, multifocal with one lesion appearing more aggressive (Ki67 80%), large LN +, bone mets.   Ibrance dose adjusted with cytopenias   Oncologic History:   Presentation   - 9/11/19 - Screening mammogram showed multiple right breast masses lower inner quadrant   - 9/13/19 - Diagnostic mammogram and US showed a right breast, 3:00 position mass, 2 CFN measuring 17 mm x 16 mm x 14 mm. There 2 smaller adjacent masses towards the nipple   - 9/19/19 - Biopsy -   A. Right breast subareolar: Grade 3 IDC, estrogen receptor 80%, progesterone receptor 0%, Her2 dago neg, Ki67 80%.   B. Right breast mass 3:00: Grade 3 IDC with papillary features, estrogen receptor 100%, progesterone receptor 0%, Her2 dago 1+, Ki67 30%.   - 9/26/19: US right axilla with abnormal lymphadenopathy measuring 4.3 x 2.8 cm with biopsy + for metastatic breast cancer.   Surgery consultation with Dr Ferris on 9/25/19   - 9/25/19 - [facility]CAnalysis pending; VUS pending   Medical oncology consultation on 10/7/19   * 10/8/19 - CT C/A/P with several lytic lesions in thoracic and lumbar spine, iliac bones, left scapula in addition to 3 x 4.5 cm right axillary node and 2.1 cm right breast mass. Bone scan negative.   * 10/31/19 - started Ibrance, Arimidex, Zoladex; plan for Xgeva.   * 11/12/19 STRATA without targetable mutations.   * 12/16/19 - Bone biopsy + for met disease (initially read as negative, but we treated as metastatic disease given high suspicion).   * MRI brain: "Partially empty sella. Question bilateral globe proptosis. Otherwise unremarkable MRI brain as detailed above specifically without evidence for intracranial enhancing lesion to suggest metastatic disease."   * " 4/22/2020 PET - disease improvement. 8/2020 PET with disease improvement.   * 9/1/20 - Palliative RT to L1-L4   Of note, * Xgeva monthly - we had been waiting on dental clearance, but she has been unable to get into dentistry and is accepting of risks. Has no active dental disease.   PET scan 4/22/2021:   FINDINGS:   Quality of the study: Mildly degraded due to patient's large body habitus and skin abutting the gantry.   In the head and neck, there are no hypermetabolic lesions worrisome for malignancy. There are no hypermetabolic mucosal lesions, and there are no pathologically enlarged or hypermetabolic lymph nodes.   In the chest, there are no hypermetabolic lesions worrisome for malignancy.   Stable CT appearance of a level 1 right axillary lymph node measuring 1.3 cm in short axis with normal background radiotracer uptake. Previously with SUV max of 2.1.   There are no concerning pulmonary nodules or masses, and there are no pathologically enlarged or hypermetabolic lymph nodes.   In the abdomen and pelvis, there is physiologic tracer distribution within the abdominal organs and excretion into the genitourinary system.   In the bones, there are stable lytic lesions throughout the lumbar spine and pelvis and additional stable sclerotic lesions in the sternum and T3 vertebral body. No associated focal abnormal increased radiotracer uptake. Findings likely represent treated disease.   Impression:   Interval decreased uptake within a prominent right axillary lymph node, now with normal background uptake. No new focal abnormal uptake.   Stable lytic and sclerotic lesions without focal abnormal uptake. Findings are compatible with treated metastasis.   8/25/2021 MRI brain   Impression:   No significant change from prior specifically without evidence for enhancing lesion to suggest intracranial metastatic disease.   Continued partially empty sella, intracranial hypertension to be included in differential in the  appropriate clinical setting. Clinical correlation and further evaluation as warranted.   10/14/2021 MRI thoracic/lumbar spine:   Impression:   Patient history of metastatic breast cancer.   Bone lesions at T10, L2, L3, and right iliac wing, as above, concerning for metastatic disease. No pathologic fracture, soft tissue component, or epidural extension identified.   10/25/2021 CT chest/abd/pelvis:   Impression:   1. Stable metastasis of the spine   No evidence of intra-abdominal or intrathoracic metastatic disease   2. Stable prominent right axillary lymph node.   3. Additional findings are detailed above.   Currently on Arimidex and Ibrance   Zoladex and Xgeva monthly   - 2/2/2022 Bone scan:   FINDINGS:   No focal abnormal uptake suggestive of osseous metastases. There is diffusely increased uptake in the calvarium in keeping with hyperostosis. There is degenerative type uptake most prominent in the bilateral shoulders and knees. The focal uptake previously described in the distal right femur is not visualized. There is otherwise physiologic distribution of the radiopharmaceutical throughout the skeleton.   There is normal uptake in the genitourinary system and soft tissues.   Impression:   There is no scintigraphic evidence of osteoblastic metastatic disease.   Nonspecific uptake in the distal right femur has resolved.   Diffuse cranial hyperostosis and other findings as above.   - 2/1/2022 CT C/A/P:   FINDINGS:   CHEST   Support tubes and lines: None.   Aorta: Normal caliber.   Heart: Normal size..   Coronary arteries: No calcifications.   Pericardium: Normal. No effusion, thickening, or calcification.   Central pulmonary arteries: Normal caliber.   Base of neck/thyroid: Normal.   Lymph nodes: No supraclavicular, axillary, internal mammary, mediastinal or hilar lymphadenopathy.   Esophagus: Normal.   Pleura: No effusion, thickening or calcification.   Body wall: Unremarkable.   Airways: Normal.   Lungs: Clear  without focal or diffuse abnormality.   Bones: Sclerotic lesions are seen throughout the spine as well as in the sternum, not substantially changed from 10/25/2021   ABDOMEN/PELVIS   Liver: Unremarkable   Gallbladder/bile ducts: Unremarkable. No intra or extrahepatic biliary ductal dilatation   Pancreas: Unremarkable.   Spleen: Unremarkable.   Adrenals: Unremarkable.   Kidneys: Simple cyst in the right kidney is unchanged   Lymph nodes: No abdominal or pelvic lymphadenopathy.   Bowel and mesentery: Unremarkable.   Abdominal aorta: Unremarkable.   Inferior vena cava: Unremarkable.   Free fluid or free air: None.   Pelvis: Unremarkable.   Urinary bladder: Unremarkable.   Body wall: Unremarkable.   Bones: Sclerotic lesions seen in the spine are unchanged.   Impression:   1. No evidence of new recurrent or metastatic disease.   2. Sclerotic lesions seen in the spine and sternum, unchanged when compared to 10/25/2021.      Genetic testing 3/23/2023             Most recent scans:   - 7/5/2023 CT C/A/P:  FINDINGS:  Some peripheral portions of the body wall are not covered on exam field of view due to patient body habitus  Base of neck: Unremarkable.  Thoracic soft tissues: No significant abnormality.  Aorta: Normal caliber.  Heart: Normal in size. No pericardial effusion.  Jane/Mediastinum: No pathologic lymphadenopathy.  Lungs: Well aerated, without consolidation or pleural fluid.  Liver: Diffusely hypodense parenchyma suggestive for steatosis.  No focal lesion.  Portal veins are patent.  Gallbladder: Unremarkable.  Bile Ducts: No evidence of dilated ducts.  Pancreas: No mass or ductal dilatation.  Spleen: Unremarkable.  Adrenals: Unremarkable.  Kidneys/ Ureters: Small right renal cyst.  No hydronephrosis or nephrolithiasis.  No ureteral dilatation.  Bladder: No evidence of wall thickening.  Reproductive organs: Calcified uterine fibroid.  GI Tract/Mesentery: No evidence of bowel obstruction or inflammation.  Peritoneal  Space: No ascites.  No free air.  Retroperitoneum: No significant adenopathy.  Abdominal wall: Minimal body wall edema.  Vasculature: Abdominal aorta is normal caliber.  Bones: Stable mixed lytic and sclerotic lesions throughout the spine, sternum, and right iliac wing.  No definite new lesion.  No acute fracture.  Impression:  1. Stable mixed lytic and sclerotic osseous lesions.  No evidence of new metastatic disease.  2. Additional findings as above.    Review of Systems   Constitutional:  Negative for activity change, appetite change, chills, fatigue (notes pretty good), fever and unexpected weight change.   HENT:  Positive for mouth sores (1 post intubation). Negative for rhinorrhea and trouble swallowing.    Eyes:  Negative for visual disturbance.   Respiratory:  Negative for cough, shortness of breath and wheezing.    Cardiovascular:  Negative for chest pain, palpitations and leg swelling.   Gastrointestinal:  Negative for abdominal distention, abdominal pain, blood in stool, change in bowel habit, constipation, diarrhea, nausea, vomiting and reflux.   Genitourinary:  Negative for difficulty urinating, dysuria, frequency and urgency.   Musculoskeletal:  Positive for arthralgias, back pain (chronic) and joint deformity (decr ROM right shoulder). Negative for joint swelling and myalgias.   Integumentary:  Negative for rash.   Neurological:  Negative for dizziness, weakness, numbness, headaches and coordination difficulties.   Hematological:  Negative for adenopathy. Does not bruise/bleed easily.   Psychiatric/Behavioral:  Negative for dysphoric mood and sleep disturbance. The patient is not nervous/anxious.           Objective     Physical Exam  Vitals and nursing note reviewed.   Constitutional:       Appearance: Normal appearance. She is obese.      Comments: Presents with her sister  ECOG = 0   Neurological:      Mental Status: She is alert.     Labs reviewed     Assessment and Plan     1. Malignant neoplasm  of upper-inner quadrant of right breast in female, estrogen receptor positive  Assessment & Plan:  Stable disease on scans- repeat mid 2/2024  Continue Arimidex and Ibrance (100 mg M-F, off Sat&Sun).   Xgeva and Zoladex -monthly- due today      2. Bone metastases  Overview:  IMO Regualtory Update 4/1/23    Assessment & Plan:  On Xgeva and therapy as above      3. Cancer associated pain  Assessment & Plan:  Well controlled on current regimen  No changes made      4. Anemia associated with chemotherapy  Assessment & Plan:  Parameters monitered on therapy, adequate to proceed      5. BMI 50.0-59.9, adult  Assessment & Plan:  Exercise limitations - continued work       6. Beta-thalassemia    7. Benign essential HTN    8. Chronic combined systolic and diastolic heart failure    9. Hyperlipidemia, unspecified hyperlipidemia type    10. Idiopathic intracranial hypertension  Assessment & Plan:  Resolved with shunt      11. Ventricular shunt in place  Assessment & Plan:  Sxs resolved and follows with Dr. Villa      12. Morbid obesity, unspecified obesity type  Assessment & Plan:  Manage adequate nutrition as she has advanced cancer  Monitor diet  Exercise limited as bone mets      13. Protein-calorie malnutrition, unspecified severity  Overview:  High protein encouraged      14. Type 2 diabetes mellitus with microalbuminuria, without long-term current use of insulin    15. Major depressive disorder, recurrent episode, moderate with anxious distress  Assessment & Plan:  Doing well  Great family support and professional support      Other orders  -     Cancel: denosumab (XGEVA) solution 120 mg  -     Cancel: goserelin (ZOLADEX) injection 3.6 mg        Route Chart for Scheduling    Med Onc Chart Routing      Follow up with physician 2 months. scans at end of March, 8 weeks cbc, cmp and EP and injections   Follow up with [facility]ILA 4 weeks. cbc, cmp and EP and injections   Infusion scheduling note    Injection scheduling note    Labs     Imaging    Pharmacy appointment    Other referrals       Additional referrals needed  PT         Treatment Plan Information   OP ANASTROZOLE PALBOCICLIB Q4W   Jessica Mendez MD   Upcoming Treatment Dates - OP ANASTROZOLE PALBOCICLIB Q4W    No upcoming days in selected categories.    Supportive Plan Information  OP BREAST GOSERELIN & DENOSUMAB Q4W   Jessica Mendez MD   Upcoming Treatment Dates - OP BREAST GOSERELIN & DENOSUMAB Q4W    No upcoming days in selected categories.    Therapy Plan Information  EPINEPHrine (EPIPEN) 0.3 mg/0.3 mL pen injection 0.3 mg  0.3 mg, Intramuscular, PRN  diphenhydrAMINE injection 50 mg  50 mg, Intravenous, PRN  methylPREDNISolone sodium succinate injection 125 mg  125 mg, Intravenous, PRN  sodium chloride 0.9% bolus 1,000 mL 1,000 mL  1,000 mL, Intravenous, PRN

## 2024-01-19 ENCOUNTER — PATIENT MESSAGE (OUTPATIENT)
Dept: PSYCHIATRY | Facility: CLINIC | Age: 49
End: 2024-01-19
Payer: MEDICARE

## 2024-01-19 DIAGNOSIS — G89.3 CANCER ASSOCIATED PAIN: ICD-10-CM

## 2024-01-19 DIAGNOSIS — F41.9 ANXIETY: ICD-10-CM

## 2024-01-19 DIAGNOSIS — F33.1 MAJOR DEPRESSIVE DISORDER, RECURRENT EPISODE, MODERATE WITH ANXIOUS DISTRESS: ICD-10-CM

## 2024-01-19 RX ORDER — IBUPROFEN 800 MG/1
800 TABLET ORAL 2 TIMES DAILY PRN
Qty: 45 TABLET | Refills: 2 | Status: SHIPPED | OUTPATIENT
Start: 2024-01-19

## 2024-01-19 RX ORDER — VENLAFAXINE HYDROCHLORIDE 75 MG/1
75 CAPSULE, EXTENDED RELEASE ORAL DAILY
Qty: 90 CAPSULE | Refills: 0 | Status: SHIPPED | OUTPATIENT
Start: 2024-01-19 | End: 2024-05-21 | Stop reason: SDUPTHER

## 2024-01-19 RX ORDER — VENLAFAXINE HYDROCHLORIDE 150 MG/1
150 CAPSULE, EXTENDED RELEASE ORAL DAILY
Qty: 90 CAPSULE | Refills: 0 | Status: SHIPPED | OUTPATIENT
Start: 2024-01-19 | End: 2024-04-08

## 2024-01-22 ENCOUNTER — OFFICE VISIT (OUTPATIENT)
Dept: CARDIOLOGY | Facility: CLINIC | Age: 49
End: 2024-01-22
Payer: MEDICARE

## 2024-01-22 ENCOUNTER — OFFICE VISIT (OUTPATIENT)
Dept: PRIMARY CARE CLINIC | Facility: CLINIC | Age: 49
End: 2024-01-22
Payer: MEDICARE

## 2024-01-22 VITALS
OXYGEN SATURATION: 99 % | WEIGHT: 293 LBS | SYSTOLIC BLOOD PRESSURE: 146 MMHG | DIASTOLIC BLOOD PRESSURE: 70 MMHG | BODY MASS INDEX: 50.02 KG/M2 | HEART RATE: 83 BPM | HEIGHT: 64 IN

## 2024-01-22 VITALS
OXYGEN SATURATION: 97 % | DIASTOLIC BLOOD PRESSURE: 73 MMHG | SYSTOLIC BLOOD PRESSURE: 123 MMHG | HEART RATE: 99 BPM | WEIGHT: 293 LBS | BODY MASS INDEX: 56.85 KG/M2 | TEMPERATURE: 99 F

## 2024-01-22 DIAGNOSIS — Z17.0 MALIGNANT NEOPLASM OF UPPER-INNER QUADRANT OF RIGHT BREAST IN FEMALE, ESTROGEN RECEPTOR POSITIVE: ICD-10-CM

## 2024-01-22 DIAGNOSIS — E78.00 ELEVATED LDL CHOLESTEROL LEVEL: ICD-10-CM

## 2024-01-22 DIAGNOSIS — L83 ACQUIRED ACANTHOSIS NIGRICANS: ICD-10-CM

## 2024-01-22 DIAGNOSIS — L81.9 HYPERPIGMENTATION: ICD-10-CM

## 2024-01-22 DIAGNOSIS — C50.211 MALIGNANT NEOPLASM OF UPPER-INNER QUADRANT OF RIGHT BREAST IN FEMALE, ESTROGEN RECEPTOR POSITIVE: ICD-10-CM

## 2024-01-22 DIAGNOSIS — I42.8 NON-ISCHEMIC CARDIOMYOPATHY: Primary | ICD-10-CM

## 2024-01-22 DIAGNOSIS — I50.42 CHRONIC COMBINED SYSTOLIC AND DIASTOLIC HEART FAILURE: ICD-10-CM

## 2024-01-22 DIAGNOSIS — Z23 IMMUNIZATION DUE: Primary | ICD-10-CM

## 2024-01-22 DIAGNOSIS — Z12.4 CERVICAL CANCER SCREENING: ICD-10-CM

## 2024-01-22 DIAGNOSIS — I10 BENIGN ESSENTIAL HTN: ICD-10-CM

## 2024-01-22 DIAGNOSIS — Z12.11 COLON CANCER SCREENING: ICD-10-CM

## 2024-01-22 PROBLEM — I42.9 CARDIOMYOPATHY: Status: RESOLVED | Noted: 2019-03-28 | Resolved: 2024-01-22

## 2024-01-22 PROCEDURE — 90686 IIV4 VACC NO PRSV 0.5 ML IM: CPT | Mod: S$GLB,,, | Performed by: STUDENT IN AN ORGANIZED HEALTH CARE EDUCATION/TRAINING PROGRAM

## 2024-01-22 PROCEDURE — 99215 OFFICE O/P EST HI 40 MIN: CPT | Mod: S$GLB,,, | Performed by: INTERNAL MEDICINE

## 2024-01-22 PROCEDURE — 99999 PR PBB SHADOW E&M-EST. PATIENT-LVL V: CPT | Mod: PBBFAC,,, | Performed by: INTERNAL MEDICINE

## 2024-01-22 PROCEDURE — G0008 ADMIN INFLUENZA VIRUS VAC: HCPCS | Mod: S$GLB,,, | Performed by: STUDENT IN AN ORGANIZED HEALTH CARE EDUCATION/TRAINING PROGRAM

## 2024-01-22 PROCEDURE — 99214 OFFICE O/P EST MOD 30 MIN: CPT | Mod: S$GLB,,, | Performed by: STUDENT IN AN ORGANIZED HEALTH CARE EDUCATION/TRAINING PROGRAM

## 2024-01-22 PROCEDURE — 99999 PR PBB SHADOW E&M-EST. PATIENT-LVL V: CPT | Mod: PBBFAC,,, | Performed by: STUDENT IN AN ORGANIZED HEALTH CARE EDUCATION/TRAINING PROGRAM

## 2024-01-22 RX ORDER — BLOOD PRESSURE TEST KIT-MEDIUM
KIT MISCELLANEOUS
Qty: 1 KIT | Refills: 0 | Status: SHIPPED | OUTPATIENT
Start: 2024-01-22

## 2024-01-22 RX ORDER — SPIRONOLACTONE 25 MG/1
25 TABLET ORAL DAILY
Qty: 90 TABLET | Refills: 3 | Status: SHIPPED | OUTPATIENT
Start: 2024-01-22

## 2024-01-22 NOTE — PROGRESS NOTES
"    PCP - Enedina De La Torre MD    Subjective:   Stage IV breast cancer diagnosed 9/2019, (Arimidex, palbociclib)  Dilated cardiomyopathy (LVEF 30% improved to 52%, down again to 35%)  HTN  HLD  Well controlled DM, orals and mounjoro  Morbid obesity BMI 57     HPI:    Patient is doing regular follow-up visit. She has been taking moujaro for six months. Since October 2022, her weight reduced from 395 lbs to now 328 lbs on her home scale. A1c also has now normalized.   She reports tiredness. Her daughters help her with cleaning/cooking. She can't take 1 flight of stairs w/o SOB and due to loss of energy and fatigue.. Hasn't been active lately and is recovering from  shunt surgery she had one month ago and before that had balance issues. She hasn't been taking lasix for a year and self stopped it beucase "it wasn't pulling fluid into her urine". She is on 3 pillars of GDMT for HF and it is unclear why she is not on MRA. She doesn't recall any side effects from MRAs. No CP. No SOB. Occasional LH while moving from sitting to standing position.     TTE 11/2022  Technically difficult study  The left ventricle is severely enlarged with eccentric hypertrophy and moderately decreased systolic function. The estimated ejection fraction is 35%.  Normal right ventricular size with normal right ventricular systolic function.  Grade I left ventricular diastolic dysfunction. Left atrial pressures do not appear to be significantly elevated.  The IVC is not visualized.    Social History     Tobacco Use    Smoking status: Never    Smokeless tobacco: Never   Substance Use Topics    Alcohol use: Not Currently     Alcohol/week: 0.0 standard drinks of alcohol     Family History   Problem Relation Age of Onset    Other Mother         breast lesions had to be surgically removed    Arthritis Mother     Diabetes Mother     Heart disease Mother         CHF, CAD , 2 stents    Hypertension Mother     Hyperlipidemia Mother     Heart failure Mother  "    Hypertension Brother     No Known Problems Maternal Grandmother     Kidney cancer Maternal Grandfather 79    Rashes / Skin problems Daughter         boils/cysts    Asthma Daughter     Cervical cancer Paternal Cousin         dx age 29?    Ovarian cancer Paternal Cousin         dx age 29?    Endometriosis Paternal Cousin     Fibroids Other         uterine    Thyroid cancer Other         type? dx age?    Fibroids Other         uterine    Fibroids Other         uterine    Fibroids Other         uterine    Fibroids Other         uterine    Breast cancer Neg Hx     Colon polyps Neg Hx        Meds:     Review of patient's allergies indicates:   Allergen Reactions    Keflex [cephalexin] Itching       Current Outpatient Medications:     anastrozole (ARIMIDEX) 1 mg Tab, TAKE 1 TABLET(1 MG) BY MOUTH EVERY DAY, Disp: 90 tablet, Rfl: 0    atorvastatin (LIPITOR) 40 MG tablet, TAKE 1 TABLET(40 MG) BY MOUTH EVERY DAY (Patient taking differently: Take by mouth every evening.), Disp: 90 tablet, Rfl: 3    blood pressure kit-extra large Kit, Check blood pressure once daily at the same time, Disp: 1 kit, Rfl: 0    blood sugar diagnostic Strp, To check BG 2 times daily, to use with insurance preferred meter, Disp: 200 each, Rfl: 3    blood-glucose sensor (DEXCOM G7 SENSOR) Monique, Change every 10 days, Disp: 3 each, Rfl: 11    carvediloL (COREG) 25 MG tablet, TAKE 1 TABLET(25 MG) BY MOUTH TWICE DAILY, Disp: 180 tablet, Rfl: 3    empagliflozin (JARDIANCE) 25 mg tablet, Take 1 tablet (25 mg total) by mouth once daily., Disp: 90 tablet, Rfl: 2    ENTRESTO  mg per tablet, TAKE 1 TABLET BY MOUTH TWICE DAILY, Disp: 60 tablet, Rfl: 11    ergocalciferol (ERGOCALCIFEROL) 50,000 unit Cap, TAKE 1 CAPSULE BY MOUTH EVERY 7 DAYS, Disp: 12 capsule, Rfl: 0    furosemide (LASIX) 20 MG tablet, Take 1 tablet (20 mg total) by mouth 2 (two) times daily. (Patient taking differently: Take 20 mg by mouth 2 (two) times daily as needed.), Disp: 180 tablet,  Rfl: 3    glipiZIDE (GLUCOTROL) 10 MG TR24, TAKE 1 TABLET(10 MG) BY MOUTH DAILY WITH BREAKFAST, Disp: 90 tablet, Rfl: 2    goserelin (ZOLADEX) 3.6 mg injection, Inject 3.6 mg into the skin every 28 days., Disp: , Rfl:     ibuprofen (ADVIL,MOTRIN) 800 MG tablet, Take 1 tablet (800 mg total) by mouth 2 (two) times daily as needed for Pain., Disp: 45 tablet, Rfl: 2    lancets Misc, To check BG 2 times daily, to use with insurance preferred meter, Disp: 200 each, Rfl: 3    metFORMIN (GLUCOPHAGE-XR) 500 MG ER 24hr tablet, Take 2 tablets (1,000 mg total) by mouth 2 (two) times daily with meals., Disp: 360 tablet, Rfl: 2    naloxone (NARCAN) 4 mg/actuation Spry, 4mg by nasal route as needed for opioid overdose; may repeat every 2-3 minutes in alternating nostrils until medical help arrives. Call 911, Disp: 1 each, Rfl: 11    ondansetron (ZOFRAN-ODT) 8 MG TbDL, DISSOLVE 1 TABLET(8 MG) ON THE TONGUE EVERY 8 HOURS AS NEEDED FOR NAUSEA, Disp: 30 tablet, Rfl: 2    palbociclib (IBRANCE) 100 mg Cap, Take 1 capsule (100 mg) by mouth once daily for 21 days, followed by 7 days off., Disp: 21 capsule, Rfl: 3    potassium chloride (KLOR-CON) 10 MEQ TbSR, Take 1 tablet (10 mEq total) by mouth once daily. (Patient taking differently: Take 10 mEq by mouth nightly.), Disp: 30 tablet, Rfl: 2    sennosides (SENNA-C ORAL), Take 2 tablets by mouth daily as needed., Disp: , Rfl:     tirzepatide 7.5 mg/0.5 mL PnIj, Inject 7.5 mg into the skin every 7 days. MONDAYS, Disp: 4 pen , Rfl: 5    traZODone (DESYREL) 50 MG tablet, Take 1 tablet (50 mg total) by mouth every evening., Disp: 30 tablet, Rfl: 11    UNKNOWN TO PATIENT, Calcium, Disp: , Rfl:     venlafaxine (EFFEXOR-XR) 150 MG Cp24, Take 1 capsule (150 mg total) by mouth once daily., Disp: 90 capsule, Rfl: 0    venlafaxine (EFFEXOR-XR) 75 MG 24 hr capsule, Take 1 capsule (75 mg total) by mouth once daily., Disp: 90 capsule, Rfl: 0    blood-glucose meter kit, To check BG 2 times daily, to use  "with insurance preferred meter, Disp: 1 each, Rfl: 0    spironolactone (ALDACTONE) 25 MG tablet, Take 1 tablet (25 mg total) by mouth once daily., Disp: 90 tablet, Rfl: 3  No current facility-administered medications for this visit.    Facility-Administered Medications Ordered in Other Visits:     0.9%  NaCl infusion, , Intravenous, Continuous, Esther Coreas MD, New Bag at 10/26/23 1722    mupirocin 2 % ointment, , Nasal, On Call Procedure, Esther Coreas MD, Given at 10/26/23 1614    Review of Systems   Constitutional:  Positive for malaise/fatigue and weight loss (with GLP-1 agonists, intentional). Negative for fever.   HENT:  Negative for hearing loss and nosebleeds.    Eyes:  Negative for photophobia and pain.   Respiratory:  Negative for cough and shortness of breath.    Cardiovascular:  Positive for orthopnea (2 pillows for few years). Negative for chest pain, palpitations and leg swelling.   Gastrointestinal:  Negative for abdominal pain and heartburn.   Genitourinary:  Negative for dysuria.   Musculoskeletal:  Negative for myalgias.   Skin:  Negative for rash.   Neurological:  Positive for dizziness (occasional, orhostatic). Negative for headaches.   Endo/Heme/Allergies:  Negative for polydipsia.   Psychiatric/Behavioral:  Negative for depression. The patient is not nervous/anxious.        Objective:   BP (!) 146/70   Pulse 83   Ht 5' 4" (1.626 m)   Wt (!) 150.8 kg (332 lb 7.3 oz)   LMP 06/20/2019   SpO2 99%   BMI 57.07 kg/m²   Physical Exam  Constitutional:       General: She is not in acute distress.     Appearance: She is obese.   HENT:      Head: Normocephalic.   Eyes:      Pupils: Pupils are equal, round, and reactive to light.   Cardiovascular:      Rate and Rhythm: Normal rate and regular rhythm.      Comments: Intact distal pulses  No murmur or gallop heard  No friction rub  JVP not appreciated  No leg edema  Pulmonary:      Effort: Pulmonary effort is normal.   Abdominal:      General: " "There is distension.      Palpations: Abdomen is soft.   Skin:     General: Skin is warm.   Neurological:      General: No focal deficit present.      Mental Status: She is alert.   Psychiatric:         Mood and Affect: Mood normal.       Labs:     Lab Results   Component Value Date     01/18/2024    K 3.5 01/18/2024     01/18/2024    CO2 24 01/18/2024    BUN 10 01/18/2024    CREATININE 0.8 01/18/2024    ANIONGAP 10 01/18/2024     Lab Results   Component Value Date    HGBA1C 6.2 (H) 12/13/2023     Lab Results   Component Value Date    BNP 18 09/14/2023    BNP 25 10/21/2022    BNP 33 05/06/2022       Lab Results   Component Value Date    WBC 6.85 01/18/2024    HGB 11.4 (L) 01/18/2024    HCT 36.8 (L) 01/18/2024     01/18/2024    GRAN 4.5 01/18/2024    GRAN 66.1 01/18/2024     Lab Results   Component Value Date    CHOL 116 (L) 12/13/2023    HDL 43 12/13/2023    LDLCALC 53.8 (L) 12/13/2023    TRIG 96 12/13/2023       Lab Results   Component Value Date     01/18/2024    K 3.5 01/18/2024     01/18/2024    CO2 24 01/18/2024    BUN 10 01/18/2024    CREATININE 0.8 01/18/2024    ANIONGAP 10 01/18/2024     Lab Results   Component Value Date    HGBA1C 6.2 (H) 12/13/2023     Lab Results   Component Value Date    BNP 18 09/14/2023    BNP 25 10/21/2022    BNP 33 05/06/2022       Vital Signs:   BP (!) 146/70   Pulse 83   Ht 5' 4" (1.626 m)   Wt (!) 150.8 kg (332 lb 7.3 oz)   LMP 06/20/2019   SpO2 99%   BMI 57.07 kg/m²   Lab Results   Component Value Date    WBC 6.85 01/18/2024    HGB 11.4 (L) 01/18/2024    HCT 36.8 (L) 01/18/2024     01/18/2024    GRAN 4.5 01/18/2024    GRAN 66.1 01/18/2024     Lab Results   Component Value Date    CHOL 116 (L) 12/13/2023    HDL 43 12/13/2023    LDLCALC 53.8 (L) 12/13/2023    TRIG 96 12/13/2023            Assessment & Plan:     1. Non-ischemic cardiomyopathy    2. Benign essential HTN    3. Elevated LDL cholesterol level    4. Chronic combined systolic " and diastolic heart failure    5. Malignant neoplasm of upper-inner quadrant of right breast in female, estrogen receptor positive      HFrEF, non ischemic dilated  -NYHA III (walking capacity <1 block  -declined ICD in the past at multiple times  -continue entresto 97/103 bid, coreg 25 bid and jardiance 25 daily   -takes lasix only prn (hasn't taken in a year)  -spironolactone fell off by mistake five months ago, will resume 25 qd, scripts sent to pharmacy  -emphasized importance of walk/exericse     Morbid obesity  -BMI 64.7 on last visit in 2022, now 57 and has lost weight with Mounjaro, cont to f/up with endocrine    HTN  -adding MRA as mentioned above, maintain BP log at home    Stage IV breast cancer  -on cancer therapies, follows with hem/onc here     F/up in 3 months    Signed:  Zak Rodriguez M.D.  Cardiovascular Fellow PGY-V  [facility]

## 2024-01-22 NOTE — PROGRESS NOTES
01/22/2024    Kristopher Elmore  [id number]    Chief Complaint   Patient presents with    [facility]    Annual Exam       HPI    This pt is new to me and presents to [facility] care. PMH sig for breast CA w/ bone mets, HTN, TYPE II DIABETES, HFrEF, IIH    Patient follows closely with Oncology and palliative care.    Breast ca   Has been doing well and no changes in current regimen. Scheduled for routine surveillance next month   State that she isn't a candidate for traditional chemo 2/2 to her HF    Facial hyperpigmentation.   Started off as small, but has gotten larger in size  Has not tried any treatments. Looked online and saw it could be related to diabetes    TYPE II DIABETES   Follows with endocrinology  On mounjaro 7.5mg has lost 70lb and is now well controlled  Diet is very limited 2/2 to mounjaro    HFrEF  Follows with cardiology and is due for appointment  Blood pressure has been controlled     IIH  S/p  shunt one month ago  Is healing well and symptoms from IIH has improved    Snoring  Will be having in lab sleep study soon    HM  Due for pap: used to follow with Dr. Washburn  Overdphil for colonoscopy    Patient has support of her family and recently her sister moved from [place] to help take care of her      Negative 10 point ROS outside of HPI    Social History     Socioeconomic History    Marital status:    Tobacco Use    Smoking status: Never    Smokeless tobacco: Never   Substance and Sexual Activity    Alcohol use: Not Currently     Alcohol/week: 0.0 standard drinks of alcohol    Drug use: No    Sexual activity: Not Currently     Partners: Male     Birth control/protection: Surgical   Social History Narrative    Patient is a pleasant AAF,  x2. She has excellent social support, although she states they worry more than she does.     Former teacher English.    Disabled.    She lives with her 2 adult daughters      Social Determinants of Health     Financial Resource Strain: High  Risk (1/18/2024)    Overall Financial Resource Strain (CARDIA)     Difficulty of Paying Living Expenses: Hard   Food Insecurity: Food Insecurity Present (1/18/2024)    Hunger Vital Sign     Worried About Running Out of Food in the Last Year: Sometimes true     Ran Out of Food in the Last Year: Often true   Transportation Needs: No Transportation Needs (1/18/2024)    PRAPARE - Transportation     Lack of Transportation (Medical): No     Lack of Transportation (Non-Medical): No   Physical Activity: Insufficiently Active (1/18/2024)    Exercise Vital Sign     Days of Exercise per Week: 2 days     Minutes of Exercise per Session: 10 min   Stress: Stress Concern Present (1/18/2024)    [place] [employer] - Occupational Stress Questionnaire     Feeling of Stress : To some extent   Social Connections: Unknown (1/18/2024)    Social Connection and Isolation Panel [NHANES]     Frequency of Communication with Friends and Family: More than three times a week     Frequency of Social Gatherings with Friends and Family: Twice a week     Active Member of Clubs or Organizations: No     Attends Club or Organization Meetings: Never     Marital Status:    Housing Stability: High Risk (1/18/2024)    Housing Stability Vital Sign     Unable to Pay for Housing in the Last Year: Yes     Number of Places Lived in the Last Year: 1     Unstable Housing in the Last Year: No           Current Outpatient Medications:     anastrozole (ARIMIDEX) 1 mg Tab, TAKE 1 TABLET(1 MG) BY MOUTH EVERY DAY, Disp: 90 tablet, Rfl: 0    atorvastatin (LIPITOR) 40 MG tablet, TAKE 1 TABLET(40 MG) BY MOUTH EVERY DAY (Patient taking differently: Take by mouth every evening.), Disp: 90 tablet, Rfl: 3    blood sugar diagnostic Strp, To check BG 2 times daily, to use with insurance preferred meter, Disp: 200 each, Rfl: 3    blood-glucose sensor (DEXCOM G7 SENSOR) Monique, Change every 10 days, Disp: 3 each, Rfl: 11    carvediloL (COREG) 25 MG  tablet, TAKE 1 TABLET(25 MG) BY MOUTH TWICE DAILY, Disp: 180 tablet, Rfl: 3    empagliflozin (JARDIANCE) 25 mg tablet, Take 1 tablet (25 mg total) by mouth once daily., Disp: 90 tablet, Rfl: 2    ENTRESTO  mg per tablet, TAKE 1 TABLET BY MOUTH TWICE DAILY, Disp: 60 tablet, Rfl: 11    ergocalciferol (ERGOCALCIFEROL) 50,000 unit Cap, TAKE 1 CAPSULE BY MOUTH EVERY 7 DAYS, Disp: 12 capsule, Rfl: 0    furosemide (LASIX) 20 MG tablet, Take 1 tablet (20 mg total) by mouth 2 (two) times daily. (Patient taking differently: Take 20 mg by mouth 2 (two) times daily as needed.), Disp: 180 tablet, Rfl: 3    glipiZIDE (GLUCOTROL) 10 MG TR24, TAKE 1 TABLET(10 MG) BY MOUTH DAILY WITH BREAKFAST, Disp: 90 tablet, Rfl: 2    goserelin (ZOLADEX) 3.6 mg injection, Inject 3.6 mg into the skin every 28 days., Disp: , Rfl:     ibuprofen (ADVIL,MOTRIN) 800 MG tablet, Take 1 tablet (800 mg total) by mouth 2 (two) times daily as needed for Pain., Disp: 45 tablet, Rfl: 2    lancets Misc, To check BG 2 times daily, to use with insurance preferred meter, Disp: 200 each, Rfl: 3    metFORMIN (GLUCOPHAGE-XR) 500 MG ER 24hr tablet, Take 2 tablets (1,000 mg total) by mouth 2 (two) times daily with meals., Disp: 360 tablet, Rfl: 2    naloxone (NARCAN) 4 mg/actuation Spry, 4mg by nasal route as needed for opioid overdose; may repeat every 2-3 minutes in alternating nostrils until medical help arrives. Call 911, Disp: 1 each, Rfl: 11    ondansetron (ZOFRAN-ODT) 8 MG TbDL, DISSOLVE 1 TABLET(8 MG) ON THE TONGUE EVERY 8 HOURS AS NEEDED FOR NAUSEA, Disp: 30 tablet, Rfl: 2    palbociclib (IBRANCE) 100 mg Cap, Take 1 capsule (100 mg) by mouth once daily for 21 days, followed by 7 days off., Disp: 21 capsule, Rfl: 3    potassium chloride (KLOR-CON) 10 MEQ TbSR, Take 1 tablet (10 mEq total) by mouth once daily. (Patient taking differently: Take 10 mEq by mouth nightly.), Disp: 30 tablet, Rfl: 2    sennosides (SENNA-C ORAL), Take 2 tablets by mouth daily as  needed., Disp: , Rfl:     tirzepatide 7.5 mg/0.5 mL PnIj, Inject 7.5 mg into the skin every 7 days. MONDAYS, Disp: 4 pen , Rfl: 5    traZODone (DESYREL) 50 MG tablet, Take 1 tablet (50 mg total) by mouth every evening., Disp: 30 tablet, Rfl: 11    UNKNOWN TO PATIENT, Calcium, Disp: , Rfl:     venlafaxine (EFFEXOR-XR) 150 MG Cp24, Take 1 capsule (150 mg total) by mouth once daily., Disp: 90 capsule, Rfl: 0    venlafaxine (EFFEXOR-XR) 75 MG 24 hr capsule, Take 1 capsule (75 mg total) by mouth once daily., Disp: 90 capsule, Rfl: 0    blood-glucose meter kit, To check BG 2 times daily, to use with insurance preferred meter, Disp: 1 each, Rfl: 0    oxyCODONE-acetaminophen (PERCOCET) 5-325 mg per tablet, Take 1 tablet by mouth every 4 (four) hours as needed for Pain. (Patient not taking: Reported on 1/22/2024), Disp: 60 tablet, Rfl: 0    oxyCODONE-acetaminophen (PERCOCET) 5-325 mg per tablet, Take 1 tablet by mouth every 6 (six) hours as needed for Pain. (Patient not taking: Reported on 1/22/2024), Disp: 28 tablet, Rfl: 0  No current facility-administered medications for this visit.    Facility-Administered Medications Ordered in Other Visits:     0.9%  NaCl infusion, , Intravenous, Continuous, Esther Coreas MD, New Bag at 10/26/23 1722    mupirocin 2 % ointment, , Nasal, On Call Procedure, Esther Coreas MD, Given at 10/26/23 1614      Physical Exam  Vitals:    01/22/24 0908   BP: 123/73   Pulse: 99   Temp: 98.9 °F (37.2 °C)       Physical Exam      Gen: well appearing, NAD, obese  Left cheek with large hyperpigmented patch with texture  Resp: non labored breathing, no crackles, no wheezes, CTAB  CV: RRR no murmur, gallops, rubs, no LE edema  Abd: soft nontender BS present no organomegaly    1. Immunization due  - Influenza - Quadrivalent *Preferred* (6 months+) (PF) given    2. Colon cancer screening  - Ambulatory referral/consult to Endo Procedure ; Future    3. Cervical cancer screening  - Ambulatory  referral/consult to Obstetrics / Gynecology; Future    4. Hyperpigmentation  - Ambulatory referral/consult to Dermatology; Future    5. Acquired acanthosis nigricans  - Ambulatory referral/consult to Dermatology; Future    6. Benign essential HTN  - blood pressure kit-extra large Kit; Check blood pressure once daily at the same time  Dispense: 1 kit; Refill: 0        RTC in 6 months for routine care    Enedina De La Torre MD  Family Medicine

## 2024-01-25 ENCOUNTER — PATIENT MESSAGE (OUTPATIENT)
Dept: HEMATOLOGY/ONCOLOGY | Facility: CLINIC | Age: 49
End: 2024-01-25
Payer: MEDICARE

## 2024-01-29 ENCOUNTER — HOSPITAL ENCOUNTER (OUTPATIENT)
Dept: SLEEP MEDICINE | Facility: HOSPITAL | Age: 49
Discharge: HOME | End: 2024-01-29
Attending: NURSE PRACTITIONER
Payer: MEDICARE

## 2024-01-29 DIAGNOSIS — G47.33 OSA (OBSTRUCTIVE SLEEP APNEA): Primary | ICD-10-CM

## 2024-01-29 DIAGNOSIS — G47.20 SLEEP STAGE DYSFUNCTION: ICD-10-CM

## 2024-01-29 DIAGNOSIS — G47.30 SLEEP APNEA, UNSPECIFIED TYPE: ICD-10-CM

## 2024-01-30 ENCOUNTER — TELEPHONE (OUTPATIENT)
Dept: SLEEP MEDICINE | Facility: CLINIC | Age: 49
End: 2024-01-30
Payer: MEDICARE

## 2024-01-30 NOTE — PROGRESS NOTES
A Diagnostic study was performed on Kristopher Elmore. The entire procedure was explained, including Bio calibration procedure, patient was informed that there may be a need to enter the room during the night to fix lead or make adjustments to the equipment. Questions were answered prior to start of study. She was given instructions on how to call out for help including how to use the nurse call unit in the bathroom. Patient was given [employer] phone number to call if patient needed tech for anything, in case tech was out of tech room.  Patient education was performed. This includes the possible use of CPAP machine and different CPAP masks. She was given the after visit summary.   She did not meet criteria for a split night study.  The EKG appeared to be NSR   The patient has a shunt in her head, leads were carefully placed.  The patient glucose monitor víctor indicated the patient sugar was low multople times. the patient drank aplle juice to help to raise it. It was still low after drinking 2 apple juices. The patient repoert no symptoms during the study and refused ER. The patient requested to end the study early to go home and take meds.

## 2024-01-30 NOTE — TELEPHONE ENCOUNTER
Fab Chambers, RRT  Kimmie Verduzco, NP  Cc: Tung Stevens MD; Josselyn Jenkins  Caller: Unspecified (Today,  7:26 AM)  Hi. Patient had an episode of low blood sugar during her sleep study. She declined being treated in the Emergency department. She requested ending the study early.

## 2024-01-31 ENCOUNTER — OFFICE VISIT (OUTPATIENT)
Dept: NEUROSURGERY | Facility: CLINIC | Age: 49
End: 2024-01-31
Payer: MEDICARE

## 2024-01-31 ENCOUNTER — HOSPITAL ENCOUNTER (OUTPATIENT)
Dept: RADIOLOGY | Facility: HOSPITAL | Age: 49
Discharge: HOME | End: 2024-01-31
Attending: PHYSICIAN ASSISTANT
Payer: MEDICARE

## 2024-01-31 VITALS
DIASTOLIC BLOOD PRESSURE: 75 MMHG | SYSTOLIC BLOOD PRESSURE: 106 MMHG | HEIGHT: 64 IN | HEART RATE: 72 BPM | BODY MASS INDEX: 57.07 KG/M2

## 2024-01-31 DIAGNOSIS — Z98.2 S/P VP SHUNT: Primary | ICD-10-CM

## 2024-01-31 DIAGNOSIS — G93.2 IIH (IDIOPATHIC INTRACRANIAL HYPERTENSION): ICD-10-CM

## 2024-01-31 DIAGNOSIS — Z98.2 S/P VP SHUNT: ICD-10-CM

## 2024-01-31 PROCEDURE — 70450 CT HEAD/BRAIN W/O DYE: CPT | Mod: TC

## 2024-01-31 PROCEDURE — 70450 CT HEAD/BRAIN W/O DYE: CPT | Mod: 26,,, | Performed by: RADIOLOGY

## 2024-01-31 PROCEDURE — 99999 PR PBB SHADOW E&M-EST. PATIENT-LVL IV: CPT | Mod: PBBFAC,,, | Performed by: PHYSICIAN ASSISTANT

## 2024-01-31 PROCEDURE — 99024 POSTOP FOLLOW-UP VISIT: CPT | Mod: S$GLB,,, | Performed by: PHYSICIAN ASSISTANT

## 2024-02-02 ENCOUNTER — PATIENT MESSAGE (OUTPATIENT)
Dept: HEMATOLOGY/ONCOLOGY | Facility: CLINIC | Age: 49
End: 2024-02-02
Payer: MEDICARE

## 2024-02-02 ENCOUNTER — TELEPHONE (OUTPATIENT)
Dept: HEMATOLOGY/ONCOLOGY | Facility: CLINIC | Age: 49
End: 2024-02-02
Payer: MEDICARE

## 2024-02-02 DIAGNOSIS — C50.211 MALIGNANT NEOPLASM OF UPPER-INNER QUADRANT OF RIGHT BREAST IN FEMALE, ESTROGEN RECEPTOR POSITIVE: ICD-10-CM

## 2024-02-02 DIAGNOSIS — C79.51 MALIGNANT NEOPLASM METASTATIC TO BONE: ICD-10-CM

## 2024-02-02 DIAGNOSIS — Z17.0 MALIGNANT NEOPLASM OF UPPER-INNER QUADRANT OF RIGHT BREAST IN FEMALE, ESTROGEN RECEPTOR POSITIVE: ICD-10-CM

## 2024-02-02 NOTE — TELEPHONE ENCOUNTER
Pt would like appt in [place] with Dr Reynoso- I was unable to schedule pt, will notify Dr Reynoso of request.----- Message from Shobha Gilbert RN sent at 2/2/2024 10:27 AM CST -----  Good Morning,   Dr. Lei would like to have the attached pt scheduled to see Dr. Reynoso.     Thank you   Shobha BRITT

## 2024-02-05 ENCOUNTER — TELEPHONE (OUTPATIENT)
Dept: HEMATOLOGY/ONCOLOGY | Facility: CLINIC | Age: 49
End: 2024-02-05
Payer: MEDICARE

## 2024-02-05 ENCOUNTER — PATIENT MESSAGE (OUTPATIENT)
Dept: CARDIOLOGY | Facility: CLINIC | Age: 49
End: 2024-02-05
Payer: MEDICARE

## 2024-02-05 DIAGNOSIS — Z17.0 MALIGNANT NEOPLASM OF UPPER-INNER QUADRANT OF RIGHT BREAST IN FEMALE, ESTROGEN RECEPTOR POSITIVE: ICD-10-CM

## 2024-02-05 DIAGNOSIS — C50.211 MALIGNANT NEOPLASM OF UPPER-INNER QUADRANT OF RIGHT BREAST IN FEMALE, ESTROGEN RECEPTOR POSITIVE: ICD-10-CM

## 2024-02-05 NOTE — TELEPHONE ENCOUNTER
Per Dr. Reynoso, called pt to see if she could come this afternoon to see him at [facility]. She stated that she could, appt scheduled.

## 2024-02-06 RX ORDER — ANASTROZOLE 1 MG/1
TABLET ORAL
Qty: 90 TABLET | Refills: 0 | Status: SHIPPED | OUTPATIENT
Start: 2024-02-06

## 2024-02-07 ENCOUNTER — PATIENT MESSAGE (OUTPATIENT)
Dept: HEMATOLOGY/ONCOLOGY | Facility: CLINIC | Age: 49
End: 2024-02-07
Payer: MEDICARE

## 2024-02-07 DIAGNOSIS — R11.0 NAUSEA: ICD-10-CM

## 2024-02-07 RX ORDER — ONDANSETRON 8 MG/1
TABLET, ORALLY DISINTEGRATING ORAL
Qty: 30 TABLET | Refills: 2 | Status: SHIPPED | OUTPATIENT
Start: 2024-02-07 | End: 2024-04-17 | Stop reason: SDUPTHER

## 2024-02-11 PROBLEM — G47.30 SLEEP APNEA: Status: ACTIVE | Noted: 2024-02-11

## 2024-02-11 PROBLEM — G47.33 OSA (OBSTRUCTIVE SLEEP APNEA): Status: ACTIVE | Noted: 2024-02-11

## 2024-02-11 PROBLEM — G47.20 SLEEP STAGE DYSFUNCTION: Status: ACTIVE | Noted: 2024-02-11

## 2024-02-11 NOTE — PROCEDURES
"    Diagnostic Report  [facility] [address], [place] [address]  Phone: [phone]  Fax: [phone]           Patient Name: Kristopher Elmore Study Date: 1/29/2024   YOB: 1975 Patient MRN: [id number]   Age:  48 year Hospital #: [id number]   Sex: Female Referring Physician: Kimmie Verduzco NP   Height: 5' 4" Recording Tech: Josselyn Jenkins   Weight: 332.0 lbs Scoring Tech: JESSIE Chambers RRT RPSGT   BMI:  57.4 AASM:  1B   AHI: - Interpreting Physician:      RERFEDERICO index: -   Low oxygen sat: 92.0%     RDI: -       The patient's glucose monitor indicated low blood sugar multople times. The patient drank aplle juice to help to raise it. It was still low after drinking 2 apple juices. The patient did not report the symptoms of hypoglycemia during the study and refused ER. The patient requested to end the study early to go home and take her medications.      Sleep architecture: This is a baseline polysomnogram. At light's out, the patient fell asleep in - minutes and slept for 0.0% of the time. Total sleep time (TST) was - minutes. - of TST was in Stage N1 sleep, - TST in slow wave sleep, and - TST in REM sleep. The REM latency was - minutes.   The study was inadequate for interpretation.    Cardiac: Cardiac rhythm monitoring revealed a sinus rhythm.     Patient perception: On a post-sleep study questionnaire, the patient indicated that sleep was the same in the lab than compared to home.    IMPRESSION:  The study was inadequate for interpreatiation due to insufficient sleep timne.     RECOMMENDATION:    Repeat the sleep study with prescription sleep aid.                  Study Overview    First Lights Off: 08:47:41 PM  COUNT INDEX   Last Lights On: 11:58:36 PM Awakenings: - -   Time in Bed: 190.9 Arousals: - -   Total Sleep Time: - Apneas & Hypopneas: - -   Sleep Efficiency: 0.0% Limb Movements: - -   Sleep Period Time: 191.0 Snores: - -   Sleep Maintenance Efficiency: " 0.0% Desaturations: - -   Sleep Latency: -      REM Latency from Sleep Onset: - Minimum Oxygen Saturation during Desaturation:  -      Sleep Architecture                   % of Time in Bed  Stages TIME (mins) % SLEEP TIME   WAKE 191.0    Stage N1 0.0 -   Stage N2 0.0 -   Stage N3 - -   REM 0.0 -     Arousal Summary     NREM REM Total Sleep Time   Apnea & Hypopnea Arousals - - -   PLM Arousals - - -   Isolated Limb Movement Arousals - - -   Spontaneous Arousals - - -   RERAs  - - -   Total - - -   Arousal Index - - -     Respiratory Summary     By Sleep Stage By Body Position Total    NREM REM Supine Non-Supine    Time (min) 0.0 0.0 - - -           Obstructive Apnea - - - - -   Mixed Apnea - - - - -   Central Apnea - - - - -   Total Apneas - - - - -   Total Apnea Index - - - - -           Hypopnea - - - - -   Hypopnea Index - - - - -           Apnea & Hypopnea - - - - -   Apnea & Hypopnea Index - - - - -           RERAs - - - - -   RERA Index - - - - -           RDI - - - - -     Scoring Criteria: Hypopneas scored at Choose an item.% desaturation criteria.    Respiratory Event Durations     Apnea Hypopnea    NREM REM NREM REM   Average (seconds) - - - -   Maximum (seconds) - - - -     Table of EtCO2 by Distribution    Range(mmHg) Time in range (min) Time in range (%) Time in or below range (min) Time in or below range (%)   0.0 - 20.0 - - - -   20.0 - 40.0 - - - -           Oxygen Saturation Summary     WAKE NREM REM   Average OSat (%) 97.0% - -   Minimum OSat (%) 92.0% - -   Maximum OSat (%) 100.0% - -                            Oxygen Saturation Distribution    Range(%) Time in range (min) Time in range (%)    90.0 - 100.0 191.0 100.0%   80.0 - 90.0 - -   70.0 - 80.0 - -   60.0 - 70.0 - -   50.0 - 60.0 - -   0.0 - 50.0 - -              Time Spent Less than 88% OSat    Range(%) Time in range (min) Time in range (%)   0.0 - 88.0 - -     # of Desaturations -   Minimum Oxygen Saturation During Desaturation -       SUPINE LEFT RIGHT PRONE   Minimum Oxygen Saturation During Desaturation by BP - - - -     Limb Movement Summary      COUNT INDEX   Isolated Limb Movements - -   Periodic Limb Movements (PLMs) - -   Total Limb Movements - -     Cardiac Summary     WAKE NREM REM Sleep TOTAL    Average Pulse Rate (BPM) 71.9 - - - 71.9   Minimum Pulse Rate (BPM) 65.0 - - - 65.0   Maximum Pulse Rate (BPM) 84.0 - - - 84.0     Pulse Rate Distribution    Range(bpm) Time in range (min) Time in range (%)   0.0 - 40.0 - -   40.0 - 60.0 - -   60.0 - 80.0 188.7 99.1%   80.0 - 100.0 1.8 0.9%   100.0 - 120.0 - -   120.0 - 140.0 - -   140.0 - 200.0 - -

## 2024-02-12 ENCOUNTER — OFFICE VISIT (OUTPATIENT)
Dept: PALLIATIVE MEDICINE | Facility: CLINIC | Age: 49
End: 2024-02-12
Payer: MEDICARE

## 2024-02-12 ENCOUNTER — PATIENT MESSAGE (OUTPATIENT)
Dept: NEUROSURGERY | Facility: CLINIC | Age: 49
End: 2024-02-12
Payer: MEDICARE

## 2024-02-12 DIAGNOSIS — G89.3 CANCER ASSOCIATED PAIN: ICD-10-CM

## 2024-02-12 DIAGNOSIS — G47.00 INSOMNIA, UNSPECIFIED TYPE: ICD-10-CM

## 2024-02-12 DIAGNOSIS — R53.0 NEOPLASTIC (MALIGNANT) RELATED FATIGUE: ICD-10-CM

## 2024-02-12 DIAGNOSIS — Z71.89 ADVANCED CARE PLANNING/COUNSELING DISCUSSION: ICD-10-CM

## 2024-02-12 DIAGNOSIS — R51.9 NONINTRACTABLE HEADACHE, UNSPECIFIED CHRONICITY PATTERN, UNSPECIFIED HEADACHE TYPE: ICD-10-CM

## 2024-02-12 DIAGNOSIS — R42 DIZZINESS: ICD-10-CM

## 2024-02-12 DIAGNOSIS — Z51.5 ENCOUNTER FOR PALLIATIVE CARE: ICD-10-CM

## 2024-02-12 DIAGNOSIS — R06.09 OTHER FORM OF DYSPNEA: ICD-10-CM

## 2024-02-12 DIAGNOSIS — F43.23 ADJUSTMENT DISORDER WITH MIXED ANXIETY AND DEPRESSED MOOD: ICD-10-CM

## 2024-02-12 DIAGNOSIS — R11.0 NAUSEA: ICD-10-CM

## 2024-02-12 DIAGNOSIS — R63.0 ANOREXIA: ICD-10-CM

## 2024-02-12 DIAGNOSIS — F41.9 ANXIETY: ICD-10-CM

## 2024-02-12 DIAGNOSIS — Z17.0 MALIGNANT NEOPLASM OF UPPER-INNER QUADRANT OF RIGHT BREAST IN FEMALE, ESTROGEN RECEPTOR POSITIVE: Primary | ICD-10-CM

## 2024-02-12 DIAGNOSIS — C50.211 MALIGNANT NEOPLASM OF UPPER-INNER QUADRANT OF RIGHT BREAST IN FEMALE, ESTROGEN RECEPTOR POSITIVE: Primary | ICD-10-CM

## 2024-02-12 PROCEDURE — 99215 OFFICE O/P EST HI 40 MIN: CPT | Mod: 95,,, | Performed by: STUDENT IN AN ORGANIZED HEALTH CARE EDUCATION/TRAINING PROGRAM

## 2024-02-12 NOTE — Clinical Note
Good afternoon,   I met with Ms. Elmore this afternoon. She shared that initially after  shunt placement her back pain, dizziness, headaches, etc significantly improved/resolved. She notes that over the last one to two weeks, she has noted that these symptoms are starting to occur again and worsening. She stated she was planning to reach out the NSGY team today, but wanted to give y'all both a heads up.  Thanks, Aishwarya

## 2024-02-12 NOTE — PROGRESS NOTES
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 care coordinator.       Signature: Aishwarya Portillo MD                          "

## 2024-02-15 ENCOUNTER — PATIENT MESSAGE (OUTPATIENT)
Dept: SLEEP MEDICINE | Facility: CLINIC | Age: 49
End: 2024-02-15
Payer: MEDICARE

## 2024-02-19 ENCOUNTER — OFFICE VISIT (OUTPATIENT)
Dept: HEMATOLOGY/ONCOLOGY | Facility: CLINIC | Age: 49
End: 2024-02-19
Payer: MEDICARE

## 2024-02-19 ENCOUNTER — INFUSION (OUTPATIENT)
Dept: INFUSION THERAPY | Facility: HOSPITAL | Age: 49
End: 2024-02-19
Payer: MEDICARE

## 2024-02-19 VITALS
HEART RATE: 95 BPM | TEMPERATURE: 98 F | OXYGEN SATURATION: 99 % | RESPIRATION RATE: 17 BRPM | WEIGHT: 293 LBS | BODY MASS INDEX: 50.02 KG/M2 | SYSTOLIC BLOOD PRESSURE: 105 MMHG | HEIGHT: 64 IN | DIASTOLIC BLOOD PRESSURE: 55 MMHG

## 2024-02-19 DIAGNOSIS — T45.1X5A ANEMIA ASSOCIATED WITH CHEMOTHERAPY: ICD-10-CM

## 2024-02-19 DIAGNOSIS — Z17.0 MALIGNANT NEOPLASM OF UPPER-INNER QUADRANT OF RIGHT BREAST IN FEMALE, ESTROGEN RECEPTOR POSITIVE: ICD-10-CM

## 2024-02-19 DIAGNOSIS — E11.9 TYPE 2 DIABETES MELLITUS WITHOUT COMPLICATION, WITH LONG-TERM CURRENT USE OF INSULIN: ICD-10-CM

## 2024-02-19 DIAGNOSIS — Z79.4 TYPE 2 DIABETES MELLITUS WITHOUT COMPLICATION, WITH LONG-TERM CURRENT USE OF INSULIN: ICD-10-CM

## 2024-02-19 DIAGNOSIS — G47.30 SLEEP APNEA, UNSPECIFIED TYPE: Primary | ICD-10-CM

## 2024-02-19 DIAGNOSIS — C79.51 MALIGNANT NEOPLASM METASTATIC TO BONE: ICD-10-CM

## 2024-02-19 DIAGNOSIS — R45.89 ANXIETY ABOUT HEALTH: Primary | ICD-10-CM

## 2024-02-19 DIAGNOSIS — G89.3 CANCER ASSOCIATED PAIN: ICD-10-CM

## 2024-02-19 DIAGNOSIS — C79.51 MALIGNANT NEOPLASM METASTATIC TO BONE: Primary | ICD-10-CM

## 2024-02-19 DIAGNOSIS — D64.81 ANEMIA ASSOCIATED WITH CHEMOTHERAPY: ICD-10-CM

## 2024-02-19 DIAGNOSIS — C50.211 MALIGNANT NEOPLASM OF UPPER-INNER QUADRANT OF RIGHT BREAST IN FEMALE, ESTROGEN RECEPTOR POSITIVE: ICD-10-CM

## 2024-02-19 DIAGNOSIS — I50.42 CHRONIC COMBINED SYSTOLIC AND DIASTOLIC HEART FAILURE: ICD-10-CM

## 2024-02-19 PROCEDURE — 99215 OFFICE O/P EST HI 40 MIN: CPT | Mod: S$GLB,,, | Performed by: NURSE PRACTITIONER

## 2024-02-19 PROCEDURE — 99999 PR PBB SHADOW E&M-EST. PATIENT-LVL V: CPT | Mod: PBBFAC,,, | Performed by: NURSE PRACTITIONER

## 2024-02-19 PROCEDURE — 63600175 PHARM REV CODE 636 W HCPCS: Mod: JZ,JG | Performed by: NURSE PRACTITIONER

## 2024-02-19 PROCEDURE — 96402 CHEMO HORMON ANTINEOPL SQ/IM: CPT

## 2024-02-19 RX ORDER — LORAZEPAM 1 MG/1
1 TABLET ORAL EVERY 6 HOURS PRN
Qty: 30 TABLET | Refills: 1 | Status: SHIPPED | OUTPATIENT
Start: 2024-02-19 | End: 2024-05-21

## 2024-02-19 RX ADMIN — GOSERELIN ACETATE 3.6 MG: 3.6 IMPLANT SUBCUTANEOUS at 10:02

## 2024-02-19 NOTE — PROGRESS NOTES
"Subjective     Patient ID: Kristopher Elmore is a 48 y.o. female.    Chief Complaint: Malignant neoplasm of upper-inner quadrant of right breast     HPI    Currently on Arimidex and Ibrance (dose adjusted due to anemia to 100 mg M-F, off Sat&Sun)   Zoladex and Xgeva monthly   Most recent scans stable.  Genetic testing negative.     Previous chart review.    Please save below in chart  Her Xgeva will need to be on hold  She did see the dentist at [facility] on 2/5/2024 and is on: Doxycycline, Metronidazole for antibiotics and Pentoxifylline for blood flow. I have let her know these are safe to be on.     Take doxy 100mg BID  Metro 500mg TID.       Overall feels "crummy"  Now on antibiotics and blood thinner for the osteonecrosis.   Antibiotics TID for 3 months.   Also on metronidazole.   Nausea and diarrhea with the above meds.   Vomited this morning - not a lot.  Decreased appetite with antibiotics.   Also BP low today which is not her normal.   Feels dizzy at times when standing.   Continues to have headaches and back pain. Was supposed to have an appt with neurosx but cancelled as was not feeling well.   Tired of going to appts.   No fever/chills.   No new pain issues today. She has the spinal fluid back pain and needs valve adjusted.   No further jaw pain.   Grinds her teeth  Couldn't complete sleep study but plans to reschedule. Feels Cpap will help with grinding.  Trouble sleeping - taking for jaw clenching.   Would like lorazepam refill   No bleeding.   Breathing has been good.            Last scans:  - 11/16/2023 CT C/A/P:  FINDINGS:  Base of neck: Unremarkable.  Thoracic soft tissues: No significant abnormality.  Aorta: Normal caliber.  Heart: Normal in size. No pericardial effusion.  Jane/Mediastinum: No pathologic lymphadenopathy.  Lungs: Thin bandlike density within the left lower lobe suggestive for subsegmental atelectasis or pulmonary scarring.  Otherwise, lungs are clear.  Liver: Diffusely hypodense " parenchyma suggestive for steatosis.  No focal lesion.  Portal veins are patent.  Gallbladder: Unremarkable.  Bile Ducts: No evidence of dilated ducts.  Pancreas: No mass or ductal dilatation.  Spleen: Unremarkable.  Adrenals: Unremarkable.  Kidneys/ Ureters: Small right renal cyst.  No hydronephrosis or renal stone.  No ureteral dilatation.  Bladder: No evidence of wall thickening.  Reproductive organs: Calcified uterine fibroid.  GI Tract/Mesentery: No evidence of bowel obstruction or inflammation.  Peritoneal Space: No ascites.  No free air.  Retroperitoneum: No significant adenopathy.  Abdominal wall: Mild patchy body wall edema.  Vasculature: Abdominal aorta is normal caliber.  Bones: Stable mixed lytic and sclerotic lesions throughout the spine, sternum, and pelvis.  Stable lytic focus within the left humeral head.  No definite new lesion.  No acute fracture.  Impression:  1. Stable mixed lytic and sclerotic osseous lesions.  No evidence of new metastatic disease.  2. Suspect hepatic steatosis.  3. Additional findings as above.     - 9/29/2023 MRI Brain:  FINDINGS:  Ventricles stable in size without evidence of hydrocephalus.  The brain parenchyma maintains normal signal intensity..  No new parenchymal mass effect, hemorrhage, edema or recent or remote major vascular distribution infarct.  No enhancing intracranial lesion.  No new extra-axial blood or fluid collections.  The T2 skull base flow voids are preserved.  Dural venous sinuses are patent.  Marrow heterogeneity of the clivus is stable from 2020 and not particularly suspicious for underlying lesion.  The visualized sinuses and mastoid air cells are clear.  Radiographic proptosis again noted.  Impression:  No evidence of intracranial metastatic disease.  Nonspecific marrow heterogeneity in the clivus is similar to 2020 without definite focal lesion.  Metastatic disease is thought unlikely.     - 9/29/2023 MRI C Spine:  FINDINGS:  Alignment: Straightening  of the normal cervical lordosis.  Vertebrae: Normal marrow signal. No fracture.  Discs: Mild multilevel disc height loss.  Cord: Normal.  Abnormal marrow signal and enhancement in the clivus.  Cerebellar tonsils are in their expected location.  Visualized brainstem is normal.  Vertebral artery flow voids are present.  Prevertebral soft tissues are normal.  Retropharyngeal course of the bilateral common carotid arteries.  Visualized parotid glands are unremarkable.  No cervical lymph node enlargement.  Paraspinal musculature demonstrates normal bulk and signal intensity.  Degenerative findings:  C2-C3: No neural foraminal narrowing or spinal canal stenosis.  C3-C4: No neural foraminal narrowing or spinal canal stenosis.  C4-C5: Mild left facet hypertrophy.  Mild left neural foraminal narrowing.  No spinal canal stenosis.  C5-C6: No neural foraminal narrowing or spinal canal stenosis.  C6-C7: No neural foraminal narrowing or spinal canal stenosis.  C7-T1: No neural foraminal narrowing or spinal canal stenosis.  Impression:  1. Abnormal marrow signal and enhancement in the clivus concerning for metastatic disease.  2. No evidence of metastatic disease in the cervical spine.     - 9/14/2023 MRI T/L spine:  FINDINGS:  ALIGNMENT: Sagittal alignment is maintained.  BONE: No evidence of acute fracture.  Evaluation for focal lesions limited without intravenous contrast.  Stable subtle T1 hypointense lesions involving the inferior aspect of the T10 vertebral body, inferior endplate of L2, inferior endplate of L3, as well as the right iliac wing.  No definite new lesions are identified.  Disc: Intervertebral disc heights are grossly maintained without evidence of severe disc narrowing.  No bone marrow edema.  SPINAL CANAL: The thoracic spinal cord is unremarkable.  The conus medullaris has a normal appearance and terminates at the L1-L2 level.  No epidural mass or collection.  PARASPINAL SOFT TISSUES: Stable right renal cyst.   Lower lumbar soft tissue edema.  Similar probable subcutaneous lipoma in the right upper back.  DEGENERATIVE FINDINGS:  No significant degenerative changes of the thoracic spine are identified.  There is unchanged appearance of a prominent ligamentum flavum hypertrophy at the T2-T3 level.  No high-grade spinal canal stenosis or neural foraminal narrowing.  Minor degenerative changes of the lumbar spine involving minimal broad-based posterior disc bulges at L4-L5 and L5-S1.  Mild facet arthropathy involving L3-S1 levels.  Possible small stable caudally extending left paracentral disc extrusion at L1-L2.  There is no high-grade spinal canal stenosis or neural foraminal narrowing in the lumbar spine.  Impression:  Limited evaluation without intravenous contrast.  Stable lesions involving T10, L2, L3, and the right iliac wing.  No pathologic fracture.  No evidence of epidural extension.  No new lesion  No high-grade spinal canal stenosis or neural foraminal narrowing.  Mild degenerative changes are stable from prior.  Follow-up with contrast enhanced examination, as clinically warranted.          Diagnosis:  1. Stage IV ([id number]) invasive ductal carcinoma of right breast, upper inner quadrant, ER %, TN neg, Her2 neg, Grade 3, multifocal with one lesion appearing more aggressive (Ki67 80%), large LN +, bone mets.   Ibrance dose adjusted with cytopenias   Oncologic History:   Presentation   - 9/11/19 - Screening mammogram showed multiple right breast masses lower inner quadrant   - 9/13/19 - Diagnostic mammogram and US showed a right breast, 3:00 position mass, 2 CFN measuring 17 mm x 16 mm x 14 mm. There 2 smaller adjacent masses towards the nipple   - 9/19/19 - Biopsy -   A. Right breast subareolar: Grade 3 IDC, estrogen receptor 80%, progesterone receptor 0%, Her2 dgao neg, Ki67 80%.   B. Right breast mass 3:00: Grade 3 IDC with papillary features, estrogen receptor 100%, progesterone receptor 0%, Her2 dago 1+,  "Ki67 30%.   - 9/26/19: US right axilla with abnormal lymphadenopathy measuring 4.3 x 2.8 cm with biopsy + for metastatic breast cancer.   Surgery consultation with Dr Ferris on 9/25/19   - 9/25/19 - Genetics Genetics [facility] Neda Pierrenjis pending; VUS pending   Medical oncology consultation on 10/7/19   * 10/8/19 - CT C/A/P with several lytic lesions in thoracic and lumbar spine, iliac bones, left scapula in addition to 3 x 4.5 cm right axillary node and 2.1 cm right breast mass. Bone scan negative.   * 10/31/19 - started Ibrance, Arimidex, Zoladex; plan for Xgeva.   * 11/12/19 STRATA without targetable mutations.   * 12/16/19 - Bone biopsy + for met disease (initially read as negative, but we treated as metastatic disease given high suspicion).   * MRI brain: "Partially empty sella. Question bilateral globe proptosis. Otherwise unremarkable MRI brain as detailed above specifically without evidence for intracranial enhancing lesion to suggest metastatic disease."   * 4/22/2020 PET - disease improvement. 8/2020 PET with disease improvement.   * 9/1/20 - Palliative RT to L1-L4   Of note, * Xgeva monthly - we had been waiting on dental clearance, but she has been unable to get into dentistry and is accepting of risks. Has no active dental disease.   PET scan 4/22/2021:   FINDINGS:   Quality of the study: Mildly degraded due to patient's large body habitus and skin abutting the gantry.   In the head and neck, there are no hypermetabolic lesions worrisome for malignancy. There are no hypermetabolic mucosal lesions, and there are no pathologically enlarged or hypermetabolic lymph nodes.   In the chest, there are no hypermetabolic lesions worrisome for malignancy.   Stable CT appearance of a level 1 right axillary lymph node measuring 1.3 cm in short axis with normal background radiotracer uptake. Previously with SUV max of 2.1.   There are no concerning pulmonary nodules or masses, and there are no pathologically " enlarged or hypermetabolic lymph nodes.   In the abdomen and pelvis, there is physiologic tracer distribution within the abdominal organs and excretion into the genitourinary system.   In the bones, there are stable lytic lesions throughout the lumbar spine and pelvis and additional stable sclerotic lesions in the sternum and T3 vertebral body. No associated focal abnormal increased radiotracer uptake. Findings likely represent treated disease.   Impression:   Interval decreased uptake within a prominent right axillary lymph node, now with normal background uptake. No new focal abnormal uptake.   Stable lytic and sclerotic lesions without focal abnormal uptake. Findings are compatible with treated metastasis.   8/25/2021 MRI brain   Impression:   No significant change from prior specifically without evidence for enhancing lesion to suggest intracranial metastatic disease.   Continued partially empty sella, intracranial hypertension to be included in differential in the appropriate clinical setting. Clinical correlation and further evaluation as warranted.   10/14/2021 MRI thoracic/lumbar spine:   Impression:   Patient history of metastatic breast cancer.   Bone lesions at T10, L2, L3, and right iliac wing, as above, concerning for metastatic disease. No pathologic fracture, soft tissue component, or epidural extension identified.   10/25/2021 CT chest/abd/pelvis:   Impression:   1. Stable metastasis of the spine   No evidence of intra-abdominal or intrathoracic metastatic disease   2. Stable prominent right axillary lymph node.   3. Additional findings are detailed above.   Currently on Arimidex and Ibrance   Zoladex and Xgeva monthly   - 2/2/2022 Bone scan:   FINDINGS:   No focal abnormal uptake suggestive of osseous metastases. There is diffusely increased uptake in the calvarium in keeping with hyperostosis. There is degenerative type uptake most prominent in the bilateral shoulders and knees. The focal uptake  previously described in the distal right femur is not visualized. There is otherwise physiologic distribution of the radiopharmaceutical throughout the skeleton.   There is normal uptake in the genitourinary system and soft tissues.   Impression:   There is no scintigraphic evidence of osteoblastic metastatic disease.   Nonspecific uptake in the distal right femur has resolved.   Diffuse cranial hyperostosis and other findings as above.   - 2/1/2022 CT C/A/P:   FINDINGS:   CHEST   Support tubes and lines: None.   Aorta: Normal caliber.   Heart: Normal size..   Coronary arteries: No calcifications.   Pericardium: Normal. No effusion, thickening, or calcification.   Central pulmonary arteries: Normal caliber.   Base of neck/thyroid: Normal.   Lymph nodes: No supraclavicular, axillary, internal mammary, mediastinal or hilar lymphadenopathy.   Esophagus: Normal.   Pleura: No effusion, thickening or calcification.   Body wall: Unremarkable.   Airways: Normal.   Lungs: Clear without focal or diffuse abnormality.   Bones: Sclerotic lesions are seen throughout the spine as well as in the sternum, not substantially changed from 10/25/2021   ABDOMEN/PELVIS   Liver: Unremarkable   Gallbladder/bile ducts: Unremarkable. No intra or extrahepatic biliary ductal dilatation   Pancreas: Unremarkable.   Spleen: Unremarkable.   Adrenals: Unremarkable.   Kidneys: Simple cyst in the right kidney is unchanged   Lymph nodes: No abdominal or pelvic lymphadenopathy.   Bowel and mesentery: Unremarkable.   Abdominal aorta: Unremarkable.   Inferior vena cava: Unremarkable.   Free fluid or free air: None.   Pelvis: Unremarkable.   Urinary bladder: Unremarkable.   Body wall: Unremarkable.   Bones: Sclerotic lesions seen in the spine are unchanged.   Impression:   1. No evidence of new recurrent or metastatic disease.   2. Sclerotic lesions seen in the spine and sternum, unchanged when compared to 10/25/2021.      Genetic testing 3/23/2023                - 7/5/2023 CT C/A/P:  FINDINGS:  Some peripheral portions of the body wall are not covered on exam field of view due to patient body habitus  Base of neck: Unremarkable.  Thoracic soft tissues: No significant abnormality.  Aorta: Normal caliber.  Heart: Normal in size. No pericardial effusion.  Jane/Mediastinum: No pathologic lymphadenopathy.  Lungs: Well aerated, without consolidation or pleural fluid.  Liver: Diffusely hypodense parenchyma suggestive for steatosis.  No focal lesion.  Portal veins are patent.  Gallbladder: Unremarkable.  Bile Ducts: No evidence of dilated ducts.  Pancreas: No mass or ductal dilatation.  Spleen: Unremarkable.  Adrenals: Unremarkable.  Kidneys/ Ureters: Small right renal cyst.  No hydronephrosis or nephrolithiasis.  No ureteral dilatation.  Bladder: No evidence of wall thickening.  Reproductive organs: Calcified uterine fibroid.  GI Tract/Mesentery: No evidence of bowel obstruction or inflammation.  Peritoneal Space: No ascites.  No free air.  Retroperitoneum: No significant adenopathy.  Abdominal wall: Minimal body wall edema.  Vasculature: Abdominal aorta is normal caliber.  Bones: Stable mixed lytic and sclerotic lesions throughout the spine, sternum, and right iliac wing.  No definite new lesion.  No acute fracture.  Impression:  1. Stable mixed lytic and sclerotic osseous lesions.  No evidence of new metastatic disease.  2. Additional findings as above.    Review of Systems   Constitutional:         See above   All other systems reviewed and are negative.         Objective     Physical Exam  Vitals and nursing note reviewed.   Constitutional:       Appearance: Normal appearance. She is obese.      Comments: Presents with family  ECOG = 0  In WC but able to ambulate to chair without difficulty.    HENT:      Mouth/Throat:      Mouth: Mucous membranes are dry.      Comments: Geographic tongue  Eyes:      Pupils: Pupils are equal, round, and reactive to light.    Cardiovascular:      Rate and Rhythm: Normal rate and regular rhythm.   Pulmonary:      Effort: Pulmonary effort is normal.      Breath sounds: Normal breath sounds.   Abdominal:      Palpations: Abdomen is soft.      Comments: Limited exam as sitting in chair.    Musculoskeletal:         General: No swelling.      Comments: No spinal or paraspinal tenderness.    Lymphadenopathy:      Cervical: No cervical adenopathy.   Skin:     General: Skin is warm and dry.      Findings: No bruising or rash.   Neurological:      Mental Status: She is alert and oriented to person, place, and time.   Psychiatric:         Mood and Affect: Mood normal.         Behavior: Behavior normal.       Labs reviewed     Assessment and Plan     1. Anxiety about health  -     LORazepam (ATIVAN) 1 MG tablet; Take 1 tablet (1 mg total) by mouth every 6 (six) hours as needed for Anxiety.  Dispense: 30 tablet; Refill: 1    2. Malignant neoplasm of upper-inner quadrant of right breast in female, estrogen receptor positive    3. Bone metastases  Overview:  IMO Regualtory Update 4/1/23      4. Anemia associated with chemotherapy    5. Cancer associated pain    6. Type 2 diabetes mellitus without complication, with long-term current use of insulin  Overview:  A1c 8.5 11/3/2021. Cont Glipizide 10 mg BID. Sending rx for Trulicity to [facility] pharmacy with pharmacy assistance referral. Podiatry referral per pt request      7. Chronic combined systolic and diastolic heart failure        Patient presents for follow up and not feeling too well- slightly dehydrated from decreased appetite and diarrhea. This is likely from the antibiotics and metronidazole.   Encouraged hydration today. Will not give IV hydration due to cardiac issues.   Stop laxative. If diarrhea continues or gets worse, we need to know.   Take zofran for nausea.   She will message me in a couple of days if not better or is she develops any new or worsening symptoms.   Zoladex today and  monthly  ECHO scheduled for Thursday.   Needs to see neurosurgery.   F/u with dentist 3/2/2024. Will message to see if can reduce metronidazole to BID instead of TID  Refill lorazepam  Will reschedule sleep study as likely needs CPAP      Route Chart for Scheduling    Med Onc Chart Routing      Follow up with physician 4 weeks. as previously sent.   Follow up with [facility]    Infusion scheduling note    Injection scheduling note zoladex monthly   Labs    Imaging    Pharmacy appointment    Other referrals                  Treatment Plan Information   OP ANASTROZOLE PALBOCICLIB Q4W   Jessica Mendez MD   Upcoming Treatment Dates - OP ANASTROZOLE PALBOCICLIB Q4W    No upcoming days in selected categories.    Supportive Plan Information  OP BREAST GOSERELIN & DENOSUMAB Q4W   Jessica Mendez MD   Upcoming Treatment Dates - OP BREAST GOSERELIN & DENOSUMAB Q4W    No upcoming days in selected categories.    Therapy Plan Information  EPINEPHrine (EPIPEN) 0.3 mg/0.3 mL pen injection 0.3 mg  0.3 mg, Intramuscular, PRN  diphenhydrAMINE injection 50 mg  50 mg, Intravenous, PRN  methylPREDNISolone sodium succinate injection 125 mg  125 mg, Intravenous, PRN  sodium chloride 0.9% bolus 1,000 mL 1,000 mL  1,000 mL, Intravenous, PRN      Patient is in agreement with the proposed treatment plan. All questions were answered to the patient's satisfaction. Pt knows to call clinic for any new or worsening symptoms and if anything is needed before the next clinic visit.    Alfredo Bhatt, MSN, APRN, FNP-C  Nurse Practitioner to Dr. Phyllis Lei  [facility]  [facility]  Hematology & Medical Oncology  [address] [place] [address]  ph. [phone] ext [phone]  Fax. [phone]              45 minutes of total time spent on the encounter, which includes face to face time and non-face to face time preparing to see the patient (eg, review of tests), Obtaining and/or reviewing separately  obtained history, Documenting clinical information in the electronic or other health record, Independently interpreting results (not separately reported) and communicating results to the patient/family/caregiver, or Care coordination (not separately reported).

## 2024-02-22 ENCOUNTER — OFFICE VISIT (OUTPATIENT)
Dept: OPHTHALMOLOGY | Facility: CLINIC | Age: 49
End: 2024-02-22
Payer: MEDICARE

## 2024-02-22 ENCOUNTER — HOSPITAL ENCOUNTER (OUTPATIENT)
Dept: CARDIOLOGY | Facility: HOSPITAL | Age: 49
Discharge: HOME | End: 2024-02-22
Attending: INTERNAL MEDICINE
Payer: MEDICARE

## 2024-02-22 ENCOUNTER — CLINICAL SUPPORT (OUTPATIENT)
Dept: OPHTHALMOLOGY | Facility: CLINIC | Age: 49
End: 2024-02-22
Payer: MEDICARE

## 2024-02-22 VITALS
BODY MASS INDEX: 50.02 KG/M2 | WEIGHT: 293 LBS | DIASTOLIC BLOOD PRESSURE: 55 MMHG | SYSTOLIC BLOOD PRESSURE: 105 MMHG | HEIGHT: 64 IN | HEART RATE: 70 BPM

## 2024-02-22 DIAGNOSIS — Z17.0 MALIGNANT NEOPLASM OF UPPER-INNER QUADRANT OF RIGHT BREAST IN FEMALE, ESTROGEN RECEPTOR POSITIVE: ICD-10-CM

## 2024-02-22 DIAGNOSIS — M89.8X8 MASS OF SPINE: ICD-10-CM

## 2024-02-22 DIAGNOSIS — G93.2 IIH (IDIOPATHIC INTRACRANIAL HYPERTENSION): Primary | ICD-10-CM

## 2024-02-22 DIAGNOSIS — C50.211 MALIGNANT NEOPLASM OF UPPER-INNER QUADRANT OF RIGHT BREAST IN FEMALE, ESTROGEN RECEPTOR POSITIVE: ICD-10-CM

## 2024-02-22 DIAGNOSIS — I42.8 NON-ISCHEMIC CARDIOMYOPATHY: ICD-10-CM

## 2024-02-22 DIAGNOSIS — Z98.2 VENTRICULAR SHUNT IN PLACE: ICD-10-CM

## 2024-02-22 DIAGNOSIS — M84.48XD: ICD-10-CM

## 2024-02-22 LAB
ASCENDING AORTA: 3.3 CM
AV INDEX (PROSTH): 0.8
AV MEAN GRADIENT: 5 MMHG
AV PEAK GRADIENT: 8 MMHG
AV VALVE AREA BY VELOCITY RATIO: 3.41 CM²
AV VALVE AREA: 3.19 CM²
AV VELOCITY RATIO: 0.86
BSA FOR ECHO PROCEDURE: 2.55 M2
CV ECHO LV RWT: 0.32 CM
DOP CALC AO PEAK VEL: 1.4 M/S
DOP CALC AO VTI: 21.97 CM
DOP CALC LVOT AREA: 4 CM2
DOP CALC LVOT DIAMETER: 2.25 CM
DOP CALC LVOT PEAK VEL: 1.2 M/S
DOP CALC LVOT STROKE VOLUME: 70.1 CM3
DOP CALCLVOT PEAK VEL VTI: 17.64 CM
E WAVE DECELERATION TIME: 116.83 MSEC
E/A RATIO: 0.61
E/E' RATIO: 4.24 M/S
ECHO LV POSTERIOR WALL: 1 CM (ref 0.6–1.1)
EJECTION FRACTION: 30 %
FRACTIONAL SHORTENING: 21 % (ref 28–44)
INTERVENTRICULAR SEPTUM: 0.99 CM (ref 0.6–1.1)
IVRT: 97.05 MSEC
LA MAJOR: 5.39 CM
LA MINOR: 4.95 CM
LA WIDTH: 3.94 CM
LEFT ATRIUM SIZE: 3.6 CM
LEFT ATRIUM VOLUME INDEX MOD: 23.9 ML/M2
LEFT ATRIUM VOLUME INDEX: 26.1 ML/M2
LEFT ATRIUM VOLUME MOD: 57 CM3
LEFT ATRIUM VOLUME: 62.22 CM3
LEFT INTERNAL DIMENSION IN SYSTOLE: 5 CM (ref 2.1–4)
LEFT VENTRICLE DIASTOLIC VOLUME INDEX: 74.9 ML/M2
LEFT VENTRICLE DIASTOLIC VOLUME: 178.26 ML
LEFT VENTRICLE MASS INDEX: 112 G/M2
LEFT VENTRICLE SYSTOLIC VOLUME INDEX: 49.6 ML/M2
LEFT VENTRICLE SYSTOLIC VOLUME: 118 ML
LEFT VENTRICULAR INTERNAL DIMENSION IN DIASTOLE: 6.3 CM (ref 3.5–6)
LEFT VENTRICULAR MASS: 266.57 G
LV LATERAL E/E' RATIO: 4 M/S
LV SEPTAL E/E' RATIO: 4.5 M/S
MV A" WAVE DURATION": 17.13 MSEC
MV PEAK A VEL: 0.59 M/S
MV PEAK E VEL: 0.36 M/S
MV STENOSIS PRESSURE HALF TIME: 33.88 MS
MV VALVE AREA P 1/2 METHOD: 6.49 CM2
PULM VEIN S/D RATIO: 1.31
PV PEAK D VEL: 0.26 M/S
PV PEAK S VEL: 0.34 M/S
RA MAJOR: 4.49 CM
RA PRESSURE ESTIMATED: 0 MMHG
RA WIDTH: 2.66 CM
RIGHT VENTRICULAR END-DIASTOLIC DIMENSION: 2.73 CM
SINUS: 3.28 CM
STJ: 2.91 CM
TDI LATERAL: 0.09 M/S
TDI SEPTAL: 0.08 M/S
TDI: 0.09 M/S
TRICUSPID ANNULAR PLANE SYSTOLIC EXCURSION: 1.98 CM
Z-SCORE OF LEFT VENTRICULAR DIMENSION IN END DIASTOLE: -5.08
Z-SCORE OF LEFT VENTRICULAR DIMENSION IN END SYSTOLE: -1.8

## 2024-02-22 PROCEDURE — 93306 TTE W/DOPPLER COMPLETE: CPT | Mod: 26,,, | Performed by: INTERNAL MEDICINE

## 2024-02-22 PROCEDURE — 99204 OFFICE O/P NEW MOD 45 MIN: CPT | Mod: S$GLB,,, | Performed by: OPHTHALMOLOGY

## 2024-02-22 PROCEDURE — 92083 EXTENDED VISUAL FIELD XM: CPT | Mod: S$GLB,,, | Performed by: OPHTHALMOLOGY

## 2024-02-22 PROCEDURE — 99999 PR PBB SHADOW E&M-EST. PATIENT-LVL III: CPT | Mod: PBBFAC,,, | Performed by: OPHTHALMOLOGY

## 2024-02-22 PROCEDURE — 92133 CPTRZD OPH DX IMG PST SGM ON: CPT | Mod: S$GLB,,, | Performed by: OPHTHALMOLOGY

## 2024-02-22 PROCEDURE — 93306 TTE W/DOPPLER COMPLETE: CPT

## 2024-02-22 RX ORDER — PENTOXIFYLLINE 400 MG/1
400 TABLET, EXTENDED RELEASE ORAL 2 TIMES DAILY
COMMUNITY
Start: 2024-02-06

## 2024-02-22 RX ORDER — DOXYCYCLINE 100 MG/1
100 CAPSULE ORAL 2 TIMES DAILY
COMMUNITY
Start: 2024-02-05

## 2024-02-22 RX ORDER — TIZANIDINE 4 MG/1
4 TABLET ORAL NIGHTLY
COMMUNITY
Start: 2024-02-01 | End: 2024-05-15 | Stop reason: SDUPTHER

## 2024-02-22 RX ORDER — PROCHLORPERAZINE MALEATE 5 MG
5 TABLET ORAL 4 TIMES DAILY
COMMUNITY
Start: 2024-02-07

## 2024-02-22 RX ORDER — METRONIDAZOLE 500 MG/1
500 TABLET ORAL 3 TIMES DAILY
COMMUNITY
Start: 2024-02-05

## 2024-02-22 NOTE — PATIENT INSTRUCTIONS
I found no papilledema or other ocular pathology that would indicate elevated intracranial pressure. She will return to the neuro-ophthalmology service as needed.

## 2024-02-22 NOTE — LETTER
[facility] [address]  [address]  [facility] [address]  Phone: [phone]  Fax: [phone]   February 22, 2024    Jayme Villa MD  [id number]  [facility] [address]    Patient: Kristopher Elmore   MR Number: [id number]   YOB: 1975   Date of Visit: 2/22/2024       Dear Dr. Villa:    Thank you for referring Kristopher Elmore to me for evaluation. Here is my assessment and plan of care:    Assessment/Plan    For exam results, see Encounter Report.    IIH (idiopathic intracranial hypertension)  -     Waller Visual Field - OU - Extended - Both Eyes; Future  -     Posterior Segment OCT Optic Nerve- Both eyes; Future    Mass of spine    Pathological fracture of lumbar vertebra with routine healing    Ventricular shunt in place      I found no papilledema or other ocular pathology that would indicate elevated intracranial pressure. She will return to the neuro-ophthalmology service as needed.           Below you will find my full exam findings. If you have questions, please do not hesitate to call me. I look forward to following Ms. Kristopher Elmore along with you.    Sincerely,  \        Patrick Case MD       CC  No Recipients             Base Eye Exam       Visual Acuity (Snellen - Linear)         Right Left    Dist sc 20/30 +1 20/40 -1    Dist ph sc 20/20 20/30 -2              Tonometry (Tonopen, 2:55 PM)         Right Left    Pressure 17 18              Pupils         Dark Light Shape React APD    Right 3 2 Round Brisk None    Left 3 2 Round Brisk None              Visual Fields (Counting fingers)         Right Left     Full Full   See HVF report             Extraocular Movement         Right Left     Full, Ortho Full, Ortho              Neuro/Psych       Oriented x3: Yes    Mood/Affect: Normal              Dilation       Both eyes: 2.5% Phenylephrine, 1% Mydriacyl @ 2:54 PM                  Slit Lamp and Fundus Exam       External Exam          Right Left    External Normal Normal              Slit Lamp Exam         Right Left    Lids/Lashes Normal Normal    Conjunctiva/Sclera White and quiet White and quiet    Cornea Clear Clear    Anterior Chamber Deep and quiet Deep and quiet    Iris Round and reactive Round and reactive    Lens 1+ Nuclear sclerosis 1+ Nuclear sclerosis    Vitreous Normal Normal              Fundus Exam         Right Left    Disc Normal Normal    C/D Ratio 0.4 0.4    Macula Normal Normal    Vessels Normal Normal    Periphery Normal Normal

## 2024-02-22 NOTE — LETTER
[address]  [address]  [facility] [address]  Phone: [phone]  Fax: [phone]   February 22, 2024    Jayme Villa MD  [address]  [facility] [address]    Patient: Kristopher Elmore   MR Number: [id number]   YOB: 1975   Date of Visit: 2/22/2024       Dear Dr. Villa:    Thank you for referring Kristopher Elmore to me for evaluation. Here is my assessment and plan of care:    Assessment/Plan    For exam results, see Encounter Report.    IIH (idiopathic intracranial hypertension)  -     Waller Visual Field - OU - Extended - Both Eyes; Future  -     Posterior Segment OCT Optic Nerve- Both eyes; Future    Mass of spine    Pathological fracture of lumbar vertebra with routine healing    Ventricular shunt in place      I found no papilledema or other ocular pathology that would indicate elevated intracranial pressure. She will return to the neuro-ophthalmology service as needed.             Below you will find my full exam findings. If you have questions, please do not hesitate to call me. I look forward to following Ms. Kristopher Elmore along with you.    Sincerely,        Patrick Case MD       CC  No Recipients             Base Eye Exam       Visual Acuity (Snellen - Linear)         Right Left    Dist sc 20/30 +1 20/40 -1    Dist ph sc 20/20 20/30 -2              Tonometry (Tonopen, 2:55 PM)         Right Left    Pressure 17 18              Pupils         Dark Light Shape React APD    Right 3 2 Round Brisk None    Left 3 2 Round Brisk None              Visual Fields (Counting fingers)         Right Left     Full Full   See HVF report             Extraocular Movement         Right Left     Full, Ortho Full, Ortho              Neuro/Psych       Oriented x3: Yes    Mood/Affect: Normal              Dilation       Both eyes: 2.5% Phenylephrine, 1% Mydriacyl @ 2:54 PM                  Slit Lamp and Fundus Exam       External Exam         Right  Left    External Normal Normal              Slit Lamp Exam         Right Left    Lids/Lashes Normal Normal    Conjunctiva/Sclera White and quiet White and quiet    Cornea Clear Clear    Anterior Chamber Deep and quiet Deep and quiet    Iris Round and reactive Round and reactive    Lens 1+ Nuclear sclerosis 1+ Nuclear sclerosis    Vitreous Normal Normal              Fundus Exam         Right Left    Disc Normal Normal    C/D Ratio 0.4 0.4    Macula Normal Normal    Vessels Normal Normal    Periphery Normal Normal

## 2024-02-22 NOTE — PROGRESS NOTES
HPI    Referred by //Dr.Shannon Hickey   S/P VPS placed x 6 weeks.  Recently dx w/IIH  Patient states OU vision blurry at near.  No eye pain    Review HVF/OCT    I have personally interviewed the patient, reviewed the history and   examined the patient and agree with the technician's exam.    Last edited by Patrick Case MD on 2/22/2024  2:33 PM.            Assessment /Plan     For exam results, see Encounter Report.    IIH (idiopathic intracranial hypertension)  -     Waller Visual Field - OU - Extended - Both Eyes; Future  -     Posterior Segment OCT Optic Nerve- Both eyes; Future    Mass of spine    Pathological fracture of lumbar vertebra with routine healing    Ventricular shunt in place      I found no papilledema or other ocular pathology that would indicate elevated intracranial pressure. She will return to the neuro-ophthalmology service as needed.

## 2024-02-22 NOTE — LETTER
[address] [address] [address]  [address]  [facility] [address]  Phone: [phone]  Fax: [phone]   February 22, 2024    Jayme Villa MD  [address]  [facility] [address]    Patient: Kristopher Elmore   MR Number: [id number]   YOB: 1975   Date of Visit: 2/22/2024       Dear Dr. Villa:    Thank you for referring Kristopher Elmore to me for evaluation. Here is my assessment and plan of care:    Assessment/Plan    For exam results, see Encounter Report.    IIH (idiopathic intracranial hypertension)  -     Waller Visual Field - OU - Extended - Both Eyes; Future  -     Posterior Segment OCT Optic Nerve- Both eyes; Future    Mass of spine    Pathological fracture of lumbar vertebra with routine healing    Ventricular shunt in place      I found no papilledema or other ocular pathology that would indicate elevated intracranial pressure. She will return to the neuro-ophthalmology service as needed.           Below you will find my full exam findings. If you have questions, please do not hesitate to call me. I look forward to following Ms. Kristopher Elmore along with you.    Sincerely,        Patrick Case MD       CC    No Recipients             Base Eye Exam       Visual Acuity (Snellen - Linear)         Right Left    Dist sc 20/30 +1 20/40 -1    Dist ph sc 20/20 20/30 -2              Tonometry (Tonopen, 2:55 PM)         Right Left    Pressure 17 18              Pupils         Dark Light Shape React APD    Right 3 2 Round Brisk None    Left 3 2 Round Brisk None              Visual Fields (Counting fingers)         Right Left     Full Full   See HVF report             Extraocular Movement         Right Left     Full, Ortho Full, Ortho              Neuro/Psych       Oriented x3: Yes    Mood/Affect: Normal              Dilation       Both eyes: 2.5% Phenylephrine, 1% Mydriacyl @ 2:54 PM                  Slit Lamp and Fundus Exam       External Exam         Right  Left    External Normal Normal              Slit Lamp Exam         Right Left    Lids/Lashes Normal Normal    Conjunctiva/Sclera White and quiet White and quiet    Cornea Clear Clear    Anterior Chamber Deep and quiet Deep and quiet    Iris Round and reactive Round and reactive    Lens 1+ Nuclear sclerosis 1+ Nuclear sclerosis    Vitreous Normal Normal              Fundus Exam         Right Left    Disc Normal Normal    C/D Ratio 0.4 0.4    Macula Normal Normal    Vessels Normal Normal    Periphery Normal Normal

## 2024-02-22 NOTE — PROGRESS NOTES
VISUAL FIELD TEST 24-2 SFAST-OU-DONE/AD  OD-REL-FIX-COOP-GOOD/AD  OS-REL-GOOD-FIX-POOR-COOP-GOOD/AD      PT HAS NO KNOWN ALLERGIES TO LATEX OR ADHESIVES.AD

## 2024-02-23 NOTE — PROGRESS NOTES
Wound Check   Neurosurgery     Kendall Cristina Elmore is a 48 y.o. female who is s/p VPS by Dr. Villa on 12/14/23 for IIH who presents to clinic today for her 6 week follow up with CTH. Patient is doing excellent and reports no headaches or vision changes. She overall is doing very well. Denies fevers, chills, night sweats or N/V.       Physical Exam:   General: well developed, well nourished, no distress  Neurologic: Alert and oriented. Thought content appropriate.   GCS: Motor: 6/Verbal: 5/Eyes: 4 GCS Total: 15   Mental Status: Awake, Alert, Oriented x3   Cranial nerves: face symmetric, tongue midline, pupils equal, round, reactive to light with accomodation, EOMI.   Motor Strength: moves all extremities with good strength and tone   Sensation: response to light touch throughout  No gait disturbances   Incision is clean, dry and intact and healing well .       Vitals:    01/31/24 1054   BP: 106/75   Pulse: 72       I have personally reviewed imaging and agree with the findings    CTH (1/31/24): Right-sided ventricular shunt in place. Tip of the catheter in stable position. Ventricles are stable in size and configuration, with essentially decompression of the right lateral ventricle and subtle leftward shift. 3rd ventricle diameter again measuring on the order of 0.2 cm. No hydrocephalus. Stable sulcal definition over the cerebral convexities.       Assessment/Plan:     49 y/o female with IIH who is 6 week post op of VPS.     -I would like the patient to follow-up in clinic PRN. I have encouraged her to contact the clinic with any questions, concerns, or adverse clinical changes. She verbalized understanding.       Lainey Hitchcock PA-C  Neurosurgery   [facility]

## 2024-02-27 ENCOUNTER — CLINICAL SUPPORT (OUTPATIENT)
Dept: ENDOSCOPY | Facility: HOSPITAL | Age: 49
End: 2024-02-27
Attending: STUDENT IN AN ORGANIZED HEALTH CARE EDUCATION/TRAINING PROGRAM
Payer: MEDICARE

## 2024-02-27 DIAGNOSIS — Z12.11 SPECIAL SCREENING FOR MALIGNANT NEOPLASMS, COLON: Primary | ICD-10-CM

## 2024-02-27 DIAGNOSIS — Z12.11 COLON CANCER SCREENING: ICD-10-CM

## 2024-02-27 NOTE — PLAN OF CARE
Spoke to pt. To schedule Colonoscopy for Screening. No availability at present. New call back appt. Made for pt.  Pt. Verbalized understanding

## 2024-02-28 ENCOUNTER — TELEPHONE (OUTPATIENT)
Dept: OPTOMETRY | Facility: CLINIC | Age: 49
End: 2024-02-28
Payer: MEDICARE

## 2024-02-28 NOTE — TELEPHONE ENCOUNTER
----- Message from Leonides Garsia sent at 2/28/2024  9:40 AM CST -----  Regarding: appointment access  Contact: [phone]  Pt is calling to schedule eye exam I tried to schedule there was no appointments showing up on line. Please contact pt with appointment.

## 2024-03-01 ENCOUNTER — PATIENT MESSAGE (OUTPATIENT)
Dept: ADMINISTRATIVE | Facility: OTHER | Age: 49
End: 2024-03-01
Payer: MEDICARE

## 2024-03-04 ENCOUNTER — HOSPITAL ENCOUNTER (OUTPATIENT)
Dept: RADIOLOGY | Facility: HOSPITAL | Age: 49
Discharge: HOME | End: 2024-03-04
Attending: PHYSICIAN ASSISTANT
Payer: MEDICARE

## 2024-03-04 ENCOUNTER — OFFICE VISIT (OUTPATIENT)
Dept: NEUROSURGERY | Facility: CLINIC | Age: 49
End: 2024-03-04
Payer: MEDICARE

## 2024-03-04 VITALS — DIASTOLIC BLOOD PRESSURE: 70 MMHG | SYSTOLIC BLOOD PRESSURE: 102 MMHG | HEART RATE: 82 BPM

## 2024-03-04 DIAGNOSIS — G93.2 IIH (IDIOPATHIC INTRACRANIAL HYPERTENSION): ICD-10-CM

## 2024-03-04 DIAGNOSIS — G93.2 IIH (IDIOPATHIC INTRACRANIAL HYPERTENSION): Primary | ICD-10-CM

## 2024-03-04 DIAGNOSIS — G93.2 IDIOPATHIC INTRACRANIAL HYPERTENSION: ICD-10-CM

## 2024-03-04 PROCEDURE — 70250 X-RAY EXAM OF SKULL: CPT | Mod: 26,,, | Performed by: RADIOLOGY

## 2024-03-04 PROCEDURE — 99999 PR PBB SHADOW E&M-EST. PATIENT-LVL IV: CPT | Mod: PBBFAC,,, | Performed by: PHYSICIAN ASSISTANT

## 2024-03-04 PROCEDURE — 62252 CSF SHUNT REPROGRAM: CPT | Mod: S$GLB,,, | Performed by: PHYSICIAN ASSISTANT

## 2024-03-04 PROCEDURE — 70450 CT HEAD/BRAIN W/O DYE: CPT | Mod: TC

## 2024-03-04 PROCEDURE — 70250 X-RAY EXAM OF SKULL: CPT | Mod: TC

## 2024-03-04 PROCEDURE — 99214 OFFICE O/P EST MOD 30 MIN: CPT | Mod: 25,S$GLB,, | Performed by: PHYSICIAN ASSISTANT

## 2024-03-04 PROCEDURE — 70450 CT HEAD/BRAIN W/O DYE: CPT | Mod: 26,,, | Performed by: RADIOLOGY

## 2024-03-08 ENCOUNTER — HOSPITAL ENCOUNTER (OUTPATIENT)
Dept: SLEEP MEDICINE | Facility: HOSPITAL | Age: 49
Discharge: HOME | End: 2024-03-08
Attending: PSYCHIATRY & NEUROLOGY
Payer: MEDICARE

## 2024-03-08 DIAGNOSIS — G47.30 SLEEP APNEA, UNSPECIFIED TYPE: ICD-10-CM

## 2024-03-08 DIAGNOSIS — G47.33 OSA (OBSTRUCTIVE SLEEP APNEA): Primary | ICD-10-CM

## 2024-03-08 PROCEDURE — 95810 POLYSOM 6/> YRS 4/> PARAM: CPT

## 2024-03-08 NOTE — Clinical Note
"ellen See imported Detailed Sleep Study Reports with raw data in  "Chart Review" under the "Media tab".    (This Sleep Study was interpreted by a Board Certified Sleep Specialist who conducted an epoch-by-epoch review of the entire raw data recording.)   (The indication for this sleep study was reviewed and deemed appropriate by AASM Practice Parameters or other reasons by a Board Certified Sleep Specialist.)  "

## 2024-03-09 NOTE — PROGRESS NOTES
A diagnostic study was performed on  Kristopher Elmore. The entire procedure was explained, including Bio calibration procedure, patient was informed that there may be a need to enter the room during the night to fix lead or make adjustments to the equipment. Questions were answered prior to start of study. She was given instructions on how to call out for help including how to use the nurse call unit in the bathroom. Patient was given [employer] phone number to call if patient needed tech for anything, in case tech was out of tech room.  Patient education was performed. This includes the possible use of CPAP machine and different CPAP masks. She was given the after visit summary.   The lowest oxygen saturation observed during sleep was 88%   She did not meet criteria for a split night study due to insufficient amount of events during the 1st part of the study.  The EKG appeared to be NSR  Technical difficulties during the study: Tir for artifact, adjust ac, tape and fix cannula multiple times.   Pt has a shunt in her head  Moderate to loud snoring was observed. Supine Rem was not observed pt was asked to turn supine but turned back on her side before REM. The patient says she sleeps on her side.

## 2024-03-11 ENCOUNTER — PATIENT MESSAGE (OUTPATIENT)
Dept: CARDIOLOGY | Facility: CLINIC | Age: 49
End: 2024-03-11
Payer: MEDICARE

## 2024-03-11 ENCOUNTER — PATIENT MESSAGE (OUTPATIENT)
Dept: PSYCHIATRY | Facility: CLINIC | Age: 49
End: 2024-03-11
Payer: MEDICARE

## 2024-03-18 ENCOUNTER — PATIENT OUTREACH (OUTPATIENT)
Dept: ADMINISTRATIVE | Facility: HOSPITAL | Age: 49
End: 2024-03-18
Payer: MEDICARE

## 2024-03-18 ENCOUNTER — PATIENT MESSAGE (OUTPATIENT)
Dept: ENDOCRINOLOGY | Facility: CLINIC | Age: 49
End: 2024-03-18
Payer: MEDICARE

## 2024-03-18 ENCOUNTER — PATIENT MESSAGE (OUTPATIENT)
Dept: ADMINISTRATIVE | Facility: HOSPITAL | Age: 49
End: 2024-03-18
Payer: MEDICARE

## 2024-03-18 DIAGNOSIS — Z12.31 ENCOUNTER FOR MAMMOGRAM TO ESTABLISH BASELINE MAMMOGRAM: Primary | ICD-10-CM

## 2024-03-18 NOTE — PROGRESS NOTES
Health Maintenance Due   Topic Date Due    Pneumococcal Vaccines (Age 0-64) (1 of 2 - PCV) Never done    Mammogram  09/26/2020    Colorectal Cancer Screening  Never done    Cervical Cancer Screening  09/11/2022    COVID-19 Vaccine (4 - 2023-24 season) 09/01/2023    Colorectal v/v scheduled 04/2024,OB/GYN appointment 07/2024, mailed [facility] appointment reminder.Patient has BREAST CANCER she has Ct done every 3 months. She will talk to her doctor before scheduling Mammogram

## 2024-03-19 ENCOUNTER — LAB VISIT (OUTPATIENT)
Dept: LAB | Facility: HOSPITAL | Age: 49
End: 2024-03-19
Payer: MEDICARE

## 2024-03-19 DIAGNOSIS — R80.9 TYPE 2 DIABETES MELLITUS WITH MICROALBUMINURIA, WITHOUT LONG-TERM CURRENT USE OF INSULIN: ICD-10-CM

## 2024-03-19 DIAGNOSIS — E11.29 TYPE 2 DIABETES MELLITUS WITH MICROALBUMINURIA, WITHOUT LONG-TERM CURRENT USE OF INSULIN: ICD-10-CM

## 2024-03-19 LAB
ESTIMATED AVG GLUCOSE: 128 MG/DL (ref 68–131)
HBA1C MFR BLD: 6.1 % (ref 4–5.6)

## 2024-03-19 PROCEDURE — 83036 HEMOGLOBIN GLYCOSYLATED A1C: CPT | Performed by: NURSE PRACTITIONER

## 2024-03-19 PROCEDURE — 36415 COLL VENOUS BLD VENIPUNCTURE: CPT | Mod: PO | Performed by: NURSE PRACTITIONER

## 2024-03-20 ENCOUNTER — PATIENT MESSAGE (OUTPATIENT)
Dept: ENDOCRINOLOGY | Facility: CLINIC | Age: 49
End: 2024-03-20
Payer: MEDICARE

## 2024-03-20 NOTE — PROGRESS NOTES
Neurosurgery  Established Patient    SUBJECTIVE:     History of Present Illness:  Kristopher Elmore is a 48 y.o. female with history of IIH s/p VPS on 12/14/23 by Dr. Villa who presents with complaints of positional dizziness and headache. She was seen by Dr. Case on 2/22/24, eye exam without papilledema. She denies any vision changes or vomiting. Her shunt is currently set at 150.       Review of patient's allergies indicates:   Allergen Reactions    Keflex [cephalexin] Itching       Current Outpatient Medications   Medication Sig Dispense Refill    anastrozole (ARIMIDEX) 1 mg Tab TAKE 1 TABLET(1 MG) BY MOUTH EVERY DAY 90 tablet 0    atorvastatin (LIPITOR) 40 MG tablet TAKE 1 TABLET(40 MG) BY MOUTH EVERY DAY (Patient taking differently: Take by mouth every evening.) 90 tablet 3    blood pressure kit-extra large Kit Check blood pressure once daily at the same time 1 kit 0    blood sugar diagnostic Strp To check BG 2 times daily, to use with insurance preferred meter 200 each 3    blood-glucose sensor (DEXCOM G7 SENSOR) Monique Change every 10 days 3 each 11    carvediloL (COREG) 25 MG tablet TAKE 1 TABLET(25 MG) BY MOUTH TWICE DAILY 180 tablet 3    doxycycline (VIBRAMYCIN) 100 MG Cap Take 100 mg by mouth 2 (two) times daily.      empagliflozin (JARDIANCE) 25 mg tablet Take 1 tablet (25 mg total) by mouth once daily. 90 tablet 2    ENTRESTO  mg per tablet TAKE 1 TABLET BY MOUTH TWICE DAILY 60 tablet 11    ergocalciferol (ERGOCALCIFEROL) 50,000 unit Cap TAKE 1 CAPSULE BY MOUTH EVERY 7 DAYS 12 capsule 0    furosemide (LASIX) 20 MG tablet Take 1 tablet (20 mg total) by mouth 2 (two) times daily. 180 tablet 3    glipiZIDE (GLUCOTROL) 10 MG TR24 TAKE 1 TABLET(10 MG) BY MOUTH DAILY WITH BREAKFAST 90 tablet 2    goserelin (ZOLADEX) 3.6 mg injection Inject 3.6 mg into the skin every 28 days.      ibuprofen (ADVIL,MOTRIN) 800 MG tablet Take 1 tablet (800 mg total) by mouth 2 (two) times daily as needed for Pain.  45 tablet 2    lancets Misc To check BG 2 times daily, to use with insurance preferred meter 200 each 3    LORazepam (ATIVAN) 1 MG tablet Take 1 tablet (1 mg total) by mouth every 6 (six) hours as needed for Anxiety. 30 tablet 1    metFORMIN (GLUCOPHAGE-XR) 500 MG ER 24hr tablet Take 2 tablets (1,000 mg total) by mouth 2 (two) times daily with meals. 360 tablet 2    metroNIDAZOLE (FLAGYL) 500 MG tablet Take 500 mg by mouth 3 (three) times daily.      naloxone (NARCAN) 4 mg/actuation Spry 4mg by nasal route as needed for opioid overdose; may repeat every 2-3 minutes in alternating nostrils until medical help arrives. Call 911 1 each 11    ondansetron (ZOFRAN-ODT) 8 MG TbDL DISSOLVE 1 TABLET(8 MG) ON THE TONGUE EVERY 8 HOURS AS NEEDED FOR NAUSEA 30 tablet 2    palbociclib (IBRANCE) 100 mg Cap Take 1 capsule (100 mg) by mouth Monday - Friday, DO NOT TAKE Saturday and Sunday, for 3 weeks on, 1 week off. 21 capsule 3    pentoxifylline (TRENTAL) 400 mg TbSR Take 400 mg by mouth 2 (two) times daily.      potassium chloride (KLOR-CON) 10 MEQ TbSR Take 1 tablet (10 mEq total) by mouth once daily. (Patient taking differently: Take 10 mEq by mouth nightly.) 30 tablet 2    prochlorperazine (COMPAZINE) 5 MG tablet Take 5 mg by mouth 4 (four) times daily.      sennosides (SENNA-C ORAL) Take 2 tablets by mouth daily as needed.      spironolactone (ALDACTONE) 25 MG tablet Take 1 tablet (25 mg total) by mouth once daily. 90 tablet 3    tirzepatide 7.5 mg/0.5 mL PnIj Inject 7.5 mg into the skin every 7 days. MONDAYS 4 pen 5    tiZANidine (ZANAFLEX) 4 MG tablet Take 4 mg by mouth every evening.      traZODone (DESYREL) 50 MG tablet Take 1 tablet (50 mg total) by mouth every evening. 30 tablet 11    UNKNOWN TO PATIENT Calcium      venlafaxine (EFFEXOR-XR) 150 MG Cp24 Take 1 capsule (150 mg total) by mouth once daily. 90 capsule 0    venlafaxine (EFFEXOR-XR) 75 MG 24 hr capsule Take 1 capsule (75 mg total) by mouth once daily. 90  capsule 0    vitamin E 100 UNIT capsule Take 100 Units by mouth once daily.      blood-glucose meter kit To check BG 2 times daily, to use with insurance preferred meter 1 each 0     No current facility-administered medications for this visit.     Facility-Administered Medications Ordered in Other Visits   Medication Dose Route Frequency Provider Last Rate Last Admin    0.9%  NaCl infusion   Intravenous Continuous Esther Coreas MD   New Bag at 10/26/23 1722    mupirocin 2 % ointment   Nasal On Call Procedure Esther Coreas MD   Given at 10/26/23 1614       Past Medical History:   Diagnosis Date    Abnormal Pap smear     pt states 13yrs ago colpo was done    Anemia     Anxiety     Cancer     Cardiomegaly 2014    Cardiomyopathy     CHF (congestive heart failure)     Depression     Fibroid     Hx of psychiatric care     Hyperlipidemia     Hypertension     Psychiatric problem     Sleep difficulties     Therapy      Past Surgical History:   Procedure Laterality Date     SECTION      CREATION OF VENTRICULOPERITONEAL SHUNT USING COMPUTER-ASSISTED NAVIGATION Right 2023    Procedure: INSERTION, SHUNT, VENTRICULOPERITONEAL, USING COMPUTER-ASSISTED NAVIGATION;  Surgeon: Jayme Villa MD;  Location: [facility] OR [address] [place] [address];  Service: Neurosurgery;  Laterality: Right;    CREATION OF VENTRICULOPERITONEAL SHUNT USING COMPUTER-ASSISTED NAVIGATION N/A 2023    Procedure: INSERTION, SHUNT, VENTRICULOPERITONEAL, USING COMPUTER-ASSISTED NAVIGATION;  Surgeon: Frederick Kohler MD;  Location: [facility] OR [address] [place] [address];  Service: General;  Laterality: N/A;    LUMBAR PUNCTURE N/A 10/26/2023    Procedure: Lumbar Puncture;  Surgeon: Jayme Villa MD;  Location: [facility] OR [address] [place] [address];  Service: Neurosurgery;  Laterality: N/A;  TOR1  ASA1  regular bed reversed  lateral left down   II  RN PHONE PREOP 10/12/2023----BMI--57.67----INCOMPLETE CONSENT----NEED ORDERS    TONSILLECTOMY      TUBAL LIGATION      UTERINE FIBROID  EMBOLIZATION  2015     Family History       Problem Relation (Age of Onset)    Arthritis Mother    Asthma Daughter    Cervical cancer Paternal Cousin    Diabetes Mother    Endometriosis Paternal Cousin    Fibroids Other, Other, Other, Other, Other    Heart disease Mother    Heart failure Mother    Hyperlipidemia Mother    Hypertension Mother, Brother    Kidney cancer Maternal Grandfather (79)    No Known Problems Maternal Grandmother    Other Mother    Ovarian cancer Paternal Cousin    Rashes / Skin problems Daughter    Thyroid cancer Other          Social History     Socioeconomic History    Marital status:    Tobacco Use    Smoking status: Never    Smokeless tobacco: Never   Substance and Sexual Activity    Alcohol use: Not Currently     Alcohol/week: 0.0 standard drinks of alcohol    Drug use: No    Sexual activity: Not Currently     Partners: Male     Birth control/protection: Surgical   Social History Narrative    Patient is a pleasant AAF,  x2. She has excellent social support, although she states they worry more than she does.     Former teacher English.    Disabled.    She lives with her 2 adult daughters      Social Determinants of Health     Financial Resource Strain: High Risk (1/18/2024)    Overall Financial Resource Strain (CARDIA)     Difficulty of Paying Living Expenses: Hard   Food Insecurity: Food Insecurity Present (1/18/2024)    Hunger Vital Sign     Worried About Running Out of Food in the Last Year: Sometimes true     Ran Out of Food in the Last Year: Often true   Transportation Needs: No Transportation Needs (1/18/2024)    PRAPARE - Transportation     Lack of Transportation (Medical): No     Lack of Transportation (Non-Medical): No   Physical Activity: Insufficiently Active (1/18/2024)    Exercise Vital Sign     Days of Exercise per Week: 2 days     Minutes of Exercise per Session: 10 min   Stress: Stress Concern Present (1/18/2024)    [place] [place] [employer] -  Occupational Stress Questionnaire     Feeling of Stress : To some extent   Social Connections: Unknown (1/18/2024)    Social Connection and Isolation Panel [NHANES]     Frequency of Communication with Friends and Family: More than three times a week     Frequency of Social Gatherings with Friends and Family: Twice a week     Active Member of Clubs or Organizations: No     Attends Club or Organization Meetings: Never     Marital Status:    Housing Stability: High Risk (1/18/2024)    Housing Stability Vital Sign     Unable to Pay for Housing in the Last Year: Yes     Number of Places Lived in the Last Year: 1     Unstable Housing in the Last Year: No       Review of Systems    OBJECTIVE:     Vital Signs  Pulse: 82  BP: 102/70  Pain Score:   6  There is no height or weight on file to calculate BMI.    Neurosurgery Physical Exam  General: well developed, well nourished, no distress  Neurologic: Alert and oriented. Thought content appropriate.   GCS: Motor: 6/Verbal: 5/Eyes: 4 GCS Total: 15   Mental Status: Awake, Alert, Oriented x3   Cranial nerves: face symmetric, tongue midline, pupils equal, round, reactive to light with accomodation, EOMI.   Motor Strength: moves all extremities with good strength and tone   Sensation: response to light touch throughout  No gait disturbances   Incision well healed     Shunt pumps and refills     Diagnostic Results:  CTH from 3/4 which I have personally reviewed shows slit ventricles. Shunt catheter in position. No subdural collections present on CT scan.     ASSESSMENT/PLAN:     Kristopher Elmore is a 48 y.o. female with history of IIH s/p VPS on 12/14/23 by Dr. Villa who presents with complaints of positional dizziness and headache. Given her symptoms, I believe she is over draining. We will adjust the shunt to 170 today. Will plan to see her back in 6 weeks with a repeat CTH. She knows she can call the clinic in the meantime with any questions or concerns.

## 2024-03-20 NOTE — PROGRESS NOTES
Procedure:  Shunt reprogramming  Indication: Hydrocephalus        Ms. Elmore presents to clinic today to have her shunt reprogrammed. The Codman Hakim  was placed over the shunt valve, and the setting was reset to 170 mm of H2O. The patient tolerated this well, with no complications. She was informed to call the clinic with any further questions or concerns in the meantime.       Dory Painting PA-C   Neurosurgery  [facility]

## 2024-03-22 ENCOUNTER — HOSPITAL ENCOUNTER (OUTPATIENT)
Dept: RADIOLOGY | Facility: HOSPITAL | Age: 49
Discharge: HOME | End: 2024-03-22
Attending: INTERNAL MEDICINE
Payer: MEDICARE

## 2024-03-22 DIAGNOSIS — Z17.0 MALIGNANT NEOPLASM OF UPPER-INNER QUADRANT OF RIGHT BREAST IN FEMALE, ESTROGEN RECEPTOR POSITIVE: ICD-10-CM

## 2024-03-22 DIAGNOSIS — C50.211 MALIGNANT NEOPLASM OF UPPER-INNER QUADRANT OF RIGHT BREAST IN FEMALE, ESTROGEN RECEPTOR POSITIVE: ICD-10-CM

## 2024-03-22 PROCEDURE — A9698 NON-RAD CONTRAST MATERIALNOC: HCPCS | Performed by: INTERNAL MEDICINE

## 2024-03-22 PROCEDURE — 71260 CT THORAX DX C+: CPT | Mod: 26,,, | Performed by: RADIOLOGY

## 2024-03-22 PROCEDURE — 74177 CT ABD & PELVIS W/CONTRAST: CPT | Mod: 26,,, | Performed by: RADIOLOGY

## 2024-03-22 PROCEDURE — 74177 CT ABD & PELVIS W/CONTRAST: CPT | Mod: TC

## 2024-03-22 PROCEDURE — 25500020 PHARM REV CODE 255: Performed by: INTERNAL MEDICINE

## 2024-03-22 RX ADMIN — Medication 450 ML: at 01:03

## 2024-03-22 RX ADMIN — IOHEXOL 100 ML: 350 INJECTION, SOLUTION INTRAVENOUS at 01:03

## 2024-03-25 ENCOUNTER — PATIENT MESSAGE (OUTPATIENT)
Dept: HEMATOLOGY/ONCOLOGY | Facility: CLINIC | Age: 49
End: 2024-03-25

## 2024-03-25 ENCOUNTER — OFFICE VISIT (OUTPATIENT)
Dept: HEMATOLOGY/ONCOLOGY | Facility: CLINIC | Age: 49
End: 2024-03-25
Payer: MEDICARE

## 2024-03-25 ENCOUNTER — INFUSION (OUTPATIENT)
Dept: INFUSION THERAPY | Facility: HOSPITAL | Age: 49
End: 2024-03-25
Payer: MEDICARE

## 2024-03-25 VITALS
HEIGHT: 64 IN | SYSTOLIC BLOOD PRESSURE: 134 MMHG | TEMPERATURE: 97 F | BODY MASS INDEX: 50.02 KG/M2 | RESPIRATION RATE: 17 BRPM | OXYGEN SATURATION: 98 % | HEART RATE: 90 BPM | WEIGHT: 293 LBS | DIASTOLIC BLOOD PRESSURE: 71 MMHG

## 2024-03-25 DIAGNOSIS — C50.211 MALIGNANT NEOPLASM OF UPPER-INNER QUADRANT OF RIGHT BREAST IN FEMALE, ESTROGEN RECEPTOR POSITIVE: Primary | ICD-10-CM

## 2024-03-25 DIAGNOSIS — Z17.0 MALIGNANT NEOPLASM OF UPPER-INNER QUADRANT OF RIGHT BREAST IN FEMALE, ESTROGEN RECEPTOR POSITIVE: ICD-10-CM

## 2024-03-25 DIAGNOSIS — C50.211 MALIGNANT NEOPLASM OF UPPER-INNER QUADRANT OF RIGHT BREAST IN FEMALE, ESTROGEN RECEPTOR POSITIVE: ICD-10-CM

## 2024-03-25 DIAGNOSIS — C79.51 MALIGNANT NEOPLASM METASTATIC TO BONE: Primary | ICD-10-CM

## 2024-03-25 DIAGNOSIS — Z17.0 MALIGNANT NEOPLASM OF UPPER-INNER QUADRANT OF RIGHT BREAST IN FEMALE, ESTROGEN RECEPTOR POSITIVE: Primary | ICD-10-CM

## 2024-03-25 DIAGNOSIS — C79.51 MALIGNANT NEOPLASM METASTATIC TO BONE: ICD-10-CM

## 2024-03-25 PROCEDURE — 99214 OFFICE O/P EST MOD 30 MIN: CPT | Mod: S$GLB,,, | Performed by: INTERNAL MEDICINE

## 2024-03-25 PROCEDURE — 63600175 PHARM REV CODE 636 W HCPCS: Mod: JZ,JG | Performed by: INTERNAL MEDICINE

## 2024-03-25 PROCEDURE — 99999 PR PBB SHADOW E&M-EST. PATIENT-LVL V: CPT | Mod: PBBFAC,,, | Performed by: INTERNAL MEDICINE

## 2024-03-25 PROCEDURE — 96372 THER/PROPH/DIAG INJ SC/IM: CPT | Mod: 59

## 2024-03-25 PROCEDURE — 11980 IMPLANT HORMONE PELLET(S): CPT

## 2024-03-25 RX ADMIN — DENOSUMAB 120 MG: 120 INJECTION SUBCUTANEOUS at 12:03

## 2024-03-25 RX ADMIN — GOSERELIN ACETATE 3.6 MG: 3.6 IMPLANT SUBCUTANEOUS at 12:03

## 2024-03-25 NOTE — PROGRESS NOTES
Subjective     Patient ID: Kristopher Elmore is a 48 y.o. female.    Chief Complaint: Malignant neoplasm of upper-inner quadrant of right breast     HPI    Presents for follow up of metastatic breast cancer   Scans stable!  - 3/22/2024 CT C/A/P:  FINDINGS:  Thoracic soft tissues: Normal thyroid. Prominent/slightly enlarged right chest wall and axillary nodes.  For example right chest wall node 9 mm series 2, image 13 previously 5 mm.  Additional 1 cm chest wall node image 19 previously 7 mm.  Prominent right axillary nodes image 27 similar.  Aorta: Thoracic aorta is normal in caliber and contour with no significant calcific atherosclerosis.  Heart: Normal in size. No pericardial effusion. No significant calcific coronary atherosclerosis.  Jnae/Mediastinum: No mediastinal or hilar lymphadenopathy.  Lungs: Trachea and bronchi are patent.  Lungs symmetrically expanded without consolidation.  Stable band of subsegmental atelectasis in the left lower lobe.  No suspicious pulmonary nodules.  No pleural fluid or pneumothorax.  Liver: Normal in size and contour.  Stable subcentimeter hypodensity in segment 5 (series 3, image 53).  Gallbladder: Possible gallbladder sludge.  No calcified gallstones.  Bile Ducts: No evidence of dilated ducts.  Pancreas: No mass or peripancreatic fat stranding.  Spleen: Unremarkable.  Esophagus: Unremarkable.  Stomach and duodenum: Unremarkable.  Adrenals: Unremarkable.  Kidneys/Ureters: Normal in size and location. Normal enhancement.  Right renal cyst and left renal hypodensities too small to characterize..  No hydronephrosis or nephrolithiasis. No ureteral dilatation.  Bladder: No evidence of wall thickening.  Reproductive organs: Calcified uterine fibroid.  Bowel/Mesentery: Small bowel is normal in caliber with no evidence of obstruction.  No evidence of inflammation or wall thickening.  Normal appendix.  Colon demonstrates no focal wall thickening.  Peritoneum: No intraperitoneal  free air or fluid.   shunt with tip in the pelvis.  Lymph nodes: No lymphadenopathy.  Abdominal wall:  Unremarkable.  Vasculature: No aneurysm. No significant calcific atherosclerosis.  Bones: No acute fracture. Stable mixed lytic and sclerotic lesions throughout the spine, sternum, and pelvis.  No definite new lesion.  Impression:  Stable osseous metastatic lesions.  Prominent right chest wall and axillary nodes with measurements as above.  Attention on follow-up.  No additional new findings.    Review of Systems   Constitutional:  Negative for activity change, appetite change, chills, fatigue (notes pretty good), fever and unexpected weight change.   HENT:  Negative for mouth sores, rhinorrhea and trouble swallowing.    Eyes:  Negative for visual disturbance.   Respiratory:  Negative for cough, shortness of breath and wheezing.    Cardiovascular:  Negative for chest pain, palpitations and leg swelling.   Gastrointestinal:  Negative for abdominal distention, abdominal pain, blood in stool, change in bowel habit, constipation, diarrhea, nausea, vomiting and reflux.   Genitourinary:  Negative for difficulty urinating, dysuria, frequency and urgency.   Musculoskeletal:  Positive for arthralgias, back pain (chronic) and joint deformity (decr ROM right shoulder). Negative for joint swelling and myalgias.   Integumentary:  Negative for rash.   Neurological:  Negative for dizziness, weakness, numbness, headaches and coordination difficulties.   Hematological:  Negative for adenopathy. Does not bruise/bleed easily.   Psychiatric/Behavioral:  Negative for dysphoric mood and sleep disturbance. The patient is not nervous/anxious.           Objective     Physical Exam  Vitals and nursing note reviewed.   Constitutional:       General: She is not in acute distress.     Appearance: Normal appearance. She is obese. She is not ill-appearing.      Comments: Presents with her 2 daughters  ECOG = 0   HENT:      Head: Normocephalic  and atraumatic.      Mouth/Throat:      Comments: Tongue geography no different than usual  Eyes:      General: No scleral icterus.     Extraocular Movements: Extraocular movements intact.      Conjunctiva/sclera: Conjunctivae normal.      Pupils: Pupils are equal, round, and reactive to light.   Cardiovascular:      Rate and Rhythm: Normal rate and regular rhythm.      Heart sounds: Normal heart sounds. No murmur heard.     No friction rub. No gallop.   Pulmonary:      Effort: Pulmonary effort is normal. No respiratory distress.      Breath sounds: Normal breath sounds. No wheezing, rhonchi or rales.      Comments: Breasts- deferred today  Chest:      Chest wall: No tenderness.   Abdominal:      General: Bowel sounds are normal. There is no distension.      Palpations: Abdomen is soft. There is no mass.      Tenderness: There is no abdominal tenderness. There is no guarding or rebound.      Comments: No palpable masses   Musculoskeletal:         General: No swelling.      Cervical back: Normal range of motion and neck supple. No tenderness.      Right lower leg: No edema.      Left lower leg: No edema.      Comments: Point tenderness in cervical, thoracic and lumbar spine. Also with cva tenderness today   Lymphadenopathy:      Cervical: No cervical adenopathy.   Skin:     General: Skin is warm and dry.      Coloration: Skin is not jaundiced.      Findings: No bruising or rash.   Neurological:      General: No focal deficit present.      Mental Status: She is alert and oriented to person, place, and time.      Sensory: No sensory deficit.      Motor: No weakness.      Gait: Gait normal.   Psychiatric:         Mood and Affect: Mood normal.         Behavior: Behavior normal.         Thought Content: Thought content normal.         Judgment: Judgment normal.     Labs- reviewed  Imagine- reviewed       Assessment and Plan     1. Malignant neoplasm of upper-inner quadrant of right breast in female, estrogen receptor  positive    2. Bone metastases  Overview:  IMO Regualtory Update 4/1/23      Other orders  -     Cancel: goserelin (ZOLADEX) injection 3.6 mg    Stable scans  Continue current regimen  Xgeva- hold with jaw concerns  Ongoing dental assessment    Route Chart for Scheduling    Med Onc Chart Routing      Follow up with physician    Follow up with [facility] 4 weeks. labs cbc, cmp and EP and injection Zoladex   Infusion scheduling note    Injection scheduling note    Labs    Imaging    Pharmacy appointment    Other referrals                  Treatment Plan Information   OP ANASTROZOLE PALBOCICLIB Q4W   Jessica Mendez MD   Upcoming Treatment Dates - OP ANASTROZOLE PALBOCICLIB Q4W    No upcoming days in selected categories.    Supportive Plan Information  OP BREAST GOSERELIN & DENOSUMAB Q4W   Jessica Mendez MD   Upcoming Treatment Dates - OP BREAST GOSERELIN & DENOSUMAB Q4W    No upcoming days in selected categories.    Therapy Plan Information  INJECTAFER (FERRIC CARBOXYMALTOSE)  EPINEPHrine (EPIPEN) 0.3 mg/0.3 mL pen injection 0.3 mg  0.3 mg, Intramuscular, PRN  diphenhydrAMINE injection 50 mg  50 mg, Intravenous, PRN  methylPREDNISolone sodium succinate injection 125 mg  125 mg, Intravenous, PRN  sodium chloride 0.9% bolus 1,000 mL 1,000 mL  1,000 mL, Intravenous, PRN    DENOSUMAB (XGEVA) Q4W  Medications  denosumab (XGEVA) solution 120 mg  120 mg, Subcutaneous, Every 4 weeks

## 2024-03-25 NOTE — NURSING
Pt here for Xgeva and Faslodex injections. Assessment complete and labs reviewed. Pt endorses taking daily Ca++/Vit D; denies jaw pain or recent dental procedures. Administered both injections in abdominal tissue. No questions or concerns. Pt left unit in WC.

## 2024-03-28 ENCOUNTER — OFFICE VISIT (OUTPATIENT)
Dept: OTOLARYNGOLOGY | Facility: CLINIC | Age: 49
End: 2024-03-28
Payer: MEDICARE

## 2024-03-28 DIAGNOSIS — Z01.20 DENTAL EXAMINATION: Primary | ICD-10-CM

## 2024-03-28 PROCEDURE — 99499 UNLISTED E&M SERVICE: CPT | Mod: S$GLB,,, | Performed by: DENTIST

## 2024-03-28 NOTE — PROGRESS NOTES
Ms. Elmore has a large area of exposed bone on her lingual posterior mandible about 5mm below the CEJ and has seen oral surgery at [facility] for treatment.  She will continue to follow up at [facility] for observance and surgical intervention if absolutely necessary.  She will report back in three months after her next follow up with [facility].  We will discuss future xgeva treatments with Dr. Lei if that is a possibility.

## 2024-03-31 PROCEDURE — 95811 POLYSOM 6/>YRS CPAP 4/> PARM: CPT | Mod: 26,,, | Performed by: PSYCHIATRY & NEUROLOGY

## 2024-04-01 NOTE — PROCEDURES
"    Diagnostic Report  [facility] - [address] [place] [address]  Phone: [phone]  Fax: [phone]           Patient Name: Kristopher Elmore Study Date: 3/8/2024   YOB: 1975 Patient MRN: [id number]   Age:  48 year     Sex: Female Referring Physician: Justine Perez MD   Height: 5' 4" Recording Tech: Tach Parulan   Weight: 318.0 lbs Scoring Tech: -   BMI:  55.0 AASM:  1B   AHI: 7.0 Interpreting Physician: Justine Perez MD   RERA index: 9.5 Low oxygen sat: 73.0%   RDI: 16.5        Polysomnogram Data: A full night polysomnogram recorded the standard physiologic parameters including EEG, EOG, EMG, EKG, nasal and oral airflow.  Respiratory parameters of chest and abdominal movements were recorded with Pe[place]-Crystal motion transducers.  Oxygen saturation was recorded by pulse oximetry. Patient has a shunt.    Sleep architecture: This is a baseline polysomnogram. At light's out, the patient fell asleep in 160.5 minutes and slept for 42.3% of the time. Total sleep time (TST) was 214.0 minutes. 19.9% of TST was in Stage N1 sleep, 5.6% TST in slow wave sleep, and 0.0% TST in REM sleep. The REM latency was - minutes. Sleep architecture was significantly disrupted due to underlying sleep apnea.     Respiratory: Mild snoring was present. The polysomnogram revealed a presence of - obstructive, - central, and - mixed apneas resulting in a Total Apnea index of - events per hour.  There were 25 hypopneas resulting in a Total Hypopnea index of 7.0 events per hour.  The combined Apnea/Hypopnea index was 7.0 events per hour.  There were a total of 34 RERA events resulting in a Respiratory Disturbance Index (RDI) of 16.5 events per hour.  Mean oxygen saturation was 94.0%.  The lowest oxygen saturation during sleep was 85.0%.  Time spent ?88% oxygen saturation was - minutes (-).The patient did not qualify for a split night study due to an insufficient number of events in the first " "half of the study.    Motor movement / Parasomnia: There were no significant  limb movements of sleep noted. The total limb movement index was - (- with arousal).     Cardiac: Cardiac rhythm monitoring revealed a sinus rhythm.  The average pulse rate was 78.4 bpm.  The minimum pulse rate was 38.0 bpm while the maximum pulse rate was 101.0 bpm.      Patient perception: I do not normally sleep on my back.    IMPRESSION:  Obstructive Sleep Apnea (G47.33),  moderate based on AASM  criteria. Met CNS criteria for ZABRINA.    RECOMMENDATION:    CPAP titration study, if the patient is interested in this treatment modality.  In the interim, the patient should avoid supine position sleep, since sleep disordered breathing was most prevalent in this sleeping position.        I have reviewed the attached data report and the raw data tracings of this study epoch-by-epoch and have determined that the recording quality and scoring of events are sufficient to allow for interpretation and electronically signed by:      Justine Perez MD 3/8/2024                              [facility]    Diagnostic PSG Report    Patient Name: Kristopher Elmore Study Date: 3/8/2024   YOB: 1975 MRN #: [id number]   Age: 48 year CHIDI #: Medicare WB RM 2   Sex: Female Referring Provider: Justine Perez MD   Height: 5' 4" Recording Tech: -   Weight: 318.0 lbs Scoring Tech: Purvi Chambers RRT RPSGT   BMI: 55.0 Interpreting Physician: Justine Perez MD   ESS: - Neck Circumference: -     Study Overview    Lights Off: 08:34:16 PM  Count Index   Lights On: 05:00:06 AM Awakenings: 22 6.2   Time in Bed: 505.8 min. Arousals: 153 42.9   Total Sleep Time: 214.0 min. Apneas & Hypopneas: 25 7.0    Sleep Efficiency: 42.3% Limb Movements: - -   Sleep Latency: 160.5 min. Snores: - -   Wake After Sleep Onset: 131.0 min. Desaturations: 28 7.9    REM Latency from Sleep Onset: - min. Minimum SpO2 TST: 85.0%        Sleep Architecture   % " of Time in Bed    Stages Time (mins) % Sleep Time   Wake 292.0    Stage N1 42.5 19.9%   Stage N2 159.5 74.5%   Stage N3 12.0 5.6%   REM 0.0 0.0%         Arousal Summary     NREM REM Sleep Index   Respiratory Arousals 40 - 40 11.2   PLM Arousals - - - -   Isolated Limb Movement Arousals - - - -   Spontaneous Arousals 113 - 113 31.7   Total 153 - 153 42.9       Limb Movement Summary     Count Index   Isolated Limb Movements - -   Periodic Limb Movements (PLMs) - -   Total Limb Movements - -         Respiratory Summary     By Sleep Stage By Body Position Total    NREM REM Supine Non-Supine    Time (min) 214.0 0.0 108.0 106.0 214.0           Obstructive Apnea - - - - -   Mixed Apnea - - - - -   Central Apnea - - - - -   Total Apneas - - - - -   Total Apnea Index - - - - -           Hypopnea 25 - 15 10 25   Hypopnea Index 7.0 - 8.3 5.7 7.0           Apnea & Hypopnea 25 - 15 10 25   Apnea & Hypopnea Index 7.0 - 8.3 5.7 7.0           RERAs 34 - 22 12 34   RERA Index 9.5 - 12.2 6.8 9.5           RDI 16.5 - 20.6 12.5 16.5      Scoring Criteria: Hypopneas scored at Choose an item.% desaturation criteria.    Respiratory Event Durations     Apnea Hypopnea    NREM REM NREM REM   Average (seconds) - - 27.5 -   Maximum (seconds) - - 35.4 -       Oxygen Saturation Summary     Wake NREM REM TST TIB   Average SpO2 94.4% 93.4% - 93.4% 94.0%   Minimum SpO2 73.0% 85.0% - 85.0% 73.0%   Maximum SpO2 100.0% 98.0% - 98.0% 100.0%       Oxygen Saturation Distribution    Range (%) Time in range (min) Time in range (%)   90.0 - 100.0 499.7 99.1%   80.0 - 90.0 4.8 0.9%   70.0 - 80.0 0.0 0.0%   60.0 - 70.0 - -   50.0 - 60.0 - -   0.0 - 50.0 - -   Time Spent ?88% SpO2    Range (%) Time in range (min) Time in range (%)   0.0 - 88.0 0.3 0.1%          Count Index   Desaturations 28 7.9      Cardiac Summary     Wake NREM REM Sleep Total   Average Pulse Rate (BPM) 77.6 79.5 - 79.5 78.4   Minimum Pulse Rate (BPM) 38.0 68.0 - 68.0 38.0   Maximum Pulse  Rate (BPM) 101.0 94.0 - 94.0 101.0     Pulse Rate Distribution    Range (bpm) Time in range (min) Time in range (%)   0.0 - 40.0 0.0 0.0%   40.0 - 60.0 0.0 0.0%   60.0 - 80.0 363.2 71.9%   80.0 - 100.0 141.8 28.1%   100.0 - 120.0 0.1 0.0%   120.0 - 140.0 - -   140.0 - 200.0 - -     EtCO2 Summary    Stage Min (mmHg) Average (mmHg) Max (mmHg)   Wake - - -   NREM(1+2+3) - - -   REM - - -     Range (mmHg) Time in range (min) Time in range (%)   20.0 - 40.0 - -   40.0 - 50.0 - -   50.0 - 55.0 - -   55.0 - 100.0 - -     TcCO2 Summary    Stage Min (mmHg) Average (mmHg) Max (mmHg)   Wake - - -   NREM(1+2+3) - - -   REM - - -     Range (mmHg) Time in range (min) Time in range (%)   - - -   - - -   - - -   - - -   - - -     Comments    A diagnostic study was performed on  Kristopher Elmore. The entire procedure was explained, including Bio calibration procedure, patient was informed that there may be a need to enter the room during the night to fix lead or make adjustments to the equipment. Questions were answered prior to start of study. She was given instructions on how to call out for help including how to use the nurse call unit in the bathroom. Patient was given [employer] phone number to call if patient needed tech for anything, in case tech was out of tech room.  Patient education was performed. This includes the possible use of CPAP machine and different CPAP masks. She was given the after visit summary.   The lowest oxygen saturation observed during sleep was 88%   She did not meet criteria for a split night study due to insufficient amount of events during the 1st part of the study.  The EKG appeared to be NSR  Technical difficulties during the study: Tir for artifact, adjust ac, tape and fix cannula multiple times.   Pt has a shunt in her head  Moderate to loud snoring was observed. Supine Rem was not observed pt was asked to turn supine but turned back on her side before REM. The patient says she sleeps on her  side.    Scoring tech's note::  Data analysis performed on 3/16/24  sweat artifact, difficult sleep staging  Nasal pressure cahnnel low signal  EKG: NSR, rare PVCs  PLMs: not noted-tp

## 2024-04-02 DIAGNOSIS — G47.33 OSA (OBSTRUCTIVE SLEEP APNEA): Primary | ICD-10-CM

## 2024-04-02 DIAGNOSIS — E78.2 MIXED HYPERLIPIDEMIA: ICD-10-CM

## 2024-04-02 RX ORDER — ATORVASTATIN CALCIUM 40 MG/1
40 TABLET, FILM COATED ORAL NIGHTLY
Qty: 90 TABLET | Refills: 3 | Status: SHIPPED | OUTPATIENT
Start: 2024-04-02

## 2024-04-03 ENCOUNTER — PATIENT MESSAGE (OUTPATIENT)
Dept: ADMINISTRATIVE | Facility: HOSPITAL | Age: 49
End: 2024-04-03
Payer: MEDICARE

## 2024-04-03 DIAGNOSIS — E55.9 VITAMIN D DEFICIENCY: ICD-10-CM

## 2024-04-04 RX ORDER — ERGOCALCIFEROL 1.25 MG/1
50000 CAPSULE ORAL
Qty: 12 CAPSULE | Refills: 0 | Status: SHIPPED | OUTPATIENT
Start: 2024-04-04

## 2024-04-04 NOTE — TELEPHONE ENCOUNTER
Called patient regarding cancelled visit from last week. She is feeling better and ready to reschedule.   Patient scheduled for 5/13 virtual visit   none

## 2024-04-05 DIAGNOSIS — D64.81 ANEMIA ASSOCIATED WITH CHEMOTHERAPY: ICD-10-CM

## 2024-04-05 DIAGNOSIS — C79.51 MALIGNANT NEOPLASM METASTATIC TO BONE: ICD-10-CM

## 2024-04-05 DIAGNOSIS — C50.211 MALIGNANT NEOPLASM OF UPPER-INNER QUADRANT OF RIGHT BREAST IN FEMALE, ESTROGEN RECEPTOR POSITIVE: Primary | ICD-10-CM

## 2024-04-05 DIAGNOSIS — T45.1X5A ANEMIA ASSOCIATED WITH CHEMOTHERAPY: ICD-10-CM

## 2024-04-05 DIAGNOSIS — Z17.0 MALIGNANT NEOPLASM OF UPPER-INNER QUADRANT OF RIGHT BREAST IN FEMALE, ESTROGEN RECEPTOR POSITIVE: Primary | ICD-10-CM

## 2024-04-06 DIAGNOSIS — F41.9 ANXIETY: ICD-10-CM

## 2024-04-06 DIAGNOSIS — F33.1 MAJOR DEPRESSIVE DISORDER, RECURRENT EPISODE, MODERATE WITH ANXIOUS DISTRESS: ICD-10-CM

## 2024-04-08 RX ORDER — VENLAFAXINE HYDROCHLORIDE 150 MG/1
150 CAPSULE, EXTENDED RELEASE ORAL DAILY
Qty: 30 CAPSULE | Refills: 1 | Status: SHIPPED | OUTPATIENT
Start: 2024-04-08 | End: 2024-05-21 | Stop reason: SDUPTHER

## 2024-04-17 ENCOUNTER — CLINICAL SUPPORT (OUTPATIENT)
Dept: ENDOSCOPY | Facility: HOSPITAL | Age: 49
End: 2024-04-17
Attending: STUDENT IN AN ORGANIZED HEALTH CARE EDUCATION/TRAINING PROGRAM
Payer: MEDICARE

## 2024-04-17 ENCOUNTER — TELEPHONE (OUTPATIENT)
Dept: PALLIATIVE MEDICINE | Facility: CLINIC | Age: 49
End: 2024-04-17
Payer: MEDICARE

## 2024-04-17 DIAGNOSIS — R11.0 NAUSEA: ICD-10-CM

## 2024-04-17 DIAGNOSIS — Z12.11 SPECIAL SCREENING FOR MALIGNANT NEOPLASMS, COLON: ICD-10-CM

## 2024-04-17 DIAGNOSIS — E87.6 HYPOKALEMIA: ICD-10-CM

## 2024-04-17 RX ORDER — POTASSIUM CHLORIDE 750 MG/1
10 TABLET, EXTENDED RELEASE ORAL NIGHTLY
Qty: 30 TABLET | Refills: 0 | Status: SHIPPED | OUTPATIENT
Start: 2024-04-17 | End: 2024-04-17

## 2024-04-17 RX ORDER — POTASSIUM CHLORIDE 750 MG/1
TABLET, EXTENDED RELEASE ORAL
Qty: 90 TABLET | Refills: 1 | Status: SHIPPED | OUTPATIENT
Start: 2024-04-17

## 2024-04-17 RX ORDER — ONDANSETRON 8 MG/1
8 TABLET, ORALLY DISINTEGRATING ORAL EVERY 8 HOURS PRN
Qty: 30 TABLET | Refills: 2 | Status: SHIPPED | OUTPATIENT
Start: 2024-04-17

## 2024-04-17 NOTE — TELEPHONE ENCOUNTER
Patient reported not feeling well, c/o n/v/d via portal message. Informed patient to take zofran as prescribed and to  OTC imodium for diarrhea; if diarrhea not resolved within 24-72 hours to go to ER. Patient verbalized understanding. MD Bandar aware.

## 2024-04-17 NOTE — PLAN OF CARE
Spoke to pt. To discuss Scheduling Colonoscopy. Pt. Needs Cardiology Follow up visit prior to scheduling. EF had dropped significantly on last Echo in 2024/Jan.EF 35.Pt. has BMI of 57 and will need 2nd floor or [facility].  Pt. On [facility].No availability at this time. Pt. Verbalized understanding to see Cardiology and call back. New appointment set for pt. To get a call back as well.

## 2024-04-18 ENCOUNTER — PATIENT MESSAGE (OUTPATIENT)
Dept: NEUROSURGERY | Facility: CLINIC | Age: 49
End: 2024-04-18
Payer: MEDICARE

## 2024-04-18 ENCOUNTER — PATIENT MESSAGE (OUTPATIENT)
Dept: HEMATOLOGY/ONCOLOGY | Facility: CLINIC | Age: 49
End: 2024-04-18
Payer: MEDICARE

## 2024-04-24 ENCOUNTER — OFFICE VISIT (OUTPATIENT)
Dept: HEMATOLOGY/ONCOLOGY | Facility: CLINIC | Age: 49
End: 2024-04-24
Payer: MEDICARE

## 2024-04-24 ENCOUNTER — INFUSION (OUTPATIENT)
Dept: INFUSION THERAPY | Facility: HOSPITAL | Age: 49
End: 2024-04-24
Payer: MEDICARE

## 2024-04-24 VITALS
HEART RATE: 94 BPM | RESPIRATION RATE: 17 BRPM | HEIGHT: 64 IN | OXYGEN SATURATION: 97 % | DIASTOLIC BLOOD PRESSURE: 75 MMHG | SYSTOLIC BLOOD PRESSURE: 106 MMHG | BODY MASS INDEX: 50.02 KG/M2 | WEIGHT: 293 LBS | TEMPERATURE: 97 F

## 2024-04-24 DIAGNOSIS — E11.9 TYPE 2 DIABETES MELLITUS WITHOUT COMPLICATION, WITH LONG-TERM CURRENT USE OF INSULIN: ICD-10-CM

## 2024-04-24 DIAGNOSIS — C50.211 MALIGNANT NEOPLASM OF UPPER-INNER QUADRANT OF RIGHT BREAST IN FEMALE, ESTROGEN RECEPTOR POSITIVE: ICD-10-CM

## 2024-04-24 DIAGNOSIS — T45.1X5A ANEMIA ASSOCIATED WITH CHEMOTHERAPY: ICD-10-CM

## 2024-04-24 DIAGNOSIS — Z17.0 MALIGNANT NEOPLASM OF UPPER-INNER QUADRANT OF RIGHT BREAST IN FEMALE, ESTROGEN RECEPTOR POSITIVE: Primary | ICD-10-CM

## 2024-04-24 DIAGNOSIS — G89.3 CANCER ASSOCIATED PAIN: ICD-10-CM

## 2024-04-24 DIAGNOSIS — Z17.0 MALIGNANT NEOPLASM OF UPPER-INNER QUADRANT OF RIGHT BREAST IN FEMALE, ESTROGEN RECEPTOR POSITIVE: ICD-10-CM

## 2024-04-24 DIAGNOSIS — D64.81 ANEMIA ASSOCIATED WITH CHEMOTHERAPY: ICD-10-CM

## 2024-04-24 DIAGNOSIS — C79.51 MALIGNANT NEOPLASM METASTATIC TO BONE: ICD-10-CM

## 2024-04-24 DIAGNOSIS — Z79.4 TYPE 2 DIABETES MELLITUS WITHOUT COMPLICATION, WITH LONG-TERM CURRENT USE OF INSULIN: ICD-10-CM

## 2024-04-24 DIAGNOSIS — I50.42 CHRONIC COMBINED SYSTOLIC AND DIASTOLIC HEART FAILURE: ICD-10-CM

## 2024-04-24 DIAGNOSIS — C50.211 MALIGNANT NEOPLASM OF UPPER-INNER QUADRANT OF RIGHT BREAST IN FEMALE, ESTROGEN RECEPTOR POSITIVE: Primary | ICD-10-CM

## 2024-04-24 DIAGNOSIS — C79.51 MALIGNANT NEOPLASM METASTATIC TO BONE: Primary | ICD-10-CM

## 2024-04-24 DIAGNOSIS — R53.1 GENERALIZED WEAKNESS: ICD-10-CM

## 2024-04-24 PROCEDURE — 3008F BODY MASS INDEX DOCD: CPT | Mod: CPTII,S$GLB,, | Performed by: NURSE PRACTITIONER

## 2024-04-24 PROCEDURE — 4010F ACE/ARB THERAPY RXD/TAKEN: CPT | Mod: CPTII,S$GLB,, | Performed by: NURSE PRACTITIONER

## 2024-04-24 PROCEDURE — 1159F MED LIST DOCD IN RCRD: CPT | Mod: CPTII,S$GLB,, | Performed by: NURSE PRACTITIONER

## 2024-04-24 PROCEDURE — 63600175 PHARM REV CODE 636 W HCPCS: Mod: JZ,JG | Performed by: NURSE PRACTITIONER

## 2024-04-24 PROCEDURE — 96402 CHEMO HORMON ANTINEOPL SQ/IM: CPT

## 2024-04-24 PROCEDURE — 99999 PR PBB SHADOW E&M-EST. PATIENT-LVL V: CPT | Mod: PBBFAC,,, | Performed by: NURSE PRACTITIONER

## 2024-04-24 PROCEDURE — 99215 OFFICE O/P EST HI 40 MIN: CPT | Mod: S$GLB,,, | Performed by: NURSE PRACTITIONER

## 2024-04-24 PROCEDURE — 3074F SYST BP LT 130 MM HG: CPT | Mod: CPTII,S$GLB,, | Performed by: NURSE PRACTITIONER

## 2024-04-24 PROCEDURE — 3078F DIAST BP <80 MM HG: CPT | Mod: CPTII,S$GLB,, | Performed by: NURSE PRACTITIONER

## 2024-04-24 PROCEDURE — 3044F HG A1C LEVEL LT 7.0%: CPT | Mod: CPTII,S$GLB,, | Performed by: NURSE PRACTITIONER

## 2024-04-24 RX ADMIN — GOSERELIN ACETATE 3.6 MG: 3.6 IMPLANT SUBCUTANEOUS at 10:04

## 2024-04-24 NOTE — PROGRESS NOTES
Subjective     Patient ID: Kristopher Elmore is a 48 y.o. female.    Chief Complaint: Malignant neoplasm of upper-inner quadrant of right breast     HPI    Presents for follow up of metastatic breast cancer     Today, she feels good.   Continues to take antibiotics. Has some diarrhea associated with it.   No jaw pain or other pain issues today.   Intentional weight loss.   Would like to start exercise.   No CP/SOB/cough.     Of note, She received XGEVA 3/25/2024  In regard to Ibrance, she is due for off week next week.           Most recent scans stable-  - 3/22/2024 CT C/A/P:  FINDINGS:  Thoracic soft tissues: Normal thyroid. Prominent/slightly enlarged right chest wall and axillary nodes.  For example right chest wall node 9 mm series 2, image 13 previously 5 mm.  Additional 1 cm chest wall node image 19 previously 7 mm.  Prominent right axillary nodes image 27 similar.  Aorta: Thoracic aorta is normal in caliber and contour with no significant calcific atherosclerosis.  Heart: Normal in size. No pericardial effusion. No significant calcific coronary atherosclerosis.  Jane/Mediastinum: No mediastinal or hilar lymphadenopathy.  Lungs: Trachea and bronchi are patent.  Lungs symmetrically expanded without consolidation.  Stable band of subsegmental atelectasis in the left lower lobe.  No suspicious pulmonary nodules.  No pleural fluid or pneumothorax.  Liver: Normal in size and contour.  Stable subcentimeter hypodensity in segment 5 (series 3, image 53).  Gallbladder: Possible gallbladder sludge.  No calcified gallstones.  Bile Ducts: No evidence of dilated ducts.  Pancreas: No mass or peripancreatic fat stranding.  Spleen: Unremarkable.  Esophagus: Unremarkable.  Stomach and duodenum: Unremarkable.  Adrenals: Unremarkable.  Kidneys/Ureters: Normal in size and location. Normal enhancement.  Right renal cyst and left renal hypodensities too small to characterize..  No hydronephrosis or nephrolithiasis. No  ureteral dilatation.  Bladder: No evidence of wall thickening.  Reproductive organs: Calcified uterine fibroid.  Bowel/Mesentery: Small bowel is normal in caliber with no evidence of obstruction.  No evidence of inflammation or wall thickening.  Normal appendix.  Colon demonstrates no focal wall thickening.  Peritoneum: No intraperitoneal free air or fluid.   shunt with tip in the pelvis.  Lymph nodes: No lymphadenopathy.  Abdominal wall:  Unremarkable.  Vasculature: No aneurysm. No significant calcific atherosclerosis.  Bones: No acute fracture. Stable mixed lytic and sclerotic lesions throughout the spine, sternum, and pelvis.  No definite new lesion.  Impression:  Stable osseous metastatic lesions.  Prominent right chest wall and axillary nodes with measurements as above.  Attention on follow-up.  No additional new findings.    Review of Systems   Constitutional:  Negative for activity change, appetite change, chills, fatigue (notes pretty good), fever and unexpected weight change.   HENT:  Negative for mouth sores, rhinorrhea and trouble swallowing.    Eyes:  Negative for visual disturbance.   Respiratory:  Negative for cough, shortness of breath and wheezing.    Cardiovascular:  Negative for chest pain, palpitations and leg swelling.   Gastrointestinal:  Negative for abdominal distention, abdominal pain, blood in stool, change in bowel habit, constipation, diarrhea, nausea, vomiting and reflux.   Genitourinary:  Negative for difficulty urinating, dysuria, frequency and urgency.   Musculoskeletal:  Positive for arthralgias, back pain (chronic) and joint deformity (decr ROM right shoulder). Negative for joint swelling and myalgias.   Integumentary:  Negative for rash.   Neurological:  Negative for dizziness, weakness, numbness, headaches and coordination difficulties.   Hematological:  Negative for adenopathy. Does not bruise/bleed easily.   Psychiatric/Behavioral:  Negative for dysphoric mood and sleep  disturbance. The patient is not nervous/anxious.           Objective     Physical Exam  Vitals and nursing note reviewed.   Constitutional:       General: She is not in acute distress.     Appearance: Normal appearance. She is not ill-appearing.      Comments: Presents with her 2 daughters  ECOG = 0  In WC but ambulates well without assist   HENT:      Head: Normocephalic and atraumatic.      Mouth/Throat:      Comments: Tongue geography no different than usual  Eyes:      General: No scleral icterus.     Extraocular Movements: Extraocular movements intact.      Conjunctiva/sclera: Conjunctivae normal.      Pupils: Pupils are equal, round, and reactive to light.   Cardiovascular:      Rate and Rhythm: Normal rate and regular rhythm.      Heart sounds: Normal heart sounds. No murmur heard.     No friction rub. No gallop.   Pulmonary:      Effort: Pulmonary effort is normal. No respiratory distress.      Breath sounds: Normal breath sounds. No wheezing, rhonchi or rales.      Comments: Breasts- deferred today  Chest:      Chest wall: No tenderness.   Abdominal:      General: Bowel sounds are normal. There is no distension.      Palpations: Abdomen is soft. There is no mass.      Tenderness: There is no abdominal tenderness. There is no guarding or rebound.      Comments: No palpable masses   Musculoskeletal:         General: No swelling.      Cervical back: Normal range of motion and neck supple. No tenderness.      Right lower leg: No edema.      Left lower leg: No edema.      Comments: No spinal or paraspinal tenderness today.    Lymphadenopathy:      Cervical: No cervical adenopathy.   Skin:     General: Skin is warm and dry.      Coloration: Skin is not jaundiced.      Findings: No bruising or rash.   Neurological:      General: No focal deficit present.      Mental Status: She is alert and oriented to person, place, and time.      Sensory: No sensory deficit.      Motor: No weakness.      Gait: Gait normal.    Psychiatric:         Mood and Affect: Mood normal.         Behavior: Behavior normal.         Thought Content: Thought content normal.         Judgment: Judgment normal.     Labs- reviewed  Imagine- reviewed       Assessment and Plan     1. Malignant neoplasm of upper-inner quadrant of right breast in female, estrogen receptor positive  -     Cancel: goserelin (ZOLADEX) injection 3.6 mg  -     CBC Oncology; Future  -     Comprehensive Metabolic Panel; Future  -     Ambulatory referral/consult to Physical/Occupational Therapy; Future; Expected date: 05/01/2024    2. Malignant neoplasm metastatic to bone  Overview:  O Regualtory Update 4/1/23    Orders:  -     Cancel: goserelin (ZOLADEX) injection 3.6 mg    3. Anemia associated with chemotherapy    4. Cancer associated pain    5. Generalized weakness  -     Ambulatory referral/consult to Physical/Occupational Therapy; Future; Expected date: 05/01/2024    6. Type 2 diabetes mellitus without complication, with long-term current use of insulin  Overview:  A1c 8.5 11/3/2021. Cont Glipizide 10 mg BID. Sending rx for Trulicity to [facility] pharmacy with pharmacy assistance referral. Podiatry referral per pt request      7. Chronic combined systolic and diastolic heart failure          Clinically doing well.   Most recent scans stable.   Labs reviewed and overall stable. Adequate to proceed.   Continue Arimidex and Ibrance (dose adjusted due to anemia to 100 mg M-F, off Sat&Sun x 3 weeks, off 1 week)   Zoladex today and every 4 weeks.   Xgeva- hold with jaw concerns  Ongoing dental assessment - next appt 5/2024.   Continue antibiotics. Encouraged to start probiotic.   Refer to PT for exercise.   Pain very stable at this time. Not requiring pain medication.   No evidence of cardiac decompensation.     Route Chart for Scheduling    Med Onc Chart Routing      Follow up with physician 4 weeks. with cbc, cmp and for zoladex. NO XGEVA   Follow up with [facility]    Infusion scheduling  note    Injection scheduling note    Labs    Imaging    Pharmacy appointment    Other referrals         PT referral please- please make sure someone reaches out.             Treatment Plan Information   OP ANASTROZOLE PALBOCICLIB Q4W   Jessica Mendez MD   Upcoming Treatment Dates - OP ANASTROZOLE PALBOCICLIB Q4W    No upcoming days in selected categories.    Supportive Plan Information  OP BREAST GOSERELIN & DENOSUMAB Q4W   Jessica Mendez MD   Upcoming Treatment Dates - OP BREAST GOSERELIN & DENOSUMAB Q4W    No upcoming days in selected categories.    Therapy Plan Information  INJECTAFER (FERRIC CARBOXYMALTOSE)  EPINEPHrine (EPIPEN) 0.3 mg/0.3 mL pen injection 0.3 mg  0.3 mg, Intramuscular, PRN  diphenhydrAMINE injection 50 mg  50 mg, Intravenous, PRN  methylPREDNISolone sodium succinate injection 125 mg  125 mg, Intravenous, PRN  sodium chloride 0.9% bolus 1,000 mL 1,000 mL  1,000 mL, Intravenous, PRN    DENOSUMAB (XGEVA) Q4W  Medications  denosumab (XGEVA) solution 120 mg  120 mg, Subcutaneous, Every 4 weeks      Patient is in agreement with the proposed treatment plan. All questions were answered to the patient's satisfaction. Pt knows to call clinic for any new or worsening symptoms and if anything is needed before the next clinic visit.    Alfredo Bhatt, MSN, APRN, FNP-C  Nurse Practitioner to Dr. Phyllis Lei  [facility]  [facility]  Hematology & Medical Oncology  [address] [place] [id number]  ph. [phone] ext [phone]  Fax. [phone]              40 minutes of total time spent on the encounter, which includes face to face time and non-face to face time preparing to see the patient (eg, review of tests), Obtaining and/or reviewing separately obtained history, Documenting clinical information in the electronic or other health record, Independently interpreting results (not separately reported) and communicating results to the  patient/family/caregiver, or Care coordination (not separately reported).

## 2024-04-25 ENCOUNTER — HOSPITAL ENCOUNTER (OUTPATIENT)
Dept: RADIOLOGY | Facility: HOSPITAL | Age: 49
Discharge: HOME | End: 2024-04-25
Attending: PHYSICIAN ASSISTANT
Payer: MEDICARE

## 2024-04-25 ENCOUNTER — OFFICE VISIT (OUTPATIENT)
Dept: NEUROSURGERY | Facility: CLINIC | Age: 49
End: 2024-04-25
Payer: MEDICARE

## 2024-04-25 DIAGNOSIS — G93.2 IIH (IDIOPATHIC INTRACRANIAL HYPERTENSION): ICD-10-CM

## 2024-04-25 DIAGNOSIS — G93.2 PSEUDOTUMOR CEREBRI SYNDROME: Primary | ICD-10-CM

## 2024-04-25 PROCEDURE — 70450 CT HEAD/BRAIN W/O DYE: CPT | Mod: 26,,, | Performed by: RADIOLOGY

## 2024-04-25 PROCEDURE — 3044F HG A1C LEVEL LT 7.0%: CPT | Mod: CPTII,S$GLB,, | Performed by: PHYSICIAN ASSISTANT

## 2024-04-25 PROCEDURE — 70450 CT HEAD/BRAIN W/O DYE: CPT | Mod: TC

## 2024-04-25 PROCEDURE — 99999 PR PBB SHADOW E&M-EST. PATIENT-LVL III: CPT | Mod: PBBFAC,,, | Performed by: PHYSICIAN ASSISTANT

## 2024-04-25 PROCEDURE — 1159F MED LIST DOCD IN RCRD: CPT | Mod: CPTII,S$GLB,, | Performed by: PHYSICIAN ASSISTANT

## 2024-04-25 PROCEDURE — 4010F ACE/ARB THERAPY RXD/TAKEN: CPT | Mod: CPTII,S$GLB,, | Performed by: PHYSICIAN ASSISTANT

## 2024-04-25 PROCEDURE — 99214 OFFICE O/P EST MOD 30 MIN: CPT | Mod: S$GLB,,, | Performed by: PHYSICIAN ASSISTANT

## 2024-04-25 NOTE — PROGRESS NOTES
Neurosurgery  Established Patient    SUBJECTIVE:     History of Present Illness:  Kristopher Elmore is a 48 y.o. female with history of IIH s/p VPS on 12/14/23 by Dr. Villa who presents with complaints of positional dizziness and headache. She was seen by Dr. Case on 2/22/24, eye exam without papilledema. She denies any vision changes or vomiting. Her shunt is currently set at 150.     Interval history 4/25/24: Patient presents for 6 week follow up after shunt adjustment. She reports complete resolution of her previous symptoms of headache, positional dizziness, and upper back pain. She denies any vision problems.       Review of patient's allergies indicates:   Allergen Reactions    Keflex [cephalexin] Itching       Current Outpatient Medications   Medication Sig Dispense Refill    anastrozole (ARIMIDEX) 1 mg Tab TAKE 1 TABLET(1 MG) BY MOUTH EVERY DAY 90 tablet 0    atorvastatin (LIPITOR) 40 MG tablet Take 1 tablet (40 mg total) by mouth every evening. 90 tablet 3    blood pressure kit-extra large Kit Check blood pressure once daily at the same time 1 kit 0    blood sugar diagnostic Strp To check BG 2 times daily, to use with insurance preferred meter 200 each 3    blood-glucose sensor (DEXCOM G7 SENSOR) Monique Change every 10 days 3 each 11    carvediloL (COREG) 25 MG tablet TAKE 1 TABLET(25 MG) BY MOUTH TWICE DAILY 180 tablet 3    doxycycline (VIBRAMYCIN) 100 MG Cap Take 100 mg by mouth 2 (two) times daily.      empagliflozin (JARDIANCE) 25 mg tablet Take 1 tablet (25 mg total) by mouth once daily. 90 tablet 2    ENTRESTO  mg per tablet TAKE 1 TABLET BY MOUTH TWICE DAILY 60 tablet 11    ergocalciferol (ERGOCALCIFEROL) 50,000 unit Cap TAKE 1 CAPSULE BY MOUTH EVERY 7 DAYS 12 capsule 0    furosemide (LASIX) 20 MG tablet Take 1 tablet (20 mg total) by mouth 2 (two) times daily. 180 tablet 3    glipiZIDE (GLUCOTROL) 10 MG TR24 TAKE 1 TABLET(10 MG) BY MOUTH DAILY WITH BREAKFAST 90 tablet 2    goserelin  (ZOLADEX) 3.6 mg injection Inject 3.6 mg into the skin every 28 days.      ibuprofen (ADVIL,MOTRIN) 800 MG tablet Take 1 tablet (800 mg total) by mouth 2 (two) times daily as needed for Pain. 45 tablet 2    lancets Misc To check BG 2 times daily, to use with insurance preferred meter 200 each 3    metFORMIN (GLUCOPHAGE-XR) 500 MG ER 24hr tablet Take 2 tablets (1,000 mg total) by mouth 2 (two) times daily with meals. 360 tablet 2    metroNIDAZOLE (FLAGYL) 500 MG tablet Take 500 mg by mouth 3 (three) times daily.      naloxone (NARCAN) 4 mg/actuation Spry 4mg by nasal route as needed for opioid overdose; may repeat every 2-3 minutes in alternating nostrils until medical help arrives. Call 911 1 each 11    ondansetron (ZOFRAN-ODT) 8 MG TbDL Take 1 tablet (8 mg total) by mouth every 8 (eight) hours as needed (nausea and vomiting). 30 tablet 2    palbociclib (IBRANCE) 100 mg Cap Take 1 capsule (100 mg) by mouth Monday - Friday, DO NOT TAKE Saturday and Sunday, for 3 weeks on, 1 week off. 21 capsule 3    pentoxifylline (TRENTAL) 400 mg TbSR Take 400 mg by mouth 2 (two) times daily.      potassium chloride (KLOR-CON) 10 MEQ TbSR TAKE 1 TABLET(10 MEQ) BY MOUTH EVERY NIGHT 90 tablet 1    prochlorperazine (COMPAZINE) 5 MG tablet Take 5 mg by mouth 4 (four) times daily.      sennosides (SENNA-C ORAL) Take 2 tablets by mouth daily as needed.      spironolactone (ALDACTONE) 25 MG tablet Take 1 tablet (25 mg total) by mouth once daily. 90 tablet 3    tirzepatide 7.5 mg/0.5 mL PnIj Inject 7.5 mg into the skin every 7 days. MONDAYS 4 pen 5    tiZANidine (ZANAFLEX) 4 MG tablet Take 4 mg by mouth every evening.      traZODone (DESYREL) 50 MG tablet Take 1 tablet (50 mg total) by mouth every evening. 30 tablet 11    UNKNOWN TO PATIENT Calcium      venlafaxine (EFFEXOR-XR) 150 MG Cp24 Take 1 capsule (150 mg total) by mouth once daily. 30 capsule 1    vitamin E 100 UNIT capsule Take 100 Units by mouth once daily.      blood-glucose  meter kit To check BG 2 times daily, to use with insurance preferred meter 1 each 0    LORazepam (ATIVAN) 1 MG tablet Take 1 tablet (1 mg total) by mouth every 6 (six) hours as needed for Anxiety. 30 tablet 1     No current facility-administered medications for this visit.     Facility-Administered Medications Ordered in Other Visits   Medication Dose Route Frequency Provider Last Rate Last Admin    0.9%  NaCl infusion   Intravenous Continuous Esther Coreas MD   New Bag at 10/26/23 1722    mupirocin 2 % ointment   Nasal On Call Procedure Esther Coreas MD   Given at 10/26/23 1614       Past Medical History:   Diagnosis Date    Abnormal Pap smear     pt states 13yrs ago colpo was done    Anemia     Anxiety     Cancer     Cardiomegaly 2014    Cardiomyopathy     CHF (congestive heart failure)     Depression     Fibroid     Hx of psychiatric care     Hyperlipidemia     Hypertension     Psychiatric problem     Sleep difficulties     Therapy      Past Surgical History:   Procedure Laterality Date     SECTION      CREATION OF VENTRICULOPERITONEAL SHUNT USING COMPUTER-ASSISTED NAVIGATION Right 2023    Procedure: INSERTION, SHUNT, VENTRICULOPERITONEAL, USING COMPUTER-ASSISTED NAVIGATION;  Surgeon: Jayme Villa MD;  Location: [address];  Service: Neurosurgery;  Laterality: Right;    CREATION OF VENTRICULOPERITONEAL SHUNT USING COMPUTER-ASSISTED NAVIGATION N/A 2023    Procedure: INSERTION, SHUNT, VENTRICULOPERITONEAL, USING COMPUTER-ASSISTED NAVIGATION;  Surgeon: Frederick Kohler MD;  Location: [address];  Service: General;  Laterality: N/A;    LUMBAR PUNCTURE N/A 10/26/2023    Procedure: Lumbar Puncture;  Surgeon: Jayme Villa MD;  Location: [facility] OR [address] [place] [address];  Service: Neurosurgery;  Laterality: N/A;  TOR1  ASA1  regular bed reversed  lateral left down   II  RN PHONE PREOP 10/12/2023----BMI--57.67----INCOMPLETE CONSENT----NEED ORDERS    TONSILLECTOMY      TUBAL  LIGATION      UTERINE FIBROID EMBOLIZATION  2015     Family History       Problem Relation (Age of Onset)    Arthritis Mother    Asthma Daughter    Cervical cancer Paternal Cousin    Diabetes Mother    Endometriosis Paternal Cousin    Fibroids Other, Other, Other, Other, Other    Heart disease Mother    Heart failure Mother    Hyperlipidemia Mother    Hypertension Mother, Brother    Kidney cancer Maternal Grandfather (79)    No Known Problems Maternal Grandmother    Other Mother    Ovarian cancer Paternal Cousin    Rashes / Skin problems Daughter    Thyroid cancer Other          Social History     Socioeconomic History    Marital status:    Tobacco Use    Smoking status: Never    Smokeless tobacco: Never   Substance and Sexual Activity    Alcohol use: Not Currently     Alcohol/week: 0.0 standard drinks of alcohol    Drug use: No    Sexual activity: Not Currently     Partners: Male     Birth control/protection: Surgical   Social History Narrative    Patient is a pleasant AAF,  x2. She has excellent social support, although she states they worry more than she does.     Former teacher English.    Disabled.    She lives with her 2 adult daughters      Social Determinants of Health     Financial Resource Strain: High Risk (1/18/2024)    Overall Financial Resource Strain (CARDIA)     Difficulty of Paying Living Expenses: Hard   Food Insecurity: Food Insecurity Present (1/18/2024)    Hunger Vital Sign     Worried About Running Out of Food in the Last Year: Sometimes true     Ran Out of Food in the Last Year: Often true   Transportation Needs: No Transportation Needs (1/18/2024)    PRAPARE - Transportation     Lack of Transportation (Medical): No     Lack of Transportation (Non-Medical): No   Physical Activity: Insufficiently Active (1/18/2024)    Exercise Vital Sign     Days of Exercise per Week: 2 days     Minutes of Exercise per Session: 10 min   Stress: Stress Concern Present (1/18/2024)    [place]  [place] [employer] - Occupational Stress Questionnaire     Feeling of Stress : To some extent   Social Connections: Unknown (1/18/2024)    Social Connection and Isolation Panel [NHANES]     Frequency of Communication with Friends and Family: More than three times a week     Frequency of Social Gatherings with Friends and Family: Twice a week     Active Member of Clubs or Organizations: No     Attends Club or Organization Meetings: Never     Marital Status:    Housing Stability: High Risk (1/18/2024)    Housing Stability Vital Sign     Unable to Pay for Housing in the Last Year: Yes     Number of Places Lived in the Last Year: 1     Unstable Housing in the Last Year: No       Review of Systems    OBJECTIVE:     Vital Signs  Pain Score: 0-No pain  There is no height or weight on file to calculate BMI.    Neurosurgery Physical Exam  General: well developed, well nourished, no distress  Neurologic: Alert and oriented. Thought content appropriate.   GCS: Motor: 6/Verbal: 5/Eyes: 4 GCS Total: 15   Mental Status: Awake, Alert, Oriented x3   Cranial nerves: face symmetric, tongue midline, pupils equal, round, reactive to light with accomodation, EOMI.   Motor Strength: moves all extremities with good strength and tone   Sensation: response to light touch throughout  No gait disturbances   Incision well healed     Shunt pumps and refills     Diagnostic Results:  CTH from 4/25/24 which I have personally reviewed shows stable ventricles when compared to previous CTH from March 2024.     ASSESSMENT/PLAN:     Kristopher Elmore is a 48 y.o. female with history of IIH s/p VPS on 12/14/23 by Dr. Villa. Last visit she had complaints of headache and positional dizziness, at that time we adjusted her shunt from 150 to 170. Today she is doing well with full resolution of her previous complaints. As long as she continues to do well we will plan to see her back in a year with CTH and XRSS. She should follow up  with Neuropthalmology as scheduled. She knows to call the clinic sooner with any questions or concerns.          Detail Level: Zone

## 2024-05-03 ENCOUNTER — DOCUMENTATION ONLY (OUTPATIENT)
Dept: REHABILITATION | Facility: HOSPITAL | Age: 49
End: 2024-05-03

## 2024-05-03 NOTE — PROGRESS NOTES
No Show Note/Documentation    Patient: Kristopher Elmore  Date of Session: 5/3/2024  Diagnosis: No diagnosis found.  MRN: [id number]    Kristopher Elmore did not attend her scheduled therapy appointment today. She did not call to cancel nor reschedule. Next appointment is scheduled for 5/3/24 and will follow up with patient at that time. No charges have been posted today.     Cancel: 0  No show: 1    Mena Dunaway, PT   5/3/2024

## 2024-05-09 ENCOUNTER — PATIENT MESSAGE (OUTPATIENT)
Dept: ENDOCRINOLOGY | Facility: CLINIC | Age: 49
End: 2024-05-09

## 2024-05-15 ENCOUNTER — OFFICE VISIT (OUTPATIENT)
Dept: PALLIATIVE MEDICINE | Facility: CLINIC | Age: 49
End: 2024-05-15
Payer: MEDICARE

## 2024-05-15 DIAGNOSIS — G47.00 INSOMNIA, UNSPECIFIED TYPE: ICD-10-CM

## 2024-05-15 DIAGNOSIS — R53.0 NEOPLASTIC (MALIGNANT) RELATED FATIGUE: ICD-10-CM

## 2024-05-15 DIAGNOSIS — Z51.5 ENCOUNTER FOR PALLIATIVE CARE: ICD-10-CM

## 2024-05-15 DIAGNOSIS — Z17.0 MALIGNANT NEOPLASM OF UPPER-INNER QUADRANT OF RIGHT BREAST IN FEMALE, ESTROGEN RECEPTOR POSITIVE: Primary | ICD-10-CM

## 2024-05-15 DIAGNOSIS — G89.3 CANCER ASSOCIATED PAIN: ICD-10-CM

## 2024-05-15 DIAGNOSIS — R42 DIZZINESS: ICD-10-CM

## 2024-05-15 DIAGNOSIS — R63.0 ANOREXIA: ICD-10-CM

## 2024-05-15 DIAGNOSIS — R06.09 OTHER FORM OF DYSPNEA: ICD-10-CM

## 2024-05-15 DIAGNOSIS — F43.23 ADJUSTMENT DISORDER WITH MIXED ANXIETY AND DEPRESSED MOOD: ICD-10-CM

## 2024-05-15 DIAGNOSIS — C50.211 MALIGNANT NEOPLASM OF UPPER-INNER QUADRANT OF RIGHT BREAST IN FEMALE, ESTROGEN RECEPTOR POSITIVE: Primary | ICD-10-CM

## 2024-05-15 DIAGNOSIS — R11.0 NAUSEA: ICD-10-CM

## 2024-05-15 DIAGNOSIS — Z71.89 ADVANCED CARE PLANNING/COUNSELING DISCUSSION: ICD-10-CM

## 2024-05-15 PROCEDURE — 3044F HG A1C LEVEL LT 7.0%: CPT | Mod: CPTII,95,, | Performed by: STUDENT IN AN ORGANIZED HEALTH CARE EDUCATION/TRAINING PROGRAM

## 2024-05-15 PROCEDURE — 4010F ACE/ARB THERAPY RXD/TAKEN: CPT | Mod: CPTII,95,, | Performed by: STUDENT IN AN ORGANIZED HEALTH CARE EDUCATION/TRAINING PROGRAM

## 2024-05-15 PROCEDURE — 1160F RVW MEDS BY RX/DR IN RCRD: CPT | Mod: CPTII,95,, | Performed by: STUDENT IN AN ORGANIZED HEALTH CARE EDUCATION/TRAINING PROGRAM

## 2024-05-15 PROCEDURE — 99215 OFFICE O/P EST HI 40 MIN: CPT | Mod: 95,,, | Performed by: STUDENT IN AN ORGANIZED HEALTH CARE EDUCATION/TRAINING PROGRAM

## 2024-05-15 PROCEDURE — 1159F MED LIST DOCD IN RCRD: CPT | Mod: CPTII,95,, | Performed by: STUDENT IN AN ORGANIZED HEALTH CARE EDUCATION/TRAINING PROGRAM

## 2024-05-15 RX ORDER — OXYCODONE AND ACETAMINOPHEN 5; 325 MG/1; MG/1
1 TABLET ORAL EVERY 6 HOURS PRN
Qty: 60 EACH | Refills: 0 | Status: SHIPPED | OUTPATIENT
Start: 2024-05-15

## 2024-05-15 RX ORDER — TIZANIDINE 4 MG/1
4 TABLET ORAL EVERY 8 HOURS
Qty: 45 TABLET | Refills: 1 | Status: SHIPPED | OUTPATIENT
Start: 2024-05-15

## 2024-05-15 NOTE — PROGRESS NOTES
[facility]  [facility]  Follow up visit    The patient location is: home  The chief complaint leading to consultation is: symptom management/GOC    Visit type: audiovisual    Face to Face time with patient: 33 minutes  40 minutes of total time spent on the encounter, which includes face to face time and non-face to face time preparing to see the patient (eg, review of tests), Obtaining and/or reviewing separately obtained history, Documenting clinical information in the electronic or other health record, Independently interpreting results (not separately reported) and communicating results to the patient/family/caregiver, or Care coordination (not separately reported).     Each patient to whom he or she provides medical services by telemedicine is:  (1) informed of the relationship between the physician and patient and the respective role of any other health care provider with respect to management of the patient; and (2) notified that he or she may decline to receive medical services by telemedicine and may withdraw from such care at any time.    Reason for Consult: symptom management and ACP      ASSESSMENT/PLAN:     Plan/Recommendations:  Diagnoses and all orders for this visit:    Malignant neoplasm of upper-inner quadrant of right breast in female, estrogen receptor positive with bone mets  - patient followed by Dr. Lei and NP Delphine  - currently on disease-directed therapy with arimidex and ibrance  - s/p  shunt on 12/14/23   - patient also with complication of jaw necrosis; holding Xgeva for now    Encounter for palliative care/Advanced care planning  Advance Care Planning   - patient decisional and by herself on telemedicine visit  - goals: life prolonging  - reviewed ACP booklet again at previous visit along with [place] form. Patient has been thinking on these topics more with ongoing symptoms of likely congestive heart failure in setting of  malignancy.   [facility] Garett Shelton at [phone] and Johnny Shelton at [phone]  - patient went into detail previously about her living will and goals:  - If she were to have a cardiac event (such as MI), patient would want all life prolonging measures including cardiac resuscitation, intubation, artifical nutrition/hydration, ICU care, etc. If she were having advanced malignancy that was driving the overall decline in health, she would not want invasive, life prolonging measures such as cardiac resuscitation, artifical nutrition/hydration, etc.      Other form of dyspnea  - patient reporting improvement in her dyspnea since last visit  - dyspnea worsens with exertion   - patient has been seen previously in ED due to dyspnea and concern for heart failure and worsening control of DM  - patient states that she has been able to walk around the house now; she plans to start working with PT next week to improve her walking and stamina/dyspnea  - continue treatment for other conditions (DM, CHF) as prescribed by other specialists  - tips for shortness of breath in new patient folder for patient to review    Cancer-related pain  - patient reporting her pain in her jaw and her back  - she has been using ibuprofen 800 mg for pain control but having upset stomach  - patient being treated jaw necrosis by dentistry  - she is out of opioid medication  - she has used tizanidine at night for jaw, which also helps her back pain  - increase tizanidine to 4 mg q8h prn  - restart oxycodone-apap 5-325 mg q6h prn for pain unrelieved by other medications  - patient reports that the Norco, oxycodone, and MS Contin all make her very sleepy  - has taken methadone sporadically but with improvement when she does so, and previously there was a discussion about strategy of taking at night at bedtime to deliver maintenance results. Patient has not been taking methadone   - EKG on 04/13/2022: Qtc: 461    Nausea/Anorexia  - patient  reports nausea occurs about once a week with diabetes medication injection  - patient's jaw pain with necrosis has also caused her to adjust the type of food she can eat due to pain/discomfort  - continue dronabinol 2.5 mg bid for nausea/mood  - patient using anti-emetics every day with good relief still   - continue zofran and phenergan prn; recently refilled  - patient already following with nutrition  - patient has already been referred to Endocrinology for better management of diabetes.  - will continue to monitor    Adjustment disorder with mixed anxiety and depressed mood  Neoplastic (malignant) related fatigue/Insomnia  - patient reports some improvement in depression and anxiety; she is excited about her eldest daughter graduating college  - patient reports good social support from 2 ex-husbands, siblings, daughters & friends  - additionally reports using journaling, music and [place] to cope with feelings of sadness; her 2 kittens also offer comfort   - reports increaesed Effexor (225 mg) is helping, prior to increase she was unable to discuss loss of parents without crying; Rx by psych NP [place]  - emotional support provided throughout visit  - patient's sleep has stabilized with the improvement of other symptom burden  - patient noted improvement in sleep with trazodone, but she has been staying up despite taking trazodone. Reviewed sleep hygiene previously.   - Tip sheet for better sleep in new patient folder for patient to review  - will continue to monitor closely     Constipation/Diarrhea  - patient previously requiring senna daily to avoid constipation  - patient has developed diarrhea with use of antibiotics TID; no accidents or urgency. She reports it is controlled at this time  - previously discussed using bowel regimen consistently   - adequate fluid intake   - constipation tip sheet in new patient folder for patient to review  - will continue close  monitoring    Dizziness/weakness/back pain/headaches  - patient reporting these symptoms all initially improved with placement of  shunt; and have now resolved with adjustment of  shunt by NSGY    Understanding of illness/Prognosis: patient has good understanding of disease at this time.     Goals of care: life-prolonging but not at the expense of QOL; hopes to avoid unnecessary suffering    Follow up: ~ 2 months    Patient's encounter and above plan of care discussed with patient's oncology team and NSGY team    SUBJECTIVE:     History of Present Illness:  Patient is a 48 y.o. year old female with anemia, anxiety, cardiomyopathy, CHF, HTN, HLD, and metastatic breast cancer presents to Palliative Medicine for symptom management and ACP. Patient was diagnosed with stage IV breast cancer in September 2019. She was started on Ibrance and Arimidex, and she continues on this regimen today. Please see oncology notes for full details regarding her oncologic treatment course.     05/15/2024:  [place]  reviewed and summarized:  04/03/2024 Lorazepam 1 mg Disp: 30 for 8 days    History of Present Illness    CHIEF COMPLAINT:  - Patient presents today for follow-up  - Reports ongoing back pain  - Requests medication refills    HISTORY OBTAINED FROM:  - Patient    HPI:  The patient presents with ongoing back pain, similar to previous episodes. She has been managing the pain with ibuprofen 800mg, but reports that it upsets her abdomen, so she tries to avoid taking it. The patient had previously been prescribed tizanidine for jaw pain, which she found helpful for her back pain as well. She takes the tizanidine once at night, which helps her sleep but the effects last until noon the following day. The patient also discusses ongoing jaw pain, which was discovered to be necrosis of the jaw by her dentist. This condition is a side effect of the Xgeva injections she receives. The necrosis was likely triggered by a scratch or nick to  "the inside of her jaw during intubation for a  shunt placement in December. The patient has been receiving treatment from the [facility], including doxycycline and a anticoagulant to encourage blood flow to the affected area. However, the necrotic bone has started to unravel, and the patient is scheduled for further treatment on Memorial Day. The jaw pain has caused some issues with her mouth, including a rough, red tongue that is sensitive to spicy foods due to her instinctively rubbing her tongue against the exposed bone. Due to the jaw necrosis, the patient has been advised to avoid dental cleanings until the condition has healed. Her energy levels have been good, despite occasional limitations due to back pain. She experiences nausea about once a week, typically around the time of her Magyro injections, but finds that Zofran effectively manages these symptoms. The patient denies any nausea associated with the doxycycline. The patient's mood is positive, as she recently celebrated her older daughter's college graduation with honors and is excited about her younger daughter's upcoming graduation next year. Her back pain may have been aggravated by trying to keep up with the younger family members during the graduation celebrations. She enjoyed indulging in a strawberry girma, her first alcohol in about a year and a half due to her medication regimen. The patient reports that the adjustments made to her  shunt have resulted in significant improvements, describing the change as "night and day." She has been released from monthly follow-ups with neurosurgery and has been given a reprieve of either six months or a year. The patient denies chest pain or fever associated with her jaw pain. She also denies any recent medication changes.    ACTIVITIES OF DAILY LIVING:  - Patient is able to do some walking and is trying to work on posture. Patient is scheduled for physical therapy next week to help with " walking.  - Patient's dizziness has improved and gait is much better. Patient feels pretty confident walking.  - Patient is excited to get back to being more like her old self.    SOCIAL HISTORY:  - Alcohol Use: Indulged in a strawberry girma at [facility] after daughter's graduation, had not had alcohol in about a year and a half since starting a new medication          Please see previous notes for full details for encounters on 2021; 2022; 2022; 2022; 10/24/2022; 2023; 2023; 2023; 2023; 2023; 2023; 2024;     Past Medical History:   Diagnosis Date    Abnormal Pap smear     pt states 13yrs ago colpo was done    Anemia     Anxiety     Cancer     Cardiomegaly 2014    Cardiomyopathy     CHF (congestive heart failure)     Depression     Fibroid     Hx of psychiatric care     Hyperlipidemia     Hypertension     Psychiatric problem     Sleep difficulties     Therapy      Past Surgical History:   Procedure Laterality Date     SECTION      CREATION OF VENTRICULOPERITONEAL SHUNT USING COMPUTER-ASSISTED NAVIGATION Right 2023    Procedure: INSERTION, SHUNT, VENTRICULOPERITONEAL, USING COMPUTER-ASSISTED NAVIGATION;  Surgeon: Jayme Villa MD;  Location: [address];  Service: Neurosurgery;  Laterality: Right;    CREATION OF VENTRICULOPERITONEAL SHUNT USING COMPUTER-ASSISTED NAVIGATION N/A 2023    Procedure: INSERTION, SHUNT, VENTRICULOPERITONEAL, USING COMPUTER-ASSISTED NAVIGATION;  Surgeon: Frederick Kohler MD;  Location: [facility] OR [address] [place] [address];  Service: General;  Laterality: N/A;    LUMBAR PUNCTURE N/A 10/26/2023    Procedure: Lumbar Puncture;  Surgeon: Jayme Villa MD;  Location: [facility] OR [address] [place] [address];  Service: Neurosurgery;  Laterality: N/A;  TOR1  ASA1  regular bed reversed  lateral left down   II  RN PHONE PREOP 10/12/2023----BMI--57.67----INCOMPLETE CONSENT----NEED ORDERS    TONSILLECTOMY      TUBAL LIGATION       UTERINE FIBROID EMBOLIZATION  2015     Family History   Problem Relation Name Age of Onset    Other Mother          breast lesions had to be surgically removed    Arthritis Mother      Diabetes Mother      Heart disease Mother          CHF, CAD , 2 stents    Hypertension Mother      Hyperlipidemia Mother      Heart failure Mother      Hypertension Brother Leonides     No Known Problems Maternal Grandmother      Kidney cancer Maternal Grandfather  79    Rashes / Skin problems Daughter          boils/cysts    Asthma Daughter      Cervical cancer Paternal Cousin Gracie         dx age 29?    Ovarian cancer Paternal Cousin Gracie         dx age 29?    Endometriosis Paternal Cousin Gracie     Fibroids Other [employer]achi         uterine    Thyroid cancer Other [employer]         type? dx age?    Fibroids Other          uterine    Fibroids Other          uterine    Fibroids Other          uterine    Fibroids Other          uterine    Breast cancer Neg Hx      Colon polyps Neg Hx       Review of patient's allergies indicates:   Allergen Reactions    Keflex [cephalexin] Itching       Medications:    Current Outpatient Medications:     anastrozole (ARIMIDEX) 1 mg Tab, TAKE 1 TABLET(1 MG) BY MOUTH EVERY DAY, Disp: 90 tablet, Rfl: 0    atorvastatin (LIPITOR) 40 MG tablet, Take 1 tablet (40 mg total) by mouth every evening., Disp: 90 tablet, Rfl: 3    blood pressure kit-extra large Kit, Check blood pressure once daily at the same time, Disp: 1 kit, Rfl: 0    blood sugar diagnostic Strp, To check BG 2 times daily, to use with insurance preferred meter, Disp: 200 each, Rfl: 3    blood-glucose meter kit, To check BG 2 times daily, to use with insurance preferred meter, Disp: 1 each, Rfl: 0    blood-glucose sensor (DEXCOM G7 SENSOR) Monique, Change every 10 days, Disp: 3 each, Rfl: 11    carvediloL (COREG) 25 MG tablet, TAKE 1 TABLET(25 MG) BY MOUTH TWICE DAILY, Disp: 180 tablet, Rfl: 3    doxycycline (VIBRAMYCIN) 100 MG Cap, Take 100 mg by  mouth 2 (two) times daily., Disp: , Rfl:     empagliflozin (JARDIANCE) 25 mg tablet, Take 1 tablet (25 mg total) by mouth once daily., Disp: 90 tablet, Rfl: 2    ENTRESTO  mg per tablet, TAKE 1 TABLET BY MOUTH TWICE DAILY, Disp: 60 tablet, Rfl: 11    ergocalciferol (ERGOCALCIFEROL) 50,000 unit Cap, TAKE 1 CAPSULE BY MOUTH EVERY 7 DAYS, Disp: 12 capsule, Rfl: 0    furosemide (LASIX) 20 MG tablet, Take 1 tablet (20 mg total) by mouth 2 (two) times daily., Disp: 180 tablet, Rfl: 3    glipiZIDE (GLUCOTROL) 10 MG TR24, TAKE 1 TABLET(10 MG) BY MOUTH DAILY WITH BREAKFAST, Disp: 90 tablet, Rfl: 2    goserelin (ZOLADEX) 3.6 mg injection, Inject 3.6 mg into the skin every 28 days., Disp: , Rfl:     ibuprofen (ADVIL,MOTRIN) 800 MG tablet, Take 1 tablet (800 mg total) by mouth 2 (two) times daily as needed for Pain., Disp: 45 tablet, Rfl: 2    lancets Misc, To check BG 2 times daily, to use with insurance preferred meter, Disp: 200 each, Rfl: 3    LORazepam (ATIVAN) 1 MG tablet, Take 1 tablet (1 mg total) by mouth every 6 (six) hours as needed for Anxiety., Disp: 30 tablet, Rfl: 1    metFORMIN (GLUCOPHAGE-XR) 500 MG ER 24hr tablet, Take 2 tablets (1,000 mg total) by mouth 2 (two) times daily with meals., Disp: 360 tablet, Rfl: 2    metroNIDAZOLE (FLAGYL) 500 MG tablet, Take 500 mg by mouth 3 (three) times daily., Disp: , Rfl:     naloxone (NARCAN) 4 mg/actuation Spry, 4mg by nasal route as needed for opioid overdose; may repeat every 2-3 minutes in alternating nostrils until medical help arrives. Call 911, Disp: 1 each, Rfl: 11    ondansetron (ZOFRAN-ODT) 8 MG TbDL, Take 1 tablet (8 mg total) by mouth every 8 (eight) hours as needed (nausea and vomiting)., Disp: 30 tablet, Rfl: 2    palbociclib (IBRANCE) 100 mg Cap, Take 1 capsule (100 mg) by mouth Monday - Friday, DO NOT TAKE Saturday and Sunday, for 3 weeks on, 1 week off., Disp: 21 capsule, Rfl: 3    pentoxifylline (TRENTAL) 400 mg TbSR, Take 400 mg by mouth 2 (two)  times daily., Disp: , Rfl:     potassium chloride (KLOR-CON) 10 MEQ TbSR, TAKE 1 TABLET(10 MEQ) BY MOUTH EVERY NIGHT, Disp: 90 tablet, Rfl: 1    prochlorperazine (COMPAZINE) 5 MG tablet, Take 5 mg by mouth 4 (four) times daily., Disp: , Rfl:     sennosides (SENNA-C ORAL), Take 2 tablets by mouth daily as needed., Disp: , Rfl:     spironolactone (ALDACTONE) 25 MG tablet, Take 1 tablet (25 mg total) by mouth once daily., Disp: 90 tablet, Rfl: 3    tirzepatide 7.5 mg/0.5 mL PnIj, Inject 7.5 mg into the skin every 7 days. MONDAYS, Disp: 4 pen , Rfl: 5    tiZANidine (ZANAFLEX) 4 MG tablet, Take 4 mg by mouth every evening., Disp: , Rfl:     traZODone (DESYREL) 50 MG tablet, Take 1 tablet (50 mg total) by mouth every evening., Disp: 30 tablet, Rfl: 11    UNKNOWN TO PATIENT, Calcium, Disp: , Rfl:     venlafaxine (EFFEXOR-XR) 150 MG Cp24, Take 1 capsule (150 mg total) by mouth once daily., Disp: 30 capsule, Rfl: 1    vitamin E 100 UNIT capsule, Take 100 Units by mouth once daily., Disp: , Rfl:   No current facility-administered medications for this visit.    Facility-Administered Medications Ordered in Other Visits:     0.9%  NaCl infusion, , Intravenous, Continuous, Esther Coreas MD, New Bag at 10/26/23 1722    mupirocin 2 % ointment, , Nasal, On Call Procedure, Esther Coreas MD, Given at 10/26/23 1614    OBJECTIVE:       ROS:  Review of Systems   Constitutional:  Positive for activity change (improving), appetite change (improving) and fatigue (improving).   HENT: Negative.     Eyes: Negative.    Respiratory:  Positive for shortness of breath (with exertion).    Cardiovascular: Negative.  Negative for leg swelling.   Gastrointestinal:  Positive for nausea. Negative for abdominal pain, constipation and diarrhea.   Genitourinary: Negative.    Musculoskeletal:  Positive for arthralgias, back pain, myalgias and neck pain.   Skin: Negative.    Neurological: Negative.  Negative for dizziness, syncope and weakness.    Psychiatric/Behavioral:  Positive for dysphoric mood and sleep disturbance. The patient is nervous/anxious.    All other systems reviewed and are negative.      Review of Symptoms      Symptom Assessment (ESAS 0-10 Scale)  Pain:  5  Dyspnea:  0  Anxiety:  0  Nausea:  0  Depression:  0  Anorexia:  0  Fatigue:  3  Insomnia:  0  Restlessness:  0  Agitation:  0     CAM / Delirium:  Negative  Constipation:  Negative  Diarrhea:  Negative    Anxiety:  Is nervous/anxious  Constipation:  No constipation    Bowel Management Plan (BMP):  Yes      Pain Assessment:  OME in 24 hours:  0-5  Location(s): back    Back       Location: lower        Quality: Aching and dull        Quantity: 3/10 in intensity        Chronicity: Onset 1 (greater than) year(s) ago, stable        Aggravating Factors: Activity        Alleviating Factors: Opiates, NSAIDs and acetaminophen (doesn't like taking opioids due to sleepiness)       Associated Symptoms: None    Modified Abigail Scale:  0 (with exertion )    ECOG Performance Status ndGndrndanddndend:nd nd2nd Living Arrangements:  Lives with family    Psychosocial/Cultural:   See Palliative Psychosocial Note: No  Patient lives with her two adult daughters (18 and 20 years old)    Parents both  since cancer diagnosis    On disability and lives in her childhood home without a mortgage    5 sisters and 3 brothers (2 bio siblings and several step and half siblings)    Medically retried since diagnosis 7th grade ; still helps to develop lesson plans with friends sometimes and helps to  kids and prepare for ACT test  **Primary  to Follow**  Palliative Care  Consult: No    Spiritual:  F - Sandra and Belief:  Christian  I - Importance:  High  C - Community:  Prays regularly  A - Address in Care:   services offered but declined. Needs met at this time      Advance Care Planning   Advance Directives:   Living Will: No    [place]: No    Do Not Resuscitate Status: No     Medical Power of : Yes    Agent's Name:  Garett Shelton   Agent's Contact Number:  [phone]    Decision Making:  Patient answered questions  Goals of Care: What is most important right now is to focus on avoiding the hospital, remaining as independent as possible, symptom/pain control, improvement in condition but with limits to invasive therapies. Accordingly, we have decided that the best plan to meet the patient's goals includes continuing with treatment.          Physical Exam: limited to telemedicine visit  Vitals:    There were no vitals filed for this visit.    Physical Exam  Constitutional:       General: She is not in acute distress.     Appearance: She is obese. She is not diaphoretic.   HENT:      Head: Normocephalic and atraumatic.      Right Ear: External ear normal.      Left Ear: External ear normal.      Nose: Nose normal.      Mouth/Throat:      Mouth: Mucous membranes are moist.   Eyes:      General: No scleral icterus.        Right eye: No discharge.         Left eye: No discharge.      Extraocular Movements: Extraocular movements intact.   Neck:      Comments: Trachea midline  Pulmonary:      Effort: Pulmonary effort is normal. No respiratory distress.   Musculoskeletal:      Cervical back: Normal range of motion.      Comments: Sitting up without difficulty; able to move upper extremities with no limitations   Skin:     General: Skin is warm.      Coloration: Skin is not jaundiced.      Findings: No rash.   Neurological:      General: No focal deficit present.      Mental Status: She is alert and oriented to person, place, and time.      Cranial Nerves: No cranial nerve deficit.   Psychiatric:         Behavior: Behavior normal.         Thought Content: Thought content normal.         Judgment: Judgment normal.         Labs:  CBC:   WBC   Date Value Ref Range Status   04/24/2024 5.46 3.90 - 12.70 K/uL Final     Hemoglobin   Date Value Ref Range Status   04/24/2024 11.1 (L) 12.0 -  "16.0 g/dL Final     Hematocrit   Date Value Ref Range Status   04/24/2024 36.0 (L) 37.0 - 48.5 % Final     MCV   Date Value Ref Range Status   04/24/2024 91 82 - 98 fL Final     Platelets   Date Value Ref Range Status   04/24/2024 446 150 - 450 K/uL Final       LFT:   Lab Results   Component Value Date    AST 11 04/24/2024    ALKPHOS 89 04/24/2024    BILITOT 0.3 04/24/2024       Albumin:   Albumin   Date Value Ref Range Status   04/24/2024 3.1 (L) 3.5 - 5.2 g/dL Final   01/28/2021 3.5 (L) 3.6 - 5.1 g/dL Final     Comment:     For additional information, please refer to   http://education.[employer]/faq/UAS898 (This link is   being provided for informational/ educational purposes only.)    This test was developed and its analytical performance   characteristics have been determined by [employer]  [facility]. It has not been cleared or approved by the   [place] Food and Drug Administration. This assay has been validated   pursuant to the CLIA regulations and is used for clinical   purposes.  @ Test Performed By:  [employer] [facility]  John Valverde M.D.,   [address] [place] [id number]  CLIA  [id number]       Protein:   Total Protein   Date Value Ref Range Status   04/24/2024 7.6 6.0 - 8.4 g/dL Final       Radiology:I have reviewed all pertinent imaging results/findings within the past 24 hours.     03/22/2024 CT C/A/P: "Stable osseous metastatic lesions. Prominent right chest wall and axillary nodes with measurements as above. Attention on follow-up. No additional new findings."    I spent a total of 40 minutes on the day of the visit.This includes face to face time in discussion of goals of care, symptom assessment, coordination of care and emotional support.  This also includes non-face to face time preparing to see the patient (eg, review of tests/imaging), obtaining and/or reviewing separately obtained history, documenting clinical " information in the electronic or other health record, independently interpreting results and communicating results to the patient/family/caregiver, or care coordinator.       Signature: Aishwarya Portillo MD

## 2024-05-16 ENCOUNTER — OFFICE VISIT (OUTPATIENT)
Dept: ENDOCRINOLOGY | Facility: CLINIC | Age: 49
End: 2024-05-16
Payer: MEDICARE

## 2024-05-16 ENCOUNTER — PATIENT MESSAGE (OUTPATIENT)
Dept: PALLIATIVE MEDICINE | Facility: CLINIC | Age: 49
End: 2024-05-16
Payer: MEDICARE

## 2024-05-16 ENCOUNTER — PATIENT MESSAGE (OUTPATIENT)
Dept: ENDOCRINOLOGY | Facility: CLINIC | Age: 49
End: 2024-05-16

## 2024-05-16 ENCOUNTER — PATIENT MESSAGE (OUTPATIENT)
Dept: PSYCHIATRY | Facility: CLINIC | Age: 49
End: 2024-05-16
Payer: MEDICARE

## 2024-05-16 DIAGNOSIS — I50.42 CHRONIC COMBINED SYSTOLIC AND DIASTOLIC HEART FAILURE: ICD-10-CM

## 2024-05-16 DIAGNOSIS — R80.9 TYPE 2 DIABETES MELLITUS WITH MICROALBUMINURIA, WITHOUT LONG-TERM CURRENT USE OF INSULIN: Primary | ICD-10-CM

## 2024-05-16 DIAGNOSIS — E11.29 TYPE 2 DIABETES MELLITUS WITH MICROALBUMINURIA, WITHOUT LONG-TERM CURRENT USE OF INSULIN: Primary | ICD-10-CM

## 2024-05-16 DIAGNOSIS — E66.01 MORBID OBESITY, UNSPECIFIED OBESITY TYPE: ICD-10-CM

## 2024-05-16 PROCEDURE — 1160F RVW MEDS BY RX/DR IN RCRD: CPT | Mod: CPTII,95,, | Performed by: NURSE PRACTITIONER

## 2024-05-16 PROCEDURE — 99214 OFFICE O/P EST MOD 30 MIN: CPT | Mod: 95,,, | Performed by: NURSE PRACTITIONER

## 2024-05-16 PROCEDURE — G2211 COMPLEX E/M VISIT ADD ON: HCPCS | Mod: 95,,, | Performed by: NURSE PRACTITIONER

## 2024-05-16 PROCEDURE — 1159F MED LIST DOCD IN RCRD: CPT | Mod: CPTII,95,, | Performed by: NURSE PRACTITIONER

## 2024-05-16 PROCEDURE — 3044F HG A1C LEVEL LT 7.0%: CPT | Mod: CPTII,95,, | Performed by: NURSE PRACTITIONER

## 2024-05-16 PROCEDURE — 4010F ACE/ARB THERAPY RXD/TAKEN: CPT | Mod: CPTII,95,, | Performed by: NURSE PRACTITIONER

## 2024-05-16 RX ORDER — GLIPIZIDE 10 MG/1
TABLET, FILM COATED, EXTENDED RELEASE ORAL
Qty: 90 TABLET | Refills: 2 | Status: SHIPPED | OUTPATIENT
Start: 2024-05-16 | End: 2024-05-16

## 2024-05-16 RX ORDER — GLIPIZIDE 5 MG/1
5 TABLET, FILM COATED, EXTENDED RELEASE ORAL
Qty: 90 TABLET | Refills: 2 | Status: SHIPPED | OUTPATIENT
Start: 2024-05-16 | End: 2025-05-16

## 2024-05-16 NOTE — PATIENT INSTRUCTIONS
Increase Mounjaro to 10 mg once weekly.   Potential side effects: nausea, diarrhea, constipation, bloating. Nausea for the first week or two is common.  Avoid big food portions and greasy/heavy foods.     Reduce glipizide to glipizide XL 5 mg once daily before 1st meal.     Continue metformin and Jardiance.     Return to clinic in 3-4 months with labs prior.

## 2024-05-16 NOTE — PROGRESS NOTES
CC: This 48 y.o. Black or  female  is here for evaluation of  T2DM along with comorbidities indicated in the Visit Diagnosis section of this encounter.    HPI: Kristopher Elmore was diagnosed with T2DM in 2021 at age 46; A1c at 8.5% upon diagnosis.   Medical hx: cardiomegaly, chronic combined systolic and diastolic HF, anxiety     DM COMPLICATIONS: peripheral neuropathy      Prior visit 12/21/23 virtual visit   A1c remains about the same from 6.3 to 6.2%. Anemia has improved with hgb at 12.2. previously has had falsely low A1c's.   She reports that she feels better on Mounjaro because it doesn't cut her appetite as much as Ozempic 1 mg did. She has actually lose 8 lb since switching to Mounjaro. BGs have also improved as noted from fingersticks. See below.    Pt had ventriculoperitoneal () shunt  for intracranial HTN. Pt has h/o breast cancer. She did not take Mounjaro for 2 weeks d/t surgical procedures.   Plan Reported BGs and A1c indicate controlled diabetes control. Continue current tx. Rtc in 3 mo with labs prior.     Interval hx virtual   A1c remains stable with last in March at 6.1%.   She c/o that her weight has remained stable and she's hasn't been able to lose weight in the last few months.   She has lost 7 lb since Dec. She gets fully quickly. Her oncologist advised her to get more protein in her diet d/t low albumin.     Feels much better now s/p  shunt.       LAST DIABETES EDUCATION: 12/12/22 with JANE Foreman, found visit helpful     HOSPITALIZED FOR DIABETES  -  Yes - for hyperglycemia 2x in 2022 for 300 and 400s     SIGNIFICANT DIABETES MED HISTORY:   Trulicity 0.75 mg started in 1/2021, stopped very soon afterward d/t nausea and headache.   Metformin ER started at initial visit 9/2022  Ozempic started 6/2023, switched to Mounajro in Sept 2023    PRESCRIBED DIABETES MEDICATIONS:   Glipizide XL 10 mg once daily   Jardiance 25 mg once daily   Metformin ER 1000 mg bid   Mounjaro  7.5 mg weekly Mondays    Misses medication doses - No      SELF MONITORING BLOOD GLUCOSE: Dayo 3 CGM sensors fell off even with using [place].   monitors glucoses with Dexcom G7 (pays out of pocket) and more often with fingersticks 2x/day.     Reports BGs are 89-120s before meals;  140-160s after meals     HYPOGLYCEMIC EPISODES: denies      CURRENT DIET: drinks water.    eats 2 meals/day, no lunch.   Snacks - not much, sometimes SF jello or fruit.       Diet recall: dinner was slice of homemade vegan pizza and salad (not typical meal). Lunch was turkey sandwich, 1/2 cup of soup.     CURRENT EXERCISE: activity limited by back pain. Will start physical therapy       LMP 06/20/2019     ROS:   CONSTITUTIONAL: Appetite good, +  fatigue  Denies n/v, constipation, or diarrhea.       PHYSICAL EXAM:  GENERAL: Well developed, well nourished. No acute distress.   PSYCH: AAOx3, appropriate mood and affect, conversant, well-groomed. Judgement and insight good.   NEURO: Cranial nerves grossly intact. Speech clear, no tremor.   CHEST: Respirations even and unlabored.      Hemoglobin A1C   Date Value Ref Range Status   03/19/2024 6.1 (H) 4.0 - 5.6 % Final     Comment:     ADA Screening Guidelines:  5.7-6.4%  Consistent with prediabetes  >or=6.5%  Consistent with diabetes    High levels of fetal hemoglobin interfere with the HbA1C  assay. Heterozygous hemoglobin variants (HbS, HgC, etc)do  not significantly interfere with this assay.   However, presence of multiple variants may affect accuracy.     12/13/2023 6.2 (H) 4.0 - 5.6 % Final     Comment:     ADA Screening Guidelines:  5.7-6.4%  Consistent with prediabetes  >or=6.5%  Consistent with diabetes    High levels of fetal hemoglobin interfere with the HbA1C  assay. Heterozygous hemoglobin variants (HbS, HgC, etc)do  not significantly interfere with this assay.   However, presence of multiple variants may affect accuracy.     09/14/2023 6.3 (H) 4.0 - 5.6 % Final     Comment:      "ADA Screening Guidelines:  5.7-6.4%  Consistent with prediabetes  >or=6.5%  Consistent with diabetes    High levels of fetal hemoglobin interfere with the HbA1C  assay. Heterozygous hemoglobin variants (HbS, HgC, etc)do  not significantly interfere with this assay.   However, presence of multiple variants may affect accuracy.         No results found for: "CPEPTIDE", "GLUTAMICACID", "ISLETCELLANT", "FRUCTOSAMINE"     Lab Results   Component Value Date    CHOL 116 (L) 12/13/2023    CHOL 113 (L) 12/19/2022    CHOL 111 (L) 12/06/2022     Lab Results   Component Value Date    HDL 43 12/13/2023    HDL 35 (L) 12/19/2022    HDL 36 (L) 12/06/2022     Lab Results   Component Value Date    LDLCALC 53.8 (L) 12/13/2023    LDLCALC 57.2 (L) 12/19/2022    LDLCALC 58.2 (L) 12/06/2022     Lab Results   Component Value Date    TRIG 96 12/13/2023    TRIG 104 12/19/2022    TRIG 84 12/06/2022     Lab Results   Component Value Date    CHOLHDL 37.1 12/13/2023    CHOLHDL 31.0 12/19/2022    CHOLHDL 32.4 12/06/2022         Component Value Date/Time     04/24/2024 0914    K 4.2 04/24/2024 0914     04/24/2024 0914    CO2 20 (L) 04/24/2024 0914    BUN 11 04/24/2024 0914    CREATININE 0.9 04/24/2024 0914     (H) 04/24/2024 0914    CALCIUM 10.1 04/24/2024 0914    ALKPHOS 89 04/24/2024 0914    AST 11 04/24/2024 0914    ALT 17 04/24/2024 0914    BILITOT 0.3 04/24/2024 0914    EGFRNORACEVR >60.0 04/24/2024 0914    ESTGFRAFRICA >60.0 07/22/2022 1012         Lab Results   Component Value Date    LABMICR 25.0 12/13/2023    CREATRANDUR 69.0 12/13/2023    MICALBCREAT 36.2 (H) 12/13/2023             ASSESSMENT and PLAN:    A1C GOAL: < 7 %     1. Type 2 diabetes mellitus with microalbuminuria, without long-term current use of insulin  Increase Mounjaro to 10 mg once weekly.   Potential side effects: nausea, diarrhea, constipation, bloating. Nausea for the first week or two is common.  Avoid big food portions and greasy/heavy foods. "     Reduce glipizide to glipizide XL 5 mg once daily before 1st meal.     Continue metformin and Jardiance.     Return to clinic in 3-4 months with labs prior.     empagliflozin (JARDIANCE) 25 mg tablet    glipiZIDE (GLUCOTROL) 10 MG TR24    Hemoglobin A1C      2. Chronic combined systolic and diastolic heart failure  Continue jardiance       3. Morbid obesity, unspecified obesity type  Increases insulin resistance.   Increase Mounjaro as above.           No orders of the defined types were placed in this encounter.       No follow-ups on file.     The patient location is: [place]   The chief complaint leading to consultation is: type 2 dm     Visit type: audiovisual    Face to Face time with patient: 18 minutes of total time spent on the encounter, which includes face to face time and non-face to face time preparing to see the patient (eg, review of tests), Obtaining and/or reviewing separately obtained history, Documenting clinical information in the electronic or other health record, Independently interpreting results (not separately reported) and communicating results to the patient/family/caregiver, or Care coordination (not separately reported).         Each patient to whom he or she provides medical services by telemedicine is:  (1) informed of the relationship between the physician and patient and the respective role of any other health care provider with respect to management of the patient; and (2) notified that he or she may decline to receive medical services by telemedicine and may withdraw from such care at any time.    Notes:

## 2024-05-21 ENCOUNTER — OFFICE VISIT (OUTPATIENT)
Dept: PSYCHIATRY | Facility: CLINIC | Age: 49
End: 2024-05-21
Payer: MEDICARE

## 2024-05-21 DIAGNOSIS — F41.9 ANXIETY: ICD-10-CM

## 2024-05-21 DIAGNOSIS — F33.1 MAJOR DEPRESSIVE DISORDER, RECURRENT EPISODE, MODERATE WITH ANXIOUS DISTRESS: Primary | Chronic | ICD-10-CM

## 2024-05-21 DIAGNOSIS — F43.21 GRIEF: ICD-10-CM

## 2024-05-21 PROCEDURE — 99214 OFFICE O/P EST MOD 30 MIN: CPT | Mod: 95,,, | Performed by: NURSE PRACTITIONER

## 2024-05-21 PROCEDURE — 1159F MED LIST DOCD IN RCRD: CPT | Mod: CPTII,95,, | Performed by: NURSE PRACTITIONER

## 2024-05-21 PROCEDURE — 4010F ACE/ARB THERAPY RXD/TAKEN: CPT | Mod: CPTII,95,, | Performed by: NURSE PRACTITIONER

## 2024-05-21 PROCEDURE — 1160F RVW MEDS BY RX/DR IN RCRD: CPT | Mod: CPTII,95,, | Performed by: NURSE PRACTITIONER

## 2024-05-21 PROCEDURE — 3044F HG A1C LEVEL LT 7.0%: CPT | Mod: CPTII,95,, | Performed by: NURSE PRACTITIONER

## 2024-05-21 RX ORDER — VENLAFAXINE HYDROCHLORIDE 75 MG/1
75 CAPSULE, EXTENDED RELEASE ORAL DAILY
Qty: 90 CAPSULE | Refills: 1 | Status: SHIPPED | OUTPATIENT
Start: 2024-05-21 | End: 2024-11-17

## 2024-05-21 RX ORDER — VENLAFAXINE HYDROCHLORIDE 150 MG/1
150 CAPSULE, EXTENDED RELEASE ORAL DAILY
Qty: 90 CAPSULE | Refills: 1 | Status: SHIPPED | OUTPATIENT
Start: 2024-05-21 | End: 2024-11-17

## 2024-05-21 NOTE — PROGRESS NOTES
"5/21/2024   Kristopher Elmore  1975  [id number]    Outpatient Psychiatry Follow-Up Visit (MD/NP) - Telemedicine Visit      The patient location is: Patient reported that their location at the time of this visit was in the [facility]       Visit type: audiovisual    Each patient to whom he or she provides medical services by telemedicine is:  (1) informed of the relationship between the physician and patient and the respective role of any other health care provider with respect to management of the patient; and (2) notified that he or she may decline to receive medical services by telemedicine and may withdraw from such care at any time.          Chief Complaint:  Kristopher Elmore, a 48 y.o. female,who presents today for follow up of depression, anxiety and grief.  Met with patient.      Interval History/Subjective Report/Content of Current Session:     Pt is a 48 y.o. female with a hx of DM II, CHF, stage 4 breast cancer, anxiety, MDD and grief.    Pt states she is doing "pretty well" on Effexor XR. States her anxiety is well controlled. She has not needed hydroxyzine. She endorses low mood around anniversary reminders of her parents' deaths. Denies amotivation, anhedonia, and hopelessness.  She had a  shunt placed since the last time I saw her and states she is doing much better. Was having headaches and tremors, and these have resolved.   Sleeping well as she is on a muscle relaxer q hs since she was diagnosed with bone necrosis of the jaw. States this is a known SE of the drug used to treat her cancer. States her latest CT showed no further growth of her cancer.    She reports she has a really strong support system. This includes her two adult daughters, her best friend, and her brother and sister.     ASE of psych meds: denies    Pt denies recurrent thoughts of death and denies any suicidal or homicidal plans or intentions.      Denies any sxs of keri or hypomania. Denies AVH, " "paranoia and delusions. No objective s/sx of psychosis or keri.         Psychotherapy:  Target symptoms: depression, anxiety , grief  Why chosen therapy is appropriate versus another modality: relevant to diagnosis, patient responds to this modality  Outcome monitoring methods: self-report, observation  Therapeutic intervention type: supportive psychotherapy  Topics discussed/themes: illness/death of a loved one, stress related to medical comorbidities, building skills sets for symptom management  The patient's response to the intervention is accepting. The patient's progress toward treatment goals is good.   Duration of intervention: 4 minutes.      Psychotropic medication review  Previous Trials-  Klonopin  Valium  Xanax  Vistaril  Celexa - never took it because it interacts with one of her cancer meds  Wellbutrin - took for wt loss; made her "sad"  Prozac     Current meds-  Effexor XR          Review of Systems       Review of Systems   Constitutional:  Negative for chills, fever and malaise/fatigue.   Respiratory:  Negative for cough and shortness of breath.    Cardiovascular:  Negative for chest pain and palpitations.   Gastrointestinal:  Negative for abdominal pain, diarrhea and vomiting.   Genitourinary:  Negative for dysuria and hematuria.   Musculoskeletal:  Negative for falls and myalgias.   Skin:  Negative for rash.   Neurological:  Negative for tremors, seizures and headaches.   Psychiatric/Behavioral:          See HPI         Past Medical, Family and Social History: The patient's past medical, family and social history, allergies, current medications, past surgical history, and problem list have been reviewed and updated as appropriate within the electronic medical record.      Compliance: yes      Risk Parameters:  Patient reports no suicidal ideation  Patient reports no homicidal ideation  Patient reports no self-injurious behavior  Patient reports no violent behavior    Exam (detailed: at least 9 " elements; comprehensive: all 15 elements)   Constitutional  Vitals:  Most recent vital signs, dated less than 90 days prior to this appointment, were reviewed.   There were no vitals filed for this visit.           Musculoskeletal  Muscle Strength/Tone:  not examined - NISREEN due to virtual visit   Gait & Station:  NISREEN due to virtual visit     Psychiatric      Appearance:  unremarkable, age appropriate, well nourished, casually dressed, neatly groomed, obese   Behavior:  normal, friendly and cooperative, eye contact normal     Speech:  no latency; no press   Mood & Affect:  euthymic  congruent and appropriate   Thought Process:  normal and logical   Associations:  intact   Thought Content:  normal, no suicidality, no homicidality, delusions, or paranoia   Insight:  intact, has awareness of illness   Judgement: behavior is adequate to circumstances, age appropriate   Orientation:  grossly intact   Memory: intact for content of interview   Language: grossly intact   Attention Span & Concentration:  able to focus   Fund of Knowledge:  intact and appropriate to age and level of education     Medications:  Outpatient Encounter Medications as of 5/21/2024   Medication Sig Dispense Refill    anastrozole (ARIMIDEX) 1 mg Tab TAKE 1 TABLET(1 MG) BY MOUTH EVERY DAY 90 tablet 0    atorvastatin (LIPITOR) 40 MG tablet Take 1 tablet (40 mg total) by mouth every evening. 90 tablet 3    blood pressure kit-extra large Kit Check blood pressure once daily at the same time 1 kit 0    blood sugar diagnostic Strp To check BG 2 times daily, to use with insurance preferred meter 200 each 3    blood-glucose meter kit To check BG 2 times daily, to use with insurance preferred meter 1 each 0    blood-glucose sensor (DEXCOM G7 SENSOR) Monique Change every 10 days 3 each 11    carvediloL (COREG) 25 MG tablet TAKE 1 TABLET(25 MG) BY MOUTH TWICE DAILY 180 tablet 3    doxycycline (VIBRAMYCIN) 100 MG Cap Take 100 mg by mouth 2 (two) times daily.       empagliflozin (JARDIANCE) 25 mg tablet Take 1 tablet (25 mg total) by mouth once daily. 90 tablet 2    ENTRESTO  mg per tablet TAKE 1 TABLET BY MOUTH TWICE DAILY 60 tablet 11    ergocalciferol (ERGOCALCIFEROL) 50,000 unit Cap TAKE 1 CAPSULE BY MOUTH EVERY 7 DAYS 12 capsule 0    furosemide (LASIX) 20 MG tablet Take 1 tablet (20 mg total) by mouth 2 (two) times daily. (Patient not taking: Reported on 5/15/2024) 180 tablet 3    glipiZIDE 5 MG TR24 Take 1 tablet (5 mg total) by mouth daily with breakfast. 90 tablet 2    goserelin (ZOLADEX) 3.6 mg injection Inject 3.6 mg into the skin every 28 days.      ibuprofen (ADVIL,MOTRIN) 800 MG tablet Take 1 tablet (800 mg total) by mouth 2 (two) times daily as needed for Pain. 45 tablet 2    lancets Misc To check BG 2 times daily, to use with insurance preferred meter 200 each 3    LORazepam (ATIVAN) 1 MG tablet Take 1 tablet (1 mg total) by mouth every 6 (six) hours as needed for Anxiety. 30 tablet 1    metFORMIN (GLUCOPHAGE-XR) 500 MG ER 24hr tablet Take 2 tablets (1,000 mg total) by mouth 2 (two) times daily with meals. 360 tablet 2    metroNIDAZOLE (FLAGYL) 500 MG tablet Take 500 mg by mouth 3 (three) times daily.      naloxone (NARCAN) 4 mg/actuation Spry 4mg by nasal route as needed for opioid overdose; may repeat every 2-3 minutes in alternating nostrils until medical help arrives. Call 911 (Patient not taking: Reported on 5/15/2024) 1 each 11    ondansetron (ZOFRAN-ODT) 8 MG TbDL Take 1 tablet (8 mg total) by mouth every 8 (eight) hours as needed (nausea and vomiting). 30 tablet 2    oxyCODONE-acetaminophen (PERCOCET) 5-325 mg per tablet Take 1 tablet by mouth every 6 (six) hours as needed for Pain. 60 each 0    palbociclib (IBRANCE) 100 mg Cap Take 1 capsule (100 mg) by mouth Monday - Friday, DO NOT TAKE Saturday and Sunday, for 3 weeks on, 1 week off. 21 capsule 3    pentoxifylline (TRENTAL) 400 mg TbSR Take 400 mg by mouth 2 (two) times daily.      potassium  chloride (KLOR-CON) 10 MEQ TbSR TAKE 1 TABLET(10 MEQ) BY MOUTH EVERY NIGHT 90 tablet 1    prochlorperazine (COMPAZINE) 5 MG tablet Take 5 mg by mouth 4 (four) times daily.      sennosides (SENNA-C ORAL) Take 2 tablets by mouth daily as needed.      spironolactone (ALDACTONE) 25 MG tablet Take 1 tablet (25 mg total) by mouth once daily. 90 tablet 3    tirzepatide 10 mg/0.5 mL PnIj Inject 10 mg into the skin every 7 days. 12 Pen 1    tiZANidine (ZANAFLEX) 4 MG tablet Take 1 tablet (4 mg total) by mouth every 8 (eight) hours. 45 tablet 1    traZODone (DESYREL) 50 MG tablet Take 1 tablet (50 mg total) by mouth every evening. 30 tablet 11    UNKNOWN TO PATIENT Calcium      venlafaxine (EFFEXOR-XR) 150 MG Cp24 Take 1 capsule (150 mg total) by mouth once daily. 30 capsule 1    vitamin E 100 UNIT capsule Take 100 Units by mouth once daily.       Facility-Administered Encounter Medications as of 5/21/2024   Medication Dose Route Frequency Provider Last Rate Last Admin    0.9%  NaCl infusion   Intravenous Continuous Esther Coreas MD   New Bag at 10/26/23 1722    mupirocin 2 % ointment   Nasal On Call Procedure Esther Coreas MD   Given at 10/26/23 1614       Allergy:  Review of patient's allergies indicates:   Allergen Reactions    Keflex [cephalexin] Itching         Assessment and Diagnosis   Status/Progress: Based on the examination today, the patient's problem(s) is/are improved and well controlled.  New problems have not been presented today.   Co-morbidities are not complicating management of the primary condition.  There are no active rule-out diagnoses for this patient at this time.       General Impression:       ICD-10-CM ICD-9-CM   1. Major depressive disorder, recurrent episode, moderate with anxious distress  F33.1 296.32   2. Anxiety  F41.9 300.00   3. Grief  F43.21 309.0         Intervention/Counseling/Treatment Plan     Medication Management:  Continue Effexor  mg q day  Labs: reviewed most  recent  The treatment plan and follow up plan were reviewed with the patient.  Discussed with patient informed consent, risks vs. benefits, alternative treatments, side effect profile and the inherent unpredictability of individual responses to treatments and all medications prescribed. The patient expresses understanding of the above and displays the capacity to agree with this current plan and had no other questions.  Encouraged Patient to keep future appointments.   Take medications as prescribed and abstain from substance abuse.   Pt was told to present to ED or call 911 for SI/HI plan or intent, psychosis, or other psychiatric or medical emergency, and pt agrees to this and verbalized understanding.        Return to Clinic: 6 months, or sooner if needed      Face-to-face time with patient:  19 minutes  Total time:  29 minutes of total time spent on the encounter, which includes face to face time and non-face to face time preparing to see the patient (eg, review of tests), Obtaining and/or reviewing separately obtained history, Documenting clinical information in the electronic or other health record, Independently interpreting results (not separately reported) and communicating results to the patient/family/caregiver, or Care coordination (not separately reported).       Vandana Parra, MSN, APRN, PMHNP-BC  [facility]

## 2024-05-22 ENCOUNTER — DOCUMENTATION ONLY (OUTPATIENT)
Dept: REHABILITATION | Facility: HOSPITAL | Age: 49
End: 2024-05-22
Payer: MEDICARE

## 2024-05-22 NOTE — PROGRESS NOTES
No Show Note/Documentation    Patient: Kristopher Elmore  Date of Session: 5/22/2024  Diagnosis: No diagnosis found.  MRN: [id number]    Kristopher Elmore did not attend her scheduled physical therapy evaluation today. She did not call to cancel nor reschedule. She does not have another appointment scheduled at this time. No charges have been posted today.     Cancel: 0  No show: 2    Mena Dunaway, PT   5/21/2024

## 2024-05-23 ENCOUNTER — OFFICE VISIT (OUTPATIENT)
Dept: HEMATOLOGY/ONCOLOGY | Facility: CLINIC | Age: 49
End: 2024-05-23
Payer: MEDICARE

## 2024-05-23 ENCOUNTER — INFUSION (OUTPATIENT)
Dept: INFUSION THERAPY | Facility: HOSPITAL | Age: 49
End: 2024-05-23
Payer: MEDICARE

## 2024-05-23 VITALS
TEMPERATURE: 98 F | BODY MASS INDEX: 50.02 KG/M2 | OXYGEN SATURATION: 99 % | DIASTOLIC BLOOD PRESSURE: 71 MMHG | HEIGHT: 64 IN | WEIGHT: 293 LBS | SYSTOLIC BLOOD PRESSURE: 130 MMHG | HEART RATE: 81 BPM

## 2024-05-23 DIAGNOSIS — M87.180 OSTEONECROSIS OF JAW DUE TO DRUG: ICD-10-CM

## 2024-05-23 DIAGNOSIS — Z17.0 MALIGNANT NEOPLASM OF UPPER-INNER QUADRANT OF RIGHT BREAST IN FEMALE, ESTROGEN RECEPTOR POSITIVE: ICD-10-CM

## 2024-05-23 DIAGNOSIS — Z17.0 MALIGNANT NEOPLASM OF UPPER-INNER QUADRANT OF RIGHT BREAST IN FEMALE, ESTROGEN RECEPTOR POSITIVE: Primary | ICD-10-CM

## 2024-05-23 DIAGNOSIS — C79.51 MALIGNANT NEOPLASM METASTATIC TO BONE: ICD-10-CM

## 2024-05-23 DIAGNOSIS — C50.211 MALIGNANT NEOPLASM OF UPPER-INNER QUADRANT OF RIGHT BREAST IN FEMALE, ESTROGEN RECEPTOR POSITIVE: Primary | ICD-10-CM

## 2024-05-23 DIAGNOSIS — I50.42 CHRONIC COMBINED SYSTOLIC AND DIASTOLIC HEART FAILURE: ICD-10-CM

## 2024-05-23 DIAGNOSIS — C50.211 MALIGNANT NEOPLASM OF UPPER-INNER QUADRANT OF RIGHT BREAST IN FEMALE, ESTROGEN RECEPTOR POSITIVE: ICD-10-CM

## 2024-05-23 DIAGNOSIS — C79.51 MALIGNANT NEOPLASM METASTATIC TO BONE: Primary | ICD-10-CM

## 2024-05-23 PROCEDURE — 3075F SYST BP GE 130 - 139MM HG: CPT | Mod: CPTII,S$GLB,, | Performed by: INTERNAL MEDICINE

## 2024-05-23 PROCEDURE — 63600175 PHARM REV CODE 636 W HCPCS: Mod: JZ,JG | Performed by: INTERNAL MEDICINE

## 2024-05-23 PROCEDURE — 96402 CHEMO HORMON ANTINEOPL SQ/IM: CPT

## 2024-05-23 PROCEDURE — 4010F ACE/ARB THERAPY RXD/TAKEN: CPT | Mod: CPTII,S$GLB,, | Performed by: INTERNAL MEDICINE

## 2024-05-23 PROCEDURE — 1159F MED LIST DOCD IN RCRD: CPT | Mod: CPTII,S$GLB,, | Performed by: INTERNAL MEDICINE

## 2024-05-23 PROCEDURE — G2211 COMPLEX E/M VISIT ADD ON: HCPCS | Mod: S$GLB,,, | Performed by: INTERNAL MEDICINE

## 2024-05-23 PROCEDURE — 3078F DIAST BP <80 MM HG: CPT | Mod: CPTII,S$GLB,, | Performed by: INTERNAL MEDICINE

## 2024-05-23 PROCEDURE — 1160F RVW MEDS BY RX/DR IN RCRD: CPT | Mod: CPTII,S$GLB,, | Performed by: INTERNAL MEDICINE

## 2024-05-23 PROCEDURE — 3008F BODY MASS INDEX DOCD: CPT | Mod: CPTII,S$GLB,, | Performed by: INTERNAL MEDICINE

## 2024-05-23 PROCEDURE — 3044F HG A1C LEVEL LT 7.0%: CPT | Mod: CPTII,S$GLB,, | Performed by: INTERNAL MEDICINE

## 2024-05-23 PROCEDURE — 99214 OFFICE O/P EST MOD 30 MIN: CPT | Mod: S$GLB,,, | Performed by: INTERNAL MEDICINE

## 2024-05-23 PROCEDURE — 99999 PR PBB SHADOW E&M-EST. PATIENT-LVL V: CPT | Mod: PBBFAC,,, | Performed by: INTERNAL MEDICINE

## 2024-05-23 RX ADMIN — GOSERELIN ACETATE 3.6 MG: 3.6 IMPLANT SUBCUTANEOUS at 03:05

## 2024-05-23 NOTE — PROGRESS NOTES
=Subjective     Patient ID: Kristopher Elmore is a 48 y.o. female.    Chief Complaint: Malignant neoplasm of upper-inner quadrant of right breast     HPI    Presents for follow up of metastatic breast cancer   Scans stable!  - 3/22/2024 CT C/A/P:  FINDINGS:  Thoracic soft tissues: Normal thyroid. Prominent/slightly enlarged right chest wall and axillary nodes.  For example right chest wall node 9 mm series 2, image 13 previously 5 mm.  Additional 1 cm chest wall node image 19 previously 7 mm.  Prominent right axillary nodes image 27 similar.  Aorta: Thoracic aorta is normal in caliber and contour with no significant calcific atherosclerosis.  Heart: Normal in size. No pericardial effusion. No significant calcific coronary atherosclerosis.  Jane/Mediastinum: No mediastinal or hilar lymphadenopathy.  Lungs: Trachea and bronchi are patent.  Lungs symmetrically expanded without consolidation.  Stable band of subsegmental atelectasis in the left lower lobe.  No suspicious pulmonary nodules.  No pleural fluid or pneumothorax.  Liver: Normal in size and contour.  Stable subcentimeter hypodensity in segment 5 (series 3, image 53).  Gallbladder: Possible gallbladder sludge.  No calcified gallstones.  Bile Ducts: No evidence of dilated ducts.  Pancreas: No mass or peripancreatic fat stranding.  Spleen: Unremarkable.  Esophagus: Unremarkable.  Stomach and duodenum: Unremarkable.  Adrenals: Unremarkable.  Kidneys/Ureters: Normal in size and location. Normal enhancement.  Right renal cyst and left renal hypodensities too small to characterize..  No hydronephrosis or nephrolithiasis. No ureteral dilatation.  Bladder: No evidence of wall thickening.  Reproductive organs: Calcified uterine fibroid.  Bowel/Mesentery: Small bowel is normal in caliber with no evidence of obstruction.  No evidence of inflammation or wall thickening.  Normal appendix.  Colon demonstrates no focal wall thickening.  Peritoneum: No intraperitoneal  free air or fluid.   shunt with tip in the pelvis.  Lymph nodes: No lymphadenopathy.  Abdominal wall:  Unremarkable.  Vasculature: No aneurysm. No significant calcific atherosclerosis.  Bones: No acute fracture. Stable mixed lytic and sclerotic lesions throughout the spine, sternum, and pelvis.  No definite new lesion.  Impression:  Stable osseous metastatic lesions.  Prominent right chest wall and axillary nodes with measurements as above.  Attention on follow-up.  No additional new findings.    Review of Systems   Constitutional:  Negative for activity change, appetite change, chills, fatigue (notes pretty good), fever and unexpected weight change.   HENT:  Negative for mouth sores, rhinorrhea and trouble swallowing.    Eyes:  Negative for visual disturbance.   Respiratory:  Negative for cough, shortness of breath and wheezing.    Cardiovascular:  Negative for chest pain, palpitations and leg swelling.   Gastrointestinal:  Negative for abdominal distention, abdominal pain, blood in stool, change in bowel habit, constipation, diarrhea, nausea, vomiting and reflux.   Genitourinary:  Negative for difficulty urinating, dysuria, frequency and urgency.   Musculoskeletal:  Positive for arthralgias, back pain (chronic) and joint deformity (decr ROM right shoulder). Negative for joint swelling and myalgias.   Integumentary:  Negative for rash.   Neurological:  Negative for dizziness, weakness, numbness, headaches and coordination difficulties.   Hematological:  Negative for adenopathy. Does not bruise/bleed easily.   Psychiatric/Behavioral:  Negative for dysphoric mood and sleep disturbance. The patient is not nervous/anxious.           Objective     Physical Exam  Vitals and nursing note reviewed.   Constitutional:       General: She is not in acute distress.     Appearance: Normal appearance. She is obese. She is not ill-appearing.      Comments: Presents with her 2 daughters  ECOG = 0   HENT:      Head: Normocephalic  and atraumatic.      Mouth/Throat:      Comments: Tongue geography no different than usual  Eyes:      General: No scleral icterus.     Extraocular Movements: Extraocular movements intact.      Conjunctiva/sclera: Conjunctivae normal.      Pupils: Pupils are equal, round, and reactive to light.   Cardiovascular:      Rate and Rhythm: Normal rate and regular rhythm.      Heart sounds: Normal heart sounds. No murmur heard.     No friction rub. No gallop.   Pulmonary:      Effort: Pulmonary effort is normal. No respiratory distress.      Breath sounds: Normal breath sounds. No wheezing, rhonchi or rales.      Comments: Breasts- deferred today  Chest:      Chest wall: No tenderness.   Abdominal:      General: Bowel sounds are normal. There is no distension.      Palpations: Abdomen is soft. There is no mass.      Tenderness: There is no abdominal tenderness. There is no guarding or rebound.      Comments: No palpable masses   Musculoskeletal:         General: No swelling.      Cervical back: Normal range of motion and neck supple. No tenderness.      Right lower leg: No edema.      Left lower leg: No edema.      Comments: Point tenderness in cervical, thoracic and lumbar spine. Also with cva tenderness today   Lymphadenopathy:      Cervical: No cervical adenopathy.   Skin:     General: Skin is warm and dry.      Coloration: Skin is not jaundiced.      Findings: No bruising or rash.   Neurological:      General: No focal deficit present.      Mental Status: She is alert and oriented to person, place, and time.      Sensory: No sensory deficit.      Motor: No weakness.      Gait: Gait normal.   Psychiatric:         Mood and Affect: Mood normal.         Behavior: Behavior normal.         Thought Content: Thought content normal.         Judgment: Judgment normal.          Assessment and Plan     1. Malignant neoplasm of upper-inner quadrant of right breast in female, estrogen receptor positive  -     Cancel: goserelin  (ZOLADEX) injection 3.6 mg    2. Bone metastases  Overview:  IMO Regualtory Update 4/1/23    Orders:  -     Cancel: goserelin (ZOLADEX) injection 3.6 mg    3. Chronic combined systolic and diastolic heart failure    4. 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   Route Chart for Scheduling    Med Onc Chart Routing      Follow up with physician . Appropriately scheduled   Follow up with [facility]    Infusion scheduling note    Injection scheduling note    Labs    Imaging    Pharmacy appointment    Other referrals                  Treatment Plan Information   OP ANASTROZOLE PALBOCICLIB Q4W   Jessica Mendez MD   Upcoming Treatment Dates - OP ANASTROZOLE PALBOCICLIB Q4W    No upcoming days in selected categories.    Supportive Plan Information  OP BREAST GOSERELIN & DENOSUMAB Q4W   Jessica Mendez MD   Upcoming Treatment Dates - OP BREAST GOSERELIN & DENOSUMAB Q4W    No upcoming days in selected categories.    Therapy Plan Information  EPINEPHrine (EPIPEN) 0.3 mg/0.3 mL pen injection 0.3 mg  0.3 mg, Intramuscular, PRN  diphenhydrAMINE injection 50 mg  50 mg, Intravenous, PRN  methylPREDNISolone sodium succinate injection 125 mg  125 mg, Intravenous, PRN  sodium chloride 0.9% bolus 1,000 mL 1,000 mL  1,000 mL, Intravenous, PRN

## 2024-05-28 ENCOUNTER — OFFICE VISIT (OUTPATIENT)
Dept: PODIATRY | Facility: CLINIC | Age: 49
End: 2024-05-28
Payer: MEDICARE

## 2024-05-28 VITALS
DIASTOLIC BLOOD PRESSURE: 65 MMHG | SYSTOLIC BLOOD PRESSURE: 103 MMHG | WEIGHT: 293 LBS | HEIGHT: 64 IN | BODY MASS INDEX: 50.02 KG/M2

## 2024-05-28 DIAGNOSIS — M20.42 HAMMER TOES OF BOTH FEET: ICD-10-CM

## 2024-05-28 DIAGNOSIS — E11.65 TYPE 2 DIABETES MELLITUS WITH HYPERGLYCEMIA, WITHOUT LONG-TERM CURRENT USE OF INSULIN: Primary | ICD-10-CM

## 2024-05-28 DIAGNOSIS — M20.41 HAMMER TOES OF BOTH FEET: ICD-10-CM

## 2024-05-28 PROCEDURE — 3074F SYST BP LT 130 MM HG: CPT | Mod: CPTII,S$GLB,, | Performed by: PODIATRIST

## 2024-05-28 PROCEDURE — 1159F MED LIST DOCD IN RCRD: CPT | Mod: CPTII,S$GLB,, | Performed by: PODIATRIST

## 2024-05-28 PROCEDURE — 3008F BODY MASS INDEX DOCD: CPT | Mod: CPTII,S$GLB,, | Performed by: PODIATRIST

## 2024-05-28 PROCEDURE — 99213 OFFICE O/P EST LOW 20 MIN: CPT | Mod: S$GLB,,, | Performed by: PODIATRIST

## 2024-05-28 PROCEDURE — 4010F ACE/ARB THERAPY RXD/TAKEN: CPT | Mod: CPTII,S$GLB,, | Performed by: PODIATRIST

## 2024-05-28 PROCEDURE — 99999 PR PBB SHADOW E&M-EST. PATIENT-LVL IV: CPT | Mod: PBBFAC,,, | Performed by: PODIATRIST

## 2024-05-28 PROCEDURE — 3044F HG A1C LEVEL LT 7.0%: CPT | Mod: CPTII,S$GLB,, | Performed by: PODIATRIST

## 2024-05-28 PROCEDURE — 3078F DIAST BP <80 MM HG: CPT | Mod: CPTII,S$GLB,, | Performed by: PODIATRIST

## 2024-05-28 RX ORDER — CICLOPIROX 80 MG/ML
SOLUTION TOPICAL NIGHTLY
Qty: 6.6 ML | Refills: 3 | Status: SHIPPED | OUTPATIENT
Start: 2024-05-28

## 2024-05-28 RX ORDER — AMMONIUM LACTATE 12 G/100G
1 CREAM TOPICAL DAILY
Qty: 140 G | Refills: 5 | Status: SHIPPED | OUTPATIENT
Start: 2024-05-28

## 2024-05-28 NOTE — PROGRESS NOTES
Subjective:      Patient ID: Kristopher Elmore is a 48 y.o. female.    Chief Complaint: Diabetes Mellitus, Diabetic Foot Exam (5/16/24 Endo Krsital), Ingrown Toenail (B/l great toenails), and Nail Problem (2nd toenail on left foot)    Kristopher is a 48 y.o. female who presents to the clinic upon referral from Dr. Sonia juarez. provider found  for evaluation and treatment of diabetic feet. Kristopher has a past medical history of Abnormal Pap smear, Anemia, Anxiety, Cancer, Cardiomegaly (04/17/2014), Cardiomyopathy, CHF (congestive heart failure), Depression, Fibroid, psychiatric care, Hyperlipidemia, Hypertension, Psychiatric problem, Sleep difficulties, and Therapy. Presents for diabetic foot risk assessment.   Reports painful ingrown nail B/L great toe B/L border .     PCP: Enedina De La Torre MD    Date Last Seen by PCP: per above    Current shoe gear: Tennis shoes    Hemoglobin A1C   Date Value Ref Range Status   03/19/2024 6.1 (H) 4.0 - 5.6 % Final     Comment:     ADA Screening Guidelines:  5.7-6.4%  Consistent with prediabetes  >or=6.5%  Consistent with diabetes    High levels of fetal hemoglobin interfere with the HbA1C  assay. Heterozygous hemoglobin variants (HbS, HgC, etc)do  not significantly interfere with this assay.   However, presence of multiple variants may affect accuracy.     12/13/2023 6.2 (H) 4.0 - 5.6 % Final     Comment:     ADA Screening Guidelines:  5.7-6.4%  Consistent with prediabetes  >or=6.5%  Consistent with diabetes    High levels of fetal hemoglobin interfere with the HbA1C  assay. Heterozygous hemoglobin variants (HbS, HgC, etc)do  not significantly interfere with this assay.   However, presence of multiple variants may affect accuracy.     09/14/2023 6.3 (H) 4.0 - 5.6 % Final     Comment:     ADA Screening Guidelines:  5.7-6.4%  Consistent with prediabetes  >or=6.5%  Consistent with diabetes    High levels of fetal hemoglobin interfere with the HbA1C  assay. Heterozygous hemoglobin variants  (HbS, HgC, etc)do  not significantly interfere with this assay.   However, presence of multiple variants may affect accuracy.             Review of Systems   Constitutional: Negative for chills.   Cardiovascular:  Negative for chest pain and claudication.   Respiratory:  Negative for cough.    Skin:  Positive for color change, dry skin and nail changes.   Musculoskeletal:  Positive for joint pain.   Gastrointestinal:  Negative for nausea.   Neurological:  Positive for paresthesias. Negative for numbness.   Psychiatric/Behavioral:  The patient is not nervous/anxious.            Objective:      Physical Exam  Constitutional:       Appearance: She is well-developed.      Comments: Oriented to time, place, and person.   Cardiovascular:      Comments: DP and PT pulses are palpable bilaterally. 3 sec capillary refill time and toes and feet are warm to touch proximally .  There is  hair growth on the feet and toes b/l. There is no edema b/l. No spider veins or varicosities present b/l.     Musculoskeletal:      Comments: Equinus noted b/l ankles with < 10 deg DF noted. MMT 5/5 in DF/PF/Inv/Ev resistance with no reproduction of pain in any direction. Passive range of motion of ankle and pedal joints is painless b/l.    Decreased stride, station of gait.  apropulsive toe off.  Increased angle and base of gait.      Patient has hammertoes of digits 2-5 bilateral partially reducible without symptom today.     Visible and palpable bunion without pain at dorsomedial 1st metatarsal head right and left.  Hallux abducted right and left partially reducible, tracks laterally without being track bound.  No ecchymosis, erythema, edema, or cardinal signs infection or signs of trauma same foot.     Fat pad atrophy to heels and met heads bilateral     Feet:      Right foot:      Skin integrity: No callus or dry skin.      Left foot:      Skin integrity: No callus or dry skin.   Lymphadenopathy:      Comments: Negative lymphadenopathy  bilateral popliteal fossa and tarsal tunnel.   Skin:     Comments: No open lesions, lacerations or wounds noted.Interdigital spaces clean, dry and intact b/l. No erythema noted to b/l foot.    Toenails 1-5 bilaterally are elongated by 2-3 mm, thickened by 2-3 mm, discolored/yellowed, dystrophic, brittle with subungual debris.     Neurological:      Mental Status: She is alert.      Comments: Light touch, proprioception, and sharp/dull sensation are all intact bilaterally. Protective threshold with the Pricedale-Wienstein monofilament is intact bilaterally.    Psychiatric:         Behavior: Behavior is cooperative.               Assessment:       Encounter Diagnoses   Name Primary?    Type 2 diabetes mellitus with hyperglycemia, without long-term current use of insulin Yes    Hammer toes of both feet          Plan:       Kristopher was seen today for diabetes mellitus, diabetic foot exam, ingrown toenail and nail problem.    Diagnoses and all orders for this visit:    Type 2 diabetes mellitus with hyperglycemia, without long-term current use of insulin  -     Ankle Brachial Indices (OPHELIA); Future  -     DIABETIC SHOES FOR HOME USE    Hammer toes of both feet  -     DIABETIC SHOES FOR HOME USE    Other orders  -     ciclopirox (PENLAC) 8 % Soln; Apply topically nightly.  -     ammonium lactate 12 % Crea; Apply 1 application  topically once daily.      I counseled the patient on her conditions, their implications and medical management.      - Shoe inspection. Diabetic Foot Education. Patient reminded of the importance of good nutrition and blood sugar control to help prevent podiatric complications of diabetes. Patient instructed on proper foot hygeine. We discussed wearing proper shoe gear, daily foot inspections, never walking without protective shoe gear, caution putting sharp instruments to feet     - Discussed DM foot care:  Wear comfortable, proper fitting shoes. Wash feet daily. Dry well. After drying, apply  moisturizer to feet (no lotion to webspaces). Inspect feet daily for skin breaks, blisters, swelling, or redness. Wear cotton socks (preferably white)  Change socks every day. Do NOT walk barefoot. Do NOT use heating pads or warm/hot water soaks       Rx diabetic shoes with custom molded inserts to be worn at all times while ambulating. Prescription provided with list of local retailers.     At patient's request, I discussed different treatments for toenail fungus. We discussed oral antifungals but I did not recommend them as a first line treatment since the medication is taken internally and can have side effects such as rash, taste disturbances, and liver enzyme elevation. We discussed topical Penlac to be applied daily and removed weekly. Pt. Expresses understanding and would like to try the Penlac. Rx sent to the pharmacy.     Discussed treatment options with patient. Options included soaking, avulsion and matrixectomy. Risks and benefits discussed and all questions were answered. The patient wishes to proceed with  Nail avulsion at a later date      In depth conversation on the treatment of ingrown nail; partial nail avulsion vs chemical matrixectomy vs conservative treatment of soaking and nail trimming  Rx. Amlactin    - With patient's permission, nails were aggressively reduced and debrided x 10 to their soft tissue attachment mechanically and with electric , removing all offending nail and debris. Patient relates relief following the procedure. He will continue to monitor the areas daily, inspect his feet, wear protective shoe gear when ambulatory, moisturizer to maintain skin integrity and follow in this office in approximately 12 months, sooner p.r.n.     Rx diabetic shoes with custom molded inserts to be worn at all times while ambulating. Prescription provided with list of local retailers.     F/u one year DM foot exam sooner PRN.

## 2024-06-03 ENCOUNTER — PATIENT MESSAGE (OUTPATIENT)
Dept: SLEEP MEDICINE | Facility: CLINIC | Age: 49
End: 2024-06-03
Payer: MEDICARE

## 2024-06-04 DIAGNOSIS — G47.33 OSA (OBSTRUCTIVE SLEEP APNEA): Primary | ICD-10-CM

## 2024-06-10 ENCOUNTER — PATIENT MESSAGE (OUTPATIENT)
Dept: ENDOCRINOLOGY | Facility: CLINIC | Age: 49
End: 2024-06-10
Payer: MEDICARE

## 2024-06-10 DIAGNOSIS — R80.9 TYPE 2 DIABETES MELLITUS WITH MICROALBUMINURIA, WITHOUT LONG-TERM CURRENT USE OF INSULIN: Primary | ICD-10-CM

## 2024-06-10 DIAGNOSIS — E11.29 TYPE 2 DIABETES MELLITUS WITH MICROALBUMINURIA, WITHOUT LONG-TERM CURRENT USE OF INSULIN: Primary | ICD-10-CM

## 2024-06-11 ENCOUNTER — PATIENT MESSAGE (OUTPATIENT)
Dept: PRIMARY CARE CLINIC | Facility: CLINIC | Age: 49
End: 2024-06-11
Payer: MEDICARE

## 2024-06-17 NOTE — PROGRESS NOTES
=Subjective     Patient ID: Kristopher Elmore is a 48 y.o. female.    Chief Complaint: Malignant neoplasm of upper-inner quadrant of right breast     HPI    Presents for follow up of metastatic breast cancer   Most recent scans stable.   Overall feels ok   She does report being tired.   She is not taking any vitamins.   She read that taking metformin may cause a b12 def so she would like that to be checked.   Pain is stable- she is working with palliative. She is unable to walk long distances.   No changes or new issues since she was here last.   Planning on jaw debridement   Savage 1st- reports tired of taking antibiotics.       Most recent imaging  - 3/22/2024 CT C/A/P:  FINDINGS:  Thoracic soft tissues: Normal thyroid. Prominent/slightly enlarged right chest wall and axillary nodes.  For example right chest wall node 9 mm series 2, image 13 previously 5 mm.  Additional 1 cm chest wall node image 19 previously 7 mm.  Prominent right axillary nodes image 27 similar.  Aorta: Thoracic aorta is normal in caliber and contour with no significant calcific atherosclerosis.  Heart: Normal in size. No pericardial effusion. No significant calcific coronary atherosclerosis.  Jane/Mediastinum: No mediastinal or hilar lymphadenopathy.  Lungs: Trachea and bronchi are patent.  Lungs symmetrically expanded without consolidation.  Stable band of subsegmental atelectasis in the left lower lobe.  No suspicious pulmonary nodules.  No pleural fluid or pneumothorax.  Liver: Normal in size and contour.  Stable subcentimeter hypodensity in segment 5 (series 3, image 53).  Gallbladder: Possible gallbladder sludge.  No calcified gallstones.  Bile Ducts: No evidence of dilated ducts.  Pancreas: No mass or peripancreatic fat stranding.  Spleen: Unremarkable.  Esophagus: Unremarkable.  Stomach and duodenum: Unremarkable.  Adrenals: Unremarkable.  Kidneys/Ureters: Normal in size and location. Normal enhancement.  Right renal cyst and left  renal hypodensities too small to characterize..  No hydronephrosis or nephrolithiasis. No ureteral dilatation.  Bladder: No evidence of wall thickening.  Reproductive organs: Calcified uterine fibroid.  Bowel/Mesentery: Small bowel is normal in caliber with no evidence of obstruction.  No evidence of inflammation or wall thickening.  Normal appendix.  Colon demonstrates no focal wall thickening.  Peritoneum: No intraperitoneal free air or fluid.   shunt with tip in the pelvis.  Lymph nodes: No lymphadenopathy.  Abdominal wall:  Unremarkable.  Vasculature: No aneurysm. No significant calcific atherosclerosis.  Bones: No acute fracture. Stable mixed lytic and sclerotic lesions throughout the spine, sternum, and pelvis.  No definite new lesion.  Impression:  Stable osseous metastatic lesions.  Prominent right chest wall and axillary nodes with measurements as above.  Attention on follow-up.  No additional new findings.    Review of Systems   Constitutional:         See above   All other systems reviewed and are negative.         Objective     Physical Exam  Vitals and nursing note reviewed.   Constitutional:       General: She is not in acute distress.     Appearance: Normal appearance. She is obese. She is not ill-appearing.      Comments: Presents with her 2 daughters  ECOG = 0  Transfers to exam table with ease.    HENT:      Head: Normocephalic and atraumatic.      Mouth/Throat:      Comments: Tongue geography no different than usual  Eyes:      General: No scleral icterus.     Extraocular Movements: Extraocular movements intact.      Conjunctiva/sclera: Conjunctivae normal.      Pupils: Pupils are equal, round, and reactive to light.   Cardiovascular:      Rate and Rhythm: Normal rate and regular rhythm.      Heart sounds: Normal heart sounds. No murmur heard.     No friction rub. No gallop.   Pulmonary:      Effort: Pulmonary effort is normal. No respiratory distress.      Breath sounds: Normal breath sounds.  No wheezing, rhonchi or rales.      Comments: Breasts- deferred today  Chest:      Chest wall: No tenderness.   Abdominal:      General: Bowel sounds are normal. There is no distension.      Palpations: Abdomen is soft.      Comments: No palpable masses   Musculoskeletal:         General: No swelling.      Cervical back: Normal range of motion and neck supple. No tenderness.      Right lower leg: No edema.      Left lower leg: No edema.      Comments: Point tenderness in cervical, thoracic and lumbar spine. Also with cva tenderness today   Lymphadenopathy:      Cervical: No cervical adenopathy.   Skin:     General: Skin is warm and dry.      Coloration: Skin is not jaundiced.      Findings: No bruising or rash.      Comments: No spinal or paraspinal tenderness   Neurological:      Mental Status: She is alert and oriented to person, place, and time.      Sensory: No sensory deficit.      Motor: No weakness.      Gait: Gait normal.   Psychiatric:         Mood and Affect: Mood normal.         Behavior: Behavior normal.         Thought Content: Thought content normal.         Judgment: Judgment normal.          Assessment and Plan     1. Malignant neoplasm of upper-inner quadrant of right breast in female, estrogen receptor positive    2. Bone metastases  Overview:  IMO Regualtory Update 4/1/23      3. Cancer associated pain    4. Anemia associated with chemotherapy    5. Iron deficiency anemia secondary to inadequate dietary iron intake    6. Heart failure with mildly reduced ejection fraction    7. 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 Fatigue, unspecified type    Other orders  -     Cancel: goserelin (ZOLADEX) injection 3.6 mg                  Most recent scans stable-  next scans end of July  Continue Arimidex and Ibrance (dose adjusted due to anemia to 100 mg M-F, off Sat&Sun x 3 weeks, off 1 week)   Zoladex today and every 4 weeks.   Xgeva- hold with osteonecrosis- seeing dental and planning debridement    Labs  appropriate to proceed  Adding iron level and will draw B12 level with next draw  Sees Cardio Onc. No evidence of cardiac decompensation  Discussion- Primary Aim of Study: To determine whether painTRAINER plus enhanced usual care, compared to enhanced usual care alone, yields significant improvements in pain severity. Research will reach out.         Route Chart for Scheduling    Med Onc Chart Routing  Urgent    Follow up with physician 1 month. patient needs monthly appts on a thursday evening with labs and zoladex. NO XGEVA. please adjust appts. thanks   Follow up with [facility]    Infusion scheduling note    Injection scheduling note    Labs    Imaging    Pharmacy appointment    Other referrals                  Treatment Plan Information   OP ANASTROZOLE PALBOCICLIB Q4W   Jessica Mendez MD   Upcoming Treatment Dates - OP ANASTROZOLE PALBOCICLIB Q4W    No upcoming days in selected categories.    Supportive Plan Information  OP BREAST GOSERELIN & DENOSUMAB Q4W   Jessica Mendez MD   Upcoming Treatment Dates - OP BREAST GOSERELIN & DENOSUMAB Q4W    No upcoming days in selected categories.    Therapy Plan Information  EPINEPHrine (EPIPEN) 0.3 mg/0.3 mL pen injection 0.3 mg  0.3 mg, Intramuscular, PRN  diphenhydrAMINE injection 50 mg  50 mg, Intravenous, PRN  methylPREDNISolone sodium succinate injection 125 mg  125 mg, Intravenous, PRN  sodium chloride 0.9% bolus 1,000 mL 1,000 mL  1,000 mL, Intravenous, PRN       Patient is in agreement with the proposed treatment plan. All questions were answered to the patient's satisfaction. Pt knows to call clinic for any new or worsening symptoms and if anything is needed before the next clinic visit.    Alfredo Bhatt, MSN, APRN, FNP-C  Nurse Practitioner to Dr. Phyllis Lei  [facility]  [facility]  Hematology & Medical Oncology  [address] [place] [id number]  ph. [phone] ext [phone]  Fax. [phone]              40  minutes of total time spent on the encounter, which includes face to face time and non-face to face time preparing to see the patient (eg, review of tests), Obtaining and/or reviewing separately obtained history, Documenting clinical information in the electronic or other health record, Independently interpreting results (not separately reported) and communicating results to the patient/family/caregiver, or Care coordination (not separately reported).

## 2024-06-19 ENCOUNTER — OFFICE VISIT (OUTPATIENT)
Dept: HEMATOLOGY/ONCOLOGY | Facility: CLINIC | Age: 49
End: 2024-06-19
Payer: MEDICARE

## 2024-06-19 ENCOUNTER — INFUSION (OUTPATIENT)
Dept: INFUSION THERAPY | Facility: HOSPITAL | Age: 49
End: 2024-06-19
Payer: MEDICARE

## 2024-06-19 VITALS
WEIGHT: 293 LBS | SYSTOLIC BLOOD PRESSURE: 105 MMHG | HEART RATE: 89 BPM | TEMPERATURE: 98 F | HEIGHT: 64 IN | OXYGEN SATURATION: 97 % | BODY MASS INDEX: 50.02 KG/M2 | RESPIRATION RATE: 17 BRPM | DIASTOLIC BLOOD PRESSURE: 68 MMHG

## 2024-06-19 DIAGNOSIS — T45.1X5A ANEMIA ASSOCIATED WITH CHEMOTHERAPY: ICD-10-CM

## 2024-06-19 DIAGNOSIS — C79.51 MALIGNANT NEOPLASM METASTATIC TO BONE: Primary | ICD-10-CM

## 2024-06-19 DIAGNOSIS — G89.3 CANCER ASSOCIATED PAIN: ICD-10-CM

## 2024-06-19 DIAGNOSIS — C79.51 MALIGNANT NEOPLASM METASTATIC TO BONE: ICD-10-CM

## 2024-06-19 DIAGNOSIS — R53.83 FATIGUE, UNSPECIFIED TYPE: ICD-10-CM

## 2024-06-19 DIAGNOSIS — Z17.0 MALIGNANT NEOPLASM OF UPPER-INNER QUADRANT OF RIGHT BREAST IN FEMALE, ESTROGEN RECEPTOR POSITIVE: Primary | ICD-10-CM

## 2024-06-19 DIAGNOSIS — M87.180 OSTEONECROSIS OF JAW DUE TO DRUG: ICD-10-CM

## 2024-06-19 DIAGNOSIS — D50.8 IRON DEFICIENCY ANEMIA SECONDARY TO INADEQUATE DIETARY IRON INTAKE: ICD-10-CM

## 2024-06-19 DIAGNOSIS — Z17.0 MALIGNANT NEOPLASM OF UPPER-INNER QUADRANT OF RIGHT BREAST IN FEMALE, ESTROGEN RECEPTOR POSITIVE: ICD-10-CM

## 2024-06-19 DIAGNOSIS — C50.211 MALIGNANT NEOPLASM OF UPPER-INNER QUADRANT OF RIGHT BREAST IN FEMALE, ESTROGEN RECEPTOR POSITIVE: ICD-10-CM

## 2024-06-19 DIAGNOSIS — D51.3 OTHER DIETARY VITAMIN B12 DEFICIENCY ANEMIA: ICD-10-CM

## 2024-06-19 DIAGNOSIS — I50.22 HEART FAILURE WITH MILDLY REDUCED EJECTION FRACTION: ICD-10-CM

## 2024-06-19 DIAGNOSIS — C50.211 MALIGNANT NEOPLASM OF UPPER-INNER QUADRANT OF RIGHT BREAST IN FEMALE, ESTROGEN RECEPTOR POSITIVE: Primary | ICD-10-CM

## 2024-06-19 DIAGNOSIS — D64.81 ANEMIA ASSOCIATED WITH CHEMOTHERAPY: ICD-10-CM

## 2024-06-19 PROCEDURE — 3074F SYST BP LT 130 MM HG: CPT | Mod: CPTII,S$GLB,, | Performed by: NURSE PRACTITIONER

## 2024-06-19 PROCEDURE — 63600175 PHARM REV CODE 636 W HCPCS: Mod: JZ,JG | Performed by: NURSE PRACTITIONER

## 2024-06-19 PROCEDURE — 3008F BODY MASS INDEX DOCD: CPT | Mod: CPTII,S$GLB,, | Performed by: NURSE PRACTITIONER

## 2024-06-19 PROCEDURE — 3044F HG A1C LEVEL LT 7.0%: CPT | Mod: CPTII,S$GLB,, | Performed by: NURSE PRACTITIONER

## 2024-06-19 PROCEDURE — G2211 COMPLEX E/M VISIT ADD ON: HCPCS | Mod: S$GLB,,, | Performed by: NURSE PRACTITIONER

## 2024-06-19 PROCEDURE — 1159F MED LIST DOCD IN RCRD: CPT | Mod: CPTII,S$GLB,, | Performed by: NURSE PRACTITIONER

## 2024-06-19 PROCEDURE — 4010F ACE/ARB THERAPY RXD/TAKEN: CPT | Mod: CPTII,S$GLB,, | Performed by: NURSE PRACTITIONER

## 2024-06-19 PROCEDURE — 99215 OFFICE O/P EST HI 40 MIN: CPT | Mod: S$GLB,,, | Performed by: NURSE PRACTITIONER

## 2024-06-19 PROCEDURE — 96402 CHEMO HORMON ANTINEOPL SQ/IM: CPT

## 2024-06-19 PROCEDURE — 99999 PR PBB SHADOW E&M-EST. PATIENT-LVL V: CPT | Mod: PBBFAC,,, | Performed by: NURSE PRACTITIONER

## 2024-06-19 PROCEDURE — 3078F DIAST BP <80 MM HG: CPT | Mod: CPTII,S$GLB,, | Performed by: NURSE PRACTITIONER

## 2024-06-19 RX ADMIN — GOSERELIN ACETATE 3.6 MG: 3.6 IMPLANT SUBCUTANEOUS at 11:06

## 2024-06-20 DIAGNOSIS — E55.9 VITAMIN D DEFICIENCY: ICD-10-CM

## 2024-06-21 ENCOUNTER — PATIENT MESSAGE (OUTPATIENT)
Dept: ENDOCRINOLOGY | Facility: CLINIC | Age: 49
End: 2024-06-21
Payer: MEDICARE

## 2024-06-21 DIAGNOSIS — R80.9 TYPE 2 DIABETES MELLITUS WITH MICROALBUMINURIA, WITHOUT LONG-TERM CURRENT USE OF INSULIN: ICD-10-CM

## 2024-06-21 DIAGNOSIS — Z17.0 MALIGNANT NEOPLASM OF UPPER-INNER QUADRANT OF RIGHT BREAST IN FEMALE, ESTROGEN RECEPTOR POSITIVE: ICD-10-CM

## 2024-06-21 DIAGNOSIS — C50.211 MALIGNANT NEOPLASM OF UPPER-INNER QUADRANT OF RIGHT BREAST IN FEMALE, ESTROGEN RECEPTOR POSITIVE: ICD-10-CM

## 2024-06-21 DIAGNOSIS — C79.51 MALIGNANT NEOPLASM METASTATIC TO BONE: ICD-10-CM

## 2024-06-21 DIAGNOSIS — E11.29 TYPE 2 DIABETES MELLITUS WITH MICROALBUMINURIA, WITHOUT LONG-TERM CURRENT USE OF INSULIN: ICD-10-CM

## 2024-06-24 RX ORDER — ERGOCALCIFEROL 1.25 MG/1
50000 CAPSULE ORAL
Qty: 12 CAPSULE | Refills: 0 | Status: SHIPPED | OUTPATIENT
Start: 2024-06-24

## 2024-06-26 DIAGNOSIS — C50.211 MALIGNANT NEOPLASM OF UPPER-INNER QUADRANT OF RIGHT BREAST IN FEMALE, ESTROGEN RECEPTOR POSITIVE: ICD-10-CM

## 2024-06-26 DIAGNOSIS — G89.3 CANCER ASSOCIATED PAIN: ICD-10-CM

## 2024-06-26 DIAGNOSIS — Z17.0 MALIGNANT NEOPLASM OF UPPER-INNER QUADRANT OF RIGHT BREAST IN FEMALE, ESTROGEN RECEPTOR POSITIVE: ICD-10-CM

## 2024-06-26 RX ORDER — TIZANIDINE 4 MG/1
4 TABLET ORAL EVERY 8 HOURS
Qty: 45 TABLET | Refills: 1 | Status: SHIPPED | OUTPATIENT
Start: 2024-06-26

## 2024-06-27 ENCOUNTER — TELEPHONE (OUTPATIENT)
Dept: RESEARCH | Facility: HOSPITAL | Age: 49
End: 2024-06-27
Payer: MEDICARE

## 2024-06-27 NOTE — TELEPHONE ENCOUNTER
Spoke with the patient about her interest in [employer] cancer pain study. Answered any questions. Informed patient that I would call back with more information if she is eligible then we will schedule consent.

## 2024-06-30 DIAGNOSIS — C50.211 MALIGNANT NEOPLASM OF UPPER-INNER QUADRANT OF RIGHT BREAST IN FEMALE, ESTROGEN RECEPTOR POSITIVE: ICD-10-CM

## 2024-06-30 DIAGNOSIS — Z17.0 MALIGNANT NEOPLASM OF UPPER-INNER QUADRANT OF RIGHT BREAST IN FEMALE, ESTROGEN RECEPTOR POSITIVE: ICD-10-CM

## 2024-07-01 RX ORDER — ANASTROZOLE 1 MG/1
1 TABLET ORAL DAILY
Qty: 90 TABLET | Refills: 0 | Status: SHIPPED | OUTPATIENT
Start: 2024-07-01

## 2024-07-02 ENCOUNTER — TELEPHONE (OUTPATIENT)
Dept: RESEARCH | Facility: HOSPITAL | Age: 49
End: 2024-07-02
Payer: MEDICARE

## 2024-07-02 NOTE — TELEPHONE ENCOUNTER
Patient is aware that she is eligible for the [employer] Cancer pain study. She agreed to meet with me on 7/8/2024 for consent and scheduled a lab appointment for [phone].

## 2024-07-09 ENCOUNTER — PATIENT MESSAGE (OUTPATIENT)
Dept: ADMINISTRATIVE | Facility: HOSPITAL | Age: 49
End: 2024-07-09
Payer: MEDICARE

## 2024-07-11 ENCOUNTER — PATIENT MESSAGE (OUTPATIENT)
Dept: HEMATOLOGY/ONCOLOGY | Facility: CLINIC | Age: 49
End: 2024-07-11
Payer: MEDICARE

## 2024-07-16 ENCOUNTER — TELEPHONE (OUTPATIENT)
Dept: RESEARCH | Facility: HOSPITAL | Age: 49
End: 2024-07-16
Payer: MEDICARE

## 2024-07-16 NOTE — TELEPHONE ENCOUNTER
Follow up call for the below topics:    The patient informed me through email that she would like to participate in [employer] [id number] and reschedule her consenting process from 8 am to 1 pm on Thursday 7/18/2024.      The patient has a blood draw the day before and an injection day of, but she still expresses interest in the study blood draw on 7/18/2024 despite this. The patient states that she understands that the study blood draw is optional and voluntary.     The patient has my contact information and will reach out on Thursday once she receives her injection and if she has any questions or concerns.

## 2024-07-17 ENCOUNTER — HOSPITAL ENCOUNTER (OUTPATIENT)
Facility: HOSPITAL | Age: 49
Discharge: HOME | End: 2024-07-18
Attending: EMERGENCY MEDICINE | Admitting: EMERGENCY MEDICINE
Payer: MEDICARE

## 2024-07-17 DIAGNOSIS — R55 SYNCOPE: ICD-10-CM

## 2024-07-17 DIAGNOSIS — Z85.3 HISTORY OF BREAST CANCER: Primary | ICD-10-CM

## 2024-07-17 DIAGNOSIS — Z86.79 HISTORY OF CHF (CONGESTIVE HEART FAILURE): ICD-10-CM

## 2024-07-17 DIAGNOSIS — R07.9 CHEST PAIN: ICD-10-CM

## 2024-07-17 LAB
ALBUMIN SERPL BCP-MCNC: 2.9 G/DL (ref 3.5–5.2)
ALP SERPL-CCNC: 73 U/L (ref 55–135)
ALT SERPL W/O P-5'-P-CCNC: 20 U/L (ref 10–44)
AMPHET+METHAMPHET UR QL: NEGATIVE
ANION GAP SERPL CALC-SCNC: 10 MMOL/L (ref 8–16)
AST SERPL-CCNC: 16 U/L (ref 10–40)
BACTERIA #/AREA URNS HPF: NORMAL /HPF
BARBITURATES UR QL SCN>200 NG/ML: NEGATIVE
BASOPHILS # BLD AUTO: 0.06 K/UL (ref 0–0.2)
BASOPHILS NFR BLD: 1.3 % (ref 0–1.9)
BENZODIAZ UR QL SCN>200 NG/ML: NEGATIVE
BILIRUB SERPL-MCNC: 0.3 MG/DL (ref 0.1–1)
BILIRUB UR QL STRIP: NEGATIVE
BNP SERPL-MCNC: <10 PG/ML (ref 0–99)
BUN SERPL-MCNC: 13 MG/DL (ref 6–20)
BZE UR QL SCN: NEGATIVE
CALCIUM SERPL-MCNC: 9.7 MG/DL (ref 8.7–10.5)
CANNABINOIDS UR QL SCN: NEGATIVE
CHLORIDE SERPL-SCNC: 112 MMOL/L (ref 95–110)
CLARITY UR: CLEAR
CO2 SERPL-SCNC: 20 MMOL/L (ref 23–29)
COLOR UR: YELLOW
CREAT SERPL-MCNC: 1.1 MG/DL (ref 0.5–1.4)
CREAT UR-MCNC: 219.5 MG/DL (ref 15–325)
CTP QC/QA: YES
CTP QC/QA: YES
D DIMER PPP IA.FEU-MCNC: 0.33 MG/L FEU
DIFFERENTIAL METHOD BLD: ABNORMAL
EOSINOPHIL # BLD AUTO: 0.1 K/UL (ref 0–0.5)
EOSINOPHIL NFR BLD: 1.8 % (ref 0–8)
ERYTHROCYTE [DISTWIDTH] IN BLOOD BY AUTOMATED COUNT: 17.8 % (ref 11.5–14.5)
EST. GFR  (NO RACE VARIABLE): >60 ML/MIN/1.73 M^2
GLUCOSE SERPL-MCNC: 96 MG/DL (ref 70–110)
GLUCOSE UR QL STRIP: ABNORMAL
HCT VFR BLD AUTO: 34 % (ref 37–48.5)
HGB BLD-MCNC: 10.6 G/DL (ref 12–16)
HGB UR QL STRIP: NEGATIVE
HYALINE CASTS #/AREA URNS LPF: 0 /LPF
IMM GRANULOCYTES # BLD AUTO: 0.02 K/UL (ref 0–0.04)
IMM GRANULOCYTES NFR BLD AUTO: 0.4 % (ref 0–0.5)
KETONES UR QL STRIP: NEGATIVE
LEUKOCYTE ESTERASE UR QL STRIP: NEGATIVE
LYMPHOCYTES # BLD AUTO: 1 K/UL (ref 1–4.8)
LYMPHOCYTES NFR BLD: 21.4 % (ref 18–48)
MCH RBC QN AUTO: 28.8 PG (ref 27–31)
MCHC RBC AUTO-ENTMCNC: 31.2 G/DL (ref 32–36)
MCV RBC AUTO: 92 FL (ref 82–98)
METHADONE UR QL SCN>300 NG/ML: NEGATIVE
MICROSCOPIC COMMENT: NORMAL
MONOCYTES # BLD AUTO: 0.3 K/UL (ref 0.3–1)
MONOCYTES NFR BLD: 7 % (ref 4–15)
NEUTROPHILS # BLD AUTO: 3.1 K/UL (ref 1.8–7.7)
NEUTROPHILS NFR BLD: 68.1 % (ref 38–73)
NITRITE UR QL STRIP: NEGATIVE
NRBC BLD-RTO: 0 /100 WBC
OPIATES UR QL SCN: NEGATIVE
PCP UR QL SCN>25 NG/ML: NEGATIVE
PH UR STRIP: 6 [PH] (ref 5–8)
PLATELET # BLD AUTO: 271 K/UL (ref 150–450)
PMV BLD AUTO: 9.7 FL (ref 9.2–12.9)
POC MOLECULAR INFLUENZA A AGN: NEGATIVE
POC MOLECULAR INFLUENZA B AGN: NEGATIVE
POCT GLUCOSE: 116 MG/DL (ref 70–110)
POCT GLUCOSE: 80 MG/DL (ref 70–110)
POTASSIUM SERPL-SCNC: 3.8 MMOL/L (ref 3.5–5.1)
PROT SERPL-MCNC: 6.6 G/DL (ref 6–8.4)
PROT UR QL STRIP: ABNORMAL
RBC # BLD AUTO: 3.68 M/UL (ref 4–5.4)
RBC #/AREA URNS HPF: 0 /HPF (ref 0–4)
SARS-COV-2 RDRP RESP QL NAA+PROBE: NEGATIVE
SODIUM SERPL-SCNC: 142 MMOL/L (ref 136–145)
SP GR UR STRIP: >1.03 (ref 1–1.03)
SQUAMOUS #/AREA URNS HPF: 1 /HPF
TOXICOLOGY INFORMATION: NORMAL
TROPONIN I SERPL DL<=0.01 NG/ML-MCNC: <0.006 NG/ML (ref 0–0.03)
TROPONIN I SERPL DL<=0.01 NG/ML-MCNC: <0.006 NG/ML (ref 0–0.03)
URN SPEC COLLECT METH UR: ABNORMAL
UROBILINOGEN UR STRIP-ACNC: NEGATIVE EU/DL
WBC # BLD AUTO: 4.57 K/UL (ref 3.9–12.7)
WBC #/AREA URNS HPF: 3 /HPF (ref 0–5)
YEAST URNS QL MICRO: NORMAL

## 2024-07-17 PROCEDURE — 25000003 PHARM REV CODE 250

## 2024-07-17 PROCEDURE — 85025 COMPLETE CBC W/AUTO DIFF WBC: CPT | Performed by: EMERGENCY MEDICINE

## 2024-07-17 PROCEDURE — 27000190 HC CPAP FULL FACE MASK W/VALVE

## 2024-07-17 PROCEDURE — 94660 CPAP INITIATION&MGMT: CPT

## 2024-07-17 PROCEDURE — 85379 FIBRIN DEGRADATION QUANT: CPT | Performed by: EMERGENCY MEDICINE

## 2024-07-17 PROCEDURE — 63600175 PHARM REV CODE 636 W HCPCS: Performed by: HOSPITALIST

## 2024-07-17 PROCEDURE — 93005 ELECTROCARDIOGRAM TRACING: CPT

## 2024-07-17 PROCEDURE — 84484 ASSAY OF TROPONIN QUANT: CPT | Performed by: EMERGENCY MEDICINE

## 2024-07-17 PROCEDURE — 80053 COMPREHEN METABOLIC PANEL: CPT | Performed by: EMERGENCY MEDICINE

## 2024-07-17 PROCEDURE — 87502 INFLUENZA DNA AMP PROBE: CPT

## 2024-07-17 PROCEDURE — G0378 HOSPITAL OBSERVATION PER HR: HCPCS

## 2024-07-17 PROCEDURE — 87635 SARS-COV-2 COVID-19 AMP PRB: CPT | Performed by: EMERGENCY MEDICINE

## 2024-07-17 PROCEDURE — 81000 URINALYSIS NONAUTO W/SCOPE: CPT | Performed by: EMERGENCY MEDICINE

## 2024-07-17 PROCEDURE — 25000003 PHARM REV CODE 250: Performed by: HOSPITALIST

## 2024-07-17 PROCEDURE — 83880 ASSAY OF NATRIURETIC PEPTIDE: CPT | Performed by: EMERGENCY MEDICINE

## 2024-07-17 PROCEDURE — 80307 DRUG TEST PRSMV CHEM ANLYZR: CPT | Performed by: EMERGENCY MEDICINE

## 2024-07-17 PROCEDURE — 99900035 HC TECH TIME PER 15 MIN (STAT)

## 2024-07-17 PROCEDURE — 25000003 PHARM REV CODE 250: Performed by: EMERGENCY MEDICINE

## 2024-07-17 PROCEDURE — 99900031 HC PATIENT EDUCATION (STAT)

## 2024-07-17 PROCEDURE — 96372 THER/PROPH/DIAG INJ SC/IM: CPT | Performed by: HOSPITALIST

## 2024-07-17 PROCEDURE — 96360 HYDRATION IV INFUSION INIT: CPT

## 2024-07-17 PROCEDURE — 99285 EMERGENCY DEPT VISIT HI MDM: CPT | Mod: 25

## 2024-07-17 PROCEDURE — 93010 ELECTROCARDIOGRAM REPORT: CPT | Mod: ,,, | Performed by: INTERNAL MEDICINE

## 2024-07-17 RX ORDER — ATORVASTATIN CALCIUM 10 MG/1
40 TABLET, FILM COATED ORAL NIGHTLY
Status: DISCONTINUED | OUTPATIENT
Start: 2024-07-17 | End: 2024-07-18 | Stop reason: HOSPADM

## 2024-07-17 RX ORDER — SPIRONOLACTONE 25 MG/1
25 TABLET ORAL DAILY
Status: DISCONTINUED | OUTPATIENT
Start: 2024-07-18 | End: 2024-07-18 | Stop reason: HOSPADM

## 2024-07-17 RX ORDER — PROCHLORPERAZINE EDISYLATE 5 MG/ML
5 INJECTION INTRAMUSCULAR; INTRAVENOUS EVERY 6 HOURS PRN
Status: DISCONTINUED | OUTPATIENT
Start: 2024-07-17 | End: 2024-07-18 | Stop reason: HOSPADM

## 2024-07-17 RX ORDER — ANASTROZOLE 1 MG/1
1 TABLET ORAL DAILY
Status: DISCONTINUED | OUTPATIENT
Start: 2024-07-18 | End: 2024-07-18 | Stop reason: HOSPADM

## 2024-07-17 RX ORDER — ENOXAPARIN SODIUM 100 MG/ML
40 INJECTION SUBCUTANEOUS EVERY 24 HOURS
Status: DISCONTINUED | OUTPATIENT
Start: 2024-07-17 | End: 2024-07-18 | Stop reason: HOSPADM

## 2024-07-17 RX ORDER — OXYCODONE AND ACETAMINOPHEN 5; 325 MG/1; MG/1
1 TABLET ORAL EVERY 6 HOURS PRN
Status: DISCONTINUED | OUTPATIENT
Start: 2024-07-17 | End: 2024-07-18 | Stop reason: HOSPADM

## 2024-07-17 RX ORDER — POLYETHYLENE GLYCOL 3350 17 G/17G
17 POWDER, FOR SOLUTION ORAL DAILY
Status: DISCONTINUED | OUTPATIENT
Start: 2024-07-18 | End: 2024-07-18 | Stop reason: HOSPADM

## 2024-07-17 RX ORDER — VENLAFAXINE HYDROCHLORIDE 75 MG/1
225 CAPSULE, EXTENDED RELEASE ORAL NIGHTLY
Status: DISCONTINUED | OUTPATIENT
Start: 2024-07-17 | End: 2024-07-18 | Stop reason: HOSPADM

## 2024-07-17 RX ORDER — ONDANSETRON HYDROCHLORIDE 2 MG/ML
4 INJECTION, SOLUTION INTRAVENOUS EVERY 8 HOURS PRN
Status: DISCONTINUED | OUTPATIENT
Start: 2024-07-17 | End: 2024-07-18 | Stop reason: HOSPADM

## 2024-07-17 RX ORDER — INSULIN ASPART 100 [IU]/ML
0-10 INJECTION, SOLUTION INTRAVENOUS; SUBCUTANEOUS
Status: DISCONTINUED | OUTPATIENT
Start: 2024-07-17 | End: 2024-07-18 | Stop reason: HOSPADM

## 2024-07-17 RX ORDER — POTASSIUM CHLORIDE 750 MG/1
10 TABLET, EXTENDED RELEASE ORAL NIGHTLY
Status: DISCONTINUED | OUTPATIENT
Start: 2024-07-17 | End: 2024-07-18 | Stop reason: HOSPADM

## 2024-07-17 RX ORDER — SODIUM CHLORIDE 0.9 % (FLUSH) 0.9 %
10 SYRINGE (ML) INJECTION EVERY 8 HOURS PRN
Status: DISCONTINUED | OUTPATIENT
Start: 2024-07-17 | End: 2024-07-18 | Stop reason: HOSPADM

## 2024-07-17 RX ORDER — TIZANIDINE 4 MG/1
4 TABLET ORAL EVERY 8 HOURS PRN
Status: DISCONTINUED | OUTPATIENT
Start: 2024-07-17 | End: 2024-07-18 | Stop reason: HOSPADM

## 2024-07-17 RX ORDER — NALOXONE HCL 0.4 MG/ML
0.02 VIAL (ML) INJECTION
Status: DISCONTINUED | OUTPATIENT
Start: 2024-07-17 | End: 2024-07-18 | Stop reason: HOSPADM

## 2024-07-17 RX ORDER — TALC
6 POWDER (GRAM) TOPICAL NIGHTLY PRN
Status: DISCONTINUED | OUTPATIENT
Start: 2024-07-17 | End: 2024-07-18 | Stop reason: HOSPADM

## 2024-07-17 RX ORDER — IPRATROPIUM BROMIDE AND ALBUTEROL SULFATE 2.5; .5 MG/3ML; MG/3ML
3 SOLUTION RESPIRATORY (INHALATION) EVERY 4 HOURS PRN
Status: DISCONTINUED | OUTPATIENT
Start: 2024-07-17 | End: 2024-07-18 | Stop reason: HOSPADM

## 2024-07-17 RX ORDER — ALUMINUM HYDROXIDE, MAGNESIUM HYDROXIDE, AND SIMETHICONE 1200; 120; 1200 MG/30ML; MG/30ML; MG/30ML
30 SUSPENSION ORAL 4 TIMES DAILY PRN
Status: DISCONTINUED | OUTPATIENT
Start: 2024-07-17 | End: 2024-07-18 | Stop reason: HOSPADM

## 2024-07-17 RX ORDER — PENTOXIFYLLINE 400 MG/1
400 TABLET, EXTENDED RELEASE ORAL 2 TIMES DAILY
Status: DISCONTINUED | OUTPATIENT
Start: 2024-07-18 | End: 2024-07-18 | Stop reason: HOSPADM

## 2024-07-17 RX ORDER — VENLAFAXINE HYDROCHLORIDE 75 MG/1
150 CAPSULE, EXTENDED RELEASE ORAL DAILY
Status: DISCONTINUED | OUTPATIENT
Start: 2024-07-18 | End: 2024-07-17

## 2024-07-17 RX ORDER — IBUPROFEN 200 MG
16 TABLET ORAL
Status: DISCONTINUED | OUTPATIENT
Start: 2024-07-17 | End: 2024-07-18 | Stop reason: HOSPADM

## 2024-07-17 RX ORDER — IBUPROFEN 200 MG
24 TABLET ORAL
Status: DISCONTINUED | OUTPATIENT
Start: 2024-07-17 | End: 2024-07-18 | Stop reason: HOSPADM

## 2024-07-17 RX ORDER — DOXYCYCLINE HYCLATE 100 MG
100 TABLET ORAL EVERY 12 HOURS
Status: DISCONTINUED | OUTPATIENT
Start: 2024-07-18 | End: 2024-07-18 | Stop reason: HOSPADM

## 2024-07-17 RX ORDER — ACETAMINOPHEN 325 MG/1
650 TABLET ORAL EVERY 6 HOURS PRN
Status: DISCONTINUED | OUTPATIENT
Start: 2024-07-17 | End: 2024-07-18 | Stop reason: HOSPADM

## 2024-07-17 RX ORDER — SIMETHICONE 80 MG
1 TABLET,CHEWABLE ORAL 4 TIMES DAILY PRN
Status: DISCONTINUED | OUTPATIENT
Start: 2024-07-17 | End: 2024-07-18 | Stop reason: HOSPADM

## 2024-07-17 RX ORDER — GLUCAGON 1 MG
1 KIT INJECTION
Status: DISCONTINUED | OUTPATIENT
Start: 2024-07-17 | End: 2024-07-18 | Stop reason: HOSPADM

## 2024-07-17 RX ADMIN — VENLAFAXINE HYDROCHLORIDE 225 MG: 75 CAPSULE, EXTENDED RELEASE ORAL at 11:07

## 2024-07-17 RX ADMIN — ENOXAPARIN SODIUM 40 MG: 40 INJECTION SUBCUTANEOUS at 10:07

## 2024-07-17 RX ADMIN — ATORVASTATIN CALCIUM 40 MG: 10 TABLET, FILM COATED ORAL at 10:07

## 2024-07-17 RX ADMIN — SODIUM CHLORIDE 250 ML: 9 INJECTION, SOLUTION INTRAVENOUS at 05:07

## 2024-07-17 RX ADMIN — POTASSIUM CHLORIDE 10 MEQ: 750 TABLET, EXTENDED RELEASE ORAL at 11:07

## 2024-07-17 NOTE — ED PROVIDER NOTES
"Encounter Date: 7/17/2024    SCRIBE #1 NOTE: I, Jennifer Jason, am scribing for, and in the presence of,  Leeann Edwards MD.       History     Chief Complaint   Patient presents with    Pre Syncope     Was st work, no trauma noted. She just slid down. Current BP 98/55, cng 158 mg/dl     Ms. Elmore reports she may have suffered a syncope episode PTA today. She was at work earlier today when she walked over to her  to obtain printing. She states she felt her bl legs were weak when she walked over. She did not respond when the  asked her a question and had fallen onto the ground by the time the  turned around. She is amnestic to the fall. She states she felt fine without any chest pain, dyspnea prior or after the event. She states her bp has been "low" recently which was 80/50 w/ EMS en route. She does note she is due for her infusion for breast cancer tomorrow and was supposed to come here today to obtain routine lab work. She states she has been feeling generally fatigued and malaise for the past few days which is typical for her several days before obtaining her infusion. She also reports -90lb weight loss the past 1.5y as she is on Mounjaro. She believes her cardiologist should have adjusted her medicines d/t her weight loss. She is currently on carvedilol, lasix as needed, and spironolactone. She also notes back pain but attributes this to increased heavy lifting and exertional activities lately. She denies any sore throat, ear pain, cough, congestion, dysuria, neck pain.     No trauma sustained from possible syncopal episode.     The history is provided by the patient.     Review of patient's allergies indicates:   Allergen Reactions    Keflex [cephalexin] Itching     Past Medical History:   Diagnosis Date    Abnormal Pap smear     pt states 13yrs ago colpo was done    Anemia     Anxiety     Cancer     Cardiomegaly 04/17/2014    Cardiomyopathy     CHF (congestive heart failure)  "    Depression     Fibroid     Hx of psychiatric care     Hyperlipidemia     Hypertension     Psychiatric problem     Sleep difficulties     Therapy      Past Surgical History:   Procedure Laterality Date     SECTION      CREATION OF VENTRICULOPERITONEAL SHUNT USING COMPUTER-ASSISTED NAVIGATION Right 2023    Procedure: INSERTION, SHUNT, VENTRICULOPERITONEAL, USING COMPUTER-ASSISTED NAVIGATION;  Surgeon: Jayme Villa MD;  Location: [address];  Service: Neurosurgery;  Laterality: Right;    CREATION OF VENTRICULOPERITONEAL SHUNT USING COMPUTER-ASSISTED NAVIGATION N/A 2023    Procedure: INSERTION, SHUNT, VENTRICULOPERITONEAL, USING COMPUTER-ASSISTED NAVIGATION;  Surgeon: Frederick Kohler MD;  Location: [facility] OR [address] [place] [address];  Service: General;  Laterality: N/A;    LUMBAR PUNCTURE N/A 10/26/2023    Procedure: Lumbar Puncture;  Surgeon: Jayme Villa MD;  Location: [facility] OR [address] [place] [address];  Service: Neurosurgery;  Laterality: N/A;  TOR1  ASA1  regular bed reversed  lateral left down   IIH  RN PHONE PREOP 10/12/2023----BMI--57.67----INCOMPLETE CONSENT----NEED ORDERS    TONSILLECTOMY      TUBAL LIGATION      UTERINE FIBROID EMBOLIZATION       Family History   Problem Relation Name Age of Onset    Other Mother          breast lesions had to be surgically removed    Arthritis Mother      Diabetes Mother      Heart disease Mother          CHF, CAD , 2 stents    Hypertension Mother      Hyperlipidemia Mother      Heart failure Mother      Hypertension Brother Leonides     No Known Problems Maternal Grandmother      Kidney cancer Maternal Grandfather  79    Rashes / Skin problems Daughter          boils/cysts    Asthma Daughter      Cervical cancer Paternal Cousin Gracie         dx age 29?    Ovarian cancer Paternal Cousin Gracie         dx age 29?    Endometriosis Paternal Cousin Gracie     Fibroids Other [employer]         uterine    Thyroid cancer Other [employer]         type? dx age?    Fibroids  Other          uterine    Fibroids Other          uterine    Fibroids Other          uterine    Fibroids Other          uterine    Breast cancer Neg Hx      Colon polyps Neg Hx       Social History     Tobacco Use    Smoking status: Never    Smokeless tobacco: Never   Substance Use Topics    Alcohol use: Not Currently     Alcohol/week: 0.0 standard drinks of alcohol    Drug use: No     Review of Systems   Constitutional:  Positive for fatigue. Negative for unexpected weight change.   HENT:  Negative for congestion and sore throat.    Respiratory:  Negative for cough and shortness of breath.    Cardiovascular:  Negative for chest pain and leg swelling.   Genitourinary:  Negative for dysuria.   Musculoskeletal:  Negative for arthralgias and myalgias.   Neurological:  Positive for syncope and weakness (bl legs).   All other systems reviewed and are negative.      Physical Exam     Initial Vitals   BP Pulse Resp Temp SpO2   07/17/24 1054 07/17/24 1054 07/17/24 1054 07/17/24 1148 07/17/24 1054   (!) 98/55 75 20 98.6 °F (37 °C) 98 %      MAP       --                Physical Exam    Nursing note and vitals reviewed.  Constitutional: She appears well-developed and well-nourished. She is not diaphoretic. No distress.   Overweight. Body mass index is 52.01 kg/m².     HENT:   Head: Normocephalic and atraumatic.   Eyes: EOM are normal.   Cardiovascular:  Normal rate, regular rhythm and normal heart sounds.           No murmur heard.  Pulmonary/Chest: Breath sounds normal. No respiratory distress.   Abdominal: Abdomen is soft. Bowel sounds are normal. She exhibits no distension and no mass. There is no abdominal tenderness. There is no guarding.   Musculoskeletal:         General: No tenderness or edema. Normal range of motion.     Neurological: She is alert and oriented to person, place, and time.   Skin: Skin is warm and dry. No rash noted. No erythema.   Psychiatric: She has a normal mood and affect. Her behavior is normal.  Judgment and thought content normal.         ED Course   Procedures  Labs Reviewed   CBC W/ AUTO DIFFERENTIAL - Abnormal; Notable for the following components:       Result Value    RBC 3.68 (*)     Hemoglobin 10.6 (*)     Hematocrit 34.0 (*)     MCHC 31.2 (*)     RDW 17.8 (*)     All other components within normal limits   COMPREHENSIVE METABOLIC PANEL - Abnormal; Notable for the following components:    Chloride 112 (*)     CO2 20 (*)     Albumin 2.9 (*)     All other components within normal limits   URINALYSIS, REFLEX TO URINE CULTURE - Abnormal; Notable for the following components:    Specific Gravity, UA >1.030 (*)     Protein, UA 1+ (*)     Glucose, UA 4+ (*)     All other components within normal limits    Narrative:     Specimen Source->Urine   COMPREHENSIVE METABOLIC PANEL - Abnormal; Notable for the following components:    Chloride 111 (*)     Glucose 115 (*)     Albumin 3.0 (*)     eGFR 46 (*)     Anion Gap 7 (*)     All other components within normal limits   CBC W/ AUTO DIFFERENTIAL - Abnormal; Notable for the following components:    RBC 3.44 (*)     Hemoglobin 9.7 (*)     Hematocrit 32.1 (*)     MCHC 30.2 (*)     RDW 18.0 (*)     All other components within normal limits   POCT GLUCOSE - Abnormal; Notable for the following components:    POCT Glucose 116 (*)     All other components within normal limits   POCT GLUCOSE - Abnormal; Notable for the following components:    POCT Glucose 121 (*)     All other components within normal limits   POCT GLUCOSE - Abnormal; Notable for the following components:    POCT Glucose 120 (*)     All other components within normal limits   D DIMER, QUANTITATIVE   TROPONIN I   B-TYPE NATRIURETIC PEPTIDE   URINALYSIS MICROSCOPIC    Narrative:     Specimen Source->Urine   TROPONIN I   DRUG SCREEN PANEL, URINE EMERGENCY   DRUG SCREEN PANEL, URINE EMERGENCY    Narrative:     Specimen Source->Urine   MAGNESIUM   PHOSPHORUS   SARS-COV-2 RDRP GENE   POCT INFLUENZA A/B  MOLECULAR   POCT GLUCOSE, HAND-HELD DEVICE   POCT GLUCOSE, HAND-HELD DEVICE   POCT GLUCOSE, HAND-HELD DEVICE   POCT GLUCOSE   POCT GLUCOSE     EKG Readings: (Independently Interpreted)   Initial Reading: No STEMI. Previous EKG: Compared with most recent EKG Rhythm: Normal Sinus Rhythm. Ectopy: No Ectopy.   Flipped twave inferiorly, seen on prior       Imaging Results              US Carotid Bilateral (Final result)  Result time 07/18/24 10:56:26      Final result by Gallo Calderon MD (07/18/24 10:56:26)                   Impression:      1. No sonographic evidence of hemodynamically significant stenosis of the bilateral extracranial internal carotid arteries.      Electronically signed by: Gallo Calderon  Date:    07/18/2024  Time:    10:56               Narrative:    EXAMINATION:  US CAROTID BILATERAL    CLINICAL HISTORY:  syncope;    TECHNIQUE:  Grayscale and color Doppler ultrasound examination of the carotid and vertebral artery systems bilaterally.  Stenosis estimates are per the NASCET measurement criteria.    COMPARISON:  None    FINDINGS:  Right:    CCA PSV: 94-cm/sec    ICA PSV: 83-cm/sec    ICA EDV: 33-cm/sec    ECA PSV: 60-cm/sec    ICA/CCA PSV ratio: 0.9    Plaque formation: No significant    Vertebral artery: Antegrade flow with normal monophasic waveforms.    Left:    CCA PSV: 77-cm/sec    ICA PSV: 98-cm/sec    ICA EDV: 31-cm/sec    ECA PSV: 58-cm/sec    ICA/CCA PSV ratio: 1.3    Plaque formation: No significant    Vertebral artery: Antegrade flow with normal monophasic waveforms.                                       X-Ray Chest AP Portable (Final result)  Result time 07/17/24 12:39:15      Final result by Patrick Lucia MD (07/17/24 12:39:15)                   Impression:      No evidence of acute cardiopulmonary disease.      Electronically signed by: Patrick Lucia MD  Date:    07/17/2024  Time:    12:39               Narrative:    EXAMINATION:  XR CHEST AP PORTABLE    CLINICAL  HISTORY:  Syncope and collapse    TECHNIQUE:  AP portable radiograph of the chest was performed.    COMPARISON:  09/14/2023    FINDINGS:  The cardiomediastinal silhouette is normal in size and midline. Pulmonary vascularity appears within normal limits.    The lungs appear clear without confluent pulmonary parenchymal opacity. No pleural fluid or pneumothorax.    Right-sided  shunt tubing                                       Medications   anastrozole tablet 1 mg (1 mg Oral Given 7/18/24 1047)   atorvastatin tablet 40 mg (40 mg Oral Given 7/17/24 2200)   sodium chloride 0.9% flush 10 mL (has no administration in time range)   albuterol-ipratropium 2.5 mg-0.5 mg/3 mL nebulizer solution 3 mL (has no administration in time range)   melatonin tablet 6 mg (has no administration in time range)   ondansetron injection 4 mg (4 mg Intravenous Given 7/18/24 0356)   prochlorperazine injection Soln 5 mg (has no administration in time range)   polyethylene glycol packet 17 g (17 g Oral Not Given 7/18/24 0900)   acetaminophen tablet 650 mg (has no administration in time range)   simethicone chewable tablet 80 mg (has no administration in time range)   aluminum-magnesium hydroxide-simethicone 200-200-20 mg/5 mL suspension 30 mL (has no administration in time range)   naloxone 0.4 mg/mL injection 0.02 mg (has no administration in time range)   glucose chewable tablet 16 g (has no administration in time range)   glucose chewable tablet 24 g (has no administration in time range)   glucagon (human recombinant) injection 1 mg (has no administration in time range)   enoxaparin injection 40 mg (40 mg Subcutaneous Given 7/17/24 2202)   insulin aspart U-100 pen 0-10 Units (has no administration in time range)   dextrose 10% bolus 125 mL 125 mL (has no administration in time range)   dextrose 10% bolus 250 mL 250 mL (has no administration in time range)   venlafaxine 24 hr capsule 225 mg (225 mg Oral Given 7/17/24 2310)   doxycycline  tablet 100 mg (100 mg Oral Given 7/18/24 1047)   sacubitriL-valsartan 24-26 mg per tablet 2 tablet (0 tablets Oral Hold 7/18/24 0900)   oxyCODONE-acetaminophen 5-325 mg per tablet 1 tablet (has no administration in time range)   pentoxifylline CR tablet 400 mg (400 mg Oral Given 7/18/24 1039)   potassium chloride SA CR tablet 10 mEq (10 mEq Oral Given 7/17/24 2310)   spironolactone tablet 25 mg (25 mg Oral Given 7/18/24 1039)   tiZANidine tablet 4 mg (has no administration in time range)   sodium chloride 0.9% bolus 250 mL 250 mL (0 mLs Intravenous Stopped 7/17/24 2041)     Medical Decision Making  49 yo w hx non ischemic cardiomyopathy w EF 30%, breast ca w bone mets, here with syncope. SBP in 80s. Pt placed on cardiac monitor, IV, gentle iv fluids, was given 500cc NS by EMS. Reports mild general malaise but otherwise feels ok. Denies cp, sob, headache. Ddx is broad and includes but is not limited to cardiac, pulmonary, neurology, and other etiologies.     Pt has been stable during her time in the ER. Labs noted. Pt has rested. Cbc, cmp, trop, ddimer, EKG acutely reassuring.   Orthostatics mildly positive after hydration and rest. Due to need to be cautious with iv fluids, will place in observation for further monitoring. Pt may need to decrease her daily dose of bp meds given 90lb weight loss. Have discussed plan for observation with HM team who will assume care. Pt is amenable to plan.     Amount and/or Complexity of Data Reviewed  Labs: ordered. Decision-making details documented in ED Course.  Radiology: ordered. Decision-making details documented in ED Course.  ECG/medicine tests: ordered and independent interpretation performed. Decision-making details documented in ED Course.            Scribe Attestation:   Scribe #1: I performed the above scribed service and the documentation accurately describes the services I performed. I attest to the accuracy of the note.                           I, Leeann Edwards MD,  personally performed the services described in this documentation. All medical record entries made by the scribe were at my direction and in my presence. I have reviewed the chart and agree that the record reflects my personal performance and is accurate and complete.      Clinical Impression:  Final diagnoses:  [R55] Syncope  [Z85.3] History of breast cancer (Primary)  [Z86.79] History of CHF (congestive heart failure)          ED Disposition Condition    Observation Stable                Leeann Edwards MD  07/18/24 4640

## 2024-07-18 ENCOUNTER — PATIENT MESSAGE (OUTPATIENT)
Dept: HEMATOLOGY/ONCOLOGY | Facility: CLINIC | Age: 49
End: 2024-07-18
Payer: MEDICARE

## 2024-07-18 VITALS
OXYGEN SATURATION: 100 % | SYSTOLIC BLOOD PRESSURE: 114 MMHG | TEMPERATURE: 98 F | HEART RATE: 74 BPM | DIASTOLIC BLOOD PRESSURE: 61 MMHG | BODY MASS INDEX: 50.02 KG/M2 | WEIGHT: 293 LBS | HEIGHT: 64 IN | RESPIRATION RATE: 19 BRPM

## 2024-07-18 LAB
ALBUMIN SERPL BCP-MCNC: 3 G/DL (ref 3.5–5.2)
ALP SERPL-CCNC: 74 U/L (ref 55–135)
ALT SERPL W/O P-5'-P-CCNC: 19 U/L (ref 10–44)
ANION GAP SERPL CALC-SCNC: 7 MMOL/L (ref 8–16)
ASCENDING AORTA: 2.58 CM
AST SERPL-CCNC: 14 U/L (ref 10–40)
AV INDEX (PROSTH): 0.69
AV MEAN GRADIENT: 5 MMHG
AV PEAK GRADIENT: 9 MMHG
AV VALVE AREA BY VELOCITY RATIO: 2.33 CM²
AV VALVE AREA: 2.27 CM²
AV VELOCITY RATIO: 0.71
BASOPHILS # BLD AUTO: 0.05 K/UL (ref 0–0.2)
BASOPHILS NFR BLD: 0.9 % (ref 0–1.9)
BILIRUB SERPL-MCNC: 0.2 MG/DL (ref 0.1–1)
BSA FOR ECHO PROCEDURE: 2.49 M2
BUN SERPL-MCNC: 14 MG/DL (ref 6–20)
CALCIUM SERPL-MCNC: 10.4 MG/DL (ref 8.7–10.5)
CHLORIDE SERPL-SCNC: 111 MMOL/L (ref 95–110)
CO2 SERPL-SCNC: 25 MMOL/L (ref 23–29)
CREAT SERPL-MCNC: 1.4 MG/DL (ref 0.5–1.4)
CV ECHO LV RWT: 0.44 CM
DIFFERENTIAL METHOD BLD: ABNORMAL
DOP CALC AO PEAK VEL: 1.47 M/S
DOP CALC AO VTI: 27.4 CM
DOP CALC LVOT AREA: 3.3 CM2
DOP CALC LVOT DIAMETER: 2.04 CM
DOP CALC LVOT PEAK VEL: 1.05 M/S
DOP CALC LVOT STROKE VOLUME: 62.07 CM3
DOP CALC MV VTI: 17 CM
DOP CALCLVOT PEAK VEL VTI: 19 CM
E WAVE DECELERATION TIME: 239.16 MSEC
E/A RATIO: 1.38
E/E' RATIO: 10.83 M/S
ECHO LV POSTERIOR WALL: 1.31 CM (ref 0.6–1.1)
EOSINOPHIL # BLD AUTO: 0.1 K/UL (ref 0–0.5)
EOSINOPHIL NFR BLD: 1.2 % (ref 0–8)
ERYTHROCYTE [DISTWIDTH] IN BLOOD BY AUTOMATED COUNT: 18 % (ref 11.5–14.5)
EST. GFR  (NO RACE VARIABLE): 46 ML/MIN/1.73 M^2
FRACTIONAL SHORTENING: 18 % (ref 28–44)
GLUCOSE SERPL-MCNC: 115 MG/DL (ref 70–110)
HCT VFR BLD AUTO: 32.1 % (ref 37–48.5)
HGB BLD-MCNC: 9.7 G/DL (ref 12–16)
IMM GRANULOCYTES # BLD AUTO: 0.02 K/UL (ref 0–0.04)
IMM GRANULOCYTES NFR BLD AUTO: 0.4 % (ref 0–0.5)
INTERVENTRICULAR SEPTUM: 1.16 CM (ref 0.6–1.1)
IVC DIAMETER: 2.39 CM
LA MAJOR: 5.25 CM
LA MINOR: 5.74 CM
LA WIDTH: 3.4 CM
LEFT ATRIUM SIZE: 4.02 CM
LEFT ATRIUM VOLUME INDEX: 27.3 ML/M2
LEFT ATRIUM VOLUME: 63.71 CM3
LEFT INTERNAL DIMENSION IN SYSTOLE: 4.92 CM (ref 2.1–4)
LEFT VENTRICLE DIASTOLIC VOLUME INDEX: 77.78 ML/M2
LEFT VENTRICLE DIASTOLIC VOLUME: 181.23 ML
LEFT VENTRICLE MASS INDEX: 141 G/M2
LEFT VENTRICLE SYSTOLIC VOLUME INDEX: 48.8 ML/M2
LEFT VENTRICLE SYSTOLIC VOLUME: 113.76 ML
LEFT VENTRICULAR INTERNAL DIMENSION IN DIASTOLE: 6.02 CM (ref 3.5–6)
LEFT VENTRICULAR MASS: 328.24 G
LV LATERAL E/E' RATIO: 9.29 M/S
LV SEPTAL E/E' RATIO: 13 M/S
LVED V (TEICH): 181.23 ML
LVES V (TEICH): 113.76 ML
LVOT MG: 2.62 MMHG
LVOT MV: 0.76 CM/S
LYMPHOCYTES # BLD AUTO: 1.2 K/UL (ref 1–4.8)
LYMPHOCYTES NFR BLD: 20.4 % (ref 18–48)
MAGNESIUM SERPL-MCNC: 1.6 MG/DL (ref 1.6–2.6)
MCH RBC QN AUTO: 28.2 PG (ref 27–31)
MCHC RBC AUTO-ENTMCNC: 30.2 G/DL (ref 32–36)
MCV RBC AUTO: 93 FL (ref 82–98)
MONOCYTES # BLD AUTO: 0.4 K/UL (ref 0.3–1)
MONOCYTES NFR BLD: 7.3 % (ref 4–15)
MV MEAN GRADIENT: 1 MMHG
MV PEAK A VEL: 0.47 M/S
MV PEAK E VEL: 0.65 M/S
MV PEAK GRADIENT: 2 MMHG
MV STENOSIS PRESSURE HALF TIME: 69.36 MS
MV VALVE AREA BY CONTINUITY EQUATION: 3.65 CM2
MV VALVE AREA P 1/2 METHOD: 3.17 CM2
NEUTROPHILS # BLD AUTO: 4 K/UL (ref 1.8–7.7)
NEUTROPHILS NFR BLD: 69.8 % (ref 38–73)
NRBC BLD-RTO: 0 /100 WBC
OHS CV RV/LV RATIO: 0.45 CM
PHOSPHATE SERPL-MCNC: 4.3 MG/DL (ref 2.7–4.5)
PISA TR MAX VEL: 1.49 M/S
PLATELET # BLD AUTO: 330 K/UL (ref 150–450)
PMV BLD AUTO: 10.2 FL (ref 9.2–12.9)
POCT GLUCOSE: 110 MG/DL (ref 70–110)
POCT GLUCOSE: 120 MG/DL (ref 70–110)
POCT GLUCOSE: 121 MG/DL (ref 70–110)
POTASSIUM SERPL-SCNC: 4.2 MMOL/L (ref 3.5–5.1)
PROT SERPL-MCNC: 6.7 G/DL (ref 6–8.4)
PV PEAK GRADIENT: 3 MMHG
PV PEAK VELOCITY: 0.87 M/S
RA MAJOR: 4.61 CM
RA PRESSURE ESTIMATED: 3 MMHG
RA WIDTH: 2.7 CM
RBC # BLD AUTO: 3.44 M/UL (ref 4–5.4)
RIGHT VENTRICLE DIASTOLIC BASEL DIMENSION: 2.7 CM
RIGHT VENTRICULAR END-DIASTOLIC DIMENSION: 2.7 CM
RV TB RVSP: 4 MMHG
RV TISSUE DOPPLER FREE WALL SYSTOLIC VELOCITY 1 (APICAL 4 CHAMBER VIEW): 11.69 CM/S
SINUS: 3 CM
SODIUM SERPL-SCNC: 143 MMOL/L (ref 136–145)
STJ: 2.55 CM
TDI LATERAL: 0.07 M/S
TDI SEPTAL: 0.05 M/S
TDI: 0.06 M/S
TR MAX PG: 9 MMHG
TV REST PULMONARY ARTERY PRESSURE: 12 MMHG
WBC # BLD AUTO: 5.65 K/UL (ref 3.9–12.7)
Z-SCORE OF LEFT VENTRICULAR DIMENSION IN END DIASTOLE: -4.5
Z-SCORE OF LEFT VENTRICULAR DIMENSION IN END SYSTOLE: -1.14

## 2024-07-18 PROCEDURE — G0378 HOSPITAL OBSERVATION PER HR: HCPCS

## 2024-07-18 PROCEDURE — 25000003 PHARM REV CODE 250: Performed by: HOSPITALIST

## 2024-07-18 PROCEDURE — 99900035 HC TECH TIME PER 15 MIN (STAT)

## 2024-07-18 PROCEDURE — 80053 COMPREHEN METABOLIC PANEL: CPT | Performed by: HOSPITALIST

## 2024-07-18 PROCEDURE — 83735 ASSAY OF MAGNESIUM: CPT | Performed by: HOSPITALIST

## 2024-07-18 PROCEDURE — 63600175 PHARM REV CODE 636 W HCPCS: Performed by: HOSPITALIST

## 2024-07-18 PROCEDURE — 84100 ASSAY OF PHOSPHORUS: CPT | Performed by: HOSPITALIST

## 2024-07-18 PROCEDURE — 25000003 PHARM REV CODE 250

## 2024-07-18 PROCEDURE — 85025 COMPLETE CBC W/AUTO DIFF WBC: CPT | Performed by: HOSPITALIST

## 2024-07-18 RX ADMIN — PENTOXIFYLLINE 400 MG: 400 TABLET, EXTENDED RELEASE ORAL at 10:07

## 2024-07-18 RX ADMIN — ONDANSETRON 4 MG: 2 INJECTION INTRAMUSCULAR; INTRAVENOUS at 03:07

## 2024-07-18 RX ADMIN — ANASTROZOLE 1 MG: 1 TABLET ORAL at 10:07

## 2024-07-18 RX ADMIN — DOXYCYCLINE HYCLATE 100 MG: 100 TABLET, COATED ORAL at 10:07

## 2024-07-18 RX ADMIN — SPIRONOLACTONE 25 MG: 25 TABLET ORAL at 10:07

## 2024-07-18 NOTE — ASSESSMENT & PLAN NOTE
Last A1c reviewed-   Lab Results   Component Value Date    HGBA1C 6.1 (H) 03/19/2024     Most recent fingerstick glucose reviewed-   Recent Labs   Lab 07/17/24  2209   POCTGLUCOSE 80     Current correctional scale  Medium  Maintain anti-hyperglycemic dose as follows-   Antihyperglycemics (From admission, onward)      Start     Stop Route Frequency Ordered    07/17/24 2205  insulin aspart U-100 pen 0-10 Units         -- SubQ Before meals & nightly PRN 07/17/24 2107          Hold Oral hypoglycemics while patient is in the hospital.

## 2024-07-18 NOTE — H&P
[facility]  [facility]  History & Physical    Patient Name: Kristopher Elmore  MRN: [id number]  Patient Class: OP- Observation  Admission Date: 7/17/2024  Attending Physician: Brant Parker MD   Primary Care Provider: Enedina De La Torre MD         Patient information was obtained from patient, past medical records, and ER records.     Subjective:     Principal Problem:Syncope    Chief Complaint:   Chief Complaint   Patient presents with    Pre Syncope     Was st work, no trauma noted. She just slid down. Current BP 98/55, cng 158 mg/dl        HPI: Ms. Elmore is a 48 yr old female with a hx of iron deficiency anemia, MDD, chronic combined systolic and diastolic CHF, nonischemic cardiomyopathy, HLD, breast cancer with bone metastases, obesity, HTN, dm type 2, idiopathic intracranial HTN s/p ventricular shunt, ZABRINA, BPPV, osteonecrosis of jaw who presents to the ED with a chief complaint of syncope.  Patient states that around 10:00 a.m. this morning while she was at school, she states that she was walking to talk to the .  She says that the  asked her question but she did not answer and states that the  told her that her right leg gave out and she fell onto her right side.  The  states that there was no head trauma.  The patient is unsure how long the syncopal episode lasted but states that probably around 5-10 minutes.  She states that prior to passing out she felt generally weak and felt like her legs were shaky as well as mild lightheadedness. Patient states that she took her morning meds this morning as well as ibuprofen 800 mg for back pain, but no other medications PTA to ED. patient endorses that she has lost about 90 lb over the last 1.5 years and thinks that her blood pressure medication is too strong and needs to be adjusted.  She states that about 2 months ago her diabetes medications were adjusted.  She denies tobacco, alcohol, and  recreational drug use.  She denies fever, chills, SOB, CP, palpitations, diaphoresis, nausea, vomiting, diarrhea, dysuria, hematuria, dizziness.    Upon arrival to ED, patient afebrile, HR of 75, RR of 20, BP of 98/55, satting 98% on RA.  Workup in the ED included CBC, CMP, D-dimer, BNP, troponin, COVID test, influenza test, UA, urine creatinine, CXR.  Workup revealed RBC of 3.68, H/H of 10.6/34, chloride of 112, CO2 of 20, albumin of 2.9.  UA with 1+ protein and 4+ glucose.  Remainder of labs fairly unremarkable.  CXR showed no evidence of acute cardiopulmonary disease.   Patient was given 250 NS bolus while in the ED. case discussed with ED provider and patient will be placed under observation for further management.    Past Medical History:   Diagnosis Date    Abnormal Pap smear     pt states 13yrs ago colpo was done    Anemia     Anxiety     Cancer     Cardiomegaly 2014    Cardiomyopathy     CHF (congestive heart failure)     Depression     Fibroid     Hx of psychiatric care     Hyperlipidemia     Hypertension     Psychiatric problem     Sleep difficulties     Therapy        Past Surgical History:   Procedure Laterality Date     SECTION      CREATION OF VENTRICULOPERITONEAL SHUNT USING COMPUTER-ASSISTED NAVIGATION Right 2023    Procedure: INSERTION, SHUNT, VENTRICULOPERITONEAL, USING COMPUTER-ASSISTED NAVIGATION;  Surgeon: Jayme Villa MD;  Location: [address];  Service: Neurosurgery;  Laterality: Right;    CREATION OF VENTRICULOPERITONEAL SHUNT USING COMPUTER-ASSISTED NAVIGATION N/A 2023    Procedure: INSERTION, SHUNT, VENTRICULOPERITONEAL, USING COMPUTER-ASSISTED NAVIGATION;  Surgeon: Frederick Kohler MD;  Location: [facility] OR [address] [place] [address];  Service: General;  Laterality: N/A;    LUMBAR PUNCTURE N/A 10/26/2023    Procedure: Lumbar Puncture;  Surgeon: Jayme Villa MD;  Location: [facility] OR [address] [place] [address];  Service: Neurosurgery;  Laterality: N/A;  TOR1  ASA1  regular bed  reversed  lateral left down   [facility]  RN PHONE PREOP 10/12/2023----BMI--57.67----INCOMPLETE CONSENT----NEED ORDERS    TONSILLECTOMY      TUBAL LIGATION      UTERINE FIBROID EMBOLIZATION  2015       Review of patient's allergies indicates:   Allergen Reactions    Keflex [cephalexin] Itching       Current Facility-Administered Medications on File Prior to Encounter   Medication    0.9%  NaCl infusion    mupirocin 2 % ointment     Current Outpatient Medications on File Prior to Encounter   Medication Sig    ammonium lactate 12 % Crea Apply 1 application  topically once daily.    anastrozole (ARIMIDEX) 1 mg Tab Take 1 tablet (1 mg total) by mouth once daily.    atorvastatin (LIPITOR) 40 MG tablet Take 1 tablet (40 mg total) by mouth every evening.    blood pressure kit-extra large Kit Check blood pressure once daily at the same time    blood sugar diagnostic Strp To check BG 2 times daily, to use with insurance preferred meter    blood-glucose meter kit To check BG 2 times daily, to use with insurance preferred meter    blood-glucose sensor (DEXCOM G7 SENSOR) Monique Change every 10 days    carvediloL (COREG) 25 MG tablet TAKE 1 TABLET(25 MG) BY MOUTH TWICE DAILY    ciclopirox (PENLAC) 8 % Soln Apply topically nightly.    doxycycline (VIBRAMYCIN) 100 MG Cap Take 100 mg by mouth 2 (two) times daily.    empagliflozin (JARDIANCE) 25 mg tablet Take 1 tablet (25 mg total) by mouth once daily.    ENTRESTO  mg per tablet TAKE 1 TABLET BY MOUTH TWICE DAILY    ergocalciferol (ERGOCALCIFEROL) 50,000 unit Cap TAKE 1 CAPSULE BY MOUTH EVERY 7 DAYS    furosemide (LASIX) 20 MG tablet Take 1 tablet (20 mg total) by mouth 2 (two) times daily.    glipiZIDE 5 MG TR24 Take 1 tablet (5 mg total) by mouth daily with breakfast.    goserelin (ZOLADEX) 3.6 mg injection Inject 3.6 mg into the skin every 28 days.    ibuprofen (ADVIL,MOTRIN) 800 MG tablet Take 1 tablet (800 mg total) by mouth 2 (two) times daily as needed for Pain.    lancets Misc  To check BG 2 times daily, to use with insurance preferred meter    metFORMIN (GLUCOPHAGE-XR) 500 MG ER 24hr tablet Take 2 tablets (1,000 mg total) by mouth 2 (two) times daily with meals.    metroNIDAZOLE (FLAGYL) 500 MG tablet Take 500 mg by mouth 3 (three) times daily.    naloxone (NARCAN) 4 mg/actuation Spry 4mg by nasal route as needed for opioid overdose; may repeat every 2-3 minutes in alternating nostrils until medical help arrives. Call 911    ondansetron (ZOFRAN-ODT) 8 MG TbDL Take 1 tablet (8 mg total) by mouth every 8 (eight) hours as needed (nausea and vomiting).    oxyCODONE-acetaminophen (PERCOCET) 5-325 mg per tablet Take 1 tablet by mouth every 6 (six) hours as needed for Pain.    palbociclib (IBRANCE) 100 mg Cap Take 1 capsule (100 mg) by mouth Monday - Friday, DO NOT TAKE Saturday and Sunday, for 3 weeks on, 1 week off.    pentoxifylline (TRENTAL) 400 mg TbSR Take 400 mg by mouth 2 (two) times daily.    potassium chloride (KLOR-CON) 10 MEQ TbSR TAKE 1 TABLET(10 MEQ) BY MOUTH EVERY NIGHT    prochlorperazine (COMPAZINE) 5 MG tablet Take 5 mg by mouth 4 (four) times daily.    sennosides (SENNA-C ORAL) Take 2 tablets by mouth daily as needed.    spironolactone (ALDACTONE) 25 MG tablet Take 1 tablet (25 mg total) by mouth once daily.    tirzepatide 10 mg/0.5 mL PnIj Inject 10 mg into the skin every 7 days.    tiZANidine (ZANAFLEX) 4 MG tablet TAKE 1 TABLET(4 MG) BY MOUTH EVERY 8 HOURS    UNKNOWN TO PATIENT Calcium    venlafaxine (EFFEXOR-XR) 150 MG Cp24 Take 1 capsule (150 mg total) by mouth once daily.    venlafaxine (EFFEXOR-XR) 75 MG 24 hr capsule Take 1 capsule (75 mg total) by mouth once daily.    vitamin E 100 UNIT capsule Take 100 Units by mouth once daily.     Family History       Problem Relation (Age of Onset)    Arthritis Mother    Asthma Daughter    Cervical cancer Paternal Cousin    Diabetes Mother    Endometriosis Paternal Cousin    Fibroids Other, Other, Other, Other, Other    Heart  disease Mother    Heart failure Mother    Hyperlipidemia Mother    Hypertension Mother, Brother    Kidney cancer Maternal Grandfather (79)    No Known Problems Maternal Grandmother    Other Mother    Ovarian cancer Paternal Cousin    Rashes / Skin problems Daughter    Thyroid cancer Other          Tobacco Use    Smoking status: Never    Smokeless tobacco: Never   Substance and Sexual Activity    Alcohol use: Not Currently     Alcohol/week: 0.0 standard drinks of alcohol    Drug use: No    Sexual activity: Not Currently     Partners: Male     Birth control/protection: Surgical     Review of Systems   Constitutional:  Negative for chills, diaphoresis and fever.   Respiratory:  Negative for shortness of breath.    Cardiovascular:  Negative for chest pain and palpitations.   Gastrointestinal:  Negative for diarrhea, nausea and vomiting.   Genitourinary:  Negative for dysuria and hematuria.   Neurological:  Positive for syncope, weakness (generalized) and light-headedness. Negative for dizziness.     Objective:     Vital Signs (Most Recent):  Temp: 98.6 °F (37 °C) (07/17/24 1148)  Pulse: 73 (07/17/24 2102)  Resp: 16 (07/17/24 2102)  BP: 111/67 (07/17/24 2102)  SpO2: 99 % (07/17/24 2102) Vital Signs (24h Range):  Temp:  [98.6 °F (37 °C)] 98.6 °F (37 °C)  Pulse:  [66-93] 73  Resp:  [16-20] 16  SpO2:  [95 %-100 %] 99 %  BP: ()/(54-68) 111/67     Weight: (!) 137.4 kg (303 lb)  Body mass index is 52.01 kg/m².     Physical Exam  Vitals reviewed.   Constitutional:       General: She is awake. She is not in acute distress.     Appearance: Normal appearance. She is obese. She is not ill-appearing or diaphoretic.   Cardiovascular:      Rate and Rhythm: Normal rate.      Heart sounds: Normal heart sounds. No murmur heard.     No friction rub. No gallop.   Pulmonary:      Effort: Pulmonary effort is normal. No accessory muscle usage or respiratory distress.      Breath sounds: No wheezing, rhonchi or rales.   Abdominal:       General: Bowel sounds are normal.      Palpations: Abdomen is soft.      Tenderness: There is no abdominal tenderness. There is no guarding or rebound.   Musculoskeletal:      Right lower leg: No edema.      Left lower leg: No edema.   Skin:     General: Skin is warm and dry.   Neurological:      Mental Status: She is alert and oriented to person, place, and time.   Psychiatric:         Behavior: Behavior is cooperative.                Significant Labs: All pertinent labs within the past 24 hours have been reviewed.  CBC:   Recent Labs   Lab 07/17/24  1208   WBC 4.57   HGB 10.6*   HCT 34.0*        CMP:   Recent Labs   Lab 07/17/24  1208      K 3.8   *   CO2 20*   GLU 96   BUN 13   CREATININE 1.1   CALCIUM 9.7   PROT 6.6   ALBUMIN 2.9*   BILITOT 0.3   ALKPHOS 73   AST 16   ALT 20   ANIONGAP 10     Cardiac Markers:   Recent Labs   Lab 07/17/24  1208   BNP <10     Troponin:   Recent Labs   Lab 07/17/24  1208 07/17/24  2042   TROPONINI <0.006 <0.006     Urine Studies:   Recent Labs   Lab 07/17/24  1646   COLORU Yellow   APPEARANCEUA Clear   PHUR 6.0   SPECGRAV >1.030*   PROTEINUA 1+*   GLUCUA 4+*   KETONESU Negative   BILIRUBINUA Negative   OCCULTUA Negative   NITRITE Negative   UROBILINOGEN Negative   LEUKOCYTESUR Negative   RBCUA 0   WBCUA 3   BACTERIA Rare   SQUAMEPITHEL 1   HYALINECASTS 0       Significant Imaging:   Imaging Results              X-Ray Chest AP Portable (Final result)  Result time 07/17/24 12:39:15      Final result by Patrick Lucia MD (07/17/24 12:39:15)                   Impression:      No evidence of acute cardiopulmonary disease.      Electronically signed by: Patrick Lucia MD  Date:    07/17/2024  Time:    12:39               Narrative:    EXAMINATION:  XR CHEST AP PORTABLE    CLINICAL HISTORY:  Syncope and collapse    TECHNIQUE:  AP portable radiograph of the chest was performed.    COMPARISON:  09/14/2023    FINDINGS:  The cardiomediastinal silhouette is normal in  size and midline. Pulmonary vascularity appears within normal limits.    The lungs appear clear without confluent pulmonary parenchymal opacity. No pleural fluid or pneumothorax.    Right-sided  shunt tubing                                     Assessment/Plan:     * Syncope  Ms. Elmore is a 48 yr old female with a hx of iron deficiency anemia, MDD, chronic combined systolic and diastolic CHF, nonischemic cardiomyopathy, HLD, breast cancer with bone metastases, obesity, HTN, dm type 2, idiopathic intracranial HTN s/p ventricular shunt, ZABRINA, BPPV, osteonecrosis of jaw who presents to the ED with a chief complaint of syncope.    - UDS ordered and negative  - Feels her BP meds may be too strong now that she has lost so much weight.  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  - Continue home Trental and doxycycline      ZABRINA (obstructive sleep apnea)  - CPAP QHS      Idiopathic intracranial hypertension  - Hx noted  - S/P ventricular shunt      Mass of spine  - Hx noted of breast cancer with bone mets  - Continue home anastrozole  - Do not have ibrance on formulary      Type 2 diabetes mellitus with hyperglycemia, with long-term current use of insulin  Last A1c reviewed-   Lab Results   Component Value Date    HGBA1C 6.1 (H) 03/19/2024     Most recent fingerstick glucose reviewed-   Recent Labs   Lab 07/17/24  2209   POCTGLUCOSE 80     Current correctional scale  Medium  Maintain anti-hyperglycemic dose as follows-   Antihyperglycemics (From admission, onward)      Start     Stop Route Frequency Ordered    07/17/24 2205  insulin aspart U-100 pen 0-10 Units         -- SubQ Before meals & nightly  PRN 07/17/24 2107          Hold Oral hypoglycemics while patient is in the hospital.       Benign essential HTN  Chronic, Latest blood pressure and vitals reviewed-     Temp:  [98.6 °F (37 °C)]   Pulse:  [66-93]   Resp:  [16-20]   BP: ()/(54-68)   SpO2:  [95 %-100 %] .   Home meds for hypertension were reviewed and noted below.   Hypertension Medications               carvediloL (COREG) 25 MG tablet TAKE 1 TABLET(25 MG) BY MOUTH TWICE DAILY    ENTRESTO  mg per tablet TAKE 1 TABLET BY MOUTH TWICE DAILY    furosemide (LASIX) 20 MG tablet Take 1 tablet (20 mg total) by mouth 2 (two) times daily.    spironolactone (ALDACTONE) 25 MG tablet Take 1 tablet (25 mg total) by mouth once daily.            While in the hospital, will manage blood pressure as follows; Adjust home antihypertensive regimen as follows- Holding home Coreg for now due to hypotension. May need meds titrated while here due to weight loss over last 1.5 yrs. Pt feels BP meds are too strong.    Will utilize p.r.n. blood pressure medication only if patient's blood pressure greater than 180/110 and she develops symptoms such as worsening chest pain or shortness of breath.    BMI 50.0-59.9, adult  Body mass index is 52.01 kg/m². Morbid obesity complicates all aspects of disease management from diagnostic modalities to treatment.      Cancer associated pain  - Continue home percocet PRN      Malignant neoplasm of upper-inner quadrant of right breast in female, estrogen receptor positive  - Hx noted  - Continue home Anastrozole  - Do not have Ibrance on formulary      Hyperlipidemia  - Continue home statin      Chronic combined systolic and diastolic heart failure  Results for orders placed during the hospital encounter of 02/22/24  Echo  Interpretation Summary    Technically challenging study.    Left Ventricle: The left ventricle is mildly dilated. Increased ventricular mass. Normal wall thickness. There is eccentric hypertrophy. Global  hypokinesis present. There is severely reduced systolic function. Ejection fraction by visual approximation is 30%. There is diastolic dysfunction.    Right Ventricle: Normal right ventricular cavity size. Wall thickness is normal. Right ventricle wall motion  is normal. Systolic function is normal.     BNP  Recent Labs   Lab 07/17/24  1208   BNP <10     - Pt does not appear to be in CHF exacerbation at this time. Denies SOB, CP. CXR without signs of acute cardiopulmonary disease. No rales or BLE edema on exam.  - Continue entresto, spironolactone, potassium  - Takes lasix PRN for leg swelling  - Holding BB at this time due to soft BP  - May need meds titrated while here due to significant weight loss over last 1.5 yrs  - Stable    Major depressive disorder, recurrent episode, moderate with anxious distress  - Continue home Effexor    Iron deficiency anemia  Patient's anemia is currently stable. Has not received any PRBCs to date. Etiology likely d/t Iron deficiency  Current CBC reviewed-   Lab Results   Component Value Date    HGB 10.6 (L) 07/17/2024    HCT 34.0 (L) 07/17/2024     Monitor serial CBC and transfuse if patient becomes hemodynamically unstable, symptomatic or H/H drops below 7/21.      VTE Risk Mitigation (From admission, onward)           Ordered     enoxaparin injection 40 mg  Daily         07/17/24 2107     IP VTE HIGH RISK PATIENT  Once         07/17/24 2107     Place sequential compression device  Until discontinued         07/17/24 2107                         On 07/17/2024, patient should be placed in hospital observation services under my care in collaboration with Brant Parker MD.           Chana Martinez PA-C  Department of Hospital Medicine  [facility] - Emergency Dept

## 2024-07-18 NOTE — HOSPITAL COURSE
Admitted for syncope     atient afebrile, HR of 75, RR of 20, BP of 98/55, satting 98% on RA. Workup in the ED included CBC, CMP, D-dimer, BNP, troponin, COVID test, influenza test, UA, urine creatinine, CXR. Workup revealed RBC of 3.68, H/H of 10.6/34, chloride of 112, CO2 of 20, albumin of 2.9. UA with 1+ protein and 4+ glucose. Remainder of labs fairly unremarkable. CXR showed no evidence of acute cardiopulmonary disease.      Consulted cards: orthostatic BP unremarkable. Echo showed   Left Ventricle: The left ventricle is mildly dilated. Mildly increased wall thickness. There is concentric hypertrophy. There is moderately reduced systolic function with a visually estimated ejection fraction of 30 - 35%. Grade I diastolic dysfunction.  Right Ventricle: Normal right ventricular cavity size. Systolic function is normal.  Left Atrium: Left atrium is mildly dilated.  Pulmonary Artery: The estimated pulmonary artery systolic pressure is 12 mmHg.  IVC/SVC: Normal venous pressure at 3 mmHg. Ordered left-vest can be placed outpatient per Dr. Collins. Holter monitoring outpatient. F/u with primary cardiologist.

## 2024-07-18 NOTE — DISCHARGE SUMMARY
[facility] Emergency Dept  Hospital Medicine  Discharge Summary      Patient Name: Kristopher Elmore  MRN: [id number]  CHIDI: [id number]  Patient Class: OP- Observation  Admission Date: 7/17/2024  Hospital Length of Stay: 0 days  Discharge Date and Time:  07/18/2024 12:54 PM  Attending Physician: Brant Parker MD   Discharging Provider: Clarke Ponce NP  Primary Care Provider: Enedina De La Torre MD    Primary Care Team: Networked reference to record PCT     HPI:   Ms. Elmore is a 48 yr old female with a hx of iron deficiency anemia, MDD, chronic combined systolic and diastolic CHF, nonischemic cardiomyopathy, HLD, breast cancer with bone metastases, obesity, HTN, dm type 2, idiopathic intracranial HTN s/p ventricular shunt, ZABRINA, BPPV, osteonecrosis of jaw who presents to the ED with a chief complaint of syncope.  Patient states that around 10:00 a.m. this morning while she was at school, she states that she was walking to talk to the .  She says that the  asked her question but she did not answer and states that the  told her that her right leg gave out and she fell onto her right side.  The  states that there was no head trauma.  The patient is unsure how long the syncopal episode lasted but states that probably around 5-10 minutes.  She states that prior to passing out she felt generally weak and felt like her legs were shaky as well as mild lightheadedness. Patient states that she took her morning meds this morning as well as ibuprofen 800 mg for back pain, but no other medications PTA to ED. patient endorses that she has lost about 90 lb over the last 1.5 years and thinks that her blood pressure medication is too strong and needs to be adjusted.  She states that about 2 months ago her diabetes medications were adjusted.  She denies tobacco, alcohol, and recreational drug use.  She denies fever, chills, SOB, CP, palpitations, diaphoresis, nausea, vomiting,  diarrhea, dysuria, hematuria, dizziness.    Upon arrival to ED, patient afebrile, HR of 75, RR of 20, BP of 98/55, satting 98% on RA.  Workup in the ED included CBC, CMP, D-dimer, BNP, troponin, COVID test, influenza test, UA, urine creatinine, CXR.  Workup revealed RBC of 3.68, H/H of 10.6/34, chloride of 112, CO2 of 20, albumin of 2.9.  UA with 1+ protein and 4+ glucose.  Remainder of labs fairly unremarkable.  CXR showed no evidence of acute cardiopulmonary disease.   Patient was given 250 NS bolus while in the ED. case discussed with ED provider and patient will be placed under observation for further management.    * No surgery found *      Hospital Course:   Admitted for syncope     atient afebrile, HR of 75, RR of 20, BP of 98/55, satting 98% on RA. Workup in the ED included CBC, CMP, D-dimer, BNP, troponin, COVID test, influenza test, UA, urine creatinine, CXR. Workup revealed RBC of 3.68, H/H of 10.6/34, chloride of 112, CO2 of 20, albumin of 2.9. UA with 1+ protein and 4+ glucose. Remainder of labs fairly unremarkable. CXR showed no evidence of acute cardiopulmonary disease.      Consulted cards: orthostatic BP unremarkable. Echo showed   Left Ventricle: The left ventricle is mildly dilated. Mildly increased wall thickness. There is concentric hypertrophy. There is moderately reduced systolic function with a visually estimated ejection fraction of 30 - 35%. Grade I diastolic dysfunction.  Right Ventricle: Normal right ventricular cavity size. Systolic function is normal.  Left Atrium: Left atrium is mildly dilated.  Pulmonary Artery: The estimated pulmonary artery systolic pressure is 12 mmHg.  IVC/SVC: Normal venous pressure at 3 mmHg. Ordered left-vest can be placed outpatient per Dr. Collins. Holter monitoring outpatient. F/u with primary cardiologist.          Goals of Care Treatment Preferences:  Code Status: Full Code    Health care agent: Almarco a [facility] care agent number: [phone]           What is most important right now is to focus on avoiding the hospital, remaining as independent as possible, symptom/pain control, improvement in condition but with limits to invasive therapies.  Accordingly, we have decided that the best plan to meet the patient's goals includes continuing with treatment.      Consults:   Consults (From admission, onward)          Status Ordering Provider     Inpatient consult to Social Work/Case Management  Once        Provider:  (Not yet assigned)    Acknowledged NERI BARONE     Inpatient consult to Cardiology  Once        Provider:  Neri Barone MD    Acknowledged ANGELO PINTO            No new Assessment & Plan notes have been filed under this hospital service since the last note was generated.  Service: Hospital Medicine    Final Active Diagnoses:    Diagnosis Date Noted POA    PRINCIPAL PROBLEM:  Syncope [R55] 07/17/2024 Yes    Osteonecrosis of jaw due to drug [M87.180] 05/23/2024 Yes    ZABRINA (obstructive sleep apnea) [G47.33] 02/11/2024 Yes    Idiopathic intracranial hypertension [G93.2] 12/15/2023 Yes    Mass of spine [M89.8X8] 09/14/2023 Yes    Type 2 diabetes mellitus with hyperglycemia, with long-term current use of insulin [E11.65, Z79.4] 02/24/2023 Not Applicable    Benign essential HTN [I10] 06/27/2022 Yes    BMI 50.0-59.9, adult [Z68.43]  Not Applicable    Cancer associated pain [G89.3] 11/12/2019 Yes    Malignant neoplasm of upper-inner quadrant of right breast in female, estrogen receptor positive [C50.211, Z17.0] 10/07/2019 Not Applicable    Hyperlipidemia [E78.5] 03/28/2019 Yes    Chronic combined systolic and diastolic heart failure [I50.42] 03/18/2019 Yes    Major depressive disorder, recurrent episode, moderate with anxious distress [F33.1] 11/02/2016 Yes    Iron deficiency anemia [D50.9] 07/17/2012 Yes      Problems Resolved During this Admission:       Discharged Condition: stable    Disposition: Home or Self Care    Follow Up:   Follow-up  Information       Sathya Mccormick MD. Schedule an appointment as soon as possible for a visit in 1 week(s).    Specialties: Cardiovascular Disease, Cardiology  Why: If symptoms worsen, As needed  Contact information:  [address]  [facility] [address]  [phone]               Enedina De La Torre MD. Schedule an appointment as soon as possible for a visit in 1 week(s).    Specialty: Family Medicine  Why: If symptoms worsen, As needed  Contact information:  [address]  [facility] [address]  [phone]                           Patient Instructions:      Holter monitor - 48 hour   Standing Status: Future Standing Exp. Date: 07/18/25     Order Specific Question Answer Comments   Physician to read study: NERI BARONE [[id number]]    Release to patient Immediate        Significant Diagnostic Studies: Labs: BMP:   Recent Labs   Lab 07/17/24  1208 07/18/24  0545   GLU 96 115*    143   K 3.8 4.2   * 111*   CO2 20* 25   BUN 13 14   CREATININE 1.1 1.4   CALCIUM 9.7 10.4   MG  --  1.6   , CMP   Recent Labs   Lab 07/17/24  1208 07/18/24  0545    143   K 3.8 4.2   * 111*   CO2 20* 25   GLU 96 115*   BUN 13 14   CREATININE 1.1 1.4   CALCIUM 9.7 10.4   PROT 6.6 6.7   ALBUMIN 2.9* 3.0*   BILITOT 0.3 0.2   ALKPHOS 73 74   AST 16 14   ALT 20 19   ANIONGAP 10 7*   , CBC   Recent Labs   Lab 07/17/24  1208 07/18/24  0545   WBC 4.57 5.65   HGB 10.6* 9.7*   HCT 34.0* 32.1*    330   , INR   Lab Results   Component Value Date    INR 1.0 10/26/2023    INR 1.0 12/16/2019    INR 1.0 10/30/2019   , Lipid Panel   Lab Results   Component Value Date    CHOL 116 (L) 12/13/2023    HDL 43 12/13/2023    LDLCALC 53.8 (L) 12/13/2023    TRIG 96 12/13/2023    CHOLHDL 37.1 12/13/2023   , Troponin   Recent Labs   Lab 07/17/24  1208 07/17/24  2042   TROPONINI <0.006 <0.006   , A1C:   Recent Labs   Lab 03/19/24  1405   HGBA1C 6.1*   , and All labs within the past 24 hours have been reviewed    Pending Diagnostic  Studies:       None           Medications:  Reconciled Home Medications:      Medication List        CONTINUE taking these medications      ammonium lactate 12 % Crea  Apply 1 application  topically once daily.     anastrozole 1 mg Tab  Commonly known as: ARIMIDEX  Take 1 tablet (1 mg total) by mouth once daily.     atorvastatin 40 MG tablet  Commonly known as: LIPITOR  Take 1 tablet (40 mg total) by mouth every evening.     blood pressure kit-extra large Kit  Check blood pressure once daily at the same time     blood sugar diagnostic Strp  To check BG 2 times daily, to use with insurance preferred meter     blood-glucose meter kit  To check BG 2 times daily, to use with insurance preferred meter     carvediloL 25 MG tablet  Commonly known as: COREG  TAKE 1 TABLET(25 MG) BY MOUTH TWICE DAILY     ciclopirox 8 % Soln  Commonly known as: PENLAC  Apply topically nightly.     DEXCOM G7 SENSOR Monique  Generic drug: blood-glucose sensor  Change every 10 days     doxycycline 100 MG Cap  Commonly known as: VIBRAMYCIN  Take 100 mg by mouth 2 (two) times daily.     empagliflozin 25 mg tablet  Commonly known as: JARDIANCE  Take 1 tablet (25 mg total) by mouth once daily.     ENTRESTO  mg per tablet  Generic drug: sacubitriL-valsartan  TAKE 1 TABLET BY MOUTH TWICE DAILY     ergocalciferol 50,000 unit Cap  Commonly known as: ERGOCALCIFEROL  TAKE 1 CAPSULE BY MOUTH EVERY 7 DAYS     furosemide 20 MG tablet  Commonly known as: LASIX  Take 1 tablet (20 mg total) by mouth 2 (two) times daily.     glipiZIDE 5 MG Tr24  Take 1 tablet (5 mg total) by mouth daily with breakfast.     goserelin 3.6 mg injection  Commonly known as: ZOLADEX  Inject 3.6 mg into the skin every 28 days.     IBRANCE 100 mg Cap  Generic drug: palbociclib  Take 1 capsule (100 mg) by mouth Monday - Friday, DO NOT TAKE Saturday and Sunday, for 3 weeks on, 1 week off.     ibuprofen 800 MG tablet  Commonly known as: ADVIL,MOTRIN  Take 1 tablet (800 mg total) by  mouth 2 (two) times daily as needed for Pain.     lancets Misc  To check BG 2 times daily, to use with insurance preferred meter     metFORMIN 500 MG ER 24hr tablet  Commonly known as: GLUCOPHAGE-XR  Take 2 tablets (1,000 mg total) by mouth 2 (two) times daily with meals.     MOUNJARO 10 mg/0.5 mL Pnij  Generic drug: tirzepatide  Inject 10 mg into the skin every 7 days.     naloxone 4 mg/actuation Spry  Commonly known as: NARCAN  4mg by nasal route as needed for opioid overdose; may repeat every 2-3 minutes in alternating nostrils until medical help arrives. Call 911     ondansetron 8 MG Tbdl  Commonly known as: ZOFRAN-ODT  Take 1 tablet (8 mg total) by mouth every 8 (eight) hours as needed (nausea and vomiting).     oxyCODONE-acetaminophen 5-325 mg per tablet  Commonly known as: PERCOCET  Take 1 tablet by mouth every 6 (six) hours as needed for Pain.     pentoxifylline 400 mg Tbsr  Commonly known as: TRENTAL  Take 400 mg by mouth 2 (two) times daily.     potassium chloride 10 MEQ Tbsr  Commonly known as: KLOR-CON  TAKE 1 TABLET(10 MEQ) BY MOUTH EVERY NIGHT     prochlorperazine 5 MG tablet  Commonly known as: COMPAZINE  Take 5 mg by mouth 4 (four) times daily.     SENNA-C ORAL  Take 2 tablets by mouth daily as needed.     spironolactone 25 MG tablet  Commonly known as: ALDACTONE  Take 1 tablet (25 mg total) by mouth once daily.     tiZANidine 4 MG tablet  Commonly known as: ZANAFLEX  TAKE 1 TABLET(4 MG) BY MOUTH EVERY 8 HOURS     UNKNOWN TO PATIENT  Calcium     * venlafaxine 150 MG Cp24  Commonly known as: EFFEXOR-XR  Take 1 capsule (150 mg total) by mouth once daily.     * venlafaxine 75 MG 24 hr capsule  Commonly known as: EFFEXOR-XR  Take 1 capsule (75 mg total) by mouth once daily.     vitamin E 100 UNIT capsule  Take 100 Units by mouth once daily.           * This list has 2 medication(s) that are the same as other medications prescribed for you. Read the directions carefully, and ask your doctor or other care  provider to review them with you.                STOP taking these medications      metroNIDAZOLE 500 MG tablet  Commonly known as: FLAGYL              Indwelling Lines/Drains at time of discharge:   Lines/Drains/Airways       None                   Time spent on the discharge of patient: 30 minutes         Clarke Ponce NP  Department of Hospital Medicine  [facility] [place] - Emergency Dept

## 2024-07-18 NOTE — SUBJECTIVE & OBJECTIVE
Past Medical History:   Diagnosis Date    Abnormal Pap smear     pt states 13yrs ago colpo was done    Anemia     Anxiety     Cancer     Cardiomegaly 2014    Cardiomyopathy     CHF (congestive heart failure)     Depression     Fibroid     Hx of psychiatric care     Hyperlipidemia     Hypertension     Psychiatric problem     Sleep difficulties     Therapy        Past Surgical History:   Procedure Laterality Date     SECTION      CREATION OF VENTRICULOPERITONEAL SHUNT USING COMPUTER-ASSISTED NAVIGATION Right 2023    Procedure: INSERTION, SHUNT, VENTRICULOPERITONEAL, USING COMPUTER-ASSISTED NAVIGATION;  Surgeon: Jayme Villa MD;  Location: [facility] OR [address] [place] [address];  Service: Neurosurgery;  Laterality: Right;    CREATION OF VENTRICULOPERITONEAL SHUNT USING COMPUTER-ASSISTED NAVIGATION N/A 2023    Procedure: INSERTION, SHUNT, VENTRICULOPERITONEAL, USING COMPUTER-ASSISTED NAVIGATION;  Surgeon: Frederick Kohler MD;  Location: [facility] OR [address] [place] [address];  Service: General;  Laterality: N/A;    LUMBAR PUNCTURE N/A 10/26/2023    Procedure: Lumbar Puncture;  Surgeon: Jayme Villa MD;  Location: [facility] OR [address] [place] [address];  Service: Neurosurgery;  Laterality: N/A;  TOR1  ASA1  regular bed reversed  lateral left down   II  RN PHONE PREOP 10/12/2023----BMI--57.67----INCOMPLETE CONSENT----NEED ORDERS    TONSILLECTOMY      TUBAL LIGATION      UTERINE FIBROID EMBOLIZATION         Review of patient's allergies indicates:   Allergen Reactions    Keflex [cephalexin] Itching       Current Facility-Administered Medications on File Prior to Encounter   Medication    0.9%  NaCl infusion    mupirocin 2 % ointment     Current Outpatient Medications on File Prior to Encounter   Medication Sig    ammonium lactate 12 % Crea Apply 1 application  topically once daily.    anastrozole (ARIMIDEX) 1 mg Tab Take 1 tablet (1 mg total) by mouth once daily.    atorvastatin (LIPITOR) 40 MG tablet Take 1 tablet (40 mg total) by mouth every  evening.    blood pressure kit-extra large Kit Check blood pressure once daily at the same time    blood sugar diagnostic Strp To check BG 2 times daily, to use with insurance preferred meter    blood-glucose meter kit To check BG 2 times daily, to use with insurance preferred meter    blood-glucose sensor (DEXCOM G7 SENSOR) Monique Change every 10 days    carvediloL (COREG) 25 MG tablet TAKE 1 TABLET(25 MG) BY MOUTH TWICE DAILY    ciclopirox (PENLAC) 8 % Soln Apply topically nightly.    doxycycline (VIBRAMYCIN) 100 MG Cap Take 100 mg by mouth 2 (two) times daily.    empagliflozin (JARDIANCE) 25 mg tablet Take 1 tablet (25 mg total) by mouth once daily.    ENTRESTO  mg per tablet TAKE 1 TABLET BY MOUTH TWICE DAILY    ergocalciferol (ERGOCALCIFEROL) 50,000 unit Cap TAKE 1 CAPSULE BY MOUTH EVERY 7 DAYS    furosemide (LASIX) 20 MG tablet Take 1 tablet (20 mg total) by mouth 2 (two) times daily.    glipiZIDE 5 MG TR24 Take 1 tablet (5 mg total) by mouth daily with breakfast.    goserelin (ZOLADEX) 3.6 mg injection Inject 3.6 mg into the skin every 28 days.    ibuprofen (ADVIL,MOTRIN) 800 MG tablet Take 1 tablet (800 mg total) by mouth 2 (two) times daily as needed for Pain.    lancets Misc To check BG 2 times daily, to use with insurance preferred meter    metFORMIN (GLUCOPHAGE-XR) 500 MG ER 24hr tablet Take 2 tablets (1,000 mg total) by mouth 2 (two) times daily with meals.    metroNIDAZOLE (FLAGYL) 500 MG tablet Take 500 mg by mouth 3 (three) times daily.    naloxone (NARCAN) 4 mg/actuation Spry 4mg by nasal route as needed for opioid overdose; may repeat every 2-3 minutes in alternating nostrils until medical help arrives. Call 911    ondansetron (ZOFRAN-ODT) 8 MG TbDL Take 1 tablet (8 mg total) by mouth every 8 (eight) hours as needed (nausea and vomiting).    oxyCODONE-acetaminophen (PERCOCET) 5-325 mg per tablet Take 1 tablet by mouth every 6 (six) hours as needed for Pain.    palbociclib (IBRANCE) 100 mg Cap  Take 1 capsule (100 mg) by mouth Monday - Friday, DO NOT TAKE Saturday and Sunday, for 3 weeks on, 1 week off.    pentoxifylline (TRENTAL) 400 mg TbSR Take 400 mg by mouth 2 (two) times daily.    potassium chloride (KLOR-CON) 10 MEQ TbSR TAKE 1 TABLET(10 MEQ) BY MOUTH EVERY NIGHT    prochlorperazine (COMPAZINE) 5 MG tablet Take 5 mg by mouth 4 (four) times daily.    sennosides (SENNA-C ORAL) Take 2 tablets by mouth daily as needed.    spironolactone (ALDACTONE) 25 MG tablet Take 1 tablet (25 mg total) by mouth once daily.    tirzepatide 10 mg/0.5 mL PnIj Inject 10 mg into the skin every 7 days.    tiZANidine (ZANAFLEX) 4 MG tablet TAKE 1 TABLET(4 MG) BY MOUTH EVERY 8 HOURS    UNKNOWN TO PATIENT Calcium    venlafaxine (EFFEXOR-XR) 150 MG Cp24 Take 1 capsule (150 mg total) by mouth once daily.    venlafaxine (EFFEXOR-XR) 75 MG 24 hr capsule Take 1 capsule (75 mg total) by mouth once daily.    vitamin E 100 UNIT capsule Take 100 Units by mouth once daily.     Family History       Problem Relation (Age of Onset)    Arthritis Mother    Asthma Daughter    Cervical cancer Paternal Cousin    Diabetes Mother    Endometriosis Paternal Cousin    Fibroids Other, Other, Other, Other, Other    Heart disease Mother    Heart failure Mother    Hyperlipidemia Mother    Hypertension Mother, Brother    Kidney cancer Maternal Grandfather (79)    No Known Problems Maternal Grandmother    Other Mother    Ovarian cancer Paternal Cousin    Rashes / Skin problems Daughter    Thyroid cancer Other          Tobacco Use    Smoking status: Never    Smokeless tobacco: Never   Substance and Sexual Activity    Alcohol use: Not Currently     Alcohol/week: 0.0 standard drinks of alcohol    Drug use: No    Sexual activity: Not Currently     Partners: Male     Birth control/protection: Surgical     Review of Systems   Constitutional:  Negative for chills, diaphoresis and fever.   Respiratory:  Negative for shortness of breath.    Cardiovascular:   Negative for chest pain and palpitations.   Gastrointestinal:  Negative for diarrhea, nausea and vomiting.   Genitourinary:  Negative for dysuria and hematuria.   Neurological:  Positive for syncope, weakness (generalized) and light-headedness. Negative for dizziness.     Objective:     Vital Signs (Most Recent):  Temp: 98.6 °F (37 °C) (07/17/24 1148)  Pulse: 73 (07/17/24 2102)  Resp: 16 (07/17/24 2102)  BP: 111/67 (07/17/24 2102)  SpO2: 99 % (07/17/24 2102) Vital Signs (24h Range):  Temp:  [98.6 °F (37 °C)] 98.6 °F (37 °C)  Pulse:  [66-93] 73  Resp:  [16-20] 16  SpO2:  [95 %-100 %] 99 %  BP: ()/(54-68) 111/67     Weight: (!) 137.4 kg (303 lb)  Body mass index is 52.01 kg/m².     Physical Exam  Vitals reviewed.   Constitutional:       General: She is awake. She is not in acute distress.     Appearance: Normal appearance. She is obese. She is not ill-appearing or diaphoretic.   Cardiovascular:      Rate and Rhythm: Normal rate.      Heart sounds: Normal heart sounds. No murmur heard.     No friction rub. No gallop.   Pulmonary:      Effort: Pulmonary effort is normal. No accessory muscle usage or respiratory distress.      Breath sounds: No wheezing, rhonchi or rales.   Abdominal:      General: Bowel sounds are normal.      Palpations: Abdomen is soft.      Tenderness: There is no abdominal tenderness. There is no guarding or rebound.   Musculoskeletal:      Right lower leg: No edema.      Left lower leg: No edema.   Skin:     General: Skin is warm and dry.   Neurological:      Mental Status: She is alert and oriented to person, place, and time.   Psychiatric:         Behavior: Behavior is cooperative.                Significant Labs: All pertinent labs within the past 24 hours have been reviewed.  CBC:   Recent Labs   Lab 07/17/24  1208   WBC 4.57   HGB 10.6*   HCT 34.0*        CMP:   Recent Labs   Lab 07/17/24  1208      K 3.8   *   CO2 20*   GLU 96   BUN 13   CREATININE 1.1   CALCIUM 9.7    PROT 6.6   ALBUMIN 2.9*   BILITOT 0.3   ALKPHOS 73   AST 16   ALT 20   ANIONGAP 10     Cardiac Markers:   Recent Labs   Lab 07/17/24  1208   BNP <10     Troponin:   Recent Labs   Lab 07/17/24  1208 07/17/24  2042   TROPONINI <0.006 <0.006     Urine Studies:   Recent Labs   Lab 07/17/24  1646   COLORU Yellow   APPEARANCEUA Clear   PHUR 6.0   SPECGRAV >1.030*   PROTEINUA 1+*   GLUCUA 4+*   KETONESU Negative   BILIRUBINUA Negative   OCCULTUA Negative   NITRITE Negative   UROBILINOGEN Negative   LEUKOCYTESUR Negative   RBCUA 0   WBCUA 3   BACTERIA Rare   SQUAMEPITHEL 1   HYALINECASTS 0       Significant Imaging:   Imaging Results              X-Ray Chest AP Portable (Final result)  Result time 07/17/24 12:39:15      Final result by Patrick Lucia MD (07/17/24 12:39:15)                   Impression:      No evidence of acute cardiopulmonary disease.      Electronically signed by: Patrick Lucia MD  Date:    07/17/2024  Time:    12:39               Narrative:    EXAMINATION:  XR CHEST AP PORTABLE    CLINICAL HISTORY:  Syncope and collapse    TECHNIQUE:  AP portable radiograph of the chest was performed.    COMPARISON:  09/14/2023    FINDINGS:  The cardiomediastinal silhouette is normal in size and midline. Pulmonary vascularity appears within normal limits.    The lungs appear clear without confluent pulmonary parenchymal opacity. No pleural fluid or pneumothorax.    Right-sided  shunt tubing

## 2024-07-18 NOTE — NURSING
DC instructions given and reviewed with patient. Encouraged and answered questions. Denies CP and SOB. IV removed with tip intact.

## 2024-07-18 NOTE — ASSESSMENT & PLAN NOTE
Results for orders placed during the hospital encounter of 02/22/24  Echo  Interpretation Summary    Technically challenging study.    Left Ventricle: The left ventricle is mildly dilated. Increased ventricular mass. Normal wall thickness. There is eccentric hypertrophy. Global hypokinesis present. There is severely reduced systolic function. Ejection fraction by visual approximation is 30%. There is diastolic dysfunction.    Right Ventricle: Normal right ventricular cavity size. Wall thickness is normal. Right ventricle wall motion  is normal. Systolic function is normal.     BNP  Recent Labs   Lab 07/17/24  1208   BNP <10     - Pt does not appear to be in CHF exacerbation at this time. Denies SOB, CP. CXR without signs of acute cardiopulmonary disease. No rales or BLE edema on exam.  - Continue entresto, spironolactone, potassium  - Takes lasix PRN for leg swelling  - Holding BB at this time due to soft BP  - May need meds titrated while here due to significant weight loss over last 1.5 yrs  - Stable

## 2024-07-18 NOTE — ASSESSMENT & PLAN NOTE
Ms. Elmore is a 48 yr old female with a hx of iron deficiency anemia, MDD, chronic combined systolic and diastolic CHF, nonischemic cardiomyopathy, HLD, breast cancer with bone metastases, obesity, HTN, dm type 2, idiopathic intracranial HTN s/p ventricular shunt, ZABRINA, BPPV, osteonecrosis of jaw who presents to the ED with a chief complaint of syncope.    - UDS ordered and negative  - Feels her BP meds may be too strong now that she has lost so much weight.  - Will hold her Coreg for now, will continue other CHF medications for now with parameters  - CXR, UA, COVID, Flu negative for infection  - Not currently hypoglycemic, CMP on arrival with glucose of 96  - Does not appear to by orthostatic from vitals in ED  - TTE pending  - Bilateral carotid US pending  - Continuous tele monitoring

## 2024-07-18 NOTE — ASSESSMENT & PLAN NOTE
Patient's anemia is currently stable. Has not received any PRBCs to date. Etiology likely d/t Iron deficiency  Current CBC reviewed-   Lab Results   Component Value Date    HGB 10.6 (L) 07/17/2024    HCT 34.0 (L) 07/17/2024     Monitor serial CBC and transfuse if patient becomes hemodynamically unstable, symptomatic or H/H drops below 7/21.

## 2024-07-18 NOTE — PLAN OF CARE
[facility] - Emergency Dept  Discharge Final Note    Primary Care Provider: Enedina De La Torre MD  Case Management Final Discharge Note      Discharge Disposition: Home    New DME ordered / company name: [employer]    Relevant SDOH / Transition of Care Barriers:  none    Primary Caretaker and contact information: Self    Scheduled followup appointment: PCP 7/29/2024 Dr. De La Torre  (Cardiology  7/25/2024 previously scheduled)    Referrals placed: Cardiology    Transportation: Family        Patient and family educated on discharge services and updated on DC plan. Bedside RN notified, patient clear to discharge from Case Management Perspective.     Expected Discharge Date: 7/18/2024    Final Discharge Note (most recent)       Final Note - 07/18/24 1346          Final Note    Assessment Type Final Discharge Note     Anticipated Discharge Disposition Home or Self Care     What phone number can be called within the next 1-3 days to see how you are doing after discharge? [phone]     Hospital Resources/Appts/Education Provided Appointments scheduled and added to AVS;Post-Acute resouces added to AVS        Post-Acute Status    Coverage Payor: [employer] [facility] SECURE SNP     Discharge Delays None known at this time                     Important Message from Medicare             Contact Info       Sathya Mccormick MD   Specialty: Cardiovascular Disease, Cardiology    [address] [facility] [address]   Phone: [phone]       Next Steps: Schedule an appointment as soon as possible for a visit in 1 week(s)    Instructions: If symptoms worsen, As needed    Enedina De La Torre MD   Specialty: Family Medicine   Relationship: PCP - General    [address]  [facility] [address]   Phone: [phone]       Next Steps: Schedule an appointment as soon as possible for a visit on 7/29/2024    Instructions: See Dr. De La Torre for your hospital followup appointment on 7/29/2024 at 11:00 a.m.  You  have been placed on the Wait List for an earlier appointment.    Please bring your ID and Insurance Card to the appointment.    [facility]  [facility] [facility]  [address]  [place], [place] [address] [phone]       Next Steps: Follow up    Instructions: They above company will call you to schedule the delivery of your defibrillator.          Addendum:  Patient to be fitted with 48 Hour Holter Monitor on 7/19/2024 at 10:00 a.m.  SUSAN called patient to advise her of the appointment.    Byron with [employer] called to confirm receipt of order.  Byron advised that patient to be fitted at home.  Byron stated that their company will complete any forms needed by the insurance company.  If Medical Necessity Form is Needed, he will call back.

## 2024-07-18 NOTE — ASSESSMENT & PLAN NOTE
- Hx noted of breast cancer with bone mets  - Continue home anastrozole  - Do not have ibrance on formulary

## 2024-07-18 NOTE — PLAN OF CARE
[facility] - Emergency Dept  Initial Discharge Assessment       Primary Care Provider: Enedina De La Torre MD  Case Management Assessment     PCP: See above  Pharmacy: [facility] [address]    Patient Arrived From: home with daughter  Existing Help at Home: daughter    Barriers to Discharge: none    Discharge Plan:    A. home   B. home with family      Discharge planning assessment completed with patient's assistance with daughters at the bedside.  Patient is from home  and independent.  Patient has  no DME or  OP services.   Patient has not had any inpatient hospital stays within the last 30 days.    When medically stable, patient will discharge to  home. Patient's daughter will transport home.  Patient will need followup appointments as ordered by the discharging provider.  Case Management will continue to follow and assist with discharge planning.p        Admission Diagnosis: Syncope [R55]    Admission Date: 7/17/2024  Expected Discharge Date:     Transition of Care Barriers: None    Payor: [employer] / Plan: [employer] SECURE SNP / Product Type: Medicare Advantage /     Extended Emergency Contact Information  Primary Emergency Contact: Gunnar Shelton  Mobile Phone: [phone]  Relation: Daughter  Secondary Emergency Contact: Janel Elmore  Mobile Phone: [phone]  Relation: Sister  Preferred language: English   needed? No    Discharge Plan A: Home  Discharge Plan B: Home with family      [id number] - [place] - [address] AT [facility] [address]  [address] [place] [place] [address]  Phone: [phone] Fax: [phone]    [facility]  [address]  [address]   [place] [address]  Phone: [phone] Fax: [phone]    [facility]  [address] [place] [address]  Phone: [phone] Fax: [phone]      Initial Assessment (most recent)       Adult Discharge Assessment - 07/18/24 1016           Discharge Assessment    Assessment Type Discharge Planning Assessment     Confirmed/corrected address, phone number and insurance Yes     Confirmed Demographics Correct on Facesheet     Source of Information family;patient     When was your last doctors appointment? 07/19/24     Reason For Admission Syncope     People in Home child(genie), adult     Facility Arrived From: home     Do you expect to return to your current living situation? Yes     Do you have help at home or someone to help you manage your care at home? Yes     Who are your caregiver(s) and their phone number(s)? daughter Juan Carlos  [phone]     Prior to hospitilization cognitive status: Alert/Oriented     Current cognitive status: Alert/Oriented     Walking or Climbing Stairs Difficulty no     Dressing/Bathing Difficulty no     Equipment Currently Used at Home other (see comments)   Dexcon:  CGM    Readmission within 30 days? No     Patient currently being followed by outpatient case management? No     Do you currently have service(s) that help you manage your care at home? No     Do you take prescription medications? Yes     Do you have prescription coverage? Yes     Coverage [facility]     Do you have any problems affording any of your prescribed medications? No     Is the patient taking medications as prescribed? yes     Who is going to help you get home at discharge? daughter     How do you get to doctors appointments? family or friend will provide     Are you on dialysis? No     Do you take coumadin? No     Discharge Plan A Home     Discharge Plan B Home with family     DME Needed Upon Discharge  none     Discharge Plan discussed with: Adult children;Patient     Transition of Care Barriers None

## 2024-07-18 NOTE — ASSESSMENT & PLAN NOTE
Chronic, Latest blood pressure and vitals reviewed-     Temp:  [98.6 °F (37 °C)]   Pulse:  [66-93]   Resp:  [16-20]   BP: ()/(54-68)   SpO2:  [95 %-100 %] .   Home meds for hypertension were reviewed and noted below.   Hypertension Medications               carvediloL (COREG) 25 MG tablet TAKE 1 TABLET(25 MG) BY MOUTH TWICE DAILY    ENTRESTO  mg per tablet TAKE 1 TABLET BY MOUTH TWICE DAILY    furosemide (LASIX) 20 MG tablet Take 1 tablet (20 mg total) by mouth 2 (two) times daily.    spironolactone (ALDACTONE) 25 MG tablet Take 1 tablet (25 mg total) by mouth once daily.            While in the hospital, will manage blood pressure as follows; Adjust home antihypertensive regimen as follows- Holding home Coreg for now due to hypotension. May need meds titrated while here due to weight loss over last 1.5 yrs. Pt feels BP meds are too strong.    Will utilize p.r.n. blood pressure medication only if patient's blood pressure greater than 180/110 and she develops symptoms such as worsening chest pain or shortness of breath.

## 2024-07-18 NOTE — HPI
Ms. Elmore is a 48 yr old female with a hx of iron deficiency anemia, MDD, chronic combined systolic and diastolic CHF, nonischemic cardiomyopathy, HLD, breast cancer with bone metastases, obesity, HTN, dm type 2, idiopathic intracranial HTN s/p ventricular shunt, ZABRINA, BPPV, osteonecrosis of jaw who presents to the ED with a chief complaint of syncope.  Patient states that around 10:00 a.m. this morning while she was at school, she states that she was walking to talk to the .  She says that the  asked her question but she did not answer and states that the  told her that her right leg gave out and she fell onto her right side.  The  states that there was no head trauma.  The patient is unsure how long the syncopal episode lasted but states that probably around 5-10 minutes.  She states that prior to passing out she felt generally weak and felt like her legs were shaky as well as mild lightheadedness. Patient states that she took her morning meds this morning as well as ibuprofen 800 mg for back pain, but no other medications PTA to ED. patient endorses that she has lost about 90 lb over the last 1.5 years and thinks that her blood pressure medication is too strong and needs to be adjusted.  She states that about 2 months ago her diabetes medications were adjusted.  She denies tobacco, alcohol, and recreational drug use.  She denies fever, chills, SOB, CP, palpitations, diaphoresis, nausea, vomiting, diarrhea, dysuria, hematuria, dizziness.    Upon arrival to ED, patient afebrile, HR of 75, RR of 20, BP of 98/55, satting 98% on RA.  Workup in the ED included CBC, CMP, D-dimer, BNP, troponin, COVID test, influenza test, UA, urine creatinine, CXR.  Workup revealed RBC of 3.68, H/H of 10.6/34, chloride of 112, CO2 of 20, albumin of 2.9.  UA with 1+ protein and 4+ glucose.  Remainder of labs fairly unremarkable.  CXR showed no evidence of acute cardiopulmonary disease.   Patient  was given 250 NS bolus while in the ED. case discussed with ED provider and patient will be placed under observation for further management.

## 2024-07-19 ENCOUNTER — PATIENT MESSAGE (OUTPATIENT)
Dept: HEMATOLOGY/ONCOLOGY | Facility: CLINIC | Age: 49
End: 2024-07-19
Payer: MEDICARE

## 2024-07-19 ENCOUNTER — PATIENT OUTREACH (OUTPATIENT)
Dept: ADMINISTRATIVE | Facility: CLINIC | Age: 49
End: 2024-07-19
Payer: MEDICARE

## 2024-07-19 LAB
OHS QRS DURATION: 106 MS
OHS QTC CALCULATION: 443 MS

## 2024-07-19 NOTE — ASSESSMENT & PLAN NOTE
By history appears to be orthostatic in nature.  However because of her low EF, ventricular arrhythmias can not be ruled out.  Recommend doing event monitoring as an outpatient.  No arrhythmias seen during hospital stay today.   Also discussed risks benefits of AICD and patient willing to consider it now.  She states that her cancer has been stable and she has a prognosis of greater than 1 year as per her oncologist   Per the patient.    Variable defibrillator such as LifeVest can be considered while she is making up her mind.  She will discuss these options with her primary cardiologist as an outpatient     No further inpatient workup indicated during this hospitalization.  Event monitor as outpatient

## 2024-07-19 NOTE — HPI
Patient is a pleasant 48-year-old lady.  Past medical history significant for stage IV cancer with bone Mets, stable for the past 5 years.  As per the patient.  Also history of nonischemic cardiomyopathy.  Stress test in 2017 did not show any significant ischemia.  Yesterday was sitting and states that when she sits for a long time sometimes her legs feel like jelly.  She got up and after taking about 10 steps fell down.  Has been doing fine.  No arrhythmias noted on tele monitoring.  Has refused AICD in the past, but is willing to consider it now.  Otherwise denies orthopnea PND or chest pains    Results for orders placed during the hospital encounter of 07/17/24    Echo Saline Bubble? Yes    Interpretation Summary    Left Ventricle: The left ventricle is mildly dilated. Mildly increased wall thickness. There is concentric hypertrophy. There is moderately reduced systolic function with a visually estimated ejection fraction of 30 - 35%. Grade I diastolic dysfunction.    Right Ventricle: Normal right ventricular cavity size. Systolic function is normal.    Left Atrium: Left atrium is mildly dilated.    Pulmonary Artery: The estimated pulmonary artery systolic pressure is 12 mmHg.    IVC/SVC: Normal venous pressure at 3 mmHg.                  Pre Syncope        Was st work, no trauma noted. She just slid down. Current BP 98/55, cng 158 mg/dl         HPI: Ms. Elmore is a 48 yr old female with a hx of iron deficiency anemia, MDD, chronic combined systolic and diastolic CHF, nonischemic cardiomyopathy, HLD, breast cancer with bone metastases, obesity, HTN, dm type 2, idiopathic intracranial HTN s/p ventricular shunt, ZABRINA, BPPV, osteonecrosis of jaw who presents to the ED with a chief complaint of syncope.  Patient states that around 10:00 a.m. this morning while she was at school, she states that she was walking to talk to the .  She says that the  asked her question but she did not answer and  states that the  told her that her right leg gave out and she fell onto her right side.  The  states that there was no head trauma.  The patient is unsure how long the syncopal episode lasted but states that probably around 5-10 minutes.  She states that prior to passing out she felt generally weak and felt like her legs were shaky as well as mild lightheadedness. Patient states that she took her morning meds this morning as well as ibuprofen 800 mg for back pain, but no other medications PTA to ED. patient endorses that she has lost about 90 lb over the last 1.5 years and thinks that her blood pressure medication is too strong and needs to be adjusted.  She states that about 2 months ago her diabetes medications were adjusted.  She denies tobacco, alcohol, and recreational drug use.  She denies fever, chills, SOB, CP, palpitations, diaphoresis, nausea, vomiting, diarrhea, dysuria, hematuria, dizziness.     Upon arrival to ED, patient afebrile, HR of 75, RR of 20, BP of 98/55, satting 98% on RA.  Workup in the ED included CBC, CMP, D-dimer, BNP, troponin, COVID test, influenza test, UA, urine creatinine, CXR.  Workup revealed RBC of 3.68, H/H of 10.6/34, chloride of 112, CO2 of 20, albumin of 2.9.  UA with 1+ protein and 4+ glucose.  Remainder of labs fairly unremarkable.  CXR showed no evidence of acute cardiopulmonary disease.   Patient was given 250 NS bolus while in the ED. case discussed with ED provider and patient will be placed under observation for further management.

## 2024-07-19 NOTE — ASSESSMENT & PLAN NOTE
Body mass index is 52.01 kg/m². Morbid obesity complicates all aspects of disease management from diagnostic modalities to treatment. Weight loss encouraged and health benefits explained to patient.

## 2024-07-19 NOTE — SUBJECTIVE & OBJECTIVE
Past Medical History:   Diagnosis Date    Abnormal Pap smear     pt states 13yrs ago colpo was done    Anemia     Anxiety     Cancer     Cardiomegaly 2014    Cardiomyopathy     CHF (congestive heart failure)     Depression     Fibroid     Hx of psychiatric care     Hyperlipidemia     Hypertension     Psychiatric problem     Sleep difficulties     Therapy        Past Surgical History:   Procedure Laterality Date     SECTION      CREATION OF VENTRICULOPERITONEAL SHUNT USING COMPUTER-ASSISTED NAVIGATION Right 2023    Procedure: INSERTION, SHUNT, VENTRICULOPERITONEAL, USING COMPUTER-ASSISTED NAVIGATION;  Surgeon: Jayme Villa MD;  Location: [facility] OR [address] [place] [address];  Service: Neurosurgery;  Laterality: Right;    CREATION OF VENTRICULOPERITONEAL SHUNT USING COMPUTER-ASSISTED NAVIGATION N/A 2023    Procedure: INSERTION, SHUNT, VENTRICULOPERITONEAL, USING COMPUTER-ASSISTED NAVIGATION;  Surgeon: Frederick Kohler MD;  Location: [facility] OR [address] [place] [address];  Service: General;  Laterality: N/A;    LUMBAR PUNCTURE N/A 10/26/2023    Procedure: Lumbar Puncture;  Surgeon: Jayme Villa MD;  Location: [facility] OR [address] [place] [address];  Service: Neurosurgery;  Laterality: N/A;  TOR1  ASA1  regular bed reversed  lateral left down   II  RN PHONE PREOP 10/12/2023----BMI--57.67----INCOMPLETE CONSENT----NEED ORDERS    TONSILLECTOMY      TUBAL LIGATION      UTERINE FIBROID EMBOLIZATION         Review of patient's allergies indicates:   Allergen Reactions    Keflex [cephalexin] Itching       Current Facility-Administered Medications on File Prior to Encounter   Medication    0.9%  NaCl infusion    mupirocin 2 % ointment     Current Outpatient Medications on File Prior to Encounter   Medication Sig    ammonium lactate 12 % Crea Apply 1 application  topically once daily.    anastrozole (ARIMIDEX) 1 mg Tab Take 1 tablet (1 mg total) by mouth once daily.    atorvastatin (LIPITOR) 40 MG tablet Take 1 tablet (40 mg total) by mouth every  evening.    blood pressure kit-extra large Kit Check blood pressure once daily at the same time    blood sugar diagnostic Strp To check BG 2 times daily, to use with insurance preferred meter    blood-glucose meter kit To check BG 2 times daily, to use with insurance preferred meter    blood-glucose sensor (DEXCOM G7 SENSOR) Monique Change every 10 days    carvediloL (COREG) 25 MG tablet TAKE 1 TABLET(25 MG) BY MOUTH TWICE DAILY    ciclopirox (PENLAC) 8 % Soln Apply topically nightly.    doxycycline (VIBRAMYCIN) 100 MG Cap Take 100 mg by mouth 2 (two) times daily.    empagliflozin (JARDIANCE) 25 mg tablet Take 1 tablet (25 mg total) by mouth once daily.    ENTRESTO  mg per tablet TAKE 1 TABLET BY MOUTH TWICE DAILY    ergocalciferol (ERGOCALCIFEROL) 50,000 unit Cap TAKE 1 CAPSULE BY MOUTH EVERY 7 DAYS    furosemide (LASIX) 20 MG tablet Take 1 tablet (20 mg total) by mouth 2 (two) times daily.    glipiZIDE 5 MG TR24 Take 1 tablet (5 mg total) by mouth daily with breakfast.    goserelin (ZOLADEX) 3.6 mg injection Inject 3.6 mg into the skin every 28 days.    ibuprofen (ADVIL,MOTRIN) 800 MG tablet Take 1 tablet (800 mg total) by mouth 2 (two) times daily as needed for Pain.    lancets Misc To check BG 2 times daily, to use with insurance preferred meter    metFORMIN (GLUCOPHAGE-XR) 500 MG ER 24hr tablet Take 2 tablets (1,000 mg total) by mouth 2 (two) times daily with meals.    naloxone (NARCAN) 4 mg/actuation Spry 4mg by nasal route as needed for opioid overdose; may repeat every 2-3 minutes in alternating nostrils until medical help arrives. Call 911    ondansetron (ZOFRAN-ODT) 8 MG TbDL Take 1 tablet (8 mg total) by mouth every 8 (eight) hours as needed (nausea and vomiting).    oxyCODONE-acetaminophen (PERCOCET) 5-325 mg per tablet Take 1 tablet by mouth every 6 (six) hours as needed for Pain.    palbociclib (IBRANCE) 100 mg Cap Take 1 capsule (100 mg) by mouth Monday - Friday, DO NOT TAKE Saturday and Sunday,  for 3 weeks on, 1 week off.    pentoxifylline (TRENTAL) 400 mg TbSR Take 400 mg by mouth 2 (two) times daily.    potassium chloride (KLOR-CON) 10 MEQ TbSR TAKE 1 TABLET(10 MEQ) BY MOUTH EVERY NIGHT    prochlorperazine (COMPAZINE) 5 MG tablet Take 5 mg by mouth 4 (four) times daily.    sennosides (SENNA-C ORAL) Take 2 tablets by mouth daily as needed.    spironolactone (ALDACTONE) 25 MG tablet Take 1 tablet (25 mg total) by mouth once daily.    tirzepatide 10 mg/0.5 mL PnIj Inject 10 mg into the skin every 7 days.    tiZANidine (ZANAFLEX) 4 MG tablet TAKE 1 TABLET(4 MG) BY MOUTH EVERY 8 HOURS    UNKNOWN TO PATIENT Calcium    venlafaxine (EFFEXOR-XR) 150 MG Cp24 Take 1 capsule (150 mg total) by mouth once daily.    venlafaxine (EFFEXOR-XR) 75 MG 24 hr capsule Take 1 capsule (75 mg total) by mouth once daily.    vitamin E 100 UNIT capsule Take 100 Units by mouth once daily.    [DISCONTINUED] metroNIDAZOLE (FLAGYL) 500 MG tablet Take 500 mg by mouth 3 (three) times daily.     Family History       Problem Relation (Age of Onset)    Arthritis Mother    Asthma Daughter    Cervical cancer Paternal Cousin    Diabetes Mother    Endometriosis Paternal Cousin    Fibroids Other, Other, Other, Other, Other    Heart disease Mother    Heart failure Mother    Hyperlipidemia Mother    Hypertension Mother, Brother    Kidney cancer Maternal Grandfather (79)    No Known Problems Maternal Grandmother    Other Mother    Ovarian cancer Paternal Cousin    Rashes / Skin problems Daughter    Thyroid cancer Other          Tobacco Use    Smoking status: Never    Smokeless tobacco: Never   Substance and Sexual Activity    Alcohol use: Not Currently     Alcohol/week: 0.0 standard drinks of alcohol    Drug use: No    Sexual activity: Not Currently     Partners: Male     Birth control/protection: Surgical     Review of Systems   Constitutional: Negative.   HENT: Negative.     Eyes: Negative.    Cardiovascular:  Positive for near-syncope and  syncope.   Respiratory: Negative.     Endocrine: Negative.    Hematologic/Lymphatic: Negative.    Skin: Negative.    Musculoskeletal: Negative.    Gastrointestinal: Negative.    Genitourinary: Negative.    Psychiatric/Behavioral: Negative.     Allergic/Immunologic: Negative.      Objective:     Vital Signs (Most Recent):  Temp: 98 °F (36.7 °C) (07/18/24 0637)  Pulse: 74 (07/18/24 1200)  Resp: 19 (07/18/24 1200)  BP: 114/61 (07/18/24 1200)  SpO2: 100 % (07/18/24 1200) Vital Signs (24h Range):  Temp:  [98 °F (36.7 °C)] 98 °F (36.7 °C)  Pulse:  [72-81] 74  Resp:  [13-21] 19  SpO2:  [95 %-100 %] 100 %  BP: (101-114)/(55-77) 114/61     Weight: (!) 137.4 kg (303 lb)  Body mass index is 52.01 kg/m².    SpO2: 100 %         Intake/Output Summary (Last 24 hours) at 7/18/2024 1916  Last data filed at 7/17/2024 2041  Gross per 24 hour   Intake 250 ml   Output --   Net 250 ml       Lines/Drains/Airways       None                    Physical Exam  Constitutional:       Appearance: Normal appearance. She is well-developed.   HENT:      Head: Normocephalic.   Eyes:      Pupils: Pupils are equal, round, and reactive to light.   Cardiovascular:      Rate and Rhythm: Normal rate and regular rhythm.   Pulmonary:      Effort: Pulmonary effort is normal.      Breath sounds: Normal breath sounds.   Abdominal:      General: Bowel sounds are normal.      Palpations: Abdomen is soft.      Tenderness: There is no abdominal tenderness.   Musculoskeletal:         General: Normal range of motion.      Cervical back: Normal range of motion and neck supple.   Skin:     General: Skin is warm.   Neurological:      Mental Status: She is alert and oriented to person, place, and time.          Significant Labs:      DATA:     Laboratory:  CBC:  Recent Labs   Lab 06/19/24  1023 07/17/24  1208 07/18/24  0545   WBC 4.52 4.57 5.65   Hemoglobin 10.6 L 10.6 L 9.7 L   Hematocrit 34.1 L 34.0 L 32.1 L   Platelets 375 271 330       CHEMISTRIES:  Recent Labs    Lab 05/06/22  1009 05/30/22  0729 06/24/22  0945 07/22/22  1012 08/18/22  1154 12/06/22  0836 12/19/22  1020 06/19/24  1023 07/17/24  1208 07/18/24  0545   Glucose 129 H  129 H 162 H 154 H 240 H   < >  --    < > 134 H 96 115 H   Sodium 141  141 140 140 138   < >  --    < > 140 142 143   Potassium 3.8  3.8 3.8 3.3 L 4.0   < >  --    < > 4.0 3.8 4.2   BUN 10  10 7 8 12   < >  --    < > 12 13 14   Creatinine 0.8  0.8 0.8 0.9 0.9   < >  --    < > 1.1 1.1 1.4   eGFR if African American >60  >60 >60.0 >60.0 >60.0  --   --   --   --   --   --    eGFR if non African American >60  >60 >60.0 >60.0 >60.0  --   --   --   --   --   --    Calcium 9.4  9.4 9.2 9.6 9.5   < >  --    < > 10.2 9.7 10.4   Magnesium 2.0  --   --   --   --  1.7  --   --   --  1.6    < > = values in this interval not displayed.       CARDIAC BIOMARKERS:  Recent Labs   Lab 09/14/23  1245 07/17/24  1208 07/17/24  2042   Troponin I <0.006 <0.006 <0.006       COAGS:  Recent Labs   Lab 10/26/23  1616   INR 1.0       LIPIDS/LFTS:  Recent Labs   Lab 12/06/22  0836 12/19/22  1020 01/18/23  1109 12/13/23  1153 12/20/23  1231 06/19/24  1023 07/17/24  1208 07/18/24  0545   Cholesterol 111 L 113 L  --  116 L  --   --   --   --    Triglycerides 84 104  --  96  --   --   --   --    HDL 36 L 35 L  --  43  --   --   --   --    LDL Cholesterol 58.2 L 57.2 L  --  53.8 L  --   --   --   --    Non-HDL Cholesterol 75 78  --  73  --   --   --   --    AST  --  14   < >  --    < > 10 16 14   ALT  --  18   < >  --    < > 14 20 19    < > = values in this interval not displayed.       Hemoglobin A1C   Date Value Ref Range Status   03/19/2024 6.1 (H) 4.0 - 5.6 % Final     Comment:     ADA Screening Guidelines:  5.7-6.4%  Consistent with prediabetes  >or=6.5%  Consistent with diabetes    High levels of fetal hemoglobin interfere with the HbA1C  assay. Heterozygous hemoglobin variants (HbS, HgC, etc)do  not significantly interfere with this assay.   However, presence of  multiple variants may affect accuracy.     12/13/2023 6.2 (H) 4.0 - 5.6 % Final     Comment:     ADA Screening Guidelines:  5.7-6.4%  Consistent with prediabetes  >or=6.5%  Consistent with diabetes    High levels of fetal hemoglobin interfere with the HbA1C  assay. Heterozygous hemoglobin variants (HbS, HgC, etc)do  not significantly interfere with this assay.   However, presence of multiple variants may affect accuracy.     09/14/2023 6.3 (H) 4.0 - 5.6 % Final     Comment:     ADA Screening Guidelines:  5.7-6.4%  Consistent with prediabetes  >or=6.5%  Consistent with diabetes    High levels of fetal hemoglobin interfere with the HbA1C  assay. Heterozygous hemoglobin variants (HbS, HgC, etc)do  not significantly interfere with this assay.   However, presence of multiple variants may affect accuracy.         TSH  Recent Labs   Lab 03/30/22  1320   TSH 1.384       The ASCVD Risk score (Kateryna GRAY, et al., 2019) failed to calculate for the following reasons:    The valid total cholesterol range is 130 to 320 mg/dL       BNP    Lab Results   Component Value Date/Time    BNP <10 07/17/2024 12:08 PM    BNP 18 09/14/2023 12:45 PM    BNP 25 10/21/2022 06:43 PM    BNP 33 05/06/2022 10:09 AM    BNP 21 03/24/2022 11:55 AM    BNP 44 10/19/2019 12:46 AM    BNP 52 03/28/2019 02:27 PM    BNP <10 08/16/2017 05:48 PM    BNP 14 02/04/2015 08:18 PM    BNP 35 04/07/2014 11:49 AM            ECHO    Results for orders placed during the hospital encounter of 07/17/24    Echo Saline Bubble? Yes    Interpretation Summary    Left Ventricle: The left ventricle is mildly dilated. Mildly increased wall thickness. There is concentric hypertrophy. There is moderately reduced systolic function with a visually estimated ejection fraction of 30 - 35%. Grade I diastolic dysfunction.    Right Ventricle: Normal right ventricular cavity size. Systolic function is normal.    Left Atrium: Left atrium is mildly dilated.    Pulmonary Artery: The estimated  pulmonary artery systolic pressure is 12 mmHg.    IVC/SVC: Normal venous pressure at 3 mmHg.

## 2024-07-19 NOTE — CONSULTS
[facility] [place] [facility]  Cardiology  Consult Note    Patient Name: Kristopher Elmore  MRN: [id number]  Admission Date: 7/17/2024  Hospital Length of Stay: 0 days  Code Status: Prior   Attending Provider: No att. providers found   Consulting Provider: Alirio Collins MD  Primary Care Physician: Enedina De La Torre MD  Principal Problem:Syncope    Patient information was obtained from patient and ER records.     Consults  Subjective:     Chief Complaint:  syncope     HPI:    Patient is a pleasant 48-year-old lady.  Past medical history significant for stage IV cancer with bone Mets, stable for the past 5 years.  As per the patient.  Also history of nonischemic cardiomyopathy.  Stress test in 2017 did not show any significant ischemia.  Yesterday was sitting and states that when she sits for a long time sometimes her legs feel like jelly.  She got up and after taking about 10 steps fell down.  Has been doing fine.  No arrhythmias noted on tele monitoring.  Has refused AICD in the past, but is willing to consider it now.  Otherwise denies orthopnea PND or chest pains    Results for orders placed during the hospital encounter of 07/17/24    Echo Saline Bubble? Yes    Interpretation Summary    Left Ventricle: The left ventricle is mildly dilated. Mildly increased wall thickness. There is concentric hypertrophy. There is moderately reduced systolic function with a visually estimated ejection fraction of 30 - 35%. Grade I diastolic dysfunction.    Right Ventricle: Normal right ventricular cavity size. Systolic function is normal.    Left Atrium: Left atrium is mildly dilated.    Pulmonary Artery: The estimated pulmonary artery systolic pressure is 12 mmHg.    IVC/SVC: Normal venous pressure at 3 mmHg.                  Pre Syncope        Was st work, no trauma noted. She just slid down. Current BP 98/55, cng 158 mg/dl         HPI: Ms. Elmore is a 48 yr old female with a hx of iron deficiency anemia, MDD, chronic combined  systolic and diastolic CHF, nonischemic cardiomyopathy, HLD, breast cancer with bone metastases, obesity, HTN, dm type 2, idiopathic intracranial HTN s/p ventricular shunt, ZABRINA, BPPV, osteonecrosis of jaw who presents to the ED with a chief complaint of syncope.  Patient states that around 10:00 a.m. this morning while she was at school, she states that she was walking to talk to the .  She says that the  asked her question but she did not answer and states that the  told her that her right leg gave out and she fell onto her right side.  The  states that there was no head trauma.  The patient is unsure how long the syncopal episode lasted but states that probably around 5-10 minutes.  She states that prior to passing out she felt generally weak and felt like her legs were shaky as well as mild lightheadedness. Patient states that she took her morning meds this morning as well as ibuprofen 800 mg for back pain, but no other medications PTA to ED. patient endorses that she has lost about 90 lb over the last 1.5 years and thinks that her blood pressure medication is too strong and needs to be adjusted.  She states that about 2 months ago her diabetes medications were adjusted.  She denies tobacco, alcohol, and recreational drug use.  She denies fever, chills, SOB, CP, palpitations, diaphoresis, nausea, vomiting, diarrhea, dysuria, hematuria, dizziness.     Upon arrival to ED, patient afebrile, HR of 75, RR of 20, BP of 98/55, satting 98% on RA.  Workup in the ED included CBC, CMP, D-dimer, BNP, troponin, COVID test, influenza test, UA, urine creatinine, CXR.  Workup revealed RBC of 3.68, H/H of 10.6/34, chloride of 112, CO2 of 20, albumin of 2.9.  UA with 1+ protein and 4+ glucose.  Remainder of labs fairly unremarkable.  CXR showed no evidence of acute cardiopulmonary disease.   Patient was given 250 NS bolus while in the ED. case discussed with ED provider and patient will be  placed under observation for further management.       Past Medical History:   Diagnosis Date    Abnormal Pap smear     pt states 13yrs ago colpo was done    Anemia     Anxiety     Cancer     Cardiomegaly 2014    Cardiomyopathy     CHF (congestive heart failure)     Depression     Fibroid     Hx of psychiatric care     Hyperlipidemia     Hypertension     Psychiatric problem     Sleep difficulties     Therapy        Past Surgical History:   Procedure Laterality Date     SECTION      CREATION OF VENTRICULOPERITONEAL SHUNT USING COMPUTER-ASSISTED NAVIGATION Right 2023    Procedure: INSERTION, SHUNT, VENTRICULOPERITONEAL, USING COMPUTER-ASSISTED NAVIGATION;  Surgeon: Jayme Villa MD;  Location: [facility] OR [address] [place] [address];  Service: Neurosurgery;  Laterality: Right;    CREATION OF VENTRICULOPERITONEAL SHUNT USING COMPUTER-ASSISTED NAVIGATION N/A 2023    Procedure: INSERTION, SHUNT, VENTRICULOPERITONEAL, USING COMPUTER-ASSISTED NAVIGATION;  Surgeon: Frederick Kohler MD;  Location: [facility] OR [address] [place] [address];  Service: General;  Laterality: N/A;    LUMBAR PUNCTURE N/A 10/26/2023    Procedure: Lumbar Puncture;  Surgeon: Jayme Villa MD;  Location: [facility] OR [address] [place] [address];  Service: Neurosurgery;  Laterality: N/A;  TOR1  ASA1  regular bed reversed  lateral left down   II  RN PHONE PREOP 10/12/2023----BMI--57.67----INCOMPLETE CONSENT----NEED ORDERS    TONSILLECTOMY      TUBAL LIGATION      UTERINE FIBROID EMBOLIZATION         Review of patient's allergies indicates:   Allergen Reactions    Keflex [cephalexin] Itching       Current Facility-Administered Medications on File Prior to Encounter   Medication    0.9%  NaCl infusion    mupirocin 2 % ointment     Current Outpatient Medications on File Prior to Encounter   Medication Sig    ammonium lactate 12 % Crea Apply 1 application  topically once daily.    anastrozole (ARIMIDEX) 1 mg Tab Take 1 tablet (1 mg total) by mouth once daily.    atorvastatin (LIPITOR) 40  MG tablet Take 1 tablet (40 mg total) by mouth every evening.    blood pressure kit-extra large Kit Check blood pressure once daily at the same time    blood sugar diagnostic Strp To check BG 2 times daily, to use with insurance preferred meter    blood-glucose meter kit To check BG 2 times daily, to use with insurance preferred meter    blood-glucose sensor (DEXCOM G7 SENSOR) Monique Change every 10 days    carvediloL (COREG) 25 MG tablet TAKE 1 TABLET(25 MG) BY MOUTH TWICE DAILY    ciclopirox (PENLAC) 8 % Soln Apply topically nightly.    doxycycline (VIBRAMYCIN) 100 MG Cap Take 100 mg by mouth 2 (two) times daily.    empagliflozin (JARDIANCE) 25 mg tablet Take 1 tablet (25 mg total) by mouth once daily.    ENTRESTO  mg per tablet TAKE 1 TABLET BY MOUTH TWICE DAILY    ergocalciferol (ERGOCALCIFEROL) 50,000 unit Cap TAKE 1 CAPSULE BY MOUTH EVERY 7 DAYS    furosemide (LASIX) 20 MG tablet Take 1 tablet (20 mg total) by mouth 2 (two) times daily.    glipiZIDE 5 MG TR24 Take 1 tablet (5 mg total) by mouth daily with breakfast.    goserelin (ZOLADEX) 3.6 mg injection Inject 3.6 mg into the skin every 28 days.    ibuprofen (ADVIL,MOTRIN) 800 MG tablet Take 1 tablet (800 mg total) by mouth 2 (two) times daily as needed for Pain.    lancets Misc To check BG 2 times daily, to use with insurance preferred meter    metFORMIN (GLUCOPHAGE-XR) 500 MG ER 24hr tablet Take 2 tablets (1,000 mg total) by mouth 2 (two) times daily with meals.    naloxone (NARCAN) 4 mg/actuation Spry 4mg by nasal route as needed for opioid overdose; may repeat every 2-3 minutes in alternating nostrils until medical help arrives. Call 911    ondansetron (ZOFRAN-ODT) 8 MG TbDL Take 1 tablet (8 mg total) by mouth every 8 (eight) hours as needed (nausea and vomiting).    oxyCODONE-acetaminophen (PERCOCET) 5-325 mg per tablet Take 1 tablet by mouth every 6 (six) hours as needed for Pain.    palbociclib (IBRANCE) 100 mg Cap Take 1 capsule (100 mg) by mouth  Monday - Friday, DO NOT TAKE Saturday and Sunday, for 3 weeks on, 1 week off.    pentoxifylline (TRENTAL) 400 mg TbSR Take 400 mg by mouth 2 (two) times daily.    potassium chloride (KLOR-CON) 10 MEQ TbSR TAKE 1 TABLET(10 MEQ) BY MOUTH EVERY NIGHT    prochlorperazine (COMPAZINE) 5 MG tablet Take 5 mg by mouth 4 (four) times daily.    sennosides (SENNA-C ORAL) Take 2 tablets by mouth daily as needed.    spironolactone (ALDACTONE) 25 MG tablet Take 1 tablet (25 mg total) by mouth once daily.    tirzepatide 10 mg/0.5 mL PnIj Inject 10 mg into the skin every 7 days.    tiZANidine (ZANAFLEX) 4 MG tablet TAKE 1 TABLET(4 MG) BY MOUTH EVERY 8 HOURS    UNKNOWN TO PATIENT Calcium    venlafaxine (EFFEXOR-XR) 150 MG Cp24 Take 1 capsule (150 mg total) by mouth once daily.    venlafaxine (EFFEXOR-XR) 75 MG 24 hr capsule Take 1 capsule (75 mg total) by mouth once daily.    vitamin E 100 UNIT capsule Take 100 Units by mouth once daily.    [DISCONTINUED] metroNIDAZOLE (FLAGYL) 500 MG tablet Take 500 mg by mouth 3 (three) times daily.     Family History       Problem Relation (Age of Onset)    Arthritis Mother    Asthma Daughter    Cervical cancer Paternal Cousin    Diabetes Mother    Endometriosis Paternal Cousin    Fibroids Other, Other, Other, Other, Other    Heart disease Mother    Heart failure Mother    Hyperlipidemia Mother    Hypertension Mother, Brother    Kidney cancer Maternal Grandfather (79)    No Known Problems Maternal Grandmother    Other Mother    Ovarian cancer Paternal Cousin    Rashes / Skin problems Daughter    Thyroid cancer Other          Tobacco Use    Smoking status: Never    Smokeless tobacco: Never   Substance and Sexual Activity    Alcohol use: Not Currently     Alcohol/week: 0.0 standard drinks of alcohol    Drug use: No    Sexual activity: Not Currently     Partners: Male     Birth control/protection: Surgical     Review of Systems   Constitutional: Negative.   HENT: Negative.     Eyes: Negative.     Cardiovascular:  Positive for near-syncope and syncope.   Respiratory: Negative.     Endocrine: Negative.    Hematologic/Lymphatic: Negative.    Skin: Negative.    Musculoskeletal: Negative.    Gastrointestinal: Negative.    Genitourinary: Negative.    Psychiatric/Behavioral: Negative.     Allergic/Immunologic: Negative.      Objective:     Vital Signs (Most Recent):  Temp: 98 °F (36.7 °C) (07/18/24 0637)  Pulse: 74 (07/18/24 1200)  Resp: 19 (07/18/24 1200)  BP: 114/61 (07/18/24 1200)  SpO2: 100 % (07/18/24 1200) Vital Signs (24h Range):  Temp:  [98 °F (36.7 °C)] 98 °F (36.7 °C)  Pulse:  [72-81] 74  Resp:  [13-21] 19  SpO2:  [95 %-100 %] 100 %  BP: (101-114)/(55-77) 114/61     Weight: (!) 137.4 kg (303 lb)  Body mass index is 52.01 kg/m².    SpO2: 100 %         Intake/Output Summary (Last 24 hours) at 7/18/2024 1916  Last data filed at 7/17/2024 2041  Gross per 24 hour   Intake 250 ml   Output --   Net 250 ml       Lines/Drains/Airways       None                    Physical Exam  Constitutional:       Appearance: Normal appearance. She is well-developed.   HENT:      Head: Normocephalic.   Eyes:      Pupils: Pupils are equal, round, and reactive to light.   Cardiovascular:      Rate and Rhythm: Normal rate and regular rhythm.   Pulmonary:      Effort: Pulmonary effort is normal.      Breath sounds: Normal breath sounds.   Abdominal:      General: Bowel sounds are normal.      Palpations: Abdomen is soft.      Tenderness: There is no abdominal tenderness.   Musculoskeletal:         General: Normal range of motion.      Cervical back: Normal range of motion and neck supple.   Skin:     General: Skin is warm.   Neurological:      Mental Status: She is alert and oriented to person, place, and time.          Significant Labs:      DATA:     Laboratory:  CBC:  Recent Labs   Lab 06/19/24  1023 07/17/24  1208 07/18/24  0545   WBC 4.52 4.57 5.65   Hemoglobin 10.6 L 10.6 L 9.7 L   Hematocrit 34.1 L 34.0 L 32.1 L   Platelets  375 415 330       CHEMISTRIES:  Recent Labs   Lab 05/06/22  1009 05/30/22  0729 06/24/22  0945 07/22/22  1012 08/18/22  1154 12/06/22  0836 12/19/22  1020 06/19/24  1023 07/17/24  1208 07/18/24  0545   Glucose 129 H  129 H 162 H 154 H 240 H   < >  --    < > 134 H 96 115 H   Sodium 141  141 140 140 138   < >  --    < > 140 142 143   Potassium 3.8  3.8 3.8 3.3 L 4.0   < >  --    < > 4.0 3.8 4.2   BUN 10  10 7 8 12   < >  --    < > 12 13 14   Creatinine 0.8  0.8 0.8 0.9 0.9   < >  --    < > 1.1 1.1 1.4   eGFR if African American >60  >60 >60.0 >60.0 >60.0  --   --   --   --   --   --    eGFR if non African American >60  >60 >60.0 >60.0 >60.0  --   --   --   --   --   --    Calcium 9.4  9.4 9.2 9.6 9.5   < >  --    < > 10.2 9.7 10.4   Magnesium 2.0  --   --   --   --  1.7  --   --   --  1.6    < > = values in this interval not displayed.       CARDIAC BIOMARKERS:  Recent Labs   Lab 09/14/23  1245 07/17/24  1208 07/17/24  2042   Troponin I <0.006 <0.006 <0.006       COAGS:  Recent Labs   Lab 10/26/23  1616   INR 1.0       LIPIDS/LFTS:  Recent Labs   Lab 12/06/22  0836 12/19/22  1020 01/18/23  1109 12/13/23  1153 12/20/23  1231 06/19/24  1023 07/17/24  1208 07/18/24  0545   Cholesterol 111 L 113 L  --  116 L  --   --   --   --    Triglycerides 84 104  --  96  --   --   --   --    HDL 36 L 35 L  --  43  --   --   --   --    LDL Cholesterol 58.2 L 57.2 L  --  53.8 L  --   --   --   --    Non-HDL Cholesterol 75 78  --  73  --   --   --   --    AST  --  14   < >  --    < > 10 16 14   ALT  --  18   < >  --    < > 14 20 19    < > = values in this interval not displayed.       Hemoglobin A1C   Date Value Ref Range Status   03/19/2024 6.1 (H) 4.0 - 5.6 % Final     Comment:     ADA Screening Guidelines:  5.7-6.4%  Consistent with prediabetes  >or=6.5%  Consistent with diabetes    High levels of fetal hemoglobin interfere with the HbA1C  assay. Heterozygous hemoglobin variants (HbS, HgC, etc)do  not significantly interfere  with this assay.   However, presence of multiple variants may affect accuracy.     12/13/2023 6.2 (H) 4.0 - 5.6 % Final     Comment:     ADA Screening Guidelines:  5.7-6.4%  Consistent with prediabetes  >or=6.5%  Consistent with diabetes    High levels of fetal hemoglobin interfere with the HbA1C  assay. Heterozygous hemoglobin variants (HbS, HgC, etc)do  not significantly interfere with this assay.   However, presence of multiple variants may affect accuracy.     09/14/2023 6.3 (H) 4.0 - 5.6 % Final     Comment:     ADA Screening Guidelines:  5.7-6.4%  Consistent with prediabetes  >or=6.5%  Consistent with diabetes    High levels of fetal hemoglobin interfere with the HbA1C  assay. Heterozygous hemoglobin variants (HbS, HgC, etc)do  not significantly interfere with this assay.   However, presence of multiple variants may affect accuracy.         TSH  Recent Labs   Lab 03/30/22  1320   TSH 1.384       The ASCVD Risk score (Kateryna GRAY, et al., 2019) failed to calculate for the following reasons:    The valid total cholesterol range is 130 to 320 mg/dL       BNP    Lab Results   Component Value Date/Time    BNP <10 07/17/2024 12:08 PM    BNP 18 09/14/2023 12:45 PM    BNP 25 10/21/2022 06:43 PM    BNP 33 05/06/2022 10:09 AM    BNP 21 03/24/2022 11:55 AM    BNP 44 10/19/2019 12:46 AM    BNP 52 03/28/2019 02:27 PM    BNP <10 08/16/2017 05:48 PM    BNP 14 02/04/2015 08:18 PM    BNP 35 04/07/2014 11:49 AM            ECHO    Results for orders placed during the hospital encounter of 07/17/24    Echo Saline Bubble? Yes    Interpretation Summary    Left Ventricle: The left ventricle is mildly dilated. Mildly increased wall thickness. There is concentric hypertrophy. There is moderately reduced systolic function with a visually estimated ejection fraction of 30 - 35%. Grade I diastolic dysfunction.    Right Ventricle: Normal right ventricular cavity size. Systolic function is normal.    Left Atrium: Left atrium is mildly  dilated.    Pulmonary Artery: The estimated pulmonary artery systolic pressure is 12 mmHg.    IVC/SVC: Normal venous pressure at 3 mmHg.        Assessment and Plan:     * Syncope   By history appears to be orthostatic in nature.  However because of her low EF, ventricular arrhythmias can not be ruled out.  Recommend doing event monitoring as an outpatient.  No arrhythmias seen during hospital stay today.   Also discussed risks benefits of AICD and patient willing to consider it now.  She states that her cancer has been stable and she has a prognosis of greater than 1 year as per her oncologist   Per the patient.    Variable defibrillator such as LifeVest can be considered while she is making up her mind.  She will discuss these options with her primary cardiologist as an outpatient     No further inpatient workup indicated during this hospitalization.  Event monitor as outpatient    BMI 50.0-59.9, adult  Body mass index is 52.01 kg/m². Morbid obesity complicates all aspects of disease management from diagnostic modalities to treatment. Weight loss encouraged and health benefits explained to patient.         Malignant neoplasm of upper-inner quadrant of right breast in female, estrogen receptor positive        Chronic combined systolic and diastolic heart failure   Currently euvolemic on exam.  Continue guideline directed medical therapy    Major depressive disorder, recurrent episode, moderate with anxious distress         Iron deficiency anemia            VTE Risk Mitigation (From admission, onward)           Ordered     IP VTE HIGH RISK PATIENT  Once         07/17/24 2249                    Thank you for your consult. I will sign off. Please contact us if you have any additional questions.    Alirio Collins MD  Cardiology   [facility] - Emergency Dept

## 2024-07-24 PROBLEM — R73.9 HYPERGLYCEMIA: Status: RESOLVED | Noted: 2021-11-07 | Resolved: 2024-07-24

## 2024-07-24 PROBLEM — R11.2 NAUSEA & VOMITING: Status: RESOLVED | Noted: 2023-06-20 | Resolved: 2024-07-24

## 2024-07-24 NOTE — PROGRESS NOTES
"=Subjective     Patient ID: Kristopher Elmore is a 48 y.o. female.    Chief Complaint: Malignant neoplasm of upper-inner quadrant of right breast     HPI Presents for follow up from hospital discharge.    Below is Hospital discharge course: "Patient afebrile, HR of 75, RR of 20, BP of 98/55, satting 98% on RA. Workup in the ED included CBC, CMP, D-dimer, BNP, troponin, COVID test, influenza test, UA, urine creatinine, CXR. Workup revealed RBC of 3.68, H/H of 10.6/34, chloride of 112, CO2 of 20, albumin of 2.9. UA with 1+ protein and 4+ glucose. Remainder of labs fairly unremarkable. CXR showed no evidence of acute cardiopulmonary disease.       Consulted cards: orthostatic BP unremarkable. Echo showed   Left Ventricle: The left ventricle is mildly dilated. Mildly increased wall thickness. There is concentric hypertrophy. There is moderately reduced systolic function with a visually estimated ejection fraction of 30 - 35%. Grade I diastolic dysfunction.  Right Ventricle: Normal right ventricular cavity size. Systolic function is normal.  Left Atrium: Left atrium is mildly dilated.  Pulmonary Artery: The estimated pulmonary artery systolic pressure is 12 mmHg.  IVC/SVC: Normal venous pressure at 3 mmHg. Ordered left-vest can be placed outpatient per Dr. Collins. Holter monitoring outpatient. F/u with primary cardiologist."    Today: She did see a cardiologist yesterday, she mets with cardiothoracic surgeon for a permanent defibrillator, currently wearing a portable life vest.     Pressure still lower today. She is eating and drinking well. She is on fluid restriction due to CHF.   Appetite has been good. Notes she has been sluggish or slow since discharge. She is nervous to drive.   Bowel movements have been good.   Sleeping okay, takes muscle relaxer for her jaw. They have not done the jaw debridement due to the hospital admission.      Pain is stable- she is working with palliative. Other than the " saw.  Denies shortness of breath.       Per Dr. Lei's previous note: Most recent imaging  - 3/22/2024 CT C/A/P:  FINDINGS:  Thoracic soft tissues: Normal thyroid. Prominent/slightly enlarged right chest wall and axillary nodes.  For example right chest wall node 9 mm series 2, image 13 previously 5 mm.  Additional 1 cm chest wall node image 19 previously 7 mm.  Prominent right axillary nodes image 27 similar.  Aorta: Thoracic aorta is normal in caliber and contour with no significant calcific atherosclerosis.  Heart: Normal in size. No pericardial effusion. No significant calcific coronary atherosclerosis.  Jane/Mediastinum: No mediastinal or hilar lymphadenopathy.  Lungs: Trachea and bronchi are patent.  Lungs symmetrically expanded without consolidation.  Stable band of subsegmental atelectasis in the left lower lobe.  No suspicious pulmonary nodules.  No pleural fluid or pneumothorax.  Liver: Normal in size and contour.  Stable subcentimeter hypodensity in segment 5 (series 3, image 53).  Gallbladder: Possible gallbladder sludge.  No calcified gallstones.  Bile Ducts: No evidence of dilated ducts.  Pancreas: No mass or peripancreatic fat stranding.  Spleen: Unremarkable.  Esophagus: Unremarkable.  Stomach and duodenum: Unremarkable.  Adrenals: Unremarkable.  Kidneys/Ureters: Normal in size and location. Normal enhancement.  Right renal cyst and left renal hypodensities too small to characterize..  No hydronephrosis or nephrolithiasis. No ureteral dilatation.  Bladder: No evidence of wall thickening.  Reproductive organs: Calcified uterine fibroid.  Bowel/Mesentery: Small bowel is normal in caliber with no evidence of obstruction.  No evidence of inflammation or wall thickening.  Normal appendix.  Colon demonstrates no focal wall thickening.  Peritoneum: No intraperitoneal free air or fluid.   shunt with tip in the pelvis.  Lymph nodes: No lymphadenopathy.  Abdominal wall:  Unremarkable.  Vasculature: No  aneurysm. No significant calcific atherosclerosis.  Bones: No acute fracture. Stable mixed lytic and sclerotic lesions throughout the spine, sternum, and pelvis.  No definite new lesion.  Impression:  Stable osseous metastatic lesions.  Prominent right chest wall and axillary nodes with measurements as above.  Attention on follow-up.  No additional new findings.    Review of Systems   Constitutional:         See above   All other systems reviewed and are negative.         Objective     Physical Exam  Vitals and nursing note reviewed.   Constitutional:       General: She is not in acute distress.     Appearance: Normal appearance. She is well-developed. She is obese.      Comments: Presents in wheelchair with life vest in place   HENT:      Head: Normocephalic.      Mouth/Throat:      Pharynx: No oropharyngeal exudate.   Eyes:      Pupils: Pupils are equal, round, and reactive to light.   Cardiovascular:      Rate and Rhythm: Normal rate and regular rhythm.      Heart sounds: Normal heart sounds.   Pulmonary:      Effort: Pulmonary effort is normal.      Breath sounds: Normal breath sounds.   Abdominal:      General: Bowel sounds are normal. There is no distension.      Palpations: Abdomen is soft.      Tenderness: There is no abdominal tenderness.   Musculoskeletal:      Cervical back: Normal range of motion.   Skin:     General: Skin is warm and dry.      Findings: No rash.   Neurological:      Mental Status: She is alert and oriented to person, place, and time.   Psychiatric:         Behavior: Behavior normal.        Assessment and Plan     1. Malignant neoplasm of upper-inner quadrant of right breast in female, estrogen receptor positive    2. Bone metastases  Overview:  IMO Regualtory Update 4/1/23      3. Cancer associated pain    4. Anemia associated with chemotherapy    5. Vitamin D deficiency    6. Type 2 diabetes mellitus without complication, with long-term current use of insulin  Overview:  A1c 8.5  11/3/2021. Cont Glipizide 10 mg BID. Sending rx for Trulicity to [facility] with pharmacy assistance referral. Podiatry referral per pt request      7. Benign essential HTN        Most recent scans stable-  next scans due now  Continue Arimidex and Ibrance (dose adjusted due to anemia to 100 mg M-F, off Sat&Sun x 3 weeks, off 1 week)   Zoladex today and every 4 weeks.   Xgeva- hold with osteonecrosis- seeing dental and planning debridement  CA corrected is 11.6 - instructed to stop taking calcium at time as she is not getting xgeva  Due to start next cycle on Monday     Labs appropriate to proceed  Follow up with cardiology in August    Return to clinic in 4 weeks with CAMILA appointment and labs and imaging.     Patient is in agreement with the proposed treatment plan. All questions were answered to the patient's satisfaction. Patient knows to call clinic for any new or worsening symptoms and if anything is needed before the next clinic visit.          Dawna Akers, FNP-C  Hematology & Medical Oncology   [address] [place] [address]  ph. [phone]  Fax. [phone]    Collaborating physician, Dr. Lei.    Approximately 15 minutes were spent face-to-face with the patient.  Approximately 20 minutes in total were spent on this encounter, which includes face-to-face time and non-face-to-face time preparing to see the patient (e.g., review of tests), obtaining and/or reviewing separately obtained history, documenting clinical information in the electronic or other health record, independently interpreting results (not separately reported) and communicating results to the patient/family/caregiver, or care coordination (not separately reported).         Route Chart for Scheduling    Med Onc Chart Routing      Follow up with physician    Follow up with CAMILA 4 weeks. please move PJ's next appointment back 1 week   Infusion scheduling note    Injection scheduling note faslodex every 4 weeks    Labs CBC and CMP   Scheduling:  Preferred lab:  Lab interval: every 4 weeks     Imaging CT chest abdomen pelvis   next week here please   Pharmacy appointment    Other referrals            Treatment Plan Information   OP ANASTROZOLE PALBOCICLIB Q4W   Phyllis Lei MD   Upcoming Treatment Dates - OP ANASTROZOLE PALBOCICLIB Q4W    No upcoming days in selected categories.    Supportive Plan Information  OP BREAST GOSERELIN & DENOSUMAB Q4W   Phyllis Lei MD   Upcoming Treatment Dates - OP BREAST GOSERELIN & DENOSUMAB Q4W    12/18/2515       Chemotherapy       goserelin (ZOLADEX) injection 3.6 mg  6/17/3007       Chemotherapy       goserelin (ZOLADEX) injection 3.6 mg  12/14/3498       Chemotherapy       goserelin (ZOLADEX) injection 3.6 mg  6/13/3990       Chemotherapy       goserelin (ZOLADEX) injection 3.6 mg

## 2024-07-25 ENCOUNTER — OFFICE VISIT (OUTPATIENT)
Dept: CARDIOLOGY | Facility: CLINIC | Age: 49
End: 2024-07-25
Payer: MEDICARE

## 2024-07-25 VITALS
DIASTOLIC BLOOD PRESSURE: 56 MMHG | WEIGHT: 293 LBS | BODY MASS INDEX: 50.02 KG/M2 | OXYGEN SATURATION: 97 % | HEART RATE: 9 BPM | HEIGHT: 64 IN | SYSTOLIC BLOOD PRESSURE: 127 MMHG

## 2024-07-25 DIAGNOSIS — R55 SYNCOPE AND COLLAPSE: ICD-10-CM

## 2024-07-25 DIAGNOSIS — I10 BENIGN ESSENTIAL HTN: ICD-10-CM

## 2024-07-25 DIAGNOSIS — I50.42 CHRONIC COMBINED SYSTOLIC AND DIASTOLIC HEART FAILURE: Primary | ICD-10-CM

## 2024-07-25 DIAGNOSIS — I42.8 NON-ISCHEMIC CARDIOMYOPATHY: Primary | ICD-10-CM

## 2024-07-25 DIAGNOSIS — I50.42 CHRONIC COMBINED SYSTOLIC AND DIASTOLIC HEART FAILURE: ICD-10-CM

## 2024-07-25 DIAGNOSIS — E78.5 HYPERLIPIDEMIA, UNSPECIFIED HYPERLIPIDEMIA TYPE: ICD-10-CM

## 2024-07-25 PROBLEM — I51.89 COMBINED SYSTOLIC AND DIASTOLIC CARDIAC DYSFUNCTION: Status: RESOLVED | Noted: 2023-12-07 | Resolved: 2024-07-25

## 2024-07-25 PROCEDURE — 99214 OFFICE O/P EST MOD 30 MIN: CPT | Mod: S$GLB,,, | Performed by: INTERNAL MEDICINE

## 2024-07-25 PROCEDURE — 4010F ACE/ARB THERAPY RXD/TAKEN: CPT | Mod: CPTII,S$GLB,, | Performed by: INTERNAL MEDICINE

## 2024-07-25 PROCEDURE — 1159F MED LIST DOCD IN RCRD: CPT | Mod: CPTII,S$GLB,, | Performed by: INTERNAL MEDICINE

## 2024-07-25 PROCEDURE — 3044F HG A1C LEVEL LT 7.0%: CPT | Mod: CPTII,S$GLB,, | Performed by: INTERNAL MEDICINE

## 2024-07-25 PROCEDURE — 99999 PR PBB SHADOW E&M-EST. PATIENT-LVL V: CPT | Mod: PBBFAC,,, | Performed by: INTERNAL MEDICINE

## 2024-07-25 PROCEDURE — 3008F BODY MASS INDEX DOCD: CPT | Mod: CPTII,S$GLB,, | Performed by: INTERNAL MEDICINE

## 2024-07-25 PROCEDURE — 3078F DIAST BP <80 MM HG: CPT | Mod: CPTII,S$GLB,, | Performed by: INTERNAL MEDICINE

## 2024-07-25 PROCEDURE — 3074F SYST BP LT 130 MM HG: CPT | Mod: CPTII,S$GLB,, | Performed by: INTERNAL MEDICINE

## 2024-07-25 PROCEDURE — 1160F RVW MEDS BY RX/DR IN RCRD: CPT | Mod: CPTII,S$GLB,, | Performed by: INTERNAL MEDICINE

## 2024-07-25 RX ORDER — CARVEDILOL 12.5 MG/1
12.5 TABLET ORAL 2 TIMES DAILY
Qty: 180 TABLET | Refills: 0 | Status: SHIPPED | OUTPATIENT
Start: 2024-07-25

## 2024-07-25 RX ORDER — LANOLIN ALCOHOL/MO/W.PET/CERES
400 CREAM (GRAM) TOPICAL DAILY
Qty: 90 TABLET | Refills: 1 | Status: SHIPPED | OUTPATIENT
Start: 2024-07-25

## 2024-07-25 NOTE — PROGRESS NOTES
PCP - Enedina De La Torre MD    Subjective:   Stage IV breast cancer diagnosed 9/2019, (Arimidex, palbociclib)  Dilated non-ischemic cardiomyopathy (LVEF 30% improved to 52%, down again to 35%, stress echo negative for ischemia)  HTN  HLD  Well controlled DM, orals and mounjoro, A1c 6.1  Morbid obesity BMI 53     HPI:    Patient reports having a syncopal event with a fall last week. She was walking in school and all of a sudden passed out and fell. No warning signs. She says no seizure like activity was seen, no tongue biting or urine incontinence. When EMS came her BP was 80s/60s (normally around 120s systolic). She was taken to hospital and EKG, Echo were unremarkable for an acute issue, EF 30-35% and known. Trop and BNP both wnl and no other blood work abnormalities found either to explain the syncope. In the hospital, tele showed no arrhythmias. She was discharged on lifevest. Since not sure what exactly caused the syncope, amio was not given on d/c. Upon d/c, her coreg was stopped due to hypotension and she was on 25 bid dose.   Of note, she was offered ICD in the past but didn't want to proceed as scared of a complication. Talked in detail again today, she is thinking about it.         Social History     Tobacco Use    Smoking status: Never    Smokeless tobacco: Never   Substance Use Topics    Alcohol use: Not Currently     Alcohol/week: 0.0 standard drinks of alcohol     Family History   Problem Relation Name Age of Onset    Other Mother          breast lesions had to be surgically removed    Arthritis Mother      Diabetes Mother      Heart disease Mother          CHF, CAD , 2 stents    Hypertension Mother      Hyperlipidemia Mother      Heart failure Mother      Hypertension Brother Leonides     No Known Problems Maternal Grandmother      Kidney cancer Maternal Grandfather  79    Rashes / Skin problems Daughter          boils/cysts    Asthma Daughter      Cervical cancer Paternal Cousin Gracie         dx  age 29?    Ovarian cancer Paternal Cousin Gracie         dx age 29?    Endometriosis Paternal Cousin Gracie     Fibroids Other Prachi         uterine    Thyroid cancer Other Prachi         type? dx age?    Fibroids Other          uterine    Fibroids Other          uterine    Fibroids Other          uterine    Fibroids Other          uterine    Breast cancer Neg Hx      Colon polyps Neg Hx         Meds:     Review of patient's allergies indicates:   Allergen Reactions    Keflex [cephalexin] Itching       Current Outpatient Medications:     ammonium lactate 12 % Crea, Apply 1 application  topically once daily., Disp: 140 g, Rfl: 5    anastrozole (ARIMIDEX) 1 mg Tab, Take 1 tablet (1 mg total) by mouth once daily., Disp: 90 tablet, Rfl: 0    atorvastatin (LIPITOR) 40 MG tablet, Take 1 tablet (40 mg total) by mouth every evening., Disp: 90 tablet, Rfl: 3    blood pressure kit-extra large Kit, Check blood pressure once daily at the same time, Disp: 1 kit, Rfl: 0    blood sugar diagnostic Strp, To check BG 2 times daily, to use with insurance preferred meter, Disp: 200 each, Rfl: 3    blood-glucose sensor (DEXCOM G7 SENSOR) Monique, Change every 10 days, Disp: 3 each, Rfl: 11    ciclopirox (PENLAC) 8 % Soln, Apply topically nightly., Disp: 6.6 mL, Rfl: 3    doxycycline (VIBRAMYCIN) 100 MG Cap, Take 100 mg by mouth 2 (two) times daily., Disp: , Rfl:     empagliflozin (JARDIANCE) 25 mg tablet, Take 1 tablet (25 mg total) by mouth once daily., Disp: 90 tablet, Rfl: 2    ENTRESTO  mg per tablet, TAKE 1 TABLET BY MOUTH TWICE DAILY, Disp: 60 tablet, Rfl: 11    ergocalciferol (ERGOCALCIFEROL) 50,000 unit Cap, TAKE 1 CAPSULE BY MOUTH EVERY 7 DAYS, Disp: 12 capsule, Rfl: 0    furosemide (LASIX) 20 MG tablet, Take 1 tablet (20 mg total) by mouth 2 (two) times daily., Disp: 180 tablet, Rfl: 3    glipiZIDE 5 MG TR24, Take 1 tablet (5 mg total) by mouth daily with breakfast., Disp: 90 tablet, Rfl: 2    goserelin (ZOLADEX) 3.6  mg injection, Inject 3.6 mg into the skin every 28 days., Disp: , Rfl:     ibuprofen (ADVIL,MOTRIN) 800 MG tablet, Take 1 tablet (800 mg total) by mouth 2 (two) times daily as needed for Pain., Disp: 45 tablet, Rfl: 2    lancets Misc, To check BG 2 times daily, to use with insurance preferred meter, Disp: 200 each, Rfl: 3    metFORMIN (GLUCOPHAGE-XR) 500 MG ER 24hr tablet, Take 2 tablets (1,000 mg total) by mouth 2 (two) times daily with meals., Disp: 360 tablet, Rfl: 2    naloxone (NARCAN) 4 mg/actuation Spry, 4mg by nasal route as needed for opioid overdose; may repeat every 2-3 minutes in alternating nostrils until medical help arrives. Call 911, Disp: 1 each, Rfl: 11    ondansetron (ZOFRAN-ODT) 8 MG TbDL, Take 1 tablet (8 mg total) by mouth every 8 (eight) hours as needed (nausea and vomiting)., Disp: 30 tablet, Rfl: 2    oxyCODONE-acetaminophen (PERCOCET) 5-325 mg per tablet, Take 1 tablet by mouth every 6 (six) hours as needed for Pain., Disp: 60 each, Rfl: 0    palbociclib (IBRANCE) 100 mg Cap, Take 1 capsule (100 mg) by mouth Monday - Friday, DO NOT TAKE Saturday and Sunday, for 3 weeks on, 1 week off., Disp: 21 capsule, Rfl: 3    pentoxifylline (TRENTAL) 400 mg TbSR, Take 400 mg by mouth 2 (two) times daily., Disp: , Rfl:     potassium chloride (KLOR-CON) 10 MEQ TbSR, TAKE 1 TABLET(10 MEQ) BY MOUTH EVERY NIGHT, Disp: 90 tablet, Rfl: 1    prochlorperazine (COMPAZINE) 5 MG tablet, Take 5 mg by mouth 4 (four) times daily., Disp: , Rfl:     sennosides (SENNA-C ORAL), Take 2 tablets by mouth daily as needed., Disp: , Rfl:     spironolactone (ALDACTONE) 25 MG tablet, Take 1 tablet (25 mg total) by mouth once daily., Disp: 90 tablet, Rfl: 3    tirzepatide 10 mg/0.5 mL PnIj, Inject 10 mg into the skin every 7 days., Disp: 12 Pen, Rfl: 1    tiZANidine (ZANAFLEX) 4 MG tablet, TAKE 1 TABLET(4 MG) BY MOUTH EVERY 8 HOURS, Disp: 45 tablet, Rfl: 1    UNKNOWN TO PATIENT, Calcium, Disp: , Rfl:     venlafaxine (EFFEXOR-XR)  "150 MG Cp24, Take 1 capsule (150 mg total) by mouth once daily., Disp: 90 capsule, Rfl: 1    venlafaxine (EFFEXOR-XR) 75 MG 24 hr capsule, Take 1 capsule (75 mg total) by mouth once daily., Disp: 90 capsule, Rfl: 1    vitamin E 100 UNIT capsule, Take 100 Units by mouth once daily., Disp: , Rfl:     blood-glucose meter kit, To check BG 2 times daily, to use with insurance preferred meter, Disp: 1 each, Rfl: 0    carvediloL (COREG) 12.5 MG tablet, Take 1 tablet (12.5 mg total) by mouth 2 (two) times daily., Disp: 180 tablet, Rfl: 0    magnesium oxide (MAG-OX) 400 mg (241.3 mg magnesium) tablet, Take 1 tablet (400 mg total) by mouth once daily., Disp: 90 tablet, Rfl: 1  No current facility-administered medications for this visit.    Facility-Administered Medications Ordered in Other Visits:     0.9%  NaCl infusion, , Intravenous, Continuous, Esther Coreas MD, New Bag at 10/26/23 1722    mupirocin 2 % ointment, , Nasal, On Call Procedure, Esther Coreas MD, Given at 10/26/23 1614    Review of Systems   Constitutional:  Negative for fever and weight loss.   HENT:  Negative for hearing loss and nosebleeds.    Eyes:  Negative for photophobia and pain.   Respiratory:  Negative for cough and shortness of breath.    Cardiovascular:  Negative for chest pain and leg swelling.   Gastrointestinal:  Negative for abdominal pain and heartburn.   Genitourinary:  Negative for dysuria.   Musculoskeletal:  Negative for myalgias.   Skin:  Negative for rash.   Neurological:  Positive for loss of consciousness. Negative for headaches.   Endo/Heme/Allergies:  Negative for polydipsia.   Psychiatric/Behavioral:  Negative for depression. The patient is not nervous/anxious.        Objective:   BP (!) 127/56   Pulse (!) 9   Ht 5' 4" (1.626 m)   Wt (!) 140.9 kg (310 lb 10.1 oz)   LMP 06/20/2019   SpO2 97%   BMI 53.32 kg/m²   Physical Exam  Constitutional:       General: She is not in acute distress.     Appearance: She is obese. She is " "not toxic-appearing.   Cardiovascular:      Rate and Rhythm: Normal rate and regular rhythm.      Comments: Pulse regular and 96 bpm  JVP not visualized with BMI 53  No leg edema  Lungs CTA  On r/a and comfortable  Pulmonary:      Effort: Pulmonary effort is normal. No respiratory distress.   Abdominal:      General: Abdomen is flat.   Skin:     General: Skin is warm.   Neurological:      Mental Status: She is alert.   Psychiatric:         Mood and Affect: Mood normal.         Labs:     Lab Results   Component Value Date     07/18/2024    K 4.2 07/18/2024     (H) 07/18/2024    CO2 25 07/18/2024    BUN 14 07/18/2024    CREATININE 1.4 07/18/2024    ANIONGAP 7 (L) 07/18/2024     Lab Results   Component Value Date    HGBA1C 6.1 (H) 03/19/2024     Lab Results   Component Value Date    BNP <10 07/17/2024    BNP 18 09/14/2023    BNP 25 10/21/2022       Lab Results   Component Value Date    WBC 5.65 07/18/2024    HGB 9.7 (L) 07/18/2024    HCT 32.1 (L) 07/18/2024     07/18/2024    GRAN 4.0 07/18/2024    GRAN 69.8 07/18/2024     Lab Results   Component Value Date    CHOL 116 (L) 12/13/2023    HDL 43 12/13/2023    LDLCALC 53.8 (L) 12/13/2023    TRIG 96 12/13/2023       Lab Results   Component Value Date     07/18/2024    K 4.2 07/18/2024     (H) 07/18/2024    CO2 25 07/18/2024    BUN 14 07/18/2024    CREATININE 1.4 07/18/2024    ANIONGAP 7 (L) 07/18/2024     Lab Results   Component Value Date    HGBA1C 6.1 (H) 03/19/2024     Lab Results   Component Value Date    BNP <10 07/17/2024    BNP 18 09/14/2023    BNP 25 10/21/2022       Vital Signs:   BP (!) 127/56   Pulse (!) 9   Ht 5' 4" (1.626 m)   Wt (!) 140.9 kg (310 lb 10.1 oz)   LMP 06/20/2019   SpO2 97%   BMI 53.32 kg/m²   Lab Results   Component Value Date    WBC 5.65 07/18/2024    HGB 9.7 (L) 07/18/2024    HCT 32.1 (L) 07/18/2024     07/18/2024    GRAN 4.0 07/18/2024    GRAN 69.8 07/18/2024     Lab Results   Component Value Date    " CHOL 116 (L) 12/13/2023    HDL 43 12/13/2023    LDLCALC 53.8 (L) 12/13/2023    TRIG 96 12/13/2023            Assessment & Plan:     1. Non-ischemic cardiomyopathy    2. Chronic combined systolic and diastolic heart failure    3. Hyperlipidemia, unspecified hyperlipidemia type    4. Benign essential HTN    5. Syncope and collapse      #unexplained syncope  -as mentioned in HPI, declined ICD in the past  -EF < 35% with known cardiomyopathy, referral to EP for ICD  -resuming beta blocker, Mg was 1.6 and takes lasix, will start 400 mg daily MagOx    #chronic HFrEF due to NICMP  -euvolemic at this time, I compared past week CXR with last years and no changes seen, exam and history show no fluid overload at this time  -on full dose entresto, jardiance and aldactone  -SBP now in 120s, will resume coreg at lower dose 12.5 bid, if BP does ok, will incerase to 25 bid (she will send me BP logs in a few days)    #Obesity  -395 lbs in the past, 329 six months ago when saw me, weight 310 lbs now  -continue GLP-1    #HTN  -well controlled    #HLD  -continue current medicine    F/up in 4 months.     Signed:  Zak Rodriguez M.D.  Cardiovascular Fellow PGY-6  [facility] [address] [place], [place]

## 2024-07-26 ENCOUNTER — PATIENT OUTREACH (OUTPATIENT)
Dept: ADMINISTRATIVE | Facility: OTHER | Age: 49
End: 2024-07-26
Payer: MEDICARE

## 2024-07-26 ENCOUNTER — INFUSION (OUTPATIENT)
Dept: INFUSION THERAPY | Facility: HOSPITAL | Age: 49
End: 2024-07-26
Payer: MEDICARE

## 2024-07-26 ENCOUNTER — LAB VISIT (OUTPATIENT)
Dept: LAB | Facility: HOSPITAL | Age: 49
End: 2024-07-26
Payer: MEDICARE

## 2024-07-26 ENCOUNTER — OFFICE VISIT (OUTPATIENT)
Dept: HEMATOLOGY/ONCOLOGY | Facility: CLINIC | Age: 49
End: 2024-07-26
Payer: MEDICARE

## 2024-07-26 VITALS
OXYGEN SATURATION: 98 % | RESPIRATION RATE: 17 BRPM | HEART RATE: 81 BPM | TEMPERATURE: 97 F | SYSTOLIC BLOOD PRESSURE: 100 MMHG | DIASTOLIC BLOOD PRESSURE: 53 MMHG | HEIGHT: 64 IN | WEIGHT: 293 LBS | BODY MASS INDEX: 50.02 KG/M2

## 2024-07-26 DIAGNOSIS — C50.211 MALIGNANT NEOPLASM OF UPPER-INNER QUADRANT OF RIGHT BREAST IN FEMALE, ESTROGEN RECEPTOR POSITIVE: ICD-10-CM

## 2024-07-26 DIAGNOSIS — E11.9 TYPE 2 DIABETES MELLITUS WITHOUT COMPLICATION, WITH LONG-TERM CURRENT USE OF INSULIN: ICD-10-CM

## 2024-07-26 DIAGNOSIS — T45.1X5A ANEMIA ASSOCIATED WITH CHEMOTHERAPY: ICD-10-CM

## 2024-07-26 DIAGNOSIS — Z17.0 MALIGNANT NEOPLASM OF UPPER-INNER QUADRANT OF RIGHT BREAST IN FEMALE, ESTROGEN RECEPTOR POSITIVE: Primary | ICD-10-CM

## 2024-07-26 DIAGNOSIS — G89.3 CANCER ASSOCIATED PAIN: ICD-10-CM

## 2024-07-26 DIAGNOSIS — Z79.4 TYPE 2 DIABETES MELLITUS WITHOUT COMPLICATION, WITH LONG-TERM CURRENT USE OF INSULIN: ICD-10-CM

## 2024-07-26 DIAGNOSIS — Z17.0 MALIGNANT NEOPLASM OF UPPER-INNER QUADRANT OF RIGHT BREAST IN FEMALE, ESTROGEN RECEPTOR POSITIVE: ICD-10-CM

## 2024-07-26 DIAGNOSIS — D64.81 ANEMIA ASSOCIATED WITH CHEMOTHERAPY: ICD-10-CM

## 2024-07-26 DIAGNOSIS — E55.9 VITAMIN D DEFICIENCY: ICD-10-CM

## 2024-07-26 DIAGNOSIS — I10 BENIGN ESSENTIAL HTN: ICD-10-CM

## 2024-07-26 DIAGNOSIS — C79.51 MALIGNANT NEOPLASM METASTATIC TO BONE: Primary | ICD-10-CM

## 2024-07-26 DIAGNOSIS — C79.51 MALIGNANT NEOPLASM METASTATIC TO BONE: ICD-10-CM

## 2024-07-26 DIAGNOSIS — C50.211 MALIGNANT NEOPLASM OF UPPER-INNER QUADRANT OF RIGHT BREAST IN FEMALE, ESTROGEN RECEPTOR POSITIVE: Primary | ICD-10-CM

## 2024-07-26 LAB
ALBUMIN SERPL BCP-MCNC: 3.1 G/DL (ref 3.5–5.2)
ALP SERPL-CCNC: 78 U/L (ref 55–135)
ALT SERPL W/O P-5'-P-CCNC: 15 U/L (ref 10–44)
ANION GAP SERPL CALC-SCNC: 10 MMOL/L (ref 8–16)
AST SERPL-CCNC: 11 U/L (ref 10–40)
BILIRUB SERPL-MCNC: 0.4 MG/DL (ref 0.1–1)
BUN SERPL-MCNC: 10 MG/DL (ref 6–20)
CALCIUM SERPL-MCNC: 10.9 MG/DL (ref 8.7–10.5)
CHLORIDE SERPL-SCNC: 109 MMOL/L (ref 95–110)
CO2 SERPL-SCNC: 22 MMOL/L (ref 23–29)
CREAT SERPL-MCNC: 0.9 MG/DL (ref 0.5–1.4)
ERYTHROCYTE [DISTWIDTH] IN BLOOD BY AUTOMATED COUNT: 17.8 % (ref 11.5–14.5)
EST. GFR  (NO RACE VARIABLE): >60 ML/MIN/1.73 M^2
GLUCOSE SERPL-MCNC: 150 MG/DL (ref 70–110)
HCT VFR BLD AUTO: 34.1 % (ref 37–48.5)
HGB BLD-MCNC: 10.6 G/DL (ref 12–16)
IMM GRANULOCYTES # BLD AUTO: 0 K/UL (ref 0–0.04)
MCH RBC QN AUTO: 29.1 PG (ref 27–31)
MCHC RBC AUTO-ENTMCNC: 31.1 G/DL (ref 32–36)
MCV RBC AUTO: 94 FL (ref 82–98)
NEUTROPHILS # BLD AUTO: 2.9 K/UL (ref 1.8–7.7)
PLATELET # BLD AUTO: 219 K/UL (ref 150–450)
PMV BLD AUTO: 10 FL (ref 9.2–12.9)
POTASSIUM SERPL-SCNC: 4.2 MMOL/L (ref 3.5–5.1)
PROT SERPL-MCNC: 7.1 G/DL (ref 6–8.4)
RBC # BLD AUTO: 3.64 M/UL (ref 4–5.4)
SODIUM SERPL-SCNC: 141 MMOL/L (ref 136–145)
WBC # BLD AUTO: 5.08 K/UL (ref 3.9–12.7)

## 2024-07-26 PROCEDURE — 36415 COLL VENOUS BLD VENIPUNCTURE: CPT | Performed by: NURSE PRACTITIONER

## 2024-07-26 PROCEDURE — 99999 PR PBB SHADOW E&M-EST. PATIENT-LVL V: CPT | Mod: PBBFAC,,, | Performed by: NURSE PRACTITIONER

## 2024-07-26 PROCEDURE — 63600175 PHARM REV CODE 636 W HCPCS: Mod: JZ,JG | Performed by: NURSE PRACTITIONER

## 2024-07-26 PROCEDURE — 96401 CHEMO ANTI-NEOPL SQ/IM: CPT

## 2024-07-26 PROCEDURE — 85027 COMPLETE CBC AUTOMATED: CPT | Performed by: NURSE PRACTITIONER

## 2024-07-26 PROCEDURE — 80053 COMPREHEN METABOLIC PANEL: CPT | Performed by: NURSE PRACTITIONER

## 2024-07-26 RX ADMIN — GOSERELIN ACETATE 3.6 MG: 3.6 IMPLANT SUBCUTANEOUS at 02:07

## 2024-07-26 NOTE — PROGRESS NOTES
CHW - Initial Contact    This Community Health Worker verified  the Social Determinant of Health questionnaire with  MRN [id number] over the phone today.    Pt identified barriers of most importance are: Transportation and food insecurity   Referrals to community agencies completed with patient/caregiver consent outside of [facility] include: Yes  Referrals were put through [facility] - : No  Support and Services: [employer], Food pantries  Other information discussed the patient needs / wants help with: SDOH reviewed with patient. Patient confirmed she needs assistance with utilities, transportation to appointments, and food insecurity. Patient was provides a list of food pantries in her area, and the number for [employer] for clinic appointments. Patient advised to contact [facility] for assistance with utilities, patient agreed.  Patient will be notified once food boxes are available in clinic. Patient denies any additional needs at this time.  Follow up required: No  No future outreach task assigned

## 2024-07-31 ENCOUNTER — TELEPHONE (OUTPATIENT)
Dept: HEMATOLOGY/ONCOLOGY | Facility: CLINIC | Age: 49
End: 2024-07-31
Payer: MEDICARE

## 2024-08-02 ENCOUNTER — PATIENT MESSAGE (OUTPATIENT)
Dept: PALLIATIVE MEDICINE | Facility: CLINIC | Age: 49
End: 2024-08-02
Payer: MEDICARE

## 2024-08-02 ENCOUNTER — PATIENT MESSAGE (OUTPATIENT)
Dept: PSYCHIATRY | Facility: CLINIC | Age: 49
End: 2024-08-02
Payer: MEDICARE

## 2024-08-02 ENCOUNTER — PATIENT MESSAGE (OUTPATIENT)
Dept: HEMATOLOGY/ONCOLOGY | Facility: CLINIC | Age: 49
End: 2024-08-02
Payer: MEDICARE

## 2024-08-02 DIAGNOSIS — G89.3 CANCER ASSOCIATED PAIN: ICD-10-CM

## 2024-08-02 DIAGNOSIS — C50.211 MALIGNANT NEOPLASM OF UPPER-INNER QUADRANT OF RIGHT BREAST IN FEMALE, ESTROGEN RECEPTOR POSITIVE: ICD-10-CM

## 2024-08-02 DIAGNOSIS — Z17.0 MALIGNANT NEOPLASM OF UPPER-INNER QUADRANT OF RIGHT BREAST IN FEMALE, ESTROGEN RECEPTOR POSITIVE: ICD-10-CM

## 2024-08-02 RX ORDER — OXYCODONE AND ACETAMINOPHEN 5; 325 MG/1; MG/1
1 TABLET ORAL EVERY 6 HOURS PRN
Qty: 60 EACH | Refills: 0 | Status: SHIPPED | OUTPATIENT
Start: 2024-08-02

## 2024-08-09 ENCOUNTER — HOSPITAL ENCOUNTER (OUTPATIENT)
Dept: RADIOLOGY | Facility: HOSPITAL | Age: 49
Discharge: HOME | End: 2024-08-09
Attending: NURSE PRACTITIONER
Payer: MEDICARE

## 2024-08-09 DIAGNOSIS — Z17.0 MALIGNANT NEOPLASM OF UPPER-INNER QUADRANT OF RIGHT BREAST IN FEMALE, ESTROGEN RECEPTOR POSITIVE: ICD-10-CM

## 2024-08-09 DIAGNOSIS — C50.211 MALIGNANT NEOPLASM OF UPPER-INNER QUADRANT OF RIGHT BREAST IN FEMALE, ESTROGEN RECEPTOR POSITIVE: ICD-10-CM

## 2024-08-09 DIAGNOSIS — C79.51 MALIGNANT NEOPLASM METASTATIC TO BONE: ICD-10-CM

## 2024-08-09 PROCEDURE — 74177 CT ABD & PELVIS W/CONTRAST: CPT | Mod: 26,,, | Performed by: RADIOLOGY

## 2024-08-09 PROCEDURE — 25500020 PHARM REV CODE 255: Performed by: NURSE PRACTITIONER

## 2024-08-09 PROCEDURE — 74177 CT ABD & PELVIS W/CONTRAST: CPT | Mod: TC

## 2024-08-09 PROCEDURE — 71260 CT THORAX DX C+: CPT | Mod: 26,,, | Performed by: RADIOLOGY

## 2024-08-09 PROCEDURE — A9698 NON-RAD CONTRAST MATERIALNOC: HCPCS | Performed by: NURSE PRACTITIONER

## 2024-08-09 RX ADMIN — IOHEXOL 100 ML: 350 INJECTION, SOLUTION INTRAVENOUS at 05:08

## 2024-08-09 RX ADMIN — BARIUM SULFATE 450 ML: 20 SUSPENSION ORAL at 05:08

## 2024-08-12 DIAGNOSIS — I50.42 CHRONIC COMBINED SYSTOLIC AND DIASTOLIC HEART FAILURE: ICD-10-CM

## 2024-08-14 ENCOUNTER — PATIENT MESSAGE (OUTPATIENT)
Dept: CARDIOLOGY | Facility: CLINIC | Age: 49
End: 2024-08-14
Payer: MEDICARE

## 2024-08-15 ENCOUNTER — PATIENT MESSAGE (OUTPATIENT)
Dept: ENDOCRINOLOGY | Facility: CLINIC | Age: 49
End: 2024-08-15
Payer: MEDICARE

## 2024-08-15 DIAGNOSIS — I50.42 CHRONIC COMBINED SYSTOLIC AND DIASTOLIC HEART FAILURE: ICD-10-CM

## 2024-08-15 RX ORDER — SACUBITRIL AND VALSARTAN 97; 103 MG/1; MG/1
1 TABLET, FILM COATED ORAL 2 TIMES DAILY
Qty: 60 TABLET | Refills: 11 | Status: SHIPPED | OUTPATIENT
Start: 2024-08-15

## 2024-08-16 ENCOUNTER — TELEPHONE (OUTPATIENT)
Dept: RESEARCH | Facility: HOSPITAL | Age: 49
End: 2024-08-16
Payer: MEDICARE

## 2024-08-19 ENCOUNTER — TELEPHONE (OUTPATIENT)
Dept: PALLIATIVE MEDICINE | Facility: CLINIC | Age: 49
End: 2024-08-19
Payer: MEDICARE

## 2024-08-22 NOTE — PROGRESS NOTES
Subjective     HPI    I had the pleasure of seeing Kristopher Elmore in consultation at your request for the evaluation of HFrEF. She is a 48F with stage IV breast cancer diagnosed 9/2019, (Arimidex, Ibrance--stable disease over past couple years based on q3 month imaging), HFrEF, HTN, HLD, DM2, obesity. Recent syncope event without prodrome.while walking to school.  Wearing LifeVest.     Pt currently on GDMT including entresto and coreg. NYHA Class II HF sx.    Echo on 7/18/2024 showed EF 30-35%. Echo on 2/2/2024 showed EF 30%. Echo in 11/2022 showed EF 35%. Echo in 5/2022 showed EF 40%. Echo in 3/2022 showed EF 55%.    My interpretation of today's ECG is sinus rhythm with QRSd 106 ms.    Review of Systems   Constitutional: Positive for malaise/fatigue. Negative for decreased appetite, weight gain and weight loss.   HENT:  Negative for sore throat.    Eyes:  Negative for blurred vision.   Cardiovascular:  Negative for chest pain, dyspnea on exertion, irregular heartbeat, leg swelling, near-syncope, orthopnea, palpitations, paroxysmal nocturnal dyspnea and syncope.   Respiratory:  Negative for shortness of breath.    Skin:  Negative for rash.   Musculoskeletal:  Negative for arthritis.   Gastrointestinal:  Negative for abdominal pain.   Neurological:  Negative for focal weakness.   Psychiatric/Behavioral:  Negative for altered mental status.           Objective     Physical Exam  Vitals and nursing note reviewed.   Constitutional:       General: She is not in acute distress.     Appearance: She is well-developed.   HENT:      Head: Normocephalic and atraumatic.   Eyes:      General: No scleral icterus.     Conjunctiva/sclera: Conjunctivae normal.      Pupils: Pupils are equal, round, and reactive to light.   Neck:      Thyroid: No thyromegaly.      Vascular: No JVD.   Cardiovascular:      Rate and Rhythm: Normal rate and regular rhythm.      Pulses: Intact distal pulses.      Heart sounds: Normal heart  sounds. No murmur heard.     No friction rub. No gallop.   Pulmonary:      Effort: Pulmonary effort is normal. No respiratory distress.      Breath sounds: Normal breath sounds.   Abdominal:      General: Bowel sounds are normal. There is no distension.      Palpations: Abdomen is soft.   Musculoskeletal:      Cervical back: Normal range of motion and neck supple.   Skin:     General: Skin is warm and dry.   Neurological:      Mental Status: She is alert and oriented to person, place, and time.   Psychiatric:         Behavior: Behavior normal.            Assessment and Plan     1. Chronic combined systolic and diastolic heart failure    2. Mixed hyperlipidemia    3. Malignant neoplasm of upper-inner quadrant of right breast in female, estrogen receptor positive    4. ZABRINA (obstructive sleep apnea)        Plan:    In summary, Kristopher Elmore is a 48F with HFrEF, NYHA Class II HF sx, on GDMT, recent episode of syncope.     Discussed risks, benefits, indications, alternatives to dual chamber ICD implantation. [place] shared decision making tool utilized. After considering her options she has agreed to proceed.    Thank you for allowing me to participate in the care of this patient. Please do not hesitate to call me with any questions or concerns.

## 2024-08-23 ENCOUNTER — OFFICE VISIT (OUTPATIENT)
Dept: HEMATOLOGY/ONCOLOGY | Facility: CLINIC | Age: 49
End: 2024-08-23
Payer: MEDICARE

## 2024-08-23 ENCOUNTER — INFUSION (OUTPATIENT)
Dept: INFUSION THERAPY | Facility: HOSPITAL | Age: 49
End: 2024-08-23
Payer: MEDICARE

## 2024-08-23 ENCOUNTER — LAB VISIT (OUTPATIENT)
Dept: LAB | Facility: HOSPITAL | Age: 49
End: 2024-08-23
Attending: INTERNAL MEDICINE
Payer: MEDICARE

## 2024-08-23 ENCOUNTER — TELEPHONE (OUTPATIENT)
Dept: ENDOCRINOLOGY | Facility: CLINIC | Age: 49
End: 2024-08-23
Payer: MEDICARE

## 2024-08-23 ENCOUNTER — TELEPHONE (OUTPATIENT)
Dept: ELECTROPHYSIOLOGY | Facility: CLINIC | Age: 49
End: 2024-08-23
Payer: MEDICARE

## 2024-08-23 VITALS
OXYGEN SATURATION: 100 % | RESPIRATION RATE: 17 BRPM | BODY MASS INDEX: 50.02 KG/M2 | HEIGHT: 64 IN | HEART RATE: 89 BPM | TEMPERATURE: 97 F | DIASTOLIC BLOOD PRESSURE: 59 MMHG | SYSTOLIC BLOOD PRESSURE: 120 MMHG | WEIGHT: 293 LBS

## 2024-08-23 DIAGNOSIS — C79.51 MALIGNANT NEOPLASM METASTATIC TO BONE: ICD-10-CM

## 2024-08-23 DIAGNOSIS — E11.9 TYPE 2 DIABETES MELLITUS WITHOUT COMPLICATION, WITH LONG-TERM CURRENT USE OF INSULIN: ICD-10-CM

## 2024-08-23 DIAGNOSIS — C50.211 MALIGNANT NEOPLASM OF UPPER-INNER QUADRANT OF RIGHT BREAST IN FEMALE, ESTROGEN RECEPTOR POSITIVE: ICD-10-CM

## 2024-08-23 DIAGNOSIS — C79.51 MALIGNANT NEOPLASM METASTATIC TO BONE: Primary | ICD-10-CM

## 2024-08-23 DIAGNOSIS — Z79.4 TYPE 2 DIABETES MELLITUS WITHOUT COMPLICATION, WITH LONG-TERM CURRENT USE OF INSULIN: ICD-10-CM

## 2024-08-23 DIAGNOSIS — C50.211 MALIGNANT NEOPLASM OF UPPER-INNER QUADRANT OF RIGHT BREAST IN FEMALE, ESTROGEN RECEPTOR POSITIVE: Primary | ICD-10-CM

## 2024-08-23 DIAGNOSIS — E11.29 TYPE 2 DIABETES MELLITUS WITH MICROALBUMINURIA, WITHOUT LONG-TERM CURRENT USE OF INSULIN: ICD-10-CM

## 2024-08-23 DIAGNOSIS — T45.1X5A ANEMIA ASSOCIATED WITH CHEMOTHERAPY: ICD-10-CM

## 2024-08-23 DIAGNOSIS — Z17.0 MALIGNANT NEOPLASM OF UPPER-INNER QUADRANT OF RIGHT BREAST IN FEMALE, ESTROGEN RECEPTOR POSITIVE: ICD-10-CM

## 2024-08-23 DIAGNOSIS — R80.9 TYPE 2 DIABETES MELLITUS WITH MICROALBUMINURIA, WITHOUT LONG-TERM CURRENT USE OF INSULIN: ICD-10-CM

## 2024-08-23 DIAGNOSIS — D64.81 ANEMIA ASSOCIATED WITH CHEMOTHERAPY: ICD-10-CM

## 2024-08-23 DIAGNOSIS — G89.3 CANCER ASSOCIATED PAIN: ICD-10-CM

## 2024-08-23 DIAGNOSIS — Z17.0 MALIGNANT NEOPLASM OF UPPER-INNER QUADRANT OF RIGHT BREAST IN FEMALE, ESTROGEN RECEPTOR POSITIVE: Primary | ICD-10-CM

## 2024-08-23 LAB
ALBUMIN SERPL BCP-MCNC: 3.3 G/DL (ref 3.5–5.2)
ALP SERPL-CCNC: 76 U/L (ref 55–135)
ALT SERPL W/O P-5'-P-CCNC: 18 U/L (ref 10–44)
ANION GAP SERPL CALC-SCNC: 10 MMOL/L (ref 8–16)
AST SERPL-CCNC: 11 U/L (ref 10–40)
BASOPHILS # BLD AUTO: 0.08 K/UL (ref 0–0.2)
BASOPHILS NFR BLD: 1.6 % (ref 0–1.9)
BILIRUB SERPL-MCNC: 0.3 MG/DL (ref 0.1–1)
BUN SERPL-MCNC: 10 MG/DL (ref 6–20)
CALCIUM SERPL-MCNC: 10.4 MG/DL (ref 8.7–10.5)
CHLORIDE SERPL-SCNC: 110 MMOL/L (ref 95–110)
CO2 SERPL-SCNC: 23 MMOL/L (ref 23–29)
CREAT SERPL-MCNC: 0.9 MG/DL (ref 0.5–1.4)
DIFFERENTIAL METHOD BLD: ABNORMAL
EOSINOPHIL # BLD AUTO: 0.1 K/UL (ref 0–0.5)
EOSINOPHIL NFR BLD: 1 % (ref 0–8)
ERYTHROCYTE [DISTWIDTH] IN BLOOD BY AUTOMATED COUNT: 17.4 % (ref 11.5–14.5)
EST. GFR  (NO RACE VARIABLE): >60 ML/MIN/1.73 M^2
ESTIMATED AVG GLUCOSE: 120 MG/DL (ref 68–131)
GLUCOSE SERPL-MCNC: 100 MG/DL (ref 70–110)
HBA1C MFR BLD: 5.8 % (ref 4–5.6)
HCT VFR BLD AUTO: 32.2 % (ref 37–48.5)
HGB BLD-MCNC: 10.1 G/DL (ref 12–16)
IMM GRANULOCYTES # BLD AUTO: 0.01 K/UL (ref 0–0.04)
IMM GRANULOCYTES NFR BLD AUTO: 0.2 % (ref 0–0.5)
LYMPHOCYTES # BLD AUTO: 1.5 K/UL (ref 1–4.8)
LYMPHOCYTES NFR BLD: 29.4 % (ref 18–48)
MCH RBC QN AUTO: 28.9 PG (ref 27–31)
MCHC RBC AUTO-ENTMCNC: 31.4 G/DL (ref 32–36)
MCV RBC AUTO: 92 FL (ref 82–98)
MONOCYTES # BLD AUTO: 0.5 K/UL (ref 0.3–1)
MONOCYTES NFR BLD: 9.4 % (ref 4–15)
NEUTROPHILS # BLD AUTO: 3 K/UL (ref 1.8–7.7)
NEUTROPHILS NFR BLD: 58.4 % (ref 38–73)
NRBC BLD-RTO: 0 /100 WBC
PLATELET # BLD AUTO: 254 K/UL (ref 150–450)
PMV BLD AUTO: 10.5 FL (ref 9.2–12.9)
POTASSIUM SERPL-SCNC: 3.6 MMOL/L (ref 3.5–5.1)
PROT SERPL-MCNC: 7.1 G/DL (ref 6–8.4)
RBC # BLD AUTO: 3.5 M/UL (ref 4–5.4)
SODIUM SERPL-SCNC: 143 MMOL/L (ref 136–145)
WBC # BLD AUTO: 5.13 K/UL (ref 3.9–12.7)

## 2024-08-23 PROCEDURE — 36415 COLL VENOUS BLD VENIPUNCTURE: CPT | Performed by: INTERNAL MEDICINE

## 2024-08-23 PROCEDURE — 63600175 PHARM REV CODE 636 W HCPCS: Mod: JZ,JG | Performed by: NURSE PRACTITIONER

## 2024-08-23 PROCEDURE — 96402 CHEMO HORMON ANTINEOPL SQ/IM: CPT

## 2024-08-23 PROCEDURE — 80053 COMPREHEN METABOLIC PANEL: CPT | Performed by: INTERNAL MEDICINE

## 2024-08-23 PROCEDURE — 85025 COMPLETE CBC W/AUTO DIFF WBC: CPT | Performed by: INTERNAL MEDICINE

## 2024-08-23 PROCEDURE — 83036 HEMOGLOBIN GLYCOSYLATED A1C: CPT | Performed by: NURSE PRACTITIONER

## 2024-08-23 PROCEDURE — 99999 PR PBB SHADOW E&M-EST. PATIENT-LVL IV: CPT | Mod: PBBFAC,,, | Performed by: NURSE PRACTITIONER

## 2024-08-23 RX ADMIN — GOSERELIN ACETATE 3.6 MG: 3.6 IMPLANT SUBCUTANEOUS at 01:08

## 2024-08-23 NOTE — PROGRESS NOTES
=Subjective     Patient ID: Kristopher Elmore is a 48 y.o. female.    Chief Complaint: No chief complaint on file.    HPI Presents for follow up      Currently on Arimidex and Ibrance (dose adjusted due to anemia to 100 mg M-F, off Sat&Sun)   Zoladex monthly   Xgeva on hold due to osteonecrosis.   Most recent scans - see below.     Here for follow up  Has an appt with cards on Monday to set appt for defibrillator   She continues to wear potable life vest. Its heavy and causing minor back pain. She takes tizanidine at night to relax her.   No other new issues or complaints.   She will reschedule debridement of her jaw.     8/9/2024 CT chest/abd/pelvis  FINDINGS:  LINES/TUBES:   shunt with stable course terminating in the lower abdomen..  SOFT TISSUES: Similar appearance of prominent right axillary and subpectoral nodes.  For example stable 1.0 cm right subpectoral node (series 2, image 35).  Right axillary hakeem conglomerate appears slightly more prominent, with a lateral node now measuring 1.3 cm in short axis (series 2, image 44), previously 1.0 cm per my measurement.  Hyperdense material within this hakeem conglomerate likely relates to prior biopsy.  No left axillary or subpectoral adenopathy.  Visualized thyroid gland is unremarkable.  HEART AND MEDIASTINUM: No mediastinal or hilar lymphadenopathy. Heart is normal size. No pericardial effusion. Left-sided aortic arch. Main pulmonary artery is normal in size. No intraluminal filling defects within the central pulmonary arterial system to suggest central pulmonary thromboembolism on this non-dedicated study.  LUNGS, PLEURA, AND AIRWAYS: Airways are patent. No focal consolidation, pleural effusion, or pneumothorax.  New nonspecific 0.4 cm solid nodule in the right lower lobe (series 6, image 328).  Stable subsegmental atelectasis versus scarring in the left lung base.  HEPATOBILIARY: Liver is normal size. Subcentimeter hypodensity in the right hepatic lobe  "(series 3, image 58), too small to characterize but unchanged..  No biliary ductal dilatation. Normal gallbladder.  PANCREAS: No focal masses or ductal dilatation.  SPLEEN: Normal size.  ADRENALS: No adrenal nodules.  KIDNEYS/URETERS: Kidneys are normal size and enhance symmetrically.  Unchanged simple cyst in the middle pole the right kidney.  No hydronephrosis, stones, or solid mass lesions.Ureters are unremarkable  PELVIC ORGANS/BLADDER: No bladder wall thickening.  Calcified fibroid in the uterine fundus, unchanged.  No adnexal masses.  PERITONEUM / RETROPERITONEUM: No free air or fluid.  LYMPH NODES: No abdominopelvic lymphadenopathy.  VESSELS: Aorta maintains normal course and caliber.  No atherosclerosis.  Portal venous system is patent.  GI TRACT: Stomach and duodenum are unremarkable. No evidence of bowel obstruction or inflammation.  Appendix is not definitely visualized.  BONES: Stable mixed lytic and sclerotic lesions of the spine, sternum, pelvis, and left humeral head.  No new lesions.  No acute fractures.  Impression:  1. Minimally increased size of previously biopsied hakeem conglomerate in the right axilla.  Otherwise, right axillary and chest wall lymph nodes appear stable.  2. New 0.4 cm solid pulmonary nodule in the right lower lobe, nonspecific.  Attention on follow-up.  3. Stable osseous metastatic disease.  No new osseous lesions.  4. Additional findings as above.             Previously,   Hospital discharge course 7/2024: "Patient afebrile, HR of 75, RR of 20, BP of 98/55, satting 98% on RA. Workup in the ED included CBC, CMP, D-dimer, BNP, troponin, COVID test, influenza test, UA, urine creatinine, CXR. Workup revealed RBC of 3.68, H/H of 10.6/34, chloride of 112, CO2 of 20, albumin of 2.9. UA with 1+ protein and 4+ glucose. Remainder of labs fairly unremarkable. CXR showed no evidence of acute cardiopulmonary disease.       Consulted cards: orthostatic BP unremarkable. Echo showed   Left " "Ventricle: The left ventricle is mildly dilated. Mildly increased wall thickness. There is concentric hypertrophy. There is moderately reduced systolic function with a visually estimated ejection fraction of 30 - 35%. Grade I diastolic dysfunction.  Right Ventricle: Normal right ventricular cavity size. Systolic function is normal.  Left Atrium: Left atrium is mildly dilated.  Pulmonary Artery: The estimated pulmonary artery systolic pressure is 12 mmHg.  IVC/SVC: Normal venous pressure at 3 mmHg. Ordered left-vest can be placed outpatient per Dr. Collins. Holter monitoring outpatient. F/u with primary cardiologist."           Diagnosis:  1. Stage IV ([id number]) invasive ductal carcinoma of right breast, upper inner quadrant, ER %, LA neg, Her2 neg, Grade 3, multifocal with one lesion appearing more aggressive (Ki67 80%), large LN +, bone mets.   Ibrance dose adjusted with cytopenias   Oncologic History:   Presentation   - 9/11/19 - Screening mammogram showed multiple right breast masses lower inner quadrant   - 9/13/19 - Diagnostic mammogram and US showed a right breast, 3:00 position mass, 2 CFN measuring 17 mm x 16 mm x 14 mm. There 2 smaller adjacent masses towards the nipple   - 9/19/19 - Biopsy -   A. Right breast subareolar: Grade 3 IDC, estrogen receptor 80%, progesterone receptor 0%, Her2 dago neg, Ki67 80%.   B. Right breast mass 3:00: Grade 3 IDC with papillary features, estrogen receptor 100%, progesterone receptor 0%, Her2 dago 1+, Ki67 30%.   - 9/26/19: US right axilla with abnormal lymphadenopathy measuring 4.3 x 2.8 cm with biopsy + for metastatic breast cancer.   Surgery consultation with Dr Ferris on 9/25/19   - 9/25/19 - Genetics [facility] [email or web address] [facility]ACAnalysis pending; VUS pending   Medical oncology consultation on 10/7/19   * 10/8/19 - CT C/A/P with several lytic lesions in thoracic and lumbar spine, iliac bones, left scapula in addition to 3 x 4.5 cm right axillary node and 2.1 cm right " "breast mass. Bone scan negative.   * 10/31/19 - started Ibrance, Arimidex, Zoladex; plan for Xgeva.   * 11/12/19 STRATA without targetable mutations.   * 12/16/19 - Bone biopsy + for met disease (initially read as negative, but we treated as metastatic disease given high suspicion).   * MRI brain: "Partially empty sella. Question bilateral globe proptosis. Otherwise unremarkable MRI brain as detailed above specifically without evidence for intracranial enhancing lesion to suggest metastatic disease."   * 4/22/2020 PET - disease improvement. 8/2020 PET with disease improvement.   * 9/1/20 - Palliative RT to L1-L4   Of note, * Xgeva monthly - we had been waiting on dental clearance, but she has been unable to get into dentistry and is accepting of risks. Has no active dental disease.   PET scan 4/22/2021:   FINDINGS:   Quality of the study: Mildly degraded due to patient's large body habitus and skin abutting the gantry.   In the head and neck, there are no hypermetabolic lesions worrisome for malignancy. There are no hypermetabolic mucosal lesions, and there are no pathologically enlarged or hypermetabolic lymph nodes.   In the chest, there are no hypermetabolic lesions worrisome for malignancy.   Stable CT appearance of a level 1 right axillary lymph node measuring 1.3 cm in short axis with normal background radiotracer uptake. Previously with SUV max of 2.1.   There are no concerning pulmonary nodules or masses, and there are no pathologically enlarged or hypermetabolic lymph nodes.   In the abdomen and pelvis, there is physiologic tracer distribution within the abdominal organs and excretion into the genitourinary system.   In the bones, there are stable lytic lesions throughout the lumbar spine and pelvis and additional stable sclerotic lesions in the sternum and T3 vertebral body. No associated focal abnormal increased radiotracer uptake. Findings likely represent treated disease.   Impression:   Interval " decreased uptake within a prominent right axillary lymph node, now with normal background uptake. No new focal abnormal uptake.   Stable lytic and sclerotic lesions without focal abnormal uptake. Findings are compatible with treated metastasis.   8/25/2021 MRI brain   Impression:   No significant change from prior specifically without evidence for enhancing lesion to suggest intracranial metastatic disease.   Continued partially empty sella, intracranial hypertension to be included in differential in the appropriate clinical setting. Clinical correlation and further evaluation as warranted.   10/14/2021 MRI thoracic/lumbar spine:   Impression:   Patient history of metastatic breast cancer.   Bone lesions at T10, L2, L3, and right iliac wing, as above, concerning for metastatic disease. No pathologic fracture, soft tissue component, or epidural extension identified.   10/25/2021 CT chest/abd/pelvis:   Impression:   1. Stable metastasis of the spine   No evidence of intra-abdominal or intrathoracic metastatic disease   2. Stable prominent right axillary lymph node.   3. Additional findings are detailed above.   Currently on Arimidex and Ibrance   Zoladex and Xgeva monthly   - 2/2/2022 Bone scan:   FINDINGS:   No focal abnormal uptake suggestive of osseous metastases. There is diffusely increased uptake in the calvarium in keeping with hyperostosis. There is degenerative type uptake most prominent in the bilateral shoulders and knees. The focal uptake previously described in the distal right femur is not visualized. There is otherwise physiologic distribution of the radiopharmaceutical throughout the skeleton.   There is normal uptake in the genitourinary system and soft tissues.   Impression:   There is no scintigraphic evidence of osteoblastic metastatic disease.   Nonspecific uptake in the distal right femur has resolved.   Diffuse cranial hyperostosis and other findings as above.   - 2/1/2022 CT C/A/P:   FINDINGS:    CHEST   Support tubes and lines: None.   Aorta: Normal caliber.   Heart: Normal size..   Coronary arteries: No calcifications.   Pericardium: Normal. No effusion, thickening, or calcification.   Central pulmonary arteries: Normal caliber.   Base of neck/thyroid: Normal.   Lymph nodes: No supraclavicular, axillary, internal mammary, mediastinal or hilar lymphadenopathy.   Esophagus: Normal.   Pleura: No effusion, thickening or calcification.   Body wall: Unremarkable.   Airways: Normal.   Lungs: Clear without focal or diffuse abnormality.   Bones: Sclerotic lesions are seen throughout the spine as well as in the sternum, not substantially changed from 10/25/2021   ABDOMEN/PELVIS   Liver: Unremarkable   Gallbladder/bile ducts: Unremarkable. No intra or extrahepatic biliary ductal dilatation   Pancreas: Unremarkable.   Spleen: Unremarkable.   Adrenals: Unremarkable.   Kidneys: Simple cyst in the right kidney is unchanged   Lymph nodes: No abdominal or pelvic lymphadenopathy.   Bowel and mesentery: Unremarkable.   Abdominal aorta: Unremarkable.   Inferior vena cava: Unremarkable.   Free fluid or free air: None.   Pelvis: Unremarkable.   Urinary bladder: Unremarkable.   Body wall: Unremarkable.   Bones: Sclerotic lesions seen in the spine are unchanged.   Impression:   1. No evidence of new recurrent or metastatic disease.   2. Sclerotic lesions seen in the spine and sternum, unchanged when compared to 10/25/2021.      Genetic testing 3/23/2023                     Review of Systems   Constitutional:         See above   All other systems reviewed and are negative.         Objective     Physical Exam  Vitals and nursing note reviewed.   Constitutional:       General: She is not in acute distress.     Appearance: Normal appearance. She is well-developed. She is obese.      Comments: Presents in wheelchair with life vest in place   HENT:      Head: Normocephalic.      Mouth/Throat:      Pharynx: No oropharyngeal exudate.    Eyes:      Pupils: Pupils are equal, round, and reactive to light.   Cardiovascular:      Rate and Rhythm: Normal rate and regular rhythm.      Heart sounds: Normal heart sounds.   Pulmonary:      Effort: Pulmonary effort is normal.      Breath sounds: Normal breath sounds.   Abdominal:      General: Bowel sounds are normal. There is no distension.      Palpations: Abdomen is soft.      Tenderness: There is no abdominal tenderness.   Musculoskeletal:      Cervical back: Normal range of motion.   Skin:     General: Skin is warm and dry.      Findings: No rash.   Neurological:      Mental Status: She is alert and oriented to person, place, and time.   Psychiatric:         Behavior: Behavior normal.          Assessment and Plan     1. Malignant neoplasm of upper-inner quadrant of right breast in female, estrogen receptor positive    2. Bone metastases  Overview:  IMO Regualtory Update 4/1/23      3. Cancer associated pain    4. Anemia associated with chemotherapy    5. Type 2 diabetes mellitus without complication, with long-term current use of insulin  Overview:  A1c 8.5 11/3/2021. Cont Glipizide 10 mg BID. Sending rx for Trulicity to [facility] pharmacy with pharmacy assistance referral. Podiatry referral per pt request      Other orders  -     Cancel: goserelin (ZOLADEX) injection 3.6 mg          Overall doing well and clinically stable.   CT scans reviewed and overall appear stable. Will repeat in 3 months.   Continue Arimidex and Ibrance (dose adjusted due to anemia to 100 mg M-F, off Sat&Sun x 3 weeks, off 1 week)   Zoladex today and every 4 weeks.   Xgeva- hold with osteonecrosis- seeing dental and planning debridement      Labs appropriate to proceed  CMP pending.   Monitor anemia  Monitor for infectious s/s          Route Chart for Scheduling    Med Onc Chart Routing      Follow up with physician 4 weeks.   Follow up with [facility]    Infusion scheduling note    Injection scheduling note zoladex monthly   Labs CBC  and CMP   Scheduling:  Preferred lab:  Lab interval:     Imaging    Pharmacy appointment    Other referrals                Treatment Plan Information   OP ANASTROZOLE PALBOCICLIB Q4W   Phyllis Lei MD   Upcoming Treatment Dates - OP ANASTROZOLE PALBOCICLIB Q4W    No upcoming days in selected categories.    Supportive Plan Information  OP BREAST GOSERELIN & DENOSUMAB Q4W   Phyllis Lei MD   Upcoming Treatment Dates - OP BREAST GOSERELIN & DENOSUMAB Q4W    12/14/3498       Chemotherapy       goserelin (ZOLADEX) injection 3.6 mg  6/13/3990       Chemotherapy       goserelin (ZOLADEX) injection 3.6 mg         Patient is in agreement with the proposed treatment plan. All questions were answered to the patient's satisfaction. Pt knows to call clinic for any new or worsening symptoms and if anything is needed before the next clinic visit.    Alfredo Bhatt, MSN, APRN, FNP-C  Nurse Practitioner to Dr. Phyllis Lei  [facility]  [facility]  Hematology & Medical Oncology  [address] [place] [id number]  ph. [phone] ext [phone]  Fax. [phone]              MDM includes  :    - acute or chronic illness or injury that poses a threat to life or bodily function  - Review of prior external notes from unique source  - Independent review and explanation of 3 results from unique tests  - Discussion of management and ordering 2 unique tests  - Extensive discussion of treatment and management  - Prescription drug management  - Drug therapy requiring intensive monitoring for toxicity

## 2024-08-23 NOTE — TELEPHONE ENCOUNTER
----- Message from Yaneth Giraldo NP sent at 8/23/2024  1:30 PM CDT -----  A1c is down to 5.8%. please schedule a f/u appt. You may be able to reduce your medications.

## 2024-08-26 ENCOUNTER — PATIENT MESSAGE (OUTPATIENT)
Dept: ELECTROPHYSIOLOGY | Facility: CLINIC | Age: 49
End: 2024-08-26

## 2024-08-26 ENCOUNTER — TELEPHONE (OUTPATIENT)
Dept: PSYCHIATRY | Facility: CLINIC | Age: 49
End: 2024-08-26
Payer: MEDICARE

## 2024-08-26 ENCOUNTER — OFFICE VISIT (OUTPATIENT)
Dept: ELECTROPHYSIOLOGY | Facility: CLINIC | Age: 49
End: 2024-08-26
Payer: MEDICARE

## 2024-08-26 ENCOUNTER — ANESTHESIA EVENT (OUTPATIENT)
Dept: MEDSURG UNIT | Facility: HOSPITAL | Age: 49
End: 2024-08-26
Payer: MEDICARE

## 2024-08-26 ENCOUNTER — TELEPHONE (OUTPATIENT)
Dept: CARDIOLOGY | Facility: HOSPITAL | Age: 49
End: 2024-08-26
Payer: MEDICARE

## 2024-08-26 ENCOUNTER — HOSPITAL ENCOUNTER (OUTPATIENT)
Dept: CARDIOLOGY | Facility: CLINIC | Age: 49
Discharge: HOME | End: 2024-08-26
Payer: MEDICARE

## 2024-08-26 VITALS
DIASTOLIC BLOOD PRESSURE: 58 MMHG | SYSTOLIC BLOOD PRESSURE: 122 MMHG | HEIGHT: 64 IN | HEART RATE: 77 BPM | BODY MASS INDEX: 50.02 KG/M2 | WEIGHT: 293 LBS

## 2024-08-26 DIAGNOSIS — I50.42 CHRONIC COMBINED SYSTOLIC AND DIASTOLIC HEART FAILURE: ICD-10-CM

## 2024-08-26 DIAGNOSIS — Z17.0 MALIGNANT NEOPLASM OF UPPER-INNER QUADRANT OF RIGHT BREAST IN FEMALE, ESTROGEN RECEPTOR POSITIVE: ICD-10-CM

## 2024-08-26 DIAGNOSIS — R55 SYNCOPE AND COLLAPSE: ICD-10-CM

## 2024-08-26 DIAGNOSIS — R55 SYNCOPE, UNSPECIFIED SYNCOPE TYPE: ICD-10-CM

## 2024-08-26 DIAGNOSIS — E78.2 MIXED HYPERLIPIDEMIA: ICD-10-CM

## 2024-08-26 DIAGNOSIS — I42.8 NON-ISCHEMIC CARDIOMYOPATHY: Primary | ICD-10-CM

## 2024-08-26 DIAGNOSIS — G47.33 OSA (OBSTRUCTIVE SLEEP APNEA): ICD-10-CM

## 2024-08-26 DIAGNOSIS — C50.211 MALIGNANT NEOPLASM OF UPPER-INNER QUADRANT OF RIGHT BREAST IN FEMALE, ESTROGEN RECEPTOR POSITIVE: ICD-10-CM

## 2024-08-26 DIAGNOSIS — I50.22 HEART FAILURE WITH MILDLY REDUCED EJECTION FRACTION: ICD-10-CM

## 2024-08-26 DIAGNOSIS — I50.42 CHRONIC COMBINED SYSTOLIC AND DIASTOLIC HEART FAILURE: Primary | ICD-10-CM

## 2024-08-26 DIAGNOSIS — I42.8 NON-ISCHEMIC CARDIOMYOPATHY: ICD-10-CM

## 2024-08-26 LAB
OHS QRS DURATION: 96 MS
OHS QTC CALCULATION: 452 MS

## 2024-08-26 PROCEDURE — 3078F DIAST BP <80 MM HG: CPT | Mod: CPTII,S$GLB,, | Performed by: INTERNAL MEDICINE

## 2024-08-26 PROCEDURE — 99999 PR PBB SHADOW E&M-EST. PATIENT-LVL II: CPT | Mod: PBBFAC,,, | Performed by: INTERNAL MEDICINE

## 2024-08-26 PROCEDURE — 3044F HG A1C LEVEL LT 7.0%: CPT | Mod: CPTII,S$GLB,, | Performed by: INTERNAL MEDICINE

## 2024-08-26 PROCEDURE — 3008F BODY MASS INDEX DOCD: CPT | Mod: CPTII,S$GLB,, | Performed by: INTERNAL MEDICINE

## 2024-08-26 PROCEDURE — 93005 ELECTROCARDIOGRAM TRACING: CPT | Mod: S$GLB,,, | Performed by: INTERNAL MEDICINE

## 2024-08-26 PROCEDURE — 93010 ELECTROCARDIOGRAM REPORT: CPT | Mod: S$GLB,,, | Performed by: INTERNAL MEDICINE

## 2024-08-26 PROCEDURE — 99205 OFFICE O/P NEW HI 60 MIN: CPT | Mod: 57,S$GLB,, | Performed by: INTERNAL MEDICINE

## 2024-08-26 PROCEDURE — 4010F ACE/ARB THERAPY RXD/TAKEN: CPT | Mod: CPTII,S$GLB,, | Performed by: INTERNAL MEDICINE

## 2024-08-26 PROCEDURE — 3074F SYST BP LT 130 MM HG: CPT | Mod: CPTII,S$GLB,, | Performed by: INTERNAL MEDICINE

## 2024-08-26 NOTE — TELEPHONE ENCOUNTER
HF GDMT    Called patient, she is on coreg 12.5 bid, she is saying DBP is runnings 50s-60s at home /58 in EP office today  Won't up her BB pre operatively at this time  Will call her again next week, once ICD is done tomorrow, plan to eventually increase coreg to 25 bid

## 2024-08-27 ENCOUNTER — HOSPITAL ENCOUNTER (OUTPATIENT)
Facility: HOSPITAL | Age: 49
Discharge: HOME | End: 2024-08-27
Attending: INTERNAL MEDICINE | Admitting: INTERNAL MEDICINE
Payer: MEDICARE

## 2024-08-27 ENCOUNTER — ANESTHESIA (OUTPATIENT)
Dept: MEDSURG UNIT | Facility: HOSPITAL | Age: 49
End: 2024-08-27
Payer: MEDICARE

## 2024-08-27 VITALS
HEART RATE: 75 BPM | WEIGHT: 293 LBS | OXYGEN SATURATION: 95 % | TEMPERATURE: 98 F | DIASTOLIC BLOOD PRESSURE: 69 MMHG | SYSTOLIC BLOOD PRESSURE: 104 MMHG | BODY MASS INDEX: 50.02 KG/M2 | HEIGHT: 64 IN | RESPIRATION RATE: 19 BRPM

## 2024-08-27 DIAGNOSIS — I42.8 NON-ISCHEMIC CARDIOMYOPATHY: ICD-10-CM

## 2024-08-27 DIAGNOSIS — I50.22 HEART FAILURE WITH MILDLY REDUCED EJECTION FRACTION: ICD-10-CM

## 2024-08-27 DIAGNOSIS — R55 SYNCOPE, UNSPECIFIED SYNCOPE TYPE: ICD-10-CM

## 2024-08-27 DIAGNOSIS — I49.9 ARRHYTHMIA: ICD-10-CM

## 2024-08-27 DIAGNOSIS — Z95.9 CARDIAC DEVICE IN SITU: ICD-10-CM

## 2024-08-27 DIAGNOSIS — I50.42 CHRONIC COMBINED SYSTOLIC AND DIASTOLIC HEART FAILURE: ICD-10-CM

## 2024-08-27 LAB
B-HCG UR QL: NEGATIVE
CTP QC/QA: YES
OHS QRS DURATION: 96 MS
OHS QTC CALCULATION: 443 MS
POCT GLUCOSE: 100 MG/DL (ref 70–110)

## 2024-08-27 PROCEDURE — 25000003 PHARM REV CODE 250: Performed by: STUDENT IN AN ORGANIZED HEALTH CARE EDUCATION/TRAINING PROGRAM

## 2024-08-27 PROCEDURE — 63600175 PHARM REV CODE 636 W HCPCS: Performed by: NURSE ANESTHETIST, CERTIFIED REGISTERED

## 2024-08-27 PROCEDURE — 33249 INSJ/RPLCMT DEFIB W/LEAD(S): CPT | Mod: ,,, | Performed by: INTERNAL MEDICINE

## 2024-08-27 PROCEDURE — 93010 ELECTROCARDIOGRAM REPORT: CPT | Mod: ,,, | Performed by: INTERNAL MEDICINE

## 2024-08-27 PROCEDURE — 93010 ELECTROCARDIOGRAM REPORT: CPT | Mod: 76,,, | Performed by: INTERNAL MEDICINE

## 2024-08-27 PROCEDURE — 37000008 HC ANESTHESIA 1ST 15 MINUTES: Performed by: INTERNAL MEDICINE

## 2024-08-27 PROCEDURE — 25500020 PHARM REV CODE 255: Performed by: NURSE ANESTHETIST, CERTIFIED REGISTERED

## 2024-08-27 PROCEDURE — C1777 LEAD, AICD, ENDO SINGLE COIL: HCPCS | Performed by: INTERNAL MEDICINE

## 2024-08-27 PROCEDURE — 25000003 PHARM REV CODE 250: Performed by: NURSE ANESTHETIST, CERTIFIED REGISTERED

## 2024-08-27 PROCEDURE — C1898 LEAD, PMKR, OTHER THAN TRANS: HCPCS | Performed by: INTERNAL MEDICINE

## 2024-08-27 PROCEDURE — C1894 INTRO/SHEATH, NON-LASER: HCPCS | Performed by: INTERNAL MEDICINE

## 2024-08-27 PROCEDURE — 63600175 PHARM REV CODE 636 W HCPCS: Performed by: ANESTHESIOLOGY

## 2024-08-27 PROCEDURE — 25000003 PHARM REV CODE 250: Performed by: INTERNAL MEDICINE

## 2024-08-27 PROCEDURE — C1721 AICD, DUAL CHAMBER: HCPCS | Performed by: INTERNAL MEDICINE

## 2024-08-27 PROCEDURE — 37000009 HC ANESTHESIA EA ADD 15 MINS: Performed by: INTERNAL MEDICINE

## 2024-08-27 PROCEDURE — 25000003 PHARM REV CODE 250: Performed by: NURSE PRACTITIONER

## 2024-08-27 PROCEDURE — 81025 URINE PREGNANCY TEST: CPT | Performed by: NURSE PRACTITIONER

## 2024-08-27 PROCEDURE — 93005 ELECTROCARDIOGRAM TRACING: CPT

## 2024-08-27 PROCEDURE — 63600175 PHARM REV CODE 636 W HCPCS: Performed by: INTERNAL MEDICINE

## 2024-08-27 PROCEDURE — 33249 INSJ/RPLCMT DEFIB W/LEAD(S): CPT | Performed by: INTERNAL MEDICINE

## 2024-08-27 PROCEDURE — 82962 GLUCOSE BLOOD TEST: CPT | Performed by: INTERNAL MEDICINE

## 2024-08-27 DEVICE — LEAD 6935M62 QUATTRO SECURE S MRI US
Type: IMPLANTABLE DEVICE | Site: HEART | Status: FUNCTIONAL
Brand: SPRINT QUATTRO SECURE S MRI™ SURESCAN™

## 2024-08-27 DEVICE — LEAD 5076-52 MRI US RCMCRD
Type: IMPLANTABLE DEVICE | Site: HEART | Status: FUNCTIONAL
Brand: CAPSUREFIX NOVUS MRI™ SURESCAN®

## 2024-08-27 DEVICE — ICD DDPC3D4 CROME DR MRI DF4 USA
Type: IMPLANTABLE DEVICE | Site: HEART | Status: FUNCTIONAL
Brand: CROME™ DR MRI SURESCAN™

## 2024-08-27 RX ORDER — ONDANSETRON HYDROCHLORIDE 2 MG/ML
INJECTION, SOLUTION INTRAVENOUS
Status: DISCONTINUED | OUTPATIENT
Start: 2024-08-27 | End: 2024-08-27

## 2024-08-27 RX ORDER — MIDAZOLAM HYDROCHLORIDE 1 MG/ML
INJECTION INTRAMUSCULAR; INTRAVENOUS
Status: DISCONTINUED | OUTPATIENT
Start: 2024-08-27 | End: 2024-08-27

## 2024-08-27 RX ORDER — PROCHLORPERAZINE EDISYLATE 5 MG/ML
5 INJECTION INTRAMUSCULAR; INTRAVENOUS EVERY 30 MIN PRN
Status: DISCONTINUED | OUTPATIENT
Start: 2024-08-27 | End: 2024-08-27 | Stop reason: HOSPADM

## 2024-08-27 RX ORDER — FENTANYL CITRATE 50 UG/ML
INJECTION, SOLUTION INTRAMUSCULAR; INTRAVENOUS
Status: DISCONTINUED | OUTPATIENT
Start: 2024-08-27 | End: 2024-08-27

## 2024-08-27 RX ORDER — IODIXANOL 320 MG/ML
INJECTION, SOLUTION INTRAVASCULAR
Status: DISCONTINUED | OUTPATIENT
Start: 2024-08-27 | End: 2024-08-27

## 2024-08-27 RX ORDER — BUPIVACAINE HYDROCHLORIDE 2.5 MG/ML
INJECTION, SOLUTION EPIDURAL; INFILTRATION; INTRACAUDAL
Status: DISCONTINUED | OUTPATIENT
Start: 2024-08-27 | End: 2024-08-27 | Stop reason: HOSPADM

## 2024-08-27 RX ORDER — HYDROMORPHONE HYDROCHLORIDE 1 MG/ML
0.2 INJECTION, SOLUTION INTRAMUSCULAR; INTRAVENOUS; SUBCUTANEOUS EVERY 5 MIN PRN
Status: DISCONTINUED | OUTPATIENT
Start: 2024-08-27 | End: 2024-08-27 | Stop reason: HOSPADM

## 2024-08-27 RX ORDER — LIDOCAINE HYDROCHLORIDE 20 MG/ML
INJECTION, SOLUTION INFILTRATION; PERINEURAL
Status: DISCONTINUED | OUTPATIENT
Start: 2024-08-27 | End: 2024-08-27 | Stop reason: HOSPADM

## 2024-08-27 RX ORDER — KETAMINE HCL IN 0.9 % NACL 50 MG/5 ML
SYRINGE (ML) INTRAVENOUS
Status: DISCONTINUED | OUTPATIENT
Start: 2024-08-27 | End: 2024-08-27

## 2024-08-27 RX ORDER — DOXYCYCLINE 100 MG/1
100 CAPSULE ORAL 2 TIMES DAILY
Qty: 10 CAPSULE | Refills: 0 | Status: SHIPPED | OUTPATIENT
Start: 2024-08-27 | End: 2024-09-01

## 2024-08-27 RX ORDER — DEXAMETHASONE SODIUM PHOSPHATE 4 MG/ML
INJECTION, SOLUTION INTRA-ARTICULAR; INTRALESIONAL; INTRAMUSCULAR; INTRAVENOUS; SOFT TISSUE
Status: DISCONTINUED | OUTPATIENT
Start: 2024-08-27 | End: 2024-08-27

## 2024-08-27 RX ORDER — ONDANSETRON HYDROCHLORIDE 2 MG/ML
4 INJECTION, SOLUTION INTRAVENOUS ONCE AS NEEDED
Status: DISCONTINUED | OUTPATIENT
Start: 2024-08-27 | End: 2024-08-27 | Stop reason: HOSPADM

## 2024-08-27 RX ORDER — FENTANYL CITRATE 50 UG/ML
25 INJECTION, SOLUTION INTRAMUSCULAR; INTRAVENOUS EVERY 5 MIN PRN
Status: DISCONTINUED | OUTPATIENT
Start: 2024-08-27 | End: 2024-08-27 | Stop reason: HOSPADM

## 2024-08-27 RX ORDER — SODIUM CHLORIDE 9 MG/ML
INJECTION, SOLUTION INTRAVENOUS CONTINUOUS PRN
Status: DISCONTINUED | OUTPATIENT
Start: 2024-08-27 | End: 2024-08-27 | Stop reason: HOSPADM

## 2024-08-27 RX ORDER — VANCOMYCIN HYDROCHLORIDE 1 G/20ML
INJECTION, POWDER, LYOPHILIZED, FOR SOLUTION INTRAVENOUS
Status: DISCONTINUED | OUTPATIENT
Start: 2024-08-27 | End: 2024-08-27 | Stop reason: HOSPADM

## 2024-08-27 RX ORDER — PROPOFOL 10 MG/ML
VIAL (ML) INTRAVENOUS CONTINUOUS PRN
Status: DISCONTINUED | OUTPATIENT
Start: 2024-08-27 | End: 2024-08-27

## 2024-08-27 RX ORDER — DIPHENHYDRAMINE HYDROCHLORIDE 50 MG/ML
25 INJECTION INTRAMUSCULAR; INTRAVENOUS EVERY 6 HOURS PRN
Status: DISCONTINUED | OUTPATIENT
Start: 2024-08-27 | End: 2024-08-27 | Stop reason: HOSPADM

## 2024-08-27 RX ORDER — ACETAMINOPHEN 325 MG/1
650 TABLET ORAL EVERY 4 HOURS PRN
Status: DISCONTINUED | OUTPATIENT
Start: 2024-08-27 | End: 2024-08-27 | Stop reason: HOSPADM

## 2024-08-27 RX ADMIN — FENTANYL CITRATE 25 MCG: 50 INJECTION, SOLUTION INTRAMUSCULAR; INTRAVENOUS at 04:08

## 2024-08-27 RX ADMIN — PROPOFOL 150 MCG/KG/MIN: 10 INJECTION, EMULSION INTRAVENOUS at 04:08

## 2024-08-27 RX ADMIN — SODIUM CHLORIDE: 0.9 INJECTION, SOLUTION INTRAVENOUS at 03:08

## 2024-08-27 RX ADMIN — PHENYLEPHRINE HYDROCHLORIDE 0.1 MCG/KG/MIN: 10 INJECTION INTRAVENOUS at 03:08

## 2024-08-27 RX ADMIN — HYDROMORPHONE HYDROCHLORIDE 0.2 MG: 1 INJECTION, SOLUTION INTRAMUSCULAR; INTRAVENOUS; SUBCUTANEOUS at 05:08

## 2024-08-27 RX ADMIN — FENTANYL CITRATE 50 MCG: 50 INJECTION, SOLUTION INTRAMUSCULAR; INTRAVENOUS at 03:08

## 2024-08-27 RX ADMIN — MIDAZOLAM HYDROCHLORIDE 2 MG: 2 INJECTION, SOLUTION INTRAMUSCULAR; INTRAVENOUS at 02:08

## 2024-08-27 RX ADMIN — ACETAMINOPHEN 650 MG: 325 TABLET ORAL at 05:08

## 2024-08-27 RX ADMIN — ONDANSETRON 4 MG: 2 INJECTION INTRAMUSCULAR; INTRAVENOUS at 04:08

## 2024-08-27 RX ADMIN — FENTANYL CITRATE 25 MCG: 50 INJECTION, SOLUTION INTRAMUSCULAR; INTRAVENOUS at 03:08

## 2024-08-27 RX ADMIN — MIDAZOLAM HYDROCHLORIDE 2 MG: 2 INJECTION, SOLUTION INTRAMUSCULAR; INTRAVENOUS at 03:08

## 2024-08-27 RX ADMIN — Medication 25 MG: at 03:08

## 2024-08-27 RX ADMIN — PROPOFOL 75 MCG/KG/MIN: 10 INJECTION, EMULSION INTRAVENOUS at 03:08

## 2024-08-27 RX ADMIN — DEXAMETHASONE SODIUM PHOSPHATE 4 MG: 4 INJECTION, SOLUTION INTRAMUSCULAR; INTRAVENOUS at 03:08

## 2024-08-27 RX ADMIN — IODIXANOL 10 ML: 320 INJECTION, SOLUTION INTRAVASCULAR at 04:08

## 2024-08-27 NOTE — DISCHARGE INSTRUCTIONS
Patient Discharge Instructions   Devices (Pacemakers & Defibrillators)    Medications:  -Continue all of your home medications.  -Start your antibiotic Doxycycline (Monodox) 100 mg two times per day for 5 days. While on prescribed doxycycline (Monodox), HOLD home medication doxycycline (Vibramycin). RESUME usual dose of Vibramycin after completion of Monodox.     Restrictions   No pushing, pulling, or lifting greater than 5 pounds with device-side arm for 6 weeks.  No lifting device-side arm higher than shoulder level for 6 weeks.  You will be discharged wearing a sling and swathe. You will wear this for 48 hours. Then, the sling/swath should be removed and will remain off during the day. Continue to wear the sling/swath at night for 6 weeks.  No driving until your clinic wound check appointment (this will be 7-10 days post-procedure).   No submerging device incision site in a tub, pool, or body of water for 6 weeks.    Activity   No heavy activity with device-side arm for 6 weeks (no tennis, golf, bowling, aerobics, mowing the lawn, etc.).   Avoid rough contact at the device site for 6 weeks.   You may participate in sexual activity unless otherwise instructed.   You may return to work within 3-5 days, unless told otherwise, provided you adhere to the above activity restrictions.   If you only had a battery/generator change performed, then there are no postoperative activity restrictions.     Wound Care  Remove the white tape pressure dressing and mepilex dressing beneath it tomorrow morning.   The incision may then remain open to air.   There is dermabond skin glue over your incision. DO NOT scrub it off. Dermabond acts as another protective barrier and will eventually dissolve/flake off.   You will be discharged with 5 days of oral antibiotics. Please take the full prescription until it is gone.   If you are taking a blood thinner (Eliquis, Pradaxa, Xarelto), continue to hold this medication for 5 days. RESUME  your blood thinner following completion of your antibiotics. If you are taking warfarin, continue to take this medication without interruption.   Do not get your incision wet for 48 hours following the procedure. You may sponge bath during this period. After 48 hours, you may shower, but avoid direct water contact to the incision and try to keep this area dry as possible. Allow the shower water to hit the back, not directly on the chest. Gently pat the incision/dressing dry if it does get wet.   Avoid using deodorants, justin, creams, lotions on your incision for 6 weeks.   If you notice increased swelling, redness, drainage, device site pain, chest pain, shortness of breath, or have a fever greater than 100 degrees, call our device clinic immediately: [phone] or [phone] during daytime business hours, OR call [phone] during after-hours or on weekends and ask for the electrophysiology fellow on call.         Long-Term Instructions  Keep your pacemaker or defibrillator identification card with you at all times.   If you have a defibrillator and you get shocked by your device: If you receive 1 shock and you feel okay, call [phone] or [phone] during normal office hours. For after hours, call [phone] and ask to speak with the on-call electrophysiology fellow. If you receive 1 shock and do NOT feel well, call EMS or go to the ER.   If you have a defibrillator and you get more than 1 shock from the device, or multiple shocks in a short period of time: Call EMS and or go to the nearest ER.   Appliances: You may operate any electrical device in your home, including microwaves.   Security Systems: Electromagnetic security systems can be located in the workplace, courthouse, airports, or other high security areas. Exposure to this type of security system has been shown to cause interference in some cases. Interference may be related to the duration of exposure and or the distance  between the device and the security device. You should be aware of the location of security systems, moving through them at a normal pace, and avoid leaning or standing too close.   Metal Detectors at Airports: Metal detectors at airports can potentially interfere with pacemakers or defibrillators, although it is unlikely. Metal detectors will likely be triggered by your device and therefore at places such as airports/courthouses it will be important for you to carry your identification card for the device. Airport personnel will likely prefer to do a manual search.   Cellular Phones: It is unlikely that using a cell phone will interfere with your device. It should be used with the hand opposite to the side where your device was implanted. The phone should not be carried in the shirt pocket on the same side as the device.   Specific Work Conditions: If you work near high voltage lines, transmitting towers, large motors, welding equipment, or powerful magnets, you should discuss your specific work conditions with your physician. In general, remain at least 2 feet from external electrical equipment, verify that the equipment is properly grounded, and wear insulated gloves when using electrical devices. Leave the immediate location if lightheadedness or other symptoms develop.   MRI: Some pacemakers and defibrillators are safe in MRI scanners, while others are not. Please consult your physician to see if you have an MRI-compatible device.   Surgery: Should you require surgery, kiley electrosurgical devices can interfere with your device function. You should discuss this with your surgeon prior to any operation.   Radiation Therapy: If you require radiation therapy, care must be taken to avoid irradiating the device.     Long-Term Follow-Up  Your device has the ability to transmit device information from home to the doctors office using a home monitoring system. This remote system takes the place of a doctors visit.  Your device will be checked from home every 3-6 months. Every 6-12 months, you will be asked to come in the office for an in-office check.   Your device should last in the range of 6-12 years. This depends on many factors including how often it paces the heart.   When the battery is low, a generator change will be performed. This is a same-day procedure with no postoperative activity restrictions.     Any need to reschedule or cancel procedures or appointments, or any questions regarding your procedures should be addressed with the Arrhythmia Department Nurses at: [phone].   do

## 2024-08-27 NOTE — ANESTHESIA PROCEDURE NOTES
Intubation    Date/Time: 8/27/2024 3:22 PM    Performed by: Rubio Gracia III, CRNA  Authorized by: Eloy Romeo MD    Intubation:     Induction:  Intravenous    Intubated:  Postinduction    Mask Ventilation:  Easy mask    Attempts:  1    Attempted By:  Staff anesthesiologist    Difficult Airway Encountered?: No      Airway Device:  Supraglottic airway/LMA    Airway Device Size:  4.0    Placement Verified By:  Capnometry    Complicating Factors:  None, obesity and short neck    Findings Post-Intubation:  BS equal bilateral and atraumatic/condition of teeth unchanged

## 2024-08-27 NOTE — PLAN OF CARE
Patient arrived to room. PIV placed. Admit assessment completed. Plan of care discussed with patient. Daughter at beside. Nurse call bell within reach.  Will monitor

## 2024-08-27 NOTE — ANESTHESIA PREPROCEDURE EVALUATION
08/27/2024  Kristopher Elmore is a 48 y.o., female.      Pre-op Assessment    I have reviewed the Patient Summary Reports.       I have reviewed the Medications.     Review of Systems  Anesthesia Hx:  No problems with previous Anesthesia               Denies Personal Hx of Anesthesia complications.                    Cardiovascular:     Hypertension       CHF              Congestive Heart Failure (CHF)                Hypertension         Pulmonary:        Sleep Apnea     Obstructive Sleep Apnea (ZABRINA).           Hepatic/GI:      Liver Disease,         Liver Disease        Endocrine:  Diabetes    Diabetes                      Psych:  Psychiatric History                  Physical Exam    Airway:  No airway management difficulties anticipated  Dental:  No active dental issues noted  Chest/Lungs:  Clear to auscultation    Heart:  Rate: Normal  Rhythm: Regular Rhythm  Sounds: Normal        Anesthesia Plan  Type of Anesthesia, risks & benefits discussed:    Anesthesia Type: Gen Supraglottic Airway  Informed Consent: Informed consent signed with the Patient and all parties understand the risks and agree with anesthesia plan.  All questions answered.   ASA Score: 3  Anesthesia Plan Notes: Chart reviewed. Patient seen and examined. Anesthesia plan discussed and questions answered. E-consent signed. Eloy Romeo MD    Ready For Surgery From Anesthesia Perspective.     .

## 2024-08-27 NOTE — TRANSFER OF CARE
"Anesthesia Transfer of Care Note    Patient: Kristopher Elmore    Procedure(s) Performed: Procedure(s) (LRB):  Insertion, ICD, Dual Chamber (Left)    Patient location: PACU    Anesthesia Type: general    Transport from OR: Transported from OR on 6-10 L/min O2 by face mask with adequate spontaneous ventilation    Post pain: adequate analgesia    Post assessment: no apparent anesthetic complications and tolerated procedure well    Post vital signs: stable    Level of consciousness: awake and alert    Nausea/Vomiting: no nausea/vomiting    Complications: none    Transfer of care protocol was followed      Last vitals: Visit Vitals  BP (!) 103/54 (BP Location: Left arm)   Pulse 72   Temp 36.5 °C (97.7 °F) (Temporal)   Resp 20   Ht 5' 4" (1.626 m)   Wt (!) 137 kg (302 lb)   LMP 06/20/2019   SpO2 98%   Breastfeeding No   BMI 51.84 kg/m²     "

## 2024-08-27 NOTE — INTERVAL H&P NOTE
Ms. Elmore is a 48 year old female who presents today to [facility] for scheduled ICD implant with Dr. Olivares. She denies any chest pain, palpitations, SOB, BLUE, dizziness, light headedness, weakness, LE swelling, syncope, or near syncopal episodes. She denies any bleeding, infections, fevers, rashes, or surgeries in the past 30 days. VSS. She is currently taking carvedilol, jardiance, lasix, trental, and entresto. She has held all diabetic medications this AM.     The patient has been examined and the H&P has been reviewed:     I concur with the findings and no changes have occurred since H&P was written.     Review of Systems   Constitutional: Negative for chills, fever and malaise/fatigue.   HENT:  Negative for congestion and nosebleeds. Negative for ear pain and tinnitus.    Eyes:  Negative for blurred vision.   Cardiovascular:  Negative for chest pain, dyspnea on exertion, irregular heartbeat, leg swelling, near-syncope, orthopnea, palpitations, paroxysmal nocturnal dyspnea and syncope.   Respiratory:  Negative for cough, hemoptysis, shortness of breath, sleep disturbances due to breathing, sputum production and wheezing.    Endocrine: Negative for polyphagia.   Hematologic/Lymphatic: Negative for bleeding problem. Negative for significant bleeding. Negative for bruise/bleed easily.   Skin:  Negative for itching and rash.   Musculoskeletal:  Negative for back pain, joint swelling, muscle cramps and muscle weakness.   Gastrointestinal:  Negative for bloating, abdominal pain, hematemesis, hematochezia, nausea and vomiting.   Genitourinary:  Negative for dysuria and hematuria.   Neurological:  Negative for dizziness, focal weakness, headaches, light-headedness, loss of balance, numbness and weakness.   Psychiatric/Behavioral:  Negative for altered mental status. Negative for depression. The patient is not nervous/anxious.        Physical Exam  Vitals and nursing note reviewed.   Constitutional:       General: Oriented  to person, place, and time. Appears well-developed and well-nourished. Not in acute distress.     Appearance: Normal appearance. Well-developed. Not diaphoretic. Obese.  HENT:      Head: Normocephalic and atraumatic.      Mouth/Throat:      Mouth: Mucous membranes are moist.      Pharynx: No oropharyngeal exudate.       Neck: Normal range of motion.   Eyes:      Conjunctiva/sclera: Conjunctivae normal.      Pupils: Pupils are equal, round, and reactive to light.   Cardiovascular:      Rate and Rhythm: Normal rate and regular rhythm.      Pulses: Normal pulses and intact distal pulses.           Radial pulses are 2+ on the right side and 2+ on the left side.        Dorsalis pedis pulses are 2+ on the right side and 2+ on the left side.      Heart sounds: Normal heart sounds, S1 normal and S2 normal. No murmur heard.  Pulmonary:      Effort: Pulmonary effort is normal. No accessory muscle usage or respiratory distress.      Breath sounds: Normal breath sounds. No decreased breath sounds, wheezing, rhonchi or rales.   Chest:      Chest wall: No tenderness.   Abdominal:      General: Bowel sounds are normal. There is no distension.      Palpations: Abdomen is soft.      Tenderness: There is no abdominal tenderness. There is no guarding.   Musculoskeletal:         General: Normal range of motion. Trace edema to right LE, +1 edema to left LE     Cervical back: Normal range of motion and neck supple.   Skin:     General: Skin is warm and dry.      Findings: No erythema or rash.   Neurological:      Mental Status: Alert and oriented to person, place, and time. Not disoriented.      Sensory: No sensory deficit.      Motor: No abnormal muscle tone.      Coordination: Coordination normal.      Gait: Gait normal.   Psychiatric:         Mood and Affect: Mood normal.         Behavior: Behavior normal.         Thought Content: Thought content normal.         Judgment: Judgment normal.     Labs: Pre procedure labs from 8/27/24  reviewed.      Significant Studies: ECG today reveals sinus rhythm at 71 BPM  QRS 96 QT/QTc 408/443.      Plan:  -ICD implant  -Anesthesia for sedation     Prior to procedure, Dr. Olivares at bedside speaking with patient and family. We discussed possible treatment options, including implantation of a ICD. This procedure was described in detail, in addition to potential risks, benefits, and alternative options, and she would like to proceed. Patient and family voiced understanding and is in agreement. Informed consent was obtained in clinic.      LEILA Doyle-EDWARD  Cardiology Electrophysiology NP   [facility]    Attending: Fernando Olivares MD

## 2024-08-27 NOTE — NURSING TRANSFER
Nursing Transfer Note      8/27/2024   6:05 PM    Reason patient is being transferred: Recovery criteria met    PACU nurse giving handoff: BALWINDER Aquino    Nurse receiving handoff: BALWINDER Bass    Transfer to 2nd floor PACU 7    Transfer via stretcher    Transfer with cardiac monitoring    Transported by RN    Transfer Vital Signs @ 1800  Temperature: 97.5  Blood Pressure: 140/69  Heart Rate: 68  O2 Sat: 94% on RA  Respirations:18    Medicines/Equipment sent with patient: Discharge medications given to daughters    Any special needs or follow-up needed: Bedrest until 2000    Patient belongings transferred with patient: Yes cellphone (remainder of belongings with daughters)    Chart send with patient: Yes    Notified: Daughters    Patient reassessed prior to transfer at: 8/27/24 @ 1800

## 2024-08-28 DIAGNOSIS — G89.3 CANCER ASSOCIATED PAIN: ICD-10-CM

## 2024-08-28 DIAGNOSIS — Z17.0 MALIGNANT NEOPLASM OF UPPER-INNER QUADRANT OF RIGHT BREAST IN FEMALE, ESTROGEN RECEPTOR POSITIVE: ICD-10-CM

## 2024-08-28 DIAGNOSIS — C50.211 MALIGNANT NEOPLASM OF UPPER-INNER QUADRANT OF RIGHT BREAST IN FEMALE, ESTROGEN RECEPTOR POSITIVE: ICD-10-CM

## 2024-08-28 LAB
OHS QRS DURATION: 98 MS
OHS QTC CALCULATION: 431 MS

## 2024-08-28 RX ORDER — TIZANIDINE 4 MG/1
4 TABLET ORAL EVERY 8 HOURS
Qty: 45 TABLET | Refills: 1 | Status: SHIPPED | OUTPATIENT
Start: 2024-08-28

## 2024-08-28 NOTE — DISCHARGE SUMMARY
[facility] - [facility]iology  Cardiac Electrophysiology  Discharge Summary      Patient Name: Kristopher Elmore  MRN: [id number]  Admission Date: 8/27/2024  Hospital Length of Stay: 0 days  Discharge Date and Time: 8/27/2024  8:25 PM  Attending Physician: Fernando Olivares MD   Discharging Provider: Asia Hendrix NP  Primary Care Physician: Enedina De La oTrre MD    HPI: Ms. Elmore is a 48 year old female who presents today to [facility] for scheduled ICD implant with Dr. Olivares. She denies any chest pain, palpitations, SOB, BLUE, dizziness, light headedness, weakness, LE swelling, syncope, or near syncopal episodes. She denies any bleeding, infections, fevers, rashes, or surgeries in the past 30 days. VSS. She is currently taking carvedilol, jardiance, lasix, trental, and entresto. She has held all diabetic medications this AM.      Labs: Pre procedure labs from 8/27/24 reviewed.      Significant Studies: ECG today reveals sinus rhythm at 71 BPM  QRS 96 QT/QTc 408/443.      Plan:  -ICD implant  -Anesthesia for sedation     Procedure(s) (LRB):  Insertion, ICD, Dual Chamber (Left)     Indwelling Lines/Drains at time of discharge:  Lines/Drains/Airways    none    Hospital Course: Patient underwent ICD implantation (see procedure note), tolerated procedure well with no acute complications. Left pectoral site with aquacel dressing C/D/I, no redness, drainage/discharge, bleeding, or hematoma noted. Sling and swath in place. Post procedure ECG and post procedure CXR reviewed by Dr. Syed Leiva prior to discharge. Monitored on telemetry for 3 hours post procedure with no reported events. Discharge plans discussed with Dr. Olivares. Patient to continue all home medications. Start Doxycycline 100 mg BID for 5 days. Remove the white tape pressure dressing and coverderm dressing beneath it tomorrow morning. Follow up with device clinic in 1 week for wound check and in 3 months in clinic with Dr. Olivares. Discharge plans/instructions  discussed with patient and family prior to and after procedure, verbalized understanding and agreement of plans of care. No further questions or concerns voiced at this time. VSS. Patient discharged home by Dr. Leiva.     Goals of Care Treatment Preferences:  Code Status: Full Code    Health care agent: Garett Shelton  Health care agent number: [phone]        What is most important right now is to focus on avoiding the hospital, remaining as independent as possible, symptom/pain control, improvement in condition but with limits to invasive therapies.  Accordingly, we have decided that the best plan to meet the patient's goals includes continuing with treatment.    Consults: none    Significant Diagnostic Studies: CXR EKG    Final Active Diagnoses:    Diagnosis Date Noted POA    PRINCIPAL PROBLEM:  Non-ischemic cardiomyopathy [I42.8] 03/28/2019 Yes      Problems Resolved During this Admission:     Discharged Condition: stable    Disposition: Home or Self Care    Follow Up: Device clinic 1 week wound check. Follow up with Dr. Olivares in 3 months    Patient Instructions:   Patient Discharge Instructions   Devices (Pacemakers & Defibrillators)    Medications:  -Continue all of your home medications.  -Start your antibiotic Doxycycline (Monodox) 100 mg two times per day for 5 days.     Restrictions   No pushing, pulling, or lifting greater than 5 pounds with device-side arm for 6 weeks.  No lifting device-side arm higher than shoulder level for 6 weeks.  You will be discharged wearing a sling and swathe. You will wear this for 48 hours. Then, the sling/swath should be removed and will remain off during the day. Continue to wear the sling/swath at night for 6 weeks.  No driving until your clinic wound check appointment (this will be 7-10 days post-procedure).   No submerging device incision site in a tub, pool, or body of water for 6 weeks.    Activity   No heavy activity with device-side arm for 6 weeks (no tennis,  golf, bowling, aerobics, mowing the lawn, etc.).   Avoid rough contact at the device site for 6 weeks.   You may participate in sexual activity unless otherwise instructed.   You may return to work within 3-5 days, unless told otherwise, provided you adhere to the above activity restrictions.   If you only had a battery/generator change performed, then there are no postoperative activity restrictions.     Wound Care  Remove the white tape pressure dressing and mepilex dressing beneath it tomorrow morning.   The incision may then remain open to air.   There is dermabond skin glue over your incision. DO NOT scrub it off. Dermabond acts as another protective barrier and will eventually dissolve/flake off.   You will be discharged with 5 days of oral antibiotics. Please take the full prescription until it is gone.   If you are taking a blood thinner (Eliquis, Pradaxa, Xarelto), continue to hold this medication for 5 days. RESUME your blood thinner following completion of your antibiotics. If you are taking warfarin, continue to take this medication without interruption.   Do not get your incision wet for 48 hours following the procedure. You may sponge bath during this period. After 48 hours, you may shower, but avoid direct water contact to the incision and try to keep this area dry as possible. Allow the shower water to hit the back, not directly on the chest. Gently pat the incision/dressing dry if it does get wet.   Avoid using deodorants, justin, creams, lotions on your incision for 6 weeks.   If you notice increased swelling, redness, drainage, device site pain, chest pain, shortness of breath, or have a fever greater than 100 degrees, call our device clinic immediately: [phone] or [phone] during daytime business hours, OR call [phone] during after-hours or on weekends and ask for the electrophysiology fellow on call.     Long-Term Instructions  Keep your pacemaker or defibrillator  identification card with you at all times.   If you have a defibrillator and you get shocked by your device: If you receive 1 shock and you feel okay, call [phone] or [phone] during normal office hours. For after hours, call [phone] and ask to speak with the on-call electrophysiology fellow. If you receive 1 shock and do NOT feel well, call EMS or go to the ER.   If you have a defibrillator and you get more than 1 shock from the device, or multiple shocks in a short period of time: Call EMS and or go to the nearest ER.   Appliances: You may operate any electrical device in your home, including microwaves.   Security Systems: Electromagnetic security systems can be located in the workplace, courthouse, airports, or other high security areas. Exposure to this type of security system has been shown to cause interference in some cases. Interference may be related to the duration of exposure and or the distance between the device and the security device. You should be aware of the location of security systems, moving through them at a normal pace, and avoid leaning or standing too close.   Metal Detectors at Airports: Metal detectors at airports can potentially interfere with pacemakers or defibrillators, although it is unlikely. Metal detectors will likely be triggered by your device and therefore at places such as airports/courthouses it will be important for you to carry your identification card for the device. Airport personnel will likely prefer to do a manual search.   Cellular Phones: It is unlikely that using a cell phone will interfere with your device. It should be used with the hand opposite to the side where your device was implanted. The phone should not be carried in the shirt pocket on the same side as the device.   Specific Work Conditions: If you work near high voltage lines, transmitting towers, large motors, welding equipment, or powerful magnets, you should discuss your specific  work conditions with your physician. In general, remain at least 2 feet from external electrical equipment, verify that the equipment is properly grounded, and wear insulated gloves when using electrical devices. Leave the immediate location if lightheadedness or other symptoms develop.   MRI: Some pacemakers and defibrillators are safe in MRI scanners, while others are not. Please consult your physician to see if you have an MRI-compatible device.   Surgery: Should you require surgery, kiley electrosurgical devices can interfere with your device function. You should discuss this with your surgeon prior to any operation.   Radiation Therapy: If you require radiation therapy, care must be taken to avoid irradiating the device.     Long-Term Follow-Up  Your device has the ability to transmit device information from home to the doctors office using a home monitoring system. This remote system takes the place of a doctors visit. Your device will be checked from home every 3-6 months. Every 6-12 months, you will be asked to come in the office for an in-office check.   Your device should last in the range of 6-12 years. This depends on many factors including how often it paces the heart.   When the battery is low, a generator change will be performed. This is a same-day procedure with no postoperative activity restrictions.     Any need to reschedule or cancel procedures or appointments, or any questions regarding your procedures should be addressed with the Arrhythmia Department Nurses at: [phone].     Medications:  Reconciled Home Medications:      Medication List        START taking these medications      doxycycline 100 MG capsule  Commonly known as: MONODOX  Take 1 capsule (100 mg total) by mouth 2 (two) times daily. for 5 days            CONTINUE taking these medications      ammonium lactate 12 % Crea  Apply 1 application  topically once daily.     anastrozole 1 mg Tab  Commonly known as: ARIMIDEX  Take 1  tablet (1 mg total) by mouth once daily.     atorvastatin 40 MG tablet  Commonly known as: LIPITOR  Take 1 tablet (40 mg total) by mouth every evening.     blood pressure kit-extra large Kit  Check blood pressure once daily at the same time     blood sugar diagnostic Strp  To check BG 2 times daily, to use with insurance preferred meter     blood-glucose meter kit  To check BG 2 times daily, to use with insurance preferred meter     carvediloL 12.5 MG tablet  Commonly known as: COREG  Take 1 tablet (12.5 mg total) by mouth 2 (two) times daily.     ciclopirox 8 % Soln  Commonly known as: PENLAC  Apply topically nightly.     DEXCOM G7 SENSOR Monique  Generic drug: blood-glucose sensor  Change every 10 days     empagliflozin 25 mg tablet  Commonly known as: JARDIANCE  Take 1 tablet (25 mg total) by mouth once daily.     * ENTRESTO  mg per tablet  Generic drug: sacubitriL-valsartan  Take 1 tablet by mouth 2 (two) times daily.     * ENTRESTO  mg per tablet  Generic drug: sacubitriL-valsartan  Take 1 tablet by mouth 2 (two) times daily.     ergocalciferol 50,000 unit Cap  Commonly known as: ERGOCALCIFEROL  TAKE 1 CAPSULE BY MOUTH EVERY 7 DAYS     furosemide 20 MG tablet  Commonly known as: LASIX  Take 1 tablet (20 mg total) by mouth 2 (two) times daily.     glipiZIDE 5 MG Tr24  Take 1 tablet (5 mg total) by mouth daily with breakfast.     goserelin 3.6 mg injection  Commonly known as: ZOLADEX  Inject 3.6 mg into the skin every 28 days.     IBRANCE 100 mg Cap  Generic drug: palbociclib  Take 1 capsule (100 mg) by mouth Monday - Friday, DO NOT TAKE Saturday and Sunday, for 3 weeks on, 1 week off.     ibuprofen 800 MG tablet  Commonly known as: ADVIL,MOTRIN  Take 1 tablet (800 mg total) by mouth 2 (two) times daily as needed for Pain.     lancets Misc  To check BG 2 times daily, to use with insurance preferred meter     magnesium oxide 400 mg (241.3 mg magnesium) tablet  Commonly known as: MAG-OX  Take 1 tablet (400  mg total) by mouth once daily.     metFORMIN 500 MG ER 24hr tablet  Commonly known as: GLUCOPHAGE-XR  Take 2 tablets (1,000 mg total) by mouth 2 (two) times daily with meals.     MOUNJARO 10 mg/0.5 mL Pnij  Generic drug: tirzepatide  Inject 10 mg into the skin every 7 days.     naloxone 4 mg/actuation Spry  Commonly known as: NARCAN  4mg by nasal route as needed for opioid overdose; may repeat every 2-3 minutes in alternating nostrils until medical help arrives. Call 911     ondansetron 8 MG Tbdl  Commonly known as: ZOFRAN-ODT  Take 1 tablet (8 mg total) by mouth every 8 (eight) hours as needed (nausea and vomiting).     oxyCODONE-acetaminophen 5-325 mg per tablet  Commonly known as: PERCOCET  Take 1 tablet by mouth every 6 (six) hours as needed for Pain.     pentoxifylline 400 mg Tbsr  Commonly known as: TRENTAL  Take 400 mg by mouth 2 (two) times daily.     potassium chloride 10 MEQ Tbsr  Commonly known as: KLOR-CON  TAKE 1 TABLET(10 MEQ) BY MOUTH EVERY NIGHT     prochlorperazine 5 MG tablet  Commonly known as: COMPAZINE  Take 5 mg by mouth 4 (four) times daily.     SENNA-C ORAL  Take 2 tablets by mouth daily as needed.     spironolactone 25 MG tablet  Commonly known as: ALDACTONE  Take 1 tablet (25 mg total) by mouth once daily.     tiZANidine 4 MG tablet  Commonly known as: ZANAFLEX  TAKE 1 TABLET(4 MG) BY MOUTH EVERY 8 HOURS     UNKNOWN TO PATIENT  Calcium     * venlafaxine 150 MG Cp24  Commonly known as: EFFEXOR-XR  Take 1 capsule (150 mg total) by mouth once daily.     * venlafaxine 75 MG 24 hr capsule  Commonly known as: EFFEXOR-XR  Take 1 capsule (75 mg total) by mouth once daily.     vitamin E 100 UNIT capsule  Take 100 Units by mouth once daily.           * This list has 4 medication(s) that are the same as other medications prescribed for you. Read the directions carefully, and ask your doctor or other care provider to review them with you.                ASK your doctor about these medications       doxycycline 100 MG Cap  Commonly known as: VIBRAMYCIN  Take 100 mg by mouth 2 (two) times daily.            Plan:  -Continue home medications  -Doxycyline 100 mg BID x 5 days  -Follow up wound check in 1 week  -Follow up with Dr. Olivares in 3 months    Time spent on the discharge of patient: 15 minutes    Asia Hendrix NP  Cardiac Electrophysiology  [facility] - Cardiology    Attending: Fernando Olivares MD

## 2024-08-28 NOTE — NURSING
Pt sitting on side of bed in PACU 2nd floor bay 7. Pt's dressed by previous RN after pt successfully drank, ate, and walked. Pt's d/c paperwork reiterated and all questions and concerns addressed. Pt's left upper chest dressing is intact and site is free from s/s of bleeding. Pt's iV removed and pt waiting to wheel out. MD duckworth with Cardiology contacted and notified that pt is already taking doxycycline bid a day for an existing infection. Current dose of doxy will end in a week. MD ordered for pt to continue taking that dose and not to double dose on doxy. Pt verbalized understanding.

## 2024-08-28 NOTE — PROGRESS NOTES
Patient ambulated with out any distress, no shortness of breath, no complain of chest pain, states she just feels sore from the insertion. Able to tolerate PO intake.

## 2024-08-29 ENCOUNTER — OFFICE VISIT (OUTPATIENT)
Dept: PALLIATIVE MEDICINE | Facility: CLINIC | Age: 49
End: 2024-08-29
Payer: MEDICARE

## 2024-08-29 DIAGNOSIS — R53.0 NEOPLASTIC (MALIGNANT) RELATED FATIGUE: ICD-10-CM

## 2024-08-29 DIAGNOSIS — G89.3 CANCER ASSOCIATED PAIN: ICD-10-CM

## 2024-08-29 DIAGNOSIS — Z17.0 MALIGNANT NEOPLASM OF UPPER-INNER QUADRANT OF RIGHT BREAST IN FEMALE, ESTROGEN RECEPTOR POSITIVE: Primary | ICD-10-CM

## 2024-08-29 DIAGNOSIS — G47.00 INSOMNIA, UNSPECIFIED TYPE: ICD-10-CM

## 2024-08-29 DIAGNOSIS — R11.0 NAUSEA: ICD-10-CM

## 2024-08-29 DIAGNOSIS — C50.211 MALIGNANT NEOPLASM OF UPPER-INNER QUADRANT OF RIGHT BREAST IN FEMALE, ESTROGEN RECEPTOR POSITIVE: Primary | ICD-10-CM

## 2024-08-29 PROCEDURE — 4010F ACE/ARB THERAPY RXD/TAKEN: CPT | Mod: CPTII,95,, | Performed by: STUDENT IN AN ORGANIZED HEALTH CARE EDUCATION/TRAINING PROGRAM

## 2024-08-29 PROCEDURE — 1159F MED LIST DOCD IN RCRD: CPT | Mod: CPTII,95,, | Performed by: STUDENT IN AN ORGANIZED HEALTH CARE EDUCATION/TRAINING PROGRAM

## 2024-08-29 PROCEDURE — 99215 OFFICE O/P EST HI 40 MIN: CPT | Mod: 95,,, | Performed by: STUDENT IN AN ORGANIZED HEALTH CARE EDUCATION/TRAINING PROGRAM

## 2024-08-29 PROCEDURE — 3044F HG A1C LEVEL LT 7.0%: CPT | Mod: CPTII,95,, | Performed by: STUDENT IN AN ORGANIZED HEALTH CARE EDUCATION/TRAINING PROGRAM

## 2024-08-30 NOTE — PROGRESS NOTES
[facility]  [facility]  Follow up visit    The patient location is: home  The chief complaint leading to consultation is: symptom management/GOC    Visit type: audiovisual    Face to Face time with patient: 33 minutes  40 minutes of total time spent on the encounter, which includes face to face time and non-face to face time preparing to see the patient (eg, review of tests), Obtaining and/or reviewing separately obtained history, Documenting clinical information in the electronic or other health record, Independently interpreting results (not separately reported) and communicating results to the patient/family/caregiver, or Care coordination (not separately reported).     Each patient to whom he or she provides medical services by telemedicine is:  (1) informed of the relationship between the physician and patient and the respective role of any other health care provider with respect to management of the patient; and (2) notified that he or she may decline to receive medical services by telemedicine and may withdraw from such care at any time.    Reason for Consult: symptom management and ACP      ASSESSMENT/PLAN:     Plan/Recommendations:  Diagnoses and all orders for this visit:    Malignant neoplasm of upper-inner quadrant of right breast in female, estrogen receptor positive with bone mets  - patient followed by Dr. Lei and NP Delphine  - currently on disease-directed therapy with arimidex and ibrance  - s/p  shunt on 12/14/23   - patient also with complication of jaw necrosis; holding Xgeva for now    Encounter for palliative care/Advanced care planning  Advance Care Planning   - patient decisional and by herself on telemedicine visit  - goals: life prolonging  - reviewed ACP booklet again at previous visit along with [place] form. Patient has been thinking on these topics more with ongoing symptoms of likely congestive heart failure in setting of  malignancy.   - [facility] Garett Shelton at [phone] and Johnny Shelton at [phone]  - patient went into detail previously about her living will and goals:  - If she were to have a cardiac event (such as MI), patient would want all life prolonging measures including cardiac resuscitation, intubation, artifical nutrition/hydration, ICU care, etc. If she were having advanced malignancy that was driving the overall decline in health, she would not want invasive, life prolonging measures such as cardiac resuscitation, artifical nutrition/hydration, etc.      Other form of dyspnea  - patient reporting improvement in her dyspnea since last visit  - dyspnea worsens with exertion   - patient has been seen previously in ED due to dyspnea and concern for heart failure and worsening control of DM  - patient states that she has been able to walk around the house now; she plans to start working with PT next week to improve her walking and stamina/dyspnea  - continue treatment for other conditions (DM, CHF) as prescribed by other specialists  - tips for shortness of breath in new patient folder for patient to review    Cancer-related pain  - patient reporting her pain in her jaw and her back  - she has been using ibuprofen 800 mg for pain control but having upset stomach  - patient being treated jaw necrosis by dentistry  - she is out of opioid medication  - she has used tizanidine at night for jaw, which also helps her back pain  - increase tizanidine to 4 mg q8h prn  - restart oxycodone-apap 5-325 mg q6h prn for pain unrelieved by other medications  - patient reports that the Norco, oxycodone, and MS Contin all make her very sleepy  - has taken methadone sporadically but with improvement when she does so, and previously there was a discussion about strategy of taking at night at bedtime to deliver maintenance results. Patient has not been taking methadone   - EKG on 04/13/2022: Qtc: 461    Nausea/Anorexia  - patient  reports nausea occurs about once a week with diabetes medication injection  - patient's jaw pain with necrosis has also caused her to adjust the type of food she can eat due to pain/discomfort  - continue dronabinol 2.5 mg bid for nausea/mood  - patient using anti-emetics every day with good relief still   - continue zofran and phenergan prn; recently refilled  - patient already following with nutrition  - patient has already been referred to Endocrinology for better management of diabetes.  - will continue to monitor    Adjustment disorder with mixed anxiety and depressed mood  Neoplastic (malignant) related fatigue/Insomnia  - patient reports some improvement in depression and anxiety; she is excited about her eldest daughter graduating college  - patient reports good social support from 2 ex-husbands, siblings, daughters & friends  - additionally reports using journaling, music and [place] to cope with feelings of sadness; her 2 kittens also offer comfort   - reports increaesed Effexor (225 mg) is helping, prior to increase she was unable to discuss loss of parents without crying; Rx by psych NP [place]  - emotional support provided throughout visit  - patient's sleep has stabilized with the improvement of other symptom burden  - patient noted improvement in sleep with trazodone, but she has been staying up despite taking trazodone. Reviewed sleep hygiene previously.   - Tip sheet for better sleep in new patient folder for patient to review  - will continue to monitor closely     Constipation/Diarrhea  - patient previously requiring senna daily to avoid constipation  - patient has developed diarrhea with use of antibiotics TID; no accidents or urgency. She reports it is controlled at this time  - previously discussed using bowel regimen consistently   - adequate fluid intake   - constipation tip sheet in new patient folder for patient to review  - will continue close  monitoring    Dizziness/weakness/back pain/headaches  - patient reporting these symptoms all initially improved with placement of  shunt; and have now resolved with adjustment of  shunt by NSGY    Understanding of illness/Prognosis: patient has good understanding of disease at this time.     Goals of care: life-prolonging but not at the expense of QOL; hopes to avoid unnecessary suffering    Follow up: ~ 2 months    Patient's encounter and above plan of care discussed with patient's oncology team and NSGY team    SUBJECTIVE:     History of Present Illness:  Patient is a 48 y.o. year old female with anemia, anxiety, cardiomyopathy, CHF, HTN, HLD, and metastatic breast cancer presents to Palliative Medicine for symptom management and ACP. Patient was diagnosed with stage IV breast cancer in September 2019. She was started on Ibrance and Arimidex, and she continues on this regimen today. Please see oncology notes for full details regarding her oncologic treatment course.     8/29/24   History of Present Illness    CHIEF COMPLAINT:  - Follow-up of cancer-associated pain related to metastatic breast cancer    HISTORY OBTAINED FROM:  - Patient    HPI:  The patient is a 48-year-old female with metastatic breast cancer who was recently hospitalized and received a defibrillator implant. Her pain has been well-managed, with the only current discomfort being soreness from the recent procedure. Prior to the implant, she was carrying a heavy portable defibrillator (life vest) which contributed to her back pain, but she feels much better now. She is currently taking oxycodone 5mg (Percocet) for pain management, with her last refill about a month ago. The patient denies any issues with nausea or appetite. 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 Her current treatment regimen includes injections every four  weeks, daily Anastrozole, and Ibrance for three weeks followed by one week off. Recent imaging shows no concerning changes in her condition.    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, sixth, and seventh grade English and math  - Recently returned to work, which has improved her mood and outlook  - Previously carried a heavy portable defibrillator (life vest) which caused back pain, but this is no longer an issue after receiving an implanted defibrillator  - Currently experiencing some soreness from the recent defibrillator implantation procedure    SOCIAL HISTORY:  - Occupation: Fifth, sixth, and seventh grade English and           05/15/2024:  [place]  reviewed and summarized:  04/03/2024 Lorazepam 1 mg Disp: 30 for 8 days    History of Present Illness    CHIEF COMPLAINT:  - Patient presents today for follow-up  - Reports ongoing back pain  - Requests medication refills    HISTORY OBTAINED FROM:  - Patient    HPI:  The patient presents with ongoing back pain, similar to previous episodes. She has been managing the pain with ibuprofen 800mg, but reports that it upsets her abdomen, so she tries to avoid taking it. The patient had previously been prescribed tizanidine for jaw pain, which she found helpful for her back pain as well. She takes the tizanidine once at night, which helps her sleep but the effects last until noon the following day. The patient also discusses ongoing jaw pain, which was discovered to be necrosis of the jaw by her dentist. This condition is a side effect of the Xgeva injections she receives. The necrosis was likely triggered by a scratch or nick to the inside of her jaw during intubation for a  shunt placement in December. The patient has been receiving treatment from the [facility], including doxycycline and a anticoagulant to encourage blood flow to the affected area. However, the necrotic bone has started to unravel, and the patient is  "scheduled for further treatment on Memorial Day. The jaw pain has caused some issues with her mouth, including a rough, red tongue that is sensitive to spicy foods due to her instinctively rubbing her tongue against the exposed bone. Due to the jaw necrosis, the patient has been advised to avoid dental cleanings until the condition has healed. Her energy levels have been good, despite occasional limitations due to back pain. She experiences nausea about once a week, typically around the time of her Magyro injections, but finds that Zofran effectively manages these symptoms. The patient denies any nausea associated with the doxycycline. The patient's mood is positive, as she recently celebrated her older daughter's college graduation with honors and is excited about her younger daughter's upcoming graduation next year. Her back pain may have been aggravated by trying to keep up with the younger family members during the graduation celebrations. She enjoyed indulging in a strawberry girma, her first alcohol in about a year and a half due to her medication regimen. The patient reports that the adjustments made to her  shunt have resulted in significant improvements, describing the change as "night and day." She has been released from monthly follow-ups with neurosurgery and has been given a reprieve of either six months or a year. The patient denies chest pain or fever associated with her jaw pain. She also denies any recent medication changes.    ACTIVITIES OF DAILY LIVING:  - Patient is able to do some walking and is trying to work on posture. Patient is scheduled for physical therapy next week to help with walking.  - Patient's dizziness has improved and gait is much better. Patient feels pretty confident walking.  - Patient is excited to get back to being more like her old self.    SOCIAL HISTORY:  - Alcohol Use: Indulged in a strawberry girma at [facility] after daughter's graduation, had not had " alcohol in about a year and a half since starting a new medication          Please see previous notes for full details for encounters on 2021; 2022; 2022; 2022; 10/24/2022; 2023; 2023; 2023; 2023; 2023; 2023; 2024;     Past Medical History:   Diagnosis Date    Abnormal Pap smear     pt states 13yrs ago colpo was done    Anemia     Anxiety     Cancer     Cardiomegaly 2014    Cardiomyopathy     CHF (congestive heart failure)     Depression     Fibroid     Hx of psychiatric care     Hyperlipidemia     Hypertension     Psychiatric problem     Sleep difficulties     Therapy      Past Surgical History:   Procedure Laterality Date     SECTION      CREATION OF VENTRICULOPERITONEAL SHUNT USING COMPUTER-ASSISTED NAVIGATION Right 2023    Procedure: INSERTION, SHUNT, VENTRICULOPERITONEAL, USING COMPUTER-ASSISTED NAVIGATION;  Surgeon: Jayme Villa MD;  Location: [facility] OR [address] [place] [address];  Service: Neurosurgery;  Laterality: Right;    CREATION OF VENTRICULOPERITONEAL SHUNT USING COMPUTER-ASSISTED NAVIGATION N/A 2023    Procedure: INSERTION, SHUNT, VENTRICULOPERITONEAL, USING COMPUTER-ASSISTED NAVIGATION;  Surgeon: Frederick Kohler MD;  Location: [facility] OR [address] [place] [address];  Service: General;  Laterality: N/A;    INSERTION, ICD, DUAL CHAMBER Left 2024    Procedure: Insertion, ICD, Dual Chamber;  Surgeon: Fernando Olivares MD;  Location: [facility] EP LAB;  Service: Cardiology;  Laterality: Left;  HFrEF, Dual ICD, MDT, MAC, GP, 3 Prep *LifeVest*    LUMBAR PUNCTURE N/A 10/26/2023    Procedure: Lumbar Puncture;  Surgeon: Jayme Villa MD;  Location: [facility] OR [address] [place] [address];  Service: Neurosurgery;  Laterality: N/A;  TOR1  ASA1  regular bed reversed  lateral left down   IIH  RN PHONE PREOP 10/12/2023----BMI--57.67----INCOMPLETE CONSENT----NEED ORDERS    TONSILLECTOMY      TUBAL LIGATION      UTERINE FIBROID EMBOLIZATION       Family History   Problem  Relation Name Age of Onset    Other Mother          breast lesions had to be surgically removed    Arthritis Mother      Diabetes Mother      Heart disease Mother          CHF, CAD , 2 stents    Hypertension Mother      Hyperlipidemia Mother      Heart failure Mother      Hypertension Brother Leonides     No Known Problems Maternal Grandmother      Kidney cancer Maternal Grandfather  79    Rashes / Skin problems Daughter          boils/cysts    Asthma Daughter      Cervical cancer Paternal Cousin Gracie         dx age 29?    Ovarian cancer Paternal Cousin Gracie         dx age 29?    Endometriosis Paternal Cousin Gracie     Fibroids Other Prachi         uterine    Thyroid cancer Other Prachi         type? dx age?    Fibroids Other          uterine    Fibroids Other          uterine    Fibroids Other          uterine    Fibroids Other          uterine    Breast cancer Neg Hx      Colon polyps Neg Hx       Review of patient's allergies indicates:   Allergen Reactions    Keflex [cephalexin] Itching       Medications:    Current Outpatient Medications:     ammonium lactate 12 % Crea, Apply 1 application  topically once daily., Disp: 140 g, Rfl: 5    anastrozole (ARIMIDEX) 1 mg Tab, Take 1 tablet (1 mg total) by mouth once daily., Disp: 90 tablet, Rfl: 0    atorvastatin (LIPITOR) 40 MG tablet, Take 1 tablet (40 mg total) by mouth every evening., Disp: 90 tablet, Rfl: 3    blood pressure kit-extra large Kit, Check blood pressure once daily at the same time, Disp: 1 kit, Rfl: 0    blood sugar diagnostic Strp, To check BG 2 times daily, to use with insurance preferred meter, Disp: 200 each, Rfl: 3    blood-glucose sensor (DEXCOM G7 SENSOR) Monique, Change every 10 days, Disp: 3 each, Rfl: 11    carvediloL (COREG) 12.5 MG tablet, Take 1 tablet (12.5 mg total) by mouth 2 (two) times daily., Disp: 180 tablet, Rfl: 0    ciclopirox (PENLAC) 8 % Soln, Apply topically nightly., Disp: 6.6 mL, Rfl: 3    doxycycline (MONODOX) 100  MG capsule, Take 1 capsule (100 mg total) by mouth 2 (two) times daily. for 5 days, Disp: 10 capsule, Rfl: 0    doxycycline (VIBRAMYCIN) 100 MG Cap, Take 100 mg by mouth 2 (two) times daily., Disp: , Rfl:     empagliflozin (JARDIANCE) 25 mg tablet, Take 1 tablet (25 mg total) by mouth once daily., Disp: 90 tablet, Rfl: 2    ergocalciferol (ERGOCALCIFEROL) 50,000 unit Cap, TAKE 1 CAPSULE BY MOUTH EVERY 7 DAYS, Disp: 12 capsule, Rfl: 0    furosemide (LASIX) 20 MG tablet, Take 1 tablet (20 mg total) by mouth 2 (two) times daily., Disp: 180 tablet, Rfl: 3    glipiZIDE 5 MG TR24, Take 1 tablet (5 mg total) by mouth daily with breakfast., Disp: 90 tablet, Rfl: 2    goserelin (ZOLADEX) 3.6 mg injection, Inject 3.6 mg into the skin every 28 days., Disp: , Rfl:     ibuprofen (ADVIL,MOTRIN) 800 MG tablet, Take 1 tablet (800 mg total) by mouth 2 (two) times daily as needed for Pain., Disp: 45 tablet, Rfl: 2    lancets Misc, To check BG 2 times daily, to use with insurance preferred meter, Disp: 200 each, Rfl: 3    magnesium oxide (MAG-OX) 400 mg (241.3 mg magnesium) tablet, Take 1 tablet (400 mg total) by mouth once daily., Disp: 90 tablet, Rfl: 1    metFORMIN (GLUCOPHAGE-XR) 500 MG ER 24hr tablet, Take 2 tablets (1,000 mg total) by mouth 2 (two) times daily with meals., Disp: 360 tablet, Rfl: 2    naloxone (NARCAN) 4 mg/actuation Spry, 4mg by nasal route as needed for opioid overdose; may repeat every 2-3 minutes in alternating nostrils until medical help arrives. Call 911, Disp: 1 each, Rfl: 11    ondansetron (ZOFRAN-ODT) 8 MG TbDL, Take 1 tablet (8 mg total) by mouth every 8 (eight) hours as needed (nausea and vomiting)., Disp: 30 tablet, Rfl: 2    oxyCODONE-acetaminophen (PERCOCET) 5-325 mg per tablet, Take 1 tablet by mouth every 6 (six) hours as needed for Pain., Disp: 60 each, Rfl: 0    palbociclib (IBRANCE) 100 mg Cap, Take 1 capsule (100 mg) by mouth Monday - Friday, DO NOT TAKE Saturday and Sunday, for 3 weeks on, 1  week off., Disp: 21 capsule, Rfl: 3    pentoxifylline (TRENTAL) 400 mg TbSR, Take 400 mg by mouth 2 (two) times daily., Disp: , Rfl:     potassium chloride (KLOR-CON) 10 MEQ TbSR, TAKE 1 TABLET(10 MEQ) BY MOUTH EVERY NIGHT, Disp: 90 tablet, Rfl: 1    prochlorperazine (COMPAZINE) 5 MG tablet, Take 5 mg by mouth 4 (four) times daily., Disp: , Rfl:     sacubitriL-valsartan (ENTRESTO)  mg per tablet, Take 1 tablet by mouth 2 (two) times daily., Disp: 60 tablet, Rfl: 11    sacubitriL-valsartan (ENTRESTO)  mg per tablet, Take 1 tablet by mouth 2 (two) times daily., Disp: 60 tablet, Rfl: 11    sennosides (SENNA-C ORAL), Take 2 tablets by mouth daily as needed., Disp: , Rfl:     spironolactone (ALDACTONE) 25 MG tablet, Take 1 tablet (25 mg total) by mouth once daily., Disp: 90 tablet, Rfl: 3    tirzepatide 10 mg/0.5 mL PnIj, Inject 10 mg into the skin every 7 days., Disp: 12 Pen, Rfl: 1    tiZANidine (ZANAFLEX) 4 MG tablet, TAKE 1 TABLET(4 MG) BY MOUTH EVERY 8 HOURS, Disp: 45 tablet, Rfl: 1    UNKNOWN TO PATIENT, Calcium, Disp: , Rfl:     venlafaxine (EFFEXOR-XR) 150 MG Cp24, Take 1 capsule (150 mg total) by mouth once daily., Disp: 90 capsule, Rfl: 1    venlafaxine (EFFEXOR-XR) 75 MG 24 hr capsule, Take 1 capsule (75 mg total) by mouth once daily., Disp: 90 capsule, Rfl: 1    vitamin E 100 UNIT capsule, Take 100 Units by mouth once daily., Disp: , Rfl:     blood-glucose meter kit, To check BG 2 times daily, to use with insurance preferred meter, Disp: 1 each, Rfl: 0  No current facility-administered medications for this visit.    Facility-Administered Medications Ordered in Other Visits:     0.9%  NaCl infusion, , Intravenous, Continuous, Esther Coreas MD, New Bag at 08/27/24 1523    mupirocin 2 % ointment, , Nasal, On Call Procedure, Esther Coreas MD, Given at 10/26/23 1614    OBJECTIVE:       ROS:  Review of Systems    Review of Symptoms      Symptom Assessment (ESAS 0-10 Scale)  Pain:  5  Dyspnea:   0  Anxiety:  0  Nausea:  0  Depression:  0  Anorexia:  0  Fatigue:  3  Insomnia:  0  Restlessness:  0  Agitation:  0     CAM / Delirium:  Negative  Constipation:  Negative  Diarrhea:  Negative      Bowel Management Plan (BMP):  Yes      Pain Assessment:  OME in 24 hours:  0-5  Location(s): back    Back       Location: lower        Quality: Aching and dull        Quantity: 3/10 in intensity        Chronicity: Onset 1 (greater than) year(s) ago, stable        Aggravating Factors: Activity        Alleviating Factors: Opiates, NSAIDs and acetaminophen (doesn't like taking opioids due to sleepiness)       Associated Symptoms: None    Modified Abigail Scale:  0 (with exertion )    ECOG Performance Status ndGndrndanddndend:nd nd2nd Living Arrangements:  Lives with family    Psychosocial/Cultural:   See Palliative Psychosocial Note: No  Patient lives with her two adult daughters (18 and 20 years old)    Parents both  since cancer diagnosis    On disability and lives in her childhood home without a mortgage    5 sisters and 3 brothers (2 bio siblings and several step and half siblings)    Medically retried since diagnosis 7th grade ; still helps to develop lesson plans with friends sometimes and helps to  kids and prepare for ACT test  **Primary  to Follow**  Palliative Care  Consult: No    Spiritual:  F - Sandra and Belief:  Alcides  I - Importance:  High  C - Community:  Prays regularly  A - Address in Care:   services offered but declined. Needs met at this time      Advance Care Planning   Advance Directives:   Living Will: No    [place]POST: No    Do Not Resuscitate Status: No    Medical Power of : Yes    Agent's Name:  Garett Shelton   Agent's Contact Number:  [phone]    Decision Making:  Patient answered questions  Goals of Care: What is most important right now is to focus on avoiding the hospital, remaining as independent as possible, symptom/pain control, improvement  in condition but with limits to invasive therapies. Accordingly, we have decided that the best plan to meet the patient's goals includes continuing with treatment.          Physical Exam: limited to telemedicine visit  Vitals:    There were no vitals filed for this visit.    Physical Exam  Constitutional:       General: She is not in acute distress.     Appearance: She is obese. She is not diaphoretic.   HENT:      Head: Normocephalic and atraumatic.      Right Ear: External ear normal.      Left Ear: External ear normal.      Nose: Nose normal.      Mouth/Throat:      Mouth: Mucous membranes are moist.   Eyes:      General: No scleral icterus.        Right eye: No discharge.         Left eye: No discharge.      Extraocular Movements: Extraocular movements intact.   Neck:      Comments: Trachea midline  Pulmonary:      Effort: Pulmonary effort is normal. No respiratory distress.   Musculoskeletal:      Cervical back: Normal range of motion.      Comments: Sitting up without difficulty; able to move upper extremities with no limitations   Skin:     General: Skin is warm.      Coloration: Skin is not jaundiced.      Findings: No rash.   Neurological:      General: No focal deficit present.      Mental Status: She is alert and oriented to person, place, and time.      Cranial Nerves: No cranial nerve deficit.   Psychiatric:         Behavior: Behavior normal.         Thought Content: Thought content normal.         Judgment: Judgment normal.         Labs:  CBC:   WBC   Date Value Ref Range Status   08/27/2024 4.98 3.90 - 12.70 K/uL Final     Hemoglobin   Date Value Ref Range Status   08/27/2024 9.7 (L) 12.0 - 16.0 g/dL Final     Hematocrit   Date Value Ref Range Status   08/27/2024 31.9 (L) 37.0 - 48.5 % Final     MCV   Date Value Ref Range Status   08/27/2024 93 82 - 98 fL Final     Platelets   Date Value Ref Range Status   08/27/2024 238 150 - 450 K/uL Final       LFT:   Lab Results   Component Value Date    AST 11  "08/23/2024    ALKPHOS 76 08/23/2024    BILITOT 0.3 08/23/2024       Albumin:   Albumin   Date Value Ref Range Status   08/23/2024 3.3 (L) 3.5 - 5.2 g/dL Final   01/28/2021 3.5 (L) 3.6 - 5.1 g/dL Final     Comment:     For additional information, please refer to   http://education.HipChat/faq/FZY533 (This link is   being provided for informational/ educational purposes only.)    This test was developed and its analytical performance   characteristics have been determined by [email or web address] [facility]. It has not been cleared or approved by the   [place] Food and Drug Administration. This assay has been validated   pursuant to the CLIA regulations and is used for clinical   purposes.  @ Test Performed By:  [employer] [facility]  John Valverde M.D.,   [address] [place] [id number]  CLIA  [id number]       Protein:   Total Protein   Date Value Ref Range Status   08/23/2024 7.1 6.0 - 8.4 g/dL Final       Radiology:I have reviewed all pertinent imaging results/findings within the past 24 hours.     03/22/2024 CT C/A/P: "Stable osseous metastatic lesions. Prominent right chest wall and axillary nodes with measurements as above. Attention on follow-up. No additional new findings."    I spent a total of 40 minutes on the day of the visit.This includes face to face time in discussion of goals of care, symptom assessment, coordination of care and emotional support.  This also includes non-face to face time preparing to see the patient (eg, review of tests/imaging), obtaining and/or reviewing separately obtained history, documenting clinical information in the electronic or other health record, independently interpreting results and communicating results to the patient/family/caregiver, or care coordinator.     This note was generated with the assistance of ambient listening technology. Verbal consent was obtained by the patient and accompanying " visitor(s) for the recording of patient appointment to facilitate this note. I attest to having reviewed and edited the generated note for accuracy, though some syntax or spelling errors may persist. Please contact the author of this note for any clarification.       Signature: Matt Morillo,                                Rhomboid Transposition Flap Text: The defect edges were debeveled with a #15 scalpel blade.  Given the location of the defect and the proximity to free margins a rhomboid transposition flap was deemed most appropriate.  Using a sterile surgical marker, an appropriate rhomboid flap was drawn incorporating the defect.    The area thus outlined was incised deep to adipose tissue with a #15 scalpel blade.  The skin margins were undermined to an appropriate distance in all directions utilizing iris scissors.

## 2024-08-30 NOTE — ANESTHESIA POSTPROCEDURE EVALUATION
Anesthesia Post Evaluation    Patient: Kristopher Elmore    Procedure(s) Performed: Procedure(s) (LRB):  Insertion, ICD, Dual Chamber (Left)    Final Anesthesia Type: general      Patient location during evaluation: PACU  Patient participation: Yes- Able to Participate  Level of consciousness: awake and alert and oriented  Post-procedure vital signs: reviewed and stable  Pain management: adequate  Airway patency: patent    PONV status at discharge: No PONV  Anesthetic complications: no      Cardiovascular status: hemodynamically stable  Respiratory status: unassisted, spontaneous ventilation and room air  Hydration status: euvolemic  Follow-up not needed.              Vitals Value Taken Time   /69 08/27/24 1945   Temp 36.7 °C (98 °F) 08/27/24 1930   Pulse 75 08/27/24 1945   Resp 19 08/27/24 1945   SpO2 95 % 08/27/24 1945         Event Time   Out of Recovery 20:00:00         Pain/Ellen Score: No data recorded

## 2024-09-03 ENCOUNTER — CLINICAL SUPPORT (OUTPATIENT)
Dept: CARDIOLOGY | Facility: HOSPITAL | Age: 49
End: 2024-09-03
Attending: INTERNAL MEDICINE
Payer: MEDICARE

## 2024-09-03 DIAGNOSIS — I50.22 HEART FAILURE WITH MILDLY REDUCED EJECTION FRACTION: ICD-10-CM

## 2024-09-03 DIAGNOSIS — I50.42 CHRONIC COMBINED SYSTOLIC AND DIASTOLIC HEART FAILURE: ICD-10-CM

## 2024-09-03 DIAGNOSIS — R55 SYNCOPE, UNSPECIFIED SYNCOPE TYPE: ICD-10-CM

## 2024-09-03 DIAGNOSIS — I42.8 NON-ISCHEMIC CARDIOMYOPATHY: ICD-10-CM

## 2024-09-03 PROCEDURE — 93283 PRGRMG EVAL IMPLANTABLE DFB: CPT

## 2024-09-03 PROCEDURE — 93283 PRGRMG EVAL IMPLANTABLE DFB: CPT | Mod: 26,,, | Performed by: INTERNAL MEDICINE

## 2024-09-05 LAB
OHS CV DC REMOTE DEVICE TYPE: NORMAL
OHS CV RV PACING PERCENT: 0.1 %

## 2024-09-12 ENCOUNTER — OFFICE VISIT (OUTPATIENT)
Dept: HEMATOLOGY/ONCOLOGY | Facility: CLINIC | Age: 49
End: 2024-09-12
Payer: MEDICARE

## 2024-09-12 ENCOUNTER — INFUSION (OUTPATIENT)
Dept: INFUSION THERAPY | Facility: HOSPITAL | Age: 49
End: 2024-09-12
Payer: MEDICARE

## 2024-09-12 ENCOUNTER — LAB VISIT (OUTPATIENT)
Dept: LAB | Facility: HOSPITAL | Age: 49
End: 2024-09-12
Attending: INTERNAL MEDICINE
Payer: MEDICARE

## 2024-09-12 ENCOUNTER — TELEPHONE (OUTPATIENT)
Dept: ELECTROPHYSIOLOGY | Facility: CLINIC | Age: 49
End: 2024-09-12
Payer: MEDICARE

## 2024-09-12 VITALS
RESPIRATION RATE: 17 BRPM | OXYGEN SATURATION: 97 % | WEIGHT: 293 LBS | HEIGHT: 64 IN | BODY MASS INDEX: 50.02 KG/M2 | DIASTOLIC BLOOD PRESSURE: 52 MMHG | TEMPERATURE: 97 F | SYSTOLIC BLOOD PRESSURE: 110 MMHG | HEART RATE: 78 BPM

## 2024-09-12 DIAGNOSIS — C79.51 MALIGNANT NEOPLASM METASTATIC TO BONE: Primary | ICD-10-CM

## 2024-09-12 DIAGNOSIS — Z17.0 MALIGNANT NEOPLASM OF UPPER-INNER QUADRANT OF RIGHT BREAST IN FEMALE, ESTROGEN RECEPTOR POSITIVE: ICD-10-CM

## 2024-09-12 DIAGNOSIS — C50.211 MALIGNANT NEOPLASM OF UPPER-INNER QUADRANT OF RIGHT BREAST IN FEMALE, ESTROGEN RECEPTOR POSITIVE: Primary | ICD-10-CM

## 2024-09-12 DIAGNOSIS — C50.211 MALIGNANT NEOPLASM OF UPPER-INNER QUADRANT OF RIGHT BREAST IN FEMALE, ESTROGEN RECEPTOR POSITIVE: ICD-10-CM

## 2024-09-12 DIAGNOSIS — I42.8 NON-ISCHEMIC CARDIOMYOPATHY: ICD-10-CM

## 2024-09-12 DIAGNOSIS — D64.81 ANEMIA ASSOCIATED WITH CHEMOTHERAPY: ICD-10-CM

## 2024-09-12 DIAGNOSIS — E87.6 HYPOKALEMIA: ICD-10-CM

## 2024-09-12 DIAGNOSIS — C79.51 MALIGNANT NEOPLASM METASTATIC TO BONE: ICD-10-CM

## 2024-09-12 DIAGNOSIS — Z17.0 MALIGNANT NEOPLASM OF UPPER-INNER QUADRANT OF RIGHT BREAST IN FEMALE, ESTROGEN RECEPTOR POSITIVE: Primary | ICD-10-CM

## 2024-09-12 DIAGNOSIS — I42.8 NON-ISCHEMIC CARDIOMYOPATHY: Primary | ICD-10-CM

## 2024-09-12 DIAGNOSIS — T45.1X5A ANEMIA ASSOCIATED WITH CHEMOTHERAPY: ICD-10-CM

## 2024-09-12 DIAGNOSIS — E78.5 HYPERLIPIDEMIA, UNSPECIFIED HYPERLIPIDEMIA TYPE: ICD-10-CM

## 2024-09-12 DIAGNOSIS — D56.1 BETA-THALASSEMIA: ICD-10-CM

## 2024-09-12 DIAGNOSIS — I10 BENIGN ESSENTIAL HTN: ICD-10-CM

## 2024-09-12 LAB
ALBUMIN SERPL BCP-MCNC: 3 G/DL (ref 3.5–5.2)
ALP SERPL-CCNC: 76 U/L (ref 55–135)
ALT SERPL W/O P-5'-P-CCNC: 14 U/L (ref 10–44)
ANION GAP SERPL CALC-SCNC: 10 MMOL/L (ref 8–16)
AST SERPL-CCNC: 12 U/L (ref 10–40)
BASOPHILS # BLD AUTO: 0.07 K/UL (ref 0–0.2)
BASOPHILS NFR BLD: 1.6 % (ref 0–1.9)
BILIRUB SERPL-MCNC: 0.3 MG/DL (ref 0.1–1)
BUN SERPL-MCNC: 14 MG/DL (ref 6–20)
CALCIUM SERPL-MCNC: 10.4 MG/DL (ref 8.7–10.5)
CHLORIDE SERPL-SCNC: 109 MMOL/L (ref 95–110)
CO2 SERPL-SCNC: 24 MMOL/L (ref 23–29)
CREAT SERPL-MCNC: 0.9 MG/DL (ref 0.5–1.4)
DIFFERENTIAL METHOD BLD: ABNORMAL
EOSINOPHIL # BLD AUTO: 0.1 K/UL (ref 0–0.5)
EOSINOPHIL NFR BLD: 1.2 % (ref 0–8)
ERYTHROCYTE [DISTWIDTH] IN BLOOD BY AUTOMATED COUNT: 17.5 % (ref 11.5–14.5)
EST. GFR  (NO RACE VARIABLE): >60 ML/MIN/1.73 M^2
GLUCOSE SERPL-MCNC: 154 MG/DL (ref 70–110)
HCT VFR BLD AUTO: 33.5 % (ref 37–48.5)
HGB BLD-MCNC: 10 G/DL (ref 12–16)
IMM GRANULOCYTES # BLD AUTO: 0.02 K/UL (ref 0–0.04)
IMM GRANULOCYTES NFR BLD AUTO: 0.5 % (ref 0–0.5)
LYMPHOCYTES # BLD AUTO: 1 K/UL (ref 1–4.8)
LYMPHOCYTES NFR BLD: 23.8 % (ref 18–48)
MCH RBC QN AUTO: 27.9 PG (ref 27–31)
MCHC RBC AUTO-ENTMCNC: 29.9 G/DL (ref 32–36)
MCV RBC AUTO: 93 FL (ref 82–98)
MONOCYTES # BLD AUTO: 0.3 K/UL (ref 0.3–1)
MONOCYTES NFR BLD: 7 % (ref 4–15)
NEUTROPHILS # BLD AUTO: 2.8 K/UL (ref 1.8–7.7)
NEUTROPHILS NFR BLD: 65.9 % (ref 38–73)
NRBC BLD-RTO: 0 /100 WBC
PLATELET # BLD AUTO: 322 K/UL (ref 150–450)
PMV BLD AUTO: 9.9 FL (ref 9.2–12.9)
POTASSIUM SERPL-SCNC: 3.8 MMOL/L (ref 3.5–5.1)
PROT SERPL-MCNC: 6.8 G/DL (ref 6–8.4)
RBC # BLD AUTO: 3.59 M/UL (ref 4–5.4)
SODIUM SERPL-SCNC: 143 MMOL/L (ref 136–145)
WBC # BLD AUTO: 4.29 K/UL (ref 3.9–12.7)

## 2024-09-12 PROCEDURE — 85025 COMPLETE CBC W/AUTO DIFF WBC: CPT | Performed by: INTERNAL MEDICINE

## 2024-09-12 PROCEDURE — 1160F RVW MEDS BY RX/DR IN RCRD: CPT | Mod: CPTII,S$GLB,, | Performed by: INTERNAL MEDICINE

## 2024-09-12 PROCEDURE — 80053 COMPREHEN METABOLIC PANEL: CPT | Performed by: INTERNAL MEDICINE

## 2024-09-12 PROCEDURE — 3008F BODY MASS INDEX DOCD: CPT | Mod: CPTII,S$GLB,, | Performed by: INTERNAL MEDICINE

## 2024-09-12 PROCEDURE — 3074F SYST BP LT 130 MM HG: CPT | Mod: CPTII,S$GLB,, | Performed by: INTERNAL MEDICINE

## 2024-09-12 PROCEDURE — 99214 OFFICE O/P EST MOD 30 MIN: CPT | Mod: S$GLB,,, | Performed by: INTERNAL MEDICINE

## 2024-09-12 PROCEDURE — 3078F DIAST BP <80 MM HG: CPT | Mod: CPTII,S$GLB,, | Performed by: INTERNAL MEDICINE

## 2024-09-12 PROCEDURE — 99999 PR PBB SHADOW E&M-EST. PATIENT-LVL V: CPT | Mod: PBBFAC,,, | Performed by: INTERNAL MEDICINE

## 2024-09-12 PROCEDURE — 36415 COLL VENOUS BLD VENIPUNCTURE: CPT | Performed by: INTERNAL MEDICINE

## 2024-09-12 PROCEDURE — 1159F MED LIST DOCD IN RCRD: CPT | Mod: CPTII,S$GLB,, | Performed by: INTERNAL MEDICINE

## 2024-09-12 PROCEDURE — G2211 COMPLEX E/M VISIT ADD ON: HCPCS | Mod: S$GLB,,, | Performed by: INTERNAL MEDICINE

## 2024-09-12 PROCEDURE — 4010F ACE/ARB THERAPY RXD/TAKEN: CPT | Mod: CPTII,S$GLB,, | Performed by: INTERNAL MEDICINE

## 2024-09-12 PROCEDURE — 3044F HG A1C LEVEL LT 7.0%: CPT | Mod: CPTII,S$GLB,, | Performed by: INTERNAL MEDICINE

## 2024-09-12 NOTE — PROGRESS NOTES
"Subjective     Patient ID: Kristopher Elmore is a 48 y.o. female.    Chief Complaint: Malignant neoplasm of upper-inner quadrant of right breast     HPI    Presents for follow up of metastatic breast cancer     States overall she has been doing well  Today feels "blah" but otherwise has feeling well  States stomach is not feeling right- took stool softeners for constipation  Some relief but still feels discomfort as if not complete  Stomach ache without crampiness  + nausea, no vomiting  + fatigue    In the interval seen for NYHA Class II HF sx, on GDMT, recent episode of syncope.   - 8/26/2024 ICD placed    Most recent scans  - 8/9/2024 CT C/A/P:  FINDINGS:  LINES/TUBES:   shunt with stable course terminating in the lower abdomen..  SOFT TISSUES: Similar appearance of prominent right axillary and subpectoral nodes.  For example stable 1.0 cm right subpectoral node (series 2, image 35).  Right axillary hakeem conglomerate appears slightly more prominent, with a lateral node now measuring 1.3 cm in short axis (series 2, image 44), previously 1.0 cm per my measurement.  Hyperdense material within this hakeem conglomerate likely relates to prior biopsy.  No left axillary or subpectoral adenopathy.  Visualized thyroid gland is unremarkable.  HEART AND MEDIASTINUM: No mediastinal or hilar lymphadenopathy. Heart is normal size. No pericardial effusion. Left-sided aortic arch. Main pulmonary artery is normal in size. No intraluminal filling defects within the central pulmonary arterial system to suggest central pulmonary thromboembolism on this non-dedicated study.  LUNGS, PLEURA, AND AIRWAYS: Airways are patent. No focal consolidation, pleural effusion, or pneumothorax.  New nonspecific 0.4 cm solid nodule in the right lower lobe (series 6, image 328).  Stable subsegmental atelectasis versus scarring in the left lung base.  HEPATOBILIARY: Liver is normal size. Subcentimeter hypodensity in the right hepatic lobe " (series 3, image 58), too small to characterize but unchanged..  No biliary ductal dilatation. Normal gallbladder.  PANCREAS: No focal masses or ductal dilatation.  SPLEEN: Normal size.  ADRENALS: No adrenal nodules.  KIDNEYS/URETERS: Kidneys are normal size and enhance symmetrically.  Unchanged simple cyst in the middle pole the right kidney.  No hydronephrosis, stones, or solid mass lesions.Ureters are unremarkable  PELVIC ORGANS/BLADDER: No bladder wall thickening.  Calcified fibroid in the uterine fundus, unchanged.  No adnexal masses.  PERITONEUM / RETROPERITONEUM: No free air or fluid.  LYMPH NODES: No abdominopelvic lymphadenopathy.  VESSELS: Aorta maintains normal course and caliber.  No atherosclerosis.  Portal venous system is patent.  GI TRACT: Stomach and duodenum are unremarkable. No evidence of bowel obstruction or inflammation.  Appendix is not definitely visualized.  BONES: Stable mixed lytic and sclerotic lesions of the spine, sternum, pelvis, and left humeral head.  No new lesions.  No acute fractures.  Impression:  1. Minimally increased size of previously biopsied hakeem conglomerate in the right axilla.  Otherwise, right axillary and chest wall lymph nodes appear stable.  2. New 0.4 cm solid pulmonary nodule in the right lower lobe, nonspecific.  Attention on follow-up.  3. Stable osseous metastatic disease.  No new osseous lesions.  4. Additional findings as above.      Review of Systems   Constitutional:  Negative for activity change, appetite change, chills, fatigue (notes pretty good), fever and unexpected weight change.   HENT:  Negative for mouth sores, rhinorrhea and trouble swallowing.    Eyes:  Negative for visual disturbance.   Respiratory:  Negative for cough, shortness of breath and wheezing.    Cardiovascular:  Negative for chest pain, palpitations and leg swelling.   Gastrointestinal:  Negative for abdominal distention, abdominal pain, blood in stool, change in bowel habit,  constipation, diarrhea, nausea, vomiting and reflux.   Genitourinary:  Negative for difficulty urinating, dysuria, frequency and urgency.   Musculoskeletal:  Positive for back pain (chronic). Negative for arthralgias, joint swelling, myalgias and joint deformity.   Integumentary:  Negative for rash.   Neurological:  Negative for dizziness, weakness, light-headedness, numbness, headaches and coordination difficulties.   Hematological:  Negative for adenopathy. Does not bruise/bleed easily.   Psychiatric/Behavioral:  Negative for dysphoric mood and sleep disturbance. The patient is not nervous/anxious.           Objective     Physical Exam  Vitals and nursing note reviewed.   Constitutional:       General: She is not in acute distress.     Appearance: Normal appearance. She is obese. She is not ill-appearing.      Comments: Presents with her 2 daughters  ECOG = 0   HENT:      Head: Normocephalic and atraumatic.      Mouth/Throat:      Comments: Tongue geography no different than usual  Eyes:      General: No scleral icterus.     Extraocular Movements: Extraocular movements intact.      Conjunctiva/sclera: Conjunctivae normal.      Pupils: Pupils are equal, round, and reactive to light.   Cardiovascular:      Rate and Rhythm: Normal rate and regular rhythm.      Heart sounds: Normal heart sounds. No murmur heard.     No friction rub. No gallop.   Pulmonary:      Effort: Pulmonary effort is normal. No respiratory distress.      Breath sounds: Normal breath sounds. No wheezing, rhonchi or rales.   Chest:      Chest wall: No tenderness.   Abdominal:      General: Abdomen is flat. Bowel sounds are normal. There is no distension.      Palpations: Abdomen is soft. There is no mass.      Tenderness: There is no abdominal tenderness. There is no guarding or rebound.      Comments: No palpable masses   Musculoskeletal:         General: No swelling or tenderness. Normal range of motion.      Cervical back: Normal range of motion  and neck supple. No tenderness.      Right lower leg: No edema.      Left lower leg: No edema.      Comments: Point tenderness in cervical, thoracic and lumbar spine. Also with cva tenderness today   Lymphadenopathy:      Cervical: No cervical adenopathy.   Skin:     General: Skin is warm and dry.      Coloration: Skin is not jaundiced.      Findings: No bruising or rash.   Neurological:      General: No focal deficit present.      Mental Status: She is alert and oriented to person, place, and time.      Sensory: No sensory deficit.      Motor: No weakness.      Gait: Gait normal.   Psychiatric:         Mood and Affect: Mood normal.         Behavior: Behavior normal.         Thought Content: Thought content normal.         Judgment: Judgment normal.          Assessment and Plan     1. Malignant neoplasm of upper-inner quadrant of right breast in female, estrogen receptor positive    2. Bone metastases  Overview:  IMO Regualtory Update 4/1/23      3. Beta-thalassemia    4. Non-ischemic cardiomyopathy    5. Hyperlipidemia, unspecified hyperlipidemia type    6. Benign essential HTN    Other orders  -     Cancel: goserelin (ZOLADEX) injection 3.6 mg        Continue current regimen as minimal changes on last scan  Scans 11/2024 unless clinically needed sooner  Continue Arimidex and Ibrance  Xgeva- hold with osteonecrosis- seeing dental     Sees Cardio Onc  ICD in place    Route Chart for Scheduling    Med Onc Chart Routing  Urgent    Follow up with physician . Adjust zoladex schedule for next week, RTC 9 weeks (shift current appt back )cbc, cmp and EP and scans   Follow up with CAMILA . RTC 5 weeks cbc, cmp and EP and Zoladex (shift current appt back)   Infusion scheduling note    Injection scheduling note    Labs    Imaging    Pharmacy appointment    Other referrals            Treatment Plan Information   OP ANASTROZOLE PALBOCICLIB Q4W Phyllis Lei MD   Associated diagnosis: Malignant neoplasm of upper-inner quadrant of  right breast in female, estrogen receptor positive Stage IV cT1c(2), cN1(f), cM1, G3, ER+, MI-, HER2- noted on 10/7/2019   Line of treatment: First Line  Treatment Goal: Palliative     Upcoming Treatment Dates - OP ANASTROZOLE PALBOCICLIB Q4W    No upcoming days in selected categories.    Supportive Plan Information  OP BREAST GOSERELIN & DENOSUMAB Q4W Phyllis Lei MD   Associated Diagnosis: Malignant neoplasm of upper-inner quadrant of right breast in female, estrogen receptor positive Stage IV cT1c(2), cN1(f), cM1, G3, ER+, MI-, HER2- noted on 10/7/2019   Line of treatment: First Line   Treatment goal: Palliative     Upcoming Treatment Dates - OP BREAST GOSERELIN & DENOSUMAB Q4W    9/20/2024       Chemotherapy       goserelin (ZOLADEX) injection 3.6 mg  6/21/3990       Chemotherapy       goserelin (ZOLADEX) injection 3.6 mg

## 2024-09-12 NOTE — NURSING
Pt one week early for zoladex.  Message sent to Shobha LEON RN to have pt rescheduled.  Pt updated.

## 2024-09-13 DIAGNOSIS — C50.211 MALIGNANT NEOPLASM OF UPPER-INNER QUADRANT OF RIGHT BREAST IN FEMALE, ESTROGEN RECEPTOR POSITIVE: Primary | ICD-10-CM

## 2024-09-13 DIAGNOSIS — Z17.0 MALIGNANT NEOPLASM OF UPPER-INNER QUADRANT OF RIGHT BREAST IN FEMALE, ESTROGEN RECEPTOR POSITIVE: Primary | ICD-10-CM

## 2024-09-13 RX ORDER — ANASTROZOLE 1 MG/1
TABLET ORAL
Qty: 90 TABLET | Refills: 0 | Status: SHIPPED | OUTPATIENT
Start: 2024-09-13

## 2024-09-13 RX ORDER — POTASSIUM CHLORIDE 750 MG/1
TABLET, EXTENDED RELEASE ORAL
Qty: 90 TABLET | Refills: 1 | Status: SHIPPED | OUTPATIENT
Start: 2024-09-13

## 2024-09-17 DIAGNOSIS — E55.9 VITAMIN D DEFICIENCY: ICD-10-CM

## 2024-09-17 RX ORDER — ERGOCALCIFEROL 1.25 MG/1
50000 CAPSULE ORAL
Qty: 12 CAPSULE | Refills: 0 | Status: SHIPPED | OUTPATIENT
Start: 2024-09-17

## 2024-09-19 ENCOUNTER — INFUSION (OUTPATIENT)
Dept: INFUSION THERAPY | Facility: HOSPITAL | Age: 49
End: 2024-09-19
Payer: MEDICARE

## 2024-09-19 DIAGNOSIS — Z17.0 MALIGNANT NEOPLASM OF UPPER-INNER QUADRANT OF RIGHT BREAST IN FEMALE, ESTROGEN RECEPTOR POSITIVE: ICD-10-CM

## 2024-09-19 DIAGNOSIS — C79.51 MALIGNANT NEOPLASM METASTATIC TO BONE: Primary | ICD-10-CM

## 2024-09-19 DIAGNOSIS — C50.211 MALIGNANT NEOPLASM OF UPPER-INNER QUADRANT OF RIGHT BREAST IN FEMALE, ESTROGEN RECEPTOR POSITIVE: ICD-10-CM

## 2024-09-19 PROCEDURE — 96402 CHEMO HORMON ANTINEOPL SQ/IM: CPT

## 2024-09-19 PROCEDURE — 63600175 PHARM REV CODE 636 W HCPCS: Mod: JZ,JG | Performed by: INTERNAL MEDICINE

## 2024-09-19 RX ADMIN — GOSERELIN ACETATE 3.6 MG: 3.6 IMPLANT SUBCUTANEOUS at 09:09

## 2024-09-28 ENCOUNTER — CLINICAL SUPPORT (OUTPATIENT)
Dept: CARDIOLOGY | Facility: HOSPITAL | Age: 49
End: 2024-09-28
Attending: INTERNAL MEDICINE
Payer: MEDICARE

## 2024-09-28 DIAGNOSIS — R55 SYNCOPE AND COLLAPSE: ICD-10-CM

## 2024-09-28 DIAGNOSIS — Z95.810 PRESENCE OF AUTOMATIC (IMPLANTABLE) CARDIAC DEFIBRILLATOR: ICD-10-CM

## 2024-09-28 PROCEDURE — 93295 DEV INTERROG REMOTE 1/2/MLT: CPT | Mod: ,,, | Performed by: INTERNAL MEDICINE

## 2024-09-28 PROCEDURE — 93296 REM INTERROG EVL PM/IDS: CPT | Performed by: INTERNAL MEDICINE

## 2024-10-01 ENCOUNTER — OFFICE VISIT (OUTPATIENT)
Dept: DERMATOLOGY | Facility: CLINIC | Age: 49
End: 2024-10-01
Payer: MEDICARE

## 2024-10-01 DIAGNOSIS — Z76.89 ENCOUNTER FOR SKIN CARE: Primary | ICD-10-CM

## 2024-10-01 DIAGNOSIS — L81.9 HYPERPIGMENTATION: ICD-10-CM

## 2024-10-01 PROCEDURE — 1160F RVW MEDS BY RX/DR IN RCRD: CPT | Mod: CPTII,95,, | Performed by: DERMATOLOGY

## 2024-10-01 PROCEDURE — 4010F ACE/ARB THERAPY RXD/TAKEN: CPT | Mod: CPTII,95,, | Performed by: DERMATOLOGY

## 2024-10-01 PROCEDURE — 1159F MED LIST DOCD IN RCRD: CPT | Mod: CPTII,95,, | Performed by: DERMATOLOGY

## 2024-10-01 PROCEDURE — G2211 COMPLEX E/M VISIT ADD ON: HCPCS | Mod: 95,,, | Performed by: DERMATOLOGY

## 2024-10-01 PROCEDURE — 3044F HG A1C LEVEL LT 7.0%: CPT | Mod: CPTII,95,, | Performed by: DERMATOLOGY

## 2024-10-01 PROCEDURE — 99203 OFFICE O/P NEW LOW 30 MIN: CPT | Mod: 95,,, | Performed by: DERMATOLOGY

## 2024-10-01 RX ORDER — HYDROQUINONE 40 MG/G
CREAM TOPICAL 2 TIMES DAILY
Qty: 30 G | Refills: 5 | Status: SHIPPED | OUTPATIENT
Start: 2024-10-01 | End: 2024-10-31

## 2024-10-01 NOTE — PROGRESS NOTES
The patient location is: work  The chief complaint leading to consultation is: darkness of the face.  No tx.    Visit type: audiovisual    Face to Face time with patient: 3.5 m  6 minutes of total time spent on the encounter, which includes face to face time and non-face to face time preparing to see the patient (eg, review of tests), Obtaining and/or reviewing separately obtained history, Documenting clinical information in the electronic or other health record, Independently interpreting results (not separately reported) and communicating results to the patient/family/caregiver, or Care coordination (not separately reported).         Each patient to whom he or she provides medical services by telemedicine is:  (1) informed of the relationship between the physician and patient and the respective role of any other health care provider with respect to management of the patient; and (2) notified that he or she may decline to receive medical services by telemedicine and may withdraw from such care at any time.    Notes:     Subjective:      Patient ID:  Kristopher Elmore is a 48 y.o. female who presents for No chief complaint on file.    HPI    Review of Systems   Constitutional:  Negative for fever.   HENT:  Negative for sore throat.    Respiratory:  Negative for cough.        Objective:   Physical Exam   Constitutional: She appears well-developed and well-nourished.   Neurological: She is alert and oriented to person, place, and time.   Psychiatric: She has a normal mood and affect.        Diagram Legend     Erythematous scaling macule/papule c/w actinic keratosis       Vascular papule c/w angioma      Pigmented verrucoid papule/plaque c/w seborrheic keratosis      Yellow umbilicated papule c/w sebaceous hyperplasia      Irregularly shaped tan macule c/w lentigo     1-2 mm smooth white papules consistent with Milia      Movable subcutaneous cyst with punctum c/w epidermal inclusion cyst      Subcutaneous  movable cyst c/w pilar cyst      Firm pink to brown papule c/w dermatofibroma      Pedunculated fleshy papule(s) c/w skin tag(s)      Evenly pigmented macule c/w junctional nevus     Mildly variegated pigmented, slightly irregular-bordered macule c/w mildly atypical nevus      Flesh colored to evenly pigmented papule c/w intradermal nevus       Pink pearly papule/plaque c/w basal cell carcinoma      Erythematous hyperkeratotic cursted plaque c/w SCC      Surgical scar with no sign of skin cancer recurrence      Open and closed comedones      Inflammatory papules and pustules      Verrucoid papule consistent consistent with wart     Erythematous eczematous patches and plaques     Dystrophic onycholytic nail with subungual debris c/w onychomycosis     Umbilicated papule    Erythematous-base heme-crusted tan verrucoid plaque consistent with inflamed seborrheic keratosis     Erythematous Silvery Scaling Plaque c/w Psoriasis     See annotation      Assessment / Plan:        Encounter for skin care  No hot water bathing reviewed.  Patient instructed in importance in daily sun protection. Sun avoidance and topical protection discussed.     Patient encouraged to wear hat for all outdoor exposure.     Also discussed sun protective clothing.  Recommend sun block with plain zinc oxide or sun block products with some zinc or titanium as their base with an spf of at least 30 to be applied every 2-3 hours of outdoor exposure.  Seek an oil free version if dealing with acne.    Hyperpigmentation  -     hydroquinone 4 % Crea; Apply topically 2 (two) times daily. Patient to start 4% HQ carefully every other day for a week or so before attempting daily.  Later can go to twice daily, morning and night.  Watch out for irritation, which can cause more darkening, which is not what we want.  If dryness occurs, take a break and apply coconut oil.  If irritation occurs, take a break and apply OTC hydrocortisone.  Dispense: 30 g; Refill:  5  Chronic nature of this condition discussed with patient.  Reviewed with patient different treatment options and associated risks.  Proper application of medications and or care for affected area(s) and condition(s) reviewed.  Previous [facility] labs and or records and notes reviewed and considered for their impact on our clinical decision making today.  Poss from lasix?           Follow up in about 1 year (around 10/1/2025).

## 2024-10-01 NOTE — PATIENT INSTRUCTIONS
Patient instructed in importance in daily sun protection. Sun avoidance and topical protection discussed.     Patient encouraged to wear hat for all outdoor exposure.     Also discussed sun protective clothing.  Recommend sun block with plain zinc oxide or sun block products with some zinc or titanium as their base with an spf of at least 30 to be applied every 2-3 hours of outdoor exposure.  Seek an oil free version if dealing with acne.  No hot water bathing reviewed.

## 2024-10-04 LAB
OHS CV AF BURDEN PERCENT: < 1
OHS CV DC REMOTE DEVICE TYPE: NORMAL
OHS CV RV PACING PERCENT: 0.04 %

## 2024-10-09 DIAGNOSIS — C50.211 MALIGNANT NEOPLASM OF UPPER-INNER QUADRANT OF RIGHT BREAST IN FEMALE, ESTROGEN RECEPTOR POSITIVE: ICD-10-CM

## 2024-10-09 DIAGNOSIS — Z17.0 MALIGNANT NEOPLASM OF UPPER-INNER QUADRANT OF RIGHT BREAST IN FEMALE, ESTROGEN RECEPTOR POSITIVE: ICD-10-CM

## 2024-10-09 DIAGNOSIS — R45.89 ANXIETY ABOUT HEALTH: ICD-10-CM

## 2024-10-09 DIAGNOSIS — G89.3 CANCER ASSOCIATED PAIN: ICD-10-CM

## 2024-10-10 RX ORDER — TIZANIDINE 4 MG/1
4 TABLET ORAL EVERY 8 HOURS
Qty: 45 TABLET | Refills: 1 | Status: SHIPPED | OUTPATIENT
Start: 2024-10-10

## 2024-10-10 RX ORDER — LORAZEPAM 1 MG/1
TABLET ORAL
Qty: 30 TABLET | Refills: 1 | Status: SHIPPED | OUTPATIENT
Start: 2024-10-10

## 2024-10-12 RX ORDER — CARVEDILOL 12.5 MG/1
12.5 TABLET ORAL 2 TIMES DAILY
Qty: 180 TABLET | Refills: 0 | Status: SHIPPED | OUTPATIENT
Start: 2024-10-12

## 2024-10-18 ENCOUNTER — PATIENT MESSAGE (OUTPATIENT)
Dept: HEMATOLOGY/ONCOLOGY | Facility: CLINIC | Age: 49
End: 2024-10-18

## 2024-10-18 ENCOUNTER — INFUSION (OUTPATIENT)
Dept: INFUSION THERAPY | Facility: HOSPITAL | Age: 49
End: 2024-10-18
Payer: MEDICARE

## 2024-10-18 ENCOUNTER — OFFICE VISIT (OUTPATIENT)
Dept: HEMATOLOGY/ONCOLOGY | Facility: CLINIC | Age: 49
End: 2024-10-18
Payer: MEDICARE

## 2024-10-18 VITALS
BODY MASS INDEX: 50.02 KG/M2 | SYSTOLIC BLOOD PRESSURE: 101 MMHG | HEART RATE: 80 BPM | HEIGHT: 64 IN | DIASTOLIC BLOOD PRESSURE: 65 MMHG | WEIGHT: 293 LBS

## 2024-10-18 VITALS
HEART RATE: 80 BPM | WEIGHT: 293 LBS | DIASTOLIC BLOOD PRESSURE: 65 MMHG | TEMPERATURE: 98 F | OXYGEN SATURATION: 96 % | HEIGHT: 64 IN | SYSTOLIC BLOOD PRESSURE: 101 MMHG | BODY MASS INDEX: 50.02 KG/M2

## 2024-10-18 DIAGNOSIS — C79.51 MALIGNANT NEOPLASM METASTATIC TO BONE: ICD-10-CM

## 2024-10-18 DIAGNOSIS — C50.211 MALIGNANT NEOPLASM OF UPPER-INNER QUADRANT OF RIGHT BREAST IN FEMALE, ESTROGEN RECEPTOR POSITIVE: ICD-10-CM

## 2024-10-18 DIAGNOSIS — C50.211 MALIGNANT NEOPLASM OF UPPER-INNER QUADRANT OF RIGHT BREAST IN FEMALE, ESTROGEN RECEPTOR POSITIVE: Primary | ICD-10-CM

## 2024-10-18 DIAGNOSIS — M25.561 ACUTE PAIN OF RIGHT KNEE: ICD-10-CM

## 2024-10-18 DIAGNOSIS — C79.51 MALIGNANT NEOPLASM METASTATIC TO BONE: Primary | ICD-10-CM

## 2024-10-18 DIAGNOSIS — Z17.0 MALIGNANT NEOPLASM OF UPPER-INNER QUADRANT OF RIGHT BREAST IN FEMALE, ESTROGEN RECEPTOR POSITIVE: ICD-10-CM

## 2024-10-18 DIAGNOSIS — I42.8 NON-ISCHEMIC CARDIOMYOPATHY: ICD-10-CM

## 2024-10-18 DIAGNOSIS — Z17.0 MALIGNANT NEOPLASM OF UPPER-INNER QUADRANT OF RIGHT BREAST IN FEMALE, ESTROGEN RECEPTOR POSITIVE: Primary | ICD-10-CM

## 2024-10-18 PROCEDURE — 63600175 PHARM REV CODE 636 W HCPCS: Mod: JZ,JG | Performed by: NURSE PRACTITIONER

## 2024-10-18 PROCEDURE — 96402 CHEMO HORMON ANTINEOPL SQ/IM: CPT

## 2024-10-18 PROCEDURE — 99999 PR PBB SHADOW E&M-EST. PATIENT-LVL II: CPT | Mod: PBBFAC,,, | Performed by: NURSE PRACTITIONER

## 2024-10-18 RX ADMIN — GOSERELIN ACETATE 3.6 MG: 3.6 IMPLANT SUBCUTANEOUS at 08:10

## 2024-10-18 NOTE — NURSING
Patient given cold pack in waiting area to numb abdomen.  Patient wheeled into treatment area, administered Zoladex SQ injection to LL abdomen.  Tolerated well. Patient wheeled out of treatment area to meet family in waiting area.

## 2024-10-18 NOTE — PROGRESS NOTES
Subjective     Patient ID: Kristopher Elmore is a 49 y.o. female.    Chief Complaint: No chief complaint on file.    HPI    Presents for follow up of metastatic breast cancer     States overall she has been doing well. She is due to start her next cycle of Ibrance on Monday  Notes new right knee pain, feels very stiff, making it difficult to walk. Gradual onset.  No fall or injury, using an arthritis cream and ibuprofen 800 mg once daily, which does provide some relief  No other new pain   No nausea/vomiting/diarrhea/constipation  No fever or s/s of infection  Eating and drinking well      In the interval seen for NYHA Class II HF sx, on GDMT, recent episode of syncope.   - 8/26/2024 ICD placed    Most recent scans  - 8/9/2024 CT C/A/P:  FINDINGS:  LINES/TUBES:   shunt with stable course terminating in the lower abdomen..  SOFT TISSUES: Similar appearance of prominent right axillary and subpectoral nodes.  For example stable 1.0 cm right subpectoral node (series 2, image 35).  Right axillary hakeem conglomerate appears slightly more prominent, with a lateral node now measuring 1.3 cm in short axis (series 2, image 44), previously 1.0 cm per my measurement.  Hyperdense material within this hakeem conglomerate likely relates to prior biopsy.  No left axillary or subpectoral adenopathy.  Visualized thyroid gland is unremarkable.  HEART AND MEDIASTINUM: No mediastinal or hilar lymphadenopathy. Heart is normal size. No pericardial effusion. Left-sided aortic arch. Main pulmonary artery is normal in size. No intraluminal filling defects within the central pulmonary arterial system to suggest central pulmonary thromboembolism on this non-dedicated study.  LUNGS, PLEURA, AND AIRWAYS: Airways are patent. No focal consolidation, pleural effusion, or pneumothorax.  New nonspecific 0.4 cm solid nodule in the right lower lobe (series 6, image 328).  Stable subsegmental atelectasis versus scarring in the left lung  base.  HEPATOBILIARY: Liver is normal size. Subcentimeter hypodensity in the right hepatic lobe (series 3, image 58), too small to characterize but unchanged..  No biliary ductal dilatation. Normal gallbladder.  PANCREAS: No focal masses or ductal dilatation.  SPLEEN: Normal size.  ADRENALS: No adrenal nodules.  KIDNEYS/URETERS: Kidneys are normal size and enhance symmetrically.  Unchanged simple cyst in the middle pole the right kidney.  No hydronephrosis, stones, or solid mass lesions.Ureters are unremarkable  PELVIC ORGANS/BLADDER: No bladder wall thickening.  Calcified fibroid in the uterine fundus, unchanged.  No adnexal masses.  PERITONEUM / RETROPERITONEUM: No free air or fluid.  LYMPH NODES: No abdominopelvic lymphadenopathy.  VESSELS: Aorta maintains normal course and caliber.  No atherosclerosis.  Portal venous system is patent.  GI TRACT: Stomach and duodenum are unremarkable. No evidence of bowel obstruction or inflammation.  Appendix is not definitely visualized.  BONES: Stable mixed lytic and sclerotic lesions of the spine, sternum, pelvis, and left humeral head.  No new lesions.  No acute fractures.  Impression:  1. Minimally increased size of previously biopsied hakeem conglomerate in the right axilla.  Otherwise, right axillary and chest wall lymph nodes appear stable.  2. New 0.4 cm solid pulmonary nodule in the right lower lobe, nonspecific.  Attention on follow-up.  3. Stable osseous metastatic disease.  No new osseous lesions.  4. Additional findings as above.      Review of Systems   Constitutional:  Negative for activity change, appetite change, chills, fatigue (notes pretty good), fever and unexpected weight change.   HENT:  Negative for mouth sores, rhinorrhea and trouble swallowing.    Eyes:  Negative for visual disturbance.   Respiratory:  Negative for cough, shortness of breath and wheezing.    Cardiovascular:  Negative for chest pain, palpitations and leg swelling.   Gastrointestinal:   Negative for abdominal distention, abdominal pain, blood in stool, change in bowel habit, constipation, diarrhea, nausea, vomiting and reflux.   Genitourinary:  Negative for difficulty urinating, dysuria, frequency and urgency.   Musculoskeletal:  Positive for arthralgias and back pain (chronic). Negative for joint swelling, myalgias and joint deformity.   Integumentary:  Negative for rash.   Neurological:  Negative for dizziness, weakness, light-headedness, numbness, headaches and coordination difficulties.   Hematological:  Negative for adenopathy. Does not bruise/bleed easily.   Psychiatric/Behavioral:  Negative for dysphoric mood and sleep disturbance. The patient is not nervous/anxious.         Objective     Physical Exam  Vitals and nursing note reviewed.   Constitutional:       General: She is not in acute distress.     Appearance: Normal appearance. She is obese. She is not ill-appearing.      Comments: Presents with her 2 daughters  ECOG = 0   HENT:      Head: Normocephalic and atraumatic.      Mouth/Throat:      Comments: Tongue geography no different than usual  Eyes:      General: No scleral icterus.     Extraocular Movements: Extraocular movements intact.      Conjunctiva/sclera: Conjunctivae normal.      Pupils: Pupils are equal, round, and reactive to light.   Cardiovascular:      Rate and Rhythm: Normal rate and regular rhythm.      Heart sounds: Normal heart sounds. No murmur heard.     No friction rub. No gallop.   Pulmonary:      Effort: Pulmonary effort is normal. No respiratory distress.      Breath sounds: Normal breath sounds. No wheezing, rhonchi or rales.   Chest:      Chest wall: No tenderness.   Abdominal:      General: Abdomen is flat. Bowel sounds are normal. There is no distension.      Palpations: Abdomen is soft. There is no mass.      Tenderness: There is no abdominal tenderness. There is no guarding or rebound.      Comments: No palpable masses   Musculoskeletal:         General: No  swelling or tenderness. Normal range of motion.      Cervical back: Normal range of motion and neck supple. No tenderness.      Right lower leg: No edema.      Left lower leg: No edema.   Lymphadenopathy:      Cervical: No cervical adenopathy.   Skin:     General: Skin is warm and dry.      Coloration: Skin is not jaundiced.      Findings: No bruising or rash.   Neurological:      General: No focal deficit present.      Mental Status: She is alert and oriented to person, place, and time.      Sensory: No sensory deficit.      Motor: No weakness.      Gait: Gait normal.   Psychiatric:         Mood and Affect: Mood normal.         Behavior: Behavior normal.         Thought Content: Thought content normal.         Judgment: Judgment normal.          Assessment and Plan     1. Malignant neoplasm of upper-inner quadrant of right breast in female, estrogen receptor positive    2. Bone metastases  Overview:  IMO Regualtory Update 4/1/23      3. Non-ischemic cardiomyopathy    4. Acute pain of right knee  -     X-Ray Knee 3 View Right; Future; Expected date: 10/18/2024    Other orders  -     Cancel: goserelin (ZOLADEX) injection 3.6 mg      Continue current regimen as minimal changes on last scan  Labs reviewed today and acceptable to restart ibrance this Monday  Scans 11/2024 unless clinically needed sooner  Continue Arimidex and Ibrance  Xgeva- hold with osteonecrosis- seeing dental  Goserelin today and every 4 weeks  Will get xray for knee pain  Follow up in 4 weeks with Dr. Quique Clark Cardio Onc  ICD in place    Route Chart for Scheduling    Med Onc Chart Routing      Follow up with physician 4 weeks.   Follow up with CAMILA . 8 weeks with PJ   Infusion scheduling note    Injection scheduling note zoladex every 4 weeks, please schedule out   Labs CBC and CMP   Scheduling:  Preferred lab:  Lab interval:  every 4 weeks, please schedule out   Imaging Other   scans already scheduled,  knee xray scheduled   Pharmacy  appointment    Other referrals                    MDM includes  :    - Acute or chronic illness or injury that poses a threat to life or bodily function  - Independent review and explanation of 3+ results from unique tests  - Discussion of management and ordering 3+ unique tests  - Extensive discussion of treatment and management  - Prescription drug management  - Drug therapy requiring intensive monitoring for toxicity     Chelita England NP    Treatment Plan Information   OP ANASTROZOLE PALBOCICLIB Q4W Phyllis Lei MD   Associated diagnosis: Malignant neoplasm of upper-inner quadrant of right breast in female, estrogen receptor positive Stage IV cT1c(2), cN1(f), cM1, G3, ER+, DC-, HER2- noted on 10/7/2019   Line of treatment: First Line  Treatment Goal: Palliative     Upcoming Treatment Dates - OP ANASTROZOLE PALBOCICLIB Q4W    No upcoming days in selected categories.    Supportive Plan Information  OP BREAST GOSERELIN & DENOSUMAB Q4W Phyllis Lei MD   Associated Diagnosis: Malignant neoplasm of upper-inner quadrant of right breast in female, estrogen receptor positive Stage IV cT1c(2), cN1(f), cM1, G3, ER+, DC-, HER2- noted on 10/7/2019   Line of treatment: First Line   Treatment goal: Palliative     Upcoming Treatment Dates - OP BREAST GOSERELIN & DENOSUMAB Q4W    No upcoming days in selected categories.

## 2024-10-26 ENCOUNTER — HOSPITAL ENCOUNTER (OUTPATIENT)
Dept: RADIOLOGY | Facility: HOSPITAL | Age: 49
Discharge: HOME | End: 2024-10-26
Attending: NURSE PRACTITIONER
Payer: MEDICARE

## 2024-10-26 DIAGNOSIS — M25.561 ACUTE PAIN OF RIGHT KNEE: ICD-10-CM

## 2024-10-26 PROCEDURE — 73562 X-RAY EXAM OF KNEE 3: CPT | Mod: TC,FY,RT

## 2024-10-26 PROCEDURE — 73562 X-RAY EXAM OF KNEE 3: CPT | Mod: 26,RT,, | Performed by: INTERNAL MEDICINE

## 2024-10-29 ENCOUNTER — TELEPHONE (OUTPATIENT)
Dept: PALLIATIVE MEDICINE | Facility: CLINIC | Age: 49
End: 2024-10-29
Payer: MEDICARE

## 2024-10-30 DIAGNOSIS — M25.561 ACUTE PAIN OF RIGHT KNEE: Primary | ICD-10-CM

## 2024-10-30 DIAGNOSIS — M17.11 ARTHRITIS OF RIGHT KNEE: ICD-10-CM

## 2024-10-31 DIAGNOSIS — C50.211 MALIGNANT NEOPLASM OF UPPER-INNER QUADRANT OF RIGHT BREAST IN FEMALE, ESTROGEN RECEPTOR POSITIVE: ICD-10-CM

## 2024-10-31 DIAGNOSIS — Z17.0 MALIGNANT NEOPLASM OF UPPER-INNER QUADRANT OF RIGHT BREAST IN FEMALE, ESTROGEN RECEPTOR POSITIVE: ICD-10-CM

## 2024-10-31 DIAGNOSIS — G89.3 CANCER ASSOCIATED PAIN: ICD-10-CM

## 2024-10-31 DIAGNOSIS — R45.89 ANXIETY ABOUT HEALTH: ICD-10-CM

## 2024-10-31 RX ORDER — LORAZEPAM 1 MG/1
1 TABLET ORAL EVERY 8 HOURS PRN
Qty: 30 TABLET | Refills: 1 | Status: SHIPPED | OUTPATIENT
Start: 2024-10-31

## 2024-10-31 RX ORDER — TIZANIDINE 4 MG/1
4 TABLET ORAL EVERY 8 HOURS
Qty: 45 TABLET | Refills: 1 | Status: SHIPPED | OUTPATIENT
Start: 2024-10-31

## 2024-10-31 RX ORDER — IBUPROFEN 800 MG/1
800 TABLET ORAL 2 TIMES DAILY PRN
Qty: 45 TABLET | Refills: 2 | Status: SHIPPED | OUTPATIENT
Start: 2024-10-31

## 2024-10-31 RX ORDER — IBUPROFEN 800 MG/1
800 TABLET ORAL 2 TIMES DAILY PRN
Qty: 45 TABLET | Refills: 2 | Status: CANCELLED | OUTPATIENT
Start: 2024-10-31

## 2024-11-02 DIAGNOSIS — E11.65 TYPE 2 DIABETES MELLITUS WITH HYPERGLYCEMIA, WITHOUT LONG-TERM CURRENT USE OF INSULIN: Chronic | ICD-10-CM

## 2024-11-04 DIAGNOSIS — E11.65 TYPE 2 DIABETES MELLITUS WITH HYPERGLYCEMIA, WITHOUT LONG-TERM CURRENT USE OF INSULIN: Chronic | ICD-10-CM

## 2024-11-04 RX ORDER — LANCETS
EACH MISCELLANEOUS
Qty: 200 EACH | Refills: 3 | Status: SHIPPED | OUTPATIENT
Start: 2024-11-04 | End: 2024-11-04 | Stop reason: SDUPTHER

## 2024-11-04 RX ORDER — INSULIN PUMP SYRINGE, 3 ML
EACH MISCELLANEOUS
Qty: 1 EACH | Refills: 0 | Status: SHIPPED | OUTPATIENT
Start: 2024-11-04 | End: 2024-11-04 | Stop reason: SDUPTHER

## 2024-11-05 DIAGNOSIS — F41.9 ANXIETY: ICD-10-CM

## 2024-11-05 DIAGNOSIS — F33.1 MAJOR DEPRESSIVE DISORDER, RECURRENT EPISODE, MODERATE WITH ANXIOUS DISTRESS: Chronic | ICD-10-CM

## 2024-11-05 RX ORDER — INSULIN PUMP SYRINGE, 3 ML
EACH MISCELLANEOUS
Qty: 1 EACH | Refills: 0 | Status: SHIPPED | OUTPATIENT
Start: 2024-11-05 | End: 2025-11-02

## 2024-11-05 RX ORDER — LANCETS
EACH MISCELLANEOUS
Qty: 200 EACH | Refills: 3 | Status: SHIPPED | OUTPATIENT
Start: 2024-11-05

## 2024-11-05 RX ORDER — VENLAFAXINE HYDROCHLORIDE 150 MG/1
150 CAPSULE, EXTENDED RELEASE ORAL DAILY
Qty: 30 CAPSULE | Refills: 0 | Status: SHIPPED | OUTPATIENT
Start: 2024-11-05 | End: 2024-12-05

## 2024-11-06 ENCOUNTER — PATIENT MESSAGE (OUTPATIENT)
Dept: ENDOCRINOLOGY | Facility: CLINIC | Age: 49
End: 2024-11-06
Payer: MEDICARE

## 2024-11-06 ENCOUNTER — OFFICE VISIT (OUTPATIENT)
Dept: ORTHOPEDICS | Facility: CLINIC | Age: 49
End: 2024-11-06
Payer: MEDICARE

## 2024-11-06 DIAGNOSIS — M25.561 ACUTE PAIN OF RIGHT KNEE: ICD-10-CM

## 2024-11-06 DIAGNOSIS — E11.65 TYPE 2 DIABETES MELLITUS WITH HYPERGLYCEMIA, WITHOUT LONG-TERM CURRENT USE OF INSULIN: Chronic | ICD-10-CM

## 2024-11-06 DIAGNOSIS — M17.11 ARTHRITIS OF RIGHT KNEE: ICD-10-CM

## 2024-11-06 DIAGNOSIS — M89.9 LESION OF BONE OF KNEE: Primary | ICD-10-CM

## 2024-11-06 PROCEDURE — 3044F HG A1C LEVEL LT 7.0%: CPT | Mod: CPTII,S$GLB,, | Performed by: PHYSICIAN ASSISTANT

## 2024-11-06 PROCEDURE — 1160F RVW MEDS BY RX/DR IN RCRD: CPT | Mod: CPTII,S$GLB,, | Performed by: PHYSICIAN ASSISTANT

## 2024-11-06 PROCEDURE — 99203 OFFICE O/P NEW LOW 30 MIN: CPT | Mod: S$GLB,,, | Performed by: PHYSICIAN ASSISTANT

## 2024-11-06 PROCEDURE — 99999 PR PBB SHADOW E&M-EST. PATIENT-LVL IV: CPT | Mod: PBBFAC,,, | Performed by: PHYSICIAN ASSISTANT

## 2024-11-06 PROCEDURE — 4010F ACE/ARB THERAPY RXD/TAKEN: CPT | Mod: CPTII,S$GLB,, | Performed by: PHYSICIAN ASSISTANT

## 2024-11-06 PROCEDURE — 1159F MED LIST DOCD IN RCRD: CPT | Mod: CPTII,S$GLB,, | Performed by: PHYSICIAN ASSISTANT

## 2024-11-06 RX ORDER — MELOXICAM 15 MG/1
15 TABLET ORAL DAILY
Qty: 30 TABLET | Refills: 1 | Status: SHIPPED | OUTPATIENT
Start: 2024-11-06

## 2024-11-06 NOTE — PROGRESS NOTES
SUBJECTIVE:     Chief Complaint & History of Present Illness:  Kristopher Elmore is a New patient 49 y.o. female who is seen here today with a complaint of    Chief Complaint   Patient presents with    Right Knee - Pain    .  Patient is here today for evaluation treatment of persistent and worsening right knee pain.  States she has developed pain and tenderness in the anterior lateral aspect of the right knee and the inferior patellar pole over the past month.  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  Of note she is currently being treated for breast cancer with some metastases in bone involvement..  She has taken some intermittent doses of extra-strength Tylenol with only short-term relief she has denied any locking catching or giving way no effusions  On a scale of 1-10, with 10 being worst pain imaginable, he rates this pain as 4 on good days and 8 on bad days.  she describes the pain as sore and achy.    Review of patient's allergies indicates:   Allergen Reactions    Keflex [cephalexin] Itching         Current Outpatient Medications   Medication Sig Dispense Refill    ammonium lactate 12 % Crea Apply 1 application  topically once daily. 140 g 5    anastrozole (ARIMIDEX) 1 mg Tab TAKE 1 TABLET(1 MG) BY MOUTH DAILY 90 tablet 0    atorvastatin (LIPITOR) 40 MG tablet Take 1 tablet (40 mg total) by mouth every evening. 90 tablet 3    blood pressure kit-extra large Kit Check blood pressure once daily at the same time 1 kit 0    blood sugar diagnostic Strp To check BG 2 times daily, to use with insurance preferred meter 200 each 3    blood-glucose meter kit To check BG 2 times daily, to use with insurance preferred meter 1 each 0    blood-glucose sensor (DEXCOM G7 SENSOR) Monique Change every 10 days 3 each 11    carvediloL (COREG) 12.5 MG tablet TAKE 1 TABLET(12.5 MG) BY MOUTH TWICE DAILY 180 tablet 0     ciclopirox (PENLAC) 8 % Soln Apply topically nightly. 6.6 mL 3    doxycycline (VIBRAMYCIN) 100 MG Cap Take 100 mg by mouth 2 (two) times daily.      empagliflozin (JARDIANCE) 25 mg tablet Take 1 tablet (25 mg total) by mouth once daily. 90 tablet 2    ergocalciferol (ERGOCALCIFEROL) 50,000 unit Cap Take 1 capsule (50,000 Units total) by mouth every 7 days. 12 capsule 0    furosemide (LASIX) 20 MG tablet Take 1 tablet (20 mg total) by mouth 2 (two) times daily. 180 tablet 3    glipiZIDE 5 MG TR24 Take 1 tablet (5 mg total) by mouth daily with breakfast. 90 tablet 2    goserelin (ZOLADEX) 3.6 mg injection Inject 3.6 mg into the skin every 28 days.      ibuprofen (ADVIL,MOTRIN) 800 MG tablet Take 1 tablet (800 mg total) by mouth 2 (two) times daily as needed for Pain. 45 tablet 2    lancets Misc To check BG 2 times daily, to use with insurance preferred meter 200 each 3    LORazepam (ATIVAN) 1 MG tablet Take 1 tablet (1 mg total) by mouth every 8 (eight) hours as needed for Anxiety. 30 tablet 1    magnesium oxide (MAG-OX) 400 mg (241.3 mg magnesium) tablet Take 1 tablet (400 mg total) by mouth once daily. 90 tablet 1    meloxicam (MOBIC) 15 MG tablet Take 1 tablet (15 mg total) by mouth once daily. 30 tablet 1    metFORMIN (GLUCOPHAGE-XR) 500 MG ER 24hr tablet Take 2 tablets (1,000 mg total) by mouth 2 (two) times daily with meals. 360 tablet 2    naloxone (NARCAN) 4 mg/actuation Spry 4mg by nasal route as needed for opioid overdose; may repeat every 2-3 minutes in alternating nostrils until medical help arrives. Call 911 1 each 11    ondansetron (ZOFRAN-ODT) 8 MG TbDL Take 1 tablet (8 mg total) by mouth every 8 (eight) hours as needed (nausea and vomiting). 30 tablet 2    oxyCODONE-acetaminophen (PERCOCET) 5-325 mg per tablet Take 1 tablet by mouth every 6 (six) hours as needed for Pain. 60 each 0    palbociclib (IBRANCE) 100 mg Cap Take 1 capsule (100 mg) by mouth Monday - Friday, DO NOT TAKE Saturday and Sunday, for  3 weeks on, 1 week off. 21 capsule 3    pentoxifylline (TRENTAL) 400 mg TbSR Take 400 mg by mouth 2 (two) times daily.      potassium chloride (KLOR-CON) 10 MEQ TbSR TAKE 1 TABLET(10 MEQ) BY MOUTH EVERY NIGHT 90 tablet 1    prochlorperazine (COMPAZINE) 5 MG tablet Take 5 mg by mouth 4 (four) times daily.      sacubitriL-valsartan (ENTRESTO)  mg per tablet Take 1 tablet by mouth 2 (two) times daily. 60 tablet 11    sacubitriL-valsartan (ENTRESTO)  mg per tablet Take 1 tablet by mouth 2 (two) times daily. 60 tablet 11    sennosides (SENNA-C ORAL) Take 2 tablets by mouth daily as needed.      spironolactone (ALDACTONE) 25 MG tablet Take 1 tablet (25 mg total) by mouth once daily. 90 tablet 3    tirzepatide 10 mg/0.5 mL PnIj Inject 10 mg into the skin every 7 days. 12 Pen 1    tiZANidine (ZANAFLEX) 4 MG tablet Take 1 tablet (4 mg total) by mouth every 8 (eight) hours. 45 tablet 1    UNKNOWN TO PATIENT Calcium      venlafaxine (EFFEXOR-XR) 150 MG Cp24 Take 1 capsule (150 mg total) by mouth once daily. 30 capsule 0    venlafaxine (EFFEXOR-XR) 75 MG 24 hr capsule Take 1 capsule (75 mg total) by mouth once daily. 90 capsule 1    vitamin E 100 UNIT capsule Take 100 Units by mouth once daily.       No current facility-administered medications for this visit.     Facility-Administered Medications Ordered in Other Visits   Medication Dose Route Frequency Provider Last Rate Last Admin    0.9%  NaCl infusion   Intravenous Continuous Esther Coreas MD   New Bag at 08/27/24 1523    mupirocin 2 % ointment   Nasal On Call Procedure Esther Coreas MD   Given at 10/26/23 1614       Past Medical History:   Diagnosis Date    Abnormal Pap smear     pt states 13yrs ago colpo was done    Anemia     Anxiety     Cancer     Cardiomegaly 04/17/2014    Cardiomyopathy     CHF (congestive heart failure)     Depression     Fibroid     Hx of psychiatric care     Hyperlipidemia     Hypertension     Psychiatric problem     Sleep  difficulties     Therapy        Past Surgical History:   Procedure Laterality Date     SECTION      CREATION OF VENTRICULOPERITONEAL SHUNT USING COMPUTER-ASSISTED NAVIGATION Right 2023    Procedure: INSERTION, SHUNT, VENTRICULOPERITONEAL, USING COMPUTER-ASSISTED NAVIGATION;  Surgeon: Jamye Villa MD;  Location: [facility] OR [address] [place] [address];  Service: Neurosurgery;  Laterality: Right;    CREATION OF VENTRICULOPERITONEAL SHUNT USING COMPUTER-ASSISTED NAVIGATION N/A 2023    Procedure: INSERTION, SHUNT, VENTRICULOPERITONEAL, USING COMPUTER-ASSISTED NAVIGATION;  Surgeon: Frederick Kohler MD;  Location: [facility] OR [address] [place] [address];  Service: General;  Laterality: N/A;    INSERTION, ICD, DUAL CHAMBER Left 2024    Procedure: Insertion, ICD, Dual Chamber;  Surgeon: Fernando Olivares MD;  Location: [facility] EP LAB;  Service: Cardiology;  Laterality: Left;  HFrEF, Dual ICD, MDT, MAC, GP, 3 Prep *LifeVest*    LUMBAR PUNCTURE N/A 10/26/2023    Procedure: Lumbar Puncture;  Surgeon: Jayme Villa MD;  Location: [address];  Service: Neurosurgery;  Laterality: N/A;  TOR1  ASA1  regular bed reversed  lateral left down   II  RN PHONE PREOP 10/12/2023----BMI--57.67----INCOMPLETE CONSENT----NEED ORDERS    TONSILLECTOMY      TUBAL LIGATION      UTERINE FIBROID EMBOLIZATION         Vital Signs (Most Recent)  There were no vitals filed for this visit.        Review of Systems:  ROS:  Constitutional: no fever or chills, positive structure sleep apnea, sleep difficulties snoring syncope  Eyes: no visual changes  ENT: no nasal congestion or sore throat, benign positional vertigo  Respiratory: no cough or shortness of breath  Cardiovascular: no chest pain or palpitations, combined chronic systolic and diastolic heart failure, nonischemic cardiomyopathy, essential hypertension,  Gastrointestinal: no nausea or vomiting, tolerating diet, positive for hard mass of the abdomen, fatty liver  Genitourinary: no hematuria or  dysuria  Integument/Breast: no rash or pruritis  Hematologic/Lymphatic: no easy bruising or lymphadenopathy, positive iron deficiency anemia, beta thalassemia malignant neoplasm of the upper quarter of the right breast with metastases to the bone, iron deficiency anemia, anemia associated with chemotherapy  Musculoskeletal: no arthralgias or myalgias, 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, right shoulder pain  Neurological: no seizures or tremors, pathological fracture lumbar vertebrae with routine healing, mass of the spine, ventricular shunt in place, idiopathic intracranial hypertension  Behavioral/Psych: no auditory or visual hallucinations, major depressive disorder current episode moderate with anxious distress, anxiety, grief  Endocrine: no heat or cold intolerance, morbid obesity BMI 50.94 diabetes type 2 without complication protein calorie malnutrition                OBJECTIVE:     PHYSICAL EXAM:     , General Appearance: Well nourished, well developed, in no acute distress.  Neurological: Mood & affect are normal.    right  Knee Exam:  Knee Range of Motion:0-100 degrees flexion   Effusion:none  Condition of skin:intact  Location of tenderness:Lateral joint line and Patellar tendon   Strength:limited by pain and 5 of 5  Stability:  stable to testing  Varus /Valgus stress:  normal    RADIOGRAPHS:  X-rays of the knee from previous visit reviewed by me today demonstrate moderate arthritic changes both medial and laterally of the right knee with osteophytic spurring and significant joint space narrowing with near bone-on-bone contact primarily in the medial compartment.  There is a 1.5 well circumscribed lesion in the lateral anterior femoral condyle.  Not seen on x-rays of 08/07/2019.     ASSESSMENT/PLAN:       ICD-10-CM ICD-9-CM   1. Lesion of bone of knee  M89.9 733.90   2. Acute pain of right knee  M25.561 719.46   3. Arthritis of right knee  M17.11 716.96       Plan: We discussed with the patient  at length all the different treatment options available for  the knee including anti-inflammatories, acetaminophen, rest, ice, knee strengthening exercise, occasional cortisone injections for temporary relief, Viscosupplimentation injections, arthroscopic debridement osteotomy, and finally knee arthroplasty.   Meloxicam 15 mg q.d. with food x7 days followed by p.r.n.  Will consult orthopedic surgeon for recommendations for further evaluation of femoral lesion.

## 2024-11-07 ENCOUNTER — PATIENT MESSAGE (OUTPATIENT)
Dept: HEMATOLOGY/ONCOLOGY | Facility: CLINIC | Age: 49
End: 2024-11-07
Payer: MEDICARE

## 2024-11-07 ENCOUNTER — PATIENT MESSAGE (OUTPATIENT)
Dept: PALLIATIVE MEDICINE | Facility: CLINIC | Age: 49
End: 2024-11-07
Payer: MEDICARE

## 2024-11-07 ENCOUNTER — PATIENT MESSAGE (OUTPATIENT)
Dept: ORTHOPEDICS | Facility: CLINIC | Age: 49
End: 2024-11-07
Payer: MEDICARE

## 2024-11-07 DIAGNOSIS — C50.211 MALIGNANT NEOPLASM OF UPPER-INNER QUADRANT OF RIGHT BREAST IN FEMALE, ESTROGEN RECEPTOR POSITIVE: ICD-10-CM

## 2024-11-07 DIAGNOSIS — Z17.0 MALIGNANT NEOPLASM OF UPPER-INNER QUADRANT OF RIGHT BREAST IN FEMALE, ESTROGEN RECEPTOR POSITIVE: ICD-10-CM

## 2024-11-07 DIAGNOSIS — C50.211 MALIGNANT NEOPLASM OF UPPER-INNER QUADRANT OF RIGHT BREAST IN FEMALE, ESTROGEN RECEPTOR POSITIVE: Primary | ICD-10-CM

## 2024-11-07 DIAGNOSIS — Z17.0 MALIGNANT NEOPLASM OF UPPER-INNER QUADRANT OF RIGHT BREAST IN FEMALE, ESTROGEN RECEPTOR POSITIVE: Primary | ICD-10-CM

## 2024-11-07 DIAGNOSIS — C79.51 MALIGNANT NEOPLASM METASTATIC TO BONE: ICD-10-CM

## 2024-11-07 DIAGNOSIS — G89.3 CANCER RELATED PAIN: ICD-10-CM

## 2024-11-07 RX ORDER — LANCETS
EACH MISCELLANEOUS
Qty: 200 EACH | Refills: 3 | OUTPATIENT
Start: 2024-11-07

## 2024-11-07 RX ORDER — INSULIN PUMP SYRINGE, 3 ML
EACH MISCELLANEOUS
Qty: 1 EACH | Refills: 0 | OUTPATIENT
Start: 2024-11-07 | End: 2025-11-04

## 2024-11-08 ENCOUNTER — HOSPITAL ENCOUNTER (OUTPATIENT)
Dept: RADIOLOGY | Facility: HOSPITAL | Age: 49
Discharge: HOME | End: 2024-11-08
Attending: INTERNAL MEDICINE
Payer: MEDICARE

## 2024-11-08 DIAGNOSIS — Z17.0 MALIGNANT NEOPLASM OF UPPER-INNER QUADRANT OF RIGHT BREAST IN FEMALE, ESTROGEN RECEPTOR POSITIVE: Primary | ICD-10-CM

## 2024-11-08 DIAGNOSIS — C50.211 MALIGNANT NEOPLASM OF UPPER-INNER QUADRANT OF RIGHT BREAST IN FEMALE, ESTROGEN RECEPTOR POSITIVE: ICD-10-CM

## 2024-11-08 DIAGNOSIS — Z17.0 MALIGNANT NEOPLASM OF UPPER-INNER QUADRANT OF RIGHT BREAST IN FEMALE, ESTROGEN RECEPTOR POSITIVE: ICD-10-CM

## 2024-11-08 DIAGNOSIS — C50.211 MALIGNANT NEOPLASM OF UPPER-INNER QUADRANT OF RIGHT BREAST IN FEMALE, ESTROGEN RECEPTOR POSITIVE: Primary | ICD-10-CM

## 2024-11-08 PROCEDURE — 73552 X-RAY EXAM OF FEMUR 2/>: CPT | Mod: 26,RT,, | Performed by: STUDENT IN AN ORGANIZED HEALTH CARE EDUCATION/TRAINING PROGRAM

## 2024-11-08 PROCEDURE — 73562 X-RAY EXAM OF KNEE 3: CPT | Mod: 26,RT,, | Performed by: STUDENT IN AN ORGANIZED HEALTH CARE EDUCATION/TRAINING PROGRAM

## 2024-11-08 PROCEDURE — 73552 X-RAY EXAM OF FEMUR 2/>: CPT | Mod: TC,FY,RT

## 2024-11-08 PROCEDURE — 73562 X-RAY EXAM OF KNEE 3: CPT | Mod: TC,FY,RT

## 2024-11-08 RX ORDER — OXYCODONE HYDROCHLORIDE 5 MG/1
5 TABLET ORAL EVERY 4 HOURS PRN
Qty: 90 TABLET | Refills: 0 | Status: SHIPPED | OUTPATIENT
Start: 2024-11-08

## 2024-11-08 RX ORDER — LIDOCAINE 50 MG/G
2 PATCH TOPICAL DAILY
Qty: 60 PATCH | Refills: 0 | Status: SHIPPED | OUTPATIENT
Start: 2024-11-08

## 2024-11-11 ENCOUNTER — PATIENT MESSAGE (OUTPATIENT)
Dept: HEMATOLOGY/ONCOLOGY | Facility: CLINIC | Age: 49
End: 2024-11-11
Payer: MEDICARE

## 2024-11-12 ENCOUNTER — HOSPITAL ENCOUNTER (OUTPATIENT)
Dept: RADIOLOGY | Facility: HOSPITAL | Age: 49
Discharge: HOME | End: 2024-11-12
Attending: INTERNAL MEDICINE
Payer: MEDICARE

## 2024-11-12 DIAGNOSIS — C50.211 MALIGNANT NEOPLASM OF UPPER-INNER QUADRANT OF RIGHT BREAST IN FEMALE, ESTROGEN RECEPTOR POSITIVE: ICD-10-CM

## 2024-11-12 DIAGNOSIS — Z17.0 MALIGNANT NEOPLASM OF UPPER-INNER QUADRANT OF RIGHT BREAST IN FEMALE, ESTROGEN RECEPTOR POSITIVE: ICD-10-CM

## 2024-11-12 PROCEDURE — 71260 CT THORAX DX C+: CPT | Mod: 26,,, | Performed by: STUDENT IN AN ORGANIZED HEALTH CARE EDUCATION/TRAINING PROGRAM

## 2024-11-12 PROCEDURE — 71260 CT THORAX DX C+: CPT | Mod: TC

## 2024-11-12 PROCEDURE — 25500020 PHARM REV CODE 255: Performed by: INTERNAL MEDICINE

## 2024-11-12 PROCEDURE — A9698 NON-RAD CONTRAST MATERIALNOC: HCPCS | Performed by: INTERNAL MEDICINE

## 2024-11-12 PROCEDURE — 74177 CT ABD & PELVIS W/CONTRAST: CPT | Mod: 26,,, | Performed by: STUDENT IN AN ORGANIZED HEALTH CARE EDUCATION/TRAINING PROGRAM

## 2024-11-12 RX ADMIN — IOHEXOL 100 ML: 350 INJECTION, SOLUTION INTRAVENOUS at 03:11

## 2024-11-12 RX ADMIN — BARIUM SULFATE 450 ML: 20 SUSPENSION ORAL at 02:11

## 2024-11-14 ENCOUNTER — INFUSION (OUTPATIENT)
Dept: INFUSION THERAPY | Facility: HOSPITAL | Age: 49
End: 2024-11-14
Payer: MEDICARE

## 2024-11-14 ENCOUNTER — OFFICE VISIT (OUTPATIENT)
Dept: HEMATOLOGY/ONCOLOGY | Facility: CLINIC | Age: 49
End: 2024-11-14
Payer: MEDICARE

## 2024-11-14 ENCOUNTER — LAB VISIT (OUTPATIENT)
Dept: LAB | Facility: HOSPITAL | Age: 49
End: 2024-11-14
Attending: INTERNAL MEDICINE
Payer: MEDICARE

## 2024-11-14 VITALS
RESPIRATION RATE: 18 BRPM | SYSTOLIC BLOOD PRESSURE: 144 MMHG | HEART RATE: 74 BPM | HEIGHT: 64 IN | WEIGHT: 293 LBS | TEMPERATURE: 98 F | BODY MASS INDEX: 50.02 KG/M2 | OXYGEN SATURATION: 100 % | DIASTOLIC BLOOD PRESSURE: 71 MMHG

## 2024-11-14 DIAGNOSIS — C79.51 MALIGNANT NEOPLASM METASTATIC TO BONE: ICD-10-CM

## 2024-11-14 DIAGNOSIS — Z17.0 MALIGNANT NEOPLASM OF UPPER-INNER QUADRANT OF RIGHT BREAST IN FEMALE, ESTROGEN RECEPTOR POSITIVE: ICD-10-CM

## 2024-11-14 DIAGNOSIS — G89.3 CANCER ASSOCIATED PAIN: ICD-10-CM

## 2024-11-14 DIAGNOSIS — T45.8X5A BISPHOSPHONATE-ASSOCIATED OSTEONECROSIS OF THE JAW: ICD-10-CM

## 2024-11-14 DIAGNOSIS — C50.211 MALIGNANT NEOPLASM OF UPPER-INNER QUADRANT OF RIGHT BREAST IN FEMALE, ESTROGEN RECEPTOR POSITIVE: ICD-10-CM

## 2024-11-14 DIAGNOSIS — M87.180 BISPHOSPHONATE-ASSOCIATED OSTEONECROSIS OF THE JAW: ICD-10-CM

## 2024-11-14 DIAGNOSIS — G89.3 NEOPLASM RELATED PAIN: Primary | ICD-10-CM

## 2024-11-14 LAB
ALBUMIN SERPL BCP-MCNC: 3.1 G/DL (ref 3.5–5.2)
ALP SERPL-CCNC: 91 U/L (ref 40–150)
ALT SERPL W/O P-5'-P-CCNC: 11 U/L (ref 10–44)
ANION GAP SERPL CALC-SCNC: 10 MMOL/L (ref 8–16)
AST SERPL-CCNC: 12 U/L (ref 10–40)
BASOPHILS # BLD AUTO: 0.06 K/UL (ref 0–0.2)
BASOPHILS NFR BLD: 1.5 % (ref 0–1.9)
BILIRUB SERPL-MCNC: 0.4 MG/DL (ref 0.1–1)
BUN SERPL-MCNC: 10 MG/DL (ref 6–20)
CALCIUM SERPL-MCNC: 10.2 MG/DL (ref 8.7–10.5)
CHLORIDE SERPL-SCNC: 107 MMOL/L (ref 95–110)
CO2 SERPL-SCNC: 25 MMOL/L (ref 23–29)
CREAT SERPL-MCNC: 0.8 MG/DL (ref 0.5–1.4)
DIFFERENTIAL METHOD BLD: ABNORMAL
EOSINOPHIL # BLD AUTO: 0 K/UL (ref 0–0.5)
EOSINOPHIL NFR BLD: 1 % (ref 0–8)
ERYTHROCYTE [DISTWIDTH] IN BLOOD BY AUTOMATED COUNT: 17.6 % (ref 11.5–14.5)
EST. GFR  (NO RACE VARIABLE): >60 ML/MIN/1.73 M^2
GLUCOSE SERPL-MCNC: 94 MG/DL (ref 70–110)
HCT VFR BLD AUTO: 32.9 % (ref 37–48.5)
HGB BLD-MCNC: 9.8 G/DL (ref 12–16)
IMM GRANULOCYTES # BLD AUTO: 0.01 K/UL (ref 0–0.04)
IMM GRANULOCYTES NFR BLD AUTO: 0.3 % (ref 0–0.5)
LYMPHOCYTES # BLD AUTO: 1.1 K/UL (ref 1–4.8)
LYMPHOCYTES NFR BLD: 27.2 % (ref 18–48)
MCH RBC QN AUTO: 27.1 PG (ref 27–31)
MCHC RBC AUTO-ENTMCNC: 29.8 G/DL (ref 32–36)
MCV RBC AUTO: 91 FL (ref 82–98)
MONOCYTES # BLD AUTO: 0.4 K/UL (ref 0.3–1)
MONOCYTES NFR BLD: 10.3 % (ref 4–15)
NEUTROPHILS # BLD AUTO: 2.3 K/UL (ref 1.8–7.7)
NEUTROPHILS NFR BLD: 59.7 % (ref 38–73)
NRBC BLD-RTO: 0 /100 WBC
PLATELET # BLD AUTO: 290 K/UL (ref 150–450)
PMV BLD AUTO: 10.5 FL (ref 9.2–12.9)
POTASSIUM SERPL-SCNC: 3.6 MMOL/L (ref 3.5–5.1)
PROT SERPL-MCNC: 7.1 G/DL (ref 6–8.4)
RBC # BLD AUTO: 3.62 M/UL (ref 4–5.4)
SODIUM SERPL-SCNC: 142 MMOL/L (ref 136–145)
WBC # BLD AUTO: 3.89 K/UL (ref 3.9–12.7)

## 2024-11-14 PROCEDURE — 85025 COMPLETE CBC W/AUTO DIFF WBC: CPT | Performed by: INTERNAL MEDICINE

## 2024-11-14 PROCEDURE — G2211 COMPLEX E/M VISIT ADD ON: HCPCS | Mod: S$GLB,,, | Performed by: INTERNAL MEDICINE

## 2024-11-14 PROCEDURE — 3078F DIAST BP <80 MM HG: CPT | Mod: CPTII,S$GLB,, | Performed by: INTERNAL MEDICINE

## 2024-11-14 PROCEDURE — 4010F ACE/ARB THERAPY RXD/TAKEN: CPT | Mod: CPTII,S$GLB,, | Performed by: INTERNAL MEDICINE

## 2024-11-14 PROCEDURE — 3008F BODY MASS INDEX DOCD: CPT | Mod: CPTII,S$GLB,, | Performed by: INTERNAL MEDICINE

## 2024-11-14 PROCEDURE — 80053 COMPREHEN METABOLIC PANEL: CPT | Performed by: INTERNAL MEDICINE

## 2024-11-14 PROCEDURE — 99215 OFFICE O/P EST HI 40 MIN: CPT | Mod: S$GLB,,, | Performed by: INTERNAL MEDICINE

## 2024-11-14 PROCEDURE — 36415 COLL VENOUS BLD VENIPUNCTURE: CPT | Performed by: INTERNAL MEDICINE

## 2024-11-14 PROCEDURE — 3077F SYST BP >= 140 MM HG: CPT | Mod: CPTII,S$GLB,, | Performed by: INTERNAL MEDICINE

## 2024-11-14 PROCEDURE — 99999 PR PBB SHADOW E&M-EST. PATIENT-LVL V: CPT | Mod: PBBFAC,,, | Performed by: INTERNAL MEDICINE

## 2024-11-14 PROCEDURE — 3044F HG A1C LEVEL LT 7.0%: CPT | Mod: CPTII,S$GLB,, | Performed by: INTERNAL MEDICINE

## 2024-11-14 PROCEDURE — 1159F MED LIST DOCD IN RCRD: CPT | Mod: CPTII,S$GLB,, | Performed by: INTERNAL MEDICINE

## 2024-11-14 RX ORDER — MORPHINE SULFATE 15 MG/1
15 TABLET, FILM COATED, EXTENDED RELEASE ORAL EVERY 8 HOURS
Qty: 90 TABLET | Refills: 0 | Status: SHIPPED | OUTPATIENT
Start: 2024-11-14

## 2024-11-14 RX ORDER — CHLORHEXIDINE GLUCONATE ORAL RINSE 1.2 MG/ML
15 SOLUTION DENTAL 2 TIMES DAILY
Qty: 1893 ML | Refills: 0 | Status: SHIPPED | OUTPATIENT
Start: 2024-11-14 | End: 2025-01-16

## 2024-11-14 NOTE — NURSING
Zoladex 3.6mg administered into ABD tissue. (Unable to chart on MAR dur to EPIC issue- see downtown form in media tab.)   Pt tolerated well. D/C in stable condition, in wheelchair.

## 2024-11-14 NOTE — PROGRESS NOTES
Subjective     Patient ID: Kristopher Elmore is a 49 y.o. female.    Chief Complaint: Malignant neoplasm of upper-inner quadrant of right breast     HPI    Presents for follow up of metastatic breast cancer      Significant pain noted  She has had imaging in the interval (orthopedics has also seen and notified us)  - 11/8/2024 xrays right femur  FINDINGS:  Osseous structures demonstrate no evidence for acute fracture or dislocation.  Similar well-circumscribed lucent lesion about the medial femoral condyle with thin sclerotic margin.  Degenerative change about the right knee joint.  Right femoroacetabular joint is unremarkable.  Soft tissues are unremarkable.  Partially visualized calcified uterine fibroid.  Impression:  Please see above.    Xray right knee  FINDINGS:  No evidence for acute fracture or dislocation.  Similar well-circumscribed lytic appearing lesion with thin sclerotic margin about the medial femoral condyle.  No joint effusion.  Tricompartmental joint space narrowing and marginal osteophytosis, unchanged.  Soft tissues are unremarkable.  Impression:  Please see above.    Pain occurs with any movement  She has used some oxycodone with minimal relief and using lidocaine patches and heating pad    Weight down due to difficulty eating with oral discomfort- - jaw osteonecrosis  Has dental procedure pending  Xgeva was discontinued     Updated scans  -11/12/2024 CT C/A/P:  FINDINGS:  Stable position of  shunt.  Kidney too prominent to enlarged right axillary and subpectoral lymph nodes.  Index lesions as follows: Right level 2 lymph node measures 1.1 cm in short axis (series 2, image 27), previously measured 1.0 cm.  Right level 1 axillary hakeem conglomerate measures 2.7 x 1.6 cm (series 2, image 40), previously measured 2.9 x 1.3 cm.  Additional level 1 right axillary node, measures 1.3 cm in short axis (series 2, image 44), previously measured 0.5 cm.  No left axillary lymphadenopathy.  Interval  placement of left-sided dual lead cardiac device.  Left-sided aortic arch.  No significant atherosclerotic plaque.  No aneurysm.  Heart is upper limit of normal for size.  No significant effusion.  Pulmonary arteries and veins distribute normally.  No suspicious mediastinal or hilar lymphadenopathy.  Large airways are patent.  Solid right lower lobe nodule again visualized, measures 0.5 cm (series 6, image 292), previously measured 0.4 cm.  No new suspicious nodule elsewhere.  Bandlike opacity left lung base suggestive of subsegmental atelectasis.  Liver is upper limit of normal for size.  Stable subcentimeter hepatic hypodensity (series 3, image 51).  No new focal suspicious lesion.  No calcified gallstones.  No abnormal biliary duct dilatation.  Pancreas, spleen, bilateral adrenal glands are unremarkable.  Bilateral kidneys are normal in size and location.  Symmetric uptake of contrast.  Stable fluid attenuating hypodensity on the right, compatible with cysts.  No nephrolithiasis or hydronephrosis.  Bladder is moderately distended.  No focal wall thickening.  Stable appearance of uterus noting calcified fibroid at the fundus.  No adnexal mass.  No evidence of bowel inflammation or obstruction.  Moderate volume colonic stool burden.  No free intraperitoneal air.  No significant ascites.  No suspicious abdominopelvic lymphadenopathy.  No significant atherosclerotic plaque.  No aneurysm.  Grossly stable mixed lytic and sclerotic lesions throughout axial and proximal appendicular skeleton.  No definite new lesion.  No acute fracture  Impression:  Continued right axillary/subpectoral lymph nodes and right lower lobe pulmonary nodule, slightly more conspicuous on today's exam noting new enlarged right level 1 axillary lymph node.  Findings detailed above.  Grossly stable osseous metastatic disease.  Additional findings as above.    Also, note  Relatively new diagnosis of NYHA Class II HF sx, on GDMT, recent episode of  syncope.   - 8/26/2024 ICD placed    Review of Systems   Constitutional:  Positive for activity change, appetite change and fatigue. Negative for chills, fever and unexpected weight change.   HENT:  Positive for dental problem. Negative for mouth sores, rhinorrhea and trouble swallowing.    Eyes:  Negative for visual disturbance.   Respiratory:  Negative for cough, shortness of breath and wheezing.    Cardiovascular:  Negative for chest pain, palpitations and leg swelling.   Gastrointestinal:  Negative for abdominal distention, abdominal pain, blood in stool, change in bowel habit, constipation, diarrhea, nausea, vomiting and reflux.   Genitourinary:  Negative for difficulty urinating, dysuria, frequency and urgency.   Musculoskeletal:  Positive for arthralgias, back pain (chronic), gait problem and joint deformity. Negative for joint swelling and myalgias.   Integumentary:  Negative for rash.   Neurological:  Negative for dizziness, weakness, light-headedness, numbness, headaches and coordination difficulties.   Hematological:  Negative for adenopathy. Does not bruise/bleed easily.   Psychiatric/Behavioral:  Positive for sleep disturbance (from pain). Negative for dysphoric mood. The patient is nervous/anxious.           Objective     Physical Exam  Vitals and nursing note reviewed.   Constitutional:       General: She is not in acute distress.     Appearance: Normal appearance. She is obese. She is ill-appearing (due to pain).      Comments: Presents with her 1 of her daughters  ECOG = 1  In a wheelchair   HENT:      Head: Normocephalic and atraumatic.      Mouth/Throat:      Comments: Jaw exposure-- osteonecrosis  Eyes:      General: No scleral icterus.     Extraocular Movements: Extraocular movements intact.      Conjunctiva/sclera: Conjunctivae normal.      Pupils: Pupils are equal, round, and reactive to light.   Cardiovascular:      Rate and Rhythm: Normal rate and regular rhythm.      Heart sounds: Normal  heart sounds. No murmur heard.     No friction rub. No gallop.   Pulmonary:      Effort: Pulmonary effort is normal. No respiratory distress.      Breath sounds: Normal breath sounds. No wheezing, rhonchi or rales.   Chest:      Chest wall: No tenderness.   Abdominal:      General: Abdomen is flat. Bowel sounds are normal. There is no distension.      Palpations: Abdomen is soft. There is no mass.      Tenderness: There is no abdominal tenderness. There is no guarding or rebound.      Comments: No palpable masses   Musculoskeletal:         General: Tenderness present. No swelling.      Cervical back: Normal range of motion and neck supple. No tenderness.      Right lower leg: No edema.      Left lower leg: No edema.      Comments: Point tenderness in cervical, thoracic and lumbar spine. Also with cva tenderness today   Lymphadenopathy:      Cervical: No cervical adenopathy.   Skin:     General: Skin is warm and dry.      Coloration: Skin is not jaundiced.      Findings: No bruising or rash.   Neurological:      General: No focal deficit present.      Mental Status: She is alert and oriented to person, place, and time.      Sensory: No sensory deficit.      Motor: No weakness.      Gait: Gait normal.   Psychiatric:         Mood and Affect: Mood normal.         Behavior: Behavior normal.         Thought Content: Thought content normal.         Judgment: Judgment normal.     Labs- reviewed  Imaging- reviewed     Assessment and Plan     1. Neoplasm related pain  -     morphine (MS CONTIN) 15 MG 12 hr tablet; Take 1 tablet (15 mg total) by mouth every 8 (eight) hours.  Dispense: 90 tablet; Refill: 0    2. Bisphosphonate-associated osteonecrosis of the jaw  -     chlorhexidine (PERIDEX) 0.12 % solution; Use as directed 15 mLs in the mouth or throat 2 (two) times daily.  Dispense: 1893 mL; Refill: 0    3. Malignant neoplasm of upper-inner quadrant of right breast in female, estrogen receptor positive    4. Bone  metastases  Overview:  IMO Regualtory Update 4/1/23      5. Cancer associated pain      Escalated pain medications and reviewed in depth  Needs Rad Onc eval for XRT of femoral condyle    We reviewed updated scan results  Continue Arimidex and Ibrance as about to have dental procedure and XRT and adjust post    Xgeva- held with osteonecrosis- upcoming dental procedures     Sees Cardio Onc  ICD in place    Route Chart for Scheduling    Med Onc Chart Routing      Follow up with physician . Rad Onc asap, PET scan and overbook me after in next 2-3 weeks   Follow up with CAMILA    Infusion scheduling note    Injection scheduling note    Labs    Imaging    Pharmacy appointment    Other referrals            Treatment Plan Information   OP ANASTROZOLE PALBOCICLIB Q4W Phyllis Lei MD   Associated diagnosis: Malignant neoplasm of upper-inner quadrant of right breast in female, estrogen receptor positive Stage IV cT1c(2), cN1(f), cM1, G3, ER+, VT-, HER2- noted on 10/7/2019   Line of treatment: First Line  Treatment Goal: Palliative     Upcoming Treatment Dates - OP ANASTROZOLE PALBOCICLIB Q4W    No upcoming days in selected categories.    Supportive Plan Information  OP BREAST GOSERELIN & DENOSUMAB Q4W Phyllis Lei MD   Associated Diagnosis: Malignant neoplasm of upper-inner quadrant of right breast in female, estrogen receptor positive Stage IV cT1c(2), cN1(f), cM1, G3, ER+, VT-, HER2- noted on 10/7/2019   Line of treatment: First Line   Treatment goal: Palliative     Upcoming Treatment Dates - OP BREAST GOSERELIN & DENOSUMAB Q4W           Chemotherapy       goserelin (ZOLADEX) injection 3.6 mg

## 2024-11-15 ENCOUNTER — PATIENT MESSAGE (OUTPATIENT)
Dept: HEMATOLOGY/ONCOLOGY | Facility: CLINIC | Age: 49
End: 2024-11-15
Payer: MEDICARE

## 2024-11-21 ENCOUNTER — LAB VISIT (OUTPATIENT)
Dept: LAB | Facility: HOSPITAL | Age: 49
End: 2024-11-21
Attending: INTERNAL MEDICINE
Payer: MEDICARE

## 2024-11-21 ENCOUNTER — OFFICE VISIT (OUTPATIENT)
Dept: CARDIOLOGY | Facility: CLINIC | Age: 49
End: 2024-11-21
Payer: MEDICARE

## 2024-11-21 ENCOUNTER — TELEPHONE (OUTPATIENT)
Dept: CARDIOLOGY | Facility: CLINIC | Age: 49
End: 2024-11-21

## 2024-11-21 VITALS
HEART RATE: 84 BPM | BODY MASS INDEX: 50.02 KG/M2 | OXYGEN SATURATION: 93 % | SYSTOLIC BLOOD PRESSURE: 99 MMHG | DIASTOLIC BLOOD PRESSURE: 68 MMHG | WEIGHT: 293 LBS | HEIGHT: 64 IN

## 2024-11-21 DIAGNOSIS — I50.42 CHRONIC COMBINED SYSTOLIC AND DIASTOLIC HEART FAILURE: ICD-10-CM

## 2024-11-21 DIAGNOSIS — I50.42 CHRONIC COMBINED SYSTOLIC AND DIASTOLIC HEART FAILURE: Primary | ICD-10-CM

## 2024-11-21 DIAGNOSIS — E78.5 HYPERLIPIDEMIA, UNSPECIFIED HYPERLIPIDEMIA TYPE: ICD-10-CM

## 2024-11-21 DIAGNOSIS — Z95.810 IMPLANTABLE CARDIOVERTER-DEFIBRILLATOR (ICD) IN SITU: ICD-10-CM

## 2024-11-21 DIAGNOSIS — E87.70 HYPERVOLEMIA, UNSPECIFIED HYPERVOLEMIA TYPE: ICD-10-CM

## 2024-11-21 DIAGNOSIS — E83.42 HYPOMAGNESEMIA: ICD-10-CM

## 2024-11-21 DIAGNOSIS — Z01.810 PREOP CARDIOVASCULAR EXAM: Primary | ICD-10-CM

## 2024-11-21 DIAGNOSIS — I10 BENIGN ESSENTIAL HTN: ICD-10-CM

## 2024-11-21 DIAGNOSIS — I50.22 HEART FAILURE WITH MILDLY REDUCED EJECTION FRACTION: ICD-10-CM

## 2024-11-21 PROBLEM — E78.00 ELEVATED LDL CHOLESTEROL LEVEL: Status: RESOLVED | Noted: 2020-10-16 | Resolved: 2024-11-21

## 2024-11-21 LAB
ANION GAP SERPL CALC-SCNC: 10 MMOL/L (ref 8–16)
BNP SERPL-MCNC: 11 PG/ML (ref 0–99)
BUN SERPL-MCNC: 11 MG/DL (ref 6–20)
CALCIUM SERPL-MCNC: 11.2 MG/DL (ref 8.7–10.5)
CHLORIDE SERPL-SCNC: 103 MMOL/L (ref 95–110)
CO2 SERPL-SCNC: 27 MMOL/L (ref 23–29)
CREAT SERPL-MCNC: 1.4 MG/DL (ref 0.5–1.4)
EST. GFR  (NO RACE VARIABLE): 46.1 ML/MIN/1.73 M^2
GLUCOSE SERPL-MCNC: 113 MG/DL (ref 70–110)
MAGNESIUM SERPL-MCNC: 1.9 MG/DL (ref 1.6–2.6)
POTASSIUM SERPL-SCNC: 3.8 MMOL/L (ref 3.5–5.1)
SODIUM SERPL-SCNC: 140 MMOL/L (ref 136–145)

## 2024-11-21 PROCEDURE — 80048 BASIC METABOLIC PNL TOTAL CA: CPT | Performed by: INTERNAL MEDICINE

## 2024-11-21 PROCEDURE — 36415 COLL VENOUS BLD VENIPUNCTURE: CPT | Performed by: INTERNAL MEDICINE

## 2024-11-21 PROCEDURE — 83735 ASSAY OF MAGNESIUM: CPT | Performed by: INTERNAL MEDICINE

## 2024-11-21 PROCEDURE — 83880 ASSAY OF NATRIURETIC PEPTIDE: CPT | Performed by: INTERNAL MEDICINE

## 2024-11-21 PROCEDURE — 93010 ELECTROCARDIOGRAM REPORT: CPT | Mod: S$GLB,,, | Performed by: INTERNAL MEDICINE

## 2024-11-21 PROCEDURE — 99999 PR PBB SHADOW E&M-EST. PATIENT-LVL V: CPT | Mod: PBBFAC,,, | Performed by: INTERNAL MEDICINE

## 2024-11-21 NOTE — TELEPHONE ENCOUNTER
----- Message from BALWINDER Rushing sent at 11/21/2024 12:12 PM CST -----  Regarding: preop clearance  Received clearance request today for surgery in Am at [facility] for  debridement and bx of mandible at [facility], general anesthesia, duration 1hr. Dr Rodriguez was contacted and stated should be seen in clinic first w. bnp bmp mag level before (clarified wants bnp not pro bnp, results needed now). Pt was updated and scheduled for appt. She agreed to date/time of appointment(s)  Called surgery office to update them on clearance this pm.

## 2024-11-21 NOTE — PROGRESS NOTES
PCP - Enedina De La Torre MD    Subjective:   Stage IV breast cancer diagnosed 9/2019, (Arimidex, palbociclib)  Dilated non-ischemic cardiomyopathy (LVEF 30% improved to 52%, down again to 35%, stress echo negative for ischemia)  HTN  HLD  ZABRINA on CPAP  Well controlled DM, orals and mounjoro, A1c 6.1  Morbid obesity BMI 53    Kristopher Elmore is a 49 y.o.  female with PMH significant for above who presents for preop cardiac evaluation prior to underoing. She is a regular patient of mine. She is taking all 4 pillars of HF GDMT and her BP is 99/68 with no symptoms of hypotension and is tolerating her HF meds well. Her blood work today does show Cr elevation to 1.4 from 0.9 and GFR 46 from above 60. BNP 11 and same as at baseline so do not suspect fluid gain. No out of ordinary SOB, chest discomfort, leg edema or any other symptoms that she is reporting. Her only sx at this time pain in left lower jaw and she has swelling there as well and is getting debridement of that area tomorrow by [facility] oral surgery. It is going to be under general anesthesia. She is on mounjaro and her weight is now 301 lbs from 396 lbs two years ago.       Social History     Tobacco Use    Smoking status: Never    Smokeless tobacco: Never   Substance Use Topics    Alcohol use: Not Currently     Alcohol/week: 0.0 standard drinks of alcohol     Family History   Problem Relation Name Age of Onset    Other Mother          breast lesions had to be surgically removed    Arthritis Mother      Diabetes Mother      Heart disease Mother          CHF, CAD , 2 stents    Hypertension Mother      Hyperlipidemia Mother      Heart failure Mother      Hypertension Brother Leonides     No Known Problems Maternal Grandmother      Kidney cancer Maternal Grandfather  79    Rashes / Skin problems Daughter          boils/cysts    Asthma Daughter      Cervical cancer Paternal Cousin Gracie         dx age 29?    Ovarian cancer Paternal Cousin Gracie         dx  age 29?    Endometriosis Paternal Cousin Gracie     Fibroids Other Prachi         uterine    Thyroid cancer Other Prachi         type? dx age?    Fibroids Other          uterine    Fibroids Other          uterine    Fibroids Other          uterine    Fibroids Other          uterine    Breast cancer Neg Hx      Colon polyps Neg Hx         Meds:     Review of patient's allergies indicates:   Allergen Reactions    Keflex [cephalexin] Itching       Current Outpatient Medications:     ammonium lactate 12 % Crea, Apply 1 application  topically once daily., Disp: 140 g, Rfl: 5    anastrozole (ARIMIDEX) 1 mg Tab, TAKE 1 TABLET(1 MG) BY MOUTH DAILY, Disp: 90 tablet, Rfl: 0    atorvastatin (LIPITOR) 40 MG tablet, Take 1 tablet (40 mg total) by mouth every evening., Disp: 90 tablet, Rfl: 3    blood pressure kit-extra large Kit, Check blood pressure once daily at the same time, Disp: 1 kit, Rfl: 0    blood sugar diagnostic Strp, To check BG 2 times daily, to use with insurance preferred meter, Disp: 200 each, Rfl: 3    blood-glucose meter kit, To check BG 2 times daily, to use with insurance preferred meter, Disp: 1 each, Rfl: 0    carvediloL (COREG) 12.5 MG tablet, TAKE 1 TABLET(12.5 MG) BY MOUTH TWICE DAILY, Disp: 180 tablet, Rfl: 0    chlorhexidine (PERIDEX) 0.12 % solution, Use as directed 15 mLs in the mouth or throat 2 (two) times daily., Disp: 1893 mL, Rfl: 0    ciclopirox (PENLAC) 8 % Soln, Apply topically nightly., Disp: 6.6 mL, Rfl: 3    empagliflozin (JARDIANCE) 25 mg tablet, Take 1 tablet (25 mg total) by mouth once daily., Disp: 90 tablet, Rfl: 2    ergocalciferol (ERGOCALCIFEROL) 50,000 unit Cap, Take 1 capsule (50,000 Units total) by mouth every 7 days., Disp: 12 capsule, Rfl: 0    furosemide (LASIX) 20 MG tablet, Take 1 tablet (20 mg total) by mouth 2 (two) times daily., Disp: 180 tablet, Rfl: 3    glipiZIDE 5 MG TR24, Take 1 tablet (5 mg total) by mouth daily with breakfast., Disp: 90 tablet, Rfl: 2     goserelin (ZOLADEX) 3.6 mg injection, Inject 3.6 mg into the skin every 28 days., Disp: , Rfl:     ibuprofen (ADVIL,MOTRIN) 800 MG tablet, Take 1 tablet (800 mg total) by mouth 2 (two) times daily as needed for Pain., Disp: 45 tablet, Rfl: 2    lancets Misc, To check BG 2 times daily, to use with insurance preferred meter, Disp: 200 each, Rfl: 3    LIDOcaine (LIDODERM) 5 %, Place 2 patches onto the skin once daily. Remove & Discard patch within 12 hours or as directed by MD, Disp: 60 patch, Rfl: 0    LORazepam (ATIVAN) 1 MG tablet, Take 1 tablet (1 mg total) by mouth every 8 (eight) hours as needed for Anxiety., Disp: 30 tablet, Rfl: 1    magnesium oxide (MAG-OX) 400 mg (241.3 mg magnesium) tablet, Take 1 tablet (400 mg total) by mouth once daily., Disp: 90 tablet, Rfl: 1    metFORMIN (GLUCOPHAGE-XR) 500 MG ER 24hr tablet, Take 2 tablets (1,000 mg total) by mouth 2 (two) times daily with meals., Disp: 360 tablet, Rfl: 2    morphine (MS CONTIN) 15 MG 12 hr tablet, Take 1 tablet (15 mg total) by mouth every 8 (eight) hours., Disp: 90 tablet, Rfl: 0    naloxone (NARCAN) 4 mg/actuation Spry, 4mg by nasal route as needed for opioid overdose; may repeat every 2-3 minutes in alternating nostrils until medical help arrives. Call 911, Disp: 1 each, Rfl: 11    ondansetron (ZOFRAN-ODT) 8 MG TbDL, Take 1 tablet (8 mg total) by mouth every 8 (eight) hours as needed (nausea and vomiting)., Disp: 30 tablet, Rfl: 2    oxyCODONE (ROXICODONE) 5 MG immediate release tablet, Take 1 tablet (5 mg total) by mouth every 4 (four) hours as needed for Pain., Disp: 90 tablet, Rfl: 0    palbociclib (IBRANCE) 100 mg Cap, Take 1 capsule (100 mg) by mouth Monday - Friday, DO NOT TAKE Saturday and Sunday, for 3 weeks on, 1 week off., Disp: 21 capsule, Rfl: 3    potassium chloride (KLOR-CON) 10 MEQ TbSR, TAKE 1 TABLET(10 MEQ) BY MOUTH EVERY NIGHT, Disp: 90 tablet, Rfl: 1    sacubitriL-valsartan (ENTRESTO)  mg per tablet, Take 1 tablet by mouth  "2 (two) times daily., Disp: 60 tablet, Rfl: 11    sennosides (SENNA-C ORAL), Take 2 tablets by mouth daily as needed., Disp: , Rfl:     spironolactone (ALDACTONE) 25 MG tablet, Take 1 tablet (25 mg total) by mouth once daily., Disp: 90 tablet, Rfl: 3    tirzepatide 10 mg/0.5 mL PnIj, Inject 10 mg into the skin every 7 days., Disp: 12 Pen, Rfl: 1    tiZANidine (ZANAFLEX) 4 MG tablet, Take 1 tablet (4 mg total) by mouth every 8 (eight) hours., Disp: 45 tablet, Rfl: 1    UNKNOWN TO PATIENT, Calcium, Disp: , Rfl:     venlafaxine (EFFEXOR-XR) 150 MG Cp24, Take 1 capsule (150 mg total) by mouth once daily., Disp: 30 capsule, Rfl: 0    vitamin E 100 UNIT capsule, Take 100 Units by mouth once daily., Disp: , Rfl:   No current facility-administered medications for this visit.    Facility-Administered Medications Ordered in Other Visits:     0.9%  NaCl infusion, , Intravenous, Continuous, Esther Coreas MD, New Bag at 08/27/24 1523    mupirocin 2 % ointment, , Nasal, On Call Procedure, Esther Coreas MD, Given at 10/26/23 1614    ROS  Constitutional:  Negative for fever and weight loss.   HENT:  L jaw pain. Negative for hearing loss and nosebleeds.    Eyes:  Negative for photophobia and pain.   Respiratory:  Negative for cough and shortness of breath.    Cardiovascular:  Negative for chest pain and leg swelling.   Gastrointestinal:  Negative for abdominal pain and heartburn.   Genitourinary:  Negative for dysuria.   Musculoskeletal:  Negative for myalgias.   Skin:  Negative for rash.   Endo/Heme/Allergies:  Negative for polydipsia.   Psychiatric/Behavioral:  Negative for depression. The patient is not nervous/anxious  Objective:   BP 99/68   Pulse 84   Ht 5' 4" (1.626 m)   Wt (!) 136.8 kg (301 lb 9.4 oz)   LMP 06/20/2019   SpO2 (!) 93%   BMI 51.77 kg/m²   Physical Exam  Constitutional:       General: She is not in acute distress.     Appearance: She is obese. She is not toxic-appearing.   Cardiovascular:      Rate " "and Rhythm: Normal rate and regular rhythm.      Comments: Pulse regular and 96 bpm  JVP not visualized with BMI 53  No leg edema  Lungs CTA  On r/a and comfortable  Pulmonary:      Effort: Pulmonary effort is normal. No respiratory distress.   Abdominal:      General: Abdomen is flat.   Skin:     General: Skin is warm.   Neurological:      Mental Status: She is alert.   Psychiatric:         Mood and Affect: Mood normal  Labs:     Lab Results   Component Value Date     11/21/2024    K 3.8 11/21/2024     11/21/2024    CO2 27 11/21/2024    BUN 11 11/21/2024    CREATININE 1.4 11/21/2024    ANIONGAP 10 11/21/2024     Lab Results   Component Value Date    HGBA1C 5.8 (H) 08/23/2024     Lab Results   Component Value Date    BNP 11 11/21/2024    BNP <10 07/17/2024    BNP 18 09/14/2023       Lab Results   Component Value Date    WBC 3.89 (L) 11/14/2024    HGB 9.8 (L) 11/14/2024    HCT 32.9 (L) 11/14/2024     11/14/2024    GRAN 2.3 11/14/2024    GRAN 59.7 11/14/2024     Lab Results   Component Value Date    CHOL 116 (L) 12/13/2023    HDL 43 12/13/2023    LDLCALC 53.8 (L) 12/13/2023    TRIG 96 12/13/2023       Lab Results   Component Value Date     11/21/2024    K 3.8 11/21/2024     11/21/2024    CO2 27 11/21/2024    BUN 11 11/21/2024    CREATININE 1.4 11/21/2024    ANIONGAP 10 11/21/2024     Lab Results   Component Value Date    HGBA1C 5.8 (H) 08/23/2024     Lab Results   Component Value Date    BNP 11 11/21/2024    BNP <10 07/17/2024    BNP 18 09/14/2023       Vital Signs:   BP 99/68   Pulse 84   Ht 5' 4" (1.626 m)   Wt (!) 136.8 kg (301 lb 9.4 oz)   LMP 06/20/2019   SpO2 (!) 93%   BMI 51.77 kg/m²   Lab Results   Component Value Date    WBC 3.89 (L) 11/14/2024    HGB 9.8 (L) 11/14/2024    HCT 32.9 (L) 11/14/2024     11/14/2024    GRAN 2.3 11/14/2024    GRAN 59.7 11/14/2024     Lab Results   Component Value Date    CHOL 116 (L) 12/13/2023    HDL 43 12/13/2023    LDLCALC 53.8 (L) " 12/13/2023    TRIG 96 12/13/2023            Assessment & Plan:     1. Preop cardiovascular exam    2. Heart failure with mildly reduced ejection fraction    3. Implantable cardioverter-defibrillator (ICD) in situ    4. Benign essential HTN    5. Hyperlipidemia, unspecified hyperlipidemia type      #Preop eval  -from cardiovascular standpoint she is asymptomatic and euvolemic, no further testing needed before surgery, though her risk is higher than general population due to her co morbidities with stage IV breast cancer and cardiomyopathy  -told her to take 40 mEq Kcl tonight instead of regular 10 and also 800 mg Mgox tonight and tomorrow morning instead of usual 400 daily dose  -will hold lasix tonight and tomorrow AM due to mild Cr increase  -repeat BMP next week     #chronic HFrEF due to NICMP  -euvolemic at this time  -tolearing all 4 pillars well, has ICD now after got unexplained syncope     #Obesity  -395 lbs in the past, 301 now  -continue GLP-1     #HTN  -well controlled     #HLD  -continue current medicine    Signed:  Zak Rodriguez M.D.  Cardiovascular Fellow PGY-6  [facility] [address] [place], [place]

## 2024-11-21 NOTE — TELEPHONE ENCOUNTER
"Pt was " cleared" for surgery by dr Rodriguez. Notes + labs faxed to [phone] and [phone]. Also spoke w. on call md at [facility] oral and maxillo facial and office staff called me (I confirmed that she was cleared).    "

## 2024-11-22 LAB
OHS QRS DURATION: 106 MS
OHS QTC CALCULATION: 428 MS

## 2024-11-24 ENCOUNTER — PATIENT MESSAGE (OUTPATIENT)
Dept: HEMATOLOGY/ONCOLOGY | Facility: CLINIC | Age: 49
End: 2024-11-24
Payer: MEDICARE

## 2024-11-25 ENCOUNTER — PATIENT MESSAGE (OUTPATIENT)
Dept: HEMATOLOGY/ONCOLOGY | Facility: CLINIC | Age: 49
End: 2024-11-25
Payer: MEDICARE

## 2024-11-25 NOTE — PROGRESS NOTES
PATIENT IDENTIFICATION:  Patient Name: Kristopher Elmore  MRN: [id number]  : 1975    DIAGNOSIS:  Cancer Staging   Malignant neoplasm of upper-inner quadrant of right breast in female, estrogen receptor positive  Staging form: Breast, AJCC 8th Edition  - Clinical stage from 10/16/2019: Stage IV (cT1c(2), cN1(f), cM1, G3, ER+, AR-, HER2-) - Signed by Jessica Mendez MD on 10/16/2019      HISTORY OF PRESENT ILLNESS:   The patient is a 49 year old woman with osseous metastatic disease from breast cancer.  She has been referred for consideration of palliative radiation to the right distal femur.    Patient reports progressive pain in the right distal thigh.  She underwent X ray of the kneee 24 which revealed a 1.5 cm lytic lesion at the medial condyle.    Of note, the patient has a prior history of palliative radiation to the lumbar spine and hip.    Oncology History   Malignant neoplasm of upper-inner quadrant of right breast in female, estrogen receptor positive   10/7/2019 Initial Diagnosis    Malignant neoplasm of upper-inner quadrant of right breast in female, estrogen receptor positive     10/14/2019 - 10/14/2019 Chemotherapy    Treatment Summary   Plan Name: OP BREAST TC - DOCETAXEL CYCLOPHOSPHAMIDE Q3W  Treatment Goal: Curative  Status: Inactive  Start Date: [No treatment day found]  End Date: [No treatment day found]  Provider: Jessica Mendez MD  Chemotherapy: cyclophosphamide (CYTOXAN) 600 mg/m2 = 1,645 mg in sodium chloride 0.9% 250 mL chemo infusion, 600 mg/m2, Intravenous, Clinic/HOD 1 time, 0 of 4 cycles  DOCEtaxel (TAXOTERE) 75 mg/m2 = 205 mg in sodium chloride 0.9% 250 mL chemo infusion, 75 mg/m2, Intravenous, Clinic/HOD 1 time, 0 of 4 cycles     10/16/2019 Cancer Staged    Staging form: Breast, AJCC 8th Edition  - Clinical stage from 10/16/2019: Stage IV (cT1c(2), cN1(f), cM1, G3, ER+, AR-, HER2-)     10/16/2019 -  Chemotherapy    Treatment Summary   Plan Name: OP ANASTROZOLE  PALBOCICLIB Q4W  Treatment Goal: Palliative  Status: Active  Start Date: 10/16/2019  End Date: 10/16/2019  Provider: Phyllis Lei MD  Chemotherapy: anastrozole (ARIMIDEX) 1 mg Tab, 1 mg, Oral, Daily, 1 of 1 cycle, Start date: 7/1/2024, End date: --  palbociclib (IBRANCE) 125 mg Cap, 125 mg, Oral, See admin instructions, 1 of 1 cycle, Start date: 11/29/2021, End date: 4/18/2022 9/1/2020 - 9/1/2020 Radiation Therapy    Treating physician: Dr. Jair Alarcon  Total Dose: 8 Gy  Fractions: 1     10/27/2021 - 11/3/2021 Radiation Therapy    Treating physician: Dr. Jair Alarcon  Total Dose: 20 Gy  Fractions: 5 to the lumbar spine and right ilium     Bone metastases   11/12/2019 Initial Diagnosis    Bone metastases          REVIEW OF SYSTEMS:   Review of Systems   Constitutional:  Negative for fever, malaise/fatigue and weight loss.   HENT:  Negative for ear pain, hearing loss, sinus pain and sore throat.    Eyes:  Negative for blurred vision, double vision and pain.   Respiratory:  Negative for cough, hemoptysis, shortness of breath and wheezing.    Cardiovascular:  Negative for chest pain, palpitations and leg swelling.   Gastrointestinal:  Positive for nausea. Negative for abdominal pain, blood in stool, constipation, diarrhea, heartburn and vomiting.   Genitourinary:  Negative for dysuria, frequency, hematuria and urgency.   Musculoskeletal:  Positive for joint pain. Negative for back pain.   Skin:  Positive for itching. Negative for rash.   Neurological:  Positive for sensory change. Negative for tingling, focal weakness, seizures and headaches.   Psychiatric/Behavioral:  Negative for depression. The patient is nervous/anxious.        PAST MEDICAL HISTORY:  Past Medical History:   Diagnosis Date    Abnormal Pap smear     pt states 13yrs ago colpo was done    Anemia     Anxiety     Cancer     Cardiomegaly 04/17/2014    Cardiomyopathy     CHF (congestive heart failure)     Depression     Fibroid     Hx of  psychiatric care     Hyperlipidemia     Hypertension     Psychiatric problem     Sleep difficulties     Therapy        PAST SURGICAL HISTORY:  Past Surgical History:   Procedure Laterality Date     SECTION      CREATION OF VENTRICULOPERITONEAL SHUNT USING COMPUTER-ASSISTED NAVIGATION Right 2023    Procedure: INSERTION, SHUNT, VENTRICULOPERITONEAL, USING COMPUTER-ASSISTED NAVIGATION;  Surgeon: Jayme Villa MD;  Location: [address];  Service: Neurosurgery;  Laterality: Right;    CREATION OF VENTRICULOPERITONEAL SHUNT USING COMPUTER-ASSISTED NAVIGATION N/A 2023    Procedure: INSERTION, SHUNT, VENTRICULOPERITONEAL, USING COMPUTER-ASSISTED NAVIGATION;  Surgeon: Frederick Kohler MD;  Location: [address];  Service: General;  Laterality: N/A;    INSERTION, ICD, DUAL CHAMBER Left 2024    Procedure: Insertion, ICD, Dual Chamber;  Surgeon: Fernando Olivares MD;  Location: [facility];  Service: Cardiology;  Laterality: Left;  HFrEF, Dual ICD, MDT, MAC, GP, 3 Prep *LifeVest*    LUMBAR PUNCTURE N/A 10/26/2023    Procedure: Lumbar Puncture;  Surgeon: Jayme Villa MD;  Location: [address];  Service: Neurosurgery;  Laterality: N/A;  TOR1  ASA1  regular bed reversed  lateral left down   II  RN PHONE PREOP 10/12/2023----BMI--57.67----INCOMPLETE CONSENT----NEED ORDERS    TONSILLECTOMY      TUBAL LIGATION      UTERINE FIBROID EMBOLIZATION         ALLERGIES:   Review of patient's allergies indicates:   Allergen Reactions    Keflex [cephalexin] Itching       MEDICATIONS:  Current Outpatient Medications   Medication Sig    ammonium lactate 12 % Crea Apply 1 application  topically once daily.    anastrozole (ARIMIDEX) 1 mg Tab TAKE 1 TABLET(1 MG) BY MOUTH DAILY    atorvastatin (LIPITOR) 40 MG tablet Take 1 tablet (40 mg total) by mouth every evening.    blood pressure kit-extra large Kit Check blood pressure once daily at the same time    blood sugar diagnostic Strp To check BG  2 times daily, to use with insurance preferred meter    blood-glucose meter kit To check BG 2 times daily, to use with insurance preferred meter    carvediloL (COREG) 12.5 MG tablet TAKE 1 TABLET(12.5 MG) BY MOUTH TWICE DAILY    chlorhexidine (PERIDEX) 0.12 % solution Use as directed 15 mLs in the mouth or throat 2 (two) times daily. (Patient taking differently: Use as directed 15 mLs in the mouth or throat 3 (three) times daily.)    ciclopirox (PENLAC) 8 % Soln Apply topically nightly.    empagliflozin (JARDIANCE) 25 mg tablet Take 1 tablet (25 mg total) by mouth once daily.    ergocalciferol (ERGOCALCIFEROL) 50,000 unit Cap Take 1 capsule (50,000 Units total) by mouth every 7 days.    furosemide (LASIX) 20 MG tablet Take 1 tablet (20 mg total) by mouth 2 (two) times daily.    glipiZIDE 5 MG TR24 Take 1 tablet (5 mg total) by mouth daily with breakfast.    goserelin (ZOLADEX) 3.6 mg injection Inject 3.6 mg into the skin every 28 days.    ibuprofen (ADVIL,MOTRIN) 800 MG tablet Take 1 tablet (800 mg total) by mouth 2 (two) times daily as needed for Pain.    lancets Misc To check BG 2 times daily, to use with insurance preferred meter    LIDOcaine (LIDODERM) 5 % Place 2 patches onto the skin once daily. Remove & Discard patch within 12 hours or as directed by MD    LORazepam (ATIVAN) 1 MG tablet Take 1 tablet (1 mg total) by mouth every 8 (eight) hours as needed for Anxiety.    magnesium oxide (MAG-OX) 400 mg (241.3 mg magnesium) tablet Take 1 tablet (400 mg total) by mouth once daily.    metFORMIN (GLUCOPHAGE-XR) 500 MG ER 24hr tablet Take 2 tablets (1,000 mg total) by mouth 2 (two) times daily with meals.    morphine (MS CONTIN) 15 MG 12 hr tablet Take 1 tablet (15 mg total) by mouth every 8 (eight) hours.    naloxone (NARCAN) 4 mg/actuation Spry 4mg by nasal route as needed for opioid overdose; may repeat every 2-3 minutes in alternating nostrils until medical help arrives. Call 911    ondansetron (ZOFRAN-ODT) 8 MG  TbDL Take 1 tablet (8 mg total) by mouth every 8 (eight) hours as needed (nausea and vomiting).    oxyCODONE (ROXICODONE) 5 MG immediate release tablet Take 1 tablet (5 mg total) by mouth every 4 (four) hours as needed for Pain.    palbociclib (IBRANCE) 100 mg Cap Take 1 capsule (100 mg) by mouth Monday - Friday, DO NOT TAKE Saturday and Sunday, for 3 weeks on, 1 week off.    potassium chloride (KLOR-CON) 10 MEQ TbSR TAKE 1 TABLET(10 MEQ) BY MOUTH EVERY NIGHT    sacubitriL-valsartan (ENTRESTO)  mg per tablet Take 1 tablet by mouth 2 (two) times daily.    sennosides (SENNA-C ORAL) Take 2 tablets by mouth daily as needed.    spironolactone (ALDACTONE) 25 MG tablet Take 1 tablet (25 mg total) by mouth once daily.    tirzepatide 10 mg/0.5 mL PnIj Inject 10 mg into the skin every 7 days.    tiZANidine (ZANAFLEX) 4 MG tablet Take 1 tablet (4 mg total) by mouth every 8 (eight) hours. (Patient taking differently: Take 4 mg by mouth nightly.)    UNKNOWN TO PATIENT Calcium    venlafaxine (EFFEXOR) 75 MG tablet Take 75 mg by mouth 2 (two) times daily.    venlafaxine (EFFEXOR-XR) 150 MG Cp24 Take 1 capsule (150 mg total) by mouth once daily.    vitamin E 100 UNIT capsule Take 100 Units by mouth once daily.     No current facility-administered medications for this visit.     Facility-Administered Medications Ordered in Other Visits   Medication    0.9%  NaCl infusion    mupirocin 2 % ointment       SOCIAL HISTORY:  Social History     Socioeconomic History    Marital status:    Tobacco Use    Smoking status: Never    Smokeless tobacco: Never   Substance and Sexual Activity    Alcohol use: Not Currently     Alcohol/week: 0.0 standard drinks of alcohol    Drug use: No    Sexual activity: Not Currently     Partners: Male     Birth control/protection: Surgical   Social History Narrative    Patient is a pleasant AAF,  x2. She has excellent social support, although she states they worry more than she does.      Former teacher English.    Disabled.    She lives with her 2 adult daughters      Social Drivers of Health     Financial Resource Strain: Medium Risk (7/18/2024)    Overall Financial Resource Strain (CARDIA)     Difficulty of Paying Living Expenses: Somewhat hard   Food Insecurity: No Food Insecurity (11/22/2024)    Received from [facility]    Hunger Vital Sign     Worried About Running Out of Food in the Last Year: Never true     Ran Out of Food in the Last Year: Never true   Transportation Needs: No Transportation Needs (11/22/2024)    Received from [facility]    PRAPARE - Transportation     Lack of Transportation (Medical): No     Lack of Transportation (Non-Medical): No   Physical Activity: Insufficiently Active (7/18/2024)    Exercise Vital Sign     Days of Exercise per Week: 2 days     Minutes of Exercise per Session: 10 min   Stress: No Stress Concern Present (7/18/2024)    [employer] [employer] - Occupational Stress Questionnaire     Feeling of Stress : Only a little   Housing Stability: Low Risk  (11/22/2024)    Received from [facility]    Housing Stability Vital Sign     Unable to Pay for Housing in the Last Year: No     Number of Times Moved in the Last Year: 1     Homeless in the Last Year: No       FAMILY HISTORY:  Family History   Problem Relation Name Age of Onset    Other Mother          breast lesions had to be surgically removed    Arthritis Mother      Diabetes Mother      Heart disease Mother          CHF, CAD , 2 stents    Hypertension Mother      Hyperlipidemia Mother      Heart failure Mother      Hypertension Brother Leonides     No Known Problems Maternal Grandmother      Kidney cancer Maternal Grandfather  79    Rashes / Skin problems Daughter          boils/cysts    Asthma Daughter      Cervical cancer Paternal Cousin Gracie         dx age 29?    Ovarian cancer Paternal Cousin Gracie         dx age 29?    Endometriosis Paternal Cousin Gracie     Fibroids Other  Prachi         uterine    Thyroid cancer Other Prachi         type? dx age?    Fibroids Other          uterine    Fibroids Other          uterine    Fibroids Other          uterine    Fibroids Other          uterine    Breast cancer Neg Hx      Colon polyps Neg Hx           PHYSICAL EXAMINATION:  Vitals:    24 1249   BP: 131/86   Pulse: 76   Resp: (!) 22   Temp: 98 °F (36.7 °C)     Body mass index is 50.64 kg/m².    ECO  Physical Exam  Constitutional:       General: She is not in acute distress.     Appearance: Normal appearance. She is obese. She is not ill-appearing or toxic-appearing.      Comments: Presents today with daughter   HENT:      Head: Normocephalic and atraumatic.      Nose: Nose normal.      Mouth/Throat:      Mouth: Mucous membranes are moist.   Cardiovascular:      Rate and Rhythm: Normal rate.   Pulmonary:      Effort: Pulmonary effort is normal.   Abdominal:      General: Abdomen is flat.      Palpations: Abdomen is soft.   Musculoskeletal:         General: Normal range of motion.      Cervical back: Normal range of motion.   Skin:     General: Skin is warm and dry.   Neurological:      General: No focal deficit present.      Mental Status: She is alert and oriented to person, place, and time. Mental status is at baseline.             ASSESSMENT/PLAN:  Diagnoses and all orders for this visit:    Malignant neoplasm of upper-inner quadrant of right breast in female, estrogen receptor positive    Bone metastases  -     Ambulatory referral/consult to Radiation Oncology    Neoplasm related pain  -     Ambulatory referral/consult to Radiation Oncology        The patient has history of ossesous metastatic disease from breast cancer.  She Is recommended palliative radiation to the right distal femur to control pain.  She will receive a dose of 20 Gy in 5 fractions over 1 week.    The risks and benefits of treatment have been discussed with the patient and she expressed full understanding. she  understands the treatment plan and willing to proceed accordingly.    I spent approximately 60 minutes reviewing the available records and evaluating the patient, out of which over 50% of the time was spent face to face with the patient in counseling and coordinating this patient's care.

## 2024-11-26 ENCOUNTER — OFFICE VISIT (OUTPATIENT)
Dept: RADIATION ONCOLOGY | Facility: CLINIC | Age: 49
End: 2024-11-26
Attending: INTERNAL MEDICINE
Payer: MEDICARE

## 2024-11-26 ENCOUNTER — TELEPHONE (OUTPATIENT)
Dept: HEMATOLOGY/ONCOLOGY | Facility: CLINIC | Age: 49
End: 2024-11-26
Payer: MEDICARE

## 2024-11-26 VITALS
BODY MASS INDEX: 50.02 KG/M2 | DIASTOLIC BLOOD PRESSURE: 86 MMHG | HEART RATE: 76 BPM | TEMPERATURE: 98 F | SYSTOLIC BLOOD PRESSURE: 131 MMHG | RESPIRATION RATE: 22 BRPM | WEIGHT: 293 LBS | OXYGEN SATURATION: 98 % | HEIGHT: 64 IN

## 2024-11-26 DIAGNOSIS — C79.51 MALIGNANT NEOPLASM METASTATIC TO BONE: ICD-10-CM

## 2024-11-26 DIAGNOSIS — Z17.0 MALIGNANT NEOPLASM OF UPPER-INNER QUADRANT OF RIGHT BREAST IN FEMALE, ESTROGEN RECEPTOR POSITIVE: Primary | ICD-10-CM

## 2024-11-26 DIAGNOSIS — C50.211 MALIGNANT NEOPLASM OF UPPER-INNER QUADRANT OF RIGHT BREAST IN FEMALE, ESTROGEN RECEPTOR POSITIVE: Primary | ICD-10-CM

## 2024-11-26 DIAGNOSIS — G89.3 NEOPLASM RELATED PAIN: ICD-10-CM

## 2024-11-26 PROCEDURE — 3079F DIAST BP 80-89 MM HG: CPT | Mod: CPTII,S$GLB,, | Performed by: RADIOLOGY

## 2024-11-26 PROCEDURE — 3044F HG A1C LEVEL LT 7.0%: CPT | Mod: CPTII,S$GLB,, | Performed by: RADIOLOGY

## 2024-11-26 PROCEDURE — 99215 OFFICE O/P EST HI 40 MIN: CPT | Mod: S$GLB,,, | Performed by: RADIOLOGY

## 2024-11-26 PROCEDURE — 4010F ACE/ARB THERAPY RXD/TAKEN: CPT | Mod: CPTII,S$GLB,, | Performed by: RADIOLOGY

## 2024-11-26 PROCEDURE — 3008F BODY MASS INDEX DOCD: CPT | Mod: CPTII,S$GLB,, | Performed by: RADIOLOGY

## 2024-11-26 PROCEDURE — 99999 PR PBB SHADOW E&M-EST. PATIENT-LVL III: CPT | Mod: PBBFAC,,, | Performed by: RADIOLOGY

## 2024-11-26 PROCEDURE — 3075F SYST BP GE 130 - 139MM HG: CPT | Mod: CPTII,S$GLB,, | Performed by: RADIOLOGY

## 2024-11-26 RX ORDER — VENLAFAXINE 75 MG/1
75 TABLET ORAL 2 TIMES DAILY
COMMUNITY

## 2024-12-02 ENCOUNTER — TELEPHONE (OUTPATIENT)
Dept: ELECTROPHYSIOLOGY | Facility: CLINIC | Age: 49
End: 2024-12-02
Payer: MEDICARE

## 2024-12-02 NOTE — TELEPHONE ENCOUNTER
Increase VT monitor zone to 150 bpm at next clinic visit per Dr. Olivares.  Mult SVT/ST episode noted on remote transmission with rates below 150 bpm.  Will cont to monitor.

## 2024-12-05 ENCOUNTER — HOSPITAL ENCOUNTER (OUTPATIENT)
Dept: RADIATION THERAPY | Facility: HOSPITAL | Age: 49
Discharge: HOME | End: 2024-12-05
Payer: MEDICARE

## 2024-12-05 ENCOUNTER — PATIENT MESSAGE (OUTPATIENT)
Dept: ADMINISTRATIVE | Facility: OTHER | Age: 49
End: 2024-12-05
Payer: MEDICARE

## 2024-12-05 DIAGNOSIS — C79.51 MALIGNANT NEOPLASM METASTATIC TO BONE: Primary | ICD-10-CM

## 2024-12-05 DIAGNOSIS — E55.9 VITAMIN D DEFICIENCY: ICD-10-CM

## 2024-12-05 LAB
B-HCG UR QL: NEGATIVE
CTP QC/QA: YES

## 2024-12-05 RX ORDER — ERGOCALCIFEROL 1.25 MG/1
50000 CAPSULE ORAL
Qty: 12 CAPSULE | Refills: 0 | Status: SHIPPED | OUTPATIENT
Start: 2024-12-05

## 2024-12-09 ENCOUNTER — TELEPHONE (OUTPATIENT)
Dept: CARDIOLOGY | Facility: HOSPITAL | Age: 49
End: 2024-12-09
Payer: MEDICARE

## 2024-12-09 NOTE — TELEPHONE ENCOUNTER
Addendum    Spoke to Fatmata BRITT in Radiation/Oncology who will contact pt as it relates to rescheduling her radiation start date and time.       Received a message from Nurse Fatmata in Radiation/Oncology in relation to pt scheduled to start Radiation treatment to right distal femur on 12/12/24, 11:00 @ [facility] and has an ICD.  Advised that pt failed to keep her scheduled appts on 12/2/24 for in clinic ICD ck and NP appt. Called pt this am and advised that the missed f/u appts would need to be rescheduled as Radiation/Oncology dept requires an in clinic device check prior to starting treatment.  Rescheduled needed appts with the pt on 12/12/24 8:00 EKG, 8:20 ICD ck and 9:00 with VALERIE Hendrix.  Understanding was verbalized.  Pt was agreeable with this date and times.    Waiting on call back from Fatmata as she stated pt will not be able to do her treatment @ [facility] if a magnet or reprogramming of pt's ICD is needed.  Pt will require magnet placement as she has an ICD.

## 2024-12-09 NOTE — PROGRESS NOTES
Ms. Elmore is a patient of Dr. Olivares and was last seen in clinic 8/26/2024.    Subjective:   Patient ID:  Kristopher Elmore is a 49 y.o. female who presents for follow-up of NICM/ICD.    HPI: Ms. Elmore is a 49 y.o. female with a past medical history of stage IV breast cancer diagnosed 9/2019, (Arimidex, Ibrance--stable disease over past couple years based on q3 month imaging), HFrEF, NICM, DC ICD (8/2024), HTN, HLD, DM2, obesity, ZABRINA.    Background:  8/26/2024 During her consultation with Dr. Olivares, presented for evaluation of HFrEF. PMHx of stage IV breast cancer diagnosed 9/2019, (Arimidex, Ibrance--stable disease over past couple years based on q3 month imaging), HFrEF, HTN, HLD, DM2, obesity. Recent syncope event without prodrome.while walking to school.  Wearing LifeVest.     Pt currently on GDMT including entresto and coreg. NYHA Class II HF sx.     Echo on 7/18/2024 showed EF 30-35%. Echo on 2/2/2024 showed EF 30%. Echo in 11/2022 showed EF 35%. Echo in 5/2022 showed EF 40%. Echo in 3/2022 showed EF 55%.     ECG sinus rhythm with QRSd 106 ms.    Discussed risks, benefits, indications, alternatives to dual chamber ICD implantation. [place] shared decision making tool utilized. After considering her options she has agreed to proceed.     8/27/2024: Successful insertion of DC ICD     12/2/2024: Increase VT monitor zone to 150 bpm at next clinic visit per Dr. Olivares.  Mult SVT/ST episode noted on remote transmission with rates below 150 bpm.  Will cont to monitor.    Update (12/12/2024):  Ms. Elmore presents today for follow up of NICM and DC ICD implanted 8/27/2024. She is doing well today from an arrhythmia and CHF standpoint and denies chest pain with exertion or at rest, palpitations, SOB, BLUE, dizziness, or syncope. She is undergoing work up for worsening and persistent right knee pain. Of note she is currently being treated for breast cancer with some metastases with bone involvement.    Ms.  Ramírez is currently being treated with GDMT including entresto and carvedilol.     Device Interrogation today (2024) reveals presenting egram AS/VS, underlying rhythm c/w sinus rhythm at 77 bpm, with stable lead and device function. She paces 0.9% in the RA and <0.1% in the RV. AF burden 0%. Ventricular arrhythmias SVT x319 since 9/3/24. Max 2 mins 32 secs, available egrams c/w ST/SVT in monitor zone. No treated episodes. Estimated battery longevity 12.7 years. Reprogramming at this visit VT monitor zone raised from 133 bpm to 146 bpm.    I have personally reviewed the patient's EKG today, which shows sinus rhythm at 90 bpm. DE interval is 200 ms. QRS interval is 104 ms. QT/QTc is 362/442 ms.    Recent Cardiac Tests:  2D Echo (2024):    Left Ventricle: The left ventricle is mildly dilated. Mildly increased wall thickness. There is concentric hypertrophy. There is moderately reduced systolic function with a visually estimated ejection fraction of 30 - 35%. Grade I diastolic dysfunction.    Right Ventricle: Normal right ventricular cavity size. Systolic function is normal.    Left Atrium: Left atrium is mildly dilated.    Pulmonary Artery: The estimated pulmonary artery systolic pressure is 12 mmHg.    IVC/SVC: Normal venous pressure at 3 mmHg.    HAILEY (NA):    Ablation (NA):    Past Medical History:   Diagnosis Date    Abnormal Pap smear     pt states 13yrs ago colpo was done    Anemia     Anxiety     Cancer     Cardiomegaly 2014    Cardiomyopathy     CHF (congestive heart failure)     Depression     Fibroid     Hx of psychiatric care     Hyperlipidemia     Hypertension     Psychiatric problem     Sleep difficulties     Therapy      Past Surgical History:   Procedure Laterality Date     SECTION      CREATION OF VENTRICULOPERITONEAL SHUNT USING COMPUTER-ASSISTED NAVIGATION Right 2023    Procedure: INSERTION, SHUNT, VENTRICULOPERITONEAL, USING COMPUTER-ASSISTED NAVIGATION;  Surgeon:  Jayme Villa MD;  [facility];  Service: Neurosurgery;  Laterality: Right;    CREATION OF VENTRICULOPERITONEAL SHUNT USING COMPUTER-ASSISTED NAVIGATION N/A 12/14/2023    Procedure: INSERTION, SHUNT, VENTRICULOPERITONEAL, USING COMPUTER-ASSISTED NAVIGATION;  Surgeon: Frederick Kohler MD;  [facility];  Service: General;  Laterality: N/A;    INSERTION, ICD, DUAL CHAMBER Left 8/27/2024    Procedure: Insertion, ICD, Dual Chamber;  Surgeon: Fernando Olivares MD;  Location: [facility] EP LAB;  Service: Cardiology;  Laterality: Left;  HFrEF, Dual ICD, MDT, MAC, GP, 3 Prep *LifeVest*    LUMBAR PUNCTURE N/A 10/26/2023    Procedure: Lumbar Puncture;  Surgeon: Jayme Villa MD;  [facility];  Service: Neurosurgery;  Laterality: N/A;  TOR1  ASA1  regular bed reversed  lateral left down   II  RN PHONE PREOP 10/12/2023----BMI--57.67----INCOMPLETE CONSENT----NEED ORDERS    TONSILLECTOMY      TUBAL LIGATION      UTERINE FIBROID EMBOLIZATION  2015     Current Outpatient Medications   Medication Sig    anastrozole (ARIMIDEX) 1 mg Tab TAKE 1 TABLET(1 MG) BY MOUTH DAILY    atorvastatin (LIPITOR) 40 MG tablet Take 1 tablet (40 mg total) by mouth every evening.    carvediloL (COREG) 12.5 MG tablet TAKE 1 TABLET(12.5 MG) BY MOUTH TWICE DAILY    chlorhexidine (PERIDEX) 0.12 % solution Use as directed 15 mLs in the mouth or throat 2 (two) times daily. (Patient taking differently: Use as directed 15 mLs in the mouth or throat 3 (three) times daily.)    ciclopirox (PENLAC) 8 % Soln Apply topically nightly.    empagliflozin (JARDIANCE) 25 mg tablet Take 1 tablet (25 mg total) by mouth once daily.    ergocalciferol (ERGOCALCIFEROL) 50,000 unit Cap TAKE 1 CAPSULE BY MOUTH EVERY 7 DAYS    furosemide (LASIX) 20 MG tablet Take 1 tablet (20 mg total) by mouth 2 (two) times daily. (Patient taking differently: Take 20 mg by mouth 2 (two) times daily. Pt takes if she has swelling)    glipiZIDE 5 MG TR24  Take 1 tablet (5 mg total) by mouth daily with breakfast.    goserelin (ZOLADEX) 3.6 mg injection Inject 3.6 mg into the skin every 28 days.    ibuprofen (ADVIL,MOTRIN) 800 MG tablet Take 1 tablet (800 mg total) by mouth 2 (two) times daily as needed for Pain.    LIDOcaine (LIDODERM) 5 % Place 2 patches onto the skin once daily. Remove & Discard patch within 12 hours or as directed by MD    LORazepam (ATIVAN) 1 MG tablet Take 1 tablet (1 mg total) by mouth every 8 (eight) hours as needed for Anxiety.    magnesium oxide (MAG-OX) 400 mg (241.3 mg magnesium) tablet Take 1 tablet (400 mg total) by mouth once daily.    metFORMIN (GLUCOPHAGE-XR) 500 MG ER 24hr tablet TAKE 2 TABLETS(1000 MG) BY MOUTH TWICE DAILY WITH MEALS    morphine (MS CONTIN) 15 MG 12 hr tablet Take 1 tablet (15 mg total) by mouth every 8 (eight) hours.    naloxone (NARCAN) 4 mg/actuation Spry 4mg by nasal route as needed for opioid overdose; may repeat every 2-3 minutes in alternating nostrils until medical help arrives. Call 911    ondansetron (ZOFRAN-ODT) 8 MG TbDL Take 1 tablet (8 mg total) by mouth every 8 (eight) hours as needed (nausea and vomiting).    oxyCODONE (ROXICODONE) 5 MG immediate release tablet Take 1 tablet (5 mg total) by mouth every 4 (four) hours as needed for Pain.    palbociclib (IBRANCE) 100 mg Cap Take 1 capsule (100 mg) by mouth Monday - Friday, DO NOT TAKE Saturday and Sunday, for 3 weeks on, 1 week off.    potassium chloride (KLOR-CON) 10 MEQ TbSR TAKE 1 TABLET(10 MEQ) BY MOUTH EVERY NIGHT    sacubitriL-valsartan (ENTRESTO)  mg per tablet Take 1 tablet by mouth 2 (two) times daily.    sennosides (SENNA-C ORAL) Take 2 tablets by mouth daily as needed.    spironolactone (ALDACTONE) 25 MG tablet Take 1 tablet (25 mg total) by mouth once daily.    tirzepatide 10 mg/0.5 mL PnIj Inject 10 mg into the skin every 7 days.    tiZANidine (ZANAFLEX) 4 MG tablet Take 1 tablet (4 mg total) by mouth every 8 (eight) hours. (Patient  taking differently: Take 4 mg by mouth nightly.)    UNKNOWN TO PATIENT Calcium    venlafaxine (EFFEXOR) 75 MG tablet Take 75 mg by mouth 2 (two) times daily.    venlafaxine (EFFEXOR-XR) 150 MG Cp24     vitamin E 100 UNIT capsule Take 100 Units by mouth once daily.    ammonium lactate 12 % Crea Apply 1 application  topically once daily.    blood pressure kit-extra large Kit Check blood pressure once daily at the same time    blood sugar diagnostic Strp To check BG 2 times daily, to use with insurance preferred meter    blood-glucose meter kit To check BG 2 times daily, to use with insurance preferred meter    lancets Misc To check BG 2 times daily, to use with insurance preferred meter     No current facility-administered medications for this visit.     Facility-Administered Medications Ordered in Other Visits   Medication    0.9%  NaCl infusion    mupirocin 2 % ointment     Review of Systems   Constitutional: Positive for malaise/fatigue. Negative for chills and fever.   HENT:  Negative for congestion and nosebleeds.    Eyes:  Negative for blurred vision.   Cardiovascular:  Negative for chest pain, dyspnea on exertion, irregular heartbeat, leg swelling, near-syncope, orthopnea, palpitations, paroxysmal nocturnal dyspnea and syncope.   Respiratory:  Negative for cough, hemoptysis, shortness of breath, sleep disturbances due to breathing and wheezing.    Endocrine: Negative for polyphagia.   Hematologic/Lymphatic: Negative for bleeding problem. Does not bruise/bleed easily.   Musculoskeletal:         Right knee pain   Gastrointestinal:  Positive for nausea. Negative for abdominal pain and hematemesis.   Genitourinary:  Negative for hematuria.   Neurological:  Negative for dizziness, focal weakness, headaches, light-headedness, loss of balance and weakness.   Psychiatric/Behavioral:  Negative for altered mental status. The patient is not nervous/anxious.      Objective:     /60 (BP Location: Left forearm, Patient  "Position: Sitting)   Pulse 90   Ht 5' 4" (1.626 m)   Wt 133.8 kg (294 lb 15.6 oz)   LMP 06/20/2019   BMI 50.63 kg/m²     Physical Exam  Vitals and nursing note reviewed.   Constitutional:       General: She is not in acute distress.     Appearance: Normal appearance. She is well-developed. She is not ill-appearing or diaphoretic.      Comments: Patient in wheelchair    HENT:      Head: Normocephalic and atraumatic.      Mouth/Throat:      Mouth: Mucous membranes are moist.      Pharynx: No oropharyngeal exudate.   Eyes:      Conjunctiva/sclera: Conjunctivae normal.      Pupils: Pupils are equal, round, and reactive to light.   Cardiovascular:      Rate and Rhythm: Normal rate and regular rhythm. No extrasystoles are present.     Pulses: Normal pulses and intact distal pulses.           Radial pulses are 2+ on the right side and 2+ on the left side.        Dorsalis pedis pulses are 2+ on the right side and 2+ on the left side.      Heart sounds: S1 normal and S2 normal.   Pulmonary:      Effort: Pulmonary effort is normal. No accessory muscle usage or respiratory distress.      Breath sounds: No decreased breath sounds or wheezing.   Chest:      Chest wall: No tenderness.      Comments: Device to LUCW. Incision and pocket in good condition.  Abdominal:      General: There is no distension.      Palpations: Abdomen is soft.      Tenderness: There is no guarding.   Musculoskeletal:         General: No swelling. Normal range of motion.      Cervical back: Normal range of motion and neck supple.   Skin:     General: Skin is warm and dry.      Findings: No bruising, erythema or rash.   Neurological:      Mental Status: She is alert and oriented to person, place, and time. She is not disoriented.      Sensory: No sensory deficit.      Motor: No abnormal muscle tone.      Coordination: Coordination normal.   Psychiatric:         Mood and Affect: Mood normal.         Speech: Speech normal.         Behavior: Behavior " normal.         Thought Content: Thought content normal.         Judgment: Judgment normal.     Lab Results   Component Value Date     11/21/2024    K 3.8 11/21/2024    MG 1.9 11/21/2024    BUN 11 11/21/2024    CREATININE 1.4 11/21/2024    ALT 11 11/14/2024    AST 12 11/14/2024    HGB 9.8 (L) 11/14/2024    HCT 32.9 (L) 11/14/2024    TSH 1.384 03/30/2022    LDLCALC 53.8 (L) 12/13/2023       Recent Labs   Lab 10/26/23  1616 08/27/24  1207   INR 1.0 1.0         Assessment:     1. Non-ischemic cardiomyopathy    2. Implantable cardioverter-defibrillator (ICD) in situ    3. Syncope, unspecified syncope type    4. ZABRINA (obstructive sleep apnea)    5. Type 2 diabetes mellitus with hyperglycemia, with long-term current use of insulin    6. Benign essential HTN      Plan:   In summary, Ms. Elmore is a 49 y.o. female with a past medical history of stage IV breast cancer diagnosed 9/2019, (Arimidex, Ibrance--stable disease over past couple years based on q3 month imaging), HFrEF, NICM, DC ICD (8/2024), HTN, HLD, DM2, obesity, ZABRINA. who presents today for follow up of NICM and DC ICD implanted 8/27/2024.     NICM/DCICD  -Ms. Elmore is doing well from a device perspective with stable lead and device function. Sinus tachycardia noted on device, longest 2 minutes 23 seconds. No atrial arrhythmias.   -Continue current medication regimen and device settings.   -Follow up via remote interrogation in 3 months.  -Follow up in device clinic as scheduled.   -Follow up in EP clinic in 6 months, sooner as needed.     GAIL Doyle, JUNGP-C  Cardiology-Electrophysiology/Arrhythmia NP   [facility]   ------------------------------------------------------------------  *A copy of this note has been sent to Dr. Olivares*

## 2024-12-11 DIAGNOSIS — Z17.0 MALIGNANT NEOPLASM OF UPPER-INNER QUADRANT OF RIGHT BREAST IN FEMALE, ESTROGEN RECEPTOR POSITIVE: ICD-10-CM

## 2024-12-11 DIAGNOSIS — E11.29 TYPE 2 DIABETES MELLITUS WITH MICROALBUMINURIA, WITHOUT LONG-TERM CURRENT USE OF INSULIN: ICD-10-CM

## 2024-12-11 DIAGNOSIS — C50.211 MALIGNANT NEOPLASM OF UPPER-INNER QUADRANT OF RIGHT BREAST IN FEMALE, ESTROGEN RECEPTOR POSITIVE: ICD-10-CM

## 2024-12-11 DIAGNOSIS — R80.9 TYPE 2 DIABETES MELLITUS WITH MICROALBUMINURIA, WITHOUT LONG-TERM CURRENT USE OF INSULIN: ICD-10-CM

## 2024-12-11 RX ORDER — METFORMIN HYDROCHLORIDE 500 MG/1
1000 TABLET, EXTENDED RELEASE ORAL 2 TIMES DAILY WITH MEALS
Qty: 360 TABLET | Refills: 2 | Status: SHIPPED | OUTPATIENT
Start: 2024-12-11

## 2024-12-11 RX ORDER — ANASTROZOLE 1 MG/1
TABLET ORAL
Qty: 90 TABLET | Refills: 0 | Status: SHIPPED | OUTPATIENT
Start: 2024-12-11

## 2024-12-12 ENCOUNTER — TELEPHONE (OUTPATIENT)
Dept: PALLIATIVE MEDICINE | Facility: CLINIC | Age: 49
End: 2024-12-12
Payer: MEDICARE

## 2024-12-12 ENCOUNTER — INFUSION (OUTPATIENT)
Dept: INFUSION THERAPY | Facility: HOSPITAL | Age: 49
End: 2024-12-12
Payer: MEDICARE

## 2024-12-12 ENCOUNTER — CLINICAL SUPPORT (OUTPATIENT)
Dept: CARDIOLOGY | Facility: HOSPITAL | Age: 49
End: 2024-12-12
Attending: INTERNAL MEDICINE
Payer: MEDICARE

## 2024-12-12 ENCOUNTER — HOSPITAL ENCOUNTER (OUTPATIENT)
Dept: CARDIOLOGY | Facility: CLINIC | Age: 49
Discharge: HOME | End: 2024-12-12
Payer: MEDICARE

## 2024-12-12 ENCOUNTER — LAB VISIT (OUTPATIENT)
Dept: LAB | Facility: HOSPITAL | Age: 49
End: 2024-12-12
Payer: MEDICARE

## 2024-12-12 ENCOUNTER — OFFICE VISIT (OUTPATIENT)
Dept: HEMATOLOGY/ONCOLOGY | Facility: CLINIC | Age: 49
End: 2024-12-12
Payer: MEDICARE

## 2024-12-12 ENCOUNTER — OFFICE VISIT (OUTPATIENT)
Dept: ELECTROPHYSIOLOGY | Facility: CLINIC | Age: 49
End: 2024-12-12
Payer: MEDICARE

## 2024-12-12 ENCOUNTER — TELEPHONE (OUTPATIENT)
Dept: HEMATOLOGY/ONCOLOGY | Facility: CLINIC | Age: 49
End: 2024-12-12
Payer: MEDICARE

## 2024-12-12 VITALS
HEART RATE: 90 BPM | RESPIRATION RATE: 18 BRPM | SYSTOLIC BLOOD PRESSURE: 100 MMHG | DIASTOLIC BLOOD PRESSURE: 60 MMHG | BODY MASS INDEX: 50.02 KG/M2 | OXYGEN SATURATION: 98 % | WEIGHT: 293 LBS | HEIGHT: 64 IN

## 2024-12-12 VITALS
SYSTOLIC BLOOD PRESSURE: 100 MMHG | WEIGHT: 293 LBS | HEIGHT: 64 IN | BODY MASS INDEX: 50.02 KG/M2 | DIASTOLIC BLOOD PRESSURE: 60 MMHG | HEART RATE: 90 BPM

## 2024-12-12 DIAGNOSIS — C79.51 MALIGNANT NEOPLASM METASTATIC TO BONE: ICD-10-CM

## 2024-12-12 DIAGNOSIS — Z17.0 MALIGNANT NEOPLASM OF UPPER-INNER QUADRANT OF RIGHT BREAST IN FEMALE, ESTROGEN RECEPTOR POSITIVE: Primary | ICD-10-CM

## 2024-12-12 DIAGNOSIS — Z17.0 MALIGNANT NEOPLASM OF UPPER-INNER QUADRANT OF RIGHT BREAST IN FEMALE, ESTROGEN RECEPTOR POSITIVE: ICD-10-CM

## 2024-12-12 DIAGNOSIS — M87.180 BISPHOSPHONATE-ASSOCIATED OSTEONECROSIS OF THE JAW: ICD-10-CM

## 2024-12-12 DIAGNOSIS — R55 SYNCOPE, UNSPECIFIED SYNCOPE TYPE: ICD-10-CM

## 2024-12-12 DIAGNOSIS — I42.8 NON-ISCHEMIC CARDIOMYOPATHY: Primary | ICD-10-CM

## 2024-12-12 DIAGNOSIS — E11.65 TYPE 2 DIABETES MELLITUS WITH HYPERGLYCEMIA, WITH LONG-TERM CURRENT USE OF INSULIN: ICD-10-CM

## 2024-12-12 DIAGNOSIS — G89.3 NEOPLASM RELATED PAIN: ICD-10-CM

## 2024-12-12 DIAGNOSIS — C50.211 MALIGNANT NEOPLASM OF UPPER-INNER QUADRANT OF RIGHT BREAST IN FEMALE, ESTROGEN RECEPTOR POSITIVE: Primary | ICD-10-CM

## 2024-12-12 DIAGNOSIS — C50.211 MALIGNANT NEOPLASM OF UPPER-INNER QUADRANT OF RIGHT BREAST IN FEMALE, ESTROGEN RECEPTOR POSITIVE: ICD-10-CM

## 2024-12-12 DIAGNOSIS — I42.8 NON-ISCHEMIC CARDIOMYOPATHY: ICD-10-CM

## 2024-12-12 DIAGNOSIS — G89.3 CANCER ASSOCIATED PAIN: ICD-10-CM

## 2024-12-12 DIAGNOSIS — G47.33 OSA (OBSTRUCTIVE SLEEP APNEA): ICD-10-CM

## 2024-12-12 DIAGNOSIS — M25.561 ACUTE PAIN OF RIGHT KNEE: ICD-10-CM

## 2024-12-12 DIAGNOSIS — E55.9 VITAMIN D DEFICIENCY: ICD-10-CM

## 2024-12-12 DIAGNOSIS — I10 BENIGN ESSENTIAL HTN: ICD-10-CM

## 2024-12-12 DIAGNOSIS — Z79.4 TYPE 2 DIABETES MELLITUS WITH HYPERGLYCEMIA, WITH LONG-TERM CURRENT USE OF INSULIN: ICD-10-CM

## 2024-12-12 DIAGNOSIS — Z95.810 IMPLANTABLE CARDIOVERTER-DEFIBRILLATOR (ICD) IN SITU: ICD-10-CM

## 2024-12-12 DIAGNOSIS — T45.8X5A BISPHOSPHONATE-ASSOCIATED OSTEONECROSIS OF THE JAW: ICD-10-CM

## 2024-12-12 LAB
ALBUMIN SERPL BCP-MCNC: 3.2 G/DL (ref 3.5–5.2)
ALP SERPL-CCNC: 103 U/L (ref 40–150)
ALT SERPL W/O P-5'-P-CCNC: 20 U/L (ref 10–44)
ANION GAP SERPL CALC-SCNC: 11 MMOL/L (ref 8–16)
AST SERPL-CCNC: 16 U/L (ref 10–40)
BILIRUB SERPL-MCNC: 0.3 MG/DL (ref 0.1–1)
BUN SERPL-MCNC: 21 MG/DL (ref 6–20)
CALCIUM SERPL-MCNC: 11.3 MG/DL (ref 8.7–10.5)
CHLORIDE SERPL-SCNC: 108 MMOL/L (ref 95–110)
CK SERPL-CCNC: 43 U/L (ref 20–180)
CO2 SERPL-SCNC: 19 MMOL/L (ref 23–29)
CREAT SERPL-MCNC: 1.7 MG/DL (ref 0.5–1.4)
ERYTHROCYTE [DISTWIDTH] IN BLOOD BY AUTOMATED COUNT: 17.4 % (ref 11.5–14.5)
EST. GFR  (NO RACE VARIABLE): 36.5 ML/MIN/1.73 M^2
GLUCOSE SERPL-MCNC: 127 MG/DL (ref 70–110)
HCT VFR BLD AUTO: 36 % (ref 37–48.5)
HGB BLD-MCNC: 10.8 G/DL (ref 12–16)
IMM GRANULOCYTES # BLD AUTO: 0.01 K/UL (ref 0–0.04)
MCH RBC QN AUTO: 27.3 PG (ref 27–31)
MCHC RBC AUTO-ENTMCNC: 30 G/DL (ref 32–36)
MCV RBC AUTO: 91 FL (ref 82–98)
NEUTROPHILS # BLD AUTO: 2.4 K/UL (ref 1.8–7.7)
OHS QRS DURATION: 104 MS
OHS QTC CALCULATION: 442 MS
PLATELET # BLD AUTO: 245 K/UL (ref 150–450)
PMV BLD AUTO: 11.3 FL (ref 9.2–12.9)
POTASSIUM SERPL-SCNC: 4.5 MMOL/L (ref 3.5–5.1)
PROT SERPL-MCNC: 7.3 G/DL (ref 6–8.4)
RBC # BLD AUTO: 3.96 M/UL (ref 4–5.4)
SODIUM SERPL-SCNC: 138 MMOL/L (ref 136–145)
WBC # BLD AUTO: 4.14 K/UL (ref 3.9–12.7)

## 2024-12-12 PROCEDURE — 63600175 PHARM REV CODE 636 W HCPCS: Mod: JZ,JG | Performed by: NURSE PRACTITIONER

## 2024-12-12 PROCEDURE — 36415 COLL VENOUS BLD VENIPUNCTURE: CPT | Performed by: NURSE PRACTITIONER

## 2024-12-12 PROCEDURE — 99999 PR PBB SHADOW E&M-EST. PATIENT-LVL V: CPT | Mod: PBBFAC,,, | Performed by: NURSE PRACTITIONER

## 2024-12-12 PROCEDURE — 99215 OFFICE O/P EST HI 40 MIN: CPT | Mod: S$GLB,,, | Performed by: NURSE PRACTITIONER

## 2024-12-12 PROCEDURE — 3074F SYST BP LT 130 MM HG: CPT | Mod: CPTII,S$GLB,, | Performed by: NURSE PRACTITIONER

## 2024-12-12 PROCEDURE — 80053 COMPREHEN METABOLIC PANEL: CPT | Performed by: NURSE PRACTITIONER

## 2024-12-12 PROCEDURE — 96402 CHEMO HORMON ANTINEOPL SQ/IM: CPT

## 2024-12-12 PROCEDURE — 3078F DIAST BP <80 MM HG: CPT | Mod: CPTII,S$GLB,, | Performed by: NURSE PRACTITIONER

## 2024-12-12 PROCEDURE — 3044F HG A1C LEVEL LT 7.0%: CPT | Mod: CPTII,S$GLB,, | Performed by: NURSE PRACTITIONER

## 2024-12-12 PROCEDURE — 99999 PR PBB SHADOW E&M-EST. PATIENT-LVL IV: CPT | Mod: PBBFAC,,, | Performed by: NURSE PRACTITIONER

## 2024-12-12 PROCEDURE — 4010F ACE/ARB THERAPY RXD/TAKEN: CPT | Mod: CPTII,S$GLB,, | Performed by: NURSE PRACTITIONER

## 2024-12-12 PROCEDURE — 82550 ASSAY OF CK (CPK): CPT | Performed by: NURSE PRACTITIONER

## 2024-12-12 PROCEDURE — 85027 COMPLETE CBC AUTOMATED: CPT | Performed by: NURSE PRACTITIONER

## 2024-12-12 PROCEDURE — 93283 PRGRMG EVAL IMPLANTABLE DFB: CPT

## 2024-12-12 PROCEDURE — 3008F BODY MASS INDEX DOCD: CPT | Mod: CPTII,S$GLB,, | Performed by: NURSE PRACTITIONER

## 2024-12-12 PROCEDURE — 99214 OFFICE O/P EST MOD 30 MIN: CPT | Mod: S$GLB,,, | Performed by: NURSE PRACTITIONER

## 2024-12-12 PROCEDURE — 1159F MED LIST DOCD IN RCRD: CPT | Mod: CPTII,S$GLB,, | Performed by: NURSE PRACTITIONER

## 2024-12-12 PROCEDURE — 93005 ELECTROCARDIOGRAM TRACING: CPT | Mod: S$GLB,,, | Performed by: INTERNAL MEDICINE

## 2024-12-12 PROCEDURE — 93010 ELECTROCARDIOGRAM REPORT: CPT | Mod: S$GLB,,, | Performed by: STUDENT IN AN ORGANIZED HEALTH CARE EDUCATION/TRAINING PROGRAM

## 2024-12-12 RX ORDER — VENLAFAXINE HYDROCHLORIDE 150 MG/1
CAPSULE, EXTENDED RELEASE ORAL
COMMUNITY
Start: 2024-11-05

## 2024-12-12 RX ADMIN — GOSERELIN ACETATE 3.6 MG: 3.6 IMPLANT SUBCUTANEOUS at 11:12

## 2024-12-12 NOTE — PROGRESS NOTES
Subjective     Patient ID: Kristopher Elmore is a 49 y.o. female.    Chief Complaint: Malignant neoplasm of upper-inner quadrant of right breast     HPI    Presents for follow up of metastatic breast cancer   [facility] interval[facility] to right distal femur. The XRT was delayed as needed cardiology clearance due to ICD.   PET has not been scheduled as of this morning as requested last visit.   Also, she underwent mandible debridement 11/22/2024 and did well.     Today, she is here for her zoladex.   Fell yesterday- tip of shoe got caught and fell hard on right knee/leg. Intense shocking pain yesterday.    Pain 10/10 today.   MSContin 30mg TID. This helps relieve pain but pain returns before next dose. Oxycodone helps a little for breakthrough - taking 5mg bid.   States pain is so bad its inhumane   also notes dry mouth  Limited on fluids and eating with recent mouth surgery.   Dentist told her ok to continue xgeva.         Previously,   Significant pain noted  She has had imaging in the interval (orthopedics has also seen and notified us)  - 11/8/2024 xrays right femur  FINDINGS:  Osseous structures demonstrate no evidence for acute fracture or dislocation.  Similar well-circumscribed lucent lesion about the medial femoral condyle with thin sclerotic margin.  Degenerative change about the right knee joint.  Right femoroacetabular joint is unremarkable.  Soft tissues are unremarkable.  Partially visualized calcified uterine fibroid.  Impression:  Please see above.    Xray right knee  FINDINGS:  No evidence for acute fracture or dislocation.  Similar well-circumscribed lytic appearing lesion with thin sclerotic margin about the medial femoral condyle.  No joint effusion.  Tricompartmental joint space narrowing and marginal osteophytosis, unchanged.  Soft tissues are unremarkable.  Impression:  Please see above.    Pain occurs with any movement  She has used some oxycodone with  minimal relief and using lidocaine patches and heating pad    Weight down due to difficulty eating with oral discomfort- - jaw osteonecrosis  Xgeva was discontinued     Updated scans  -11/12/2024 CT C/A/P:  FINDINGS:  Stable position of  shunt.  Kidney too prominent to enlarged right axillary and subpectoral lymph nodes.  Index lesions as follows: Right level 2 lymph node measures 1.1 cm in short axis (series 2, image 27), previously measured 1.0 cm.  Right level 1 axillary hakeem conglomerate measures 2.7 x 1.6 cm (series 2, image 40), previously measured 2.9 x 1.3 cm.  Additional level 1 right axillary node, measures 1.3 cm in short axis (series 2, image 44), previously measured 0.5 cm.  No left axillary lymphadenopathy.  Interval placement of left-sided dual lead cardiac device.  Left-sided aortic arch.  No significant atherosclerotic plaque.  No aneurysm.  Heart is upper limit of normal for size.  No significant effusion.  Pulmonary arteries and veins distribute normally.  No suspicious mediastinal or hilar lymphadenopathy.  Large airways are patent.  Solid right lower lobe nodule again visualized, measures 0.5 cm (series 6, image 292), previously measured 0.4 cm.  No new suspicious nodule elsewhere.  Bandlike opacity left lung base suggestive of subsegmental atelectasis.  Liver is upper limit of normal for size.  Stable subcentimeter hepatic hypodensity (series 3, image 51).  No new focal suspicious lesion.  No calcified gallstones.  No abnormal biliary duct dilatation.  Pancreas, spleen, bilateral adrenal glands are unremarkable.  Bilateral kidneys are normal in size and location.  Symmetric uptake of contrast.  Stable fluid attenuating hypodensity on the right, compatible with cysts.  No nephrolithiasis or hydronephrosis.  Bladder is moderately distended.  No focal wall thickening.  Stable appearance of uterus noting calcified fibroid at the fundus.  No adnexal mass.  No evidence of bowel inflammation or  obstruction.  Moderate volume colonic stool burden.  No free intraperitoneal air.  No significant ascites.  No suspicious abdominopelvic lymphadenopathy.  No significant atherosclerotic plaque.  No aneurysm.  Grossly stable mixed lytic and sclerotic lesions throughout axial and proximal appendicular skeleton.  No definite new lesion.  No acute fracture  Impression:  Continued right axillary/subpectoral lymph nodes and right lower lobe pulmonary nodule, slightly more conspicuous on today's exam noting new enlarged right level 1 axillary lymph node.  Findings detailed above.  Grossly stable osseous metastatic disease.  Additional findings as above.    Also, note  Relatively new diagnosis of NYHA Class II HF sx, on GDMT, recent episode of syncope.   - 8/26/2024 ICD placed    Review of Systems   Constitutional:  Positive for activity change, appetite change and fatigue. Negative for chills, fever and unexpected weight change.   HENT:  Positive for dental problem. Negative for mouth sores, rhinorrhea and trouble swallowing.    Eyes:  Negative for visual disturbance.   Respiratory:  Negative for cough, shortness of breath and wheezing.    Cardiovascular:  Negative for chest pain, palpitations and leg swelling.   Gastrointestinal:  Negative for abdominal distention, abdominal pain, blood in stool, change in bowel habit, constipation, diarrhea, nausea, vomiting and reflux.   Genitourinary:  Negative for difficulty urinating, dysuria, frequency and urgency.   Musculoskeletal:  Positive for arthralgias, back pain (chronic), gait problem, joint swelling and joint deformity. Negative for myalgias.   Integumentary:  Negative for rash.   Neurological:  Negative for dizziness, weakness, light-headedness, numbness, headaches and coordination difficulties.   Hematological:  Negative for adenopathy. Does not bruise/bleed easily.   Psychiatric/Behavioral:  Positive for sleep disturbance (from pain). Negative for dysphoric mood. The  patient is nervous/anxious.           Objective     Physical Exam  Vitals and nursing note reviewed.   Constitutional:       General: She is not in acute distress.     Appearance: Normal appearance. She is obese. She is ill-appearing (due to pain).      Comments: Presents with her 2 of her daughters  ECOG = 1  In a wheelchair   HENT:      Head: Normocephalic and atraumatic.      Mouth/Throat:      Comments: Jaw exposure-- osteonecrosis  Eyes:      General: No scleral icterus.     Extraocular Movements: Extraocular movements intact.      Conjunctiva/sclera: Conjunctivae normal.      Pupils: Pupils are equal, round, and reactive to light.   Cardiovascular:      Rate and Rhythm: Normal rate and regular rhythm.      Heart sounds: Normal heart sounds. No murmur heard.     No friction rub. No gallop.   Pulmonary:      Effort: Pulmonary effort is normal. No respiratory distress.      Breath sounds: Normal breath sounds. No wheezing, rhonchi or rales.   Chest:      Chest wall: No tenderness.   Abdominal:      General: Abdomen is flat. Bowel sounds are normal. There is no distension.      Palpations: Abdomen is soft. There is no mass.      Tenderness: There is no abdominal tenderness. There is no guarding or rebound.      Comments: No palpable masses   Musculoskeletal:         General: Tenderness present. No swelling.      Cervical back: Normal range of motion and neck supple. No tenderness.      Right lower leg: No edema.      Left lower leg: No edema.      Comments: Point tenderness in cervical, thoracic and lumbar spine.   Right leg/knee tender. Limited ROM   Lymphadenopathy:      Cervical: No cervical adenopathy.   Skin:     General: Skin is warm and dry.      Coloration: Skin is not jaundiced.      Findings: No bruising or rash.   Neurological:      General: No focal deficit present.      Mental Status: She is alert and oriented to person, place, and time.      Sensory: No sensory deficit.      Motor: No weakness.       Gait: Gait normal.   Psychiatric:         Mood and Affect: Mood normal.         Behavior: Behavior normal.         Thought Content: Thought content normal.         Judgment: Judgment normal.       Labs- reviewed  Imaging- reviewed     Assessment and Plan     1. Malignant neoplasm of upper-inner quadrant of right breast in female, estrogen receptor positive    2. Bone metastases  Overview:  IMO Regualtory Update 4/1/23    Orders:  -     Ambulatory referral/consult to CLINIC Palliative Care; Future; Expected date: 12/19/2024    3. Cancer associated pain  -     Ambulatory referral/consult to CLINIC Palliative Care; Future; Expected date: 12/19/2024    4. Acute pain of right knee    5. Neoplasm related pain    6. Vitamin D deficiency    7. Bisphosphonate-associated osteonecrosis of the jaw    8. Non-ischemic cardiomyopathy    Other orders  -     Cancel: goserelin (ZOLADEX) injection 3.6 mg        Patient with 10/10 pain in right leg/knee after fall.   Known lesion to that area and planning to start XRT.   Not eating and drinking much due to recent dental surgery.   Recommend ED for pain relief, supportive fluids and imaging of right leg/knee.   Palliative needs to see her as well to adjust pain medication  Rad Onc needs to see her ASAP to start XRT as well.     Message to Dr. Marin  Referral to palliative placed.   SW message regarding scooter - patient unable to walk due to metastatic lesion in leg. Also fatigued and with cardiomyopathy making it difficult to operate regular wheelchair. She is capable of operating the motorized scooter  in home and outside of home.         Continue Arimidex and Ibrance for now  PET pending  See Dr. Lei post  Patient reports that her dentist cleared her to resume XGEVA    Xgeva- held with osteonecrosis-      Sees Cardio Onc  ICD in place      Labs resulted and reviewed. ALIS noted. Needs supportive care in ED as above. Report called to ED - Radha Jolley Chart for  Scheduling    Med Onc Chart Routing  Urgent    Follow up with physician . See Dr. Lei after PET please   Follow up with [facility]    Infusion scheduling note    Injection scheduling note    Labs    Imaging    Pharmacy appointment    Other referrals                Treatment Plan Information   OP ANASTROZOLE PALBOCICLIB Q4W Phyllis Lei MD   Associated diagnosis: Malignant neoplasm of upper-inner quadrant of right breast in female, estrogen receptor positive Stage IV cT1c(2), cN1(f), cM1, G3, ER+, DC-, HER2- noted on 10/7/2019   Line of treatment: First Line  Treatment Goal: Palliative     Upcoming Treatment Dates - OP ANASTROZOLE PALBOCICLIB Q4W    No upcoming days in selected categories.    Supportive Plan Information  OP BREAST GOSERELIN Q4W Phyllis Lei MD   Associated Diagnosis: Malignant neoplasm of upper-inner quadrant of right breast in female, estrogen receptor positive Stage IV cT1c(2), cN1(f), cM1, G3, ER+, DC-, HER2- noted on 10/7/2019   Line of treatment: Supportive Care   Treatment goal: Supportive     Upcoming Treatment Dates - OP BREAST GOSERELIN Q4W    1/9/2025       Chemotherapy       goserelin (ZOLADEX) injection 3.6 mg  2/6/2025       Chemotherapy       goserelin (ZOLADEX) injection 3.6 mg  3/6/2025       Chemotherapy       goserelin (ZOLADEX) injection 3.6 mg  4/3/2025       Chemotherapy       goserelin (ZOLADEX) injection 3.6 mg       Patient is in agreement with the proposed treatment plan. All questions were answered to the patient's satisfaction. Pt knows to call clinic for any new or worsening symptoms and if anything is needed before the next clinic visit.    Alfredo Bhatt, MSN, APRN, FNP-C  Nurse Practitioner to Dr. Phyllis Lei  [facility]  [facility]  Hematology & Medical Oncology  [address] [place] [id number]  ph. [phone] ext [phone]  Fax. [phone]              40 minutes of total time spent on the encounter, which  includes face to face time and non-face to face time preparing to see the patient (eg, review of tests), Obtaining and/or reviewing separately obtained history, Documenting clinical information in the electronic or other health record, Independently interpreting results (not separately reported) and communicating results to the patient/family/caregiver, or Care coordination (not separately reported).

## 2024-12-12 NOTE — Clinical Note
Hi! Any chance we can help get her a motorized scooter? She is suffering pretty bad- sending her to Ed

## 2024-12-13 ENCOUNTER — PATIENT MESSAGE (OUTPATIENT)
Dept: HEMATOLOGY/ONCOLOGY | Facility: CLINIC | Age: 49
End: 2024-12-13
Payer: MEDICARE

## 2024-12-13 ENCOUNTER — PATIENT OUTREACH (OUTPATIENT)
Dept: EMERGENCY MEDICINE | Facility: HOSPITAL | Age: 49
End: 2024-12-13
Payer: MEDICARE

## 2024-12-13 NOTE — PROGRESS NOTES
Call placed per ED Navigator to f/u from last encounter. No answer. V/m left. ED navigator will follow-up with patient and assist.

## 2024-12-16 ENCOUNTER — DOCUMENTATION ONLY (OUTPATIENT)
Dept: HEMATOLOGY/ONCOLOGY | Facility: CLINIC | Age: 49
End: 2024-12-16
Payer: MEDICARE

## 2024-12-16 ENCOUNTER — DOCUMENTATION ONLY (OUTPATIENT)
Dept: CARDIOLOGY | Facility: HOSPITAL | Age: 49
End: 2024-12-16
Payer: MEDICARE

## 2024-12-16 NOTE — PROGRESS NOTES
Oncology Social Worker received an In Basket Epic message from Alfredo Bhatt NP, re: patient needing a motorized scooter. Sent a reply message requesting an order and an addendum to the current progress note reflecting why patient needs the motorized scooter, and she entered these. Called patient and discussed with her. Verified patient's demographic information and explained the referral process to request authorization from [employer]  insurance company. Patient stated understanding and agreement. Completed the [employer] Medical Necessity Form and faxed this to [phone] along with the order, patient's facesheet, list of diagnoses (SnapShot Information), and progress note. Sent a reply message with update to Alfredo Bhatt NP.  Will continue to follow and assist as needs are identified.

## 2024-12-17 ENCOUNTER — TELEPHONE (OUTPATIENT)
Dept: PALLIATIVE MEDICINE | Facility: CLINIC | Age: 49
End: 2024-12-17
Payer: MEDICARE

## 2024-12-17 NOTE — TELEPHONE ENCOUNTER
Attempted to reach pt regarding  scheduling an appointment in Palliative medicine, Left voicemail and provided clinic number for a call back.

## 2024-12-20 DIAGNOSIS — G89.3 NEOPLASM RELATED PAIN: ICD-10-CM

## 2024-12-20 DIAGNOSIS — C50.211 MALIGNANT NEOPLASM OF UPPER-INNER QUADRANT OF RIGHT BREAST IN FEMALE, ESTROGEN RECEPTOR POSITIVE: ICD-10-CM

## 2024-12-20 DIAGNOSIS — C79.51 MALIGNANT NEOPLASM METASTATIC TO BONE: ICD-10-CM

## 2024-12-20 DIAGNOSIS — Z17.0 MALIGNANT NEOPLASM OF UPPER-INNER QUADRANT OF RIGHT BREAST IN FEMALE, ESTROGEN RECEPTOR POSITIVE: ICD-10-CM

## 2024-12-20 RX ORDER — MORPHINE SULFATE 15 MG/1
15 TABLET, FILM COATED, EXTENDED RELEASE ORAL EVERY 8 HOURS
Qty: 90 TABLET | Refills: 0 | Status: SHIPPED | OUTPATIENT
Start: 2024-12-20

## 2024-12-23 RX ORDER — OXYCODONE HYDROCHLORIDE 5 MG/1
5 TABLET ORAL EVERY 4 HOURS PRN
Qty: 90 TABLET | Refills: 0 | Status: SHIPPED | OUTPATIENT
Start: 2024-12-23

## 2024-12-26 ENCOUNTER — PATIENT MESSAGE (OUTPATIENT)
Dept: HEMATOLOGY/ONCOLOGY | Facility: CLINIC | Age: 49
End: 2024-12-26
Payer: MEDICARE

## 2024-12-27 ENCOUNTER — PATIENT MESSAGE (OUTPATIENT)
Dept: HEMATOLOGY/ONCOLOGY | Facility: CLINIC | Age: 49
End: 2024-12-27
Payer: MEDICARE

## 2024-12-27 DIAGNOSIS — Z17.0 MALIGNANT NEOPLASM OF UPPER-INNER QUADRANT OF RIGHT BREAST IN FEMALE, ESTROGEN RECEPTOR POSITIVE: Primary | ICD-10-CM

## 2024-12-27 DIAGNOSIS — C50.211 MALIGNANT NEOPLASM OF UPPER-INNER QUADRANT OF RIGHT BREAST IN FEMALE, ESTROGEN RECEPTOR POSITIVE: Primary | ICD-10-CM

## 2024-12-27 DIAGNOSIS — C50.211 MALIGNANT NEOPLASM OF UPPER-INNER QUADRANT OF RIGHT BREAST IN FEMALE, ESTROGEN RECEPTOR POSITIVE: ICD-10-CM

## 2024-12-27 DIAGNOSIS — Z17.0 MALIGNANT NEOPLASM OF UPPER-INNER QUADRANT OF RIGHT BREAST IN FEMALE, ESTROGEN RECEPTOR POSITIVE: ICD-10-CM

## 2024-12-27 RX ORDER — MORPHINE SULFATE 15 MG/1
15 TABLET ORAL EVERY 4 HOURS PRN
Qty: 160 TABLET | Refills: 0 | Status: SHIPPED | OUTPATIENT
Start: 2024-12-27

## 2024-12-28 ENCOUNTER — CLINICAL SUPPORT (OUTPATIENT)
Dept: CARDIOLOGY | Facility: HOSPITAL | Age: 49
End: 2024-12-28
Attending: INTERNAL MEDICINE
Payer: MEDICARE

## 2024-12-28 DIAGNOSIS — G89.3 CANCER RELATED PAIN: ICD-10-CM

## 2024-12-28 DIAGNOSIS — C50.211 MALIGNANT NEOPLASM OF UPPER-INNER QUADRANT OF RIGHT BREAST IN FEMALE, ESTROGEN RECEPTOR POSITIVE: ICD-10-CM

## 2024-12-28 DIAGNOSIS — Z95.810 PRESENCE OF AUTOMATIC (IMPLANTABLE) CARDIAC DEFIBRILLATOR: ICD-10-CM

## 2024-12-28 DIAGNOSIS — R55 SYNCOPE AND COLLAPSE: ICD-10-CM

## 2024-12-28 DIAGNOSIS — Z17.0 MALIGNANT NEOPLASM OF UPPER-INNER QUADRANT OF RIGHT BREAST IN FEMALE, ESTROGEN RECEPTOR POSITIVE: ICD-10-CM

## 2024-12-28 PROCEDURE — 93296 REM INTERROG EVL PM/IDS: CPT | Performed by: INTERNAL MEDICINE

## 2024-12-28 PROCEDURE — 93295 DEV INTERROG REMOTE 1/2/MLT: CPT | Mod: ,,, | Performed by: INTERNAL MEDICINE

## 2024-12-30 ENCOUNTER — DOCUMENTATION ONLY (OUTPATIENT)
Dept: HEMATOLOGY/ONCOLOGY | Facility: CLINIC | Age: 49
End: 2024-12-30
Payer: MEDICARE

## 2024-12-30 ENCOUNTER — HOSPITAL ENCOUNTER (OUTPATIENT)
Dept: RADIOLOGY | Facility: HOSPITAL | Age: 49
Discharge: HOME | End: 2024-12-30
Attending: INTERNAL MEDICINE
Payer: MEDICARE

## 2024-12-30 DIAGNOSIS — I10 ESSENTIAL (PRIMARY) HYPERTENSION: ICD-10-CM

## 2024-12-30 DIAGNOSIS — G89.3 NEOPLASM RELATED PAIN: ICD-10-CM

## 2024-12-30 DIAGNOSIS — I50.22 HEART FAILURE WITH MILDLY REDUCED EJECTION FRACTION: ICD-10-CM

## 2024-12-30 DIAGNOSIS — I42.8 NON-ISCHEMIC CARDIOMYOPATHY: Primary | ICD-10-CM

## 2024-12-30 LAB — POCT GLUCOSE: 138 MG/DL (ref 70–110)

## 2024-12-30 PROCEDURE — 78815 PET IMAGE W/CT SKULL-THIGH: CPT | Mod: 26,PS,, | Performed by: NUCLEAR MEDICINE

## 2024-12-30 PROCEDURE — A9552 F18 FDG: HCPCS | Performed by: INTERNAL MEDICINE

## 2024-12-30 PROCEDURE — 78815 PET IMAGE W/CT SKULL-THIGH: CPT | Mod: TC

## 2024-12-30 RX ORDER — LIDOCAINE 50 MG/G
2 PATCH TOPICAL DAILY
Qty: 60 PATCH | Refills: 0 | Status: SHIPPED | OUTPATIENT
Start: 2024-12-30

## 2024-12-30 RX ORDER — FLUDEOXYGLUCOSE F18 500 MCI/ML
14.8 INJECTION INTRAVENOUS
Status: COMPLETED | OUTPATIENT
Start: 2024-12-30 | End: 2024-12-30

## 2024-12-30 RX ADMIN — FLUDEOXYGLUCOSE F-18 14.8 MILLICURIE: 500 INJECTION INTRAVENOUS at 01:12

## 2024-12-30 NOTE — PROGRESS NOTES
Oncology Social Worker received a MS Teams message from Shobha Gilbert RN, re: patient who has a PET scan scheduled today and messaged through her portal account that she is having transportation issues and no one is available to help her. Sent reply message to Shobha that I can assist and will call patient. Called patient at [phone] and it went to voice mail; left a detailed voice mail message for patient including my direct number for her to call me and confirm that she's in agreement and can go via cab and be ready for a 12 noon  for her 1 PM appointment and I can arrange cab transport for her through [employer]. Sent another reply message to Sohbha with update and she replied that patient normally messages through the portal and she would see if she can reach her. Shobha then messaged me that she just talked to her and she said she was able to get someone to bring her. Will continue to follow and assist as needs are identified.

## 2024-12-31 RX ORDER — MORPHINE SULFATE 15 MG/1
15 TABLET ORAL EVERY 4 HOURS PRN
Qty: 160 TABLET | Refills: 0 | Status: SHIPPED | OUTPATIENT
Start: 2024-12-31

## 2025-01-03 LAB
OHS CV AF BURDEN PERCENT: < 1
OHS CV DC REMOTE DEVICE TYPE: NORMAL
OHS CV ICD SHOCK: NO
OHS CV RV PACING PERCENT: 0.03 %

## 2025-01-05 ENCOUNTER — PATIENT MESSAGE (OUTPATIENT)
Dept: HEMATOLOGY/ONCOLOGY | Facility: CLINIC | Age: 50
End: 2025-01-05
Payer: MEDICARE

## 2025-01-08 ENCOUNTER — PATIENT MESSAGE (OUTPATIENT)
Dept: HEMATOLOGY/ONCOLOGY | Facility: CLINIC | Age: 50
End: 2025-01-08
Payer: MEDICARE

## 2025-01-09 RX ORDER — CARVEDILOL 12.5 MG/1
12.5 TABLET ORAL 2 TIMES DAILY
Qty: 180 TABLET | Refills: 0 | Status: SHIPPED | OUTPATIENT
Start: 2025-01-09

## 2025-01-10 DIAGNOSIS — E78.2 MIXED HYPERLIPIDEMIA: ICD-10-CM

## 2025-01-10 RX ORDER — ATORVASTATIN CALCIUM 40 MG/1
40 TABLET, FILM COATED ORAL NIGHTLY
Qty: 90 TABLET | Refills: 3 | Status: SHIPPED | OUTPATIENT
Start: 2025-01-10

## 2025-01-13 ENCOUNTER — INFUSION (OUTPATIENT)
Dept: INFUSION THERAPY | Facility: HOSPITAL | Age: 50
End: 2025-01-13
Attending: STUDENT IN AN ORGANIZED HEALTH CARE EDUCATION/TRAINING PROGRAM
Payer: MEDICARE

## 2025-01-13 ENCOUNTER — OFFICE VISIT (OUTPATIENT)
Dept: HEMATOLOGY/ONCOLOGY | Facility: CLINIC | Age: 50
End: 2025-01-13
Payer: MEDICARE

## 2025-01-13 VITALS
HEIGHT: 64 IN | TEMPERATURE: 97 F | HEART RATE: 93 BPM | BODY MASS INDEX: 49.31 KG/M2 | SYSTOLIC BLOOD PRESSURE: 138 MMHG | RESPIRATION RATE: 17 BRPM | OXYGEN SATURATION: 100 % | WEIGHT: 288.81 LBS | DIASTOLIC BLOOD PRESSURE: 78 MMHG

## 2025-01-13 DIAGNOSIS — C79.51 MALIGNANT NEOPLASM METASTATIC TO BONE: ICD-10-CM

## 2025-01-13 DIAGNOSIS — C50.211 MALIGNANT NEOPLASM OF UPPER-INNER QUADRANT OF RIGHT BREAST IN FEMALE, ESTROGEN RECEPTOR POSITIVE: ICD-10-CM

## 2025-01-13 DIAGNOSIS — Z17.0 MALIGNANT NEOPLASM OF UPPER-INNER QUADRANT OF RIGHT BREAST IN FEMALE, ESTROGEN RECEPTOR POSITIVE: ICD-10-CM

## 2025-01-13 DIAGNOSIS — M87.180 OSTEONECROSIS OF JAW DUE TO DRUG: ICD-10-CM

## 2025-01-13 DIAGNOSIS — Z17.0 MALIGNANT NEOPLASM OF UPPER-INNER QUADRANT OF RIGHT BREAST IN FEMALE, ESTROGEN RECEPTOR POSITIVE: Primary | ICD-10-CM

## 2025-01-13 DIAGNOSIS — Z95.810 IMPLANTABLE CARDIOVERTER-DEFIBRILLATOR (ICD) IN SITU: ICD-10-CM

## 2025-01-13 DIAGNOSIS — F41.8 DEPRESSION WITH ANXIETY: ICD-10-CM

## 2025-01-13 DIAGNOSIS — Z79.4 TYPE 2 DIABETES MELLITUS WITH HYPERGLYCEMIA, WITH LONG-TERM CURRENT USE OF INSULIN: ICD-10-CM

## 2025-01-13 DIAGNOSIS — H10.9 CONJUNCTIVITIS, UNSPECIFIED CONJUNCTIVITIS TYPE, UNSPECIFIED LATERALITY: Primary | ICD-10-CM

## 2025-01-13 DIAGNOSIS — E66.01 MORBID OBESITY, UNSPECIFIED OBESITY TYPE: ICD-10-CM

## 2025-01-13 DIAGNOSIS — G89.3 CANCER ASSOCIATED PAIN: ICD-10-CM

## 2025-01-13 DIAGNOSIS — E11.65 TYPE 2 DIABETES MELLITUS WITH HYPERGLYCEMIA, WITH LONG-TERM CURRENT USE OF INSULIN: ICD-10-CM

## 2025-01-13 DIAGNOSIS — C50.211 MALIGNANT NEOPLASM OF UPPER-INNER QUADRANT OF RIGHT BREAST IN FEMALE, ESTROGEN RECEPTOR POSITIVE: Primary | ICD-10-CM

## 2025-01-13 DIAGNOSIS — G93.2 IDIOPATHIC INTRACRANIAL HYPERTENSION: ICD-10-CM

## 2025-01-13 DIAGNOSIS — D56.1 BETA-THALASSEMIA: ICD-10-CM

## 2025-01-13 PROCEDURE — 3075F SYST BP GE 130 - 139MM HG: CPT | Mod: CPTII,S$GLB,, | Performed by: INTERNAL MEDICINE

## 2025-01-13 PROCEDURE — 3008F BODY MASS INDEX DOCD: CPT | Mod: CPTII,S$GLB,, | Performed by: INTERNAL MEDICINE

## 2025-01-13 PROCEDURE — 99999 PR PBB SHADOW E&M-EST. PATIENT-LVL V: CPT | Mod: PBBFAC,,, | Performed by: INTERNAL MEDICINE

## 2025-01-13 PROCEDURE — 3078F DIAST BP <80 MM HG: CPT | Mod: CPTII,S$GLB,, | Performed by: INTERNAL MEDICINE

## 2025-01-13 PROCEDURE — 96402 CHEMO HORMON ANTINEOPL SQ/IM: CPT

## 2025-01-13 PROCEDURE — 63600175 PHARM REV CODE 636 W HCPCS: Mod: JZ,TB | Performed by: INTERNAL MEDICINE

## 2025-01-13 PROCEDURE — G2211 COMPLEX E/M VISIT ADD ON: HCPCS | Mod: S$GLB,,, | Performed by: INTERNAL MEDICINE

## 2025-01-13 PROCEDURE — 99215 OFFICE O/P EST HI 40 MIN: CPT | Mod: S$GLB,,, | Performed by: INTERNAL MEDICINE

## 2025-01-13 PROCEDURE — 1159F MED LIST DOCD IN RCRD: CPT | Mod: CPTII,S$GLB,, | Performed by: INTERNAL MEDICINE

## 2025-01-13 RX ORDER — AZITHROMYCIN 250 MG/1
TABLET, FILM COATED ORAL
Qty: 6 TABLET | Refills: 0 | Status: SHIPPED | OUTPATIENT
Start: 2025-01-13 | End: 2025-01-18

## 2025-01-13 RX ORDER — VENLAFAXINE 75 MG/1
75 TABLET ORAL DAILY
Qty: 30 TABLET | Refills: 0 | Status: SHIPPED | OUTPATIENT
Start: 2025-01-13

## 2025-01-13 RX ORDER — VENLAFAXINE HYDROCHLORIDE 150 MG/1
150 CAPSULE, EXTENDED RELEASE ORAL DAILY
Qty: 30 CAPSULE | Refills: 0 | Status: SHIPPED | OUTPATIENT
Start: 2025-01-13

## 2025-01-13 RX ADMIN — GOSERELIN ACETATE 3.6 MG: 3.6 IMPLANT SUBCUTANEOUS at 04:01

## 2025-01-13 NOTE — PROGRESS NOTES
Subjective     Patient ID: Kristopher Elmore is a 49 y.o. female.    Chief Complaint: Malignant neoplasm of upper-inner quadrant of right breast     HPI    Presents for follow up of metastatic breast cancer     - 12/30/2024 PET scan restaging: We have previously reviewed via phone  FINDINGS:  Quality of the study: Adequate.  In the head and neck, there are no hypermetabolic lesions worrisome for malignancy.  In the chest, there are a few mildly hypermetabolic right axillary nodes.  Dominant node measures 2.0 cm short axis with SUV max 4.3 (axial image 73).  Three additional nodes (axial images 57, 60, and 70) are nonenlarged and faintly tracer-avid, for example a right subpectoral node measuring 7 mm with SUV max 2.6 (axial image 57).  There are no concerning pulmonary nodules or masses.  In the abdomen and pelvis, there is physiologic tracer distribution within the abdominal organs and excretion into the genitourinary system.  Mild focal uptake at the anorectal junction, likely also physiologic.  In the bones, there is partially visualized hypermetabolic lucent lesion of the right knee/distal femur.  Numerous non-tracer-avid sclerotic osseous lesions, compatible with treated metastases.  Additional CT findings: Partially visualized ventriculoperitoneal shunt catheter tract.  Left chest wall implantable cardioverter-defibrillator.  Calcified uterine fibroid.  Impression:  Mildly hypermetabolic right axillary adenopathy, which is new from prior FDG PET-CT in 2021.  Hypermetabolic lucent lesion of the right distal femur, incompletely visualized with known metastasis for XRT     In the interval- she did have increased anxiety and depression noted  This worsened even after review of PET scan results and she worried if after 5 years she had reached a state where Hospice was appropriate  The pain in the right femur also contributed  On review it turns also out that she had run out of her Effexor and as such several  doses missed (daughter mentioned today)    Pain is better today- notes pain medications did not really help  Radiation has helped  Can restart Xgeva- received dental clearance    No new pain  Weight down- now stabilized  Factors included prior jaw osteonecrosis and prior pain; also had made changes    Noted to have bilateral conjunctivitis- was taking OTC and had not reported  + drainage     Also, note  Relatively new diagnosis of NYHA Class II HF sx, on GDMT, recent episode of syncope.   - 8/26/2024 ICD placed    Moved into apartment while awaiting home repairs    Review of Systems   Constitutional:  Positive for fatigue. Negative for activity change, appetite change, chills, fever and unexpected weight change.   HENT:  Positive for dental problem (better). Negative for mouth sores, rhinorrhea and trouble swallowing.    Eyes:  Positive for pain, discharge, redness and itching. Negative for visual disturbance.   Respiratory:  Negative for cough, shortness of breath and wheezing.    Cardiovascular:  Negative for chest pain, palpitations and leg swelling.   Gastrointestinal:  Negative for abdominal distention, abdominal pain, blood in stool, change in bowel habit, constipation, diarrhea, nausea, vomiting and reflux.   Genitourinary:  Negative for difficulty urinating, dysuria, frequency and urgency.   Musculoskeletal:  Positive for arthralgias, back pain (chronic), gait problem and joint deformity. Negative for joint swelling and myalgias.   Integumentary:  Negative for rash.   Neurological:  Negative for dizziness, weakness, light-headedness, numbness, headaches and coordination difficulties.   Hematological:  Negative for adenopathy. Does not bruise/bleed easily.   Psychiatric/Behavioral:  Positive for dysphoric mood. The patient is nervous/anxious.           Objective     Physical Exam  Vitals and nursing note reviewed.   Constitutional:       General: She is not in acute distress.     Appearance: Normal  appearance. She is obese. She is not ill-appearing.      Comments: Presents with her 1 of her daughters  ECOG = 1   HENT:      Head: Normocephalic and atraumatic.      Nose: No congestion or rhinorrhea.      Mouth/Throat:      Mouth: Mucous membranes are moist.      Pharynx: No oropharyngeal exudate or posterior oropharyngeal erythema.      Comments: Jaw exposure-- osteonecrosis  Eyes:      General: No scleral icterus.        Right eye: Discharge present.         Left eye: Discharge present.     Extraocular Movements: Extraocular movements intact.      Conjunctiva/sclera: Conjunctivae normal.      Pupils: Pupils are equal, round, and reactive to light.      Comments: Bilateral conjunctivitis- worse on left   Cardiovascular:      Rate and Rhythm: Normal rate and regular rhythm.      Heart sounds: Normal heart sounds. No murmur heard.     No friction rub. No gallop.   Pulmonary:      Effort: Pulmonary effort is normal. No respiratory distress.      Breath sounds: Normal breath sounds. No wheezing, rhonchi or rales.   Chest:      Chest wall: No tenderness.   Abdominal:      General: Abdomen is flat. Bowel sounds are normal. There is no distension.      Palpations: Abdomen is soft. There is no mass.      Tenderness: There is no abdominal tenderness. There is no guarding or rebound.      Comments: No palpable masses   Musculoskeletal:         General: Tenderness present. No swelling.      Cervical back: Normal range of motion and neck supple. No tenderness.      Right lower leg: No edema.      Left lower leg: No edema.      Comments: Point tenderness in cervical, thoracic and lumbar spine. Also with cva tenderness today   Lymphadenopathy:      Cervical: No cervical adenopathy.   Skin:     General: Skin is warm and dry.      Coloration: Skin is not jaundiced.      Findings: No bruising or rash.   Neurological:      General: No focal deficit present.      Mental Status: She is alert and oriented to person, place, and time.       Sensory: No sensory deficit.      Motor: No weakness.      Gait: Gait normal.   Psychiatric:         Mood and Affect: Mood normal.         Behavior: Behavior normal.         Thought Content: Thought content normal.         Judgment: Judgment normal.   Labs- reviewed  Imaging - reviewed       Assessment and Plan     1. Conjunctivitis, unspecified conjunctivitis type, unspecified laterality  -     ciprofloxacin HCl (CILOXAN) 0.3 % Oint; Place into both eyes 3 (three) times daily.  Dispense: 3.5 g; Refill: 0  -     azithromycin (ZITHROMAX Z-FABY) 250 MG tablet; Take 2 tablets (500 mg) on  Day 1,  followed by 1 tablet (250 mg) once daily on Days 2 through 5.  Dispense: 6 tablet; Refill: 0    2. Depression with anxiety  -     venlafaxine (EFFEXOR) 75 MG tablet; Take 1 tablet (75 mg total) by mouth once daily.  Dispense: 30 tablet; Refill: 0  -     venlafaxine (EFFEXOR-XR) 150 MG Cp24; Take 1 capsule (150 mg total) by mouth once daily.  Dispense: 30 capsule; Refill: 0    3. Malignant neoplasm metastatic to bone  Overview:  IMO Regualtory Update 4/1/23      4. Beta-thalassemia    5. Morbid obesity, unspecified obesity type    6. Idiopathic intracranial hypertension    7. Malignant neoplasm of upper-inner quadrant of right breast in female, estrogen receptor positive    8. Cancer associated pain    9. Type 2 diabetes mellitus with hyperglycemia, with long-term current use of insulin    10. Osteonecrosis of jaw due to drug    11. Implantable cardioverter-defibrillator (ICD) in situ      Conjunctivitis  Antibiotic ointment and oral antibiotic sent and we will follow up by phone later this week  (Addendum- called 1/15 and symptoms significantly improved)    Depression and Anxiety  Medications refilled  Has follow up    Neoplasm related pain  Improved post XRT  Monitor closely as weight bearing bone     Metastatic breast cancer  We reviewed updated scan results  Continue Arimidex and Ibrance  Remains on Zoladex     Xgeva-  held with osteonecrosis- now cleared by oral surgeon- will wait for letter and restart if benefit > risk     CHF  Sees Cardio Onc  ICD in place        code applied: patient requires or will require a continuous, longitudinal, and active collaborative plan of care related to this patient's health condition, breast cancer --the management of which requires the direction of a practitioner with specialized clinical knowledge, skill, and expertise.       Route Chart for Scheduling    Med Onc Chart Routing      Follow up with physician    Follow up with CAMILA . 4 weeks Zoladex, cbc, cmp and EP   Infusion scheduling note    Injection scheduling note    Labs    Imaging    Pharmacy appointment    Other referrals            Treatment Plan Information   OP ANASTROZOLE PALBOCICLIB Q4W Phyllis Lei MD   Associated diagnosis: Malignant neoplasm of upper-inner quadrant of right breast in female, estrogen receptor positive Stage IV cT1c(2), cN1(f), cM1, G3, ER+, CA-, HER2- noted on 10/7/2019   Line of treatment: First Line  Treatment Goal: Palliative     Upcoming Treatment Dates - OP ANASTROZOLE PALBOCICLIB Q4W    No upcoming days in selected categories.    Supportive Plan Information  OP BREAST GOSERELIN Q4W Phyllis Lei MD   Associated Diagnosis: Malignant neoplasm of upper-inner quadrant of right breast in female, estrogen receptor positive Stage IV cT1c(2), cN1(f), cM1, G3, ER+, CA-, HER2- noted on 10/7/2019   Line of treatment: Supportive Care   Treatment goal: Supportive     Upcoming Treatment Dates - OP BREAST GOSERELIN Q4W    2/6/2025       Chemotherapy       goserelin (ZOLADEX) injection 3.6 mg  3/6/2025       Chemotherapy       goserelin (ZOLADEX) injection 3.6 mg  4/3/2025       Chemotherapy       goserelin (ZOLADEX) injection 3.6 mg  5/1/2025       Chemotherapy       goserelin (ZOLADEX) injection 3.6 mg

## 2025-01-14 DIAGNOSIS — E55.9 VITAMIN D DEFICIENCY: ICD-10-CM

## 2025-01-14 RX ORDER — ERGOCALCIFEROL 1.25 MG/1
50000 CAPSULE ORAL
Qty: 12 CAPSULE | Refills: 0 | Status: SHIPPED | OUTPATIENT
Start: 2025-01-14

## 2025-01-15 PROBLEM — F41.8 DEPRESSION WITH ANXIETY: Status: ACTIVE | Noted: 2025-01-15

## 2025-01-15 PROBLEM — H10.9 CONJUNCTIVITIS: Status: ACTIVE | Noted: 2025-01-15

## 2025-01-16 ENCOUNTER — LAB VISIT (OUTPATIENT)
Dept: LAB | Facility: HOSPITAL | Age: 50
End: 2025-01-16
Attending: NURSE PRACTITIONER
Payer: MEDICARE

## 2025-01-16 ENCOUNTER — OFFICE VISIT (OUTPATIENT)
Dept: ENDOCRINOLOGY | Facility: CLINIC | Age: 50
End: 2025-01-16
Payer: MEDICARE

## 2025-01-16 VITALS
BODY MASS INDEX: 49.09 KG/M2 | WEIGHT: 286 LBS | SYSTOLIC BLOOD PRESSURE: 138 MMHG | TEMPERATURE: 97 F | DIASTOLIC BLOOD PRESSURE: 76 MMHG | HEART RATE: 90 BPM

## 2025-01-16 DIAGNOSIS — E66.01 MORBID OBESITY, UNSPECIFIED OBESITY TYPE: ICD-10-CM

## 2025-01-16 DIAGNOSIS — R80.9 TYPE 2 DIABETES MELLITUS WITH MICROALBUMINURIA, WITHOUT LONG-TERM CURRENT USE OF INSULIN: ICD-10-CM

## 2025-01-16 DIAGNOSIS — E11.29 TYPE 2 DIABETES MELLITUS WITH MICROALBUMINURIA, WITHOUT LONG-TERM CURRENT USE OF INSULIN: Primary | ICD-10-CM

## 2025-01-16 DIAGNOSIS — R80.9 TYPE 2 DIABETES MELLITUS WITH MICROALBUMINURIA, WITHOUT LONG-TERM CURRENT USE OF INSULIN: Primary | ICD-10-CM

## 2025-01-16 DIAGNOSIS — E11.29 TYPE 2 DIABETES MELLITUS WITH MICROALBUMINURIA, WITHOUT LONG-TERM CURRENT USE OF INSULIN: ICD-10-CM

## 2025-01-16 LAB — GLUCOSE SERPL-MCNC: 135 MG/DL (ref 70–110)

## 2025-01-16 PROCEDURE — 82962 GLUCOSE BLOOD TEST: CPT | Mod: S$GLB,,, | Performed by: NURSE PRACTITIONER

## 2025-01-16 PROCEDURE — 83036 HEMOGLOBIN GLYCOSYLATED A1C: CPT | Performed by: NURSE PRACTITIONER

## 2025-01-16 PROCEDURE — 99999 PR PBB SHADOW E&M-EST. PATIENT-LVL IV: CPT | Mod: PBBFAC,,, | Performed by: NURSE PRACTITIONER

## 2025-01-16 PROCEDURE — 1159F MED LIST DOCD IN RCRD: CPT | Mod: CPTII,S$GLB,, | Performed by: NURSE PRACTITIONER

## 2025-01-16 PROCEDURE — 3078F DIAST BP <80 MM HG: CPT | Mod: CPTII,S$GLB,, | Performed by: NURSE PRACTITIONER

## 2025-01-16 PROCEDURE — 1160F RVW MEDS BY RX/DR IN RCRD: CPT | Mod: CPTII,S$GLB,, | Performed by: NURSE PRACTITIONER

## 2025-01-16 PROCEDURE — 36415 COLL VENOUS BLD VENIPUNCTURE: CPT | Performed by: NURSE PRACTITIONER

## 2025-01-16 PROCEDURE — 3008F BODY MASS INDEX DOCD: CPT | Mod: CPTII,S$GLB,, | Performed by: NURSE PRACTITIONER

## 2025-01-16 PROCEDURE — 99214 OFFICE O/P EST MOD 30 MIN: CPT | Mod: S$GLB,,, | Performed by: NURSE PRACTITIONER

## 2025-01-16 PROCEDURE — 3075F SYST BP GE 130 - 139MM HG: CPT | Mod: CPTII,S$GLB,, | Performed by: NURSE PRACTITIONER

## 2025-01-16 RX ORDER — SPIRONOLACTONE 25 MG/1
25 TABLET ORAL
Qty: 90 TABLET | Refills: 3 | Status: SHIPPED | OUTPATIENT
Start: 2025-01-16

## 2025-01-16 RX ORDER — METFORMIN HYDROCHLORIDE 500 MG/1
1000 TABLET, EXTENDED RELEASE ORAL 2 TIMES DAILY WITH MEALS
Qty: 360 TABLET | Refills: 1 | Status: SHIPPED | OUTPATIENT
Start: 2025-01-16

## 2025-01-16 NOTE — PROGRESS NOTES
CC: This 49 y.o. Black or  female  is here for evaluation of  T2DM along with comorbidities indicated in the Visit Diagnosis section of this encounter.    HPI: Kristopher Elmore was diagnosed with T2DM in 2021 at age 46; A1c at 8.5% upon diagnosis.   Medical hx: cardiomegaly, chronic combined systolic and diastolic HF, anxiety     DM COMPLICATIONS: peripheral neuropathy      Prior visit 5/16/24 virtual   A1c remains stable with last in March at 6.1%.   She c/o that her weight has remained stable and she's hasn't been able to lose weight in the last few months.   She has lost 7 lb since Dec. She gets fully quickly. Her oncologist advised her to get more protein in her diet d/t low albumin.   Feels much better now s/p  shunt.   Plan Increase Mounjaro to 10 mg once weekly.   Reduce glipizide to glipizide XL 5 mg once daily before 1st meal.   Continue metformin and Jardiance. Return to clinic in 3-4 months with labs prior.     Interval hx  Last A1c was 5.8% in August.       She has increased Mounjaro to 10 mg. Food portions are smaller and does not snack much. She has lost 27 lb since lov in May.     Hasn't been taking glipizide at all because she forgets.   Misses Jardiance half the time.         LAST DIABETES EDUCATION: 12/12/22 with JANE Foreman, found visit helpful     HOSPITALIZED FOR DIABETES  -  Yes - for hyperglycemia 2x in 2022 for 300 and 400s     SIGNIFICANT DIABETES MED HISTORY:   Trulicity 0.75 mg started in 1/2021, stopped very soon afterward d/t nausea and headache.   Metformin ER started at initial visit 9/2022  Ozempic started 6/2023, switched to Mounajro in Sept 2023    PRESCRIBED DIABETES MEDICATIONS:   Glipizide XL 5 mg once daily   Jardiance 25 mg once daily   Metformin ER 1000 mg bid   Mounjaro 10 mg weekly Mondays    Misses medication doses - as above.       SELF MONITORING BLOOD GLUCOSE: has previously used both dalton and dexcom.   Tests BG with fingersticks currently  2x/day.     FBGs   After meals 140s; as high as 190s around holidays.       HYPOGLYCEMIC EPISODES: denies      CURRENT DIET: drinks water.    eats 2 meals/day, no lunch.   Snacks - infrequent, veggies and dip     CURRENT EXERCISE:       /76 (BP Location: Right arm, Patient Position: Sitting)   Pulse 90   Temp 97.3 °F (36.3 °C)   Wt 129.7 kg (286 lb)   LMP 06/20/2019   BMI 49.09 kg/m²     ROS:   CONSTITUTIONAL: Appetite good, +  fatigue  Denies n/v, constipation, or diarrhea.       PHYSICAL EXAM:  GENERAL: Well developed, well nourished. No acute distress.   PSYCH: AAOx3, appropriate mood and affect, conversant, well-groomed. Judgement and insight good.   NEURO: Cranial nerves grossly intact. Speech clear, no tremor.   CHEST: Respirations even and unlabored.  SKIN: + AN      Hemoglobin A1C   Date Value Ref Range Status   08/23/2024 5.8 (H) 4.0 - 5.6 % Final     Comment:     ADA Screening Guidelines:  5.7-6.4%  Consistent with prediabetes  >or=6.5%  Consistent with diabetes    High levels of fetal hemoglobin interfere with the HbA1C  assay. Heterozygous hemoglobin variants (HbS, HgC, etc)do  not significantly interfere with this assay.   However, presence of multiple variants may affect accuracy.     03/19/2024 6.1 (H) 4.0 - 5.6 % Final     Comment:     ADA Screening Guidelines:  5.7-6.4%  Consistent with prediabetes  >or=6.5%  Consistent with diabetes    High levels of fetal hemoglobin interfere with the HbA1C  assay. Heterozygous hemoglobin variants (HbS, HgC, etc)do  not significantly interfere with this assay.   However, presence of multiple variants may affect accuracy.     12/13/2023 6.2 (H) 4.0 - 5.6 % Final     Comment:     ADA Screening Guidelines:  5.7-6.4%  Consistent with prediabetes  >or=6.5%  Consistent with diabetes    High levels of fetal hemoglobin interfere with the HbA1C  assay. Heterozygous hemoglobin variants (HbS, HgC, etc)do  not significantly interfere with this assay.  "  However, presence of multiple variants may affect accuracy.         No results found for: "CPEPTIDE", "GLUTAMICACID", "ISLETCELLANT", "FRUCTOSAMINE"     Lab Results   Component Value Date    CHOL 116 (L) 12/13/2023    CHOL 113 (L) 12/19/2022    CHOL 111 (L) 12/06/2022     Lab Results   Component Value Date    HDL 43 12/13/2023    HDL 35 (L) 12/19/2022    HDL 36 (L) 12/06/2022     Lab Results   Component Value Date    LDLCALC 53.8 (L) 12/13/2023    LDLCALC 57.2 (L) 12/19/2022    LDLCALC 58.2 (L) 12/06/2022     Lab Results   Component Value Date    TRIG 96 12/13/2023    TRIG 104 12/19/2022    TRIG 84 12/06/2022     Lab Results   Component Value Date    CHOLHDL 37.1 12/13/2023    CHOLHDL 31.0 12/19/2022    CHOLHDL 32.4 12/06/2022         Component Value Date/Time     01/13/2025 1500    K 3.3 (L) 01/13/2025 1500     01/13/2025 1500    CO2 22 (L) 01/13/2025 1500    BUN 14 01/13/2025 1500    CREATININE 0.9 01/13/2025 1500     01/13/2025 1500    CALCIUM 10.5 01/13/2025 1500    ALKPHOS 117 01/13/2025 1500    AST 17 01/13/2025 1500    ALT 22 01/13/2025 1500    BILITOT 0.3 01/13/2025 1500    EGFRNORACEVR >60.0 01/13/2025 1500    ESTGFRAFRICA >60.0 07/22/2022 1012         Lab Results   Component Value Date    LABMICR 25.0 12/13/2023    CREATRANDUR 219.5 07/17/2024    MICALBCREAT 36.2 (H) 12/13/2023             ASSESSMENT and PLAN:    A1C GOAL: < 7 %     1. Type 2 diabetes mellitus with microalbuminuria, without long-term current use of insulin  A1c today.     Reported glucoses continue to be at goal.     No glipizide.   Take Jardiance once daily to help with glucose control and kidney benefit.   Continue metformin and Mounjaro 10 mg. Will not increase dose at this time since pt needs to work on increasing protein intake.     If A1c remains at goal, will transfer back to [facility]. Schedule appt with PCP, last visit was a year ago.     POCT Glucose, Hand-Held Device    tirzepatide 10 mg/0.5 mL PnIj    " empagliflozin (JARDIANCE) 25 mg tablet    metFORMIN (GLUCOPHAGE-XR) 500 MG ER 24hr tablet    Hemoglobin A1C      2. Morbid obesity, unspecified obesity type  tirzepatide 10 mg/0.5 mL PnIj            Orders Placed This Encounter   Procedures    Hemoglobin A1C     Standing Status:   Future     Standing Expiration Date:   3/17/2026     Order Specific Question:   Send normal result to authorizing provider's In Basket if patient is active on MyChart:     Answer:   Yes    POCT Glucose, Hand-Held Device        No follow-ups on file.

## 2025-01-16 NOTE — PATIENT INSTRUCTIONS
A1c today.     Reported glucoses continue to be at goal.     No glipizide.   Take Jardiance once daily to help with glucose control and kidney benefit.   Continue metformin and Mounjaro 10 mg. Will not increase dose at this time since pt needs to work on increasing protein intake.     If A1c remains at goal, will transfer back to Primary Care. Schedule appt with PCP, last visit was a year ago.

## 2025-01-17 ENCOUNTER — PATIENT MESSAGE (OUTPATIENT)
Dept: ENDOCRINOLOGY | Facility: CLINIC | Age: 50
End: 2025-01-17
Payer: MEDICARE

## 2025-01-17 LAB
ESTIMATED AVG GLUCOSE: 126 MG/DL (ref 68–131)
HBA1C MFR BLD: 6 % (ref 4–5.6)

## 2025-01-24 ENCOUNTER — TELEPHONE (OUTPATIENT)
Dept: PALLIATIVE MEDICINE | Facility: CLINIC | Age: 50
End: 2025-01-24
Payer: MEDICARE

## 2025-01-27 NOTE — PROGRESS NOTES
Subjective     Patient ID: Kristopher Elmore is a 49 y.o. female.    Chief Complaint: Malignant neoplasm of upper-inner quadrant of right breast     HPI    Presents for follow up of metastatic breast cancer.  She had radiation done to her knee prior to Christmas, notes that it is better. She is walking with a cane, no longer wheelchair bound.   She was able to drive herself today.     Overall feeling well. She is eating and drinking well.  She is following with her endocrinologist, hemoglobin A1C was 6. Will follow with PCP going forward. She is still taking her monjuaro shot.  She has lost over 100 lbs since starting monjuaro.     She is going on a cruise on 2/27, she is going to [place].     Starts Ibrance today.     Per Dr. Lei's previous note: - 12/30/2024 PET scan restaging: We have previously reviewed via phone  FINDINGS:  Quality of the study: Adequate.  In the head and neck, there are no hypermetabolic lesions worrisome for malignancy.  In the chest, there are a few mildly hypermetabolic right axillary nodes.  Dominant node measures 2.0 cm short axis with SUV max 4.3 (axial image 73).  Three additional nodes (axial images 57, 60, and 70) are nonenlarged and faintly tracer-avid, for example a right subpectoral node measuring 7 mm with SUV max 2.6 (axial image 57).  There are no concerning pulmonary nodules or masses.  In the abdomen and pelvis, there is physiologic tracer distribution within the abdominal organs and excretion into the genitourinary system.  Mild focal uptake at the anorectal junction, likely also physiologic.  In the bones, there is partially visualized hypermetabolic lucent lesion of the right knee/distal femur.  Numerous non-tracer-avid sclerotic osseous lesions, compatible with treated metastases.  Additional CT findings: Partially visualized ventriculoperitoneal shunt catheter tract.  Left chest wall implantable cardioverter-defibrillator.  Calcified uterine  fibroid.  Impression:  Mildly hypermetabolic right axillary adenopathy, which is new from prior FDG PET-CT in 2021.  Hypermetabolic lucent lesion of the right distal femur, incompletely visualized with known metastasis for XRT     In the interval- she did have increased anxiety and depression noted  This worsened even after review of PET scan results and she worried if after 5 years she had reached a state where Hospice was appropriate  The pain in the right femur also contributed  On review it turns also out that she had run out of her Effexor and as such several doses missed (daughter mentioned today)    Pain is better today- notes pain medications did not really help  Radiation has helped  Can restart Xgeva- received dental clearance    No new pain  Weight down- now stabilized  Factors included prior jaw osteonecrosis and prior pain; also had made changes    Noted to have bilateral conjunctivitis- was taking OTC and had not reported  + drainage     Also, note  Relatively new diagnosis of NYHA Class II HF sx, on GDMT, recent episode of syncope.   - 8/26/2024 ICD placed    Moved into apartment while awaiting home repairs.     Review of Systems   Constitutional:  Positive for fatigue. Negative for activity change, appetite change, chills, fever and unexpected weight change.   HENT:  Negative for dental problem (improved), mouth sores, rhinorrhea and trouble swallowing.    Eyes:  Negative for discharge and visual disturbance.   Respiratory:  Negative for cough, shortness of breath and wheezing.    Cardiovascular:  Negative for chest pain, palpitations and leg swelling.   Gastrointestinal:  Negative for abdominal distention, abdominal pain, blood in stool, change in bowel habit, constipation, diarrhea, nausea, vomiting and reflux.   Genitourinary:  Negative for difficulty urinating, dysuria, frequency and urgency.   Musculoskeletal:  Positive for arthralgias, back pain (chronic), gait problem (ambulates with cane) and  joint deformity. Negative for joint swelling and myalgias.   Integumentary:  Negative for rash.   Neurological:  Negative for dizziness, weakness, light-headedness, numbness, headaches and coordination difficulties.   Hematological:  Negative for adenopathy. Does not bruise/bleed easily.   Psychiatric/Behavioral:  Negative for dysphoric mood. The patient is nervous/anxious.           Objective     Physical Exam  Vitals and nursing note reviewed.   Constitutional:       General: She is not in acute distress.     Appearance: Normal appearance. She is well-developed.      Comments: Presents alone  Ambulates with cane  Drove herself to clinic   HENT:      Head: Normocephalic.   Eyes:      Pupils: Pupils are equal, round, and reactive to light.   Cardiovascular:      Rate and Rhythm: Normal rate and regular rhythm.      Heart sounds: Normal heart sounds.   Pulmonary:      Effort: Pulmonary effort is normal.      Breath sounds: Normal breath sounds.   Abdominal:      General: Bowel sounds are normal. There is no distension.      Palpations: Abdomen is soft.      Tenderness: There is no abdominal tenderness.   Musculoskeletal:      Cervical back: Normal range of motion.   Lymphadenopathy:      Cervical: No cervical adenopathy.      Upper Body:      Right upper body: No supraclavicular adenopathy.      Left upper body: No supraclavicular adenopathy.   Skin:     General: Skin is warm and dry.      Findings: No rash.   Neurological:      Mental Status: She is alert and oriented to person, place, and time.   Psychiatric:         Behavior: Behavior normal.     Labs- reviewed  Imaging - reviewed       Assessment and Plan     1. Malignant neoplasm of upper-inner quadrant of right breast in female, estrogen receptor positive    2. Bone metastases  Overview:  IMO Regualtory Update 4/1/23      3. Anemia associated with chemotherapy    4. Cancer associated pain    5. Other iron deficiency anemia    6. Benign essential HTN    7.  Hyperlipidemia, unspecified hyperlipidemia type    8. Vitamin D deficiency    9. Type 2 diabetes mellitus without complication, with long-term current use of insulin  Overview:  A1c 8.5 11/3/2021. Cont Glipizide 10 mg BID. Sending rx for Trulicity to [facility] with pharmacy assistance referral. Podiatry referral per pt request      10. Morbid obesity, unspecified obesity type        Depression and Anxiety  Medications refilled  Has follow up    Neoplasm related pain  Improved post XRT  Monitor closely as weight bearing bone     Metastatic breast cancer  We reviewed updated scan results  Continue Arimidex and Ibrance - starts next cycle of Ibrance today  Remains on Zoladex     Xgeva- held with osteonecrosis- now cleared by oral surgeon- will wait for letter and restart if benefit > risk  - will message Dr. Layton Garza at [facility] to get clearance     CHF  Sees Cardio Onc  ICD in place        code applied: patient requires or will require a continuous, longitudinal, and active collaborative plan of care related to this patient's health condition, breast cancer --the management of which requires the direction of a practitioner with specialized clinical knowledge, skill, and expertise.     Return to clinic in 4 weeks with CAMILA appointment and labs.     Patient is in agreement with the proposed treatment plan. All questions were answered to the patient's satisfaction. Patient knows to call clinic for any new or worsening symptoms and if anything is needed before the next clinic visit.          Dawna Akers, FNP-C  Hematology & Medical Oncology   [facility] [place] [address]  ph. [phone]  Fax. [phone]    Collaborating physician, Dr. Lei.    Approximately 20 minutes were spent face-to-face with the patient.  Approximately 25 minutes in total were spent on this encounter, which includes face-to-face time and non-face-to-face time preparing to see the patient (e.g.,  review of tests), obtaining and/or reviewing separately obtained history, documenting clinical information in the electronic or other health record, independently interpreting results (not separately reported) and communicating results to the patient/family/caregiver, or care coordination (not separately reported).       Route Chart for Scheduling    Med Onc Chart Routing      Follow up with physician 4 weeks. already scheduled   Follow up with CAMILA    Infusion scheduling note    Injection scheduling note    Labs    Imaging    Pharmacy appointment    Other referrals                Treatment Plan Information   OP ANASTROZOLE PALBOCICLIB Q4W Phyllis Lei MD   Associated diagnosis: Malignant neoplasm of upper-inner quadrant of right breast in female, estrogen receptor positive Stage IV cT1c(2), cN1(f), cM1, G3, ER+, IL-, HER2- noted on 10/7/2019   Line of treatment: First Line  Treatment Goal: Palliative     Upcoming Treatment Dates - OP ANASTROZOLE PALBOCICLIB Q4W    No upcoming days in selected categories.    Supportive Plan Information  OP BREAST GOSERELIN Q4W Phyllis Lei MD   Associated Diagnosis: Malignant neoplasm of upper-inner quadrant of right breast in female, estrogen receptor positive Stage IV cT1c(2), cN1(f), cM1, G3, ER+, IL-, HER2- noted on 10/7/2019   Line of treatment: Supportive Care   Treatment goal: Supportive     Upcoming Treatment Dates - OP BREAST GOSERELIN Q4W    2/6/2025       Chemotherapy       goserelin (ZOLADEX) injection 3.6 mg  3/6/2025       Chemotherapy       goserelin (ZOLADEX) injection 3.6 mg  4/3/2025       Chemotherapy       goserelin (ZOLADEX) injection 3.6 mg  5/1/2025       Chemotherapy       goserelin (ZOLADEX) injection 3.6 mg

## 2025-02-05 RX ORDER — LANOLIN ALCOHOL/MO/W.PET/CERES
CREAM (GRAM) TOPICAL
Qty: 90 TABLET | Refills: 1 | Status: SHIPPED | OUTPATIENT
Start: 2025-02-05

## 2025-02-06 ENCOUNTER — OFFICE VISIT (OUTPATIENT)
Dept: RADIATION ONCOLOGY | Facility: CLINIC | Age: 50
End: 2025-02-06
Payer: MEDICARE

## 2025-02-06 VITALS
OXYGEN SATURATION: 98 % | WEIGHT: 289.25 LBS | BODY MASS INDEX: 49.38 KG/M2 | DIASTOLIC BLOOD PRESSURE: 85 MMHG | HEART RATE: 75 BPM | SYSTOLIC BLOOD PRESSURE: 123 MMHG | HEIGHT: 64 IN

## 2025-02-06 DIAGNOSIS — Z17.0 MALIGNANT NEOPLASM OF UPPER-INNER QUADRANT OF RIGHT BREAST IN FEMALE, ESTROGEN RECEPTOR POSITIVE: Primary | ICD-10-CM

## 2025-02-06 DIAGNOSIS — C50.211 MALIGNANT NEOPLASM OF UPPER-INNER QUADRANT OF RIGHT BREAST IN FEMALE, ESTROGEN RECEPTOR POSITIVE: Primary | ICD-10-CM

## 2025-02-06 PROCEDURE — 4010F ACE/ARB THERAPY RXD/TAKEN: CPT | Mod: CPTII,S$GLB,, | Performed by: RADIOLOGY

## 2025-02-06 PROCEDURE — 3044F HG A1C LEVEL LT 7.0%: CPT | Mod: CPTII,S$GLB,, | Performed by: RADIOLOGY

## 2025-02-06 PROCEDURE — 99024 POSTOP FOLLOW-UP VISIT: CPT | Mod: S$GLB,,, | Performed by: RADIOLOGY

## 2025-02-06 NOTE — PROGRESS NOTES
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  She has been referred for consideration of palliative radiation to the right distal femur.     Patient reports progressive pain in the right distal thigh.  She underwent X ray of the kneee 24 which revealed a 1.5 cm lytic lesion at the medial condyle.     Of note, the patient has a prior history of palliative radiation to the lumbar spine and hip.  Oncology History   Malignant neoplasm of upper-inner quadrant of right breast in female, estrogen receptor positive   10/7/2019 Initial Diagnosis    Malignant neoplasm of upper-inner quadrant of right breast in female, estrogen receptor positive     10/14/2019 - 10/14/2019 Chemotherapy    Treatment Summary   Plan Name: OP BREAST TC - DOCETAXEL CYCLOPHOSPHAMIDE Q3W  Treatment Goal: Curative  Status: Inactive  Start Date: [No treatment day found]  End Date: [No treatment day found]  Provider: Jessica Mendez MD  Chemotherapy: cyclophosphamide (CYTOXAN) 600 mg/m2 = 1,645 mg in sodium chloride 0.9% 250 mL chemo infusion, 600 mg/m2, Intravenous, Clinic/HOD 1 time, 0 of 4 cycles  DOCEtaxel (TAXOTERE) 75 mg/m2 = 205 mg in sodium chloride 0.9% 250 mL chemo infusion, 75 mg/m2, Intravenous, Clinic/HOD 1 time, 0 of 4 cycles     10/16/2019 Cancer Staged    Staging form: Breast, AJCC 8th Edition  - Clinical stage from 10/16/2019: Stage IV (cT1c(2), cN1(f), cM1, G3, ER+, PA-, HER2-)     10/16/2019 -  Chemotherapy    Treatment Summary   Plan  Name: OP ANASTROZOLE PALBOCICLIB Q4W  Treatment Goal: Palliative  Status: Active  Start Date: 10/16/2019  End Date: 10/16/2019  Provider: Phyllis Lei MD  Chemotherapy: anastrozole (ARIMIDEX) 1 mg Tab, 1 mg, Oral, Daily, 1 of 1 cycle, Start date: 7/1/2024, End date: --  palbociclib (IBRANCE) 125 mg Cap, 125 mg, Oral, See admin instructions, 1 of 1 cycle, Start date: 11/29/2021, End date: 4/18/2022 9/1/2020 - 9/1/2020 Radiation Therapy    Treating physician: Dr. Jair Alarcon  Total Dose: 8 Gy  Fractions: 1     10/27/2021 - 11/3/2021 Radiation Therapy    Treating physician: Dr. Jair Alarcon  Total Dose: 20 Gy  Fractions: 5 to the lumbar spine and right ilium     Bone metastases   11/12/2019 Initial Diagnosis    Bone metastases     12/16/2024 - 12/20/2024 Radiation Therapy    Treating physician: Dr. Nina Marin  Total Dose: 20 Gy  Fractions: 5         RADIATION:      HISTORY OF PRESENT ILLNESS: Ms. Elmore returns to clinic today for routine post-radiation follow-up.  Patient reports pain has improved in the distal right femur since completing radiation.  She reports her pain at this site as 4/10.  Pt takes Motrin for pain control.  She is considering undergoing a nerve block.    REVIEW OF SYSTEMS:   Review of Systems   HENT:  Positive for tinnitus.    Eyes:  Positive for blurred vision.   Cardiovascular:  Positive for leg swelling.   Psychiatric/Behavioral:  Positive for depression.          ALLERGIES:   Review of patient's allergies indicates:   Allergen Reactions    Keflex [cephalexin] Itching       MEDICATIONS:  Current Outpatient Medications   Medication Sig    ammonium lactate 12 % Crea Apply 1 application  topically once daily.    anastrozole (ARIMIDEX) 1 mg Tab TAKE 1 TABLET(1 MG) BY MOUTH DAILY    atorvastatin (LIPITOR) 40 MG tablet TAKE 1 TABLET(40 MG) BY MOUTH EVERY EVENING    blood pressure kit-extra large Kit Check blood pressure once daily at the same time    blood sugar diagnostic Strp To  check BG 2 times daily, to use with insurance preferred meter    blood-glucose meter kit To check BG 2 times daily, to use with insurance preferred meter    carvediloL (COREG) 12.5 MG tablet TAKE 1 TABLET(12.5 MG) BY MOUTH TWICE DAILY    ciclopirox (PENLAC) 8 % Soln Apply topically nightly.    ciprofloxacin HCl (CILOXAN) 0.3 % Oint Place into both eyes 3 (three) times daily.    empagliflozin (JARDIANCE) 25 mg tablet Take 1 tablet (25 mg total) by mouth once daily.    ergocalciferol (ERGOCALCIFEROL) 50,000 unit Cap Take 1 capsule (50,000 Units total) by mouth every 7 days.    furosemide (LASIX) 20 MG tablet Take 1 tablet (20 mg total) by mouth 2 (two) times daily. (Patient taking differently: Take 20 mg by mouth 2 (two) times daily. Pt takes if she has swelling)    goserelin (ZOLADEX) 3.6 mg injection Inject 3.6 mg into the skin every 28 days.    ibuprofen (ADVIL,MOTRIN) 800 MG tablet Take 1 tablet (800 mg total) by mouth 2 (two) times daily as needed for Pain.    lancets Misc To check BG 2 times daily, to use with insurance preferred meter    LIDOcaine (LIDODERM) 5 % Place 2 patches onto the skin once daily. Remove & Discard patch within 12 hours or as directed by MD    LORazepam (ATIVAN) 1 MG tablet Take 1 tablet (1 mg total) by mouth every 8 (eight) hours as needed for Anxiety.    magnesium oxide (MAG-OX) 400 mg (241.3 mg magnesium) tablet TAKE 1 TABLET(400 MG) BY MOUTH DAILY    metFORMIN (GLUCOPHAGE-XR) 500 MG ER 24hr tablet Take 2 tablets (1,000 mg total) by mouth 2 (two) times daily with meals.    morphine (MSIR) 15 MG tablet Take 1 tablet (15 mg total) by mouth every 4 (four) hours as needed for Pain.    naloxone (NARCAN) 4 mg/actuation Spry 4mg by nasal route as needed for opioid overdose; may repeat every 2-3 minutes in alternating nostrils until medical help arrives. Call 911    ondansetron (ZOFRAN-ODT) 8 MG TbDL Take 1 tablet (8 mg total) by mouth every 8 (eight) hours as needed (nausea and vomiting).     oxyCODONE (ROXICODONE) 5 MG immediate release tablet Take 1 tablet (5 mg total) by mouth every 4 (four) hours as needed for Pain.    palbociclib (IBRANCE) 100 mg Cap Take 1 capsule (100 mg) by mouth Monday - Friday, DO NOT TAKE Saturday and , for 3 weeks on, 1 week off.    potassium chloride (KLOR-CON) 10 MEQ TbSR TAKE 1 TABLET(10 MEQ) BY MOUTH EVERY NIGHT    sacubitriL-valsartan (ENTRESTO)  mg per tablet Take 1 tablet by mouth 2 (two) times daily.    sennosides (SENNA-C ORAL) Take 2 tablets by mouth daily as needed.    spironolactone (ALDACTONE) 25 MG tablet TAKE 1 TABLET(25 MG) BY MOUTH EVERY DAY    tirzepatide 10 mg/0.5 mL PnIj Inject 10 mg into the skin every 7 days.    tiZANidine (ZANAFLEX) 4 MG tablet Take 1 tablet (4 mg total) by mouth every 8 (eight) hours.    glipiZIDE 5 MG TR24 Take 1 tablet (5 mg total) by mouth daily with breakfast.    UNKNOWN TO PATIENT Calcium    venlafaxine (EFFEXOR) 75 MG tablet Take 1 tablet (75 mg total) by mouth once daily.    venlafaxine (EFFEXOR-XR) 150 MG Cp24 Take 1 capsule (150 mg total) by mouth once daily.    vitamin E 100 UNIT capsule Take 100 Units by mouth once daily.     No current facility-administered medications for this visit.     Facility-Administered Medications Ordered in Other Visits   Medication    0.9%  NaCl infusion    mupirocin 2 % ointment           PHYSICAL EXAMINATION:  There were no vitals filed for this visit.  ECO  There is no height or weight on file to calculate BMI.   Physical Exam  Constitutional:       Appearance: She is well-developed.   HENT:      Head: Normocephalic and atraumatic.   Eyes:      Conjunctiva/sclera: Conjunctivae normal.   Cardiovascular:      Rate and Rhythm: Normal rate.   Pulmonary:      Effort: Pulmonary effort is normal.   Abdominal:      Palpations: Abdomen is soft.   Musculoskeletal:         General: Normal range of motion.      Cervical back: Normal range of motion and neck supple.   Skin:     General:  Skin is warm and dry.   Neurological:      Mental Status: She is alert and oriented to person, place, and time.   Psychiatric:         Behavior: Behavior normal.         Thought Content: Thought content normal.          ASSESSMENT/PLAN:  Diagnoses and all orders for this visit:    Malignant neoplasm of upper-inner quadrant of right breast in female, estrogen receptor positive    Pain improved.  RTC as needed in the future.

## 2025-02-10 ENCOUNTER — INFUSION (OUTPATIENT)
Dept: INFUSION THERAPY | Facility: HOSPITAL | Age: 50
End: 2025-02-10
Attending: STUDENT IN AN ORGANIZED HEALTH CARE EDUCATION/TRAINING PROGRAM
Payer: MEDICARE

## 2025-02-10 ENCOUNTER — OFFICE VISIT (OUTPATIENT)
Dept: HEMATOLOGY/ONCOLOGY | Facility: CLINIC | Age: 50
End: 2025-02-10
Payer: MEDICARE

## 2025-02-10 ENCOUNTER — LAB VISIT (OUTPATIENT)
Dept: LAB | Facility: HOSPITAL | Age: 50
End: 2025-02-10
Attending: INTERNAL MEDICINE
Payer: MEDICARE

## 2025-02-10 VITALS
TEMPERATURE: 98 F | HEIGHT: 64 IN | HEART RATE: 94 BPM | WEIGHT: 287.5 LBS | OXYGEN SATURATION: 96 % | SYSTOLIC BLOOD PRESSURE: 135 MMHG | RESPIRATION RATE: 18 BRPM | DIASTOLIC BLOOD PRESSURE: 78 MMHG | BODY MASS INDEX: 49.08 KG/M2

## 2025-02-10 DIAGNOSIS — D50.8 OTHER IRON DEFICIENCY ANEMIA: ICD-10-CM

## 2025-02-10 DIAGNOSIS — D64.81 ANEMIA ASSOCIATED WITH CHEMOTHERAPY: ICD-10-CM

## 2025-02-10 DIAGNOSIS — G89.3 CANCER ASSOCIATED PAIN: ICD-10-CM

## 2025-02-10 DIAGNOSIS — Z17.0 MALIGNANT NEOPLASM OF UPPER-INNER QUADRANT OF RIGHT BREAST IN FEMALE, ESTROGEN RECEPTOR POSITIVE: Primary | ICD-10-CM

## 2025-02-10 DIAGNOSIS — E55.9 VITAMIN D DEFICIENCY: ICD-10-CM

## 2025-02-10 DIAGNOSIS — C50.211 MALIGNANT NEOPLASM OF UPPER-INNER QUADRANT OF RIGHT BREAST IN FEMALE, ESTROGEN RECEPTOR POSITIVE: Primary | ICD-10-CM

## 2025-02-10 DIAGNOSIS — C79.51 MALIGNANT NEOPLASM METASTATIC TO BONE: ICD-10-CM

## 2025-02-10 DIAGNOSIS — I10 BENIGN ESSENTIAL HTN: ICD-10-CM

## 2025-02-10 DIAGNOSIS — E78.5 HYPERLIPIDEMIA, UNSPECIFIED HYPERLIPIDEMIA TYPE: ICD-10-CM

## 2025-02-10 DIAGNOSIS — T45.1X5A ANEMIA ASSOCIATED WITH CHEMOTHERAPY: ICD-10-CM

## 2025-02-10 DIAGNOSIS — E66.01 MORBID OBESITY, UNSPECIFIED OBESITY TYPE: ICD-10-CM

## 2025-02-10 DIAGNOSIS — E11.9 TYPE 2 DIABETES MELLITUS WITHOUT COMPLICATION, WITH LONG-TERM CURRENT USE OF INSULIN: ICD-10-CM

## 2025-02-10 DIAGNOSIS — Z79.4 TYPE 2 DIABETES MELLITUS WITHOUT COMPLICATION, WITH LONG-TERM CURRENT USE OF INSULIN: ICD-10-CM

## 2025-02-10 LAB
ALBUMIN SERPL BCP-MCNC: 3.1 G/DL (ref 3.5–5.2)
ALP SERPL-CCNC: 117 U/L (ref 40–150)
ALT SERPL W/O P-5'-P-CCNC: 15 U/L (ref 10–44)
ANION GAP SERPL CALC-SCNC: 11 MMOL/L (ref 8–16)
AST SERPL-CCNC: 12 U/L (ref 10–40)
BASOPHILS # BLD AUTO: 0.08 K/UL (ref 0–0.2)
BASOPHILS NFR BLD: 1.3 % (ref 0–1.9)
BILIRUB SERPL-MCNC: 0.3 MG/DL (ref 0.1–1)
BUN SERPL-MCNC: 11 MG/DL (ref 6–20)
CALCIUM SERPL-MCNC: 10 MG/DL (ref 8.7–10.5)
CHLORIDE SERPL-SCNC: 105 MMOL/L (ref 95–110)
CO2 SERPL-SCNC: 23 MMOL/L (ref 23–29)
CREAT SERPL-MCNC: 0.9 MG/DL (ref 0.5–1.4)
DIFFERENTIAL METHOD BLD: ABNORMAL
EOSINOPHIL # BLD AUTO: 0.1 K/UL (ref 0–0.5)
EOSINOPHIL NFR BLD: 1.3 % (ref 0–8)
ERYTHROCYTE [DISTWIDTH] IN BLOOD BY AUTOMATED COUNT: 17.8 % (ref 11.5–14.5)
EST. GFR  (NO RACE VARIABLE): >60 ML/MIN/1.73 M^2
GLUCOSE SERPL-MCNC: 117 MG/DL (ref 70–110)
HCT VFR BLD AUTO: 34.3 % (ref 37–48.5)
HGB BLD-MCNC: 10.4 G/DL (ref 12–16)
IMM GRANULOCYTES # BLD AUTO: 0.01 K/UL (ref 0–0.04)
IMM GRANULOCYTES NFR BLD AUTO: 0.2 % (ref 0–0.5)
LYMPHOCYTES # BLD AUTO: 1.3 K/UL (ref 1–4.8)
LYMPHOCYTES NFR BLD: 22.3 % (ref 18–48)
MCH RBC QN AUTO: 26.5 PG (ref 27–31)
MCHC RBC AUTO-ENTMCNC: 30.3 G/DL (ref 32–36)
MCV RBC AUTO: 88 FL (ref 82–98)
MONOCYTES # BLD AUTO: 0.5 K/UL (ref 0.3–1)
MONOCYTES NFR BLD: 8.9 % (ref 4–15)
NEUTROPHILS # BLD AUTO: 3.9 K/UL (ref 1.8–7.7)
NEUTROPHILS NFR BLD: 66 % (ref 38–73)
NRBC BLD-RTO: 0 /100 WBC
PLATELET # BLD AUTO: 310 K/UL (ref 150–450)
PMV BLD AUTO: 10.6 FL (ref 9.2–12.9)
POTASSIUM SERPL-SCNC: 4.2 MMOL/L (ref 3.5–5.1)
PROT SERPL-MCNC: 8.2 G/DL (ref 6–8.4)
RBC # BLD AUTO: 3.92 M/UL (ref 4–5.4)
SODIUM SERPL-SCNC: 139 MMOL/L (ref 136–145)
WBC # BLD AUTO: 5.97 K/UL (ref 3.9–12.7)

## 2025-02-10 PROCEDURE — 3078F DIAST BP <80 MM HG: CPT | Mod: CPTII,S$GLB,, | Performed by: NURSE PRACTITIONER

## 2025-02-10 PROCEDURE — 3008F BODY MASS INDEX DOCD: CPT | Mod: CPTII,S$GLB,, | Performed by: NURSE PRACTITIONER

## 2025-02-10 PROCEDURE — 80053 COMPREHEN METABOLIC PANEL: CPT | Performed by: INTERNAL MEDICINE

## 2025-02-10 PROCEDURE — 99999 PR PBB SHADOW E&M-EST. PATIENT-LVL V: CPT | Mod: PBBFAC,,, | Performed by: NURSE PRACTITIONER

## 2025-02-10 PROCEDURE — 99215 OFFICE O/P EST HI 40 MIN: CPT | Mod: S$GLB,,, | Performed by: NURSE PRACTITIONER

## 2025-02-10 PROCEDURE — 1159F MED LIST DOCD IN RCRD: CPT | Mod: CPTII,S$GLB,, | Performed by: NURSE PRACTITIONER

## 2025-02-10 PROCEDURE — 1160F RVW MEDS BY RX/DR IN RCRD: CPT | Mod: CPTII,S$GLB,, | Performed by: NURSE PRACTITIONER

## 2025-02-10 PROCEDURE — 96402 CHEMO HORMON ANTINEOPL SQ/IM: CPT

## 2025-02-10 PROCEDURE — 36415 COLL VENOUS BLD VENIPUNCTURE: CPT | Performed by: INTERNAL MEDICINE

## 2025-02-10 PROCEDURE — 3075F SYST BP GE 130 - 139MM HG: CPT | Mod: CPTII,S$GLB,, | Performed by: NURSE PRACTITIONER

## 2025-02-10 PROCEDURE — 85025 COMPLETE CBC W/AUTO DIFF WBC: CPT | Performed by: INTERNAL MEDICINE

## 2025-02-10 PROCEDURE — 63600175 PHARM REV CODE 636 W HCPCS: Mod: JZ,TB | Performed by: NURSE PRACTITIONER

## 2025-02-10 PROCEDURE — 4010F ACE/ARB THERAPY RXD/TAKEN: CPT | Mod: CPTII,S$GLB,, | Performed by: NURSE PRACTITIONER

## 2025-02-10 PROCEDURE — G2211 COMPLEX E/M VISIT ADD ON: HCPCS | Mod: S$GLB,,, | Performed by: NURSE PRACTITIONER

## 2025-02-10 PROCEDURE — 3044F HG A1C LEVEL LT 7.0%: CPT | Mod: CPTII,S$GLB,, | Performed by: NURSE PRACTITIONER

## 2025-02-10 RX ADMIN — GOSERELIN ACETATE 3.6 MG: 3.6 IMPLANT SUBCUTANEOUS at 09:02

## 2025-02-14 DIAGNOSIS — F41.8 DEPRESSION WITH ANXIETY: ICD-10-CM

## 2025-02-17 RX ORDER — VENLAFAXINE 75 MG/1
75 TABLET ORAL DAILY
Qty: 30 TABLET | Refills: 0 | Status: SHIPPED | OUTPATIENT
Start: 2025-02-17 | End: 2025-02-18 | Stop reason: SDUPTHER

## 2025-02-20 RX ORDER — GLIPIZIDE 5 MG/1
5 TABLET, FILM COATED, EXTENDED RELEASE ORAL
Qty: 90 TABLET | Refills: 2 | Status: SHIPPED | OUTPATIENT
Start: 2025-02-20

## 2025-03-04 ENCOUNTER — PATIENT MESSAGE (OUTPATIENT)
Dept: ADMINISTRATIVE | Facility: OTHER | Age: 50
End: 2025-03-04
Payer: MEDICARE

## 2025-03-05 ENCOUNTER — PATIENT MESSAGE (OUTPATIENT)
Dept: HEMATOLOGY/ONCOLOGY | Facility: CLINIC | Age: 50
End: 2025-03-05
Payer: MEDICARE

## 2025-03-06 DIAGNOSIS — C50.211 MALIGNANT NEOPLASM OF UPPER-INNER QUADRANT OF RIGHT BREAST IN FEMALE, ESTROGEN RECEPTOR POSITIVE: ICD-10-CM

## 2025-03-06 DIAGNOSIS — Z17.0 MALIGNANT NEOPLASM OF UPPER-INNER QUADRANT OF RIGHT BREAST IN FEMALE, ESTROGEN RECEPTOR POSITIVE: ICD-10-CM

## 2025-03-07 DIAGNOSIS — E87.6 HYPOKALEMIA: ICD-10-CM

## 2025-03-07 RX ORDER — ANASTROZOLE 1 MG/1
TABLET ORAL
Qty: 90 TABLET | Refills: 0 | Status: SHIPPED | OUTPATIENT
Start: 2025-03-07

## 2025-03-10 RX ORDER — POTASSIUM CHLORIDE 750 MG/1
10 TABLET, EXTENDED RELEASE ORAL ONCE
Qty: 90 TABLET | Refills: 1 | Status: SHIPPED | OUTPATIENT
Start: 2025-03-10 | End: 2025-03-10

## 2025-03-10 NOTE — PROGRESS NOTES
Subjective     Patient ID: Kristopher Elmore is a 49 y.o. female.    Chief Complaint: Malignant neoplasm of upper-inner quadrant of right breast     HPI    Presents for follow up of metastatic breast cancer      Taking Trazodone at night now and helping with her sleep!  Goes to bed by 8:15 PM and better sleep-- aiming for 8 hours    Pain is much better- denies any leg pain  Ambulating well- now without any assist devices    Weight is stable- frustrated as slow for her  She is on Mounjaro    Most recent imaging:  - 12/30/2024 PET scan restaging: We have previously reviewed via phone  FINDINGS:  Quality of the study: Adequate.  In the head and neck, there are no hypermetabolic lesions worrisome for malignancy.  In the chest, there are a few mildly hypermetabolic right axillary nodes.  Dominant node measures 2.0 cm short axis with SUV max 4.3 (axial image 73).  Three additional nodes (axial images 57, 60, and 70) are nonenlarged and faintly tracer-avid, for example a right subpectoral node measuring 7 mm with SUV max 2.6 (axial image 57).  There are no concerning pulmonary nodules or masses.  In the abdomen and pelvis, there is physiologic tracer distribution within the abdominal organs and excretion into the genitourinary system.  Mild focal uptake at the anorectal junction, likely also physiologic.  In the bones, there is partially visualized hypermetabolic lucent lesion of the right knee/distal femur.  Numerous non-tracer-avid sclerotic osseous lesions, compatible with treated metastases.  Additional CT findings: Partially visualized ventriculoperitoneal shunt catheter tract.  Left chest wall implantable cardioverter-defibrillator.  Calcified uterine fibroid.  Impression:  Mildly hypermetabolic right axillary adenopathy, which is new from prior FDG PET-CT in 2021.  Hypermetabolic lucent lesion of the right distal femur, incompletely visualized with known metastasis for XRT     In the interval- she did have  increased anxiety and depression noted  This worsened even after review of PET scan results and she worried if after 5 years she had reached a state where Hospice was appropriate  The pain in the right femur also contributed  On review it turns also out that she had run out of her Effexor and as such several doses missed (daughter mentioned today)     Pain is better today- notes pain medications did not really help  Radiation has helped  Can restart Xgeva- received dental clearance     No new pain  Weight down- now stabilized  Factors included prior jaw osteonecrosis and prior pain; also had made changes     Noted to have bilateral conjunctivitis- was taking OTC and had not reported  + drainage     Also, note  Relatively new diagnosis of NYHA Class II HF sx, on GDMT, recent episode of syncope.   - 8/26/2024 ICD placed     Moved into apartment while awaiting home repairs    Review of Systems   Constitutional:  Positive for fatigue (chronic). Negative for activity change, appetite change, chills, fever and unexpected weight change.   HENT:  Positive for dental problem (better). Negative for mouth sores, rhinorrhea and trouble swallowing.    Eyes:  Negative for visual disturbance.   Respiratory:  Negative for cough, shortness of breath and wheezing.    Cardiovascular:  Negative for chest pain, palpitations and leg swelling.   Gastrointestinal:  Negative for abdominal distention, abdominal pain, blood in stool, change in bowel habit, constipation, diarrhea, nausea, vomiting and reflux.   Genitourinary:  Negative for difficulty urinating, dysuria, frequency and urgency.   Musculoskeletal:  Negative for arthralgias, back pain, gait problem, joint swelling, myalgias and joint deformity.   Integumentary:  Negative for rash.   Neurological:  Negative for dizziness, weakness, light-headedness, numbness, headaches and coordination difficulties.   Hematological:  Negative for adenopathy. Does not bruise/bleed easily.    Psychiatric/Behavioral:  Positive for sleep disturbance (getting better). Negative for dysphoric mood. The patient is not nervous/anxious.           Objective     Physical Exam  Vitals and nursing note reviewed.   Constitutional:       General: She is not in acute distress.     Appearance: Normal appearance. She is obese. She is not ill-appearing.      Comments: Presents alone  Very pleasant  ECOG= 0   HENT:      Head: Normocephalic and atraumatic.      Mouth/Throat:      Comments: Jaw exposure-- osteonecrosis  Eyes:      General: No scleral icterus.     Extraocular Movements: Extraocular movements intact.      Conjunctiva/sclera: Conjunctivae normal.      Pupils: Pupils are equal, round, and reactive to light.   Cardiovascular:      Rate and Rhythm: Normal rate and regular rhythm.      Heart sounds: Normal heart sounds. No murmur heard.     No friction rub. No gallop.   Pulmonary:      Effort: Pulmonary effort is normal. No respiratory distress.      Breath sounds: Normal breath sounds. No wheezing, rhonchi or rales.   Chest:      Chest wall: No tenderness.   Abdominal:      General: Abdomen is flat. Bowel sounds are normal. There is no distension.      Palpations: Abdomen is soft. There is no mass.      Tenderness: There is no abdominal tenderness. There is no guarding or rebound.      Comments: No palpable masses   Musculoskeletal:         General: No swelling or tenderness.      Cervical back: Normal range of motion and neck supple. No tenderness.      Right lower leg: No edema.      Left lower leg: No edema.      Comments: Point tenderness in cervical, thoracic and lumbar spine. Also with cva tenderness today   Lymphadenopathy:      Cervical: No cervical adenopathy.   Skin:     General: Skin is warm and dry.      Coloration: Skin is not jaundiced.      Findings: No bruising or rash.   Neurological:      General: No focal deficit present.      Mental Status: She is alert and oriented to person, place, and  time.      Sensory: No sensory deficit.      Motor: No weakness.      Gait: Gait normal.   Psychiatric:         Mood and Affect: Mood normal.         Behavior: Behavior normal.         Thought Content: Thought content normal.         Judgment: Judgment normal.   Labs- reviewed       Assessment and Plan     1. Malignant neoplasm of upper-inner quadrant of right breast in female, estrogen receptor positive    2. Bone metastases  Overview:  IMO Regualtory Update 4/1/23      3. 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 Sleep difficulties    5. Beta-thalassemia    6. Implantable cardioverter-defibrillator (ICD) in situ    Other orders  -     goserelin (ZOLADEX) injection 3.6 mg      Metastatic Breast Cancer  Doing well  Continue current therapy  Continue Arimidex and Ibrance  Remains on Zoladex     Xgeva- held with osteonecrosis- now cleared by oral surgeon- will wait for letter and restart if benefit > risk  No further pain post XRT    CHF  Sees Cardio Onc  ICD in place    Mild anemia  B thal  Stable    Route Chart for Scheduling    Med Onc Chart Routing      Follow up with physician . 8 weeks Zoladex; 8 weeks EP cbc, cmp   Follow up with [facility] . 4 weeks Zoladex; 4 weeks EP cbc, cmp   Infusion scheduling note    Injection scheduling note    Labs    Imaging    Pharmacy appointment    Other referrals            Treatment Plan Information   OP ANASTROZOLE PALBOCICLIB Q4W Phyllis Lei MD   Associated diagnosis: Malignant neoplasm of upper-inner quadrant of right breast in female, estrogen receptor positive Stage IV cT1c(2), cN1(f), cM1, G3, ER+, MS-, HER2- noted on 10/7/2019   Line of treatment: First Line  Treatment Goal: Palliative     Upcoming Treatment Dates - OP ANASTROZOLE PALBOCICLIB Q4W    No upcoming days in selected categories.    Supportive Plan Information  OP BREAST GOSERELIN Q4W Phyllis Lei MD   Associated Diagnosis: Malignant neoplasm of upper-inner quadrant of right breast in female, estrogen receptor  positive Stage IV cT1c(2), cN1(f), cM1, G3, ER+, MD-, HER2- noted on 10/7/2019   Line of treatment: Supportive Care   Treatment goal: Supportive     Upcoming Treatment Dates - OP BREAST GOSERELIN Q4W    4/3/2025       Chemotherapy       goserelin (ZOLADEX) injection 3.6 mg  5/1/2025       Chemotherapy       goserelin (ZOLADEX) injection 3.6 mg  5/29/2025       Chemotherapy       goserelin (ZOLADEX) injection 3.6 mg  6/26/2025       Chemotherapy       goserelin (ZOLADEX) injection 3.6 mg

## 2025-03-11 ENCOUNTER — OFFICE VISIT (OUTPATIENT)
Dept: HEMATOLOGY/ONCOLOGY | Facility: CLINIC | Age: 50
End: 2025-03-11
Payer: MEDICARE

## 2025-03-11 ENCOUNTER — LAB VISIT (OUTPATIENT)
Dept: LAB | Facility: HOSPITAL | Age: 50
End: 2025-03-11
Attending: INTERNAL MEDICINE
Payer: MEDICARE

## 2025-03-11 ENCOUNTER — INFUSION (OUTPATIENT)
Dept: INFUSION THERAPY | Facility: HOSPITAL | Age: 50
End: 2025-03-11
Attending: STUDENT IN AN ORGANIZED HEALTH CARE EDUCATION/TRAINING PROGRAM
Payer: MEDICARE

## 2025-03-11 VITALS
DIASTOLIC BLOOD PRESSURE: 80 MMHG | RESPIRATION RATE: 17 BRPM | BODY MASS INDEX: 48.86 KG/M2 | OXYGEN SATURATION: 100 % | HEART RATE: 89 BPM | TEMPERATURE: 98 F | SYSTOLIC BLOOD PRESSURE: 140 MMHG | HEIGHT: 64 IN | WEIGHT: 286.19 LBS

## 2025-03-11 DIAGNOSIS — C50.211 MALIGNANT NEOPLASM OF UPPER-INNER QUADRANT OF RIGHT BREAST IN FEMALE, ESTROGEN RECEPTOR POSITIVE: Primary | ICD-10-CM

## 2025-03-11 DIAGNOSIS — C79.51 MALIGNANT NEOPLASM METASTATIC TO BONE: ICD-10-CM

## 2025-03-11 DIAGNOSIS — G47.9 SLEEP DIFFICULTIES: ICD-10-CM

## 2025-03-11 DIAGNOSIS — Z95.810 IMPLANTABLE CARDIOVERTER-DEFIBRILLATOR (ICD) IN SITU: ICD-10-CM

## 2025-03-11 DIAGNOSIS — Z17.0 MALIGNANT NEOPLASM OF UPPER-INNER QUADRANT OF RIGHT BREAST IN FEMALE, ESTROGEN RECEPTOR POSITIVE: Primary | ICD-10-CM

## 2025-03-11 DIAGNOSIS — D64.81 ANEMIA ASSOCIATED WITH CHEMOTHERAPY: ICD-10-CM

## 2025-03-11 DIAGNOSIS — D56.1 BETA-THALASSEMIA: ICD-10-CM

## 2025-03-11 DIAGNOSIS — T45.1X5A ANEMIA ASSOCIATED WITH CHEMOTHERAPY: ICD-10-CM

## 2025-03-11 DIAGNOSIS — C50.211 MALIGNANT NEOPLASM OF UPPER-INNER QUADRANT OF RIGHT BREAST IN FEMALE, ESTROGEN RECEPTOR POSITIVE: ICD-10-CM

## 2025-03-11 DIAGNOSIS — Z17.0 MALIGNANT NEOPLASM OF UPPER-INNER QUADRANT OF RIGHT BREAST IN FEMALE, ESTROGEN RECEPTOR POSITIVE: ICD-10-CM

## 2025-03-11 DIAGNOSIS — M87.180 OSTEONECROSIS OF JAW DUE TO DRUG: ICD-10-CM

## 2025-03-11 LAB
ALBUMIN SERPL BCP-MCNC: 3 G/DL (ref 3.5–5.2)
ALP SERPL-CCNC: 112 U/L (ref 40–150)
ALT SERPL W/O P-5'-P-CCNC: 15 U/L (ref 10–44)
ANION GAP SERPL CALC-SCNC: 9 MMOL/L (ref 8–16)
AST SERPL-CCNC: 16 U/L (ref 10–40)
BASOPHILS # BLD AUTO: 0.1 K/UL (ref 0–0.2)
BASOPHILS NFR BLD: 1.6 % (ref 0–1.9)
BILIRUB SERPL-MCNC: 0.3 MG/DL (ref 0.1–1)
BUN SERPL-MCNC: 11 MG/DL (ref 6–20)
CALCIUM SERPL-MCNC: 9.7 MG/DL (ref 8.7–10.5)
CHLORIDE SERPL-SCNC: 106 MMOL/L (ref 95–110)
CO2 SERPL-SCNC: 24 MMOL/L (ref 23–29)
CREAT SERPL-MCNC: 0.9 MG/DL (ref 0.5–1.4)
DIFFERENTIAL METHOD BLD: ABNORMAL
EOSINOPHIL # BLD AUTO: 0.1 K/UL (ref 0–0.5)
EOSINOPHIL NFR BLD: 1 % (ref 0–8)
ERYTHROCYTE [DISTWIDTH] IN BLOOD BY AUTOMATED COUNT: 17.5 % (ref 11.5–14.5)
EST. GFR  (NO RACE VARIABLE): >60 ML/MIN/1.73 M^2
GLUCOSE SERPL-MCNC: 136 MG/DL (ref 70–110)
HCT VFR BLD AUTO: 33.5 % (ref 37–48.5)
HGB BLD-MCNC: 10.4 G/DL (ref 12–16)
IMM GRANULOCYTES # BLD AUTO: 0.02 K/UL (ref 0–0.04)
IMM GRANULOCYTES NFR BLD AUTO: 0.3 % (ref 0–0.5)
LYMPHOCYTES # BLD AUTO: 1.5 K/UL (ref 1–4.8)
LYMPHOCYTES NFR BLD: 23.3 % (ref 18–48)
MCH RBC QN AUTO: 26.9 PG (ref 27–31)
MCHC RBC AUTO-ENTMCNC: 31 G/DL (ref 32–36)
MCV RBC AUTO: 87 FL (ref 82–98)
MONOCYTES # BLD AUTO: 0.7 K/UL (ref 0.3–1)
MONOCYTES NFR BLD: 10.9 % (ref 4–15)
NEUTROPHILS # BLD AUTO: 4 K/UL (ref 1.8–7.7)
NEUTROPHILS NFR BLD: 62.9 % (ref 38–73)
NRBC BLD-RTO: 0 /100 WBC
PLATELET # BLD AUTO: 259 K/UL (ref 150–450)
PMV BLD AUTO: 9.7 FL (ref 9.2–12.9)
POTASSIUM SERPL-SCNC: 3.9 MMOL/L (ref 3.5–5.1)
PROT SERPL-MCNC: 7.6 G/DL (ref 6–8.4)
RBC # BLD AUTO: 3.86 M/UL (ref 4–5.4)
SODIUM SERPL-SCNC: 139 MMOL/L (ref 136–145)
WBC # BLD AUTO: 6.31 K/UL (ref 3.9–12.7)

## 2025-03-11 PROCEDURE — 3077F SYST BP >= 140 MM HG: CPT | Mod: CPTII,S$GLB,, | Performed by: INTERNAL MEDICINE

## 2025-03-11 PROCEDURE — 4010F ACE/ARB THERAPY RXD/TAKEN: CPT | Mod: CPTII,S$GLB,, | Performed by: INTERNAL MEDICINE

## 2025-03-11 PROCEDURE — 80053 COMPREHEN METABOLIC PANEL: CPT | Performed by: INTERNAL MEDICINE

## 2025-03-11 PROCEDURE — 99999 PR PBB SHADOW E&M-EST. PATIENT-LVL III: CPT | Mod: PBBFAC,,, | Performed by: INTERNAL MEDICINE

## 2025-03-11 PROCEDURE — G2211 COMPLEX E/M VISIT ADD ON: HCPCS | Mod: S$GLB,,, | Performed by: INTERNAL MEDICINE

## 2025-03-11 PROCEDURE — 63600175 PHARM REV CODE 636 W HCPCS: Mod: JZ,TB | Performed by: INTERNAL MEDICINE

## 2025-03-11 PROCEDURE — 36415 COLL VENOUS BLD VENIPUNCTURE: CPT | Performed by: INTERNAL MEDICINE

## 2025-03-11 PROCEDURE — 3079F DIAST BP 80-89 MM HG: CPT | Mod: CPTII,S$GLB,, | Performed by: INTERNAL MEDICINE

## 2025-03-11 PROCEDURE — 85025 COMPLETE CBC W/AUTO DIFF WBC: CPT | Performed by: INTERNAL MEDICINE

## 2025-03-11 PROCEDURE — 96402 CHEMO HORMON ANTINEOPL SQ/IM: CPT

## 2025-03-11 PROCEDURE — 3008F BODY MASS INDEX DOCD: CPT | Mod: CPTII,S$GLB,, | Performed by: INTERNAL MEDICINE

## 2025-03-11 PROCEDURE — 3044F HG A1C LEVEL LT 7.0%: CPT | Mod: CPTII,S$GLB,, | Performed by: INTERNAL MEDICINE

## 2025-03-11 PROCEDURE — 99214 OFFICE O/P EST MOD 30 MIN: CPT | Mod: S$GLB,,, | Performed by: INTERNAL MEDICINE

## 2025-03-11 RX ADMIN — GOSERELIN ACETATE 3.6 MG: 3.6 IMPLANT SUBCUTANEOUS at 09:03

## 2025-03-20 DIAGNOSIS — F41.8 DEPRESSION WITH ANXIETY: ICD-10-CM

## 2025-03-21 RX ORDER — VENLAFAXINE HYDROCHLORIDE 150 MG/1
CAPSULE, EXTENDED RELEASE ORAL
Qty: 30 CAPSULE | Refills: 0 | Status: SHIPPED | OUTPATIENT
Start: 2025-03-21

## 2025-03-22 RX ORDER — CARVEDILOL 12.5 MG/1
12.5 TABLET ORAL 2 TIMES DAILY
Qty: 180 TABLET | Refills: 0 | Status: SHIPPED | OUTPATIENT
Start: 2025-03-22

## 2025-03-24 ENCOUNTER — PATIENT MESSAGE (OUTPATIENT)
Dept: CARDIOLOGY | Facility: CLINIC | Age: 50
End: 2025-03-24
Payer: MEDICARE

## 2025-03-25 NOTE — TELEPHONE ENCOUNTER
Spoke w. pt and updated her on dr Rodriguez's response. I scheduled her for f/u w. Ms Cerrato on 4/2, date she requested, told her to come 30 mn early. She agreed to date/time of appointment(s) and verbalized understanding.

## 2025-03-28 ENCOUNTER — CLINICAL SUPPORT (OUTPATIENT)
Dept: CARDIOLOGY | Facility: HOSPITAL | Age: 50
End: 2025-03-28
Payer: MEDICARE

## 2025-03-28 ENCOUNTER — CLINICAL SUPPORT (OUTPATIENT)
Dept: CARDIOLOGY | Facility: HOSPITAL | Age: 50
End: 2025-03-28
Attending: INTERNAL MEDICINE
Payer: MEDICARE

## 2025-03-28 DIAGNOSIS — R55 SYNCOPE AND COLLAPSE: ICD-10-CM

## 2025-03-28 DIAGNOSIS — Z95.810 PRESENCE OF AUTOMATIC (IMPLANTABLE) CARDIAC DEFIBRILLATOR: ICD-10-CM

## 2025-03-28 PROCEDURE — 93297 REM INTERROG DEV EVAL ICPMS: CPT | Performed by: INTERNAL MEDICINE

## 2025-03-28 PROCEDURE — 93297 REM INTERROG DEV EVAL ICPMS: CPT | Mod: 26,,, | Performed by: INTERNAL MEDICINE

## 2025-03-31 LAB
OHS CV AF BURDEN PERCENT: < 1
OHS CV DC REMOTE DEVICE TYPE: NORMAL
OHS CV RV PACING PERCENT: 0.03 %

## 2025-04-03 DIAGNOSIS — Z17.0 MALIGNANT NEOPLASM OF UPPER-INNER QUADRANT OF RIGHT BREAST IN FEMALE, ESTROGEN RECEPTOR POSITIVE: ICD-10-CM

## 2025-04-03 DIAGNOSIS — C50.211 MALIGNANT NEOPLASM OF UPPER-INNER QUADRANT OF RIGHT BREAST IN FEMALE, ESTROGEN RECEPTOR POSITIVE: ICD-10-CM

## 2025-04-03 DIAGNOSIS — C79.51 MALIGNANT NEOPLASM METASTATIC TO BONE: ICD-10-CM

## 2025-04-07 ENCOUNTER — LAB VISIT (OUTPATIENT)
Dept: LAB | Facility: HOSPITAL | Age: 50
End: 2025-04-07
Attending: INTERNAL MEDICINE
Payer: MEDICARE

## 2025-04-07 ENCOUNTER — INFUSION (OUTPATIENT)
Dept: INFUSION THERAPY | Facility: HOSPITAL | Age: 50
End: 2025-04-07
Attending: STUDENT IN AN ORGANIZED HEALTH CARE EDUCATION/TRAINING PROGRAM
Payer: MEDICARE

## 2025-04-07 ENCOUNTER — OFFICE VISIT (OUTPATIENT)
Dept: HEMATOLOGY/ONCOLOGY | Facility: CLINIC | Age: 50
End: 2025-04-07
Payer: MEDICARE

## 2025-04-07 VITALS
HEART RATE: 105 BPM | HEIGHT: 64 IN | DIASTOLIC BLOOD PRESSURE: 70 MMHG | RESPIRATION RATE: 17 BRPM | WEIGHT: 293 LBS | TEMPERATURE: 97 F | SYSTOLIC BLOOD PRESSURE: 133 MMHG | OXYGEN SATURATION: 98 % | BODY MASS INDEX: 50.02 KG/M2

## 2025-04-07 DIAGNOSIS — C79.51 MALIGNANT NEOPLASM METASTATIC TO BONE: ICD-10-CM

## 2025-04-07 DIAGNOSIS — I10 BENIGN ESSENTIAL HTN: ICD-10-CM

## 2025-04-07 DIAGNOSIS — C50.211 MALIGNANT NEOPLASM OF UPPER-INNER QUADRANT OF RIGHT BREAST IN FEMALE, ESTROGEN RECEPTOR POSITIVE: ICD-10-CM

## 2025-04-07 DIAGNOSIS — E78.2 MIXED HYPERLIPIDEMIA: ICD-10-CM

## 2025-04-07 DIAGNOSIS — C50.211 MALIGNANT NEOPLASM OF UPPER-INNER QUADRANT OF RIGHT BREAST IN FEMALE, ESTROGEN RECEPTOR POSITIVE: Primary | ICD-10-CM

## 2025-04-07 DIAGNOSIS — D56.1 BETA-THALASSEMIA: ICD-10-CM

## 2025-04-07 DIAGNOSIS — I42.8 NON-ISCHEMIC CARDIOMYOPATHY: ICD-10-CM

## 2025-04-07 DIAGNOSIS — T45.1X5A ANEMIA ASSOCIATED WITH CHEMOTHERAPY: ICD-10-CM

## 2025-04-07 DIAGNOSIS — Z17.0 MALIGNANT NEOPLASM OF UPPER-INNER QUADRANT OF RIGHT BREAST IN FEMALE, ESTROGEN RECEPTOR POSITIVE: Primary | ICD-10-CM

## 2025-04-07 DIAGNOSIS — D64.81 ANEMIA ASSOCIATED WITH CHEMOTHERAPY: ICD-10-CM

## 2025-04-07 DIAGNOSIS — Z17.0 MALIGNANT NEOPLASM OF UPPER-INNER QUADRANT OF RIGHT BREAST IN FEMALE, ESTROGEN RECEPTOR POSITIVE: ICD-10-CM

## 2025-04-07 DIAGNOSIS — G93.2 IDIOPATHIC INTRACRANIAL HYPERTENSION: ICD-10-CM

## 2025-04-07 DIAGNOSIS — Z95.810 IMPLANTABLE CARDIOVERTER-DEFIBRILLATOR (ICD) IN SITU: ICD-10-CM

## 2025-04-07 LAB
ABSOLUTE EOSINOPHIL (OHS): 0.1 K/UL
ABSOLUTE MONOCYTE (OHS): 0.64 K/UL (ref 0.3–1)
ABSOLUTE NEUTROPHIL COUNT (OHS): 3.65 K/UL (ref 1.8–7.7)
ALBUMIN SERPL BCP-MCNC: 2.9 G/DL (ref 3.5–5.2)
ALP SERPL-CCNC: 120 UNIT/L (ref 40–150)
ALT SERPL W/O P-5'-P-CCNC: 14 UNIT/L (ref 10–44)
ANION GAP (OHS): 7 MMOL/L (ref 8–16)
AST SERPL-CCNC: 12 UNIT/L (ref 11–45)
BASOPHILS # BLD AUTO: 0.12 K/UL
BASOPHILS NFR BLD AUTO: 2 %
BILIRUB SERPL-MCNC: 0.3 MG/DL (ref 0.1–1)
BUN SERPL-MCNC: 10 MG/DL (ref 6–20)
CALCIUM SERPL-MCNC: 9.9 MG/DL (ref 8.7–10.5)
CHLORIDE SERPL-SCNC: 104 MMOL/L (ref 95–110)
CO2 SERPL-SCNC: 26 MMOL/L (ref 23–29)
CREAT SERPL-MCNC: 0.8 MG/DL (ref 0.5–1.4)
ERYTHROCYTE [DISTWIDTH] IN BLOOD BY AUTOMATED COUNT: 17.4 % (ref 11.5–14.5)
GFR SERPLBLD CREATININE-BSD FMLA CKD-EPI: >60 ML/MIN/1.73/M2
GLUCOSE SERPL-MCNC: 128 MG/DL (ref 70–110)
HCT VFR BLD AUTO: 37.1 % (ref 37–48.5)
HGB BLD-MCNC: 11.2 GM/DL (ref 12–16)
IMM GRANULOCYTES # BLD AUTO: 0.02 K/UL (ref 0–0.04)
IMM GRANULOCYTES NFR BLD AUTO: 0.3 % (ref 0–0.5)
LYMPHOCYTES # BLD AUTO: 1.33 K/UL (ref 1–4.8)
MCH RBC QN AUTO: 26.5 PG (ref 27–31)
MCHC RBC AUTO-ENTMCNC: 30.2 G/DL (ref 32–36)
MCV RBC AUTO: 88 FL (ref 82–98)
NUCLEATED RBC (/100WBC) (OHS): 0 /100 WBC
PLATELET # BLD AUTO: 297 K/UL (ref 150–450)
PMV BLD AUTO: 9.8 FL (ref 9.2–12.9)
POTASSIUM SERPL-SCNC: 4.1 MMOL/L (ref 3.5–5.1)
PROT SERPL-MCNC: 7.8 GM/DL (ref 6–8.4)
RBC # BLD AUTO: 4.22 M/UL (ref 4–5.4)
RELATIVE EOSINOPHIL (OHS): 1.7 %
RELATIVE LYMPHOCYTE (OHS): 22.7 % (ref 18–48)
RELATIVE MONOCYTE (OHS): 10.9 % (ref 4–15)
RELATIVE NEUTROPHIL (OHS): 62.4 % (ref 38–73)
SODIUM SERPL-SCNC: 137 MMOL/L (ref 136–145)
WBC # BLD AUTO: 5.86 K/UL (ref 3.9–12.7)

## 2025-04-07 PROCEDURE — 99215 OFFICE O/P EST HI 40 MIN: CPT | Mod: S$GLB,,, | Performed by: INTERNAL MEDICINE

## 2025-04-07 PROCEDURE — 3078F DIAST BP <80 MM HG: CPT | Mod: CPTII,S$GLB,, | Performed by: INTERNAL MEDICINE

## 2025-04-07 PROCEDURE — 63600175 PHARM REV CODE 636 W HCPCS: Mod: JZ,TB | Performed by: INTERNAL MEDICINE

## 2025-04-07 PROCEDURE — 85025 COMPLETE CBC W/AUTO DIFF WBC: CPT

## 2025-04-07 PROCEDURE — 96402 CHEMO HORMON ANTINEOPL SQ/IM: CPT

## 2025-04-07 PROCEDURE — 99999 PR PBB SHADOW E&M-EST. PATIENT-LVL III: CPT | Mod: PBBFAC,,, | Performed by: INTERNAL MEDICINE

## 2025-04-07 PROCEDURE — 82040 ASSAY OF SERUM ALBUMIN: CPT

## 2025-04-07 PROCEDURE — 3008F BODY MASS INDEX DOCD: CPT | Mod: CPTII,S$GLB,, | Performed by: INTERNAL MEDICINE

## 2025-04-07 PROCEDURE — 3075F SYST BP GE 130 - 139MM HG: CPT | Mod: CPTII,S$GLB,, | Performed by: INTERNAL MEDICINE

## 2025-04-07 PROCEDURE — 1159F MED LIST DOCD IN RCRD: CPT | Mod: CPTII,S$GLB,, | Performed by: INTERNAL MEDICINE

## 2025-04-07 PROCEDURE — 3044F HG A1C LEVEL LT 7.0%: CPT | Mod: CPTII,S$GLB,, | Performed by: INTERNAL MEDICINE

## 2025-04-07 PROCEDURE — 36415 COLL VENOUS BLD VENIPUNCTURE: CPT

## 2025-04-07 PROCEDURE — 1160F RVW MEDS BY RX/DR IN RCRD: CPT | Mod: CPTII,S$GLB,, | Performed by: INTERNAL MEDICINE

## 2025-04-07 PROCEDURE — 4010F ACE/ARB THERAPY RXD/TAKEN: CPT | Mod: CPTII,S$GLB,, | Performed by: INTERNAL MEDICINE

## 2025-04-07 PROCEDURE — G2211 COMPLEX E/M VISIT ADD ON: HCPCS | Mod: S$GLB,,, | Performed by: INTERNAL MEDICINE

## 2025-04-07 RX ADMIN — GOSERELIN ACETATE 3.6 MG: 3.6 IMPLANT SUBCUTANEOUS at 09:04

## 2025-04-07 NOTE — PROGRESS NOTES
Subjective     Patient ID: Kristopher Elmore is a 49 y.o. female.    Chief Complaint: Malignant neoplasm of upper-inner quadrant of right breast     HPI    Presents for follow up of metastatic breast cancer      Appetite is good and gained few lbs-- She is on Mounjaro  Reports sleep remains much better-  Trazodone prn night now and notes not always needed  Good energy level and ambulating well  Pain is ok- ibuprofen prn and does well  Denies any leg pain     Most recent imaging:  - 12/30/2024 PET scan restaging: We have previously reviewed via phone  FINDINGS:  Quality of the study: Adequate.  In the head and neck, there are no hypermetabolic lesions worrisome for malignancy.  In the chest, there are a few mildly hypermetabolic right axillary nodes.  Dominant node measures 2.0 cm short axis with SUV max 4.3 (axial image 73).  Three additional nodes (axial images 57, 60, and 70) are nonenlarged and faintly tracer-avid, for example a right subpectoral node measuring 7 mm with SUV max 2.6 (axial image 57).  There are no concerning pulmonary nodules or masses.  In the abdomen and pelvis, there is physiologic tracer distribution within the abdominal organs and excretion into the genitourinary system.  Mild focal uptake at the anorectal junction, likely also physiologic.  In the bones, there is partially visualized hypermetabolic lucent lesion of the right knee/distal femur.  Numerous non-tracer-avid sclerotic osseous lesions, compatible with treated metastases.  Additional CT findings: Partially visualized ventriculoperitoneal shunt catheter tract.  Left chest wall implantable cardioverter-defibrillator.  Calcified uterine fibroid.  Impression:  Mildly hypermetabolic right axillary adenopathy, which is new from prior FDG PET-CT in 2021.  Hypermetabolic lucent lesion of the right distal femur, incompletely visualized with known metastasis for XRT     In the interval- she did have increased anxiety and depression  noted  This worsened even after review of PET scan results and she worried if after 5 years she had reached a state where Hospice was appropriate  The pain in the right femur also contributed  On review it turns also out that she had run out of her Effexor and as such several doses missed (daughter mentioned today)     Pain is better today- notes pain medications did not really help  Radiation has helped  Can restart Xgeva- received dental clearance     No new pain  Weight down- now stabilized  Factors included prior jaw osteonecrosis and prior pain; also had made changes     Noted to have bilateral conjunctivitis- was taking OTC and had not reported  + drainage     Also, note  Relatively new diagnosis of NYHA Class II HF sx, on GDMT, recent episode of syncope.   - 8/26/2024 ICD placed     Moved into apartment while awaiting home repairs    Review of Systems   Constitutional:  Negative for activity change, appetite change, chills, fatigue, fever and unexpected weight change.   HENT:  Positive for dental problem (better). Negative for mouth sores, rhinorrhea and trouble swallowing.    Eyes:  Negative for visual disturbance.   Respiratory:  Negative for cough, chest tightness, shortness of breath and wheezing.    Cardiovascular:  Negative for chest pain, palpitations and leg swelling.   Gastrointestinal:  Negative for abdominal distention, abdominal pain, blood in stool, change in bowel habit, constipation, diarrhea, nausea, vomiting and reflux.   Genitourinary:  Negative for difficulty urinating, dysuria, frequency and urgency.   Musculoskeletal:  Negative for arthralgias, back pain, gait problem, joint swelling, myalgias and joint deformity.   Integumentary:  Negative for rash.   Neurological:  Negative for dizziness, weakness, light-headedness, numbness, headaches and coordination difficulties.   Hematological:  Negative for adenopathy. Does not bruise/bleed easily.   Psychiatric/Behavioral:  Negative for dysphoric  mood and sleep disturbance. The patient is not nervous/anxious.           Objective     Physical Exam  Vitals and nursing note reviewed.   Constitutional:       General: She is not in acute distress.     Appearance: Normal appearance. She is obese. She is not ill-appearing.      Comments: Presents alone  Very pleasant  ECOG= 0   HENT:      Head: Normocephalic and atraumatic.      Mouth/Throat:      Comments: Jaw exposure-- osteonecrosis  Eyes:      General: No scleral icterus.     Extraocular Movements: Extraocular movements intact.      Conjunctiva/sclera: Conjunctivae normal.      Pupils: Pupils are equal, round, and reactive to light.   Cardiovascular:      Rate and Rhythm: Normal rate and regular rhythm.      Heart sounds: Normal heart sounds. No murmur heard.     No friction rub. No gallop.   Pulmonary:      Effort: Pulmonary effort is normal. No respiratory distress.      Breath sounds: Normal breath sounds. No wheezing, rhonchi or rales.   Chest:      Chest wall: No tenderness.   Abdominal:      General: Abdomen is flat. Bowel sounds are normal. There is no distension.      Palpations: Abdomen is soft. There is no mass.      Tenderness: There is no abdominal tenderness. There is no guarding or rebound.      Comments: No palpable masses   Musculoskeletal:         General: No swelling or tenderness.      Cervical back: Normal range of motion and neck supple. No tenderness.      Right lower leg: No edema.      Left lower leg: No edema.      Comments: Point tenderness in cervical, thoracic and lumbar spine. Also with cva tenderness today   Lymphadenopathy:      Cervical: No cervical adenopathy.   Skin:     General: Skin is warm and dry.      Coloration: Skin is not jaundiced.      Findings: No bruising or rash.   Neurological:      General: No focal deficit present.      Mental Status: She is alert and oriented to person, place, and time. Mental status is at baseline.      Sensory: No sensory deficit.      Motor:  No weakness.      Coordination: Coordination normal.      Gait: Gait normal.   Psychiatric:         Mood and Affect: Mood normal.         Behavior: Behavior normal.         Thought Content: Thought content normal.         Judgment: Judgment normal.     Labs- reviewed       Assessment and Plan     1. Malignant neoplasm of upper-inner quadrant of right breast in female, estrogen receptor positive    2. Bone metastases  Overview:  IMO Regualtory Update 4/1/23      3. Idiopathic intracranial hypertension    4. Benign essential HTN    5. Mixed hyperlipidemia    6. Implantable cardioverter-defibrillator (ICD) in situ    7. Non-ischemic cardiomyopathy    8. Beta-thalassemia      Metastatic Breast Cancer  Clinically doing well  Continue current therapy- Arimidex and Ibrance, Zoladex  Xgeva- held with osteonecrosis- question if cleared by oral surgeon though no letter received- will wait for letter and restart if benefit > risk  No further pain post XRT     CHF. HTN, Hyperlipidemia  Sees Cardio Onc  ICD in place     Mild anemia  B thal  Stable parameters     code applied: patient requires or will require a continuous, longitudinal, and active collaborative plan of care related to this patient's health condition, breast cancer --the management of which requires the direction of a practitioner with specialized clinical knowledge, skill, and expertise.       Route Chart for Scheduling    Med Onc Chart Routing      Follow up with physician . PET prior to next visit   Follow up with [facility]    Infusion scheduling note    Injection scheduling note    Labs    Imaging    Pharmacy appointment    Other referrals                  Treatment Plan Information   OP ANASTROZOLE PALBOCICLIB Q4W Phyllis Lei MD   Associated diagnosis: Malignant neoplasm of upper-inner quadrant of right breast in female, estrogen receptor positive Stage IV cT1c(2), cN1(f), cM1, G3, ER+, ND-, HER2- noted on 10/7/2019   Line of treatment: First  Line  Treatment Goal: Palliative     Upcoming Treatment Dates - OP ANASTROZOLE PALBOCICLIB Q4W    No upcoming days in selected categories.    Supportive Plan Information  OP BREAST GOSERELIN Q4W Phyllis Lei MD   Associated Diagnosis: Malignant neoplasm of upper-inner quadrant of right breast in female, estrogen receptor positive Stage IV cT1c(2), cN1(f), cM1, G3, ER+, MT-, HER2- noted on 10/7/2019   Line of treatment: Supportive Care   Treatment goal: Supportive     Upcoming Treatment Dates - OP BREAST GOSERELIN Q4W    5/1/2025       Chemotherapy       goserelin (ZOLADEX) injection 3.6 mg  5/29/2025       Chemotherapy       goserelin (ZOLADEX) injection 3.6 mg  6/26/2025       Chemotherapy       goserelin (ZOLADEX) injection 3.6 mg  7/24/2025       Chemotherapy       goserelin (ZOLADEX) injection 3.6 mg

## 2025-04-19 DIAGNOSIS — C50.211 MALIGNANT NEOPLASM OF UPPER-INNER QUADRANT OF RIGHT BREAST IN FEMALE, ESTROGEN RECEPTOR POSITIVE: ICD-10-CM

## 2025-04-19 DIAGNOSIS — Z17.0 MALIGNANT NEOPLASM OF UPPER-INNER QUADRANT OF RIGHT BREAST IN FEMALE, ESTROGEN RECEPTOR POSITIVE: ICD-10-CM

## 2025-04-19 DIAGNOSIS — R11.0 NAUSEA: ICD-10-CM

## 2025-04-19 DIAGNOSIS — G89.3 CANCER RELATED PAIN: ICD-10-CM

## 2025-04-20 DIAGNOSIS — F41.8 DEPRESSION WITH ANXIETY: ICD-10-CM

## 2025-04-20 RX ORDER — CARVEDILOL 12.5 MG/1
12.5 TABLET ORAL 2 TIMES DAILY
Qty: 180 TABLET | Refills: 0 | Status: SHIPPED | OUTPATIENT
Start: 2025-04-20

## 2025-04-21 RX ORDER — ONDANSETRON 8 MG/1
TABLET, ORALLY DISINTEGRATING ORAL
Qty: 30 TABLET | Refills: 2 | Status: SHIPPED | OUTPATIENT
Start: 2025-04-21

## 2025-04-21 RX ORDER — ANASTROZOLE 1 MG/1
TABLET ORAL
Qty: 90 TABLET | Refills: 0 | Status: SHIPPED | OUTPATIENT
Start: 2025-04-21

## 2025-04-21 RX ORDER — VENLAFAXINE HYDROCHLORIDE 150 MG/1
CAPSULE, EXTENDED RELEASE ORAL
Qty: 30 CAPSULE | Refills: 2 | Status: SHIPPED | OUTPATIENT
Start: 2025-04-21

## 2025-04-21 RX ORDER — LIDOCAINE 50 MG/G
PATCH TOPICAL
Qty: 60 PATCH | Refills: 2 | Status: SHIPPED | OUTPATIENT
Start: 2025-04-21

## 2025-04-22 DIAGNOSIS — F41.8 DEPRESSION WITH ANXIETY: ICD-10-CM

## 2025-04-22 RX ORDER — VENLAFAXINE 75 MG/1
75 TABLET ORAL DAILY
Qty: 30 TABLET | Refills: 1 | Status: SHIPPED | OUTPATIENT
Start: 2025-04-22

## 2025-04-27 DIAGNOSIS — G89.3 CANCER ASSOCIATED PAIN: ICD-10-CM

## 2025-04-28 RX ORDER — IBUPROFEN 800 MG/1
800 TABLET ORAL 2 TIMES DAILY PRN
Qty: 45 TABLET | Refills: 2 | Status: SHIPPED | OUTPATIENT
Start: 2025-04-28

## 2025-05-01 ENCOUNTER — TELEPHONE (OUTPATIENT)
Dept: HEMATOLOGY/ONCOLOGY | Facility: CLINIC | Age: 50
End: 2025-05-01
Payer: MEDICARE

## 2025-05-02 ENCOUNTER — HOSPITAL ENCOUNTER (OUTPATIENT)
Dept: RADIOLOGY | Facility: HOSPITAL | Age: 50
Discharge: HOME | End: 2025-05-02
Attending: INTERNAL MEDICINE
Payer: MEDICARE

## 2025-05-02 DIAGNOSIS — C50.211 MALIGNANT NEOPLASM OF UPPER-INNER QUADRANT OF RIGHT BREAST IN FEMALE, ESTROGEN RECEPTOR POSITIVE: ICD-10-CM

## 2025-05-02 DIAGNOSIS — Z17.0 MALIGNANT NEOPLASM OF UPPER-INNER QUADRANT OF RIGHT BREAST IN FEMALE, ESTROGEN RECEPTOR POSITIVE: ICD-10-CM

## 2025-05-02 LAB — POCT GLUCOSE: 134 MG/DL (ref 70–110)

## 2025-05-02 PROCEDURE — A9552 F18 FDG: HCPCS | Performed by: INTERNAL MEDICINE

## 2025-05-02 PROCEDURE — 78815 PET IMAGE W/CT SKULL-THIGH: CPT | Mod: TC

## 2025-05-02 PROCEDURE — 78815 PET IMAGE W/CT SKULL-THIGH: CPT | Mod: 26,PS,, | Performed by: NUCLEAR MEDICINE

## 2025-05-02 RX ORDER — FLUDEOXYGLUCOSE F18 500 MCI/ML
11.44 INJECTION INTRAVENOUS
Status: COMPLETED | OUTPATIENT
Start: 2025-05-02 | End: 2025-05-02

## 2025-05-02 RX ADMIN — FLUDEOXYGLUCOSE F-18 11.44 MILLICURIE: 500 INJECTION INTRAVENOUS at 08:05

## (undated) DEVICE — DRESSING TRANS 4X4 TEGADERM

## (undated) DEVICE — KIT ANTIFOG W/SPONG & FLUID

## (undated) DEVICE — SLING SWATHE UNIVERSAL FOAM

## (undated) DEVICE — SUT SILK BLK BR. 2 2-60

## (undated) DEVICE — VISIPAQUE CONTRAST 320MG/100ML

## (undated) DEVICE — SHEATH PRELUDE 9X13CM SNAP

## (undated) DEVICE — NDL HYPO REG 25G X 1 1/2

## (undated) DEVICE — TROCAR ENDOPATH XCEL 5MM 7.5CM

## (undated) DEVICE — STYLET AXIEM 23CM SINGLE COIL

## (undated) DEVICE — ELECTRODE REM PLYHSV RETURN 9

## (undated) DEVICE — SUT SILK 2-0 SH 18IN BLACK

## (undated) DEVICE — SUT VICRYL PLUS 0 CT1 18IN

## (undated) DEVICE — PAD DEFIB CADENCE ADULT R2

## (undated) DEVICE — CATH PASSER DISPOSABLE

## (undated) DEVICE — DRAPE INCISE IOBAN 2 23X17IN

## (undated) DEVICE — BIT DRILL PERFORATOR DISP 14MM

## (undated) DEVICE — SUT 4/0 18IN NUROLON BLK B

## (undated) DEVICE — DIFFUSER

## (undated) DEVICE — SUT 2-0 12-18IN SILK

## (undated) DEVICE — BANDAGE ADHESIVE PLASTIC 7/8IN

## (undated) DEVICE — SUT MONOCRYL 4-0 PS-1 UND

## (undated) DEVICE — NDL PERCUTANOUS 9CM X 18GA

## (undated) DEVICE — SPRAY MASTISOL

## (undated) DEVICE — DRAPE EENT SPLIT STERILE

## (undated) DEVICE — SUT GUT PL. 4-0 27 FS-2

## (undated) DEVICE — ELECTRODE BLADE INSULATED 1 IN

## (undated) DEVICE — ADHESIVE DERMABOND ADVANCED

## (undated) DEVICE — TUBING INSUFFLATION HEATED

## (undated) DEVICE — GOWN SMART IMP BREATHABLE XXLG

## (undated) DEVICE — BRA POST SURGICAL WHT 48-50IN

## (undated) DEVICE — STRIP MEDI WND CLSR 1/4X3IN

## (undated) DEVICE — TUBE FRAZIER 5MM 2FT SOFT TIP

## (undated) DEVICE — SUT VICRYL PLUS 3-0 SH 18IN

## (undated) DEVICE — TRACKER PATIENT NON INVASIVE

## (undated) DEVICE — TAPE SURG MEDIPORE 6X72IN

## (undated) DEVICE — CARTRIDGE OIL

## (undated) DEVICE — TRAY LUMBAR PUNCTURE ADULT

## (undated) DEVICE — TROCAR ENDOPATH XCEL 15MM 10CM

## (undated) DEVICE — PAD CURAD NONADH 3X4IN

## (undated) DEVICE — PASSER CATH SHORT 55 CM

## (undated) DEVICE — CORD BIPOLAR 12 FOOT

## (undated) DEVICE — TRAY NEURO OMC

## (undated) DEVICE — MARKER SKIN STND TIP BLUE BARR

## (undated) DEVICE — DURAPREP SURG SCRUB 26ML

## (undated) DEVICE — SUT CTD VICRYL 0 UND BR

## (undated) DEVICE — TUBING HF INSUFFLATION W/ FLTR

## (undated) DEVICE — INTRODUCER PRELUDESNAP 7F 13CM

## (undated) DEVICE — STAPLER SKIN PROXIMATE WIDE

## (undated) DEVICE — PACK PACER PERMANENT OMC

## (undated) DEVICE — DRESSING TELFA N ADH 3X8

## (undated) DEVICE — POINTER AXIEM CRANIAL TRACER

## (undated) DEVICE — BURR ACORN 7.5MM